# Patient Record
Sex: MALE | Race: BLACK OR AFRICAN AMERICAN | NOT HISPANIC OR LATINO | Employment: OTHER | URBAN - METROPOLITAN AREA
[De-identification: names, ages, dates, MRNs, and addresses within clinical notes are randomized per-mention and may not be internally consistent; named-entity substitution may affect disease eponyms.]

---

## 2017-06-14 LAB — HCV AB SER-ACNC: NEGATIVE

## 2019-01-29 ENCOUNTER — LAB VISIT (OUTPATIENT)
Dept: LAB | Facility: HOSPITAL | Age: 54
End: 2019-01-29
Attending: FAMILY MEDICINE
Payer: MEDICARE

## 2019-01-29 ENCOUNTER — OFFICE VISIT (OUTPATIENT)
Dept: FAMILY MEDICINE | Facility: CLINIC | Age: 54
End: 2019-01-29
Payer: MEDICARE

## 2019-01-29 VITALS
HEIGHT: 69 IN | OXYGEN SATURATION: 99 % | TEMPERATURE: 98 F | HEART RATE: 75 BPM | SYSTOLIC BLOOD PRESSURE: 122 MMHG | WEIGHT: 168.88 LBS | BODY MASS INDEX: 25.01 KG/M2 | DIASTOLIC BLOOD PRESSURE: 80 MMHG

## 2019-01-29 DIAGNOSIS — E78.00 PURE HYPERCHOLESTEROLEMIA: ICD-10-CM

## 2019-01-29 DIAGNOSIS — N18.5 BENIGN HYPERTENSION WITH CHRONIC KIDNEY DISEASE, STAGE V: ICD-10-CM

## 2019-01-29 DIAGNOSIS — I10 ESSENTIAL (PRIMARY) HYPERTENSION: ICD-10-CM

## 2019-01-29 DIAGNOSIS — N18.6 ESRD ON HEMODIALYSIS: ICD-10-CM

## 2019-01-29 DIAGNOSIS — I12.0 BENIGN HYPERTENSION WITH CHRONIC KIDNEY DISEASE, STAGE V: ICD-10-CM

## 2019-01-29 DIAGNOSIS — E11.9 CONTROLLED TYPE 2 DIABETES MELLITUS WITHOUT COMPLICATION, WITHOUT LONG-TERM CURRENT USE OF INSULIN: ICD-10-CM

## 2019-01-29 DIAGNOSIS — Z99.2 ESRD ON HEMODIALYSIS: ICD-10-CM

## 2019-01-29 DIAGNOSIS — E11.9 CONTROLLED TYPE 2 DIABETES MELLITUS WITHOUT COMPLICATION, WITHOUT LONG-TERM CURRENT USE OF INSULIN: Primary | ICD-10-CM

## 2019-01-29 LAB
ALBUMIN SERPL BCP-MCNC: 3.4 G/DL
ALP SERPL-CCNC: 78 U/L
ALT SERPL W/O P-5'-P-CCNC: 7 U/L
ANION GAP SERPL CALC-SCNC: 16 MMOL/L
AST SERPL-CCNC: 11 U/L
BILIRUB SERPL-MCNC: 0.3 MG/DL
BUN SERPL-MCNC: 32 MG/DL
CALCIUM SERPL-MCNC: 8.8 MG/DL
CHLORIDE SERPL-SCNC: 98 MMOL/L
CHOLEST SERPL-MCNC: 241 MG/DL
CHOLEST/HDLC SERPL: 3.8 {RATIO}
CO2 SERPL-SCNC: 26 MMOL/L
CREAT SERPL-MCNC: 9.7 MG/DL
ERYTHROCYTE [DISTWIDTH] IN BLOOD BY AUTOMATED COUNT: 15.9 %
EST. GFR  (AFRICAN AMERICAN): 6.3 ML/MIN/1.73 M^2
EST. GFR  (NON AFRICAN AMERICAN): 5.4 ML/MIN/1.73 M^2
ESTIMATED AVG GLUCOSE: 128 MG/DL
GLUCOSE SERPL-MCNC: 214 MG/DL
HBA1C MFR BLD HPLC: 6.1 %
HCT VFR BLD AUTO: 41 %
HDLC SERPL-MCNC: 63 MG/DL
HDLC SERPL: 26.1 %
HGB BLD-MCNC: 12.2 G/DL
LDLC SERPL CALC-MCNC: 148.6 MG/DL
MCH RBC QN AUTO: 29.4 PG
MCHC RBC AUTO-ENTMCNC: 29.8 G/DL
MCV RBC AUTO: 99 FL
NONHDLC SERPL-MCNC: 178 MG/DL
PLATELET # BLD AUTO: 275 K/UL
PMV BLD AUTO: 10.3 FL
POTASSIUM SERPL-SCNC: 4.2 MMOL/L
PROT SERPL-MCNC: 8.5 G/DL
RBC # BLD AUTO: 4.15 M/UL
SODIUM SERPL-SCNC: 140 MMOL/L
TRIGL SERPL-MCNC: 147 MG/DL
WBC # BLD AUTO: 6.92 K/UL

## 2019-01-29 PROCEDURE — 99999 PR PBB SHADOW E&M-NEW PATIENT-LVL III: ICD-10-PCS | Mod: PBBFAC,,, | Performed by: FAMILY MEDICINE

## 2019-01-29 PROCEDURE — 99203 OFFICE O/P NEW LOW 30 MIN: CPT | Mod: PBBFAC,PO | Performed by: FAMILY MEDICINE

## 2019-01-29 PROCEDURE — 80053 COMPREHEN METABOLIC PANEL: CPT

## 2019-01-29 PROCEDURE — 83036 HEMOGLOBIN GLYCOSYLATED A1C: CPT

## 2019-01-29 PROCEDURE — 99204 PR OFFICE/OUTPT VISIT, NEW, LEVL IV, 45-59 MIN: ICD-10-PCS | Mod: S$PBB,,, | Performed by: FAMILY MEDICINE

## 2019-01-29 PROCEDURE — 99999 PR PBB SHADOW E&M-NEW PATIENT-LVL III: CPT | Mod: PBBFAC,,, | Performed by: FAMILY MEDICINE

## 2019-01-29 PROCEDURE — 36415 COLL VENOUS BLD VENIPUNCTURE: CPT | Mod: PO

## 2019-01-29 PROCEDURE — 85027 COMPLETE CBC AUTOMATED: CPT

## 2019-01-29 PROCEDURE — 80061 LIPID PANEL: CPT

## 2019-01-29 PROCEDURE — 99204 OFFICE O/P NEW MOD 45 MIN: CPT | Mod: S$PBB,,, | Performed by: FAMILY MEDICINE

## 2019-01-29 RX ORDER — HYDRALAZINE HYDROCHLORIDE 50 MG/1
50 TABLET, FILM COATED ORAL 3 TIMES DAILY
COMMUNITY
End: 2019-01-29

## 2019-01-29 RX ORDER — INSULIN GLARGINE 100 [IU]/ML
INJECTION, SOLUTION SUBCUTANEOUS
COMMUNITY
End: 2019-01-29 | Stop reason: SDUPTHER

## 2019-01-29 NOTE — LETTER
January 31, 2019      Lily May NP  [address] [place] [address]  [address]  [place] [address]           [facility]  [address] [facility] [place] [address]  Phone: [phone]  Fax: [phone]          Patient: Adryan Neff   MR Number: [id number]   YOB: 1965   Date of Visit: 1/29/2019       Dear Lily May:    Thank you for referring Adryan Neff to me for evaluation. Attached you will find relevant portions of my assessment and plan of care.    If you have questions, please do not hesitate to call me. I look forward to following Adryan Neff along with you.    Sincerely,    Soren Rice MD    Enclosure  CC:  No Recipients    If you would like to receive this communication electronically, please contact [email or web address] or [phone] to request more information on [employer] Link access.    For providers and/or their staff who would like to refer a patient to [facility], please contact us through our one-stop-shop provider referral line, [facility] , at [phone].    If you feel you have received this communication in error or would no longer like to receive these types of communications, please e-mail [email or web address]

## 2019-01-29 NOTE — PROGRESS NOTES
Office Visit    Patient Name: Adryan Neff    : 1965  MRN: [id number]      Assessment/Plan:  Adryan eNff is a 53 y.o. male who presents today for :    Controlled type 2 diabetes mellitus without complication, without long-term current use of insulin  -     Hemoglobin A1c; Future; Expected date: 2019  -     CBC Without Differential; Future; Expected date: 2019  -     Comprehensive metabolic panel; Future; Expected date: 2019  -     Lipid panel; Future; Expected date: 2019  -     Microalbumin/creatinine urine ratio; Future; Expected date: 2019  -     Urinalysis Microscopic; Future; Expected date: 2019  -     insulin (LANTUS SOLOSTAR U-100 INSULIN) glargine 100 units/mL (3mL) SubQ pen; Inject 15 Units into the skin every evening.  Dispense: 3 mL; Refill: 4  -     blood sugar diagnostic Strp; 1 each by Misc.(Non-Drug; Combo Route) route 2 hours after meals and at bedtime.  Dispense: 200 each; Refill: 10  -     Ambulatory referral to Optometry  -previous A1c reviewed:   Lab Results   Component Value Date    HGBA1C 6.1 (H) 2019     -controlled, continue current medication regimen  -d/w patient about the need for regular eye care, skin care, daily foot exam, proper nutrition, daily BG checks at home (fasting and 2 hrs pp) and medication compliance in a diabetic.  Target morning BS  and after meal -180  -discussed weight loss and regular physical excercise  -caution on hypoglycemia and eating small cracker or small cup of juice if pt feels faint/dizzy/weak, and call doctor or go to ER.  -f/u in 3 months.    Essential (primary) hypertension  -     lisinopril (PRINIVIL,ZESTRIL) 2.5 MG tablet; Take 1 tablet (2.5 mg total) by mouth once daily.  Dispense: 90 tablet; Refill: 3  -     hydrALAZINE (APRESOLINE) 50 MG tablet; Take 1 tablet (50 mg total) by mouth 3 (three) times daily.  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  Dispense: 90 tablet; Refill: 3        Follow-up in about 3 months (around 4/29/2019).     This note was created by combination of typed  and Dragon dictation.  Transcription errors may be present.  If there are any questions, please contact me.    At least 45 minutes were spent today with the patient in the office, which more than half the time was spent in counseling regarding management as above, as well as answering questions and addressing patient's concerns. Pt voices understanding of what was discussed and has no other questions at this time.      ----------------------------------------------------------------------------------------------------------------------      HPI:  Patient Care Team:  Soren Rice MD as PCP - General (Family Medicine)    Adryan is a 53 y.o. male with      Patient Active Problem List   Diagnosis    Controlled type 2 diabetes mellitus without complication, without long-term current use of insulin    Essential (primary) hypertension    ESRD on hemodialysis    Pure hypercholesterolemia     This patient is new to me     Patient presents today for f/u :  Establish Care and Diabetes      DM - Dx'd over 18 years ago, last saw prior PCP was over 6 months ago. His current regimen is only Lantus 15 unit nightly, patient is compliant, no side effects   He has not checked FBG in quite some time as he no longer has a glucometer - requests for new testing supplies today. He denies any polyuria/polydipsia. No foot numbness/tingling/vision changes/hypoglycemia. Labs obtained today shows good control of DM. He does shave ESRD on HD M,W,F - still makes some urine.      Hemoglobin A1C   Date Value Ref Range Status   01/29/2019 6.1 (H) 4.0 -  5.6 % Final     Comment:     ADA Screening Guidelines:  5.7-6.4%  Consistent with prediabetes  >or=6.5%  Consistent with diabetes  High levels of fetal hemoglobin interfere with the HbA1C  assay. Heterozygous hemoglobin variants (HbS, HgC, etc)do  not significantly interfere with this assay.   However, presence of multiple variants may affect accuracy.         HTN - compliant with meds as prescribed, denies any side effects.  No BP logs at home. No vision changes/urinary changes/leg swelling.         Additional ROS    No F/C/wt changes/fatigue  No dysphagia/sore throat  No CP/WALLIS/palpitations/swelling  No cough/wheezing/SOB  No nausea/vomiting/abd pain/no diarrhea, no constipation, no blood in stool  No muscle aches/joint pain  No rashes  No weakness/HA/tingling/numbness  No anxiety/depression  No dysuria/hematuria      Patient Active Problem List   Diagnosis    Controlled type 2 diabetes mellitus without complication, without long-term current use of insulin    Essential (primary) hypertension    ESRD on hemodialysis    Pure hypercholesterolemia       Current Medications  Medications reviewed/updated.     No current outpatient medications on file prior to visit.     No current facility-administered medications on file prior to visit.            History reviewed. No pertinent surgical history.    History reviewed. No pertinent family history.    Social History     Socioeconomic History    Marital status: Single     Spouse name: Not on file    Number of children: Not on file    Years of education: Not on file    Highest education level: Not on file   Social Needs    Financial resource strain: Not on file    Food insecurity - worry: Not on file    Food insecurity - inability: Not on file    Transportation needs - medical: Not on file    Transportation needs - non-medical: Not on file   Occupational History    Not on file   Tobacco Use    Smoking status: Never Smoker    Smokeless tobacco: Never Used  "  Substance and Sexual Activity    Alcohol use: No     Frequency: Never    Drug use: No    Sexual activity: Not on file   Other Topics Concern    Not on file   Social History Narrative    Not on file             Allergies   Review of patient's allergies indicates:  No Known Allergies          Review of Systems  See HPI      Physical Exam  /80   Pulse 75   Temp 98 °F (36.7 °C) (Oral)   Ht 5' 9" (1.753 m)   Wt 76.6 kg (168 lb 14 oz)   SpO2 99%   BMI 24.94 kg/m²     GEN: NAD, well developed, pleasant, well nourished  HEENT: NCAT, PERRLA, EOMI, sclera clear, anicteric, O/P clear, MMM with no lesions  NECK: normal, supple with midline trachea, no LAD, no thyromegaly  LUNGS: CTAB, no w/r/r, no increased work of breathing   HEART: RRR, normal S1 and S2, no m/r/g, no edema  ABD: s/nt/nd, NABS  SKIN: normal turgor, no rashes  PSYCH: AOx3, appropriate mood and affect  MSK: warm/well perfused, normal ROM in all extremities, no c/c/e.  FOOT:  Protective Sensation (w/ 10 gram monofilament):  Right: Intact  Left: Intact    Visual Inspection:  Normal -  Bilateral    Pedal Pulses:   Right: Present  Left: Present    Posterior tibialis:   Right:Present  Left: Present          Labs  Lab Results   Component Value Date    HGBA1C 6.1 (H) 01/29/2019     Lab Results   Component Value Date     01/29/2019    K 4.2 01/29/2019    CL 98 01/29/2019    CO2 26 01/29/2019    BUN 32 (H) 01/29/2019    CREATININE 9.7 (H) 01/29/2019    CALCIUM 8.8 01/29/2019    ANIONGAP 16 01/29/2019    ESTGFRAFRICA 6.3 (A) 01/29/2019    EGFRNONAA 5.4 (A) 01/29/2019     Lab Results   Component Value Date    CHOL 241 (H) 01/29/2019     Lab Results   Component Value Date    HDL 63 01/29/2019     Lab Results   Component Value Date    LDLCALC 148.6 01/29/2019     Lab Results   Component Value Date    TRIG 147 01/29/2019     Lab Results   Component Value Date    CHOLHDL 26.1 01/29/2019     Last set of blood work has been reviewed as noted " above.      Health Maintenance  Health Maintenance    Patient has no pending health maintenance at this time

## 2019-01-31 PROBLEM — E78.00 PURE HYPERCHOLESTEROLEMIA: Status: ACTIVE | Noted: 2019-01-31

## 2019-01-31 PROBLEM — I12.0 BENIGN HYPERTENSION WITH CHRONIC KIDNEY DISEASE, STAGE V: Status: ACTIVE | Noted: 2019-01-31

## 2019-01-31 PROBLEM — N18.5 BENIGN HYPERTENSION WITH CHRONIC KIDNEY DISEASE, STAGE V: Status: ACTIVE | Noted: 2019-01-31

## 2019-01-31 RX ORDER — HYDRALAZINE HYDROCHLORIDE 50 MG/1
50 TABLET, FILM COATED ORAL 3 TIMES DAILY
Qty: 90 TABLET | Refills: 11 | Status: SHIPPED | OUTPATIENT
Start: 2019-01-31 | End: 2020-10-26 | Stop reason: ALTCHOICE

## 2019-01-31 RX ORDER — ATORVASTATIN CALCIUM 20 MG/1
20 TABLET, FILM COATED ORAL DAILY
Qty: 90 TABLET | Refills: 3 | Status: SHIPPED | OUTPATIENT
Start: 2019-01-31 | End: 2021-08-09

## 2019-01-31 RX ORDER — INSULIN GLARGINE 100 [IU]/ML
15 INJECTION, SOLUTION SUBCUTANEOUS NIGHTLY
Qty: 3 ML | Refills: 4 | Status: SHIPPED | OUTPATIENT
Start: 2019-01-31 | End: 2021-01-07 | Stop reason: ALTCHOICE

## 2019-01-31 RX ORDER — LISINOPRIL 2.5 MG/1
2.5 TABLET ORAL DAILY
Qty: 90 TABLET | Refills: 3 | OUTPATIENT
Start: 2019-01-31 | End: 2021-01-07

## 2019-02-04 ENCOUNTER — TELEPHONE (OUTPATIENT)
Dept: FAMILY MEDICINE | Facility: CLINIC | Age: 54
End: 2019-02-04

## 2019-02-04 NOTE — TELEPHONE ENCOUNTER
----- Message from Suzi Cervantes sent at 2/4/2019  9:20 AM CST -----  Contact: [facility] [phone]  [facility] called on behalf of the patient. The patient has been without insulin for 3 weeks. His insurance will not cover the medication prescribed. The medication is:insulin (LANTUS SOLOSTAR U-100 INSULIN) glargine 100 units/mL (3mL) SubQ pen. Instead they would like something else called in to the pharmacy. The pharmacy has faxed over a medication request as well. Please call at your earliest convenience.

## 2019-02-04 NOTE — TELEPHONE ENCOUNTER
Called patient an informed him that he will need to contact his insurance to find out what insulin is covered under his insurance. Patient voiced understanding.

## 2019-02-04 NOTE — TELEPHONE ENCOUNTER
Patient has been out of insulin for 3 weeks and his insurance will not cover his lantus and need and alternative sent to his pharmacy. Please advise.

## 2019-02-05 ENCOUNTER — TELEPHONE (OUTPATIENT)
Dept: FAMILY MEDICINE | Facility: CLINIC | Age: 54
End: 2019-02-05

## 2019-02-05 NOTE — TELEPHONE ENCOUNTER
----- Message from Laurie Paz sent at 2/5/2019 10:12 AM CST -----  Contact: self  Pt is calling to speak with staff. His insulin (LANTUS SOLOSTAR U-100 INSULIN) glargine 100 units/mL (3mL) SubQ pen is not covered by insurance. Please call pt at [phone].

## 2019-02-05 NOTE — TELEPHONE ENCOUNTER
Informed patient again that Lantus is not covered under his u=insurance and that he has to call his insurance company to find out which medication is covered. I gave patient the number to his insurance company to call.

## 2019-02-07 DIAGNOSIS — E11.9 CONTROLLED TYPE 2 DIABETES MELLITUS WITHOUT COMPLICATION, WITHOUT LONG-TERM CURRENT USE OF INSULIN: Primary | ICD-10-CM

## 2019-02-07 RX ORDER — INSULIN GLARGINE 100 [IU]/ML
INJECTION, SOLUTION SUBCUTANEOUS
COMMUNITY
End: 2019-02-07 | Stop reason: SDUPTHER

## 2019-02-07 RX ORDER — INSULIN GLARGINE 100 [IU]/ML
15 INJECTION, SOLUTION SUBCUTANEOUS NIGHTLY
Qty: 15 ML | Refills: 12 | Status: SHIPPED | OUTPATIENT
Start: 2019-02-07 | End: 2022-05-17 | Stop reason: DRUGHIGH

## 2019-02-07 NOTE — TELEPHONE ENCOUNTER
Dialysis center calling stating patient is still waiting on insulin. I have advised patient multiple times to call insurance company to find out which insulin is covered but had no luck. CoverMyMeds suggested Georgina Triana might possibly be covered under insurance. Please advise.

## 2019-02-07 NOTE — TELEPHONE ENCOUNTER
"----- Message from Kandy Balderas sent at 2/6/2019 12:20 PM CST -----  Contact: Jovana "[facility]" [phone]  Pt is still waiting to get a  PA on insulin (LANTUS SOLOSTAR U-100 INSULIN) glargine 100 units/mL (3mL) SubQ pen. If PA is denied please change Rx to something that covered by insurance company.  "

## 2019-02-07 NOTE — TELEPHONE ENCOUNTER
Controlled type 2 diabetes mellitus without complication, without long-term current use of insulin  -     insulin (BASAGLAR KWIKPEN U-100 INSULIN) glargine 100 units/mL (3mL) SubQ pen; Inject 15 Units into the skin every evening.  Dispense: 15 mL; Refill: 12

## 2019-05-14 ENCOUNTER — OFFICE VISIT (OUTPATIENT)
Dept: FAMILY MEDICINE | Facility: CLINIC | Age: 54
End: 2019-05-14
Payer: MEDICARE

## 2019-05-14 VITALS
HEART RATE: 80 BPM | HEIGHT: 69 IN | DIASTOLIC BLOOD PRESSURE: 70 MMHG | SYSTOLIC BLOOD PRESSURE: 124 MMHG | WEIGHT: 171.75 LBS | BODY MASS INDEX: 25.44 KG/M2 | OXYGEN SATURATION: 98 % | TEMPERATURE: 99 F

## 2019-05-14 DIAGNOSIS — K21.9 GASTROESOPHAGEAL REFLUX DISEASE, ESOPHAGITIS PRESENCE NOT SPECIFIED: Primary | ICD-10-CM

## 2019-05-14 DIAGNOSIS — E78.00 PURE HYPERCHOLESTEROLEMIA: ICD-10-CM

## 2019-05-14 DIAGNOSIS — N18.5 BENIGN HYPERTENSION WITH CHRONIC KIDNEY DISEASE, STAGE V: ICD-10-CM

## 2019-05-14 DIAGNOSIS — Z99.2 ESRD ON HEMODIALYSIS: ICD-10-CM

## 2019-05-14 DIAGNOSIS — Z12.11 COLON CANCER SCREENING: ICD-10-CM

## 2019-05-14 DIAGNOSIS — I12.0 BENIGN HYPERTENSION WITH CHRONIC KIDNEY DISEASE, STAGE V: ICD-10-CM

## 2019-05-14 DIAGNOSIS — N18.6 ESRD ON HEMODIALYSIS: ICD-10-CM

## 2019-05-14 DIAGNOSIS — E11.9 CONTROLLED TYPE 2 DIABETES MELLITUS WITHOUT COMPLICATION, WITHOUT LONG-TERM CURRENT USE OF INSULIN: ICD-10-CM

## 2019-05-14 PROCEDURE — 99213 OFFICE O/P EST LOW 20 MIN: CPT | Mod: PBBFAC,PO | Performed by: FAMILY MEDICINE

## 2019-05-14 PROCEDURE — 99214 PR OFFICE/OUTPT VISIT, EST, LEVL IV, 30-39 MIN: ICD-10-PCS | Mod: S$PBB,,, | Performed by: FAMILY MEDICINE

## 2019-05-14 PROCEDURE — 99999 PR PBB SHADOW E&M-EST. PATIENT-LVL III: CPT | Mod: PBBFAC,,, | Performed by: FAMILY MEDICINE

## 2019-05-14 PROCEDURE — 99214 OFFICE O/P EST MOD 30 MIN: CPT | Mod: S$PBB,,, | Performed by: FAMILY MEDICINE

## 2019-05-14 PROCEDURE — 99999 PR PBB SHADOW E&M-EST. PATIENT-LVL III: ICD-10-PCS | Mod: PBBFAC,,, | Performed by: FAMILY MEDICINE

## 2019-05-14 RX ORDER — OMEPRAZOLE 20 MG/1
20 CAPSULE, DELAYED RELEASE ORAL DAILY
Qty: 90 CAPSULE | Refills: 3 | Status: SHIPPED | OUTPATIENT
Start: 2019-05-14 | End: 2020-05-13

## 2019-05-14 NOTE — PROGRESS NOTES
Office Visit    Patient Name: Adryan Neff    : 1965  MRN: [id number]      Assessment/Plan:  Adryan Neff is a 53 y.o. male who presents today for :    Gastroesophageal reflux disease, esophagitis presence not specified  -     omeprazole (PRILOSEC) 20 MG capsule; Take 1 capsule (20 mg total) by mouth once daily.  Dispense: 90 capsule; Refill: 3  -start antacid and avoid triggers  d/w pt about the benefits and side effects of chronic PPI use, may use Zantac and TUMs as alternative in addition to dietary modification to improve symptoms  - handout given  -avoid spicy/greasy/sour/acidic foods, as well as tea/coffee/chocolate if possible  -Tylenol as needed for pain, avoid NSAIDs    -Keep food diary      Controlled type 2 diabetes mellitus without complication, without long-term current use of insulin  Benign hypertension with chronic kidney disease, stage V  ESRD on hemodialysis  Pure hypercholesterolemia  -continue current medications   - ?advised DASH diet, portion control, regular cardiovascular exercises  - ?counseled on weight loss    Colon cancer screening  -     Fecal Immunochemical Test (iFOBT); Future; Expected date: 2019  -     Case request GI: COLONOSCOPY            Follow up for any evaluation as needed.     This note was created by combination of typed  and Dragon dictation.  Transcription errors may be present.  If there are any questions, please contact me.        ----------------------------------------------------------------------------------------------------------------------      HPI:  Patient Care Team:  Soren Rice MD as PCP - General (Family Medicine)    Adryan is a 53 y.o. male with      Patient Active Problem List   Diagnosis    Controlled type 2 diabetes mellitus without complication, without long-term current use of insulin    Essential (primary) hypertension    ESRD on hemodialysis    Pure hypercholesterolemia    Benign hypertension with chronic kidney disease,  stage V       Patient presents today for :  Abdominal Pain  in epigastric region for the past week, feels worse right after meals - had 1 episode of non-bilious/blood vomiting yesterday - no recurrence, sometimes with heart burn sensation going up to the right chest. Feels better after meals. No WALLIS/SOB/palpitations.  He denies junk food/tea/coffee/special fad diet. He has normal soft BM daily.   MEDs tried at home:  none  Denies wt loss/N/V/appetite changes/heartburn/flatulence/changes in BMs/melena/hematochezia  Denies  Diarrhea/constipation  Pt is compliant with daily BP/insulin medication at home - no side effects, BP/FBG controlled at home.      No F/C/wt changes/fatigue  No dysphagia/sore throat/rhinorrhea  No CP/SOB/palpitations/swelling  No cough/wheezing/SOB  No muscle aches/joint pain  No rashes  No weakness/HA/tingling/numbness  No anxiety/depression  No dysuria/hematuria  No polyuria/polydipsia/fatigue/wt changes/cold or hot intolerance  No bleeding/bruising          Patient Active Problem List   Diagnosis    Controlled type 2 diabetes mellitus without complication, without long-term current use of insulin    Essential (primary) hypertension    ESRD on hemodialysis    Pure hypercholesterolemia    Benign hypertension with chronic kidney disease, stage V       Current Medications  Medications reviewed and updated.       Current Outpatient Medications:     atorvastatin (LIPITOR) 20 MG tablet, Take 1 tablet (20 mg total) by mouth once daily., Disp: 90 tablet, Rfl: 3    blood sugar diagnostic Strp, 1 each by Misc.(Non-Drug; Combo Route) route 2 hours after meals and at bedtime., Disp: 200 each, Rfl: 10    hydrALAZINE (APRESOLINE) 50 MG tablet, Take 1 tablet (50 mg total) by mouth 3 (three) times daily., Disp: 90 tablet, Rfl: 11    insulin (BASAGLAR KWIKPEN U-100 INSULIN) glargine 100 units/mL (3mL) SubQ pen, Inject 15 Units into the skin every evening., Disp: 15 mL, Rfl: 12    insulin (LANTUS  "SOLOSTAR U-100 INSULIN) glargine 100 units/mL (3mL) SubQ pen, Inject 15 Units into the skin every evening., Disp: 3 mL, Rfl: 4    lisinopril (PRINIVIL,ZESTRIL) 2.5 MG tablet, Take 1 tablet (2.5 mg total) by mouth once daily., Disp: 90 tablet, Rfl: 3    omeprazole (PRILOSEC) 20 MG capsule, Take 1 capsule (20 mg total) by mouth once daily., Disp: 90 capsule, Rfl: 3    History reviewed. No pertinent surgical history.    History reviewed. No pertinent family history.    Social History     Socioeconomic History    Marital status: Single     Spouse name: Not on file    Number of children: Not on file    Years of education: Not on file    Highest education level: Not on file   Occupational History    Not on file   Social Needs    Financial resource strain: Not on file    Food insecurity:     Worry: Not on file     Inability: Not on file    Transportation needs:     Medical: Not on file     Non-medical: Not on file   Tobacco Use    Smoking status: Never Smoker    Smokeless tobacco: Never Used   Substance and Sexual Activity    Alcohol use: No     Frequency: Never    Drug use: No    Sexual activity: Not on file   Lifestyle    Physical activity:     Days per week: Not on file     Minutes per session: Not on file    Stress: Not on file   Relationships    Social connections:     Talks on phone: Not on file     Gets together: Not on file     Attends Sabianist service: Not on file     Active member of club or organization: Not on file     Attends meetings of clubs or organizations: Not on file     Relationship status: Not on file   Other Topics Concern    Not on file   Social History Narrative    Not on file           Allergies   Review of patient's allergies indicates:  No Known Allergies          Review of Systems  See HPI      Physical Exam  /70   Pulse 80   Temp 98.7 °F (37.1 °C) (Oral)   Ht 5' 9" (1.753 m)   Wt 77.9 kg (171 lb 11.8 oz)   SpO2 98%   BMI 25.36 kg/m²     GEN: NAD, well developed, " pleasant, well nourished  HEENT: NCAT, PERRLA, EOMI, sclera clear, anicteric, O/P clear, MMM with no lesions  NECK: normal, supple with midline trachea, no LAD, no thyromegaly  LUNGS: CTAB, no w/r/r, no increased work of breathing   HEART: RRR, normal S1 and S2, no m/r/g, no edema  ABD: s/nd, +mild epigastric TTP, NABS, no organomegaly, no masses, no hernias, no rebound, no guarding. No RLQ/LLQ TTP. Suprapubic tenderness absent. No CVA tenderness.  SKIN: normal turgor, no rashes  PSYCH: AOx3, appropriate mood and affect  MSK: warm/well perfused, normal ROM in all extremities, no c/c/e.      Labs  Lab Results   Component Value Date    HGBA1C 6.1 (H) 01/29/2019     Lab Results   Component Value Date     01/29/2019    K 4.2 01/29/2019    CL 98 01/29/2019    CO2 26 01/29/2019    BUN 32 (H) 01/29/2019    CREATININE 9.7 (H) 01/29/2019    CALCIUM 8.8 01/29/2019    ANIONGAP 16 01/29/2019    ESTGFRAFRICA 6.3 (A) 01/29/2019    EGFRNONAA 5.4 (A) 01/29/2019     Lab Results   Component Value Date    CHOL 241 (H) 01/29/2019     Lab Results   Component Value Date    HDL 63 01/29/2019     Lab Results   Component Value Date    LDLCALC 148.6 01/29/2019     Lab Results   Component Value Date    TRIG 147 01/29/2019     Lab Results   Component Value Date    CHOLHDL 26.1 01/29/2019     Last set of blood work has been reviewed as noted above.

## 2019-06-04 ENCOUNTER — OFFICE VISIT (OUTPATIENT)
Dept: FAMILY MEDICINE | Facility: CLINIC | Age: 54
End: 2019-06-04
Payer: MEDICARE

## 2019-06-04 VITALS
TEMPERATURE: 99 F | HEIGHT: 69 IN | HEART RATE: 84 BPM | OXYGEN SATURATION: 99 % | BODY MASS INDEX: 25.12 KG/M2 | DIASTOLIC BLOOD PRESSURE: 70 MMHG | SYSTOLIC BLOOD PRESSURE: 138 MMHG | WEIGHT: 169.63 LBS

## 2019-06-04 DIAGNOSIS — E11.9 CONTROLLED TYPE 2 DIABETES MELLITUS WITHOUT COMPLICATION, WITHOUT LONG-TERM CURRENT USE OF INSULIN: Primary | ICD-10-CM

## 2019-06-04 DIAGNOSIS — G47.09 OTHER INSOMNIA: ICD-10-CM

## 2019-06-04 DIAGNOSIS — J30.89 SEASONAL ALLERGIC RHINITIS DUE TO OTHER ALLERGIC TRIGGER: ICD-10-CM

## 2019-06-04 DIAGNOSIS — N18.5 BENIGN HYPERTENSION WITH CHRONIC KIDNEY DISEASE, STAGE V: ICD-10-CM

## 2019-06-04 DIAGNOSIS — N18.6 ESRD ON HEMODIALYSIS: ICD-10-CM

## 2019-06-04 DIAGNOSIS — K21.9 GASTROESOPHAGEAL REFLUX DISEASE, ESOPHAGITIS PRESENCE NOT SPECIFIED: ICD-10-CM

## 2019-06-04 DIAGNOSIS — E78.00 PURE HYPERCHOLESTEROLEMIA: ICD-10-CM

## 2019-06-04 DIAGNOSIS — I12.0 BENIGN HYPERTENSION WITH CHRONIC KIDNEY DISEASE, STAGE V: ICD-10-CM

## 2019-06-04 DIAGNOSIS — Z99.2 ESRD ON HEMODIALYSIS: ICD-10-CM

## 2019-06-04 PROCEDURE — 99213 OFFICE O/P EST LOW 20 MIN: CPT | Mod: PBBFAC,PO | Performed by: FAMILY MEDICINE

## 2019-06-04 PROCEDURE — 99214 OFFICE O/P EST MOD 30 MIN: CPT | Mod: S$PBB,,, | Performed by: FAMILY MEDICINE

## 2019-06-04 PROCEDURE — 99999 PR PBB SHADOW E&M-EST. PATIENT-LVL III: ICD-10-PCS | Mod: PBBFAC,,, | Performed by: FAMILY MEDICINE

## 2019-06-04 PROCEDURE — 99999 PR PBB SHADOW E&M-EST. PATIENT-LVL III: CPT | Mod: PBBFAC,,, | Performed by: FAMILY MEDICINE

## 2019-06-04 PROCEDURE — 99214 PR OFFICE/OUTPT VISIT, EST, LEVL IV, 30-39 MIN: ICD-10-PCS | Mod: S$PBB,,, | Performed by: FAMILY MEDICINE

## 2019-06-04 RX ORDER — TRAZODONE HYDROCHLORIDE 50 MG/1
50 TABLET ORAL NIGHTLY
Qty: 90 TABLET | Refills: 1 | Status: SHIPPED | OUTPATIENT
Start: 2019-06-04 | End: 2021-01-26

## 2019-06-04 NOTE — PROGRESS NOTES
Office Visit    Patient Name: Adryan Neff    : 1965  MRN: [id number]      Assessment/Plan:  Adryan Neff is a 53 y.o. male who presents today for :    Controlled type 2 diabetes mellitus without complication, without long-term current use of insulin  Benign hypertension with chronic kidney disease, stage V  ESRD on hemodialysis  Pure hypercholesterolemia  -BP in appropriate range  -continue current medications   - ?advised DASH diet, portion control, regular cardiovascular exercises    Gastroesophageal reflux disease, esophagitis presence not specified  -stable, continue current medication regimen  Prn    Seasonal allergic rhinitis due to other allergic trigger  -start Claritin daily  -use Flonase daily in the morning and oral anti-histamine at night, as well as nasal saline rinses twice daily as needed. Pt prefers to get meds OTC.  -consider wearing a nose mask at work to avoid inhaling fine dust and/or pollen particles  -discussed use of air humidifier/filter at home, changing AC filters regularly, avoid second-hand smoking.   -advised cont supportive therapy and symptomatic management.   -f/u as needed     Other insomnia  -     traZODone (DESYREL) 50 MG tablet; Take 1 tablet (50 mg total) by mouth every evening.  Dispense: 90 tablet; Refill: 1  -I had a detailed discussion with patient regarding the risks and benefits of sleeping medications, and the importance of treating the underlying cause of insomnia instead of just treating insomnia with sleep medications  -advised patient to modify pre-bedtime routine:   -avoid watching TV at least 3-4 hours before bedtime, as well a no cell phone use in bed. If patient wakes up in the middle of the night, to get up and do tasks that do not involve mentally intense activities until patient gets sleepy again. Avoid smoking/alcohol abuse/substance abuse - especially tea. Advised eating healthy diet and pursuing a regular physical exercise for physical and mental  well-being to help with better, quality sleep.  Patient voices understanding  -f/u prn      Follow up in about 6 months (around 12/4/2019).     This note was created by combination of typed  and Dragon dictation.  Transcription errors may be present.  If there are any questions, please contact me.      ----------------------------------------------------------------------------------------------------------------------      HPI:  Patient Care Team:  Soren Rice MD as PCP - General (Family Medicine)    Adryan is a 53 y.o. male with      Patient Active Problem List   Diagnosis    Controlled type 2 diabetes mellitus without complication, without long-term current use of insulin    Essential (primary) hypertension    ESRD on hemodialysis    Pure hypercholesterolemia    Benign hypertension with chronic kidney disease, stage V    Other insomnia    Gastroesophageal reflux disease         Patient presents today for f/u   Insomnia      HTN - compliant with meds as prescribed, denies any side effects.  120s/80s per BP log at home.  No vision changes/urinary changes/leg swelling. Patient is cutting down on salt-intake. He has ESRD on HD MWF, going well without any issues. HLD - tolerating statin well without any issues.   DM - Pt is compliant with daily Basaglar/Lantus at home - no side effects, FBGcontrolled at home.    Sinus allergies - Pt endorses getting nasal and throat drainage on/off throughout the year but wo0rse in the past 2 weeks with increased pollen near his house. Associated with watery eyes and eyelid swelling. Has not tried any OTC meds.  coughing+sinus congestion, and occasional coughing due to postnasal drip. Also has issues with sleep the past 2 weeks due to coughing and drainage at night time. He does take tea 1-2 cups daily, no other sources of caffeine. No new meds nor changes in work/family life. No new stressors.      Additional ROS    No F/C/wt changes/fatigue  No dysphagia/sore  throat  No CP/WALLIS/palpitations/swelling  No cough/wheezing/SOB  No nausea/vomiting/abd pain/no diarrhea  No MSK weakness/HA/tingling/numbness  No rashes  No anxiety/depression  No dysuria/hematuria              Patient Active Problem List   Diagnosis    Controlled type 2 diabetes mellitus without complication, without long-term current use of insulin    Essential (primary) hypertension    ESRD on hemodialysis    Pure hypercholesterolemia    Benign hypertension with chronic kidney disease, stage V    Other insomnia    Gastroesophageal reflux disease       Current Medications  Medications reviewed/updated.     Current Outpatient Medications on File Prior to Visit   Medication Sig Dispense Refill    atorvastatin (LIPITOR) 20 MG tablet Take 1 tablet (20 mg total) by mouth once daily. 90 tablet 3    blood sugar diagnostic Strp 1 each by Misc.(Non-Drug; Combo Route) route 2 hours after meals and at bedtime. 200 each 10    hydrALAZINE (APRESOLINE) 50 MG tablet Take 1 tablet (50 mg total) by mouth 3 (three) times daily. 90 tablet 11    insulin (BASAGLAR KWIKPEN U-100 INSULIN) glargine 100 units/mL (3mL) SubQ pen Inject 15 Units into the skin every evening. 15 mL 12    insulin (LANTUS SOLOSTAR U-100 INSULIN) glargine 100 units/mL (3mL) SubQ pen Inject 15 Units into the skin every evening. 3 mL 4    lisinopril (PRINIVIL,ZESTRIL) 2.5 MG tablet Take 1 tablet (2.5 mg total) by mouth once daily. 90 tablet 3    omeprazole (PRILOSEC) 20 MG capsule Take 1 capsule (20 mg total) by mouth once daily. 90 capsule 3     No current facility-administered medications on file prior to visit.            History reviewed. No pertinent surgical history.    History reviewed. No pertinent family history.    Social History     Socioeconomic History    Marital status: Single     Spouse name: Not on file    Number of children: Not on file    Years of education: Not on file    Highest education level: Not on file   Occupational  "History    Not on file   Social Needs    Financial resource strain: Not on file    Food insecurity:     Worry: Not on file     Inability: Not on file    Transportation needs:     Medical: Not on file     Non-medical: Not on file   Tobacco Use    Smoking status: Never Smoker    Smokeless tobacco: Never Used   Substance and Sexual Activity    Alcohol use: No     Frequency: Never    Drug use: No    Sexual activity: Not on file   Lifestyle    Physical activity:     Days per week: Not on file     Minutes per session: Not on file    Stress: Not on file   Relationships    Social connections:     Talks on phone: Not on file     Gets together: Not on file     Attends Latter day service: Not on file     Active member of club or organization: Not on file     Attends meetings of clubs or organizations: Not on file     Relationship status: Not on file   Other Topics Concern    Not on file   Social History Narrative    Not on file             Allergies   Review of patient's allergies indicates:  No Known Allergies          Review of Systems  See HPI      Physical Exam  /70   Pulse 84   Temp 98.7 °F (37.1 °C) (Oral)   Ht 5' 9" (1.753 m)   Wt 76.9 kg (169 lb 10.3 oz)   SpO2 99%   BMI 25.05 kg/m²     GEN: NAD, well developed, pleasant, well nourished  HEENT: NCAT, PERRLA, EOMI, sclera clear, anicteric, TM clear bilaterally, O/P with copious PND but otherwise normal, MMM with +mild cobblestoning, +boggy nasal mucosa with clear nasal discharge, no maxillary/frontal TTP   NECK: normal, supple with midline trachea, no LAD, no thyromegaly  LUNGS: CTAB, no w/r/r, no respiratory distress, no increased work of breathing  HEART: RRR, normal S1 and S2, no m/r/g, no palpitations    ABD: s/nt/nd, NABS, no organomegaly  SKIN: normal turgor, no rashes, no other lesions.               "

## 2019-07-05 ENCOUNTER — TELEPHONE (OUTPATIENT)
Dept: FAMILY MEDICINE | Facility: CLINIC | Age: 54
End: 2019-07-05

## 2019-07-05 NOTE — TELEPHONE ENCOUNTER
----- Message from Malka Benítez sent at 7/5/2019  9:51 AM CDT -----  Contact: Ciara  Type: Patient Call Back    Who called: Mick Sutton     What is the request in detail: need a copy of patient's progress note, they referred patient to office     Can the clinic reply by [facility]? No     Would the patient rather a call back or a response via [facility]? Call     Best call back number:phone: [phone] Fax: [phone]

## 2019-09-18 ENCOUNTER — PATIENT OUTREACH (OUTPATIENT)
Dept: ADMINISTRATIVE | Facility: HOSPITAL | Age: 54
End: 2019-09-18

## 2020-03-09 ENCOUNTER — TELEPHONE (OUTPATIENT)
Dept: FAMILY MEDICINE | Facility: CLINIC | Age: 55
End: 2020-03-09

## 2020-03-09 DIAGNOSIS — E11.9 CONTROLLED TYPE 2 DIABETES MELLITUS WITHOUT COMPLICATION, WITHOUT LONG-TERM CURRENT USE OF INSULIN: Primary | ICD-10-CM

## 2020-03-09 NOTE — TELEPHONE ENCOUNTER
Order entered. Thanks!    Controlled type 2 diabetes mellitus without complication, without long-term current use of insulin  -     Diabetic Eye Screening Photo; Future

## 2020-07-11 ENCOUNTER — LAB VISIT (OUTPATIENT)
Dept: LAB | Facility: HOSPITAL | Age: 55
End: 2020-07-11
Attending: NURSE PRACTITIONER
Payer: MEDICARE

## 2020-07-11 DIAGNOSIS — Z99.2 ANEMIA IN CHRONIC KIDNEY DISEASE, ON CHRONIC DIALYSIS: ICD-10-CM

## 2020-07-11 DIAGNOSIS — Z99.2 ANEMIA IN CHRONIC KIDNEY DISEASE, ON CHRONIC DIALYSIS: Primary | ICD-10-CM

## 2020-07-11 DIAGNOSIS — N18.6 ANEMIA IN CHRONIC KIDNEY DISEASE, ON CHRONIC DIALYSIS: Primary | ICD-10-CM

## 2020-07-11 DIAGNOSIS — D63.1 ANEMIA IN CHRONIC KIDNEY DISEASE, ON CHRONIC DIALYSIS: Primary | ICD-10-CM

## 2020-07-11 DIAGNOSIS — D63.1 ANEMIA IN CHRONIC KIDNEY DISEASE, ON CHRONIC DIALYSIS: ICD-10-CM

## 2020-07-11 DIAGNOSIS — N18.6 ANEMIA IN CHRONIC KIDNEY DISEASE, ON CHRONIC DIALYSIS: ICD-10-CM

## 2020-07-11 LAB
BASOPHILS # BLD AUTO: 0.04 K/UL (ref 0–0.2)
BASOPHILS NFR BLD: 0.6 % (ref 0–1.9)
DIFFERENTIAL METHOD: ABNORMAL
EOSINOPHIL # BLD AUTO: 0.3 K/UL (ref 0–0.5)
EOSINOPHIL NFR BLD: 4 % (ref 0–8)
ERYTHROCYTE [DISTWIDTH] IN BLOOD BY AUTOMATED COUNT: 16.2 % (ref 11.5–14.5)
HCT VFR BLD AUTO: 27 % (ref 40–54)
HGB BLD-MCNC: 7.9 G/DL (ref 14–18)
IMM GRANULOCYTES # BLD AUTO: 0.03 K/UL (ref 0–0.04)
IMM GRANULOCYTES NFR BLD AUTO: 0.4 % (ref 0–0.5)
LYMPHOCYTES # BLD AUTO: 1.1 K/UL (ref 1–4.8)
LYMPHOCYTES NFR BLD: 16.5 % (ref 18–48)
MCH RBC QN AUTO: 28.5 PG (ref 27–31)
MCHC RBC AUTO-ENTMCNC: 29.3 G/DL (ref 32–36)
MCV RBC AUTO: 98 FL (ref 82–98)
MONOCYTES # BLD AUTO: 0.5 K/UL (ref 0.3–1)
MONOCYTES NFR BLD: 7 % (ref 4–15)
NEUTROPHILS # BLD AUTO: 4.8 K/UL (ref 1.8–7.7)
NEUTROPHILS NFR BLD: 71.5 % (ref 38–73)
NRBC BLD-RTO: 1 /100 WBC
PLATELET # BLD AUTO: 188 K/UL (ref 150–350)
PMV BLD AUTO: 10 FL (ref 9.2–12.9)
RBC # BLD AUTO: 2.77 M/UL (ref 4.6–6.2)
WBC # BLD AUTO: 6.67 K/UL (ref 3.9–12.7)

## 2020-07-11 PROCEDURE — 36415 COLL VENOUS BLD VENIPUNCTURE: CPT

## 2020-07-11 PROCEDURE — 85025 COMPLETE CBC W/AUTO DIFF WBC: CPT

## 2020-07-22 ENCOUNTER — OFFICE VISIT (OUTPATIENT)
Dept: FAMILY MEDICINE | Facility: CLINIC | Age: 55
End: 2020-07-22
Payer: MEDICARE

## 2020-07-22 VITALS
WEIGHT: 163.69 LBS | TEMPERATURE: 97 F | HEIGHT: 69 IN | BODY MASS INDEX: 24.24 KG/M2 | HEART RATE: 82 BPM | DIASTOLIC BLOOD PRESSURE: 62 MMHG | OXYGEN SATURATION: 96 % | SYSTOLIC BLOOD PRESSURE: 164 MMHG

## 2020-07-22 DIAGNOSIS — R10.13 EPIGASTRIC PAIN: Primary | ICD-10-CM

## 2020-07-22 DIAGNOSIS — R11.2 NAUSEA AND VOMITING, INTRACTABILITY OF VOMITING NOT SPECIFIED, UNSPECIFIED VOMITING TYPE: ICD-10-CM

## 2020-07-22 PROCEDURE — 99214 OFFICE O/P EST MOD 30 MIN: CPT | Mod: S$PBB,,, | Performed by: NURSE PRACTITIONER

## 2020-07-22 PROCEDURE — 99215 OFFICE O/P EST HI 40 MIN: CPT | Mod: PBBFAC,PO | Performed by: NURSE PRACTITIONER

## 2020-07-22 PROCEDURE — 99214 PR OFFICE/OUTPT VISIT, EST, LEVL IV, 30-39 MIN: ICD-10-PCS | Mod: S$PBB,,, | Performed by: NURSE PRACTITIONER

## 2020-07-22 PROCEDURE — 99999 PR PBB SHADOW E&M-EST. PATIENT-LVL V: ICD-10-PCS | Mod: PBBFAC,,, | Performed by: NURSE PRACTITIONER

## 2020-07-22 PROCEDURE — 99999 PR PBB SHADOW E&M-EST. PATIENT-LVL V: CPT | Mod: PBBFAC,,, | Performed by: NURSE PRACTITIONER

## 2020-07-22 RX ORDER — IBUPROFEN 800 MG/1
TABLET ORAL
COMMUNITY
Start: 2020-07-15 | End: 2021-01-07

## 2020-07-22 RX ORDER — ONDANSETRON 8 MG/1
8 TABLET, ORALLY DISINTEGRATING ORAL EVERY 8 HOURS PRN
Qty: 30 TABLET | Refills: 0 | Status: SHIPPED | OUTPATIENT
Start: 2020-07-22 | End: 2020-08-01

## 2020-07-22 RX ORDER — PENICILLIN V POTASSIUM 500 MG/1
TABLET, FILM COATED ORAL
COMMUNITY
Start: 2020-07-15 | End: 2021-01-07

## 2020-07-22 RX ORDER — OMEPRAZOLE 20 MG/1
40 CAPSULE, DELAYED RELEASE ORAL DAILY
Qty: 180 CAPSULE | Refills: 0 | Status: SHIPPED | OUTPATIENT
Start: 2020-07-22 | End: 2020-10-19

## 2020-07-22 NOTE — PATIENT INSTRUCTIONS
Omeprazole 40 mg in the am on a empty stomach  Zofran one every 8 hours as needed for nausea or vomiting  Gi referral   Clear liquids advance to bland diet as tolerated  Avoid greasy and spicy foods   Clear Liquid Diet    Clear liquids are any liquid that you can see through. They are also very easy to digest. You may be put on a clear liquid diet if you are recovering from irritation or infection of the stomach or intestinal tract. This diet may also be used before surgery or special procedures such as a colonoscopy. You should not be on this diet for more than 3 days. Below are some clear liquids you can have on this diet.  Adults and children over 2 years old  Adults should drink a total of 2 to 3 quarts of liquid per day. It may be easier to drink small frequent servings rather than a few large ones. Liquids can include:  · Fruit juices. Strained orange juice or lemonade (no pulp); apple, grape, and cranberry juice; clear fruit drinks  · Beverages. Sport drinks, sodas, mineral water (plain or flavored), tea, black coffee, liquid gelatin (add twice the recommended amount of water)  · Soups. Clear broth  · Desserts. Plain gelatin, popsicles, fruit juice bars  Children under 2 years old  Oral rehydration fluids are available at drug stores and most grocery stores. You dont need a prescription.  Date Last Reviewed: 8/1/2016 © 2000-2017 [employer]. [address] [place] [address]. All rights reserved. This information is not intended as a substitute for professional medical care. Always follow your healthcare professional's instructions.        [place] Diet  Your healthcare provider may recommend a bland diet if you have an upset stomach. It consists of foods that are mild and easy to digest. It is better to eat small frequent meals rather than 3 large meals a day.    Beverages  OK: Fruit juices, non-caffeinated teas and coffee, non-carbonated jones  Avoid: Carbonated beverage, caffeinated  "tea and coffee, all alcoholic beverages  Bread  OK: Refined white, wheat or rye bread, kyara or soda crackers, Davin toast, plain rolls, bagels  Avoid: Whole-grain bread  Cereal  OK: Refined cereals: cooked or ready to eat  Avoid: Whole-grain cereals and granola, or those containing bran, seeds or nuts  Desserts  OK: Peanut butter and all others except those to "avoid"  Avoid: Chocolate, cocoa, coconut, popcorn, nuts, seeds, jam, marmalade  Fruits  OK: Canned, cooked, frozen or fresh fruits without seeds or tough skin  Avoid: Olives, skin and seeds of fruit  Meats  OK: All fresh or preserved meat, fish and fowl  Avoid: Any that are prepared with those spices to "avoid"  Cheese and eggs  OK: Eggs, cottage cheese, cream cheese, other cheeses  Avoid: All cheeses made with those spices to "avoid"  Potatoes and pasta  OK: Potato, rice, macaroni, noodles, spaghetti  Avoid: None  Soups  OK: All soups without heavy seasoning  Avoid: Soups made with those spices to "avoid"  Vegetables  OK: Canned, cooked, fresh or frozen mildly flavored vegetables without seeds, skins or coarse fiber  Avoid: Vegetables prepared with those spices to "avoid"; skin and seeds of vegetables and those with coarse fiber  Spices  OK: Salt, lemon and lime juice, vinegar, all extracts, jean, cinnamon, thyme, mace, allspice, paprika  Avoid: Chili powder, cloves, pepper, seed spices, garlic, gravy pickles, highly seasoned salad dressings  Date Last Reviewed: 11/20/2015 © 2000-2017 [employer]. [address] [place] [address], [place] [address]. All rights reserved. This information is not intended as a substitute for professional medical care. Always follow your healthcare professional's instructions.        "

## 2020-07-22 NOTE — PROGRESS NOTES
Subjective:       Patient ID: Adryan Neff is a 54 y.o. male.    Chief Complaint: Abdominal Pain (5 days), Emesis (5 days), and Nausea (5 days)    54-year-old male presents to the clinic today with complaint of epigastric pain, nausea, and vomiting x5 days.  He states he vomited only once today. He has been drinking clear liquids and ate only a banana today.  He denies any diarrhea or constipation.  He denies any fever or chills.  He denies any dysuria, difficulty urination, or hematuria.  He denies any cardiac chest pain, heart palpitations, shortness breath, or swelling to lower extremities. He denies any headaches, dizziness, or blurred vision.     Past Medical History:   Diagnosis Date    Diabetes mellitus type I     Hypertension     Kidney failure      No past surgical history on file.   reports that he has never smoked. He has never used smokeless tobacco. He reports that he does not drink alcohol or use drugs.  Review of Systems   Constitutional: Negative for chills and fever.   Respiratory: Negative for cough, shortness of breath and wheezing.    Cardiovascular: Negative for chest pain, palpitations and leg swelling.   Gastrointestinal: Positive for abdominal pain, nausea and vomiting. Negative for blood in stool, constipation and diarrhea.   Neurological: Negative for dizziness, light-headedness and headaches.       Objective:      Physical Exam  Constitutional:       General: He is not in acute distress.     Appearance: Normal appearance. He is normal weight. He is not ill-appearing, toxic-appearing or diaphoretic.   Cardiovascular:      Rate and Rhythm: Normal rate and regular rhythm.      Heart sounds: Normal heart sounds. No murmur.   Pulmonary:      Effort: Pulmonary effort is normal.      Breath sounds: Normal breath sounds. No wheezing or rhonchi.   Abdominal:      Palpations: Abdomen is soft.      Tenderness: There is abdominal tenderness. There is no guarding or rebound.      Comments: Mild  epigastric tenderness no rebound or guarding noted    Musculoskeletal: Normal range of motion.      Right lower leg: No edema.      Left lower leg: Edema present.   Neurological:      Mental Status: He is alert and oriented to person, place, and time.         Assessment:       1. Epigastric pain    2. Nausea and vomiting, intractability of vomiting not specified, unspecified vomiting type        Plan:         Epigastric pain  -     omeprazole (PRILOSEC) 20 MG capsule; Take 2 capsules (40 mg total) by mouth once daily.  Dispense: 180 capsule; Refill: 0  -     Ambulatory referral/consult to Gastroenterology; Future; Expected date: 07/29/2020    Nausea and vomiting, intractability of vomiting not specified, unspecified vomiting type  -     ondansetron (ZOFRAN-ODT) 8 MG TbDL; Take 1 tablet (8 mg total) by mouth every 8 (eight) hours as needed.  Dispense: 30 tablet; Refill: 0    - clear liquids advance to a bland diet as tolerated

## 2020-10-14 DIAGNOSIS — Z76.82 ORGAN TRANSPLANT CANDIDATE: Primary | ICD-10-CM

## 2020-10-15 DIAGNOSIS — Z76.82 ORGAN TRANSPLANT CANDIDATE: Primary | ICD-10-CM

## 2020-10-18 DIAGNOSIS — R10.13 EPIGASTRIC PAIN: ICD-10-CM

## 2020-10-19 RX ORDER — OMEPRAZOLE 20 MG/1
CAPSULE, DELAYED RELEASE ORAL
Qty: 180 CAPSULE | Refills: 0 | Status: ON HOLD | OUTPATIENT
Start: 2020-10-19 | End: 2022-11-10 | Stop reason: HOSPADM

## 2020-10-21 ENCOUNTER — TELEPHONE (OUTPATIENT)
Dept: TRANSPLANT | Facility: CLINIC | Age: 55
End: 2020-10-21

## 2020-10-25 ENCOUNTER — HOSPITAL ENCOUNTER (INPATIENT)
Facility: HOSPITAL | Age: 55
LOS: 1 days | Discharge: LEFT BEFORE DISCHARGE | DRG: 640 | End: 2020-10-27
Attending: EMERGENCY MEDICINE | Admitting: EMERGENCY MEDICINE
Payer: MEDICARE

## 2020-10-25 DIAGNOSIS — R06.02 SOB (SHORTNESS OF BREATH): ICD-10-CM

## 2020-10-25 DIAGNOSIS — I10 HYPERTENSION, UNSPECIFIED TYPE: ICD-10-CM

## 2020-10-25 DIAGNOSIS — R11.2 INTRACTABLE VOMITING WITH NAUSEA, UNSPECIFIED VOMITING TYPE: Primary | ICD-10-CM

## 2020-10-25 DIAGNOSIS — R79.89 ELEVATED TROPONIN: ICD-10-CM

## 2020-10-25 DIAGNOSIS — R10.32 LEFT LOWER QUADRANT ABDOMINAL PAIN: ICD-10-CM

## 2020-10-25 LAB
ALBUMIN SERPL BCP-MCNC: 4 G/DL (ref 3.5–5.2)
ALP SERPL-CCNC: 86 U/L (ref 55–135)
ALT SERPL W/O P-5'-P-CCNC: 12 U/L (ref 10–44)
ANION GAP SERPL CALC-SCNC: 17 MMOL/L (ref 8–16)
AST SERPL-CCNC: 13 U/L (ref 10–40)
BASOPHILS # BLD AUTO: 0.05 K/UL (ref 0–0.2)
BASOPHILS NFR BLD: 0.8 % (ref 0–1.9)
BILIRUB SERPL-MCNC: 0.2 MG/DL (ref 0.1–1)
BNP SERPL-MCNC: 1359 PG/ML (ref 0–99)
BUN SERPL-MCNC: 33 MG/DL (ref 6–20)
CALCIUM SERPL-MCNC: 9.6 MG/DL (ref 8.7–10.5)
CHLORIDE SERPL-SCNC: 101 MMOL/L (ref 95–110)
CO2 SERPL-SCNC: 22 MMOL/L (ref 23–29)
CREAT SERPL-MCNC: 11.2 MG/DL (ref 0.5–1.4)
DIFFERENTIAL METHOD: ABNORMAL
EOSINOPHIL # BLD AUTO: 0.1 K/UL (ref 0–0.5)
EOSINOPHIL NFR BLD: 2 % (ref 0–8)
ERYTHROCYTE [DISTWIDTH] IN BLOOD BY AUTOMATED COUNT: 16.7 % (ref 11.5–14.5)
EST. GFR  (AFRICAN AMERICAN): 5 ML/MIN/1.73 M^2
EST. GFR  (NON AFRICAN AMERICAN): 5 ML/MIN/1.73 M^2
GLUCOSE SERPL-MCNC: 117 MG/DL (ref 70–110)
HCT VFR BLD AUTO: 38.7 % (ref 40–54)
HGB BLD-MCNC: 11.7 G/DL (ref 14–18)
IMM GRANULOCYTES # BLD AUTO: 0.01 K/UL (ref 0–0.04)
IMM GRANULOCYTES NFR BLD AUTO: 0.2 % (ref 0–0.5)
LYMPHOCYTES # BLD AUTO: 1.1 K/UL (ref 1–4.8)
LYMPHOCYTES NFR BLD: 16.1 % (ref 18–48)
MCH RBC QN AUTO: 27.2 PG (ref 27–31)
MCHC RBC AUTO-ENTMCNC: 30.2 G/DL (ref 32–36)
MCV RBC AUTO: 90 FL (ref 82–98)
MONOCYTES # BLD AUTO: 0.4 K/UL (ref 0.3–1)
MONOCYTES NFR BLD: 6 % (ref 4–15)
NEUTROPHILS # BLD AUTO: 5 K/UL (ref 1.8–7.7)
NEUTROPHILS NFR BLD: 74.9 % (ref 38–73)
NRBC BLD-RTO: 0 /100 WBC
PLATELET # BLD AUTO: 151 K/UL (ref 150–350)
PMV BLD AUTO: 11.1 FL (ref 9.2–12.9)
POTASSIUM SERPL-SCNC: 5.9 MMOL/L (ref 3.5–5.1)
PROT SERPL-MCNC: 8.4 G/DL (ref 6–8.4)
RBC # BLD AUTO: 4.3 M/UL (ref 4.6–6.2)
SODIUM SERPL-SCNC: 140 MMOL/L (ref 136–145)
TROPONIN I SERPL DL<=0.01 NG/ML-MCNC: 0.08 NG/ML (ref 0–0.03)
TROPONIN I SERPL DL<=0.01 NG/ML-MCNC: 0.09 NG/ML (ref 0–0.03)
WBC # BLD AUTO: 6.66 K/UL (ref 3.9–12.7)

## 2020-10-25 PROCEDURE — 96375 TX/PRO/DX INJ NEW DRUG ADDON: CPT | Mod: NTX

## 2020-10-25 PROCEDURE — 84484 ASSAY OF TROPONIN QUANT: CPT | Mod: NTX

## 2020-10-25 PROCEDURE — 80053 COMPREHEN METABOLIC PANEL: CPT | Mod: NTX

## 2020-10-25 PROCEDURE — 63600175 PHARM REV CODE 636 W HCPCS: Mod: NTX | Performed by: EMERGENCY MEDICINE

## 2020-10-25 PROCEDURE — 25000003 PHARM REV CODE 250: Mod: NTX | Performed by: EMERGENCY MEDICINE

## 2020-10-25 PROCEDURE — 96374 THER/PROPH/DIAG INJ IV PUSH: CPT | Mod: NTX

## 2020-10-25 PROCEDURE — 93010 EKG 12-LEAD: ICD-10-PCS | Mod: NTX,,, | Performed by: INTERNAL MEDICINE

## 2020-10-25 PROCEDURE — 83880 ASSAY OF NATRIURETIC PEPTIDE: CPT | Mod: NTX

## 2020-10-25 PROCEDURE — 93010 ELECTROCARDIOGRAM REPORT: CPT | Mod: NTX,,, | Performed by: INTERNAL MEDICINE

## 2020-10-25 PROCEDURE — 85025 COMPLETE CBC W/AUTO DIFF WBC: CPT | Mod: NTX

## 2020-10-25 PROCEDURE — 99285 EMERGENCY DEPT VISIT HI MDM: CPT | Mod: 25,NTX

## 2020-10-25 PROCEDURE — 93005 ELECTROCARDIOGRAM TRACING: CPT | Mod: NTX

## 2020-10-25 RX ORDER — HYDRALAZINE HYDROCHLORIDE 20 MG/ML
20 INJECTION INTRAMUSCULAR; INTRAVENOUS
Status: COMPLETED | OUTPATIENT
Start: 2020-10-25 | End: 2020-10-25

## 2020-10-25 RX ORDER — ASPIRIN 325 MG
325 TABLET ORAL
Status: COMPLETED | OUTPATIENT
Start: 2020-10-25 | End: 2020-10-25

## 2020-10-25 RX ORDER — ONDANSETRON 2 MG/ML
4 INJECTION INTRAMUSCULAR; INTRAVENOUS
Status: COMPLETED | OUTPATIENT
Start: 2020-10-25 | End: 2020-10-25

## 2020-10-25 RX ORDER — HYDROMORPHONE HYDROCHLORIDE 2 MG/ML
1 INJECTION, SOLUTION INTRAMUSCULAR; INTRAVENOUS; SUBCUTANEOUS
Status: COMPLETED | OUTPATIENT
Start: 2020-10-25 | End: 2020-10-25

## 2020-10-25 RX ADMIN — ONDANSETRON 4 MG: 2 INJECTION INTRAMUSCULAR; INTRAVENOUS at 09:10

## 2020-10-25 RX ADMIN — ASPIRIN 325 MG ORAL TABLET 325 MG: 325 PILL ORAL at 08:10

## 2020-10-25 RX ADMIN — HYDROMORPHONE HYDROCHLORIDE 1 MG: 2 INJECTION INTRAMUSCULAR; INTRAVENOUS; SUBCUTANEOUS at 09:10

## 2020-10-25 RX ADMIN — HYDRALAZINE HYDROCHLORIDE 20 MG: 20 INJECTION INTRAMUSCULAR; INTRAVENOUS at 08:10

## 2020-10-26 PROBLEM — I50.32 CHRONIC DIASTOLIC HEART FAILURE: Status: ACTIVE | Noted: 2020-10-26

## 2020-10-26 PROBLEM — R06.02 SHORTNESS OF BREATH: Status: ACTIVE | Noted: 2020-10-26

## 2020-10-26 PROBLEM — I25.10 CAD (CORONARY ARTERY DISEASE): Status: ACTIVE | Noted: 2020-10-26

## 2020-10-26 PROBLEM — R11.10 INTRACTABLE VOMITING: Status: ACTIVE | Noted: 2020-10-26

## 2020-10-26 PROBLEM — R79.89 ELEVATED TROPONIN: Status: ACTIVE | Noted: 2020-10-26

## 2020-10-26 PROBLEM — E87.5 HYPERKALEMIA: Status: ACTIVE | Noted: 2020-10-26

## 2020-10-26 LAB
ANION GAP SERPL CALC-SCNC: 13 MMOL/L (ref 8–16)
AORTIC ROOT ANNULUS: 2.85 CM
AORTIC VALVE CUSP SEPERATION: 2.02 CM
ASCENDING AORTA: 2.65 CM
AV INDEX (PROSTH): 0.73
AV MEAN GRADIENT: 4 MMHG
AV PEAK GRADIENT: 8 MMHG
AV VALVE AREA: 2.37 CM2
AV VELOCITY RATIO: 0.69
BASOPHILS # BLD AUTO: 0.05 K/UL (ref 0–0.2)
BASOPHILS NFR BLD: 0.5 % (ref 0–1.9)
BSA FOR ECHO PROCEDURE: 1.82 M2
BUN SERPL-MCNC: 38 MG/DL (ref 6–20)
CALCIUM SERPL-MCNC: 8.9 MG/DL (ref 8.7–10.5)
CHLORIDE SERPL-SCNC: 101 MMOL/L (ref 95–110)
CO2 SERPL-SCNC: 25 MMOL/L (ref 23–29)
CREAT SERPL-MCNC: 11.9 MG/DL (ref 0.5–1.4)
CTP QC/QA: YES
CV ECHO LV RWT: 0.51 CM
DIFFERENTIAL METHOD: ABNORMAL
DOP CALC AO PEAK VEL: 1.37 M/S
DOP CALC AO VTI: 31.52 CM
DOP CALC LVOT AREA: 3.2 CM2
DOP CALC LVOT DIAMETER: 2.03 CM
DOP CALC LVOT PEAK VEL: 0.95 M/S
DOP CALC LVOT STROKE VOLUME: 74.82 CM3
DOP CALCLVOT PEAK VEL VTI: 23.13 CM
E WAVE DECELERATION TIME: 194.11 MSEC
E/A RATIO: 3.64
ECHO LV POSTERIOR WALL: 1.31 CM (ref 0.6–1.1)
EOSINOPHIL # BLD AUTO: 0 K/UL (ref 0–0.5)
EOSINOPHIL NFR BLD: 0.3 % (ref 0–8)
ERYTHROCYTE [DISTWIDTH] IN BLOOD BY AUTOMATED COUNT: 16.9 % (ref 11.5–14.5)
EST. GFR  (AFRICAN AMERICAN): 5 ML/MIN/1.73 M^2
EST. GFR  (NON AFRICAN AMERICAN): 4 ML/MIN/1.73 M^2
ESTIMATED AVG GLUCOSE: 114 MG/DL (ref 68–131)
FRACTIONAL SHORTENING: 24 % (ref 28–44)
GLUCOSE SERPL-MCNC: 151 MG/DL (ref 70–110)
HBA1C MFR BLD HPLC: 5.6 % (ref 4–5.6)
HCT VFR BLD AUTO: 36.3 % (ref 40–54)
HGB BLD-MCNC: 11.3 G/DL (ref 14–18)
IMM GRANULOCYTES # BLD AUTO: 0.02 K/UL (ref 0–0.04)
IMM GRANULOCYTES NFR BLD AUTO: 0.2 % (ref 0–0.5)
INTERVENTRICULAR SEPTUM: 1.19 CM (ref 0.6–1.1)
IVRT: 139.87 MSEC
LA MAJOR: 7.85 CM
LA MINOR: 6.47 CM
LA WIDTH: 3.96 CM
LEFT ATRIUM SIZE: 4.55 CM
LEFT ATRIUM VOLUME INDEX: 59.3 ML/M2
LEFT ATRIUM VOLUME: 108.64 CM3
LEFT INTERNAL DIMENSION IN SYSTOLE: 3.85 CM (ref 2.1–4)
LEFT VENTRICLE DIASTOLIC VOLUME INDEX: 67.4 ML/M2
LEFT VENTRICLE DIASTOLIC VOLUME: 123.48 ML
LEFT VENTRICLE MASS INDEX: 139 G/M2
LEFT VENTRICLE SYSTOLIC VOLUME INDEX: 35 ML/M2
LEFT VENTRICLE SYSTOLIC VOLUME: 64.03 ML
LEFT VENTRICULAR INTERNAL DIMENSION IN DIASTOLE: 5.09 CM (ref 3.5–6)
LEFT VENTRICULAR MASS: 254.67 G
LYMPHOCYTES # BLD AUTO: 1.2 K/UL (ref 1–4.8)
LYMPHOCYTES NFR BLD: 12 % (ref 18–48)
MCH RBC QN AUTO: 27.4 PG (ref 27–31)
MCHC RBC AUTO-ENTMCNC: 31.1 G/DL (ref 32–36)
MCV RBC AUTO: 88 FL (ref 82–98)
MONOCYTES # BLD AUTO: 0.7 K/UL (ref 0.3–1)
MONOCYTES NFR BLD: 7.3 % (ref 4–15)
MV PEAK A VEL: 0.36 M/S
MV PEAK E VEL: 1.31 M/S
MV STENOSIS PRESSURE HALF TIME: 56.29 MS
MV VALVE AREA P 1/2 METHOD: 3.91 CM2
NEUTROPHILS # BLD AUTO: 7.7 K/UL (ref 1.8–7.7)
NEUTROPHILS NFR BLD: 79.7 % (ref 38–73)
NRBC BLD-RTO: 0 /100 WBC
PHOSPHATE SERPL-MCNC: 5.8 MG/DL (ref 2.7–4.5)
PISA TR MAX VEL: 4.13 M/S
PLATELET # BLD AUTO: 190 K/UL (ref 150–350)
PMV BLD AUTO: 10.8 FL (ref 9.2–12.9)
POCT GLUCOSE: 150 MG/DL (ref 70–110)
POCT GLUCOSE: 155 MG/DL (ref 70–110)
POCT GLUCOSE: 159 MG/DL (ref 70–110)
POCT GLUCOSE: 186 MG/DL (ref 70–110)
POCT GLUCOSE: 50 MG/DL (ref 70–110)
POTASSIUM SERPL-SCNC: 5.8 MMOL/L (ref 3.5–5.1)
POTASSIUM SERPL-SCNC: 7 MMOL/L (ref 3.5–5.1)
PULM VEIN S/D RATIO: 0.68
PV PEAK D VEL: 0.4 M/S
PV PEAK S VEL: 0.27 M/S
PV PEAK VELOCITY: 0.81 CM/S
RA MAJOR: 6.15 CM
RA PRESSURE: 3 MMHG
RA WIDTH: 3.38 CM
RBC # BLD AUTO: 4.13 M/UL (ref 4.6–6.2)
RIGHT VENTRICULAR END-DIASTOLIC DIMENSION: 4.01 CM
SARS-COV-2 RDRP RESP QL NAA+PROBE: NEGATIVE
SODIUM SERPL-SCNC: 139 MMOL/L (ref 136–145)
STJ: 1.88 CM
TR MAX PG: 68 MMHG
TRICUSPID ANNULAR PLANE SYSTOLIC EXCURSION: 2.13 CM
TROPONIN I SERPL DL<=0.01 NG/ML-MCNC: 0.14 NG/ML (ref 0–0.03)
TV REST PULMONARY ARTERY PRESSURE: 71 MMHG
WBC # BLD AUTO: 9.63 K/UL (ref 3.9–12.7)

## 2020-10-26 PROCEDURE — 96375 TX/PRO/DX INJ NEW DRUG ADDON: CPT | Mod: NTX

## 2020-10-26 PROCEDURE — 84100 ASSAY OF PHOSPHORUS: CPT | Mod: NTX

## 2020-10-26 PROCEDURE — U0002 COVID-19 LAB TEST NON-CDC: HCPCS | Mod: NTX | Performed by: EMERGENCY MEDICINE

## 2020-10-26 PROCEDURE — 96372 THER/PROPH/DIAG INJ SC/IM: CPT

## 2020-10-26 PROCEDURE — 25000003 PHARM REV CODE 250: Mod: NTX | Performed by: HOSPITALIST

## 2020-10-26 PROCEDURE — 80100016 HC MAINTENANCE HEMODIALYSIS

## 2020-10-26 PROCEDURE — 84484 ASSAY OF TROPONIN QUANT: CPT | Mod: NTX

## 2020-10-26 PROCEDURE — 25000003 PHARM REV CODE 250: Mod: NTX | Performed by: INTERNAL MEDICINE

## 2020-10-26 PROCEDURE — 83036 HEMOGLOBIN GLYCOSYLATED A1C: CPT | Mod: NTX

## 2020-10-26 PROCEDURE — 25000003 PHARM REV CODE 250: Mod: NTX | Performed by: EMERGENCY MEDICINE

## 2020-10-26 PROCEDURE — 80048 BASIC METABOLIC PNL TOTAL CA: CPT | Mod: NTX

## 2020-10-26 PROCEDURE — 94640 AIRWAY INHALATION TREATMENT: CPT | Mod: NTX

## 2020-10-26 PROCEDURE — 27000221 HC OXYGEN, UP TO 24 HOURS: Mod: NTX

## 2020-10-26 PROCEDURE — C9399 UNCLASSIFIED DRUGS OR BIOLOG: HCPCS | Mod: NTX | Performed by: HOSPITALIST

## 2020-10-26 PROCEDURE — 63600175 PHARM REV CODE 636 W HCPCS: Mod: NTX | Performed by: EMERGENCY MEDICINE

## 2020-10-26 PROCEDURE — 96375 TX/PRO/DX INJ NEW DRUG ADDON: CPT

## 2020-10-26 PROCEDURE — G0257 UNSCHED DIALYSIS ESRD PT HOS: HCPCS | Mod: NTX

## 2020-10-26 PROCEDURE — 11000001 HC ACUTE MED/SURG PRIVATE ROOM: Mod: NTX

## 2020-10-26 PROCEDURE — 84132 ASSAY OF SERUM POTASSIUM: CPT | Mod: NTX

## 2020-10-26 PROCEDURE — 85025 COMPLETE CBC W/AUTO DIFF WBC: CPT | Mod: NTX

## 2020-10-26 PROCEDURE — 94761 N-INVAS EAR/PLS OXIMETRY MLT: CPT | Mod: NTX

## 2020-10-26 PROCEDURE — 63600175 PHARM REV CODE 636 W HCPCS: Mod: NTX | Performed by: INTERNAL MEDICINE

## 2020-10-26 PROCEDURE — 25000242 PHARM REV CODE 250 ALT 637 W/ HCPCS: Mod: NTX | Performed by: HOSPITALIST

## 2020-10-26 PROCEDURE — 63600175 PHARM REV CODE 636 W HCPCS: Mod: NTX | Performed by: HOSPITALIST

## 2020-10-26 PROCEDURE — 36415 COLL VENOUS BLD VENIPUNCTURE: CPT | Mod: NTX

## 2020-10-26 RX ORDER — ACETAMINOPHEN 325 MG/1
650 TABLET ORAL EVERY 8 HOURS PRN
Status: DISCONTINUED | OUTPATIENT
Start: 2020-10-26 | End: 2020-10-27 | Stop reason: HOSPADM

## 2020-10-26 RX ORDER — LABETALOL HYDROCHLORIDE 5 MG/ML
20 INJECTION, SOLUTION INTRAVENOUS
Status: COMPLETED | OUTPATIENT
Start: 2020-10-26 | End: 2020-10-26

## 2020-10-26 RX ORDER — SODIUM CHLORIDE 0.9 % (FLUSH) 0.9 %
10 SYRINGE (ML) INJECTION
Status: DISCONTINUED | OUTPATIENT
Start: 2020-10-26 | End: 2020-10-27 | Stop reason: HOSPADM

## 2020-10-26 RX ORDER — MAG HYDROX/ALUMINUM HYD/SIMETH 200-200-20
30 SUSPENSION, ORAL (FINAL DOSE FORM) ORAL
Status: DISCONTINUED | OUTPATIENT
Start: 2020-10-26 | End: 2020-10-26

## 2020-10-26 RX ORDER — AMLODIPINE BESYLATE 10 MG/1
10 TABLET ORAL DAILY
Qty: 30 TABLET | Refills: 11 | Status: SHIPPED | OUTPATIENT
Start: 2020-10-26 | End: 2021-01-07

## 2020-10-26 RX ORDER — HYDRALAZINE HYDROCHLORIDE 25 MG/1
100 TABLET, FILM COATED ORAL
Status: DISCONTINUED | OUTPATIENT
Start: 2020-10-26 | End: 2020-10-26

## 2020-10-26 RX ORDER — METOCLOPRAMIDE HYDROCHLORIDE 5 MG/ML
10 INJECTION INTRAMUSCULAR; INTRAVENOUS
Status: COMPLETED | OUTPATIENT
Start: 2020-10-26 | End: 2020-10-26

## 2020-10-26 RX ORDER — CARVEDILOL 12.5 MG/1
25 TABLET ORAL ONCE
Status: DISCONTINUED | OUTPATIENT
Start: 2020-10-26 | End: 2020-10-26

## 2020-10-26 RX ORDER — IBUPROFEN 200 MG
24 TABLET ORAL
Status: DISCONTINUED | OUTPATIENT
Start: 2020-10-26 | End: 2020-10-27 | Stop reason: HOSPADM

## 2020-10-26 RX ORDER — CARVEDILOL 25 MG/1
25 TABLET ORAL 2 TIMES DAILY WITH MEALS
Qty: 60 TABLET | Refills: 11 | Status: SHIPPED | OUTPATIENT
Start: 2020-10-26 | End: 2020-10-26 | Stop reason: SDUPTHER

## 2020-10-26 RX ORDER — AMLODIPINE BESYLATE 5 MG/1
10 TABLET ORAL DAILY
Status: DISCONTINUED | OUTPATIENT
Start: 2020-10-27 | End: 2020-10-27 | Stop reason: HOSPADM

## 2020-10-26 RX ORDER — CLONIDINE HYDROCHLORIDE 0.1 MG/1
0.1 TABLET ORAL EVERY 12 HOURS PRN
Status: DISCONTINUED | OUTPATIENT
Start: 2020-10-26 | End: 2020-10-27 | Stop reason: HOSPADM

## 2020-10-26 RX ORDER — GUAIFENESIN/DEXTROMETHORPHAN 100-10MG/5
10 SYRUP ORAL EVERY 4 HOURS PRN
Status: DISCONTINUED | OUTPATIENT
Start: 2020-10-26 | End: 2020-10-27 | Stop reason: HOSPADM

## 2020-10-26 RX ORDER — IBUPROFEN 200 MG
16 TABLET ORAL
Status: DISCONTINUED | OUTPATIENT
Start: 2020-10-26 | End: 2020-10-27 | Stop reason: HOSPADM

## 2020-10-26 RX ORDER — HYDRALAZINE HYDROCHLORIDE 100 MG/1
100 TABLET, FILM COATED ORAL EVERY 8 HOURS
Qty: 90 TABLET | Refills: 11 | Status: ON HOLD | OUTPATIENT
Start: 2020-10-26 | End: 2022-01-26 | Stop reason: HOSPADM

## 2020-10-26 RX ORDER — HEPARIN SODIUM 5000 [USP'U]/ML
5000 INJECTION, SOLUTION INTRAVENOUS; SUBCUTANEOUS EVERY 8 HOURS
Status: DISCONTINUED | OUTPATIENT
Start: 2020-10-26 | End: 2020-10-27 | Stop reason: HOSPADM

## 2020-10-26 RX ORDER — AMLODIPINE BESYLATE 10 MG/1
10 TABLET ORAL DAILY
Qty: 30 TABLET | Refills: 11 | Status: SHIPPED | OUTPATIENT
Start: 2020-10-26 | End: 2020-10-26 | Stop reason: SDUPTHER

## 2020-10-26 RX ORDER — IPRATROPIUM BROMIDE AND ALBUTEROL SULFATE 2.5; .5 MG/3ML; MG/3ML
3 SOLUTION RESPIRATORY (INHALATION) EVERY 4 HOURS
Status: DISCONTINUED | OUTPATIENT
Start: 2020-10-26 | End: 2020-10-27

## 2020-10-26 RX ORDER — HYDRALAZINE HYDROCHLORIDE 100 MG/1
100 TABLET, FILM COATED ORAL EVERY 8 HOURS
Qty: 90 TABLET | Refills: 11 | Status: SHIPPED | OUTPATIENT
Start: 2020-10-26 | End: 2020-10-26 | Stop reason: SDUPTHER

## 2020-10-26 RX ORDER — AMLODIPINE BESYLATE 5 MG/1
10 TABLET ORAL
Status: DISCONTINUED | OUTPATIENT
Start: 2020-10-26 | End: 2020-10-26

## 2020-10-26 RX ORDER — GLUCAGON 1 MG
1 KIT INJECTION
Status: DISCONTINUED | OUTPATIENT
Start: 2020-10-26 | End: 2020-10-27 | Stop reason: HOSPADM

## 2020-10-26 RX ORDER — ATORVASTATIN CALCIUM 10 MG/1
20 TABLET, FILM COATED ORAL DAILY
Status: DISCONTINUED | OUTPATIENT
Start: 2020-10-26 | End: 2020-10-27 | Stop reason: HOSPADM

## 2020-10-26 RX ORDER — AMOXICILLIN 250 MG
1 CAPSULE ORAL DAILY PRN
Status: DISCONTINUED | OUTPATIENT
Start: 2020-10-26 | End: 2020-10-27 | Stop reason: HOSPADM

## 2020-10-26 RX ORDER — CARVEDILOL 25 MG/1
25 TABLET ORAL 2 TIMES DAILY WITH MEALS
Qty: 60 TABLET | Refills: 11 | Status: SHIPPED | OUTPATIENT
Start: 2020-10-26 | End: 2021-01-07

## 2020-10-26 RX ORDER — ONDANSETRON 2 MG/ML
4 INJECTION INTRAMUSCULAR; INTRAVENOUS EVERY 8 HOURS PRN
Status: DISCONTINUED | OUTPATIENT
Start: 2020-10-26 | End: 2020-10-27 | Stop reason: HOSPADM

## 2020-10-26 RX ORDER — SUCRALFATE 1 G/10ML
1 SUSPENSION ORAL EVERY 6 HOURS
Status: DISCONTINUED | OUTPATIENT
Start: 2020-10-26 | End: 2020-10-26

## 2020-10-26 RX ORDER — INSULIN ASPART 100 [IU]/ML
0-5 INJECTION, SOLUTION INTRAVENOUS; SUBCUTANEOUS
Status: DISCONTINUED | OUTPATIENT
Start: 2020-10-26 | End: 2020-10-27 | Stop reason: HOSPADM

## 2020-10-26 RX ORDER — CARVEDILOL 12.5 MG/1
25 TABLET ORAL 2 TIMES DAILY
Status: DISCONTINUED | OUTPATIENT
Start: 2020-10-26 | End: 2020-10-27 | Stop reason: HOSPADM

## 2020-10-26 RX ORDER — INSULIN ASPART 100 [IU]/ML
10 INJECTION, SOLUTION INTRAVENOUS; SUBCUTANEOUS ONCE
Status: COMPLETED | OUTPATIENT
Start: 2020-10-26 | End: 2020-10-26

## 2020-10-26 RX ORDER — AMLODIPINE BESYLATE 5 MG/1
10 TABLET ORAL DAILY
Status: DISCONTINUED | OUTPATIENT
Start: 2020-10-26 | End: 2020-10-26

## 2020-10-26 RX ADMIN — ACETAMINOPHEN 650 MG: 325 TABLET ORAL at 06:10

## 2020-10-26 RX ADMIN — HEPARIN SODIUM 5000 UNITS: 5000 INJECTION INTRAVENOUS; SUBCUTANEOUS at 06:10

## 2020-10-26 RX ADMIN — METOCLOPRAMIDE 10 MG: 5 INJECTION, SOLUTION INTRAMUSCULAR; INTRAVENOUS at 12:10

## 2020-10-26 RX ADMIN — LABETALOL HYDROCHLORIDE 20 MG: 5 INJECTION INTRAVENOUS at 12:10

## 2020-10-26 RX ADMIN — DEXTROSE MONOHYDRATE 25 G: 25 INJECTION, SOLUTION INTRAVENOUS at 06:10

## 2020-10-26 RX ADMIN — AMLODIPINE BESYLATE 10 MG: 5 TABLET ORAL at 01:10

## 2020-10-26 RX ADMIN — INSULIN ASPART 10 UNITS: 100 INJECTION, SOLUTION INTRAVENOUS; SUBCUTANEOUS at 06:10

## 2020-10-26 RX ADMIN — CALCIUM GLUCONATE 1000 MG: 98 INJECTION, SOLUTION INTRAVENOUS at 06:10

## 2020-10-26 RX ADMIN — IPRATROPIUM BROMIDE AND ALBUTEROL SULFATE 3 ML: .5; 3 SOLUTION RESPIRATORY (INHALATION) at 08:10

## 2020-10-26 RX ADMIN — ATORVASTATIN CALCIUM 20 MG: 10 TABLET, FILM COATED ORAL at 08:10

## 2020-10-26 RX ADMIN — IPRATROPIUM BROMIDE AND ALBUTEROL SULFATE 3 ML: .5; 3 SOLUTION RESPIRATORY (INHALATION) at 03:10

## 2020-10-26 RX ADMIN — CLONIDINE HYDROCHLORIDE 0.1 MG: 0.1 TABLET ORAL at 09:10

## 2020-10-26 RX ADMIN — INSULIN DETEMIR 7.5 UNITS: 100 INJECTION, SOLUTION SUBCUTANEOUS at 04:10

## 2020-10-26 RX ADMIN — CARVEDILOL 25 MG: 12.5 TABLET, FILM COATED ORAL at 01:10

## 2020-10-26 RX ADMIN — HEPARIN SODIUM 5000 UNITS: 5000 INJECTION INTRAVENOUS; SUBCUTANEOUS at 09:10

## 2020-10-26 RX ADMIN — IPRATROPIUM BROMIDE AND ALBUTEROL SULFATE 3 ML: .5; 3 SOLUTION RESPIRATORY (INHALATION) at 07:10

## 2020-10-26 RX ADMIN — HEPARIN SODIUM 5000 UNITS: 5000 INJECTION INTRAVENOUS; SUBCUTANEOUS at 02:10

## 2020-10-26 RX ADMIN — INSULIN DETEMIR 7.5 UNITS: 100 INJECTION, SOLUTION SUBCUTANEOUS at 09:10

## 2020-10-26 RX ADMIN — CARVEDILOL 25 MG: 12.5 TABLET, FILM COATED ORAL at 08:10

## 2020-10-26 RX ADMIN — IPRATROPIUM BROMIDE AND ALBUTEROL SULFATE 3 ML: .5; 3 SOLUTION RESPIRATORY (INHALATION) at 04:10

## 2020-10-26 NOTE — ASSESSMENT & PLAN NOTE
Random glucose=117.Most recent hemoglobin A1c=  -P0CT glucose monitoring  -Hold home anti-hyperglycemics, basal and correctional scale insulin; adjust as needed for tighter control.  -Hemoglobin A1c pending

## 2020-10-26 NOTE — ASSESSMENT & PLAN NOTE
TTE:LVEF 55% with grade 2 diastolic dysfunction moderate pulmonary hypertension on 03/27/2017  Further treatment as above.

## 2020-10-26 NOTE — ASSESSMENT & PLAN NOTE
K=5.9 on admit.BUN /CR 33/11.2 bumped in comparison to previous on BUN/CR=32./9.7 on 01/29/19. BNP=1.359  -Nephrology consult for Hemodialysis

## 2020-10-26 NOTE — [facility] SECONDARY REVIEW
Physician Advisor External    Level of Care Issue     Sent to EHR again for potassium 7.0    Approved Inpatient   EHR IP determination by Low Yang

## 2020-10-26 NOTE — PLAN OF CARE
10/26/20 1304   Discharge Assessment   Assessment Type Discharge Planning Assessment   Confirmed/corrected address and phone number on facesheet? Yes   Assessment information obtained from? Patient   Communicated expected length of stay with patient/caregiver yes   Prior to hospitilization cognitive status: Alert/Oriented   Prior to hospitalization functional status: Independent   Current cognitive status: Alert/Oriented   Current Functional Status: Independent   Facility Arrived From: Home   Lives With child(ramesh), adult   Able to Return to Prior Arrangements yes   Is patient able to care for self after discharge? Yes   Who are your caregiver(s) and their phone number(s)? Mariel, pt's daughter,   Patient's perception of discharge disposition home or selfcare   Readmission Within the Last 30 Days no previous admission in last 30 days   Patient currently being followed by outpatient case management? No   Patient currently receives any other outside agency services? No   Equipment Currently Used at Home none   Do you have any problems affording any of your prescribed medications? No   Is the patient taking medications as prescribed? yes   Does the patient have transportation home? Yes   Transportation Anticipated family or friend will provide   Dialysis Name and Scheduled days [facility]   Does the patient receive services at the [facility]? No   Discharge Plan A Home with family   DME Needed Upon Discharge  none   Patient/Family in Agreement with Plan yes     SW to patient's room to discuss Helping the patient manage care at home.   TN/SW role explained to pt.  Patient identified using two identifiers:  Name and date of birth.    SW's name and contact info placed on white board.     Person who will help at home if needed:  Mariel pt's daughter.     Preferred pharmacy:   [id number] - [place], [place] - [address]  [facility]  "[address] [place] [id number]  Phone: [phone] Fax: [phone]     "Help at home Questions" discussed and placed in "My Health Packet" and placed at bedside.     Preferred Appointment time: Morning appointments       "

## 2020-10-26 NOTE — ED PROVIDER NOTES
Encounter Date: 10/25/2020    SCRIBE #1 NOTE: I, Alfonso Allen, am scribing for, and in the presence of,  Fan Kaye MD. I have scribed the following portions of the note - Other sections scribed: HPI, ROS, PE.       History     Chief Complaint   Patient presents with    Abdominal Pain     pt daughter states that he has been having SOB and abdominal pain since 6:50pm.     Shortness of Breath     This is a 54-year-old male dialysis patient with a PMHx of Hypertension, Diabetes Mellitus, and Kidney Failure who presents to the ED complaining of left lower quadrant abdominal pain that onset earlier this evening. Associated symptoms include nausea and vomiting. Patient denies any fever or urinary symptoms. No alleviating or worsening factors. No prior treatment. No known drug allergies.    The history is provided by the patient and a relative. No  was used.     Review of patient's allergies indicates:  No Known Allergies  Past Medical History:   Diagnosis Date    Diabetes mellitus type I     Dialysis patient     Hypertension     Kidney failure      No past surgical history on file.  No family history on file.  Social History     Tobacco Use    Smoking status: Never Smoker    Smokeless tobacco: Never Used   Substance Use Topics    Alcohol use: No     Frequency: Never    Drug use: No     Review of Systems   Constitutional: Negative for fever.   Respiratory: Positive for shortness of breath.    Gastrointestinal: Positive for abdominal pain (LLQ), nausea and vomiting.   Genitourinary: Negative for difficulty urinating, dysuria and frequency.   All other systems reviewed and are negative.      Physical Exam     Initial Vitals   BP Pulse Resp Temp SpO2   10/25/20 1933 10/25/20 1954 10/25/20 2135 10/25/20 2000 10/25/20 1954   (!) 228/102 83 18 98.5 °F (36.9 °C) 97 %      MAP       --                Physical Exam    Nursing note and vitals reviewed.  Constitutional: He appears well-developed  and well-nourished. He is not diaphoretic. No distress.   HENT:   Head: Normocephalic and atraumatic.   Mouth/Throat: Oropharynx is clear and moist.   Eyes: Conjunctivae are normal. Right eye exhibits no discharge. Left eye exhibits no discharge. No scleral icterus.   Neck: No tracheal deviation present. No JVD present.   Cardiovascular: Normal rate, regular rhythm, normal heart sounds and intact distal pulses. Exam reveals no gallop and no friction rub.    No murmur heard.  Pulmonary/Chest: Breath sounds normal. No stridor. No respiratory distress. He has no wheezes. He has no rhonchi. He has no rales. He exhibits no tenderness.   Abdominal: Soft. He exhibits no distension and no mass. There is abdominal tenderness (TTP to the LLQ). There is guarding. There is no rebound.   Musculoskeletal: Normal range of motion. No tenderness or edema.   Neurological: He is alert and oriented to person, place, and time. He has normal strength. GCS score is 15. GCS eye subscore is 4. GCS verbal subscore is 5. GCS motor subscore is 6.   TERRENCE with NGND's   Skin: Skin is warm and dry. Capillary refill takes less than 2 seconds. No rash and no abscess noted. No erythema. No pallor.   AV fistula to the LUE   Psychiatric: He has a normal mood and affect. His behavior is normal. Judgment and thought content normal.         ED Course   Procedures  Labs Reviewed   CBC W/ AUTO DIFFERENTIAL - Abnormal; Notable for the following components:       Result Value    RBC 4.30 (*)     Hemoglobin 11.7 (*)     Hematocrit 38.7 (*)     Mean Corpuscular Hemoglobin Conc 30.2 (*)     RDW 16.7 (*)     Gran% 74.9 (*)     Lymph% 16.1 (*)     All other components within normal limits   COMPREHENSIVE METABOLIC PANEL - Abnormal; Notable for the following components:    Potassium 5.9 (*)     CO2 22 (*)     Glucose 117 (*)     BUN, Bld 33 (*)     Creatinine 11.2 (*)     Anion Gap 17 (*)     eGFR if  5 (*)     eGFR if non  5 (*)      All other components within normal limits   TROPONIN I - Abnormal; Notable for the following components:    Troponin I 0.088 (*)     All other components within normal limits   B-TYPE NATRIURETIC PEPTIDE - Abnormal; Notable for the following components:    BNP 1,359 (*)     All other components within normal limits   TROPONIN I - Abnormal; Notable for the following components:    Troponin I 0.079 (*)     All other components within normal limits   SARS-COV-2 RDRP GENE     EKG Readings: (Independently Interpreted)   Initial Reading: No STEMI. Rhythm: Normal Sinus Rhythm. Heart Rate: 83. Ectopy: No Ectopy. ST Segments: Normal ST Segments. Axis: Normal. Clinical Impression: Normal Sinus Rhythm       Imaging Results          CT Abdomen Pelvis  Without Contrast (Final result)  Result time 10/25/20 22:41:36    Final result by Gisselle Montiel MD (10/25/20 22:41:36)                 Impression:      1. Significant bibasilar ground-glass and superimposed consolidative opacities.  Findings could relate to underlying infectious or non infectious inflammatory process or possibly pulmonary edema.  Clinical correlation is advised.  2. Circumferential bladder wall thickening which can be seen with nondistention, cystitis or chronic outlet obstruction.  Correlation with urinalysis advised.  Mild prostatomegaly.  3. Minimal asymmetric prominence of the left renal collecting system without definite evidence of left ureteral stone.  Findings could relate to possible recently passed stone or infectious process.  Correlation with urinalysis advised.  4. Moderate volume of fecal material in the colon which can be seen with constipation.  5. Additional findings as discussed above.      Electronically signed by: Gisselle Montiel MD  Date:    10/25/2020  Time:    22:41             Narrative:    EXAMINATION:  CT ABDOMEN PELVIS WITHOUT CONTRAST    CLINICAL HISTORY:  LLQ abdominal pain, diverticulitis suspected;    TECHNIQUE:  Low dose axial  images, sagittal and coronal reformations were obtained from the lung bases to the pubic symphysis.  Oral contrast was not administered.    COMPARISON:  None    FINDINGS:  The visualized lung bases demonstrate significant bilateral ground-glass and superimposed consolidative opacities.  The visualized portions of the heart and pericardium demonstrate calcific atherosclerosis of the coronary vessels.    Please note evaluation of solid organ parenchyma is limited due to lack of IV contrast as well as patient motion artifact.  The liver, spleen, pancreas and adrenal glands demonstrate a grossly unremarkable noncontrast CT appearance.  The gallbladder demonstrates no evidence of radiopaque stone within its lumen.    The kidneys demonstrate bilateral cortical thinning.  There is minimal slight asymmetric prominence of the left renal collecting system.  No definite calcific density identified along the anticipated course of the left ureter.  There are bilateral renal vascular calcifications versus small nephroliths.  There is circumferential bladder wall thickening.  The prostate is mildly prominent in size.    The abdominal aorta is nonaneurysmal with atherosclerosis.  There is significant obstruct plaquing of visceral abdominal vasculature.  No bulky lymphadenopathy.    Allowing for motion artifact, the visualized loops of large and small bowel demonstrate no evidence of obstruction or inflammatory change.  Moderate volume of fecal material throughout colon which can be seen with constipation.  There is no ascites, free intraperitoneal air or portal venous gas.    The visualized osseous structures demonstrate degenerative change without acute abnormality.                                X-Ray Chest AP Portable (Final result)  Result time 10/25/20 20:39:49    Final result by Domenico Hall MD (10/25/20 20:39:49)                 Impression:      Findings suggesting sequela of pulmonary edema/CHF pattern, with interstitial  pneumonia not excluded.    Right basilar opacity may represent superimposed atelectasis versus aspiration or pneumonia.  Short-term follow-up chest radiography after therapy is recommended to ensure resolution.    Asymmetric prominent left hilum with differential considerations above.  Further evaluation with elective/nonemergent cross-sectional imaging can be obtained after treatment of acute illness as warranted.    This report was flagged in Epic as abnormal.      Electronically signed by: Domenico Hall MD  Date:    10/25/2020  Time:    20:39             Narrative:    EXAMINATION:  XR CHEST AP PORTABLE    CLINICAL HISTORY:  Chest Pain;    TECHNIQUE:  Single frontal view of the chest was performed.    COMPARISON:  None    FINDINGS:  Monitoring leads overlie the chest.  Patient is slightly rotated.    Cardiac silhouette is mild-to-moderately enlarged with prominence of the central pulmonary vasculature and bilateral diffuse nonspecific interstitial coarsening suggesting pulmonary edema/CHF pattern.  Left subclavian vascular stent in place.  Calcific atherosclerosis of the aortic arch.  Remaining mediastinal contours are grossly within limits.    Trachea is midline.    Opacity at the right lung base.  The lungs are otherwise symmetrically well expanded without large consolidation on the left, pleural effusion or pneumothorax either side.  Left hilum is slightly prominent than right which may be related to AP portable technique, patient rotation and chest wall; however, pulmonary arterial enlargement and/or underlying lymphadenopathy or mass not excluded.  No acute osseous process seen.  PA and lateral views can be obtained.                              Pt arrived alert, afebrile, non-toxic in appearance, in no acute respiratory distress with poorly controlled HTN and remaining VSS.  Hydralazine IVP given to initially address BP.  EKG revealed no acute findings concerning for ischemia, arrhythmia, heart block or any  pathology to warrant cardiology consultation.  Labs returned with flat trop and elevated BNP which correlates with moderate volume overload.   CT returned with no findings to warrant surgical consultation and patient's abdominal pain resolved with meds.      However, he was unable to tolerate PO intake due to N/v and was unable to tolerate his PO BP meds  Pt thus discussed with hospital medicine and placed in observation for further evaluation and management.    Fan Kaye MD      .                    Scribe Attestation:   Scribe #1: I performed the above scribed service and the documentation accurately describes the services I performed. I attest to the accuracy of the note.                      Clinical Impression:       ICD-10-CM ICD-9-CM   1. Left lower quadrant abdominal pain  R10.32 789.04   2. SOB (shortness of breath)  R06.02 786.05   3. Hypertension, unspecified type  I10 401.9   4. Intractable vomiting with nausea, unspecified vomiting type  R11.2 536.2   5. Intractable vomiting  R11.10 536.2                          ED Disposition Condition    Observation                     I, Fan Kaye, personally performed the services described in this documentation. All medical record entries made by the scribe were at my direction and in my presence.  I have reviewed the chart and agree that the record reflects my personal performance and is accurate and complete.    Fan Kaye MD  10/26/20 2804

## 2020-10-26 NOTE — PROGRESS NOTES
WRITTEN HEALTHCARE DISCHARGE INFORMATION     Things that YOU are RESPONSIBLE for to Manage Your Care At Home:    1. Getting your prescriptions filled.  2. Taking you medications as directed. DO NOT MISS ANY DOSES!  3. Going to your follow-up doctor appointments. This is important because it allows the doctor to monitor your progress and to determine if any changes need to be made to your treatment plan.    If you are unable to make your follow up appointments, please call the number listed and reschedule this appointment.     ____________HELP AT HOME____________________    Experiencing any SIGNS or SYMPTOMS: YOU CAN    Schedule a same day appopintment with your Primary Care Doctor or  you can call [facility] On Call Nurse Care Line for 24/7 assistance at [phone]    If you are experience any signs or symptoms that have become severe, Call 911 and come to your nearest Emergency Room.    Thank you for choosing [facility] and allowing us to care for you.   From your care management team: Yael HERNANDEZ [place] [phone]    You should receive a call from [facility] Discharge Department within 48-72 hours to help manage your care after discharge. Please try to make sure that you answer your phone for this important phone call.  Follow-up Information     Soren Rice MD On 11/3/2020.    Specialty: Family Medicine  Why: Outpatient Services, PCP, follow-up appointment at 8:00am.   Contact information:  [id number]  [place] [id number]  [phone]

## 2020-10-26 NOTE — SUBJECTIVE & OBJECTIVE
Past Medical History:   Diagnosis Date    Diabetes mellitus type I     Dialysis patient     Hypertension     Kidney failure        No past surgical history on file.    Review of patient's allergies indicates:  No Known Allergies    No current facility-administered medications on file prior to encounter.      Current Outpatient Medications on File Prior to Encounter   Medication Sig    atorvastatin (LIPITOR) 20 MG tablet Take 1 tablet (20 mg total) by mouth once daily.    blood sugar diagnostic Strp 1 each by Misc.(Non-Drug; Combo Route) route 2 hours after meals and at bedtime.    ibuprofen (ADVIL,MOTRIN) 800 MG tablet     insulin (BASAGLAR KWIKPEN U-100 INSULIN) glargine 100 units/mL (3mL) SubQ pen Inject 15 Units into the skin every evening.    insulin (LANTUS SOLOSTAR U-100 INSULIN) glargine 100 units/mL (3mL) SubQ pen Inject 15 Units into the skin every evening. (Patient not taking: Reported on 7/22/2020)    omeprazole (PRILOSEC) 20 MG capsule TAKE 2 CAPSULES(40 MG) BY MOUTH EVERY DAY    penicillin v potassium (VEETID) 500 MG tablet     traZODone (DESYREL) 50 MG tablet Take 1 tablet (50 mg total) by mouth every evening.    [DISCONTINUED] hydrALAZINE (APRESOLINE) 50 MG tablet Take 1 tablet (50 mg total) by mouth 3 (three) times daily.    [DISCONTINUED] lisinopril (PRINIVIL,ZESTRIL) 2.5 MG tablet Take 1 tablet (2.5 mg total) by mouth once daily.     Family History     None        Tobacco Use    Smoking status: Never Smoker    Smokeless tobacco: Never Used   Substance and Sexual Activity    Alcohol use: No     Frequency: Never    Drug use: No    Sexual activity: Not on file     Review of Systems   Constitutional: Negative for chills and fever.   HENT: Negative for congestion, rhinorrhea and sore throat.    Respiratory: Positive for cough (productive x1 day) and shortness of breath.         Endorses chest congestion.   Cardiovascular: Negative for chest pain.   Gastrointestinal: Positive for  abdominal pain and vomiting. Negative for blood in stool, constipation, diarrhea and nausea.   Genitourinary: Negative for dysuria, flank pain and hematuria.   Musculoskeletal: Negative for arthralgias, back pain, myalgias and neck pain.   Skin: Negative for rash.   Neurological: Negative for dizziness, syncope, weakness, numbness and headaches.   Hematological: Does not bruise/bleed easily.   Psychiatric/Behavioral: Negative for confusion.     Objective:     Vital Signs (Most Recent):  Temp: 97.8 °F (36.6 °C) (10/26/20 0242)  Pulse: 81 (10/26/20 0242)  Resp: (!) 0 (10/26/20 0003)  BP: (!) 181/82 (10/26/20 0242)  SpO2: 100 % (10/26/20 0242) Vital Signs (24h Range):  Temp:  [97.8 °F (36.6 °C)-98.5 °F (36.9 °C)] 97.8 °F (36.6 °C)  Pulse:  [] 81  Resp:  [0-18] 0  SpO2:  [95 %-100 %] 100 %  BP: (151-230)/() 181/82     Weight: 68.4 kg (150 lb 12.7 oz)  Body mass index is 22.27 kg/m².    Physical Exam  Vitals signs and nursing note reviewed.   Constitutional:       Appearance: Normal appearance.   HENT:      Head: Normocephalic and atraumatic.      Nose: Nose normal. No congestion.      Mouth/Throat:      Mouth: Mucous membranes are moist.   Eyes:      Conjunctiva/sclera: Conjunctivae normal.   Neck:      Musculoskeletal: Normal range of motion and neck supple. No neck rigidity or muscular tenderness.   Cardiovascular:      Rate and Rhythm: Normal rate and regular rhythm.      Pulses: Normal pulses.      Heart sounds: Normal heart sounds. No gallop.    Pulmonary:      Effort: Pulmonary effort is normal.      Breath sounds: No wheezing.      Comments: Coarse throughout lung fields.  Chest:      Chest wall: No tenderness.   Abdominal:      General: There is no distension.      Palpations: Abdomen is soft.      Tenderness: There is abdominal tenderness (LUQ/LLQ). There is no guarding or rebound.   Musculoskeletal:         General: No tenderness.      Right lower leg: No edema.      Left lower leg: No edema.    Skin:     General: Skin is warm and dry.      Capillary Refill: Capillary refill takes less than 2 seconds.      Findings: No rash.   Neurological:      Mental Status: He is alert and oriented to person, place, and time.   Psychiatric:         Mood and Affect: Mood normal.         Thought Content: Thought content normal.         Judgment: Judgment normal.             Significant Labs:   CBC:   Recent Labs   Lab 10/25/20  1951 10/26/20  0414   WBC 6.66 9.63   HGB 11.7* 11.3*   HCT 38.7* 36.3*    190     CMP:   Recent Labs   Lab 10/25/20  1951 10/26/20  0413    139   K 5.9* 7.0*    101   CO2 22* 25   * 151*   BUN 33* 38*   CREATININE 11.2* 11.9*   CALCIUM 9.6 8.9   PROT 8.4  --    ALBUMIN 4.0  --    BILITOT 0.2  --    ALKPHOS 86  --    AST 13  --    ALT 12  --    ANIONGAP 17* 13   EGFRNONAA 5* 4*     Cardiac Markers:   Recent Labs   Lab 10/25/20  1951   BNP 1,359*     All pertinent labs within the past 24 hours have been reviewed.    Significant Imaging: I have reviewed all pertinent imaging results/findings within the past 24 hours.

## 2020-10-26 NOTE — H&P
"[facility] Medicine  History & Physical    Patient Name: Adryan Neff  MRN: [id number]  Admission Date: 10/25/2020  Attending Physician: Homer Melendez MD   Primary Care Provider: Soren Rice MD         Patient information was obtained from patient, past medical records and ER records.     Subjective:     Principal Problem:Intractable vomiting    Chief Complaint:   Chief Complaint   Patient presents with    Abdominal Pain     pt daughter states that he has been having SOB and abdominal pain since 6:50pm.     Shortness of Breath        HPI: Adryan Neff 54 y.o. male PMHX: Hypertension ESRD on hemodialysis presents complaining of abdominal pain and shortness of breath.He reports acute onset of constant stabbing LLQ abdominal pain(10/10) associated with shortness of breath around 1700 on10/25/20.He endorses x3 episodes of vomiting, last bowel movement on morning of 10/25/20 ,chest congestion,chronic cough;productive cough x1 day and compliance with M/W/F dialysis at [facility] last on 10/23/20.Additionally endorses compliance with home medication;however doesn't know the names.He denies fever/chills,diarrhea/constipation,CP/palpitations,dysuria or any other associated symptoms.    "As clarification,on 10/26/2020 patient should be admitted for hospital observation services under my care in collaboration with Nikolay Sawyer MD   Signed by Mary Lou Jordan,MSN,APRN,FNP-C".    Left Heart Cath on 03/30/2016:  CAD-Non-obstructive,medically managed.  TTE on 03/27/2017:  Constrictive physiology  Normal left ventricular systolic function(LVEF55%).  Grade II left ventricular diastolic dysfunction  Moderate Pulmonary Hypertension  Elevated left atrial and central venous pressures.        Past Medical History:   Diagnosis Date    Diabetes mellitus type I     Dialysis patient     Hypertension     Kidney failure        No past surgical history on file.    Review of patient's " allergies indicates:  No Known Allergies    No current facility-administered medications on file prior to encounter.      Current Outpatient Medications on File Prior to Encounter   Medication Sig    atorvastatin (LIPITOR) 20 MG tablet Take 1 tablet (20 mg total) by mouth once daily.    blood sugar diagnostic Strp 1 each by Misc.(Non-Drug; Combo Route) route 2 hours after meals and at bedtime.    ibuprofen (ADVIL,MOTRIN) 800 MG tablet     insulin (BASAGLAR KWIKPEN U-100 INSULIN) glargine 100 units/mL (3mL) SubQ pen Inject 15 Units into the skin every evening.    insulin (LANTUS SOLOSTAR U-100 INSULIN) glargine 100 units/mL (3mL) SubQ pen Inject 15 Units into the skin every evening. (Patient not taking: Reported on 7/22/2020)    omeprazole (PRILOSEC) 20 MG capsule TAKE 2 CAPSULES(40 MG) BY MOUTH EVERY DAY    penicillin v potassium (VEETID) 500 MG tablet     traZODone (DESYREL) 50 MG tablet Take 1 tablet (50 mg total) by mouth every evening.    [DISCONTINUED] hydrALAZINE (APRESOLINE) 50 MG tablet Take 1 tablet (50 mg total) by mouth 3 (three) times daily.    [DISCONTINUED] lisinopril (PRINIVIL,ZESTRIL) 2.5 MG tablet Take 1 tablet (2.5 mg total) by mouth once daily.     Family History     None        Tobacco Use    Smoking status: Never Smoker    Smokeless tobacco: Never Used   Substance and Sexual Activity    Alcohol use: No     Frequency: Never    Drug use: No    Sexual activity: Not on file     Review of Systems   Constitutional: Negative for chills and fever.   HENT: Negative for congestion, rhinorrhea and sore throat.    Respiratory: Positive for cough (productive x1 day) and shortness of breath.         Endorses chest congestion.   Cardiovascular: Negative for chest pain.   Gastrointestinal: Positive for abdominal pain and vomiting. Negative for blood in stool, constipation, diarrhea and nausea.   Genitourinary: Negative for dysuria, flank pain and hematuria.   Musculoskeletal: Negative for  arthralgias, back pain, myalgias and neck pain.   Skin: Negative for rash.   Neurological: Negative for dizziness, syncope, weakness, numbness and headaches.   Hematological: Does not bruise/bleed easily.   Psychiatric/Behavioral: Negative for confusion.     Objective:     Vital Signs (Most Recent):  Temp: 97.8 °F (36.6 °C) (10/26/20 0242)  Pulse: 81 (10/26/20 0242)  Resp: (!) 0 (10/26/20 0003)  BP: (!) 181/82 (10/26/20 0242)  SpO2: 100 % (10/26/20 0242) Vital Signs (24h Range):  Temp:  [97.8 °F (36.6 °C)-98.5 °F (36.9 °C)] 97.8 °F (36.6 °C)  Pulse:  [] 81  Resp:  [0-18] 0  SpO2:  [95 %-100 %] 100 %  BP: (151-230)/() 181/82     Weight: 68.4 kg (150 lb 12.7 oz)  Body mass index is 22.27 kg/m².    Physical Exam  Vitals signs and nursing note reviewed.   Constitutional:       Appearance: Normal appearance.   HENT:      Head: Normocephalic and atraumatic.      Nose: Nose normal. No congestion.      Mouth/Throat:      Mouth: Mucous membranes are moist.   Eyes:      Conjunctiva/sclera: Conjunctivae normal.   Neck:      Musculoskeletal: Normal range of motion and neck supple. No neck rigidity or muscular tenderness.   Cardiovascular:      Rate and Rhythm: Normal rate and regular rhythm.      Pulses: Normal pulses.      Heart sounds: Normal heart sounds. No gallop.    Pulmonary:      Effort: Pulmonary effort is normal.      Breath sounds: No wheezing.      Comments: Coarse throughout lung fields.  Chest:      Chest wall: No tenderness.   Abdominal:      General: There is no distension.      Palpations: Abdomen is soft.      Tenderness: There is abdominal tenderness (LUQ/LLQ). There is no guarding or rebound.   Musculoskeletal:         General: No tenderness.      Right lower leg: No edema.      Left lower leg: No edema.   Skin:     General: Skin is warm and dry.      Capillary Refill: Capillary refill takes less than 2 seconds.      Findings: No rash.   Neurological:      Mental Status: He is alert and oriented  to person, place, and time.   Psychiatric:         Mood and Affect: Mood normal.         Thought Content: Thought content normal.         Judgment: Judgment normal.             Significant Labs:   CBC:   Recent Labs   Lab 10/25/20  1952 10/26/20  0414   WBC 6.66 9.63   HGB 11.7* 11.3*   HCT 38.7* 36.3*    190     CMP:   Recent Labs   Lab 10/25/20  1952 10/26/20  0413    139   K 5.9* 7.0*    101   CO2 22* 25   * 151*   BUN 33* 38*   CREATININE 11.2* 11.9*   CALCIUM 9.6 8.9   PROT 8.4  --    ALBUMIN 4.0  --    BILITOT 0.2  --    ALKPHOS 86  --    AST 13  --    ALT 12  --    ANIONGAP 17* 13   EGFRNONAA 5* 4*     Cardiac Markers:   Recent Labs   Lab 10/25/20  1952   BNP 1,359*     All pertinent labs within the past 24 hours have been reviewed.    Significant Imaging: I have reviewed all pertinent imaging results/findings within the past 24 hours.    Assessment/Plan:     * Elevated troponin  JUO=2532 bumped in comparison to OST=709 on 03/27/2017. Initial trop=0.008 in comparison totrop 0.02 on 06/14/2017.EKG exhibits no findings of acute ischemia;prolonged QT and pulmonary disease pattern.CXR exhibits findings of suggestive of pulmonary edema/CHF pattern right basilar opacity could represent superimposed atelectasis versus aspiration or pneumonia. CT abdomen/pelvis exhibit findings significant bibasilar ground-glass and superimposed consolidative opacities; could relate to underlying infectious or noninfectious inflammatory process a possible pulmonary edema.  -Telemetry  -Serial troponins  -ASA   -TSH and lipid panel pending  -2D Echo        ESRD on hemodialysis  K=5.9 on admit.BUN /CR 33/11.2 bumped in comparison to previous on BUN/CR=32./9.7 on 01/29/19. BNP=1.359  -Nephrology consult for Hemodialysis     Chronic diastolic heart failure  TTE:LVEF 55% with grade 2 diastolic dysfunction moderate pulmonary hypertension on 03/27/2017  Further treatment as above.      Shortness of breath  See as  above.      Hyperkalemia  See ESRD above        CAD (coronary artery disease)  Nonobstructive,medically managed.  See as above      Intractable vomiting  CT abd/pelvis exhibit findings of moderate stool burden in the colon.  -Pericolace  -Anti-emetics  -Advance diet as tolerated.          Gastroesophageal reflux disease  Continue home medication treatment regimen.      Pure hypercholesterolemia  Continue home statin.      Essential (primary) hypertension  Systolic BP's 151-230. Received IV hydralazine,IV labetalol,oral the amlodipine and carvedilol in ED with improvement.Likely 2/2 pulmonary edema.Continue home medication treatment regimen;Clonidine p.r.n. for tighter control. Nephrology consulted for volume overload          Controlled type 2 diabetes mellitus without complication, without long-term current use of insulin  Random glucose=117.Most recent hemoglobin A1c=  -P0CT glucose monitoring  -Hold home anti-hyperglycemics, basal and correctional scale insulin; adjust as needed for tighter control.  -Hemoglobin A1c pending      VTE Risk Mitigation (From admission, onward)         Ordered     heparin (porcine) injection 5,000 Units  Every 8 hours      10/26/20 0308     IP VTE LOW RISK PATIENT  Once      10/26/20 0303     Place sequential compression device  Until discontinued      10/26/20 0304                   KIKI Newman, FNP-C  Hospitalist - Department of Hospital Medicine  [address], [place] [address]  Office [phone]; Pager [phone]

## 2020-10-26 NOTE — HPI
"Adryan Neff 54 y.o. male PMHX: Hypertension ESRD on hemodialysis presents complaining of abdominal pain and shortness of breath.He reports acute onset of constant stabbing LLQ abdominal pain(10/10) associated with shortness of breath around 1700 on10/25/20.He endorses x3 episodes of vomiting, last bowel movement on morning of 10/25/20 ,chest congestion,chronic cough;productive cough x1 day and compliance with M/W/F dialysis at [facility] last on 10/23/20.Additionally endorses compliance with home medication;however doesn't know the names.He denies fever/chills,diarrhea/constipation,CP/palpitations,dysuria or any other associated symptoms.    "As clarification,on 10/26/2020 patient should be admitted for hospital observation services under my care in collaboration with Nikolay Sawyer MD   Signed by Mary Lou Jordan,MSN,APRN,FNP-C".    Left Heart Cath on 03/30/2016:  CAD-Non-obstructive,medically managed.  TTE on 03/27/2017:  Constrictive physiology  Normal left ventricular systolic function(LVEF55%).  Grade II left ventricular diastolic dysfunction  Moderate Pulmonary Hypertension  Elevated left atrial and central venous pressures.      "

## 2020-10-26 NOTE — ED NOTES
In POV. A/o X4. Ambulatory to room. VSS. With HTN noted.     C/o CP and epigastric pain for 3 hrs. Pt notes the epigastric pain is better when pressing on stomach. Admits to nausea but denies vomiting. Admits to constipation but denies diarrhea. Denies Sob at present. Pt notes he's on dialysis M/W/F with last dialysis F and was normal. Pt is anuric from dialysis.    Hooked to monitor. 18G INT started to R FA with blood drawn and ran/sent to lab as ordered.

## 2020-10-26 NOTE — ASSESSMENT & PLAN NOTE
Systolic BP's 151-230. Received IV hydralazine,IV labetalol,oral the amlodipine and carvedilol in ED with improvement.Likely 2/2 pulmonary edema.Continue home medication treatment regimen;Clonidine p.r.n. for tighter control. Nephrology consulted for volume overload

## 2020-10-26 NOTE — ASSESSMENT & PLAN NOTE
LVC=6162 bumped in comparison to UGZ=502 on 03/27/2017. Initial trop=0.008 in comparison totrop 0.02 on 06/14/2017.EKG exhibits no findings of acute ischemia;prolonged QT and pulmonary disease pattern.CXR exhibits findings of suggestive of pulmonary edema/CHF pattern right basilar opacity could represent superimposed atelectasis versus aspiration or pneumonia. CT abdomen/pelvis exhibit findings significant bibasilar ground-glass and superimposed consolidative opacities; could relate to underlying infectious or noninfectious inflammatory process a possible pulmonary edema.  -Telemetry  -Serial troponins  -ASA   -TSH and lipid panel pending  -2D Echo

## 2020-10-26 NOTE — NURSING
Transferred to  Cardiology via wheelchair with Oxygen infusing at 2/ LPM via nasal cannula. Daughter at side. He prefers to use daughter for

## 2020-10-26 NOTE — PLAN OF CARE
Patient arrived on unit with recent episode of emesis and nausea and had a lot of guarding and complaints of pain left side of abdomen. His nausea decreased while time passed but pain persisted. His daughter is present to interpret because he is Yoruba speaking.       Problem: Fall Injury Risk  Goal: Absence of Fall and Fall-Related Injury  Outcome: Ongoing, Progressing     Problem: Adult Inpatient Plan of Care  Goal: Patient-Specific Goal (Individualization)  Outcome: Ongoing, Progressing

## 2020-10-26 NOTE — ASSESSMENT & PLAN NOTE
CT abd/pelvis exhibit findings of moderate stool burden in the colon.  -Pericolace  -Anti-emetics  -Advance diet as tolerated.

## 2020-10-26 NOTE — CONSULTS
Awake alert oriented NAD admitted with LLQ abd pain n/v of a few hrs duration prior to admit.    Denies fever diarrhea dysuria or hematuria    Renal consult requested to help with care    Usual hd at [facility]O admitted with    Denies CNS ENT CP GI  RHEUM OR DERM SX  Past Medical History:   Diagnosis Date    Diabetes mellitus type I     Dialysis patient     Hypertension     Kidney failure      No past surgical history on file.  Review of patient's allergies indicates:  No Known Allergies    Current Facility-Administered Medications   Medication    acetaminophen tablet 650 mg    albuterol-ipratropium 2.5 mg-0.5 mg/3 mL nebulizer solution 3 mL    [START ON 10/27/2020] amLODIPine tablet 10 mg    atorvastatin tablet 20 mg    carvediloL tablet 25 mg    dextromethorphan-guaifenesin  mg/5 ml liquid 10 mL    dextrose 50% injection 12.5 g    dextrose 50% injection 25 g    glucagon (human recombinant) injection 1 mg    glucose chewable tablet 16 g    glucose chewable tablet 24 g    heparin (porcine) injection 5,000 Units    influenza (QUADRIVALENT PF) vaccine 0.5 mL    insulin aspart U-100 pen 0-5 Units    insulin detemir U-100 pen 7.5 Units    ondansetron injection 4 mg    pneumoc 13-jo conj-dip cr(PF) (PREVNAR 13 (PF)) 0.5 mL    senna-docusate 8.6-50 mg per tablet 1 tablet    sodium chloride 0.9% flush 10 mL     Social History     Socioeconomic History    Marital status: Single     Spouse name: Not on file    Number of children: Not on file    Years of education: Not on file    Highest education level: Not on file   Occupational History    Not on file   Social Needs    Financial resource strain: Not on file    Food insecurity     Worry: Not on file     Inability: Not on file    Transportation needs     Medical: Not on file     Non-medical: Not on file   Tobacco Use    Smoking status: Never Smoker    Smokeless tobacco: Never Used   Substance and Sexual Activity    Alcohol use:  No     Frequency: Never    Drug use: No    Sexual activity: Not on file   Lifestyle    Physical activity     Days per week: Not on file     Minutes per session: Not on file    Stress: Not on file   Relationships    Social connections     Talks on phone: Not on file     Gets together: Not on file     Attends Evangelical service: Not on file     Active member of club or organization: Not on file     Attends meetings of clubs or organizations: Not on file     Relationship status: Not on file   Other Topics Concern    Not on file   Social History Narrative    Not on file     No family history on file.    LABS    Recent Results (from the past 24 hour(s))   CBC auto differential    Collection Time: 10/25/20  7:51 PM   Result Value Ref Range    WBC 6.66 3.90 - 12.70 K/uL    RBC 4.30 (L) 4.60 - 6.20 M/uL    Hemoglobin 11.7 (L) 14.0 - 18.0 g/dL    Hematocrit 38.7 (L) 40.0 - 54.0 %    Mean Corpuscular Volume 90 82 - 98 fL    Mean Corpuscular Hemoglobin 27.2 27.0 - 31.0 pg    Mean Corpuscular Hemoglobin Conc 30.2 (L) 32.0 - 36.0 g/dL    RDW 16.7 (H) 11.5 - 14.5 %    Platelets 151 150 - 350 K/uL    MPV 11.1 9.2 - 12.9 fL    Immature Granulocytes 0.2 0.0 - 0.5 %    Gran # (ANC) 5.0 1.8 - 7.7 K/uL    Immature Grans (Abs) 0.01 0.00 - 0.04 K/uL    Lymph # 1.1 1.0 - 4.8 K/uL    Mono # 0.4 0.3 - 1.0 K/uL    Eos # 0.1 0.0 - 0.5 K/uL    Baso # 0.05 0.00 - 0.20 K/uL    nRBC 0 0 /100 WBC    Gran% 74.9 (H) 38.0 - 73.0 %    Lymph% 16.1 (L) 18.0 - 48.0 %    Mono% 6.0 4.0 - 15.0 %    Eosinophil% 2.0 0.0 - 8.0 %    Basophil% 0.8 0.0 - 1.9 %    Differential Method Automated    Comprehensive metabolic panel    Collection Time: 10/25/20  7:51 PM   Result Value Ref Range    Sodium 140 136 - 145 mmol/L    Potassium 5.9 (H) 3.5 - 5.1 mmol/L    Chloride 101 95 - 110 mmol/L    CO2 22 (L) 23 - 29 mmol/L    Glucose 117 (H) 70 - 110 mg/dL    BUN, Bld 33 (H) 6 - 20 mg/dL    Creatinine 11.2 (H) 0.5 - 1.4 mg/dL    Calcium 9.6 8.7 - 10.5 mg/dL    Total  Protein 8.4 6.0 - 8.4 g/dL    Albumin 4.0 3.5 - 5.2 g/dL    Total Bilirubin 0.2 0.1 - 1.0 mg/dL    Alkaline Phosphatase 86 55 - 135 U/L    AST 13 10 - 40 U/L    ALT 12 10 - 44 U/L    Anion Gap 17 (H) 8 - 16 mmol/L    eGFR if African American 5 (A) >60 mL/min/1.73 m^2    eGFR if non African American 5 (A) >60 mL/min/1.73 m^2   Troponin I #1    Collection Time: 10/25/20  7:51 PM   Result Value Ref Range    Troponin I 0.088 (H) 0.000 - 0.026 ng/mL   B-Type natriuretic peptide (BNP)    Collection Time: 10/25/20  7:51 PM   Result Value Ref Range    BNP 1,359 (H) 0 - 99 pg/mL   Troponin I #2    Collection Time: 10/25/20 11:23 PM   Result Value Ref Range    Troponin I 0.079 (H) 0.000 - 0.026 ng/mL   POCT COVID-19 Rapid Screening    Collection Time: 10/26/20 12:43 AM   Result Value Ref Range    POC Rapid COVID Negative Negative     Acceptable Yes    POCT glucose    Collection Time: 10/26/20  4:10 AM   Result Value Ref Range    POCT Glucose 155 (H) 70 - 110 mg/dL   Basic Metabolic Panel    Collection Time: 10/26/20  4:13 AM   Result Value Ref Range    Sodium 139 136 - 145 mmol/L    Potassium 7.0 (HH) 3.5 - 5.1 mmol/L    Chloride 101 95 - 110 mmol/L    CO2 25 23 - 29 mmol/L    Glucose 151 (H) 70 - 110 mg/dL    BUN, Bld 38 (H) 6 - 20 mg/dL    Creatinine 11.9 (H) 0.5 - 1.4 mg/dL    Calcium 8.9 8.7 - 10.5 mg/dL    Anion Gap 13 8 - 16 mmol/L    eGFR if African American 5 (A) >60 mL/min/1.73 m^2    eGFR if non African American 4 (A) >60 mL/min/1.73 m^2   Troponin I    Collection Time: 10/26/20  4:13 AM   Result Value Ref Range    Troponin I 0.141 (H) 0.000 - 0.026 ng/mL   Hemoglobin A1c if not done in past 3 months    Collection Time: 10/26/20  4:14 AM   Result Value Ref Range    Hemoglobin A1C 5.6 4.0 - 5.6 %    Estimated Avg Glucose 114 68 - 131 mg/dL   CBC auto differential    Collection Time: 10/26/20  4:14 AM   Result Value Ref Range    WBC 9.63 3.90 - 12.70 K/uL    RBC 4.13 (L) 4.60 - 6.20 M/uL    Hemoglobin  11.3 (L) 14.0 - 18.0 g/dL    Hematocrit 36.3 (L) 40.0 - 54.0 %    Mean Corpuscular Volume 88 82 - 98 fL    Mean Corpuscular Hemoglobin 27.4 27.0 - 31.0 pg    Mean Corpuscular Hemoglobin Conc 31.1 (L) 32.0 - 36.0 g/dL    RDW 16.9 (H) 11.5 - 14.5 %    Platelets 190 150 - 350 K/uL    MPV 10.8 9.2 - 12.9 fL    Immature Granulocytes 0.2 0.0 - 0.5 %    Gran # (ANC) 7.7 1.8 - 7.7 K/uL    Immature Grans (Abs) 0.02 0.00 - 0.04 K/uL    Lymph # 1.2 1.0 - 4.8 K/uL    Mono # 0.7 0.3 - 1.0 K/uL    Eos # 0.0 0.0 - 0.5 K/uL    Baso # 0.05 0.00 - 0.20 K/uL    nRBC 0 0 /100 WBC    Gran% 79.7 (H) 38.0 - 73.0 %    Lymph% 12.0 (L) 18.0 - 48.0 %    Mono% 7.3 4.0 - 15.0 %    Eosinophil% 0.3 0.0 - 8.0 %    Basophil% 0.5 0.0 - 1.9 %    Differential Method Automated    POCT glucose    Collection Time: 10/26/20  8:05 AM   Result Value Ref Range    POCT Glucose 159 (H) 70 - 110 mg/dL   Echo Color Flow Doppler? Yes    Collection Time: 10/26/20 11:37 AM   Result Value Ref Range    BSA 1.82 m2    LA WIDTH 3.96 cm    AORTIC VALVE CUSP SEPERATION 2.02 cm    PV PEAK VELOCITY 0.81 cm/s    LVIDd 5.09 3.5 - 6.0 cm    IVS 1.19 (A) 0.6 - 1.1 cm    Posterior Wall 1.31 (A) 0.6 - 1.1 cm    Ao root annulus 2.85 cm    LVIDs 3.85 2.1 - 4.0 cm    FS 24 28 - 44 %    LA volume 108.64 cm3    STJ 1.88 cm    Ascending aorta 2.65 cm    LV mass 254.67 g    LA size 4.55 cm    RVDD 4.01 cm    TAPSE 2.13 cm    Left Ventricle Relative Wall Thickness 0.51 cm    AV mean gradient 4 mmHg    AV valve area 2.37 cm2    AV Velocity Ratio 0.69     AV index (prosthetic) 0.73     MV valve area p 1/2 method 3.91 cm2    E/A ratio 3.64     E wave decelartion time 194.11 msec    IVRT 139.87 msec    Pulm vein S/D ratio 0.68     LVOT diameter 2.03 cm    LVOT area 3.2 cm2    LVOT peak mac 0.95 m/s    LVOT peak VTI 23.13 cm    Ao peak mac 1.37 m/s    Ao VTI 31.52 cm    LVOT stroke volume 74.82 cm3    AV peak gradient 8 mmHg    MV Peak E Mac 1.31 m/s    TR Max Mac 4.13 m/s    MV stenosis  pressure 1/2 time 56.29 ms    MV Peak A Mac 0.36 m/s    PV Peak S Mac 0.27 m/s    PV Peak D Mac 0.40 m/s    LV Systolic Volume 64.03 mL    LV Systolic Volume Index 35.0 mL/m2    LV Diastolic Volume 123.48 mL    LV Diastolic Volume Index 67.40 mL/m2    LA Volume Index 59.3 mL/m2    LV Mass Index 139 g/m2    RA Major Axis 6.15 cm    Left Atrium Minor Axis 6.47 cm    Left Atrium Major Axis 7.85 cm    Triscuspid Valve Regurgitation Peak Gradient 68 mmHg    RA Width 3.38 cm    Right Atrial Pressure (from IVC) 3 mmHg    TV rest pulmonary artery pressure 71 mmHg   Potassium    Collection Time: 10/26/20 12:10 PM   Result Value Ref Range    Potassium 5.8 (H) 3.5 - 5.1 mmol/L   POCT glucose    Collection Time: 10/26/20 12:20 PM   Result Value Ref Range    POCT Glucose 50 (LL) 70 - 110 mg/dL   ]    No intake/output data recorded.    Vitals:    10/26/20 0438 10/26/20 0741 10/26/20 0801 10/26/20 1220   BP: (!) 175/78  (!) 151/69 (!) 145/66   Pulse: 67 66 62 60   Resp: 17 18 19 20   Temp:   98.2 °F (36.8 °C) 96.4 °F (35.8 °C)   TempSrc:   Oral Oral   SpO2: 98% 100% 96% 96%   Weight:       Height:           No Jvd, Thyromegaly or Lymphadenopathy  Lungs: Fairly clear anteriorly and laterally  Cor: RRR no G or rubs  Abd: Soft benign good bowel sounds non tender  Ext: No E C C    A)    ESRD  Abd pain with nausea and vomiting  HTN  Anemia of ckd  Chronic lung changes in cxr  Dm  2nd hyperpth    P)  Renal Diet  Home meds  Protect access  HD MWF  EPO  prn  Binders prn  Adjust all meds to the degree of renal fx  Close follow up I/O and weights  Maintain Hydration   Check po4

## 2020-10-27 VITALS
BODY MASS INDEX: 22.34 KG/M2 | HEART RATE: 63 BPM | OXYGEN SATURATION: 99 % | TEMPERATURE: 98 F | DIASTOLIC BLOOD PRESSURE: 62 MMHG | SYSTOLIC BLOOD PRESSURE: 136 MMHG | RESPIRATION RATE: 18 BRPM | WEIGHT: 150.81 LBS | HEIGHT: 69 IN

## 2020-10-27 LAB
POCT GLUCOSE: 137 MG/DL (ref 70–110)
POCT GLUCOSE: 93 MG/DL (ref 70–110)

## 2020-10-27 PROCEDURE — 25000003 PHARM REV CODE 250: Mod: NTX | Performed by: HOSPITALIST

## 2020-10-27 PROCEDURE — 25000242 PHARM REV CODE 250 ALT 637 W/ HCPCS: Mod: NTX | Performed by: HOSPITALIST

## 2020-10-27 PROCEDURE — 99900035 HC TECH TIME PER 15 MIN (STAT): Mod: NTX

## 2020-10-27 PROCEDURE — 25000003 PHARM REV CODE 250: Mod: NTX | Performed by: EMERGENCY MEDICINE

## 2020-10-27 PROCEDURE — 94640 AIRWAY INHALATION TREATMENT: CPT | Mod: NTX

## 2020-10-27 PROCEDURE — 63600175 PHARM REV CODE 636 W HCPCS: Mod: NTX | Performed by: HOSPITALIST

## 2020-10-27 PROCEDURE — 94761 N-INVAS EAR/PLS OXIMETRY MLT: CPT | Mod: NTX

## 2020-10-27 RX ORDER — IPRATROPIUM BROMIDE AND ALBUTEROL SULFATE 2.5; .5 MG/3ML; MG/3ML
3 SOLUTION RESPIRATORY (INHALATION) EVERY 6 HOURS PRN
Status: DISCONTINUED | OUTPATIENT
Start: 2020-10-27 | End: 2020-10-27 | Stop reason: HOSPADM

## 2020-10-27 RX ADMIN — IPRATROPIUM BROMIDE AND ALBUTEROL SULFATE 3 ML: .5; 3 SOLUTION RESPIRATORY (INHALATION) at 12:10

## 2020-10-27 RX ADMIN — HEPARIN SODIUM 5000 UNITS: 5000 INJECTION INTRAVENOUS; SUBCUTANEOUS at 05:10

## 2020-10-27 RX ADMIN — ATORVASTATIN CALCIUM 20 MG: 10 TABLET, FILM COATED ORAL at 08:10

## 2020-10-27 RX ADMIN — CARVEDILOL 25 MG: 12.5 TABLET, FILM COATED ORAL at 08:10

## 2020-10-27 RX ADMIN — IPRATROPIUM BROMIDE AND ALBUTEROL SULFATE 3 ML: .5; 3 SOLUTION RESPIRATORY (INHALATION) at 04:10

## 2020-10-27 RX ADMIN — AMLODIPINE BESYLATE 10 MG: 5 TABLET ORAL at 08:10

## 2020-10-27 RX ADMIN — IPRATROPIUM BROMIDE AND ALBUTEROL SULFATE 3 ML: .5; 3 SOLUTION RESPIRATORY (INHALATION) at 08:10

## 2020-10-27 NOTE — NURSING
Returned from Dialysis. No acute distress noted.  Patient on room air at this time. No complains of pain or discomfort

## 2020-10-27 NOTE — NURSING
Pt request to leave AMA, and stated he is not waiting for Dialysis schedule for 3pm today, language line  Dexter # [id number] explained AMA form in language pt requested, creole, pt stated he verbalize understanding of form, of K level 5.8, possible risk of heart attack or death.   notified of pt AMA and message left for Dr. Rodriguez at messaging service of pt leaving AMA and refusal for dialysis.   R.arm IV line removed, pt left ambulated/left in no s/s of distress. Wife and daughter present during interpretation and verbalize understanding, pt and family members had no questions or concerns.   AMA form signed by pt and placed in pt's chart.

## 2020-10-27 NOTE — NURSING
On several occasions patient refusing lab to be obtained. He states that he has no more blood. Informed  Him that this is an informed step in his care. Discussed potassium level from yesterday

## 2020-10-27 NOTE — PLAN OF CARE
Problem: Fall Injury Risk  Goal: Absence of Fall and Fall-Related Injury  Intervention: Identify and Manage Contributors to Fall Injury Risk  Flowsheets (Taken 10/26/2020 1929)  Self-Care Promotion:   independence encouraged   BADL personal objects within reach   BADL personal routines maintained   meal setup provided  Medication Review/Management:   medications reviewed   high risk medications identified     Problem: Diabetes Comorbidity  Goal: Blood Glucose Level Within Desired Range  Intervention: Maintain Glycemic Control  Flowsheets (Taken 10/26/2020 1929)  Glycemic Management: blood glucose monitoring     Problem: Electrolyte Imbalance (Chronic Kidney Disease)  Goal: Electrolyte Balance  Outcome: Ongoing, Progressing   Dialysis tolerated well. Calcium carbonate given prior for elevated potassium

## 2020-10-27 NOTE — PROGRESS NOTES
Renal Progress Note    Date of Admission:  10/25/2020  7:28 PM    Length of Stay: 1  Days    Subjective: n/a    Objective:    Current Facility-Administered Medications   Medication    acetaminophen tablet 650 mg    albuterol-ipratropium 2.5 mg-0.5 mg/3 mL nebulizer solution 3 mL    amLODIPine tablet 10 mg    atorvastatin tablet 20 mg    carvediloL tablet 25 mg    cloNIDine tablet 0.1 mg    dextromethorphan-guaifenesin  mg/5 ml liquid 10 mL    dextrose 50% injection 12.5 g    dextrose 50% injection 25 g    glucagon (human recombinant) injection 1 mg    glucose chewable tablet 16 g    glucose chewable tablet 24 g    heparin (porcine) injection 5,000 Units    influenza (QUADRIVALENT PF) vaccine 0.5 mL    insulin aspart U-100 pen 0-5 Units    insulin detemir U-100 pen 7.5 Units    ondansetron injection 4 mg    pneumoc 13-jo conj-dip cr(PF) (PREVNAR 13 (PF)) 0.5 mL    senna-docusate 8.6-50 mg per tablet 1 tablet    sodium chloride 0.9% flush 10 mL       Vitals:    10/27/20 0412 10/27/20 0427 10/27/20 0745 10/27/20 0801   BP:  (!) 148/67 127/60    BP Location:  Right arm Right arm    Patient Position:  Lying Lying    Pulse: 79 67 72 67   Resp: 18 16 17 16   Temp:  98.5 °F (36.9 °C) 98.4 °F (36.9 °C)    TempSrc:  Oral Oral    SpO2: 95% 96% 97% 99%   Weight:       Height:           I/O last 3 completed shifts:  In: 860 [P.O.:360; Other:500]  Out: 2000 [Other:2000]  I/O this shift:  In: 240 [P.O.:240]  Out: -       Physical Exam: n/a      Laboratories:    No results for input(s): WBC, RBC, HGB, HCT, PLT, MCV, MCH, MCHC in the last 24 hours.    Recent Labs   Lab 10/26/20  1210   K 5.8*       No results for input(s): COLORU, CLARITYU, SPECGRAV, PHUR, PROTEINUA, GLUCOSEU, BLOODU, WBCU, RBCU, BACTERIA, MUCUS in the last 24 hours.    Invalid input(s):  BILIRUBINCON    Microbiology Results (last 7 days)     ** No results found for the last 168 hours. **            Diagnostic  Tests:    X-ray Abdomen    FINDINGS:   Scattered air is seen throughout nondilated loops of large and small bowel.  There is a moderate to large volume of fecal material throughout the colon which can be seen with constipation.  Limited evaluation of free intraperitoneal air due to patient positioning.  Calcifications projecting over the pelvis likely relate to pelvic phleboliths.  Vascular calcifications are present.  Osseous structures are intact.    Impression:       Moderate to large volume of fecal material throughout the colon which can be seen with constipation.       Electronically signed by: Gisselle Montiel MD   Date: 10/26/2020   Time: 23:40       CXR:  Impression:       Findings suggesting sequela of pulmonary edema/CHF pattern, with interstitial pneumonia not excluded.     Right basilar opacity may represent superimposed atelectasis versus aspiration or pneumonia.  Short-term follow-up chest radiography after therapy is recommended to ensure resolution.     Asymmetric prominent left hilum with differential considerations above.  Further evaluation with elective/nonemergent cross-sectional imaging can be obtained after treatment of acute illness as warranted.     This report was flagged in Epic as abnormal.       Electronically signed by: Domenico Hall MD   Date: 10/25/2020              Assessment:    53 y/o male with ESRD on HD admitted with:    - Dyspnea with abnormal CXR suggestive of HF and fluid O.L.  - Hyperkalemia  - Emesis - abd. Pain r/o obstipation  - Chronic diastolic HF  - Stable anemia of CKD  - DM-2 with renal manifestations  - HTN  - CAD  - Hx. GERD        Plan:    - Pte. Told he needed additional Dialysis today   But he refused and left Hospital AMA

## 2020-10-27 NOTE — PLAN OF CARE
10/27/20 1042   Medicare Message   Important Message from Medicare regarding Discharge Appeal Rights Given to patient/caregiver;Explained to patient/caregiver;Signed/date by patient/caregiver   Date IMM was signed 10/27/20   Time IMM was signed 1042

## 2020-10-27 NOTE — NURSING
Language line/Jonus, used for interaction and medication administration with pt, pt verbalize understanding and pt's questions answered in preferred language/ creole.

## 2020-10-30 NOTE — HOSPITAL COURSE
Ms Neff presented with severe hyperkalemia secondary to non compliance with dialysis. Admitted and dialyzed. Potassium improved but not back to normal. Patient then refused repeat labs for re-evaluation and refused repeat hemodialysis. Patient requested leaving against medical advised prior to evaluation by myself and nephrologist. Patient signed AMA form and left room ambulating independently, per nurse.

## 2020-11-25 ENCOUNTER — TELEPHONE (OUTPATIENT)
Dept: TRANSPLANT | Facility: CLINIC | Age: 55
End: 2020-11-25

## 2020-12-11 DIAGNOSIS — Z76.82 ORGAN TRANSPLANT CANDIDATE: Primary | ICD-10-CM

## 2020-12-21 ENCOUNTER — DOCUMENTATION ONLY (OUTPATIENT)
Dept: TRANSPLANT | Facility: CLINIC | Age: 55
End: 2020-12-21

## 2020-12-21 ENCOUNTER — TELEPHONE (OUTPATIENT)
Dept: TRANSPLANT | Facility: CLINIC | Age: 55
End: 2020-12-21

## 2020-12-21 NOTE — TELEPHONE ENCOUNTER
12/21/20  I called [facility] to check on compliance.  I spoke to KIAH Bettencourt.  She stated the pt has no issues with compliance.  I informed her of the D/C summary in Epic concerning his hospital stay and issues of noncompliance.  Leaving AMA and refused treatment.  Rut confirmed he is not like that with his HD treatments or meds.

## 2020-12-21 NOTE — TELEPHONE ENCOUNTER
----- Message from Soraida Villagomez sent at 12/21/2020 12:56 PM CST -----  Regarding: RE: Add pancreas for FC  Patient can be cleared for pancreas transplant do you need a clearance sheet for K/P  ----- Message -----  From: Scott Carlos RN  Sent: 12/19/2020  12:44 PM CST  To: Soraida Villagomez  Subject: Add pancreas for FC                              Please see if this pt can be financially cleared for pancreas transplant.    Thanks  Nikolay

## 2020-12-28 ENCOUNTER — TELEPHONE (OUTPATIENT)
Dept: TRANSPLANT | Facility: CLINIC | Age: 55
End: 2020-12-28

## 2020-12-28 NOTE — TELEPHONE ENCOUNTER
"Compliance check completed.    ----- Message from Brett Ron sent at 12/28/2020 11:16 AM CST -----  Regarding: RE: compliance check  No additional follow up needed from SW perspective.  Thanks!  Brett  ----- Message -----  From: Scott Carlos RN  Sent: 12/28/2020  11:04 AM CST  To: Brett Ron  Subject: compliance check                                 I need to confirm a compliance check for this pt.  I called his dialysis unit This is me note. " 12/21/20  I called [facility] to check on compliance.  I spoke to KIAH Bettencourt.  She stated the pt has no issues with compliance.  I informed her of the D/C summary in Epic concerning his hospital stay and issues of noncompliance.  Leaving AMA and refused treatment.  Rut confirmed he is not like that with his HD treatments or meds"".  Do you need to do any additional follow up?    Thanks   Nikolay        "

## 2021-01-07 ENCOUNTER — OFFICE VISIT (OUTPATIENT)
Dept: TRANSPLANT | Facility: CLINIC | Age: 56
End: 2021-01-07
Payer: MEDICARE

## 2021-01-07 ENCOUNTER — HOSPITAL ENCOUNTER (OUTPATIENT)
Dept: RADIOLOGY | Facility: HOSPITAL | Age: 56
Discharge: HOME | End: 2021-01-07
Attending: NURSE PRACTITIONER
Payer: MEDICARE

## 2021-01-07 ENCOUNTER — TELEPHONE (OUTPATIENT)
Dept: TRANSPLANT | Facility: CLINIC | Age: 56
End: 2021-01-07

## 2021-01-07 VITALS
TEMPERATURE: 99 F | HEART RATE: 114 BPM | SYSTOLIC BLOOD PRESSURE: 221 MMHG | DIASTOLIC BLOOD PRESSURE: 92 MMHG | BODY MASS INDEX: 23.02 KG/M2 | OXYGEN SATURATION: 100 % | HEIGHT: 69 IN | WEIGHT: 155.44 LBS | RESPIRATION RATE: 16 BRPM

## 2021-01-07 DIAGNOSIS — Z76.82 ORGAN TRANSPLANT CANDIDATE: ICD-10-CM

## 2021-01-07 DIAGNOSIS — I10 ESSENTIAL (PRIMARY) HYPERTENSION: ICD-10-CM

## 2021-01-07 DIAGNOSIS — E11.9 CONTROLLED TYPE 2 DIABETES MELLITUS WITHOUT COMPLICATION, WITHOUT LONG-TERM CURRENT USE OF INSULIN: ICD-10-CM

## 2021-01-07 DIAGNOSIS — Z01.818 PRE-TRANSPLANT EVALUATION FOR KIDNEY TRANSPLANT: ICD-10-CM

## 2021-01-07 DIAGNOSIS — N18.6 ESRD ON HEMODIALYSIS: Primary | ICD-10-CM

## 2021-01-07 DIAGNOSIS — Z99.2 ESRD ON HEMODIALYSIS: Primary | ICD-10-CM

## 2021-01-07 DIAGNOSIS — E78.00 PURE HYPERCHOLESTEROLEMIA: ICD-10-CM

## 2021-01-07 PROCEDURE — 76770 US EXAM ABDO BACK WALL COMP: CPT | Mod: 26,TXP,, | Performed by: RADIOLOGY

## 2021-01-07 PROCEDURE — 71046 X-RAY EXAM CHEST 2 VIEWS: CPT | Mod: 26,TXP,, | Performed by: RADIOLOGY

## 2021-01-07 PROCEDURE — 93978 VASCULAR STUDY: CPT | Mod: TC,TXP

## 2021-01-07 PROCEDURE — 99204 PR OFFICE/OUTPT VISIT, NEW, LEVL IV, 45-59 MIN: ICD-10-PCS | Mod: S$PBB,TXP,, | Performed by: TRANSPLANT SURGERY

## 2021-01-07 PROCEDURE — 76700 US EXAM ABDOM COMPLETE: CPT | Mod: TC,TXP

## 2021-01-07 PROCEDURE — 93978 US DOPP ILIACS BILATERAL: ICD-10-PCS | Mod: 26,TXP,, | Performed by: RADIOLOGY

## 2021-01-07 PROCEDURE — 93978 VASCULAR STUDY: CPT | Mod: 26,TXP,, | Performed by: RADIOLOGY

## 2021-01-07 PROCEDURE — 76700 US ABDOMEN COMPLETE: ICD-10-PCS | Mod: 26,TXP,, | Performed by: RADIOLOGY

## 2021-01-07 PROCEDURE — 76770 US RETROPERITONEAL COMPLETE: ICD-10-PCS | Mod: 26,TXP,, | Performed by: RADIOLOGY

## 2021-01-07 PROCEDURE — 71046 XR CHEST PA AND LATERAL: ICD-10-PCS | Mod: 26,TXP,, | Performed by: RADIOLOGY

## 2021-01-07 PROCEDURE — 99204 OFFICE O/P NEW MOD 45 MIN: CPT | Mod: S$PBB,TXP,, | Performed by: TRANSPLANT SURGERY

## 2021-01-07 PROCEDURE — 99205 OFFICE O/P NEW HI 60 MIN: CPT | Mod: S$PBB,TXP,, | Performed by: NURSE PRACTITIONER

## 2021-01-07 PROCEDURE — 71046 X-RAY EXAM CHEST 2 VIEWS: CPT | Mod: TC,TXP

## 2021-01-07 PROCEDURE — 99999 PR PBB SHADOW E&M-EST. PATIENT-LVL IV: ICD-10-PCS | Mod: PBBFAC,TXP,, | Performed by: NURSE PRACTITIONER

## 2021-01-07 PROCEDURE — 99214 OFFICE O/P EST MOD 30 MIN: CPT | Mod: PBBFAC,25,TXP | Performed by: NURSE PRACTITIONER

## 2021-01-07 PROCEDURE — 76700 US EXAM ABDOM COMPLETE: CPT | Mod: 26,TXP,, | Performed by: RADIOLOGY

## 2021-01-07 PROCEDURE — 99999 PR PBB SHADOW E&M-EST. PATIENT-LVL IV: CPT | Mod: PBBFAC,TXP,, | Performed by: NURSE PRACTITIONER

## 2021-01-07 PROCEDURE — 99205 PR OFFICE/OUTPT VISIT, NEW, LEVL V, 60-74 MIN: ICD-10-PCS | Mod: S$PBB,TXP,, | Performed by: NURSE PRACTITIONER

## 2021-01-07 PROCEDURE — 76770 US EXAM ABDO BACK WALL COMP: CPT | Mod: TC,TXP

## 2021-01-07 RX ORDER — SEVELAMER CARBONATE 800 MG/1
800 TABLET, FILM COATED ORAL
Status: ON HOLD | COMMUNITY
Start: 2020-11-21 | End: 2022-12-02 | Stop reason: HOSPADM

## 2021-01-07 RX ORDER — LISINOPRIL 2.5 MG/1
2.5 TABLET ORAL DAILY
COMMUNITY
End: 2021-04-20 | Stop reason: SDUPTHER

## 2021-01-14 ENCOUNTER — SOCIAL WORK (OUTPATIENT)
Dept: TRANSPLANT | Facility: CLINIC | Age: 56
End: 2021-01-14

## 2021-01-20 ENCOUNTER — PATIENT OUTREACH (OUTPATIENT)
Dept: ADMINISTRATIVE | Facility: HOSPITAL | Age: 56
End: 2021-01-20

## 2021-01-22 ENCOUNTER — TELEPHONE (OUTPATIENT)
Dept: CARDIOLOGY | Facility: CLINIC | Age: 56
End: 2021-01-22

## 2021-01-24 ENCOUNTER — PATIENT OUTREACH (OUTPATIENT)
Dept: ADMINISTRATIVE | Facility: OTHER | Age: 56
End: 2021-01-24

## 2021-01-26 ENCOUNTER — EPISODE CHANGES (OUTPATIENT)
Dept: TRANSPLANT | Facility: CLINIC | Age: 56
End: 2021-01-26

## 2021-01-26 ENCOUNTER — TELEPHONE (OUTPATIENT)
Dept: TRANSPLANT | Facility: CLINIC | Age: 56
End: 2021-01-26

## 2021-01-26 ENCOUNTER — OFFICE VISIT (OUTPATIENT)
Dept: CARDIOLOGY | Facility: CLINIC | Age: 56
End: 2021-01-26
Payer: MEDICARE

## 2021-01-26 ENCOUNTER — HOSPITAL ENCOUNTER (OUTPATIENT)
Dept: CARDIOLOGY | Facility: HOSPITAL | Age: 56
Discharge: HOME | End: 2021-01-26
Attending: NURSE PRACTITIONER
Payer: MEDICARE

## 2021-01-26 VITALS
SYSTOLIC BLOOD PRESSURE: 148 MMHG | DIASTOLIC BLOOD PRESSURE: 65 MMHG | HEIGHT: 70 IN | HEART RATE: 80 BPM | WEIGHT: 159.38 LBS | BODY MASS INDEX: 22.82 KG/M2

## 2021-01-26 VITALS
DIASTOLIC BLOOD PRESSURE: 65 MMHG | HEIGHT: 70 IN | HEART RATE: 73 BPM | BODY MASS INDEX: 22.76 KG/M2 | SYSTOLIC BLOOD PRESSURE: 148 MMHG | WEIGHT: 159 LBS

## 2021-01-26 DIAGNOSIS — Z99.2 ESRD ON HEMODIALYSIS: ICD-10-CM

## 2021-01-26 DIAGNOSIS — Z01.818 PRE-TRANSPLANT EVALUATION FOR KIDNEY TRANSPLANT: ICD-10-CM

## 2021-01-26 DIAGNOSIS — N18.6 ESRD ON HEMODIALYSIS: ICD-10-CM

## 2021-01-26 DIAGNOSIS — E11.9 CONTROLLED TYPE 2 DIABETES MELLITUS WITHOUT COMPLICATION, WITHOUT LONG-TERM CURRENT USE OF INSULIN: ICD-10-CM

## 2021-01-26 DIAGNOSIS — I10 ESSENTIAL (PRIMARY) HYPERTENSION: ICD-10-CM

## 2021-01-26 DIAGNOSIS — E78.00 PURE HYPERCHOLESTEROLEMIA: ICD-10-CM

## 2021-01-26 DIAGNOSIS — I50.32 CHRONIC DIASTOLIC HEART FAILURE: Primary | ICD-10-CM

## 2021-01-26 DIAGNOSIS — I25.10 CORONARY ARTERY DISEASE INVOLVING NATIVE HEART WITHOUT ANGINA PECTORIS, UNSPECIFIED VESSEL OR LESION TYPE: ICD-10-CM

## 2021-01-26 DIAGNOSIS — Z76.82 ORGAN TRANSPLANT CANDIDATE: ICD-10-CM

## 2021-01-26 DIAGNOSIS — I27.20 PULMONARY HTN: ICD-10-CM

## 2021-01-26 LAB
ASCENDING AORTA: 2.63 CM
AV INDEX (PROSTH): 0.71
AV MEAN GRADIENT: 3 MMHG
AV PEAK GRADIENT: 6 MMHG
AV VALVE AREA: 2.15 CM2
AV VELOCITY RATIO: 0.74
BSA FOR ECHO PROCEDURE: 1.89 M2
CV ECHO LV RWT: 0.36 CM
DOP CALC AO PEAK VEL: 1.19 M/S
DOP CALC AO VTI: 25.4 CM
DOP CALC LVOT AREA: 3 CM2
DOP CALC LVOT DIAMETER: 1.96 CM
DOP CALC LVOT PEAK VEL: 0.88 M/S
DOP CALC LVOT STROKE VOLUME: 54.52 CM3
DOP CALCLVOT PEAK VEL VTI: 18.08 CM
E WAVE DECELERATION TIME: 174.13 MSEC
E/A RATIO: 2.26
E/E' RATIO: 17.6 M/S
ECHO LV POSTERIOR WALL: 1.04 CM (ref 0.6–1.1)
FRACTIONAL SHORTENING: 15 % (ref 28–44)
INTERVENTRICULAR SEPTUM: 0.83 CM (ref 0.6–1.1)
IVRT: 107.04 MSEC
LA MAJOR: 4.88 CM
LA MINOR: 5.11 CM
LA WIDTH: 4.62 CM
LEFT ATRIUM SIZE: 4.55 CM
LEFT ATRIUM VOLUME INDEX MOD: 37 ML/M2
LEFT ATRIUM VOLUME INDEX: 47.2 ML/M2
LEFT ATRIUM VOLUME MOD: 70.01 CM3
LEFT ATRIUM VOLUME: 89.2 CM3
LEFT INTERNAL DIMENSION IN SYSTOLE: 5 CM (ref 2.1–4)
LEFT VENTRICLE DIASTOLIC VOLUME INDEX: 90.01 ML/M2
LEFT VENTRICLE DIASTOLIC VOLUME: 170.11 ML
LEFT VENTRICLE MASS INDEX: 115 G/M2
LEFT VENTRICLE SYSTOLIC VOLUME INDEX: 62.5 ML/M2
LEFT VENTRICLE SYSTOLIC VOLUME: 118.07 ML
LEFT VENTRICULAR INTERNAL DIMENSION IN DIASTOLE: 5.85 CM (ref 3.5–6)
LEFT VENTRICULAR MASS: 216.84 G
LV LATERAL E/E' RATIO: 17.6 M/S
LV SEPTAL E/E' RATIO: 17.6 M/S
MV A" WAVE DURATION": 10.85 MSEC
MV PEAK A VEL: 0.39 M/S
MV PEAK E VEL: 0.88 M/S
PISA MRMAX VEL: 0.07 M/S
PISA RADIUS: 0.6 CM
PISA TR MAX VEL: 4.07 M/S
PISA TR VN NYQUIST: 0.01 M/S
PULM VEIN S/D RATIO: 0.52
PV PEAK D VEL: 0.52 M/S
PV PEAK S VEL: 0.27 M/S
RA MAJOR: 4.08 CM
RA PRESSURE: 3 MMHG
RA WIDTH: 3.35 CM
RIGHT VENTRICULAR END-DIASTOLIC DIMENSION: 3.2 CM
RV TISSUE DOPPLER FREE WALL SYSTOLIC VELOCITY 1 (APICAL 4 CHAMBER VIEW): 10.01 CM/S
SINUS: 2.56 CM
STJ: 2.26 CM
TDI LATERAL: 0.05 M/S
TDI SEPTAL: 0.05 M/S
TDI: 0.05 M/S
TR MAX PG: 66 MMHG
TRICUSPID ANNULAR PLANE SYSTOLIC EXCURSION: 1.93 CM
TV REST PULMONARY ARTERY PRESSURE: 69 MMHG

## 2021-01-26 PROCEDURE — 99999 PR PBB SHADOW E&M-EST. PATIENT-LVL IV: CPT | Mod: PBBFAC,TXP,, | Performed by: INTERNAL MEDICINE

## 2021-01-26 PROCEDURE — 99204 PR OFFICE/OUTPT VISIT, NEW, LEVL IV, 45-59 MIN: ICD-10-PCS | Mod: S$PBB,TXP,, | Performed by: INTERNAL MEDICINE

## 2021-01-26 PROCEDURE — 99999 PR PBB SHADOW E&M-EST. PATIENT-LVL IV: ICD-10-PCS | Mod: PBBFAC,TXP,, | Performed by: INTERNAL MEDICINE

## 2021-01-26 PROCEDURE — 93306 TTE W/DOPPLER COMPLETE: CPT | Mod: TXP

## 2021-01-26 PROCEDURE — 93306 ECHO (CUPID ONLY): ICD-10-PCS | Mod: 26,TXP,, | Performed by: INTERNAL MEDICINE

## 2021-01-26 PROCEDURE — 99204 OFFICE O/P NEW MOD 45 MIN: CPT | Mod: S$PBB,TXP,, | Performed by: INTERNAL MEDICINE

## 2021-01-26 PROCEDURE — 93306 TTE W/DOPPLER COMPLETE: CPT | Mod: 26,TXP,, | Performed by: INTERNAL MEDICINE

## 2021-01-26 PROCEDURE — 99214 OFFICE O/P EST MOD 30 MIN: CPT | Mod: PBBFAC,TXP | Performed by: INTERNAL MEDICINE

## 2021-01-29 ENCOUNTER — TELEPHONE (OUTPATIENT)
Dept: TRANSPLANT | Facility: CLINIC | Age: 56
End: 2021-01-29

## 2021-01-29 DIAGNOSIS — Z79.899 POLYPHARMACY: Primary | ICD-10-CM

## 2021-01-29 DIAGNOSIS — R06.82 TACHYPNEA: ICD-10-CM

## 2021-01-29 DIAGNOSIS — I27.9 CHRONIC PULMONARY HEART DISEASE: ICD-10-CM

## 2021-02-02 ENCOUNTER — TELEPHONE (OUTPATIENT)
Dept: CARDIOLOGY | Facility: CLINIC | Age: 56
End: 2021-02-02

## 2021-02-03 DIAGNOSIS — Z76.82 ORGAN TRANSPLANT CANDIDATE: Primary | ICD-10-CM

## 2021-02-04 ENCOUNTER — HOSPITAL ENCOUNTER (OUTPATIENT)
Dept: CARDIOLOGY | Facility: HOSPITAL | Age: 56
Discharge: HOME | End: 2021-02-04
Attending: NURSE PRACTITIONER
Payer: MEDICARE

## 2021-02-04 VITALS — WEIGHT: 159 LBS | BODY MASS INDEX: 22.76 KG/M2 | HEIGHT: 70 IN

## 2021-02-04 LAB
CV PHARM DOSE: 0.4 MG
CV STRESS BASE HR: 68 BPM
END DIASTOLIC INDEX-HIGH: 170 ML/M2
END SYSTOLIC INDEX-HIGH: 70 ML/M2
NUC REST DIASTOLIC VOLUME INDEX: 189
NUC REST EJECTION FRACTION: 41
NUC REST SYSTOLIC VOLUME INDEX: 111
NUC STRESS DIASTOLIC VOLUME INDEX: 189
NUC STRESS EJECTION FRACTION: 38 %
NUC STRESS SYSTOLIC VOLUME INDEX: 118
OHS CV CPX 85 PERCENT MAX PREDICTED HEART RATE MALE: 139
OHS CV CPX MAX PREDICTED HEART RATE: 164
OHS CV CPX PATIENT IS FEMALE: 0
OHS CV CPX PATIENT IS MALE: 1
OHS CV CPX PEAK HEAR RATE: 82 BPM
OHS CV CPX PERCENT MAX PREDICTED HEART RATE ACHIEVED: 50
RETIRED EF AND QEF - SEE NOTES: 51 %

## 2021-02-04 PROCEDURE — 93016 STRESS TEST WITH MYOCARDIAL PERFUSION (CUPID ONLY): ICD-10-PCS | Mod: TXP,,, | Performed by: INTERNAL MEDICINE

## 2021-02-04 PROCEDURE — 93018 STRESS TEST WITH MYOCARDIAL PERFUSION (CUPID ONLY): ICD-10-PCS | Mod: TXP,,, | Performed by: INTERNAL MEDICINE

## 2021-02-04 PROCEDURE — 78452 HT MUSCLE IMAGE SPECT MULT: CPT | Mod: TXP

## 2021-02-04 PROCEDURE — A9502 TC99M TETROFOSMIN: HCPCS | Mod: TXP

## 2021-02-04 PROCEDURE — 78452 STRESS TEST WITH MYOCARDIAL PERFUSION (CUPID ONLY): ICD-10-PCS | Mod: 26,TXP,, | Performed by: INTERNAL MEDICINE

## 2021-02-04 PROCEDURE — 93018 CV STRESS TEST I&R ONLY: CPT | Mod: TXP,,, | Performed by: INTERNAL MEDICINE

## 2021-02-04 PROCEDURE — 78452 HT MUSCLE IMAGE SPECT MULT: CPT | Mod: 26,TXP,, | Performed by: INTERNAL MEDICINE

## 2021-02-04 PROCEDURE — 93016 CV STRESS TEST SUPVJ ONLY: CPT | Mod: TXP,,, | Performed by: INTERNAL MEDICINE

## 2021-02-04 PROCEDURE — 63600175 PHARM REV CODE 636 W HCPCS: Mod: TXP | Performed by: NURSE PRACTITIONER

## 2021-02-04 RX ORDER — AMINOPHYLLINE 25 MG/ML
75 INJECTION, SOLUTION INTRAVENOUS ONCE
Status: COMPLETED | OUTPATIENT
Start: 2021-02-04 | End: 2021-02-04

## 2021-02-04 RX ORDER — REGADENOSON 0.08 MG/ML
0.4 INJECTION, SOLUTION INTRAVENOUS
Status: COMPLETED | OUTPATIENT
Start: 2021-02-04 | End: 2021-02-04

## 2021-02-04 RX ADMIN — REGADENOSON 0.4 MG: 0.08 INJECTION, SOLUTION INTRAVENOUS at 08:02

## 2021-02-04 RX ADMIN — AMINOPHYLLINE 75 MG: 25 INJECTION, SOLUTION INTRAVENOUS at 09:02

## 2021-02-08 ENCOUNTER — TELEPHONE (OUTPATIENT)
Dept: TRANSPLANT | Facility: CLINIC | Age: 56
End: 2021-02-08

## 2021-02-15 DIAGNOSIS — Z01.818 PRE-TRANSPLANT EVALUATION FOR KIDNEY TRANSPLANT: Primary | ICD-10-CM

## 2021-02-24 ENCOUNTER — PATIENT OUTREACH (OUTPATIENT)
Dept: ADMINISTRATIVE | Facility: OTHER | Age: 56
End: 2021-02-24

## 2021-03-16 ENCOUNTER — TELEPHONE (OUTPATIENT)
Dept: TRANSPLANT | Facility: CLINIC | Age: 56
End: 2021-03-16

## 2021-03-24 DIAGNOSIS — Z99.2 DIALYSIS PATIENT: Primary | ICD-10-CM

## 2021-03-24 DIAGNOSIS — Z01.818 PRE-OP TESTING: ICD-10-CM

## 2021-03-24 DIAGNOSIS — Z12.11 COLON CANCER SCREENING: Primary | ICD-10-CM

## 2021-03-24 RX ORDER — POLYETHYLENE GLYCOL 3350, SODIUM SULFATE ANHYDROUS, SODIUM BICARBONATE, SODIUM CHLORIDE, POTASSIUM CHLORIDE 236; 22.74; 6.74; 5.86; 2.97 G/4L; G/4L; G/4L; G/4L; G/4L
4 POWDER, FOR SOLUTION ORAL ONCE
Qty: 4000 ML | Refills: 0 | Status: SHIPPED | OUTPATIENT
Start: 2021-03-24 | End: 2021-03-24

## 2021-04-19 ENCOUNTER — TELEPHONE (OUTPATIENT)
Dept: VASCULAR SURGERY | Facility: CLINIC | Age: 56
End: 2021-04-19

## 2021-04-19 ENCOUNTER — PATIENT OUTREACH (OUTPATIENT)
Dept: ADMINISTRATIVE | Facility: OTHER | Age: 56
End: 2021-04-19

## 2021-04-19 DIAGNOSIS — E11.9 TYPE 2 DIABETES MELLITUS WITHOUT COMPLICATION, WITH LONG-TERM CURRENT USE OF INSULIN: ICD-10-CM

## 2021-04-19 DIAGNOSIS — Z79.4 TYPE 2 DIABETES MELLITUS WITHOUT COMPLICATION, WITH LONG-TERM CURRENT USE OF INSULIN: ICD-10-CM

## 2021-04-19 DIAGNOSIS — E11.9 CONTROLLED TYPE 2 DIABETES MELLITUS WITHOUT COMPLICATION, WITHOUT LONG-TERM CURRENT USE OF INSULIN: Primary | ICD-10-CM

## 2021-04-20 ENCOUNTER — OFFICE VISIT (OUTPATIENT)
Dept: INFECTIOUS DISEASES | Facility: CLINIC | Age: 56
End: 2021-04-20
Attending: SURGERY
Payer: MEDICARE

## 2021-04-20 ENCOUNTER — INITIAL CONSULT (OUTPATIENT)
Dept: VASCULAR SURGERY | Facility: CLINIC | Age: 56
End: 2021-04-20
Attending: SURGERY
Payer: MEDICARE

## 2021-04-20 ENCOUNTER — HOSPITAL ENCOUNTER (OUTPATIENT)
Dept: VASCULAR SURGERY | Facility: CLINIC | Age: 56
Discharge: HOME | End: 2021-04-20
Attending: SURGERY
Payer: MEDICARE

## 2021-04-20 ENCOUNTER — HOSPITAL ENCOUNTER (OUTPATIENT)
Dept: RADIOLOGY | Facility: HOSPITAL | Age: 56
Discharge: HOME | End: 2021-04-20
Attending: NURSE PRACTITIONER
Payer: MEDICARE

## 2021-04-20 ENCOUNTER — OFFICE VISIT (OUTPATIENT)
Dept: TRANSPLANT | Facility: CLINIC | Age: 56
End: 2021-04-20
Payer: MEDICARE

## 2021-04-20 ENCOUNTER — HOSPITAL ENCOUNTER (OUTPATIENT)
Dept: PULMONOLOGY | Facility: CLINIC | Age: 56
Discharge: HOME | End: 2021-04-20
Payer: MEDICARE

## 2021-04-20 VITALS
WEIGHT: 163.13 LBS | BODY MASS INDEX: 23.6 KG/M2 | HEIGHT: 70 IN | SYSTOLIC BLOOD PRESSURE: 170 MMHG | HEART RATE: 76 BPM | TEMPERATURE: 99 F | WEIGHT: 164.88 LBS | SYSTOLIC BLOOD PRESSURE: 186 MMHG | BODY MASS INDEX: 23.35 KG/M2 | HEIGHT: 70 IN | DIASTOLIC BLOOD PRESSURE: 75 MMHG | DIASTOLIC BLOOD PRESSURE: 91 MMHG | HEART RATE: 77 BPM

## 2021-04-20 VITALS — WEIGHT: 180 LBS | BODY MASS INDEX: 25.77 KG/M2 | HEIGHT: 70 IN

## 2021-04-20 VITALS — HEIGHT: 70 IN | BODY MASS INDEX: 23.48 KG/M2 | WEIGHT: 164 LBS | TEMPERATURE: 99 F

## 2021-04-20 DIAGNOSIS — I27.20 PULMONARY HTN: Primary | ICD-10-CM

## 2021-04-20 DIAGNOSIS — I10 ESSENTIAL (PRIMARY) HYPERTENSION: ICD-10-CM

## 2021-04-20 DIAGNOSIS — N18.6 ESRD (END STAGE RENAL DISEASE): ICD-10-CM

## 2021-04-20 DIAGNOSIS — I27.9 CHRONIC PULMONARY HEART DISEASE: ICD-10-CM

## 2021-04-20 DIAGNOSIS — Z76.82 ORGAN TRANSPLANT CANDIDATE: ICD-10-CM

## 2021-04-20 DIAGNOSIS — Z01.818 PRE-TRANSPLANT EVALUATION FOR KIDNEY TRANSPLANT: ICD-10-CM

## 2021-04-20 DIAGNOSIS — I50.32 CHRONIC DIASTOLIC HEART FAILURE: ICD-10-CM

## 2021-04-20 DIAGNOSIS — N18.6 ESRD (END STAGE RENAL DISEASE): Primary | ICD-10-CM

## 2021-04-20 DIAGNOSIS — Z76.82 ORGAN TRANSPLANT CANDIDATE: Primary | ICD-10-CM

## 2021-04-20 PROCEDURE — 99999 PR PBB SHADOW E&M-EST. PATIENT-LVL IV: CPT | Mod: PBBFAC,TXP,, | Performed by: SURGERY

## 2021-04-20 PROCEDURE — 93975 VASCULAR STUDY: CPT | Mod: 26,S$PBB,TXP, | Performed by: SURGERY

## 2021-04-20 PROCEDURE — 94618 PULMONARY STRESS TESTING: CPT | Mod: 26,S$PBB,TXP, | Performed by: INTERNAL MEDICINE

## 2021-04-20 PROCEDURE — 99204 OFFICE O/P NEW MOD 45 MIN: CPT | Mod: S$PBB,TXP,, | Performed by: SURGERY

## 2021-04-20 PROCEDURE — 99213 PR OFFICE/OUTPT VISIT, EST, LEVL III, 20-29 MIN: ICD-10-PCS | Mod: S$PBB,GC,TXP, | Performed by: STUDENT IN AN ORGANIZED HEALTH CARE EDUCATION/TRAINING PROGRAM

## 2021-04-20 PROCEDURE — 71250 CT THORAX DX C-: CPT | Mod: 26,TXP,, | Performed by: RADIOLOGY

## 2021-04-20 PROCEDURE — 99999 PR PBB SHADOW E&M-EST. PATIENT-LVL IV: CPT | Mod: PBBFAC,TXP,, | Performed by: INTERNAL MEDICINE

## 2021-04-20 PROCEDURE — 71250 CT THORAX DX C-: CPT | Mod: TC,TXP

## 2021-04-20 PROCEDURE — 99999 PR PBB SHADOW E&M-EST. PATIENT-LVL IV: ICD-10-PCS | Mod: PBBFAC,TXP,, | Performed by: SURGERY

## 2021-04-20 PROCEDURE — 99214 OFFICE O/P EST MOD 30 MIN: CPT | Mod: PBBFAC,25,27,TXP | Performed by: INTERNAL MEDICINE

## 2021-04-20 PROCEDURE — 99204 PR OFFICE/OUTPT VISIT, NEW, LEVL IV, 45-59 MIN: ICD-10-PCS | Mod: S$PBB,TXP,, | Performed by: SURGERY

## 2021-04-20 PROCEDURE — 93975 PR DUPLEX ABD/PEL VASC STUDY,COMPLETE: ICD-10-PCS | Mod: 26,S$PBB,TXP, | Performed by: SURGERY

## 2021-04-20 PROCEDURE — 71250 CT CHEST WITHOUT CONTRAST: ICD-10-PCS | Mod: 26,TXP,, | Performed by: RADIOLOGY

## 2021-04-20 PROCEDURE — 99214 OFFICE O/P EST MOD 30 MIN: CPT | Mod: PBBFAC,25,27,TXP | Performed by: SURGERY

## 2021-04-20 PROCEDURE — 99214 PR OFFICE/OUTPT VISIT, EST, LEVL IV, 30-39 MIN: ICD-10-PCS | Mod: S$PBB,TXP,, | Performed by: INTERNAL MEDICINE

## 2021-04-20 PROCEDURE — 94618 PULMONARY STRESS TESTING: CPT | Mod: PBBFAC,TXP | Performed by: INTERNAL MEDICINE

## 2021-04-20 PROCEDURE — 99213 OFFICE O/P EST LOW 20 MIN: CPT | Mod: PBBFAC,25,TXP | Performed by: STUDENT IN AN ORGANIZED HEALTH CARE EDUCATION/TRAINING PROGRAM

## 2021-04-20 PROCEDURE — 99213 OFFICE O/P EST LOW 20 MIN: CPT | Mod: S$PBB,GC,TXP, | Performed by: STUDENT IN AN ORGANIZED HEALTH CARE EDUCATION/TRAINING PROGRAM

## 2021-04-20 PROCEDURE — 99999 PR PBB SHADOW E&M-EST. PATIENT-LVL IV: ICD-10-PCS | Mod: PBBFAC,TXP,, | Performed by: INTERNAL MEDICINE

## 2021-04-20 PROCEDURE — 99999 PR PBB SHADOW E&M-EST. PATIENT-LVL III: ICD-10-PCS | Mod: PBBFAC,GC,TXP, | Performed by: STUDENT IN AN ORGANIZED HEALTH CARE EDUCATION/TRAINING PROGRAM

## 2021-04-20 PROCEDURE — 99999 PR PBB SHADOW E&M-EST. PATIENT-LVL III: CPT | Mod: PBBFAC,GC,TXP, | Performed by: STUDENT IN AN ORGANIZED HEALTH CARE EDUCATION/TRAINING PROGRAM

## 2021-04-20 PROCEDURE — 93975 VASCULAR STUDY: CPT | Mod: PBBFAC,TXP | Performed by: SURGERY

## 2021-04-20 PROCEDURE — 94618 PULMONARY STRESS TESTING: ICD-10-PCS | Mod: 26,S$PBB,TXP, | Performed by: INTERNAL MEDICINE

## 2021-04-20 PROCEDURE — 99214 OFFICE O/P EST MOD 30 MIN: CPT | Mod: S$PBB,TXP,, | Performed by: INTERNAL MEDICINE

## 2021-04-20 RX ORDER — TRAZODONE HYDROCHLORIDE 50 MG/1
50 TABLET ORAL NIGHTLY
Status: ON HOLD | COMMUNITY
End: 2022-01-26 | Stop reason: HOSPADM

## 2021-04-20 RX ORDER — LANOLIN ALCOHOL/MO/W.PET/CERES
50 CREAM (GRAM) TOPICAL DAILY
Qty: 90 TABLET | Refills: 2 | Status: SHIPPED | OUTPATIENT
Start: 2021-04-20 | End: 2021-07-19

## 2021-04-20 RX ORDER — METOPROLOL SUCCINATE 25 MG/1
25 TABLET, EXTENDED RELEASE ORAL DAILY
Qty: 30 TABLET | Refills: 11 | Status: SHIPPED | OUTPATIENT
Start: 2021-04-20 | End: 2022-04-20

## 2021-04-20 RX ORDER — LISINOPRIL 5 MG/1
5 TABLET ORAL DAILY
Qty: 90 TABLET | Refills: 3 | Status: SHIPPED | OUTPATIENT
Start: 2021-04-20 | End: 2021-08-20 | Stop reason: SDUPTHER

## 2021-04-20 RX ORDER — ISONIAZID 300 MG/1
300 TABLET ORAL DAILY
Qty: 90 TABLET | Refills: 2 | Status: ON HOLD | OUTPATIENT
Start: 2021-04-20 | End: 2022-01-26 | Stop reason: HOSPADM

## 2021-05-10 DIAGNOSIS — Z76.82 ORGAN TRANSPLANT CANDIDATE: Primary | ICD-10-CM

## 2021-05-18 ENCOUNTER — TELEPHONE (OUTPATIENT)
Dept: TRANSPLANT | Facility: CLINIC | Age: 56
End: 2021-05-18

## 2021-05-19 ENCOUNTER — LAB VISIT (OUTPATIENT)
Dept: LAB | Facility: HOSPITAL | Age: 56
End: 2021-05-19
Attending: INTERNAL MEDICINE
Payer: MEDICARE

## 2021-05-19 ENCOUNTER — OFFICE VISIT (OUTPATIENT)
Dept: TRANSPLANT | Facility: CLINIC | Age: 56
End: 2021-05-19
Payer: MEDICARE

## 2021-05-19 ENCOUNTER — TELEPHONE (OUTPATIENT)
Dept: INFECTIOUS DISEASES | Facility: CLINIC | Age: 56
End: 2021-05-19

## 2021-05-19 VITALS
HEIGHT: 72 IN | HEART RATE: 86 BPM | WEIGHT: 167.31 LBS | SYSTOLIC BLOOD PRESSURE: 177 MMHG | DIASTOLIC BLOOD PRESSURE: 75 MMHG | BODY MASS INDEX: 22.66 KG/M2

## 2021-05-19 DIAGNOSIS — I50.32 CHRONIC DIASTOLIC HEART FAILURE: ICD-10-CM

## 2021-05-19 DIAGNOSIS — I27.20 PULMONARY HTN: Primary | ICD-10-CM

## 2021-05-19 DIAGNOSIS — I10 ESSENTIAL (PRIMARY) HYPERTENSION: ICD-10-CM

## 2021-05-19 DIAGNOSIS — N18.6 ESRD (END STAGE RENAL DISEASE): ICD-10-CM

## 2021-05-19 LAB
ALBUMIN SERPL BCP-MCNC: 2.9 G/DL (ref 3.5–5.2)
ALP SERPL-CCNC: 63 U/L (ref 55–135)
ALT SERPL W/O P-5'-P-CCNC: 5 U/L (ref 10–44)
ANION GAP SERPL CALC-SCNC: 11 MMOL/L (ref 8–16)
ANION GAP SERPL CALC-SCNC: 11 MMOL/L (ref 8–16)
AST SERPL-CCNC: 11 U/L (ref 10–40)
BILIRUB SERPL-MCNC: 0.4 MG/DL (ref 0.1–1)
BUN SERPL-MCNC: 32 MG/DL (ref 6–20)
BUN SERPL-MCNC: 32 MG/DL (ref 6–20)
CALCIUM SERPL-MCNC: 8.6 MG/DL (ref 8.7–10.5)
CALCIUM SERPL-MCNC: 8.6 MG/DL (ref 8.7–10.5)
CHLORIDE SERPL-SCNC: 108 MMOL/L (ref 95–110)
CHLORIDE SERPL-SCNC: 108 MMOL/L (ref 95–110)
CO2 SERPL-SCNC: 22 MMOL/L (ref 23–29)
CO2 SERPL-SCNC: 22 MMOL/L (ref 23–29)
CREAT SERPL-MCNC: 9.7 MG/DL (ref 0.5–1.4)
CREAT SERPL-MCNC: 9.7 MG/DL (ref 0.5–1.4)
EST. GFR  (AFRICAN AMERICAN): 6.2 ML/MIN/1.73 M^2
EST. GFR  (AFRICAN AMERICAN): 6.2 ML/MIN/1.73 M^2
EST. GFR  (NON AFRICAN AMERICAN): 5.4 ML/MIN/1.73 M^2
EST. GFR  (NON AFRICAN AMERICAN): 5.4 ML/MIN/1.73 M^2
GLUCOSE SERPL-MCNC: 192 MG/DL (ref 70–110)
GLUCOSE SERPL-MCNC: 192 MG/DL (ref 70–110)
POTASSIUM SERPL-SCNC: 4.9 MMOL/L (ref 3.5–5.1)
POTASSIUM SERPL-SCNC: 4.9 MMOL/L (ref 3.5–5.1)
PROT SERPL-MCNC: 7.5 G/DL (ref 6–8.4)
SODIUM SERPL-SCNC: 141 MMOL/L (ref 136–145)
SODIUM SERPL-SCNC: 141 MMOL/L (ref 136–145)

## 2021-05-19 PROCEDURE — 99214 OFFICE O/P EST MOD 30 MIN: CPT | Mod: PBBFAC,TXP | Performed by: INTERNAL MEDICINE

## 2021-05-19 PROCEDURE — 36415 COLL VENOUS BLD VENIPUNCTURE: CPT | Mod: TXP | Performed by: INTERNAL MEDICINE

## 2021-05-19 PROCEDURE — 99214 PR OFFICE/OUTPT VISIT, EST, LEVL IV, 30-39 MIN: ICD-10-PCS | Mod: S$PBB,TXP,, | Performed by: INTERNAL MEDICINE

## 2021-05-19 PROCEDURE — 99999 PR PBB SHADOW E&M-EST. PATIENT-LVL IV: ICD-10-PCS | Mod: PBBFAC,TXP,, | Performed by: INTERNAL MEDICINE

## 2021-05-19 PROCEDURE — 80053 COMPREHEN METABOLIC PANEL: CPT | Mod: TXP | Performed by: INTERNAL MEDICINE

## 2021-05-19 PROCEDURE — 99214 OFFICE O/P EST MOD 30 MIN: CPT | Mod: S$PBB,TXP,, | Performed by: INTERNAL MEDICINE

## 2021-05-19 PROCEDURE — 99999 PR PBB SHADOW E&M-EST. PATIENT-LVL IV: CPT | Mod: PBBFAC,TXP,, | Performed by: INTERNAL MEDICINE

## 2021-05-24 ENCOUNTER — LAB VISIT (OUTPATIENT)
Dept: LAB | Facility: HOSPITAL | Age: 56
End: 2021-05-24
Attending: STUDENT IN AN ORGANIZED HEALTH CARE EDUCATION/TRAINING PROGRAM
Payer: MEDICARE

## 2021-05-24 DIAGNOSIS — Z76.82 ORGAN TRANSPLANT CANDIDATE: ICD-10-CM

## 2021-05-24 PROCEDURE — 36415 COLL VENOUS BLD VENIPUNCTURE: CPT | Mod: TXP | Performed by: STUDENT IN AN ORGANIZED HEALTH CARE EDUCATION/TRAINING PROGRAM

## 2021-05-24 PROCEDURE — 86790 VIRUS ANTIBODY NOS: CPT | Mod: TXP | Performed by: STUDENT IN AN ORGANIZED HEALTH CARE EDUCATION/TRAINING PROGRAM

## 2021-05-25 LAB — HEPATITIS A ANTIBODY, IGG: POSITIVE

## 2021-05-27 LAB — SCHISTOSOMA IGG SER QL: NEGATIVE

## 2021-06-02 ENCOUNTER — TELEPHONE (OUTPATIENT)
Dept: ENDOSCOPY | Facility: HOSPITAL | Age: 56
End: 2021-06-02

## 2021-06-02 DIAGNOSIS — N18.6 ESRD (END STAGE RENAL DISEASE): Primary | ICD-10-CM

## 2021-06-02 DIAGNOSIS — Z12.11 SPECIAL SCREENING FOR MALIGNANT NEOPLASMS, COLON: Primary | ICD-10-CM

## 2021-06-02 RX ORDER — POLYETHYLENE GLYCOL 3350, SODIUM SULFATE ANHYDROUS, SODIUM BICARBONATE, SODIUM CHLORIDE, POTASSIUM CHLORIDE 236; 22.74; 6.74; 5.86; 2.97 G/4L; G/4L; G/4L; G/4L; G/4L
4 POWDER, FOR SOLUTION ORAL ONCE
Qty: 4000 ML | Refills: 0 | Status: SHIPPED | OUTPATIENT
Start: 2021-06-02 | End: 2021-06-02

## 2021-06-11 ENCOUNTER — OFFICE VISIT (OUTPATIENT)
Dept: HEMATOLOGY/ONCOLOGY | Facility: CLINIC | Age: 56
End: 2021-06-11
Payer: MEDICARE

## 2021-06-11 VITALS
TEMPERATURE: 98 F | SYSTOLIC BLOOD PRESSURE: 188 MMHG | WEIGHT: 163.38 LBS | HEART RATE: 91 BPM | BODY MASS INDEX: 22.13 KG/M2 | OXYGEN SATURATION: 99 % | HEIGHT: 72 IN | RESPIRATION RATE: 18 BRPM | DIASTOLIC BLOOD PRESSURE: 78 MMHG

## 2021-06-11 DIAGNOSIS — Z76.82 ORGAN TRANSPLANT CANDIDATE: ICD-10-CM

## 2021-06-11 DIAGNOSIS — D47.2 MONOCLONAL PARAPROTEINEMIA: Primary | ICD-10-CM

## 2021-06-11 PROBLEM — E78.5 HLD (HYPERLIPIDEMIA): Status: ACTIVE | Noted: 2017-01-24

## 2021-06-11 PROBLEM — E11.3599 PDR (PROLIFERATIVE DIABETIC RETINOPATHY): Status: ACTIVE | Noted: 2017-09-21

## 2021-06-11 PROBLEM — N25.81 SECONDARY HYPERPARATHYROIDISM OF RENAL ORIGIN: Status: ACTIVE | Noted: 2017-10-02

## 2021-06-11 PROCEDURE — 99214 OFFICE O/P EST MOD 30 MIN: CPT | Mod: PBBFAC,TXP | Performed by: INTERNAL MEDICINE

## 2021-06-11 PROCEDURE — 99204 OFFICE O/P NEW MOD 45 MIN: CPT | Mod: S$PBB,TXP,, | Performed by: INTERNAL MEDICINE

## 2021-06-11 PROCEDURE — 99204 PR OFFICE/OUTPT VISIT, NEW, LEVL IV, 45-59 MIN: ICD-10-PCS | Mod: S$PBB,TXP,, | Performed by: INTERNAL MEDICINE

## 2021-06-11 PROCEDURE — 99999 PR PBB SHADOW E&M-EST. PATIENT-LVL IV: CPT | Mod: PBBFAC,TXP,, | Performed by: INTERNAL MEDICINE

## 2021-06-11 PROCEDURE — 99999 PR PBB SHADOW E&M-EST. PATIENT-LVL IV: ICD-10-PCS | Mod: PBBFAC,TXP,, | Performed by: INTERNAL MEDICINE

## 2021-06-11 RX ORDER — LISINOPRIL 2.5 MG/1
2.5 TABLET ORAL DAILY
Status: ON HOLD | COMMUNITY
End: 2022-01-26 | Stop reason: HOSPADM

## 2021-06-23 ENCOUNTER — TELEPHONE (OUTPATIENT)
Dept: HEMATOLOGY/ONCOLOGY | Facility: CLINIC | Age: 56
End: 2021-06-23

## 2021-06-25 DIAGNOSIS — I27.9 CHRONIC PULMONARY HEART DISEASE: Primary | ICD-10-CM

## 2021-08-02 ENCOUNTER — PROCEDURE VISIT (OUTPATIENT)
Dept: HEMATOLOGY/ONCOLOGY | Facility: CLINIC | Age: 56
End: 2021-08-02
Payer: MEDICARE

## 2021-08-02 VITALS
TEMPERATURE: 98 F | RESPIRATION RATE: 16 BRPM | DIASTOLIC BLOOD PRESSURE: 61 MMHG | SYSTOLIC BLOOD PRESSURE: 145 MMHG | OXYGEN SATURATION: 100 % | HEART RATE: 72 BPM

## 2021-08-02 DIAGNOSIS — D47.2 MONOCLONAL PARAPROTEINEMIA: Primary | ICD-10-CM

## 2021-08-02 PROCEDURE — 88184 FLOWCYTOMETRY/ TC 1 MARKER: CPT | Performed by: PATHOLOGY

## 2021-08-02 PROCEDURE — 88313 SPECIAL STAINS GROUP 2: CPT | Mod: 26,,, | Performed by: PATHOLOGY

## 2021-08-02 PROCEDURE — 88365 INSITU HYBRIDIZATION (FISH): CPT | Mod: 26,,, | Performed by: PATHOLOGY

## 2021-08-02 PROCEDURE — 88189 PR  FLOWCYTOMETRY/READ, 16 & > MARKERS: ICD-10-PCS | Mod: ,,, | Performed by: PATHOLOGY

## 2021-08-02 PROCEDURE — 85097 BONE MARROW INTERPRETATION: CPT | Mod: ,,, | Performed by: PATHOLOGY

## 2021-08-02 PROCEDURE — 88305 TISSUE EXAM BY PATHOLOGIST: CPT | Mod: 26,,, | Performed by: PATHOLOGY

## 2021-08-02 PROCEDURE — 88313 PR  SPECIAL STAINS,GROUP II: ICD-10-PCS | Mod: 26,,, | Performed by: PATHOLOGY

## 2021-08-02 PROCEDURE — 88311 DECALCIFY TISSUE: CPT | Mod: 26,,, | Performed by: PATHOLOGY

## 2021-08-02 PROCEDURE — 38222 PR BONE MARROW BIOPSY(IES) W/ASPIRATION(S); DIAGNOSTIC: ICD-10-PCS | Mod: S$PBB,LT,, | Performed by: NURSE PRACTITIONER

## 2021-08-02 PROCEDURE — 88189 FLOWCYTOMETRY/READ 16 & >: CPT | Mod: ,,, | Performed by: PATHOLOGY

## 2021-08-02 PROCEDURE — 88342 IMHCHEM/IMCYTCHM 1ST ANTB: CPT | Performed by: PATHOLOGY

## 2021-08-02 PROCEDURE — 88364 INSITU HYBRIDIZATION (FISH): CPT | Performed by: PATHOLOGY

## 2021-08-02 PROCEDURE — 88342 IMHCHEM/IMCYTCHM 1ST ANTB: CPT | Mod: 26,XU,, | Performed by: PATHOLOGY

## 2021-08-02 PROCEDURE — 88313 SPECIAL STAINS GROUP 2: CPT | Performed by: PATHOLOGY

## 2021-08-02 PROCEDURE — 38222 DX BONE MARROW BX & ASPIR: CPT | Mod: S$PBB,LT,, | Performed by: NURSE PRACTITIONER

## 2021-08-02 PROCEDURE — 88341 IMHCHEM/IMCYTCHM EA ADD ANTB: CPT | Mod: 59 | Performed by: PATHOLOGY

## 2021-08-02 PROCEDURE — 88311 PR  DECALCIFY TISSUE: ICD-10-PCS | Mod: 26,,, | Performed by: PATHOLOGY

## 2021-08-02 PROCEDURE — 88365 INSITU HYBRIDIZATION (FISH): CPT | Performed by: PATHOLOGY

## 2021-08-02 PROCEDURE — 88364 CHG INSITU HYBRIDIZATION (FISH: ICD-10-PCS | Mod: 26,,, | Performed by: PATHOLOGY

## 2021-08-02 PROCEDURE — 88185 FLOWCYTOMETRY/TC ADD-ON: CPT | Performed by: PATHOLOGY

## 2021-08-02 PROCEDURE — 88364 INSITU HYBRIDIZATION (FISH): CPT | Mod: 26,,, | Performed by: PATHOLOGY

## 2021-08-02 PROCEDURE — 88305 TISSUE EXAM BY PATHOLOGIST: CPT | Mod: 59 | Performed by: PATHOLOGY

## 2021-08-02 PROCEDURE — 88341 PR IHC OR ICC EACH ADD'L SINGLE ANTIBODY  STAINPR: ICD-10-PCS | Mod: 26,XU,, | Performed by: PATHOLOGY

## 2021-08-02 PROCEDURE — 88311 DECALCIFY TISSUE: CPT | Performed by: PATHOLOGY

## 2021-08-02 PROCEDURE — 88342 CHG IMMUNOCYTOCHEMISTRY: ICD-10-PCS | Mod: 26,XU,, | Performed by: PATHOLOGY

## 2021-08-02 PROCEDURE — 88341 IMHCHEM/IMCYTCHM EA ADD ANTB: CPT | Mod: 26,XU,, | Performed by: PATHOLOGY

## 2021-08-02 PROCEDURE — 88305 TISSUE EXAM BY PATHOLOGIST: ICD-10-PCS | Mod: 26,,, | Performed by: PATHOLOGY

## 2021-08-02 PROCEDURE — 88365 PR  TISSUE HYBRIDIZATION: ICD-10-PCS | Mod: 26,,, | Performed by: PATHOLOGY

## 2021-08-02 PROCEDURE — 85097 PR  BONE MARROW,SMEAR INTERPRETATION: ICD-10-PCS | Mod: ,,, | Performed by: PATHOLOGY

## 2021-08-02 RX ORDER — LIDOCAINE HYDROCHLORIDE 20 MG/ML
10 INJECTION, SOLUTION EPIDURAL; INFILTRATION; INTRACAUDAL; PERINEURAL ONCE
Status: COMPLETED | OUTPATIENT
Start: 2021-08-02 | End: 2021-08-02

## 2021-08-02 RX ADMIN — LIDOCAINE HYDROCHLORIDE 7 ML: 20 INJECTION, SOLUTION EPIDURAL; INFILTRATION; INTRACAUDAL; PERINEURAL at 02:08

## 2021-08-03 DIAGNOSIS — Z01.812 PRE-PROCEDURE LAB EXAM: Primary | ICD-10-CM

## 2021-08-03 LAB
BODY SITE - BONE MARROW: NORMAL
CHROM BANDING METHOD: NORMAL
CHROMOSOME ANALYSIS BM ADDITIONAL INFORMATION: NORMAL
CHROMOSOME ANALYSIS BM RELEASED BY: NORMAL
CHROMOSOME ANALYSIS BM RESULT SUMMARY: NORMAL
CLINICAL CYTOGENETICIST REVIEW: NORMAL
CLINICAL DIAGNOSIS - BONE MARROW: NORMAL
FLOW CYTOMETRY ANTIBODIES ANALYZED - BONE MARROW: NORMAL
FLOW CYTOMETRY COMMENT - BONE MARROW: NORMAL
FLOW CYTOMETRY INTERPRETATION - BONE MARROW: NORMAL
KARYOTYP MAR: NORMAL
REASON FOR REFERRAL (NARRATIVE): NORMAL
REF LAB TEST METHOD: NORMAL
SPECIMEN SOURCE: NORMAL
SPECIMEN: NORMAL

## 2021-08-09 ENCOUNTER — OFFICE VISIT (OUTPATIENT)
Dept: HEMATOLOGY/ONCOLOGY | Facility: CLINIC | Age: 56
End: 2021-08-09
Payer: MEDICARE

## 2021-08-09 ENCOUNTER — LAB VISIT (OUTPATIENT)
Dept: LAB | Facility: HOSPITAL | Age: 56
End: 2021-08-09
Attending: INTERNAL MEDICINE
Payer: MEDICARE

## 2021-08-09 VITALS
HEIGHT: 72 IN | RESPIRATION RATE: 16 BRPM | WEIGHT: 160.38 LBS | SYSTOLIC BLOOD PRESSURE: 158 MMHG | DIASTOLIC BLOOD PRESSURE: 88 MMHG | BODY MASS INDEX: 21.72 KG/M2 | HEART RATE: 74 BPM | TEMPERATURE: 98 F | OXYGEN SATURATION: 100 %

## 2021-08-09 DIAGNOSIS — N18.6 ESRD (END STAGE RENAL DISEASE): ICD-10-CM

## 2021-08-09 DIAGNOSIS — D47.2 MGUS (MONOCLONAL GAMMOPATHY OF UNKNOWN SIGNIFICANCE): Primary | ICD-10-CM

## 2021-08-09 LAB — POTASSIUM SERPL-SCNC: 4.3 MMOL/L (ref 3.5–5.1)

## 2021-08-09 PROCEDURE — 99999 PR PBB SHADOW E&M-EST. PATIENT-LVL IV: ICD-10-PCS | Mod: PBBFAC,,, | Performed by: INTERNAL MEDICINE

## 2021-08-09 PROCEDURE — 99214 OFFICE O/P EST MOD 30 MIN: CPT | Mod: PBBFAC,TXP | Performed by: INTERNAL MEDICINE

## 2021-08-09 PROCEDURE — 99214 OFFICE O/P EST MOD 30 MIN: CPT | Mod: S$PBB,,, | Performed by: INTERNAL MEDICINE

## 2021-08-09 PROCEDURE — 84132 ASSAY OF SERUM POTASSIUM: CPT | Mod: TXP | Performed by: INTERNAL MEDICINE

## 2021-08-09 PROCEDURE — 99999 PR PBB SHADOW E&M-EST. PATIENT-LVL IV: CPT | Mod: PBBFAC,,, | Performed by: INTERNAL MEDICINE

## 2021-08-09 PROCEDURE — 36415 COLL VENOUS BLD VENIPUNCTURE: CPT | Mod: TXP | Performed by: INTERNAL MEDICINE

## 2021-08-09 PROCEDURE — 99214 PR OFFICE/OUTPT VISIT, EST, LEVL IV, 30-39 MIN: ICD-10-PCS | Mod: S$PBB,,, | Performed by: INTERNAL MEDICINE

## 2021-08-10 LAB
COMMENT: NORMAL
FINAL PATHOLOGIC DIAGNOSIS: NORMAL
GENETICIST REVIEW: NORMAL
GROSS: NORMAL
Lab: NORMAL
MICROSCOPIC EXAM: NORMAL
PLASMA CELL PROLIF RELEASED BY: NORMAL
PLASMA CELL PROLIF RESULT SUMMARY: NORMAL
PLASMA CELL PROLIF RESULT TABLE: NORMAL
REASON FOR REFERRAL, PLASMA CELL PROLIF (PCPD), FISH: NORMAL
REF LAB TEST METHOD: NORMAL
RESULTS, PLASMA CELL PROLIF (PCPD), FISH: NORMAL
SERVICE CMNT-IMP: NORMAL
SERVICE CMNT-IMP: NORMAL
SPECIMEN SOURCE: NORMAL
SPECIMEN, PLASMA CELL PROLIF (PCPD), FISH: NORMAL
SUPPLEMENTAL DIAGNOSIS: NORMAL

## 2021-08-17 DIAGNOSIS — N18.6 ESRD (END STAGE RENAL DISEASE): Primary | ICD-10-CM

## 2021-08-17 DIAGNOSIS — R10.31 RIGHT LOWER QUADRANT ABDOMINAL PAIN: ICD-10-CM

## 2021-08-20 ENCOUNTER — HOSPITAL ENCOUNTER (OUTPATIENT)
Dept: RADIOLOGY | Facility: HOSPITAL | Age: 56
Discharge: HOME | End: 2021-08-20
Attending: INTERNAL MEDICINE
Payer: MEDICARE

## 2021-08-20 ENCOUNTER — OFFICE VISIT (OUTPATIENT)
Dept: TRANSPLANT | Facility: CLINIC | Age: 56
End: 2021-08-20
Payer: MEDICARE

## 2021-08-20 VITALS
HEART RATE: 72 BPM | WEIGHT: 156.94 LBS | SYSTOLIC BLOOD PRESSURE: 132 MMHG | HEIGHT: 71 IN | BODY MASS INDEX: 21.97 KG/M2 | DIASTOLIC BLOOD PRESSURE: 62 MMHG | OXYGEN SATURATION: 99 %

## 2021-08-20 DIAGNOSIS — I50.32 CHRONIC DIASTOLIC HEART FAILURE: ICD-10-CM

## 2021-08-20 DIAGNOSIS — R10.31 RIGHT LOWER QUADRANT ABDOMINAL PAIN: ICD-10-CM

## 2021-08-20 DIAGNOSIS — I27.20 PULMONARY HTN: Primary | ICD-10-CM

## 2021-08-20 DIAGNOSIS — N18.6 ESRD (END STAGE RENAL DISEASE): ICD-10-CM

## 2021-08-20 PROCEDURE — 99213 OFFICE O/P EST LOW 20 MIN: CPT | Mod: PBBFAC,NTX,25 | Performed by: INTERNAL MEDICINE

## 2021-08-20 PROCEDURE — 99999 PR PBB SHADOW E&M-EST. PATIENT-LVL III: ICD-10-PCS | Mod: PBBFAC,TXP,, | Performed by: INTERNAL MEDICINE

## 2021-08-20 PROCEDURE — 99214 PR OFFICE/OUTPT VISIT, EST, LEVL IV, 30-39 MIN: ICD-10-PCS | Mod: S$PBB,TXP,, | Performed by: INTERNAL MEDICINE

## 2021-08-20 PROCEDURE — 99999 PR PBB SHADOW E&M-EST. PATIENT-LVL III: CPT | Mod: PBBFAC,TXP,, | Performed by: INTERNAL MEDICINE

## 2021-08-20 PROCEDURE — 76700 US EXAM ABDOM COMPLETE: CPT | Mod: TC,NTX

## 2021-08-20 PROCEDURE — 76700 US EXAM ABDOM COMPLETE: CPT | Mod: 26,NTX,, | Performed by: INTERNAL MEDICINE

## 2021-08-20 PROCEDURE — 99214 OFFICE O/P EST MOD 30 MIN: CPT | Mod: S$PBB,TXP,, | Performed by: INTERNAL MEDICINE

## 2021-08-20 PROCEDURE — 76700 US ABDOMEN COMPLETE: ICD-10-PCS | Mod: 26,NTX,, | Performed by: INTERNAL MEDICINE

## 2021-08-20 RX ORDER — DOXERCALCIFEROL 0.5 UG/1
2 CAPSULE ORAL
Status: ON HOLD | COMMUNITY
Start: 2021-07-24 | End: 2022-01-26 | Stop reason: HOSPADM

## 2021-08-26 ENCOUNTER — TELEPHONE (OUTPATIENT)
Dept: TRANSPLANT | Facility: CLINIC | Age: 56
End: 2021-08-26

## 2021-08-27 ENCOUNTER — COMMITTEE REVIEW (OUTPATIENT)
Dept: TRANSPLANT | Facility: CLINIC | Age: 56
End: 2021-08-27

## 2021-09-17 ENCOUNTER — CLINICAL SUPPORT (OUTPATIENT)
Dept: URGENT CARE | Facility: CLINIC | Age: 56
End: 2021-09-17
Payer: MEDICARE

## 2021-09-17 DIAGNOSIS — Z20.822 ENCOUNTER FOR LABORATORY TESTING FOR COVID-19 VIRUS: Primary | ICD-10-CM

## 2021-09-17 LAB
CTP QC/QA: YES
SARS-COV-2 RDRP RESP QL NAA+PROBE: NEGATIVE

## 2021-09-17 PROCEDURE — U0002: ICD-10-PCS | Mod: QW,CR,S$GLB, | Performed by: NURSE PRACTITIONER

## 2021-09-17 PROCEDURE — U0002 COVID-19 LAB TEST NON-CDC: HCPCS | Mod: QW,CR,S$GLB, | Performed by: NURSE PRACTITIONER

## 2021-09-30 DIAGNOSIS — N18.6 ESRD (END STAGE RENAL DISEASE): ICD-10-CM

## 2021-09-30 DIAGNOSIS — Z00.5 TRANSPLANT DONOR EVALUATION: Primary | ICD-10-CM

## 2021-10-07 DIAGNOSIS — I73.9 PAD (PERIPHERAL ARTERY DISEASE): Primary | ICD-10-CM

## 2021-10-07 DIAGNOSIS — N18.6 ESRD (END STAGE RENAL DISEASE): ICD-10-CM

## 2021-10-12 DIAGNOSIS — N18.6 ESRD (END STAGE RENAL DISEASE): ICD-10-CM

## 2021-10-12 DIAGNOSIS — D47.2 MGUS (MONOCLONAL GAMMOPATHY OF UNKNOWN SIGNIFICANCE): ICD-10-CM

## 2021-10-12 DIAGNOSIS — R10.11 RUQ PAIN: Primary | ICD-10-CM

## 2021-10-14 ENCOUNTER — TELEPHONE (OUTPATIENT)
Dept: ENDOSCOPY | Facility: HOSPITAL | Age: 56
End: 2021-10-14

## 2021-11-11 ENCOUNTER — HOSPITAL ENCOUNTER (INPATIENT)
Facility: HOSPITAL | Age: 56
LOS: 1 days | Discharge: HOME | DRG: 602 | End: 2021-11-11
Attending: EMERGENCY MEDICINE | Admitting: HOSPITALIST
Payer: MEDICARE

## 2021-11-11 VITALS
OXYGEN SATURATION: 100 % | RESPIRATION RATE: 16 BRPM | SYSTOLIC BLOOD PRESSURE: 185 MMHG | DIASTOLIC BLOOD PRESSURE: 89 MMHG | TEMPERATURE: 99 F | HEART RATE: 65 BPM

## 2021-11-11 DIAGNOSIS — I96 TOE NECROSIS: ICD-10-CM

## 2021-11-11 DIAGNOSIS — I96 GANGRENE OF TOE OF LEFT FOOT: ICD-10-CM

## 2021-11-11 DIAGNOSIS — M79.672 LEFT FOOT PAIN: ICD-10-CM

## 2021-11-11 DIAGNOSIS — S91.109A OPEN TOE WOUND: Primary | ICD-10-CM

## 2021-11-11 DIAGNOSIS — L08.9 DIABETIC FOOT INFECTION: ICD-10-CM

## 2021-11-11 DIAGNOSIS — L03.032 PARONYCHIA, TOE, LEFT: ICD-10-CM

## 2021-11-11 DIAGNOSIS — E11.628 DIABETIC FOOT INFECTION: ICD-10-CM

## 2021-11-11 PROBLEM — Z22.7 TB LUNG, LATENT: Status: ACTIVE | Noted: 2021-04-01

## 2021-11-11 PROBLEM — Z99.2 CONTROLLED TYPE 2 DIABETES MELLITUS WITH CHRONIC KIDNEY DISEASE ON CHRONIC DIALYSIS, WITH LONG-TERM CURRENT USE OF INSULIN: Status: ACTIVE | Noted: 2019-01-29

## 2021-11-11 PROBLEM — E11.22 CONTROLLED TYPE 2 DIABETES MELLITUS WITH CHRONIC KIDNEY DISEASE ON CHRONIC DIALYSIS, WITH LONG-TERM CURRENT USE OF INSULIN: Status: ACTIVE | Noted: 2019-01-29

## 2021-11-11 PROBLEM — Z79.4 CONTROLLED TYPE 2 DIABETES MELLITUS WITH CHRONIC KIDNEY DISEASE ON CHRONIC DIALYSIS, WITH LONG-TERM CURRENT USE OF INSULIN: Status: ACTIVE | Noted: 2019-01-29

## 2021-11-11 PROBLEM — N18.6 CONTROLLED TYPE 2 DIABETES MELLITUS WITH CHRONIC KIDNEY DISEASE ON CHRONIC DIALYSIS, WITH LONG-TERM CURRENT USE OF INSULIN: Status: ACTIVE | Noted: 2019-01-29

## 2021-11-11 LAB
ALBUMIN SERPL BCP-MCNC: 3.5 G/DL (ref 3.5–5.2)
ALP SERPL-CCNC: 106 U/L (ref 55–135)
ALT SERPL W/O P-5'-P-CCNC: 7 U/L (ref 10–44)
ANION GAP SERPL CALC-SCNC: 12 MMOL/L (ref 8–16)
AST SERPL-CCNC: 11 U/L (ref 10–40)
BASOPHILS # BLD AUTO: 0.07 K/UL (ref 0–0.2)
BASOPHILS NFR BLD: 1 % (ref 0–1.9)
BILIRUB SERPL-MCNC: 0.6 MG/DL (ref 0.1–1)
BUN SERPL-MCNC: 14 MG/DL (ref 6–20)
CALCIUM SERPL-MCNC: 9.4 MG/DL (ref 8.7–10.5)
CHLORIDE SERPL-SCNC: 98 MMOL/L (ref 95–110)
CO2 SERPL-SCNC: 26 MMOL/L (ref 23–29)
CREAT SERPL-MCNC: 6.2 MG/DL (ref 0.5–1.4)
CRP SERPL-MCNC: 18.3 MG/L (ref 0–8.2)
CTP QC/QA: YES
DIFFERENTIAL METHOD: ABNORMAL
EOSINOPHIL # BLD AUTO: 0.5 K/UL (ref 0–0.5)
EOSINOPHIL NFR BLD: 7.2 % (ref 0–8)
ERYTHROCYTE [DISTWIDTH] IN BLOOD BY AUTOMATED COUNT: 16.3 % (ref 11.5–14.5)
ERYTHROCYTE [SEDIMENTATION RATE] IN BLOOD BY WESTERGREN METHOD: >120 MM/HR (ref 0–23)
EST. GFR  (AFRICAN AMERICAN): 10.7 ML/MIN/1.73 M^2
EST. GFR  (NON AFRICAN AMERICAN): 9.2 ML/MIN/1.73 M^2
GLUCOSE SERPL-MCNC: 84 MG/DL (ref 70–110)
HCT VFR BLD AUTO: 39.4 % (ref 40–54)
HCV AB SERPL QL IA: NEGATIVE
HGB BLD-MCNC: 12.2 G/DL (ref 14–18)
HIV 1+2 AB+HIV1 P24 AG SERPL QL IA: NEGATIVE
IMM GRANULOCYTES # BLD AUTO: 0.02 K/UL (ref 0–0.04)
IMM GRANULOCYTES NFR BLD AUTO: 0.3 % (ref 0–0.5)
LACTATE SERPL-SCNC: 1.1 MMOL/L (ref 0.5–2.2)
LYMPHOCYTES # BLD AUTO: 1.1 K/UL (ref 1–4.8)
LYMPHOCYTES NFR BLD: 15.7 % (ref 18–48)
MCH RBC QN AUTO: 28.5 PG (ref 27–31)
MCHC RBC AUTO-ENTMCNC: 31 G/DL (ref 32–36)
MCV RBC AUTO: 92 FL (ref 82–98)
MONOCYTES # BLD AUTO: 0.7 K/UL (ref 0.3–1)
MONOCYTES NFR BLD: 9.1 % (ref 4–15)
NEUTROPHILS # BLD AUTO: 4.8 K/UL (ref 1.8–7.7)
NEUTROPHILS NFR BLD: 66.7 % (ref 38–73)
NRBC BLD-RTO: 0 /100 WBC
PLATELET # BLD AUTO: 145 K/UL (ref 150–450)
PMV BLD AUTO: 9.9 FL (ref 9.2–12.9)
POTASSIUM SERPL-SCNC: 4.4 MMOL/L (ref 3.5–5.1)
PROT SERPL-MCNC: 9.6 G/DL (ref 6–8.4)
RBC # BLD AUTO: 4.28 M/UL (ref 4.6–6.2)
SARS-COV-2 RDRP RESP QL NAA+PROBE: NEGATIVE
SODIUM SERPL-SCNC: 136 MMOL/L (ref 136–145)
WBC # BLD AUTO: 7.25 K/UL (ref 3.9–12.7)

## 2021-11-11 PROCEDURE — 87040 BLOOD CULTURE FOR BACTERIA: CPT | Performed by: PHYSICIAN ASSISTANT

## 2021-11-11 PROCEDURE — 12000002 HC ACUTE/MED SURGE SEMI-PRIVATE ROOM

## 2021-11-11 PROCEDURE — 83605 ASSAY OF LACTIC ACID: CPT | Performed by: PHYSICIAN ASSISTANT

## 2021-11-11 PROCEDURE — 96366 THER/PROPH/DIAG IV INF ADDON: CPT

## 2021-11-11 PROCEDURE — 80053 COMPREHEN METABOLIC PANEL: CPT | Mod: AY | Performed by: PHYSICIAN ASSISTANT

## 2021-11-11 PROCEDURE — 25000003 PHARM REV CODE 250: Performed by: PHYSICIAN ASSISTANT

## 2021-11-11 PROCEDURE — 63600175 PHARM REV CODE 636 W HCPCS: Performed by: PHYSICIAN ASSISTANT

## 2021-11-11 PROCEDURE — 86140 C-REACTIVE PROTEIN: CPT | Performed by: PHYSICIAN ASSISTANT

## 2021-11-11 PROCEDURE — 86803 HEPATITIS C AB TEST: CPT | Performed by: EMERGENCY MEDICINE

## 2021-11-11 PROCEDURE — U0002 COVID-19 LAB TEST NON-CDC: HCPCS | Performed by: PHYSICIAN ASSISTANT

## 2021-11-11 PROCEDURE — 96365 THER/PROPH/DIAG IV INF INIT: CPT

## 2021-11-11 PROCEDURE — 87389 HIV-1 AG W/HIV-1&-2 AB AG IA: CPT | Performed by: EMERGENCY MEDICINE

## 2021-11-11 PROCEDURE — 85652 RBC SED RATE AUTOMATED: CPT | Performed by: PHYSICIAN ASSISTANT

## 2021-11-11 PROCEDURE — 99285 PR EMERGENCY DEPT VISIT,LEVEL V: ICD-10-PCS | Mod: CS,,, | Performed by: PHYSICIAN ASSISTANT

## 2021-11-11 PROCEDURE — 99223 1ST HOSP IP/OBS HIGH 75: CPT | Mod: ,,, | Performed by: PODIATRIST

## 2021-11-11 PROCEDURE — 99223 PR INITIAL HOSPITAL CARE,LEVL III: ICD-10-PCS | Mod: ,,, | Performed by: PODIATRIST

## 2021-11-11 PROCEDURE — 96367 TX/PROPH/DG ADDL SEQ IV INF: CPT

## 2021-11-11 PROCEDURE — 99284 EMERGENCY DEPT VISIT MOD MDM: CPT | Mod: 25

## 2021-11-11 PROCEDURE — 99223 1ST HOSP IP/OBS HIGH 75: CPT | Mod: AI,GC,, | Performed by: HOSPITALIST

## 2021-11-11 PROCEDURE — 99223 PR INITIAL HOSPITAL CARE,LEVL III: ICD-10-PCS | Mod: AI,GC,, | Performed by: HOSPITALIST

## 2021-11-11 PROCEDURE — 85025 COMPLETE CBC W/AUTO DIFF WBC: CPT | Performed by: PHYSICIAN ASSISTANT

## 2021-11-11 PROCEDURE — 99285 EMERGENCY DEPT VISIT HI MDM: CPT | Mod: CS,,, | Performed by: PHYSICIAN ASSISTANT

## 2021-11-11 RX ORDER — IBUPROFEN 200 MG
24 TABLET ORAL
Status: DISCONTINUED | OUTPATIENT
Start: 2021-11-11 | End: 2021-11-11 | Stop reason: HOSPADM

## 2021-11-11 RX ORDER — AMOXICILLIN AND CLAVULANATE POTASSIUM 500; 125 MG/1; MG/1
1 TABLET, FILM COATED ORAL DAILY
Qty: 7 TABLET | Refills: 0 | Status: ON HOLD | OUTPATIENT
Start: 2021-11-11 | End: 2022-01-26 | Stop reason: HOSPADM

## 2021-11-11 RX ORDER — AMOXICILLIN AND CLAVULANATE POTASSIUM 500; 125 MG/1; MG/1
1 TABLET, FILM COATED ORAL DAILY
Qty: 7 TABLET | Refills: 0 | Status: SHIPPED | OUTPATIENT
Start: 2021-11-11 | End: 2021-11-11 | Stop reason: SDUPTHER

## 2021-11-11 RX ORDER — IBUPROFEN 200 MG
16 TABLET ORAL
Status: DISCONTINUED | OUTPATIENT
Start: 2021-11-11 | End: 2021-11-11 | Stop reason: HOSPADM

## 2021-11-11 RX ORDER — NALOXONE HCL 0.4 MG/ML
0.02 VIAL (ML) INJECTION
Status: DISCONTINUED | OUTPATIENT
Start: 2021-11-11 | End: 2021-11-11 | Stop reason: HOSPADM

## 2021-11-11 RX ORDER — SODIUM CHLORIDE 0.9 % (FLUSH) 0.9 %
10 SYRINGE (ML) INJECTION EVERY 12 HOURS PRN
Status: DISCONTINUED | OUTPATIENT
Start: 2021-11-11 | End: 2021-11-11 | Stop reason: HOSPADM

## 2021-11-11 RX ORDER — GLUCAGON 1 MG
1 KIT INJECTION
Status: DISCONTINUED | OUTPATIENT
Start: 2021-11-11 | End: 2021-11-11 | Stop reason: HOSPADM

## 2021-11-11 RX ADMIN — PIPERACILLIN AND TAZOBACTAM 4.5 G: 4; .5 INJECTION, POWDER, LYOPHILIZED, FOR SOLUTION INTRAVENOUS; PARENTERAL at 01:11

## 2021-11-11 RX ADMIN — VANCOMYCIN HYDROCHLORIDE 1500 MG: 1.5 INJECTION, POWDER, LYOPHILIZED, FOR SOLUTION INTRAVENOUS at 02:11

## 2021-11-15 ENCOUNTER — TELEPHONE (OUTPATIENT)
Dept: PODIATRY | Facility: CLINIC | Age: 56
End: 2021-11-15
Payer: MEDICARE

## 2021-11-16 LAB
BACTERIA BLD CULT: NORMAL
BACTERIA BLD CULT: NORMAL

## 2021-11-19 ENCOUNTER — PES CALL (OUTPATIENT)
Dept: ADMINISTRATIVE | Facility: CLINIC | Age: 56
End: 2021-11-19
Payer: MEDICARE

## 2021-11-23 ENCOUNTER — OFFICE VISIT (OUTPATIENT)
Dept: PODIATRY | Facility: CLINIC | Age: 56
End: 2021-11-23
Payer: MEDICARE

## 2021-11-23 VITALS
HEIGHT: 71 IN | BODY MASS INDEX: 22.47 KG/M2 | SYSTOLIC BLOOD PRESSURE: 166 MMHG | DIASTOLIC BLOOD PRESSURE: 74 MMHG | HEART RATE: 60 BPM | WEIGHT: 160.5 LBS | RESPIRATION RATE: 18 BRPM

## 2021-11-23 DIAGNOSIS — Z79.4 CONTROLLED TYPE 2 DIABETES MELLITUS WITH CHRONIC KIDNEY DISEASE ON CHRONIC DIALYSIS, WITH LONG-TERM CURRENT USE OF INSULIN: ICD-10-CM

## 2021-11-23 DIAGNOSIS — N18.6 ESRD ON HEMODIALYSIS: ICD-10-CM

## 2021-11-23 DIAGNOSIS — I96 GANGRENE OF TOE OF LEFT FOOT: Primary | ICD-10-CM

## 2021-11-23 DIAGNOSIS — Z99.2 ESRD ON HEMODIALYSIS: ICD-10-CM

## 2021-11-23 DIAGNOSIS — N18.6 CONTROLLED TYPE 2 DIABETES MELLITUS WITH CHRONIC KIDNEY DISEASE ON CHRONIC DIALYSIS, WITH LONG-TERM CURRENT USE OF INSULIN: ICD-10-CM

## 2021-11-23 DIAGNOSIS — Z99.2 CONTROLLED TYPE 2 DIABETES MELLITUS WITH CHRONIC KIDNEY DISEASE ON CHRONIC DIALYSIS, WITH LONG-TERM CURRENT USE OF INSULIN: ICD-10-CM

## 2021-11-23 DIAGNOSIS — E11.22 CONTROLLED TYPE 2 DIABETES MELLITUS WITH CHRONIC KIDNEY DISEASE ON CHRONIC DIALYSIS, WITH LONG-TERM CURRENT USE OF INSULIN: ICD-10-CM

## 2021-11-23 PROCEDURE — 99999 PR PBB SHADOW E&M-EST. PATIENT-LVL IV: ICD-10-PCS | Mod: PBBFAC,,, | Performed by: PODIATRIST

## 2021-11-23 PROCEDURE — 99213 PR OFFICE/OUTPT VISIT, EST, LEVL III, 20-29 MIN: ICD-10-PCS | Mod: S$PBB,,, | Performed by: PODIATRIST

## 2021-11-23 PROCEDURE — 99213 OFFICE O/P EST LOW 20 MIN: CPT | Mod: S$PBB,,, | Performed by: PODIATRIST

## 2021-11-23 PROCEDURE — 99214 OFFICE O/P EST MOD 30 MIN: CPT | Mod: PBBFAC | Performed by: PODIATRIST

## 2021-11-23 PROCEDURE — 99999 PR PBB SHADOW E&M-EST. PATIENT-LVL IV: CPT | Mod: PBBFAC,,, | Performed by: PODIATRIST

## 2021-11-23 RX ORDER — PENICILLIN V POTASSIUM 500 MG/1
500 TABLET, FILM COATED ORAL 4 TIMES DAILY
Status: ON HOLD | COMMUNITY
Start: 2021-09-15 | End: 2022-01-26 | Stop reason: HOSPADM

## 2021-11-23 RX ORDER — LISINOPRIL 5 MG/1
5 TABLET ORAL DAILY
Status: ON HOLD | COMMUNITY
Start: 2021-11-18 | End: 2022-01-26 | Stop reason: HOSPADM

## 2021-11-24 ENCOUNTER — TELEPHONE (OUTPATIENT)
Dept: CARDIOLOGY | Facility: CLINIC | Age: 56
End: 2021-11-24
Payer: MEDICARE

## 2021-11-24 ENCOUNTER — OFFICE VISIT (OUTPATIENT)
Dept: OPTOMETRY | Facility: CLINIC | Age: 56
End: 2021-11-24
Payer: MEDICARE

## 2021-11-24 DIAGNOSIS — I73.9 PAD (PERIPHERAL ARTERY DISEASE): Primary | ICD-10-CM

## 2021-11-24 DIAGNOSIS — Z96.1 PSEUDOPHAKIA: ICD-10-CM

## 2021-11-24 DIAGNOSIS — H25.11 NUCLEAR SCLEROSIS OF RIGHT EYE: ICD-10-CM

## 2021-11-24 DIAGNOSIS — I10 PRIMARY HYPERTENSION: ICD-10-CM

## 2021-11-24 DIAGNOSIS — H25.041 POSTERIOR SUBCAPSULAR AGE-RELATED CATARACT OF RIGHT EYE: ICD-10-CM

## 2021-11-24 DIAGNOSIS — E78.2 MIXED HYPERLIPIDEMIA: ICD-10-CM

## 2021-11-24 DIAGNOSIS — E11.3593 PROLIFERATIVE DIABETIC RETINOPATHY OF BOTH EYES ASSOCIATED WITH TYPE 2 DIABETES MELLITUS, MACULAR EDEMA PRESENCE UNSPECIFIED: Primary | ICD-10-CM

## 2021-11-24 PROCEDURE — 92134 POSTERIOR SEGMENT OCT RETINA (OCULAR COHERENCE TOMOGRAPHY)-BOTH EYES: ICD-10-PCS | Mod: 26,S$PBB,, | Performed by: OPTOMETRIST

## 2021-11-24 PROCEDURE — 92134 CPTRZ OPH DX IMG PST SGM RTA: CPT | Mod: PBBFAC,PO | Performed by: OPTOMETRIST

## 2021-11-24 PROCEDURE — 92004 COMPRE OPH EXAM NEW PT 1/>: CPT | Mod: S$PBB,,, | Performed by: OPTOMETRIST

## 2021-11-24 PROCEDURE — 99999 PR PBB SHADOW E&M-EST. PATIENT-LVL III: CPT | Mod: PBBFAC,,, | Performed by: OPTOMETRIST

## 2021-11-24 PROCEDURE — 92004 PR EYE EXAM, NEW PATIENT,COMPREHESV: ICD-10-PCS | Mod: S$PBB,,, | Performed by: OPTOMETRIST

## 2021-11-24 PROCEDURE — 99999 PR PBB SHADOW E&M-EST. PATIENT-LVL III: ICD-10-PCS | Mod: PBBFAC,,, | Performed by: OPTOMETRIST

## 2021-11-24 PROCEDURE — 99213 OFFICE O/P EST LOW 20 MIN: CPT | Mod: PBBFAC,PO | Performed by: OPTOMETRIST

## 2021-12-08 ENCOUNTER — TELEPHONE (OUTPATIENT)
Dept: CARDIOLOGY | Facility: CLINIC | Age: 56
End: 2021-12-08
Payer: MEDICARE

## 2021-12-09 ENCOUNTER — TELEPHONE (OUTPATIENT)
Dept: CARDIOLOGY | Facility: CLINIC | Age: 56
End: 2021-12-09
Payer: MEDICARE

## 2021-12-13 ENCOUNTER — OFFICE VISIT (OUTPATIENT)
Dept: PODIATRY | Facility: CLINIC | Age: 56
End: 2021-12-13
Payer: MEDICARE

## 2021-12-13 VITALS
WEIGHT: 160.5 LBS | HEART RATE: 99 BPM | BODY MASS INDEX: 22.38 KG/M2 | DIASTOLIC BLOOD PRESSURE: 82 MMHG | SYSTOLIC BLOOD PRESSURE: 182 MMHG

## 2021-12-13 DIAGNOSIS — M79.672 ISCHEMIC PAIN OF FOOT, LEFT: ICD-10-CM

## 2021-12-13 DIAGNOSIS — Z79.4 CONTROLLED TYPE 2 DIABETES MELLITUS WITH CHRONIC KIDNEY DISEASE ON CHRONIC DIALYSIS, WITH LONG-TERM CURRENT USE OF INSULIN: Primary | ICD-10-CM

## 2021-12-13 DIAGNOSIS — E11.22 CONTROLLED TYPE 2 DIABETES MELLITUS WITH CHRONIC KIDNEY DISEASE ON CHRONIC DIALYSIS, WITH LONG-TERM CURRENT USE OF INSULIN: Primary | ICD-10-CM

## 2021-12-13 DIAGNOSIS — I99.8 ISCHEMIC PAIN OF FOOT, LEFT: ICD-10-CM

## 2021-12-13 DIAGNOSIS — N18.6 CONTROLLED TYPE 2 DIABETES MELLITUS WITH CHRONIC KIDNEY DISEASE ON CHRONIC DIALYSIS, WITH LONG-TERM CURRENT USE OF INSULIN: Primary | ICD-10-CM

## 2021-12-13 DIAGNOSIS — I96 GANGRENE OF TOE OF LEFT FOOT: ICD-10-CM

## 2021-12-13 DIAGNOSIS — Z99.2 CONTROLLED TYPE 2 DIABETES MELLITUS WITH CHRONIC KIDNEY DISEASE ON CHRONIC DIALYSIS, WITH LONG-TERM CURRENT USE OF INSULIN: Primary | ICD-10-CM

## 2021-12-13 DIAGNOSIS — I73.9 PAD (PERIPHERAL ARTERY DISEASE): ICD-10-CM

## 2021-12-13 PROCEDURE — 99999 PR PBB SHADOW E&M-EST. PATIENT-LVL III: CPT | Mod: PBBFAC,,, | Performed by: PODIATRIST

## 2021-12-13 PROCEDURE — 99213 OFFICE O/P EST LOW 20 MIN: CPT | Mod: PBBFAC | Performed by: PODIATRIST

## 2021-12-13 PROCEDURE — 99213 PR OFFICE/OUTPT VISIT, EST, LEVL III, 20-29 MIN: ICD-10-PCS | Mod: S$PBB,,, | Performed by: PODIATRIST

## 2021-12-13 PROCEDURE — 99999 PR PBB SHADOW E&M-EST. PATIENT-LVL III: ICD-10-PCS | Mod: PBBFAC,,, | Performed by: PODIATRIST

## 2021-12-13 PROCEDURE — 99213 OFFICE O/P EST LOW 20 MIN: CPT | Mod: S$PBB,,, | Performed by: PODIATRIST

## 2021-12-13 RX ORDER — TRAMADOL HYDROCHLORIDE 50 MG/1
50 TABLET ORAL EVERY 8 HOURS PRN
Qty: 30 TABLET | Refills: 0 | Status: ON HOLD | OUTPATIENT
Start: 2021-12-13 | End: 2022-01-26 | Stop reason: SDUPTHER

## 2021-12-15 ENCOUNTER — HOSPITAL ENCOUNTER (OUTPATIENT)
Dept: RADIOLOGY | Facility: HOSPITAL | Age: 56
Discharge: HOME | End: 2021-12-15
Attending: INTERNAL MEDICINE
Payer: MEDICARE

## 2021-12-15 ENCOUNTER — HOSPITAL ENCOUNTER (OUTPATIENT)
Dept: CARDIOLOGY | Facility: HOSPITAL | Age: 56
Discharge: HOME | End: 2021-12-15
Attending: INTERNAL MEDICINE
Payer: MEDICARE

## 2021-12-15 DIAGNOSIS — I10 PRIMARY HYPERTENSION: ICD-10-CM

## 2021-12-15 DIAGNOSIS — I73.9 PAD (PERIPHERAL ARTERY DISEASE): ICD-10-CM

## 2021-12-15 LAB
EJECTION FRACTION: 55 %
RA PRESSURE: 3 MMHG
SINUS: 2.8 CM

## 2021-12-15 PROCEDURE — 93925 LOWER EXTREMITY STUDY: CPT | Mod: TC

## 2021-12-15 PROCEDURE — 93922 ANKLE BRACHIAL INDICES (ABI): ICD-10-PCS | Mod: 26,,, | Performed by: SURGERY

## 2021-12-15 PROCEDURE — 93922 UPR/L XTREMITY ART 2 LEVELS: CPT | Mod: 26,,, | Performed by: SURGERY

## 2021-12-15 PROCEDURE — 93925 LOWER EXTREMITY STUDY: CPT | Mod: 26,,, | Performed by: RADIOLOGY

## 2021-12-15 PROCEDURE — 93922 UPR/L XTREMITY ART 2 LEVELS: CPT

## 2021-12-15 PROCEDURE — 93306 TTE W/DOPPLER COMPLETE: CPT | Mod: 26,,, | Performed by: INTERNAL MEDICINE

## 2021-12-15 PROCEDURE — 93306 ECHO (CUPID ONLY): ICD-10-PCS | Mod: 26,,, | Performed by: INTERNAL MEDICINE

## 2021-12-15 PROCEDURE — 93306 TTE W/DOPPLER COMPLETE: CPT

## 2021-12-15 PROCEDURE — 93925 US LOWER EXTREMITY ARTERIES BILATERAL: ICD-10-PCS | Mod: 26,,, | Performed by: RADIOLOGY

## 2021-12-21 LAB
LEFT ABI: 1.34
LEFT DORSALIS PEDIS: 255 MMHG
LEFT POSTERIOR TIBIAL: 255 MMHG
LEFT TBI: 0
LEFT TOE PRESSURE: 0 MMHG
RIGHT ABI: 1.34
RIGHT ARM BP: 190 MMHG
RIGHT DORSALIS PEDIS: 255 MMHG
RIGHT POSTERIOR TIBIAL: 255 MMHG
RIGHT TBI: 0
RIGHT TOE PRESSURE: 0 MMHG

## 2022-01-04 ENCOUNTER — TELEPHONE (OUTPATIENT)
Dept: CARDIOLOGY | Facility: CLINIC | Age: 57
End: 2022-01-04
Payer: MEDICARE

## 2022-01-04 NOTE — TELEPHONE ENCOUNTER
----- Message from Thierry Bertrand MD sent at 11/24/2021  6:21 AM CST -----  Thanks      We will help       I will keep you posted      Ms Epps      Can we get him asap please       ZN  ----- Message -----  From: Judie Calles DPM  Sent: 11/23/2021  10:05 AM CST  To: MD Tomas Sandoval,     I have a very pleasant new patient w/ dry gangrene to the L  2nd toe. Can your dept assit w/ scheduling vascular testing and clinical f/u w/ you?     Many thanks and happy holidays !     DB

## 2022-01-05 ENCOUNTER — HOSPITAL ENCOUNTER (INPATIENT)
Facility: HOSPITAL | Age: 57
LOS: 21 days | Discharge: NURSING, REHAB OR LONG-TERM CARE | DRG: 239 | End: 2022-01-26
Attending: EMERGENCY MEDICINE | Admitting: SURGERY
Payer: MEDICARE

## 2022-01-05 ENCOUNTER — ANESTHESIA EVENT (OUTPATIENT)
Dept: SURGERY | Facility: HOSPITAL | Age: 57
DRG: 239 | End: 2022-01-05
Payer: MEDICARE

## 2022-01-05 DIAGNOSIS — L03.032 PARONYCHIA, TOE, LEFT: ICD-10-CM

## 2022-01-05 DIAGNOSIS — U07.1 COVID-19 VIRUS INFECTION: ICD-10-CM

## 2022-01-05 DIAGNOSIS — Z99.2 END-STAGE RENAL DISEASE ON HEMODIALYSIS: ICD-10-CM

## 2022-01-05 DIAGNOSIS — I96 GANGRENE OF LEFT FOOT: Primary | ICD-10-CM

## 2022-01-05 DIAGNOSIS — G93.41 ENCEPHALOPATHY, METABOLIC: ICD-10-CM

## 2022-01-05 DIAGNOSIS — N18.6 END-STAGE RENAL DISEASE ON HEMODIALYSIS: ICD-10-CM

## 2022-01-05 DIAGNOSIS — R07.9 CHEST PAIN: ICD-10-CM

## 2022-01-05 DIAGNOSIS — Z01.818 PREOPERATIVE CLEARANCE: ICD-10-CM

## 2022-01-05 DIAGNOSIS — I10 UNCONTROLLED HYPERTENSION: ICD-10-CM

## 2022-01-05 PROBLEM — D84.9 COVID-19 IN IMMUNOCOMPROMISED PATIENT: Status: ACTIVE | Noted: 2022-01-05

## 2022-01-05 LAB
ALBUMIN SERPL BCP-MCNC: 2.4 G/DL (ref 3.5–5.2)
ALP SERPL-CCNC: 94 U/L (ref 55–135)
ALT SERPL W/O P-5'-P-CCNC: 7 U/L (ref 10–44)
ANION GAP SERPL CALC-SCNC: 14 MMOL/L (ref 8–16)
AST SERPL-CCNC: 13 U/L (ref 10–40)
BASOPHILS # BLD AUTO: 0.04 K/UL (ref 0–0.2)
BASOPHILS NFR BLD: 0.3 % (ref 0–1.9)
BILIRUB SERPL-MCNC: 0.4 MG/DL (ref 0.1–1)
BUN SERPL-MCNC: 38 MG/DL (ref 6–20)
CALCIUM SERPL-MCNC: 8.6 MG/DL (ref 8.7–10.5)
CHLORIDE SERPL-SCNC: 98 MMOL/L (ref 95–110)
CO2 SERPL-SCNC: 26 MMOL/L (ref 23–29)
CREAT SERPL-MCNC: 9.3 MG/DL (ref 0.5–1.4)
CTP QC/QA: YES
DIFFERENTIAL METHOD: ABNORMAL
EOSINOPHIL # BLD AUTO: 0.2 K/UL (ref 0–0.5)
EOSINOPHIL NFR BLD: 1.5 % (ref 0–8)
ERYTHROCYTE [DISTWIDTH] IN BLOOD BY AUTOMATED COUNT: 15.2 % (ref 11.5–14.5)
EST. GFR  (AFRICAN AMERICAN): 6 ML/MIN/1.73 M^2
EST. GFR  (NON AFRICAN AMERICAN): 6 ML/MIN/1.73 M^2
GLUCOSE SERPL-MCNC: 212 MG/DL (ref 70–110)
HCT VFR BLD AUTO: 37.8 % (ref 40–54)
HGB BLD-MCNC: 10.8 G/DL (ref 14–18)
IMM GRANULOCYTES # BLD AUTO: 0.1 K/UL (ref 0–0.04)
IMM GRANULOCYTES NFR BLD AUTO: 0.7 % (ref 0–0.5)
INR PPP: 1 (ref 0.8–1.2)
LACTATE SERPL-SCNC: 1.2 MMOL/L (ref 0.5–2.2)
LYMPHOCYTES # BLD AUTO: 1.1 K/UL (ref 1–4.8)
LYMPHOCYTES NFR BLD: 7 % (ref 18–48)
MCH RBC QN AUTO: 27.5 PG (ref 27–31)
MCHC RBC AUTO-ENTMCNC: 28.6 G/DL (ref 32–36)
MCV RBC AUTO: 96 FL (ref 82–98)
MONOCYTES # BLD AUTO: 1.2 K/UL (ref 0.3–1)
MONOCYTES NFR BLD: 7.7 % (ref 4–15)
NEUTROPHILS # BLD AUTO: 12.6 K/UL (ref 1.8–7.7)
NEUTROPHILS NFR BLD: 82.8 % (ref 38–73)
NRBC BLD-RTO: 0 /100 WBC
PLATELET # BLD AUTO: 425 K/UL (ref 150–450)
PMV BLD AUTO: 9.9 FL (ref 9.2–12.9)
POCT GLUCOSE: 224 MG/DL (ref 70–110)
POCT GLUCOSE: 226 MG/DL (ref 70–110)
POCT GLUCOSE: 233 MG/DL (ref 70–110)
POTASSIUM SERPL-SCNC: 4.7 MMOL/L (ref 3.5–5.1)
PROT SERPL-MCNC: 9 G/DL (ref 6–8.4)
PROTHROMBIN TIME: 11.2 SEC (ref 9–12.5)
RBC # BLD AUTO: 3.93 M/UL (ref 4.6–6.2)
SARS-COV-2 RDRP RESP QL NAA+PROBE: POSITIVE
SODIUM SERPL-SCNC: 138 MMOL/L (ref 136–145)
WBC # BLD AUTO: 15.22 K/UL (ref 3.9–12.7)

## 2022-01-05 PROCEDURE — 93010 EKG 12-LEAD: ICD-10-PCS | Mod: ,,, | Performed by: INTERNAL MEDICINE

## 2022-01-05 PROCEDURE — C9399 UNCLASSIFIED DRUGS OR BIOLOG: HCPCS | Performed by: FAMILY MEDICINE

## 2022-01-05 PROCEDURE — 93010 ELECTROCARDIOGRAM REPORT: CPT | Mod: ,,, | Performed by: INTERNAL MEDICINE

## 2022-01-05 PROCEDURE — 82962 GLUCOSE BLOOD TEST: CPT

## 2022-01-05 PROCEDURE — 80053 COMPREHEN METABOLIC PANEL: CPT | Performed by: EMERGENCY MEDICINE

## 2022-01-05 PROCEDURE — 87070 CULTURE OTHR SPECIMN AEROBIC: CPT | Performed by: EMERGENCY MEDICINE

## 2022-01-05 PROCEDURE — 85025 COMPLETE CBC W/AUTO DIFF WBC: CPT | Performed by: EMERGENCY MEDICINE

## 2022-01-05 PROCEDURE — 96365 THER/PROPH/DIAG IV INF INIT: CPT

## 2022-01-05 PROCEDURE — 25000003 PHARM REV CODE 250: Performed by: FAMILY MEDICINE

## 2022-01-05 PROCEDURE — 27000207 HC ISOLATION

## 2022-01-05 PROCEDURE — 83605 ASSAY OF LACTIC ACID: CPT | Performed by: EMERGENCY MEDICINE

## 2022-01-05 PROCEDURE — 25000003 PHARM REV CODE 250: Performed by: EMERGENCY MEDICINE

## 2022-01-05 PROCEDURE — 21400001 HC TELEMETRY ROOM

## 2022-01-05 PROCEDURE — 99223 PR INITIAL HOSPITAL CARE,LEVL III: ICD-10-PCS | Mod: 57,ICN,, | Performed by: SURGERY

## 2022-01-05 PROCEDURE — 85610 PROTHROMBIN TIME: CPT | Performed by: EMERGENCY MEDICINE

## 2022-01-05 PROCEDURE — 63600175 PHARM REV CODE 636 W HCPCS: Performed by: FAMILY MEDICINE

## 2022-01-05 PROCEDURE — 63600175 PHARM REV CODE 636 W HCPCS: Performed by: EMERGENCY MEDICINE

## 2022-01-05 PROCEDURE — 87077 CULTURE AEROBIC IDENTIFY: CPT | Mod: 59 | Performed by: EMERGENCY MEDICINE

## 2022-01-05 PROCEDURE — U0002 COVID-19 LAB TEST NON-CDC: HCPCS | Performed by: EMERGENCY MEDICINE

## 2022-01-05 PROCEDURE — 87040 BLOOD CULTURE FOR BACTERIA: CPT | Mod: 59 | Performed by: EMERGENCY MEDICINE

## 2022-01-05 PROCEDURE — 87186 SC STD MICRODIL/AGAR DIL: CPT | Mod: 59 | Performed by: EMERGENCY MEDICINE

## 2022-01-05 PROCEDURE — 99223 1ST HOSP IP/OBS HIGH 75: CPT | Mod: 57,ICN,, | Performed by: SURGERY

## 2022-01-05 PROCEDURE — 93005 ELECTROCARDIOGRAM TRACING: CPT

## 2022-01-05 PROCEDURE — 25500020 PHARM REV CODE 255: Performed by: EMERGENCY MEDICINE

## 2022-01-05 PROCEDURE — 99285 EMERGENCY DEPT VISIT HI MDM: CPT | Mod: 25

## 2022-01-05 RX ORDER — LISINOPRIL 5 MG/1
5 TABLET ORAL DAILY
Status: DISCONTINUED | OUTPATIENT
Start: 2022-01-05 | End: 2022-01-22

## 2022-01-05 RX ORDER — METOPROLOL SUCCINATE 25 MG/1
25 TABLET, EXTENDED RELEASE ORAL DAILY
Status: DISCONTINUED | OUTPATIENT
Start: 2022-01-05 | End: 2022-01-09

## 2022-01-05 RX ORDER — HYDROCODONE BITARTRATE AND ACETAMINOPHEN 10; 325 MG/1; MG/1
1 TABLET ORAL EVERY 6 HOURS PRN
Status: DISCONTINUED | OUTPATIENT
Start: 2022-01-05 | End: 2022-01-09

## 2022-01-05 RX ORDER — HYDROCODONE BITARTRATE AND ACETAMINOPHEN 5; 325 MG/1; MG/1
1 TABLET ORAL EVERY 6 HOURS PRN
Status: DISCONTINUED | OUTPATIENT
Start: 2022-01-05 | End: 2022-01-09

## 2022-01-05 RX ORDER — TRAZODONE HYDROCHLORIDE 50 MG/1
50 TABLET ORAL NIGHTLY
Status: DISCONTINUED | OUTPATIENT
Start: 2022-01-05 | End: 2022-01-13

## 2022-01-05 RX ORDER — INSULIN ASPART 100 [IU]/ML
1-10 INJECTION, SOLUTION INTRAVENOUS; SUBCUTANEOUS
Status: DISCONTINUED | OUTPATIENT
Start: 2022-01-05 | End: 2022-01-27 | Stop reason: HOSPADM

## 2022-01-05 RX ORDER — ACETAMINOPHEN 325 MG/1
650 TABLET ORAL EVERY 4 HOURS PRN
Status: DISCONTINUED | OUTPATIENT
Start: 2022-01-05 | End: 2022-01-24

## 2022-01-05 RX ORDER — IBUPROFEN 200 MG
24 TABLET ORAL
Status: DISCONTINUED | OUTPATIENT
Start: 2022-01-05 | End: 2022-01-27 | Stop reason: HOSPADM

## 2022-01-05 RX ORDER — HYDRALAZINE HYDROCHLORIDE 25 MG/1
100 TABLET, FILM COATED ORAL EVERY 8 HOURS
Status: DISCONTINUED | OUTPATIENT
Start: 2022-01-05 | End: 2022-01-22

## 2022-01-05 RX ORDER — PANTOPRAZOLE SODIUM 40 MG/1
40 TABLET, DELAYED RELEASE ORAL DAILY
Refills: 0 | Status: DISCONTINUED | OUTPATIENT
Start: 2022-01-05 | End: 2022-01-27 | Stop reason: HOSPADM

## 2022-01-05 RX ORDER — ACETAMINOPHEN 325 MG/1
650 TABLET ORAL EVERY 8 HOURS PRN
Status: DISCONTINUED | OUTPATIENT
Start: 2022-01-05 | End: 2022-01-27 | Stop reason: HOSPADM

## 2022-01-05 RX ORDER — SEVELAMER CARBONATE 800 MG/1
800 TABLET, FILM COATED ORAL
Status: DISCONTINUED | OUTPATIENT
Start: 2022-01-05 | End: 2022-01-11

## 2022-01-05 RX ORDER — NALOXONE HCL 0.4 MG/ML
0.02 VIAL (ML) INJECTION
Status: DISCONTINUED | OUTPATIENT
Start: 2022-01-05 | End: 2022-01-27 | Stop reason: HOSPADM

## 2022-01-05 RX ORDER — ONDANSETRON 2 MG/ML
8 INJECTION INTRAMUSCULAR; INTRAVENOUS EVERY 8 HOURS PRN
Status: DISCONTINUED | OUTPATIENT
Start: 2022-01-05 | End: 2022-01-27 | Stop reason: HOSPADM

## 2022-01-05 RX ORDER — LANOLIN ALCOHOL/MO/W.PET/CERES
800 CREAM (GRAM) TOPICAL
Status: DISCONTINUED | OUTPATIENT
Start: 2022-01-05 | End: 2022-01-27 | Stop reason: HOSPADM

## 2022-01-05 RX ORDER — ATORVASTATIN CALCIUM 10 MG/1
20 TABLET, FILM COATED ORAL DAILY
Status: DISCONTINUED | OUTPATIENT
Start: 2022-01-05 | End: 2022-01-27 | Stop reason: HOSPADM

## 2022-01-05 RX ORDER — IBUPROFEN 200 MG
16 TABLET ORAL
Status: DISCONTINUED | OUTPATIENT
Start: 2022-01-05 | End: 2022-01-27 | Stop reason: HOSPADM

## 2022-01-05 RX ORDER — SODIUM CHLORIDE 0.9 % (FLUSH) 0.9 %
10 SYRINGE (ML) INJECTION EVERY 12 HOURS PRN
Status: DISCONTINUED | OUTPATIENT
Start: 2022-01-05 | End: 2022-01-27 | Stop reason: HOSPADM

## 2022-01-05 RX ORDER — HEPARIN SODIUM 5000 [USP'U]/ML
5000 INJECTION, SOLUTION INTRAVENOUS; SUBCUTANEOUS EVERY 8 HOURS
Status: DISCONTINUED | OUTPATIENT
Start: 2022-01-05 | End: 2022-01-18

## 2022-01-05 RX ORDER — GLUCAGON 1 MG
1 KIT INJECTION
Status: DISCONTINUED | OUTPATIENT
Start: 2022-01-05 | End: 2022-01-27 | Stop reason: HOSPADM

## 2022-01-05 RX ADMIN — HEPARIN SODIUM 5000 UNITS: 5000 INJECTION INTRAVENOUS; SUBCUTANEOUS at 09:01

## 2022-01-05 RX ADMIN — INSULIN ASPART 4 UNITS: 100 INJECTION, SOLUTION INTRAVENOUS; SUBCUTANEOUS at 05:01

## 2022-01-05 RX ADMIN — HYDRALAZINE HYDROCHLORIDE 100 MG: 25 TABLET, FILM COATED ORAL at 09:01

## 2022-01-05 RX ADMIN — METOPROLOL SUCCINATE 25 MG: 25 TABLET, EXTENDED RELEASE ORAL at 05:01

## 2022-01-05 RX ADMIN — INSULIN DETEMIR 15 UNITS: 100 INJECTION, SOLUTION SUBCUTANEOUS at 09:01

## 2022-01-05 RX ADMIN — IOHEXOL 125 ML: 350 INJECTION, SOLUTION INTRAVENOUS at 02:01

## 2022-01-05 RX ADMIN — PIPERACILLIN AND TAZOBACTAM 4.5 G: 4; .5 INJECTION, POWDER, LYOPHILIZED, FOR SOLUTION INTRAVENOUS; PARENTERAL at 01:01

## 2022-01-05 RX ADMIN — HYDROCODONE BITARTRATE AND ACETAMINOPHEN 1 TABLET: 10; 325 TABLET ORAL at 11:01

## 2022-01-05 RX ADMIN — PANTOPRAZOLE SODIUM 40 MG: 40 TABLET, DELAYED RELEASE ORAL at 05:01

## 2022-01-05 RX ADMIN — VANCOMYCIN HYDROCHLORIDE 1500 MG: 1.5 INJECTION, POWDER, LYOPHILIZED, FOR SOLUTION INTRAVENOUS at 01:01

## 2022-01-05 RX ADMIN — ATORVASTATIN CALCIUM 20 MG: 10 TABLET, FILM COATED ORAL at 05:01

## 2022-01-05 RX ADMIN — LISINOPRIL 5 MG: 5 TABLET ORAL at 05:01

## 2022-01-05 RX ADMIN — TRAZODONE HYDROCHLORIDE 50 MG: 50 TABLET ORAL at 09:01

## 2022-01-05 RX ADMIN — INSULIN ASPART 2 UNITS: 100 INJECTION, SOLUTION INTRAVENOUS; SUBCUTANEOUS at 09:01

## 2022-01-05 NOTE — ED PROVIDER NOTES
Encounter Date: 1/5/2022    SCRIBE #1 NOTE: I, Carla Dodd, am scribing for, and in the presence of,  Adan Judge MD. I have scribed the following portions of the note - Other sections scribed: HPI,ROS.       History     Chief Complaint   Patient presents with    Foot Injury     EMS called to 55yo male with left foot pain x1 month. EMS reports that left foot is black to above ankle, cold to touch, and they were unable to find a pulse. Some peeling skin noted to top of foot.       CC: Foot pain    HPI: This is a 56 y.o.male patient, with a PMHx of ESRD on hemodialysis, CAD, HTN, and DM, presenting to the ED for further evaluation of left foot pain and black discoloration that began 3 days ago. Patient states these symptoms have been ongoing for the last month. He reports being referred to a specialist but denies ever following up. No trauma or injury. Patient reports associated chills. Patient states applying a creme to the foot and noticed his discoloration worsened. Patient reports he was last dialyzed yesterday. Patient denies any fever, shortness of breath, chest pain, neck pain, back pain, abdominal pain, rash, headaches, congestion, rhinorrhea, cough, sore throat, ear pain, eye pain, blurred vision, nausea, vomiting, diarrhea, dysuria, or any other associated symptoms. No known drug allergies.    The history is provided by the patient. A  was used.     Review of patient's allergies indicates:  No Known Allergies  Past Medical History:   Diagnosis Date    CAD (coronary artery disease) 2016    CAD (non obstructive) 2016 [facility]    Diabetes mellitus type I     Diabetic retinopathy     ESRD on hemodialysis     on HD TThSat    Hypertension     Nuclear sclerosis of right eye 11/24/2021    Pulmonary HTN     TB lung, latent 04/2021     Past Surgical History:   Procedure Laterality Date    AV FISTULA PLACEMENT      CATARACT EXTRACTION Left     EYE SURGERY       Family History   Problem  Relation Age of Onset    No Known Problems Mother     No Known Problems Father     No Known Problems Sister     No Known Problems Brother     No Known Problems Daughter     No Known Problems Daughter     No Known Problems Daughter     No Known Problems Brother     No Known Problems Maternal Aunt     No Known Problems Maternal Uncle     No Known Problems Paternal Aunt     No Known Problems Paternal Uncle     No Known Problems Maternal Grandmother     No Known Problems Maternal Grandfather     No Known Problems Paternal Grandmother     No Known Problems Paternal Grandfather      Social History     Tobacco Use    Smoking status: Never Smoker    Smokeless tobacco: Never Used   Substance Use Topics    Alcohol use: No    Drug use: No     Review of Systems   Constitutional: Positive for chills. Negative for fever.   HENT: Negative for congestion, ear pain, rhinorrhea and sore throat.    Eyes: Negative for pain and visual disturbance.   Respiratory: Negative for cough and shortness of breath.    Cardiovascular: Negative for chest pain.   Gastrointestinal: Negative for abdominal pain, diarrhea, nausea and vomiting.   Genitourinary: Negative for dysuria.   Musculoskeletal: Positive for arthralgias. Negative for back pain and neck pain.   Skin: Positive for color change. Negative for rash.   Neurological: Negative for headaches.       Physical Exam     Initial Vitals [01/05/22 1135]   BP Pulse Resp Temp SpO2   (!) 191/82 92 16 98.3 °F (36.8 °C) 100 %      MAP       --         Physical Exam  The patient was examined specifically for the following:   General:No significant distress, Good color, Warm and dry. Head and neck:Scalp atraumatic, Neck supple. Neurological:Appropriate conversation, Gross motor deficits. Eyes:Conjugate gaze, Clear corneas. ENT: No epistaxis. Cardiac: Regular rate and rhythm, Grossly normal heart tones. Pulmonary: Wheezing, Rales. Gastrointestinal: Abdominal tenderness, Abdominal  distention. Musculoskeletal: Extremity deformity, Apparent pain with range of motion of the joints. Skin: Rash.   The findings on examination were normal except for the following:  The patient has black cold 2nd 3rd and 4th toes of the left foot.  The great toe and little toe or also dusky with no capillary refill.  I cannot feel posterior tibial or dorsalis pedis pulses.  I can Doppler both posterior tibial and dorsalis pedis pulses.           ED Course   Procedures  Labs Reviewed   POCT GLUCOSE - Abnormal; Notable for the following components:       Result Value    POCT Glucose 224 (*)     All other components within normal limits   COMPREHENSIVE METABOLIC PANEL   CBC W/ AUTO DIFFERENTIAL   URINALYSIS, REFLEX TO URINE CULTURE   PROTIME-INR   LACTIC ACID, PLASMA   SARS-COV-2 RDRP GENE     EKG Readings: (Independently Interpreted)   This patient is in a normal sinus rhythm with a heart rate of 90. There are nonspecific ST segment and T-wave changes.  There is no definite evidence of acute myocardial infarction or malignant arrhythmia.  There is poor R-wave progression across the precordium.         Imaging Results    None       Medical decision making:  Given the above this patient has gangrene of the left foot.  He will need an amputation.  Consultation was obtained with vascular surgery, Orthopedics, hospital medicine.  The patient will be admitted and a plan her amputation will be made.  Preoperative clearance was launch in the emergency room.  The patient was treated with IV antibiotics.       Medications - No data to display           Scribe Attestation:   Scribe #1: I performed the above scribed service and the documentation accurately describes the services I performed. I attest to the accuracy of the note.                 Clinical Impression:   Final diagnoses:  [I96] Gangrene of left foot  [Z01.818] Preoperative clearance          I personally performed the services described in this documentation.  All  medical record  entries made by the scribe are at my direction and in my presence.  Signed, Dr. Alfie Judge MD  01/06/22 5222

## 2022-01-05 NOTE — ASSESSMENT & PLAN NOTE
-Ortho consulted for L foot wound - rec open 2-4 toe amputations  -ID consult  -Wound care  -Will obtain arterial US and CTA runoff

## 2022-01-05 NOTE — ASSESSMENT & PLAN NOTE
Chronic, uncontrolled.  Latest blood pressure and vitals reviewed-   Temp:  [98.3 °F (36.8 °C)]   Pulse:  []   Resp:  [16-18]   BP: (173-215)/(76-99)   SpO2:  [99 %-100 %] .   Home meds for hypertension were reviewed and noted below.   Hypertension Medications             hydrALAZINE (APRESOLINE) 100 MG tablet Take 1 tablet (100 mg total) by mouth every 8 (eight) hours.    lisinopriL (PRINIVIL,ZESTRIL) 2.5 MG tablet Take 2.5 mg by mouth once daily.    lisinopriL (PRINIVIL,ZESTRIL) 5 MG tablet Take 5 mg by mouth once daily.    metoprolol succinate (TOPROL-XL) 25 MG 24 hr tablet Take 1 tablet (25 mg total) by mouth once daily.          While in the hospital, will manage blood pressure as follows; Continue home antihypertensive regimen (will use higher dose of lisinopril). Adjust as needed.     Will utilize p.r.n. blood pressure medication only if patient's blood pressure greater than  180/110 and he develops symptoms such as worsening chest pain or shortness of breath.

## 2022-01-05 NOTE — ED TRIAGE NOTES
56 y.o male presents to the ED with chief complaint of foot injury. Pt reports left foot pain and black discoloration x 1 month with worsening symptoms for the past 3 days. Reports he was suppose to get blood work done today but the pain was too bad for him to drive. Pt reports associated chils. Reports he went to dialysis yesterday and got the full tx. Pt denies fever, SOB, CP, abdominal pain, N/V/D. States he does not make urine. AAOx4, NAD. MD able to doppler posterior tibial and dorsalis pedis pulses on left foot.  #[id number] used for hx.

## 2022-01-05 NOTE — SUBJECTIVE & OBJECTIVE
Past Medical History:   Diagnosis Date    CAD (coronary artery disease) 2016    CAD (non obstructive) 2016 [facility]    Diabetes mellitus type I     Diabetic retinopathy     ESRD on hemodialysis     on HD TThSat    Hypertension     Nuclear sclerosis of right eye 11/24/2021    Pulmonary HTN     TB lung, latent 04/2021       Past Surgical History:   Procedure Laterality Date    AV FISTULA PLACEMENT      CATARACT EXTRACTION Left     EYE SURGERY         Review of patient's allergies indicates:  No Known Allergies    No current facility-administered medications on file prior to encounter.     Current Outpatient Medications on File Prior to Encounter   Medication Sig    amoxicillin-clavulanate 500-125mg (AUGMENTIN) 500-125 mg Tab Take 1 tablet (500 mg total) by mouth once daily.    atorvastatin (LIPITOR) 20 MG tablet Take 1 tablet (20 mg total) by mouth once daily. (Patient taking differently: Take 20 mg by mouth once daily.)    blood sugar diagnostic Strp 1 each by Misc.(Non-Drug; Combo Route) route 2 hours after meals and at bedtime.    doxercalciferoL (HECTOROL) 0.5 MCG capsule 2 mcg.    heparin sodium,porcine (HEPARIN, PORCINE,) 1,000 unit/mL Soln injection Heparin Sodium (Porcine) 1,000 Units/mL Systemic    hydrALAZINE (APRESOLINE) 100 MG tablet Take 1 tablet (100 mg total) by mouth every 8 (eight) hours. (Patient taking differently: Take 50 mg by mouth every 8 (eight) hours. )    insulin (BASAGLAR KWIKPEN U-100 INSULIN) glargine 100 units/mL (3mL) SubQ pen Inject 15 Units into the skin every evening.    isoniazid (NYDRAZID) 300 MG Tab Take 1 tablet (300 mg total) by mouth once daily.    lisinopriL (PRINIVIL,ZESTRIL) 2.5 MG tablet Take 2.5 mg by mouth once daily.    lisinopriL (PRINIVIL,ZESTRIL) 5 MG tablet Take 5 mg by mouth once daily.    methoxy peg-epoetin beta (MIRCERA INJ) 75 mcg.    metoprolol succinate (TOPROL-XL) 25 MG 24 hr tablet Take 1 tablet (25 mg total) by mouth once daily.     omeprazole (PRILOSEC) 20 MG capsule TAKE 2 CAPSULES(40 MG) BY MOUTH EVERY DAY (Patient taking differently: Take 20 mg by mouth once daily.)    penicillin v potassium (VEETID) 500 MG tablet Take 500 mg by mouth 4 (four) times daily.    RENVELA 800 mg Tab Take 800 mg by mouth 3 (three) times daily with meals.    traMADoL (ULTRAM) 50 mg tablet Take 1 tablet (50 mg total) by mouth every 8 (eight) hours as needed for Pain.    traZODone (DESYREL) 50 MG tablet Take 50 mg by mouth every evening.     Family History     Problem Relation (Age of Onset)    No Known Problems Mother, Father, Sister, Brother, Daughter, Daughter, Daughter, Brother, Maternal Aunt, Maternal Uncle, Paternal Aunt, Paternal Uncle, Maternal Grandmother, Maternal Grandfather, Paternal Grandmother, Paternal Grandfather        Tobacco Use    Smoking status: Never Smoker    Smokeless tobacco: Never Used   Substance and Sexual Activity    Alcohol use: No    Drug use: No    Sexual activity: Yes     Partners: Female     Review of Systems   Constitutional: Negative for activity change, appetite change, chills, diaphoresis, fatigue and fever.   HENT: Negative for congestion, sinus pressure, sore throat and tinnitus.    Eyes: Negative for visual disturbance.   Respiratory: Negative for cough, chest tightness, shortness of breath and wheezing.    Cardiovascular: Negative for chest pain, palpitations and leg swelling.   Gastrointestinal: Negative for abdominal distention, abdominal pain, nausea and vomiting.   Endocrine: Negative for polydipsia, polyphagia and polyuria.   Genitourinary: Negative for dysuria.   Musculoskeletal: Positive for arthralgias. Negative for back pain.   Skin: Negative for rash and wound.   Neurological: Negative for dizziness, syncope, light-headedness and headaches.   Psychiatric/Behavioral: Negative for confusion.     Objective:     Vital Signs (Most Recent):  Temp: 98.3 °F (36.8 °C) (01/05/22 1135)  Pulse: 77 (01/05/22  1332)  Resp: 17 (01/05/22 1332)  BP: (!) 187/76 (01/05/22 1332)  SpO2: 100 % (01/05/22 1332) Vital Signs (24h Range):  Temp:  [98.3 °F (36.8 °C)] 98.3 °F (36.8 °C)  Pulse:  [] 77  Resp:  [16-18] 17  SpO2:  [99 %-100 %] 100 %  BP: (173-215)/(76-99) 187/76     Weight: 69 kg (152 lb 1.9 oz)  Body mass index is 21.83 kg/m².    Physical Exam  Vitals and nursing note reviewed.   Constitutional:       General: He is not in acute distress.     Appearance: Normal appearance. He is well-developed and well-nourished. He is not ill-appearing, toxic-appearing or sickly-appearing.   HENT:      Head: Normocephalic and atraumatic.      Right Ear: External ear normal.      Left Ear: External ear normal.      Nose: Nose normal.      Mouth/Throat:      Mouth: Oropharynx is clear and moist and mucous membranes are normal.      Pharynx: Uvula midline.   Eyes:      General: Lids are normal.      Extraocular Movements: EOM normal.      Conjunctiva/sclera: Conjunctivae normal.      Pupils: Pupils are equal, round, and reactive to light.   Neck:      Thyroid: No thyroid mass.      Vascular: No JVD.      Trachea: Trachea normal.   Cardiovascular:      Rate and Rhythm: Normal rate and regular rhythm.      Heart sounds: Normal heart sounds, S1 normal and S2 normal.   Pulmonary:      Effort: Pulmonary effort is normal.      Breath sounds: Normal breath sounds.   Abdominal:      General: Bowel sounds are normal. There is no distension.      Palpations: Abdomen is soft.      Tenderness: There is no abdominal tenderness.   Musculoskeletal:      Cervical back: Full passive range of motion without pain, normal range of motion and neck supple.      Right lower leg: No edema.      Left lower leg: No edema.      Comments: L foot with gangrene to all metatarsals. Also ulceration/ blister on dorsal surface.   Skin:     General: Skin is intact.      Nails: There is no cyanosis.   Neurological:      Mental Status: He is alert.      Cranial Nerves: No  cranial nerve deficit.      Sensory: No sensory deficit.      Coordination: He displays a negative Romberg sign.      Deep Tendon Reflexes: Strength normal.   Psychiatric:         Mood and Affect: Mood and affect normal.         Speech: Speech normal.         Behavior: Behavior normal. Behavior is cooperative.         Thought Content: Thought content normal.         Cognition and Memory: Cognition and memory normal.           CRANIAL NERVES     CN III, IV, VI   Pupils are equal, round, and reactive to light.  Extraocular motions are normal.        Significant Labs:   All pertinent labs within the past 24 hours have been reviewed.  Recent Lab Results       01/05/22  1238   01/05/22  1232   01/05/22  1153        Albumin   2.4         Alkaline Phosphatase   94         ALT   7         Anion Gap   14         AST   13         Baso #   0.04         Basophil %   0.3         BILIRUBIN TOTAL   0.4  Comment: For infants and newborns, interpretation of results should be based  on gestational age, weight and in agreement with clinical  observations.    Premature Infant recommended reference ranges:  Up to 24 hours.............<8.0 mg/dL  Up to 48 hours............<12.0 mg/dL  3-5 days..................<15.0 mg/dL  6-29 days.................<15.0 mg/dL           BUN   38         Calcium   8.6         Chloride   98         CO2   26         Creatinine   9.3         Differential Method   Automated         eGFR if    6         eGFR if non    6  Comment: Calculation used to obtain the estimated glomerular filtration  rate (eGFR) is the CKD-EPI equation.            Eos #   0.2         Eosinophil %   1.5         Glucose   212         Gran # (ANC)   12.6         Gran %   82.8         Hematocrit   37.8         Hemoglobin   10.8         Immature Grans (Abs)   0.10  Comment: Mild elevation in immature granulocytes is non specific and   can be seen in a variety of conditions including stress response,   acute  inflammation, trauma and pregnancy. Correlation with other   laboratory and clinical findings is essential.           Immature Granulocytes   0.7         INR   1.0  Comment: Coumadin Therapy:  2.0 - 3.0 for INR for all indicators except mechanical heart valves  and antiphospholipid syndromes which should use 2.5 - 3.5.           Lactate, Gm   1.2  Comment: Falsely low lactic acid results can be found in samples   containing >=13.0 mg/dL total bilirubin and/or >=3.5 mg/dL   direct bilirubin.           Lymph #   1.1         Lymph %   7.0         MCH   27.5         MCHC   28.6         MCV   96         Mono #   1.2         Mono %   7.7         MPV   9.9         nRBC   0         Platelets   425         POCT Glucose     224       Potassium   4.7         PROTEIN TOTAL   9.0         Protime   11.2          Acceptable Yes           RBC   3.93         RDW   15.2         SARS-CoV-2 RNA, Amplification, Qual Positive           Sodium   138         WBC   15.22               Significant Imaging: I have reviewed all pertinent imaging results/findings within the past 24 hours.

## 2022-01-05 NOTE — CONSULTS
[facility] - Emergency Dept  Vascular Surgery  Consult Note    Inpatient consult to Vascular Surgery  Consult performed by: Masoud Soni MD  Consult ordered by: Jessenia Wharton MD        Subjective:     Chief Complaint/Reason for Admission: L foot wound    History of Present Illness:             Masoud Soni MD [facility]                         01/05/2022    HPI:  Adryan Neff is a 56 y.o. male with   Patient Active Problem List   Diagnosis    Controlled type 2 diabetes mellitus with chronic kidney disease on chronic dialysis, with long-term current use of insulin    Essential (primary) hypertension    Pure hypercholesterolemia    Benign hypertension with chronic kidney disease, stage V    Other insomnia    Gastroesophageal reflux disease    Intractable vomiting    Chronic diastolic heart failure    CAD (coronary artery disease) - [facility]obstructive from [facility] in 2016    Hyperkalemia    Pre-transplant evaluation for kidney transplant    Pulmonary HTN    Chronic pulmonary heart disease    HLD (hyperlipidemia)    NICM (nonischemic cardiomyopathy)    PDR (proliferative diabetic retinopathy)    Secondary hyperparathyroidism of renal origin    ESRD on hemodialysis    TB lung, latent    Paronychia, toe, left    Nuclear sclerosis of right eye    Pseudophakia    Gangrene of left foot    COVID-19 in immunocompromised patient    being managed by PCP and specialists who is here today for evaluation of L foot wound.  Patient states location is L foot occurring for 1 month.  Associated signs and symptoms include discoloration and pain.  Quality is aching and severity is 5/10.  Symptoms began 1 mo ago.  Alleviating factors include rest.  Worsening factors include pressure.    no MI  no Stroke  Tobacco use: denies    Past Medical History:   Diagnosis Date    CAD (coronary artery disease) 2016    CAD (non obstructive) 2016 [facility]    Diabetes mellitus type I      Diabetic retinopathy     ESRD on hemodialysis     on HD TThSat    Hypertension     Nuclear sclerosis of right eye 11/24/2021    Pulmonary HTN     TB lung, latent 04/2021     Past Surgical History:   Procedure Laterality Date    AV FISTULA PLACEMENT      CATARACT EXTRACTION Left     EYE SURGERY       Family History   Problem Relation Age of Onset    No Known Problems Mother     No Known Problems Father     No Known Problems Sister     No Known Problems Brother     No Known Problems Daughter     No Known Problems Daughter     No Known Problems Daughter     No Known Problems Brother     No Known Problems Maternal Aunt     No Known Problems Maternal Uncle     No Known Problems Paternal Aunt     No Known Problems Paternal Uncle     No Known Problems Maternal Grandmother     No Known Problems Maternal Grandfather     No Known Problems Paternal Grandmother     No Known Problems Paternal Grandfather      Social History     Socioeconomic History    Marital status: Single    Number of children: 5   Tobacco Use    Smoking status: Never Smoker    Smokeless tobacco: Never Used   Substance and Sexual Activity    Alcohol use: No    Drug use: No    Sexual activity: Yes     Partners: Female   Social History Narrative    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       Current Facility-Administered Medications:     acetaminophen tablet 650 mg, 650 mg, Oral, Q8H PRN, Jessenia Wharton MD    acetaminophen tablet 650 mg, 650 mg, Oral, Q4H PRN, Jessenia Wharton MD    atorvastatin tablet 20 mg, 20 mg, Oral, Daily, Jessenia Wharton MD    dextrose 50% injection 12.5 g, 12.5 g, Intravenous, PRN, Jessenia Wharton MD    dextrose 50% injection 25 g, 25 g, Intravenous, PRN, Jessenia Wharton MD    glucagon (human recombinant) injection 1 mg, 1 mg, Intramuscular, PRN, Jessenia Wharton MD    glucose chewable tablet 16 g, 16 g, Oral, PRN, Jessenia Wharton MD    glucose chewable tablet 24 g, 24 g, Oral, PRN, Jessenia  MD Dio    heparin (porcine) injection 5,000 Units, 5,000 Units, Subcutaneous, Q8H, Jessenia Wharton MD    hydrALAZINE tablet 100 mg, 100 mg, Oral, Q8H, Jessenia Wharton MD    HYDROcodone-acetaminophen  mg per tablet 1 tablet, 1 tablet, Oral, Q6H PRN, Jessenia Wharton MD    HYDROcodone-acetaminophen 5-325 mg per tablet 1 tablet, 1 tablet, Oral, Q6H PRN, Jessenia Wharton MD    insulin aspart U-100 pen 1-10 Units, 1-10 Units, Subcutaneous, QID (AC + HS) PRN, Jessenia Wharton MD    insulin detemir U-100 pen 15 Units, 15 Units, Subcutaneous, QHS, Jessenia Wharton MD    lisinopriL tablet 5 mg, 5 mg, Oral, Daily, Jessenia Wharton MD    magnesium oxide tablet 800 mg, 800 mg, Oral, PRN, Jessenia Wharton MD    magnesium oxide tablet 800 mg, 800 mg, Oral, PRN, Jessenia Wharton MD    metoprolol succinate (TOPROL-XL) 24 hr tablet 25 mg, 25 mg, Oral, Daily, Jessenia Wharton MD    naloxone 0.4 mg/mL injection 0.02 mg, 0.02 mg, Intravenous, PRN, Jessenia Wharton MD    ondansetron injection 8 mg, 8 mg, Intravenous, Q8H PRN, Jessenia Wharton MD    pantoprazole EC tablet 40 mg, 40 mg, Oral, Daily, Jessenia Wharton MD    sevelamer carbonate tablet 800 mg, 800 mg, Oral, TID WM, Jessenia Wharton MD    sodium chloride 0.9% flush 10 mL, 10 mL, Intravenous, Q12H PRN, Jessenia Wharton MD    traZODone tablet 50 mg, 50 mg, Oral, QHS, Jessenia Wharton MD    vancomycin 1.5 g in dextrose 5 % 250 mL IVPB (ready to mix), 20 mg/kg, Intravenous, ED 1 Time, Adan Judge MD, Last Rate: 166.7 mL/hr at 01/05/22 1351, 1,500 mg at 01/05/22 1351    Current Outpatient Medications:     amoxicillin-clavulanate 500-125mg (AUGMENTIN) 500-125 mg Tab, Take 1 tablet (500 mg total) by mouth once daily., Disp: 7 tablet, Rfl: 0    atorvastatin (LIPITOR) 20 MG tablet, Take 1 tablet (20 mg total) by mouth once daily. (Patient taking differently: Take 20 mg by mouth once daily.), Disp: 90 tablet, Rfl:  3    blood sugar diagnostic Strp, 1 each by Misc.(Non-Drug; Combo Route) route 2 hours after meals and at bedtime., Disp: 200 each, Rfl: 10    doxercalciferoL (HECTOROL) 0.5 MCG capsule, 2 mcg., Disp: , Rfl:     heparin sodium,porcine (HEPARIN, PORCINE,) 1,000 unit/mL Soln injection, Heparin Sodium (Porcine) 1,000 Units/mL Systemic, Disp: , Rfl:     hydrALAZINE (APRESOLINE) 100 MG tablet, Take 1 tablet (100 mg total) by mouth every 8 (eight) hours. (Patient taking differently: Take 50 mg by mouth every 8 (eight) hours. ), Disp: 90 tablet, Rfl: 11    insulin (BASAGLAR KWIKPEN U-100 INSULIN) glargine 100 units/mL (3mL) SubQ pen, Inject 15 Units into the skin every evening., Disp: 15 mL, Rfl: 12    isoniazid (NYDRAZID) 300 MG Tab, Take 1 tablet (300 mg total) by mouth once daily., Disp: 90 tablet, Rfl: 2    lisinopriL (PRINIVIL,ZESTRIL) 2.5 MG tablet, Take 2.5 mg by mouth once daily., Disp: , Rfl:     lisinopriL (PRINIVIL,ZESTRIL) 5 MG tablet, Take 5 mg by mouth once daily., Disp: , Rfl:     methoxy peg-epoetin beta (MIRCERA INJ), 75 mcg., Disp: , Rfl:     metoprolol succinate (TOPROL-XL) 25 MG 24 hr tablet, Take 1 tablet (25 mg total) by mouth once daily., Disp: 30 tablet, Rfl: 11    omeprazole (PRILOSEC) 20 MG capsule, TAKE 2 CAPSULES(40 MG) BY MOUTH EVERY DAY (Patient taking differently: Take 20 mg by mouth once daily.), Disp: 180 capsule, Rfl: 0    penicillin v potassium (VEETID) 500 MG tablet, Take 500 mg by mouth 4 (four) times daily., Disp: , Rfl:     RENVELA 800 mg Tab, Take 800 mg by mouth 3 (three) times daily with meals., Disp: , Rfl:     traMADoL (ULTRAM) 50 mg tablet, Take 1 tablet (50 mg total) by mouth every 8 (eight) hours as needed for Pain., Disp: 30 tablet, Rfl: 0    traZODone (DESYREL) 50 MG tablet, Take 50 mg by mouth every evening., Disp: , Rfl:         (Not in a hospital admission)      Review of patient's allergies indicates:  No Known Allergies    Past Medical History:    Diagnosis Date    CAD (coronary artery disease) 2016    [facility] obstructive[facility]    Diabetes mellitus type I     Diabetic retinopathy     ESRD on hemodialysis     on HD TThSat    Hypertension     Nuclear sclerosis of right eye 11/24/2021    Pulmonary HTN     TB lung, latent 04/2021     Past Surgical History:   Procedure Laterality Date    AV FISTULA PLACEMENT      CATARACT EXTRACTION Left     EYE SURGERY       Family History     Problem Relation (Age of Onset)    No Known Problems Mother, Father, Sister, Brother, Daughter, Daughter, Daughter, Brother, Maternal Aunt, Maternal Uncle, Paternal Aunt, Paternal Uncle, Maternal Grandmother, Maternal Grandfather, Paternal Grandmother, Paternal Grandfather        Tobacco Use    Smoking status: Never Smoker    Smokeless tobacco: Never Used   Substance and Sexual Activity    Alcohol use: No    Drug use: No    Sexual activity: Yes     Partners: Female     Review of Systems   Constitutional: Negative for chills and fever.   HENT: Negative for congestion.    Eyes: Negative for visual disturbance.   Respiratory: Negative for shortness of breath.    Cardiovascular: Negative for chest pain.   Gastrointestinal: Negative for abdominal distention.   Endocrine: Negative for cold intolerance.   Genitourinary: Negative for flank pain.   Musculoskeletal: Negative for back pain.   Skin: Positive for color change and wound. Negative for pallor and rash.   Allergic/Immunologic: Negative for immunocompromised state.   Neurological: Negative for dizziness.   Hematological: Does not bruise/bleed easily.   Psychiatric/Behavioral: Negative for agitation.     Objective:     Vital Signs (Most Recent):  Temp: 98.3 °F (36.8 °C) (01/05/22 1135)  Pulse: 77 (01/05/22 1332)  Resp: 17 (01/05/22 1332)  BP: (!) 187/76 (01/05/22 1332)  SpO2: 100 % (01/05/22 1332) Vital Signs (24h Range):  Temp:  [98.3 °F (36.8 °C)] 98.3 °F (36.8 °C)  Pulse:  [] 77  Resp:  [16-18] 17  SpO2:  [99  %-100 %] 100 %  BP: (173-215)/(76-99) 187/76     Weight: 69 kg (152 lb 1.9 oz)  Body mass index is 21.83 kg/m².    Physical Exam  Vitals reviewed.   Constitutional:       General: He is not in acute distress.     Appearance: He is well-developed and well-nourished. He is not diaphoretic.   HENT:      Head: Normocephalic and atraumatic.   Eyes:      Conjunctiva/sclera: Conjunctivae normal.   Cardiovascular:      Rate and Rhythm: Normal rate.      Pulses:           Femoral pulses are 2+ on the right side and 2+ on the left side.       Popliteal pulses are 2+ on the left side.   Pulmonary:      Effort: Pulmonary effort is normal.   Abdominal:      General: There is no distension.      Palpations: Abdomen is soft. There is no mass.      Tenderness: There is no abdominal tenderness. There is no guarding or rebound.      Hernia: No hernia is present.   Musculoskeletal:         General: Tenderness present. No deformity. Normal range of motion.      Cervical back: Neck supple.      Right lower leg: No edema.      Left lower leg: No edema.   Feet:      Left foot:      Skin integrity: Ulcer, blister, skin breakdown and dry skin present. No erythema.   Skin:     General: Skin is warm and dry.      Capillary Refill: Capillary refill takes more than 3 seconds.      Findings: No erythema or rash.   Neurological:      General: No focal deficit present.      Mental Status: He is alert and oriented to person, place, and time.   Psychiatric:         Mood and Affect: Mood and affect and mood normal.         Significant Labs:  All pertinent labs from the last 24 hours have been reviewed.    Significant Diagnostics:  I have reviewed all pertinent imaging results/findings within the past 24 hours.    Assessment/Plan:     * Gangrene of left foot  -Ortho consulted for L foot wound - rec open 2-4 toe amputations  -ID consult  -Wound care  -Will obtain arterial US and CTA runoff    ESRD on hemodialysis  -will evaluate HD access once acute  clinic issues are resolved        Thank you for your consult. I will follow-up with patient. Please contact us if you have any additional questions.    Masoud oSni MD  Vascular Surgery  [facility] [place] - Emergency Dept

## 2022-01-05 NOTE — H&P
[facility] Medicine  History & Physical    Patient Name: Adryan Neff  MRN: [id number]  Patient Class: IP- Inpatient  Admission Date: 1/5/2022  Attending Physician: Jessenia Wharton MD  Primary Care Provider: Soren Rice MD      Patient information was obtained from patient, past medical records and ER records.     Subjective:     Principal Problem:Gangrene of left foot    Chief Complaint:   Chief Complaint   Patient presents with    Foot Injury     EMS called to 55yo male with left foot pain x1 month. EMS reports that left foot is black to above ankle, cold to touch, and they were unable to find a pulse. Some peeling skin noted to top of foot.          HPI: This is a 56 y.o.male patient, with a PMHx of ESRD on hemodialysis, CAD, HTN, and DM, presenting to the ED for further evaluation of left foot pain and black discoloration that began 3 days ago. Patient states these symptoms have been ongoing for the last month. He reports being referred to a specialist but denies ever following up. He has been putting some otc medication on the foot that he believes has turned his foot more black. No trauma or injury. Patient reports associated chills. Patient states applying a creme to the foot and noticed his discoloration worsened. Patient reports he was last dialyzed yesterday. Patient denies any fever, shortness of breath, chest pain, neck pain, back pain, abdominal pain, rash, headaches, congestion, rhinorrhea, cough, sore throat, ear pain, eye pain, blurred vision, nausea, vomiting, diarrhea, dysuria, or any other associated symptoms.    Per chart review, he had an arterial u/s 12/15.   Impression:   1. High-grade severe stenosis right popliteal artery with occlusion anterior tibial artery which is reconstituted at level of DPA via collaterals.  2. 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    Cardiology has been planning to see him for revascularization options. Last podiatry  visit 12/13 with Stable dry gangrene L 2nd toe and medial L 5th toe. Dorsalis pedis and posterior tibial pulses are diminished Bilaterally. Toes are cool to touch. Feet are warm proximally.There is decreased digital hair. Skin is atrophic, slightly hyperpigmented, and mildly edematous     In the ED, he was found to have worsened gangrene and a cool extremity. Vascular surgery was consulted. He was also COVID +. Says he got a booster shot in November sometime.      Past Medical History:   Diagnosis Date    CAD (coronary artery disease) 2016    CAD (non obstructive) 2016 [facility]    Diabetes mellitus type I     Diabetic retinopathy     ESRD on hemodialysis     on HD TThSat    Hypertension     Nuclear sclerosis of right eye 11/24/2021    Pulmonary HTN     TB lung, latent 04/2021       Past Surgical History:   Procedure Laterality Date    AV FISTULA PLACEMENT      CATARACT EXTRACTION Left     EYE SURGERY         Review of patient's allergies indicates:  No Known Allergies    No current facility-administered medications on file prior to encounter.     Current Outpatient Medications on File Prior to Encounter   Medication Sig    amoxicillin-clavulanate 500-125mg (AUGMENTIN) 500-125 mg Tab Take 1 tablet (500 mg total) by mouth once daily.    atorvastatin (LIPITOR) 20 MG tablet Take 1 tablet (20 mg total) by mouth once daily. (Patient taking differently: Take 20 mg by mouth once daily.)    blood sugar diagnostic Strp 1 each by Misc.(Non-Drug; Combo Route) route 2 hours after meals and at bedtime.    doxercalciferoL (HECTOROL) 0.5 MCG capsule 2 mcg.    heparin sodium,porcine (HEPARIN, PORCINE,) 1,000 unit/mL Soln injection Heparin Sodium (Porcine) 1,000 Units/mL Systemic    hydrALAZINE (APRESOLINE) 100 MG tablet Take 1 tablet (100 mg total) by mouth every 8 (eight) hours. (Patient taking differently: Take 50 mg by mouth every 8 (eight) hours. )    insulin (BASAGLAR KWIKPEN U-100 INSULIN) glargine 100 units/mL  (3mL) SubQ pen Inject 15 Units into the skin every evening.    isoniazid (NYDRAZID) 300 MG Tab Take 1 tablet (300 mg total) by mouth once daily.    lisinopriL (PRINIVIL,ZESTRIL) 2.5 MG tablet Take 2.5 mg by mouth once daily.    lisinopriL (PRINIVIL,ZESTRIL) 5 MG tablet Take 5 mg by mouth once daily.    methoxy peg-epoetin beta (MIRCERA INJ) 75 mcg.    metoprolol succinate (TOPROL-XL) 25 MG 24 hr tablet Take 1 tablet (25 mg total) by mouth once daily.    omeprazole (PRILOSEC) 20 MG capsule TAKE 2 CAPSULES(40 MG) BY MOUTH EVERY DAY (Patient taking differently: Take 20 mg by mouth once daily.)    penicillin v potassium (VEETID) 500 MG tablet Take 500 mg by mouth 4 (four) times daily.    RENVELA 800 mg Tab Take 800 mg by mouth 3 (three) times daily with meals.    traMADoL (ULTRAM) 50 mg tablet Take 1 tablet (50 mg total) by mouth every 8 (eight) hours as needed for Pain.    traZODone (DESYREL) 50 MG tablet Take 50 mg by mouth every evening.     Family History     Problem Relation (Age of Onset)    No Known Problems Mother, Father, Sister, Brother, Daughter, Daughter, Daughter, Brother, Maternal Aunt, Maternal Uncle, Paternal Aunt, Paternal Uncle, Maternal Grandmother, Maternal Grandfather, Paternal Grandmother, Paternal Grandfather        Tobacco Use    Smoking status: Never Smoker    Smokeless tobacco: Never Used   Substance and Sexual Activity    Alcohol use: No    Drug use: No    Sexual activity: Yes     Partners: Female     Review of Systems   Constitutional: Negative for activity change, appetite change, chills, diaphoresis, fatigue and fever.   HENT: Negative for congestion, sinus pressure, sore throat and tinnitus.    Eyes: Negative for visual disturbance.   Respiratory: Negative for cough, chest tightness, shortness of breath and wheezing.    Cardiovascular: Negative for chest pain, palpitations and leg swelling.   Gastrointestinal: Negative for abdominal distention, abdominal pain, nausea and  vomiting.   Endocrine: Negative for polydipsia, polyphagia and polyuria.   Genitourinary: Negative for dysuria.   Musculoskeletal: Positive for arthralgias. Negative for back pain.   Skin: Negative for rash and wound.   Neurological: Negative for dizziness, syncope, light-headedness and headaches.   Psychiatric/Behavioral: Negative for confusion.     Objective:     Vital Signs (Most Recent):  Temp: 98.3 °F (36.8 °C) (01/05/22 1135)  Pulse: 77 (01/05/22 1332)  Resp: 17 (01/05/22 1332)  BP: (!) 187/76 (01/05/22 1332)  SpO2: 100 % (01/05/22 1332) Vital Signs (24h Range):  Temp:  [98.3 °F (36.8 °C)] 98.3 °F (36.8 °C)  Pulse:  [] 77  Resp:  [16-18] 17  SpO2:  [99 %-100 %] 100 %  BP: (173-215)/(76-99) 187/76     Weight: 69 kg (152 lb 1.9 oz)  Body mass index is 21.83 kg/m².    Physical Exam  Vitals and nursing note reviewed.   Constitutional:       General: He is not in acute distress.     Appearance: Normal appearance. He is well-developed and well-nourished. He is not ill-appearing, toxic-appearing or sickly-appearing.   HENT:      Head: Normocephalic and atraumatic.      Right Ear: External ear normal.      Left Ear: External ear normal.      Nose: Nose normal.      Mouth/Throat:      Mouth: Oropharynx is clear and moist and mucous membranes are normal.      Pharynx: Uvula midline.   Eyes:      General: Lids are normal.      Extraocular Movements: EOM normal.      Conjunctiva/sclera: Conjunctivae normal.      Pupils: Pupils are equal, round, and reactive to light.   Neck:      Thyroid: No thyroid mass.      Vascular: No JVD.      Trachea: Trachea normal.   Cardiovascular:      Rate and Rhythm: Normal rate and regular rhythm.      Heart sounds: Normal heart sounds, S1 normal and S2 normal.   Pulmonary:      Effort: Pulmonary effort is normal.      Breath sounds: Normal breath sounds.   Abdominal:      General: Bowel sounds are normal. There is no distension.      Palpations: Abdomen is soft.      Tenderness: There  is no abdominal tenderness.   Musculoskeletal:      Cervical back: Full passive range of motion without pain, normal range of motion and neck supple.      Right lower leg: No edema.      Left lower leg: No edema.      Comments: L foot with gangrene to all metatarsals. Also ulceration/ blister on dorsal surface.   Skin:     General: Skin is intact.      Nails: There is no cyanosis.   Neurological:      Mental Status: He is alert.      Cranial Nerves: No cranial nerve deficit.      Sensory: No sensory deficit.      Coordination: He displays a negative Romberg sign.      Deep Tendon Reflexes: Strength normal.   Psychiatric:         Mood and Affect: Mood and affect normal.         Speech: Speech normal.         Behavior: Behavior normal. Behavior is cooperative.         Thought Content: Thought content normal.         Cognition and Memory: Cognition and memory normal.           CRANIAL NERVES     CN III, IV, VI   Pupils are equal, round, and reactive to light.  Extraocular motions are normal.        Significant Labs:   All pertinent labs within the past 24 hours have been reviewed.  Recent Lab Results       01/05/22  1238   01/05/22  1232   01/05/22  1153        Albumin   2.4         Alkaline Phosphatase   94         ALT   7         Anion Gap   14         AST   13         Baso #   0.04         Basophil %   0.3         BILIRUBIN TOTAL   0.4  Comment: For infants and newborns, interpretation of results should be based  on gestational age, weight and in agreement with clinical  observations.    Premature Infant recommended reference ranges:  Up to 24 hours.............<8.0 mg/dL  Up to 48 hours............<12.0 mg/dL  3-5 days..................<15.0 mg/dL  6-29 days.................<15.0 mg/dL           BUN   38         Calcium   8.6         Chloride   98         CO2   26         Creatinine   9.3         Differential Method   Automated         eGFR if    6         eGFR if non    6  Comment:  Calculation used to obtain the estimated glomerular filtration  rate (eGFR) is the CKD-EPI equation.            Eos #   0.2         Eosinophil %   1.5         Glucose   212         Gran # (ANC)   12.6         Gran %   82.8         Hematocrit   37.8         Hemoglobin   10.8         Immature Grans (Abs)   0.10  Comment: Mild elevation in immature granulocytes is non specific and   can be seen in a variety of conditions including stress response,   acute inflammation, trauma and pregnancy. Correlation with other   laboratory and clinical findings is essential.           Immature Granulocytes   0.7         INR   1.0  Comment: Coumadin Therapy:  2.0 - 3.0 for INR for all indicators except mechanical heart valves  and antiphospholipid syndromes which should use 2.5 - 3.5.           Lactate, Gm   1.2  Comment: Falsely low lactic acid results can be found in samples   containing >=13.0 mg/dL total bilirubin and/or >=3.5 mg/dL   direct bilirubin.           Lymph #   1.1         Lymph %   7.0         MCH   27.5         MCHC   28.6         MCV   96         Mono #   1.2         Mono %   7.7         MPV   9.9         nRBC   0         Platelets   425         POCT Glucose     224       Potassium   4.7         PROTEIN TOTAL   9.0         Protime   11.2          Acceptable Yes           RBC   3.93         RDW   15.2         SARS-CoV-2 RNA, Amplification, Qual Positive           Sodium   138         WBC   15.22               Significant Imaging: I have reviewed all pertinent imaging results/findings within the past 24 hours.     November 2021 January 2022      Assessment/Plan:     * Gangrene of left foot  Vascular and ortho consulted. Likely needs amputation. In comparison of recent pictures by podiatry, his gangrene has worsened and now involves all toes. See pics below.   Continue IV abx. Pain control.     COVID-19 in immunocompromised patient  HD patient + for COVID 19. Not hypoxic or symptomatic. Supportive  care prn.       ESRD on hemodialysis  Nephrology consulted to resume routine HD.     Chronic diastolic heart failure  December 2021 echo reviewed. Stable.       Essential (primary) hypertension  Chronic, uncontrolled.  Latest blood pressure and vitals reviewed-   Temp:  [98.3 °F (36.8 °C)]   Pulse:  []   Resp:  [16-18]   BP: (173-215)/(76-99)   SpO2:  [99 %-100 %] .   Home meds for hypertension were reviewed and noted below.   Hypertension Medications             hydrALAZINE (APRESOLINE) 100 MG tablet Take 1 tablet (100 mg total) by mouth every 8 (eight) hours.    lisinopriL (PRINIVIL,ZESTRIL) 2.5 MG tablet Take 2.5 mg by mouth once daily.    lisinopriL (PRINIVIL,ZESTRIL) 5 MG tablet Take 5 mg by mouth once daily.    metoprolol succinate (TOPROL-XL) 25 MG 24 hr tablet Take 1 tablet (25 mg total) by mouth once daily.          While in the hospital, will manage blood pressure as follows; Continue home antihypertensive regimen (will use higher dose of lisinopril). Adjust as needed.     Will utilize p.r.n. blood pressure medication only if patient's blood pressure greater than  180/110 and he develops symptoms such as worsening chest pain or shortness of breath.      Controlled type 2 diabetes mellitus with chronic kidney disease on chronic dialysis, with long-term current use of insulin  Patient's FSGs are controlled on current medication regimen.  Last A1c reviewed-   Lab Results   Component Value Date    HGBA1C 6.1 (H) 01/07/2021     Most recent fingerstick glucose reviewed-   Recent Labs   Lab 01/05/22  1153   POCTGLUCOSE 224*     Current correctional scale  Medium  Maintain anti-hyperglycemic dose as follows-   Antihyperglycemics (From admission, onward)            Start     Stop Route Frequency Ordered    01/05/22 2100  insulin detemir U-100 pen 15 Units         -- SubQ Nightly 01/05/22 1404    01/05/22 1503  insulin aspart U-100 pen 1-10 Units         -- SubQ Before meals & nightly PRN 01/05/22 1404         Hold Oral hypoglycemics while patient is in the hospital.          VTE Risk Mitigation (From admission, onward)         Ordered     heparin (porcine) injection 5,000 Units  Every 8 hours         01/05/22 1404     IP VTE HIGH RISK PATIENT  Once         01/05/22 1404     Place sequential compression device  Until discontinued         01/05/22 1404                Jessenia Wharton MD  Department of Hospital Medicine   [facility] - [place] [facility] [place] Dept

## 2022-01-05 NOTE — ASSESSMENT & PLAN NOTE
Vascular and ortho consulted. Likely needs amputation. Discussed with Dr. Soni. In comparison of recent pictures by podiatry, his gangrene has worsened and now involves all toes. See pics below.   Continue IV abx. Pain control.

## 2022-01-05 NOTE — HPI
Masoud Soni MD [facility]                         01/05/2022    HPI:  Adryan Neff is a 56 y.o. male with   Patient Active Problem List   Diagnosis    Controlled type 2 diabetes mellitus with chronic kidney disease on chronic dialysis, with long-term current use of insulin    Essential (primary) hypertension    Pure hypercholesterolemia    Benign hypertension with chronic kidney disease, stage V    Other insomnia    Gastroesophageal reflux disease    Intractable vomiting    Chronic diastolic heart failure    CAD (coronary artery disease) - non-obstructive from [facility] in 2016    Hyperkalemia    Pre-transplant evaluation for kidney transplant    Pulmonary HTN    Chronic pulmonary heart disease    HLD (hyperlipidemia)    NICM (nonischemic cardiomyopathy)    PDR (proliferative diabetic retinopathy)    Secondary hyperparathyroidism of renal origin    ESRD on hemodialysis    TB lung, latent    Paronychia, toe, left    Nuclear sclerosis of right eye    Pseudophakia    Gangrene of left foot    COVID-19 in immunocompromised patient    being managed by PCP and specialists who is here today for evaluation of L foot wound.  Patient states location is L foot occurring for 1 month.  Associated signs and symptoms include discoloration and pain.  Quality is aching and severity is 5/10.  Symptoms began 1 mo ago.  Alleviating factors include rest.  Worsening factors include pressure.    no MI  no Stroke  Tobacco use: denies    Past Medical History:   Diagnosis Date    CAD (coronary artery disease) 2016    CAD (non obstructive) 2016 [facility]    Diabetes mellitus type I     Diabetic retinopathy     ESRD on hemodialysis     on HD TThSat    Hypertension     Nuclear sclerosis of right eye 11/24/2021    Pulmonary HTN     TB lung, latent 04/2021     Past Surgical History:   Procedure Laterality Date    AV FISTULA PLACEMENT      CATARACT EXTRACTION Left     EYE SURGERY       Family History    Problem Relation Age of Onset    No Known Problems Mother     No Known Problems Father     No Known Problems Sister     No Known Problems Brother     No Known Problems Daughter     No Known Problems Daughter     No Known Problems Daughter     No Known Problems Brother     No Known Problems Maternal Aunt     No Known Problems Maternal Uncle     No Known Problems Paternal Aunt     No Known Problems Paternal Uncle     No Known Problems Maternal Grandmother     No Known Problems Maternal Grandfather     No Known Problems Paternal Grandmother     No Known Problems Paternal Grandfather      Social History     Socioeconomic History    Marital status: Single    Number of children: 5   Tobacco Use    Smoking status: Never Smoker    Smokeless tobacco: Never Used   Substance and Sexual Activity    Alcohol use: No    Drug use: No    Sexual activity: Yes     Partners: Female   Social History Narrative    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       Current Facility-Administered Medications:     acetaminophen tablet 650 mg, 650 mg, Oral, Q8H PRN, Jessenia Wharton MD    acetaminophen tablet 650 mg, 650 mg, Oral, Q4H PRN, Jessenia Wharton MD    atorvastatin tablet 20 mg, 20 mg, Oral, Daily, Jessenia Wharton MD    dextrose 50% injection 12.5 g, 12.5 g, Intravenous, PRN, Jessenia Wharton MD    dextrose 50% injection 25 g, 25 g, Intravenous, PRN, Jessenia Wharton MD    glucagon (human recombinant) injection 1 mg, 1 mg, Intramuscular, PRN, Jessenia Wharton MD    glucose chewable tablet 16 g, 16 g, Oral, PRN, Jessenia Wharton MD    glucose chewable tablet 24 g, 24 g, Oral, PRN, Jessenia Wharton MD    heparin (porcine) injection 5,000 Units, 5,000 Units, Subcutaneous, Q8H, Jessenia Wharton MD    hydrALAZINE tablet 100 mg, 100 mg, Oral, Q8H, Jessenia Wharton MD    HYDROcodone-acetaminophen  mg per tablet 1 tablet, 1 tablet, Oral, Q6H PRN, Jessenia Wharton MD    HYDROcodone-acetaminophen 5-325 mg per tablet 1 tablet, 1 tablet,  Oral, Q6H PRN, Jessenia Wharton MD    insulin aspart U-100 pen 1-10 Units, 1-10 Units, Subcutaneous, QID (AC + HS) PRN, Jessenia Wharton MD    insulin detemir U-100 pen 15 Units, 15 Units, Subcutaneous, QHS, Jessenia Wharton MD    lisinopriL tablet 5 mg, 5 mg, Oral, Daily, Jessenia Wharton MD    magnesium oxide tablet 800 mg, 800 mg, Oral, PRN, Jessenia Wharton MD    magnesium oxide tablet 800 mg, 800 mg, Oral, PRN, Jessenia Wharton MD    metoprolol succinate (TOPROL-XL) 24 hr tablet 25 mg, 25 mg, Oral, Daily, Jessenia Wharton MD    naloxone 0.4 mg/mL injection 0.02 mg, 0.02 mg, Intravenous, PRN, Jessenia Wharton MD    ondansetron injection 8 mg, 8 mg, Intravenous, Q8H PRN, Jessenia Wharton MD    pantoprazole EC tablet 40 mg, 40 mg, Oral, Daily, Jessenia Wharton MD    sevelamer carbonate tablet 800 mg, 800 mg, Oral, TID WM, Jessenia Wharton MD    sodium chloride 0.9% flush 10 mL, 10 mL, Intravenous, Q12H PRN, Jessenia Wharton MD    traZODone tablet 50 mg, 50 mg, Oral, QHS, Jessenia Wharton MD    vancomycin 1.5 g in dextrose 5 % 250 mL IVPB (ready to mix), 20 mg/kg, Intravenous, ED 1 Time, Adan Judge MD, Last Rate: 166.7 mL/hr at 01/05/22 1351, 1,500 mg at 01/05/22 1351    Current Outpatient Medications:     amoxicillin-clavulanate 500-125mg (AUGMENTIN) 500-125 mg Tab, Take 1 tablet (500 mg total) by mouth once daily., Disp: 7 tablet, Rfl: 0    atorvastatin (LIPITOR) 20 MG tablet, Take 1 tablet (20 mg total) by mouth once daily. (Patient taking differently: Take 20 mg by mouth once daily.), Disp: 90 tablet, Rfl: 3    blood sugar diagnostic Strp, 1 each by Misc.(Non-Drug; Combo Route) route 2 hours after meals and at bedtime., Disp: 200 each, Rfl: 10    doxercalciferoL (HECTOROL) 0.5 MCG capsule, 2 mcg., Disp: , Rfl:     heparin sodium,porcine (HEPARIN, PORCINE,) 1,000 unit/mL Soln injection, Heparin Sodium (Porcine) 1,000 Units/mL Systemic, Disp: , Rfl:     hydrALAZINE (APRESOLINE) 100  MG tablet, Take 1 tablet (100 mg total) by mouth every 8 (eight) hours. (Patient taking differently: Take 50 mg by mouth every 8 (eight) hours. ), Disp: 90 tablet, Rfl: 11    insulin (BASAGLAR KWIKPEN U-100 INSULIN) glargine 100 units/mL (3mL) SubQ pen, Inject 15 Units into the skin every evening., Disp: 15 mL, Rfl: 12    isoniazid (NYDRAZID) 300 MG Tab, Take 1 tablet (300 mg total) by mouth once daily., Disp: 90 tablet, Rfl: 2    lisinopriL (PRINIVIL,ZESTRIL) 2.5 MG tablet, Take 2.5 mg by mouth once daily., Disp: , Rfl:     lisinopriL (PRINIVIL,ZESTRIL) 5 MG tablet, Take 5 mg by mouth once daily., Disp: , Rfl:     methoxy peg-epoetin beta (MIRCERA INJ), 75 mcg., Disp: , Rfl:     metoprolol succinate (TOPROL-XL) 25 MG 24 hr tablet, Take 1 tablet (25 mg total) by mouth once daily., Disp: 30 tablet, Rfl: 11    omeprazole (PRILOSEC) 20 MG capsule, TAKE 2 CAPSULES(40 MG) BY MOUTH EVERY DAY (Patient taking differently: Take 20 mg by mouth once daily.), Disp: 180 capsule, Rfl: 0    penicillin v potassium (VEETID) 500 MG tablet, Take 500 mg by mouth 4 (four) times daily., Disp: , Rfl:     RENVELA 800 mg Tab, Take 800 mg by mouth 3 (three) times daily with meals., Disp: , Rfl:     traMADoL (ULTRAM) 50 mg tablet, Take 1 tablet (50 mg total) by mouth every 8 (eight) hours as needed for Pain., Disp: 30 tablet, Rfl: 0    traZODone (DESYREL) 50 MG tablet, Take 50 mg by mouth every evening., Disp: , Rfl:

## 2022-01-05 NOTE — HPI
This is a 56 y.o.male patient, with a PMHx of ESRD on hemodialysis, CAD, HTN, and DM, presenting to the ED for further evaluation of left foot pain and black discoloration that began 3 days ago. Patient states these symptoms have been ongoing for the last month. He reports being referred to a specialist but denies ever following up. He has been putting some otc medication on the foot that he believes has turned his foot more black. No trauma or injury. Patient reports associated chills. Patient states applying a creme to the foot and noticed his discoloration worsened. Patient reports he was last dialyzed yesterday. Patient denies any fever, shortness of breath, chest pain, neck pain, back pain, abdominal pain, rash, headaches, congestion, rhinorrhea, cough, sore throat, ear pain, eye pain, blurred vision, nausea, vomiting, diarrhea, dysuria, or any other associated symptoms.    Per chart review, he had an arterial u/s 12/15.   Impression:   1. High-grade severe stenosis right popliteal artery with occlusion anterior tibial artery which is reconstituted at level of DPA via collaterals.  2. 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    Cardiology has been planning to see him for revascularization options. Last podiatry visit 12/13 with Stable dry gangrene L 2nd toe and medial L 5th toe. Dorsalis pedis and posterior tibial pulses are diminished Bilaterally. Toes are cool to touch. Feet are warm proximally.There is decreased digital hair. Skin is atrophic, slightly hyperpigmented, and mildly edematous     In the ED, he was found to have worsened gangrene and a cool extremity. Vascular surgery was consulted. He was also COVID +. Says he got a booster shot in November sometime.

## 2022-01-05 NOTE — SUBJECTIVE & OBJECTIVE
(Not in a hospital admission)      Review of patient's allergies indicates:  No Known Allergies    Past Medical History:   Diagnosis Date    CAD (coronary artery disease) 2016    CAD (non obstructive) 2016 [facility]    Diabetes mellitus type I     Diabetic retinopathy     ESRD on hemodialysis     on HD TThSat    Hypertension     Nuclear sclerosis of right eye 11/24/2021    Pulmonary HTN     TB lung, latent 04/2021     Past Surgical History:   Procedure Laterality Date    AV FISTULA PLACEMENT      CATARACT EXTRACTION Left     EYE SURGERY       Family History     Problem Relation (Age of Onset)    No Known Problems Mother, Father, Sister, Brother, Daughter, Daughter, Daughter, Brother, Maternal Aunt, Maternal Uncle, Paternal Aunt, Paternal Uncle, Maternal Grandmother, Maternal Grandfather, Paternal Grandmother, Paternal Grandfather        Tobacco Use    Smoking status: Never Smoker    Smokeless tobacco: Never Used   Substance and Sexual Activity    Alcohol use: No    Drug use: No    Sexual activity: Yes     Partners: Female     Review of Systems   Constitutional: Negative for chills and fever.   HENT: Negative for congestion.    Eyes: Negative for visual disturbance.   Respiratory: Negative for shortness of breath.    Cardiovascular: Negative for chest pain.   Gastrointestinal: Negative for abdominal distention.   Endocrine: Negative for cold intolerance.   Genitourinary: Negative for flank pain.   Musculoskeletal: Negative for back pain.   Skin: Positive for color change and wound. Negative for pallor and rash.   Allergic/Immunologic: Negative for immunocompromised state.   Neurological: Negative for dizziness.   Hematological: Does not bruise/bleed easily.   Psychiatric/Behavioral: Negative for agitation.     Objective:     Vital Signs (Most Recent):  Temp: 98.3 °F (36.8 °C) (01/05/22 1135)  Pulse: 77 (01/05/22 1332)  Resp: 17 (01/05/22 1332)  BP: (!) 187/76 (01/05/22 1332)  SpO2: 100 % (01/05/22 1332)  Vital Signs (24h Range):  Temp:  [98.3 °F (36.8 °C)] 98.3 °F (36.8 °C)  Pulse:  [] 77  Resp:  [16-18] 17  SpO2:  [99 %-100 %] 100 %  BP: (173-215)/(76-99) 187/76     Weight: 69 kg (152 lb 1.9 oz)  Body mass index is 21.83 kg/m².    Physical Exam  Vitals reviewed.   Constitutional:       General: He is not in acute distress.     Appearance: He is well-developed and well-nourished. He is not diaphoretic.   HENT:      Head: Normocephalic and atraumatic.   Eyes:      Conjunctiva/sclera: Conjunctivae normal.   Cardiovascular:      Rate and Rhythm: Normal rate.      Pulses:           Femoral pulses are 2+ on the right side and 2+ on the left side.       Popliteal pulses are 2+ on the left side.   Pulmonary:      Effort: Pulmonary effort is normal.   Abdominal:      General: There is no distension.      Palpations: Abdomen is soft. There is no mass.      Tenderness: There is no abdominal tenderness. There is no guarding or rebound.      Hernia: No hernia is present.   Musculoskeletal:         General: Tenderness present. No deformity. Normal range of motion.      Cervical back: Neck supple.      Right lower leg: No edema.      Left lower leg: No edema.   Feet:      Left foot:      Skin integrity: Ulcer, blister, skin breakdown and dry skin present. No erythema.   Skin:     General: Skin is warm and dry.      Capillary Refill: Capillary refill takes more than 3 seconds.      Findings: No erythema or rash.   Neurological:      General: No focal deficit present.      Mental Status: He is alert and oriented to person, place, and time.   Psychiatric:         Mood and Affect: Mood and affect and mood normal.         Significant Labs:  All pertinent labs from the last 24 hours have been reviewed.    Significant Diagnostics:  I have reviewed all pertinent imaging results/findings within the past 24 hours.

## 2022-01-05 NOTE — H&P (VIEW-ONLY)
[facility] - Emergency Dept  Vascular Surgery  Consult Note    Inpatient consult to Vascular Surgery  Consult performed by: Masoud Soni MD  Consult ordered by: Jessenia Wharton MD        Subjective:     Chief Complaint/Reason for Admission: L foot wound    History of Present Illness:             Masoud Soni MD [facility]                         01/05/2022    HPI:  Adryan Neff is a 56 y.o. male with   Patient Active Problem List   Diagnosis    Controlled type 2 diabetes mellitus with chronic kidney disease on chronic dialysis, with long-term current use of insulin    Essential (primary) hypertension    Pure hypercholesterolemia    Benign hypertension with chronic kidney disease, stage V    Other insomnia    Gastroesophageal reflux disease    Intractable vomiting    Chronic diastolic heart failure    CAD (coronary artery disease) - non-obstructive from [facility] in 2016    Hyperkalemia    Pre-transplant evaluation for kidney transplant    Pulmonary HTN    Chronic pulmonary heart disease    HLD (hyperlipidemia)    NICM (nonischemic cardiomyopathy)    PDR (proliferative diabetic retinopathy)    Secondary hyperparathyroidism of renal origin    ESRD on hemodialysis    TB lung, latent    Paronychia, toe, left    Nuclear sclerosis of right eye    Pseudophakia    Gangrene of left foot    COVID-19 in immunocompromised patient    being managed by PCP and specialists who is here today for evaluation of L foot wound.  Patient states location is L foot occurring for 1 month.  Associated signs and symptoms include discoloration and pain.  Quality is aching and severity is 5/10.  Symptoms began 1 mo ago.  Alleviating factors include rest.  Worsening factors include pressure.    no MI  no Stroke  Tobacco use: denies    Past Medical History:   Diagnosis Date    CAD (coronary artery disease) 2016    CAD (non obstructive) 2016 [facility]    Diabetes mellitus type I      Diabetic retinopathy     ESRD on hemodialysis     on HD TThSat    Hypertension     Nuclear sclerosis of right eye 11/24/2021    Pulmonary HTN     TB lung, latent 04/2021     Past Surgical History:   Procedure Laterality Date    AV FISTULA PLACEMENT      CATARACT EXTRACTION Left     EYE SURGERY       Family History   Problem Relation Age of Onset    No Known Problems Mother     No Known Problems Father     No Known Problems Sister     No Known Problems Brother     No Known Problems Daughter     No Known Problems Daughter     No Known Problems Daughter     No Known Problems Brother     No Known Problems Maternal Aunt     No Known Problems Maternal Uncle     No Known Problems Paternal Aunt     No Known Problems Paternal Uncle     No Known Problems Maternal Grandmother     No Known Problems Maternal Grandfather     No Known Problems Paternal Grandmother     No Known Problems Paternal Grandfather      Social History     Socioeconomic History    Marital status: Single    Number of children: 5   Tobacco Use    Smoking status: Never Smoker    Smokeless tobacco: Never Used   Substance and Sexual Activity    Alcohol use: No    Drug use: No    Sexual activity: Yes     Partners: Female   Social History Narrative    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       Current Facility-Administered Medications:     acetaminophen tablet 650 mg, 650 mg, Oral, Q8H PRN, Jessenia Wharton MD    acetaminophen tablet 650 mg, 650 mg, Oral, Q4H PRN, Jessenia Wharton MD    atorvastatin tablet 20 mg, 20 mg, Oral, Daily, Jessenia Wharton MD    dextrose 50% injection 12.5 g, 12.5 g, Intravenous, PRN, Jessenia Wharton MD    dextrose 50% injection 25 g, 25 g, Intravenous, PRN, Jessenia Wharton MD    glucagon (human recombinant) injection 1 mg, 1 mg, Intramuscular, PRN, Jessenia Wharton MD    glucose chewable tablet 16 g, 16 g, Oral, PRN, Jessenia Wharton MD    glucose chewable tablet 24 g, 24 g, Oral, PRN, Jessenia  MD Dio    heparin (porcine) injection 5,000 Units, 5,000 Units, Subcutaneous, Q8H, Jessenia Wharton MD    hydrALAZINE tablet 100 mg, 100 mg, Oral, Q8H, Jessenia Wharton MD    HYDROcodone-acetaminophen  mg per tablet 1 tablet, 1 tablet, Oral, Q6H PRN, Jessenia Wharton MD    HYDROcodone-acetaminophen 5-325 mg per tablet 1 tablet, 1 tablet, Oral, Q6H PRN, Jessenia Wharton MD    insulin aspart U-100 pen 1-10 Units, 1-10 Units, Subcutaneous, QID (AC + HS) PRN, Jessenia Wharton MD    insulin detemir U-100 pen 15 Units, 15 Units, Subcutaneous, QHS, Jessenia Wharton MD    lisinopriL tablet 5 mg, 5 mg, Oral, Daily, Jessenia Wharton MD    magnesium oxide tablet 800 mg, 800 mg, Oral, PRN, Jessenia Wharton MD    magnesium oxide tablet 800 mg, 800 mg, Oral, PRN, Jessenia Wharton MD    metoprolol succinate (TOPROL-XL) 24 hr tablet 25 mg, 25 mg, Oral, Daily, Jessenia Wharton MD    naloxone 0.4 mg/mL injection 0.02 mg, 0.02 mg, Intravenous, PRN, Jessenia Wharton MD    ondansetron injection 8 mg, 8 mg, Intravenous, Q8H PRN, Jessenia Wharton MD    pantoprazole EC tablet 40 mg, 40 mg, Oral, Daily, Jessenia Wharton MD    sevelamer carbonate tablet 800 mg, 800 mg, Oral, TID WM, Jessenia Wharton MD    sodium chloride 0.9% flush 10 mL, 10 mL, Intravenous, Q12H PRN, Jessenia Wharton MD    traZODone tablet 50 mg, 50 mg, Oral, QHS, Jessenia Wharton MD    vancomycin 1.5 g in dextrose 5 % 250 mL IVPB (ready to mix), 20 mg/kg, Intravenous, ED 1 Time, Adan Judge MD, Last Rate: 166.7 mL/hr at 01/05/22 1351, 1,500 mg at 01/05/22 1351    Current Outpatient Medications:     amoxicillin-clavulanate 500-125mg (AUGMENTIN) 500-125 mg Tab, Take 1 tablet (500 mg total) by mouth once daily., Disp: 7 tablet, Rfl: 0    atorvastatin (LIPITOR) 20 MG tablet, Take 1 tablet (20 mg total) by mouth once daily. (Patient taking differently: Take 20 mg by mouth once daily.), Disp: 90 tablet, Rfl:  3    blood sugar diagnostic Strp, 1 each by Misc.(Non-Drug; Combo Route) route 2 hours after meals and at bedtime., Disp: 200 each, Rfl: 10    doxercalciferoL (HECTOROL) 0.5 MCG capsule, 2 mcg., Disp: , Rfl:     heparin sodium,porcine (HEPARIN, PORCINE,) 1,000 unit/mL Soln injection, Heparin Sodium (Porcine) 1,000 Units/mL Systemic, Disp: , Rfl:     hydrALAZINE (APRESOLINE) 100 MG tablet, Take 1 tablet (100 mg total) by mouth every 8 (eight) hours. (Patient taking differently: Take 50 mg by mouth every 8 (eight) hours. ), Disp: 90 tablet, Rfl: 11    insulin (BASAGLAR KWIKPEN U-100 INSULIN) glargine 100 units/mL (3mL) SubQ pen, Inject 15 Units into the skin every evening., Disp: 15 mL, Rfl: 12    isoniazid (NYDRAZID) 300 MG Tab, Take 1 tablet (300 mg total) by mouth once daily., Disp: 90 tablet, Rfl: 2    lisinopriL (PRINIVIL,ZESTRIL) 2.5 MG tablet, Take 2.5 mg by mouth once daily., Disp: , Rfl:     lisinopriL (PRINIVIL,ZESTRIL) 5 MG tablet, Take 5 mg by mouth once daily., Disp: , Rfl:     methoxy peg-epoetin beta (MIRCERA INJ), 75 mcg., Disp: , Rfl:     metoprolol succinate (TOPROL-XL) 25 MG 24 hr tablet, Take 1 tablet (25 mg total) by mouth once daily., Disp: 30 tablet, Rfl: 11    omeprazole (PRILOSEC) 20 MG capsule, TAKE 2 CAPSULES(40 MG) BY MOUTH EVERY DAY (Patient taking differently: Take 20 mg by mouth once daily.), Disp: 180 capsule, Rfl: 0    penicillin v potassium (VEETID) 500 MG tablet, Take 500 mg by mouth 4 (four) times daily., Disp: , Rfl:     RENVELA 800 mg Tab, Take 800 mg by mouth 3 (three) times daily with meals., Disp: , Rfl:     traMADoL (ULTRAM) 50 mg tablet, Take 1 tablet (50 mg total) by mouth every 8 (eight) hours as needed for Pain., Disp: 30 tablet, Rfl: 0    traZODone (DESYREL) 50 MG tablet, Take 50 mg by mouth every evening., Disp: , Rfl:         (Not in a hospital admission)      Review of patient's allergies indicates:  No Known Allergies    Past Medical History:    Diagnosis Date    CAD (coronary artery disease) 2016    CAD (non obstructive[facility] 2016 [facility]    Diabetes mellitus type I     Diabetic retinopathy     ESRD on hemodialysis     on HD TThSat    Hypertension     Nuclear sclerosis of right eye 11/24/2021    Pulmonary HTN     TB lung, latent 04/2021     Past Surgical History:   Procedure Laterality Date    AV FISTULA PLACEMENT      CATARACT EXTRACTION Left     EYE SURGERY       Family History     Problem Relation (Age of Onset)    No Known Problems Mother, Father, Sister, Brother, Daughter, Daughter, Daughter, Brother, Maternal Aunt, Maternal Uncle, Paternal Aunt, Paternal Uncle, Maternal Grandmother, Maternal Grandfather, Paternal Grandmother, Paternal Grandfather        Tobacco Use    Smoking status: Never Smoker    Smokeless tobacco: Never Used   Substance and Sexual Activity    Alcohol use: No    Drug use: No    Sexual activity: Yes     Partners: Female     Review of Systems   Constitutional: Negative for chills and fever.   HENT: Negative for congestion.    Eyes: Negative for visual disturbance.   Respiratory: Negative for shortness of breath.    Cardiovascular: Negative for chest pain.   Gastrointestinal: Negative for abdominal distention.   Endocrine: Negative for cold intolerance.   Genitourinary: Negative for flank pain.   Musculoskeletal: Negative for back pain.   Skin: Positive for color change and wound. Negative for pallor and rash.   Allergic/Immunologic: Negative for immunocompromised state.   Neurological: Negative for dizziness.   Hematological: Does not bruise/bleed easily.   Psychiatric/Behavioral: Negative for agitation.     Objective:     Vital Signs (Most Recent):  Temp: 98.3 °F (36.8 °C) (01/05/22 1135)  Pulse: 77 (01/05/22 1332)  Resp: 17 (01/05/22 1332)  BP: (!) 187/76 (01/05/22 1332)  SpO2: 100 % (01/05/22 1332) Vital Signs (24h Range):  Temp:  [98.3 °F (36.8 °C)] 98.3 °F (36.8 °C)  Pulse:  [] 77  Resp:  [16-18] 17  SpO2:  [99  %-100 %] 100 %  BP: (173-215)/(76-99) 187/76     Weight: 69 kg (152 lb 1.9 oz)  Body mass index is 21.83 kg/m².    Physical Exam  Vitals reviewed.   Constitutional:       General: He is not in acute distress.     Appearance: He is well-developed and well-nourished. He is not diaphoretic.   HENT:      Head: Normocephalic and atraumatic.   Eyes:      Conjunctiva/sclera: Conjunctivae normal.   Cardiovascular:      Rate and Rhythm: Normal rate.      Pulses:           Femoral pulses are 2+ on the right side and 2+ on the left side.       Popliteal pulses are 2+ on the left side.   Pulmonary:      Effort: Pulmonary effort is normal.   Abdominal:      General: There is no distension.      Palpations: Abdomen is soft. There is no mass.      Tenderness: There is no abdominal tenderness. There is no guarding or rebound.      Hernia: No hernia is present.   Musculoskeletal:         General: Tenderness present. No deformity. Normal range of motion.      Cervical back: Neck supple.      Right lower leg: No edema.      Left lower leg: No edema.   Feet:      Left foot:      Skin integrity: Ulcer, blister, skin breakdown and dry skin present. No erythema.   Skin:     General: Skin is warm and dry.      Capillary Refill: Capillary refill takes more than 3 seconds.      Findings: No erythema or rash.   Neurological:      General: No focal deficit present.      Mental Status: He is alert and oriented to person, place, and time.   Psychiatric:         Mood and Affect: Mood and affect and mood normal.         Significant Labs:  All pertinent labs from the last 24 hours have been reviewed.    Significant Diagnostics:  I have reviewed all pertinent imaging results/findings within the past 24 hours.    Assessment/Plan:     * Gangrene of left foot  -Ortho consulted for L foot wound - rec open 2-4 toe amputations  -ID consult  -Wound care  -Will obtain arterial US and CTA runoff    ESRD on hemodialysis  -will evaluate HD access once acute  clinic issues are resolved        Thank you for your consult. I will follow-up with patient. Please contact us if you have any additional questions.    Masoud Soni MD  Vascular Surgery  [facility] - Emergency Dept

## 2022-01-05 NOTE — ASSESSMENT & PLAN NOTE
Patient's FSGs are controlled on current medication regimen.  Last A1c reviewed-   Lab Results   Component Value Date    HGBA1C 6.1 (H) 01/07/2021     Most recent fingerstick glucose reviewed-   Recent Labs   Lab 01/05/22  1153   POCTGLUCOSE 224*     Current correctional scale  Medium  Maintain anti-hyperglycemic dose as follows-   Antihyperglycemics (From admission, onward)            Start     Stop Route Frequency Ordered    01/05/22 2100  insulin detemir U-100 pen 15 Units         -- SubQ Nightly 01/05/22 1404    01/05/22 1503  insulin aspart U-100 pen 1-10 Units         -- SubQ Before meals & nightly PRN 01/05/22 1404        Hold Oral hypoglycemics while patient is in the hospital.

## 2022-01-06 ENCOUNTER — ANESTHESIA (OUTPATIENT)
Dept: SURGERY | Facility: HOSPITAL | Age: 57
DRG: 239 | End: 2022-01-06
Payer: MEDICARE

## 2022-01-06 LAB
ANION GAP SERPL CALC-SCNC: 15 MMOL/L (ref 8–16)
BASOPHILS # BLD AUTO: 0.05 K/UL (ref 0–0.2)
BASOPHILS NFR BLD: 0.3 % (ref 0–1.9)
BUN SERPL-MCNC: 45 MG/DL (ref 6–20)
CALCIUM SERPL-MCNC: 9.3 MG/DL (ref 8.7–10.5)
CHLORIDE SERPL-SCNC: 97 MMOL/L (ref 95–110)
CO2 SERPL-SCNC: 24 MMOL/L (ref 23–29)
CREAT SERPL-MCNC: 11.4 MG/DL (ref 0.5–1.4)
DIFFERENTIAL METHOD: ABNORMAL
EOSINOPHIL # BLD AUTO: 0.3 K/UL (ref 0–0.5)
EOSINOPHIL NFR BLD: 2.2 % (ref 0–8)
ERYTHROCYTE [DISTWIDTH] IN BLOOD BY AUTOMATED COUNT: 15.2 % (ref 11.5–14.5)
EST. GFR  (AFRICAN AMERICAN): 5 ML/MIN/1.73 M^2
EST. GFR  (NON AFRICAN AMERICAN): 4 ML/MIN/1.73 M^2
GLUCOSE SERPL-MCNC: 120 MG/DL (ref 70–110)
GRAM STN SPEC: NORMAL
HCT VFR BLD AUTO: 32.6 % (ref 40–54)
HGB BLD-MCNC: 9.7 G/DL (ref 14–18)
IMM GRANULOCYTES # BLD AUTO: 0.1 K/UL (ref 0–0.04)
IMM GRANULOCYTES NFR BLD AUTO: 0.7 % (ref 0–0.5)
LYMPHOCYTES # BLD AUTO: 1.2 K/UL (ref 1–4.8)
LYMPHOCYTES NFR BLD: 8.5 % (ref 18–48)
MCH RBC QN AUTO: 27.8 PG (ref 27–31)
MCHC RBC AUTO-ENTMCNC: 29.8 G/DL (ref 32–36)
MCV RBC AUTO: 93 FL (ref 82–98)
MONOCYTES # BLD AUTO: 1 K/UL (ref 0.3–1)
MONOCYTES NFR BLD: 7.3 % (ref 4–15)
NEUTROPHILS # BLD AUTO: 11.6 K/UL (ref 1.8–7.7)
NEUTROPHILS NFR BLD: 81 % (ref 38–73)
NRBC BLD-RTO: 0 /100 WBC
PLATELET # BLD AUTO: 450 K/UL (ref 150–450)
PMV BLD AUTO: 9.7 FL (ref 9.2–12.9)
POCT GLUCOSE: 135 MG/DL (ref 70–110)
POCT GLUCOSE: 145 MG/DL (ref 70–110)
POCT GLUCOSE: 147 MG/DL (ref 70–110)
POCT GLUCOSE: 161 MG/DL (ref 70–110)
POTASSIUM SERPL-SCNC: 4.7 MMOL/L (ref 3.5–5.1)
RBC # BLD AUTO: 3.49 M/UL (ref 4.6–6.2)
SODIUM SERPL-SCNC: 136 MMOL/L (ref 136–145)
VANCOMYCIN SERPL-MCNC: 29 UG/ML
WBC # BLD AUTO: 14.3 K/UL (ref 3.9–12.7)

## 2022-01-06 PROCEDURE — 28800 PR AMPUTATION FOOT,MIDTARSAL-CHOPART: ICD-10-PCS | Mod: LT,,, | Performed by: SURGERY

## 2022-01-06 PROCEDURE — D9220A PRA ANESTHESIA: ICD-10-PCS | Mod: CRNA,,, | Performed by: NURSE ANESTHETIST, CERTIFIED REGISTERED

## 2022-01-06 PROCEDURE — 25000003 PHARM REV CODE 250: Performed by: INTERNAL MEDICINE

## 2022-01-06 PROCEDURE — D9220A PRA ANESTHESIA: Mod: CRNA,,, | Performed by: NURSE ANESTHETIST, CERTIFIED REGISTERED

## 2022-01-06 PROCEDURE — 87186 SC STD MICRODIL/AGAR DIL: CPT | Mod: 59 | Performed by: SURGERY

## 2022-01-06 PROCEDURE — 99499 UNLISTED E&M SERVICE: CPT | Mod: ,,, | Performed by: SURGERY

## 2022-01-06 PROCEDURE — 63600175 PHARM REV CODE 636 W HCPCS: Performed by: NURSE ANESTHETIST, CERTIFIED REGISTERED

## 2022-01-06 PROCEDURE — 80048 BASIC METABOLIC PNL TOTAL CA: CPT | Performed by: INTERNAL MEDICINE

## 2022-01-06 PROCEDURE — 21400001 HC TELEMETRY ROOM

## 2022-01-06 PROCEDURE — 36000707: Performed by: SURGERY

## 2022-01-06 PROCEDURE — 25000003 PHARM REV CODE 250: Performed by: FAMILY MEDICINE

## 2022-01-06 PROCEDURE — 87205 SMEAR GRAM STAIN: CPT | Performed by: SURGERY

## 2022-01-06 PROCEDURE — D9220A PRA ANESTHESIA: ICD-10-PCS | Mod: ANES,,, | Performed by: ANESTHESIOLOGY

## 2022-01-06 PROCEDURE — 87102 FUNGUS ISOLATION CULTURE: CPT | Mod: 59 | Performed by: SURGERY

## 2022-01-06 PROCEDURE — 87075 CULTR BACTERIA EXCEPT BLOOD: CPT | Mod: 59 | Performed by: SURGERY

## 2022-01-06 PROCEDURE — 63600175 PHARM REV CODE 636 W HCPCS: Performed by: INTERNAL MEDICINE

## 2022-01-06 PROCEDURE — 80202 ASSAY OF VANCOMYCIN: CPT | Performed by: INTERNAL MEDICINE

## 2022-01-06 PROCEDURE — 63600175 PHARM REV CODE 636 W HCPCS: Performed by: SURGERY

## 2022-01-06 PROCEDURE — 87206 SMEAR FLUORESCENT/ACID STAI: CPT | Mod: 91 | Performed by: SURGERY

## 2022-01-06 PROCEDURE — 25000003 PHARM REV CODE 250: Performed by: NURSE ANESTHETIST, CERTIFIED REGISTERED

## 2022-01-06 PROCEDURE — 80100016 HC MAINTENANCE HEMODIALYSIS

## 2022-01-06 PROCEDURE — 71000033 HC RECOVERY, INTIAL HOUR: Performed by: SURGERY

## 2022-01-06 PROCEDURE — 87340 HEPATITIS B SURFACE AG IA: CPT | Performed by: INTERNAL MEDICINE

## 2022-01-06 PROCEDURE — 37000008 HC ANESTHESIA 1ST 15 MINUTES: Performed by: SURGERY

## 2022-01-06 PROCEDURE — 37000009 HC ANESTHESIA EA ADD 15 MINS: Performed by: SURGERY

## 2022-01-06 PROCEDURE — 99499 NO LOS: ICD-10-PCS | Mod: ,,, | Performed by: SURGERY

## 2022-01-06 PROCEDURE — 88311 PR  DECALCIFY TISSUE: ICD-10-PCS | Mod: 26,,, | Performed by: PATHOLOGY

## 2022-01-06 PROCEDURE — C9399 UNCLASSIFIED DRUGS OR BIOLOG: HCPCS | Performed by: FAMILY MEDICINE

## 2022-01-06 PROCEDURE — 88311 DECALCIFY TISSUE: CPT | Mod: 26,,, | Performed by: PATHOLOGY

## 2022-01-06 PROCEDURE — 63600175 PHARM REV CODE 636 W HCPCS: Performed by: FAMILY MEDICINE

## 2022-01-06 PROCEDURE — 87116 MYCOBACTERIA CULTURE: CPT | Performed by: SURGERY

## 2022-01-06 PROCEDURE — 85025 COMPLETE CBC W/AUTO DIFF WBC: CPT | Performed by: INTERNAL MEDICINE

## 2022-01-06 PROCEDURE — 87070 CULTURE OTHR SPECIMN AEROBIC: CPT | Mod: 59 | Performed by: SURGERY

## 2022-01-06 PROCEDURE — 36415 COLL VENOUS BLD VENIPUNCTURE: CPT | Performed by: INTERNAL MEDICINE

## 2022-01-06 PROCEDURE — 28800 AMPUTATION OF MIDFOOT: CPT | Mod: LT,,, | Performed by: SURGERY

## 2022-01-06 PROCEDURE — 88305 TISSUE EXAM BY PATHOLOGIST: CPT | Performed by: PATHOLOGY

## 2022-01-06 PROCEDURE — 27000207 HC ISOLATION

## 2022-01-06 PROCEDURE — 88305 TISSUE EXAM BY PATHOLOGIST: ICD-10-PCS | Mod: 26,,, | Performed by: PATHOLOGY

## 2022-01-06 PROCEDURE — 36000706: Performed by: SURGERY

## 2022-01-06 PROCEDURE — D9220A PRA ANESTHESIA: Mod: ANES,,, | Performed by: ANESTHESIOLOGY

## 2022-01-06 PROCEDURE — 86706 HEP B SURFACE ANTIBODY: CPT | Performed by: INTERNAL MEDICINE

## 2022-01-06 PROCEDURE — 87077 CULTURE AEROBIC IDENTIFY: CPT | Performed by: SURGERY

## 2022-01-06 PROCEDURE — 88305 TISSUE EXAM BY PATHOLOGIST: CPT | Mod: 26,,, | Performed by: PATHOLOGY

## 2022-01-06 RX ORDER — GENTAMICIN SULFATE 40 MG/ML
INJECTION, SOLUTION INTRAMUSCULAR; INTRAVENOUS
Status: DISCONTINUED | OUTPATIENT
Start: 2022-01-06 | End: 2022-01-06 | Stop reason: HOSPADM

## 2022-01-06 RX ORDER — SODIUM CHLORIDE 9 MG/ML
INJECTION, SOLUTION INTRAVENOUS ONCE
Status: DISCONTINUED | OUTPATIENT
Start: 2022-01-06 | End: 2022-01-09

## 2022-01-06 RX ORDER — SODIUM CHLORIDE 9 MG/ML
INJECTION, SOLUTION INTRAVENOUS
Status: DISCONTINUED | OUTPATIENT
Start: 2022-01-06 | End: 2022-01-27 | Stop reason: HOSPADM

## 2022-01-06 RX ORDER — MUPIROCIN 20 MG/G
OINTMENT TOPICAL 2 TIMES DAILY
Status: DISPENSED | OUTPATIENT
Start: 2022-01-06 | End: 2022-01-11

## 2022-01-06 RX ORDER — PROPOFOL 10 MG/ML
VIAL (ML) INTRAVENOUS
Status: DISCONTINUED | OUTPATIENT
Start: 2022-01-06 | End: 2022-01-06

## 2022-01-06 RX ORDER — PROPOFOL 10 MG/ML
VIAL (ML) INTRAVENOUS CONTINUOUS PRN
Status: DISCONTINUED | OUTPATIENT
Start: 2022-01-06 | End: 2022-01-06

## 2022-01-06 RX ADMIN — ACETAMINOPHEN 650 MG: 325 TABLET ORAL at 06:01

## 2022-01-06 RX ADMIN — SEVELAMER CARBONATE 800 MG: 800 TABLET, FILM COATED ORAL at 06:01

## 2022-01-06 RX ADMIN — PROPOFOL 20 MG: 10 INJECTION, EMULSION INTRAVENOUS at 09:01

## 2022-01-06 RX ADMIN — HEPARIN SODIUM 5000 UNITS: 5000 INJECTION INTRAVENOUS; SUBCUTANEOUS at 09:01

## 2022-01-06 RX ADMIN — MUPIROCIN: 20 OINTMENT TOPICAL at 09:01

## 2022-01-06 RX ADMIN — SODIUM CHLORIDE: 0.9 INJECTION, SOLUTION INTRAVENOUS at 09:01

## 2022-01-06 RX ADMIN — PROPOFOL 10 MG: 10 INJECTION, EMULSION INTRAVENOUS at 09:01

## 2022-01-06 RX ADMIN — PIPERACILLIN AND TAZOBACTAM 4.5 G: 4; .5 INJECTION, POWDER, LYOPHILIZED, FOR SOLUTION INTRAVENOUS; PARENTERAL at 08:01

## 2022-01-06 RX ADMIN — HYDROCODONE BITARTRATE AND ACETAMINOPHEN 1 TABLET: 5; 325 TABLET ORAL at 01:01

## 2022-01-06 RX ADMIN — HYDRALAZINE HYDROCHLORIDE 100 MG: 25 TABLET, FILM COATED ORAL at 09:01

## 2022-01-06 RX ADMIN — INSULIN DETEMIR 15 UNITS: 100 INJECTION, SOLUTION SUBCUTANEOUS at 09:01

## 2022-01-06 RX ADMIN — PIPERACILLIN AND TAZOBACTAM 4.5 G: 4; .5 INJECTION, POWDER, LYOPHILIZED, FOR SOLUTION INTRAVENOUS; PARENTERAL at 09:01

## 2022-01-06 RX ADMIN — TRAZODONE HYDROCHLORIDE 50 MG: 50 TABLET ORAL at 09:01

## 2022-01-06 RX ADMIN — SEVELAMER CARBONATE 800 MG: 800 TABLET, FILM COATED ORAL at 01:01

## 2022-01-06 RX ADMIN — HYDROCODONE BITARTRATE AND ACETAMINOPHEN 1 TABLET: 5; 325 TABLET ORAL at 06:01

## 2022-01-06 RX ADMIN — HYDRALAZINE HYDROCHLORIDE 100 MG: 25 TABLET, FILM COATED ORAL at 05:01

## 2022-01-06 RX ADMIN — PROPOFOL 25 MCG/KG/MIN: 10 INJECTION, EMULSION INTRAVENOUS at 09:01

## 2022-01-06 RX ADMIN — PROPOFOL 30 MG: 10 INJECTION, EMULSION INTRAVENOUS at 09:01

## 2022-01-06 RX ADMIN — HEPARIN SODIUM 5000 UNITS: 5000 INJECTION INTRAVENOUS; SUBCUTANEOUS at 05:01

## 2022-01-06 NOTE — CONSULTS
Consult on 56-year-old male with diabetes and progressive gangrene to the left foot.  Patient was seen in podiatry clinic in vascular clinic last month and studies ordered.     Afebrile vital signs stable  Leukocytosis    Patient is alert and oriented.  Speaks some English but does not seem to comprehend full language.  Daughter has left hospital and unable to contact her currently.  Patient has obvious ischemic left hindfoot and forefoot with necrotic wound to the dorsal aspect of the 2nd 3rd and 4th toes.  He has no sensation to the forefoot.  The skin is dark, ischemic appearing and the foot is painful.    I have discussed the case with Dr. Masoud Soni , vascular surgery who plans to attempt revascularization of the left lower extremity and amputate the toes.  In my opinion, he will probably require left below-knee amputation at some point unless significant improvement in circulation can be obtained.  Here he has necrotic findings in the foot and does have ischemic pain.  Apparently the patient and daughter want to try everything to save the limb.  Re-consult Orthopedics if below-knee amputation is desired by patient and family.  Otherwise podiatry can continue there treatment as they have been already caring for this patient is left foot.

## 2022-01-06 NOTE — PLAN OF CARE
01/06/22 1754   Discharge Assessment   Assessment Type Discharge Planning Assessment   TN attempted video chat to complete DC needs assessment.  Patient out of room.  TN to follow up at a  later time.

## 2022-01-06 NOTE — SUBJECTIVE & OBJECTIVE
Interval History: No new issues     Review of Systems   Constitutional: Negative for activity change, appetite change, chills, diaphoresis, fatigue and fever.   HENT: Negative for congestion, sinus pressure, sore throat and tinnitus.    Eyes: Negative for visual disturbance.   Respiratory: Negative for cough, chest tightness, shortness of breath and wheezing.    Cardiovascular: Negative for chest pain, palpitations and leg swelling.   Gastrointestinal: Negative for abdominal distention, abdominal pain, nausea and vomiting.   Endocrine: Negative for polydipsia, polyphagia and polyuria.   Genitourinary: Negative for dysuria.   Musculoskeletal: Positive for arthralgias. Negative for back pain.   Skin: Negative for rash and wound.   Neurological: Negative for dizziness, syncope, light-headedness and headaches.   Psychiatric/Behavioral: Negative for confusion.     Objective:     Vital Signs (Most Recent):  Temp: 97.7 °F (36.5 °C) (01/06/22 0453)  Pulse: 82 (01/06/22 0541)  Resp: 18 (01/06/22 0453)  BP: (!) 160/71 (01/06/22 0541)  SpO2: 99 % (01/06/22 0453) Vital Signs (24h Range):  Temp:  [97.7 °F (36.5 °C)-99.5 °F (37.5 °C)] 97.7 °F (36.5 °C)  Pulse:  [] 82  Resp:  [15-20] 18  SpO2:  [96 %-100 %] 99 %  BP: (100-215)/(45-99) 160/71     Weight: 64.3 kg (141 lb 12.1 oz)  Body mass index is 20.34 kg/m².    Intake/Output Summary (Last 24 hours) at 1/6/2022 0708  Last data filed at 1/6/2022 0300  Gross per 24 hour   Intake 614.83 ml   Output 350 ml   Net 264.83 ml      Physical Exam  Vitals and nursing note reviewed.   Constitutional:       General: He is not in acute distress.     Appearance: Normal appearance. He is well-developed. He is not ill-appearing or toxic-appearing.   HENT:      Head: Normocephalic and atraumatic.      Right Ear: External ear normal.      Left Ear: External ear normal.      Nose: Nose normal.      Mouth/Throat:      Pharynx: Uvula midline.   Eyes:      General: Lids are normal.       Conjunctiva/sclera: Conjunctivae normal.      Pupils: Pupils are equal, round, and reactive to light.   Neck:      Thyroid: No thyroid mass.      Vascular: No JVD.      Trachea: Trachea normal.   Cardiovascular:      Rate and Rhythm: Normal rate and regular rhythm.      Heart sounds: Normal heart sounds, S1 normal and S2 normal.   Pulmonary:      Effort: Pulmonary effort is normal.      Breath sounds: Normal breath sounds.   Musculoskeletal:      Cervical back: Full passive range of motion without pain, normal range of motion and neck supple.      Right lower leg: No edema.      Left lower leg: No edema.      Comments: L foot with gangrene to all metatarsals. Also ulceration/ blister on dorsal surface.   Neurological:      Mental Status: He is alert and oriented to person, place, and time.      Cranial Nerves: No cranial nerve deficit.      Sensory: No sensory deficit.   Psychiatric:         Speech: Speech normal.         Behavior: Behavior normal. Behavior is cooperative.         Thought Content: Thought content normal.         Significant Labs:   All pertinent labs within the past 24 hours have been reviewed.  BMP:   Recent Labs   Lab 01/05/22  1232   *      K 4.7   CL 98   CO2 26   BUN 38*   CREATININE 9.3*   CALCIUM 8.6*     CBC:   Recent Labs   Lab 01/05/22  1232   WBC 15.22*   HGB 10.8*   HCT 37.8*

## 2022-01-06 NOTE — OP NOTE
[facility]  Operative Note     SUMMARY      Surgery Date: 1/6/2022      Surgeon(s) and Role:     * Masoud Soni MD - Primary     Assisting Surgeon: None     Pre-op Diagnosis:  Gangrene of left foot [I96]     Post-op Diagnosis:  Post-Op Diagnosis Codes:     * Gangrene of left foot [I96]     Procedure:  1. Left 2-5 metatarsal amputations   2. Left foot washout  3. Left foot debridement     Anesthesia: Regional     Operative Findings: soft 2-5 metatarsals, seropurulent drainage, no mid foot purulence, no infection involving 1st digit.    Procedure in detail:  Patient seen preoperative anesthesia was deep to for 30 previous but to the operating placed spinal operative table.  COVID protocols were followed.  Regional block was performed by anesthesia.  Please see procedure note for the details of procedure.  Patient was preventive sterile fashion.  Some was performed.  Scalpel used to remove the infected portion of the left foot involving the 2nd through 5th digits.  Bone she was reused to remove the metatarsals and these were resected proximally instead office margins.  Proximally the metatarsals were noted to be hard where as distally they are very soft.  There was severe purulent drainage in the foot although no purulence in the midfoot.  The 1st toe was uninvolved.  Specimen was passed off.  Cultures were obtained.  The wound was irrigated washed out and hemostasis was achieved.  There is poor bleeding.  With a dry dressing was placed.  The patient was transferred a stretcher assess would back to his room without any complications noted in stable condition.     Estimated Blood Loss: <5 cc     Estimated Blood Loss has been documented.         Specimens:       Specimen (24h ago, onward)    Left 2-4 digits, proximal margin, left distal foot

## 2022-01-06 NOTE — PROGRESS NOTES
[facility]  Progress Note    Patient Name: Adryan Neff  MRN: [id number]  Patient Class: IP- Inpatient   Admission Date: 1/5/2022  Length of Stay: 1 days  Attending Physician: Nikolay Sawyer MD  Primary Care Provider: Soren Rice MD        Subjective:     Principal Problem:Gangrene of left foot        HPI:  This is a 56 y.o.male patient, with a PMHx of ESRD on hemodialysis, CAD, HTN, and DM, presenting to the ED for further evaluation of left foot pain and black discoloration that began 3 days ago. Patient states these symptoms have been ongoing for the last month. He reports being referred to a specialist but denies ever following up. He has been putting some otc medication on the foot that he believes has turned his foot more black. No trauma or injury. Patient reports associated chills. Patient states applying a creme to the foot and noticed his discoloration worsened. Patient reports he was last dialyzed yesterday. Patient denies any fever, shortness of breath, chest pain, neck pain, back pain, abdominal pain, rash, headaches, congestion, rhinorrhea, cough, sore throat, ear pain, eye pain, blurred vision, nausea, vomiting, diarrhea, dysuria, or any other associated symptoms.    Per chart review, he had an arterial u/s 12/15.   Impression:   1. High-grade severe stenosis right popliteal artery with occlusion anterior tibial artery which is reconstituted at level of DPA via collaterals.  2. 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    Cardiology has been planning to see him for revascularization options. Last podiatry visit 12/13 with Stable dry gangrene L 2nd toe and medial L 5th toe. Dorsalis pedis and posterior tibial pulses are diminished Bilaterally. Toes are cool to touch. Feet are warm proximally.There is decreased digital hair. Skin is atrophic, slightly hyperpigmented, and mildly edematous     In the ED, he was found to have worsened gangrene and a  cool extremity. Vascular surgery was consulted. He was also COVID +. Says he got a booster shot in November sometime.      Overview/Hospital Course:  Patient admitted to the hospital for eval and treatment of L foot gangrene.  Ortho and Vasc consulted. Nephrology was also consulted for chronic dialysis       Interval History: No new issues     Review of Systems   Constitutional: Negative for activity change, appetite change, chills, diaphoresis, fatigue and fever.   HENT: Negative for congestion, sinus pressure, sore throat and tinnitus.    Eyes: Negative for visual disturbance.   Respiratory: Negative for cough, chest tightness, shortness of breath and wheezing.    Cardiovascular: Negative for chest pain, palpitations and leg swelling.   Gastrointestinal: Negative for abdominal distention, abdominal pain, nausea and vomiting.   Endocrine: Negative for polydipsia, polyphagia and polyuria.   Genitourinary: Negative for dysuria.   Musculoskeletal: Positive for arthralgias. Negative for back pain.   Skin: Negative for rash and wound.   Neurological: Negative for dizziness, syncope, light-headedness and headaches.   Psychiatric/Behavioral: Negative for confusion.     Objective:     Vital Signs (Most Recent):  Temp: 97.7 °F (36.5 °C) (01/06/22 0453)  Pulse: 82 (01/06/22 0541)  Resp: 18 (01/06/22 0453)  BP: (!) 160/71 (01/06/22 0541)  SpO2: 99 % (01/06/22 0453) Vital Signs (24h Range):  Temp:  [97.7 °F (36.5 °C)-99.5 °F (37.5 °C)] 97.7 °F (36.5 °C)  Pulse:  [] 82  Resp:  [15-20] 18  SpO2:  [96 %-100 %] 99 %  BP: (100-215)/(45-99) 160/71     Weight: 64.3 kg (141 lb 12.1 oz)  Body mass index is 20.34 kg/m².    Intake/Output Summary (Last 24 hours) at 1/6/2022 0708  Last data filed at 1/6/2022 0300  Gross per 24 hour   Intake 614.83 ml   Output 350 ml   Net 264.83 ml      Physical Exam  Vitals and nursing note reviewed.   Constitutional:       General: He is not in acute distress.     Appearance: Normal appearance. He  is well-developed. He is not ill-appearing or toxic-appearing.   HENT:      Head: Normocephalic and atraumatic.      Right Ear: External ear normal.      Left Ear: External ear normal.      Nose: Nose normal.      Mouth/Throat:      Pharynx: Uvula midline.   Eyes:      General: Lids are normal.      Conjunctiva/sclera: Conjunctivae normal.      Pupils: Pupils are equal, round, and reactive to light.   Neck:      Thyroid: No thyroid mass.      Vascular: No JVD.      Trachea: Trachea normal.   Cardiovascular:      Rate and Rhythm: Normal rate and regular rhythm.      Heart sounds: Normal heart sounds, S1 normal and S2 normal.   Pulmonary:      Effort: Pulmonary effort is normal.      Breath sounds: Normal breath sounds.   Musculoskeletal:      Cervical back: Full passive range of motion without pain, normal range of motion and neck supple.      Right lower leg: No edema.      Left lower leg: No edema.      Comments: L foot with gangrene to all metatarsals. Also ulceration/ blister on dorsal surface.   Neurological:      Mental Status: He is alert and oriented to person, place, and time.      Cranial Nerves: No cranial nerve deficit.      Sensory: No sensory deficit.   Psychiatric:         Speech: Speech normal.         Behavior: Behavior normal. Behavior is cooperative.         Thought Content: Thought content normal.         Significant Labs:   All pertinent labs within the past 24 hours have been reviewed.  BMP:   Recent Labs   Lab 01/05/22  1232   *      K 4.7   CL 98   CO2 26   BUN 38*   CREATININE 9.3*   CALCIUM 8.6*     CBC:   Recent Labs   Lab 01/05/22  1232   WBC 15.22*   HGB 10.8*   HCT 37.8*          Assessment/Plan:      * Gangrene of left foot  Vascular and ortho consulted. Likely needs amputation. Discussed with Dr. Soni. In comparison of recent pictures by podiatry, his gangrene has worsened and now involves all toes. See pics below.   Continue IV abx. Pain control.     Follow  vasc. Ortho recs     COVID-19 in immunocompromised patient  HD patient + for COVID 19. Not hypoxic or symptomatic. Supportive care prn.       ESRD on hemodialysis  Nephrology consulted to resume routine HD.     Chronic diastolic heart failure  December 2021 echo reviewed. Stable.       Essential (primary) hypertension  Chronic, uncontrolled.  Latest blood pressure and vitals reviewed-   Temp:  [98.3 °F (36.8 °C)]   Pulse:  []   Resp:  [16-18]   BP: (173-215)/(76-99)   SpO2:  [99 %-100 %] .   Home meds for hypertension were reviewed and noted below.   Hypertension Medications             hydrALAZINE (APRESOLINE) 100 MG tablet Take 1 tablet (100 mg total) by mouth every 8 (eight) hours.    lisinopriL (PRINIVIL,ZESTRIL) 2.5 MG tablet Take 2.5 mg by mouth once daily.    lisinopriL (PRINIVIL,ZESTRIL) 5 MG tablet Take 5 mg by mouth once daily.    metoprolol succinate (TOPROL-XL) 25 MG 24 hr tablet Take 1 tablet (25 mg total) by mouth once daily.          While in the hospital, will manage blood pressure as follows; Continue home antihypertensive regimen (will use higher dose of lisinopril). Adjust as needed.     Will utilize p.r.n. blood pressure medication only if patient's blood pressure greater than  180/110 and he develops symptoms such as worsening chest pain or shortness of breath.      Controlled type 2 diabetes mellitus with chronic kidney disease on chronic dialysis, with long-term current use of insulin  Patient's FSGs are controlled on current medication regimen.  Last A1c reviewed-   Lab Results   Component Value Date    HGBA1C 6.1 (H) 01/07/2021     Most recent fingerstick glucose reviewed-   Recent Labs   Lab 01/05/22  1153   POCTGLUCOSE 224*     Current correctional scale  Medium  Maintain anti-hyperglycemic dose as follows-   Antihyperglycemics (From admission, onward)            Start     Stop Route Frequency Ordered    01/05/22 2100  insulin detemir U-100 pen 15 Units         -- SubQ Nightly  01/05/22 1404    01/05/22 1503  insulin aspart U-100 pen 1-10 Units         -- SubQ Before meals & nightly PRN 01/05/22 1404        Hold Oral hypoglycemics while patient is in the hospital.          VTE Risk Mitigation (From admission, onward)         Ordered     heparin (porcine) injection 5,000 Units  Every 8 hours         01/05/22 1404     IP VTE HIGH RISK PATIENT  Once         01/05/22 1404     Place sequential compression device  Until discontinued         01/05/22 1404                Discharge Planning   ENIO:      Code Status: Full Code   Is the patient medically ready for discharge?:     Reason for patient still in hospital (select all that apply): Patient unstable                     Nikolay Burch MD  Department of Hospital Medicine   [facility], [place]

## 2022-01-06 NOTE — PROGRESS NOTES
Pharmacokinetic Assessment Follow Up: IV Vancomycin    Vancomycin serum concentration assessment(s):    The random level was drawn correctly and can be used to guide therapy at this time. The measurement is above the desired definitive target range of 10 to 20 mcg/mL.    Vancomycin Regimen Plan:    Re-dose when the random level is less than 20 mcg/mL, next level to be drawn at 0400 on 01/07/2022    Drug levels (last 3 results):  Recent Labs   Lab Result Units 01/06/22  0812   Vancomycin, Random ug/mL 29.0       Pharmacy will continue to follow and monitor vancomycin.    Please contact pharmacy at extension [phone] for questions regarding this assessment.    Thank you for the consult,   Reymundo Montoya       Patient brief summary:  Adryan Neff is a 56 y.o. male initiated on antimicrobial therapy with IV Vancomycin for treatment of bone/joint infection      Drug Allergies:   Review of patient's allergies indicates:  No Known Allergies    Actual Body Weight:   64.3 kg    Renal Function:   Estimated Creatinine Clearance: 8 mL/min (A) (based on SCr of 9.3 mg/dL (H)).,     Dialysis Method (if applicable):  intermittent HD    CBC (last 72 hours):  Recent Labs   Lab Result Units 01/05/22  1232 01/06/22  0812   WBC K/uL 15.22* 14.30*   Hemoglobin g/dL 10.8* 9.7*   Hematocrit % 37.8* 32.6*   Platelets K/uL 425 450   Gran % % 82.8* 81.0*   Lymph % % 7.0* 8.5*   Mono % % 7.7 7.3   Eosinophil % % 1.5 2.2   Basophil % % 0.3 0.3   Differential Method  Automated Automated       Metabolic Panel (last 72 hours):  Recent Labs   Lab Result Units 01/05/22  1232   Sodium mmol/L 138   Potassium mmol/L 4.7   Chloride mmol/L 98   CO2 mmol/L 26   Glucose mg/dL 212*   BUN mg/dL 38*   Creatinine mg/dL 9.3*   Albumin g/dL 2.4*   Total Bilirubin mg/dL 0.4   Alkaline Phosphatase U/L 94   AST U/L 13   ALT U/L 7*       Vancomycin Administrations:  vancomycin given in the last 96 hours                     vancomycin 1.5 g in dextrose 5 % 250 mL  IVPB (ready to mix) (mg) 1,500 mg New Bag 01/05/22 1351                    Microbiologic Results:  Microbiology Results (last 7 days)       Procedure Component Value Units Date/Time    Blood culture [[id number]] Collected: 01/05/22 1309    Order Status: Completed Specimen: Blood from Peripheral, Wrist, Right Updated: 01/05/22 2112     Blood Culture, Routine No Growth to date    Blood culture [[id number]] Collected: 01/05/22 1308    Order Status: Completed Specimen: Blood from Peripheral, Hand, Right Updated: 01/05/22 2112     Blood Culture, Routine No Growth to date    Aerobic culture (Specify Source) **CANNOT BE ORDERED AS STAT** [[id number]] Collected: 01/05/22 1315    Order Status: Sent Specimen: Wound from Foot, Left Updated: 01/05/22 1324

## 2022-01-06 NOTE — CONSULTS
Date of Admit: 1/5/2022  Length of Stay: 1   Days  Consulting Staff: MD Alfie    Date of Consult: 1/6/2022    Reason for Consultation     dialysis    Subjective:      History of Present Illness:  Adryan Neff is a 56 y.o. year old male with a past medical history significant for esrd, hnt ,and dm. Pt hd at [facility] under Dr. Mckeon. Pt came with foot pain ,found with gangrene, history of PAD.  Pt + covid 19.    Past Medical History:  Past Medical History:   Diagnosis Date    CAD (coronary artery disease) 2016    CAD (non obstructive) 2016 [facility]    Diabetes mellitus type I     Diabetic retinopathy     ESRD on hemodialysis     on HD TThSat    Gangrene of left foot     Hypertension     Nuclear sclerosis of right eye 11/24/2021    PAD (peripheral artery disease)     Pulmonary HTN     TB lung, latent 04/2021       Past Surgical History:  Past Surgical History:   Procedure Laterality Date    AV FISTULA PLACEMENT      CATARACT EXTRACTION Left     EYE SURGERY         Allergies:  Review of patient's allergies indicates:  No Known Allergies    Home Medications:    No current facility-administered medications on file prior to encounter.     Current Outpatient Medications on File Prior to Encounter   Medication Sig Dispense Refill    amoxicillin-clavulanate 500-125mg (AUGMENTIN) 500-125 mg Tab Take 1 tablet (500 mg total) by mouth once daily. 7 tablet 0    atorvastatin (LIPITOR) 20 MG tablet Take 1 tablet (20 mg total) by mouth once daily. (Patient taking differently: Take 20 mg by mouth once daily.) 90 tablet 3    blood sugar diagnostic Strp 1 each by Misc.(Non-Drug; Combo Route) route 2 hours after meals and at bedtime. 200 each 10    doxercalciferoL (HECTOROL) 0.5 MCG capsule 2 mcg.      heparin sodium,porcine (HEPARIN, PORCINE,) 1,000 unit/mL Soln injection Heparin Sodium (Porcine) 1,000 Units/mL Systemic      hydrALAZINE (APRESOLINE) 100 MG tablet Take 1 tablet (100 mg total) by mouth every 8  (eight) hours. (Patient taking differently: Take 50 mg by mouth every 8 (eight) hours. ) 90 tablet 11    insulin (BASAGLAR KWIKPEN U-100 INSULIN) glargine 100 units/mL (3mL) SubQ pen Inject 15 Units into the skin every evening. 15 mL 12    isoniazid (NYDRAZID) 300 MG Tab Take 1 tablet (300 mg total) by mouth once daily. 90 tablet 2    lisinopriL (PRINIVIL,ZESTRIL) 2.5 MG tablet Take 2.5 mg by mouth once daily.      lisinopriL (PRINIVIL,ZESTRIL) 5 MG tablet Take 5 mg by mouth once daily.      methoxy peg-epoetin beta (MIRCERA INJ) 75 mcg.      metoprolol succinate (TOPROL-XL) 25 MG 24 hr tablet Take 1 tablet (25 mg total) by mouth once daily. 30 tablet 11    omeprazole (PRILOSEC) 20 MG capsule TAKE 2 CAPSULES(40 MG) BY MOUTH EVERY DAY (Patient taking differently: Take 20 mg by mouth once daily.) 180 capsule 0    penicillin v potassium (VEETID) 500 MG tablet Take 500 mg by mouth 4 (four) times daily.      RENVELA 800 mg Tab Take 800 mg by mouth 3 (three) times daily with meals.      traMADoL (ULTRAM) 50 mg tablet Take 1 tablet (50 mg total) by mouth every 8 (eight) hours as needed for Pain. 30 tablet 0    traZODone (DESYREL) 50 MG tablet Take 50 mg by mouth every evening.         Family History:  Family History   Problem Relation Age of Onset    No Known Problems Mother     No Known Problems Father     No Known Problems Sister     No Known Problems Brother     No Known Problems Daughter     No Known Problems Daughter     No Known Problems Daughter     No Known Problems Brother     No Known Problems Maternal Aunt     No Known Problems Maternal Uncle     No Known Problems Paternal Aunt     No Known Problems Paternal Uncle     No Known Problems Maternal Grandmother     No Known Problems Maternal Grandfather     No Known Problems Paternal Grandmother     No Known Problems Paternal Grandfather        Social History:  Social History     Tobacco Use    Smoking status: Never Smoker    Smokeless  "tobacco: Never Used   Substance Use Topics    Alcohol use: No    Drug use: No       Review of Systems:10pt ros: +food wound, foot pain, black toes       Objective:     Scheduled Meds:   sodium chloride 0.9%   Intravenous Once    atorvastatin  20 mg Oral Daily    heparin (porcine)  5,000 Units Subcutaneous Q8H    hydrALAZINE  100 mg Oral Q8H    insulin detemir U-100  15 Units Subcutaneous QHS    lisinopriL  5 mg Oral Daily    metoprolol succinate  25 mg Oral Daily    mupirocin   Nasal BID    pantoprazole  40 mg Oral Daily    piperacillin-tazobactam (ZOSYN) IVPB  4.5 g Intravenous Q12H    sevelamer carbonate  800 mg Oral TID WM    traZODone  50 mg Oral QHS     Continuous Infusions:  PRN Meds:.sodium chloride 0.9%, acetaminophen, acetaminophen, dextrose 50%, dextrose 50%, glucagon (human recombinant), glucose, glucose, HYDROcodone-acetaminophen, HYDROcodone-acetaminophen, insulin aspart U-100, magnesium oxide, magnesium oxide, naloxone, ondansetron, sodium chloride 0.9%, sodium chloride 0.9%, Pharmacy to dose Vancomycin consult **AND** vancomycin - pharmacy to dose      Physical Examination:    Vitals: /72   Pulse 94   Temp (!) 100.4 °F (38 °C) (Oral)   Resp 18   Ht 5' 10" (1.778 m)   Wt 64.3 kg (141 lb 12.1 oz)   SpO2 98%   BMI 20.34 kg/m²    I/O last 3 completed shifts:  In: 614.8 [P.O.:260; IV Piggyback:354.8]  Out: 350 [Urine:350]  No intake/output data recorded.      General: wd male in nad  HEENT:ncat,eomi,mask  CVS:s1s2 regular  PULM:ctab  ABD:+bs,soft  EXT:no leg edema , avf  NEURO:awake  Laboratory:  Recent Results (from the past 24 hour(s))   POCT glucose    Collection Time: 01/05/22  5:10 PM   Result Value Ref Range    POCT Glucose 226 (H) 70 - 110 mg/dL   POCT glucose    Collection Time: 01/05/22  8:54 PM   Result Value Ref Range    POCT Glucose 233 (H) 70 - 110 mg/dL   CBC Auto Differential    Collection Time: 01/06/22  8:12 AM   Result Value Ref Range    WBC 14.30 (H) 3.90 - " 12.70 K/uL    RBC 3.49 (L) 4.60 - 6.20 M/uL    Hemoglobin 9.7 (L) 14.0 - 18.0 g/dL    Hematocrit 32.6 (L) 40.0 - 54.0 %    MCV 93 82 - 98 fL    MCH 27.8 27.0 - 31.0 pg    MCHC 29.8 (L) 32.0 - 36.0 g/dL    RDW 15.2 (H) 11.5 - 14.5 %    Platelets 450 150 - 450 K/uL    MPV 9.7 9.2 - 12.9 fL    Immature Granulocytes 0.7 (H) 0.0 - 0.5 %    Gran # (ANC) 11.6 (H) 1.8 - 7.7 K/uL    Immature Grans (Abs) 0.10 (H) 0.00 - 0.04 K/uL    Lymph # 1.2 1.0 - 4.8 K/uL    Mono # 1.0 0.3 - 1.0 K/uL    Eos # 0.3 0.0 - 0.5 K/uL    Baso # 0.05 0.00 - 0.20 K/uL    nRBC 0 0 /100 WBC    Gran % 81.0 (H) 38.0 - 73.0 %    Lymph % 8.5 (L) 18.0 - 48.0 %    Mono % 7.3 4.0 - 15.0 %    Eosinophil % 2.2 0.0 - 8.0 %    Basophil % 0.3 0.0 - 1.9 %    Differential Method Automated    Basic Metabolic Panel    Collection Time: 01/06/22  8:12 AM   Result Value Ref Range    Sodium 136 136 - 145 mmol/L    Potassium 4.7 3.5 - 5.1 mmol/L    Chloride 97 95 - 110 mmol/L    CO2 24 23 - 29 mmol/L    Glucose 120 (H) 70 - 110 mg/dL    BUN 45 (H) 6 - 20 mg/dL    Creatinine 11.4 (H) 0.5 - 1.4 mg/dL    Calcium 9.3 8.7 - 10.5 mg/dL    Anion Gap 15 8 - 16 mmol/L    eGFR if African American 5 (A) >60 mL/min/1.73 m^2    eGFR if non African American 4 (A) >60 mL/min/1.73 m^2   Vancomycin, random    Collection Time: 01/06/22  8:12 AM   Result Value Ref Range    Vancomycin, Random 29.0 Not established ug/mL   POCT glucose    Collection Time: 01/06/22  8:39 AM   Result Value Ref Range    POCT Glucose 135 (H) 70 - 110 mg/dL   POCT glucose    Collection Time: 01/06/22 12:17 PM   Result Value Ref Range    POCT Glucose 161 (H) 70 - 110 mg/dL               Diagnostic Tests:     Narrative & Impression  EXAMINATION:  XR CHEST AP PORTABLE     CLINICAL HISTORY:  chest pain;     TECHNIQUE:  Single frontal view of the chest was performed.     COMPARISON:  January 7, 2021     FINDINGS:  There are vascular stents at the left upper chest and within the soft tissues of the left axilla/proximal  arm.  Cardiac size is upper normal.  There is no pleural effusion.  The osseous structures demonstrate degenerative change.  The lungs are clear.  There is vascular calcification.     Impression:     No detrimental change        Electronically signed by: Sandra Wright MD  Date:                                            01/05/2022  Time:                                           13:03             Exam Ended: 01/05/22 12:52                Assessment/Plan:     Adryan Neff is a 56 y.o. male admitted with:     1.esrd. cont hd TTS for now. Seen on hd.  2.htn. uf with hd.  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 Add epo.  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 Cont binders.  5.dm2. following sugars.  6 gas .gangrene. cont tx. Surgery today by vascular.  7.pad.seen by vascular. ? longterm may need bka.    Reshma Gee

## 2022-01-06 NOTE — ANESTHESIA PREPROCEDURE EVALUATION
01/05/2022    Pre-operative evaluation for Procedure(s) (LRB):  AMPUTATION, TOE (Left)    Adryan Neff is a 56 y.o. male     Patient Active Problem List   Diagnosis    Controlled type 2 diabetes mellitus with chronic kidney disease on chronic dialysis, with long-term current use of insulin    Essential (primary) hypertension    Pure hypercholesterolemia    Benign hypertension with chronic kidney disease, stage V    Other insomnia    Gastroesophageal reflux disease    Intractable vomiting    Chronic diastolic heart failure    CAD (coronary artery disease) - non-obstructive from [facility] in 2016    Hyperkalemia    Pre-transplant evaluation for kidney transplant    Pulmonary HTN    Chronic pulmonary heart disease    HLD (hyperlipidemia)    NICM (nonischemic cardiomyopathy)    PDR (proliferative diabetic retinopathy)    Secondary hyperparathyroidism of renal origin    ESRD on hemodialysis    TB lung, latent    Paronychia, toe, left    Nuclear sclerosis of right eye    Pseudophakia    Gangrene of left foot    COVID-19 in immunocompromised patient       Review of patient's allergies indicates:  No Known Allergies    No current facility-administered medications on file prior to encounter.     Current Outpatient Medications on File Prior to Encounter   Medication Sig Dispense Refill    amoxicillin-clavulanate 500-125mg (AUGMENTIN) 500-125 mg Tab Take 1 tablet (500 mg total) by mouth once daily. 7 tablet 0    atorvastatin (LIPITOR) 20 MG tablet Take 1 tablet (20 mg total) by mouth once daily. (Patient taking differently: Take 20 mg by mouth once daily.) 90 tablet 3    blood sugar diagnostic Strp 1 each by Misc.(Non-Drug; Combo Route) route 2 hours after meals and at bedtime. 200 each 10    doxercalciferoL (HECTOROL) 0.5 MCG capsule 2 mcg.      heparin sodium,porcine (HEPARIN, PORCINE,) 1,000 unit/mL Soln injection Heparin Sodium (Porcine) 1,000 Units/mL Systemic      hydrALAZINE (APRESOLINE) 100 MG  tablet Take 1 tablet (100 mg total) by mouth every 8 (eight) hours. (Patient taking differently: Take 50 mg by mouth every 8 (eight) hours. ) 90 tablet 11    insulin (BASAGLAR KWIKPEN U-100 INSULIN) glargine 100 units/mL (3mL) SubQ pen Inject 15 Units into the skin every evening. 15 mL 12    isoniazid (NYDRAZID) 300 MG Tab Take 1 tablet (300 mg total) by mouth once daily. 90 tablet 2    lisinopriL (PRINIVIL,ZESTRIL) 2.5 MG tablet Take 2.5 mg by mouth once daily.      lisinopriL (PRINIVIL,ZESTRIL) 5 MG tablet Take 5 mg by mouth once daily.      methoxy peg-epoetin beta (MIRCERA INJ) 75 mcg.      metoprolol succinate (TOPROL-XL) 25 MG 24 hr tablet Take 1 tablet (25 mg total) by mouth once daily. 30 tablet 11    omeprazole (PRILOSEC) 20 MG capsule TAKE 2 CAPSULES(40 MG) BY MOUTH EVERY DAY (Patient taking differently: Take 20 mg by mouth once daily.) 180 capsule 0    penicillin v potassium (VEETID) 500 MG tablet Take 500 mg by mouth 4 (four) times daily.      RENVELA 800 mg Tab Take 800 mg by mouth 3 (three) times daily with meals.      traMADoL (ULTRAM) 50 mg tablet Take 1 tablet (50 mg total) by mouth every 8 (eight) hours as needed for Pain. 30 tablet 0    traZODone (DESYREL) 50 MG tablet Take 50 mg by mouth every evening.         Past Surgical History:   Procedure Laterality Date    AV FISTULA PLACEMENT      CATARACT EXTRACTION Left     EYE SURGERY       LABS  CBC:   Recent Labs     01/05/22  1232   WBC 15.22*   RBC 3.93*   HGB 10.8*   HCT 37.8*      MCV 96   MCH 27.5   MCHC 28.6*       CMP:   Recent Labs     01/05/22  1232      K 4.7   CL 98   CO2 26   BUN 38*   CREATININE 9.3*   *   CALCIUM 8.6*   ALBUMIN 2.4*   PROT 9.0*   ALKPHOS 94   ALT 7*   AST 13   BILITOT 0.4       INR  Recent Labs     01/05/22  1232   INR 1.0       Anesthesia Evaluation     I have reviewed the Nursing Notes.       Review of Systems  Social:  Non-Smoker   Cardiovascular:   Hypertension CAD   PVD  hyperlipidemia Echo 12/15/21  · The estimated ejection fraction is 55%.  · Normal systolic function.  · Grade I left ventricular diastolic dysfunction.  · Moderate left atrial enlargement.  · Mild right atrial enlargement.  · Mild tricuspid regurgitation.  · Normal right ventricular size with normal right ventricular systolic function.  · Moderate-to-severe mitral regurgitation.  · Normal central venous pressure (3 mmHg).   Cardiomyopathy, Non-Ischemic Cardiomyopathy    Pulmonary:   Denies COPD.  Denies Asthma.  Denies Shortness of breath.  Denies Sleep Apnea. COVID  Pulmonary Hypertension    Renal/:   Chronic Renal Disease, ESRD, Dialysis    Hepatic/GI:   No Bowel Prep. Denies PUD. GERD Denies Liver Disease. Denies Hepatitis.    Musculoskeletal:   Gangrene L foot   Neurological:   Denies CVA. Denies Seizures.   Peripheral Neuropathy    Endocrine:   Diabetes, type 2 Denies Hypothyroidism. Denies Hyperthyroidism.        Physical Exam  General:  Well nourished    Airway/Jaw/Neck:  AIRWAY FINDINGS: Normal      Chest/Lungs:  Chest/Lungs Clear    Heart/Vascular:  Heart Findings: Normal       Mental Status:  Pt responds to simple questions but not clearly able to consent       Anesthesia Plan  Type of Anesthesia, risks & benefits discussed:  Anesthesia Type:  regional    Patient's Preference:   Plan Factors:          Intra-op Monitoring Plan: standard ASA monitors  Intra-op Monitoring Plan Comments:   Post Op Pain Control Plan:   Post Op Pain Control Plan Comments:     Induction:   IV  Beta Blocker:         Informed Consent: Patient representative understands risks and agrees with Anesthesia plan.  Questions answered. Anesthesia consent signed with patient representative.  ASA Score: 3     Day of Surgery Review of History & Physical:  There are no significant changes.  H&P update referred to the provider.     Anesthesia Plan Notes: 57 yo M PMHx of COVID-19 (positive on 1/5/220, ESRD (on HD), CAD, HTN, and DM scheduled  for amputation possible TMA due to gangrene.  Telephone consent obtained from daughter.        Ready For Surgery From Anesthesia Perspective.

## 2022-01-06 NOTE — OR NURSING
Patient awake, alert, VSS. Dressing to LLE dry and intact, denies sensation secondary to block. Denies pain at present.

## 2022-01-06 NOTE — INTERVAL H&P NOTE
The patient has been examined and the H&P has been reviewed:    I concur with the findings and no changes have occurred since H&P was written.    Surgery risks, benefits and alternative options discussed and understood by patient/family.          Active Hospital Problems    Diagnosis  POA    *Gangrene of left foot [I96]  Yes    COVID-19 in immunocompromised patient [U07.1, D84.9]  Yes    ESRD on hemodialysis [N18.6, Z99.2]  Not Applicable     on HD TThSat        Chronic diastolic heart failure [I50.32]  Yes    Controlled type 2 diabetes mellitus with chronic kidney disease on chronic dialysis, with long-term current use of insulin [E11.22, N18.6, Z79.4, Z99.2]  Not Applicable    Essential (primary) hypertension [I10]  Yes      Resolved Hospital Problems   No resolved problems to display.

## 2022-01-06 NOTE — PROGRESS NOTES
Returned from surgery without signs of distress. Dressing to left foot dry and intact. Patient able to hold simple conversation at this time. Call bell in reach, bed alarm set, SR up x3, bed in low position.

## 2022-01-06 NOTE — TRANSFER OF CARE
"Anesthesia Transfer of Care Note    Patient: Adryan Neff    Procedure(s) Performed: Procedure(s) (LRB):  AMPUTATION, TOE (Left)    Patient location: Other: OR 4    Anesthesia Type: MAC and regional    Transport from OR: Transported from OR on room air with adequate spontaneous ventilation    Post pain: adequate analgesia    Post assessment: no apparent anesthetic complications and tolerated procedure well    Post vital signs: stable    Level of consciousness: awake, alert and oriented    Nausea/Vomiting: no nausea/vomiting    Complications: none    Transfer of care protocol was followed      Last vitals:   Visit Vitals  BP (!) 149/68   Pulse 90   Temp 37 °C (98.6 °F)   Resp 20   Ht 5' 10" (1.778 m)   Wt 64.3 kg (141 lb 12.1 oz)   SpO2 100%   BMI 20.34 kg/m²     "

## 2022-01-06 NOTE — ASSESSMENT & PLAN NOTE
Vascular and ortho consulted. Likely needs amputation. Discussed with Dr. Soni. In comparison of recent pictures by podiatry, his gangrene has worsened and now involves all toes. See pics below.   Continue IV abx. Pain control.     Follow vasc. Ortho recs

## 2022-01-06 NOTE — HOSPITAL COURSE
Patient admitted to the hospital for eval and treatment of L foot gangrene.  ID, Ortho and Vasc consulted. Nephrology was also consulted for chronic dialysis. Patient started on broad spectrum antibiotics, and had several toes on L foot amputated by Vascular surgery.     The patient's AVF had recurrent malfunction. IR attempts at intervention without success. THDC placed for dialysis.    Pt became increasingly delirious and combative. He refused angiogram with Vascular. Psychiatry evaluated, felt that he was not able to make his own medical decisions. At this point Dr. Soni felt the patient would be a poor candidate for vascular intervention, recommended AKA. ID in agreement. Ortho consulted for AKA, daughter/POA in agreement,   S/P left AKA on 1.21.22,plan is SNF placement,SW was on case.afebrile.  Had some EKG changes with no chest pain,cardiology cleared patient for procedure.  Overall his mental status is improved,his pain was controlled,patient was discharged to SNF with PCP and Ortho. Follow up as out patient.

## 2022-01-07 LAB
ANION GAP SERPL CALC-SCNC: 12 MMOL/L (ref 8–16)
BASOPHILS # BLD AUTO: 0.07 K/UL (ref 0–0.2)
BASOPHILS NFR BLD: 0.5 % (ref 0–1.9)
BUN SERPL-MCNC: 28 MG/DL (ref 6–20)
CALCIUM SERPL-MCNC: 8.4 MG/DL (ref 8.7–10.5)
CHLORIDE SERPL-SCNC: 97 MMOL/L (ref 95–110)
CO2 SERPL-SCNC: 25 MMOL/L (ref 23–29)
CREAT SERPL-MCNC: 8.8 MG/DL (ref 0.5–1.4)
DIFFERENTIAL METHOD: ABNORMAL
EOSINOPHIL # BLD AUTO: 0.2 K/UL (ref 0–0.5)
EOSINOPHIL NFR BLD: 1.3 % (ref 0–8)
ERYTHROCYTE [DISTWIDTH] IN BLOOD BY AUTOMATED COUNT: 15.4 % (ref 11.5–14.5)
EST. GFR  (AFRICAN AMERICAN): 7 ML/MIN/1.73 M^2
EST. GFR  (NON AFRICAN AMERICAN): 6 ML/MIN/1.73 M^2
GLUCOSE SERPL-MCNC: 180 MG/DL (ref 70–110)
HBV SURFACE AB SER-ACNC: POSITIVE M[IU]/ML
HBV SURFACE AG SERPL QL IA: NEGATIVE
HCT VFR BLD AUTO: 33.4 % (ref 40–54)
HGB BLD-MCNC: 9.9 G/DL (ref 14–18)
IMM GRANULOCYTES # BLD AUTO: 0.07 K/UL (ref 0–0.04)
IMM GRANULOCYTES NFR BLD AUTO: 0.5 % (ref 0–0.5)
LYMPHOCYTES # BLD AUTO: 1.1 K/UL (ref 1–4.8)
LYMPHOCYTES NFR BLD: 7.8 % (ref 18–48)
MCH RBC QN AUTO: 27.2 PG (ref 27–31)
MCHC RBC AUTO-ENTMCNC: 29.6 G/DL (ref 32–36)
MCV RBC AUTO: 92 FL (ref 82–98)
MONOCYTES # BLD AUTO: 1.1 K/UL (ref 0.3–1)
MONOCYTES NFR BLD: 7.5 % (ref 4–15)
NEUTROPHILS # BLD AUTO: 11.6 K/UL (ref 1.8–7.7)
NEUTROPHILS NFR BLD: 82.4 % (ref 38–73)
NRBC BLD-RTO: 0 /100 WBC
PLATELET # BLD AUTO: 437 K/UL (ref 150–450)
PMV BLD AUTO: 9.9 FL (ref 9.2–12.9)
POCT GLUCOSE: 176 MG/DL (ref 70–110)
POCT GLUCOSE: 218 MG/DL (ref 70–110)
POCT GLUCOSE: 95 MG/DL (ref 70–110)
POTASSIUM SERPL-SCNC: 4.8 MMOL/L (ref 3.5–5.1)
RBC # BLD AUTO: 3.64 M/UL (ref 4.6–6.2)
SODIUM SERPL-SCNC: 134 MMOL/L (ref 136–145)
VANCOMYCIN SERPL-MCNC: 21.5 UG/ML
WBC # BLD AUTO: 14.08 K/UL (ref 3.9–12.7)

## 2022-01-07 PROCEDURE — 25000003 PHARM REV CODE 250: Performed by: INTERNAL MEDICINE

## 2022-01-07 PROCEDURE — 27000207 HC ISOLATION

## 2022-01-07 PROCEDURE — 36415 COLL VENOUS BLD VENIPUNCTURE: CPT | Performed by: INTERNAL MEDICINE

## 2022-01-07 PROCEDURE — 80048 BASIC METABOLIC PNL TOTAL CA: CPT | Performed by: INTERNAL MEDICINE

## 2022-01-07 PROCEDURE — 80202 ASSAY OF VANCOMYCIN: CPT | Performed by: INTERNAL MEDICINE

## 2022-01-07 PROCEDURE — 25000003 PHARM REV CODE 250: Performed by: FAMILY MEDICINE

## 2022-01-07 PROCEDURE — 99024 PR POST-OP FOLLOW-UP VISIT: ICD-10-PCS | Mod: ,,, | Performed by: SURGERY

## 2022-01-07 PROCEDURE — 99024 POSTOP FOLLOW-UP VISIT: CPT | Mod: ,,, | Performed by: SURGERY

## 2022-01-07 PROCEDURE — 63600175 PHARM REV CODE 636 W HCPCS: Performed by: FAMILY MEDICINE

## 2022-01-07 PROCEDURE — 21400001 HC TELEMETRY ROOM

## 2022-01-07 PROCEDURE — 36415 COLL VENOUS BLD VENIPUNCTURE: CPT | Performed by: SURGERY

## 2022-01-07 PROCEDURE — 63600175 PHARM REV CODE 636 W HCPCS: Performed by: INTERNAL MEDICINE

## 2022-01-07 PROCEDURE — 85025 COMPLETE CBC W/AUTO DIFF WBC: CPT | Performed by: SURGERY

## 2022-01-07 RX ADMIN — SEVELAMER CARBONATE 800 MG: 800 TABLET, FILM COATED ORAL at 01:01

## 2022-01-07 RX ADMIN — HYDRALAZINE HYDROCHLORIDE 100 MG: 25 TABLET, FILM COATED ORAL at 09:01

## 2022-01-07 RX ADMIN — METOPROLOL SUCCINATE 25 MG: 25 TABLET, EXTENDED RELEASE ORAL at 09:01

## 2022-01-07 RX ADMIN — ATORVASTATIN CALCIUM 20 MG: 10 TABLET, FILM COATED ORAL at 09:01

## 2022-01-07 RX ADMIN — HYDRALAZINE HYDROCHLORIDE 100 MG: 25 TABLET, FILM COATED ORAL at 01:01

## 2022-01-07 RX ADMIN — HYDROCODONE BITARTRATE AND ACETAMINOPHEN 1 TABLET: 10; 325 TABLET ORAL at 07:01

## 2022-01-07 RX ADMIN — LISINOPRIL 5 MG: 5 TABLET ORAL at 09:01

## 2022-01-07 RX ADMIN — MUPIROCIN: 20 OINTMENT TOPICAL at 09:01

## 2022-01-07 RX ADMIN — PANTOPRAZOLE SODIUM 40 MG: 40 TABLET, DELAYED RELEASE ORAL at 09:01

## 2022-01-07 RX ADMIN — SEVELAMER CARBONATE 800 MG: 800 TABLET, FILM COATED ORAL at 06:01

## 2022-01-07 RX ADMIN — HEPARIN SODIUM 5000 UNITS: 5000 INJECTION INTRAVENOUS; SUBCUTANEOUS at 01:01

## 2022-01-07 RX ADMIN — HEPARIN SODIUM 5000 UNITS: 5000 INJECTION INTRAVENOUS; SUBCUTANEOUS at 05:01

## 2022-01-07 RX ADMIN — TRAZODONE HYDROCHLORIDE 50 MG: 50 TABLET ORAL at 09:01

## 2022-01-07 RX ADMIN — ACETAMINOPHEN 650 MG: 325 TABLET ORAL at 01:01

## 2022-01-07 RX ADMIN — PIPERACILLIN AND TAZOBACTAM 4.5 G: 4; .5 INJECTION, POWDER, LYOPHILIZED, FOR SOLUTION INTRAVENOUS; PARENTERAL at 09:01

## 2022-01-07 RX ADMIN — SEVELAMER CARBONATE 800 MG: 800 TABLET, FILM COATED ORAL at 09:01

## 2022-01-07 RX ADMIN — HYDROCODONE BITARTRATE AND ACETAMINOPHEN 1 TABLET: 10; 325 TABLET ORAL at 01:01

## 2022-01-07 RX ADMIN — HEPARIN SODIUM 5000 UNITS: 5000 INJECTION INTRAVENOUS; SUBCUTANEOUS at 09:01

## 2022-01-07 NOTE — PHYSICIAN QUERY
PT Name: Adryan Neff  MR #: [id number]     Documentation Clarification      CDS/: Olivia Gonzalez RN CDIS               Contact information: [email or web address]      This form is a permanent document in the medical record.     Query Date: January 7, 2022    By submitting this query, we are merely seeking further clarification of documentation. Please utilize your independent clinical judgment when addressing the question(s) below.    The Medical Record reflects the following:    Supporting Clinical Findings Location in Medical Record   Controlled type 2 diabetes mellitus with chronic kidney disease on chronic dialysis, with long-term current use of insulin  Patient's FSGs are controlled on current medication regimen.  Last A1c reviewed-   Lab Results  Component Value Date    HGBA1C 6.1 (H) 01/07/2021     Most recent fingerstick glucose reviewed-   Recent Labs  Lab 01/05/22  1153  POCTGLUCOSE 224*     Current correctional scale  Medium  Maintain anti-hyperglycemic dose as follows-   Antihyperglycemics (From admission, onward)         Start     Stop Route Frequency Ordered    01/05/22 2100  insulin detemir U-100 pen 15 Units        -- SubQ Nightly 01/05/22 1404    01/05/22 1503  insulin aspart U-100 pen 1-10 Units        -- SubQ Before meals & nightly PRN 01/05/22 1404      Hold Oral hypoglycemics while patient is in the hospital.      note 1/7   Diabetes mellitus type I H&P                                                                             Provider, please provide diagnosis or diagnoses associated with above clinical findings.    [   ] Diabetes mellitus type I   [   x] Diabetes mellitus type II   [   ] Other (please specify): ____________   [  ] Clinically undetermined

## 2022-01-07 NOTE — PROGRESS NOTES
Patient seen and examined. Infection has improved in foot with bleeding noted. Recommend continued wound care twice daily and antibiotics. Once infection Improves will perform in angiography and possible revascularization

## 2022-01-07 NOTE — PT/OT/SLP PROGRESS
Occupational Therapy      Patient Name:  Adryan Neff   MRN:  [id number]    Patient not seen today secondary to pending WB status from MD. Will follow-up 1/7/22.    1/7/2022

## 2022-01-07 NOTE — PROGRESS NOTES
[facility] Medicine  Progress Note    Patient Name: Adryan Neff  MRN: [id number]  Patient Class: IP- Inpatient   Admission Date: 1/5/2022  Length of Stay: 2 days  Attending Physician: Nikolay Sawyer MD  Primary Care Provider: Soren Rice MD        Subjective:     Principal Problem:Gangrene of left foot        HPI:  This is a 56 y.o.male patient, with a PMHx of ESRD on hemodialysis, CAD, HTN, and DM, presenting to the ED for further evaluation of left foot pain and black discoloration that began 3 days ago. Patient states these symptoms have been ongoing for the last month. He reports being referred to a specialist but denies ever following up. He has been putting some otc medication on the foot that he believes has turned his foot more black. No trauma or injury. Patient reports associated chills. Patient states applying a creme to the foot and noticed his discoloration worsened. Patient reports he was last dialyzed yesterday. Patient denies any fever, shortness of breath, chest pain, neck pain, back pain, abdominal pain, rash, headaches, congestion, rhinorrhea, cough, sore throat, ear pain, eye pain, blurred vision, nausea, vomiting, diarrhea, dysuria, or any other associated symptoms.    Per chart review, he had an arterial u/s 12/15.   Impression:   1. High-grade severe stenosis right popliteal artery with occlusion anterior tibial artery which is reconstituted at level of DPA via collaterals.  2. 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    Cardiology has been planning to see him for revascularization options. Last podiatry visit 12/13 with Stable dry gangrene L 2nd toe and medial L 5th toe. Dorsalis pedis and posterior tibial pulses are diminished Bilaterally. Toes are cool to touch. Feet are warm proximally.There is decreased digital hair. Skin is atrophic, slightly hyperpigmented, and mildly edematous     In the ED, he was found to have worsened gangrene and a  cool extremity. Vascular surgery was consulted. He was also COVID +. Says he got a booster shot in November sometime.      Overview/Hospital Course:  Patient admitted to the hospital for eval and treatment of L foot gangrene.  Ortho and Vasc consulted. Nephrology was also consulted for chronic dialysis. Patient had several toes on L foot amputated.  Continued with dialysis. PT/OT consulted.       Interval History: No new issues  Pain controlled.       Review of Systems   Constitutional: Negative for activity change, appetite change, chills, diaphoresis, fatigue and fever.   HENT: Negative for congestion, sinus pressure, sore throat and tinnitus.    Eyes: Negative for visual disturbance.   Respiratory: Negative for cough, chest tightness, shortness of breath and wheezing.    Cardiovascular: Negative for chest pain, palpitations and leg swelling.   Gastrointestinal: Negative for abdominal distention, abdominal pain, nausea and vomiting.   Endocrine: Negative for polydipsia, polyphagia and polyuria.   Genitourinary: Negative for dysuria.   Musculoskeletal: Positive for arthralgias. Negative for back pain.   Skin: Negative for rash and wound.   Neurological: Negative for dizziness, syncope, light-headedness and headaches.   Psychiatric/Behavioral: Negative for confusion.     Objective:     Vital Signs (Most Recent):  Temp: 98.7 °F (37.1 °C) (01/07/22 0806)  Pulse: 92 (01/07/22 0806)  Resp: 18 (01/07/22 0806)  BP: (!) 113/53 (01/07/22 0806)  SpO2: 97 % (01/07/22 0806) Vital Signs (24h Range):  Temp:  [98.5 °F (36.9 °C)-101.6 °F (38.7 °C)] 98.7 °F (37.1 °C)  Pulse:  [] 92  Resp:  [18-20] 18  SpO2:  [97 %-100 %] 97 %  BP: (101-165)/(48-83) 113/53     Weight: 64.3 kg (141 lb 12.1 oz)  Body mass index is 20.34 kg/m².    Intake/Output Summary (Last 24 hours) at 1/7/2022 1059  Last data filed at 1/6/2022 1725  Gross per 24 hour   Intake 750 ml   Output 2750 ml   Net -2000 ml      Physical Exam  Vitals and nursing note  reviewed.   Constitutional:       General: He is not in acute distress.     Appearance: Normal appearance. He is well-developed. He is not ill-appearing or toxic-appearing.   HENT:      Head: Normocephalic and atraumatic.      Right Ear: External ear normal.      Left Ear: External ear normal.      Nose: Nose normal.      Mouth/Throat:      Pharynx: Uvula midline.   Eyes:      General: Lids are normal.      Conjunctiva/sclera: Conjunctivae normal.      Pupils: Pupils are equal, round, and reactive to light.   Neck:      Thyroid: No thyroid mass.      Vascular: No JVD.      Trachea: Trachea normal.   Cardiovascular:      Rate and Rhythm: Normal rate and regular rhythm.      Heart sounds: Normal heart sounds, S1 normal and S2 normal.   Pulmonary:      Effort: Pulmonary effort is normal.      Breath sounds: Normal breath sounds.   Musculoskeletal:      Cervical back: Full passive range of motion without pain, normal range of motion and neck supple.      Right lower leg: No edema.      Left lower leg: No edema.      Comments: L foot with gangrene to all metatarsals. Also ulceration/ blister on dorsal surface.   Neurological:      Mental Status: He is alert and oriented to person, place, and time.      Cranial Nerves: No cranial nerve deficit.      Sensory: No sensory deficit.   Psychiatric:         Speech: Speech normal.         Behavior: Behavior normal. Behavior is cooperative.         Thought Content: Thought content normal.         Significant Labs:   All pertinent labs within the past 24 hours have been reviewed.  BMP:   Recent Labs   Lab 01/06/22  0812   *      K 4.7   CL 97   CO2 24   BUN 45*   CREATININE 11.4*   CALCIUM 9.3     CBC:   Recent Labs   Lab 01/05/22  1232 01/06/22  0812 01/07/22  0714   WBC 15.22* 14.30* 14.08*   HGB 10.8* 9.7* 9.9*   HCT 37.8* 32.6* 33.4*    450 437            Assessment/Plan:      * Gangrene of left foot  Vascular and ortho consulted. Likely needs amputation.  Discussed with Dr. Soni. In comparison of recent pictures by podiatry, his gangrene has worsened and now involves all toes. See pics below.   Continue IV abx. Pain control.     Follow vasc. Ortho recs     S/p amputation of toes.  Patient would like to try to salvage limb.  Awaiting next vascular recs.   PT/OT consulted. Continue Abx.     COVID-19 in immunocompromised patient  HD patient + for COVID 19. Not hypoxic or symptomatic. Supportive care prn.       ESRD on hemodialysis  Nephrology consulted to resume routine HD.     Chronic diastolic heart failure  December 2021 echo reviewed. Stable.       Essential (primary) hypertension  Chronic, uncontrolled.  Latest blood pressure and vitals reviewed-   Temp:  [98.3 °F (36.8 °C)]   Pulse:  []   Resp:  [16-18]   BP: (173-215)/(76-99)   SpO2:  [99 %-100 %] .   Home meds for hypertension were reviewed and noted below.   Hypertension Medications             hydrALAZINE (APRESOLINE) 100 MG tablet Take 1 tablet (100 mg total) by mouth every 8 (eight) hours.    lisinopriL (PRINIVIL,ZESTRIL) 2.5 MG tablet Take 2.5 mg by mouth once daily.    lisinopriL (PRINIVIL,ZESTRIL) 5 MG tablet Take 5 mg by mouth once daily.    metoprolol succinate (TOPROL-XL) 25 MG 24 hr tablet Take 1 tablet (25 mg total) by mouth once daily.          While in the hospital, will manage blood pressure as follows; Continue home antihypertensive regimen (will use higher dose of lisinopril). Adjust as needed.     Will utilize p.r.n. blood pressure medication only if patient's blood pressure greater than  180/110 and he develops symptoms such as worsening chest pain or shortness of breath.      Controlled type 2 diabetes mellitus with chronic kidney disease on chronic dialysis, with long-term current use of insulin  Patient's FSGs are controlled on current medication regimen.  Last A1c reviewed-   Lab Results   Component Value Date    HGBA1C 6.1 (H) 01/07/2021     Most recent fingerstick glucose  reviewed-   Recent Labs   Lab 01/05/22  1153   POCTGLUCOSE 224*     Current correctional scale  Medium  Maintain anti-hyperglycemic dose as follows-   Antihyperglycemics (From admission, onward)            Start     Stop Route Frequency Ordered    01/05/22 2100  insulin detemir U-100 pen 15 Units         -- SubQ Nightly 01/05/22 1404    01/05/22 1503  insulin aspart U-100 pen 1-10 Units         -- SubQ Before meals & nightly PRN 01/05/22 1404        Hold Oral hypoglycemics while patient is in the hospital.          VTE Risk Mitigation (From admission, onward)         Ordered     heparin (porcine) injection 5,000 Units  Every 8 hours         01/05/22 1404     IP VTE HIGH RISK PATIENT  Once         01/05/22 1404     Place sequential compression device  Until discontinued         01/05/22 1404                Discharge Planning   ENIO:      Code Status: Full Code   Is the patient medically ready for discharge?:     Reason for patient still in hospital (select all that apply): Patient unstable           Awaiting plan from vascular             Nikolay Burch MD  Department of Hospital Medicine   [facility]

## 2022-01-07 NOTE — SUBJECTIVE & OBJECTIVE
Interval History: No new issues  Pain controlled.       Review of Systems   Constitutional: Negative for activity change, appetite change, chills, diaphoresis, fatigue and fever.   HENT: Negative for congestion, sinus pressure, sore throat and tinnitus.    Eyes: Negative for visual disturbance.   Respiratory: Negative for cough, chest tightness, shortness of breath and wheezing.    Cardiovascular: Negative for chest pain, palpitations and leg swelling.   Gastrointestinal: Negative for abdominal distention, abdominal pain, nausea and vomiting.   Endocrine: Negative for polydipsia, polyphagia and polyuria.   Genitourinary: Negative for dysuria.   Musculoskeletal: Positive for arthralgias. Negative for back pain.   Skin: Negative for rash and wound.   Neurological: Negative for dizziness, syncope, light-headedness and headaches.   Psychiatric/Behavioral: Negative for confusion.     Objective:     Vital Signs (Most Recent):  Temp: 98.7 °F (37.1 °C) (01/07/22 0806)  Pulse: 92 (01/07/22 0806)  Resp: 18 (01/07/22 0806)  BP: (!) 113/53 (01/07/22 0806)  SpO2: 97 % (01/07/22 0806) Vital Signs (24h Range):  Temp:  [98.5 °F (36.9 °C)-101.6 °F (38.7 °C)] 98.7 °F (37.1 °C)  Pulse:  [] 92  Resp:  [18-20] 18  SpO2:  [97 %-100 %] 97 %  BP: (101-165)/(48-83) 113/53     Weight: 64.3 kg (141 lb 12.1 oz)  Body mass index is 20.34 kg/m².    Intake/Output Summary (Last 24 hours) at 1/7/2022 1059  Last data filed at 1/6/2022 1725  Gross per 24 hour   Intake 750 ml   Output 2750 ml   Net -2000 ml      Physical Exam  Vitals and nursing note reviewed.   Constitutional:       General: He is not in acute distress.     Appearance: Normal appearance. He is well-developed. He is not ill-appearing or toxic-appearing.   HENT:      Head: Normocephalic and atraumatic.      Right Ear: External ear normal.      Left Ear: External ear normal.      Nose: Nose normal.      Mouth/Throat:      Pharynx: Uvula midline.   Eyes:      General: Lids are  normal.      Conjunctiva/sclera: Conjunctivae normal.      Pupils: Pupils are equal, round, and reactive to light.   Neck:      Thyroid: No thyroid mass.      Vascular: No JVD.      Trachea: Trachea normal.   Cardiovascular:      Rate and Rhythm: Normal rate and regular rhythm.      Heart sounds: Normal heart sounds, S1 normal and S2 normal.   Pulmonary:      Effort: Pulmonary effort is normal.      Breath sounds: Normal breath sounds.   Musculoskeletal:      Cervical back: Full passive range of motion without pain, normal range of motion and neck supple.      Right lower leg: No edema.      Left lower leg: No edema.      Comments: L foot with gangrene to all metatarsals. Also ulceration/ blister on dorsal surface.   Neurological:      Mental Status: He is alert and oriented to person, place, and time.      Cranial Nerves: No cranial nerve deficit.      Sensory: No sensory deficit.   Psychiatric:         Speech: Speech normal.         Behavior: Behavior normal. Behavior is cooperative.         Thought Content: Thought content normal.         Significant Labs:   All pertinent labs within the past 24 hours have been reviewed.  BMP:   Recent Labs   Lab 01/06/22  0812   *      K 4.7   CL 97   CO2 24   BUN 45*   CREATININE 11.4*   CALCIUM 9.3     CBC:   Recent Labs   Lab 01/05/22  1232 01/06/22  0812 01/07/22  0714   WBC 15.22* 14.30* 14.08*   HGB 10.8* 9.7* 9.9*   HCT 37.8* 32.6* 33.4*    450 437

## 2022-01-07 NOTE — ASSESSMENT & PLAN NOTE
Vascular and ortho consulted. Likely needs amputation. Discussed with Dr. Soni. In comparison of recent pictures by podiatry, his gangrene has worsened and now involves all toes. See pics below.   Continue IV abx. Pain control.     Follow vasc. Ortho recs     S/p amputation of toes.  Patient would like to try to salvage limb.  Awaiting next vascular recs.   PT/OT consulted. Continue Abx.

## 2022-01-07 NOTE — CONSULTS
[facility]    Patient Name:  Adryan Neff   MRN:  [id number]  Date: 1/7/2022  Diagnosis: Gangrene of left foot    History:     Past Medical History:   Diagnosis Date    CAD (coronary artery disease) 2016    CAD (non obstructive) 2016 [facility]    Diabetes mellitus type I     Diabetic retinopathy     ESRD on hemodialysis     on HD TThSat    Gangrene of left foot     Hypertension     Nuclear sclerosis of right eye 11/24/2021    PAD (peripheral artery disease)     Pulmonary HTN     TB lung, latent 04/2021       Social History     Socioeconomic History    Marital status: Single    Number of children: 5   Tobacco Use    Smoking status: Never Smoker    Smokeless tobacco: Never Used   Substance and Sexual Activity    Alcohol use: No    Drug use: No    Sexual activity: Yes     Partners: Female   Social History Narrative    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 Assessment Details/Treatment   Consulted for wounds post surgery left foot 1/6/22  A 56 year old male admitted 1/5/22 from home with gangrene left foot; COVID 19 in immunocompromised patient; ESRD on HD; chronic diastolic heart failure; essential hypertension; controlled DM II with CKD on chronic dialysis  1/7 WBC 14.08 Hgb  9.9 Hct 33.4   1/5 Alb 2.4   1/6 S/P Left 2-5 metatarsal amputations; left foot washout; left foot debridement per Dr. Soni for gangrene left foot  On Isoflex mattress; Harpal score 19  Assessment:  Photodocumentation    Left 2-5 metatarsal amputation- Open wound with small amount bloody drainage. Base of wound pink in color with clots. Painful to touch.  Treatment:  Cleansed wound with Vashe. Filled with Aquacel Ag hydrofiber dressing. Covered with dry gauze then wrapped with Kerlix roll. Nursing to change dressing daily.   Orders placed.     01/07/2022

## 2022-01-07 NOTE — PT/OT/SLP PROGRESS
"Physical Therapy      Patient Name:  Adryan Neff   MRN:  [id number]    Patient not seen today secondary to pending weight-bearing status of left LE from Dr. Santo.     Addendum: per Secure Chat patient is "touch down" weightbearing to LLE per Dr. Santo. Dept to follow up tomorrow.         "

## 2022-01-08 LAB
BACTERIA SPEC AEROBE CULT: ABNORMAL
BACTERIA SPEC AEROBE CULT: ABNORMAL
BASOPHILS # BLD AUTO: 0.09 K/UL (ref 0–0.2)
BASOPHILS NFR BLD: 0.5 % (ref 0–1.9)
DIFFERENTIAL METHOD: ABNORMAL
EOSINOPHIL # BLD AUTO: 0.3 K/UL (ref 0–0.5)
EOSINOPHIL NFR BLD: 1.5 % (ref 0–8)
ERYTHROCYTE [DISTWIDTH] IN BLOOD BY AUTOMATED COUNT: 15.5 % (ref 11.5–14.5)
HCT VFR BLD AUTO: 31.1 % (ref 40–54)
HGB BLD-MCNC: 9.1 G/DL (ref 14–18)
IMM GRANULOCYTES # BLD AUTO: 0.12 K/UL (ref 0–0.04)
IMM GRANULOCYTES NFR BLD AUTO: 0.7 % (ref 0–0.5)
LYMPHOCYTES # BLD AUTO: 1.2 K/UL (ref 1–4.8)
LYMPHOCYTES NFR BLD: 7.4 % (ref 18–48)
MCH RBC QN AUTO: 27.3 PG (ref 27–31)
MCHC RBC AUTO-ENTMCNC: 29.3 G/DL (ref 32–36)
MCV RBC AUTO: 93 FL (ref 82–98)
MONOCYTES # BLD AUTO: 1.5 K/UL (ref 0.3–1)
MONOCYTES NFR BLD: 8.8 % (ref 4–15)
NEUTROPHILS # BLD AUTO: 13.4 K/UL (ref 1.8–7.7)
NEUTROPHILS NFR BLD: 81.1 % (ref 38–73)
NRBC BLD-RTO: 0 /100 WBC
PLATELET # BLD AUTO: 434 K/UL (ref 150–450)
PMV BLD AUTO: 9.5 FL (ref 9.2–12.9)
POCT GLUCOSE: 148 MG/DL (ref 70–110)
POCT GLUCOSE: 174 MG/DL (ref 70–110)
POCT GLUCOSE: 215 MG/DL (ref 70–110)
POCT GLUCOSE: 289 MG/DL (ref 70–110)
RBC # BLD AUTO: 3.33 M/UL (ref 4.6–6.2)
VANCOMYCIN SERPL-MCNC: 18.9 UG/ML
WBC # BLD AUTO: 16.52 K/UL (ref 3.9–12.7)

## 2022-01-08 PROCEDURE — 36415 COLL VENOUS BLD VENIPUNCTURE: CPT | Performed by: INTERNAL MEDICINE

## 2022-01-08 PROCEDURE — C9399 UNCLASSIFIED DRUGS OR BIOLOG: HCPCS | Performed by: FAMILY MEDICINE

## 2022-01-08 PROCEDURE — 97166 OT EVAL MOD COMPLEX 45 MIN: CPT

## 2022-01-08 PROCEDURE — 97161 PT EVAL LOW COMPLEX 20 MIN: CPT

## 2022-01-08 PROCEDURE — 25000003 PHARM REV CODE 250: Performed by: INTERNAL MEDICINE

## 2022-01-08 PROCEDURE — 63600175 PHARM REV CODE 636 W HCPCS: Performed by: FAMILY MEDICINE

## 2022-01-08 PROCEDURE — 63600175 PHARM REV CODE 636 W HCPCS: Performed by: INTERNAL MEDICINE

## 2022-01-08 PROCEDURE — 27000207 HC ISOLATION

## 2022-01-08 PROCEDURE — 25000003 PHARM REV CODE 250: Performed by: FAMILY MEDICINE

## 2022-01-08 PROCEDURE — 21400001 HC TELEMETRY ROOM

## 2022-01-08 PROCEDURE — 80202 ASSAY OF VANCOMYCIN: CPT | Performed by: INTERNAL MEDICINE

## 2022-01-08 PROCEDURE — 85025 COMPLETE CBC W/AUTO DIFF WBC: CPT | Performed by: INTERNAL MEDICINE

## 2022-01-08 RX ADMIN — HYDROCODONE BITARTRATE AND ACETAMINOPHEN 1 TABLET: 10; 325 TABLET ORAL at 06:01

## 2022-01-08 RX ADMIN — HEPARIN SODIUM 5000 UNITS: 5000 INJECTION INTRAVENOUS; SUBCUTANEOUS at 09:01

## 2022-01-08 RX ADMIN — HYDROCODONE BITARTRATE AND ACETAMINOPHEN 1 TABLET: 5; 325 TABLET ORAL at 08:01

## 2022-01-08 RX ADMIN — SEVELAMER CARBONATE 800 MG: 800 TABLET, FILM COATED ORAL at 05:01

## 2022-01-08 RX ADMIN — HYDRALAZINE HYDROCHLORIDE 100 MG: 25 TABLET, FILM COATED ORAL at 09:01

## 2022-01-08 RX ADMIN — METOPROLOL SUCCINATE 25 MG: 25 TABLET, EXTENDED RELEASE ORAL at 08:01

## 2022-01-08 RX ADMIN — INSULIN ASPART 2 UNITS: 100 INJECTION, SOLUTION INTRAVENOUS; SUBCUTANEOUS at 05:01

## 2022-01-08 RX ADMIN — INSULIN DETEMIR 15 UNITS: 100 INJECTION, SOLUTION SUBCUTANEOUS at 09:01

## 2022-01-08 RX ADMIN — MUPIROCIN: 20 OINTMENT TOPICAL at 08:01

## 2022-01-08 RX ADMIN — ATORVASTATIN CALCIUM 20 MG: 10 TABLET, FILM COATED ORAL at 08:01

## 2022-01-08 RX ADMIN — SEVELAMER CARBONATE 800 MG: 800 TABLET, FILM COATED ORAL at 08:01

## 2022-01-08 RX ADMIN — VANCOMYCIN HYDROCHLORIDE 500 MG: 500 INJECTION, POWDER, LYOPHILIZED, FOR SOLUTION INTRAVENOUS at 05:01

## 2022-01-08 RX ADMIN — PIPERACILLIN AND TAZOBACTAM 4.5 G: 4; .5 INJECTION, POWDER, LYOPHILIZED, FOR SOLUTION INTRAVENOUS; PARENTERAL at 09:01

## 2022-01-08 RX ADMIN — HYDRALAZINE HYDROCHLORIDE 100 MG: 25 TABLET, FILM COATED ORAL at 06:01

## 2022-01-08 RX ADMIN — HEPARIN SODIUM 5000 UNITS: 5000 INJECTION INTRAVENOUS; SUBCUTANEOUS at 06:01

## 2022-01-08 RX ADMIN — PIPERACILLIN AND TAZOBACTAM 4.5 G: 4; .5 INJECTION, POWDER, LYOPHILIZED, FOR SOLUTION INTRAVENOUS; PARENTERAL at 08:01

## 2022-01-08 RX ADMIN — INSULIN ASPART 2 UNITS: 100 INJECTION, SOLUTION INTRAVENOUS; SUBCUTANEOUS at 09:01

## 2022-01-08 RX ADMIN — TRAZODONE HYDROCHLORIDE 50 MG: 50 TABLET ORAL at 09:01

## 2022-01-08 RX ADMIN — MUPIROCIN: 20 OINTMENT TOPICAL at 09:01

## 2022-01-08 RX ADMIN — PANTOPRAZOLE SODIUM 40 MG: 40 TABLET, DELAYED RELEASE ORAL at 08:01

## 2022-01-08 NOTE — PROGRESS NOTES
Adryan Neff is a 56 y.o. male patient.    Follow for ESRD, dialysis    Patient was brought to dialysis this morning, after several attempts per 2 different nurses, unable to cannulating AVF.  Patient return to room  No c/o, feeling frustrated    Scheduled Meds:   sodium chloride 0.9%   Intravenous Once    atorvastatin  20 mg Oral Daily    heparin (porcine)  5,000 Units Subcutaneous Q8H    hydrALAZINE  100 mg Oral Q8H    insulin detemir U-100  15 Units Subcutaneous QHS    lisinopriL  5 mg Oral Daily    metoprolol succinate  25 mg Oral Daily    mupirocin   Nasal BID    pantoprazole  40 mg Oral Daily    piperacillin-tazobactam (ZOSYN) IVPB  4.5 g Intravenous Q12H    sevelamer carbonate  800 mg Oral TID WM    traZODone  50 mg Oral QHS    vancomycin (VANCOCIN) IVPB  500 mg Intravenous Once       Review of patient's allergies indicates:  No Known Allergies      Vital Signs Range (Last 24H):  Temp:  [97.5 °F (36.4 °C)-98.7 °F (37.1 °C)]   Pulse:  [82-94]   Resp:  [17-20]   BP: (120-159)/(53-90)   SpO2:  [96 %-98 %]     I & O (Last 24H):    Intake/Output Summary (Last 24 hours) at 1/8/2022 1604  Last data filed at 1/8/2022 1250  Gross per 24 hour   Intake 600 ml   Output --   Net 600 ml           Physical Exam:  General appearance: well developed, well nourished, no distress  Lungs:  clear to auscultation bilaterally and normal respiratory effort  Heart: regular rate and rhythm  Abdomen: soft, non-tender non-distented; bowel sounds normal; no masses,  no organomegaly  Extremities: no cyanosis or edema, or clubbing    Laboratory:  I have reviewed all pertinent lab results within the past 24 hours.  CBC:   Recent Labs   Lab 01/08/22  0614   WBC 16.52*   RBC 3.33*   HGB 9.1*   HCT 31.1*      MCV 93   MCH 27.3   MCHC 29.3*     CMP:   Recent Labs   Lab 01/05/22  1232 01/06/22  0812 01/07/22  1120   *   < > 180*   CALCIUM 8.6*   < > 8.4*   ALBUMIN 2.4*  --   --    PROT 9.0*  --   --       < >  134*   K 4.7   < > 4.8   CO2 26   < > 25   CL 98   < > 97   BUN 38*   < > 28*   CREATININE 9.3*   < > 8.8*   ALKPHOS 94  --   --    ALT 7*  --   --    AST 13  --   --    BILITOT 0.4  --   --     < > = values in this interval not displayed.       Imp/pan    ESRD  COVID-19 infection  (L) foot gangrene  HTN  DM type 2  Anemia of CKD    Patient is asymptomatic  Check BMP in am  Unless problem with elevated K or fluid overload, will see if IR can do fistulogram on Monday, and HD afterward        Wil Fine  1/8/2022

## 2022-01-08 NOTE — PLAN OF CARE
OT evaluation completed and goals set.     Problem: Occupational Therapy Goal  Goal: Occupational Therapy Goal  Description: Goals to be met by: 2/8/2022    Patient will increase functional independence with ADLs by performing:    LE Dressing with Minimal Assistance.  Grooming while standing with Stand-by Assistance  Toileting from bedside commode with Stand-by Assistance for hygiene and clothing management  Supine to sit with Stand-by Assistance.  Toilet transfer to bedside commode with Stand-by Assistance    Outcome: Ongoing, Progressing

## 2022-01-08 NOTE — ASSESSMENT & PLAN NOTE
Vascular and ortho consulted. Likely needs amputation. Discussed with Dr. Soni. In comparison of recent pictures by podiatry, his gangrene has worsened and now involves all toes. See pics below.   Continue IV abx. Pain control.     Follow vasc. Ortho recs     S/p amputation of toes.  Patient would like to try to salvage limb.  Awaiting next vascular recs.   PT/OT consulted. Continue Abx.     Angiogram on Mon or Tues per Vascular. Continue wound care/Abx

## 2022-01-08 NOTE — PT/OT/SLP EVAL
Physical Therapy Evaluation    Patient Name:  Adryan Neff   MRN:  [id number]    Recommendations:     Discharge Recommendations:  nursing facility, skilled,home with home health (To be determined)   Discharge Equipment Recommendations: walker, rolling   Barriers to discharge: None    Assessment:     Adryan Neff is a 56 y.o. male admitted with a medical diagnosis of Gangrene of left foot.  He presents with the following impairments/functional limitations:  weakness,impaired endurance,impaired functional mobilty,gait instability,decreased lower extremity function,pain .    Rehab Prognosis: Good; patient would benefit from acute skilled PT services to address these deficits and reach maximum level of function.    Recent Surgery: Procedure(s) (LRB):  AMPUTATION, TOE (Left) 2 Days Post-Op    Plan:     During this hospitalization, patient to be seen daily to address the identified rehab impairments via gait training,therapeutic activities,therapeutic exercises and progress toward the following goals:    · Plan of Care Expires:  01/14/22    Subjective     Chief Complaint: Pain (L) foot  Patient/Family Comments/goals: unstated; language barrier  Pain/Comfort:  · Pain Rating 1: 7/10  · Pain Addressed 1: Reposition    Patients cultural, spiritual, Yazidi conflicts given the current situation: no    Living Environment:  PTA pt reports living with his son and daughter in an apartment.  Prior to admission, patients level of function was independent.  Equipment used at home: walker, rolling.  DME owned (not currently used): none.  Upon discharge, patient will have assistance from family.    Objective:     Communicated with nurseRoberta prior to session.  Patient found supine with peripheral IV  upon PT entry to room.    General Precautions: Standard, droplet   Orthopedic Precautions:LLE toe touch weight bearing   Braces: N/A  Respiratory Status: Room air    Exams:  · RLE ROM: WFL  · RLE Strength: unable to adequately test  · LLE  ROM: WFL  · LLE Strength: unable to adequately test    Functional Mobility:  · Bed Mobility:     · Supine to Sit: stand by assistance  · Sit to Supine: stand by assistance    Therapeutic Activities and Exercises:   Pt sat EOB, however reluctant.    AM-PAC 6 CLICK MOBILITY  Total Score:17     Patient left supine with all lines intact and call button in reach.    GOALS:   Multidisciplinary Problems     Physical Therapy Goals        Problem: Physical Therapy Goal    Goal Priority Disciplines Outcome Goal Variances Interventions   Physical Therapy Goal     PT, PT/OT      Description: 1. Pt to be mod I with bed mobility.  2. Pt to transfer with SBA.  3. Pt to ambulate 15' w/RW TDWB LLE.                   History:     Past Medical History:   Diagnosis Date    CAD (coronary artery disease) 2016    CAD (non obstructive) 2016 [facility]    Diabetes mellitus type I     Diabetic retinopathy     ESRD on hemodialysis     on HD TThSat    Gangrene of left foot     Hypertension     Nuclear sclerosis of right eye 11/24/2021    PAD (peripheral artery disease)     Pulmonary HTN     TB lung, latent 04/2021       Past Surgical History:   Procedure Laterality Date    AV FISTULA PLACEMENT      CATARACT EXTRACTION Left     EYE SURGERY      TOE AMPUTATION Left 1/6/2022    Procedure: AMPUTATION, TOE;  Surgeon: Masoud Soni MD;  Location: [facility] OR;  Service: Vascular;  Laterality: Left;  Left first through fifth toes, possible open transmetatarsal amputation and all other indicated procedures       Time Tracking:     PT Received On: 01/08/22  PT Start Time: 0950     PT Stop Time: 1004  PT Total Time (min): 14 min     Billable Minutes: Evaluation 14      01/08/2022

## 2022-01-08 NOTE — PLAN OF CARE
01/08/22 1621   Discharge Assessment   Assessment Type Discharge Planning Assessment   TN attempted without success to complete assessment with patient via video chat.  TN called patient's daughter @ [phone], no answer, detailed message left requesting call back.

## 2022-01-08 NOTE — PT/OT/SLP EVAL
Occupational Therapy   Evaluation    Name: Adryan Neff  MRN: [id number]  Admitting Diagnosis:  Gangrene of left foot  Recent Surgery: Procedure(s) (LRB):  AMPUTATION, TOE (Left) 2 Days Post-Op    Recommendations:     Discharge Recommendations: nursing facility, skilled  Discharge Equipment Recommendations:  walker, rolling,bedside commode  Barriers to discharge:  Other (Comment) (required increased assistance for ADL's and functional mobility)    Assessment:     Adryan Neff is a 56 y.o. male with a medical diagnosis of Gangrene of left foot.  He presents with increased pain to left foot which is limiting his ability to perform ADL's in standing as well as transfers to bedside commode. Performance deficits affecting function: weakness,impaired endurance,impaired functional mobilty,gait instability,impaired balance,impaired self care skills,impaired sensation,pain,decreased lower extremity function,impaired skin,orthopedic precautions.      Rehab Prognosis: Good; patient would benefit from acute skilled OT services to address these deficits and reach maximum level of function.       Plan:     Patient to be seen 5 x/week to address the above listed problems via self-care/home management,therapeutic activities,therapeutic exercises,neuromuscular re-education  · Plan of Care Expires: 02/08/22  · Plan of Care Reviewed with: patient    Subjective     Chief Complaint: Pain in left foot.   Patient/Family Comments/goals: To get back to PLOF.     Occupational Profile: Language barrier made it difficult for patient to answer all PLOF questions.   Living Environment: Patient lives with his daughter in an apartment.   Previous level of function: He was independent with ADL's and functional mobility. He was driving.  Roles and Routines: Father  Equipment Used at Home:  none  Assistance upon Discharge: Daughter     Pain/Comfort:  · Pain Rating 1:  (yes but did not rate with number)  · Location - Side 1: Left  · Location 1: foot  · Pain  "Addressed 1: Reposition,Distraction,Cessation of Activity    Patients cultural, spiritual, Denominational conflicts given the current situation:  n/a    Objective:     Communicated with: RN prior to session.  Patient found right sidelying with peripheral IV upon OT entry to room.    General Precautions: Standard, airborne,contact,droplet,fall (covid +)   Orthopedic Precautions: ("touch down weight bearing" to LLE)   Braces: N/A    Occupational Performance:    Bed Mobility:    · Patient completed Scooting/Bridging with stand by assistance  · Patient completed Supine to Sit with stand by assistance  · Patient completed Sit to Supine with minimum assistance    Functional Mobility/Transfers:  · Patient declined to perform due to pain in left foot.     Activities of Daily Living:  · Feeding:  independence to drink from cup   · Grooming: supervision to brush teeth including opening tooth paste tube and mouth wash bottle   · Upper Body Dressing: minimal assistance to don back gown  · Lower Body Dressing: maximal assistance to don right sock     Cognitive/Visual Perceptual:  Cognitive/Psychosocial Skills:     -       Oriented to: Person, Place, Time and Situation   -       Follows Commands/attention:Follows one-step commands  -       Communication: clear/fluent  -       Memory: No Deficits noted  -       Safety awareness/insight to disability: intact   -       Mood/Affect/Coping skills/emotional control: Appropriate to situation    Physical Exam:  Dominant hand:    -       right  Upper Extremity Range of Motion:     -       Right Upper Extremity: WFL except limited to 90 degrees at shoulder   -       Left Upper Extremity: WFL except limited to 90 degrees at shoulder   Upper Extremity Strength:    -       Right Upper Extremity: WFL  -       Left Upper Extremity: WFL   Strength:    -       Right Upper Extremity: WFL  -       Left Upper Extremity: WFL  Gross motor coordination:   WFL    AMPAC 6 Click ADL:  AMPAC Total Score: " 17    Treatment & Education:  Patient sat edge of bed ~15 minutes with supervision to perform ADL's. Patient educated in OT role and plan of care.   Education:    Patient left right sidelying with all lines intact and call button in reach.    GOALS:   Multidisciplinary Problems     Occupational Therapy Goals        Problem: Occupational Therapy Goal    Goal Priority Disciplines Outcome Interventions   Occupational Therapy Goal     OT, PT/OT Ongoing, Progressing    Description: Goals to be met by: 2/8/2022    Patient will increase functional independence with ADLs by performing:    LE Dressing with Minimal Assistance.  Grooming while standing with Stand-by Assistance  Toileting from bedside commode with Stand-by Assistance for hygiene and clothing management  Supine to sit with Stand-by Assistance.  Toilet transfer to bedside commode with Stand-by Assistance                     History:     Past Medical History:   Diagnosis Date    CAD (coronary artery disease) 2016    CAD (non obstructive) 2016 [facility]    Diabetes mellitus type I     Diabetic retinopathy     ESRD on hemodialysis     on HD TThSat    Gangrene of left foot     Hypertension     Nuclear sclerosis of right eye 11/24/2021    PAD (peripheral artery disease)     Pulmonary HTN     TB lung, latent 04/2021       Past Surgical History:   Procedure Laterality Date    AV FISTULA PLACEMENT      CATARACT EXTRACTION Left     EYE SURGERY      TOE AMPUTATION Left 1/6/2022    Procedure: AMPUTATION, TOE;  Surgeon: Masoud Soni MD;  Location: [facility] OR;  Service: Vascular;  Laterality: Left;  Left first through fifth toes, possible open transmetatarsal amputation and all other indicated procedures       Time Tracking:     OT Date of Treatment: 01/08/22  OT Start Time: 1026  OT Stop Time: 1047  OT Total Time (min): 21 min    Billable Minutes:Evaluation 21 1/8/2022

## 2022-01-08 NOTE — PROGRESS NOTES
Unable to start dialysis tx today d/t access complications. Unsuccessfully cannulations by myself and additional dialysis RN, Alvarez to venous end greater than 3 times. Notified Dr. Fine of access issues. NON for CMP in am. Informed that he would have vascular look at it possibly on Monday. Report given to TAMIKA Escobar LPN via secure chat/pt returned to room.

## 2022-01-08 NOTE — SUBJECTIVE & OBJECTIVE
Interval History: No new issues     Review of Systems   Constitutional: Negative for activity change, appetite change, chills, diaphoresis, fatigue and fever.   HENT: Negative for congestion, sinus pressure, sore throat and tinnitus.    Eyes: Negative for visual disturbance.   Respiratory: Negative for cough, chest tightness, shortness of breath and wheezing.    Cardiovascular: Negative for chest pain, palpitations and leg swelling.   Gastrointestinal: Negative for abdominal distention, abdominal pain, nausea and vomiting.   Endocrine: Negative for polydipsia, polyphagia and polyuria.   Genitourinary: Negative for dysuria.   Musculoskeletal: Positive for arthralgias. Negative for back pain.   Skin: Negative for rash and wound.   Neurological: Negative for dizziness, syncope, light-headedness and headaches.   Psychiatric/Behavioral: Negative for confusion.     Objective:     Vital Signs (Most Recent):  Temp: 97.8 °F (36.6 °C) (01/08/22 0809)  Pulse: 87 (01/08/22 0809)  Resp: 18 (01/08/22 0847)  BP: (!) 120/57 (01/08/22 0809)  SpO2: 98 % (01/08/22 0809) Vital Signs (24h Range):  Temp:  [97.5 °F (36.4 °C)-99.7 °F (37.6 °C)] 97.8 °F (36.6 °C)  Pulse:  [82-95] 87  Resp:  [17-20] 18  SpO2:  [96 %-98 %] 98 %  BP: (120-159)/(57-90) 120/57     Weight: 64.3 kg (141 lb 12.1 oz)  Body mass index is 20.34 kg/m².    Intake/Output Summary (Last 24 hours) at 1/8/2022 1010  Last data filed at 1/7/2022 1800  Gross per 24 hour   Intake 360 ml   Output --   Net 360 ml      Physical Exam  Vitals and nursing note reviewed.   Constitutional:       General: He is not in acute distress.     Appearance: Normal appearance. He is well-developed. He is not ill-appearing or toxic-appearing.   HENT:      Head: Normocephalic and atraumatic.      Right Ear: External ear normal.      Left Ear: External ear normal.      Nose: Nose normal.      Mouth/Throat:      Pharynx: Uvula midline.   Eyes:      General: Lids are normal.      Conjunctiva/sclera:  Conjunctivae normal.      Pupils: Pupils are equal, round, and reactive to light.   Neck:      Thyroid: No thyroid mass.      Vascular: No JVD.      Trachea: Trachea normal.   Cardiovascular:      Rate and Rhythm: Normal rate and regular rhythm.      Heart sounds: Normal heart sounds, S1 normal and S2 normal.   Pulmonary:      Effort: Pulmonary effort is normal.      Breath sounds: Normal breath sounds.   Musculoskeletal:      Cervical back: Full passive range of motion without pain, normal range of motion and neck supple.      Right lower leg: No edema.      Left lower leg: No edema.      Comments: L foot with gangrene to all metatarsals. Also ulceration/ blister on dorsal surface.   Neurological:      Mental Status: He is alert and oriented to person, place, and time.      Cranial Nerves: No cranial nerve deficit.      Sensory: No sensory deficit.   Psychiatric:         Speech: Speech normal.         Behavior: Behavior normal. Behavior is cooperative.         Thought Content: Thought content normal.         Significant Labs:   All pertinent labs within the past 24 hours have been reviewed.  BMP:   Recent Labs   Lab 01/07/22  1120   *   *   K 4.8   CL 97   CO2 25   BUN 28*   CREATININE 8.8*   CALCIUM 8.4*     CBC:   Recent Labs   Lab 01/07/22  0714 01/08/22  0614   WBC 14.08* 16.52*   HGB 9.9* 9.1*   HCT 33.4* 31.1*    434

## 2022-01-08 NOTE — PROGRESS NOTES
[facility] Medicine  Progress Note    Patient Name: Adryan Neff  MRN: [id number]  Patient Class: IP- Inpatient   Admission Date: 1/5/2022  Length of Stay: 3 days  Attending Physician: Nikolay Sawyer MD  Primary Care Provider: Soren Rice MD        Subjective:     Principal Problem:Gangrene of left foot        HPI:  This is a 56 y.o.male patient, with a PMHx of ESRD on hemodialysis, CAD, HTN, and DM, presenting to the ED for further evaluation of left foot pain and black discoloration that began 3 days ago. Patient states these symptoms have been ongoing for the last month. He reports being referred to a specialist but denies ever following up. He has been putting some otc medication on the foot that he believes has turned his foot more black. No trauma or injury. Patient reports associated chills. Patient states applying a creme to the foot and noticed his discoloration worsened. Patient reports he was last dialyzed yesterday. Patient denies any fever, shortness of breath, chest pain, neck pain, back pain, abdominal pain, rash, headaches, congestion, rhinorrhea, cough, sore throat, ear pain, eye pain, blurred vision, nausea, vomiting, diarrhea, dysuria, or any other associated symptoms.    Per chart review, he had an arterial u/s 12/15.   Impression:   1. High-grade severe stenosis right popliteal artery with occlusion anterior tibial artery which is reconstituted at level of DPA via collaterals.  2. 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    Cardiology has been planning to see him for revascularization options. Last podiatry visit 12/13 with Stable dry gangrene L 2nd toe and medial L 5th toe. Dorsalis pedis and posterior tibial pulses are diminished Bilaterally. Toes are cool to touch. Feet are warm proximally.There is decreased digital hair. Skin is atrophic, slightly hyperpigmented, and mildly edematous     In the ED, he was found to have worsened gangrene and a  cool extremity. Vascular surgery was consulted. He was also COVID +. Says he got a booster shot in November sometime.      Overview/Hospital Course:  Patient admitted to the hospital for eval and treatment of L foot gangrene.  Ortho and Vasc consulted. Nephrology was also consulted for chronic dialysis. Patient had several toes on L foot amputated.  Continued with dialysis. PT/OT consulted.       Interval History: No new issues     Review of Systems   Constitutional: Negative for activity change, appetite change, chills, diaphoresis, fatigue and fever.   HENT: Negative for congestion, sinus pressure, sore throat and tinnitus.    Eyes: Negative for visual disturbance.   Respiratory: Negative for cough, chest tightness, shortness of breath and wheezing.    Cardiovascular: Negative for chest pain, palpitations and leg swelling.   Gastrointestinal: Negative for abdominal distention, abdominal pain, nausea and vomiting.   Endocrine: Negative for polydipsia, polyphagia and polyuria.   Genitourinary: Negative for dysuria.   Musculoskeletal: Positive for arthralgias. Negative for back pain.   Skin: Negative for rash and wound.   Neurological: Negative for dizziness, syncope, light-headedness and headaches.   Psychiatric/Behavioral: Negative for confusion.     Objective:     Vital Signs (Most Recent):  Temp: 97.8 °F (36.6 °C) (01/08/22 0809)  Pulse: 87 (01/08/22 0809)  Resp: 18 (01/08/22 0847)  BP: (!) 120/57 (01/08/22 0809)  SpO2: 98 % (01/08/22 0809) Vital Signs (24h Range):  Temp:  [97.5 °F (36.4 °C)-99.7 °F (37.6 °C)] 97.8 °F (36.6 °C)  Pulse:  [82-95] 87  Resp:  [17-20] 18  SpO2:  [96 %-98 %] 98 %  BP: (120-159)/(57-90) 120/57     Weight: 64.3 kg (141 lb 12.1 oz)  Body mass index is 20.34 kg/m².    Intake/Output Summary (Last 24 hours) at 1/8/2022 1010  Last data filed at 1/7/2022 1800  Gross per 24 hour   Intake 360 ml   Output --   Net 360 ml      Physical Exam  Vitals and nursing note reviewed.   Constitutional:        General: He is not in acute distress.     Appearance: Normal appearance. He is well-developed. He is not ill-appearing or toxic-appearing.   HENT:      Head: Normocephalic and atraumatic.      Right Ear: External ear normal.      Left Ear: External ear normal.      Nose: Nose normal.      Mouth/Throat:      Pharynx: Uvula midline.   Eyes:      General: Lids are normal.      Conjunctiva/sclera: Conjunctivae normal.      Pupils: Pupils are equal, round, and reactive to light.   Neck:      Thyroid: No thyroid mass.      Vascular: No JVD.      Trachea: Trachea normal.   Cardiovascular:      Rate and Rhythm: Normal rate and regular rhythm.      Heart sounds: Normal heart sounds, S1 normal and S2 normal.   Pulmonary:      Effort: Pulmonary effort is normal.      Breath sounds: Normal breath sounds.   Musculoskeletal:      Cervical back: Full passive range of motion without pain, normal range of motion and neck supple.      Right lower leg: No edema.      Left lower leg: No edema.      Comments: L foot with gangrene to all metatarsals. Also ulceration/ blister on dorsal surface.   Neurological:      Mental Status: He is alert and oriented to person, place, and time.      Cranial Nerves: No cranial nerve deficit.      Sensory: No sensory deficit.   Psychiatric:         Speech: Speech normal.         Behavior: Behavior normal. Behavior is cooperative.         Thought Content: Thought content normal.         Significant Labs:   All pertinent labs within the past 24 hours have been reviewed.  BMP:   Recent Labs   Lab 01/07/22  1120   *   *   K 4.8   CL 97   CO2 25   BUN 28*   CREATININE 8.8*   CALCIUM 8.4*     CBC:   Recent Labs   Lab 01/07/22  0714 01/08/22  0614   WBC 14.08* 16.52*   HGB 9.9* 9.1*   HCT 33.4* 31.1*    434           Assessment/Plan:      * Gangrene of left foot  Vascular and ortho consulted. Likely needs amputation. Discussed with Dr. Soni. In comparison of recent pictures by  podiatry, his gangrene has worsened and now involves all toes. See pics below.   Continue IV abx. Pain control.     Follow vasc. Ortho recs     S/p amputation of toes.  Patient would like to try to salvage limb.  Awaiting next vascular recs.   PT/OT consulted. Continue Abx.     Angiogram on Mon or Tues per Vascular. Continue wound care/Abx     COVID-19 in immunocompromised patient  HD patient + for COVID 19. Not hypoxic or symptomatic. Supportive care prn.       ESRD on hemodialysis  Nephrology consulted to resume routine HD.     Chronic diastolic heart failure  December 2021 echo reviewed. Stable.       Essential (primary) hypertension  Chronic, uncontrolled.  Latest blood pressure and vitals reviewed-   Temp:  [98.3 °F (36.8 °C)]   Pulse:  []   Resp:  [16-18]   BP: (173-215)/(76-99)   SpO2:  [99 %-100 %] .   Home meds for hypertension were reviewed and noted below.   Hypertension Medications             hydrALAZINE (APRESOLINE) 100 MG tablet Take 1 tablet (100 mg total) by mouth every 8 (eight) hours.    lisinopriL (PRINIVIL,ZESTRIL) 2.5 MG tablet Take 2.5 mg by mouth once daily.    lisinopriL (PRINIVIL,ZESTRIL) 5 MG tablet Take 5 mg by mouth once daily.    metoprolol succinate (TOPROL-XL) 25 MG 24 hr tablet Take 1 tablet (25 mg total) by mouth once daily.          While in the hospital, will manage blood pressure as follows; Continue home antihypertensive regimen (will use higher dose of lisinopril). Adjust as needed.     Will utilize p.r.n. blood pressure medication only if patient's blood pressure greater than  180/110 and he develops symptoms such as worsening chest pain or shortness of breath.      Controlled type 2 diabetes mellitus with chronic kidney disease on chronic dialysis, with long-term current use of insulin  Patient's FSGs are controlled on current medication regimen.  Last A1c reviewed-   Lab Results   Component Value Date    HGBA1C 6.1 (H) 01/07/2021     Most recent fingerstick glucose  reviewed-   Recent Labs   Lab 01/05/22  1153   POCTGLUCOSE 224*     Current correctional scale  Medium  Maintain anti-hyperglycemic dose as follows-   Antihyperglycemics (From admission, onward)            Start     Stop Route Frequency Ordered    01/05/22 2100  insulin detemir U-100 pen 15 Units         -- SubQ Nightly 01/05/22 1404    01/05/22 1503  insulin aspart U-100 pen 1-10 Units         -- SubQ Before meals & nightly PRN 01/05/22 1404        Hold Oral hypoglycemics while patient is in the hospital.          VTE Risk Mitigation (From admission, onward)         Ordered     heparin (porcine) injection 5,000 Units  Every 8 hours         01/05/22 1404     IP VTE HIGH RISK PATIENT  Once         01/05/22 1404     Place sequential compression device  Until discontinued         01/05/22 1404                Discharge Planning   ENIO:      Code Status: Full Code   Is the patient medically ready for discharge?:     Reason for patient still in hospital (select all that apply): Patient unstable                     Nikolay Burch MD  Department of Hospital Medicine   [facility]

## 2022-01-08 NOTE — PROGRESS NOTES
Pharmacokinetic Assessment Follow Up: IV Vancomycin    Vancomycin serum concentration assessment(s):    The random level was drawn correctly and can be used to guide therapy at this time. The measurement is within the desired definitive target range of 10 to 20 mcg/mL.    Vancomycin Regimen Plan:    Vancomycin 500mg to be given post HD.    Re-dose when the random level is less than 20 mcg/mL, next level to be drawn at 0400 on 1/9/22    Drug levels (last 3 results):  Recent Labs   Lab Result Units 01/06/22  0812 01/07/22  0458 01/08/22  0614   Vancomycin, Random ug/mL 29.0 21.5 18.9       Pharmacy will continue to follow and monitor vancomycin.    Please contact pharmacy at extension [phone] for questions regarding this assessment.    Thank you for the consult,   Douglas Falcon       Patient brief summary:  Adryan Neff is a 56 y.o. male initiated on antimicrobial therapy with IV Vancomycin for treatment of bone/joint infection    The patient's current regimen is random pulse dosing    Drug Allergies:   Review of patient's allergies indicates:  No Known Allergies    Actual Body Weight:   64.3 kg    Renal Function:   Estimated Creatinine Clearance: 8.4 mL/min (A) (based on SCr of 8.8 mg/dL (H)).,     Dialysis Method (if applicable):  intermittent HD    CBC (last 72 hours):  Recent Labs   Lab Result Units 01/05/22  1232 01/06/22  0812 01/07/22  0714 01/08/22  0614   WBC K/uL 15.22* 14.30* 14.08* 16.52*   Hemoglobin g/dL 10.8* 9.7* 9.9* 9.1*   Hematocrit % 37.8* 32.6* 33.4* 31.1*   Platelets K/uL 425 450 437 434   Gran % % 82.8* 81.0* 82.4* 81.1*   Lymph % % 7.0* 8.5* 7.8* 7.4*   Mono % % 7.7 7.3 7.5 8.8   Eosinophil % % 1.5 2.2 1.3 1.5   Basophil % % 0.3 0.3 0.5 0.5   Differential Method  Automated Automated Automated Automated       Metabolic Panel (last 72 hours):  Recent Labs   Lab Result Units 01/05/22  1232 01/06/22  0812 01/07/22  1120   Sodium mmol/L 138 136 134*   Potassium mmol/L 4.7 4.7 4.8   Chloride  mmol/L 98 97 97   CO2 mmol/L 26 24 25   Glucose mg/dL 212* 120* 180*   BUN mg/dL 38* 45* 28*   Creatinine mg/dL 9.3* 11.4* 8.8*   Albumin g/dL 2.4*  --   --    Total Bilirubin mg/dL 0.4  --   --    Alkaline Phosphatase U/L 94  --   --    AST U/L 13  --   --    ALT U/L 7*  --   --        Vancomycin Administrations:  vancomycin given in the last 96 hours                     vancomycin 1.5 g in dextrose 5 % 250 mL IVPB (ready to mix) (mg) 1,500 mg New Bag 01/05/22 1351                    Microbiologic Results:  Microbiology Results (last 7 days)       Procedure Component Value Units Date/Time    Blood culture [[id number]] Collected: 01/05/22 1308    Order Status: Completed Specimen: Blood from Peripheral, Hand, Right Updated: 01/07/22 1503     Blood Culture, Routine No Growth to date      No Growth to date      No Growth to date    Blood culture [[id number]] Collected: 01/05/22 1309    Order Status: Completed Specimen: Blood from Peripheral, Wrist, Right Updated: 01/07/22 1503     Blood Culture, Routine No Growth to date      No Growth to date      No Growth to date    Aerobic culture [[id number]] Collected: 01/06/22 1100    Order Status: Completed Specimen: Wound from Foot, Left Updated: 01/07/22 1037     Aerobic Bacterial Culture Further report to follow    Narrative:      Left foot metatarsal  Proximal margin - left foot    Aerobic culture [[id number]] Collected: 01/06/22 1100    Order Status: Completed Specimen: Wound from Foot, Left Updated: 01/07/22 1034    AFB Culture & Smear [[id number]] Collected: 01/06/22 1100    Order Status: Sent Specimen: Wound from Foot, Left Updated: 01/07/22 1005    AFB Culture & Smear [[id number]] Collected: 01/06/22 1100    Order Status: Sent Specimen: Wound from Foot, Left Updated: 01/07/22 1005    Aerobic culture (Specify Source) **CANNOT BE ORDERED AS STAT** [[id number]]  (Abnormal)  (Susceptibility) Collected: 01/05/22 1315    Order Status: Completed Specimen: Wound from Foot, Left  Updated: 01/07/22 0849     Aerobic Bacterial Culture CITROBACTER FREUNDII  Many        GRAM NEGATIVE JOHN, NON-LACTOSE   Many  Identification and susceptibility pending        PSEUDOMONAS AERUGINOSA  Moderate      Gram stain [[id number]] Collected: 01/06/22 1100    Order Status: Completed Specimen: Wound from Foot, Left Updated: 01/06/22 1451     Gram Stain Result Few WBC's      Moderate Gram negative rods      Few Gram positive cocci    Narrative:      Left foot metatarsal  Proximal margin - left foot    Gram stain [[id number]] Collected: 01/06/22 1100    Order Status: Completed Specimen: Wound from Foot, Left Updated: 01/06/22 1450     Gram Stain Result Few WBC's      Few Gram negative rods      Few Gram positive cocci    Fungus culture [[id number]] Collected: 01/06/22 1100    Order Status: Sent Specimen: Wound from Foot, Left Updated: 01/06/22 1350    Fungus culture [[id number]] Collected: 01/06/22 1100    Order Status: Sent Specimen: Wound from Foot, Left Updated: 01/06/22 1346    Culture, Anaerobe [[id number]] Collected: 01/06/22 1100    Order Status: Sent Specimen: Wound from Foot, Left Updated: 01/06/22 1346    Culture, Anaerobe [[id number]] Collected: 01/06/22 1100    Order Status: Sent Specimen: Wound from Foot, Left Updated: 01/06/22 1345

## 2022-01-09 LAB
ALBUMIN SERPL BCP-MCNC: 1.9 G/DL (ref 3.5–5.2)
ALP SERPL-CCNC: 70 U/L (ref 55–135)
ALT SERPL W/O P-5'-P-CCNC: 5 U/L (ref 10–44)
ANION GAP SERPL CALC-SCNC: 17 MMOL/L (ref 8–16)
AST SERPL-CCNC: 12 U/L (ref 10–40)
BACTERIA BLD CULT: NORMAL
BACTERIA BLD CULT: NORMAL
BACTERIA SPEC AEROBE CULT: ABNORMAL
BACTERIA SPEC AEROBE CULT: ABNORMAL
BASOPHILS # BLD AUTO: 0.09 K/UL (ref 0–0.2)
BASOPHILS NFR BLD: 0.5 % (ref 0–1.9)
BILIRUB SERPL-MCNC: 0.3 MG/DL (ref 0.1–1)
BUN SERPL-MCNC: 55 MG/DL (ref 6–20)
CALCIUM SERPL-MCNC: 8.7 MG/DL (ref 8.7–10.5)
CHLORIDE SERPL-SCNC: 97 MMOL/L (ref 95–110)
CO2 SERPL-SCNC: 20 MMOL/L (ref 23–29)
CREAT SERPL-MCNC: 13.5 MG/DL (ref 0.5–1.4)
DIFFERENTIAL METHOD: ABNORMAL
EOSINOPHIL # BLD AUTO: 0.4 K/UL (ref 0–0.5)
EOSINOPHIL NFR BLD: 2.2 % (ref 0–8)
ERYTHROCYTE [DISTWIDTH] IN BLOOD BY AUTOMATED COUNT: 15.6 % (ref 11.5–14.5)
EST. GFR  (AFRICAN AMERICAN): 4 ML/MIN/1.73 M^2
EST. GFR  (NON AFRICAN AMERICAN): 4 ML/MIN/1.73 M^2
GLUCOSE SERPL-MCNC: 103 MG/DL (ref 70–110)
HCT VFR BLD AUTO: 31.9 % (ref 40–54)
HGB BLD-MCNC: 9.2 G/DL (ref 14–18)
IMM GRANULOCYTES # BLD AUTO: 0.11 K/UL (ref 0–0.04)
IMM GRANULOCYTES NFR BLD AUTO: 0.6 % (ref 0–0.5)
LACTATE SERPL-SCNC: 0.6 MMOL/L (ref 0.5–2.2)
LYMPHOCYTES # BLD AUTO: 1.4 K/UL (ref 1–4.8)
LYMPHOCYTES NFR BLD: 8.1 % (ref 18–48)
MCH RBC QN AUTO: 27.5 PG (ref 27–31)
MCHC RBC AUTO-ENTMCNC: 28.8 G/DL (ref 32–36)
MCV RBC AUTO: 95 FL (ref 82–98)
MONOCYTES # BLD AUTO: 1.2 K/UL (ref 0.3–1)
MONOCYTES NFR BLD: 6.7 % (ref 4–15)
NEUTROPHILS # BLD AUTO: 14 K/UL (ref 1.8–7.7)
NEUTROPHILS NFR BLD: 81.9 % (ref 38–73)
NRBC BLD-RTO: 0 /100 WBC
PLATELET # BLD AUTO: 468 K/UL (ref 150–450)
PMV BLD AUTO: 9.7 FL (ref 9.2–12.9)
POCT GLUCOSE: 164 MG/DL (ref 70–110)
POCT GLUCOSE: 195 MG/DL (ref 70–110)
POCT GLUCOSE: 90 MG/DL (ref 70–110)
POTASSIUM SERPL-SCNC: 4.8 MMOL/L (ref 3.5–5.1)
PROT SERPL-MCNC: 8.5 G/DL (ref 6–8.4)
RBC # BLD AUTO: 3.35 M/UL (ref 4.6–6.2)
SODIUM SERPL-SCNC: 134 MMOL/L (ref 136–145)
VANCOMYCIN SERPL-MCNC: 25.5 UG/ML
WBC # BLD AUTO: 17.1 K/UL (ref 3.9–12.7)

## 2022-01-09 PROCEDURE — 27000207 HC ISOLATION

## 2022-01-09 PROCEDURE — 21400001 HC TELEMETRY ROOM

## 2022-01-09 PROCEDURE — 36415 COLL VENOUS BLD VENIPUNCTURE: CPT | Performed by: INTERNAL MEDICINE

## 2022-01-09 PROCEDURE — 25000003 PHARM REV CODE 250: Performed by: FAMILY MEDICINE

## 2022-01-09 PROCEDURE — 97110 THERAPEUTIC EXERCISES: CPT | Mod: CQ

## 2022-01-09 PROCEDURE — 83605 ASSAY OF LACTIC ACID: CPT | Performed by: HOSPITALIST

## 2022-01-09 PROCEDURE — 80053 COMPREHEN METABOLIC PANEL: CPT | Performed by: INTERNAL MEDICINE

## 2022-01-09 PROCEDURE — 63600175 PHARM REV CODE 636 W HCPCS: Performed by: INTERNAL MEDICINE

## 2022-01-09 PROCEDURE — 63600175 PHARM REV CODE 636 W HCPCS: Performed by: FAMILY MEDICINE

## 2022-01-09 PROCEDURE — 94760 N-INVAS EAR/PLS OXIMETRY 1: CPT

## 2022-01-09 PROCEDURE — 25000003 PHARM REV CODE 250: Performed by: HOSPITALIST

## 2022-01-09 PROCEDURE — 63600175 PHARM REV CODE 636 W HCPCS: Performed by: HOSPITALIST

## 2022-01-09 PROCEDURE — 87040 BLOOD CULTURE FOR BACTERIA: CPT | Mod: 59 | Performed by: HOSPITALIST

## 2022-01-09 PROCEDURE — 85025 COMPLETE CBC W/AUTO DIFF WBC: CPT | Performed by: HOSPITALIST

## 2022-01-09 PROCEDURE — 80202 ASSAY OF VANCOMYCIN: CPT | Performed by: INTERNAL MEDICINE

## 2022-01-09 PROCEDURE — 25000003 PHARM REV CODE 250: Performed by: INTERNAL MEDICINE

## 2022-01-09 RX ORDER — HYDROMORPHONE HYDROCHLORIDE 2 MG/ML
1 INJECTION, SOLUTION INTRAMUSCULAR; INTRAVENOUS; SUBCUTANEOUS
Status: DISCONTINUED | OUTPATIENT
Start: 2022-01-09 | End: 2022-01-12

## 2022-01-09 RX ORDER — HYDROMORPHONE HYDROCHLORIDE 2 MG/ML
1 INJECTION, SOLUTION INTRAMUSCULAR; INTRAVENOUS; SUBCUTANEOUS ONCE
Status: COMPLETED | OUTPATIENT
Start: 2022-01-09 | End: 2022-01-09

## 2022-01-09 RX ORDER — CARVEDILOL 6.25 MG/1
6.25 TABLET ORAL 2 TIMES DAILY
Status: DISCONTINUED | OUTPATIENT
Start: 2022-01-09 | End: 2022-01-27 | Stop reason: HOSPADM

## 2022-01-09 RX ORDER — OXYCODONE HYDROCHLORIDE 5 MG/1
10 TABLET ORAL EVERY 4 HOURS PRN
Status: DISCONTINUED | OUTPATIENT
Start: 2022-01-09 | End: 2022-01-12

## 2022-01-09 RX ORDER — SODIUM CHLORIDE 9 MG/ML
INJECTION, SOLUTION INTRAVENOUS ONCE
Status: DISCONTINUED | OUTPATIENT
Start: 2022-01-09 | End: 2022-01-24

## 2022-01-09 RX ADMIN — HYDRALAZINE HYDROCHLORIDE 100 MG: 25 TABLET, FILM COATED ORAL at 01:01

## 2022-01-09 RX ADMIN — MUPIROCIN: 20 OINTMENT TOPICAL at 08:01

## 2022-01-09 RX ADMIN — HYDROMORPHONE HYDROCHLORIDE 1 MG: 2 INJECTION INTRAMUSCULAR; INTRAVENOUS; SUBCUTANEOUS at 10:01

## 2022-01-09 RX ADMIN — ATORVASTATIN CALCIUM 20 MG: 10 TABLET, FILM COATED ORAL at 08:01

## 2022-01-09 RX ADMIN — INSULIN ASPART 2 UNITS: 100 INJECTION, SOLUTION INTRAVENOUS; SUBCUTANEOUS at 03:01

## 2022-01-09 RX ADMIN — OXYCODONE 10 MG: 5 TABLET ORAL at 08:01

## 2022-01-09 RX ADMIN — PIPERACILLIN AND TAZOBACTAM 4.5 G: 4; .5 INJECTION, POWDER, LYOPHILIZED, FOR SOLUTION INTRAVENOUS; PARENTERAL at 10:01

## 2022-01-09 RX ADMIN — TRAZODONE HYDROCHLORIDE 50 MG: 50 TABLET ORAL at 08:01

## 2022-01-09 RX ADMIN — PANTOPRAZOLE SODIUM 40 MG: 40 TABLET, DELAYED RELEASE ORAL at 08:01

## 2022-01-09 RX ADMIN — LISINOPRIL 5 MG: 5 TABLET ORAL at 08:01

## 2022-01-09 RX ADMIN — HEPARIN SODIUM 5000 UNITS: 5000 INJECTION INTRAVENOUS; SUBCUTANEOUS at 01:01

## 2022-01-09 RX ADMIN — HYDRALAZINE HYDROCHLORIDE 100 MG: 25 TABLET, FILM COATED ORAL at 08:01

## 2022-01-09 RX ADMIN — METOPROLOL SUCCINATE 25 MG: 25 TABLET, EXTENDED RELEASE ORAL at 08:01

## 2022-01-09 RX ADMIN — SEVELAMER CARBONATE 800 MG: 800 TABLET, FILM COATED ORAL at 07:01

## 2022-01-09 RX ADMIN — HYDRALAZINE HYDROCHLORIDE 100 MG: 25 TABLET, FILM COATED ORAL at 05:01

## 2022-01-09 RX ADMIN — OXYCODONE 10 MG: 5 TABLET ORAL at 04:01

## 2022-01-09 RX ADMIN — SEVELAMER CARBONATE 800 MG: 800 TABLET, FILM COATED ORAL at 12:01

## 2022-01-09 RX ADMIN — HEPARIN SODIUM 5000 UNITS: 5000 INJECTION INTRAVENOUS; SUBCUTANEOUS at 05:01

## 2022-01-09 RX ADMIN — HYDROCODONE BITARTRATE AND ACETAMINOPHEN 1 TABLET: 10; 325 TABLET ORAL at 07:01

## 2022-01-09 RX ADMIN — HYDROCODONE BITARTRATE AND ACETAMINOPHEN 1 TABLET: 10; 325 TABLET ORAL at 12:01

## 2022-01-09 RX ADMIN — SEVELAMER CARBONATE 800 MG: 800 TABLET, FILM COATED ORAL at 04:01

## 2022-01-09 RX ADMIN — HEPARIN SODIUM 5000 UNITS: 5000 INJECTION INTRAVENOUS; SUBCUTANEOUS at 08:01

## 2022-01-09 NOTE — PROGRESS NOTES
Adryan Neff is a 56 y.o. male patient.    Follow for ESRD, dialysis    Having problem with dialysis yesterday  AVF infiltrated  No c/o this am  Comfortable    Scheduled Meds:   sodium chloride 0.9%   Intravenous Once    atorvastatin  20 mg Oral Daily    heparin (porcine)  5,000 Units Subcutaneous Q8H    hydrALAZINE  100 mg Oral Q8H    HYDROmorphone  1 mg Intravenous Once    insulin detemir U-100  15 Units Subcutaneous QHS    lisinopriL  5 mg Oral Daily    metoprolol succinate  25 mg Oral Daily    mupirocin   Nasal BID    pantoprazole  40 mg Oral Daily    piperacillin-tazobactam (ZOSYN) IVPB  4.5 g Intravenous Q12H    sevelamer carbonate  800 mg Oral TID WM    traZODone  50 mg Oral QHS       Review of patient's allergies indicates:  No Known Allergies      Vital Signs Range (Last 24H):  Temp:  [97.8 °F (36.6 °C)-98.6 °F (37 °C)]   Pulse:  [69-91]   Resp:  [16-20]   BP: (120-175)/(53-79)   SpO2:  [97 %-98 %]     I & O (Last 24H):    Intake/Output Summary (Last 24 hours) at 1/9/2022 0926  Last data filed at 1/8/2022 1250  Gross per 24 hour   Intake 240 ml   Output --   Net 240 ml           Physical Exam:  General appearance: well developed, well nourished, no distress  Lungs:  clear to auscultation bilaterally and normal respiratory effort  Heart: regular rate and rhythm  Abdomen: soft, non-tender non-distented; bowel sounds normal; no masses,  no organomegaly  Extremities: no cyanosis or edema, or clubbing    Laboratory:  I have reviewed all pertinent lab results within the past 24 hours.  CBC:   Recent Labs   Lab 01/08/22  0614   WBC 16.52*   RBC 3.33*   HGB 9.1*   HCT 31.1*      MCV 93   MCH 27.3   MCHC 29.3*     CMP:   Recent Labs   Lab 01/09/22  0542      CALCIUM 8.7   ALBUMIN 1.9*   PROT 8.5*   *   K 4.8   CO2 20*   CL 97   BUN 55*   CREATININE 13.5*   ALKPHOS 70   ALT 5*   AST 12   BILITOT 0.3       Imp/Plan    ESRD  COVID-19 infection  Malfunction AVF  (L) foot  gangrene  HTN  DM type 2  Anemia of CKD    Consult IR in am for fistulogram  HD after fistulogram  We'll follow for dialysis          Wil Fine  1/9/2022

## 2022-01-09 NOTE — CONSULTS
[facility]  Telemedicine Consult Note      Thank you for your consult to [facility]. We have reviewed the patient chart. This patient does meet criteria for [facility] service at this time. Will assume care on 01/09/22 at 7AM.      Edi Moreno MD  Department of Hospital Medicine   [facility]

## 2022-01-09 NOTE — PT/OT/SLP PROGRESS
"Physical Therapy Treatment    Patient Name:  Adryan Neff   MRN:  [id number]    Recommendations:     Discharge Recommendations:  nursing facility, skilled,home with home health (To be determined)   Discharge Equipment Recommendations: walker, rolling   Barriers to discharge: pain and decreased mobility     Assessment:     Adryan Neff is a 56 y.o. male admitted with a medical diagnosis of Gangrene of left foot.  He presents with the following impairments/functional limitations:  weakness,impaired endurance,impaired self care skills,gait instability,impaired functional mobilty,impaired balance,decreased upper extremity function,decreased lower extremity function,decreased ROM,decreased safety awareness,pain,edema,orthopedic precautions,impaired skin,decreased coordination .    Rehab Prognosis: Good; patient would benefit from acute skilled PT services to address these deficits and reach maximum level of function.    Recent Surgery: Procedure(s) (LRB):  AMPUTATION, TOE (Left) 3 Days Post-Op    Plan:     During this hospitalization, patient to be seen daily to address the identified rehab impairments via gait training,therapeutic activities,therapeutic exercises and progress toward the following goals:    · Plan of Care Expires:  01/14/22    Subjective     Chief Complaint: pain   Patient/Family Comments/goals: pt agreeable to bed level ex's only stated he is in pain from wound care.   Pain/Comfort:  · Pain Rating 1: 8/10  · Location - Side 1: Left  · Location 1: foot  · Pain Addressed 1: Pre-medicate for activity,Reposition,Nurse notified  · Pain Rating Post-Intervention 1: 8/10      Objective:     Communicated with nurse  prior to session.  Patient found HOB elevated with bed alarm,peripheral IV upon PT entry to room.     General Precautions: Standard, fall,airborne,contact,droplet   Orthopedic Precautions: ("touch down weight bearing" to LLE)   Braces: N/A    AM-PAC 6 CLICK MOBILITY  Turning over in bed (including " adjusting bedclothes, sheets and blankets)?: 4  Sitting down on and standing up from a chair with arms (e.g., wheelchair, bedside commode, etc.): 2  Moving from lying on back to sitting on the side of the bed?: 3  Moving to and from a bed to a chair (including a wheelchair)?: 2  Need to walk in hospital room?: 2  Climbing 3-5 steps with a railing?: 1  Basic Mobility Total Score: 14       Therapeutic Activities and Exercises:   Lower Extremity Exercises.   Patient educated on the purpose of therapeutic exercise.     Patient verbalized acceptance/understanding of instructions, expectations, and limitations(for safety).   Patient performed:  10 reps (each) of B LE There Ex (AAROM BLE) : AP, QS, HS, Hip abd/add while in bed(bed in chair position ).          Patient required max encouragements and education to participate in ex's, verbal cues/tactile cues to ensure correct sequence, to maintain proper form, and to allow for self-correction.      Patient left with bed in chair position with all lines intact, call button in reach, bed alarm on and nurse notified..    GOALS:   Multidisciplinary Problems     Physical Therapy Goals        Problem: Physical Therapy Goal    Goal Priority Disciplines Outcome Goal Variances Interventions   Physical Therapy Goal     PT, PT/OT Ongoing, Progressing     Description: 1. Pt to be mod I with bed mobility.  2. Pt to transfer with SBA.  3. Pt to ambulate 15' w/RW TDWB LLE.                   Time Tracking:     PT Received On: 01/09/22  PT Start Time: 1630     PT Stop Time: 1642  PT Total Time (min): 12 min     Billable Minutes: Therapeutic Exercise 12    Treatment Type: Treatment  PT/PTA: PTA     PTA Visit Number: 1     01/09/2022

## 2022-01-09 NOTE — NURSING
Wound Care Completed  Report given to Dennise DUPONT, patient is resting comfortably on room air, no distress, pain medicine given

## 2022-01-09 NOTE — PLAN OF CARE
[facility]  Initial Discharge Assessment       Primary Care Provider: Soren Rice MD    Admission Diagnosis: Preoperative clearance [Z01.818]  End-stage renal disease on hemodialysis [N18.6, Z99.2]  Uncontrolled hypertension [I10]  Chest pain [R07.9]  Gangrene of left foot [I96]  COVID-19 virus infection [U07.1]    Admission Date: 1/5/2022  Expected Discharge Date:          Payor: MEDICARE / Plan: MEDICARE PART A & B / Product Type: Government /     Extended Emergency Contact Information  Primary Emergency Contact: Jonn Neff  Mobile Phone: [phone]  Relation: Daughter  Preferred language: English   needed? No    Discharge Plan A: Skilled Nursing Facility  Discharge Plan B:  (tbd)      [id number] - [place], [place] - [address]  [address] [place] [address]  Phone: [phone] Fax: [phone]      Initial Assessment (most recent)     Adult Discharge Assessment - 01/09/22 1543        Discharge Assessment    Assessment Type Discharge Planning Assessment     Confirmed/corrected address, phone number and insurance Yes     Confirmed Demographics Correct on Facesheet     Source of Information patient     If unable to respond/provide information was family/caregiver contacted? --   called Jonn sanchez at:  [phone], no answer, detailed message left requesting call back    When was your last doctors appointment? --   does not recall    Communicated ENIO with patient/caregiver Date not available/Unable to determine     Lives With friend(s);child(ramesh), adult     Do you expect to return to your current living situation? Yes     Do you have help at home or someone to help you manage your care at home? Yes     Who are your caregiver(s) and their phone number(s)? JonyJonn alvarez [phone]     Prior to hospitilization cognitive status: Unable to Assess     Current cognitive status: Alert/Oriented      Walking or Climbing Stairs Difficulty ambulation difficulty, assistance 1 person     Dressing/Bathing Difficulty bathing difficulty, assistance 1 person     Equipment Currently Used at Home none     Readmission within 30 days? No     Patient currently being followed by outpatient case management? No     Do you currently have service(s) that help you manage your care at home? No     Do you take prescription medications? Yes     Do you have prescription coverage? Yes     Coverage Medicare     Do you have any problems affording any of your prescribed medications? TBD     Is the patient taking medications as prescribed? --   unknown    Who is going to help you get home at discharge? Jonn Espinal [phone]     How do you get to doctors appointments? family or friend will provide     Are you on dialysis? Yes     Dialysis Name and Scheduled days [facility] MWF     Do you take coumadin? No     Discharge Plan A Skilled Nursing Facility     Discharge Plan B --   tbd    DME Needed Upon Discharge  --   tbd    Discharge Plan discussed with: Patient        Relationship/Environment    Name(s) of Who Lives With Patient Jonn Espinal [phone]

## 2022-01-09 NOTE — PROGRESS NOTES
Pharmacokinetic Assessment Follow Up: IV Vancomycin    Vancomycin serum concentration assessment(s):    The random level was drawn correctly and can be used to guide therapy at this time. The measurement is above the desired definitive target range of 10 to 20 mcg/mL.    Vancomycin Regimen Plan:    Re-dose when the random level is less than 20 mcg/mL, next level to be drawn at 0400 on 1/10/22    Drug levels (last 3 results):  Recent Labs   Lab Result Units 01/07/22  0458 01/08/22  0614 01/09/22  0542   Vancomycin, Random ug/mL 21.5 18.9 25.5       Pharmacy will continue to follow and monitor vancomycin.    Please contact pharmacy at extension [phone] for questions regarding this assessment.    Thank you for the consult,   Douglas Falcon       Patient brief summary:  Adryan Neff is a 56 y.o. male initiated on antimicrobial therapy with IV Vancomycin for treatment of bone/joint infection    The patient's current regimen is random pulse dosing    Drug Allergies:   Review of patient's allergies indicates:  No Known Allergies    Actual Body Weight:   64.3 kg      Renal Function:   Estimated Creatinine Clearance: 8.4 mL/min (A) (based on SCr of 8.8 mg/dL (H)).,     Dialysis Method (if applicable):  intermittent HD    CBC (last 72 hours):  Recent Labs   Lab Result Units 01/06/22  0812 01/07/22  0714 01/08/22  0614   WBC K/uL 14.30* 14.08* 16.52*   Hemoglobin g/dL 9.7* 9.9* 9.1*   Hematocrit % 32.6* 33.4* 31.1*   Platelets K/uL 450 437 434   Gran % % 81.0* 82.4* 81.1*   Lymph % % 8.5* 7.8* 7.4*   Mono % % 7.3 7.5 8.8   Eosinophil % % 2.2 1.3 1.5   Basophil % % 0.3 0.5 0.5   Differential Method  Automated Automated Automated       Metabolic Panel (last 72 hours):  Recent Labs   Lab Result Units 01/06/22  0812 01/07/22  1120   Sodium mmol/L 136 134*   Potassium mmol/L 4.7 4.8   Chloride mmol/L 97 97   CO2 mmol/L 24 25   Glucose mg/dL 120* 180*   BUN mg/dL 45* 28*   Creatinine mg/dL 11.4* 8.8*       Vancomycin  Administrations:  vancomycin given in the last 96 hours                     vancomycin 500 mg in dextrose 5 % 100 mL IVPB (ready to mix system) (mg) 500 mg New Bag 01/08/22 1721    vancomycin 1.5 g in dextrose 5 % 250 mL IVPB (ready to mix) (mg) 1,500 mg New Bag 01/05/22 1351                    Microbiologic Results:  Microbiology Results (last 7 days)       Procedure Component Value Units Date/Time    AFB Culture & Smear [[id number]] Collected: 01/06/22 1100    Order Status: Completed Specimen: Wound from Foot, Left Updated: 01/08/22 2127     AFB Culture & Smear Culture in progress    Narrative:      Left foot metatarsal  Proximal margin - left foot    AFB Culture & Smear [[id number]] Collected: 01/06/22 1100    Order Status: Completed Specimen: Wound from Foot, Left Updated: 01/08/22 2127     AFB Culture & Smear Culture in progress    Blood culture [[id number]] Collected: 01/05/22 1309    Order Status: Completed Specimen: Blood from Peripheral, Wrist, Right Updated: 01/08/22 1503     Blood Culture, Routine No Growth to date      No Growth to date      No Growth to date      No Growth to date    Blood culture [[id number]] Collected: 01/05/22 1308    Order Status: Completed Specimen: Blood from Peripheral, Hand, Right Updated: 01/08/22 1503     Blood Culture, Routine No Growth to date      No Growth to date      No Growth to date      No Growth to date    Culture, Anaerobe [[id number]] Collected: 01/06/22 1100    Order Status: Completed Specimen: Wound from Foot, Left Updated: 01/08/22 1357     Anaerobic Culture Culture in progress    Narrative:      Left foot metatarsal  Proximal margin - left foot    Culture, Anaerobe [[id number]] Collected: 01/06/22 1100    Order Status: Completed Specimen: Wound from Foot, Left Updated: 01/08/22 1356     Anaerobic Culture Culture in progress    Aerobic culture [[id number]]  (Abnormal) Collected: 01/06/22 1100    Order Status: Completed Specimen: Wound from Foot, Left Updated: 01/08/22  0919     Aerobic Bacterial Culture CITROBACTER KOSERI  Many  For susceptibility see order # [id number]        GRAM NEGATIVE JOHN, NON-LACTOSE   Identification and susceptibility pending      Narrative:      Left foot metatarsal  Proximal margin - left foot    Aerobic culture [[id number]]  (Abnormal)  (Susceptibility) Collected: 01/06/22 1100    Order Status: Completed Specimen: Wound from Foot, Left Updated: 01/08/22 0916     Aerobic Bacterial Culture CITROBACTER KOSERI  Many        GRAM NEGATIVE JOHN  Few  Identification and susceptibility pending      Aerobic culture (Specify Source) **CANNOT BE ORDERED AS STAT** [[id number]]  (Abnormal)  (Susceptibility) Collected: 01/05/22 1315    Order Status: Completed Specimen: Wound from Foot, Left Updated: 01/08/22 0850     Aerobic Bacterial Culture CITROBACTER FREUNDII  Many        PSEUDOMONAS AERUGINOSA  Moderate      Gram stain [[id number]] Collected: 01/06/22 1100    Order Status: Completed Specimen: Wound from Foot, Left Updated: 01/06/22 1451     Gram Stain Result Few WBC's      Moderate Gram negative rods      Few Gram positive cocci    Narrative:      Left foot metatarsal  Proximal margin - left foot    Gram stain [[id number]] Collected: 01/06/22 1100    Order Status: Completed Specimen: Wound from Foot, Left Updated: 01/06/22 1450     Gram Stain Result Few WBC's      Few Gram negative rods      Few Gram positive cocci    Fungus culture [[id number]] Collected: 01/06/22 1100    Order Status: Sent Specimen: Wound from Foot, Left Updated: 01/06/22 1350    Fungus culture [[id number]] Collected: 01/06/22 1100    Order Status: Sent Specimen: Wound from Foot, Left Updated: 01/06/22 1346

## 2022-01-09 NOTE — PLAN OF CARE
Problem: Physical Therapy Goal  Goal: Physical Therapy Goal  Description: 1. Pt to be mod I with bed mobility.  2. Pt to transfer with SBA.  3. Pt to ambulate 15' w/RW TDWB LLE.  Outcome: Ongoing, Progressing

## 2022-01-10 DIAGNOSIS — I70.229 CRITICAL LOWER LIMB ISCHEMIA: Primary | ICD-10-CM

## 2022-01-10 LAB
BACTERIA SPEC AEROBE CULT: ABNORMAL
BACTERIA SPEC ANAEROBE CULT: NORMAL
BACTERIA SPEC ANAEROBE CULT: NORMAL
BASOPHILS # BLD AUTO: 0.1 K/UL (ref 0–0.2)
BASOPHILS NFR BLD: 0.6 % (ref 0–1.9)
DIFFERENTIAL METHOD: ABNORMAL
EOSINOPHIL # BLD AUTO: 0.4 K/UL (ref 0–0.5)
EOSINOPHIL NFR BLD: 2.5 % (ref 0–8)
ERYTHROCYTE [DISTWIDTH] IN BLOOD BY AUTOMATED COUNT: 15.6 % (ref 11.5–14.5)
FINAL PATHOLOGIC DIAGNOSIS: NORMAL
GROSS: NORMAL
HCT VFR BLD AUTO: 32 % (ref 40–54)
HGB BLD-MCNC: 9.4 G/DL (ref 14–18)
IMM GRANULOCYTES # BLD AUTO: 0.1 K/UL (ref 0–0.04)
IMM GRANULOCYTES NFR BLD AUTO: 0.6 % (ref 0–0.5)
LYMPHOCYTES # BLD AUTO: 1.2 K/UL (ref 1–4.8)
LYMPHOCYTES NFR BLD: 7 % (ref 18–48)
Lab: NORMAL
MCH RBC QN AUTO: 27.5 PG (ref 27–31)
MCHC RBC AUTO-ENTMCNC: 29.4 G/DL (ref 32–36)
MCV RBC AUTO: 94 FL (ref 82–98)
MONOCYTES # BLD AUTO: 1.1 K/UL (ref 0.3–1)
MONOCYTES NFR BLD: 6.8 % (ref 4–15)
NEUTROPHILS # BLD AUTO: 13.7 K/UL (ref 1.8–7.7)
NEUTROPHILS NFR BLD: 82.5 % (ref 38–73)
NRBC BLD-RTO: 0 /100 WBC
PLATELET # BLD AUTO: 462 K/UL (ref 150–450)
PMV BLD AUTO: 9.1 FL (ref 9.2–12.9)
POCT GLUCOSE: 112 MG/DL (ref 70–110)
POCT GLUCOSE: 272 MG/DL (ref 70–110)
POCT GLUCOSE: 278 MG/DL (ref 70–110)
RBC # BLD AUTO: 3.42 M/UL (ref 4.6–6.2)
VANCOMYCIN SERPL-MCNC: 22.2 UG/ML
WBC # BLD AUTO: 16.66 K/UL (ref 3.9–12.7)

## 2022-01-10 PROCEDURE — 25000003 PHARM REV CODE 250: Performed by: FAMILY MEDICINE

## 2022-01-10 PROCEDURE — 80202 ASSAY OF VANCOMYCIN: CPT | Performed by: HOSPITALIST

## 2022-01-10 PROCEDURE — 25000003 PHARM REV CODE 250: Performed by: HOSPITALIST

## 2022-01-10 PROCEDURE — 27201247 HC HEMODIALYSIS, SET-UP & CANCEL

## 2022-01-10 PROCEDURE — 85025 COMPLETE CBC W/AUTO DIFF WBC: CPT | Performed by: HOSPITALIST

## 2022-01-10 PROCEDURE — 63600175 PHARM REV CODE 636 W HCPCS: Performed by: HOSPITALIST

## 2022-01-10 PROCEDURE — 21400001 HC TELEMETRY ROOM

## 2022-01-10 PROCEDURE — 63600175 PHARM REV CODE 636 W HCPCS: Performed by: INTERNAL MEDICINE

## 2022-01-10 PROCEDURE — 63600175 PHARM REV CODE 636 W HCPCS: Performed by: FAMILY MEDICINE

## 2022-01-10 PROCEDURE — 99223 1ST HOSP IP/OBS HIGH 75: CPT | Mod: ,,, | Performed by: INTERNAL MEDICINE

## 2022-01-10 PROCEDURE — 99223 PR INITIAL HOSPITAL CARE,LEVL III: ICD-10-PCS | Mod: ,,, | Performed by: INTERNAL MEDICINE

## 2022-01-10 PROCEDURE — 99024 PR POST-OP FOLLOW-UP VISIT: ICD-10-PCS | Mod: ,,, | Performed by: SURGERY

## 2022-01-10 PROCEDURE — 27000207 HC ISOLATION

## 2022-01-10 PROCEDURE — 99024 POSTOP FOLLOW-UP VISIT: CPT | Mod: ,,, | Performed by: SURGERY

## 2022-01-10 PROCEDURE — 25000003 PHARM REV CODE 250: Performed by: INTERNAL MEDICINE

## 2022-01-10 RX ORDER — CEFEPIME HYDROCHLORIDE 1 G/50ML
1 INJECTION, SOLUTION INTRAVENOUS
Status: DISCONTINUED | OUTPATIENT
Start: 2022-01-10 | End: 2022-01-24

## 2022-01-10 RX ADMIN — ACETAMINOPHEN 650 MG: 325 TABLET ORAL at 09:01

## 2022-01-10 RX ADMIN — CARVEDILOL 6.25 MG: 6.25 TABLET, FILM COATED ORAL at 10:01

## 2022-01-10 RX ADMIN — PIPERACILLIN AND TAZOBACTAM 4.5 G: 4; .5 INJECTION, POWDER, LYOPHILIZED, FOR SOLUTION INTRAVENOUS; PARENTERAL at 09:01

## 2022-01-10 RX ADMIN — CEFEPIME HYDROCHLORIDE 1 G: 1 INJECTION, SOLUTION INTRAVENOUS at 04:01

## 2022-01-10 RX ADMIN — HEPARIN SODIUM 5000 UNITS: 5000 INJECTION INTRAVENOUS; SUBCUTANEOUS at 02:01

## 2022-01-10 RX ADMIN — SEVELAMER CARBONATE 800 MG: 800 TABLET, FILM COATED ORAL at 04:01

## 2022-01-10 RX ADMIN — OXYCODONE 10 MG: 5 TABLET ORAL at 10:01

## 2022-01-10 RX ADMIN — PANTOPRAZOLE SODIUM 40 MG: 40 TABLET, DELAYED RELEASE ORAL at 09:01

## 2022-01-10 RX ADMIN — OXYCODONE 10 MG: 5 TABLET ORAL at 04:01

## 2022-01-10 RX ADMIN — MUPIROCIN: 20 OINTMENT TOPICAL at 09:01

## 2022-01-10 RX ADMIN — HYDROMORPHONE HYDROCHLORIDE 1 MG: 2 INJECTION INTRAMUSCULAR; INTRAVENOUS; SUBCUTANEOUS at 08:01

## 2022-01-10 RX ADMIN — HYDRALAZINE HYDROCHLORIDE 100 MG: 25 TABLET, FILM COATED ORAL at 10:01

## 2022-01-10 RX ADMIN — INSULIN ASPART 6 UNITS: 100 INJECTION, SOLUTION INTRAVENOUS; SUBCUTANEOUS at 05:01

## 2022-01-10 RX ADMIN — TRAZODONE HYDROCHLORIDE 50 MG: 50 TABLET ORAL at 10:01

## 2022-01-10 RX ADMIN — HYDRALAZINE HYDROCHLORIDE 100 MG: 25 TABLET, FILM COATED ORAL at 02:01

## 2022-01-10 RX ADMIN — MUPIROCIN: 20 OINTMENT TOPICAL at 10:01

## 2022-01-10 NOTE — PT/OT/SLP PROGRESS
Occupational Therapy      Patient Name:  Adryan Neff   MRN:  [id number]    Patient not seen today secondary to in IR, followed immediately by Dialysis. KIAH Tristan stated Patient also currently NPO 2* likely Sx 01/11/22.   OT will follow up as indicated.     1/10/2022

## 2022-01-10 NOTE — PROGRESS NOTES
[facility]  Vascular Surgery  Progress Note    Patient Name: Adryan Neff  MRN: [id number]  Admission Date: 1/5/2022  Primary Care Provider: Soren Rice MD    Subjective:     Interval History: No new issues    Post-Op Info:  Procedure(s) (LRB):  AMPUTATION, TOE (Left)   4 Days Post-Op       Medications:  Continuous Infusions:  Scheduled Meds:   sodium chloride 0.9%   Intravenous Once    atorvastatin  20 mg Oral Daily    carvediloL  6.25 mg Oral BID    ceFEPime (MAXIPIME) IVPB  1 g Intravenous Q24H    heparin (porcine)  5,000 Units Subcutaneous Q8H    hydrALAZINE  100 mg Oral Q8H    insulin detemir U-100  15 Units Subcutaneous QHS    lisinopriL  5 mg Oral Daily    mupirocin   Nasal BID    pantoprazole  40 mg Oral Daily    sevelamer carbonate  800 mg Oral TID WM    traZODone  50 mg Oral QHS     PRN Meds:sodium chloride 0.9%, acetaminophen, acetaminophen, dextrose 50%, dextrose 50%, glucagon (human recombinant), glucose, glucose, HYDROmorphone, influenza, insulin aspart U-100, magnesium oxide, magnesium oxide, naloxone, ondansetron, oxyCODONE, sodium chloride 0.9%, sodium chloride 0.9%     Objective:     Vital Signs (Most Recent):  Temp: 97.7 °F (36.5 °C) (01/10/22 1415)  Pulse: 75 (01/10/22 1415)  Resp: 19 (01/10/22 1616)  BP: (!) 167/67 (med given, see mar) (01/10/22 1415)  SpO2: 100 % (01/10/22 1415) Vital Signs (24h Range):  Temp:  [97.7 °F (36.5 °C)-98.7 °F (37.1 °C)] 97.7 °F (36.5 °C)  Pulse:  [71-83] 75  Resp:  [11-22] 19  SpO2:  [96 %-100 %] 100 %  BP: (139-173)/() 167/67         Physical Exam  Vitals reviewed.   Constitutional:       General: He is not in acute distress.     Appearance: He is well-developed. He is not diaphoretic.   HENT:      Head: Normocephalic and atraumatic.   Eyes:      Conjunctiva/sclera: Conjunctivae normal.   Cardiovascular:      Rate and Rhythm: Normal rate.      Pulses:           Femoral pulses are 2+ on the right side and 2+ on the left side.        Popliteal pulses are 2+ on the left side.   Pulmonary:      Effort: Pulmonary effort is normal.   Abdominal:      General: There is no distension.      Palpations: Abdomen is soft. There is no mass.      Tenderness: There is no abdominal tenderness. There is no guarding or rebound.      Hernia: No hernia is present.   Musculoskeletal:         General: Tenderness present. No deformity. Normal range of motion.      Cervical back: Neck supple.      Right lower leg: No edema.      Left lower leg: No edema.   Feet:      Left foot:      Skin integrity: Ulcer, blister, skin breakdown and dry skin present. No erythema.   Skin:     General: Skin is warm and dry.      Capillary Refill: Capillary refill takes more than 3 seconds.      Findings: No erythema or rash.   Neurological:      General: No focal deficit present.      Mental Status: He is alert and oriented to person, place, and time.   Psychiatric:         Mood and Affect: Mood normal.         Significant Labs:  All pertinent labs from the last 24 hours have been reviewed.    Significant Diagnostics:  I have reviewed all pertinent imaging results/findings within the past 24 hours.    Assessment/Plan:     * Gangrene of left foot  -Ortho deferred mgmt of L foot wound to vascular surgery- rec open 2-5 toe amputations  -ID consult  -Wound care    ESRD on hemodialysis  -will evaluate HD access once acute clinic issues are resolved        Masoud Soni MD  Vascular Surgery  [facility] - Telemetry [place]

## 2022-01-10 NOTE — ASSESSMENT & PLAN NOTE
- s/p amputations, debridement, and washout  - remaining bone seems likely infected  - culture grew Citrobacter x 2 and Pseudomonas  - QTc 499 ms, so I do not feel comfortable treating with a quinolone which potentiates QT prolongation  - stop vancomycin- OK to change to cefepime/Fortaz x 6 weeks

## 2022-01-10 NOTE — HPI
"This is a 56 y.o.male patient, with a PMHx of ESRD on hemodialysis, CAD, HTN, and DM, presenting to the ED for further evaluation of left foot pain. He was diagnosed with gangrene of the foot due to PAD. Coincidentally, he tested positive for COVID-19. The patient was taken to the OR and underwent: left 2-5 metatarsal amputations and left foot debridement and washout. OR cultures have grown Citrobacter and Pseudomonas. The patient has been receiving Zosyn and Vancocin since admission. ID is consulted for: "left foot gangrene, patient and family wanting to preserve limb; growing citrobacter and pseudomonas." The patient complains of pain but is otherwise, a difficult historian. No family is present.  "

## 2022-01-10 NOTE — ANESTHESIA PROCEDURE NOTES
Peripheral Block    Patient location during procedure: OR    Reason for block: primary anesthetic   Diagnosis: left foot gangrene   Start time: 1/6/2022 9:20 AM  Timeout: 1/6/2022 9:20 AM   End time: 1/6/2022 9:30 AM    Staffing  Authorizing Provider: Emmett Morales MD  Performing Provider: Emmett Morales MD    Preanesthetic Checklist  Completed: patient identified, IV checked, site marked, risks and benefits discussed, surgical consent, monitors and equipment checked, pre-op evaluation and timeout performed  Peripheral Block  Patient position: supine  Prep: ChloraPrep  Patient monitoring: heart rate, cardiac monitor, continuous pulse ox and frequent blood pressure checks  Block type: ankle - saphenous, ankle - superficial peroneal, ankle - sural and ankle - tibial  Laterality: left  Injection technique: single shot  Needle  Needle gauge: 25 G  Needle length: 2 in  Needle localization: anatomical landmarks     Assessment  Injection assessment: negative aspiration and negative parasthesia  Paresthesia pain: none  Heart rate change: no  Slow fractionated injection: yes  Pain Tolerance: comfortable throughout block and no complaints

## 2022-01-10 NOTE — PROGRESS NOTES
[facility]  Telemedicine Progress Note    Patient Name: Adryan Neff  MRN: [id number]  Patient Class: IP- Inpatient   Admission Date: 1/5/2022  Length of Stay: 4 days  Attending Physician: Edi Moreno MD  Primary Care Provider: Soren Rice MD          Subjective:     Principal Problem:Gangrene of left foot        HPI:  This is a 56 y.o.male patient, with a PMHx of ESRD on hemodialysis, CAD, HTN, and DM, presenting to the ED for further evaluation of left foot pain and black discoloration that began 3 days ago. Patient states these symptoms have been ongoing for the last month. He reports being referred to a specialist but denies ever following up. He has been putting some otc medication on the foot that he believes has turned his foot more black. No trauma or injury. Patient reports associated chills. Patient states applying a creme to the foot and noticed his discoloration worsened. Patient reports he was last dialyzed yesterday. Patient denies any fever, shortness of breath, chest pain, neck pain, back pain, abdominal pain, rash, headaches, congestion, rhinorrhea, cough, sore throat, ear pain, eye pain, blurred vision, nausea, vomiting, diarrhea, dysuria, or any other associated symptoms.    Per chart review, he had an arterial u/s 12/15.   Impression:   1. High-grade severe stenosis right popliteal artery with occlusion anterior tibial artery which is reconstituted at level of DPA via collaterals.  2. 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    Cardiology has been planning to see him for revascularization options. Last podiatry visit 12/13 with Stable dry gangrene L 2nd toe and medial L 5th toe. Dorsalis pedis and posterior tibial pulses are diminished Bilaterally. Toes are cool to touch. Feet are warm proximally.There is decreased digital hair. Skin is atrophic, slightly hyperpigmented, and mildly edematous     In the ED, he was found to have worsened  gangrene and a cool extremity. Vascular surgery was consulted. He was also COVID +. Says he got a booster shot in November sometime.      Overview/Hospital Course:  Patient admitted to the hospital for eval and treatment of L foot gangrene.  Ortho and Vasc consulted. Nephrology was also consulted for chronic dialysis. Patient had several toes on L foot amputated.  Continued with dialysis. PT/OT consulted.       Interval History: patient's leukocytosis is worsening; on Vanc and Zosyn; getting blood cultures today; denies any pain; on RA; patient unable to get HD properly yesterday due to infiltration of fistula, nephrology recs IR fistulagram for the AM, NPO after midnight     - patient may need amputation of foot, however needs an angiogram prior by vascular surgery who is waiting on infection to improve; will re-discuss with them in the AM     Review of Systems   Constitutional: Negative for activity change, fatigue and fever.   Respiratory: Negative for cough and shortness of breath.    Gastrointestinal: Negative for abdominal pain and nausea.     Objective:     Vital Signs (Most Recent):  Temp: 98.7 °F (37.1 °C) (01/09/22 2056)  Pulse: 83 (01/09/22 2056)  Resp: (!) 22 (01/09/22 2058)  BP: (!) 151/106 (01/09/22 2056)  SpO2: 96 % (01/09/22 2056) Vital Signs (24h Range):  Temp:  [97.8 °F (36.6 °C)-98.7 °F (37.1 °C)] 98.7 °F (37.1 °C)  Pulse:  [68-83] 83  Resp:  [16-22] 22  SpO2:  [96 %-100 %] 96 %  BP: (123-172)/() 151/106     Weight: 64.3 kg (141 lb 12.1 oz)  Body mass index is 20.34 kg/m².    Intake/Output Summary (Last 24 hours) at 1/9/2022 2231  Last data filed at 1/9/2022 1603  Gross per 24 hour   Intake 320 ml   Output --   Net 320 ml      Physical Exam  Constitutional:       Appearance: Normal appearance.   Pulmonary:      Effort: Pulmonary effort is normal.      Breath sounds: No wheezing.   Abdominal:      General: Abdomen is flat. There is no distension.   Musculoskeletal:         General: Swelling  and tenderness present.   Skin:     Coloration: Skin is not jaundiced or pale.   Neurological:      General: No focal deficit present.      Mental Status: He is alert and oriented to person, place, and time.   Psychiatric:         Mood and Affect: Mood normal.         Behavior: Behavior normal.         Significant Labs: All pertinent labs within the past 24 hours have been reviewed.    Significant Imaging: I have reviewed all pertinent imaging results/findings within the past 24 hours.      Assessment/Plan:      * Gangrene of left foot  Vascular and ortho consulted. Likely needs amputation. Discussed with Dr. Soni. In comparison of recent pictures by podiatry, his gangrene has worsened and now involves all toes. See pics below.   Continue IV abx. Pain control.     Follow vasc. Ortho recs     S/p amputation of toes.  Patient would like to try to salvage limb.  Awaiting next vascular recs.   PT/OT consulted. Continue Abx.     Angiogram on Mon or Tues per Vascular. Continue wound care/Abx     1/9- leukocytosis rising, on Vanc and Zosyn; getting blood cultures today; will discuss with vascular surgery tomorrow about when angiogram will occur as wbc is rising; may need amputation     ESRD on hemodialysis  Nephrology consulted to resume routine HD.     1/9 - fistula infiltrated, planning for IR fistulagram in the AM    Essential (primary) hypertension  Chronic, uncontrolled.  Latest blood pressure and vitals reviewed-   Temp:  [97.8 °F (36.6 °C)-98.7 °F (37.1 °C)]   Pulse:  [68-83]   Resp:  [16-22]   BP: (123-172)/()   SpO2:  [96 %-100 %] .   Home meds for hypertension were reviewed and noted below.   Hypertension Medications             hydrALAZINE (APRESOLINE) 100 MG tablet Take 1 tablet (100 mg total) by mouth every 8 (eight) hours.    lisinopriL (PRINIVIL,ZESTRIL) 2.5 MG tablet Take 2.5 mg by mouth once daily.    lisinopriL (PRINIVIL,ZESTRIL) 5 MG tablet Take 5 mg by mouth once daily.    metoprolol succinate  (TOPROL-XL) 25 MG 24 hr tablet Take 1 tablet (25 mg total) by mouth once daily.          While in the hospital, will manage blood pressure as follows; Continue home antihypertensive regimen (will use higher dose of lisinopril). Adjust as needed.     Will utilize p.r.n. blood pressure medication only if patient's blood pressure greater than  180/110 and he develops symptoms such as worsening chest pain or shortness of breath.    1/9- uncontrolled, adding coreg today       COVID-19 in immunocompromised patient  HD patient + for COVID 19. Not hypoxic or symptomatic. Supportive care prn.       Chronic diastolic heart failure  December 2021 echo reviewed. Stable.       Controlled type 2 diabetes mellitus with chronic kidney disease on chronic dialysis, with long-term current use of insulin  Patient's FSGs are controlled on current medication regimen.  Last A1c reviewed-   Lab Results   Component Value Date    HGBA1C 6.1 (H) 01/07/2021     Most recent fingerstick glucose reviewed-   Recent Labs   Lab 01/05/22  1153   POCTGLUCOSE 224*     Current correctional scale  Medium  Maintain anti-hyperglycemic dose as follows-   Antihyperglycemics (From admission, onward)            Start     Stop Route Frequency Ordered    01/05/22 2100  insulin detemir U-100 pen 15 Units         -- SubQ Nightly 01/05/22 1404    01/05/22 1503  insulin aspart U-100 pen 1-10 Units         -- SubQ Before meals & nightly PRN 01/05/22 1404        Hold Oral hypoglycemics while patient is in the hospital.          VTE Risk Mitigation (From admission, onward)         Ordered     heparin (porcine) injection 5,000 Units  Every 8 hours         01/05/22 1404     IP VTE HIGH RISK PATIENT  Once         01/05/22 1404     Place sequential compression device  Until discontinued         01/05/22 1404                      I have assessed these finding virtually using telemed platform and with assistance of bedside nurse                 The attending portion of this  evaluation, treatment, and documentation was performed per Edi Moreno MD via Telemedicine AudioVisual using the secure Scan software platform with 2 way audio/video. The provider was located off-site and the patient is located in the hospital. The aforementioned video software was utilized to document the relevant history and physical exam    Edi Moreno MD  Department of [facility]   [facility]

## 2022-01-10 NOTE — ASSESSMENT & PLAN NOTE
-Ortho deferred mgmt of L foot wound to vascular surgery s/p open 2-5 toe amputations  -ID consulted - f/u recs  -Cont wound care and monitor WBC closely - plan for angiogram/revascularization if safely able 1/11/22

## 2022-01-10 NOTE — BRIEF OP NOTE
Radiology Post-Procedure Note    Pre Op Diagnosis: GODWINE AVF/AVG malfunction  Post Op Diagnosis: Same    Procedure: US and fluoroscopic-guided 1. LUE brachio-cephalic AVG fistulagram 2. Angioplasty of severe distal AVG stenosis with 8-mm balloon 3. Angioplasty of severe edge-stent cephalic vein stenosis with 8-mm balloon 4. Angioplasty of in-stent total occlusion at axillary/subclavian vein border with 10-mm ballon 5. Stent placement (7-mm x 6-cm BD COVERA vascular covered stent) excluding large mid AVG pseudoaneurysm    Procedure performed by: Gt Mcghee MD    Written Informed Consent Obtained: Yes  Specimen Removed: NO  Estimated Blood Loss: Minimal    Findings:   Fistulagram of GODWINE brachio-cephalic AVG reveals mid-AVG large PSA disrupting inline flow through the dialysis circuit, severe stenosis of the distal AVG, severe edge-stent stenosis of the cephalic vein outflow and complete in-stent occlusion at the border of the axillary and subclavian veins. These findings significantly alter/impede optimal flow through the AVG, disrupt inline flow through the AVG outflow veins to the central veins, contributing to difficulty accessing the venous segement of the AVG and contribute to prolonged bleeding post HD.    Now s/p successful angioplasty of severe distal AVG stenosis and severe edge-stent cephalic vein stenosis with 8-mm balloon, angioplasty of in-stent total occlusion at axillary/subclavian vein border with 10-mm balloon and exclusion of large mid-AVG PSA with 7-mm x 6-cm BD COVERA vascular covered stent under moderate conscious sedation.     Post-interventional fistulagram reveals no further evidence of hemodynamically significant stenoses in distal AVG, cephalic vein or axillary/subclavian vein junction and successful exclusion of the mid-AVG large PSA, now with markedly improved rapid antegrade flow of contrast from the arterial anastomosis, through the AVG and central veins. There is now optimal palpable  thrill and audible bruit and, LUE AVG is ready for use immediately.    Suture-lock device placed over AVG access site for hemostasis post which will be removed tomorrow 1/11/22 by IR representative.    Thank you for considering IR for the care of your patient.     Gt Mcghee MD  Interventional Radiology

## 2022-01-10 NOTE — ASSESSMENT & PLAN NOTE
Chronic, uncontrolled.  Latest blood pressure and vitals reviewed-   Temp:  [97.8 °F (36.6 °C)-98.7 °F (37.1 °C)]   Pulse:  [68-83]   Resp:  [16-22]   BP: (123-172)/()   SpO2:  [96 %-100 %] .   Home meds for hypertension were reviewed and noted below.   Hypertension Medications             hydrALAZINE (APRESOLINE) 100 MG tablet Take 1 tablet (100 mg total) by mouth every 8 (eight) hours.    lisinopriL (PRINIVIL,ZESTRIL) 2.5 MG tablet Take 2.5 mg by mouth once daily.    lisinopriL (PRINIVIL,ZESTRIL) 5 MG tablet Take 5 mg by mouth once daily.    metoprolol succinate (TOPROL-XL) 25 MG 24 hr tablet Take 1 tablet (25 mg total) by mouth once daily.          While in the hospital, will manage blood pressure as follows; Continue home antihypertensive regimen (will use higher dose of lisinopril). Adjust as needed.     Will utilize p.r.n. blood pressure medication only if patient's blood pressure greater than  180/110 and he develops symptoms such as worsening chest pain or shortness of breath.    1/9- uncontrolled, adding coreg today

## 2022-01-10 NOTE — NURSING
Dr. Rodriguez has been notified of pt's refusal. Despite multiple efforts to explain risks of not having dialysis to pt- he still refuses. Pt has now agreed to sign AMA form.

## 2022-01-10 NOTE — NURSING
Report given to Dennise DUPONT, patient resting comfortably on room air, no distress noted, safety measures in place

## 2022-01-10 NOTE — SUBJECTIVE & OBJECTIVE
Interval History: patient's leukocytosis is worsening; on Vanc and Zosyn; getting blood cultures today; denies any pain; on RA; patient unable to get HD properly yesterday due to infiltration of fistula, nephrology recs IR fistulagram for the AM, NPO after midnight     - patient may need amputation of foot, however needs an angiogram prior by vascular surgery who is waiting on infection to improve; will re-discuss with them in the AM     Review of Systems   Constitutional: Negative for activity change, fatigue and fever.   Respiratory: Negative for cough and shortness of breath.    Gastrointestinal: Negative for abdominal pain and nausea.     Objective:     Vital Signs (Most Recent):  Temp: 98.7 °F (37.1 °C) (01/09/22 2056)  Pulse: 83 (01/09/22 2056)  Resp: (!) 22 (01/09/22 2058)  BP: (!) 151/106 (01/09/22 2056)  SpO2: 96 % (01/09/22 2056) Vital Signs (24h Range):  Temp:  [97.8 °F (36.6 °C)-98.7 °F (37.1 °C)] 98.7 °F (37.1 °C)  Pulse:  [68-83] 83  Resp:  [16-22] 22  SpO2:  [96 %-100 %] 96 %  BP: (123-172)/() 151/106     Weight: 64.3 kg (141 lb 12.1 oz)  Body mass index is 20.34 kg/m².    Intake/Output Summary (Last 24 hours) at 1/9/2022 2231  Last data filed at 1/9/2022 1603  Gross per 24 hour   Intake 320 ml   Output --   Net 320 ml      Physical Exam  Constitutional:       Appearance: Normal appearance.   Pulmonary:      Effort: Pulmonary effort is normal.      Breath sounds: No wheezing.   Abdominal:      General: Abdomen is flat. There is no distension.   Musculoskeletal:         General: Swelling and tenderness present.   Skin:     Coloration: Skin is not jaundiced or pale.   Neurological:      General: No focal deficit present.      Mental Status: He is alert and oriented to person, place, and time.   Psychiatric:         Mood and Affect: Mood normal.         Behavior: Behavior normal.         Significant Labs: All pertinent labs within the past 24 hours have been reviewed.    Significant Imaging: I have  reviewed all pertinent imaging results/findings within the past 24 hours.

## 2022-01-10 NOTE — ASSESSMENT & PLAN NOTE
Vascular and ortho consulted. Likely needs amputation. Discussed with Dr. Soni. In comparison of recent pictures by podiatry, his gangrene has worsened and now involves all toes. See pics below.   Continue IV abx. Pain control.     Follow vasc. Ortho recs     S/p amputation of toes.  Patient would like to try to salvage limb.  Awaiting next vascular recs.   PT/OT consulted. Continue Abx.     Angiogram on Mon or Tues per Vascular. Continue wound care/Abx     1/9- leukocytosis rising, on Vanc and Zosyn; getting blood cultures today; will discuss with vascular surgery tomorrow about when angiogram will occur as wbc is rising; may need amputation

## 2022-01-10 NOTE — SUBJECTIVE & OBJECTIVE
Medications:  Continuous Infusions:  Scheduled Meds:   sodium chloride 0.9%   Intravenous Once    atorvastatin  20 mg Oral Daily    carvediloL  6.25 mg Oral BID    ceFEPime (MAXIPIME) IVPB  1 g Intravenous Q24H    heparin (porcine)  5,000 Units Subcutaneous Q8H    hydrALAZINE  100 mg Oral Q8H    insulin detemir U-100  15 Units Subcutaneous QHS    lisinopriL  5 mg Oral Daily    mupirocin   Nasal BID    pantoprazole  40 mg Oral Daily    sevelamer carbonate  800 mg Oral TID WM    traZODone  50 mg Oral QHS     PRN Meds:sodium chloride 0.9%, acetaminophen, acetaminophen, dextrose 50%, dextrose 50%, glucagon (human recombinant), glucose, glucose, HYDROmorphone, influenza, insulin aspart U-100, magnesium oxide, magnesium oxide, naloxone, ondansetron, oxyCODONE, sodium chloride 0.9%, sodium chloride 0.9%     Objective:     Vital Signs (Most Recent):  Temp: 97.7 °F (36.5 °C) (01/10/22 1415)  Pulse: 75 (01/10/22 1415)  Resp: 19 (01/10/22 1616)  BP: (!) 167/67 (med given, see mar) (01/10/22 1415)  SpO2: 100 % (01/10/22 1415) Vital Signs (24h Range):  Temp:  [97.7 °F (36.5 °C)-98.7 °F (37.1 °C)] 97.7 °F (36.5 °C)  Pulse:  [71-83] 75  Resp:  [11-22] 19  SpO2:  [96 %-100 %] 100 %  BP: (139-173)/() 167/67         Physical Exam  Vitals reviewed.   Constitutional:       General: He is not in acute distress.     Appearance: He is well-developed. He is not diaphoretic.   HENT:      Head: Normocephalic and atraumatic.   Eyes:      Conjunctiva/sclera: Conjunctivae normal.   Cardiovascular:      Rate and Rhythm: Normal rate.      Pulses:           Femoral pulses are 2+ on the right side and 2+ on the left side.       Popliteal pulses are 2+ on the left side.   Pulmonary:      Effort: Pulmonary effort is normal.   Abdominal:      General: There is no distension.      Palpations: Abdomen is soft. There is no mass.      Tenderness: There is no abdominal tenderness. There is no guarding or rebound.      Hernia: No hernia  is present.   Musculoskeletal:         General: Tenderness present. No deformity. Normal range of motion.      Cervical back: Neck supple.      Right lower leg: No edema.      Left lower leg: No edema.   Feet:      Left foot:      Skin integrity: Ulcer, blister, skin breakdown and dry skin present. No erythema.   Skin:     General: Skin is warm and dry.      Capillary Refill: Capillary refill takes more than 3 seconds.      Findings: No erythema or rash.   Neurological:      General: No focal deficit present.      Mental Status: He is alert and oriented to person, place, and time.   Psychiatric:         Mood and Affect: Mood normal.         Significant Labs:  All pertinent labs from the last 24 hours have been reviewed.    Significant Diagnostics:  I have reviewed all pertinent imaging results/findings within the past 24 hours.

## 2022-01-10 NOTE — PROGRESS NOTES
Renal Progress Note    Date of Admission:  1/5/2022 11:39 AM    Length of Stay: 5  Days    Subjective: n/a    Objective:    Current Facility-Administered Medications   Medication    0.9%  NaCl infusion    0.9%  NaCl infusion    acetaminophen tablet 650 mg    acetaminophen tablet 650 mg    atorvastatin tablet 20 mg    carvediloL tablet 6.25 mg    cefepime in dextrose 5 % 1 gram/50 mL IVPB 1 g    dextrose 50% injection 12.5 g    dextrose 50% injection 25 g    glucagon (human recombinant) injection 1 mg    glucose chewable tablet 16 g    glucose chewable tablet 24 g    heparin (porcine) injection 5,000 Units    hydrALAZINE tablet 100 mg    HYDROmorphone (PF) injection 1 mg    influenza (QUADRIVALENT PF) vaccine 0.5 mL    insulin aspart U-100 pen 1-10 Units    insulin detemir U-100 pen 15 Units    lisinopriL tablet 5 mg    magnesium oxide tablet 800 mg    magnesium oxide tablet 800 mg    mupirocin 2 % ointment    naloxone 0.4 mg/mL injection 0.02 mg    ondansetron injection 8 mg    oxyCODONE immediate release tablet 10 mg    pantoprazole EC tablet 40 mg    sevelamer carbonate tablet 800 mg    sodium chloride 0.9% bolus 250 mL    sodium chloride 0.9% flush 10 mL    traZODone tablet 50 mg       Vitals:    01/09/22 2058 01/09/22 2300 01/10/22 0847 01/10/22 0935   BP:    139/63   BP Location:       Patient Position:       Pulse:    71   Resp: (!) 22  20 20   Temp:  Comment: refused vitals     TempSrc:       SpO2:    97%   Weight:       Height:           I/O last 3 completed shifts:  In: 320 [P.O.:320]  Out: -   No intake/output data recorded.      Physical Exam: deferred due to Covid-19    Laboratories:    Recent Labs   Lab 01/10/22  1051   WBC 16.66*   RBC 3.42*   HGB 9.4*   HCT 32.0*   *   MCV 94   MCH 27.5   MCHC 29.4*       No results for input(s): GLUCOSE, CALCIUM, PROT, NA, K, CO2, CL, BUN, CREATININE, ALKPHOS, ALT, AST, BILITOT in the last 24  hours.    Invalid input(s):  ALBUMIN    No results for input(s): COLORU, CLARITYU, SPECGRAV, PHUR, PROTEINUA, GLUCOSEU, BLOODU, WBCU, RBCU, BACTERIA, MUCUS in the last 24 hours.    Invalid input(s):  BILIRUBINCON    Microbiology Results (last 7 days)     Procedure Component Value Units Date/Time    Aerobic culture [[id number]]  (Abnormal)  (Susceptibility) Collected: 01/06/22 1100    Order Status: Completed Specimen: Wound from Foot, Left Updated: 01/10/22 0838     Aerobic Bacterial Culture CITROBACTER KOSERI  Many  For susceptibility see order # [id number]        CITROBACTER FREUNDII  Many  For susceptibility see order # [id number]        PSEUDOMONAS AERUGINOSA    Narrative:      Left foot metatarsal  Proximal margin - left foot    Blood culture [[id number]] Collected: 01/09/22 1217    Order Status: Completed Specimen: Blood Updated: 01/09/22 1912     Blood Culture, Routine No Growth to date    Blood culture [[id number]] Collected: 01/09/22 1216    Order Status: Completed Specimen: Blood Updated: 01/09/22 1912     Blood Culture, Routine No Growth to date    Blood culture [[id number]] Collected: 01/05/22 1309    Order Status: Completed Specimen: Blood from Peripheral, Wrist, Right Updated: 01/09/22 1503     Blood Culture, Routine No Growth after 4 days.     Blood culture [[id number]] Collected: 01/05/22 1308    Order Status: Completed Specimen: Blood from Peripheral, Hand, Right Updated: 01/09/22 1503     Blood Culture, Routine No Growth after 4 days.     Aerobic culture [[id number]]  (Abnormal)  (Susceptibility) Collected: 01/06/22 1100    Order Status: Completed Specimen: Wound from Foot, Left Updated: 01/09/22 0857     Aerobic Bacterial Culture CITROBACTER KOSERI  Many        CITROBACTER FREUNDII  Few      AFB Culture & Smear [[id number]] Collected: 01/06/22 1100    Order Status: Completed Specimen: Wound from Foot, Left Updated: 01/08/22 2127     AFB Culture & Smear Culture in progress    Narrative:      Left foot  metatarsal  Proximal margin - left foot    AFB Culture & Smear [[id number]] Collected: 01/06/22 1100    Order Status: Completed Specimen: Wound from Foot, Left Updated: 01/08/22 2127     AFB Culture & Smear Culture in progress    Culture, Anaerobe [[id number]] Collected: 01/06/22 1100    Order Status: Completed Specimen: Wound from Foot, Left Updated: 01/08/22 1357     Anaerobic Culture Culture in progress    Narrative:      Left foot metatarsal  Proximal margin - left foot    Culture, Anaerobe [[id number]] Collected: 01/06/22 1100    Order Status: Completed Specimen: Wound from Foot, Left Updated: 01/08/22 1356     Anaerobic Culture Culture in progress    Aerobic culture (Specify Source) **CANNOT BE ORDERED AS STAT** [[id number]]  (Abnormal)  (Susceptibility) Collected: 01/05/22 1315    Order Status: Completed Specimen: Wound from Foot, Left Updated: 01/08/22 0850     Aerobic Bacterial Culture CITROBACTER FREUNDII  Many        PSEUDOMONAS AERUGINOSA  Moderate      Gram stain [[id number]] Collected: 01/06/22 1100    Order Status: Completed Specimen: Wound from Foot, Left Updated: 01/06/22 1451     Gram Stain Result Few WBC's      Moderate Gram negative rods      Few Gram positive cocci    Narrative:      Left foot metatarsal  Proximal margin - left foot    Gram stain [[id number]] Collected: 01/06/22 1100    Order Status: Completed Specimen: Wound from Foot, Left Updated: 01/06/22 1450     Gram Stain Result Few WBC's      Few Gram negative rods      Few Gram positive cocci    Fungus culture [[id number]] Collected: 01/06/22 1100    Order Status: Sent Specimen: Wound from Foot, Left Updated: 01/06/22 1350    Fungus culture [[id number]] Collected: 01/06/22 1100    Order Status: Sent Specimen: Wound from Foot, Left Updated: 01/06/22 1346            Diagnostic Tests: n/a        Assessment:    57 y/o male with ESRD on HD admitted with:    - L-foot gangrene s/p amputation, debridement and washout  - Citrobacter + Pseudomonas wound  infection  - Osteo  - LE PAD  - + Covid test  - AVF malfunction s/p Fistulogram  - Uncontrolled HTN  - IDDM2  - Chronic diastolic HF  - Hypoalbuminemia          Plan:    - Pte. Refused Dialysis today and wants to go back to TTS schedule in a.m.  - Epogen 3 x week  - Antibiotics per ID  - Renal ADA diet  - Vascular following      Will f/u on HD days.

## 2022-01-10 NOTE — PLAN OF CARE
Problem: Adult Inpatient Plan of Care  Goal: Optimal Comfort and Wellbeing  Outcome: Ongoing, Progressing  Intervention: Provide Person-Centered Care  Flowsheets (Taken 1/10/2022 1708)  Trust Relationship/Rapport:   care explained   questions answered   questions encouraged   choices provided   emotional support provided   reassurance provided     Problem: Fall Injury Risk  Goal: Absence of Fall and Fall-Related Injury  Outcome: Ongoing, Progressing  Intervention: Promote Injury-Free Environment  Flowsheets (Taken 1/10/2022 1708)  Safety Promotion/Fall Prevention:   bed alarm set   assistive device/personal item within reach   side rails raised x 2

## 2022-01-10 NOTE — NURSING
Pt refused midnight vital signs.   0400: pt refused 0400 vitals, meds and lab draw. Charge nurse notified.

## 2022-01-10 NOTE — ASSESSMENT & PLAN NOTE
-Ortho deferred mgmt of L foot wound to vascular surgery- rec open 2-5 toe amputations  -ID consult  -Wound care

## 2022-01-10 NOTE — NURSING
"Upon successful cannulation of arterial site, trouble cannulating venous site. CHELSEY ARCHER came to BS to show where stents were placed. At time of 2nd attempt to cannulate venous site, patient is now refusing tx. CHELSEY ARCHER at BS at time of refusal. Pt also refuses to sign AMA form. Pt states "I will have a tx tomorrow." Explained to pt that he needs to have dialysis today 2/2 not having a tx done on Saturday. Pt remains persistent that he will not allow me to perform dialysis on him today and he will resume tomorrow. Will notify nephrologist. Report given to primary RN.  "

## 2022-01-10 NOTE — SUBJECTIVE & OBJECTIVE
Medications:  Continuous Infusions:  Scheduled Meds:   sodium chloride 0.9%   Intravenous Once    atorvastatin  20 mg Oral Daily    carvediloL  6.25 mg Oral BID    ceFEPime (MAXIPIME) IVPB  1 g Intravenous Q24H    heparin (porcine)  5,000 Units Subcutaneous Q8H    hydrALAZINE  100 mg Oral Q8H    insulin detemir U-100  15 Units Subcutaneous QHS    lisinopriL  5 mg Oral Daily    mupirocin   Nasal BID    pantoprazole  40 mg Oral Daily    sevelamer carbonate  800 mg Oral TID WM    traZODone  50 mg Oral QHS     PRN Meds:sodium chloride 0.9%, acetaminophen, acetaminophen, dextrose 50%, dextrose 50%, glucagon (human recombinant), glucose, glucose, HYDROmorphone, influenza, insulin aspart U-100, magnesium oxide, magnesium oxide, naloxone, ondansetron, oxyCODONE, sodium chloride 0.9%, sodium chloride 0.9%     Objective:     Vital Signs (Most Recent):  Temp: 97.7 °F (36.5 °C) (01/10/22 1415)  Pulse: 75 (01/10/22 1415)  Resp: 19 (01/10/22 1616)  BP: (!) 167/67 (med given, see mar) (01/10/22 1415)  SpO2: 100 % (01/10/22 1415) Vital Signs (24h Range):  Temp:  [97.7 °F (36.5 °C)-98.7 °F (37.1 °C)] 97.7 °F (36.5 °C)  Pulse:  [71-83] 75  Resp:  [11-22] 19  SpO2:  [96 %-100 %] 100 %  BP: (139-173)/() 167/67         Physical Exam  Vitals reviewed.   Constitutional:       General: He is not in acute distress.     Appearance: He is well-developed. He is not diaphoretic.   HENT:      Head: Normocephalic and atraumatic.   Eyes:      Conjunctiva/sclera: Conjunctivae normal.   Cardiovascular:      Rate and Rhythm: Normal rate.      Pulses:           Femoral pulses are 2+ on the right side and 2+ on the left side.       Popliteal pulses are 2+ on the left side.   Pulmonary:      Effort: Pulmonary effort is normal.   Abdominal:      General: There is no distension.      Palpations: Abdomen is soft. There is no mass.      Tenderness: There is no abdominal tenderness. There is no guarding or rebound.      Hernia: No hernia  is present.   Musculoskeletal:         General: Tenderness present. No deformity. Normal range of motion.      Cervical back: Neck supple.      Right lower leg: No edema.      Left lower leg: No edema.   Feet:      Left foot:      Skin integrity: No ulcer, blister, skin breakdown, erythema or dry skin.   Skin:     General: Skin is warm and dry.      Capillary Refill: Capillary refill takes more than 3 seconds.      Findings: No erythema or rash.      Comments: L foot wound without purulence, bleeding noted   Neurological:      General: No focal deficit present.      Mental Status: He is alert and oriented to person, place, and time.   Psychiatric:         Mood and Affect: Mood normal.         Significant Labs:  All pertinent labs from the last 24 hours have been reviewed.    Significant Diagnostics:  I have reviewed all pertinent imaging results/findings within the past 24 hours.

## 2022-01-10 NOTE — CONSULTS
"[facility]  Infectious Disease  Consult Note    Patient Name: Adryan Neff  MRN: [id number]  Admission Date: 1/5/2022  Hospital Length of Stay: 5 days  Attending Physician: Edi Moreno MD  Primary Care Provider: Soren Rice MD     Isolation Status: Airborne and Contact and Droplet    Patient information was obtained from patient, past medical records and ER records.      Inpatient consult to Infectious Diseases  Consult performed by: Fausto Archer MD  Consult ordered by: Edi Moreno MD        Assessment/Plan:     * Gangrene of left foot  - s/p amputations, debridement, and washout  - remaining bone seems likely infected  - culture grew Citrobacter x 2 and Pseudomonas  - QTc 499 ms, so I do not feel comfortable treating with a quinolone which potentiates QT prolongation  - stop vancomycin- OK to change to cefepime/Fortaz x 6 weeks      COVID - asymptomatic.    Thank you for your consult. I will follow-up with patient. Please contact us if you have any additional questions.    Fausto Archer MD  Infectious Disease  [facility]    Subjective:     Principal Problem: Gangrene of left foot    HPI: This is a 56 y.o.male patient, with a PMHx of ESRD on hemodialysis, CAD, HTN, and DM, presenting to the ED for further evaluation of left foot pain. He was diagnosed with gangrene of the foot due to PAD. Coincidentally, he tested positive for COVID-19. The patient was taken to the OR and underwent: left 2-5 metatarsal amputations and left foot debridement and washout. OR cultures have grown Citrobacter and Pseudomonas. The patient has been receiving Zosyn and Vancocin since admission. ID is consulted for: "left foot gangrene, patient and family wanting to preserve limb; growing citrobacter and pseudomonas." The patient complains of pain but is otherwise, a difficult historian. No family is present.      Past Medical History:   Diagnosis Date    CAD (coronary artery disease) 2016 " ----- Message from Dalila Jerry sent at 11/14/2017 12:15 PM CST -----  Contact: [phone]/self  Patient called in returning your call. Please advise.        CAD (non obstructive) 2016 [facility]    Diabetes mellitus type I     Diabetic retinopathy     ESRD on hemodialysis     on HD TThSat    Gangrene of left foot     Hypertension     Nuclear sclerosis of right eye 11/24/2021    PAD (peripheral artery disease)     Pulmonary HTN     TB lung, latent 04/2021       Past Surgical History:   Procedure Laterality Date    AV FISTULA PLACEMENT      CATARACT EXTRACTION Left     EYE SURGERY      TOE AMPUTATION Left 1/6/2022    Procedure: AMPUTATION, TOE;  Surgeon: Masoud Soni MD;  Location: [facility] OR;  Service: Vascular;  Laterality: Left;  Left first through fifth toes, possible open transmetatarsal amputation and all other indicated procedures       Review of patient's allergies indicates:  No Known Allergies    Medications:  Medications Prior to Admission   Medication Sig    amoxicillin-clavulanate 500-125mg (AUGMENTIN) 500-125 mg Tab Take 1 tablet (500 mg total) by mouth once daily.    atorvastatin (LIPITOR) 20 MG tablet Take 1 tablet (20 mg total) by mouth once daily. (Patient taking differently: Take 20 mg by mouth once daily.)    blood sugar diagnostic Strp 1 each by Misc.(Non-Drug; Combo Route) route 2 hours after meals and at bedtime.    doxercalciferoL (HECTOROL) 0.5 MCG capsule 2 mcg.    heparin sodium,porcine (HEPARIN, PORCINE,) 1,000 unit/mL Soln injection Heparin Sodium (Porcine) 1,000 Units/mL Systemic    hydrALAZINE (APRESOLINE) 100 MG tablet Take 1 tablet (100 mg total) by mouth every 8 (eight) hours. (Patient taking differently: Take 50 mg by mouth every 8 (eight) hours. )    insulin (BASAGLAR KWIKPEN U-100 INSULIN) glargine 100 units/mL (3mL) SubQ pen Inject 15 Units into the skin every evening.    isoniazid (NYDRAZID) 300 MG Tab Take 1 tablet (300 mg total) by mouth once daily.    lisinopriL (PRINIVIL,ZESTRIL) 2.5 MG tablet Take 2.5 mg by mouth once daily.    lisinopriL (PRINIVIL,ZESTRIL) 5 MG tablet Take 5 mg by mouth once daily.     "methoxy peg-epoetin beta (MIRCERA INJ) 75 mcg.    metoprolol succinate (TOPROL-XL) 25 MG 24 hr tablet Take 1 tablet (25 mg total) by mouth once daily.    omeprazole (PRILOSEC) 20 MG capsule TAKE 2 CAPSULES(40 MG) BY MOUTH EVERY DAY (Patient taking differently: Take 20 mg by mouth once daily.)    penicillin v potassium (VEETID) 500 MG tablet Take 500 mg by mouth 4 (four) times daily.    RENVELA 800 mg Tab Take 800 mg by mouth 3 (three) times daily with meals.    traMADoL (ULTRAM) 50 mg tablet Take 1 tablet (50 mg total) by mouth every 8 (eight) hours as needed for Pain.    traZODone (DESYREL) 50 MG tablet Take 50 mg by mouth every evening.     Antibiotics (From admission, onward)            Start     Stop Route Frequency Ordered    01/06/22 0945  mupirocin 2 % ointment         01/11 0859 Nasl 2 times daily 01/06/22 0836    01/06/22 0815  piperacillin-tazobactam 4.5 g in dextrose 5 % 100 mL IVPB (ready to mix system)         -- IV Every 12 hours (non-standard times) 01/06/22 0711    01/06/22 0811  vancomycin - pharmacy to dose  (vancomycin IVPB)        "And" Linked Group Details    -- IV pharmacy to manage frequency 01/06/22 0711        Antifungals (From admission, onward)            None        Antivirals (From admission, onward)    None           Immunization History   Administered Date(s) Administered    COVID-19 Vaccine 01/15/2021    COVID-19, MRNA, LN-S, PF (MODERNA FULL 0.5 ML DOSE) 01/15/2021, 02/11/2021    Hepatitis B, Adult 07/10/2017, 08/14/2017, 09/11/2017, 03/30/2021, 04/22/2021    Influenza 09/29/2020    Influenza - Quadrivalent - PF *Preferred* (6 months and older) 01/16/2008, 03/30/2016, 12/07/2016    PPD Test 04/04/2017    Pneumococcal Conjugate - 13 Valent 11/27/2017    Pneumococcal Polysaccharide - 23 Valent 03/30/2016, 10/24/2018    Tdap 03/30/2016       Family History     Problem Relation (Age of Onset)    No Known Problems Mother, Father, Sister, Brother, Daughter, Daughter, " Daughter, Brother, Maternal Aunt, Maternal Uncle, Paternal Aunt, Paternal Uncle, Maternal Grandmother, Maternal Grandfather, Paternal Grandmother, Paternal Grandfather        Social History     Socioeconomic History    Marital status: Single    Number of children: 5   Tobacco Use    Smoking status: Never Smoker    Smokeless tobacco: Never Used   Substance and Sexual Activity    Alcohol use: No    Drug use: No    Sexual activity: Yes     Partners: Female   Social History Narrative    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     Review of Systems   Unable to perform ROS: Dementia     Objective:     Vital Signs (Most Recent):  Temp:  (refused vitals) (01/09/22 2300)  Pulse: 83 (01/09/22 2056)  Resp: 20 (01/10/22 0847)  BP: (!) 151/106 (01/09/22 2056)  SpO2: 96 % (01/09/22 2056) Vital Signs (24h Range):  Temp:  [97.8 °F (36.6 °C)-98.7 °F (37.1 °C)] 98.7 °F (37.1 °C)  Pulse:  [68-83] 83  Resp:  [16-22] 20  SpO2:  [96 %-100 %] 96 %  BP: (127-172)/() 151/106     Weight: 64.3 kg (141 lb 12.1 oz)  Body mass index is 20.34 kg/m².    Estimated Creatinine Clearance: 5.5 mL/min (A) (based on SCr of 13.5 mg/dL (H)).    Physical Exam  Vitals and nursing note reviewed.   Constitutional:       General: He is not in acute distress.     Appearance: Normal appearance. He is not toxic-appearing.   HENT:      Head: Normocephalic and atraumatic.      Mouth/Throat:      Comments: Poor dentition  Eyes:      Extraocular Movements: Extraocular movements intact.      Pupils: Pupils are equal, round, and reactive to light.   Cardiovascular:      Rate and Rhythm: Normal rate.   Pulmonary:      Effort: Pulmonary effort is normal.      Breath sounds: Normal breath sounds.   Abdominal:      General: Abdomen is flat.      Tenderness: There is no abdominal tenderness.   Musculoskeletal:         General: Tenderness and deformity present.   Neurological:      General: No focal deficit present.      Mental Status: He is alert.   Psychiatric:         Behavior:  Behavior normal.           From 1/7:        Significant Labs:   Blood Culture:   Recent Labs   Lab 11/11/21  1155 01/05/22  1308 01/05/22  1309 01/09/22  1216 01/09/22  1217   LABBLOO No growth after 5 days. No Growth after 4 days.  No Growth after 4 days.  No Growth to date No Growth to date     CBC:   Recent Labs   Lab 01/09/22  1217   WBC 17.10*   HGB 9.2*   HCT 31.9*   *     CMP:   Recent Labs   Lab 01/09/22  0542   *   K 4.8   CL 97   CO2 20*      BUN 55*   CREATININE 13.5*   CALCIUM 8.7   PROT 8.5*   ALBUMIN 1.9*   BILITOT 0.3   ALKPHOS 70   AST 12   ALT 5*   ANIONGAP 17*   EGFRNONAA 4*     Wound Culture:   Recent Labs   Lab 01/05/22  1315 01/06/22  1100   LABAERO CITROBACTER FREUNDII  Many  *  PSEUDOMONAS AERUGINOSA  Moderate  * CITROBACTER KOSERI  Many  For susceptibility see order # [id number]  *  CITROBACTER FREUNDII  Many  For susceptibility see order # [id number]  *  PSEUDOMONAS AERUGINOSA*  CITROBACTER KOSERI  Many  *  CITROBACTER FREUNDII  Few  *       Significant Imaging: None

## 2022-01-10 NOTE — NURSING
Attempted to take vials and accucheck. Pt screaming. Pain assessed. PRN pain med and reassurance given now. Will continue to monitor.

## 2022-01-10 NOTE — PT/OT/SLP PROGRESS
Physical Therapy      Patient Name:  Adryan Neff   MRN:  [id number]    Patient not seen today secondary to in IR for procedure then to HD. Will f/u tomorrow.

## 2022-01-10 NOTE — PROGRESS NOTES
[facility]  Vascular Surgery  Progress Note    Patient Name: Adryan Neff  MRN: [id number]  Admission Date: 1/5/2022  Primary Care Provider: Soren Rice MD    Subjective:     Interval History: s/p toe amputations, on Abx    Post-Op Info:  Procedure(s) (LRB):  AMPUTATION, TOE (Left)   4 Days Post-Op       Medications:  Continuous Infusions:  Scheduled Meds:   sodium chloride 0.9%   Intravenous Once    atorvastatin  20 mg Oral Daily    carvediloL  6.25 mg Oral BID    ceFEPime (MAXIPIME) IVPB  1 g Intravenous Q24H    heparin (porcine)  5,000 Units Subcutaneous Q8H    hydrALAZINE  100 mg Oral Q8H    insulin detemir U-100  15 Units Subcutaneous QHS    lisinopriL  5 mg Oral Daily    mupirocin   Nasal BID    pantoprazole  40 mg Oral Daily    sevelamer carbonate  800 mg Oral TID WM    traZODone  50 mg Oral QHS     PRN Meds:sodium chloride 0.9%, acetaminophen, acetaminophen, dextrose 50%, dextrose 50%, glucagon (human recombinant), glucose, glucose, HYDROmorphone, influenza, insulin aspart U-100, magnesium oxide, magnesium oxide, naloxone, ondansetron, oxyCODONE, sodium chloride 0.9%, sodium chloride 0.9%     Objective:     Vital Signs (Most Recent):  Temp: 97.7 °F (36.5 °C) (01/10/22 1415)  Pulse: 75 (01/10/22 1415)  Resp: 19 (01/10/22 1616)  BP: (!) 167/67 (med given, see mar) (01/10/22 1415)  SpO2: 100 % (01/10/22 1415) Vital Signs (24h Range):  Temp:  [97.7 °F (36.5 °C)-98.7 °F (37.1 °C)] 97.7 °F (36.5 °C)  Pulse:  [71-83] 75  Resp:  [11-22] 19  SpO2:  [96 %-100 %] 100 %  BP: (139-173)/() 167/67         Physical Exam  Vitals reviewed.   Constitutional:       General: He is not in acute distress.     Appearance: He is well-developed. He is not diaphoretic.   HENT:      Head: Normocephalic and atraumatic.   Eyes:      Conjunctiva/sclera: Conjunctivae normal.   Cardiovascular:      Rate and Rhythm: Normal rate.      Pulses:           Femoral pulses are 2+ on the right side and 2+ on the left  side.       Popliteal pulses are 2+ on the left side.   Pulmonary:      Effort: Pulmonary effort is normal.   Abdominal:      General: There is no distension.      Palpations: Abdomen is soft. There is no mass.      Tenderness: There is no abdominal tenderness. There is no guarding or rebound.      Hernia: No hernia is present.   Musculoskeletal:         General: Tenderness present. No deformity. Normal range of motion.      Cervical back: Neck supple.      Right lower leg: No edema.      Left lower leg: No edema.   Feet:      Left foot:      Skin integrity: No ulcer, blister, skin breakdown, erythema or dry skin.   Skin:     General: Skin is warm and dry.      Capillary Refill: Capillary refill takes more than 3 seconds.      Findings: No erythema or rash.      Comments: L foot wound without purulence, bleeding noted   Neurological:      General: No focal deficit present.      Mental Status: He is alert and oriented to person, place, and time.   Psychiatric:         Mood and Affect: Mood normal.         Significant Labs:  All pertinent labs from the last 24 hours have been reviewed.    Significant Diagnostics:  I have reviewed all pertinent imaging results/findings within the past 24 hours.    Assessment/Plan:     * Gangrene of left foot  -Ortho deferred mgmt of L foot wound to vascular surgery s/p open 2-5 toe amputations  -ID consulted - f/u recs  -Cont wound care and monitor WBC closely - plan for angiogram/revascularization if safely able 1/11/22    ESRD on hemodialysis  -will evaluate HD access once acute clinic issues are resolved        Masoud Soni MD  Vascular Surgery  [facility] [place] [facility] [place]

## 2022-01-10 NOTE — NURSING
Attempted to check pt's blood sugar on 3 separate occasions. Pt is refusing to have accuchek and refusing IV antibiotics. Pt states that the medication is no good. Asked CNA to go in and attempt to check blood sugar and he told her also that he did not want to have it done. Educated pt of the importance of having blood sugars checked so that he could get his sliding scale insulin and levemir insulin. Pt again verbalized that he did not want to get either the accuchek done, get the insulins and did not want the antibiotic. Further explained the importance of receiving these medications  because of his diagnosis and he continued to refuse treatment. Notified charge nurse of patient refusing medication. Will continue to educate patient and will continue to monitor.

## 2022-01-10 NOTE — PROGRESS NOTES
[facility]  Vascular Surgery  Progress Note    Patient Name: Adryan Neff  MRN: [id number]  Admission Date: 1/5/2022  Primary Care Provider: Soren Rice MD    Subjective:     Interval History: resting comfortably today    Post-Op Info:  Procedure(s) (LRB):  AMPUTATION, TOE (Left)   4 Days Post-Op       Medications:  Continuous Infusions:  Scheduled Meds:   sodium chloride 0.9%   Intravenous Once    atorvastatin  20 mg Oral Daily    carvediloL  6.25 mg Oral BID    ceFEPime (MAXIPIME) IVPB  1 g Intravenous Q24H    heparin (porcine)  5,000 Units Subcutaneous Q8H    hydrALAZINE  100 mg Oral Q8H    insulin detemir U-100  15 Units Subcutaneous QHS    lisinopriL  5 mg Oral Daily    mupirocin   Nasal BID    pantoprazole  40 mg Oral Daily    sevelamer carbonate  800 mg Oral TID WM    traZODone  50 mg Oral QHS     PRN Meds:sodium chloride 0.9%, acetaminophen, acetaminophen, dextrose 50%, dextrose 50%, glucagon (human recombinant), glucose, glucose, HYDROmorphone, influenza, insulin aspart U-100, magnesium oxide, magnesium oxide, naloxone, ondansetron, oxyCODONE, sodium chloride 0.9%, sodium chloride 0.9%     Objective:     Vital Signs (Most Recent):  Temp: 97.7 °F (36.5 °C) (01/10/22 1415)  Pulse: 75 (01/10/22 1415)  Resp: 19 (01/10/22 1616)  BP: (!) 167/67 (med given, see mar) (01/10/22 1415)  SpO2: 100 % (01/10/22 1415) Vital Signs (24h Range):  Temp:  [97.7 °F (36.5 °C)-98.7 °F (37.1 °C)] 97.7 °F (36.5 °C)  Pulse:  [71-83] 75  Resp:  [11-22] 19  SpO2:  [96 %-100 %] 100 %  BP: (139-173)/() 167/67         Physical Exam  Vitals reviewed.   Constitutional:       General: He is not in acute distress.     Appearance: He is well-developed. He is not diaphoretic.   HENT:      Head: Normocephalic and atraumatic.   Eyes:      Conjunctiva/sclera: Conjunctivae normal.   Cardiovascular:      Rate and Rhythm: Normal rate.      Pulses:           Femoral pulses are 2+ on the right side and 2+ on the left  side.       Popliteal pulses are 2+ on the left side.   Pulmonary:      Effort: Pulmonary effort is normal.   Abdominal:      General: There is no distension.      Palpations: Abdomen is soft. There is no mass.      Tenderness: There is no abdominal tenderness. There is no guarding or rebound.      Hernia: No hernia is present.   Musculoskeletal:         General: Tenderness present. No deformity. Normal range of motion.      Cervical back: Neck supple.      Right lower leg: No edema.      Left lower leg: No edema.   Feet:      Left foot:      Skin integrity: No ulcer, blister, skin breakdown, erythema or dry skin.   Skin:     General: Skin is warm and dry.      Capillary Refill: Capillary refill takes more than 3 seconds.      Findings: No erythema or rash.      Comments: L foot wound without purulence, bleeding noted   Neurological:      General: No focal deficit present.      Mental Status: He is alert and oriented to person, place, and time.   Psychiatric:         Mood and Affect: Mood normal.         Significant Labs:  All pertinent labs from the last 24 hours have been reviewed.    Significant Diagnostics:  I have reviewed all pertinent imaging results/findings within the past 24 hours.    Assessment/Plan:     * Gangrene of left foot  -Ortho deferred mgmt of L foot wound to vascular surgery s/p open 2-5 toe amputations  -ID consulted - f/u recs  -Cont wound care and monitor WBC closely - plan for angiogram/revascularization if safely able 1/11/22    ESRD on hemodialysis  -will evaluate HD access once acute clinic issues are resolved        Masoud Soni MD  Vascular Surgery  [facility] [place] [facility] [place]

## 2022-01-10 NOTE — SUBJECTIVE & OBJECTIVE
Past Medical History:   Diagnosis Date    CAD (coronary artery disease) 2016    CAD (non obstructive) 2016 [facility]    Diabetes mellitus type I     Diabetic retinopathy     ESRD on hemodialysis     on HD TThSat    Gangrene of left foot     Hypertension     Nuclear sclerosis of right eye 11/24/2021    PAD (peripheral artery disease)     Pulmonary HTN     TB lung, latent 04/2021       Past Surgical History:   Procedure Laterality Date    AV FISTULA PLACEMENT      CATARACT EXTRACTION Left     EYE SURGERY      TOE AMPUTATION Left 1/6/2022    Procedure: AMPUTATION, TOE;  Surgeon: Masoud Soni MD;  Location: [facility] OR;  Service: Vascular;  Laterality: Left;  Left first through fifth toes, possible open transmetatarsal amputation and all other indicated procedures       Review of patient's allergies indicates:  No Known Allergies    Medications:  Medications Prior to Admission   Medication Sig    amoxicillin-clavulanate 500-125mg (AUGMENTIN) 500-125 mg Tab Take 1 tablet (500 mg total) by mouth once daily.    atorvastatin (LIPITOR) 20 MG tablet Take 1 tablet (20 mg total) by mouth once daily. (Patient taking differently: Take 20 mg by mouth once daily.)    blood sugar diagnostic Strp 1 each by Misc.(Non-Drug; Combo Route) route 2 hours after meals and at bedtime.    doxercalciferoL (HECTOROL) 0.5 MCG capsule 2 mcg.    heparin sodium,porcine (HEPARIN, PORCINE,) 1,000 unit/mL Soln injection Heparin Sodium (Porcine) 1,000 Units/mL Systemic    hydrALAZINE (APRESOLINE) 100 MG tablet Take 1 tablet (100 mg total) by mouth every 8 (eight) hours. (Patient taking differently: Take 50 mg by mouth every 8 (eight) hours. )    insulin (BASAGLAR KWIKPEN U-100 INSULIN) glargine 100 units/mL (3mL) SubQ pen Inject 15 Units into the skin every evening.    isoniazid (NYDRAZID) 300 MG Tab Take 1 tablet (300 mg total) by mouth once daily.    lisinopriL (PRINIVIL,ZESTRIL) 2.5 MG tablet Take 2.5 mg by mouth once daily.     "lisinopriL (PRINIVIL,ZESTRIL) 5 MG tablet Take 5 mg by mouth once daily.    methoxy peg-epoetin beta (MIRCERA INJ) 75 mcg.    metoprolol succinate (TOPROL-XL) 25 MG 24 hr tablet Take 1 tablet (25 mg total) by mouth once daily.    omeprazole (PRILOSEC) 20 MG capsule TAKE 2 CAPSULES(40 MG) BY MOUTH EVERY DAY (Patient taking differently: Take 20 mg by mouth once daily.)    penicillin v potassium (VEETID) 500 MG tablet Take 500 mg by mouth 4 (four) times daily.    RENVELA 800 mg Tab Take 800 mg by mouth 3 (three) times daily with meals.    traMADoL (ULTRAM) 50 mg tablet Take 1 tablet (50 mg total) by mouth every 8 (eight) hours as needed for Pain.    traZODone (DESYREL) 50 MG tablet Take 50 mg by mouth every evening.     Antibiotics (From admission, onward)            Start     Stop Route Frequency Ordered    01/06/22 0945  mupirocin 2 % ointment         01/11 0859 Nasl 2 times daily 01/06/22 0836    01/06/22 0815  piperacillin-tazobactam 4.5 g in dextrose 5 % 100 mL IVPB (ready to mix system)         -- IV Every 12 hours (non-standard times) 01/06/22 0711    01/06/22 0811  vancomycin - pharmacy to dose  (vancomycin IVPB)        "And" Linked Group Details    -- IV pharmacy to manage frequency 01/06/22 0711        Antifungals (From admission, onward)            None        Antivirals (From admission, onward)    None           Immunization History   Administered Date(s) Administered    COVID-19 Vaccine 01/15/2021    COVID-19, MRNA, LN-S, PF (MODERNA FULL 0.5 ML DOSE) 01/15/2021, 02/11/2021    Hepatitis B, Adult 07/10/2017, 08/14/2017, 09/11/2017, 03/30/2021, 04/22/2021    Influenza 09/29/2020    Influenza - Quadrivalent - PF *Preferred* (6 months and older) 01/16/2008, 03/30/2016, 12/07/2016    PPD Test 04/04/2017    Pneumococcal Conjugate - 13 Valent 11/27/2017    Pneumococcal Polysaccharide - 23 Valent 03/30/2016, 10/24/2018    Tdap 03/30/2016       Family History     Problem Relation (Age of Onset)    " No Known Problems Mother, Father, Sister, Brother, Daughter, Daughter, Daughter, Brother, Maternal Aunt, Maternal Uncle, Paternal Aunt, Paternal Uncle, Maternal Grandmother, Maternal Grandfather, Paternal Grandmother, Paternal Grandfather        Social History     Socioeconomic History    Marital status: Single    Number of children: 5   Tobacco Use    Smoking status: Never Smoker    Smokeless tobacco: Never Used   Substance and Sexual Activity    Alcohol use: No    Drug use: No    Sexual activity: Yes     Partners: Female   Social History Narrative    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     Review of Systems   Unable to perform ROS: Dementia     Objective:     Vital Signs (Most Recent):  Temp:  (refused vitals) (01/09/22 2300)  Pulse: 83 (01/09/22 2056)  Resp: 20 (01/10/22 0847)  BP: (!) 151/106 (01/09/22 2056)  SpO2: 96 % (01/09/22 2056) Vital Signs (24h Range):  Temp:  [97.8 °F (36.6 °C)-98.7 °F (37.1 °C)] 98.7 °F (37.1 °C)  Pulse:  [68-83] 83  Resp:  [16-22] 20  SpO2:  [96 %-100 %] 96 %  BP: (127-172)/() 151/106     Weight: 64.3 kg (141 lb 12.1 oz)  Body mass index is 20.34 kg/m².    Estimated Creatinine Clearance: 5.5 mL/min (A) (based on SCr of 13.5 mg/dL (H)).    Physical Exam  Vitals and nursing note reviewed.   Constitutional:       General: He is not in acute distress.     Appearance: Normal appearance. He is not toxic-appearing.   HENT:      Head: Normocephalic and atraumatic.      Mouth/Throat:      Comments: Poor dentition  Eyes:      Extraocular Movements: Extraocular movements intact.      Pupils: Pupils are equal, round, and reactive to light.   Cardiovascular:      Rate and Rhythm: Normal rate.   Pulmonary:      Effort: Pulmonary effort is normal.      Breath sounds: Normal breath sounds.   Abdominal:      General: Abdomen is flat.      Tenderness: There is no abdominal tenderness.   Musculoskeletal:         General: Tenderness and deformity present.   Neurological:      General: No focal deficit  present.      Mental Status: He is alert.   Psychiatric:         Behavior: Behavior normal.           From 1/7:        Significant Labs:   Blood Culture:   Recent Labs   Lab 11/11/21  1155 01/05/22  1308 01/05/22  1309 01/09/22  1216 01/09/22  1217   LABBLOO No growth after 5 days. No Growth after 4 days.  No Growth after 4 days.  No Growth to date No Growth to date     CBC:   Recent Labs   Lab 01/09/22  1217   WBC 17.10*   HGB 9.2*   HCT 31.9*   *     CMP:   Recent Labs   Lab 01/09/22  0542   *   K 4.8   CL 97   CO2 20*      BUN 55*   CREATININE 13.5*   CALCIUM 8.7   PROT 8.5*   ALBUMIN 1.9*   BILITOT 0.3   ALKPHOS 70   AST 12   ALT 5*   ANIONGAP 17*   EGFRNONAA 4*     Wound Culture:   Recent Labs   Lab 01/05/22  1315 01/06/22  1100   LABAERO CITROBACTER FREUNDII  Many  *  PSEUDOMONAS AERUGINOSA  Moderate  * CITROBACTER KOSERI  Many  For susceptibility see order # [id number]  *  CITROBACTER FREUNDII  Many  For susceptibility see order # [id number]  *  PSEUDOMONAS AERUGINOSA*  CITROBACTER KOSERI  Many  *  CITROBACTER FREUNDII  Few  *       Significant Imaging: None

## 2022-01-10 NOTE — PLAN OF CARE
01/10/22 1633   Discharge Reassessment   Assessment Type Discharge Planning Reassessment   SNF recommended for DC.  TN attempted to speak with patient about DC plan without success.  TN called patient's family, no answer, detailed message left requesting call back.  TN sent reeferral via [employer] to:  [facility], [facility], [facility], [facility] and [facility].  TN will continue to reach out to family for preferences.

## 2022-01-10 NOTE — NURSING
Pt explained care of plan and reeducated regarding NPO. Pt reassessed pain. Pt changed new gown and partial sheet/blanket. Wound dressing GLENDA. Dressing completed per order. Pt cooperative. Will continue to monitor.

## 2022-01-10 NOTE — ASSESSMENT & PLAN NOTE
Nephrology consulted to resume routine HD.     1/9 - fistula infiltrated, planning for IR fistulagram in the AM

## 2022-01-10 NOTE — CONSULTS
Inpatient Radiology Pre-procedure Note    History of Present Illness:  Adryan Neff is a 56 y.o. male with pertinent PMHx of ESRD on HD TTS via LUE AVF/AVG with reports of pt undergoing full HD session as inpatient this past Thursday however, with unsuccessful attempts at HD on this past Saturday 2/2 to reported difficulties cannulating the venous segment despite multiple repeated attempts by varying RN's suggesting malfunction of the LUE AVF/AVG requiring evaluation.    A new inpatient IR consult received for US and fluoroscopic-guided LUE AVF/AVG fistulagram with potential intervention.    Admission H&P reviewed.  Past Medical History:   Diagnosis Date    CAD (coronary artery disease) 2016    CAD (non obstructive) 2016 [facility]    Diabetes mellitus type I     Diabetic retinopathy     ESRD on hemodialysis     on HD TThSat    Gangrene of left foot     Hypertension     Nuclear sclerosis of right eye 11/24/2021    PAD (peripheral artery disease)     Pulmonary HTN     TB lung, latent 04/2021     Past Surgical History:   Procedure Laterality Date    AV FISTULA PLACEMENT      CATARACT EXTRACTION Left     EYE SURGERY      TOE AMPUTATION Left 1/6/2022    Procedure: AMPUTATION, TOE;  Surgeon: Masoud Soni MD;  Location: [facility];  Service: Vascular;  Laterality: Left;  Left first through fifth toes, possible open transmetatarsal amputation and all other indicated procedures     Review of Systems:   As documented in primary team H&P    Home Meds:   Prior to Admission medications    Medication Sig Start Date End Date Taking? Authorizing Provider   amoxicillin-clavulanate 500-125mg (AUGMENTIN) 500-125 mg Tab Take 1 tablet (500 mg total) by mouth once daily. 11/11/21   Natali Mayers MD   atorvastatin (LIPITOR) 20 MG tablet Take 1 tablet (20 mg total) by mouth once daily.  Patient taking differently: Take 20 mg by mouth once daily. 1/31/19 8/9/21  Soren Rice MD   blood sugar diagnostic Strp 1 each by  Misc.(Non-Drug; Combo Route) route 2 hours after meals and at bedtime. 1/31/19   Soren Rice MD   doxercalciferoL (HECTOROL) 0.5 MCG capsule 2 mcg. 7/24/21 7/23/22  Historical Provider   heparin sodium,porcine (HEPARIN, PORCINE,) 1,000 unit/mL Soln injection Heparin Sodium (Porcine) 1,000 Units/mL Systemic 2/9/21 2/8/22  Historical Provider   hydrALAZINE (APRESOLINE) 100 MG tablet Take 1 tablet (100 mg total) by mouth every 8 (eight) hours.  Patient taking differently: Take 50 mg by mouth every 8 (eight) hours.  10/26/20 10/26/21  Fan Kaye MD   insulin (BASAGLAR KWIKPEN U-100 INSULIN) glargine 100 units/mL (3mL) SubQ pen Inject 15 Units into the skin every evening. 2/7/19   Soren Rice MD   isoniazid (NYDRAZID) 300 MG Tab Take 1 tablet (300 mg total) by mouth once daily. 4/20/21 8/9/21  Anastacio Hoyos MD   lisinopriL (PRINIVIL,ZESTRIL) 2.5 MG tablet Take 2.5 mg by mouth once daily.    Historical Provider   lisinopriL (PRINIVIL,ZESTRIL) 5 MG tablet Take 5 mg by mouth once daily. 11/18/21   Historical Provider   methoxy peg-epoetin beta (MIRCERA INJ) 75 mcg. 7/10/21 7/9/22  Historical Provider   metoprolol succinate (TOPROL-XL) 25 MG 24 hr tablet Take 1 tablet (25 mg total) by mouth once daily. 4/20/21 4/20/22  Mor Avina MD   omeprazole (PRILOSEC) 20 MG capsule TAKE 2 CAPSULES(40 MG) BY MOUTH EVERY DAY  Patient taking differently: Take 20 mg by mouth once daily. 10/19/20   Soren Rice MD   penicillin v potassium (VEETID) 500 MG tablet Take 500 mg by mouth 4 (four) times daily. 9/15/21   Historical Provider   RENVELA 800 mg Tab Take 800 mg by mouth 3 (three) times daily with meals. 11/21/20   Historical Provider   traMADoL (ULTRAM) 50 mg tablet Take 1 tablet (50 mg total) by mouth every 8 (eight) hours as needed for Pain. 12/13/21   Tomas Barron DPM   traZODone (DESYREL) 50 MG tablet Take 50 mg by mouth every evening.    Historical Provider     Scheduled Meds:    sodium chloride 0.9%    Intravenous Once    atorvastatin  20 mg Oral Daily    carvediloL  6.25 mg Oral BID    heparin (porcine)  5,000 Units Subcutaneous Q8H    hydrALAZINE  100 mg Oral Q8H    insulin detemir U-100  15 Units Subcutaneous QHS    lisinopriL  5 mg Oral Daily    mupirocin   Nasal BID    pantoprazole  40 mg Oral Daily    piperacillin-tazobactam (ZOSYN) IVPB  4.5 g Intravenous Q12H    sevelamer carbonate  800 mg Oral TID WM    traZODone  50 mg Oral QHS     Continuous Infusions:   PRN Meds:sodium chloride 0.9%, acetaminophen, acetaminophen, dextrose 50%, dextrose 50%, glucagon (human recombinant), glucose, glucose, HYDROmorphone, influenza, insulin aspart U-100, magnesium oxide, magnesium oxide, naloxone, ondansetron, oxyCODONE, sodium chloride 0.9%, sodium chloride 0.9%, Pharmacy to dose Vancomycin consult **AND** vancomycin - pharmacy to dose     Anticoagulants/Antiplatelets: Heparin    Allergies: Review of patient's allergies indicates:  No Known Allergies   \  Sedation Hx: have not been any systemic reactions    Labs:  Recent Labs   Lab 01/05/22  1232   INR 1.0       Recent Labs   Lab 01/09/22  1217   WBC 17.10*   HGB 9.2*   HCT 31.9*   MCV 95   *      Recent Labs   Lab 01/09/22  0542      *   K 4.8   CL 97   CO2 20*   BUN 55*   CREATININE 13.5*   CALCIUM 8.7   ALT 5*   AST 12   ALBUMIN 1.9*   BILITOT 0.3     Vitals:  Temp:  (refused vitals) (01/09/22 2300)  Pulse: 71 (01/10/22 0935)  Resp: 20 (01/10/22 0935)  BP: 139/63 (01/10/22 0935)  SpO2: 97 % (01/10/22 0935)     Physical Exam:  ASA: III  Mallampati: II    General: no acute distress  Mental Status: alert and oriented to person, place and time  HEENT: normocephalic, atraumatic  Chest: unlabored breathing  Heart: regular heart rate  Abdomen: nondistended  Extremity: moves all extremities    A/P:  56 y.o. male with pertinent PMHx of ESRD on HD TTS via LUE AVF/AVG with reports of pt undergoing full HD session as inpatient this past Thursday  however, with unsuccessful attempts at HD on this past Saturday 2/2 to reported difficulties cannulating the venous segment despite multiple repeated attempts by varying RN's suggesting malfunction of the LUE AVF/AVG requiring evaluation.    1. LUE AVF/AVG malfunction - Will attempt US and fluoroscopic-guided LUE AVF/AVG fistulagram with potential intervention with local anesthetic and up to moderate conscious sedation.    Risks (including, but not limited to, pain, bleeding, infection, damage to nearby structures, failure to obtain sufficient material for a diagnosis, the need for additional procedures, and death), benefits, and alternatives were discussed with the patient. All questions were answered to the best of my abilities. The patient wishes to proceed with the procedure. Written informed consent was obtained.    Thank you for considering IR for the care of your patient.     Gt Mcghee MD  Interventional Radiology

## 2022-01-10 NOTE — NURSING
Pt back to unit now. No acute distress noted. HD access dressing DIC. VS and BG obtained. BP meds given per order. Pt cooperative Lunch tray given. Dr. Moreno notified regarding dialysis refusal.

## 2022-01-10 NOTE — ANESTHESIA POSTPROCEDURE EVALUATION
Anesthesia Post Evaluation    Patient: Adryan Neff    Procedure(s) Performed: Procedure(s) (LRB):  AMPUTATION, TOE (Left)    Final Anesthesia Type: regional      Patient location during evaluation: PACU  Patient participation: Yes- Able to Participate  Level of consciousness: awake and alert  Post-procedure vital signs: reviewed and stable  Pain management: adequate  Airway patency: patent    PONV status at discharge: No PONV  Anesthetic complications: no      Cardiovascular status: blood pressure returned to baseline and hemodynamically stable  Respiratory status: unassisted and spontaneous ventilation  Hydration status: euvolemic  Follow-up not needed.          Vitals Value Taken Time   /106 01/09/22 2056   Temp 37.1 °C (98.7 °F) 01/09/22 2056   Pulse 83 01/09/22 2056   Resp 22 01/09/22 2058   SpO2 96 % 01/09/22 2056         Event Time   Out of Recovery 01/06/2022 11:29:26         Pain/Azul Score: Pain Rating Prior to Med Admin: 10 (1/9/2022  8:58 PM)

## 2022-01-11 LAB
ALBUMIN SERPL BCP-MCNC: 1.8 G/DL (ref 3.5–5.2)
ANION GAP SERPL CALC-SCNC: 18 MMOL/L (ref 8–16)
BASOPHILS # BLD AUTO: 0.09 K/UL (ref 0–0.2)
BASOPHILS NFR BLD: 0.5 % (ref 0–1.9)
BUN SERPL-MCNC: 78 MG/DL (ref 6–20)
CALCIUM SERPL-MCNC: 8.3 MG/DL (ref 8.7–10.5)
CHLORIDE SERPL-SCNC: 93 MMOL/L (ref 95–110)
CO2 SERPL-SCNC: 20 MMOL/L (ref 23–29)
CREAT SERPL-MCNC: 17.1 MG/DL (ref 0.5–1.4)
DIFFERENTIAL METHOD: ABNORMAL
EOSINOPHIL # BLD AUTO: 0.6 K/UL (ref 0–0.5)
EOSINOPHIL NFR BLD: 3.2 % (ref 0–8)
ERYTHROCYTE [DISTWIDTH] IN BLOOD BY AUTOMATED COUNT: 15.8 % (ref 11.5–14.5)
EST. GFR  (AFRICAN AMERICAN): 3 ML/MIN/1.73 M^2
EST. GFR  (NON AFRICAN AMERICAN): 3 ML/MIN/1.73 M^2
GLUCOSE SERPL-MCNC: 174 MG/DL (ref 70–110)
HCT VFR BLD AUTO: 28.5 % (ref 40–54)
HGB BLD-MCNC: 8.5 G/DL (ref 14–18)
IMM GRANULOCYTES # BLD AUTO: 0.12 K/UL (ref 0–0.04)
IMM GRANULOCYTES NFR BLD AUTO: 0.7 % (ref 0–0.5)
LYMPHOCYTES # BLD AUTO: 1 K/UL (ref 1–4.8)
LYMPHOCYTES NFR BLD: 5.7 % (ref 18–48)
MCH RBC QN AUTO: 27.7 PG (ref 27–31)
MCHC RBC AUTO-ENTMCNC: 29.8 G/DL (ref 32–36)
MCV RBC AUTO: 93 FL (ref 82–98)
MONOCYTES # BLD AUTO: 1.2 K/UL (ref 0.3–1)
MONOCYTES NFR BLD: 7 % (ref 4–15)
NEUTROPHILS # BLD AUTO: 14.2 K/UL (ref 1.8–7.7)
NEUTROPHILS NFR BLD: 82.9 % (ref 38–73)
NRBC BLD-RTO: 0 /100 WBC
PHOSPHATE SERPL-MCNC: 8.8 MG/DL (ref 2.7–4.5)
PLATELET # BLD AUTO: 489 K/UL (ref 150–450)
PMV BLD AUTO: 9.4 FL (ref 9.2–12.9)
POCT GLUCOSE: 118 MG/DL (ref 70–110)
POCT GLUCOSE: 124 MG/DL (ref 70–110)
POCT GLUCOSE: 137 MG/DL (ref 70–110)
POCT GLUCOSE: 201 MG/DL (ref 70–110)
POTASSIUM SERPL-SCNC: 5.3 MMOL/L (ref 3.5–5.1)
RBC # BLD AUTO: 3.07 M/UL (ref 4.6–6.2)
SODIUM SERPL-SCNC: 131 MMOL/L (ref 136–145)
WBC # BLD AUTO: 17.12 K/UL (ref 3.9–12.7)

## 2022-01-11 PROCEDURE — 86580 TB INTRADERMAL TEST: CPT | Performed by: HOSPITALIST

## 2022-01-11 PROCEDURE — C9399 UNCLASSIFIED DRUGS OR BIOLOG: HCPCS | Performed by: FAMILY MEDICINE

## 2022-01-11 PROCEDURE — 99024 POSTOP FOLLOW-UP VISIT: CPT | Mod: ,,, | Performed by: SURGERY

## 2022-01-11 PROCEDURE — 63600175 PHARM REV CODE 636 W HCPCS: Performed by: INTERNAL MEDICINE

## 2022-01-11 PROCEDURE — 25000003 PHARM REV CODE 250: Performed by: INTERNAL MEDICINE

## 2022-01-11 PROCEDURE — 63600175 PHARM REV CODE 636 W HCPCS: Performed by: FAMILY MEDICINE

## 2022-01-11 PROCEDURE — 99024 PR POST-OP FOLLOW-UP VISIT: ICD-10-PCS | Mod: ,,, | Performed by: SURGERY

## 2022-01-11 PROCEDURE — 21400001 HC TELEMETRY ROOM

## 2022-01-11 PROCEDURE — 25000003 PHARM REV CODE 250: Performed by: FAMILY MEDICINE

## 2022-01-11 PROCEDURE — 85025 COMPLETE CBC W/AUTO DIFF WBC: CPT | Performed by: HOSPITALIST

## 2022-01-11 PROCEDURE — 36415 COLL VENOUS BLD VENIPUNCTURE: CPT | Performed by: HOSPITALIST

## 2022-01-11 PROCEDURE — 30200315 PPD INTRADERMAL TEST REV CODE 302: Performed by: HOSPITALIST

## 2022-01-11 PROCEDURE — 27000207 HC ISOLATION

## 2022-01-11 PROCEDURE — 25000003 PHARM REV CODE 250: Performed by: HOSPITALIST

## 2022-01-11 PROCEDURE — 80069 RENAL FUNCTION PANEL: CPT | Performed by: HOSPITALIST

## 2022-01-11 RX ORDER — SEVELAMER CARBONATE 800 MG/1
2400 TABLET, FILM COATED ORAL
Status: DISCONTINUED | OUTPATIENT
Start: 2022-01-11 | End: 2022-01-27 | Stop reason: HOSPADM

## 2022-01-11 RX ADMIN — HYDRALAZINE HYDROCHLORIDE 100 MG: 25 TABLET, FILM COATED ORAL at 09:01

## 2022-01-11 RX ADMIN — OXYCODONE 10 MG: 5 TABLET ORAL at 02:01

## 2022-01-11 RX ADMIN — CEFEPIME HYDROCHLORIDE 1 G: 1 INJECTION, SOLUTION INTRAVENOUS at 05:01

## 2022-01-11 RX ADMIN — TUBERCULIN PURIFIED PROTEIN DERIVATIVE 5 UNITS: 5 INJECTION, SOLUTION INTRADERMAL at 09:01

## 2022-01-11 RX ADMIN — NEPHROCAP 1 CAPSULE: 1 CAP ORAL at 02:01

## 2022-01-11 RX ADMIN — SODIUM ZIRCONIUM CYCLOSILICATE 10 G: 10 POWDER, FOR SUSPENSION ORAL at 02:01

## 2022-01-11 RX ADMIN — HEPARIN SODIUM 5000 UNITS: 5000 INJECTION INTRAVENOUS; SUBCUTANEOUS at 02:01

## 2022-01-11 RX ADMIN — HYDRALAZINE HYDROCHLORIDE 100 MG: 25 TABLET, FILM COATED ORAL at 02:01

## 2022-01-11 RX ADMIN — OXYCODONE 10 MG: 5 TABLET ORAL at 07:01

## 2022-01-11 RX ADMIN — CARVEDILOL 6.25 MG: 6.25 TABLET, FILM COATED ORAL at 09:01

## 2022-01-11 RX ADMIN — SEVELAMER CARBONATE 2400 MG: 800 TABLET, FILM COATED ORAL at 05:01

## 2022-01-11 RX ADMIN — PANTOPRAZOLE SODIUM 40 MG: 40 TABLET, DELAYED RELEASE ORAL at 08:01

## 2022-01-11 RX ADMIN — TRAZODONE HYDROCHLORIDE 50 MG: 50 TABLET ORAL at 09:01

## 2022-01-11 NOTE — PROGRESS NOTES
[facility]  Telemedicine Progress Note    Patient Name: Adryan Neff  MRN: [id number]  Patient Class: IP- Inpatient   Admission Date: 1/5/2022  Length of Stay: 5 days  Attending Physician: Edi Moreno MD  Primary Care Provider: Soren Rice MD          Subjective:     Principal Problem:Gangrene of left foot        HPI:  This is a 56 y.o.male patient, with a PMHx of ESRD on hemodialysis, CAD, HTN, and DM, presenting to the ED for further evaluation of left foot pain and black discoloration that began 3 days ago. Patient states these symptoms have been ongoing for the last month. He reports being referred to a specialist but denies ever following up. He has been putting some otc medication on the foot that he believes has turned his foot more black. No trauma or injury. Patient reports associated chills. Patient states applying a creme to the foot and noticed his discoloration worsened. Patient reports he was last dialyzed yesterday. Patient denies any fever, shortness of breath, chest pain, neck pain, back pain, abdominal pain, rash, headaches, congestion, rhinorrhea, cough, sore throat, ear pain, eye pain, blurred vision, nausea, vomiting, diarrhea, dysuria, or any other associated symptoms.    Per chart review, he had an arterial u/s 12/15.   Impression:   1. High-grade severe stenosis right popliteal artery with occlusion anterior tibial artery which is reconstituted at level of DPA via collaterals.  2. 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    Cardiology has been planning to see him for revascularization options. Last podiatry visit 12/13 with Stable dry gangrene L 2nd toe and medial L 5th toe. Dorsalis pedis and posterior tibial pulses are diminished Bilaterally. Toes are cool to touch. Feet are warm proximally.There is decreased digital hair. Skin is atrophic, slightly hyperpigmented, and mildly edematous     In the ED, he was found to have worsened  gangrene and a cool extremity. Vascular surgery was consulted. He was also COVID +. Says he got a booster shot in November sometime.      Overview/Hospital Course:  Patient admitted to the hospital for eval and treatment of L foot gangrene.  Ortho and Vasc consulted. Nephrology was also consulted for chronic dialysis. Patient had several toes on L foot amputated.  Continued with dialysis. PT/OT consulted.       Interval History: patient went for fistulagram today to repair AV fistula infiltration, however patient refused HD after and states his normal schedule is TThSat and will get it tomorrow    - spoke with vascular surgery who is planning on doing an angiogram tomorrow plus or minus intervention; patient likely needing an amputation    - NPO after midnight     Review of Systems   Constitutional: Negative for activity change, fatigue and fever.   Respiratory: Negative for cough and shortness of breath.    Gastrointestinal: Negative for abdominal pain and nausea.     Objective:     Vital Signs (Most Recent):  Temp: 97.7 °F (36.5 °C) (01/10/22 1722)  Pulse: 84 (01/10/22 1722)  Resp: 19 (01/10/22 1722)  BP: (!) 156/67 (01/10/22 1722)  SpO2: 100 % (01/10/22 1722) Vital Signs (24h Range):  Temp:  [97.7 °F (36.5 °C)-98.7 °F (37.1 °C)] 97.7 °F (36.5 °C)  Pulse:  [71-84] 84  Resp:  [11-22] 19  SpO2:  [96 %-100 %] 100 %  BP: (139-173)/() 156/67     Weight: 64.3 kg (141 lb 12.1 oz)  Body mass index is 20.34 kg/m².  No intake or output data in the 24 hours ending 01/10/22 1901   Physical Exam  Constitutional:       Appearance: Normal appearance.   Pulmonary:      Effort: Pulmonary effort is normal.      Breath sounds: No wheezing.   Abdominal:      General: Abdomen is flat. There is no distension.   Musculoskeletal:         General: Swelling and tenderness present.   Skin:     Coloration: Skin is not jaundiced or pale.   Neurological:      General: No focal deficit present.      Mental Status: He is alert and oriented  to person, place, and time.   Psychiatric:         Mood and Affect: Mood normal.         Behavior: Behavior normal.         Significant Labs: All pertinent labs within the past 24 hours have been reviewed.    Significant Imaging: I have reviewed all pertinent imaging results/findings within the past 24 hours.      Assessment/Plan:      * Gangrene of left foot  Vascular and ortho consulted. Likely needs amputation. Discussed with Dr. Soni. In comparison of recent pictures by podiatry, his gangrene has worsened and now involves all toes. See pics below.   Continue IV abx. Pain control.     Follow vasc. Ortho recs     S/p amputation of toes.  Patient would like to try to salvage limb.  Awaiting next vascular recs.   PT/OT consulted. Continue Abx.     Angiogram on Mon or Tues per Vascular. Continue wound care/Abx     1/9- leukocytosis rising, on Vanc and Zosyn; getting blood cultures today; will discuss with vascular surgery tomorrow about when angiogram will occur as wbc is rising; may need amputation     1/10- vascular surgery planning on doing angiogram in the AM; NPO aftermidnight    ESRD on hemodialysis  Nephrology consulted to resume routine HD.     1/9 - fistula infiltrated, planning for IR fistulagram in the AM    1/10- AV fistula repaired today in IR; HD tomorrow as patient refused today    Essential (primary) hypertension  Chronic, uncontrolled.  Latest blood pressure and vitals reviewed-   Temp:  [97.8 °F (36.6 °C)-98.7 °F (37.1 °C)]   Pulse:  [68-83]   Resp:  [16-22]   BP: (123-172)/()   SpO2:  [96 %-100 %] .   Home meds for hypertension were reviewed and noted below.   Hypertension Medications             hydrALAZINE (APRESOLINE) 100 MG tablet Take 1 tablet (100 mg total) by mouth every 8 (eight) hours.    lisinopriL (PRINIVIL,ZESTRIL) 2.5 MG tablet Take 2.5 mg by mouth once daily.    lisinopriL (PRINIVIL,ZESTRIL) 5 MG tablet Take 5 mg by mouth once daily.    metoprolol succinate (TOPROL-XL) 25 MG  24 hr tablet Take 1 tablet (25 mg total) by mouth once daily.          While in the hospital, will manage blood pressure as follows; Continue home antihypertensive regimen (will use higher dose of lisinopril). Adjust as needed.     Will utilize p.r.n. blood pressure medication only if patient's blood pressure greater than  180/110 and he develops symptoms such as worsening chest pain or shortness of breath.    1/9- uncontrolled, adding coreg today       COVID-19 in immunocompromised patient  HD patient + for COVID 19. Not hypoxic or symptomatic. Supportive care prn.       Chronic diastolic heart failure  December 2021 echo reviewed. Stable.       Controlled type 2 diabetes mellitus with chronic kidney disease on chronic dialysis, with long-term current use of insulin  Patient's FSGs are controlled on current medication regimen.  Last A1c reviewed-   Lab Results   Component Value Date    HGBA1C 6.1 (H) 01/07/2021     Most recent fingerstick glucose reviewed-   Recent Labs   Lab 01/05/22  1153   POCTGLUCOSE 224*     Current correctional scale  Medium  Maintain anti-hyperglycemic dose as follows-   Antihyperglycemics (From admission, onward)            Start     Stop Route Frequency Ordered    01/05/22 2100  insulin detemir U-100 pen 15 Units         -- SubQ Nightly 01/05/22 1404    01/05/22 1503  insulin aspart U-100 pen 1-10 Units         -- SubQ Before meals & nightly PRN 01/05/22 1404        Hold Oral hypoglycemics while patient is in the hospital.          VTE Risk Mitigation (From admission, onward)         Ordered     heparin (porcine) injection 5,000 Units  Every 8 hours         01/05/22 1404     IP VTE HIGH RISK PATIENT  Once         01/05/22 1404     Place sequential compression device  Until discontinued         01/05/22 1404                      I have assessed these finding virtually using telemed platform and with assistance of bedside nurse                 The attending portion of this evaluation,  treatment, and documentation was performed per Edi Moreno MD via Telemedicine AudioVisual using the secure [employer] software platform with 2 way audio/video. The provider was located off-site and the patient is located in the hospital. The aforementioned video software was utilized to document the relevant history and physical exam    Edi Moreno MD  Department of [facility]   [facility]

## 2022-01-11 NOTE — PROGRESS NOTES
Renal Progress Note    Date of Admission:  1/5/2022 11:39 AM    Length of Stay: 6  Days    Subjective: n/a    Objective:    Current Facility-Administered Medications   Medication    0.9%  NaCl infusion    0.9%  NaCl infusion    acetaminophen tablet 650 mg    acetaminophen tablet 650 mg    atorvastatin tablet 20 mg    carvediloL tablet 6.25 mg    cefepime in dextrose 5 % 1 gram/50 mL IVPB 1 g    dextrose 50% injection 12.5 g    dextrose 50% injection 25 g    glucagon (human recombinant) injection 1 mg    glucose chewable tablet 16 g    glucose chewable tablet 24 g    heparin (porcine) injection 5,000 Units    hydrALAZINE tablet 100 mg    HYDROmorphone (PF) injection 1 mg    influenza (QUADRIVALENT PF) vaccine 0.5 mL    insulin aspart U-100 pen 1-10 Units    insulin detemir U-100 pen 15 Units    lisinopriL tablet 5 mg    magnesium oxide tablet 800 mg    magnesium oxide tablet 800 mg    naloxone 0.4 mg/mL injection 0.02 mg    ondansetron injection 8 mg    oxyCODONE immediate release tablet 10 mg    pantoprazole EC tablet 40 mg    sevelamer carbonate tablet 800 mg    sodium chloride 0.9% bolus 250 mL    sodium chloride 0.9% flush 10 mL    traZODone tablet 50 mg       Vitals:    01/10/22 2214 01/11/22 0000 01/11/22 0722 01/11/22 0750   BP:   (!) 152/64    BP Location:   Right arm    Patient Position:   Lying    Pulse:   86    Resp: 18  18 19   Temp:  Comment: patient refused vitals and nurse was Radha was notified both times 98.7 °F (37.1 °C)    TempSrc:       SpO2:   98%    Weight:       Height:           No intake/output data recorded.  No intake/output data recorded.      Physical Exam: deferred due to Covid-19    Laboratories:    Recent Labs   Lab 01/11/22  0329   WBC 17.12*   RBC 3.07*   HGB 8.5*   HCT 28.5*   *   MCV 93   MCH 27.7   MCHC 29.8*       Recent Labs   Lab 01/11/22  0329   CALCIUM 8.3*   *   K 5.3*   CO2 20*   CL 93*   BUN 78*    CREATININE 17.1*       No results for input(s): COLORU, CLARITYU, SPECGRAV, PHUR, PROTEINUA, GLUCOSEU, BLOODU, WBCU, RBCU, BACTERIA, MUCUS in the last 24 hours.    Invalid input(s):  BILIRUBINCON    Microbiology Results (last 7 days)     Procedure Component Value Units Date/Time    AFB Culture & Smear [[id number]] Collected: 01/06/22 1100    Order Status: Completed Specimen: Wound from Foot, Left Updated: 01/10/22 1600     AFB Culture & Smear Culture in progress     AFB CULTURE STAIN No acid fast bacilli seen.    Narrative:      Left foot metatarsal  Proximal margin - left foot    AFB Culture & Smear [[id number]] Collected: 01/06/22 1100    Order Status: Completed Specimen: Wound from Foot, Left Updated: 01/10/22 1600     AFB Culture & Smear Culture in progress     AFB CULTURE STAIN No acid fast bacilli seen.    Culture, Anaerobe [[id number]] Collected: 01/06/22 1100    Order Status: Completed Specimen: Wound from Foot, Left Updated: 01/10/22 1357     Anaerobic Culture No anaerobes isolated    Narrative:      Left foot metatarsal  Proximal margin - left foot    Culture, Anaerobe [[id number]] Collected: 01/06/22 1100    Order Status: Completed Specimen: Wound from Foot, Left Updated: 01/10/22 1357     Anaerobic Culture No anaerobes isolated    Blood culture [[id number]] Collected: 01/09/22 1217    Order Status: Completed Specimen: Blood Updated: 01/10/22 1303     Blood Culture, Routine No Growth to date      No Growth to date    Blood culture [[id number]] Collected: 01/09/22 1216    Order Status: Completed Specimen: Blood Updated: 01/10/22 1303     Blood Culture, Routine No Growth to date      No Growth to date    Aerobic culture [[id number]]  (Abnormal)  (Susceptibility) Collected: 01/06/22 1100    Order Status: Completed Specimen: Wound from Foot, Left Updated: 01/10/22 0838     Aerobic Bacterial Culture CITROBACTER KOSERI  Many  For susceptibility see order # [id number]        CITROBACTER FREUNDII  Many  For  susceptibility see order # [id number]        PSEUDOMONAS AERUGINOSA    Narrative:      Left foot metatarsal  Proximal margin - left foot    Blood culture [[id number]] Collected: 01/05/22 1309    Order Status: Completed Specimen: Blood from Peripheral, Wrist, Right Updated: 01/09/22 1503     Blood Culture, Routine No Growth after 4 days.     Blood culture [[id number]] Collected: 01/05/22 1308    Order Status: Completed Specimen: Blood from Peripheral, Hand, Right Updated: 01/09/22 1503     Blood Culture, Routine No Growth after 4 days.     Aerobic culture [[id number]]  (Abnormal)  (Susceptibility) Collected: 01/06/22 1100    Order Status: Completed Specimen: Wound from Foot, Left Updated: 01/09/22 0857     Aerobic Bacterial Culture CITROBACTER KOSERI  Many        CITROBACTER FREUNDII  Few      Aerobic culture (Specify Source) **CANNOT BE ORDERED AS STAT** [[id number]]  (Abnormal)  (Susceptibility) Collected: 01/05/22 1315    Order Status: Completed Specimen: Wound from Foot, Left Updated: 01/08/22 0850     Aerobic Bacterial Culture CITROBACTER FREUNDII  Many        PSEUDOMONAS AERUGINOSA  Moderate      Gram stain [[id number]] Collected: 01/06/22 1100    Order Status: Completed Specimen: Wound from Foot, Left Updated: 01/06/22 1451     Gram Stain Result Few WBC's      Moderate Gram negative rods      Few Gram positive cocci    Narrative:      Left foot metatarsal  Proximal margin - left foot    Gram stain [[id number]] Collected: 01/06/22 1100    Order Status: Completed Specimen: Wound from Foot, Left Updated: 01/06/22 1450     Gram Stain Result Few WBC's      Few Gram negative rods      Few Gram positive cocci    Fungus culture [[id number]] Collected: 01/06/22 1100    Order Status: Sent Specimen: Wound from Foot, Left Updated: 01/06/22 1350    Fungus culture [[id number]] Collected: 01/06/22 1100    Order Status: Sent Specimen: Wound from Foot, Left Updated: 01/06/22 1346            Diagnostic Tests:  n/a        Assessment:    57 y/o male with ESRD on HD (outside Nephrologist) admitted with:    - L-foot gangrene s/p amputation, debridement and washout  - Citrobacter + Pseudomonas wound infection  - Osteo  - LE PAD  - + Covid test  - AVF malfunction s/p Fistulogram 1/10/22 (HD nurse unable to needle access due to swelling-pain)  - Uncontrolled HTN  - Hyperphosphatemia  - IDDM2  - Chronic diastolic HF  - Hypoalbuminemia  - Non-compliant behavior          Plan:    - Consult IR for re-do fistulogram vs THDC placement  - Vascular (Dr. Soni) notified of problem  - Dialysis once AVF issues resolved   - Na+zirconium for K+ control until able to be dialyzed  - Epogen 3 x week  - Antibiotics per ID  - Renal ADA diet low PO4-  - Adjust oral PO4- binders  - Podiatry and Vascular following  - Other problems per admitting

## 2022-01-11 NOTE — PT/OT/SLP PROGRESS
Physical Therapy      Patient Name:  Adryan Neff   MRN:  [id number]    Hold PT today. Patient in need of HD (Cr = 17.1, BUN = 78) after refusing HD yesterday. Pt also refused heparin last night. Awaiting further intervention to leg and appropriate anticoagulation as per ordered by MD. Will f/u.

## 2022-01-11 NOTE — NURSING
Patient refused all insulin and heparin. In pain 10/10 to L foot, gave oxycodone prn. BP also elevated most likely d/t pain also, did take BP meds. Reminded of NPO status for L foot surgery in AM, refused a snack at this time.

## 2022-01-11 NOTE — ASSESSMENT & PLAN NOTE
-Ortho deferred mgmt of L foot wound to vascular surgery s/p open 2-5 toe amputations  -ID consulted - f/u recs  -Cont wound care and monitor WBC closely - plan for angiogram/revascularization if safely able 1/11/22.  Discussed risks and benefits with patient and ; pt refusing procedure.  Will consult psych to assist with capacity.  Explained he is at high risk of amputation of left leg without angiogram/revascularization.

## 2022-01-11 NOTE — ASSESSMENT & PLAN NOTE
Nephrology consulted to resume routine HD.     1/9 - fistula infiltrated, planning for IR fistulagram in the AM    1/10- AV fistula repaired today in IR; HD tomorrow as patient refused today

## 2022-01-11 NOTE — PROGRESS NOTES
[facility]  Vascular Surgery  Progress Note    Patient Name: Adryan Neff  MRN: [id number]  Admission Date: 1/5/2022  Primary Care Provider: Soren Rice MD    Subjective:     Interval History: No complaints today    Post-Op Info:  Procedure(s) (LRB):  AMPUTATION, TOE (Left)   5 Days Post-Op       Medications:  Continuous Infusions:  Scheduled Meds:   sodium chloride 0.9%   Intravenous Once    atorvastatin  20 mg Oral Daily    carvediloL  6.25 mg Oral BID    ceFEPime (MAXIPIME) IVPB  1 g Intravenous Q24H    heparin (porcine)  5,000 Units Subcutaneous Q8H    hydrALAZINE  100 mg Oral Q8H    insulin detemir U-100  15 Units Subcutaneous QHS    lisinopriL  5 mg Oral Daily    pantoprazole  40 mg Oral Daily    sevelamer carbonate  2,400 mg Oral TID WM    traZODone  50 mg Oral QHS    vitamin renal formula (B-complex-vitamin c-folic acid)  1 capsule Oral Daily     PRN Meds:sodium chloride 0.9%, acetaminophen, acetaminophen, dextrose 50%, dextrose 50%, glucagon (human recombinant), glucose, glucose, HYDROmorphone, influenza, insulin aspart U-100, magnesium oxide, magnesium oxide, naloxone, ondansetron, oxyCODONE, sodium chloride 0.9%, sodium chloride 0.9%     Objective:     Vital Signs (Most Recent):  Temp: 98.6 °F (37 °C) (01/11/22 1123)  Pulse: 75 (01/11/22 1123)  Resp: 19 (01/11/22 1123)  BP: (!) 156/66 (01/11/22 1123)  SpO2: 97 % (01/11/22 1123) Vital Signs (24h Range):  Temp:  [97.7 °F (36.5 °C)-98.7 °F (37.1 °C)] 98.6 °F (37 °C)  Pulse:  [75-92] 75  Resp:  [18-19] 19  SpO2:  [97 %-100 %] 97 %  BP: (152-192)/(64-76) 156/66         Physical Exam  Vitals reviewed.   Constitutional:       General: He is not in acute distress.     Appearance: He is well-developed. He is not diaphoretic.   HENT:      Head: Normocephalic and atraumatic.   Eyes:      Conjunctiva/sclera: Conjunctivae normal.   Cardiovascular:      Rate and Rhythm: Normal rate.      Pulses:           Femoral pulses are 2+ on the right  side and 2+ on the left side.       Popliteal pulses are 2+ on the left side.   Pulmonary:      Effort: Pulmonary effort is normal.   Abdominal:      General: There is no distension.      Palpations: Abdomen is soft. There is no mass.      Tenderness: There is no abdominal tenderness. There is no guarding or rebound.      Hernia: No hernia is present.   Musculoskeletal:         General: Tenderness present. No deformity. Normal range of motion.      Cervical back: Neck supple.      Right lower leg: No edema.      Left lower leg: No edema.   Feet:      Left foot:      Skin integrity: No ulcer, blister, skin breakdown, erythema or dry skin.   Skin:     General: Skin is warm and dry.      Capillary Refill: Capillary refill takes more than 3 seconds.      Findings: No erythema or rash.      Comments: L foot wound without purulence, bleeding noted   Neurological:      General: No focal deficit present.      Mental Status: He is alert and oriented to person, place, and time.   Psychiatric:         Mood and Affect: Mood normal.         Significant Labs:  All pertinent labs from the last 24 hours have been reviewed.    Significant Diagnostics:  I have reviewed and interpreted all pertinent imaging results/findings within the past 24 hours.    Assessment/Plan:     * Gangrene of left foot  -Ortho deferred mgmt of L foot wound to vascular surgery s/p open 2-5 toe amputations  -ID consulted - f/u recs  -Cont wound care and monitor WBC closely - plan for angiogram/revascularization if safely able 1/11/22.  Discussed risks and benefits with patient and ; pt refusing procedure.  Will consult psych to assist with capacity.  Explained he is at high risk of amputation of left leg without angiogram/revascularization.    ESRD on hemodialysis  -will evaluate HD access once acute clinic issues are resolved        Masoud Soni MD  Vascular Surgery  [facility] - Telemetry [place]

## 2022-01-11 NOTE — ASSESSMENT & PLAN NOTE
Vascular and ortho consulted. Likely needs amputation. Discussed with Dr. Soni. In comparison of recent pictures by podiatry, his gangrene has worsened and now involves all toes. See pics below.   Continue IV abx. Pain control.     Follow vasc. Ortho recs     S/p amputation of toes.  Patient would like to try to salvage limb.  Awaiting next vascular recs.   PT/OT consulted. Continue Abx.     Angiogram on Mon or Tues per Vascular. Continue wound care/Abx     1/9- leukocytosis rising, on Vanc and Zosyn; getting blood cultures today; will discuss with vascular surgery tomorrow about when angiogram will occur as wbc is rising; may need amputation     1/10- vascular surgery planning on doing angiogram in the AM; NPO aftermidnight

## 2022-01-11 NOTE — SUBJECTIVE & OBJECTIVE
Interval History: patient went for fistulagram today to repair AV fistula infiltration, however patient refused HD after and states his normal schedule is TThSat and will get it tomorrow    - spoke with vascular surgery who is planning on doing an angiogram tomorrow plus or minus intervention; patient likely needing an amputation    - NPO after midnight     Review of Systems   Constitutional: Negative for activity change, fatigue and fever.   Respiratory: Negative for cough and shortness of breath.    Gastrointestinal: Negative for abdominal pain and nausea.     Objective:     Vital Signs (Most Recent):  Temp: 97.7 °F (36.5 °C) (01/10/22 1722)  Pulse: 84 (01/10/22 1722)  Resp: 19 (01/10/22 1722)  BP: (!) 156/67 (01/10/22 1722)  SpO2: 100 % (01/10/22 1722) Vital Signs (24h Range):  Temp:  [97.7 °F (36.5 °C)-98.7 °F (37.1 °C)] 97.7 °F (36.5 °C)  Pulse:  [71-84] 84  Resp:  [11-22] 19  SpO2:  [96 %-100 %] 100 %  BP: (139-173)/() 156/67     Weight: 64.3 kg (141 lb 12.1 oz)  Body mass index is 20.34 kg/m².  No intake or output data in the 24 hours ending 01/10/22 1901   Physical Exam  Constitutional:       Appearance: Normal appearance.   Pulmonary:      Effort: Pulmonary effort is normal.      Breath sounds: No wheezing.   Abdominal:      General: Abdomen is flat. There is no distension.   Musculoskeletal:         General: Swelling and tenderness present.   Skin:     Coloration: Skin is not jaundiced or pale.   Neurological:      General: No focal deficit present.      Mental Status: He is alert and oriented to person, place, and time.   Psychiatric:         Mood and Affect: Mood normal.         Behavior: Behavior normal.         Significant Labs: All pertinent labs within the past 24 hours have been reviewed.    Significant Imaging: I have reviewed all pertinent imaging results/findings within the past 24 hours.

## 2022-01-11 NOTE — PROGRESS NOTES
Marco completed.  Locet called in to [facility] @ [phone].  PASSR faxed to [facility] at:  [phone].   Awaiting 142.

## 2022-01-11 NOTE — SUBJECTIVE & OBJECTIVE
Medications:  Continuous Infusions:  Scheduled Meds:   sodium chloride 0.9%   Intravenous Once    atorvastatin  20 mg Oral Daily    carvediloL  6.25 mg Oral BID    ceFEPime (MAXIPIME) IVPB  1 g Intravenous Q24H    heparin (porcine)  5,000 Units Subcutaneous Q8H    hydrALAZINE  100 mg Oral Q8H    insulin detemir U-100  15 Units Subcutaneous QHS    lisinopriL  5 mg Oral Daily    pantoprazole  40 mg Oral Daily    sevelamer carbonate  2,400 mg Oral TID WM    traZODone  50 mg Oral QHS    vitamin renal formula (B-complex-vitamin c-folic acid)  1 capsule Oral Daily     PRN Meds:sodium chloride 0.9%, acetaminophen, acetaminophen, dextrose 50%, dextrose 50%, glucagon (human recombinant), glucose, glucose, HYDROmorphone, influenza, insulin aspart U-100, magnesium oxide, magnesium oxide, naloxone, ondansetron, oxyCODONE, sodium chloride 0.9%, sodium chloride 0.9%     Objective:     Vital Signs (Most Recent):  Temp: 98.6 °F (37 °C) (01/11/22 1123)  Pulse: 75 (01/11/22 1123)  Resp: 19 (01/11/22 1123)  BP: (!) 156/66 (01/11/22 1123)  SpO2: 97 % (01/11/22 1123) Vital Signs (24h Range):  Temp:  [97.7 °F (36.5 °C)-98.7 °F (37.1 °C)] 98.6 °F (37 °C)  Pulse:  [75-92] 75  Resp:  [18-19] 19  SpO2:  [97 %-100 %] 97 %  BP: (152-192)/(64-76) 156/66         Physical Exam  Vitals reviewed.   Constitutional:       General: He is not in acute distress.     Appearance: He is well-developed. He is not diaphoretic.   HENT:      Head: Normocephalic and atraumatic.   Eyes:      Conjunctiva/sclera: Conjunctivae normal.   Cardiovascular:      Rate and Rhythm: Normal rate.      Pulses:           Femoral pulses are 2+ on the right side and 2+ on the left side.       Popliteal pulses are 2+ on the left side.   Pulmonary:      Effort: Pulmonary effort is normal.   Abdominal:      General: There is no distension.      Palpations: Abdomen is soft. There is no mass.      Tenderness: There is no abdominal tenderness. There is no guarding or  rebound.      Hernia: No hernia is present.   Musculoskeletal:         General: Tenderness present. No deformity. Normal range of motion.      Cervical back: Neck supple.      Right lower leg: No edema.      Left lower leg: No edema.   Feet:      Left foot:      Skin integrity: No ulcer, blister, skin breakdown, erythema or dry skin.   Skin:     General: Skin is warm and dry.      Capillary Refill: Capillary refill takes more than 3 seconds.      Findings: No erythema or rash.      Comments: L foot wound without purulence, bleeding noted   Neurological:      General: No focal deficit present.      Mental Status: He is alert and oriented to person, place, and time.   Psychiatric:         Mood and Affect: Mood normal.         Significant Labs:  All pertinent labs from the last 24 hours have been reviewed.    Significant Diagnostics:  I have reviewed and interpreted all pertinent imaging results/findings within the past 24 hours.

## 2022-01-11 NOTE — PROGRESS NOTES
Unable to access the left AVF due swelling and pain, thrill is difficult to palpate and patient get's very upset when palpating access due to pain. Dr Rodriguez made aware and asked that I let Dr Soni be aware of problem, which I have, no new orders at this time.

## 2022-01-11 NOTE — PT/OT/SLP PROGRESS
Occupational Therapy      Patient Name:  Adryan Neff   MRN:  [id number]    Patient not seen today secondary to Dialysis (then off the unit for HD access.). Will follow-up 1/12/2021.    1/11/2022

## 2022-01-11 NOTE — PLAN OF CARE
Problem: Adult Inpatient Plan of Care  Goal: Plan of Care Review  Outcome: Ongoing, Progressing     Problem: Fall Injury Risk  Goal: Absence of Fall and Fall-Related Injury  Outcome: Ongoing, Progressing     Problem: Diabetes Comorbidity  Goal: Blood Glucose Level Within Targeted Range  Outcome: Ongoing, Progressing     Problem: Impaired Wound Healing  Goal: Optimal Wound Healing  Outcome: Ongoing, Progressing     Problem: Device-Related Complication Risk (Hemodialysis)  Goal: Safe, Effective Therapy Delivery  Outcome: Ongoing, Progressing     Problem: Hemodynamic Instability (Hemodialysis)  Goal: Effective Tissue Perfusion  Outcome: Ongoing, Progressing     Problem: Infection (Hemodialysis)  Goal: Absence of Infection Signs and Symptoms  Outcome: Ongoing, Progressing     Problem: Skin Injury Risk Increased  Goal: Skin Health and Integrity  Outcome: Ongoing, Progressing

## 2022-01-11 NOTE — NURSING
Pt back to unit now. Reported by dialysis nurse. Unable to access due to swollen and pain. HD not completed. Dr Moreno and Dr Soni notified. Will continue to monitor.

## 2022-01-11 NOTE — NURSING
Pt reeducated regarding NPO status/ angiogram/ hemo dialysis. Pt verbalized understanding of care plan. Pt cooperative, VS/BG obtained by primary nurse. Pain assessed. Pt stated pain scale 8 out of 10. 10 mg oxycodone given now. Will continue to monitor.

## 2022-01-11 NOTE — NURSING
IR department called regarding HD access. Tech stated HD access was not clotted and worked good yesterday. He recommended attending doctor contacts Dr. Mcghee. Made  Dr. Moreno aware of it.

## 2022-01-12 PROBLEM — G93.41 ENCEPHALOPATHY, METABOLIC: Status: ACTIVE | Noted: 2022-01-12

## 2022-01-12 LAB
ANION GAP SERPL CALC-SCNC: 20 MMOL/L (ref 8–16)
BASOPHILS # BLD AUTO: 0.1 K/UL (ref 0–0.2)
BASOPHILS NFR BLD: 0.7 % (ref 0–1.9)
BUN SERPL-MCNC: 86 MG/DL (ref 6–20)
CALCIUM SERPL-MCNC: 8.4 MG/DL (ref 8.7–10.5)
CHLORIDE SERPL-SCNC: 93 MMOL/L (ref 95–110)
CO2 SERPL-SCNC: 21 MMOL/L (ref 23–29)
CREAT SERPL-MCNC: 19.7 MG/DL (ref 0.5–1.4)
DIFFERENTIAL METHOD: ABNORMAL
EOSINOPHIL # BLD AUTO: 0.7 K/UL (ref 0–0.5)
EOSINOPHIL NFR BLD: 5.1 % (ref 0–8)
ERYTHROCYTE [DISTWIDTH] IN BLOOD BY AUTOMATED COUNT: 15.9 % (ref 11.5–14.5)
EST. GFR  (AFRICAN AMERICAN): 3 ML/MIN/1.73 M^2
EST. GFR  (NON AFRICAN AMERICAN): 2 ML/MIN/1.73 M^2
GLUCOSE SERPL-MCNC: 159 MG/DL (ref 70–110)
HCT VFR BLD AUTO: 27.4 % (ref 40–54)
HGB BLD-MCNC: 8.3 G/DL (ref 14–18)
IMM GRANULOCYTES # BLD AUTO: 0.15 K/UL (ref 0–0.04)
IMM GRANULOCYTES NFR BLD AUTO: 1.1 % (ref 0–0.5)
LYMPHOCYTES # BLD AUTO: 1 K/UL (ref 1–4.8)
LYMPHOCYTES NFR BLD: 6.9 % (ref 18–48)
MCH RBC QN AUTO: 27.7 PG (ref 27–31)
MCHC RBC AUTO-ENTMCNC: 30.3 G/DL (ref 32–36)
MCV RBC AUTO: 91 FL (ref 82–98)
MONOCYTES # BLD AUTO: 0.9 K/UL (ref 0.3–1)
MONOCYTES NFR BLD: 6.6 % (ref 4–15)
NEUTROPHILS # BLD AUTO: 11.2 K/UL (ref 1.8–7.7)
NEUTROPHILS NFR BLD: 79.6 % (ref 38–73)
NRBC BLD-RTO: 0 /100 WBC
PHOSPHATE SERPL-MCNC: 9.5 MG/DL (ref 2.7–4.5)
PLATELET # BLD AUTO: 479 K/UL (ref 150–450)
PMV BLD AUTO: 9.3 FL (ref 9.2–12.9)
POCT GLUCOSE: 127 MG/DL (ref 70–110)
POCT GLUCOSE: 136 MG/DL (ref 70–110)
POCT GLUCOSE: 144 MG/DL (ref 70–110)
POTASSIUM SERPL-SCNC: 5.4 MMOL/L (ref 3.5–5.1)
RBC # BLD AUTO: 3 M/UL (ref 4.6–6.2)
SODIUM SERPL-SCNC: 134 MMOL/L (ref 136–145)
WBC # BLD AUTO: 14.08 K/UL (ref 3.9–12.7)

## 2022-01-12 PROCEDURE — 90792 PSYCH DIAG EVAL W/MED SRVCS: CPT | Mod: ,,, | Performed by: PSYCHIATRY & NEUROLOGY

## 2022-01-12 PROCEDURE — 84100 ASSAY OF PHOSPHORUS: CPT | Performed by: NURSE PRACTITIONER

## 2022-01-12 PROCEDURE — 85025 COMPLETE CBC W/AUTO DIFF WBC: CPT | Performed by: NURSE PRACTITIONER

## 2022-01-12 PROCEDURE — 25000003 PHARM REV CODE 250: Performed by: HOSPITALIST

## 2022-01-12 PROCEDURE — 27000207 HC ISOLATION

## 2022-01-12 PROCEDURE — 21400001 HC TELEMETRY ROOM

## 2022-01-12 PROCEDURE — 80100016 HC MAINTENANCE HEMODIALYSIS

## 2022-01-12 PROCEDURE — 80048 BASIC METABOLIC PNL TOTAL CA: CPT | Performed by: NURSE PRACTITIONER

## 2022-01-12 PROCEDURE — 63600175 PHARM REV CODE 636 W HCPCS: Performed by: INTERNAL MEDICINE

## 2022-01-12 PROCEDURE — 90792 PR PSYCHIATRIC DIAGNOSTIC EVALUATION W/MEDICAL SERVICES: ICD-10-PCS | Mod: ,,, | Performed by: PSYCHIATRY & NEUROLOGY

## 2022-01-12 PROCEDURE — 25000003 PHARM REV CODE 250: Performed by: FAMILY MEDICINE

## 2022-01-12 PROCEDURE — 36415 COLL VENOUS BLD VENIPUNCTURE: CPT | Performed by: NURSE PRACTITIONER

## 2022-01-12 PROCEDURE — 25000003 PHARM REV CODE 250: Performed by: INTERNAL MEDICINE

## 2022-01-12 RX ORDER — OXYCODONE HYDROCHLORIDE 5 MG/1
5 TABLET ORAL EVERY 6 HOURS PRN
Status: DISCONTINUED | OUTPATIENT
Start: 2022-01-12 | End: 2022-01-27 | Stop reason: HOSPADM

## 2022-01-12 RX ORDER — HYDROMORPHONE HYDROCHLORIDE 2 MG/ML
0.5 INJECTION, SOLUTION INTRAMUSCULAR; INTRAVENOUS; SUBCUTANEOUS EVERY 6 HOURS PRN
Status: DISCONTINUED | OUTPATIENT
Start: 2022-01-12 | End: 2022-01-27 | Stop reason: HOSPADM

## 2022-01-12 RX ADMIN — CARVEDILOL 6.25 MG: 6.25 TABLET, FILM COATED ORAL at 10:01

## 2022-01-12 RX ADMIN — ATORVASTATIN CALCIUM 20 MG: 10 TABLET, FILM COATED ORAL at 10:01

## 2022-01-12 RX ADMIN — LISINOPRIL 5 MG: 5 TABLET ORAL at 10:01

## 2022-01-12 RX ADMIN — NEPHROCAP 1 CAPSULE: 1 CAP ORAL at 10:01

## 2022-01-12 RX ADMIN — PANTOPRAZOLE SODIUM 40 MG: 40 TABLET, DELAYED RELEASE ORAL at 10:01

## 2022-01-12 RX ADMIN — OXYCODONE 10 MG: 5 TABLET ORAL at 11:01

## 2022-01-12 RX ADMIN — CEFEPIME HYDROCHLORIDE 1 G: 1 INJECTION, SOLUTION INTRAVENOUS at 06:01

## 2022-01-12 RX ADMIN — SODIUM ZIRCONIUM CYCLOSILICATE 10 G: 10 POWDER, FOR SUSPENSION ORAL at 10:01

## 2022-01-12 NOTE — PROGRESS NOTES
[facility] Medicine  Progress Note    Patient Name: Adryan Neff  MRN: [id number]  Patient Class: IP- Inpatient   Admission Date: 1/5/2022  Length of Stay: 7 days  Attending Physician: Nghia Jordan MD  Primary Care Provider: Soren Rice MD        Subjective:     Principal Problem:Gangrene of left foot        HPI:  This is a 56 y.o.male patient, with a PMHx of ESRD on hemodialysis, CAD, HTN, and DM, presenting to the ED for further evaluation of left foot pain and black discoloration that began 3 days ago. Patient states these symptoms have been ongoing for the last month. He reports being referred to a specialist but denies ever following up. He has been putting some otc medication on the foot that he believes has turned his foot more black. No trauma or injury. Patient reports associated chills. Patient states applying a creme to the foot and noticed his discoloration worsened. Patient reports he was last dialyzed yesterday. Patient denies any fever, shortness of breath, chest pain, neck pain, back pain, abdominal pain, rash, headaches, congestion, rhinorrhea, cough, sore throat, ear pain, eye pain, blurred vision, nausea, vomiting, diarrhea, dysuria, or any other associated symptoms.    Per chart review, he had an arterial u/s 12/15.   Impression:   1. High-grade severe stenosis right popliteal artery with occlusion anterior tibial artery which is reconstituted at level of DPA via collaterals.  2. 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    Cardiology has been planning to see him for revascularization options. Last podiatry visit 12/13 with Stable dry gangrene L 2nd toe and medial L 5th toe. Dorsalis pedis and posterior tibial pulses are diminished Bilaterally. Toes are cool to touch. Feet are warm proximally.There is decreased digital hair. Skin is atrophic, slightly hyperpigmented, and mildly edematous     In the ED, he was found to have worsened gangrene and a cool  "extremity. Vascular surgery was consulted. He was also COVID +. Says he got a booster shot in November sometime.      Overview/Hospital Course:  Patient admitted to the hospital for eval and treatment of L foot gangrene.  Ortho and Vasc consulted. Nephrology was also consulted for chronic dialysis. Patient had several toes on L foot amputated.  Continued with dialysis. PT/OT consulted.       Interval History: Yesterday PM pt refused medications, reported confusion.     This AM pt reports feeling "sick", is able to reason that dialysis may help him, and that he needs IR prior to dialysis. Oriented x 3    Review of Systems   Constitutional: Negative for chills and fever.        Generalized malaise   Respiratory: Negative for cough and shortness of breath.    Cardiovascular: Negative for chest pain and leg swelling.   Gastrointestinal: Negative for abdominal distention and abdominal pain.     Objective:     Vital Signs (Most Recent):  Temp: 98.6 °F (37 °C) (01/12/22 0507)  Pulse: 91 (01/12/22 0507)  Resp: 19 (01/12/22 0507)  BP: (!) 145/64 (01/12/22 0507)  SpO2: 99 % (01/12/22 0507) Vital Signs (24h Range):  Temp:  [98 °F (36.7 °C)-98.6 °F (37 °C)] 98.6 °F (37 °C)  Pulse:  [75-94] 91  Resp:  [18-19] 19  SpO2:  [97 %-99 %] 99 %  BP: (125-165)/(60-69) 145/64     Weight: 64.3 kg (141 lb 12.1 oz)  Body mass index is 20.34 kg/m².  No intake or output data in the 24 hours ending 01/12/22 0905   Physical Exam  Constitutional:       General: He is not in acute distress.     Appearance: He is ill-appearing. He is not toxic-appearing or diaphoretic.   Cardiovascular:      Rate and Rhythm: Normal rate and regular rhythm.      Heart sounds: No murmur heard.  No gallop.    Pulmonary:      Effort: Pulmonary effort is normal. No respiratory distress.      Breath sounds: Normal breath sounds. No wheezing.   Abdominal:      General: Bowel sounds are normal. There is no distension.      Palpations: Abdomen is soft.      Tenderness: " There is no abdominal tenderness.         Significant Labs: All pertinent labs within the past 24 hours have been reviewed.    Significant Imaging: I have reviewed all pertinent imaging results/findings within the past 24 hours.      Assessment/Plan:      * Gangrene of left foot  Vascular and ortho consulted. Likely needs amputation. Discussed with Dr. Soni. In comparison of recent pictures by podiatry, his gangrene has worsened and now involves all toes. See pics below.   Continue IV abx. Pain control.     Follow vasc. Ortho recs     S/p amputation of toes.  Patient would like to try to salvage limb.  Awaiting next vascular recs.   PT/OT consulted. Continue Abx.     Angiogram on Mon or Tues per Vascular. Continue wound care/Abx     1/9- leukocytosis rising, on Vanc and Zosyn; getting blood cultures today; will discuss with vascular surgery tomorrow about when angiogram will occur as wbc is rising; may need amputation     1/10- vascular surgery planning on doing angiogram in the AM; NPO aftermidnight    1/11- patient refused angiogram today    1/12 psychiatry consult pending for capacity    Encephalopathy, metabolic  Waxing and waning confusion likely delirium secondary to ischemic foot w/ possible superimposed infection and uremia from missing dialysis  - psychiatry consulted, appreciate recs  - tx of underlying issues as noted elsewhere    COVID-19 in immunocompromised patient  HD patient + for COVID 19. Not hypoxic or symptomatic. Supportive care prn.       ESRD on hemodialysis  Nephrology consulted to resume routine HD. Pt w/ fistulogram 1/10 w/ IR, however after attempting HD on 1/11 the patient had pain and swelling at fistula site.   - IR reconsulted for redo declot  - nephrology following    Chronic diastolic heart failure  December 2021 echo reviewed. Stable.       Essential (primary) hypertension  Titrate to goal. Pt refusing po medications may require IV meds      Controlled type 2 diabetes mellitus  with chronic kidney disease on chronic dialysis, with long-term current use of insulin  Blood sugars mildly elevated as pt has been refusing insulin.        VTE Risk Mitigation (From admission, onward)         Ordered     heparin (porcine) injection 5,000 Units  Every 8 hours         01/05/22 1404     IP VTE HIGH RISK PATIENT  Once         01/05/22 1404     Place sequential compression device  Until discontinued         01/05/22 1404                Discharge Planning   ENIO:      Code Status: Full Code   Is the patient medically ready for discharge?:     Reason for patient still in hospital (select all that apply): Patient trending condition, Laboratory test, Treatment, Consult recommendations and Pending disposition  Discharge Plan A: Skilled Nursing Facility                  Nghia Jordan MD  Department of Hospital Medicine   [facility]

## 2022-01-12 NOTE — ASSESSMENT & PLAN NOTE
Patient is somewhat of a difficult conversation.  He does not have any primary psychiatric concerns at this time.  He does not seem to be functioning cognitively at his baseline as per records.      Capacity: the ability to accept or refuse treatment recommendations. Capacity is determined by a clinician upon specific elements of a mental status exam (does not have to be performed by a psychiatrist). To demonstrate capacity, a patient must be able to cognitively utilize information provided to them:   Communicate and Express a choice that is stable over time - he does not express a choice as much as that his foot is sick  Understand the relevant information - he does not express an understanding of what is happening to his foot  Appreciate the consequences of the decision (risks vs benefits) - he does not express good vs bad of having an amputation or not  Manipulate all of the data in a logical fashion (rationalize) - he is not rational or able to process information given to him about his need of care      -Based on the above assessment, pt Adryan Neff appears to lack capacity to make medical decisions about his care at this time.     Please reach out to nearest family and enact a surrogate decision in his best interest.  He is unable to fully understand or appreciate his situation and/or risks of surgery.

## 2022-01-12 NOTE — PLAN OF CARE
Contact by Dr. Rodriguez to comment on the patient's malfunctioning LUE AVF s/p fistulogram, angioplasty and stenting by IR 1/10/22.  Imaging reviewed.    Recommend: I think patient would benefit from fistulogram to evaluate poor flow in mid graft and central graft thrombus. Please let me know if I can assist in or with procedure.    This was sent to Dr. Rodriguez and Taz.

## 2022-01-12 NOTE — PLAN OF CARE
Problem: Adult Inpatient Plan of Care  Goal: Optimal Comfort and Wellbeing  Outcome: Ongoing, Progressing     Problem: Fall Injury Risk  Goal: Absence of Fall and Fall-Related Injury  Outcome: Ongoing, Progressing     Problem: Impaired Wound Healing  Goal: Optimal Wound Healing  Outcome: Ongoing, Progressing     Problem: Device-Related Complication Risk (Hemodialysis)  Goal: Safe, Effective Therapy Delivery  Outcome: Ongoing, Progressing     Problem: Hemodynamic Instability (Hemodialysis)  Goal: Effective Tissue Perfusion  Outcome: Ongoing, Progressing     Problem: Infection (Hemodialysis)  Goal: Absence of Infection Signs and Symptoms  Outcome: Ongoing, Progressing     Problem: Skin Injury Risk Increased  Goal: Skin Health and Integrity  Outcome: Ongoing, Progressing

## 2022-01-12 NOTE — SUBJECTIVE & OBJECTIVE
Interval History:   - patient is having swelling around AV fistula site and pain and unable to access the AV fistula due to that; U/S ordered to further evaluate, however unable to get HD today    - patient also refused his angiogram gram today and seems a little confused and not able to have capacity to make decisions  - psych eval ordered for capacity    - I tried calling daughter on the phone and left a VM to update her on patient's condition and to call back my cell phone    Review of Systems   Constitutional: Negative for activity change, fatigue and fever.   Respiratory: Negative for cough and shortness of breath.    Gastrointestinal: Negative for abdominal pain and nausea.     Objective:     Vital Signs (Most Recent):  Temp: 98 °F (36.7 °C) (01/11/22 2352)  Pulse: 88 (01/11/22 2352)  Resp: 19 (01/11/22 2352)  BP: 125/60 (01/11/22 2352)  SpO2: 98 % (01/11/22 2352) Vital Signs (24h Range):  Temp:  [98 °F (36.7 °C)-98.7 °F (37.1 °C)] 98 °F (36.7 °C)  Pulse:  [75-94] 88  Resp:  [18-19] 19  SpO2:  [97 %-98 %] 98 %  BP: (125-165)/(60-69) 125/60     Weight: 64.3 kg (141 lb 12.1 oz)  Body mass index is 20.34 kg/m².  No intake or output data in the 24 hours ending 01/12/22 0135   Physical Exam  Constitutional:       Appearance: Normal appearance.   Pulmonary:      Effort: Pulmonary effort is normal.      Breath sounds: No wheezing.   Abdominal:      General: Abdomen is flat. There is no distension.   Musculoskeletal:         General: Swelling and tenderness present.   Skin:     Coloration: Skin is not jaundiced or pale.   Neurological:      General: No focal deficit present.      Mental Status: He is alert.      Motor: Weakness present.      Coordination: Coordination abnormal.         Significant Labs: All pertinent labs within the past 24 hours have been reviewed.    Significant Imaging: I have reviewed all pertinent imaging results/findings within the past 24 hours.

## 2022-01-12 NOTE — ASSESSMENT & PLAN NOTE
Vascular and ortho consulted. Likely needs amputation. Discussed with Dr. Soni. In comparison of recent pictures by podiatry, his gangrene has worsened and now involves all toes. See pics below.   Continue IV abx. Pain control.     Follow vasc. Ortho recs     S/p amputation of toes.  Patient would like to try to salvage limb.  Awaiting next vascular recs.   PT/OT consulted. Continue Abx.     Angiogram on Mon or Tues per Vascular. Continue wound care/Abx     1/9- leukocytosis rising, on Vanc and Zosyn; getting blood cultures today; will discuss with vascular surgery tomorrow about when angiogram will occur as wbc is rising; may need amputation     1/10- vascular surgery planning on doing angiogram in the AM; NPO aftermidnight    1/11- patient refused angiogram today    1/12 psychiatry consult pending for capacity

## 2022-01-12 NOTE — PROGRESS NOTES
01/11/22 1877      Offer of free  was accepted or rejected? accepted   Interpreted items include Patient education activities;Nursing rounding;Other (See comment)  (PATIENT'S NIGHT MEDICATION, AND INSULIN)    service used Video Remote   Patient confused, AAOX1. Reoriented pt to time & place. Patient took all PO Medication. I was able to place a new IV. Pt refusing insulin and heparin SQ at this time. Educated pt blood sugar was 201 and he needing his insulin. Pt stating he is not the doctor and he does not know. Attempted to reorient patient and educate him about insulin administration. He kept stating I am not the doctor I do not know. Pt refused insulin administration at this time. Pt getting aggravated and angry toward staff. Before exiting room I asked pt if he needed anything patient said no. Will continue to round on him. No family at bedside.

## 2022-01-12 NOTE — PROGRESS NOTES
01/12/22 0542      Offer of free  was accepted or rejected? accepted   Interpreted items include Nursing rounding;Other (See comment)    service used Video Remote   's ID # morning medication blood pressure & blood thinner.   Patient AAOX1. Reoriented pt to place, situation & time. Patient stating he is in [place]. Reoriented pt he is in a hospital in [place]. Refusing his morning medication. Patient not speaking kreole to . He is attempting to speak english. Pt keeps stating I do not know why I am here. Attempted to reorient patient. No family at bedside.

## 2022-01-12 NOTE — PT/OT/SLP PROGRESS
Physical Therapy      Patient Name:  Adryan Neff   MRN:  [id number]    Please reconsult PT once stable after he receives adequate HD/further medical treatment. PT currently not indicated.

## 2022-01-12 NOTE — CONSULTS
[facility]  Psychiatry  Consult Note    Patient Name: Adryan Neff  MRN: [id number]   Code Status: Full Code  Admission Date: 1/5/2022  Hospital Length of Stay: 7 days  Attending Physician: Nghia Jordan MD  Primary Care Provider: Soren Rice MD    Current Legal Status: Uncontested    Patient information was obtained from patient, ER records and chart review, nursing, .   Inpatient consult to Psychiatry  Consult performed by: Mekhi Ndiaye MD  Consult ordered by: Masoud Soni MD        Subjective:     Principal Problem:Gangrene of left foot    Chief Complaint:  Foot pain     HPI: Patient Adryan Neff presents to the hospital with foot pain and found to have gangrenous digits on his feet.  There was some confusion as to if he had capacity for surgical intervention and psychiatric consult was placed.  Patient is easily approached in conversation and [place] Creole  was used.  Patient engages but is somewhat repetitive with answers.  He is oriented to name only.  When I ask if we are located in a bank, hospital, or school; he is unable to answer.  He is not oriented to the month or year.  He does tell me that he is here because his foot is sick.  I ask if he has any other medical problems and he says that he does not.  When I ask about dialysis, he says that he was on dialysis in [facility].  He is able to tell me that he lives with his son and that he was a  until 1996 and moved here in 1980.  He repeats a lot that his foot is sick and hurts.  When asked if he knows about gangrene, he simply says yes but not able to tell me any more about it.  When I ask about having surgery to have his toes and possibly foot removed, he says again that his foot is sick.  He eventually says that he is OK with having a surgery if he needs to have one but cannot tell me anything about needing an amputation.  He denies any depression, anxiety, jennifer, or psychosis.  Denies any psychiatric  history.  After the conversation, the  adds that the patient is confused and cannot hold a lucid conversation.      Hospital Course: No notes on file         Patient History           Medical as of 1/12/2022     Past Medical History     Diagnosis Date Comments Source    CAD (coronary artery disease) 2016 CAD (non obstructive) 2016 [facility] Provider    Diabetes mellitus type I -- -- Provider    Diabetic retinopathy -- -- Provider    ESRD on hemodialysis -- on HD TThSat Provider    Gangrene of left foot -- -- Provider    Hypertension -- -- Provider    Nuclear sclerosis of right eye 11/24/2021 -- Provider    PAD (peripheral artery disease) -- -- Provider    Pulmonary HTN -- -- Provider    TB lung, latent 04/2021 -- Provider          Pertinent Negatives     Diagnosis Date Noted Comments Source    Amblyopia 11/24/2021 -- Provider    Arthritis 11/24/2021 -- Provider    Glaucoma 11/24/2021 -- Provider    History of psychiatric hospitalization 01/12/2022 -- Provider    Hx of psychiatric care 01/12/2022 -- Provider    Macular degeneration 11/24/2021 -- Provider    Retinal detachment 11/24/2021 -- Provider    Sickle cell anemia 11/24/2021 -- Provider    Sickle cell trait 11/24/2021 -- Provider    Strabismus 11/24/2021 -- Provider    Suicide attempt 01/12/2022 -- Provider    Therapy 01/12/2022 -- Provider    Uveitis 11/24/2021 -- Provider                  Surgical as of 1/12/2022     Past Surgical History     Procedure Laterality Date Comments Source    EYE SURGERY -- -- -- Provider    AV FISTULA PLACEMENT -- -- -- Provider    CATARACT EXTRACTION Left -- -- Provider    TOE AMPUTATION Left 1/6/2022 Procedure: AMPUTATION, TOE;  Surgeon: Masoud Soni MD;  Location: [facility] OR;  Service: Vascular;  Laterality: Left;  Left first through fifth toes, possible open transmetatarsal amputation and all other indicated procedures Provider                  Family as of 1/12/2022     Problem Relation Name Age of Onset Comments  Source    No Known Problems Mother -- -- -- Provider    No Known Problems Father -- -- -- Provider    No Known Problems Sister -- -- -- Provider    No Known Problems Brother -- -- -- Provider    No Known Problems Daughter -- -- -- Provider    No Known Problems Daughter -- -- -- Provider    No Known Problems Daughter -- -- -- Provider    No Known Problems Brother -- -- -- Provider    No Known Problems Maternal Aunt -- -- -- Provider    No Known Problems Maternal Uncle -- -- -- Provider    No Known Problems Paternal Aunt -- -- -- Provider    No Known Problems Paternal Uncle -- -- -- Provider    No Known Problems Maternal Grandmother -- -- -- Provider    No Known Problems Maternal Grandfather -- -- -- Provider    No Known Problems Paternal Grandmother -- -- -- Provider    No Known Problems Paternal Grandfather -- -- -- Provider            Tobacco Use as of 1/12/2022     Smoking Status Smoking Start Date Smoking Quit Date Packs/Day Years Used    Never Smoker -- -- -- --    Types Comments Smokeless Tobacco Status Smokeless Tobacco Quit Date Source    -- -- Never Used -- Provider            Alcohol Use as of 1/12/2022     Alcohol Use Drinks/Week Alcohol/Week Comments Source    No   -- -- Provider            Drug Use as of 1/12/2022     Drug Use Types Frequency Comments Source    No -- -- -- Provider            Sexual Activity as of 1/12/2022     Sexually Active Birth Control Partners Comments Source    Yes -- Female -- Provider            Activities of Daily Living as of 1/12/2022     Activities of Daily Living Question Response Comments Source    Patient feels they ought to cut down on drinking/drug use Not Asked -- Provider    Patient annoyed by others criticizing their drinking/drug use Not Asked -- Provider    Patient has felt bad or guilty about drinking/drug use Not Asked -- Provider    Patient has had a drink/used drugs as an eye opener in the AM Not Asked -- Provider            Social Documentation as of 1/12/2022     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  Source: Provider           Occupational as of 1/12/2022    None           Socioeconomic as of 1/12/2022     Marital Status Spouse Name Number of Children Years Education Education Level Preferred Language Ethnicity Race Source    Single -- 5 -- -- English Not  or /a Black or  Provider            Pertinent History     Question Response Comments    Lives with family --    Place in Birth Order -- --    Lives in -- --    Number of Siblings -- --    Raised by -- --    Legal Involvement -- --    Childhood Trauma -- --    Criminal History of -- --    Financial Status -- --    Highest Level of Education -- --    Does patient have access to a firearm? -- --     Service -- --    Primary Leisure Activity -- --    Spirituality -- --        Past Medical History:   Diagnosis Date    CAD (coronary artery disease) 2016    CAD (non obstructive) 2016 [facility]    Diabetes mellitus type I     Diabetic retinopathy     ESRD on hemodialysis     on HD TThSat    Gangrene of left foot     Hypertension     Nuclear sclerosis of right eye 11/24/2021    PAD (peripheral artery disease)     Pulmonary HTN     TB lung, latent 04/2021     Past Surgical History:   Procedure Laterality Date    AV FISTULA PLACEMENT      CATARACT EXTRACTION Left     EYE SURGERY      TOE AMPUTATION Left 1/6/2022    Procedure: AMPUTATION, TOE;  Surgeon: Masoud Soni MD;  Location: [facility] OR;  Service: Vascular;  Laterality: Left;  Left first through fifth toes, possible open transmetatarsal amputation and all other indicated procedures     Family History     Problem Relation (Age of Onset)    No Known Problems Mother, Father, Sister, Brother, Daughter, Daughter, Daughter, Brother, Maternal Aunt, Maternal Uncle, Paternal Aunt, Paternal Uncle, Maternal Grandmother, Maternal Grandfather, Paternal Grandmother, Paternal Grandfather        Tobacco Use    Smoking status: Never Smoker    Smokeless  tobacco: Never Used   Substance and Sexual Activity    Alcohol use: No    Drug use: No    Sexual activity: Yes     Partners: Female     Review of patient's allergies indicates:  No Known Allergies    No current facility-administered medications on file prior to encounter.     Current Outpatient Medications on File Prior to Encounter   Medication Sig    amoxicillin-clavulanate 500-125mg (AUGMENTIN) 500-125 mg Tab Take 1 tablet (500 mg total) by mouth once daily.    atorvastatin (LIPITOR) 20 MG tablet Take 1 tablet (20 mg total) by mouth once daily. (Patient taking differently: Take 20 mg by mouth once daily.)    blood sugar diagnostic Strp 1 each by Misc.(Non-Drug; Combo Route) route 2 hours after meals and at bedtime.    doxercalciferoL (HECTOROL) 0.5 MCG capsule 2 mcg.    heparin sodium,porcine (HEPARIN, PORCINE,) 1,000 unit/mL Soln injection Heparin Sodium (Porcine) 1,000 Units/mL Systemic    hydrALAZINE (APRESOLINE) 100 MG tablet Take 1 tablet (100 mg total) by mouth every 8 (eight) hours. (Patient taking differently: Take 50 mg by mouth every 8 (eight) hours. )    insulin (BASAGLAR KWIKPEN U-100 INSULIN) glargine 100 units/mL (3mL) SubQ pen Inject 15 Units into the skin every evening.    isoniazid (NYDRAZID) 300 MG Tab Take 1 tablet (300 mg total) by mouth once daily.    lisinopriL (PRINIVIL,ZESTRIL) 2.5 MG tablet Take 2.5 mg by mouth once daily.    lisinopriL (PRINIVIL,ZESTRIL) 5 MG tablet Take 5 mg by mouth once daily.    methoxy peg-epoetin beta (MIRCERA INJ) 75 mcg.    metoprolol succinate (TOPROL-XL) 25 MG 24 hr tablet Take 1 tablet (25 mg total) by mouth once daily.    omeprazole (PRILOSEC) 20 MG capsule TAKE 2 CAPSULES(40 MG) BY MOUTH EVERY DAY (Patient taking differently: Take 20 mg by mouth once daily.)    penicillin v potassium (VEETID) 500 MG tablet Take 500 mg by mouth 4 (four) times daily.    RENVELA 800 mg Tab Take 800 mg by mouth 3 (three) times daily with meals.    traMADoL  "(ULTRAM) 50 mg tablet Take 1 tablet (50 mg total) by mouth every 8 (eight) hours as needed for Pain.    traZODone (DESYREL) 50 MG tablet Take 50 mg by mouth every evening.     Psychotherapeutics (From admission, onward)            Start     Stop Route Frequency Ordered    01/05/22 2100  traZODone tablet 50 mg         -- Oral Nightly 01/05/22 1404        Review of Systems   Constitutional: Positive for fatigue.   Respiratory: Negative for shortness of breath.    Cardiovascular: Negative for chest pain.   Musculoskeletal: Positive for myalgias.        Foot pain   Psychiatric/Behavioral: Negative for suicidal ideas.     Strengths and Liabilities: Liability: Patient has poor health., Liability: Patient has possible cognitive impairment., Liability: Patient lacks coping skills.    Objective:     Vital Signs (Most Recent):  Temp: 98.1 °F (36.7 °C) (01/12/22 1127)  Pulse: 95 (01/12/22 1127)  Resp: 20 (01/12/22 1150)  BP: (!) 155/71 (01/12/22 1127)  SpO2: 97 % (01/12/22 1127) Vital Signs (24h Range):  Temp:  [97.6 °F (36.4 °C)-98.6 °F (37 °C)] 98.1 °F (36.7 °C)  Pulse:  [88-99] 95  Resp:  [18-20] 20  SpO2:  [97 %-99 %] 97 %  BP: (125-155)/(60-82) 155/71     Height: 5' 10" (177.8 cm)  Weight: 64.3 kg (141 lb 12.1 oz)  Body mass index is 20.34 kg/m².    No intake or output data in the 24 hours ending 01/12/22 1600    Physical Exam  Psychiatric:      Comments: EXAMINATION    CONSTITUTIONAL  General Appearance: hospital attire    MUSCULOSKELETAL  Muscle Strength and Tone: weak  Abnormal Involuntary Movements: none noted  Gait and Station: not observed    PSYCHIATRIC MENTAL STATUS EXAM   Level of Consciousness: awake and alert  Orientation: name only  Grooming: limited  Psychomotor Behavior: slowed  Speech: some delay, very soft  Language: no abnormalities  Mood: "good"  Affect: blunted and confused  Thought Process: perseverative  Associations: not intact most of the time  Thought Content: denies " suicidal/homicidal/psychosis  Memory: better intact to remote, limited to recent  Attention: diminished  Fund of Knowledge: simple to conversation  Insight: poor into current state  Judgment: fair into cooperative with care            Significant Labs:   Last 24 Hours:   Recent Lab Results       01/12/22  1126   01/12/22  1003   01/12/22  0445   01/11/22 2027        Anion Gap     20         Baso #     0.10         Basophil %     0.7         BUN     86         Calcium     8.4         Chloride     93         CO2     21         Creatinine     19.7         Differential Method     Automated         eGFR if      3         eGFR if non      2  Comment: Calculation used to obtain the estimated glomerular filtration  rate (eGFR) is the CKD-EPI equation.            Eos #     0.7         Eosinophil %     5.1         Glucose     159         Gran # (ANC)     11.2         Gran %     79.6         Hematocrit     27.4         Hemoglobin     8.3         Immature Grans (Abs)     0.15  Comment: Mild elevation in immature granulocytes is non specific and   can be seen in a variety of conditions including stress response,   acute inflammation, trauma and pregnancy. Correlation with other   laboratory and clinical findings is essential.           Immature Granulocytes     1.1         Lymph #     1.0         Lymph %     6.9         MCH     27.7         MCHC     30.3         MCV     91         Mono #     0.9         Mono %     6.6         MPV     9.3         nRBC     0         Phosphorus     9.5  Comment: PHOS  critical result(s) called and verbal readback obtained from   NURSE XIMENA QUIÑONEZ ON 01/12/2022 @ 0622 AKS/MLT  by AKABI   01/12/2022 06:22           Platelets     479         POCT Glucose 144   136     201       Potassium     5.4         RBC     3.00         RDW     15.9         Sodium     134         WBC     14.08             All pertinent labs within the past 24 hours have been  reviewed.    Significant Imaging: I have reviewed all pertinent imaging results/findings within the past 24 hours.    Assessment/Plan:     Encephalopathy, metabolic  Patient is somewhat of a difficult conversation.  He does not have any primary psychiatric concerns at this time.  He does not seem to be functioning cognitively at his baseline as per records.      Capacity: the ability to accept or refuse treatment recommendations. Capacity is determined by a clinician upon specific elements of a mental status exam (does not have to be performed by a psychiatrist). To demonstrate capacity, a patient must be able to cognitively utilize information provided to them:   Communicate and Express a choice that is stable over time - he does not express a choice as much as that his foot is sick  Understand the relevant information - he does not express an understanding of what is happening to his foot  Appreciate the consequences of the decision (risks vs benefits) - he does not express good vs bad of having an amputation or not  Manipulate all of the data in a logical fashion (rationalize) - he is not rational or able to process information given to him about his need of care      -Based on the above assessment, pt Adryan Neff appears to lack capacity to make medical decisions about his care at this time.     Please reach out to nearest family and enact a surrogate decision in his best interest.  He is unable to fully understand or appreciate his situation and/or risks of surgery.                 Total Time:  60 minutes      Mekhi Ndiaye MD   Psychiatry  [facility]

## 2022-01-12 NOTE — SUBJECTIVE & OBJECTIVE
Patient History           Medical as of 1/12/2022     Past Medical History     Diagnosis Date Comments Source    CAD (coronary artery disease) 2016 CAD (non obstructive) 2016 [facility] Provider    Diabetes mellitus type I -- -- Provider    Diabetic retinopathy -- -- Provider    ESRD on hemodialysis -- on HD TThSat Provider    Gangrene of left foot -- -- Provider    Hypertension -- -- Provider    Nuclear sclerosis of right eye 11/24/2021 -- Provider    PAD (peripheral artery disease) -- -- Provider    Pulmonary HTN -- -- Provider    TB lung, latent 04/2021 -- Provider          Pertinent Negatives     Diagnosis Date Noted Comments Source    Amblyopia 11/24/2021 -- Provider    Arthritis 11/24/2021 -- Provider    Glaucoma 11/24/2021 -- Provider    History of psychiatric hospitalization 01/12/2022 -- Provider    Hx of psychiatric care 01/12/2022 -- Provider    Macular degeneration 11/24/2021 -- Provider    Retinal detachment 11/24/2021 -- Provider    Sickle cell anemia 11/24/2021 -- Provider    Sickle cell trait 11/24/2021 -- Provider    Strabismus 11/24/2021 -- Provider    Suicide attempt 01/12/2022 -- Provider    Therapy 01/12/2022 -- Provider    Uveitis 11/24/2021 -- Provider                  Surgical as of 1/12/2022     Past Surgical History     Procedure Laterality Date Comments Source    EYE SURGERY -- -- -- Provider    AV FISTULA PLACEMENT -- -- -- Provider    CATARACT EXTRACTION Left -- -- Provider    TOE AMPUTATION Left 1/6/2022 Procedure: AMPUTATION, TOE;  Surgeon: Masoud Soni MD;  Location: [facility];  Service: Vascular;  Laterality: Left;  Left first through fifth toes, possible open transmetatarsal amputation and all other indicated procedures Provider                  Family as of 1/12/2022     Problem Relation Name Age of Onset Comments Source    No Known Problems Mother -- -- -- Provider    No Known Problems Father -- -- -- Provider    No Known Problems Sister -- -- -- Provider    No Known Problems  Brother -- -- -- Provider    No Known Problems Daughter -- -- -- Provider    No Known Problems Daughter -- -- -- Provider    No Known Problems Daughter -- -- -- Provider    No Known Problems Brother -- -- -- Provider    No Known Problems Maternal Aunt -- -- -- Provider    No Known Problems Maternal Uncle -- -- -- Provider    No Known Problems Paternal Aunt -- -- -- Provider    No Known Problems Paternal Uncle -- -- -- Provider    No Known Problems Maternal Grandmother -- -- -- Provider    No Known Problems Maternal Grandfather -- -- -- Provider    No Known Problems Paternal Grandmother -- -- -- Provider    No Known Problems Paternal Grandfather -- -- -- Provider            Tobacco Use as of 1/12/2022     Smoking Status Smoking Start Date Smoking Quit Date Packs/Day Years Used    Never Smoker -- -- -- --    Types Comments Smokeless Tobacco Status Smokeless Tobacco Quit Date Source    -- -- Never Used -- Provider            Alcohol Use as of 1/12/2022     Alcohol Use Drinks/Week Alcohol/Week Comments Source    No   -- -- Provider            Drug Use as of 1/12/2022     Drug Use Types Frequency Comments Source    No -- -- -- Provider            Sexual Activity as of 1/12/2022     Sexually Active Birth Control Partners Comments Source    Yes -- Female -- Provider            Activities of Daily Living as of 1/12/2022     Activities of Daily Living Question Response Comments Source    Patient feels they ought to cut down on drinking/drug use Not Asked -- Provider    Patient annoyed by others criticizing their drinking/drug use Not Asked -- Provider    Patient has felt bad or guilty about drinking/drug use Not Asked -- Provider    Patient has had a drink/used drugs as an eye opener in the AM Not Asked -- Provider            Social Documentation as of 1/12/2022    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  Source: Provider           Occupational as of 1/12/2022    None           Socioeconomic as of 1/12/2022     Marital Status Spouse Name Number  of Children Years Education Education Level Preferred Language Ethnicity Race Source    Single -- 5 -- -- English Not  or /a Black or  Provider            Pertinent History     Question Response Comments    Lives with family --    Place in Birth Order -- --    Lives in -- --    Number of Siblings -- --    Raised by -- --    Legal Involvement -- --    Childhood Trauma -- --    Criminal History of -- --    Financial Status -- --    Highest Level of Education -- --    Does patient have access to a firearm? -- --     Service -- --    Primary Leisure Activity -- --    Spirituality -- --        Past Medical History:   Diagnosis Date    CAD (coronary artery disease) 2016    CAD (non obstructive) 2016 [facility]    Diabetes mellitus type I     Diabetic retinopathy     ESRD on hemodialysis     on HD TThSat    Gangrene of left foot     Hypertension     Nuclear sclerosis of right eye 11/24/2021    PAD (peripheral artery disease)     Pulmonary HTN     TB lung, latent 04/2021     Past Surgical History:   Procedure Laterality Date    AV FISTULA PLACEMENT      CATARACT EXTRACTION Left     EYE SURGERY      TOE AMPUTATION Left 1/6/2022    Procedure: AMPUTATION, TOE;  Surgeon: Masoud Soni MD;  Location: [facility];  Service: Vascular;  Laterality: Left;  Left first through fifth toes, possible open transmetatarsal amputation and all other indicated procedures     Family History     Problem Relation (Age of Onset)    No Known Problems Mother, Father, Sister, Brother, Daughter, Daughter, Daughter, Brother, Maternal Aunt, Maternal Uncle, Paternal Aunt, Paternal Uncle, Maternal Grandmother, Maternal Grandfather, Paternal Grandmother, Paternal Grandfather        Tobacco Use    Smoking status: Never Smoker    Smokeless tobacco: Never Used   Substance and Sexual Activity    Alcohol use: No    Drug use: No    Sexual activity: Yes     Partners: Female     Review of patient's allergies  indicates:  No Known Allergies    No current facility-administered medications on file prior to encounter.     Current Outpatient Medications on File Prior to Encounter   Medication Sig    amoxicillin-clavulanate 500-125mg (AUGMENTIN) 500-125 mg Tab Take 1 tablet (500 mg total) by mouth once daily.    atorvastatin (LIPITOR) 20 MG tablet Take 1 tablet (20 mg total) by mouth once daily. (Patient taking differently: Take 20 mg by mouth once daily.)    blood sugar diagnostic Strp 1 each by Misc.(Non-Drug; Combo Route) route 2 hours after meals and at bedtime.    doxercalciferoL (HECTOROL) 0.5 MCG capsule 2 mcg.    heparin sodium,porcine (HEPARIN, PORCINE,) 1,000 unit/mL Soln injection Heparin Sodium (Porcine) 1,000 Units/mL Systemic    hydrALAZINE (APRESOLINE) 100 MG tablet Take 1 tablet (100 mg total) by mouth every 8 (eight) hours. (Patient taking differently: Take 50 mg by mouth every 8 (eight) hours. )    insulin (BASAGLAR KWIKPEN U-100 INSULIN) glargine 100 units/mL (3mL) SubQ pen Inject 15 Units into the skin every evening.    isoniazid (NYDRAZID) 300 MG Tab Take 1 tablet (300 mg total) by mouth once daily.    lisinopriL (PRINIVIL,ZESTRIL) 2.5 MG tablet Take 2.5 mg by mouth once daily.    lisinopriL (PRINIVIL,ZESTRIL) 5 MG tablet Take 5 mg by mouth once daily.    methoxy peg-epoetin beta (MIRCERA INJ) 75 mcg.    metoprolol succinate (TOPROL-XL) 25 MG 24 hr tablet Take 1 tablet (25 mg total) by mouth once daily.    omeprazole (PRILOSEC) 20 MG capsule TAKE 2 CAPSULES(40 MG) BY MOUTH EVERY DAY (Patient taking differently: Take 20 mg by mouth once daily.)    penicillin v potassium (VEETID) 500 MG tablet Take 500 mg by mouth 4 (four) times daily.    RENVELA 800 mg Tab Take 800 mg by mouth 3 (three) times daily with meals.    traMADoL (ULTRAM) 50 mg tablet Take 1 tablet (50 mg total) by mouth every 8 (eight) hours as needed for Pain.    traZODone (DESYREL) 50 MG tablet Take 50 mg by mouth every evening.  "    Psychotherapeutics (From admission, onward)            Start     Stop Route Frequency Ordered    01/05/22 2100  traZODone tablet 50 mg         -- Oral Nightly 01/05/22 1404        Review of Systems   Constitutional: Positive for fatigue.   Respiratory: Negative for shortness of breath.    Cardiovascular: Negative for chest pain.   Musculoskeletal: Positive for myalgias.        Foot pain   Psychiatric/Behavioral: Negative for suicidal ideas.     Strengths and Liabilities: Liability: Patient has poor health., Liability: Patient has possible cognitive impairment., Liability: Patient lacks coping skills.    Objective:     Vital Signs (Most Recent):  Temp: 98.1 °F (36.7 °C) (01/12/22 1127)  Pulse: 95 (01/12/22 1127)  Resp: 20 (01/12/22 1150)  BP: (!) 155/71 (01/12/22 1127)  SpO2: 97 % (01/12/22 1127) Vital Signs (24h Range):  Temp:  [97.6 °F (36.4 °C)-98.6 °F (37 °C)] 98.1 °F (36.7 °C)  Pulse:  [88-99] 95  Resp:  [18-20] 20  SpO2:  [97 %-99 %] 97 %  BP: (125-155)/(60-82) 155/71     Height: 5' 10" (177.8 cm)  Weight: 64.3 kg (141 lb 12.1 oz)  Body mass index is 20.34 kg/m².    No intake or output data in the 24 hours ending 01/12/22 1600    Physical Exam  Psychiatric:      Comments: EXAMINATION    CONSTITUTIONAL  General Appearance: hospital attire    MUSCULOSKELETAL  Muscle Strength and Tone: weak  Abnormal Involuntary Movements: none noted  Gait and Station: not observed    PSYCHIATRIC MENTAL STATUS EXAM   Level of Consciousness: awake and alert  Orientation: name only  Grooming: limited  Psychomotor Behavior: slowed  Speech: some delay, very soft  Language: no abnormalities  Mood: "good"  Affect: blunted and confused  Thought Process: perseverative  Associations: not intact most of the time  Thought Content: denies suicidal/homicidal/psychosis  Memory: better intact to remote, limited to recent  Attention: diminished  Fund of Knowledge: simple to conversation  Insight: poor into current state  Judgment: fair into " cooperative with care            Significant Labs:   Last 24 Hours:   Recent Lab Results       01/12/22  1126   01/12/22  1003   01/12/22  0445   01/11/22 2027        Anion Gap     20         Baso #     0.10         Basophil %     0.7         BUN     86         Calcium     8.4         Chloride     93         CO2     21         Creatinine     19.7         Differential Method     Automated         eGFR if      3         eGFR if non      2  Comment: Calculation used to obtain the estimated glomerular filtration  rate (eGFR) is the CKD-EPI equation.            Eos #     0.7         Eosinophil %     5.1         Glucose     159         Gran # (ANC)     11.2         Gran %     79.6         Hematocrit     27.4         Hemoglobin     8.3         Immature Grans (Abs)     0.15  Comment: Mild elevation in immature granulocytes is non specific and   can be seen in a variety of conditions including stress response,   acute inflammation, trauma and pregnancy. Correlation with other   laboratory and clinical findings is essential.           Immature Granulocytes     1.1         Lymph #     1.0         Lymph %     6.9         MCH     27.7         MCHC     30.3         MCV     91         Mono #     0.9         Mono %     6.6         MPV     9.3         nRBC     0         Phosphorus     9.5  Comment: PHOS  critical result(s) called and verbal readback obtained from   NURSE XIMENA QUIÑONEZ ON 01/12/2022 @ 0622 AKS/MLT  by AK   01/12/2022 06:22           Platelets     479         POCT Glucose 144   136     201       Potassium     5.4         RBC     3.00         RDW     15.9         Sodium     134         WBC     14.08             All pertinent labs within the past 24 hours have been reviewed.    Significant Imaging: I have reviewed all pertinent imaging results/findings within the past 24 hours.

## 2022-01-12 NOTE — CONSULTS
Inpatient Radiology Pre-procedure Note    History of Present Illness:  Adryan Neff is a 56 y.o. male with pertinent PMHx of ESRD on HD TTS via LUE brachi-cephalic AVG s/p IR fistulagram and intervention on 1/10/22 for angioplasty of severe stenoses in mid-distal AVG and cephalic vein outflow as well as total occlusion of axillary/subclavian vein junction and, stent-graft exclusion of large mid AVG PSA. HD tech again reports unsuccessful attempts at HD 2/2 to reported difficulties cannulating the venous segment despite multiple repeated attempts with imaging revealing minimal, nonocclusive mural thrombus along the distal AVG which is otherwise now widely patent.    A new inpatient IR consult received for fluoroscopic-guided fistulagram with potential intervention.    Admission H&P reviewed.  Past Medical History:   Diagnosis Date    CAD (coronary artery disease) 2016    CAD (non obstructive) 2016 [facility]    Diabetes mellitus type I     Diabetic retinopathy     ESRD on hemodialysis     on HD TThSat    Gangrene of left foot     Hypertension     Nuclear sclerosis of right eye 11/24/2021    PAD (peripheral artery disease)     Pulmonary HTN     TB lung, latent 04/2021     Past Surgical History:   Procedure Laterality Date    AV FISTULA PLACEMENT      CATARACT EXTRACTION Left     EYE SURGERY      TOE AMPUTATION Left 1/6/2022    Procedure: AMPUTATION, TOE;  Surgeon: Masoud Soni MD;  Location: [facility] OR;  Service: Vascular;  Laterality: Left;  Left first through fifth toes, possible open transmetatarsal amputation and all other indicated procedures     Review of Systems:   As documented in primary team H&P    Home Meds:   Prior to Admission medications    Medication Sig Start Date End Date Taking? Authorizing Provider   amoxicillin-clavulanate 500-125mg (AUGMENTIN) 500-125 mg Tab Take 1 tablet (500 mg total) by mouth once daily. 11/11/21   Natali Mayers MD   atorvastatin (LIPITOR) 20 MG tablet Take 1 tablet  (20 mg total) by mouth once daily.  Patient taking differently: Take 20 mg by mouth once daily. 1/31/19 8/9/21  Soren Rice MD   blood sugar diagnostic Strp 1 each by Misc.(Non-Drug; Combo Route) route 2 hours after meals and at bedtime. 1/31/19   Soren Rice MD   doxercalciferoL (HECTOROL) 0.5 MCG capsule 2 mcg. 7/24/21 7/23/22  Historical Provider   heparin sodium,porcine (HEPARIN, PORCINE,) 1,000 unit/mL Soln injection Heparin Sodium (Porcine) 1,000 Units/mL Systemic 2/9/21 2/8/22  Historical Provider   hydrALAZINE (APRESOLINE) 100 MG tablet Take 1 tablet (100 mg total) by mouth every 8 (eight) hours.  Patient taking differently: Take 50 mg by mouth every 8 (eight) hours.  10/26/20 10/26/21  Fan Kaye MD   insulin (BASAGLAR KWIKPEN U-100 INSULIN) glargine 100 units/mL (3mL) SubQ pen Inject 15 Units into the skin every evening. 2/7/19   Soren Rice MD   isoniazid (NYDRAZID) 300 MG Tab Take 1 tablet (300 mg total) by mouth once daily. 4/20/21 8/9/21  Anastacio Hoyos MD   lisinopriL (PRINIVIL,ZESTRIL) 2.5 MG tablet Take 2.5 mg by mouth once daily.    Historical Provider   lisinopriL (PRINIVIL,ZESTRIL) 5 MG tablet Take 5 mg by mouth once daily. 11/18/21   Historical Provider   methoxy peg-epoetin beta (MIRCERA INJ) 75 mcg. 7/10/21 7/9/22  Historical Provider   metoprolol succinate (TOPROL-XL) 25 MG 24 hr tablet Take 1 tablet (25 mg total) by mouth once daily. 4/20/21 4/20/22  Mor Avina MD   omeprazole (PRILOSEC) 20 MG capsule TAKE 2 CAPSULES(40 MG) BY MOUTH EVERY DAY  Patient taking differently: Take 20 mg by mouth once daily. 10/19/20   Soren Rice MD   penicillin v potassium (VEETID) 500 MG tablet Take 500 mg by mouth 4 (four) times daily. 9/15/21   Historical Provider   RENVELA 800 mg Tab Take 800 mg by mouth 3 (three) times daily with meals. 11/21/20   Historical Provider   traMADoL (ULTRAM) 50 mg tablet Take 1 tablet (50 mg total) by mouth every 8 (eight) hours as needed for  Pain. 12/13/21   Tomas Barron DPM   traZODone (DESYREL) 50 MG tablet Take 50 mg by mouth every evening.    Historical Provider     Scheduled Meds:    sodium chloride 0.9%   Intravenous Once    atorvastatin  20 mg Oral Daily    carvediloL  6.25 mg Oral BID    ceFEPime (MAXIPIME) IVPB  1 g Intravenous Q24H    heparin (porcine)  5,000 Units Subcutaneous Q8H    hydrALAZINE  100 mg Oral Q8H    insulin detemir U-100  15 Units Subcutaneous QHS    lisinopriL  5 mg Oral Daily    pantoprazole  40 mg Oral Daily    sevelamer carbonate  2,400 mg Oral TID WM    traZODone  50 mg Oral QHS    vitamin renal formula (B-complex-vitamin c-folic acid)  1 capsule Oral Daily     Continuous Infusions:   PRN Meds:sodium chloride 0.9%, acetaminophen, acetaminophen, dextrose 50%, dextrose 50%, glucagon (human recombinant), glucose, glucose, HYDROmorphone, influenza, insulin aspart U-100, magnesium oxide, magnesium oxide, naloxone, ondansetron, oxyCODONE, sodium chloride 0.9%, sodium chloride 0.9%     Anticoagulants/Antiplatelets: no anticoagulation    Allergies: Review of patient's allergies indicates:  No Known Allergies     Sedation Hx: have not been any systemic reactions    Labs:  No results for input(s): INR in the last 168 hours.    Invalid input(s):  PT,  PTT    Recent Labs   Lab 01/12/22  0445   WBC 14.08*   HGB 8.3*   HCT 27.4*   MCV 91   *      Recent Labs   Lab 01/09/22  0542 01/09/22  0542 01/11/22  0329 01/11/22  0329 01/12/22  0445      < > 174*   < > 159*   *   < > 131*   < > 134*   K 4.8   < > 5.3*   < > 5.4*   CL 97   < > 93*   < > 93*   CO2 20*   < > 20*   < > 21*   BUN 55*   < > 78*   < > 86*   CREATININE 13.5*   < > 17.1*   < > 19.7*   CALCIUM 8.7   < > 8.3*   < > 8.4*   ALT 5*  --   --   --   --    AST 12  --   --   --   --    ALBUMIN 1.9*   < > 1.8*  --   --    BILITOT 0.3  --   --   --   --     < > = values in this interval not displayed.     Vitals:  Temp: 98.1 °F (36.7 °C) (01/12/22  1127)  Pulse: 95 (01/12/22 1127)  Resp: 20 (01/12/22 1150)  BP: (!) 155/71 (01/12/22 1127)  SpO2: 97 % (01/12/22 1127)     Physical Exam:  ASA: II  Mallampati: II    General: no acute distress  Mental Status: alert and oriented to person, place and time  HEENT: normocephalic, atraumatic  Chest: unlabored breathing  Heart: regular heart rate  Abdomen: nondistended  Extremity: moves all extremities    A/P:  56 y.o. male with pertinent PMHx of ESRD on HD TTS via LUE brachi-cephalic AVG s/p IR fistulagram and intervention on 1/10/22 for angioplasty of severe stenoses in mid-distal AVG and cephalic vein outflow as well as total occlusion of axillary/subclavian vein junction and, stent-graft exclusion of large mid AVG PSA. HD tech again reports unsuccessful attempts at HD 2/2 to reported difficulties cannulating the venous segment despite multiple repeated attempts with imaging revealing minimal, nonocclusive mural thrombus along the distal AVG which is otherwise now widely patent.     1. Persistent difficulty cannulating LUE brachio-cephalic AVG - Will attempt US and fluoroscopic-guided LUE AVG fistulagram with potential intervention with local anesthetic and moderate conscious sedation.     However, it is unclear to me that the minimal nonocclusive mural thrombus would significantly contribute to the persistent difficulties cannulating the venous segment despite recent intervention with otherwise widely patent AVG with persistence of optimal thrill on personal evaluation just a moment ago. Distal AVG feels easily palpable to me however, possibly the depth of AVG may be contributing to difficulties cannulating. Will re-attempt intervention however, pt may require at least 1 other HD tech attempting access and potential for HD catheter placement. Consideration for revision of AVG.     Risks (including, but not limited to, pain, bleeding, infection, damage to nearby structures, failure to obtain sufficient material for a  diagnosis, the need for additional procedures, and death), benefits, and alternatives were discussed with the patient. All questions were answered to the best of my abilities. The patient wishes to proceed with the procedure. Written informed consent was obtained.     Thank you for considering IR for the care of your patient.      Gt Mcghee MD  Interventional Radiology

## 2022-01-12 NOTE — SUBJECTIVE & OBJECTIVE
"Interval History: Yesterday PM pt refused medications, reported confusion.     This AM pt reports feeling "sick", is able to reason that dialysis may help him, and that he needs IR prior to dialysis. Oriented x 3    Review of Systems   Constitutional: Negative for chills and fever.        Generalized malaise   Respiratory: Negative for cough and shortness of breath.    Cardiovascular: Negative for chest pain and leg swelling.   Gastrointestinal: Negative for abdominal distention and abdominal pain.     Objective:     Vital Signs (Most Recent):  Temp: 98.6 °F (37 °C) (01/12/22 0507)  Pulse: 91 (01/12/22 0507)  Resp: 19 (01/12/22 0507)  BP: (!) 145/64 (01/12/22 0507)  SpO2: 99 % (01/12/22 0507) Vital Signs (24h Range):  Temp:  [98 °F (36.7 °C)-98.6 °F (37 °C)] 98.6 °F (37 °C)  Pulse:  [75-94] 91  Resp:  [18-19] 19  SpO2:  [97 %-99 %] 99 %  BP: (125-165)/(60-69) 145/64     Weight: 64.3 kg (141 lb 12.1 oz)  Body mass index is 20.34 kg/m².  No intake or output data in the 24 hours ending 01/12/22 0905   Physical Exam  Constitutional:       General: He is not in acute distress.     Appearance: He is ill-appearing. He is not toxic-appearing or diaphoretic.   Cardiovascular:      Rate and Rhythm: Normal rate and regular rhythm.      Heart sounds: No murmur heard.  No gallop.    Pulmonary:      Effort: Pulmonary effort is normal. No respiratory distress.      Breath sounds: Normal breath sounds. No wheezing.   Abdominal:      General: Bowel sounds are normal. There is no distension.      Palpations: Abdomen is soft.      Tenderness: There is no abdominal tenderness.         Significant Labs: All pertinent labs within the past 24 hours have been reviewed.    Significant Imaging: I have reviewed all pertinent imaging results/findings within the past 24 hours.  "

## 2022-01-12 NOTE — PLAN OF CARE
Problem: Adult Inpatient Plan of Care  Goal: Plan of Care Review  Outcome: Ongoing, Progressing  Flowsheets (Taken 1/12/2022 1603)  Plan of Care Reviewed With: patient  Goal: Patient-Specific Goal (Individualized)  Outcome: Ongoing, Progressing  Goal: Absence of Hospital-Acquired Illness or Injury  Outcome: Ongoing, Progressing  Intervention: Identify and Manage Fall Risk  Flowsheets (Taken 1/12/2022 1603)  Safety Promotion/Fall Prevention:   assistive device/personal item within reach   bed alarm set   side rails raised x 3   upper side rails raised x 2, lower siderails raised x 1 (Peds only)   toileting scheduled  Intervention: Prevent Skin Injury  Flowsheets (Taken 1/12/2022 1603)  Body Position:   left   lower extremity elevated  Skin Protection:   adhesive use limited   skin-to-device areas padded   skin-to-skin areas padded   incontinence pads utilized  Goal: Optimal Comfort and Wellbeing  Outcome: Ongoing, Progressing  Intervention: Provide Person-Centered Care  Flowsheets (Taken 1/12/2022 1603)  Trust Relationship/Rapport:   care explained   choices provided   emotional support provided   empathic listening provided   questions answered   questions encouraged   reassurance provided   thoughts/feelings acknowledged   other (see comments)  Goal: Readiness for Transition of Care  Outcome: Ongoing, Progressing     Problem: Hemodynamic Instability (Hemodialysis)  Goal: Effective Tissue Perfusion  Outcome: Ongoing, Progressing     Problem: Infection (Hemodialysis)  Goal: Absence of Infection Signs and Symptoms  Outcome: Ongoing, Progressing     Problem: Skin Injury Risk Increased  Goal: Skin Health and Integrity  Outcome: Ongoing, Progressing

## 2022-01-12 NOTE — PROGRESS NOTES
[facility]  Telemedicine Progress Note    Patient Name: Adryan Neff  MRN: [id number]  Patient Class: IP- Inpatient   Admission Date: 1/5/2022  Length of Stay: 7 days  Attending Physician: Edi Moreno MD  Primary Care Provider: Soren Rice MD          Subjective:     Principal Problem:Gangrene of left foot        HPI:  This is a 56 y.o.male patient, with a PMHx of ESRD on hemodialysis, CAD, HTN, and DM, presenting to the ED for further evaluation of left foot pain and black discoloration that began 3 days ago. Patient states these symptoms have been ongoing for the last month. He reports being referred to a specialist but denies ever following up. He has been putting some otc medication on the foot that he believes has turned his foot more black. No trauma or injury. Patient reports associated chills. Patient states applying a creme to the foot and noticed his discoloration worsened. Patient reports he was last dialyzed yesterday. Patient denies any fever, shortness of breath, chest pain, neck pain, back pain, abdominal pain, rash, headaches, congestion, rhinorrhea, cough, sore throat, ear pain, eye pain, blurred vision, nausea, vomiting, diarrhea, dysuria, or any other associated symptoms.    Per chart review, he had an arterial u/s 12/15.   Impression:   1. High-grade severe stenosis right popliteal artery with occlusion anterior tibial artery which is reconstituted at level of DPA via collaterals.  2. 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    Cardiology has been planning to see him for revascularization options. Last podiatry visit 12/13 with Stable dry gangrene L 2nd toe and medial L 5th toe. Dorsalis pedis and posterior tibial pulses are diminished Bilaterally. Toes are cool to touch. Feet are warm proximally.There is decreased digital hair. Skin is atrophic, slightly hyperpigmented, and mildly edematous     In the ED, he was found to have worsened  gangrene and a cool extremity. Vascular surgery was consulted. He was also COVID +. Says he got a booster shot in November sometime.      Overview/Hospital Course:  Patient admitted to the hospital for eval and treatment of L foot gangrene.  Ortho and Vasc consulted. Nephrology was also consulted for chronic dialysis. Patient had several toes on L foot amputated.  Continued with dialysis. PT/OT consulted.       Interval History:   - patient is having swelling around AV fistula site and pain and unable to access the AV fistula due to that; U/S ordered to further evaluate, however unable to get HD today    - patient also refused his angiogram gram today and seems a little confused and not able to have capacity to make decisions  - psych eval ordered for capacity    - I tried calling daughter on the phone and left a VM to update her on patient's condition and to call back my cell phone    Review of Systems   Constitutional: Negative for activity change, fatigue and fever.   Respiratory: Negative for cough and shortness of breath.    Gastrointestinal: Negative for abdominal pain and nausea.     Objective:     Vital Signs (Most Recent):  Temp: 98 °F (36.7 °C) (01/11/22 2352)  Pulse: 88 (01/11/22 2352)  Resp: 19 (01/11/22 2352)  BP: 125/60 (01/11/22 2352)  SpO2: 98 % (01/11/22 2352) Vital Signs (24h Range):  Temp:  [98 °F (36.7 °C)-98.7 °F (37.1 °C)] 98 °F (36.7 °C)  Pulse:  [75-94] 88  Resp:  [18-19] 19  SpO2:  [97 %-98 %] 98 %  BP: (125-165)/(60-69) 125/60     Weight: 64.3 kg (141 lb 12.1 oz)  Body mass index is 20.34 kg/m².  No intake or output data in the 24 hours ending 01/12/22 0135   Physical Exam  Constitutional:       Appearance: Normal appearance.   Pulmonary:      Effort: Pulmonary effort is normal.      Breath sounds: No wheezing.   Abdominal:      General: Abdomen is flat. There is no distension.   Musculoskeletal:         General: Swelling and tenderness present.   Skin:     Coloration: Skin is not jaundiced  or pale.   Neurological:      General: No focal deficit present.      Mental Status: He is alert.      Motor: Weakness present.      Coordination: Coordination abnormal.         Significant Labs: All pertinent labs within the past 24 hours have been reviewed.    Significant Imaging: I have reviewed all pertinent imaging results/findings within the past 24 hours.      Assessment/Plan:      * Gangrene of left foot  Vascular and ortho consulted. Likely needs amputation. Discussed with Dr. Soni. In comparison of recent pictures by podiatry, his gangrene has worsened and now involves all toes. See pics below.   Continue IV abx. Pain control.     Follow vasc. Ortho recs     S/p amputation of toes.  Patient would like to try to salvage limb.  Awaiting next vascular recs.   PT/OT consulted. Continue Abx.     Angiogram on Mon or Tues per Vascular. Continue wound care/Abx     1/9- leukocytosis rising, on Vanc and Zosyn; getting blood cultures today; will discuss with vascular surgery tomorrow about when angiogram will occur as wbc is rising; may need amputation     1/10- vascular surgery planning on doing angiogram in the AM; NPO aftermidnight    1/11- patient refused angiogram today    Encephalopathy, metabolic  - multifactorial  - doing infectious work up   - uremia  - psych consult for capacity     ESRD on hemodialysis  Nephrology consulted to resume routine HD.     1/9 - fistula infiltrated, planning for IR fistulagram in the AM    1/10- AV fistula repaired today in IR; HD tomorrow as patient refused today    Essential (primary) hypertension  Chronic, uncontrolled.  Latest blood pressure and vitals reviewed-   Temp:  [97.8 °F (36.6 °C)-98.7 °F (37.1 °C)]   Pulse:  [68-83]   Resp:  [16-22]   BP: (123-172)/()   SpO2:  [96 %-100 %] .   Home meds for hypertension were reviewed and noted below.   Hypertension Medications             hydrALAZINE (APRESOLINE) 100 MG tablet Take 1 tablet (100 mg total) by mouth every 8  (eight) hours.    lisinopriL (PRINIVIL,ZESTRIL) 2.5 MG tablet Take 2.5 mg by mouth once daily.    lisinopriL (PRINIVIL,ZESTRIL) 5 MG tablet Take 5 mg by mouth once daily.    metoprolol succinate (TOPROL-XL) 25 MG 24 hr tablet Take 1 tablet (25 mg total) by mouth once daily.          While in the hospital, will manage blood pressure as follows; Continue home antihypertensive regimen (will use higher dose of lisinopril). Adjust as needed.     Will utilize p.r.n. blood pressure medication only if patient's blood pressure greater than  180/110 and he develops symptoms such as worsening chest pain or shortness of breath.    1/9- uncontrolled, adding coreg today       COVID-19 in immunocompromised patient  HD patient + for COVID 19. Not hypoxic or symptomatic. Supportive care prn.       Chronic diastolic heart failure  December 2021 echo reviewed. Stable.       Controlled type 2 diabetes mellitus with chronic kidney disease on chronic dialysis, with long-term current use of insulin  Patient's FSGs are controlled on current medication regimen.  Last A1c reviewed-   Lab Results   Component Value Date    HGBA1C 6.1 (H) 01/07/2021     Most recent fingerstick glucose reviewed-   Recent Labs   Lab 01/05/22  1153   POCTGLUCOSE 224*     Current correctional scale  Medium  Maintain anti-hyperglycemic dose as follows-   Antihyperglycemics (From admission, onward)            Start     Stop Route Frequency Ordered    01/05/22 2100  insulin detemir U-100 pen 15 Units         -- SubQ Nightly 01/05/22 1404    01/05/22 1503  insulin aspart U-100 pen 1-10 Units         -- SubQ Before meals & nightly PRN 01/05/22 1404        Hold Oral hypoglycemics while patient is in the hospital.          VTE Risk Mitigation (From admission, onward)         Ordered     heparin (porcine) injection 5,000 Units  Every 8 hours         01/05/22 1404     IP VTE HIGH RISK PATIENT  Once         01/05/22 1404     Place sequential compression device  Until  discontinued         01/05/22 1404                      I have assessed these finding virtually using telemed platform and with assistance of bedside nurse                 The attending portion of this evaluation, treatment, and documentation was performed per Edi Moreno MD via Telemedicine AudioVisual using the secure [employer] software platform with 2 way audio/video. The provider was located off-site and the patient is located in the hospital. The aforementioned video software was utilized to document the relevant history and physical exam    Edi Moreno MD  Department of Hospital Medicine   [facility]

## 2022-01-12 NOTE — HPI
Patient Adryan Neff presents to the hospital with foot pain and found to have gangrenous digits on his feet.  There was some confusion as to if he had capacity for surgical intervention and psychiatric consult was placed.  Patient is easily approached in conversation and Tuval[place] Creole  was used.  Patient engages but is somewhat repetitive with answers.  He is oriented to name only.  When I ask if we are located in a bank, hospital, or school; he is unable to answer.  He is not oriented to the month or year.  He does tell me that he is here because his foot is sick.  I ask if he has any other medical problems and he says that he does not.  When I ask about dialysis, he says that he was on dialysis in [facility].  He is able to tell me that he lives with his son and that he was a  until 1996 and moved here in 1980.  He repeats a lot that his foot is sick and hurts.  When asked if he knows about gangrene, he simply says yes but not able to tell me any more about it.  When I ask about having surgery to have his toes and possibly foot removed, he says again that his foot is sick.  He eventually says that he is OK with having a surgery if he needs to have one but cannot tell me anything about needing an amputation.  He denies any depression, anxiety, jennifer, or psychosis.  Denies any psychiatric history.  After the conversation, the  adds that the patient is confused and cannot hold a lucid conversation.

## 2022-01-12 NOTE — ASSESSMENT & PLAN NOTE
Waxing and waning confusion likely delirium secondary to ischemic foot w/ possible superimposed infection and uremia from missing dialysis  - psychiatry consulted, appreciate recs  - tx of underlying issues as noted elsewhere

## 2022-01-12 NOTE — PROGRESS NOTES
Renal Progress Note    Date of Admission:  1/5/2022 11:39 AM    Length of Stay: 7  Days    Subjective: n/a    Objective:    Current Facility-Administered Medications   Medication    0.9%  NaCl infusion    0.9%  NaCl infusion    acetaminophen tablet 650 mg    acetaminophen tablet 650 mg    atorvastatin tablet 20 mg    carvediloL tablet 6.25 mg    cefepime in dextrose 5 % 1 gram/50 mL IVPB 1 g    dextrose 50% injection 12.5 g    dextrose 50% injection 25 g    glucagon (human recombinant) injection 1 mg    glucose chewable tablet 16 g    glucose chewable tablet 24 g    heparin (porcine) injection 5,000 Units    hydrALAZINE tablet 100 mg    HYDROmorphone (PF) injection 1 mg    influenza (QUADRIVALENT PF) vaccine 0.5 mL    insulin aspart U-100 pen 1-10 Units    insulin detemir U-100 pen 15 Units    lisinopriL tablet 5 mg    magnesium oxide tablet 800 mg    magnesium oxide tablet 800 mg    naloxone 0.4 mg/mL injection 0.02 mg    ondansetron injection 8 mg    oxyCODONE immediate release tablet 10 mg    pantoprazole EC tablet 40 mg    sevelamer carbonate tablet 2,400 mg    sodium chloride 0.9% bolus 250 mL    sodium chloride 0.9% flush 10 mL    sodium zirconium cyclosilicate packet 10 g    traZODone tablet 50 mg    vitamin renal formula (B-complex-vitamin c-folic acid) 1 mg per capsule 1 capsule       Vitals:    01/11/22 1457 01/11/22 2028 01/11/22 2352 01/12/22 0507   BP:  (!) 150/65 125/60 (!) 145/64   BP Location:  Right arm Right arm Left arm   Patient Position:  Lying Lying Lying   Pulse:  94 88 91   Resp: 18 18 19 19   Temp:  98.2 °F (36.8 °C) 98 °F (36.7 °C) 98.6 °F (37 °C)   TempSrc:  Oral Axillary Oral   SpO2:  98% 98% 99%   Weight:       Height:           No intake/output data recorded.  No intake/output data recorded.      Physical Exam: deferred due to Covid-19    Laboratories:    Recent Labs   Lab 01/12/22  6442   WBC 14.08*   RBC 3.00*   HGB 8.3*   HCT  27.4*   *   MCV 91   MCH 27.7   MCHC 30.3*       Recent Labs   Lab 01/12/22  0445   CALCIUM 8.4*   *   K 5.4*   CO2 21*   CL 93*   BUN 86*   CREATININE 19.7*       No results for input(s): COLORU, CLARITYU, SPECGRAV, PHUR, PROTEINUA, GLUCOSEU, BLOODU, WBCU, RBCU, BACTERIA, MUCUS in the last 24 hours.    Invalid input(s):  BILIRUBINCON    Microbiology Results (last 7 days)     Procedure Component Value Units Date/Time    Blood culture [[id number]] Collected: 01/09/22 1216    Order Status: Completed Specimen: Blood Updated: 01/11/22 1303     Blood Culture, Routine No Growth to date      No Growth to date      No Growth to date    Blood culture [[id number]] Collected: 01/09/22 1217    Order Status: Completed Specimen: Blood Updated: 01/11/22 1303     Blood Culture, Routine No Growth to date      No Growth to date      No Growth to date    AFB Culture & Smear [[id number]] Collected: 01/06/22 1100    Order Status: Completed Specimen: Wound from Foot, Left Updated: 01/10/22 1600     AFB Culture & Smear Culture in progress     AFB CULTURE STAIN No acid fast bacilli seen.    Narrative:      Left foot metatarsal  Proximal margin - left foot    AFB Culture & Smear [[id number]] Collected: 01/06/22 1100    Order Status: Completed Specimen: Wound from Foot, Left Updated: 01/10/22 1600     AFB Culture & Smear Culture in progress     AFB CULTURE STAIN No acid fast bacilli seen.    Culture, Anaerobe [[id number]] Collected: 01/06/22 1100    Order Status: Completed Specimen: Wound from Foot, Left Updated: 01/10/22 1357     Anaerobic Culture No anaerobes isolated    Narrative:      Left foot metatarsal  Proximal margin - left foot    Culture, Anaerobe [[id number]] Collected: 01/06/22 1100    Order Status: Completed Specimen: Wound from Foot, Left Updated: 01/10/22 1357     Anaerobic Culture No anaerobes isolated    Aerobic culture [[id number]]  (Abnormal)  (Susceptibility) Collected: 01/06/22 1100    Order Status: Completed  Specimen: Wound from Foot, Left Updated: 01/10/22 0838     Aerobic Bacterial Culture CITROBACTER KOSERI  Many  For susceptibility see order # [id number]        CITROBACTER FREUNDII  Many  For susceptibility see order # [id number]        PSEUDOMONAS AERUGINOSA    Narrative:      Left foot metatarsal  Proximal margin - left foot    Blood culture [[id number]] Collected: 01/05/22 1309    Order Status: Completed Specimen: Blood from Peripheral, Wrist, Right Updated: 01/09/22 1503     Blood Culture, Routine No Growth after 4 days.     Blood culture [[id number]] Collected: 01/05/22 1308    Order Status: Completed Specimen: Blood from Peripheral, Hand, Right Updated: 01/09/22 1503     Blood Culture, Routine No Growth after 4 days.     Aerobic culture [[id number]]  (Abnormal)  (Susceptibility) Collected: 01/06/22 1100    Order Status: Completed Specimen: Wound from Foot, Left Updated: 01/09/22 0857     Aerobic Bacterial Culture CITROBACTER KOSERI  Many        CITROBACTER FREUNDII  Few      Aerobic culture (Specify Source) **CANNOT BE ORDERED AS STAT** [[id number]]  (Abnormal)  (Susceptibility) Collected: 01/05/22 1315    Order Status: Completed Specimen: Wound from Foot, Left Updated: 01/08/22 0850     Aerobic Bacterial Culture CITROBACTER FREUNDII  Many        PSEUDOMONAS AERUGINOSA  Moderate      Gram stain [[id number]] Collected: 01/06/22 1100    Order Status: Completed Specimen: Wound from Foot, Left Updated: 01/06/22 1451     Gram Stain Result Few WBC's      Moderate Gram negative rods      Few Gram positive cocci    Narrative:      Left foot metatarsal  Proximal margin - left foot    Gram stain [[id number]] Collected: 01/06/22 1100    Order Status: Completed Specimen: Wound from Foot, Left Updated: 01/06/22 1450     Gram Stain Result Few WBC's      Few Gram negative rods      Few Gram positive cocci    Fungus culture [[id number]] Collected: 01/06/22 1100    Order Status: Sent Specimen: Wound from Foot, Left Updated: 01/06/22  1350    Fungus culture [[id number]] Collected: 01/06/22 1100    Order Status: Sent Specimen: Wound from Foot, Left Updated: 01/06/22 1346            Diagnostic Tests: n/a        Assessment:    55 y/o male with ESRD on HD (outside Nephrologist) admitted with:    - L-foot gangrene s/p amputation, debridement and washout  - Citrobacter + Pseudomonas wound infection  - Osteo  - LE PAD  - + Covid test  - Recurrent AVF malfunction s/p Fistulogram 1/10/22 (HD nurse unable to needle access 1/11/22 due to swelling-pain)  - Uncontrolled HTN  - Hyperphosphatemia  - IDDM2  - Chronic diastolic HF  - Hypoalbuminemia  - Non-compliant behavior          Plan:    - IR Consulted for re-do fistulogram vs THDC placement  - Plan discussed with Vascular (Dr. Soni) over the phone  - Dialysis once AVF issues resolved   - Na+zirconium for K+ control until able to be dialyzed  - Epogen 3 x week  - Antibiotics per ID  - Renal ADA diet low PO4-  - Adjust oral PO4- binders  - Adjust opiate dose for ESRD  - Podiatry and Vascular following  - Other problems per admitting

## 2022-01-12 NOTE — NURSING
Attempted utilization of [facility]  services, Madhuri [id number].  Patient was uncooperative and did not answer or communicate with the .   attempted to communicate in Liberian and Creole. Nurse able to converse in English for basic needs.  Safety maintained, will continue to monitor.

## 2022-01-12 NOTE — ASSESSMENT & PLAN NOTE
Nephrology consulted to resume routine HD. Pt w/ fistulogram 1/10 w/ IR, however after attempting HD on 1/11 the patient had pain and swelling at fistula site.   - IR reconsulted for redo declot  - nephrology following

## 2022-01-12 NOTE — BRIEF OP NOTE
Radiology Post-Procedure Note    Pre Op Diagnosis: Persistent difficulty cannulating LUE brachio-cephalic AVG   Post Op Diagnosis: Same    Procedure: Fluoroscopic-guided 1. LUE brachio-cephalic AVG fistulagram 2. Angioplasty of recurrent stenosis at costoclavicular junction with 10-mm balloon 3. Thrombectomy of small, partially occlusive thrombus from distal AVG segment    Procedure performed by: Gt Mcghee MD    Written Informed Consent Obtained: Yes  Specimen Removed: NO  Estimated Blood Loss: Minimal    Findings:   Arterial and venous segments of AVG accessed without imaging/US guidance with mild and no difficulty respectively. Unclear to me as to reason for persistent difficulty accessing AVG along the venous segment.    Fistulagram reveals nonflow-limiting trace, nonocclusive thrombus along distal segment of AVG s/p successful thrombectomy and, recurrent moderate but barely flow-limiting in-stent stenosis at the costoclavicular junction which is expected as a result of placing stent at this location with external compression by surrounding osseous structures s/p successful angioplasty with 10-mm balloon. No residual thrombus or stenosis noted on post-intervention fistulagram with rapid antegrade flow of contrast through the AVG to the central veins. In my opinion, there is no need for placement of HD catheter or VascSurg consult to consider AVG revision.     Continue subQ Heparin as scheduled.    AVG is ready for use immediately.     Thank you for considering IR for the care of your patient.     Gt Mcghee MD  Interventional Radiology

## 2022-01-12 NOTE — NURSING
Report received from KIAH Rosenberg in IR.  Patient arrived to the floor.  WENDY, ALEXG 127.  Dialysis nurse at bedside.  Will continue to monitor.

## 2022-01-12 NOTE — ASSESSMENT & PLAN NOTE
Vascular and ortho consulted. Likely needs amputation. Discussed with Dr. Soni. In comparison of recent pictures by podiatry, his gangrene has worsened and now involves all toes. See pics below.   Continue IV abx. Pain control.     Follow vasc. Ortho recs     S/p amputation of toes.  Patient would like to try to salvage limb.  Awaiting next vascular recs.   PT/OT consulted. Continue Abx.     Angiogram on Mon or Tues per Vascular. Continue wound care/Abx     1/9- leukocytosis rising, on Vanc and Zosyn; getting blood cultures today; will discuss with vascular surgery tomorrow about when angiogram will occur as wbc is rising; may need amputation     1/10- vascular surgery planning on doing angiogram in the AM; NPO aftermidnight    1/11- patient refused angiogram today

## 2022-01-13 LAB
ALBUMIN SERPL BCP-MCNC: 2.1 G/DL (ref 3.5–5.2)
ALP SERPL-CCNC: 79 U/L (ref 55–135)
ALT SERPL W/O P-5'-P-CCNC: 13 U/L (ref 10–44)
ANION GAP SERPL CALC-SCNC: 19 MMOL/L (ref 8–16)
AST SERPL-CCNC: 30 U/L (ref 10–40)
BACTERIA BLD CULT: NORMAL
BACTERIA BLD CULT: NORMAL
BILIRUB SERPL-MCNC: 0.3 MG/DL (ref 0.1–1)
BUN SERPL-MCNC: 49 MG/DL (ref 6–20)
CALCIUM SERPL-MCNC: 9.3 MG/DL (ref 8.7–10.5)
CHLORIDE SERPL-SCNC: 94 MMOL/L (ref 95–110)
CO2 SERPL-SCNC: 21 MMOL/L (ref 23–29)
CREAT SERPL-MCNC: 13.1 MG/DL (ref 0.5–1.4)
ERYTHROCYTE [DISTWIDTH] IN BLOOD BY AUTOMATED COUNT: 15.9 % (ref 11.5–14.5)
EST. GFR  (AFRICAN AMERICAN): 4 ML/MIN/1.73 M^2
EST. GFR  (NON AFRICAN AMERICAN): 4 ML/MIN/1.73 M^2
GLUCOSE SERPL-MCNC: 114 MG/DL (ref 70–110)
HCT VFR BLD AUTO: 31.1 % (ref 40–54)
HGB BLD-MCNC: 9.5 G/DL (ref 14–18)
MCH RBC QN AUTO: 27.6 PG (ref 27–31)
MCHC RBC AUTO-ENTMCNC: 30.5 G/DL (ref 32–36)
MCV RBC AUTO: 90 FL (ref 82–98)
PLATELET # BLD AUTO: 599 K/UL (ref 150–450)
PMV BLD AUTO: 9.1 FL (ref 9.2–12.9)
POCT GLUCOSE: 118 MG/DL (ref 70–110)
POCT GLUCOSE: 134 MG/DL (ref 70–110)
POCT GLUCOSE: 134 MG/DL (ref 70–110)
POCT GLUCOSE: 200 MG/DL (ref 70–110)
POTASSIUM SERPL-SCNC: 5 MMOL/L (ref 3.5–5.1)
PROT SERPL-MCNC: 9.5 G/DL (ref 6–8.4)
RBC # BLD AUTO: 3.44 M/UL (ref 4.6–6.2)
SODIUM SERPL-SCNC: 134 MMOL/L (ref 136–145)
WBC # BLD AUTO: 19.74 K/UL (ref 3.9–12.7)

## 2022-01-13 PROCEDURE — 80053 COMPREHEN METABOLIC PANEL: CPT | Performed by: STUDENT IN AN ORGANIZED HEALTH CARE EDUCATION/TRAINING PROGRAM

## 2022-01-13 PROCEDURE — 63600175 PHARM REV CODE 636 W HCPCS: Performed by: INTERNAL MEDICINE

## 2022-01-13 PROCEDURE — 25000003 PHARM REV CODE 250: Performed by: STUDENT IN AN ORGANIZED HEALTH CARE EDUCATION/TRAINING PROGRAM

## 2022-01-13 PROCEDURE — 25000003 PHARM REV CODE 250: Performed by: FAMILY MEDICINE

## 2022-01-13 PROCEDURE — 63600175 PHARM REV CODE 636 W HCPCS: Performed by: FAMILY MEDICINE

## 2022-01-13 PROCEDURE — 85027 COMPLETE CBC AUTOMATED: CPT | Performed by: SURGERY

## 2022-01-13 PROCEDURE — 25000003 PHARM REV CODE 250: Performed by: HOSPITALIST

## 2022-01-13 PROCEDURE — C9399 UNCLASSIFIED DRUGS OR BIOLOG: HCPCS | Performed by: FAMILY MEDICINE

## 2022-01-13 PROCEDURE — 99233 SBSQ HOSP IP/OBS HIGH 50: CPT | Mod: ,,, | Performed by: SURGERY

## 2022-01-13 PROCEDURE — 21400001 HC TELEMETRY ROOM

## 2022-01-13 PROCEDURE — 97530 THERAPEUTIC ACTIVITIES: CPT

## 2022-01-13 PROCEDURE — 99233 PR SUBSEQUENT HOSPITAL CARE,LEVL III: ICD-10-PCS | Mod: ,,, | Performed by: SURGERY

## 2022-01-13 PROCEDURE — 27000207 HC ISOLATION

## 2022-01-13 RX ORDER — QUETIAPINE FUMARATE 25 MG/1
25 TABLET, FILM COATED ORAL NIGHTLY
Status: DISCONTINUED | OUTPATIENT
Start: 2022-01-13 | End: 2022-01-13

## 2022-01-13 RX ORDER — OLANZAPINE 10 MG/2ML
5 INJECTION, POWDER, FOR SOLUTION INTRAMUSCULAR ONCE AS NEEDED
Status: DISCONTINUED | OUTPATIENT
Start: 2022-01-13 | End: 2022-01-27 | Stop reason: HOSPADM

## 2022-01-13 RX ORDER — QUETIAPINE FUMARATE 25 MG/1
50 TABLET, FILM COATED ORAL NIGHTLY
Status: DISCONTINUED | OUTPATIENT
Start: 2022-01-13 | End: 2022-01-14

## 2022-01-13 RX ORDER — OLANZAPINE 5 MG/1
5 TABLET, ORALLY DISINTEGRATING ORAL EVERY 6 HOURS PRN
Status: DISCONTINUED | OUTPATIENT
Start: 2022-01-13 | End: 2022-01-27 | Stop reason: HOSPADM

## 2022-01-13 RX ADMIN — CARVEDILOL 6.25 MG: 6.25 TABLET, FILM COATED ORAL at 01:01

## 2022-01-13 RX ADMIN — CEFEPIME HYDROCHLORIDE 1 G: 1 INJECTION, SOLUTION INTRAVENOUS at 06:01

## 2022-01-13 RX ADMIN — HYDRALAZINE HYDROCHLORIDE 100 MG: 25 TABLET, FILM COATED ORAL at 10:01

## 2022-01-13 RX ADMIN — ATORVASTATIN CALCIUM 20 MG: 10 TABLET, FILM COATED ORAL at 01:01

## 2022-01-13 RX ADMIN — TRAZODONE HYDROCHLORIDE 50 MG: 50 TABLET ORAL at 01:01

## 2022-01-13 RX ADMIN — HYDRALAZINE HYDROCHLORIDE 100 MG: 25 TABLET, FILM COATED ORAL at 05:01

## 2022-01-13 RX ADMIN — QUETIAPINE FUMARATE 50 MG: 25 TABLET ORAL at 10:01

## 2022-01-13 RX ADMIN — LISINOPRIL 5 MG: 5 TABLET ORAL at 01:01

## 2022-01-13 RX ADMIN — HEPARIN SODIUM 5000 UNITS: 5000 INJECTION INTRAVENOUS; SUBCUTANEOUS at 10:01

## 2022-01-13 RX ADMIN — CARVEDILOL 6.25 MG: 6.25 TABLET, FILM COATED ORAL at 10:01

## 2022-01-13 RX ADMIN — HEPARIN SODIUM 5000 UNITS: 5000 INJECTION INTRAVENOUS; SUBCUTANEOUS at 03:01

## 2022-01-13 RX ADMIN — HYDRALAZINE HYDROCHLORIDE 100 MG: 25 TABLET, FILM COATED ORAL at 01:01

## 2022-01-13 RX ADMIN — PANTOPRAZOLE SODIUM 40 MG: 40 TABLET, DELAYED RELEASE ORAL at 01:01

## 2022-01-13 RX ADMIN — DEXTROSE 250 MG: 50 INJECTION, SOLUTION INTRAVENOUS at 04:01

## 2022-01-13 NOTE — PLAN OF CARE
"  Problem: Occupational Therapy Goal  Goal: Occupational Therapy Goal  Description: Goals to be met by: 2/8/2022    Patient will increase functional independence with ADLs by performing:    LE Dressing with Minimal Assistance.  Grooming while standing with Stand-by Assistance  Sit to stand w/ min A and use of RW w/ LLE "touch down weigth bearing" compliance.   Toileting from bedside commode with Stand-by Assistance for hygiene and clothing management  Supine to sit with Stand-by Assistance.  Toilet transfer to bedside commode with Stand-by Assistance    Outcome: Ongoing, Progressing     Pt cooperative yet moderately confused, requiring clear, one-step commands and redirection for participation; pt somewhat withdrawn throughout session. Pt was able to perform supine>sit w/ min A to stand from EOB x 2 trials w/ mod A and use of RW; pt noted w/ bearing no weight through LLE, maintaining NWB w/ static standing. Pt was unable to pivot/hop to HOB despite cueing and education. Pt will continue to benefit from skilled acute OT services to maximize functional capacity for safe performance w/ ADLs and functional mobility.   "

## 2022-01-13 NOTE — PROGRESS NOTES
[facility]  Vascular Surgery  Progress Note    Patient Name: Adryan Neff  MRN: [id number]  Admission Date: 1/5/2022  Primary Care Provider: Soren Rice MD    Subjective:     Interval History: Delirious this AM. Refusing labs and wound care.    Post-Op Info:  Procedure(s) (LRB):  AMPUTATION, TOE (Left)   7 Days Post-Op       Medications:  Continuous Infusions:  Scheduled Meds:   sodium chloride 0.9%   Intravenous Once    atorvastatin  20 mg Oral Daily    carvediloL  6.25 mg Oral BID    ceFEPime (MAXIPIME) IVPB  1 g Intravenous Q24H    heparin (porcine)  5,000 Units Subcutaneous Q8H    hydrALAZINE  100 mg Oral Q8H    insulin detemir U-100  15 Units Subcutaneous QHS    lisinopriL  5 mg Oral Daily    pantoprazole  40 mg Oral Daily    sevelamer carbonate  2,400 mg Oral TID WM    traZODone  50 mg Oral QHS    vitamin renal formula (B-complex-vitamin c-folic acid)  1 capsule Oral Daily     PRN Meds:sodium chloride 0.9%, acetaminophen, acetaminophen, dextrose 50%, dextrose 50%, glucagon (human recombinant), glucose, glucose, HYDROmorphone, influenza, insulin aspart U-100, magnesium oxide, magnesium oxide, naloxone, ondansetron, oxyCODONE, sodium chloride 0.9%, sodium chloride 0.9%     Objective:     Vital Signs (Most Recent):  Temp: 98.4 °F (36.9 °C) (01/13/22 0739)  Pulse: 101 (01/13/22 0739)  Resp: 18 (01/13/22 0739)  BP: (!) 151/72 (01/13/22 0739)  SpO2: 99 % (01/13/22 0739) Vital Signs (24h Range):  Temp:  [97.5 °F (36.4 °C)-99.2 °F (37.3 °C)] 98.4 °F (36.9 °C)  Pulse:  [] 101  Resp:  [12-20] 18  SpO2:  [97 %-100 %] 99 %  BP: ()/(51-82) 151/72         Physical Exam  Vitals reviewed.   Constitutional:       General: He is not in acute distress.     Appearance: He is well-developed. He is not diaphoretic.   HENT:      Head: Normocephalic and atraumatic.   Eyes:      Conjunctiva/sclera: Conjunctivae normal.   Cardiovascular:      Rate and Rhythm: Normal rate.      Pulses:            Femoral pulses are 2+ on the right side and 2+ on the left side.       Popliteal pulses are 2+ on the left side.   Pulmonary:      Effort: Pulmonary effort is normal.   Abdominal:      General: There is no distension.      Palpations: Abdomen is soft. There is no mass.      Tenderness: There is no abdominal tenderness. There is no guarding or rebound.      Hernia: No hernia is present.   Musculoskeletal:         General: Tenderness present. No deformity. Normal range of motion.      Cervical back: Neck supple.      Right lower leg: No edema.      Left lower leg: No edema.   Feet:      Left foot:      Skin integrity: No ulcer, blister, skin breakdown, erythema or dry skin.   Skin:     General: Skin is warm and dry.      Capillary Refill: Capillary refill takes more than 3 seconds.      Findings: No erythema or rash.      Comments: L foot wound without purulence, bleeding noted   Neurological:      General: No focal deficit present.      Mental Status: He is alert and oriented to person, place, and time.   Psychiatric:         Mood and Affect: Mood normal.         Significant Labs:  All pertinent labs from the last 24 hours have been reviewed.    Significant Diagnostics:  I have reviewed all pertinent imaging results/findings within the past 24 hours.    Assessment/Plan:     * Gangrene of left foot  -Ortho deferred mgmt of L foot wound to vascular surgery s/p open 2-5 toe amputations  -ID consulted - f/u recs  -Cont wound care and monitor WBC closely - plan for angiogram/revascularization if safely able 1/11/22.  Discussed risks and benefits with patient and ; pt refusing procedure.  Psych deemed no capacity.  Discussed risks and benefits of treatment plan with daughter; rec L AKA due to patient's infection and current clinical condition as risks outweigh benefits for L fem-tib bypass with preop angiogram. Will coordinate L AKA as soon as possible with Ortho.    ESRD on hemodialysis  -will evaluate HD access  once acute clinic issues are resolved        Masoud Soni MD  Vascular Surgery  [facility] - Telemetry [place]

## 2022-01-13 NOTE — SUBJECTIVE & OBJECTIVE
Medications:  Continuous Infusions:  Scheduled Meds:   sodium chloride 0.9%   Intravenous Once    atorvastatin  20 mg Oral Daily    carvediloL  6.25 mg Oral BID    ceFEPime (MAXIPIME) IVPB  1 g Intravenous Q24H    heparin (porcine)  5,000 Units Subcutaneous Q8H    hydrALAZINE  100 mg Oral Q8H    insulin detemir U-100  15 Units Subcutaneous QHS    lisinopriL  5 mg Oral Daily    pantoprazole  40 mg Oral Daily    sevelamer carbonate  2,400 mg Oral TID WM    traZODone  50 mg Oral QHS    vitamin renal formula (B-complex-vitamin c-folic acid)  1 capsule Oral Daily     PRN Meds:sodium chloride 0.9%, acetaminophen, acetaminophen, dextrose 50%, dextrose 50%, glucagon (human recombinant), glucose, glucose, HYDROmorphone, influenza, insulin aspart U-100, magnesium oxide, magnesium oxide, naloxone, ondansetron, oxyCODONE, sodium chloride 0.9%, sodium chloride 0.9%     Objective:     Vital Signs (Most Recent):  Temp: 98.4 °F (36.9 °C) (01/13/22 0739)  Pulse: 101 (01/13/22 0739)  Resp: 18 (01/13/22 0739)  BP: (!) 151/72 (01/13/22 0739)  SpO2: 99 % (01/13/22 0739) Vital Signs (24h Range):  Temp:  [97.5 °F (36.4 °C)-99.2 °F (37.3 °C)] 98.4 °F (36.9 °C)  Pulse:  [] 101  Resp:  [12-20] 18  SpO2:  [97 %-100 %] 99 %  BP: ()/(51-82) 151/72         Physical Exam  Vitals reviewed.   Constitutional:       General: He is not in acute distress.     Appearance: He is well-developed. He is not diaphoretic.   HENT:      Head: Normocephalic and atraumatic.   Eyes:      Conjunctiva/sclera: Conjunctivae normal.   Cardiovascular:      Rate and Rhythm: Normal rate.      Pulses:           Femoral pulses are 2+ on the right side and 2+ on the left side.       Popliteal pulses are 2+ on the left side.   Pulmonary:      Effort: Pulmonary effort is normal.   Abdominal:      General: There is no distension.      Palpations: Abdomen is soft. There is no mass.      Tenderness: There is no abdominal tenderness. There is no guarding  or rebound.      Hernia: No hernia is present.   Musculoskeletal:         General: Tenderness present. No deformity. Normal range of motion.      Cervical back: Neck supple.      Right lower leg: No edema.      Left lower leg: No edema.   Feet:      Left foot:      Skin integrity: No ulcer, blister, skin breakdown, erythema or dry skin.   Skin:     General: Skin is warm and dry.      Capillary Refill: Capillary refill takes more than 3 seconds.      Findings: No erythema or rash.      Comments: L foot wound without purulence, bleeding noted   Neurological:      General: No focal deficit present.      Mental Status: He is alert and oriented to person, place, and time.   Psychiatric:         Mood and Affect: Mood normal.         Significant Labs:  All pertinent labs from the last 24 hours have been reviewed.    Significant Diagnostics:  I have reviewed all pertinent imaging results/findings within the past 24 hours.

## 2022-01-13 NOTE — ASSESSMENT & PLAN NOTE
Vascular and ortho consulted. Likely needs amputation. Discussed with Dr. Soni. In comparison of recent pictures by podiatry, his gangrene has worsened and now involves all toes. See pics below.   Continue IV abx. Pain control.     Follow vasc. Ortho recs     S/p amputation of toes.  Patient would like to try to salvage limb.  Awaiting next vascular recs.   PT/OT consulted. Continue Abx.     Angiogram on Mon or Tues per Vascular. Continue wound care/Abx     1/9- leukocytosis rising, on Vanc and Zosyn; getting blood cultures today; will discuss with vascular surgery tomorrow about when angiogram will occur as wbc is rising; may need amputation     1/10- vascular surgery planning on doing angiogram in the AM; NPO aftermidnight    1/11- patient refused angiogram today    1/12 psychiatry ruled patient without capacity    1/13 after discussing with patient's daughter, Dr. Soni planning on AKA

## 2022-01-13 NOTE — PROGRESS NOTES
[facility] Medicine  Progress Note    Patient Name: Adryan Neff  MRN: [id number]  Patient Class: IP- Inpatient   Admission Date: 1/5/2022  Length of Stay: 8 days  Attending Physician: Nghia Jordan MD  Primary Care Provider: Soren Rice MD        Subjective:     Principal Problem:Gangrene of left foot        HPI:  This is a 56 y.o.male patient, with a PMHx of ESRD on hemodialysis, CAD, HTN, and DM, presenting to the ED for further evaluation of left foot pain and black discoloration that began 3 days ago. Patient states these symptoms have been ongoing for the last month. He reports being referred to a specialist but denies ever following up. He has been putting some otc medication on the foot that he believes has turned his foot more black. No trauma or injury. Patient reports associated chills. Patient states applying a creme to the foot and noticed his discoloration worsened. Patient reports he was last dialyzed yesterday. Patient denies any fever, shortness of breath, chest pain, neck pain, back pain, abdominal pain, rash, headaches, congestion, rhinorrhea, cough, sore throat, ear pain, eye pain, blurred vision, nausea, vomiting, diarrhea, dysuria, or any other associated symptoms.    Per chart review, he had an arterial u/s 12/15.   Impression:   1. High-grade severe stenosis right popliteal artery with occlusion anterior tibial artery which is reconstituted at level of DPA via collaterals.  2. 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    Cardiology has been planning to see him for revascularization options. Last podiatry visit 12/13 with Stable dry gangrene L 2nd toe and medial L 5th toe. Dorsalis pedis and posterior tibial pulses are diminished Bilaterally. Toes are cool to touch. Feet are warm proximally.There is decreased digital hair. Skin is atrophic, slightly hyperpigmented, and mildly edematous     In the ED, he was found to have worsened gangrene and a cool  extremity. Vascular surgery was consulted. He was also COVID +. Says he got a booster shot in November sometime.      Overview/Hospital Course:  Patient admitted to the hospital for eval and treatment of L foot gangrene.  Ortho and Vasc consulted. Nephrology was also consulted for chronic dialysis. Patient had several toes on L foot amputated.  Continued with dialysis. PT/OT consulted.     Pt became increasingly delirious and combative. Psychiatry evaluated, felt that he was not able to make his own medical decisions. Vascular surgery discussed with daughter, plan for AKA.       Interval History: Disoriented overnight, refusing medications and labs. This AM he denies refusing anything.     Review of Systems   Constitutional: Negative.    Respiratory: Negative.    Cardiovascular: Negative.    Gastrointestinal: Negative.      Objective:     Vital Signs (Most Recent):  Temp: 97.9 °F (36.6 °C) (01/13/22 1141)  Pulse: 99 (01/13/22 1141)  Resp: 18 (01/13/22 1141)  BP: 127/66 (01/13/22 1141)  SpO2: 100 % (01/13/22 1141) Vital Signs (24h Range):  Temp:  [97.5 °F (36.4 °C)-99.2 °F (37.3 °C)] 97.9 °F (36.6 °C)  Pulse:  [] 99  Resp:  [12-20] 18  SpO2:  [97 %-100 %] 100 %  BP: ()/(51-77) 127/66     Weight: 64.3 kg (141 lb 12.1 oz)  Body mass index is 20.34 kg/m².    Intake/Output Summary (Last 24 hours) at 1/13/2022 1502  Last data filed at 1/13/2022 0045  Gross per 24 hour   Intake 500 ml   Output 3500 ml   Net -3000 ml      Physical Exam  Constitutional:       General: He is not in acute distress.     Appearance: He is ill-appearing. He is not toxic-appearing or diaphoretic.   Cardiovascular:      Rate and Rhythm: Normal rate and regular rhythm.      Heart sounds: No murmur heard.  No gallop.    Pulmonary:      Effort: Pulmonary effort is normal. No respiratory distress.      Breath sounds: Normal breath sounds. No wheezing.   Abdominal:      General: Bowel sounds are normal. There is no distension.       Palpations: Abdomen is soft.      Tenderness: There is no abdominal tenderness.         Significant Labs: All pertinent labs within the past 24 hours have been reviewed.    Significant Imaging: I have reviewed all pertinent imaging results/findings within the past 24 hours.      Assessment/Plan:      * Gangrene of left foot  Vascular and ortho consulted. Likely needs amputation. Discussed with Dr. Soni. In comparison of recent pictures by podiatry, his gangrene has worsened and now involves all toes. See pics below.   Continue IV abx. Pain control.     Follow vasc. Ortho recs     S/p amputation of toes.  Patient would like to try to salvage limb.  Awaiting next vascular recs.   PT/OT consulted. Continue Abx.     Angiogram on Mon or Tues per Vascular. Continue wound care/Abx     1/9- leukocytosis rising, on Vanc and Zosyn; getting blood cultures today; will discuss with vascular surgery tomorrow about when angiogram will occur as wbc is rising; may need amputation     1/10- vascular surgery planning on doing angiogram in the AM; NPO aftermidnight    1/11- patient refused angiogram today    1/12 psychiatry ruled patient without capacity    1/13 after discussing with patient's daughter, Dr. Soni planning on AKA    Encephalopathy, metabolic  Waxing and waning confusion likely delirium secondary to ischemic foot w/ possible superimposed infection and uremia from missing dialysis  - psychiatry consulted, appreciate recs  - tx of underlying issues as noted elsewhere  - started seroquel, valproate  - delirium precautions    COVID-19 in immunocompromised patient  HD patient + for COVID 19. Not hypoxic or symptomatic. Supportive care prn.       ESRD on hemodialysis  Nephrology consulted to resume routine HD. Pt w/ difficulty w/ fistula, plan for IR THDC pending improvement of vascular access  - IR consulted by nephrology for THDC  - nephrology following    Chronic diastolic heart failure  December 2021 echo reviewed.  Stable.       Essential (primary) hypertension  Titrate to goal. Pt refusing po medications may require IV meds      Controlled type 2 diabetes mellitus with chronic kidney disease on chronic dialysis, with long-term current use of insulin  Blood sugars mildly elevated as pt has been refusing insulin.        VTE Risk Mitigation (From admission, onward)         Ordered     heparin (porcine) injection 5,000 Units  Every 8 hours         01/05/22 1404     IP VTE HIGH RISK PATIENT  Once         01/05/22 1404     Place sequential compression device  Until discontinued         01/05/22 1404                Discharge Planning   ENIO:      Code Status: Full Code   Is the patient medically ready for discharge?:     Reason for patient still in hospital (select all that apply): Patient trending condition, Laboratory test, Treatment, Consult recommendations and Pending disposition  Discharge Plan A: Skilled Nursing Facility                  Nghia Jordan MD  Department of Hospital Medicine   [facility]

## 2022-01-13 NOTE — PLAN OF CARE
01/13/22 1541   Post-Acute Status   Post-Acute Authorization Placement   Post-Acute Placement Status Referrals Sent   TN spoke with patient's daughter, Jonn [phone] who agreed to SNF as DC plan.  She is aware due to covid this could be quite [place] few miles away.  Per rasheed no accepting as yet.  TN expanded search and added 11 more facilities.

## 2022-01-13 NOTE — ASSESSMENT & PLAN NOTE
Nephrology consulted to resume routine HD. Pt w/ difficulty w/ fistula, plan for IR THDC pending improvement of vascular access  - IR consulted by nephrology for THDC  - nephrology following

## 2022-01-13 NOTE — SUBJECTIVE & OBJECTIVE
Interval History: Disoriented overnight, refusing medications and labs. This AM he denies refusing anything.     Review of Systems   Constitutional: Negative.    Respiratory: Negative.    Cardiovascular: Negative.    Gastrointestinal: Negative.      Objective:     Vital Signs (Most Recent):  Temp: 97.9 °F (36.6 °C) (01/13/22 1141)  Pulse: 99 (01/13/22 1141)  Resp: 18 (01/13/22 1141)  BP: 127/66 (01/13/22 1141)  SpO2: 100 % (01/13/22 1141) Vital Signs (24h Range):  Temp:  [97.5 °F (36.4 °C)-99.2 °F (37.3 °C)] 97.9 °F (36.6 °C)  Pulse:  [] 99  Resp:  [12-20] 18  SpO2:  [97 %-100 %] 100 %  BP: ()/(51-77) 127/66     Weight: 64.3 kg (141 lb 12.1 oz)  Body mass index is 20.34 kg/m².    Intake/Output Summary (Last 24 hours) at 1/13/2022 1502  Last data filed at 1/13/2022 0045  Gross per 24 hour   Intake 500 ml   Output 3500 ml   Net -3000 ml      Physical Exam  Constitutional:       General: He is not in acute distress.     Appearance: He is ill-appearing. He is not toxic-appearing or diaphoretic.   Cardiovascular:      Rate and Rhythm: Normal rate and regular rhythm.      Heart sounds: No murmur heard.  No gallop.    Pulmonary:      Effort: Pulmonary effort is normal. No respiratory distress.      Breath sounds: Normal breath sounds. No wheezing.   Abdominal:      General: Bowel sounds are normal. There is no distension.      Palpations: Abdomen is soft.      Tenderness: There is no abdominal tenderness.         Significant Labs: All pertinent labs within the past 24 hours have been reviewed.    Significant Imaging: I have reviewed all pertinent imaging results/findings within the past 24 hours.

## 2022-01-13 NOTE — PROGRESS NOTES
"[facility] [place]  Adult Nutrition   Progress Note (Initial Assessment)     SUMMARY     Recommendations/Interventions:  1) ADA medically able and tolerated to renal diet, encourage PO intake  2) Commercial beverage (Suplena) to assist in meeting needs  3) Hyponatremia, hyperkalemia, hyperphosphatemia, hyperglycemia    Goals:   1) Pt to tolerate advancement in diet and consistently meet >75% of EEN/EPN by PO/ONS intake by RD f/u  2) Nutrition related labs to trend towards target ranges    Nutrition Goal Status: new  Communication of RD Recs:  (POC)    Assessment and Plan  Nutrition Problem  Inadequate oral intake    Related to (etiology):   Inconsistent diet order    Signs and Symptoms (as evidenced by):   NPO on/off adding up to 6 days    Interventions/Recommendations (treatment strategy):  Commercial beverage (suplena)  Mineral modified diet   Collaboration of care with other providers    Nutrition Diagnosis Status:   New      Malnutrition Assessment:  Malnutrition Type: acute illness or injury  Energy Intake: severe energy intake  Weight Loss (Malnutrition): greater than 5% in 1 month  Energy Intake (Malnutrition): less than or equal to 50% for greater than or equal to 5 days   Severe Weight Loss (Malnutrition): greater than 5% in 1 month   NFPE unable to be performed due to COVID19     Pt UBW per pt chart hx is 160lbs. 12% wt change in 1 month signifying severe malnutrition.    Reason for Assessment    Reason For Assessment: length of stay  Diagnosis:  (Gangrene of left foot)  Relevant Medical History: Covid19, T2DM, CKD on dialysis, HTN, chronic diastolic heart failure, GERD    Nutrition Risk Screen    Nutrition Risk Screen: no indicators present    Nutrition/Diet History    Spiritual, Cultural Beliefs, Jew Practices, Values that Affect Care: no  Food Allergies: NKFA  Factors Affecting Nutritional Intake: NPO    Anthropometrics    Temp: 97.9 °F (36.6 °C)  Height Method: Stated  Height: 5' 10" " (177.8 cm)  Height (inches): 70 in  Weight Method: Bed Scale  Weight: 64.3 kg (141 lb 12.1 oz)  Weight (lb): 141.76 lb  Ideal Body Weight (IBW), Male: 166 lb  % Ideal Body Weight, Male (lb): 85.4 %  BMI (Calculated): 20.3  BMI Grade: 18.5-24.9 - normal  Usual Body Weight (UBW), k.7 kg  % Usual Body Weight: 88.63  % Weight Change From Usual Weight: -11.56 %       Weight History:  Wt Readings from Last 10 Encounters:   22 64.3 kg (141 lb 12.1 oz)   21 72.8 kg (160 lb 7.9 oz)   21 72.8 kg (160 lb 7.9 oz)   21 71.2 kg (156 lb 15.5 oz)   21 72.7 kg (160 lb 6.2 oz)   21 72.8 kg (160 lb 7.9 oz)   21 74.1 kg (163 lb 5.8 oz)   21 75.9 kg (167 lb 5.3 oz)   21 81.6 kg (180 lb)   21 74 kg (163 lb 2.3 oz)       Lab/Procedures/Meds: Pertinent Labs Reviewed    Medications:Pertinent Medications Reviewed  Scheduled Meds:   sodium chloride 0.9%   Intravenous Once    atorvastatin  20 mg Oral Daily    carvediloL  6.25 mg Oral BID    ceFEPime (MAXIPIME) IVPB  1 g Intravenous Q24H    heparin (porcine)  5,000 Units Subcutaneous Q8H    hydrALAZINE  100 mg Oral Q8H    insulin detemir U-100  15 Units Subcutaneous QHS    lisinopriL  5 mg Oral Daily    pantoprazole  40 mg Oral Daily    sevelamer carbonate  2,400 mg Oral TID WM    traZODone  50 mg Oral QHS    vitamin renal formula (B-complex-vitamin c-folic acid)  1 capsule Oral Daily     Continuous Infusions:   PRN Meds:.sodium chloride 0.9%, acetaminophen, acetaminophen, dextrose 50%, dextrose 50%, glucagon (human recombinant), glucose, glucose, HYDROmorphone, influenza, insulin aspart U-100, magnesium oxide, magnesium oxide, naloxone, ondansetron, oxyCODONE, sodium chloride 0.9%, sodium chloride 0.9%    Estimated/Assessed Needs    Weight Used For Calorie Calculations: 64.3 kg (141 lb 12.1 oz)  Energy Calorie Requirements (kcal): 2517-1744  Energy Need Method: Kcal/kg (25-35 per CKD HD)  Protein Requirements: 64-77 g  (1.0-1.2 g/kg)  Weight Used For Protein Calculations: 64.3 kg (141 lb 12.1 oz)  Fluid Requirements (mL): 1500  Estimated Fluid Requirement Method: other (see comments) (per HD or MD orders)  CHO Requirement: 200 g    Nutrition Prescription Ordered    Current Diet Order: NPO    Evaluation of Received Nutrient/Fluid Intake    Energy Calories Required: not meeting needs  Protein Required: not meeting needs  Fluid Required: not meeting needs  % Intake of Estimated Energy Needs: 0 - 25 %  % Meal Intake: NPO    Intake/Output Summary (Last 24 hours) at 1/13/2022 1145  Last data filed at 1/13/2022 0045  Gross per 24 hour   Intake 500 ml   Output 3500 ml   Net -3000 ml      Nutrition Risk    Level of Risk/Frequency of Follow-up:  (1-2x/week)     Monitor and Evaluation    Food and Nutrient Intake: energy intake,food and beverage intake  Food and Nutrient Adminstration: diet order  Knowledge/Beliefs/Attitudes: food and nutrition knowledge/skill  Physical Activity and Function: nutrition-related ADLs and IADLs  Anthropometric Measurements: weight,weight change,body mass index  Biochemical Data, Medical Tests and Procedures: electrolyte and renal panel,gastrointestinal profile,glucose/endocrine profile,inflammatory profile,lipid profile  Nutrition-Focused Physical Findings: overall appearance,extremities, muscles and bones,head and eyes,skin     Nutrition Follow-Up    RD Follow-up?: Yes

## 2022-01-13 NOTE — PLAN OF CARE
Problem: Adult Inpatient Plan of Care  Goal: Plan of Care Review  Outcome: Ongoing, Progressing  Goal: Patient-Specific Goal (Individualized)  Outcome: Ongoing, Progressing  Goal: Absence of Hospital-Acquired Illness or Injury  Outcome: Ongoing, Progressing  Goal: Optimal Comfort and Wellbeing  Outcome: Ongoing, Progressing  Goal: Readiness for Transition of Care  Outcome: Ongoing, Progressing     Problem: Fall Injury Risk  Goal: Absence of Fall and Fall-Related Injury  Outcome: Ongoing, Progressing     Problem: Diabetes Comorbidity  Goal: Blood Glucose Level Within Targeted Range  Outcome: Ongoing, Progressing     Problem: Impaired Wound Healing  Goal: Optimal Wound Healing  Outcome: Ongoing, Progressing     Problem: Device-Related Complication Risk (Hemodialysis)  Goal: Safe, Effective Therapy Delivery  Outcome: Ongoing, Progressing     Problem: Hemodynamic Instability (Hemodialysis)  Goal: Effective Tissue Perfusion  Outcome: Ongoing, Progressing     Problem: Infection (Hemodialysis)  Goal: Absence of Infection Signs and Symptoms  Outcome: Ongoing, Progressing     Problem: Skin Injury Risk Increased  Goal: Skin Health and Integrity  Outcome: Ongoing, Progressing

## 2022-01-13 NOTE — PROGRESS NOTES
01/13/22 0100      Interpreted items include Nursing rounding;Patient education activities;Other (See comment)  (medication)    service used Video Remote

## 2022-01-13 NOTE — ASSESSMENT & PLAN NOTE
-Ortho deferred mgmt of L foot wound to vascular surgery s/p open 2-5 toe amputations  -ID consulted - f/u recs  -Cont wound care and monitor WBC closely - plan for angiogram/revascularization if safely able 1/11/22.  Discussed risks and benefits with patient and ; pt refusing procedure.  Psych deemed no capacity.  Discussed risks and benefits of treatment plan with daughter; rec L AKA due to patient's infection and current clinical condition as risks outweigh benefits for L fem-tib bypass with preop angiogram. Will coordinate L AKA as soon as possible with Ortho.

## 2022-01-13 NOTE — PLAN OF CARE
Recommendations:  1) ADA medically able and tolerated to renal diet, encourage PO intake  2) Commercial beverage (Suplena) to assist in meeting needs  3) Hyponatremia, hyperkalemia, hyperphosphatemia, hyperglycemia    Goals:   1) Pt to tolerate advancement in diet and consistently meet >75% of EEN/EPN by PO/ONS intake by RD f/u  2) Nutrition related labs to trend towards target ranges    Nutrition Goal Status: new  Communication of RD Recs:  (POC)

## 2022-01-13 NOTE — ASSESSMENT & PLAN NOTE
Waxing and waning confusion likely delirium secondary to ischemic foot w/ possible superimposed infection and uremia from missing dialysis  - psychiatry consulted, appreciate recs  - tx of underlying issues as noted elsewhere  - started seroquel, valproate  - delirium precautions

## 2022-01-13 NOTE — PT/OT/SLP PROGRESS
Occupational Therapy      Patient Name:  Adryan Neff   MRN:  [id number]    Patient not seen today secondary to Unavailable. 2 attempts made to see pt. On first attempt, pt. yaz 2/2 visiting with MD and second attempt, pt. TAVO to procedure in IR. Will follow-up 12/13/2022.    1/12/2022

## 2022-01-13 NOTE — PROGRESS NOTES
Renal Progress Note    Date of Admission:  1/5/2022 11:39 AM    Length of Stay: 8  Days    Subjective: n/a    Objective:    Current Facility-Administered Medications   Medication    0.9%  NaCl infusion    0.9%  NaCl infusion    acetaminophen tablet 650 mg    acetaminophen tablet 650 mg    atorvastatin tablet 20 mg    carvediloL tablet 6.25 mg    cefepime in dextrose 5 % 1 gram/50 mL IVPB 1 g    dextrose 50% injection 12.5 g    dextrose 50% injection 25 g    glucagon (human recombinant) injection 1 mg    glucose chewable tablet 16 g    glucose chewable tablet 24 g    heparin (porcine) injection 5,000 Units    hydrALAZINE tablet 100 mg    HYDROmorphone (PF) injection 0.5 mg    influenza (QUADRIVALENT PF) vaccine 0.5 mL    insulin aspart U-100 pen 1-10 Units    insulin detemir U-100 pen 15 Units    lisinopriL tablet 5 mg    magnesium oxide tablet 800 mg    magnesium oxide tablet 800 mg    naloxone 0.4 mg/mL injection 0.02 mg    ondansetron injection 8 mg    oxyCODONE immediate release tablet 5 mg    pantoprazole EC tablet 40 mg    sevelamer carbonate tablet 2,400 mg    sodium chloride 0.9% bolus 250 mL    sodium chloride 0.9% flush 10 mL    traZODone tablet 50 mg    vitamin renal formula (B-complex-vitamin c-folic acid) 1 mg per capsule 1 capsule       Vitals:    01/13/22 0128 01/13/22 0456 01/13/22 0509 01/13/22 0739   BP: (!) 175/77 (!) 93/51 138/64 (!) 151/72   BP Location:  Right arm Right arm Right arm   Patient Position:  Lying Sitting Lying   Pulse: 89 83 82 101   Resp:  19  18   Temp:  99.2 °F (37.3 °C)  98.4 °F (36.9 °C)   TempSrc:  Oral  Oral   SpO2:  97%  99%   Weight:       Height:           I/O last 3 completed shifts:  In: 500 [Other:500]  Out: 3500 [Other:3500]  No intake/output data recorded.      Physical Exam: deferred due to Covid-19    Laboratories:    No results for input(s): WBC, RBC, HGB, HCT, PLT, MCV, MCH, MCHC in the last 24 hours.    No  results for input(s): GLUCOSE, CALCIUM, PROT, NA, K, CO2, CL, BUN, CREATININE, ALKPHOS, ALT, AST, BILITOT in the last 24 hours.    Invalid input(s):  ALBUMIN    No results for input(s): COLORU, CLARITYU, SPECGRAV, PHUR, PROTEINUA, GLUCOSEU, BLOODU, WBCU, RBCU, BACTERIA, MUCUS in the last 24 hours.    Invalid input(s):  BILIRUBINCON    Microbiology Results (last 7 days)     Procedure Component Value Units Date/Time    Blood culture [[id number]] Collected: 01/09/22 1217    Order Status: Completed Specimen: Blood Updated: 01/12/22 1303     Blood Culture, Routine No Growth to date      No Growth to date      No Growth to date      No Growth to date    Blood culture [[id number]] Collected: 01/09/22 1216    Order Status: Completed Specimen: Blood Updated: 01/12/22 1303     Blood Culture, Routine No Growth to date      No Growth to date      No Growth to date      No Growth to date    AFB Culture & Smear [[id number]] Collected: 01/06/22 1100    Order Status: Completed Specimen: Wound from Foot, Left Updated: 01/10/22 1600     AFB Culture & Smear Culture in progress     AFB CULTURE STAIN No acid fast bacilli seen.    Narrative:      Left foot metatarsal  Proximal margin - left foot    AFB Culture & Smear [[id number]] Collected: 01/06/22 1100    Order Status: Completed Specimen: Wound from Foot, Left Updated: 01/10/22 1600     AFB Culture & Smear Culture in progress     AFB CULTURE STAIN No acid fast bacilli seen.    Culture, Anaerobe [[id number]] Collected: 01/06/22 1100    Order Status: Completed Specimen: Wound from Foot, Left Updated: 01/10/22 1357     Anaerobic Culture No anaerobes isolated    Narrative:      Left foot metatarsal  Proximal margin - left foot    Culture, Anaerobe [[id number]] Collected: 01/06/22 1100    Order Status: Completed Specimen: Wound from Foot, Left Updated: 01/10/22 1357     Anaerobic Culture No anaerobes isolated    Aerobic culture [[id number]]  (Abnormal)  (Susceptibility) Collected: 01/06/22 1100     Order Status: Completed Specimen: Wound from Foot, Left Updated: 01/10/22 0838     Aerobic Bacterial Culture CITROBACTER KOSERI  Many  For susceptibility see order # [id number]        CITROBACTER FREUNDII  Many  For susceptibility see order # [id number]        PSEUDOMONAS AERUGINOSA    Narrative:      Left foot metatarsal  Proximal margin - left foot    Blood culture [[id number]] Collected: 01/05/22 1309    Order Status: Completed Specimen: Blood from Peripheral, Wrist, Right Updated: 01/09/22 1503     Blood Culture, Routine No Growth after 4 days.     Blood culture [[id number]] Collected: 01/05/22 1308    Order Status: Completed Specimen: Blood from Peripheral, Hand, Right Updated: 01/09/22 1503     Blood Culture, Routine No Growth after 4 days.     Aerobic culture [[id number]]  (Abnormal)  (Susceptibility) Collected: 01/06/22 1100    Order Status: Completed Specimen: Wound from Foot, Left Updated: 01/09/22 0857     Aerobic Bacterial Culture CITROBACTER KOSERI  Many        CITROBACTER FREUNDII  Few      Aerobic culture (Specify Source) **CANNOT BE ORDERED AS STAT** [[id number]]  (Abnormal)  (Susceptibility) Collected: 01/05/22 1315    Order Status: Completed Specimen: Wound from Foot, Left Updated: 01/08/22 0850     Aerobic Bacterial Culture CITROBACTER FREUNDII  Many        PSEUDOMONAS AERUGINOSA  Moderate      Gram stain [[id number]] Collected: 01/06/22 1100    Order Status: Completed Specimen: Wound from Foot, Left Updated: 01/06/22 1451     Gram Stain Result Few WBC's      Moderate Gram negative rods      Few Gram positive cocci    Narrative:      Left foot metatarsal  Proximal margin - left foot    Gram stain [[id number]] Collected: 01/06/22 1100    Order Status: Completed Specimen: Wound from Foot, Left Updated: 01/06/22 1450     Gram Stain Result Few WBC's      Few Gram negative rods      Few Gram positive cocci    Fungus culture [[id number]] Collected: 01/06/22 1100    Order Status: Sent Specimen: Wound from  Foot, Left Updated: 01/06/22 1350    Fungus culture [[id number]] Collected: 01/06/22 1100    Order Status: Sent Specimen: Wound from Foot, Left Updated: 01/06/22 1346            Diagnostic Tests: n/a        Assessment:    55 y/o male with ESRD on HD (outside Nephrologist) admitted with:    - L-foot gangrene s/p amputation, debridement and washout  - Citrobacter + Pseudomonas wound infection  - Osteo  - LE PAD  - + Covid test  - Recurrent AVF malfunction s/p re-do Fistulogram 1/12/22 (HD nurse unable to needle access 1/11/22 due to swelling-pain and again poor blood flows on 1/12  - Uncontrolled HTN  - Hyperkalemia  - Severe Hyperphosphatemia  - IDDM2  - Chronic diastolic HF  - Hypoalbuminemia  - Non-compliant behavior          Plan:      - Consult IR for THDC  - Dialysis in a.m. after above  - Epogen 3 x week  - Antibiotics per ID  - Renal ADA diet low PO4-  - Adjust oral PO4- binders as needed  - Adjust opiate dose for ESRD  - Podiatry and Vascular following  - Other problems per admitting    Dr. Soni notified of access malfunction

## 2022-01-13 NOTE — NURSING
Received pt from dialysis. Removed 3 L LAVA positive bruit & thrill. No bleeding observed. Made patient comfortable. Will give 2100 medication at this time.

## 2022-01-13 NOTE — CONSULTS
Inpatient Radiology Pre-procedure Note    History of Present Illness:  Adryan Neff is a 56 y.o. male with pertinent PMHx of ESRD on HD TTS via LUE brachi-cephalic AVG s/p IR fistulagram and intervention on 1/10/22 with repeat on 1/12/22 2/2 difficulties cannulating the venous segment of the AVG.     Post repeat fistulagram on 1/12/22, HD tech now able to cannulate AVG however, with reports of suboptimal flow rates during HD requiring alternative route of access for continued HD.     A new inpatient IR consult received for US and fluoroscopic-guided placement of a RIJV-approach THDC.    Admission H&P reviewed.  Past Medical History:   Diagnosis Date    CAD (coronary artery disease) 2016    CAD (non obstructive) 2016 [facility]    Diabetes mellitus type I     Diabetic retinopathy     ESRD on hemodialysis     on HD TThSat    Gangrene of left foot     Hypertension     Nuclear sclerosis of right eye 11/24/2021    PAD (peripheral artery disease)     Pulmonary HTN     TB lung, latent 04/2021     Past Surgical History:   Procedure Laterality Date    AV FISTULA PLACEMENT      CATARACT EXTRACTION Left     EYE SURGERY      TOE AMPUTATION Left 1/6/2022    Procedure: AMPUTATION, TOE;  Surgeon: Masoud Soni MD;  Location: [facility] OR;  Service: Vascular;  Laterality: Left;  Left first through fifth toes, possible open transmetatarsal amputation and all other indicated procedures     Review of Systems:   As documented in primary team H&P    Home Meds:   Prior to Admission medications    Medication Sig Start Date End Date Taking? Authorizing Provider   amoxicillin-clavulanate 500-125mg (AUGMENTIN) 500-125 mg Tab Take 1 tablet (500 mg total) by mouth once daily. 11/11/21   Natali Mayers MD   atorvastatin (LIPITOR) 20 MG tablet Take 1 tablet (20 mg total) by mouth once daily.  Patient taking differently: Take 20 mg by mouth once daily. 1/31/19 8/9/21  Soren Rice MD   blood sugar diagnostic Strp 1 each by  Misc.(Non-Drug; Combo Route) route 2 hours after meals and at bedtime. 1/31/19   Soren Rice MD   doxercalciferoL (HECTOROL) 0.5 MCG capsule 2 mcg. 7/24/21 7/23/22  Historical Provider   heparin sodium,porcine (HEPARIN, PORCINE,) 1,000 unit/mL Soln injection Heparin Sodium (Porcine) 1,000 Units/mL Systemic 2/9/21 2/8/22  Historical Provider   hydrALAZINE (APRESOLINE) 100 MG tablet Take 1 tablet (100 mg total) by mouth every 8 (eight) hours.  Patient taking differently: Take 50 mg by mouth every 8 (eight) hours.  10/26/20 10/26/21  Fan Kaye MD   insulin (BASAGLAR KWIKPEN U-100 INSULIN) glargine 100 units/mL (3mL) SubQ pen Inject 15 Units into the skin every evening. 2/7/19   Soren Rice MD   isoniazid (NYDRAZID) 300 MG Tab Take 1 tablet (300 mg total) by mouth once daily. 4/20/21 8/9/21  Anastacio Hoyos MD   lisinopriL (PRINIVIL,ZESTRIL) 2.5 MG tablet Take 2.5 mg by mouth once daily.    Historical Provider   lisinopriL (PRINIVIL,ZESTRIL) 5 MG tablet Take 5 mg by mouth once daily. 11/18/21   Historical Provider   methoxy peg-epoetin beta (MIRCERA INJ) 75 mcg. 7/10/21 7/9/22  Historical Provider   metoprolol succinate (TOPROL-XL) 25 MG 24 hr tablet Take 1 tablet (25 mg total) by mouth once daily. 4/20/21 4/20/22  Mor Avina MD   omeprazole (PRILOSEC) 20 MG capsule TAKE 2 CAPSULES(40 MG) BY MOUTH EVERY DAY  Patient taking differently: Take 20 mg by mouth once daily. 10/19/20   Soren Rice MD   penicillin v potassium (VEETID) 500 MG tablet Take 500 mg by mouth 4 (four) times daily. 9/15/21   Historical Provider   RENVELA 800 mg Tab Take 800 mg by mouth 3 (three) times daily with meals. 11/21/20   Historical Provider   traMADoL (ULTRAM) 50 mg tablet Take 1 tablet (50 mg total) by mouth every 8 (eight) hours as needed for Pain. 12/13/21   Tomas Barron DPM   traZODone (DESYREL) 50 MG tablet Take 50 mg by mouth every evening.    Historical Provider     Scheduled Meds:    sodium chloride 0.9%    Intravenous Once    atorvastatin  20 mg Oral Daily    carvediloL  6.25 mg Oral BID    ceFEPime (MAXIPIME) IVPB  1 g Intravenous Q24H    heparin (porcine)  5,000 Units Subcutaneous Q8H    hydrALAZINE  100 mg Oral Q8H    insulin detemir U-100  15 Units Subcutaneous QHS    lisinopriL  5 mg Oral Daily    pantoprazole  40 mg Oral Daily    sevelamer carbonate  2,400 mg Oral TID WM    traZODone  50 mg Oral QHS    vitamin renal formula (B-complex-vitamin c-folic acid)  1 capsule Oral Daily     Continuous Infusions:   PRN Meds:sodium chloride 0.9%, acetaminophen, acetaminophen, dextrose 50%, dextrose 50%, glucagon (human recombinant), glucose, glucose, HYDROmorphone, influenza, insulin aspart U-100, magnesium oxide, magnesium oxide, naloxone, ondansetron, oxyCODONE, sodium chloride 0.9%, sodium chloride 0.9%     Anticoagulants/Antiplatelets: Heparin    Allergies: Review of patient's allergies indicates:  No Known Allergies     Sedation Hx: have not been any systemic reactions    Labs:  No results for input(s): INR in the last 168 hours.    Invalid input(s):  PT,  PTT    Recent Labs   Lab 01/12/22  0445   WBC 14.08*   HGB 8.3*   HCT 27.4*   MCV 91   *      Recent Labs   Lab 01/09/22  0542 01/09/22  0542 01/11/22  0329 01/11/22  0329 01/12/22  0445      < > 174*   < > 159*   *   < > 131*   < > 134*   K 4.8   < > 5.3*   < > 5.4*   CL 97   < > 93*   < > 93*   CO2 20*   < > 20*   < > 21*   BUN 55*   < > 78*   < > 86*   CREATININE 13.5*   < > 17.1*   < > 19.7*   CALCIUM 8.7   < > 8.3*   < > 8.4*   ALT 5*  --   --   --   --    AST 12  --   --   --   --    ALBUMIN 1.9*   < > 1.8*  --   --    BILITOT 0.3  --   --   --   --     < > = values in this interval not displayed.     Vitals:  Temp: 97.9 °F (36.6 °C) (01/13/22 1141)  Pulse: 99 (01/13/22 1141)  Resp: 18 (01/13/22 1141)  BP: 127/66 (01/13/22 1141)  SpO2: 100 % (01/13/22 1141)     A/P:  56 y.o. male with  suboptimal flow rates during HD requiring  alternative route of access for continued HD.     1. Suboptimal flow rates via LUE brachio-cephalic AVG during HD - Will attempt US and fluoroscopic-guided placement of a RIJV-approach THDC with local anesthetic and moderate conscious sedation on 1/14/22.    Of note, pts K+ was 5.4 on labs over 24 hours prior and reportedly suboptimal HD session last night. Will need a new BMP prior to procedure to assess for safety of administering moderate sedation. Hopefully pt will not continue to refuse serum lab test.     Please continue to hold all non-essential anti-platelets, anti-coagulants and keep pt NPO after midnight.    Thank you for considering IR for the care of your patient.     Gt Mcghee MD  Interventional Radiology

## 2022-01-13 NOTE — PT/OT/SLP PROGRESS
Occupational Therapy   Treatment    Name: Adryan Neff  MRN: [id number]  Admitting Diagnosis:  Gangrene of left foot  7 Days Post-Op    Recommendations:     Discharge Recommendations: nursing facility, skilled  Discharge Equipment Recommendations:  walker, rolling,bedside commode  Barriers to discharge: Pt is at risk for falls, readmission and morbidity if d/c home at this time.       Assessment:     Adryan Neff is a 56 y.o. male with a medical diagnosis of Gangrene of left foot.   Performance deficits affecting function are weakness,impaired endurance,impaired self care skills,impaired functional mobilty,impaired balance,decreased upper extremity function,decreased lower extremity function,impaired cognition,decreased coordination,decreased safety awareness,decreased ROM,pain,impaired skin.     Pt cooperative yet moderately confused, requiring clear, one-step commands and redirection for participation; pt somewhat withdrawn throughout session. Pt was able to perform supine>sit w/ min A to stand from EOB x 2 trials w/ mod A and use of RW; pt noted w/ bearing no weight through LLE, maintaining NWB w/ static standing. Pt was unable to pivot/hop to HOB despite cueing and education. Pt will continue to benefit from skilled acute OT services to maximize functional capacity for safe performance w/ ADLs and functional mobility.     Rehab Prognosis:  Good; patient would benefit from acute skilled OT services to address these deficits and reach maximum level of function.       Plan:     Patient to be seen 5 x/week to address the above listed problems via self-care/home management,therapeutic activities,therapeutic exercises  · Plan of Care Expires: 02/08/22  · Plan of Care Reviewed with: patient    Subjective     Pain/Comfort:  · Pain Rating 1: 0/10  · Pain Rating Post-Intervention 1: 0/10    Objective:     Communicated with: nurse prior to session.  Patient found HOB elevated with bed alarm,peripheral IV upon OT entry to  "room.    General Precautions: Standard, fall,airborne,contact,droplet   Orthopedic Precautions: (Per chart, pt "touch down weight bearing")   Braces: N/A  Respiratory Status: Room air     Occupational Performance:     Bed Mobility:    · Patient completed Scooting hips to EOB with minimum assistance  · Patient completed Supine to Sit with stand by assistance and HOB elevated   · Patient completed Sit to Supine with minimum assistance     Functional Mobility/Transfers:  · Patient completed Sit <> Stand Transfer x 2 trials with moderate assistance  with  rolling walker; pt required increased time for initiating trials  · Pt noted w/ maintaining NWB, possible due to pain when bearing weight through heel   · Increased time needed in between trial due to  entering room to draw labs   · Functional Mobility: Unable to pivot or hop to HOB despite cueing.     Activities of Daily Living:  · Upper Body Dressing: minimum assistance for donning gown over back     [facility] 6 Click ADL: 18    Treatment & Education:  -Pt performed functional mobility as noted above w/ demonstration and education.   -Pt educated on jean body mechanics for achieving upright posture while complying w/ LLE WB orders.   -Questions and concerns addressed within OT scope.     Patient left right sidelying with all lines intact, call button in reach, bed alarm on and nurse notifiedEducation:      GOALS:   Multidisciplinary Problems     Occupational Therapy Goals        Problem: Occupational Therapy Goal    Goal Priority Disciplines Outcome Interventions   Occupational Therapy Goal     OT, PT/OT Ongoing, Progressing    Description: Goals to be met by: 2/8/2022    Patient will increase functional independence with ADLs by performing:    LE Dressing with Minimal Assistance.  Grooming while standing with Stand-by Assistance  Sit to stand w/ min A and use of RW w/ LLE "touch down weigth bearing" compliance.   Toileting from bedside commode with Stand-by " Assistance for hygiene and clothing management  Supine to sit with Stand-by Assistance.  Toilet transfer to bedside commode with Stand-by Assistance                     Time Tracking:     OT Date of Treatment: 01/13/22  OT Start Time: 1442  OT Stop Time: 1505  OT Total Time (min): 23 min    Billable Minutes:Therapeutic Activity 23  Total Time 23    OT/GLENDA: OT     GLENDA Visit Number: 0    1/13/2022

## 2022-01-14 ENCOUNTER — DOCUMENTATION ONLY (OUTPATIENT)
Dept: VASCULAR SURGERY | Facility: CLINIC | Age: 57
End: 2022-01-14
Payer: MEDICARE

## 2022-01-14 DIAGNOSIS — I70.229 CRITICAL LOWER LIMB ISCHEMIA: Primary | ICD-10-CM

## 2022-01-14 LAB
ALBUMIN SERPL BCP-MCNC: 2 G/DL (ref 3.5–5.2)
ALP SERPL-CCNC: 78 U/L (ref 55–135)
ALT SERPL W/O P-5'-P-CCNC: 9 U/L (ref 10–44)
ANION GAP SERPL CALC-SCNC: 18 MMOL/L (ref 8–16)
AST SERPL-CCNC: 35 U/L (ref 10–40)
BASOPHILS # BLD AUTO: 0.09 K/UL (ref 0–0.2)
BASOPHILS NFR BLD: 0.6 % (ref 0–1.9)
BILIRUB SERPL-MCNC: 0.3 MG/DL (ref 0.1–1)
BUN SERPL-MCNC: 59 MG/DL (ref 6–20)
CALCIUM SERPL-MCNC: 8.7 MG/DL (ref 8.7–10.5)
CHLORIDE SERPL-SCNC: 95 MMOL/L (ref 95–110)
CO2 SERPL-SCNC: 22 MMOL/L (ref 23–29)
CREAT SERPL-MCNC: 14.9 MG/DL (ref 0.5–1.4)
DIFFERENTIAL METHOD: ABNORMAL
EOSINOPHIL # BLD AUTO: 0.6 K/UL (ref 0–0.5)
EOSINOPHIL NFR BLD: 3.8 % (ref 0–8)
ERYTHROCYTE [DISTWIDTH] IN BLOOD BY AUTOMATED COUNT: 15.9 % (ref 11.5–14.5)
EST. GFR  (AFRICAN AMERICAN): 4 ML/MIN/1.73 M^2
EST. GFR  (NON AFRICAN AMERICAN): 3 ML/MIN/1.73 M^2
GLUCOSE SERPL-MCNC: 126 MG/DL (ref 70–110)
HCT VFR BLD AUTO: 29 % (ref 40–54)
HGB BLD-MCNC: 8.9 G/DL (ref 14–18)
IMM GRANULOCYTES # BLD AUTO: 0.12 K/UL (ref 0–0.04)
IMM GRANULOCYTES NFR BLD AUTO: 0.8 % (ref 0–0.5)
LYMPHOCYTES # BLD AUTO: 0.7 K/UL (ref 1–4.8)
LYMPHOCYTES NFR BLD: 4.2 % (ref 18–48)
MCH RBC QN AUTO: 27.7 PG (ref 27–31)
MCHC RBC AUTO-ENTMCNC: 30.7 G/DL (ref 32–36)
MCV RBC AUTO: 90 FL (ref 82–98)
MONOCYTES # BLD AUTO: 1.1 K/UL (ref 0.3–1)
MONOCYTES NFR BLD: 7 % (ref 4–15)
NEUTROPHILS # BLD AUTO: 13.1 K/UL (ref 1.8–7.7)
NEUTROPHILS NFR BLD: 83.6 % (ref 38–73)
NRBC BLD-RTO: 0 /100 WBC
PLATELET # BLD AUTO: 505 K/UL (ref 150–450)
PMV BLD AUTO: 9.1 FL (ref 9.2–12.9)
POCT GLUCOSE: 106 MG/DL (ref 70–110)
POCT GLUCOSE: 121 MG/DL (ref 70–110)
POCT GLUCOSE: 123 MG/DL (ref 70–110)
POTASSIUM SERPL-SCNC: 4.5 MMOL/L (ref 3.5–5.1)
PROT SERPL-MCNC: 8.6 G/DL (ref 6–8.4)
RBC # BLD AUTO: 3.21 M/UL (ref 4.6–6.2)
SODIUM SERPL-SCNC: 135 MMOL/L (ref 136–145)
WBC # BLD AUTO: 15.59 K/UL (ref 3.9–12.7)

## 2022-01-14 PROCEDURE — 27000207 HC ISOLATION

## 2022-01-14 PROCEDURE — 63600175 PHARM REV CODE 636 W HCPCS: Performed by: FAMILY MEDICINE

## 2022-01-14 PROCEDURE — 25000003 PHARM REV CODE 250: Performed by: HOSPITALIST

## 2022-01-14 PROCEDURE — 36415 COLL VENOUS BLD VENIPUNCTURE: CPT | Performed by: STUDENT IN AN ORGANIZED HEALTH CARE EDUCATION/TRAINING PROGRAM

## 2022-01-14 PROCEDURE — 80053 COMPREHEN METABOLIC PANEL: CPT | Performed by: STUDENT IN AN ORGANIZED HEALTH CARE EDUCATION/TRAINING PROGRAM

## 2022-01-14 PROCEDURE — 25000003 PHARM REV CODE 250: Performed by: INTERNAL MEDICINE

## 2022-01-14 PROCEDURE — 25000003 PHARM REV CODE 250: Performed by: STUDENT IN AN ORGANIZED HEALTH CARE EDUCATION/TRAINING PROGRAM

## 2022-01-14 PROCEDURE — 90935 HEMODIALYSIS ONE EVALUATION: CPT

## 2022-01-14 PROCEDURE — 97530 THERAPEUTIC ACTIVITIES: CPT

## 2022-01-14 PROCEDURE — 21400001 HC TELEMETRY ROOM

## 2022-01-14 PROCEDURE — 25000003 PHARM REV CODE 250: Performed by: FAMILY MEDICINE

## 2022-01-14 PROCEDURE — 63600175 PHARM REV CODE 636 W HCPCS: Performed by: INTERNAL MEDICINE

## 2022-01-14 PROCEDURE — 99233 PR SUBSEQUENT HOSPITAL CARE,LEVL III: ICD-10-PCS | Mod: ,,, | Performed by: INTERNAL MEDICINE

## 2022-01-14 PROCEDURE — 85025 COMPLETE CBC W/AUTO DIFF WBC: CPT | Performed by: STUDENT IN AN ORGANIZED HEALTH CARE EDUCATION/TRAINING PROGRAM

## 2022-01-14 PROCEDURE — 97535 SELF CARE MNGMENT TRAINING: CPT

## 2022-01-14 PROCEDURE — C9399 UNCLASSIFIED DRUGS OR BIOLOG: HCPCS | Performed by: FAMILY MEDICINE

## 2022-01-14 PROCEDURE — 99233 SBSQ HOSP IP/OBS HIGH 50: CPT | Mod: ,,, | Performed by: INTERNAL MEDICINE

## 2022-01-14 PROCEDURE — 63600175 PHARM REV CODE 636 W HCPCS: Performed by: RADIOLOGY

## 2022-01-14 RX ORDER — FENTANYL CITRATE 50 UG/ML
INJECTION, SOLUTION INTRAMUSCULAR; INTRAVENOUS CODE/TRAUMA/SEDATION MEDICATION
Status: COMPLETED | OUTPATIENT
Start: 2022-01-14 | End: 2022-01-14

## 2022-01-14 RX ORDER — HEPARIN SODIUM 5000 [USP'U]/ML
5000 INJECTION, SOLUTION INTRAVENOUS; SUBCUTANEOUS
Status: DISCONTINUED | OUTPATIENT
Start: 2022-01-14 | End: 2022-01-27 | Stop reason: HOSPADM

## 2022-01-14 RX ORDER — QUETIAPINE FUMARATE 25 MG/1
25 TABLET, FILM COATED ORAL NIGHTLY
Status: DISCONTINUED | OUTPATIENT
Start: 2022-01-14 | End: 2022-01-27 | Stop reason: HOSPADM

## 2022-01-14 RX ORDER — HEPARIN 100 UNIT/ML
SYRINGE INTRAVENOUS CODE/TRAUMA/SEDATION MEDICATION
Status: COMPLETED | OUTPATIENT
Start: 2022-01-14 | End: 2022-01-14

## 2022-01-14 RX ORDER — MIDAZOLAM HYDROCHLORIDE 1 MG/ML
INJECTION INTRAMUSCULAR; INTRAVENOUS CODE/TRAUMA/SEDATION MEDICATION
Status: COMPLETED | OUTPATIENT
Start: 2022-01-14 | End: 2022-01-14

## 2022-01-14 RX ADMIN — ATORVASTATIN CALCIUM 20 MG: 10 TABLET, FILM COATED ORAL at 09:01

## 2022-01-14 RX ADMIN — MIDAZOLAM HYDROCHLORIDE 0.5 MG: 1 INJECTION, SOLUTION INTRAMUSCULAR; INTRAVENOUS at 02:01

## 2022-01-14 RX ADMIN — HYDRALAZINE HYDROCHLORIDE 100 MG: 25 TABLET, FILM COATED ORAL at 10:01

## 2022-01-14 RX ADMIN — HEPARIN SODIUM 5000 UNITS: 5000 INJECTION INTRAVENOUS; SUBCUTANEOUS at 07:01

## 2022-01-14 RX ADMIN — PANTOPRAZOLE SODIUM 40 MG: 40 TABLET, DELAYED RELEASE ORAL at 09:01

## 2022-01-14 RX ADMIN — SEVELAMER CARBONATE 2400 MG: 800 TABLET, FILM COATED ORAL at 09:01

## 2022-01-14 RX ADMIN — DEXTROSE 250 MG: 50 INJECTION, SOLUTION INTRAVENOUS at 12:01

## 2022-01-14 RX ADMIN — CARVEDILOL 6.25 MG: 6.25 TABLET, FILM COATED ORAL at 08:01

## 2022-01-14 RX ADMIN — LISINOPRIL 5 MG: 5 TABLET ORAL at 09:01

## 2022-01-14 RX ADMIN — HEPARIN 1.8 ML: 100 SYRINGE at 02:01

## 2022-01-14 RX ADMIN — HEPARIN SODIUM 5000 UNITS: 5000 INJECTION INTRAVENOUS; SUBCUTANEOUS at 10:01

## 2022-01-14 RX ADMIN — NEPHROCAP 1 CAPSULE: 1 CAP ORAL at 09:01

## 2022-01-14 RX ADMIN — QUETIAPINE FUMARATE 25 MG: 25 TABLET ORAL at 08:01

## 2022-01-14 RX ADMIN — INSULIN DETEMIR 15 UNITS: 100 INJECTION, SOLUTION SUBCUTANEOUS at 08:01

## 2022-01-14 RX ADMIN — FENTANYL CITRATE 25 MCG: 50 INJECTION INTRAMUSCULAR; INTRAVENOUS at 02:01

## 2022-01-14 RX ADMIN — CARVEDILOL 6.25 MG: 6.25 TABLET, FILM COATED ORAL at 09:01

## 2022-01-14 NOTE — NURSING
Patient refused Insulin SQ and dressing to his wound.     12:00 am  Patient refused Vital signs to be taken at 12 MN. Will try again later, patient is calm and comfortable in bed. No apparent distress noted.

## 2022-01-14 NOTE — PLAN OF CARE
Problem: Adult Inpatient Plan of Care  Goal: Plan of Care Review  Outcome: Ongoing, Progressing  Goal: Patient-Specific Goal (Individualized)  Outcome: Ongoing, Progressing  Goal: Absence of Hospital-Acquired Illness or Injury  Outcome: Ongoing, Progressing  Goal: Optimal Comfort and Wellbeing  Outcome: Ongoing, Progressing  Goal: Readiness for Transition of Care  Outcome: Ongoing, Progressing     Problem: Infection (Hemodialysis)  Goal: Absence of Infection Signs and Symptoms  Outcome: Ongoing, Progressing

## 2022-01-14 NOTE — PLAN OF CARE
Problem: Fall Injury Risk  Goal: Absence of Fall and Fall-Related Injury  Outcome: Ongoing, Progressing  Intervention: Identify and Manage Contributors  Flowsheets (Taken 1/13/2022 1951)  Self-Care Promotion:   BADL personal routines maintained   meal set-up provided   safe use of adaptive equipment encouraged   BADL personal objects within reach  Medication Review/Management: medications reviewed

## 2022-01-14 NOTE — CONSULTS
Chief Complaint: left foot gangrene    History of Present Illness:  Adryan Neff is a very pleasant 56 y.o. male with multiple co-morbidities ESRD, DM with left foot wound. Hx obtained partly by pt, chart and nurse and daughter over the telephone.  His WBC is elevated but he is afebrile.  Consulted for possible AKA. PT refusing AKA currently.    Review of Systems:    Noncontributory except for above      Past Medical History:   Diagnosis Date    CAD (coronary artery disease) 2016    CAD (non obstructive) 2016 [facility]    Diabetes mellitus type I     Diabetic retinopathy     ESRD on hemodialysis     on HD TThSat    Gangrene of left foot     Hypertension     Nuclear sclerosis of right eye 11/24/2021    PAD (peripheral artery disease)     Pulmonary HTN     TB lung, latent 04/2021       Past Surgical History:   Past Surgical History:   Procedure Laterality Date    AV FISTULA PLACEMENT      CATARACT EXTRACTION Left     EYE SURGERY      TOE AMPUTATION Left 1/6/2022    Procedure: AMPUTATION, TOE;  Surgeon: Masoud Soni MD;  Location: [facility] OR;  Service: Vascular;  Laterality: Left;  Left first through fifth toes, possible open transmetatarsal amputation and all other indicated procedures       Social History:  negative tobacco abuse    Allergies:  Review of patient's allergies indicates:  No Known Allergies    Medications:  Current Facility-Administered Medications   Medication Dose Route Frequency Provider Last Rate Last Admin    0.9%  NaCl infusion   Intravenous PRN Reshma Gee MD        0.9%  NaCl infusion   Intravenous Once Wil Fine MD        acetaminophen tablet 650 mg  650 mg Oral Q8H PRN Jessenia Wharton MD   650 mg at 01/07/22 1323    acetaminophen tablet 650 mg  650 mg Oral Q4H PRN Jessenia Wharton MD   650 mg at 01/10/22 0920    atorvastatin tablet 20 mg  20 mg Oral Daily Jessenia Wharton MD   20 mg at 01/13/22 1348    carvediloL tablet 6.25 mg  6.25 mg Oral BID Edi Moreno MD    6.25 mg at 01/13/22 1349    cefepime in dextrose 5 % 1 gram/50 mL IVPB 1 g  1 g Intravenous Q24H Fausto Archer MD   Stopped at 01/13/22 1830    dextrose 50% injection 12.5 g  12.5 g Intravenous PRN Jessenia Wharton MD        dextrose 50% injection 25 g  25 g Intravenous PRN Jessenia Wharton MD        glucagon (human recombinant) injection 1 mg  1 mg Intramuscular PRN Jessenia Wharton MD        glucose chewable tablet 16 g  16 g Oral PRN Jessenia Wharton MD        glucose chewable tablet 24 g  24 g Oral PRN Jessenia Wharton MD        heparin (porcine) injection 5,000 Units  5,000 Units Subcutaneous Q8H Jessenia Wharton MD   5,000 Units at 01/13/22 1540    hydrALAZINE tablet 100 mg  100 mg Oral Q8H Jessenia Wharton MD   100 mg at 01/13/22 0517    HYDROmorphone (PF) injection 0.5 mg  0.5 mg Intravenous Q6H PRN Juan Rodriguez MD        influenza (QUADRIVALENT PF) vaccine 0.5 mL  0.5 mL Intramuscular vaccine x 1 dose Edi Moreno MD        insulin aspart U-100 pen 1-10 Units  1-10 Units Subcutaneous QID (AC + HS) PRN Jessenia Wharton MD   6 Units at 01/10/22 1724    insulin detemir U-100 pen 15 Units  15 Units Subcutaneous QHS Jessenia Wharton MD   15 Units at 01/08/22 2135    lisinopriL tablet 5 mg  5 mg Oral Daily Jessenia Wharton MD   5 mg at 01/13/22 1349    magnesium oxide tablet 800 mg  800 mg Oral PRN Jessenia Wharton MD        magnesium oxide tablet 800 mg  800 mg Oral PRN Jessenia Wharton MD        naloxone 0.4 mg/mL injection 0.02 mg  0.02 mg Intravenous PRN Jessenia Wharton MD        OLANZapine injection 5 mg  5 mg Intramuscular Once PRN Nghia Jordan MD        olanzapine zydis disintegrating tablet 5 mg  5 mg Oral Q6H PRN Nghia Jordan MD        ondansetron injection 8 mg  8 mg Intravenous Q8H PRN Jessenia Wharton MD        oxyCODONE immediate release tablet 5 mg  5 mg Oral Q6H PRN Juan Rodriguez MD        pantoprazole EC  tablet 40 mg  40 mg Oral Daily Jessenia Wharton MD   40 mg at 01/13/22 1348    QUEtiapine tablet 50 mg  50 mg Oral QHS Nghia Jordan MD        sevelamer carbonate tablet 2,400 mg  2,400 mg Oral TID  Juan Rodriguez MD   2,400 mg at 01/11/22 1708    sodium chloride 0.9% bolus 250 mL  250 mL Intravenous PRN Reshma Gee MD        sodium chloride 0.9% flush 10 mL  10 mL Intravenous Q12H PRN Jessenia Wharton MD        valproate (DEPACON) 250 mg in dextrose 5 % 50 mL IVPB  250 mg Intravenous Q8H Nghia Jordan MD   Stopped at 01/13/22 1758    vitamin renal formula (B-complex-vitamin c-folic acid) 1 mg per capsule 1 capsule  1 capsule Oral Daily Juan Rodriguez MD   1 capsule at 01/12/22 1011       Physical Exam:   General:  Well developed and well nourished age appropriate male in no acute distress  Cardiovascular: regular rate and rhythm  Respiratory:  Non labored breathing, no wheezing  Abdomen: soft, non-tender, non-distended  Musculoskeletal:   Left lesser toe amputations with open non healing wound without any purulence    Neuro: unable to determine     Imaging:  Imaging revealed: pending    Diagnosis:  55 y/o man with ESRD and DM and PVD and left foot wound with elevated WBC count, afebrile    Plan:   1. We discussed both surgical and non surgical options with the patient who refuses and the daughter on the telephone who wishes to discuss further. She reports she is a nurse and the main decision maker for the family.    2. Will discuss further with patient and family tomorrow at 2:30 PM    3. Will f/u x-rays.    Rusty Light MD  [facility]

## 2022-01-14 NOTE — BRIEF OP NOTE
Radiology Post-Procedure Note    Pre Op Diagnosis: LUE AVG dysfunction  Post Op Diagnosis: Same    Procedure: 1. US and fluoroscopic-guided placement of a 23-cm RIJV-approach THDC    Procedure performed by: Gt Mcghee MD    Written Informed Consent Obtained: Yes  Specimen Removed: NO  Estimated Blood Loss: Minimal    Findings:   Successful US and fluoroscopic-guided placement of a 23-cm RIJV-approach THDC with local anesthetic and moderate conscious sedation. Patient tolerated the procedure well. No immediate post-procedural complications noted.     Patient transferred back to floor for 2 hours post-op monitoring and bed rest with HOB elevated at least 30 degrees during bed rest.    Thank you for considering IR for the care of your patient.     Gt Mcghee MD  Interventional Radiology

## 2022-01-14 NOTE — ASSESSMENT & PLAN NOTE
Vascular and ortho consulted. Likely needs amputation. Discussed with Dr. Soni. In comparison of recent pictures by podiatry, his gangrene has worsened and now involves all toes. See pics below.   Continue IV abx. Pain control.     Follow vasc. Ortho recs     S/p amputation of toes.  Patient would like to try to salvage limb.  Awaiting next vascular recs.   PT/OT consulted. Continue Abx.     Angiogram on Mon or Tues per Vascular. Continue wound care/Abx     1/9- leukocytosis rising, on Vanc and Zosyn; getting blood cultures today; will discuss with vascular surgery tomorrow about when angiogram will occur as wbc is rising; may need amputation     1/10- vascular surgery planning on doing angiogram in the AM; NPO aftermidnight    1/11- patient refused angiogram today    1/12 psychiatry ruled patient without capacity    1/13 after discussing with patient's daughter, Dr. Soni planning on AKA    1/14 will discuss AKA with daughter along with orthopedics

## 2022-01-14 NOTE — PROGRESS NOTES
[facility] Medicine  Progress Note    Patient Name: Adryan Neff  MRN: [id number]  Patient Class: IP- Inpatient   Admission Date: 1/5/2022  Length of Stay: 9 days  Attending Physician: Nghia Jordan MD  Primary Care Provider: Soren Rice MD        Subjective:     Principal Problem:Gangrene of left foot        HPI:  This is a 56 y.o.male patient, with a PMHx of ESRD on hemodialysis, CAD, HTN, and DM, presenting to the ED for further evaluation of left foot pain and black discoloration that began 3 days ago. Patient states these symptoms have been ongoing for the last month. He reports being referred to a specialist but denies ever following up. He has been putting some otc medication on the foot that he believes has turned his foot more black. No trauma or injury. Patient reports associated chills. Patient states applying a creme to the foot and noticed his discoloration worsened. Patient reports he was last dialyzed yesterday. Patient denies any fever, shortness of breath, chest pain, neck pain, back pain, abdominal pain, rash, headaches, congestion, rhinorrhea, cough, sore throat, ear pain, eye pain, blurred vision, nausea, vomiting, diarrhea, dysuria, or any other associated symptoms.    Per chart review, he had an arterial u/s 12/15.   Impression:   1. High-grade severe stenosis right popliteal artery with occlusion anterior tibial artery which is reconstituted at level of DPA via collaterals.  2. 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    Cardiology has been planning to see him for revascularization options. Last podiatry visit 12/13 with Stable dry gangrene L 2nd toe and medial L 5th toe. Dorsalis pedis and posterior tibial pulses are diminished Bilaterally. Toes are cool to touch. Feet are warm proximally.There is decreased digital hair. Skin is atrophic, slightly hyperpigmented, and mildly edematous     In the ED, he was found to have worsened gangrene and a cool  extremity. Vascular surgery was consulted. He was also COVID +. Says he got a booster shot in November sometime.      Overview/Hospital Course:  Patient admitted to the hospital for eval and treatment of L foot gangrene.  Ortho and Vasc consulted. Nephrology was also consulted for chronic dialysis. Patient had several toes on L foot amputated.  Continued with dialysis. PT/OT consulted.     Pt became increasingly delirious and combative. Psychiatry evaluated, felt that he was not able to make his own medical decisions. Vascular surgery discussed with daughter, initial plan for aKA.      Interval History: Resting comfortably this AM    Review of Systems   Constitutional: Negative for chills and fever.   Respiratory: Negative for cough and shortness of breath.    Cardiovascular: Negative for chest pain and leg swelling.   Gastrointestinal: Negative for abdominal distention and abdominal pain.     Objective:     Vital Signs (Most Recent):  Temp: 98.1 °F (36.7 °C) (01/14/22 1152)  Pulse: 85 (01/14/22 1152)  Resp: 19 (01/14/22 1152)  BP: (!) 159/70 (01/14/22 1152)  SpO2: 96 % (01/14/22 1152) Vital Signs (24h Range):  Temp:  [97.5 °F (36.4 °C)-99.4 °F (37.4 °C)] 98.1 °F (36.7 °C)  Pulse:  [85-98] 85  Resp:  [18-20] 19  SpO2:  [96 %-100 %] 96 %  BP: (122-159)/(63-80) 159/70     Weight: 64.3 kg (141 lb 12.1 oz)  Body mass index is 20.34 kg/m².    Intake/Output Summary (Last 24 hours) at 1/14/2022 1158  Last data filed at 1/13/2022 1800  Gross per 24 hour   Intake 100 ml   Output --   Net 100 ml      Physical Exam  Constitutional:       General: He is not in acute distress.     Appearance: He is ill-appearing. He is not toxic-appearing or diaphoretic.   Cardiovascular:      Rate and Rhythm: Normal rate and regular rhythm.      Heart sounds: No murmur heard.  No gallop.    Pulmonary:      Effort: Pulmonary effort is normal. No respiratory distress.      Breath sounds: Normal breath sounds. No wheezing.   Abdominal:       General: Bowel sounds are normal. There is no distension.      Palpations: Abdomen is soft.      Tenderness: There is no abdominal tenderness.         Significant Labs: All pertinent labs within the past 24 hours have been reviewed.    Significant Imaging: I have reviewed all pertinent imaging results/findings within the past 24 hours.      Assessment/Plan:      * Gangrene of left foot  Vascular and ortho consulted. Likely needs amputation. Discussed with Dr. Soni. In comparison of recent pictures by podiatry, his gangrene has worsened and now involves all toes. See pics below.   Continue IV abx. Pain control.     Follow vasc. Ortho recs     S/p amputation of toes.  Patient would like to try to salvage limb.  Awaiting next vascular recs.   PT/OT consulted. Continue Abx.     Angiogram on Mon or Tues per Vascular. Continue wound care/Abx     1/9- leukocytosis rising, on Vanc and Zosyn; getting blood cultures today; will discuss with vascular surgery tomorrow about when angiogram will occur as wbc is rising; may need amputation     1/10- vascular surgery planning on doing angiogram in the AM; NPO aftermidnight    1/11- patient refused angiogram today    1/12 psychiatry ruled patient without capacity    1/13 after discussing with patient's daughter, Dr. Soni planning on AKA    1/14 will discuss AKA with daughter along with orthopedics    Encephalopathy, metabolic  Waxing and waning confusion likely delirium secondary to ischemic foot w/ possible superimposed infection and uremia from missing dialysis  - psychiatry consulted, appreciate recs  - tx of underlying issues as noted elsewhere  - started seroquel, valproate  - delirium precautions    COVID-19 in immunocompromised patient  HD patient + for COVID 19. Not hypoxic or symptomatic. Supportive care prn.       ESRD on hemodialysis  Nephrology consulted to resume routine HD. Pt w/ difficulty w/ fistula, plan for IR THDC pending improvement of vascular access  -  IR consulted by nephrology for [facility]  - nephrology following    Chronic diastolic heart failure  December 2021 echo reviewed. Stable.       Essential (primary) hypertension  Titrate to goal. Pt refusing po medications may require IV meds      Controlled type 2 diabetes mellitus with chronic kidney disease on chronic dialysis, with long-term current use of insulin  Blood sugars mildly elevated as pt has been refusing insulin.        VTE Risk Mitigation (From admission, onward)         Ordered     heparin (porcine) injection 5,000 Units  Every 8 hours         01/05/22 1404     IP VTE HIGH RISK PATIENT  Once         01/05/22 1404     Place sequential compression device  Until discontinued         01/05/22 1404                Discharge Planning   ENIO:      Code Status: Full Code   Is the patient medically ready for discharge?:     Reason for patient still in hospital (select all that apply): Patient trending condition  Discharge Plan A: Skilled Nursing Facility                  Nghia Jordan MD  Department of Hospital Medicine   [facility]

## 2022-01-14 NOTE — PLAN OF CARE
"  Problem: Occupational Therapy Goal  Goal: Occupational Therapy Goal  Description: Goals to be met by: 2/8/2022    Patient will increase functional independence with ADLs by performing:    LE Dressing with Minimal Assistance.  Grooming while standing with Stand-by Assistance  Sit to stand w/ min A and use of RW w/ LLE "touch down weigth bearing" compliance.   Toileting from bedside commode with Stand-by Assistance for hygiene and clothing management  Supine to sit with Stand-by Assistance.  Toilet transfer to bedside commode with Stand-by Assistance    Outcome: Ongoing, Progressing     "

## 2022-01-14 NOTE — PLAN OF CARE
01/14/22 1243   Discharge Reassessment   Assessment Type Discharge Planning Reassessment   Per careport no accepting facility as yet.  Most facilities stating no beds available.  TN will attempt again once patient has had surgery.

## 2022-01-14 NOTE — PT/OT/SLP PROGRESS
"Occupational Therapy   Treatment    Name: Adryan Neff  MRN: [id number]  Admitting Diagnosis:  Gangrene of left foot  8 Days Post-Op    Recommendations:     Discharge Recommendations: nursing facility, skilled  Discharge Equipment Recommendations:  bedside commode,walker, rolling  Barriers to discharge:   Pt is at risk for falls, readmission and morbidity if d/c home at this time.       Assessment:     Adryan Neff is a 56 y.o. male with a medical diagnosis of Gangrene of left foot. Performance deficits affecting function are weakness,impaired endurance,impaired self care skills,impaired functional mobilty,gait instability,impaired balance,decreased upper extremity function,decreased lower extremity function,decreased coordination,decreased safety awareness,pain,decreased ROM,impaired skin,impaired cognition,orthopedic precautions.     Pt pleasant and moderately cooperative, but still confused, refusing to stand and attempt transfers despite encouragement and redirection. Pt was able to sit EOB for performing self-care skills/oral care. Pt noted w/ BUE weakness and dropping ADL items frequently, requiring hand-over-hand assist for stability.     Rehab Prognosis:  Fair; patient would benefit from acute skilled OT services to address these deficits and reach maximum level of function.       Plan:     Patient to be seen  (3-5x/wk) to address the above listed problems via self-care/home management,therapeutic exercises,therapeutic activities  · Plan of Care Expires: 02/08/22  · Plan of Care Reviewed with: patient,daughter    Subjective     Pain/Comfort:  · Pain Rating 1: 0/10  · Pain Rating Post-Intervention 1: 0/10    Objective:     Communicated with: nurse (Ellen) prior to session.  Patient found HOB elevated with bed alarm,peripheral IV upon OT entry to room.    General Precautions: Standard, fall,airborne,contact,droplet   Orthopedic Precautions: (Per chart, pt "touchdown weight bearing")   Braces: N/A  Respiratory " "Status: Room air     Occupational Performance:     Bed Mobility:    · Patient completed Scooting hips to EOB with minimum assistance  · Patient completed Supine to Sit with minimum assistance for initiation   · Patient completed Sit to Supine with minimum assistance for lifting BLEs onto bed     Functional Mobility/Transfers:  · Pt refused despite encouragement; pt required max A for laterally scooting to HOB for returning to supine.     Activities of Daily Living:  · Grooming: minimum assistance for preventing pt from dropping ADL items; pt able to brush teeth but due to weakness, pt was noted w/ dropping items and unable to keep toothbrush in mouth for extended period of time; pt required increased time   · Upper Body Dressing: min A for donning gown over back     AMPA 6 Click ADL: 15    Treatment & Education:  -Pt performed ADL and bed mobility as noted above.   -Pt declined standing and functional transfers this date despite encouragement.   -Questions and concerns addressed within OT scope.     Patient left HOB elevated with all lines intact, call button in reach, bed alarm on and [employer] present Education:      GOALS:   Multidisciplinary Problems     Occupational Therapy Goals        Problem: Occupational Therapy Goal    Goal Priority Disciplines Outcome Interventions   Occupational Therapy Goal     OT, PT/OT Ongoing, Progressing    Description: Goals to be met by: 2/8/2022    Patient will increase functional independence with ADLs by performing:    LE Dressing with Minimal Assistance.  Grooming while standing with Stand-by Assistance  Sit to stand w/ min A and use of RW w/ LLE "touch down weigth bearing" compliance.   Toileting from bedside commode with Stand-by Assistance for hygiene and clothing management  Supine to sit with Stand-by Assistance.  Toilet transfer to bedside commode with Stand-by Assistance                     Time Tracking:     OT Date of Treatment: 01/14/22  OT Start Time: 1025  OT Stop " Time: 1049  OT Total Time (min): 24 min    Billable Minutes:Self Care/Home Management 12  Therapeutic Activity 12  Total Time 24    OT/GLENDA: OT     GLENDA Visit Number: 0    1/14/2022

## 2022-01-14 NOTE — NURSING
Report received from KIAH Young. Patient resting comfortably in bed, confused. No acute distress noted. Instructed patient to call for assistance before ambulating, side rails up x2, bed alarm set, call light in reach, non skid socks in use. Peripheral IV site, no redness or swelling noted. Will continue to monitor.    NPO post midnight instructed.

## 2022-01-14 NOTE — PROGRESS NOTES
Renal Progress Note    Date of Admission:  1/5/2022 11:39 AM    Length of Stay: 9  Days    Subjective: n/a    Objective:    Current Facility-Administered Medications   Medication    0.9%  NaCl infusion    0.9%  NaCl infusion    acetaminophen tablet 650 mg    acetaminophen tablet 650 mg    atorvastatin tablet 20 mg    carvediloL tablet 6.25 mg    cefepime in dextrose 5 % 1 gram/50 mL IVPB 1 g    dextrose 50% injection 12.5 g    dextrose 50% injection 25 g    glucagon (human recombinant) injection 1 mg    glucose chewable tablet 16 g    glucose chewable tablet 24 g    heparin (porcine) injection 5,000 Units    hydrALAZINE tablet 100 mg    HYDROmorphone (PF) injection 0.5 mg    influenza (QUADRIVALENT PF) vaccine 0.5 mL    insulin aspart U-100 pen 1-10 Units    insulin detemir U-100 pen 15 Units    lisinopriL tablet 5 mg    magnesium oxide tablet 800 mg    magnesium oxide tablet 800 mg    naloxone 0.4 mg/mL injection 0.02 mg    OLANZapine injection 5 mg    olanzapine zydis disintegrating tablet 5 mg    ondansetron injection 8 mg    oxyCODONE immediate release tablet 5 mg    pantoprazole EC tablet 40 mg    QUEtiapine tablet 50 mg    sevelamer carbonate tablet 2,400 mg    sodium chloride 0.9% bolus 250 mL    sodium chloride 0.9% flush 10 mL    valproate (DEPACON) 250 mg in dextrose 5 % 50 mL IVPB    vitamin renal formula (B-complex-vitamin c-folic acid) 1 mg per capsule 1 capsule       Vitals:    01/13/22 1634 01/13/22 2028 01/14/22 0516 01/14/22 0803   BP: 122/63 136/80 (!) 143/65 (!) 143/65   BP Location: Right arm Left arm Right arm    Patient Position: Lying Lying Lying    Pulse: 94 94 98 90   Resp: 18 20 20 19   Temp: 99.4 °F (37.4 °C) 99.3 °F (37.4 °C) 97.5 °F (36.4 °C) 97.9 °F (36.6 °C)   TempSrc: Axillary Oral Axillary    SpO2: 97% 98% 99% 100%   Weight:       Height:           I/O last 3 completed shifts:  In: 600 [P.O.:100; Other:500]  Out: 3500  [Other:3500]  No intake/output data recorded.      Physical Exam: deferred due to Covid-19    Laboratories:    Recent Labs   Lab 01/14/22  0630   WBC 15.59*   RBC 3.21*   HGB 8.9*   HCT 29.0*   *   MCV 90   MCH 27.7   MCHC 30.7*       Recent Labs   Lab 01/14/22  0630   CALCIUM 8.7   PROT 8.6*   *   K 4.5   CO2 22*   CL 95   BUN 59*   CREATININE 14.9*   ALKPHOS 78   ALT 9*   AST 35   BILITOT 0.3       No results for input(s): COLORU, CLARITYU, SPECGRAV, PHUR, PROTEINUA, GLUCOSEU, BLOODU, WBCU, RBCU, BACTERIA, MUCUS in the last 24 hours.    Invalid input(s):  BILIRUBINCON    Microbiology Results (last 7 days)     Procedure Component Value Units Date/Time    Blood culture [[id number]] Collected: 01/09/22 1216    Order Status: Completed Specimen: Blood Updated: 01/13/22 1303     Blood Culture, Routine No Growth after 4 days.     Blood culture [[id number]] Collected: 01/09/22 1217    Order Status: Completed Specimen: Blood Updated: 01/13/22 1303     Blood Culture, Routine No Growth after 4 days.     AFB Culture & Smear [[id number]] Collected: 01/06/22 1100    Order Status: Completed Specimen: Wound from Foot, Left Updated: 01/10/22 1600     AFB Culture & Smear Culture in progress     AFB CULTURE STAIN No acid fast bacilli seen.    Narrative:      Left foot metatarsal  Proximal margin - left foot    AFB Culture & Smear [[id number]] Collected: 01/06/22 1100    Order Status: Completed Specimen: Wound from Foot, Left Updated: 01/10/22 1600     AFB Culture & Smear Culture in progress     AFB CULTURE STAIN No acid fast bacilli seen.    Culture, Anaerobe [[id number]] Collected: 01/06/22 1100    Order Status: Completed Specimen: Wound from Foot, Left Updated: 01/10/22 1357     Anaerobic Culture No anaerobes isolated    Narrative:      Left foot metatarsal  Proximal margin - left foot    Culture, Anaerobe [[id number]] Collected: 01/06/22 1100    Order Status: Completed Specimen: Wound from Foot, Left Updated: 01/10/22 1357      Anaerobic Culture No anaerobes isolated    Aerobic culture [[id number]]  (Abnormal)  (Susceptibility) Collected: 01/06/22 1100    Order Status: Completed Specimen: Wound from Foot, Left Updated: 01/10/22 0838     Aerobic Bacterial Culture CITROBACTER KOSERI  Many  For susceptibility see order # [id number]        CITROBACTER FREUNDII  Many  For susceptibility see order # [id number]        PSEUDOMONAS AERUGINOSA    Narrative:      Left foot metatarsal  Proximal margin - left foot    Blood culture [[id number]] Collected: 01/05/22 1309    Order Status: Completed Specimen: Blood from Peripheral, Wrist, Right Updated: 01/09/22 1503     Blood Culture, Routine No Growth after 4 days.     Blood culture [[id number]] Collected: 01/05/22 1308    Order Status: Completed Specimen: Blood from Peripheral, Hand, Right Updated: 01/09/22 1503     Blood Culture, Routine No Growth after 4 days.     Aerobic culture [[id number]]  (Abnormal)  (Susceptibility) Collected: 01/06/22 1100    Order Status: Completed Specimen: Wound from Foot, Left Updated: 01/09/22 0857     Aerobic Bacterial Culture CITROBACTER KOSERI  Many        CITROBACTER FREUNDII  Few      Aerobic culture (Specify Source) **CANNOT BE ORDERED AS STAT** [[id number]]  (Abnormal)  (Susceptibility) Collected: 01/05/22 1315    Order Status: Completed Specimen: Wound from Foot, Left Updated: 01/08/22 0850     Aerobic Bacterial Culture CITROBACTER FREUNDII  Many        PSEUDOMONAS AERUGINOSA  Moderate              Diagnostic Tests: n/a        Assessment:    57 y/o male with ESRD on HD (outside Nephrologist) admitted with:    - L-foot gangrene s/p amputation, debridement and washout  - Citrobacter + Pseudomonas wound infection  - Osteo  - LE PAD  - + Covid test  - Recurrent AVF malfunction s/p re-do Fistulogram 1/12/22 (HD nurse unable to needle access 1/11/22 due to swelling-pain and again poor blood flows on 1/12  - Uncontrolled HTN  - Hyperkalemia IMPROVING  - Severe  Hyperphosphatemia  - IDDM2  - Chronic diastolic HF  - Hypoalbuminemia  - Non-compliant behavior: refusing procedures, labs. V/S etc. While in Hospital, Pte. Already seen by Psychiatry.          Plan:      - IR for [facility]  - Dialysis today after above  - Epogen 3 x week  - Antibiotics per ID  - Renal ADA diet low PO4-  - Adjust oral PO4- binders as needed  - Adjust opiate dose for ESRD  - Podiatry and Vascular following  - Other problems per admitting

## 2022-01-14 NOTE — NURSING
Patient has refused Lab works this morning. Indication explained.     Patient has refused morning medication.    06:30 am Lab works collected.

## 2022-01-14 NOTE — SUBJECTIVE & OBJECTIVE
Interval History: Events noted. Less confused today. Foot wound open and undressed.     Review of Systems   Constitutional: Negative for chills and fever.   Skin: Positive for wound.   All other systems reviewed and are negative.    Objective:     Vital Signs (Most Recent):  Temp: 97.9 °F (36.6 °C) (01/14/22 0803)  Pulse: 90 (01/14/22 0803)  Resp: 19 (01/14/22 0803)  BP: (!) 143/65 (01/14/22 0803)  SpO2: 100 % (01/14/22 0803) Vital Signs (24h Range):  Temp:  [97.5 °F (36.4 °C)-99.4 °F (37.4 °C)] 97.9 °F (36.6 °C)  Pulse:  [90-99] 90  Resp:  [18-20] 19  SpO2:  [97 %-100 %] 100 %  BP: (122-143)/(63-80) 143/65     Weight: 64.3 kg (141 lb 12.1 oz)  Body mass index is 20.34 kg/m².    Estimated Creatinine Clearance: 5 mL/min (A) (based on SCr of 14.9 mg/dL (H)).    Physical Exam  Vitals and nursing note reviewed.   Constitutional:       General: He is not in acute distress.     Appearance: He is not ill-appearing, toxic-appearing or diaphoretic.   HENT:      Head: Normocephalic and atraumatic.      Right Ear: External ear normal.      Left Ear: External ear normal.   Eyes:      Extraocular Movements: Extraocular movements intact.      Conjunctiva/sclera: Conjunctivae normal.      Pupils: Pupils are equal, round, and reactive to light.   Musculoskeletal:         General: Deformity present.   Neurological:      General: No focal deficit present.      Mental Status: He is alert and oriented to person, place, and time.   Psychiatric:         Mood and Affect: Mood normal.         Behavior: Behavior normal.         Thought Content: Thought content normal.         Judgment: Judgment normal.         Significant Labs:   Blood Culture:   Recent Labs   Lab 11/11/21  1155 01/05/22  1308 01/05/22  1309 01/09/22  1216 01/09/22  1217   LABBLOO No growth after 5 days. No Growth after 4 days.  No Growth after 4 days.  No Growth after 4 days.  No Growth after 4 days.      CBC:   Recent Labs   Lab 01/13/22  1459 01/14/22  0630   WBC 19.74*  15.59*   HGB 9.5* 8.9*   HCT 31.1* 29.0*   * 505*     CMP:   Recent Labs   Lab 01/13/22  1459 01/14/22  0630   * 135*   K 5.0 4.5   CL 94* 95   CO2 21* 22*   * 126*   BUN 49* 59*   CREATININE 13.1* 14.9*   CALCIUM 9.3 8.7   PROT 9.5* 8.6*   ALBUMIN 2.1* 2.0*   BILITOT 0.3 0.3   ALKPHOS 79 78   AST 30 35   ALT 13 9*   ANIONGAP 19* 18*   EGFRNONAA 4* 3*     Urine Culture: No results for input(s): LABURIN in the last 4320 hours.  Wound Culture:   Recent Labs   Lab 01/05/22  1315 01/06/22  1100   LABAERO CITROBACTER FREUNDII  Many  *  PSEUDOMONAS AERUGINOSA  Moderate  * CITROBACTER KOSERI  Many  For susceptibility see order # [id number]  *  CITROBACTER FREUNDII  Many  For susceptibility see order # [id number]  *  PSEUDOMONAS AERUGINOSA*  CITROBACTER KOSERI  Many  *  CITROBACTER FREUNDII  Few  *       Significant Imaging: None

## 2022-01-14 NOTE — PROGRESS NOTES
"[facility] [place] [place] [facility]  Infectious Disease  Progress Note    Patient Name: Adryan Neff  MRN: [id number]  Admission Date: 1/5/2022  Length of Stay: 9 days  Attending Physician: Nghia Jordan MD  Primary Care Provider: Soren Rice MD    Isolation Status: Airborne and Contact and Droplet     Assessment/Plan:      * Gangrene of left foot  - s/p amputations, debridement, and washout  - remaining bone seems likely infected  - culture grew Citrobacter x 2 and Pseudomonas (QTc 499 ms, so I do not feel comfortable treating with a quinolone which potentiates QT prolongation)  - stable on cefepime, though that will not be curative  - recommend amputation for cure      Fausto Archer MD  Infectious Diseases      Subjective:     Principal Problem:Gangrene of left foot    HPI: This is a 56 y.o.male patient, with a PMHx of ESRD on hemodialysis, CAD, HTN, and DM, presenting to the ED for further evaluation of left foot pain. He was diagnosed with gangrene of the foot due to PAD. Coincidentally, he tested positive for COVID-19. The patient was taken to the OR and underwent: left 2-5 metatarsal amputations and left foot debridement and washout. OR cultures have grown Citrobacter and Pseudomonas. The patient has been receiving Zosyn and Vancocin since admission. ID is consulted for: "left foot gangrene, patient and family wanting to preserve limb; growing citrobacter and pseudomonas." The patient complains of pain but is otherwise, a difficult historian. No family is present.    Interval History: Events noted. Less confused today. Foot wound open and undressed.     Review of Systems   Constitutional: Negative for chills and fever.   Skin: Positive for wound.   All other systems reviewed and are negative.    Objective:     Vital Signs (Most Recent):  Temp: 97.9 °F (36.6 °C) (01/14/22 0803)  Pulse: 90 (01/14/22 0803)  Resp: 19 (01/14/22 0803)  BP: (!) 143/65 (01/14/22 0803)  SpO2: 100 % (01/14/22 0803) Vital Signs (24h " Range):  Temp:  [97.5 °F (36.4 °C)-99.4 °F (37.4 °C)] 97.9 °F (36.6 °C)  Pulse:  [90-99] 90  Resp:  [18-20] 19  SpO2:  [97 %-100 %] 100 %  BP: (122-143)/(63-80) 143/65     Weight: 64.3 kg (141 lb 12.1 oz)  Body mass index is 20.34 kg/m².    Estimated Creatinine Clearance: 5 mL/min (A) (based on SCr of 14.9 mg/dL (H)).    Physical Exam  Vitals and nursing note reviewed.   Constitutional:       General: He is not in acute distress.     Appearance: He is not ill-appearing, toxic-appearing or diaphoretic.   HENT:      Head: Normocephalic and atraumatic.      Right Ear: External ear normal.      Left Ear: External ear normal.   Eyes:      Extraocular Movements: Extraocular movements intact.      Conjunctiva/sclera: Conjunctivae normal.      Pupils: Pupils are equal, round, and reactive to light.   Musculoskeletal:         General: Deformity present.   Neurological:      General: No focal deficit present.      Mental Status: He is alert and oriented to person, place, and time.   Psychiatric:         Mood and Affect: Mood normal.         Behavior: Behavior normal.         Thought Content: Thought content normal.         Judgment: Judgment normal.         Significant Labs:   Blood Culture:   Recent Labs   Lab 11/11/21  1155 01/05/22  1308 01/05/22  1309 01/09/22  1216 01/09/22  1217   LABBLOO No growth after 5 days. No Growth after 4 days.  No Growth after 4 days.  No Growth after 4 days.  No Growth after 4 days.      CBC:   Recent Labs   Lab 01/13/22  1459 01/14/22  0630   WBC 19.74* 15.59*   HGB 9.5* 8.9*   HCT 31.1* 29.0*   * 505*     CMP:   Recent Labs   Lab 01/13/22  1459 01/14/22  0630   * 135*   K 5.0 4.5   CL 94* 95   CO2 21* 22*   * 126*   BUN 49* 59*   CREATININE 13.1* 14.9*   CALCIUM 9.3 8.7   PROT 9.5* 8.6*   ALBUMIN 2.1* 2.0*   BILITOT 0.3 0.3   ALKPHOS 79 78   AST 30 35   ALT 13 9*   ANIONGAP 19* 18*   EGFRNONAA 4* 3*     Urine Culture: No results for input(s): LABURIN in the last 4320  hours.  Wound Culture:   Recent Labs   Lab 01/05/22  1315 01/06/22  1100   LABAERO CITROBACTER FREUNDII  Many  *  PSEUDOMONAS AERUGINOSA  Moderate  * CITROBACTER KOSERI  Many  For susceptibility see order # [id number]  *  CITROBACTER FREUNDII  Many  For susceptibility see order # [id number]  *  PSEUDOMONAS AERUGINOSA*  CITROBACTER KOSERI  Many  *  CITROBACTER FREUNDII  Few  *       Significant Imaging: None

## 2022-01-14 NOTE — ASSESSMENT & PLAN NOTE
- s/p amputations, debridement, and washout  - remaining bone seems likely infected  - culture grew Citrobacter x 2 and Pseudomonas (QTc 499 ms, so I do not feel comfortable treating with a quinolone which potentiates QT prolongation)  - stable on cefepime, though that will not be curative  - recommend amputation for cure

## 2022-01-15 LAB
ALBUMIN SERPL BCP-MCNC: 2.1 G/DL (ref 3.5–5.2)
ALP SERPL-CCNC: 77 U/L (ref 55–135)
ALT SERPL W/O P-5'-P-CCNC: 13 U/L (ref 10–44)
ANION GAP SERPL CALC-SCNC: 16 MMOL/L (ref 8–16)
AST SERPL-CCNC: 36 U/L (ref 10–40)
BASOPHILS # BLD AUTO: 0.11 K/UL (ref 0–0.2)
BASOPHILS NFR BLD: 0.8 % (ref 0–1.9)
BILIRUB SERPL-MCNC: 0.1 MG/DL (ref 0.1–1)
BUN SERPL-MCNC: 29 MG/DL (ref 6–20)
CALCIUM SERPL-MCNC: 8.6 MG/DL (ref 8.7–10.5)
CHLORIDE SERPL-SCNC: 98 MMOL/L (ref 95–110)
CO2 SERPL-SCNC: 24 MMOL/L (ref 23–29)
CREAT SERPL-MCNC: 8.6 MG/DL (ref 0.5–1.4)
DIFFERENTIAL METHOD: ABNORMAL
EOSINOPHIL # BLD AUTO: 0.4 K/UL (ref 0–0.5)
EOSINOPHIL NFR BLD: 3.2 % (ref 0–8)
ERYTHROCYTE [DISTWIDTH] IN BLOOD BY AUTOMATED COUNT: 15.9 % (ref 11.5–14.5)
EST. GFR  (AFRICAN AMERICAN): 7 ML/MIN/1.73 M^2
EST. GFR  (NON AFRICAN AMERICAN): 6 ML/MIN/1.73 M^2
GLUCOSE SERPL-MCNC: 169 MG/DL (ref 70–110)
HCT VFR BLD AUTO: 29.1 % (ref 40–54)
HGB BLD-MCNC: 8.9 G/DL (ref 14–18)
IMM GRANULOCYTES # BLD AUTO: 0.15 K/UL (ref 0–0.04)
IMM GRANULOCYTES NFR BLD AUTO: 1.1 % (ref 0–0.5)
LYMPHOCYTES # BLD AUTO: 1 K/UL (ref 1–4.8)
LYMPHOCYTES NFR BLD: 7.3 % (ref 18–48)
MCH RBC QN AUTO: 27.3 PG (ref 27–31)
MCHC RBC AUTO-ENTMCNC: 30.6 G/DL (ref 32–36)
MCV RBC AUTO: 89 FL (ref 82–98)
MONOCYTES # BLD AUTO: 1.2 K/UL (ref 0.3–1)
MONOCYTES NFR BLD: 8.7 % (ref 4–15)
NEUTROPHILS # BLD AUTO: 10.6 K/UL (ref 1.8–7.7)
NEUTROPHILS NFR BLD: 78.9 % (ref 38–73)
NRBC BLD-RTO: 0 /100 WBC
PLATELET # BLD AUTO: 549 K/UL (ref 150–450)
PMV BLD AUTO: 9.5 FL (ref 9.2–12.9)
POCT GLUCOSE: 104 MG/DL (ref 70–110)
POCT GLUCOSE: 174 MG/DL (ref 70–110)
POCT GLUCOSE: 92 MG/DL (ref 70–110)
POTASSIUM SERPL-SCNC: 4.3 MMOL/L (ref 3.5–5.1)
PROT SERPL-MCNC: 9 G/DL (ref 6–8.4)
RBC # BLD AUTO: 3.26 M/UL (ref 4.6–6.2)
SODIUM SERPL-SCNC: 138 MMOL/L (ref 136–145)
WBC # BLD AUTO: 13.43 K/UL (ref 3.9–12.7)

## 2022-01-15 PROCEDURE — 25000003 PHARM REV CODE 250: Performed by: FAMILY MEDICINE

## 2022-01-15 PROCEDURE — 63600175 PHARM REV CODE 636 W HCPCS: Performed by: FAMILY MEDICINE

## 2022-01-15 PROCEDURE — 25000003 PHARM REV CODE 250: Performed by: STUDENT IN AN ORGANIZED HEALTH CARE EDUCATION/TRAINING PROGRAM

## 2022-01-15 PROCEDURE — 80053 COMPREHEN METABOLIC PANEL: CPT | Performed by: STUDENT IN AN ORGANIZED HEALTH CARE EDUCATION/TRAINING PROGRAM

## 2022-01-15 PROCEDURE — 25000003 PHARM REV CODE 250: Performed by: INTERNAL MEDICINE

## 2022-01-15 PROCEDURE — 21400001 HC TELEMETRY ROOM

## 2022-01-15 PROCEDURE — 27000207 HC ISOLATION

## 2022-01-15 PROCEDURE — 63600175 PHARM REV CODE 636 W HCPCS: Performed by: INTERNAL MEDICINE

## 2022-01-15 PROCEDURE — 36415 COLL VENOUS BLD VENIPUNCTURE: CPT | Performed by: STUDENT IN AN ORGANIZED HEALTH CARE EDUCATION/TRAINING PROGRAM

## 2022-01-15 PROCEDURE — 25000003 PHARM REV CODE 250: Performed by: HOSPITALIST

## 2022-01-15 PROCEDURE — 85025 COMPLETE CBC W/AUTO DIFF WBC: CPT | Performed by: STUDENT IN AN ORGANIZED HEALTH CARE EDUCATION/TRAINING PROGRAM

## 2022-01-15 RX ADMIN — SEVELAMER CARBONATE 2400 MG: 800 TABLET, FILM COATED ORAL at 09:01

## 2022-01-15 RX ADMIN — HEPARIN SODIUM 5000 UNITS: 5000 INJECTION INTRAVENOUS; SUBCUTANEOUS at 04:01

## 2022-01-15 RX ADMIN — CARVEDILOL 6.25 MG: 6.25 TABLET, FILM COATED ORAL at 09:01

## 2022-01-15 RX ADMIN — HEPARIN SODIUM 5000 UNITS: 5000 INJECTION INTRAVENOUS; SUBCUTANEOUS at 09:01

## 2022-01-15 RX ADMIN — OXYCODONE 5 MG: 5 TABLET ORAL at 12:01

## 2022-01-15 RX ADMIN — QUETIAPINE FUMARATE 25 MG: 25 TABLET ORAL at 09:01

## 2022-01-15 RX ADMIN — HYDRALAZINE HYDROCHLORIDE 100 MG: 25 TABLET, FILM COATED ORAL at 05:01

## 2022-01-15 RX ADMIN — NEPHROCAP 1 CAPSULE: 1 CAP ORAL at 09:01

## 2022-01-15 RX ADMIN — CEFEPIME HYDROCHLORIDE 1 G: 1 INJECTION, SOLUTION INTRAVENOUS at 04:01

## 2022-01-15 RX ADMIN — ATORVASTATIN CALCIUM 20 MG: 10 TABLET, FILM COATED ORAL at 09:01

## 2022-01-15 RX ADMIN — SEVELAMER CARBONATE 2400 MG: 800 TABLET, FILM COATED ORAL at 04:01

## 2022-01-15 RX ADMIN — HYDRALAZINE HYDROCHLORIDE 100 MG: 25 TABLET, FILM COATED ORAL at 04:01

## 2022-01-15 RX ADMIN — HEPARIN SODIUM 5000 UNITS: 5000 INJECTION INTRAVENOUS; SUBCUTANEOUS at 05:01

## 2022-01-15 RX ADMIN — HYDRALAZINE HYDROCHLORIDE 100 MG: 25 TABLET, FILM COATED ORAL at 09:01

## 2022-01-15 RX ADMIN — LISINOPRIL 5 MG: 5 TABLET ORAL at 09:01

## 2022-01-15 RX ADMIN — SEVELAMER CARBONATE 2400 MG: 800 TABLET, FILM COATED ORAL at 12:01

## 2022-01-15 RX ADMIN — PANTOPRAZOLE SODIUM 40 MG: 40 TABLET, DELAYED RELEASE ORAL at 09:01

## 2022-01-15 RX ADMIN — INSULIN ASPART 2 UNITS: 100 INJECTION, SOLUTION INTRAVENOUS; SUBCUTANEOUS at 09:01

## 2022-01-15 NOTE — ASSESSMENT & PLAN NOTE
Waxing and waning confusion likely delirium secondary to ischemic foot w/ possible superimposed infection and uremia from missing dialysis. Psychiatry deemed patient not to have capacity to make medical decisions. Overall orientation improved after starting low dose seroquel.  - psychiatry consulted, appreciate recs  - tx of underlying issues as noted elsewhere  - continue seroquel  - delirium precautions

## 2022-01-15 NOTE — PROGRESS NOTES
Renal Progress Note    Date of Admission:  1/5/2022 11:39 AM    Length of Stay: 10  Days    Subjective: n/a    Objective:    Current Facility-Administered Medications   Medication    0.9%  NaCl infusion    0.9%  NaCl infusion    acetaminophen tablet 650 mg    acetaminophen tablet 650 mg    atorvastatin tablet 20 mg    carvediloL tablet 6.25 mg    cefepime in dextrose 5 % 1 gram/50 mL IVPB 1 g    dextrose 50% injection 12.5 g    dextrose 50% injection 25 g    glucagon (human recombinant) injection 1 mg    glucose chewable tablet 16 g    glucose chewable tablet 24 g    heparin (porcine) injection 5,000 Units    heparin (porcine) injection 5,000 Units    hydrALAZINE tablet 100 mg    HYDROmorphone (PF) injection 0.5 mg    influenza (QUADRIVALENT PF) vaccine 0.5 mL    insulin aspart U-100 pen 1-10 Units    lisinopriL tablet 5 mg    magnesium oxide tablet 800 mg    magnesium oxide tablet 800 mg    naloxone 0.4 mg/mL injection 0.02 mg    OLANZapine injection 5 mg    olanzapine zydis disintegrating tablet 5 mg    ondansetron injection 8 mg    oxyCODONE immediate release tablet 5 mg    pantoprazole EC tablet 40 mg    QUEtiapine tablet 25 mg    sevelamer carbonate tablet 2,400 mg    sodium chloride 0.9% bolus 250 mL    sodium chloride 0.9% flush 10 mL    vitamin renal formula (B-complex-vitamin c-folic acid) 1 mg per capsule 1 capsule       Vitals:    01/15/22 0814 01/15/22 1112 01/15/22 1224 01/15/22 1551   BP: 132/63 (!) 102/59  133/60   BP Location:    Right arm   Patient Position:    Lying   Pulse: 93 88  86   Resp: 18 19 18 18   Temp: 99.2 °F (37.3 °C) 99.1 °F (37.3 °C)  98.2 °F (36.8 °C)   TempSrc:    Oral   SpO2: 97% 98%  98%   Weight:       Height:           I/O last 3 completed shifts:  In: 500 [Other:500]  Out: 2000 [Other:2000]  No intake/output data recorded.      Physical Exam: deferred due to Covid-19    Laboratories:    Recent Labs   Lab 01/15/22  4490    WBC 13.43*   RBC 3.26*   HGB 8.9*   HCT 29.1*   *   MCV 89   MCH 27.3   MCHC 30.6*       Recent Labs   Lab 01/15/22  0416   CALCIUM 8.6*   PROT 9.0*      K 4.3   CO2 24   CL 98   BUN 29*   CREATININE 8.6*   ALKPHOS 77   ALT 13   AST 36   BILITOT 0.1       No results for input(s): COLORU, CLARITYU, SPECGRAV, PHUR, PROTEINUA, GLUCOSEU, BLOODU, WBCU, RBCU, BACTERIA, MUCUS in the last 24 hours.    Invalid input(s):  BILIRUBINCON    Microbiology Results (last 7 days)     Procedure Component Value Units Date/Time    Blood culture [[id number]] Collected: 01/09/22 1216    Order Status: Completed Specimen: Blood Updated: 01/13/22 1303     Blood Culture, Routine No Growth after 4 days.     Blood culture [[id number]] Collected: 01/09/22 1217    Order Status: Completed Specimen: Blood Updated: 01/13/22 1303     Blood Culture, Routine No Growth after 4 days.     AFB Culture & Smear [[id number]] Collected: 01/06/22 1100    Order Status: Completed Specimen: Wound from Foot, Left Updated: 01/10/22 1600     AFB Culture & Smear Culture in progress     AFB CULTURE STAIN No acid fast bacilli seen.    Narrative:      Left foot metatarsal  Proximal margin - left foot    AFB Culture & Smear [[id number]] Collected: 01/06/22 1100    Order Status: Completed Specimen: Wound from Foot, Left Updated: 01/10/22 1600     AFB Culture & Smear Culture in progress     AFB CULTURE STAIN No acid fast bacilli seen.    Culture, Anaerobe [[id number]] Collected: 01/06/22 1100    Order Status: Completed Specimen: Wound from Foot, Left Updated: 01/10/22 1357     Anaerobic Culture No anaerobes isolated    Narrative:      Left foot metatarsal  Proximal margin - left foot    Culture, Anaerobe [[id number]] Collected: 01/06/22 1100    Order Status: Completed Specimen: Wound from Foot, Left Updated: 01/10/22 1357     Anaerobic Culture No anaerobes isolated    Aerobic culture [[id number]]  (Abnormal)  (Susceptibility) Collected: 01/06/22 1100    Order  Status: Completed Specimen: Wound from Foot, Left Updated: 01/10/22 0838     Aerobic Bacterial Culture CITROBACTER KOSERI  Many  For susceptibility see order # [id number]        CITROBACTER FREUNDII  Many  For susceptibility see order # [id number]        PSEUDOMONAS AERUGINOSA    Narrative:      Left foot metatarsal  Proximal margin - left foot    Blood culture [[id number]] Collected: 01/05/22 1309    Order Status: Completed Specimen: Blood from Peripheral, Wrist, Right Updated: 01/09/22 1503     Blood Culture, Routine No Growth after 4 days.     Blood culture [[id number]] Collected: 01/05/22 1308    Order Status: Completed Specimen: Blood from Peripheral, Hand, Right Updated: 01/09/22 1503     Blood Culture, Routine No Growth after 4 days.     Aerobic culture [[id number]]  (Abnormal)  (Susceptibility) Collected: 01/06/22 1100    Order Status: Completed Specimen: Wound from Foot, Left Updated: 01/09/22 0857     Aerobic Bacterial Culture CITROBACTER KOSERI  Many        CITROBACTER FREUNDII  Few              Diagnostic Tests: n/a        Assessment:    57 y/o male with ESRD on HD (outside Nephrologist) admitted with:    L-foot gangrene s/p amputation, debridement and washout  Citrobacter + Pseudomonas wound infection  Osteo  LE PAD  + Covid test  Recurrent AVF malfunction s/p re-do Fistulogram 1/12/22 (HD nurse unable to needle access 1/11/22 due to swelling-pain and again poor blood flows on 1/12  Uncontrolled HTN  Hyperkalemia IMPROVING  Severe Hyperphosphatemia  IDDM2  Chronic diastolic HF  Hypoalbuminemia  Non-compliant behavior: refusing procedures, labs. V/S etc. While in Hospital, Pte. Already seen by Psychiatry.    Plan:    Dialysis? MWF   Epogen 3 x week  Antibiotics per ID  Renal ADA diet low PO4-  Adjust oral PO4- binders as needed  Adjust opiate dose for ESRD  Podiatry and Vascular following  Other problems per admitting

## 2022-01-15 NOTE — ASSESSMENT & PLAN NOTE
Presents with gangrenous L foot in the setting of severe PAD. Underwent left 2-5 metatarsal amputations w/ washout and debridement w/ Vascular surgery. Initial plan for angiogram w/ intervention, however pt refused the procedure. Given the low initial probability of wound healing and infectious clearance even with vascular intervention and lack of patient ability to adhere to medical treatment, Dr. Soni recommended AKA. Ortho consulted. Family initially in agreement, but wanted to discuss more.   - possible AKA pending discussion w/ ortho, family  - antibiotics per ID (who recommend amputation)

## 2022-01-15 NOTE — PROGRESS NOTES
[facility] Medicine  Progress Note    Patient Name: Adryan Neff  MRN: [id number]  Patient Class: IP- Inpatient   Admission Date: 1/5/2022  Length of Stay: 10 days  Attending Physician: Nghia Jordan MD  Primary Care Provider: Soren Rice MD        Subjective:     Principal Problem:Gangrene of left foot        HPI:  This is a 56 y.o.male patient, with a PMHx of ESRD on hemodialysis, CAD, HTN, and DM, presenting to the ED for further evaluation of left foot pain and black discoloration that began 3 days ago. Patient states these symptoms have been ongoing for the last month. He reports being referred to a specialist but denies ever following up. He has been putting some otc medication on the foot that he believes has turned his foot more black. No trauma or injury. Patient reports associated chills. Patient states applying a creme to the foot and noticed his discoloration worsened. Patient reports he was last dialyzed yesterday. Patient denies any fever, shortness of breath, chest pain, neck pain, back pain, abdominal pain, rash, headaches, congestion, rhinorrhea, cough, sore throat, ear pain, eye pain, blurred vision, nausea, vomiting, diarrhea, dysuria, or any other associated symptoms.    Per chart review, he had an arterial u/s 12/15.   Impression:   1. High-grade severe stenosis right popliteal artery with occlusion anterior tibial artery which is reconstituted at level of DPA via collaterals.  2. 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    Cardiology has been planning to see him for revascularization options. Last podiatry visit 12/13 with Stable dry gangrene L 2nd toe and medial L 5th toe. Dorsalis pedis and posterior tibial pulses are diminished Bilaterally. Toes are cool to touch. Feet are warm proximally.There is decreased digital hair. Skin is atrophic, slightly hyperpigmented, and mildly edematous     In the ED, he was found to have worsened gangrene and a  cool extremity. Vascular surgery was consulted. He was also COVID +. Says he got a booster shot in November sometime.      Overview/Hospital Course:  Patient admitted to the hospital for eval and treatment of L foot gangrene.  Ortho and Vasc consulted. Nephrology was also consulted for chronic dialysis. Patient had several toes on L foot amputated.  Continued with dialysis. PT/OT consulted.     Pt became increasingly delirious and combative. Psychiatry evaluated, felt that he was not able to make his own medical decisions. Vascular surgery discussed with daughter, initial plan for aKA.      Interval History: No events overnight. Pt complains of pain this AM.    Review of Systems   Constitutional: Negative for chills and fever.   Respiratory: Negative for cough and shortness of breath.    Cardiovascular: Negative for chest pain and leg swelling.   Gastrointestinal: Negative for abdominal distention and abdominal pain.   Musculoskeletal:        Foot pain     Objective:     Vital Signs (Most Recent):  Temp: 99.1 °F (37.3 °C) (01/15/22 1112)  Pulse: 88 (01/15/22 1112)  Resp: 18 (01/15/22 1224)  BP: (!) 102/59 (01/15/22 1112)  SpO2: 98 % (01/15/22 1112) Vital Signs (24h Range):  Temp:  [97 °F (36.1 °C)-99.2 °F (37.3 °C)] 99.1 °F (37.3 °C)  Pulse:  [] 88  Resp:  [15-19] 18  SpO2:  [97 %-98 %] 98 %  BP: ()/(50-87) 102/59     Weight: 64.3 kg (141 lb 12.1 oz)  Body mass index is 20.34 kg/m².    Intake/Output Summary (Last 24 hours) at 1/15/2022 1425  Last data filed at 1/14/2022 1925  Gross per 24 hour   Intake 500 ml   Output 2000 ml   Net -1500 ml      Physical Exam  Constitutional:       General: He is not in acute distress.     Appearance: He is ill-appearing. He is not toxic-appearing or diaphoretic.   Cardiovascular:      Rate and Rhythm: Normal rate and regular rhythm.      Heart sounds: No murmur heard.  No gallop.    Pulmonary:      Effort: Pulmonary effort is normal. No respiratory distress.      Breath  sounds: Normal breath sounds. No wheezing.   Abdominal:      General: Bowel sounds are normal. There is no distension.      Palpations: Abdomen is soft.      Tenderness: There is no abdominal tenderness.   Skin:     Comments: L foot s/p multiple amputated digits, necrotic tissue at site of amputation         Significant Labs: All pertinent labs within the past 24 hours have been reviewed.    Significant Imaging: I have reviewed all pertinent imaging results/findings within the past 24 hours.      Assessment/Plan:      * Gangrene of left foot  Presents with gangrenous L foot in the setting of severe PAD. Underwent left 2-5 metatarsal amputations w/ washout and debridement w/ Vascular surgery. Initial plan for angiogram w/ intervention, however pt refused the procedure. Given the low initial probability of wound healing and infectious clearance even with vascular intervention and lack of patient ability to adhere to medical treatment, Dr. Soni recommended AKA. Ortho consulted. Family initially in agreement, but wanted to discuss more.   - possible AKA pending discussion w/ ortho, family  - antibiotics per ID (who recommend amputation)      Encephalopathy, metabolic  Waxing and waning confusion likely delirium secondary to ischemic foot w/ possible superimposed infection and uremia from missing dialysis. Psychiatry deemed patient not to have capacity to make medical decisions. Overall orientation improved after starting low dose seroquel.  - psychiatry consulted, appreciate recs  - tx of underlying issues as noted elsewhere  - continue seroquel  - delirium precautions    COVID-19 in immunocompromised patient  HD patient + for COVID 19. Not hypoxic or symptomatic. Supportive care prn.       ESRD on hemodialysis  Nephrology consulted to resume routine HD. Fistula malfunctioning despite IR attempts at declot, THDC placed for alternative access  - nephrology following  - needs vascular vs IR fix of fistula    Chronic  diastolic heart failure  December 2021 echo reviewed. Stable.       Essential (primary) hypertension  Titrate to goal. Pt intermittently refuses po medications, requiring IV meds      Controlled type 2 diabetes mellitus with chronic kidney disease on chronic dialysis, with long-term current use of insulin  Pt refused insulin, has intermittent PO intake.  - sliding scale insulin for now      VTE Risk Mitigation (From admission, onward)         Ordered     heparin (porcine) injection 5,000 Units  As needed (PRN)         01/14/22 1716     heparin (porcine) injection 5,000 Units  Every 8 hours         01/05/22 1404     IP VTE HIGH RISK PATIENT  Once         01/05/22 1404     Place sequential compression device  Until discontinued         01/05/22 1404                Discharge Planning   ENIO:      Code Status: Full Code   Is the patient medically ready for discharge?:     Reason for patient still in hospital (select all that apply): Patient trending condition, Laboratory test, Treatment, Consult recommendations and Pending disposition  Discharge Plan A: Skilled Nursing Facility                  Nghia Jordan MD  Department of Hospital Medicine   [facility]

## 2022-01-15 NOTE — PROGRESS NOTES
Patient is in room today  Daughter not in room       White blood cell count 13 today from 16 yesterday  Afebrile    Attempted to call daughter, no answer      Physical exam:  Unchanged    Will follow and discuss need for AKA with family once available. Will attempt daily  Given he is afebrile with an improving  White count this is not emergent.    MD Kuldeep

## 2022-01-15 NOTE — ASSESSMENT & PLAN NOTE
Nephrology consulted to resume routine HD. Fistula malfunctioning despite IR attempts at declot, THDC placed for alternative access  - nephrology following  - needs vascular vs IR fix of fistula

## 2022-01-15 NOTE — PROGRESS NOTES
Pt not in room today  Daughter not in room either  Attempted to call daughter, no answer    Will follow and discuss need for AKA with family once available. Will attempt daily    MD Kuldeep

## 2022-01-15 NOTE — SUBJECTIVE & OBJECTIVE
Interval History: No events overnight. Pt complains of pain this AM.    Review of Systems   Constitutional: Negative for chills and fever.   Respiratory: Negative for cough and shortness of breath.    Cardiovascular: Negative for chest pain and leg swelling.   Gastrointestinal: Negative for abdominal distention and abdominal pain.   Musculoskeletal:        Foot pain     Objective:     Vital Signs (Most Recent):  Temp: 99.1 °F (37.3 °C) (01/15/22 1112)  Pulse: 88 (01/15/22 1112)  Resp: 18 (01/15/22 1224)  BP: (!) 102/59 (01/15/22 1112)  SpO2: 98 % (01/15/22 1112) Vital Signs (24h Range):  Temp:  [97 °F (36.1 °C)-99.2 °F (37.3 °C)] 99.1 °F (37.3 °C)  Pulse:  [] 88  Resp:  [15-19] 18  SpO2:  [97 %-98 %] 98 %  BP: ()/(50-87) 102/59     Weight: 64.3 kg (141 lb 12.1 oz)  Body mass index is 20.34 kg/m².    Intake/Output Summary (Last 24 hours) at 1/15/2022 1425  Last data filed at 1/14/2022 1925  Gross per 24 hour   Intake 500 ml   Output 2000 ml   Net -1500 ml      Physical Exam  Constitutional:       General: He is not in acute distress.     Appearance: He is ill-appearing. He is not toxic-appearing or diaphoretic.   Cardiovascular:      Rate and Rhythm: Normal rate and regular rhythm.      Heart sounds: No murmur heard.  No gallop.    Pulmonary:      Effort: Pulmonary effort is normal. No respiratory distress.      Breath sounds: Normal breath sounds. No wheezing.   Abdominal:      General: Bowel sounds are normal. There is no distension.      Palpations: Abdomen is soft.      Tenderness: There is no abdominal tenderness.   Skin:     Comments: L foot s/p multiple amputated digits, necrotic tissue at site of amputation         Significant Labs: All pertinent labs within the past 24 hours have been reviewed.    Significant Imaging: I have reviewed all pertinent imaging results/findings within the past 24 hours.

## 2022-01-16 LAB
ALBUMIN SERPL BCP-MCNC: 2.1 G/DL (ref 3.5–5.2)
ALP SERPL-CCNC: 79 U/L (ref 55–135)
ALT SERPL W/O P-5'-P-CCNC: 12 U/L (ref 10–44)
ANION GAP SERPL CALC-SCNC: 14 MMOL/L (ref 8–16)
AST SERPL-CCNC: 28 U/L (ref 10–40)
BASOPHILS # BLD AUTO: 0.09 K/UL (ref 0–0.2)
BASOPHILS NFR BLD: 0.6 % (ref 0–1.9)
BILIRUB SERPL-MCNC: 0.3 MG/DL (ref 0.1–1)
BUN SERPL-MCNC: 49 MG/DL (ref 6–20)
CALCIUM SERPL-MCNC: 8.9 MG/DL (ref 8.7–10.5)
CHLORIDE SERPL-SCNC: 101 MMOL/L (ref 95–110)
CO2 SERPL-SCNC: 24 MMOL/L (ref 23–29)
CREAT SERPL-MCNC: 12.2 MG/DL (ref 0.5–1.4)
DIFFERENTIAL METHOD: ABNORMAL
EOSINOPHIL # BLD AUTO: 0.6 K/UL (ref 0–0.5)
EOSINOPHIL NFR BLD: 4.4 % (ref 0–8)
ERYTHROCYTE [DISTWIDTH] IN BLOOD BY AUTOMATED COUNT: 16.2 % (ref 11.5–14.5)
EST. GFR  (AFRICAN AMERICAN): 5 ML/MIN/1.73 M^2
EST. GFR  (NON AFRICAN AMERICAN): 4 ML/MIN/1.73 M^2
GLUCOSE SERPL-MCNC: 142 MG/DL (ref 70–110)
HCT VFR BLD AUTO: 29.1 % (ref 40–54)
HGB BLD-MCNC: 8.4 G/DL (ref 14–18)
IMM GRANULOCYTES # BLD AUTO: 0.14 K/UL (ref 0–0.04)
IMM GRANULOCYTES NFR BLD AUTO: 1 % (ref 0–0.5)
LYMPHOCYTES # BLD AUTO: 1.4 K/UL (ref 1–4.8)
LYMPHOCYTES NFR BLD: 9.8 % (ref 18–48)
MCH RBC QN AUTO: 26.9 PG (ref 27–31)
MCHC RBC AUTO-ENTMCNC: 28.9 G/DL (ref 32–36)
MCV RBC AUTO: 93 FL (ref 82–98)
MONOCYTES # BLD AUTO: 1.6 K/UL (ref 0.3–1)
MONOCYTES NFR BLD: 11.7 % (ref 4–15)
NEUTROPHILS # BLD AUTO: 10.2 K/UL (ref 1.8–7.7)
NEUTROPHILS NFR BLD: 72.5 % (ref 38–73)
NRBC BLD-RTO: 0 /100 WBC
PLATELET # BLD AUTO: 491 K/UL (ref 150–450)
PMV BLD AUTO: 9.4 FL (ref 9.2–12.9)
POCT GLUCOSE: 121 MG/DL (ref 70–110)
POCT GLUCOSE: 137 MG/DL (ref 70–110)
POCT GLUCOSE: 177 MG/DL (ref 70–110)
POCT GLUCOSE: 248 MG/DL (ref 70–110)
POCT GLUCOSE: 378 MG/DL (ref 70–110)
POTASSIUM SERPL-SCNC: 4.5 MMOL/L (ref 3.5–5.1)
PROT SERPL-MCNC: 8.7 G/DL (ref 6–8.4)
RBC # BLD AUTO: 3.12 M/UL (ref 4.6–6.2)
SODIUM SERPL-SCNC: 139 MMOL/L (ref 136–145)
WBC # BLD AUTO: 14.02 K/UL (ref 3.9–12.7)

## 2022-01-16 PROCEDURE — 21400001 HC TELEMETRY ROOM

## 2022-01-16 PROCEDURE — 25000003 PHARM REV CODE 250: Performed by: STUDENT IN AN ORGANIZED HEALTH CARE EDUCATION/TRAINING PROGRAM

## 2022-01-16 PROCEDURE — 63600175 PHARM REV CODE 636 W HCPCS: Performed by: FAMILY MEDICINE

## 2022-01-16 PROCEDURE — 36415 COLL VENOUS BLD VENIPUNCTURE: CPT | Performed by: STUDENT IN AN ORGANIZED HEALTH CARE EDUCATION/TRAINING PROGRAM

## 2022-01-16 PROCEDURE — 25000003 PHARM REV CODE 250: Performed by: FAMILY MEDICINE

## 2022-01-16 PROCEDURE — 63600175 PHARM REV CODE 636 W HCPCS: Performed by: INTERNAL MEDICINE

## 2022-01-16 PROCEDURE — 85025 COMPLETE CBC W/AUTO DIFF WBC: CPT | Performed by: STUDENT IN AN ORGANIZED HEALTH CARE EDUCATION/TRAINING PROGRAM

## 2022-01-16 PROCEDURE — 27000207 HC ISOLATION

## 2022-01-16 PROCEDURE — 80053 COMPREHEN METABOLIC PANEL: CPT | Performed by: STUDENT IN AN ORGANIZED HEALTH CARE EDUCATION/TRAINING PROGRAM

## 2022-01-16 PROCEDURE — 25000003 PHARM REV CODE 250: Performed by: INTERNAL MEDICINE

## 2022-01-16 PROCEDURE — 25000003 PHARM REV CODE 250: Performed by: HOSPITALIST

## 2022-01-16 RX ADMIN — NEPHROCAP 1 CAPSULE: 1 CAP ORAL at 08:01

## 2022-01-16 RX ADMIN — SEVELAMER CARBONATE 2400 MG: 800 TABLET, FILM COATED ORAL at 05:01

## 2022-01-16 RX ADMIN — ATORVASTATIN CALCIUM 20 MG: 10 TABLET, FILM COATED ORAL at 08:01

## 2022-01-16 RX ADMIN — INSULIN ASPART 10 UNITS: 100 INJECTION, SOLUTION INTRAVENOUS; SUBCUTANEOUS at 11:01

## 2022-01-16 RX ADMIN — HYDRALAZINE HYDROCHLORIDE 100 MG: 25 TABLET, FILM COATED ORAL at 05:01

## 2022-01-16 RX ADMIN — SEVELAMER CARBONATE 2400 MG: 800 TABLET, FILM COATED ORAL at 12:01

## 2022-01-16 RX ADMIN — PANTOPRAZOLE SODIUM 40 MG: 40 TABLET, DELAYED RELEASE ORAL at 08:01

## 2022-01-16 RX ADMIN — CARVEDILOL 6.25 MG: 6.25 TABLET, FILM COATED ORAL at 09:01

## 2022-01-16 RX ADMIN — CARVEDILOL 6.25 MG: 6.25 TABLET, FILM COATED ORAL at 08:01

## 2022-01-16 RX ADMIN — QUETIAPINE FUMARATE 25 MG: 25 TABLET ORAL at 09:01

## 2022-01-16 RX ADMIN — HEPARIN SODIUM 5000 UNITS: 5000 INJECTION INTRAVENOUS; SUBCUTANEOUS at 05:01

## 2022-01-16 RX ADMIN — CEFEPIME HYDROCHLORIDE 1 G: 1 INJECTION, SOLUTION INTRAVENOUS at 05:01

## 2022-01-16 RX ADMIN — HEPARIN SODIUM 5000 UNITS: 5000 INJECTION INTRAVENOUS; SUBCUTANEOUS at 09:01

## 2022-01-16 RX ADMIN — SEVELAMER CARBONATE 2400 MG: 800 TABLET, FILM COATED ORAL at 08:01

## 2022-01-16 RX ADMIN — LISINOPRIL 5 MG: 5 TABLET ORAL at 08:01

## 2022-01-16 RX ADMIN — HYDRALAZINE HYDROCHLORIDE 100 MG: 25 TABLET, FILM COATED ORAL at 09:01

## 2022-01-16 RX ADMIN — HEPARIN SODIUM 5000 UNITS: 5000 INJECTION INTRAVENOUS; SUBCUTANEOUS at 01:01

## 2022-01-16 NOTE — NURSING
Wound care completed per orders. Pt tolerated well, daughter at bedside with Dr Light for amputation surgical consent. Consent witnessed and placed in chart.

## 2022-01-16 NOTE — PROGRESS NOTES
Patient seen examined at bedside this morning.  Daughter at bedside.  Had extensive conversation with the patient's daughter regarding the risks benefits and alternatives as well as anticipated convalescence of left above knee amputation in this patient with comorbidities including severe peripheral vascular disease as well as end-stage renal disease.  Recommendations from vascular surgery for left above knee amputation noted  .      Physical exam:  Unchanged    Assessment and plan:  56-year-old male with peripheral vascular disease and end-stage renal disease and left lower extremity peripheral vascular disease.  Orthopedics was consulted for left above knee amputation.  Daughter at bedside.  Had extensive conversation with the daughter daughter wishes to proceed with left above knee amputation informed consent signed surgical site marked.      Will proceed with left above knee amputation.      MD Mary

## 2022-01-16 NOTE — SUBJECTIVE & OBJECTIVE
Interval History: No events overnight. Pt complains of pain this AM.    Review of Systems   Constitutional: Negative for chills and fever.   Respiratory: Negative for cough and shortness of breath.    Cardiovascular: Negative for chest pain and leg swelling.   Gastrointestinal: Negative for abdominal distention and abdominal pain.     Objective:     Vital Signs (Most Recent):  Temp: 97.9 °F (36.6 °C) (01/16/22 1110)  Pulse: 93 (01/16/22 1110)  Resp: 19 (01/16/22 1110)  BP: 111/73 (01/16/22 1110)  SpO2: 98 % (01/16/22 1110) Vital Signs (24h Range):  Temp:  [97.9 °F (36.6 °C)-99.6 °F (37.6 °C)] 97.9 °F (36.6 °C)  Pulse:  [86-95] 93  Resp:  [18-19] 19  SpO2:  [97 %-98 %] 98 %  BP: (111-133)/(59-73) 111/73     Weight: 64.3 kg (141 lb 12.1 oz)  Body mass index is 20.34 kg/m².  No intake or output data in the 24 hours ending 01/16/22 1132   Physical Exam  Constitutional:       General: He is not in acute distress.     Appearance: He is ill-appearing. He is not toxic-appearing or diaphoretic.   Cardiovascular:      Rate and Rhythm: Normal rate and regular rhythm.      Heart sounds: No murmur heard.  No gallop.    Pulmonary:      Effort: Pulmonary effort is normal. No respiratory distress.      Breath sounds: Normal breath sounds. No wheezing.   Abdominal:      General: Bowel sounds are normal. There is no distension.      Palpations: Abdomen is soft.      Tenderness: There is no abdominal tenderness.   Skin:     Comments: L foot s/p multiple amputated digits, necrotic tissue at site of amputation         Significant Labs: All pertinent labs within the past 24 hours have been reviewed.    Significant Imaging: I have reviewed all pertinent imaging results/findings within the past 24 hours.

## 2022-01-16 NOTE — PROGRESS NOTES
[facility] Medicine  Progress Note    Patient Name: Adryan Neff  MRN: [id number]  Patient Class: IP- Inpatient   Admission Date: 1/5/2022  Length of Stay: 11 days  Attending Physician: Nghia Jordan MD  Primary Care Provider: Soren Rice MD        Subjective:     Principal Problem:Gangrene of left foot        HPI:  This is a 56 y.o.male patient, with a PMHx of ESRD on hemodialysis, CAD, HTN, and DM, presenting to the ED for further evaluation of left foot pain and black discoloration that began 3 days ago. Patient states these symptoms have been ongoing for the last month. He reports being referred to a specialist but denies ever following up. He has been putting some otc medication on the foot that he believes has turned his foot more black. No trauma or injury. Patient reports associated chills. Patient states applying a creme to the foot and noticed his discoloration worsened. Patient reports he was last dialyzed yesterday. Patient denies any fever, shortness of breath, chest pain, neck pain, back pain, abdominal pain, rash, headaches, congestion, rhinorrhea, cough, sore throat, ear pain, eye pain, blurred vision, nausea, vomiting, diarrhea, dysuria, or any other associated symptoms.    Per chart review, he had an arterial u/s 12/15.   Impression:   1. High-grade severe stenosis right popliteal artery with occlusion anterior tibial artery which is reconstituted at level of DPA via collaterals.  2. 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    Cardiology has been planning to see him for revascularization options. Last podiatry visit 12/13 with Stable dry gangrene L 2nd toe and medial L 5th toe. Dorsalis pedis and posterior tibial pulses are diminished Bilaterally. Toes are cool to touch. Feet are warm proximally.There is decreased digital hair. Skin is atrophic, slightly hyperpigmented, and mildly edematous     In the ED, he was found to have worsened gangrene and a  cool extremity. Vascular surgery was consulted. He was also COVID +. Says he got a booster shot in November sometime.      Overview/Hospital Course:  Patient admitted to the hospital for eval and treatment of L foot gangrene.  Ortho and Vasc consulted. Nephrology was also consulted for chronic dialysis. Patient had several toes on L foot amputated.  Continued with dialysis. PT/OT consulted.     Pt became increasingly delirious and combative. Psychiatry evaluated, felt that he was not able to make his own medical decisions. Vascular surgery discussed with daughter, initial plan for aKA.      Interval History: No events overnight. Pt complains of pain this AM.    Review of Systems   Constitutional: Negative for chills and fever.   Respiratory: Negative for cough and shortness of breath.    Cardiovascular: Negative for chest pain and leg swelling.   Gastrointestinal: Negative for abdominal distention and abdominal pain.     Objective:     Vital Signs (Most Recent):  Temp: 97.9 °F (36.6 °C) (01/16/22 1110)  Pulse: 93 (01/16/22 1110)  Resp: 19 (01/16/22 1110)  BP: 111/73 (01/16/22 1110)  SpO2: 98 % (01/16/22 1110) Vital Signs (24h Range):  Temp:  [97.9 °F (36.6 °C)-99.6 °F (37.6 °C)] 97.9 °F (36.6 °C)  Pulse:  [86-95] 93  Resp:  [18-19] 19  SpO2:  [97 %-98 %] 98 %  BP: (111-133)/(59-73) 111/73     Weight: 64.3 kg (141 lb 12.1 oz)  Body mass index is 20.34 kg/m².  No intake or output data in the 24 hours ending 01/16/22 1132   Physical Exam  Constitutional:       General: He is not in acute distress.     Appearance: He is ill-appearing. He is not toxic-appearing or diaphoretic.   Cardiovascular:      Rate and Rhythm: Normal rate and regular rhythm.      Heart sounds: No murmur heard.  No gallop.    Pulmonary:      Effort: Pulmonary effort is normal. No respiratory distress.      Breath sounds: Normal breath sounds. No wheezing.   Abdominal:      General: Bowel sounds are normal. There is no distension.      Palpations:  Abdomen is soft.      Tenderness: There is no abdominal tenderness.   Skin:     Comments: L foot s/p multiple amputated digits, necrotic tissue at site of amputation         Significant Labs: All pertinent labs within the past 24 hours have been reviewed.    Significant Imaging: I have reviewed all pertinent imaging results/findings within the past 24 hours.      Assessment/Plan:      * Gangrene of left foot  Presents with gangrenous L foot in the setting of severe PAD. Underwent left 2-5 metatarsal amputations w/ washout and debridement w/ Vascular surgery. Initial plan for angiogram w/ intervention, however pt refused the procedure. Given the low initial probability of wound healing and infectious clearance even with vascular intervention and lack of patient ability to adhere to medical treatment, Dr. Soni recommended AKA. Ortho consulted. Family initially in agreement, but wanted to discuss more.   - possible AKA pending discussion w/ ortho, family  - antibiotics per ID (who recommend amputation)      Encephalopathy, metabolic  Waxing and waning confusion likely delirium secondary to ischemic foot w/ possible superimposed infection and uremia from missing dialysis. Psychiatry deemed patient not to have capacity to make medical decisions. Overall orientation improved after starting low dose seroquel.  - psychiatry consulted, appreciate recs  - tx of underlying issues as noted elsewhere  - continue seroquel  - delirium precautions    COVID-19 in immunocompromised patient  HD patient + for COVID 19. Not hypoxic or symptomatic. Supportive care prn.       ESRD on hemodialysis  Nephrology consulted to resume routine HD. Fistula malfunctioning despite IR attempts at declot, THDC placed for alternative access  - nephrology following  - needs vascular vs IR fix of fistula    Chronic diastolic heart failure  December 2021 echo reviewed. Stable.       Essential (primary) hypertension  Titrate to goal. Pt intermittently  refuses po medications, requiring IV meds      Controlled type 2 diabetes mellitus with chronic kidney disease on chronic dialysis, with long-term current use of insulin  Pt refused insulin, has intermittent PO intake.  - sliding scale insulin for now      VTE Risk Mitigation (From admission, onward)         Ordered     heparin (porcine) injection 5,000 Units  As needed (PRN)         01/14/22 1716     heparin (porcine) injection 5,000 Units  Every 8 hours         01/05/22 1404     IP VTE HIGH RISK PATIENT  Once         01/05/22 1404     Place sequential compression device  Until discontinued         01/05/22 1404                Discharge Planning   ENIO:      Code Status: Full Code   Is the patient medically ready for discharge?:     Reason for patient still in hospital (select all that apply): Patient trending condition, Laboratory test, Treatment, Consult recommendations and Pending disposition  Discharge Plan A: Skilled Nursing Facility                  Nghia Jordan MD  Department of Hospital Medicine   [facility]

## 2022-01-16 NOTE — PROGRESS NOTES
Renal Progress Note    Date of Admission:  1/5/2022 11:39 AM    Length of Stay: 11  Days    Subjective: n/a    Objective:    Current Facility-Administered Medications   Medication    0.9%  NaCl infusion    0.9%  NaCl infusion    acetaminophen tablet 650 mg    acetaminophen tablet 650 mg    atorvastatin tablet 20 mg    carvediloL tablet 6.25 mg    cefepime in dextrose 5 % 1 gram/50 mL IVPB 1 g    dextrose 50% injection 12.5 g    dextrose 50% injection 25 g    glucagon (human recombinant) injection 1 mg    glucose chewable tablet 16 g    glucose chewable tablet 24 g    heparin (porcine) injection 5,000 Units    heparin (porcine) injection 5,000 Units    hydrALAZINE tablet 100 mg    HYDROmorphone (PF) injection 0.5 mg    influenza (QUADRIVALENT PF) vaccine 0.5 mL    insulin aspart U-100 pen 1-10 Units    lisinopriL tablet 5 mg    magnesium oxide tablet 800 mg    magnesium oxide tablet 800 mg    naloxone 0.4 mg/mL injection 0.02 mg    OLANZapine injection 5 mg    olanzapine zydis disintegrating tablet 5 mg    ondansetron injection 8 mg    oxyCODONE immediate release tablet 5 mg    pantoprazole EC tablet 40 mg    QUEtiapine tablet 25 mg    sevelamer carbonate tablet 2,400 mg    sodium chloride 0.9% bolus 250 mL    sodium chloride 0.9% flush 10 mL    vitamin renal formula (B-complex-vitamin c-folic acid) 1 mg per capsule 1 capsule       Vitals:    01/16/22 0508 01/16/22 0750 01/16/22 1110 01/16/22 1602   BP: 121/60 125/60 111/73 (!) 124/59   BP Location: Left arm      Patient Position: Lying      Pulse: 88 91 93 85   Resp: 18 19 19 19   Temp: 99.6 °F (37.6 °C) 99 °F (37.2 °C) 97.9 °F (36.6 °C) 98.9 °F (37.2 °C)   TempSrc: Oral      SpO2: 97% 97% 98% 98%   Weight:       Height:           I/O last 3 completed shifts:  In: 500 [Other:500]  Out: 2000 [Other:2000]  No intake/output data recorded.      Physical Exam: deferred due to Covid-19    Laboratories:    Recent Labs    Lab 01/16/22  0432   WBC 14.02*   RBC 3.12*   HGB 8.4*   HCT 29.1*   *   MCV 93   MCH 26.9*   MCHC 28.9*       Recent Labs   Lab 01/16/22 0432   CALCIUM 8.9   PROT 8.7*      K 4.5   CO2 24      BUN 49*   CREATININE 12.2*   ALKPHOS 79   ALT 12   AST 28   BILITOT 0.3       No results for input(s): COLORU, CLARITYU, SPECGRAV, PHUR, PROTEINUA, GLUCOSEU, BLOODU, WBCU, RBCU, BACTERIA, MUCUS in the last 24 hours.    Invalid input(s):  BILIRUBINCON    Microbiology Results (last 7 days)     Procedure Component Value Units Date/Time    Blood culture [[id number]] Collected: 01/09/22 1216    Order Status: Completed Specimen: Blood Updated: 01/13/22 1303     Blood Culture, Routine No Growth after 4 days.     Blood culture [[id number]] Collected: 01/09/22 1217    Order Status: Completed Specimen: Blood Updated: 01/13/22 1303     Blood Culture, Routine No Growth after 4 days.     AFB Culture & Smear [[id number]] Collected: 01/06/22 1100    Order Status: Completed Specimen: Wound from Foot, Left Updated: 01/10/22 1600     AFB Culture & Smear Culture in progress     AFB CULTURE STAIN No acid fast bacilli seen.    Narrative:      Left foot metatarsal  Proximal margin - left foot    AFB Culture & Smear [[id number]] Collected: 01/06/22 1100    Order Status: Completed Specimen: Wound from Foot, Left Updated: 01/10/22 1600     AFB Culture & Smear Culture in progress     AFB CULTURE STAIN No acid fast bacilli seen.    Culture, Anaerobe [[id number]] Collected: 01/06/22 1100    Order Status: Completed Specimen: Wound from Foot, Left Updated: 01/10/22 1357     Anaerobic Culture No anaerobes isolated    Narrative:      Left foot metatarsal  Proximal margin - left foot    Culture, Anaerobe [[id number]] Collected: 01/06/22 1100    Order Status: Completed Specimen: Wound from Foot, Left Updated: 01/10/22 1357     Anaerobic Culture No anaerobes isolated    Aerobic culture [[id number]]  (Abnormal)  (Susceptibility) Collected:  01/06/22 1100    Order Status: Completed Specimen: Wound from Foot, Left Updated: 01/10/22 0838     Aerobic Bacterial Culture CITROBACTER KOSERI  Many  For susceptibility see order # [id number]        CITROBACTER FREUNDII  Many  For susceptibility see order # [id number]        PSEUDOMONAS AERUGINOSA    Narrative:      Left foot metatarsal  Proximal margin - left foot            Diagnostic Tests: n/a        Assessment:    57 y/o male with ESRD on HD (outside Nephrologist) admitted with:    L-foot gangrene s/p amputation, debridement and washout  Citrobacter + Pseudomonas wound infection  Osteo  LE PAD  + Covid test  Recurrent AVF malfunction s/p re-do Fistulogram 1/12/22 (HD nurse unable to needle access 1/11/22 due to swelling-pain and again poor blood flows on 1/12  Uncontrolled HTN  Hyperkalemia IMPROVING  Severe Hyperphosphatemia  IDDM2  Chronic diastolic HF  Hypoalbuminemia  Non-compliant behavior: refusing procedures, labs. V/S etc. While in Hospital, Pte. Already seen by Psychiatry.    Plan:    Dialysis? MWF   Epogen 3 x week  Antibiotics per ID  Renal ADA diet low PO4-  Adjust oral PO4- binders as needed  Adjust opiate dose for ESRD  Podiatry and Vascular following  Other problems per admitting

## 2022-01-17 LAB
ALBUMIN SERPL BCP-MCNC: 1.9 G/DL (ref 3.5–5.2)
ALP SERPL-CCNC: 89 U/L (ref 55–135)
ALT SERPL W/O P-5'-P-CCNC: 11 U/L (ref 10–44)
ANION GAP SERPL CALC-SCNC: 16 MMOL/L (ref 8–16)
AST SERPL-CCNC: 20 U/L (ref 10–40)
BASOPHILS # BLD AUTO: 0.09 K/UL (ref 0–0.2)
BASOPHILS NFR BLD: 0.5 % (ref 0–1.9)
BILIRUB SERPL-MCNC: 0.3 MG/DL (ref 0.1–1)
BUN SERPL-MCNC: 60 MG/DL (ref 6–20)
CALCIUM SERPL-MCNC: 8.6 MG/DL (ref 8.7–10.5)
CHLORIDE SERPL-SCNC: 96 MMOL/L (ref 95–110)
CO2 SERPL-SCNC: 22 MMOL/L (ref 23–29)
CREAT SERPL-MCNC: 13.9 MG/DL (ref 0.5–1.4)
DIFFERENTIAL METHOD: ABNORMAL
EOSINOPHIL # BLD AUTO: 0.9 K/UL (ref 0–0.5)
EOSINOPHIL NFR BLD: 5.1 % (ref 0–8)
ERYTHROCYTE [DISTWIDTH] IN BLOOD BY AUTOMATED COUNT: 16.1 % (ref 11.5–14.5)
EST. GFR  (AFRICAN AMERICAN): 4 ML/MIN/1.73 M^2
EST. GFR  (NON AFRICAN AMERICAN): 3 ML/MIN/1.73 M^2
GLUCOSE SERPL-MCNC: 232 MG/DL (ref 70–110)
HCT VFR BLD AUTO: 26.9 % (ref 40–54)
HGB BLD-MCNC: 7.8 G/DL (ref 14–18)
IMM GRANULOCYTES # BLD AUTO: 0.18 K/UL (ref 0–0.04)
IMM GRANULOCYTES NFR BLD AUTO: 1.1 % (ref 0–0.5)
LYMPHOCYTES # BLD AUTO: 1.8 K/UL (ref 1–4.8)
LYMPHOCYTES NFR BLD: 10.5 % (ref 18–48)
MCH RBC QN AUTO: 27 PG (ref 27–31)
MCHC RBC AUTO-ENTMCNC: 29 G/DL (ref 32–36)
MCV RBC AUTO: 93 FL (ref 82–98)
MONOCYTES # BLD AUTO: 1.4 K/UL (ref 0.3–1)
MONOCYTES NFR BLD: 8.3 % (ref 4–15)
NEUTROPHILS # BLD AUTO: 12.5 K/UL (ref 1.8–7.7)
NEUTROPHILS NFR BLD: 74.5 % (ref 38–73)
NRBC BLD-RTO: 0 /100 WBC
PLATELET # BLD AUTO: 466 K/UL (ref 150–450)
PMV BLD AUTO: 9.4 FL (ref 9.2–12.9)
POCT GLUCOSE: 140 MG/DL (ref 70–110)
POCT GLUCOSE: 146 MG/DL (ref 70–110)
POCT GLUCOSE: 220 MG/DL (ref 70–110)
POCT GLUCOSE: 389 MG/DL (ref 70–110)
POTASSIUM SERPL-SCNC: 4.4 MMOL/L (ref 3.5–5.1)
PROT SERPL-MCNC: 7.9 G/DL (ref 6–8.4)
RBC # BLD AUTO: 2.89 M/UL (ref 4.6–6.2)
SODIUM SERPL-SCNC: 134 MMOL/L (ref 136–145)
WBC # BLD AUTO: 16.77 K/UL (ref 3.9–12.7)

## 2022-01-17 PROCEDURE — 80053 COMPREHEN METABOLIC PANEL: CPT | Performed by: STUDENT IN AN ORGANIZED HEALTH CARE EDUCATION/TRAINING PROGRAM

## 2022-01-17 PROCEDURE — 85025 COMPLETE CBC W/AUTO DIFF WBC: CPT | Performed by: STUDENT IN AN ORGANIZED HEALTH CARE EDUCATION/TRAINING PROGRAM

## 2022-01-17 PROCEDURE — 25000003 PHARM REV CODE 250: Performed by: INTERNAL MEDICINE

## 2022-01-17 PROCEDURE — 25000003 PHARM REV CODE 250: Performed by: FAMILY MEDICINE

## 2022-01-17 PROCEDURE — 36415 COLL VENOUS BLD VENIPUNCTURE: CPT | Performed by: STUDENT IN AN ORGANIZED HEALTH CARE EDUCATION/TRAINING PROGRAM

## 2022-01-17 PROCEDURE — 80100016 HC MAINTENANCE HEMODIALYSIS

## 2022-01-17 PROCEDURE — 25000003 PHARM REV CODE 250: Performed by: HOSPITALIST

## 2022-01-17 PROCEDURE — 63600175 PHARM REV CODE 636 W HCPCS: Performed by: INTERNAL MEDICINE

## 2022-01-17 PROCEDURE — 63600175 PHARM REV CODE 636 W HCPCS: Performed by: FAMILY MEDICINE

## 2022-01-17 PROCEDURE — 27000207 HC ISOLATION

## 2022-01-17 PROCEDURE — 25000003 PHARM REV CODE 250: Performed by: STUDENT IN AN ORGANIZED HEALTH CARE EDUCATION/TRAINING PROGRAM

## 2022-01-17 PROCEDURE — 21400001 HC TELEMETRY ROOM

## 2022-01-17 RX ORDER — SODIUM CHLORIDE 9 MG/ML
INJECTION, SOLUTION INTRAVENOUS ONCE
Status: DISCONTINUED | OUTPATIENT
Start: 2022-01-17 | End: 2022-01-24

## 2022-01-17 RX ADMIN — INSULIN ASPART 10 UNITS: 100 INJECTION, SOLUTION INTRAVENOUS; SUBCUTANEOUS at 05:01

## 2022-01-17 RX ADMIN — CEFEPIME HYDROCHLORIDE 1 G: 1 INJECTION, SOLUTION INTRAVENOUS at 05:01

## 2022-01-17 RX ADMIN — HYDRALAZINE HYDROCHLORIDE 100 MG: 25 TABLET, FILM COATED ORAL at 03:01

## 2022-01-17 RX ADMIN — ACETAMINOPHEN 650 MG: 325 TABLET ORAL at 08:01

## 2022-01-17 RX ADMIN — HYDRALAZINE HYDROCHLORIDE 100 MG: 25 TABLET, FILM COATED ORAL at 09:01

## 2022-01-17 RX ADMIN — NEPHROCAP 1 CAPSULE: 1 CAP ORAL at 08:01

## 2022-01-17 RX ADMIN — HEPARIN SODIUM 5000 UNITS: 5000 INJECTION INTRAVENOUS; SUBCUTANEOUS at 05:01

## 2022-01-17 RX ADMIN — CARVEDILOL 6.25 MG: 6.25 TABLET, FILM COATED ORAL at 08:01

## 2022-01-17 RX ADMIN — INSULIN ASPART 4 UNITS: 100 INJECTION, SOLUTION INTRAVENOUS; SUBCUTANEOUS at 08:01

## 2022-01-17 RX ADMIN — SEVELAMER CARBONATE 2400 MG: 800 TABLET, FILM COATED ORAL at 08:01

## 2022-01-17 RX ADMIN — PANTOPRAZOLE SODIUM 40 MG: 40 TABLET, DELAYED RELEASE ORAL at 08:01

## 2022-01-17 RX ADMIN — SEVELAMER CARBONATE 2400 MG: 800 TABLET, FILM COATED ORAL at 05:01

## 2022-01-17 RX ADMIN — HEPARIN SODIUM 5000 UNITS: 5000 INJECTION INTRAVENOUS; SUBCUTANEOUS at 03:01

## 2022-01-17 RX ADMIN — HEPARIN SODIUM 5000 UNITS: 5000 INJECTION INTRAVENOUS; SUBCUTANEOUS at 09:01

## 2022-01-17 RX ADMIN — QUETIAPINE FUMARATE 25 MG: 25 TABLET ORAL at 08:01

## 2022-01-17 RX ADMIN — OXYCODONE 5 MG: 5 TABLET ORAL at 01:01

## 2022-01-17 RX ADMIN — HYDRALAZINE HYDROCHLORIDE 100 MG: 25 TABLET, FILM COATED ORAL at 05:01

## 2022-01-17 RX ADMIN — HEPARIN SODIUM 5000 UNITS: 5000 INJECTION INTRAVENOUS; SUBCUTANEOUS at 12:01

## 2022-01-17 RX ADMIN — ATORVASTATIN CALCIUM 20 MG: 10 TABLET, FILM COATED ORAL at 08:01

## 2022-01-17 NOTE — PROGRESS NOTES
[facility], operated by [facility]  Progress Note    Patient Name: Adryan Neff  MRN: [id number]  Patient Class: IP- Inpatient   Admission Date: 1/5/2022  Length of Stay: 12 days  Attending Physician: Nghia Jordan MD  Primary Care Provider: Soren Rice MD        Subjective:     Principal Problem:Gangrene of left foot        HPI:  This is a 56 y.o.male patient, with a PMHx of ESRD on hemodialysis, CAD, HTN, and DM, presenting to the ED for further evaluation of left foot pain and black discoloration that began 3 days ago. Patient states these symptoms have been ongoing for the last month. He reports being referred to a specialist but denies ever following up. He has been putting some otc medication on the foot that he believes has turned his foot more black. No trauma or injury. Patient reports associated chills. Patient states applying a creme to the foot and noticed his discoloration worsened. Patient reports he was last dialyzed yesterday. Patient denies any fever, shortness of breath, chest pain, neck pain, back pain, abdominal pain, rash, headaches, congestion, rhinorrhea, cough, sore throat, ear pain, eye pain, blurred vision, nausea, vomiting, diarrhea, dysuria, or any other associated symptoms.    Per chart review, he had an arterial u/s 12/15.   Impression:   1. High-grade severe stenosis right popliteal artery with occlusion anterior tibial artery which is reconstituted at level of DPA via collaterals.  2. 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    Cardiology has been planning to see him for revascularization options. Last podiatry visit 12/13 with Stable dry gangrene L 2nd toe and medial L 5th toe. Dorsalis pedis and posterior tibial pulses are diminished Bilaterally. Toes are cool to touch. Feet are warm proximally.There is decreased digital hair. Skin is atrophic, slightly hyperpigmented, and mildly edematous     In the ED, he was found to have worsened gangrene and a  cool extremity. Vascular surgery was consulted. He was also COVID +. Says he got a booster shot in November sometime.      Overview/Hospital Course:  Patient admitted to the hospital for eval and treatment of L foot gangrene.  ID, Ortho and Vasc consulted. Nephrology was also consulted for chronic dialysis. Patient started on broad spectrum antibiotics, and had several toes on L foot amputated by Vascular surgery.     The patient's AVF had recurrent malfunction. IR attempts at intervention without success. THDC placed for dialysis.    Pt became increasingly delirious and combative. He refused angiogram with Vascular. Psychiatry evaluated, felt that he was not able to make his own medical decisions. At this point Dr. Soni felt the patient would be a poor candidate for vascular intervention, recommended AKA. ID in agreement. Ortho consulted for AKA, daughter/POA in agreement, date of procedure TBD.       Interval History: No events overnight. Complains of leg pain    Review of Systems   Constitutional: Negative for chills and fever.   Respiratory: Negative for cough and shortness of breath.    Cardiovascular: Negative for chest pain and leg swelling.   Gastrointestinal: Negative for abdominal distention and abdominal pain.     Objective:     Vital Signs (Most Recent):  Temp: 98 °F (36.7 °C) (01/17/22 1313)  Pulse: 98 (01/17/22 1313)  Resp: 20 (01/17/22 1313)  BP: 128/61 (01/17/22 1313)  SpO2: 99 % (01/17/22 1313) Vital Signs (24h Range):  Temp:  [97.8 °F (36.6 °C)-98.9 °F (37.2 °C)] 98 °F (36.7 °C)  Pulse:  [71-98] 98  Resp:  [17-20] 20  SpO2:  [95 %-99 %] 99 %  BP: (102-161)/(54-87) 128/61     Weight: 64.3 kg (141 lb 12.1 oz)  Body mass index is 20.34 kg/m².    Intake/Output Summary (Last 24 hours) at 1/17/2022 1418  Last data filed at 1/17/2022 1254  Gross per 24 hour   Intake 700 ml   Output 82549 ml   Net -05297 ml      Physical Exam  Constitutional:       General: He is not in acute distress.     Appearance:  He is ill-appearing. He is not toxic-appearing or diaphoretic.   Cardiovascular:      Rate and Rhythm: Normal rate and regular rhythm.      Heart sounds: No murmur heard.  No gallop.    Pulmonary:      Effort: Pulmonary effort is normal. No respiratory distress.      Breath sounds: Normal breath sounds. No wheezing.   Abdominal:      General: Bowel sounds are normal. There is no distension.      Palpations: Abdomen is soft.      Tenderness: There is no abdominal tenderness.   Skin:     Comments: L foot s/p multiple amputated digits, necrotic tissue at site of amputation         Significant Labs: All pertinent labs within the past 24 hours have been reviewed.    Significant Imaging: I have reviewed all pertinent imaging results/findings within the past 24 hours.      Assessment/Plan:      * Gangrene of left foot  Presents with gangrenous L foot in the setting of severe PAD. Underwent left 2-5 metatarsal amputations w/ washout and debridement w/ Vascular surgery. Initial plan for angiogram w/ intervention, however pt refused the procedure. Given the low initial probability of wound healing and infectious clearance even with vascular intervention and lack of patient ability to adhere to medical treatment, Dr. Soni recommended AKA. Ortho consulted.   - family in agreement with AKA, date TBD  - antibiotics per ID      Encephalopathy, metabolic  Waxing and waning confusion likely delirium secondary to ischemic foot w/ possible superimposed infection and uremia from missing dialysis. Psychiatry deemed patient not to have capacity to make medical decisions. Overall orientation improved after starting low dose seroquel.  - psychiatry consulted, appreciate recs  - tx of underlying issues as noted elsewhere  - continue seroquel  - delirium precautions    COVID-19 in immunocompromised patient  HD patient + for COVID 19. Not hypoxic or symptomatic. Supportive care prn.       ESRD on hemodialysis  Nephrology consulted to  resume routine HD. Fistula malfunctioning despite IR attempts at declot, THDC placed for alternative access  - nephrology following  - needs vascular vs IR fix of fistula    Chronic diastolic heart failure  December 2021 echo reviewed. Stable.       Essential (primary) hypertension  Titrate to goal. Pt intermittently refuses po medications, requiring IV meds      Controlled type 2 diabetes mellitus with chronic kidney disease on chronic dialysis, with long-term current use of insulin  Pt refused insulin, has intermittent PO intake.  - sliding scale insulin for now      VTE Risk Mitigation (From admission, onward)         Ordered     heparin (porcine) injection 5,000 Units  As needed (PRN)         01/14/22 1716     heparin (porcine) injection 5,000 Units  Every 8 hours         01/05/22 1404     IP VTE HIGH RISK PATIENT  Once         01/05/22 1404     Place sequential compression device  Until discontinued         01/05/22 1404                Discharge Planning   ENIO:      Code Status: Full Code   Is the patient medically ready for discharge?:     Reason for patient still in hospital (select all that apply): Patient trending condition, Laboratory test, Treatment, Consult recommendations and Pending disposition  Discharge Plan A: Skilled Nursing Facility                  Nghia Jordan MD  Department of Hospital Medicine   [facility]

## 2022-01-17 NOTE — PLAN OF CARE
Problem: Adult Inpatient Plan of Care  Goal: Plan of Care Review  Outcome: Ongoing, Progressing  Goal: Patient-Specific Goal (Individualized)  Outcome: Ongoing, Progressing  Goal: Absence of Hospital-Acquired Illness or Injury  Outcome: Ongoing, Progressing  Goal: Optimal Comfort and Wellbeing  Outcome: Ongoing, Progressing  Goal: Readiness for Transition of Care  Outcome: Ongoing, Progressing     Problem: Fall Injury Risk  Goal: Absence of Fall and Fall-Related Injury  Outcome: Ongoing, Progressing     Problem: Diabetes Comorbidity  Goal: Blood Glucose Level Within Targeted Range  Outcome: Ongoing, Progressing     Problem: Impaired Wound Healing  Goal: Optimal Wound Healing  Outcome: Ongoing, Progressing     Problem: Hemodynamic Instability (Hemodialysis)  Goal: Effective Tissue Perfusion  Outcome: Ongoing, Progressing   No falls, safety maintained. Consented for AKA by ortho. No prn medications given for pain, offered, refused. Daughter at bedside with pt today

## 2022-01-17 NOTE — NURSING
Patient to dialysis via bed. Patient awake, alert, no c/o discomfort at this time. No apparent distress noted.

## 2022-01-17 NOTE — PROGRESS NOTES
Maintenance hemodialysis done x 3.5 hours/pt reinufsed. vito tx well. bp trending low and UF decreased as tolerated. No c/o during hd tx. Flushed cvc to right chest wall, heparin locked, caps applied. Pt returned to room 322 per bed via transporter.    Net fluid removed 2982 ml as tolerated

## 2022-01-17 NOTE — PROGRESS NOTES
Adryan Neff is a 56 y.o. male patient.    Follow for ESRD, dialysis    Patient seen while on dialysis  No new c/o, comfortable      Scheduled Meds:   sodium chloride 0.9%   Intravenous Once    atorvastatin  20 mg Oral Daily    carvediloL  6.25 mg Oral BID    ceFEPime (MAXIPIME) IVPB  1 g Intravenous Q24H    heparin (porcine)  5,000 Units Subcutaneous Q8H    hydrALAZINE  100 mg Oral Q8H    lisinopriL  5 mg Oral Daily    pantoprazole  40 mg Oral Daily    QUEtiapine  25 mg Oral QHS    sevelamer carbonate  2,400 mg Oral TID WM    vitamin renal formula (B-complex-vitamin c-folic acid)  1 capsule Oral Daily       Review of patient's allergies indicates:  No Known Allergies      Vital Signs Range (Last 24H):  Temp:  [97.8 °F (36.6 °C)-98.9 °F (37.2 °C)]   Pulse:  [84-93]   Resp:  [17-19]   BP: (111-161)/(57-87)   SpO2:  [95 %-99 %]     I & O (Last 24H):No intake or output data in the 24 hours ending 01/17/22 1033        Physical Exam:  General appearance: well developed, well nourished, no distress  Lungs:  clear to auscultation bilaterally and normal respiratory effort  Heart: regular rate and rhythm  Abdomen: soft, non-tender non-distented; bowel sounds normal; no masses,  no organomegaly  Extremities: no cyanosis or edema, or clubbing    Laboratory:  I have reviewed all pertinent lab results within the past 24 hours.  CBC:   Recent Labs   Lab 01/17/22  0516   WBC 16.77*   RBC 2.89*   HGB 7.8*   HCT 26.9*   *   MCV 93   MCH 27.0   MCHC 29.0*     CMP:   Recent Labs   Lab 01/17/22  0516   *   CALCIUM 8.6*   ALBUMIN 1.9*   PROT 7.9   *   K 4.4   CO2 22*   CL 96   BUN 60*   CREATININE 13.9*   ALKPHOS 89   ALT 11   AST 20   BILITOT 0.3     Imp/Plan    ESRD - on dialysis, stable, tolerated well  COVID-19 infection  (L) foot gangrene s/p amputation  AVF malfunction s/p tunneled cath placement  HTN  DM type 2  Anemia of CKD    Continue present Rx  Switch HD schedule to TTS  Next HD will be on  Thursday  We'll follow for dialysis      Wil Fine  1/17/2022

## 2022-01-17 NOTE — PROGRESS NOTES
Pt arrived to [facility] via bed per transporter. Right chest wall cvc aspirated/flushed without difficulty noted. Maintenance hemodialysis started x 3.5 hours. Plan is to remove 3 L as tolerated. Tolerating tx well at this time.

## 2022-01-17 NOTE — SUBJECTIVE & OBJECTIVE
Interval History: No events overnight. Complains of leg pain    Review of Systems   Constitutional: Negative for chills and fever.   Respiratory: Negative for cough and shortness of breath.    Cardiovascular: Negative for chest pain and leg swelling.   Gastrointestinal: Negative for abdominal distention and abdominal pain.     Objective:     Vital Signs (Most Recent):  Temp: 98 °F (36.7 °C) (01/17/22 1313)  Pulse: 98 (01/17/22 1313)  Resp: 20 (01/17/22 1313)  BP: 128/61 (01/17/22 1313)  SpO2: 99 % (01/17/22 1313) Vital Signs (24h Range):  Temp:  [97.8 °F (36.6 °C)-98.9 °F (37.2 °C)] 98 °F (36.7 °C)  Pulse:  [71-98] 98  Resp:  [17-20] 20  SpO2:  [95 %-99 %] 99 %  BP: (102-161)/(54-87) 128/61     Weight: 64.3 kg (141 lb 12.1 oz)  Body mass index is 20.34 kg/m².    Intake/Output Summary (Last 24 hours) at 1/17/2022 1418  Last data filed at 1/17/2022 1254  Gross per 24 hour   Intake 700 ml   Output 01531 ml   Net -17190 ml      Physical Exam  Constitutional:       General: He is not in acute distress.     Appearance: He is ill-appearing. He is not toxic-appearing or diaphoretic.   Cardiovascular:      Rate and Rhythm: Normal rate and regular rhythm.      Heart sounds: No murmur heard.  No gallop.    Pulmonary:      Effort: Pulmonary effort is normal. No respiratory distress.      Breath sounds: Normal breath sounds. No wheezing.   Abdominal:      General: Bowel sounds are normal. There is no distension.      Palpations: Abdomen is soft.      Tenderness: There is no abdominal tenderness.   Skin:     Comments: L foot s/p multiple amputated digits, necrotic tissue at site of amputation         Significant Labs: All pertinent labs within the past 24 hours have been reviewed.    Significant Imaging: I have reviewed all pertinent imaging results/findings within the past 24 hours.

## 2022-01-17 NOTE — ASSESSMENT & PLAN NOTE
Presents with gangrenous L foot in the setting of severe PAD. Underwent left 2-5 metatarsal amputations w/ washout and debridement w/ Vascular surgery. Initial plan for angiogram w/ intervention, however pt refused the procedure. Given the low initial probability of wound healing and infectious clearance even with vascular intervention and lack of patient ability to adhere to medical treatment, Dr. Soni recommended AKA. Ortho consulted.   - family in agreement with AKA, date TBD  - antibiotics per ID

## 2022-01-18 ENCOUNTER — ANESTHESIA (OUTPATIENT)
Dept: SURGERY | Facility: HOSPITAL | Age: 57
DRG: 239 | End: 2022-01-18
Payer: MEDICARE

## 2022-01-18 ENCOUNTER — ANESTHESIA EVENT (OUTPATIENT)
Dept: SURGERY | Facility: HOSPITAL | Age: 57
DRG: 239 | End: 2022-01-18
Payer: MEDICARE

## 2022-01-18 LAB
ABO + RH BLD: NORMAL
ALBUMIN SERPL BCP-MCNC: 2.1 G/DL (ref 3.5–5.2)
ALP SERPL-CCNC: 94 U/L (ref 55–135)
ALT SERPL W/O P-5'-P-CCNC: 13 U/L (ref 10–44)
ANION GAP SERPL CALC-SCNC: 15 MMOL/L (ref 8–16)
AST SERPL-CCNC: 20 U/L (ref 10–40)
BASOPHILS # BLD AUTO: 0.09 K/UL (ref 0–0.2)
BASOPHILS NFR BLD: 0.5 % (ref 0–1.9)
BILIRUB SERPL-MCNC: 1 MG/DL (ref 0.1–1)
BLD GP AB SCN CELLS X3 SERPL QL: NORMAL
BUN SERPL-MCNC: 41 MG/DL (ref 6–20)
CALCIUM SERPL-MCNC: 9.2 MG/DL (ref 8.7–10.5)
CHLORIDE SERPL-SCNC: 96 MMOL/L (ref 95–110)
CO2 SERPL-SCNC: 24 MMOL/L (ref 23–29)
CREAT SERPL-MCNC: 8.9 MG/DL (ref 0.5–1.4)
DIFFERENTIAL METHOD: ABNORMAL
EOSINOPHIL # BLD AUTO: 0.9 K/UL (ref 0–0.5)
EOSINOPHIL NFR BLD: 4.8 % (ref 0–8)
ERYTHROCYTE [DISTWIDTH] IN BLOOD BY AUTOMATED COUNT: 16 % (ref 11.5–14.5)
EST. GFR  (AFRICAN AMERICAN): 7 ML/MIN/1.73 M^2
EST. GFR  (NON AFRICAN AMERICAN): 6 ML/MIN/1.73 M^2
GLUCOSE SERPL-MCNC: 143 MG/DL (ref 70–110)
HCT VFR BLD AUTO: 28.3 % (ref 40–54)
HGB BLD-MCNC: 8.2 G/DL (ref 14–18)
IMM GRANULOCYTES # BLD AUTO: 0.27 K/UL (ref 0–0.04)
IMM GRANULOCYTES NFR BLD AUTO: 1.4 % (ref 0–0.5)
LYMPHOCYTES # BLD AUTO: 1.7 K/UL (ref 1–4.8)
LYMPHOCYTES NFR BLD: 8.8 % (ref 18–48)
MCH RBC QN AUTO: 27.4 PG (ref 27–31)
MCHC RBC AUTO-ENTMCNC: 29 G/DL (ref 32–36)
MCV RBC AUTO: 95 FL (ref 82–98)
MONOCYTES # BLD AUTO: 1.7 K/UL (ref 0.3–1)
MONOCYTES NFR BLD: 8.9 % (ref 4–15)
NEUTROPHILS # BLD AUTO: 14.5 K/UL (ref 1.8–7.7)
NEUTROPHILS NFR BLD: 75.6 % (ref 38–73)
NRBC BLD-RTO: 0 /100 WBC
PLATELET # BLD AUTO: 393 K/UL (ref 150–450)
PMV BLD AUTO: 9.3 FL (ref 9.2–12.9)
POCT GLUCOSE: 163 MG/DL (ref 70–110)
POCT GLUCOSE: 178 MG/DL (ref 70–110)
POCT GLUCOSE: 269 MG/DL (ref 70–110)
POTASSIUM SERPL-SCNC: 4.3 MMOL/L (ref 3.5–5.1)
PROT SERPL-MCNC: 8.7 G/DL (ref 6–8.4)
RBC # BLD AUTO: 2.99 M/UL (ref 4.6–6.2)
SODIUM SERPL-SCNC: 135 MMOL/L (ref 136–145)
TROPONIN I SERPL DL<=0.01 NG/ML-MCNC: 0.14 NG/ML (ref 0–0.03)
WBC # BLD AUTO: 19.19 K/UL (ref 3.9–12.7)

## 2022-01-18 PROCEDURE — 71000033 HC RECOVERY, INTIAL HOUR: Performed by: ORTHOPAEDIC SURGERY

## 2022-01-18 PROCEDURE — 25000003 PHARM REV CODE 250: Performed by: FAMILY MEDICINE

## 2022-01-18 PROCEDURE — 25000003 PHARM REV CODE 250: Performed by: STUDENT IN AN ORGANIZED HEALTH CARE EDUCATION/TRAINING PROGRAM

## 2022-01-18 PROCEDURE — 36415 COLL VENOUS BLD VENIPUNCTURE: CPT | Performed by: HOSPITALIST

## 2022-01-18 PROCEDURE — 99223 PR INITIAL HOSPITAL CARE,LEVL III: ICD-10-PCS | Mod: ,,, | Performed by: INTERNAL MEDICINE

## 2022-01-18 PROCEDURE — 63600175 PHARM REV CODE 636 W HCPCS: Performed by: INTERNAL MEDICINE

## 2022-01-18 PROCEDURE — 86850 RBC ANTIBODY SCREEN: CPT | Performed by: ORTHOPAEDIC SURGERY

## 2022-01-18 PROCEDURE — 25000003 PHARM REV CODE 250: Performed by: HOSPITALIST

## 2022-01-18 PROCEDURE — 25000003 PHARM REV CODE 250: Performed by: INTERNAL MEDICINE

## 2022-01-18 PROCEDURE — 86920 COMPATIBILITY TEST SPIN: CPT | Performed by: ANESTHESIOLOGY

## 2022-01-18 PROCEDURE — 99233 SBSQ HOSP IP/OBS HIGH 50: CPT | Mod: CR,,, | Performed by: INTERNAL MEDICINE

## 2022-01-18 PROCEDURE — 63600175 PHARM REV CODE 636 W HCPCS: Performed by: FAMILY MEDICINE

## 2022-01-18 PROCEDURE — 84484 ASSAY OF TROPONIN QUANT: CPT | Performed by: HOSPITALIST

## 2022-01-18 PROCEDURE — 21400001 HC TELEMETRY ROOM

## 2022-01-18 PROCEDURE — 99223 1ST HOSP IP/OBS HIGH 75: CPT | Mod: ,,, | Performed by: INTERNAL MEDICINE

## 2022-01-18 PROCEDURE — 99233 PR SUBSEQUENT HOSPITAL CARE,LEVL III: ICD-10-PCS | Mod: CR,,, | Performed by: INTERNAL MEDICINE

## 2022-01-18 PROCEDURE — 36415 COLL VENOUS BLD VENIPUNCTURE: CPT | Performed by: ORTHOPAEDIC SURGERY

## 2022-01-18 PROCEDURE — 80053 COMPREHEN METABOLIC PANEL: CPT | Performed by: STUDENT IN AN ORGANIZED HEALTH CARE EDUCATION/TRAINING PROGRAM

## 2022-01-18 PROCEDURE — 85025 COMPLETE CBC W/AUTO DIFF WBC: CPT | Performed by: STUDENT IN AN ORGANIZED HEALTH CARE EDUCATION/TRAINING PROGRAM

## 2022-01-18 PROCEDURE — 25000003 PHARM REV CODE 250: Performed by: NURSE ANESTHETIST, CERTIFIED REGISTERED

## 2022-01-18 RX ORDER — SODIUM CHLORIDE 0.9 % (FLUSH) 0.9 %
10 SYRINGE (ML) INJECTION
Status: DISCONTINUED | OUTPATIENT
Start: 2022-01-18 | End: 2022-01-21

## 2022-01-18 RX ORDER — ONDANSETRON 2 MG/ML
4 INJECTION INTRAMUSCULAR; INTRAVENOUS DAILY PRN
Status: DISCONTINUED | OUTPATIENT
Start: 2022-01-18 | End: 2022-01-21

## 2022-01-18 RX ORDER — HYDROCODONE BITARTRATE AND ACETAMINOPHEN 500; 5 MG/1; MG/1
TABLET ORAL
Status: DISCONTINUED | OUTPATIENT
Start: 2022-01-18 | End: 2022-01-21

## 2022-01-18 RX ORDER — HYDROMORPHONE HYDROCHLORIDE 2 MG/ML
0.2 INJECTION, SOLUTION INTRAMUSCULAR; INTRAVENOUS; SUBCUTANEOUS EVERY 5 MIN PRN
Status: DISCONTINUED | OUTPATIENT
Start: 2022-01-18 | End: 2022-01-21

## 2022-01-18 RX ADMIN — SEVELAMER CARBONATE 2400 MG: 800 TABLET, FILM COATED ORAL at 05:01

## 2022-01-18 RX ADMIN — INSULIN ASPART 3 UNITS: 100 INJECTION, SOLUTION INTRAVENOUS; SUBCUTANEOUS at 09:01

## 2022-01-18 RX ADMIN — CEFEPIME HYDROCHLORIDE 1 G: 1 INJECTION, SOLUTION INTRAVENOUS at 05:01

## 2022-01-18 RX ADMIN — LISINOPRIL 5 MG: 5 TABLET ORAL at 09:01

## 2022-01-18 RX ADMIN — CARVEDILOL 6.25 MG: 6.25 TABLET, FILM COATED ORAL at 09:01

## 2022-01-18 RX ADMIN — ESMOLOL HYDROCHLORIDE 20 MG: 10 INJECTION INTRAVENOUS at 01:01

## 2022-01-18 RX ADMIN — QUETIAPINE FUMARATE 25 MG: 25 TABLET ORAL at 10:01

## 2022-01-18 RX ADMIN — SODIUM CHLORIDE: 0.9 INJECTION, SOLUTION INTRAVENOUS at 01:01

## 2022-01-18 RX ADMIN — HYDROMORPHONE HYDROCHLORIDE 0.5 MG: 2 INJECTION INTRAMUSCULAR; INTRAVENOUS; SUBCUTANEOUS at 11:01

## 2022-01-18 RX ADMIN — CARVEDILOL 6.25 MG: 6.25 TABLET, FILM COATED ORAL at 10:01

## 2022-01-18 RX ADMIN — OXYCODONE 5 MG: 5 TABLET ORAL at 05:01

## 2022-01-18 RX ADMIN — HYDRALAZINE HYDROCHLORIDE 100 MG: 25 TABLET, FILM COATED ORAL at 10:01

## 2022-01-18 NOTE — SUBJECTIVE & OBJECTIVE
Past Medical History:   Diagnosis Date    CAD (coronary artery disease) 2016    CAD (non obstructive) 2016 [facility]    Diabetes mellitus type I     Diabetic retinopathy     ESRD on hemodialysis     on HD TThSat    Gangrene of left foot     Hypertension     Nuclear sclerosis of right eye 11/24/2021    PAD (peripheral artery disease)     Pulmonary HTN     TB lung, latent 04/2021       Past Surgical History:   Procedure Laterality Date    AV FISTULA PLACEMENT      CATARACT EXTRACTION Left     EYE SURGERY      TOE AMPUTATION Left 1/6/2022    Procedure: AMPUTATION, TOE;  Surgeon: Masoud Soni MD;  Location: [facility] OR;  Service: Vascular;  Laterality: Left;  Left first through fifth toes, possible open transmetatarsal amputation and all other indicated procedures       Review of patient's allergies indicates:  No Known Allergies    No current facility-administered medications on file prior to encounter.     Current Outpatient Medications on File Prior to Encounter   Medication Sig    amoxicillin-clavulanate 500-125mg (AUGMENTIN) 500-125 mg Tab Take 1 tablet (500 mg total) by mouth once daily.    atorvastatin (LIPITOR) 20 MG tablet Take 1 tablet (20 mg total) by mouth once daily. (Patient taking differently: Take 20 mg by mouth once daily.)    blood sugar diagnostic Strp 1 each by Misc.(Non-Drug; Combo Route) route 2 hours after meals and at bedtime.    doxercalciferoL (HECTOROL) 0.5 MCG capsule 2 mcg.    heparin sodium,porcine (HEPARIN, PORCINE,) 1,000 unit/mL Soln injection Heparin Sodium (Porcine) 1,000 Units/mL Systemic    hydrALAZINE (APRESOLINE) 100 MG tablet Take 1 tablet (100 mg total) by mouth every 8 (eight) hours. (Patient taking differently: Take 50 mg by mouth every 8 (eight) hours. )    insulin (BASAGLAR KWIKPEN U-100 INSULIN) glargine 100 units/mL (3mL) SubQ pen Inject 15 Units into the skin every evening.    isoniazid (NYDRAZID) 300 MG Tab Take 1 tablet (300 mg total) by mouth once  daily.    lisinopriL (PRINIVIL,ZESTRIL) 2.5 MG tablet Take 2.5 mg by mouth once daily.    lisinopriL (PRINIVIL,ZESTRIL) 5 MG tablet Take 5 mg by mouth once daily.    methoxy peg-epoetin beta (MIRCERA INJ) 75 mcg.    metoprolol succinate (TOPROL-XL) 25 MG 24 hr tablet Take 1 tablet (25 mg total) by mouth once daily.    omeprazole (PRILOSEC) 20 MG capsule TAKE 2 CAPSULES(40 MG) BY MOUTH EVERY DAY (Patient taking differently: Take 20 mg by mouth once daily.)    penicillin v potassium (VEETID) 500 MG tablet Take 500 mg by mouth 4 (four) times daily.    RENVELA 800 mg Tab Take 800 mg by mouth 3 (three) times daily with meals.    traMADoL (ULTRAM) 50 mg tablet Take 1 tablet (50 mg total) by mouth every 8 (eight) hours as needed for Pain.    traZODone (DESYREL) 50 MG tablet Take 50 mg by mouth every evening.     Family History     Problem Relation (Age of Onset)    No Known Problems Mother, Father, Sister, Brother, Daughter, Daughter, Daughter, Brother, Maternal Aunt, Maternal Uncle, Paternal Aunt, Paternal Uncle, Maternal Grandmother, Maternal Grandfather, Paternal Grandmother, Paternal Grandfather        Tobacco Use    Smoking status: Never Smoker    Smokeless tobacco: Never Used   Substance and Sexual Activity    Alcohol use: No    Drug use: No    Sexual activity: Yes     Partners: Female     Review of Systems   Constitutional: Negative for chills, diaphoresis, fever and malaise/fatigue.   HENT: Negative for nosebleeds.    Eyes: Negative for blurred vision and double vision.   Cardiovascular: Negative for chest pain, claudication, cyanosis, dyspnea on exertion, leg swelling, orthopnea, palpitations, paroxysmal nocturnal dyspnea and syncope.   Respiratory: Negative for cough, shortness of breath and wheezing.    Skin: Negative for dry skin and poor wound healing.   Musculoskeletal: Negative for back pain, joint swelling and myalgias.   Gastrointestinal: Negative for abdominal pain, nausea and vomiting.    Genitourinary: Negative for hematuria.   Neurological: Negative for dizziness, headaches, numbness, seizures and weakness.   Psychiatric/Behavioral: Negative for altered mental status and depression.     Objective:     Vital Signs (Most Recent):  Temp: 98.2 °F (36.8 °C) (01/18/22 1501)  Pulse: 86 (01/18/22 1501)  Resp: 19 (01/18/22 1501)  BP: 133/62 (01/18/22 1501)  SpO2: 100 % (01/18/22 1501) Vital Signs (24h Range):  Temp:  [98.1 °F (36.7 °C)-100.9 °F (38.3 °C)] 98.2 °F (36.8 °C)  Pulse:  [] 86  Resp:  [14-20] 19  SpO2:  [97 %-100 %] 100 %  BP: (115-165)/(55-71) 133/62     Weight: 64.3 kg (141 lb 12.1 oz)  Body mass index is 20.34 kg/m².    SpO2: 100 %  O2 Device (Oxygen Therapy): nasal cannula      Intake/Output Summary (Last 24 hours) at 1/18/2022 1619  Last data filed at 1/18/2022 1345  Gross per 24 hour   Intake 25 ml   Output --   Net 25 ml       Lines/Drains/Airways     Central Venous Catheter Line                 Hemodialysis Catheter 01/14/22 1439 right internal jugular 4 days          Drain                 Hemodialysis AV Fistula Left upper arm -- days          Peripheral Intravenous Line                 Peripheral IV - Single Lumen 01/11/22 2332 20 G Anterior;Right Forearm 6 days         Peripheral IV - Single Lumen 01/16/22 1710 22 G Posterior;Right Hand 1 day                Physical Exam  Constitutional:       General: He is not in acute distress.     Appearance: Normal appearance. He is well-developed, normal weight and well-nourished. He is not ill-appearing, toxic-appearing or diaphoretic.   HENT:      Head: Normocephalic and atraumatic.   Eyes:      General: No scleral icterus.     Extraocular Movements: Extraocular movements intact and EOM normal.      Conjunctiva/sclera: Conjunctivae normal.      Pupils: Pupils are equal, round, and reactive to light.   Neck:      Thyroid: No thyromegaly.      Vascular: No JVD.      Trachea: No tracheal deviation.   Cardiovascular:      Rate and Rhythm:  Normal rate and regular rhythm.      Heart sounds: S1 normal and S2 normal. No murmur heard.  No friction rub. No gallop.    Pulmonary:      Effort: Pulmonary effort is normal. No respiratory distress.      Breath sounds: Normal breath sounds. No stridor. No wheezing, rhonchi or rales.   Chest:      Chest wall: No tenderness.   Abdominal:      General: There is no distension.      Palpations: Abdomen is soft.   Musculoskeletal:         General: No edema.      Cervical back: Normal range of motion and neck supple. No rigidity.      Right lower leg: No edema.      Left lower leg: No edema.   Skin:     General: Skin is warm and dry.      Coloration: Skin is not jaundiced.      Findings: No rash.   Neurological:      General: No focal deficit present.      Mental Status: He is alert and oriented to person, place, and time.      Cranial Nerves: No cranial nerve deficit.      Deep Tendon Reflexes: Strength normal.   Psychiatric:         Mood and Affect: Mood and affect and mood normal.         Behavior: Behavior normal.         Current Medications:   sodium chloride 0.9%   Intravenous Once    sodium chloride 0.9%   Intravenous Once    atorvastatin  20 mg Oral Daily    carvediloL  6.25 mg Oral BID    ceFEPime (MAXIPIME) IVPB  1 g Intravenous Q24H    hydrALAZINE  100 mg Oral Q8H    lisinopriL  5 mg Oral Daily    pantoprazole  40 mg Oral Daily    QUEtiapine  25 mg Oral QHS    sevelamer carbonate  2,400 mg Oral TID WM    vitamin renal formula (B-complex-vitamin c-folic acid)  1 capsule Oral Daily       sodium chloride, sodium chloride, sodium chloride 0.9%, acetaminophen, acetaminophen, dextrose 50%, dextrose 50%, glucagon (human recombinant), glucose, glucose, heparin (porcine), HYDROmorphone, HYDROmorphone, influenza, insulin aspart U-100, magnesium oxide, magnesium oxide, naloxone, OLANZapine, olanzapine zydis, ondansetron, ondansetron, oxyCODONE, sodium chloride 0.9%, sodium chloride 0.9%, sodium chloride 0.9%,  sodium chloride 0.9%    Laboratory (all labs reviewed):  CBC:  Recent Labs   Lab 01/14/22  0630 01/15/22  0416 01/16/22  0432 01/17/22  0516 01/18/22  0441   WBC 15.59 H 13.43 H 14.02 H 16.77 H 19.19 H   Hemoglobin 8.9 L 8.9 L 8.4 L 7.8 L 8.2 L   Hematocrit 29.0 L 29.1 L 29.1 L 26.9 L 28.3 L   Platelets 505 H 549 H 491 H 466 H 393       CHEMISTRIES:  Recent Labs   Lab 01/14/22  0630 01/15/22  0416 01/16/22  0432 01/17/22  0516 01/18/22  0441   Glucose 126 H 169 H 142 H 232 H 143 H   Sodium 135 L 138 139 134 L 135 L   Potassium 4.5 4.3 4.5 4.4 4.3   BUN 59 H 29 H 49 H 60 H 41 H   Creatinine 14.9 H 8.6 H 12.2 H 13.9 H 8.9 H   eGFR if  4 A 7 A 5 A 4 A 7 A   eGFR if non African American 3 A 6 A 4 A 3 A 6 A   Calcium 8.7 8.6 L 8.9 8.6 L 9.2       CARDIAC BIOMARKERS:  Recent Labs   Lab 10/25/20  1951 10/25/20  2323 10/26/20  0413 01/18/22  1417   Troponin I 0.088 H 0.079 H 0.141 H 0.142 H       COAGS:  Recent Labs   Lab 01/07/21  1003 01/05/22  1232   INR 1.0 1.0       LIPIDS/LFTS:  Recent Labs   Lab 01/29/19  1049 10/25/20  1952 01/07/21  1003 05/19/21  1016 01/14/22 0630 01/15/22  0416 01/16/22  0432 01/17/22  0516 01/18/22  0441   Cholesterol 241 H  --  186  --   --   --   --   --   --    Triglycerides 147  --  123  --   --   --   --   --   --    HDL 63  --  67  --   --   --   --   --   --    LDL Cholesterol 148.6  --  94.4  --   --   --   --   --   --    Non-HDL Cholesterol 178  --  119  --   --   --   --   --   --    AST 11   < > 9 L   < > 35 36 28 20 20   ALT 7 L   < > <5 L   < > 9 L 13 12 11 13    < > = values in this interval not displayed.       BNP:  Recent Labs   Lab 10/25/20  1952   BNP 1,359 H       TSH:        Free T4:        Diagnostic Results:  ECG (personally reviewed and interpreted tracing(s)):  1/18/22 1353 SR, inflat ST depression, ?isch, similar to 1/5/22, less prom than 2/4/21    Chest X-Ray (personally reviewed and interpreted image(s)): 1/5/22  There are vascular stents at the  left upper chest and within the soft tissues of the left axilla/proximal arm.  Cardiac size is upper normal.  There is no pleural effusion.  The osseous structures demonstrate degenerative change.  The lungs are clear.  There is vascular calcification.    Echo: 12/15/21  · The estimated ejection fraction is 55%.  · Normal systolic function.  · Grade I left ventricular diastolic dysfunction.  · Moderate left atrial enlargement.  · Mild right atrial enlargement.  · Mild tricuspid regurgitation.  · Normal right ventricular size with normal right ventricular systolic function.  · Moderate-to-severe mitral regurgitation.  · Normal central venous pressure (3 mmHg).    Stress Test: L MPI 2/4/21    Equivocal myocardial perfusion scan.    There is a moderate intensity, large sized, equivocal defect that is mostly fixed with some reversible areas in the basal to apical lateral wall(s). This finding is equivocal due to diaphragm shadow.    Consider PET stress testing if clinically indicated.    The gated perfusion images showed an ejection fraction of 41% at rest. The gated perfusion images showed an ejection fraction of 38% post stress. Normal ejection fraction is greater than 51%.    There is mild global hypokinesis at rest and stress.    LV cavity size is mildly enlarged at rest and mildly enlarged at stress.    The EKG portion of this study is abnormal but not diagnostic.    The patient reported no chest pain during the stress test.    There were no arrhythmias during stress.    Cath: 4/1/16 (care everywhere)  · Co-dominant coronary system  · Left main: large caliber with non-obstructive disease.   · Left anterior descending: large caliber that wraps around the apex. It gives a small-mod diagonal branch. All with non-obstructive disease.  · Circumflex: large caliber and gives two OM branches and small PDA. All with non-obstructive disease.   · Right coronary artery: large caliber with non obstructive disease  Left  heart catheterization:  · Ao: 125/68 mmHg  · LV: 125/7 mmHg  · LVEDP 7 mmHg  IMPRESSIONS:   · Non-obstructive coronary artery disease.

## 2022-01-18 NOTE — SUBJECTIVE & OBJECTIVE
Interval history: NAEO. Awaiting YASMANI FLOWERS. Daughter at bedside. Denies preceeding trauma to LLE wound    Past Medical History:   Diagnosis Date    CAD (coronary artery disease) 2016    CAD (non obstructive) 2016 [facility]    Diabetes mellitus type I     Diabetic retinopathy     ESRD on hemodialysis     on HD TThSat    Gangrene of left foot     Hypertension     Nuclear sclerosis of right eye 11/24/2021    PAD (peripheral artery disease)     Pulmonary HTN     TB lung, latent 04/2021         Objective:     Vital Signs (Most Recent):  Temp: 99.5 °F (37.5 °C) (01/18/22 1143)  Pulse: 78 (01/18/22 1143)  Resp: 18 (01/18/22 1143)  BP: (!) 115/55 (01/18/22 1143)  SpO2: 99 % (01/18/22 1143) Vital Signs (24h Range):  Temp:  [98 °F (36.7 °C)-100.9 °F (38.3 °C)] 99.5 °F (37.5 °C)  Pulse:  [] 78  Resp:  [18-20] 18  SpO2:  [98 %-100 %] 99 %  BP: (104-143)/(54-72) 115/55     Weight: 64.3 kg (141 lb 12.1 oz)  Body mass index is 20.34 kg/m².    Estimated Creatinine Clearance: 8.3 mL/min (A) (based on SCr of 8.9 mg/dL (H)).    Physical Exam  Vitals and nursing note reviewed.   Constitutional:       General: He is not in acute distress.     Appearance: He is not ill-appearing, toxic-appearing or diaphoretic.   HENT:      Head: Normocephalic and atraumatic.      Right Ear: External ear normal.      Left Ear: External ear normal.   Eyes:      Extraocular Movements: Extraocular movements intact.      Conjunctiva/sclera: Conjunctivae normal.      Pupils: Pupils are equal, round, and reactive to light.   Musculoskeletal:         General: Deformity present.   Neurological:      General: No focal deficit present.      Mental Status: He is alert and oriented to person, place, and time.   Psychiatric:         Mood and Affect: Mood normal.         Behavior: Behavior normal.         Thought Content: Thought content normal.         Judgment: Judgment normal.         Significant Labs:   Blood Culture:   Recent Labs   Lab 11/11/21  1150  01/05/22  1308 01/05/22  1309 01/09/22  1216 01/09/22  1217   LABBLOO No growth after 5 days. No Growth after 4 days.  No Growth after 4 days.  No Growth after 4 days.  No Growth after 4 days.      CBC:   Recent Labs   Lab 01/17/22  0516 01/18/22  0441   WBC 16.77* 19.19*   HGB 7.8* 8.2*   HCT 26.9* 28.3*   * 393     CMP:   Recent Labs   Lab 01/17/22  0516 01/18/22  0441   * 135*   K 4.4 4.3   CL 96 96   CO2 22* 24   * 143*   BUN 60* 41*   CREATININE 13.9* 8.9*   CALCIUM 8.6* 9.2   PROT 7.9 8.7*   ALBUMIN 1.9* 2.1*   BILITOT 0.3 1.0   ALKPHOS 89 94   AST 20 20   ALT 11 13   ANIONGAP 16 15   EGFRNONAA 3* 6*     Urine Culture: No results for input(s): LABURIN in the last 4320 hours.  Wound Culture:   Recent Labs   Lab 01/05/22  1315 01/06/22  1100   LABAERO CITROBACTER FREUNDII  Many  *  PSEUDOMONAS AERUGINOSA  Moderate  * CITROBACTER KOSERI  Many  For susceptibility see order # [id number]  *  CITROBACTER FREUNDII  Many  For susceptibility see order # [id number]  *  PSEUDOMONAS AERUGINOSA*  CITROBACTER KOSERI  Many  *  CITROBACTER FREUNDII  Few  *       Significant Imaging: None    Past Surgical History:   Procedure Laterality Date    AV FISTULA PLACEMENT      CATARACT EXTRACTION Left     EYE SURGERY      TOE AMPUTATION Left 1/6/2022    Procedure: AMPUTATION, TOE;  Surgeon: Masoud Soni MD;  Location: [facility];  Service: Vascular;  Laterality: Left;  Left first through fifth toes, possible open transmetatarsal amputation and all other indicated procedures       Review of patient's allergies indicates:  No Known Allergies    Medications:  Medications Prior to Admission   Medication Sig    amoxicillin-clavulanate 500-125mg (AUGMENTIN) 500-125 mg Tab Take 1 tablet (500 mg total) by mouth once daily.    atorvastatin (LIPITOR) 20 MG tablet Take 1 tablet (20 mg total) by mouth once daily. (Patient taking differently: Take 20 mg by mouth once daily.)    blood sugar diagnostic Strp 1  each by Misc.(Non-Drug; Combo Route) route 2 hours after meals and at bedtime.    doxercalciferoL (HECTOROL) 0.5 MCG capsule 2 mcg.    heparin sodium,porcine (HEPARIN, PORCINE,) 1,000 unit/mL Soln injection Heparin Sodium (Porcine) 1,000 Units/mL Systemic    hydrALAZINE (APRESOLINE) 100 MG tablet Take 1 tablet (100 mg total) by mouth every 8 (eight) hours. (Patient taking differently: Take 50 mg by mouth every 8 (eight) hours. )    insulin (BASAGLAR KWIKPEN U-100 INSULIN) glargine 100 units/mL (3mL) SubQ pen Inject 15 Units into the skin every evening.    isoniazid (NYDRAZID) 300 MG Tab Take 1 tablet (300 mg total) by mouth once daily.    lisinopriL (PRINIVIL,ZESTRIL) 2.5 MG tablet Take 2.5 mg by mouth once daily.    lisinopriL (PRINIVIL,ZESTRIL) 5 MG tablet Take 5 mg by mouth once daily.    methoxy peg-epoetin beta (MIRCERA INJ) 75 mcg.    metoprolol succinate (TOPROL-XL) 25 MG 24 hr tablet Take 1 tablet (25 mg total) by mouth once daily.    omeprazole (PRILOSEC) 20 MG capsule TAKE 2 CAPSULES(40 MG) BY MOUTH EVERY DAY (Patient taking differently: Take 20 mg by mouth once daily.)    penicillin v potassium (VEETID) 500 MG tablet Take 500 mg by mouth 4 (four) times daily.    RENVELA 800 mg Tab Take 800 mg by mouth 3 (three) times daily with meals.    traMADoL (ULTRAM) 50 mg tablet Take 1 tablet (50 mg total) by mouth every 8 (eight) hours as needed for Pain.    traZODone (DESYREL) 50 MG tablet Take 50 mg by mouth every evening.     Antibiotics (From admission, onward)            Start     Stop Route Frequency Ordered    01/10/22 1700  cefepime in dextrose 5 % 1 gram/50 mL IVPB 1 g         -- IV Every 24 hours (non-standard times) 01/10/22 1102    01/06/22 0945  mupirocin 2 % ointment         01/11 0859 Nasl 2 times daily 01/06/22 0836        Antifungals (From admission, onward)            None        Antivirals (From admission, onward)    None           Immunization History   Administered Date(s)  Administered    COVID-19 Vaccine 01/15/2021    COVID-19, MRNA, LN-S, PF (MODERNA FULL 0.5 ML DOSE) 01/15/2021, 02/11/2021    Hepatitis B, Adult 07/10/2017, 08/14/2017, 09/11/2017, 03/30/2021, 04/22/2021    Influenza 09/29/2020    Influenza - Quadrivalent - PF *Preferred* (6 months and older) 01/16/2008, 03/30/2016, 12/07/2016    PPD Test 04/04/2017, 01/11/2022    Pneumococcal Conjugate - 13 Valent 11/27/2017    Pneumococcal Polysaccharide - 23 Valent 03/30/2016, 10/24/2018    Tdap 03/30/2016       Family History     Problem Relation (Age of Onset)    No Known Problems Mother, Father, Sister, Brother, Daughter, Daughter, Daughter, Brother, Maternal Aunt, Maternal Uncle, Paternal Aunt, Paternal Uncle, Maternal Grandmother, Maternal Grandfather, Paternal Grandmother, Paternal Grandfather        Social History     Socioeconomic History    Marital status: Single    Number of children: 5   Tobacco Use    Smoking status: Never Smoker    Smokeless tobacco: Never Used   Substance and Sexual Activity    Alcohol use: No    Drug use: No    Sexual activity: Yes     Partners: Female   Social History Narrative    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   Gastrointestinal: Negative for abdominal distention and abdominal pain.   Genitourinary: Negative for dysuria and urgency.   Musculoskeletal: Negative for arthralgias and back pain.   Skin: Positive for wound.   Neurological: Negative for headaches.   Psychiatric/Behavioral: Negative for confusion.     Objective:     Vital Signs (Most Recent):  Temp: 99.5 °F (37.5 °C) (01/18/22 1143)  Pulse: 78 (01/18/22 1143)  Resp: 18 (01/18/22 1143)  BP: (!) 115/55 (01/18/22 1143)  SpO2: 99 % (01/18/22 1143) Vital Signs (24h Range):  Temp:  [98 °F (36.7 °C)-100.9 °F (38.3 °C)] 99.5 °F (37.5 °C)  Pulse:  [] 78  Resp:  [18-20] 18  SpO2:  [98 %-100 %] 99 %  BP: (104-143)/(54-72) 115/55     Weight: 64.3 kg (141 lb 12.1  oz)  Body mass index is 20.34 kg/m².    Estimated Creatinine Clearance: 8.3 mL/min (A) (based on SCr of 8.9 mg/dL (H)).    Physical Exam    Significant Labs: All pertinent labs within the past 24 hours have been reviewed.    Significant Imaging: I have reviewed all pertinent imaging results/findings within the past 24 hours.

## 2022-01-18 NOTE — NURSING
Pt transferred to stretcher independently. Patient to OR for AKA via stretcher. Patient awake, alert. Daughter accompanying pt.  No apparent distress noted.

## 2022-01-18 NOTE — TRANSFER OF CARE
"Anesthesia Transfer of Care Note    Patient: Adryan Neff    Procedure(s) Performed: Procedure(s) (LRB):  AMPUTATION, ABOVE KNEE (Left)    Patient location: PACU    Transport from OR: Transported from OR on room air with adequate spontaneous ventilation    Post pain: adequate analgesia    Post assessment: no apparent anesthetic complications    Post vital signs: stable    Level of consciousness: awake    Complications: none    Transfer of care protocol was followedComments: Case cancelled due to ST segment depression.      Last vitals:   Visit Vitals  BP (!) 165/71 (BP Location: Right arm)   Pulse 95   Temp 36.7 °C (98.1 °F) (Skin)   Resp 17   Ht 5' 10" (1.778 m)   Wt 64.3 kg (141 lb 12.1 oz)   SpO2 97%   BMI 20.34 kg/m²     "

## 2022-01-18 NOTE — SUBJECTIVE & OBJECTIVE
Interval History: No events overnight. Complains of leg pain    Review of Systems   Constitutional: Negative for chills and fever.   Respiratory: Negative for cough and shortness of breath.    Cardiovascular: Negative for chest pain and leg swelling.   Gastrointestinal: Negative for abdominal distention and abdominal pain.     Objective:     Vital Signs (Most Recent):  Temp: 99.5 °F (37.5 °C) (01/18/22 1143)  Pulse: 78 (01/18/22 1143)  Resp: 18 (01/18/22 1143)  BP: (!) 115/55 (01/18/22 1143)  SpO2: 99 % (01/18/22 1143) Vital Signs (24h Range):  Temp:  [98 °F (36.7 °C)-100.9 °F (38.3 °C)] 99.5 °F (37.5 °C)  Pulse:  [] 78  Resp:  [18-20] 18  SpO2:  [98 %-100 %] 99 %  BP: (104-143)/(54-72) 115/55     Weight: 64.3 kg (141 lb 12.1 oz)  Body mass index is 20.34 kg/m².    Intake/Output Summary (Last 24 hours) at 1/18/2022 1155  Last data filed at 1/17/2022 1254  Gross per 24 hour   Intake 700 ml   Output 42895 ml   Net -71659 ml      Physical Exam  Constitutional:       General: He is not in acute distress.     Appearance: He is ill-appearing. He is not toxic-appearing or diaphoretic.   Cardiovascular:      Rate and Rhythm: Normal rate and regular rhythm.      Heart sounds: No murmur heard.  No gallop.    Pulmonary:      Effort: Pulmonary effort is normal. No respiratory distress.      Breath sounds: Normal breath sounds. No wheezing.   Abdominal:      General: Bowel sounds are normal. There is no distension.      Palpations: Abdomen is soft.      Tenderness: There is no abdominal tenderness.   Skin:     Comments: L foot s/p multiple amputated digits, necrotic tissue at site of amputation         Significant Labs: All pertinent labs within the past 24 hours have been reviewed.    Significant Imaging: I have reviewed all pertinent imaging results/findings within the past 24 hours.

## 2022-01-18 NOTE — PLAN OF CARE
Problem: Adult Inpatient Plan of Care  Goal: Plan of Care Review  Outcome: Ongoing, Progressing  Goal: Patient-Specific Goal (Individualized)  Outcome: Ongoing, Progressing  Goal: Absence of Hospital-Acquired Illness or Injury  Outcome: Ongoing, Progressing  Goal: Optimal Comfort and Wellbeing  Outcome: Ongoing, Progressing  Goal: Readiness for Transition of Care  Outcome: Ongoing, Progressing     Problem: Fall Injury Risk  Goal: Absence of Fall and Fall-Related Injury  Outcome: Ongoing, Progressing     Problem: Diabetes Comorbidity  Goal: Blood Glucose Level Within Targeted Range  Outcome: Ongoing, Progressing

## 2022-01-18 NOTE — ANESTHESIA PREPROCEDURE EVALUATION
01/18/2022  Adryan Neff is a 56 y.o., male.  To undergo Procedure(s) (LRB):  AMPUTATION, ABOVE KNEE (Left)     Denies CP/SOB/MI/CVA/URI symptoms.  Endorses GERD, well controlled with medication.  METS > 4  NPO > 8    Past Medical History:  Past Medical History:   Diagnosis Date    CAD (coronary artery disease) 2016    CAD (non obstructive) 2016 [facility]    Diabetes mellitus type I     Diabetic retinopathy     ESRD on hemodialysis     on HD TThSat    Gangrene of left foot     Hypertension     Nuclear sclerosis of right eye 11/24/2021    PAD (peripheral artery disease)     Pulmonary HTN     TB lung, latent 04/2021       Past Surgical History:  Past Surgical History:   Procedure Laterality Date    AV FISTULA PLACEMENT      CATARACT EXTRACTION Left     EYE SURGERY      TOE AMPUTATION Left 1/6/2022    Procedure: AMPUTATION, TOE;  Surgeon: Masoud Soni MD;  Location: [facility] OR;  Service: Vascular;  Laterality: Left;  Left first through fifth toes, possible open transmetatarsal amputation and all other indicated procedures       Social History:  Social History     Socioeconomic History    Marital status: Single    Number of children: 5   Tobacco Use    Smoking status: Never Smoker    Smokeless tobacco: Never Used   Substance and Sexual Activity    Alcohol use: No    Drug use: No    Sexual activity: Yes     Partners: Female   Social History Narrative    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     Current Outpatient Medications on File Prior to Encounter   Medication Sig Dispense Refill    amoxicillin-clavulanate 500-125mg (AUGMENTIN) 500-125 mg Tab Take 1 tablet (500 mg total) by mouth once daily. 7 tablet 0    atorvastatin (LIPITOR) 20 MG tablet Take 1 tablet (20 mg total) by mouth once daily. (Patient taking differently:  Take 20 mg by mouth once daily.) 90 tablet 3    blood sugar diagnostic Strp 1 each by Misc.(Non-Drug; Combo Route) route 2 hours after meals and at bedtime. 200 each 10    doxercalciferoL (HECTOROL) 0.5 MCG capsule 2 mcg.      heparin sodium,porcine (HEPARIN, PORCINE,) 1,000 unit/mL Soln injection Heparin Sodium (Porcine) 1,000 Units/mL Systemic      hydrALAZINE (APRESOLINE) 100 MG tablet Take 1 tablet (100 mg total) by mouth every 8 (eight) hours. (Patient taking differently: Take 50 mg by mouth every 8 (eight) hours. ) 90 tablet 11    insulin (BASAGLAR KWIKPEN U-100 INSULIN) glargine 100 units/mL (3mL) SubQ pen Inject 15 Units into the skin every evening. 15 mL 12    isoniazid (NYDRAZID) 300 MG Tab Take 1 tablet (300 mg total) by mouth once daily. 90 tablet 2    lisinopriL (PRINIVIL,ZESTRIL) 2.5 MG tablet Take 2.5 mg by mouth once daily.      lisinopriL (PRINIVIL,ZESTRIL) 5 MG tablet Take 5 mg by mouth once daily.      methoxy peg-epoetin beta (MIRCERA INJ) 75 mcg.      metoprolol succinate (TOPROL-XL) 25 MG 24 hr tablet Take 1 tablet (25 mg total) by mouth once daily. 30 tablet 11    omeprazole (PRILOSEC) 20 MG capsule TAKE 2 CAPSULES(40 MG) BY MOUTH EVERY DAY (Patient taking differently: Take 20 mg by mouth once daily.) 180 capsule 0    penicillin v potassium (VEETID) 500 MG tablet Take 500 mg by mouth 4 (four) times daily.      RENVELA 800 mg Tab Take 800 mg by mouth 3 (three) times daily with meals.      traMADoL (ULTRAM) 50 mg tablet Take 1 tablet (50 mg total) by mouth every 8 (eight) hours as needed for Pain. 30 tablet 0    traZODone (DESYREL) 50 MG tablet Take 50 mg by mouth every evening.         Allergies:  Review of patient's allergies indicates:  No Known Allergies    Active Problems:  Patient Active Problem List   Diagnosis    Controlled type 2 diabetes mellitus with chronic kidney disease on chronic dialysis, with long-term current use of insulin    Essential (primary) hypertension     Pure hypercholesterolemia    Benign hypertension with chronic kidney disease, stage V    Other insomnia    Gastroesophageal reflux disease    Intractable vomiting    Chronic diastolic heart failure    CAD (coronary artery disease) - non-obstructive from [facility] in 2016    Hyperkalemia    Pre-transplant evaluation for kidney transplant    Pulmonary HTN    Chronic pulmonary heart disease    HLD (hyperlipidemia)    NICM (nonischemic cardiomyopathy)    PDR (proliferative diabetic retinopathy)    Secondary hyperparathyroidism of renal origin    ESRD on hemodialysis    TB lung, latent    Paronychia, toe, left    Nuclear sclerosis of right eye    Pseudophakia    Gangrene of left foot    COVID-19 in immunocompromised patient    Encephalopathy, metabolic       Diagnostic Studies:    CBC:  Recent Labs   Lab 01/18/22  0441   WBC 19.19*   RBC 2.99*   HGB 8.2*   HCT 28.3*      MCV 95   MCH 27.4   MCHC 29.0*        CMP:  Recent Labs   Lab 01/18/22  0441   CALCIUM 9.2   PROT 8.7*   *   K 4.3   CO2 24   CL 96   BUN 41*   CREATININE 8.9*   ALKPHOS 94   ALT 13   AST 20   BILITOT 1.0     EKG (1/5/22):  Normal sinus rhythm   Biatrial enlargement   Cannot rule out Anterior infarct ,age undetermined     TTE (12/15/21):  · The estimated ejection fraction is 55%.  · Normal systolic function.  · Grade I left ventricular diastolic dysfunction.  · Moderate left atrial enlargement.  · Mild right atrial enlargement.  · Mild tricuspid regurgitation.  · Normal right ventricular size with normal right ventricular systolic function.  · Moderate-to-severe mitral regurgitation.  · Normal central venous pressure (3 mmHg).    24 Hour Vitals:  Temp:  [36.7 °C (98 °F)-38.3 °C (100.9 °F)] 37.5 °C (99.5 °F)  Pulse:  [] 78  Resp:  [18-20] 18  SpO2:  [98 %-100 %] 99 %  BP: (104-143)/(54-72) 115/55   See Nursing Charting For Additional Vitals    Anesthesia Evaluation    I have reviewed the Patient Summary Reports.    I  have reviewed the Nursing Notes.       Review of Systems  Anesthesia Hx:   Denies Personal Hx of Anesthesia complications.   Social:  Non-Smoker, No Alcohol Use    Hematology/Oncology:         -- Anemia:   Cardiovascular:   Hypertension CAD   CHF PVD ECG has been reviewed. pHTN   Pulmonary:  Pulmonary Normal    Renal/:   Chronic Renal Disease, ESRD HD T/Tr/Sa   Hepatic/GI:   GERD, well controlled    Musculoskeletal:   Critical right lower limb ischemia   Neurological:  Neurology Normal    Endocrine:   Diabetes, type 2        Physical Exam  General:  Well nourished    Airway/Jaw/Neck:   MP2, TMD > 3FB, teeth intact     Chest/Lungs:  Chest/Lungs Clear    Heart/Vascular:  Heart Findings: Normal            Anesthesia Plan  Type of Anesthesia, risks & benefits discussed:  Anesthesia Type:  general    Patient's Preference:   Plan Factors:          Intra-op Monitoring Plan: standard ASA monitors  Intra-op Monitoring Plan Comments:   Post Op Pain Control Plan: multimodal analgesia, IV/PO Opioids PRN and per primary service following discharge from PACU  Post Op Pain Control Plan Comments:     Induction:   IV  Beta Blocker:  Patient is on a Beta-Blocker and has received one dose within the past 24 hours (No further documentation required).       Informed Consent: Patient understands risks and agrees with Anesthesia plan.  Questions answered. Anesthesia consent signed with patient.  ASA Score: 3     Day of Surgery Review of History & Physical:        Anesthesia Plan Notes:   GA with OETT  Standard ASA monitors  Transfuse 1PRBC during procedure given anemia with surgery at high risk of bleeding  Standard ASA monitors  Recovery in PACU  PONV: 2        Ready For Surgery From Anesthesia Perspective.

## 2022-01-18 NOTE — ASSESSMENT & PLAN NOTE
56M with ESRD on HD and PAD admitted LLE gangrene, awaiting AKA. Wound cultures with citrobacter x 2 and pseudomonas. No systemic signs of infection. Has permecath for HD, also LUE fistula (with wire). Incidentally tested positive covid    Recommendations:   - amputation may be curative of infection. Would send prox margin for path and culture  - continue cefepime  - needs adequate perfusion to heal wound  - would touch base with nephrology or vascular re: plan for external wire in AVF

## 2022-01-18 NOTE — CONSULTS
[facility]  Cardiology  Consult Note    Patient Name: Adryan Neff  MRN: [id number]  Admission Date: 1/5/2022  Hospital Length of Stay: 13 days  Code Status: Full Code   Attending Provider: Dottie Kaufman MD   Consulting Provider: Víctor Sutton MD  Primary Care Physician: Soren Rice MD  Principal Problem:Gangrene of left foot    Patient information was obtained from patient, relative(s) and ER records.     Inpatient consult to Cardiology  Consult performed by: Víctor Stuton MD  Consult ordered by: Dottie Kaufman MD  Reason for consult: preop CV eval        Subjective:     Chief Complaint:  Leg pain     HPI:   Patient admitted to the hospital for eval and treatment of L foot gangrene.  ID, Ortho and Vasc consulted. Nephrology was also consulted for chronic dialysis. Patient started on broad spectrum antibiotics, and had several toes on L foot amputated by Vascular surgery.   The patient's AVF had recurrent malfunction. IR attempts at intervention without success. THDC placed for dialysis.   Pt became increasingly delirious and combative. He refused angiogram with Vascular. Psychiatry evaluated, felt that he was not able to make his own medical decisions. At this point Dr. Soni felt the patient would be a poor candidate for vascular intervention, recommended AKA. ID in agreement. Ortho consulted for AKA, daughter/POA in agreement, date of procedure TBD.     Cardiology consulted for preop cardiac risk stratification.    The patient is seen in the attendance of his daughter who aids with Creole interpretation.  He reports a reasonable exercise capacity prior to hospitalization telling me that he would be able to climb a flight of stairs if needed.  Prior cardiac history is notable for heart failure with preserved ejection fraction of prior heart catheterization 2016 noting nonobstructive CAD and a recent nuclear stress test which was read as equivocal.  His EKG reveals  chronic ischemic changes likely related to LVH.  Recent echocardiogram reveals normal LV function.  Given his reasonable exercise capacity, I do not feel that he is at prohibitive risk for the planned amputation and associated anesthesia.  No further preoperative cardiac testing is required.      Past Medical History:   Diagnosis Date    CAD (coronary artery disease) 2016    CAD (non obstructive) 2016 [facility]    Diabetes mellitus type I     Diabetic retinopathy     ESRD on hemodialysis     on HD TThSat    Gangrene of left foot     Hypertension     Nuclear sclerosis of right eye 11/24/2021    PAD (peripheral artery disease)     Pulmonary HTN     TB lung, latent 04/2021       Past Surgical History:   Procedure Laterality Date    AV FISTULA PLACEMENT      CATARACT EXTRACTION Left     EYE SURGERY      TOE AMPUTATION Left 1/6/2022    Procedure: AMPUTATION, TOE;  Surgeon: Masoud Soni MD;  Location: [facility] OR;  Service: Vascular;  Laterality: Left;  Left first through fifth toes, possible open transmetatarsal amputation and all other indicated procedures       Review of patient's allergies indicates:  No Known Allergies    No current facility-administered medications on file prior to encounter.     Current Outpatient Medications on File Prior to Encounter   Medication Sig    amoxicillin-clavulanate 500-125mg (AUGMENTIN) 500-125 mg Tab Take 1 tablet (500 mg total) by mouth once daily.    atorvastatin (LIPITOR) 20 MG tablet Take 1 tablet (20 mg total) by mouth once daily. (Patient taking differently: Take 20 mg by mouth once daily.)    blood sugar diagnostic Strp 1 each by Misc.(Non-Drug; Combo Route) route 2 hours after meals and at bedtime.    doxercalciferoL (HECTOROL) 0.5 MCG capsule 2 mcg.    heparin sodium,porcine (HEPARIN, PORCINE,) 1,000 unit/mL Soln injection Heparin Sodium (Porcine) 1,000 Units/mL Systemic    hydrALAZINE (APRESOLINE) 100 MG tablet Take 1 tablet (100 mg total) by mouth every 8  (eight) hours. (Patient taking differently: Take 50 mg by mouth every 8 (eight) hours. )    insulin (BASAGLAR KWIKPEN U-100 INSULIN) glargine 100 units/mL (3mL) SubQ pen Inject 15 Units into the skin every evening.    isoniazid (NYDRAZID) 300 MG Tab Take 1 tablet (300 mg total) by mouth once daily.    lisinopriL (PRINIVIL,ZESTRIL) 2.5 MG tablet Take 2.5 mg by mouth once daily.    lisinopriL (PRINIVIL,ZESTRIL) 5 MG tablet Take 5 mg by mouth once daily.    methoxy peg-epoetin beta (MIRCERA INJ) 75 mcg.    metoprolol succinate (TOPROL-XL) 25 MG 24 hr tablet Take 1 tablet (25 mg total) by mouth once daily.    omeprazole (PRILOSEC) 20 MG capsule TAKE 2 CAPSULES(40 MG) BY MOUTH EVERY DAY (Patient taking differently: Take 20 mg by mouth once daily.)    penicillin v potassium (VEETID) 500 MG tablet Take 500 mg by mouth 4 (four) times daily.    RENVELA 800 mg Tab Take 800 mg by mouth 3 (three) times daily with meals.    traMADoL (ULTRAM) 50 mg tablet Take 1 tablet (50 mg total) by mouth every 8 (eight) hours as needed for Pain.    traZODone (DESYREL) 50 MG tablet Take 50 mg by mouth every evening.     Family History     Problem Relation (Age of Onset)    No Known Problems Mother, Father, Sister, Brother, Daughter, Daughter, Daughter, Brother, Maternal Aunt, Maternal Uncle, Paternal Aunt, Paternal Uncle, Maternal Grandmother, Maternal Grandfather, Paternal Grandmother, Paternal Grandfather        Tobacco Use    Smoking status: Never Smoker    Smokeless tobacco: Never Used   Substance and Sexual Activity    Alcohol use: No    Drug use: No    Sexual activity: Yes     Partners: Female     Review of Systems   Constitutional: Negative for chills, diaphoresis, fever and malaise/fatigue.   HENT: Negative for nosebleeds.    Eyes: Negative for blurred vision and double vision.   Cardiovascular: Negative for chest pain, claudication, cyanosis, dyspnea on exertion, leg swelling, orthopnea, palpitations, paroxysmal  nocturnal dyspnea and syncope.   Respiratory: Negative for cough, shortness of breath and wheezing.    Skin: Negative for dry skin and poor wound healing.   Musculoskeletal: Negative for back pain, joint swelling and myalgias.   Gastrointestinal: Negative for abdominal pain, nausea and vomiting.   Genitourinary: Negative for hematuria.   Neurological: Negative for dizziness, headaches, numbness, seizures and weakness.   Psychiatric/Behavioral: Negative for altered mental status and depression.     Objective:     Vital Signs (Most Recent):  Temp: 98.2 °F (36.8 °C) (01/18/22 1501)  Pulse: 86 (01/18/22 1501)  Resp: 19 (01/18/22 1501)  BP: 133/62 (01/18/22 1501)  SpO2: 100 % (01/18/22 1501) Vital Signs (24h Range):  Temp:  [98.1 °F (36.7 °C)-100.9 °F (38.3 °C)] 98.2 °F (36.8 °C)  Pulse:  [] 86  Resp:  [14-20] 19  SpO2:  [97 %-100 %] 100 %  BP: (115-165)/(55-71) 133/62     Weight: 64.3 kg (141 lb 12.1 oz)  Body mass index is 20.34 kg/m².    SpO2: 100 %  O2 Device (Oxygen Therapy): nasal cannula      Intake/Output Summary (Last 24 hours) at 1/18/2022 1619  Last data filed at 1/18/2022 1345  Gross per 24 hour   Intake 25 ml   Output --   Net 25 ml       Lines/Drains/Airways     Central Venous Catheter Line                 Hemodialysis Catheter 01/14/22 1439 right internal jugular 4 days          Drain                 Hemodialysis AV Fistula Left upper arm -- days          Peripheral Intravenous Line                 Peripheral IV - Single Lumen 01/11/22 2332 20 G Anterior;Right Forearm 6 days         Peripheral IV - Single Lumen 01/16/22 1710 22 G Posterior;Right Hand 1 day                Physical Exam  Constitutional:       General: He is not in acute distress.     Appearance: Normal appearance. He is well-developed, normal weight and well-nourished. He is not ill-appearing, toxic-appearing or diaphoretic.   HENT:      Head: Normocephalic and atraumatic.   Eyes:      General: No scleral icterus.     Extraocular  Movements: Extraocular movements intact and EOM normal.      Conjunctiva/sclera: Conjunctivae normal.      Pupils: Pupils are equal, round, and reactive to light.   Neck:      Thyroid: No thyromegaly.      Vascular: No JVD.      Trachea: No tracheal deviation.   Cardiovascular:      Rate and Rhythm: Normal rate and regular rhythm.      Heart sounds: S1 normal and S2 normal. No murmur heard.  No friction rub. No gallop.    Pulmonary:      Effort: Pulmonary effort is normal. No respiratory distress.      Breath sounds: Normal breath sounds. No stridor. No wheezing, rhonchi or rales.   Chest:      Chest wall: No tenderness.   Abdominal:      General: There is no distension.      Palpations: Abdomen is soft.   Musculoskeletal:         General: No edema.      Cervical back: Normal range of motion and neck supple. No rigidity.      Right lower leg: No edema.      Left lower leg: No edema.   Skin:     General: Skin is warm and dry.      Coloration: Skin is not jaundiced.      Findings: No rash.   Neurological:      General: No focal deficit present.      Mental Status: He is alert and oriented to person, place, and time.      Cranial Nerves: No cranial nerve deficit.      Deep Tendon Reflexes: Strength normal.   Psychiatric:         Mood and Affect: Mood and affect and mood normal.         Behavior: Behavior normal.         Current Medications:   sodium chloride 0.9%   Intravenous Once    sodium chloride 0.9%   Intravenous Once    atorvastatin  20 mg Oral Daily    carvediloL  6.25 mg Oral BID    ceFEPime (MAXIPIME) IVPB  1 g Intravenous Q24H    hydrALAZINE  100 mg Oral Q8H    lisinopriL  5 mg Oral Daily    pantoprazole  40 mg Oral Daily    QUEtiapine  25 mg Oral QHS    sevelamer carbonate  2,400 mg Oral TID WM    vitamin renal formula (B-complex-vitamin c-folic acid)  1 capsule Oral Daily       sodium chloride, sodium chloride, sodium chloride 0.9%, acetaminophen, acetaminophen, dextrose 50%, dextrose 50%,  glucagon (human recombinant), glucose, glucose, heparin (porcine), HYDROmorphone, HYDROmorphone, influenza, insulin aspart U-100, magnesium oxide, magnesium oxide, naloxone, OLANZapine, olanzapine zydis, ondansetron, ondansetron, oxyCODONE, sodium chloride 0.9%, sodium chloride 0.9%, sodium chloride 0.9%, sodium chloride 0.9%    Laboratory (all labs reviewed):  CBC:  Recent Labs   Lab 01/14/22  0630 01/15/22  0416 01/16/22  0432 01/17/22  0516 01/18/22  0441   WBC 15.59 H 13.43 H 14.02 H 16.77 H 19.19 H   Hemoglobin 8.9 L 8.9 L 8.4 L 7.8 L 8.2 L   Hematocrit 29.0 L 29.1 L 29.1 L 26.9 L 28.3 L   Platelets 505 H 549 H 491 H 466 H 393       CHEMISTRIES:  Recent Labs   Lab 01/14/22  0630 01/15/22  0416 01/16/22  0432 01/17/22  0516 01/18/22  0441   Glucose 126 H 169 H 142 H 232 H 143 H   Sodium 135 L 138 139 134 L 135 L   Potassium 4.5 4.3 4.5 4.4 4.3   BUN 59 H 29 H 49 H 60 H 41 H   Creatinine 14.9 H 8.6 H 12.2 H 13.9 H 8.9 H   eGFR if  4 A 7 A 5 A 4 A 7 A   eGFR if non African American 3 A 6 A 4 A 3 A 6 A   Calcium 8.7 8.6 L 8.9 8.6 L 9.2       CARDIAC BIOMARKERS:  Recent Labs   Lab 10/25/20  1951 10/25/20  2323 10/26/20  0413 01/18/22  1417   Troponin I 0.088 H 0.079 H 0.141 H 0.142 H       COAGS:  Recent Labs   Lab 01/07/21  1003 01/05/22  1232   INR 1.0 1.0       LIPIDS/LFTS:  Recent Labs   Lab 01/29/19  1049 10/25/20  1952 01/07/21  1003 05/19/21  1016 01/14/22  0630 01/15/22  0416 01/16/22 0432 01/17/22  0516 01/18/22 0441   Cholesterol 241 H  --  186  --   --   --   --   --   --    Triglycerides 147  --  123  --   --   --   --   --   --    HDL 63  --  67  --   --   --   --   --   --    LDL Cholesterol 148.6  --  94.4  --   --   --   --   --   --    Non-HDL Cholesterol 178  --  119  --   --   --   --   --   --    AST 11   < > 9 L   < > 35 36 28 20 20   ALT 7 L   < > <5 L   < > 9 L 13 12 11 13    < > = values in this interval not displayed.       BNP:  Recent Labs   Lab 10/25/20  1951   BNP 1,359  H       TSH:        Free T4:        Diagnostic Results:  ECG (personally reviewed and interpreted tracing(s)):  1/18/22 1353 SR, inflat ST depression, ?isch, similar to 1/5/22, less prom than 2/4/21    Chest X-Ray (personally reviewed and interpreted image(s)): 1/5/22  There are vascular stents at the left upper chest and within the soft tissues of the left axilla/proximal arm.  Cardiac size is upper normal.  There is no pleural effusion.  The osseous structures demonstrate degenerative change.  The lungs are clear.  There is vascular calcification.    Echo: 12/15/21  · The estimated ejection fraction is 55%.  · Normal systolic function.  · Grade I left ventricular diastolic dysfunction.  · Moderate left atrial enlargement.  · Mild right atrial enlargement.  · Mild tricuspid regurgitation.  · Normal right ventricular size with normal right ventricular systolic function.  · Moderate-to-severe mitral regurgitation.  · Normal central venous pressure (3 mmHg).    Stress Test: L MPI 2/4/21    Equivocal myocardial perfusion scan.    There is a moderate intensity, large sized, equivocal defect that is mostly fixed with some reversible areas in the basal to apical lateral wall(s). This finding is equivocal due to diaphragm shadow.    Consider PET stress testing if clinically indicated.    The gated perfusion images showed an ejection fraction of 41% at rest. The gated perfusion images showed an ejection fraction of 38% post stress. Normal ejection fraction is greater than 51%.    There is mild global hypokinesis at rest and stress.    LV cavity size is mildly enlarged at rest and mildly enlarged at stress.    The EKG portion of this study is abnormal but not diagnostic.    The patient reported no chest pain during the stress test.    There were no arrhythmias during stress.    Cath: 4/1/16 (care everywhere)  · Co-dominant coronary system  · Left main: large caliber with non-obstructive disease.   · Left anterior  descending: large caliber that wraps around the apex. It gives a small-mod diagonal branch. All with non-obstructive disease.  · Circumflex: large caliber and gives two OM branches and small PDA. All with non-obstructive disease.   · Right coronary artery: large caliber with non obstructive disease  Left heart catheterization:  · Ao: 125/68 mmHg  · LV: 125/7 mmHg  · LVEDP 7 mmHg  IMPRESSIONS:   · Non-obstructive coronary artery disease.          Assessment and Plan:     * Gangrene of left foot  The patient is seen in the attendance of his daughter who aids with Creole interpretation.  He reports a reasonable exercise capacity prior to hospitalization telling me that he would be able to climb a flight of stairs if needed.  Prior cardiac history is notable for heart failure with preserved ejection fraction and prior heart catheterization 2016 noting nonobstructive CAD as well as a recent nuclear stress test which was read as equivocal.  His EKG reveals chronic ischemic changes likely related to LVH.  Recent echocardiogram reveals normal LV function.  Given his reasonable exercise capacity, I do not feel that he is at prohibitive risk for the planned amputation and associated anesthesia.  No further preoperative cardiac testing is required.    Controlled type 2 diabetes mellitus with chronic kidney disease on chronic dialysis, with long-term current use of insulin  Mgmt per IM    Essential (primary) hypertension  Cont med rx    Chronic diastolic heart failure  Appears euvolemic    ESRD on hemodialysis  Mgmt per IM/neph        VTE Risk Mitigation (From admission, onward)         Ordered     heparin (porcine) injection 5,000 Units  As needed (PRN)         01/14/22 1716     IP VTE HIGH RISK PATIENT  Once         01/05/22 1404     Place sequential compression device  Until discontinued         01/05/22 1404                Thank you for your consult. I will sign off. Please contact us if you have any additional  questions.    Víctor Sutton MD  Cardiology   [facility] - Telemetry [place]

## 2022-01-18 NOTE — HPI
Patient admitted to the hospital for eval and treatment of L foot gangrene.  ID, Ortho and Vasc consulted. Nephrology was also consulted for chronic dialysis. Patient started on broad spectrum antibiotics, and had several toes on L foot amputated by Vascular surgery.   The patient's AVF had recurrent malfunction. IR attempts at intervention without success. THDC placed for dialysis.   Pt became increasingly delirious and combative. He refused angiogram with Vascular. Psychiatry evaluated, felt that he was not able to make his own medical decisions. At this point Dr. Soni felt the patient would be a poor candidate for vascular intervention, recommended AKA. ID in agreement. Ortho consulted for AKA, daughter/POA in agreement, date of procedure TBD.     Cardiology consulted for preop cardiac risk stratification.    The patient is seen in the attendance of his daughter who aids with Creole interpretation.  He reports a reasonable exercise capacity prior to hospitalization telling me that he would be able to climb a flight of stairs if needed.  Prior cardiac history is notable for heart failure with preserved ejection fraction of prior heart catheterization 2016 noting nonobstructive CAD and a recent nuclear stress test which was read as equivocal.  His EKG reveals chronic ischemic changes likely related to LVH.  Recent echocardiogram reveals normal LV function.  Given his reasonable exercise capacity, I do not feel that he is at prohibitive risk for the planned amputation and associated anesthesia.  No further preoperative cardiac testing is required.

## 2022-01-18 NOTE — PLAN OF CARE
01/18/22 0919   Discharge Reassessment   Assessment Type Discharge Planning Reassessment   Did the patient's condition or plan change since previous assessment? No   Discharge Plan discussed with: Adult children   Communicated ENIO with patient/caregiver Date not available/Unable to determine   Discharge Plan A Skilled Nursing Facility   Discharge Barriers Identified Nursing Home rejection   Why the patient remains in the hospital Requires continued medical care   Per careport patient rejected by n SNF's:  [facility], [facility], [facility], [facility].  No other responses at this time.

## 2022-01-18 NOTE — CONSULTS
"[facility]  Infectious Disease  Consult Note    Patient Name: Adryan Neff  MRN: [id number]  Admission Date: 1/5/2022  Hospital Length of Stay: 13 days  Attending Physician: Dottie Kaufman MD  Primary Care Provider: Soren Rice MD     Isolation Status: No active isolations    Patient information was obtained from patient and ER records.      Consults  Assessment/Plan:     * Gangrene of left foot  56M with ESRD on HD and PAD admitted LLE gangrene, awaiting AKA. Wound cultures with citrobacter x 2 and pseudomonas. No systemic signs of infection. Has permecath for HD, also LUE fistula (with wire). Incidentally tested positive covid    Recommendations:   - amputation may be curative of infection. Would send prox margin for path and culture  - continue cefepime  - needs adequate perfusion to heal wound  - would touch base with nephrology or vascular re: plan for external wire in AVF        Thank you for your consult. I will follow-up with patient. Please contact us if you have any additional questions.    Ruby Salvador MD  Infectious Disease  [facility]    Subjective:     Principal Problem: Gangrene of left foot    HPI: This is a 56 y.o.male patient, with a PMHx of ESRD on hemodialysis, CAD, HTN, and DM, presenting to the ED for further evaluation of left foot pain. He was diagnosed with gangrene of the foot due to PAD. Coincidentally, he tested positive for COVID-19. The patient was taken to the OR and underwent: left 2-5 metatarsal amputations and left foot debridement and washout. OR cultures have grown Citrobacter and Pseudomonas. The patient has been receiving Zosyn and Vancocin since admission. ID is consulted for: "left foot gangrene, patient and family wanting to preserve limb; growing citrobacter and pseudomonas." The patient complains of pain but is otherwise, a difficult historian. No family is present.      Interval history: NAEO. Awaiting L AKA. Daughter at bedside. " Denies preceeding trauma to LLE wound    Past Medical History:   Diagnosis Date    CAD (coronary artery disease) 2016    CAD (non obstructive) 2016 [facility]    Diabetes mellitus type I     Diabetic retinopathy     ESRD on hemodialysis     on HD TThSat    Gangrene of left foot     Hypertension     Nuclear sclerosis of right eye 11/24/2021    PAD (peripheral artery disease)     Pulmonary HTN     TB lung, latent 04/2021         Objective:     Vital Signs (Most Recent):  Temp: 99.5 °F (37.5 °C) (01/18/22 1143)  Pulse: 78 (01/18/22 1143)  Resp: 18 (01/18/22 1143)  BP: (!) 115/55 (01/18/22 1143)  SpO2: 99 % (01/18/22 1143) Vital Signs (24h Range):  Temp:  [98 °F (36.7 °C)-100.9 °F (38.3 °C)] 99.5 °F (37.5 °C)  Pulse:  [] 78  Resp:  [18-20] 18  SpO2:  [98 %-100 %] 99 %  BP: (104-143)/(54-72) 115/55     Weight: 64.3 kg (141 lb 12.1 oz)  Body mass index is 20.34 kg/m².    Estimated Creatinine Clearance: 8.3 mL/min (A) (based on SCr of 8.9 mg/dL (H)).    Physical Exam  Vitals and nursing note reviewed.   Constitutional:       General: He is not in acute distress.     Appearance: He is not ill-appearing, toxic-appearing or diaphoretic.   HENT:      Head: Normocephalic and atraumatic.      Right Ear: External ear normal.      Left Ear: External ear normal.   Eyes:      Extraocular Movements: Extraocular movements intact.      Conjunctiva/sclera: Conjunctivae normal.      Pupils: Pupils are equal, round, and reactive to light.   Musculoskeletal:         General: Deformity present.   Neurological:      General: No focal deficit present.      Mental Status: He is alert and oriented to person, place, and time.   Psychiatric:         Mood and Affect: Mood normal.         Behavior: Behavior normal.         Thought Content: Thought content normal.         Judgment: Judgment normal.         Significant Labs:   Blood Culture:   Recent Labs   Lab 11/11/21  1155 01/05/22  1308 01/05/22  1309 01/09/22  1216 01/09/22  1217    LABBLOO No growth after 5 days. No Growth after 4 days.  No Growth after 4 days.  No Growth after 4 days.  No Growth after 4 days.      CBC:   Recent Labs   Lab 01/17/22  0516 01/18/22  0441   WBC 16.77* 19.19*   HGB 7.8* 8.2*   HCT 26.9* 28.3*   * 393     CMP:   Recent Labs   Lab 01/17/22  0516 01/18/22  0441   * 135*   K 4.4 4.3   CL 96 96   CO2 22* 24   * 143*   BUN 60* 41*   CREATININE 13.9* 8.9*   CALCIUM 8.6* 9.2   PROT 7.9 8.7*   ALBUMIN 1.9* 2.1*   BILITOT 0.3 1.0   ALKPHOS 89 94   AST 20 20   ALT 11 13   ANIONGAP 16 15   EGFRNONAA 3* 6*     Urine Culture: No results for input(s): LABURIN in the last 4320 hours.  Wound Culture:   Recent Labs   Lab 01/05/22  1315 01/06/22  1100   LABAERO CITROBACTER FREUNDII  Many  *  PSEUDOMONAS AERUGINOSA  Moderate  * CITROBACTER KOSERI  Many  For susceptibility see order # [id number]  *  CITROBACTER FREUNDII  Many  For susceptibility see order # [id number]  *  PSEUDOMONAS AERUGINOSA*  CITROBACTER KOSERI  Many  *  CITROBACTER FREUNDII  Few  *       Significant Imaging: None    Past Surgical History:   Procedure Laterality Date    AV FISTULA PLACEMENT      CATARACT EXTRACTION Left     EYE SURGERY      TOE AMPUTATION Left 1/6/2022    Procedure: AMPUTATION, TOE;  Surgeon: Masoud Soni MD;  Location: [facility] OR;  Service: Vascular;  Laterality: Left;  Left first through fifth toes, possible open transmetatarsal amputation and all other indicated procedures       Review of patient's allergies indicates:  No Known Allergies    Medications:  Medications Prior to Admission   Medication Sig    amoxicillin-clavulanate 500-125mg (AUGMENTIN) 500-125 mg Tab Take 1 tablet (500 mg total) by mouth once daily.    atorvastatin (LIPITOR) 20 MG tablet Take 1 tablet (20 mg total) by mouth once daily. (Patient taking differently: Take 20 mg by mouth once daily.)    blood sugar diagnostic Strp 1 each by Misc.(Non-Drug; Combo Route) route 2 hours after meals  and at bedtime.    doxercalciferoL (HECTOROL) 0.5 MCG capsule 2 mcg.    heparin sodium,porcine (HEPARIN, PORCINE,) 1,000 unit/mL Soln injection Heparin Sodium (Porcine) 1,000 Units/mL Systemic    hydrALAZINE (APRESOLINE) 100 MG tablet Take 1 tablet (100 mg total) by mouth every 8 (eight) hours. (Patient taking differently: Take 50 mg by mouth every 8 (eight) hours. )    insulin (BASAGLAR KWIKPEN U-100 INSULIN) glargine 100 units/mL (3mL) SubQ pen Inject 15 Units into the skin every evening.    isoniazid (NYDRAZID) 300 MG Tab Take 1 tablet (300 mg total) by mouth once daily.    lisinopriL (PRINIVIL,ZESTRIL) 2.5 MG tablet Take 2.5 mg by mouth once daily.    lisinopriL (PRINIVIL,ZESTRIL) 5 MG tablet Take 5 mg by mouth once daily.    methoxy peg-epoetin beta (MIRCERA INJ) 75 mcg.    metoprolol succinate (TOPROL-XL) 25 MG 24 hr tablet Take 1 tablet (25 mg total) by mouth once daily.    omeprazole (PRILOSEC) 20 MG capsule TAKE 2 CAPSULES(40 MG) BY MOUTH EVERY DAY (Patient taking differently: Take 20 mg by mouth once daily.)    penicillin v potassium (VEETID) 500 MG tablet Take 500 mg by mouth 4 (four) times daily.    RENVELA 800 mg Tab Take 800 mg by mouth 3 (three) times daily with meals.    traMADoL (ULTRAM) 50 mg tablet Take 1 tablet (50 mg total) by mouth every 8 (eight) hours as needed for Pain.    traZODone (DESYREL) 50 MG tablet Take 50 mg by mouth every evening.     Antibiotics (From admission, onward)            Start     Stop Route Frequency Ordered    01/10/22 1700  cefepime in dextrose 5 % 1 gram/50 mL IVPB 1 g         -- IV Every 24 hours (non-standard times) 01/10/22 1102    01/06/22 0945  mupirocin 2 % ointment         01/11 0859 Nasl 2 times daily 01/06/22 0836        Antifungals (From admission, onward)            None        Antivirals (From admission, onward)    None           Immunization History   Administered Date(s) Administered    COVID-19 Vaccine 01/15/2021    COVID-19, MRNA, LN-S,  PF (MODERNA FULL 0.5 ML DOSE) 01/15/2021, 02/11/2021    Hepatitis B, Adult 07/10/2017, 08/14/2017, 09/11/2017, 03/30/2021, 04/22/2021    Influenza 09/29/2020    Influenza - Quadrivalent - PF *Preferred* (6 months and older) 01/16/2008, 03/30/2016, 12/07/2016    PPD Test 04/04/2017, 01/11/2022    Pneumococcal Conjugate - 13 Valent 11/27/2017    Pneumococcal Polysaccharide - 23 Valent 03/30/2016, 10/24/2018    Tdap 03/30/2016       Family History     Problem Relation (Age of Onset)    No Known Problems Mother, Father, Sister, Brother, Daughter, Daughter, Daughter, Brother, Maternal Aunt, Maternal Uncle, Paternal Aunt, Paternal Uncle, Maternal Grandmother, Maternal Grandfather, Paternal Grandmother, Paternal Grandfather        Social History     Socioeconomic History    Marital status: Single    Number of children: 5   Tobacco Use    Smoking status: Never Smoker    Smokeless tobacco: Never Used   Substance and Sexual Activity    Alcohol use: No    Drug use: No    Sexual activity: Yes     Partners: Female   Social History Narrative    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   Gastrointestinal: Negative for abdominal distention and abdominal pain.   Genitourinary: Negative for dysuria and urgency.   Musculoskeletal: Negative for arthralgias and back pain.   Skin: Positive for wound.   Neurological: Negative for headaches.   Psychiatric/Behavioral: Negative for confusion.     Objective:     Vital Signs (Most Recent):  Temp: 99.5 °F (37.5 °C) (01/18/22 1143)  Pulse: 78 (01/18/22 1143)  Resp: 18 (01/18/22 1143)  BP: (!) 115/55 (01/18/22 1143)  SpO2: 99 % (01/18/22 1143) Vital Signs (24h Range):  Temp:  [98 °F (36.7 °C)-100.9 °F (38.3 °C)] 99.5 °F (37.5 °C)  Pulse:  [] 78  Resp:  [18-20] 18  SpO2:  [98 %-100 %] 99 %  BP: (104-143)/(54-72) 115/55     Weight: 64.3 kg (141 lb 12.1 oz)  Body mass index is 20.34 kg/m².    Estimated Creatinine Clearance: 8.3  mL/min (A) (based on SCr of 8.9 mg/dL (H)).    Physical Exam    Significant Labs: All pertinent labs within the past 24 hours have been reviewed.    Significant Imaging: I have reviewed all pertinent imaging results/findings within the past 24 hours.

## 2022-01-18 NOTE — NURSING
Call received from Dr. Light, states amputation is scheduled for today approx [id number] today. Instructed to hold heparin. Keep NPO. Dr. Light states he will call daughter to inform of schedule.

## 2022-01-18 NOTE — ASSESSMENT & PLAN NOTE
Presents with gangrenous L foot in the setting of severe PAD. Underwent left 2-5 metatarsal amputations w/ washout and debridement w/ Vascular surgery. Initial plan for angiogram w/ intervention, however pt refused the procedure. Given the low initial probability of wound healing and infectious clearance even with vascular intervention and lack of patient ability to adhere to medical treatment, Dr. Soni recommended AKA. Ortho consulted.   - family in agreement with AKA, date TBD  - antibiotics per ID.  Will have AKA today.

## 2022-01-18 NOTE — ASSESSMENT & PLAN NOTE
The patient is seen in the attendance of his daughter who aids with Creole interpretation.  He reports a reasonable exercise capacity prior to hospitalization telling me that he would be able to climb a flight of stairs if needed.  Prior cardiac history is notable for heart failure with preserved ejection fraction and prior heart catheterization 2016 noting nonobstructive CAD as well as a recent nuclear stress test which was read as equivocal.  His EKG reveals chronic ischemic changes likely related to LVH.  Recent echocardiogram reveals normal LV function.  Given his reasonable exercise capacity, I do not feel that he is at prohibitive risk for the planned amputation and associated anesthesia.  No further preoperative cardiac testing is required.

## 2022-01-18 NOTE — PROGRESS NOTES
Released from PACU per Dr Bingham.   12 lead EKG completed and first timed Troponin drawn in PACU.  Denies pain. VSS per flow sheet. Responds to voice.  Placed on 2LNC per Dr Bingham.  Hand off report to JEANINE Dorsey LPN.  Family updated per Dr Light.

## 2022-01-19 LAB
ALBUMIN SERPL BCP-MCNC: 2.1 G/DL (ref 3.5–5.2)
ALP SERPL-CCNC: 79 U/L (ref 55–135)
ALT SERPL W/O P-5'-P-CCNC: 9 U/L (ref 10–44)
ANION GAP SERPL CALC-SCNC: 18 MMOL/L (ref 8–16)
AST SERPL-CCNC: 18 U/L (ref 10–40)
BASOPHILS # BLD AUTO: 0.09 K/UL (ref 0–0.2)
BASOPHILS NFR BLD: 0.5 % (ref 0–1.9)
BILIRUB SERPL-MCNC: 0.4 MG/DL (ref 0.1–1)
BUN SERPL-MCNC: 57 MG/DL (ref 6–20)
CALCIUM SERPL-MCNC: 9.4 MG/DL (ref 8.7–10.5)
CHLORIDE SERPL-SCNC: 96 MMOL/L (ref 95–110)
CO2 SERPL-SCNC: 21 MMOL/L (ref 23–29)
CREAT SERPL-MCNC: 11.5 MG/DL (ref 0.5–1.4)
DIFFERENTIAL METHOD: ABNORMAL
EOSINOPHIL # BLD AUTO: 1.2 K/UL (ref 0–0.5)
EOSINOPHIL NFR BLD: 6.5 % (ref 0–8)
ERYTHROCYTE [DISTWIDTH] IN BLOOD BY AUTOMATED COUNT: 16 % (ref 11.5–14.5)
EST. GFR  (AFRICAN AMERICAN): 5 ML/MIN/1.73 M^2
EST. GFR  (NON AFRICAN AMERICAN): 4 ML/MIN/1.73 M^2
GLUCOSE SERPL-MCNC: 99 MG/DL (ref 70–110)
HCT VFR BLD AUTO: 26.8 % (ref 40–54)
HGB BLD-MCNC: 7.9 G/DL (ref 14–18)
IMM GRANULOCYTES # BLD AUTO: 0.21 K/UL (ref 0–0.04)
IMM GRANULOCYTES NFR BLD AUTO: 1.2 % (ref 0–0.5)
LYMPHOCYTES # BLD AUTO: 1.6 K/UL (ref 1–4.8)
LYMPHOCYTES NFR BLD: 8.9 % (ref 18–48)
MCH RBC QN AUTO: 27.9 PG (ref 27–31)
MCHC RBC AUTO-ENTMCNC: 29.5 G/DL (ref 32–36)
MCV RBC AUTO: 95 FL (ref 82–98)
MONOCYTES # BLD AUTO: 1.5 K/UL (ref 0.3–1)
MONOCYTES NFR BLD: 8.3 % (ref 4–15)
NEUTROPHILS # BLD AUTO: 13.5 K/UL (ref 1.8–7.7)
NEUTROPHILS NFR BLD: 74.6 % (ref 38–73)
NRBC BLD-RTO: 0 /100 WBC
PLATELET # BLD AUTO: 373 K/UL (ref 150–450)
PMV BLD AUTO: 9.7 FL (ref 9.2–12.9)
POCT GLUCOSE: 130 MG/DL (ref 70–110)
POCT GLUCOSE: 148 MG/DL (ref 70–110)
POCT GLUCOSE: 154 MG/DL (ref 70–110)
POCT GLUCOSE: 257 MG/DL (ref 70–110)
POTASSIUM SERPL-SCNC: 4.8 MMOL/L (ref 3.5–5.1)
PROT SERPL-MCNC: 8.8 G/DL (ref 6–8.4)
RBC # BLD AUTO: 2.83 M/UL (ref 4.6–6.2)
SODIUM SERPL-SCNC: 135 MMOL/L (ref 136–145)
WBC # BLD AUTO: 18.12 K/UL (ref 3.9–12.7)

## 2022-01-19 PROCEDURE — 21400001 HC TELEMETRY ROOM

## 2022-01-19 PROCEDURE — 36415 COLL VENOUS BLD VENIPUNCTURE: CPT | Performed by: STUDENT IN AN ORGANIZED HEALTH CARE EDUCATION/TRAINING PROGRAM

## 2022-01-19 PROCEDURE — 85025 COMPLETE CBC W/AUTO DIFF WBC: CPT | Performed by: STUDENT IN AN ORGANIZED HEALTH CARE EDUCATION/TRAINING PROGRAM

## 2022-01-19 PROCEDURE — 63600175 PHARM REV CODE 636 W HCPCS: Performed by: INTERNAL MEDICINE

## 2022-01-19 PROCEDURE — 25000003 PHARM REV CODE 250: Performed by: STUDENT IN AN ORGANIZED HEALTH CARE EDUCATION/TRAINING PROGRAM

## 2022-01-19 PROCEDURE — 80053 COMPREHEN METABOLIC PANEL: CPT | Performed by: STUDENT IN AN ORGANIZED HEALTH CARE EDUCATION/TRAINING PROGRAM

## 2022-01-19 PROCEDURE — 25000003 PHARM REV CODE 250: Performed by: INTERNAL MEDICINE

## 2022-01-19 PROCEDURE — 25000003 PHARM REV CODE 250: Performed by: HOSPITALIST

## 2022-01-19 PROCEDURE — 97530 THERAPEUTIC ACTIVITIES: CPT

## 2022-01-19 PROCEDURE — 25000003 PHARM REV CODE 250: Performed by: FAMILY MEDICINE

## 2022-01-19 RX ADMIN — CARVEDILOL 6.25 MG: 6.25 TABLET, FILM COATED ORAL at 09:01

## 2022-01-19 RX ADMIN — CEFEPIME HYDROCHLORIDE 1 G: 1 INJECTION, SOLUTION INTRAVENOUS at 05:01

## 2022-01-19 RX ADMIN — QUETIAPINE FUMARATE 25 MG: 25 TABLET ORAL at 09:01

## 2022-01-19 RX ADMIN — PANTOPRAZOLE SODIUM 40 MG: 40 TABLET, DELAYED RELEASE ORAL at 12:01

## 2022-01-19 RX ADMIN — ATORVASTATIN CALCIUM 20 MG: 10 TABLET, FILM COATED ORAL at 12:01

## 2022-01-19 RX ADMIN — HYDRALAZINE HYDROCHLORIDE 100 MG: 25 TABLET, FILM COATED ORAL at 05:01

## 2022-01-19 RX ADMIN — NEPHROCAP 1 CAPSULE: 1 CAP ORAL at 12:01

## 2022-01-19 RX ADMIN — HYDRALAZINE HYDROCHLORIDE 100 MG: 25 TABLET, FILM COATED ORAL at 09:01

## 2022-01-19 RX ADMIN — SEVELAMER CARBONATE 2400 MG: 800 TABLET, FILM COATED ORAL at 05:01

## 2022-01-19 RX ADMIN — SEVELAMER CARBONATE 2400 MG: 800 TABLET, FILM COATED ORAL at 12:01

## 2022-01-19 RX ADMIN — CARVEDILOL 6.25 MG: 6.25 TABLET, FILM COATED ORAL at 12:01

## 2022-01-19 RX ADMIN — OXYCODONE 5 MG: 5 TABLET ORAL at 05:01

## 2022-01-19 RX ADMIN — INSULIN ASPART 6 UNITS: 100 INJECTION, SOLUTION INTRAVENOUS; SUBCUTANEOUS at 05:01

## 2022-01-19 RX ADMIN — LISINOPRIL 5 MG: 5 TABLET ORAL at 12:01

## 2022-01-19 NOTE — PROGRESS NOTES
[facility]  Progress Note    Patient Name: Adryan Neff  MRN: [id number]  Patient Class: IP- Inpatient   Admission Date: 1/5/2022  Length of Stay: 14 days  Attending Physician: Dottie Kaufman MD  Primary Care Provider: Soren Rice MD        Subjective:     Principal Problem:Gangrene of left foot        HPI:  This is a 56 y.o.male patient, with a PMHx of ESRD on hemodialysis, CAD, HTN, and DM, presenting to the ED for further evaluation of left foot pain and black discoloration that began 3 days ago. Patient states these symptoms have been ongoing for the last month. He reports being referred to a specialist but denies ever following up. He has been putting some otc medication on the foot that he believes has turned his foot more black. No trauma or injury. Patient reports associated chills. Patient states applying a creme to the foot and noticed his discoloration worsened. Patient reports he was last dialyzed yesterday. Patient denies any fever, shortness of breath, chest pain, neck pain, back pain, abdominal pain, rash, headaches, congestion, rhinorrhea, cough, sore throat, ear pain, eye pain, blurred vision, nausea, vomiting, diarrhea, dysuria, or any other associated symptoms.    Per chart review, he had an arterial u/s 12/15.   Impression:   1. High-grade severe stenosis right popliteal artery with occlusion anterior tibial artery which is reconstituted at level of DPA via collaterals.  2. 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    Cardiology has been planning to see him for revascularization options. Last podiatry visit 12/13 with Stable dry gangrene L 2nd toe and medial L 5th toe. Dorsalis pedis and posterior tibial pulses are diminished Bilaterally. Toes are cool to touch. Feet are warm proximally.There is decreased digital hair. Skin is atrophic, slightly hyperpigmented, and mildly edematous     In the ED, he was found to have worsened gangrene and  a cool extremity. Vascular surgery was consulted. He was also COVID +. Says he got a booster shot in November sometime.      Overview/Hospital Course:  Patient admitted to the hospital for eval and treatment of L foot gangrene.  ID, Ortho and Vasc consulted. Nephrology was also consulted for chronic dialysis. Patient started on broad spectrum antibiotics, and had several toes on L foot amputated by Vascular surgery.     The patient's AVF had recurrent malfunction. IR attempts at intervention without success. THDC placed for dialysis.    Pt became increasingly delirious and combative. He refused angiogram with Vascular. Psychiatry evaluated, felt that he was not able to make his own medical decisions. At this point Dr. Soni felt the patient would be a poor candidate for vascular intervention, recommended AKA. ID in agreement. Ortho consulted for AKA, daughter/POA in agreement, date of procedure TBD.   Had some EKG changes with no chest pain,cardiology cleared patient for procedure.      Interval History: No events overnight.CC today is  Complains of leg pain    Review of Systems   Constitutional: Negative for chills and fever.   Respiratory: Negative for cough and shortness of breath.    Cardiovascular: Negative for chest pain and leg swelling.   Gastrointestinal: Negative for abdominal distention and abdominal pain.     Objective:     Vital Signs (Most Recent):  Temp: 98.3 °F (36.8 °C) (01/19/22 1144)  Pulse: 91 (01/19/22 1144)  Resp: 19 (01/19/22 1144)  BP: (!) 165/75 (01/19/22 1144)  SpO2: 99 % (01/19/22 1144) Vital Signs (24h Range):  Temp:  [98.1 °F (36.7 °C)-98.9 °F (37.2 °C)] 98.3 °F (36.8 °C)  Pulse:  [79-98] 91  Resp:  [14-20] 19  SpO2:  [97 %-100 %] 99 %  BP: ()/(55-78) 165/75     Weight: 64.3 kg (141 lb 12.1 oz)  Body mass index is 20.34 kg/m².    Intake/Output Summary (Last 24 hours) at 1/19/2022 1201  Last data filed at 1/18/2022 1345  Gross per 24 hour   Intake 25 ml   Output --   Net 25 ml       Physical Exam  Constitutional:       General: He is not in acute distress.     Appearance: He is ill-appearing. He is not toxic-appearing or diaphoretic.   Cardiovascular:      Rate and Rhythm: Normal rate and regular rhythm.      Heart sounds: No murmur heard.  No gallop.    Pulmonary:      Effort: Pulmonary effort is normal. No respiratory distress.      Breath sounds: Normal breath sounds. No wheezing.   Abdominal:      General: Bowel sounds are normal. There is no distension.      Palpations: Abdomen is soft.      Tenderness: There is no abdominal tenderness.   Skin:     Comments: L foot s/p multiple amputated digits, necrotic tissue at site of amputation         Significant Labs: All pertinent labs within the past 24 hours have been reviewed.    Significant Imaging: I have reviewed all pertinent imaging results/findings within the past 24 hours.      Assessment/Plan:      * Gangrene of left foot  Presents with gangrenous L foot in the setting of severe PAD. Underwent left 2-5 metatarsal amputations w/ washout and debridement w/ Vascular surgery. Initial plan for angiogram w/ intervention, however pt refused the procedure. Given the low initial probability of wound healing and infectious clearance even with vascular intervention and lack of patient ability to adhere to medical treatment, Dr. Soni recommended AKA. Ortho consulted.   - family in agreement with AKA, date TBD  - antibiotics per ID.    Had some EKG changes with no chest pain,cardiology cleared patient for procedure.    Encephalopathy, metabolic  Waxing and waning confusion likely delirium secondary to ischemic foot w/ possible superimposed infection and uremia from missing dialysis. Psychiatry deemed patient not to have capacity to make medical decisions. Overall orientation improved after starting low dose seroquel.  - psychiatry consulted, appreciate recs  - tx of underlying issues as noted elsewhere  - continue seroquel  - delirium  precautions    COVID-19 in immunocompromised patient  HD patient + for COVID 19. Not hypoxic or symptomatic. Supportive care prn.       ESRD on hemodialysis  Nephrology consulted to resume routine HD. Fistula malfunctioning despite IR attempts at declot, THDC placed for alternative access  - nephrology following  - needs vascular vs IR fix of fistula    Chronic diastolic heart failure  December 2021 echo reviewed. Stable.       Essential (primary) hypertension  Titrate to goal. Pt intermittently refuses po medications, requiring IV meds      Controlled type 2 diabetes mellitus with chronic kidney disease on chronic dialysis, with long-term current use of insulin  Pt refused insulin, has intermittent PO intake.  - sliding scale insulin for now      VTE Risk Mitigation (From admission, onward)         Ordered     heparin (porcine) injection 5,000 Units  As needed (PRN)         01/14/22 1716     IP VTE HIGH RISK PATIENT  Once         01/05/22 1404     Place sequential compression device  Until discontinued         01/05/22 1404                Discharge Planning   ENIO:      Code Status: Full Code   Is the patient medically ready for discharge?:     Reason for patient still in hospital (select all that apply): Patient trending condition  Discharge Plan A: Skilled Nursing Facility                  Dottie Kaufman MD  Department of Hospital Medicine   [facility]

## 2022-01-19 NOTE — PLAN OF CARE
01/19/22 1451   Medicare Message   Important Message from Medicare regarding Discharge Appeal Rights Given to patient/caregiver;Explained to patient/caregiver  (SW explained IMM to patient's daughter, Jonn, via telephone. Patient's daughter verbally expressed understanding. Copy of IMM mailed to address on file via certified mail.)   Date IMM was signed 01/19/22   Time IMM was signed [id number]     Certified mail #[phone]

## 2022-01-19 NOTE — PT/OT/SLP PROGRESS
Occupational Therapy   Treatment    Name: Adryan Neff  MRN: [id number]  Admitting Diagnosis:  Gangrene of left foot  1 Day Post-Op    Recommendations:     Discharge Recommendations: nursing facility, skilled  Discharge Equipment Recommendations:  bedside commode,walker, rolling  Barriers to discharge:   (Pt is at risk for falls, readmission and morbidity if d/c home at this time.)    Assessment:     Adryan Neff is a 56 y.o. male with a medical diagnosis of Gangrene of left foot. Performance deficits affecting function are weakness,impaired endurance,impaired self care skills,impaired functional mobilty,decreased upper extremity function,decreased lower extremity function,decreased coordination,impaired cognition,pain,decreased safety awareness,decreased ROM,impaired skin,edema,orthopedic precautions.     Pt pleasant and willing to participate in tx session this date w/ min encouragement. Pt still benefits from mod-max redirection and repetition for following commands but was able to perform supine>sit w/ min A to stand from EOB w/ mod A and ambulate very short household distance w/ min A and use of RW. Pt fluctuating between NWB and TTWB throughout, requiring cueing for using BUE/RLE support. Pt participated in EOB BUE TE for increasing overall strength and endurance. Pt will continue to benefit from skilled acute OT serivces 3-5x/wk to maximize functional capacity for safe performance w/ ADLs and functional mobility.     Rehab Prognosis:  Good; patient would benefit from acute skilled OT services to address these deficits and reach maximum level of function.       Plan:     Patient to be seen  (3-5x/wk) to address the above listed problems via self-care/home management,therapeutic activities,therapeutic exercises  · Plan of Care Expires: 02/08/22  · Plan of Care Reviewed with: patient    Subjective     Pain/Comfort:  · Pain Rating 1:  (Pt did not rate.)  · Location - Side 1: Left  · Location 1: foot  · Pain Addressed  "1: Reposition,Cessation of Activity,Nurse notified    Objective:     Communicated with: nurse (Janis) prior to session. Nurse reports pt did NOT have L AKA as initially scheduled.. Patient found HOB elevated with bed alarm,peripheral IV (Avasys, dialysis fistula) upon OT entry to room.    General Precautions: Standard, fall,diabetic   Orthopedic Precautions: (Per chart, LLE "touch down WB")   Braces: N/A  Respiratory Status: Room air; pt w/ SPO2 98% on RA     Occupational Performance:     Bed Mobility:    · Patient completed Scooting hips to EOB with contact guard assistance and minimum assistance  · Patient completed Supine to Sit with minimum assistance for lifting trunk and managing LLE   · Patient completed Sit to Supine with minimum assistance for managing LLE    Functional Mobility/Transfers:  · Patient completed Sit <> Stand Transfer x 1 trial with moderate assistance  with  rolling walker   · Functional Mobility: Pt was able to ambulate very short household distances (~6 ft) w/ min A and use of RW; pt initially demo'd LLE NWB but w/ fatigue, began to place partial weight to balls of feet.     Activities of Daily Living:  · Upper Body Dressing: minimum assistance for donning gown over back     [facility] 6 Click ADL: 15    Treatment & Education:  -Pt performed ADLs and functional mobility as noted above.   -Pt re-educated on LLE precautions prior to OOB activity.  -Pt performed BUE elbow flexion, scapular retraction and horizontal shoulder flex/ext for 1 set x 10 reps to increase UB strength and endurance for safe performance w/ ADLs and functional mobility. Pt required mod-max redirection for performing w/ proper form.   -Questions and concerns addressed within OT scope.      Patient left HOB elevated with all lines intact, call button in reach, bed alarm on and nurse notifiedEducation:      GOALS:   Multidisciplinary Problems     Occupational Therapy Goals        Problem: Occupational Therapy Goal    Goal " "Priority Disciplines Outcome Interventions   Occupational Therapy Goal     OT, PT/OT Ongoing, Progressing    Description: Goals to be met by: 2/8/2022    Patient will increase functional independence with ADLs by performing:    LE Dressing with Minimal Assistance.  Grooming while standing with Stand-by Assistance  Sit to stand w/ min A and use of RW w/ LLE "touch down weigth bearing" compliance.   Toileting from bedside commode with Stand-by Assistance for hygiene and clothing management  Supine to sit with Stand-by Assistance.  Toilet transfer to bedside commode with Stand-by Assistance                     Time Tracking:     OT Date of Treatment: 01/19/22  OT Start Time: 1012  OT Stop Time: 1030  OT Total Time (min): 18 min    Billable Minutes:Therapeutic Activity 18  Total Time 18    OT/GLENDA: OT     GLENDA Visit Number: 0    1/19/2022    "

## 2022-01-19 NOTE — NURSING
Call placed to dialysis center regarding orders for dialysis today MWF and possible surgery tomorrow. Ashley in dialysis states dialysis scheduled for tomorrow 1/20 per Dr. Fine's request.

## 2022-01-19 NOTE — SUBJECTIVE & OBJECTIVE
Interval History: No events overnight.CC today is  Complains of leg pain    Review of Systems   Constitutional: Negative for chills and fever.   Respiratory: Negative for cough and shortness of breath.    Cardiovascular: Negative for chest pain and leg swelling.   Gastrointestinal: Negative for abdominal distention and abdominal pain.     Objective:     Vital Signs (Most Recent):  Temp: 98.3 °F (36.8 °C) (01/19/22 1144)  Pulse: 91 (01/19/22 1144)  Resp: 19 (01/19/22 1144)  BP: (!) 165/75 (01/19/22 1144)  SpO2: 99 % (01/19/22 1144) Vital Signs (24h Range):  Temp:  [98.1 °F (36.7 °C)-98.9 °F (37.2 °C)] 98.3 °F (36.8 °C)  Pulse:  [79-98] 91  Resp:  [14-20] 19  SpO2:  [97 %-100 %] 99 %  BP: ()/(55-78) 165/75     Weight: 64.3 kg (141 lb 12.1 oz)  Body mass index is 20.34 kg/m².    Intake/Output Summary (Last 24 hours) at 1/19/2022 1201  Last data filed at 1/18/2022 1345  Gross per 24 hour   Intake 25 ml   Output --   Net 25 ml      Physical Exam  Constitutional:       General: He is not in acute distress.     Appearance: He is ill-appearing. He is not toxic-appearing or diaphoretic.   Cardiovascular:      Rate and Rhythm: Normal rate and regular rhythm.      Heart sounds: No murmur heard.  No gallop.    Pulmonary:      Effort: Pulmonary effort is normal. No respiratory distress.      Breath sounds: Normal breath sounds. No wheezing.   Abdominal:      General: Bowel sounds are normal. There is no distension.      Palpations: Abdomen is soft.      Tenderness: There is no abdominal tenderness.   Skin:     Comments: L foot s/p multiple amputated digits, necrotic tissue at site of amputation         Significant Labs: All pertinent labs within the past 24 hours have been reviewed.    Significant Imaging: I have reviewed all pertinent imaging results/findings within the past 24 hours.

## 2022-01-19 NOTE — PLAN OF CARE
1345- [facility] [place] responded to [facility] requesting more clinical and information regarding pt's COVID status. They are re-looking at referral.     Messages sent to [facility] [place], [facility] and [facility] requesting re-consideration of referral due to patient being discontinued from isolation and no longer being COVID positive.

## 2022-01-19 NOTE — PLAN OF CARE
Problem: Adult Inpatient Plan of Care  Goal: Plan of Care Review  Outcome: Ongoing, Progressing  Goal: Patient-Specific Goal (Individualized)  Outcome: Ongoing, Progressing  Goal: Absence of Hospital-Acquired Illness or Injury  Outcome: Ongoing, Progressing  Goal: Optimal Comfort and Wellbeing  Outcome: Ongoing, Progressing  Goal: Readiness for Transition of Care  Outcome: Ongoing, Progressing     Problem: Fall Injury Risk  Goal: Absence of Fall and Fall-Related Injury  Outcome: Ongoing, Progressing     Problem: Diabetes Comorbidity  Goal: Blood Glucose Level Within Targeted Range  Outcome: Ongoing, Progressing     Problem: Device-Related Complication Risk (Hemodialysis)  Goal: Safe, Effective Therapy Delivery  Outcome: Ongoing, Progressing     Problem: Hemodynamic Instability (Hemodialysis)  Goal: Effective Tissue Perfusion  Outcome: Ongoing, Progressing

## 2022-01-19 NOTE — ASSESSMENT & PLAN NOTE
Presents with gangrenous L foot in the setting of severe PAD. Underwent left 2-5 metatarsal amputations w/ washout and debridement w/ Vascular surgery. Initial plan for angiogram w/ intervention, however pt refused the procedure. Given the low initial probability of wound healing and infectious clearance even with vascular intervention and lack of patient ability to adhere to medical treatment, Dr. Soni recommended AKA. Ortho consulted.   - family in agreement with AKA, date TBD  - antibiotics per ID.    Had some EKG changes with no chest pain,cardiology cleared patient for procedure.

## 2022-01-19 NOTE — PLAN OF CARE
"  Problem: Occupational Therapy Goal  Goal: Occupational Therapy Goal  Description: Goals to be met by: 2/8/2022    Patient will increase functional independence with ADLs by performing:    LE Dressing with Minimal Assistance.  Grooming while standing with Stand-by Assistance  Sit to stand w/ min A and use of RW w/ LLE "touch down weigth bearing" compliance.   Toileting from bedside commode with Stand-by Assistance for hygiene and clothing management  Supine to sit with Stand-by Assistance.  Toilet transfer to bedside commode with Stand-by Assistance    Outcome: Ongoing, Progressing     Pt pleasant and willing to participate in tx session this date w/ min encouragement. Pt still benefits from mod-max redirection and repetition for following commands but was able to perform supine>sit w/ min A to stand from EOB w/ mod A and ambulate very short household distance w/ min A and use of RW. Pt fluctuating between NWB and TTWB throughout, requiring cueing for using BUE/RLE support. Pt participated in EOB BUE TE for increasing overall strength and endurance. Pt will continue to benefit from skilled acute OT serivces 3-5x/wk to maximize functional capacity for safe performance w/ ADLs and functional mobility.   "

## 2022-01-19 NOTE — NURSING
Call placed to [facility] regarding possible AKA surgery (or not) today, spoke to Ruby, who states that MD says surgery will NOT be today. New orders placed for diabetic renal diet

## 2022-01-19 NOTE — PROGRESS NOTES
Per rasheed Angulo [facility] requesting more info.  All requesting clinicals sent via [facility].

## 2022-01-20 LAB
ALBUMIN SERPL BCP-MCNC: 2 G/DL (ref 3.5–5.2)
ALP SERPL-CCNC: 79 U/L (ref 55–135)
ALT SERPL W/O P-5'-P-CCNC: 8 U/L (ref 10–44)
ANION GAP SERPL CALC-SCNC: 17 MMOL/L (ref 8–16)
AST SERPL-CCNC: 14 U/L (ref 10–40)
BASOPHILS # BLD AUTO: 0.07 K/UL (ref 0–0.2)
BASOPHILS NFR BLD: 0.4 % (ref 0–1.9)
BILIRUB SERPL-MCNC: 0.4 MG/DL (ref 0.1–1)
BUN SERPL-MCNC: 74 MG/DL (ref 6–20)
CALCIUM SERPL-MCNC: 8.9 MG/DL (ref 8.7–10.5)
CHLORIDE SERPL-SCNC: 97 MMOL/L (ref 95–110)
CO2 SERPL-SCNC: 20 MMOL/L (ref 23–29)
CREAT SERPL-MCNC: 14.2 MG/DL (ref 0.5–1.4)
DIFFERENTIAL METHOD: ABNORMAL
EOSINOPHIL # BLD AUTO: 1.1 K/UL (ref 0–0.5)
EOSINOPHIL NFR BLD: 6.4 % (ref 0–8)
ERYTHROCYTE [DISTWIDTH] IN BLOOD BY AUTOMATED COUNT: 16 % (ref 11.5–14.5)
EST. GFR  (AFRICAN AMERICAN): 4 ML/MIN/1.73 M^2
EST. GFR  (NON AFRICAN AMERICAN): 3 ML/MIN/1.73 M^2
GLUCOSE SERPL-MCNC: 182 MG/DL (ref 70–110)
HCT VFR BLD AUTO: 25.2 % (ref 40–54)
HGB BLD-MCNC: 7.4 G/DL (ref 14–18)
IMM GRANULOCYTES # BLD AUTO: 0.12 K/UL (ref 0–0.04)
IMM GRANULOCYTES NFR BLD AUTO: 0.7 % (ref 0–0.5)
LYMPHOCYTES # BLD AUTO: 1.4 K/UL (ref 1–4.8)
LYMPHOCYTES NFR BLD: 8.1 % (ref 18–48)
MCH RBC QN AUTO: 27.7 PG (ref 27–31)
MCHC RBC AUTO-ENTMCNC: 29.4 G/DL (ref 32–36)
MCV RBC AUTO: 94 FL (ref 82–98)
MONOCYTES # BLD AUTO: 1.5 K/UL (ref 0.3–1)
MONOCYTES NFR BLD: 8.5 % (ref 4–15)
NEUTROPHILS # BLD AUTO: 13.3 K/UL (ref 1.8–7.7)
NEUTROPHILS NFR BLD: 75.9 % (ref 38–73)
NRBC BLD-RTO: 0 /100 WBC
PLATELET # BLD AUTO: 321 K/UL (ref 150–450)
PMV BLD AUTO: 9.8 FL (ref 9.2–12.9)
POCT GLUCOSE: 149 MG/DL (ref 70–110)
POCT GLUCOSE: 168 MG/DL (ref 70–110)
POTASSIUM SERPL-SCNC: 4.8 MMOL/L (ref 3.5–5.1)
PROT SERPL-MCNC: 8 G/DL (ref 6–8.4)
RBC # BLD AUTO: 2.67 M/UL (ref 4.6–6.2)
SODIUM SERPL-SCNC: 134 MMOL/L (ref 136–145)
WBC # BLD AUTO: 17.55 K/UL (ref 3.9–12.7)

## 2022-01-20 PROCEDURE — 80053 COMPREHEN METABOLIC PANEL: CPT | Performed by: STUDENT IN AN ORGANIZED HEALTH CARE EDUCATION/TRAINING PROGRAM

## 2022-01-20 PROCEDURE — 63600175 PHARM REV CODE 636 W HCPCS: Performed by: INTERNAL MEDICINE

## 2022-01-20 PROCEDURE — 21400001 HC TELEMETRY ROOM

## 2022-01-20 PROCEDURE — 25000003 PHARM REV CODE 250: Performed by: INTERNAL MEDICINE

## 2022-01-20 PROCEDURE — 80100016 HC MAINTENANCE HEMODIALYSIS

## 2022-01-20 PROCEDURE — 25000003 PHARM REV CODE 250: Performed by: STUDENT IN AN ORGANIZED HEALTH CARE EDUCATION/TRAINING PROGRAM

## 2022-01-20 PROCEDURE — 25000003 PHARM REV CODE 250: Performed by: FAMILY MEDICINE

## 2022-01-20 PROCEDURE — 36415 COLL VENOUS BLD VENIPUNCTURE: CPT | Performed by: STUDENT IN AN ORGANIZED HEALTH CARE EDUCATION/TRAINING PROGRAM

## 2022-01-20 PROCEDURE — 85025 COMPLETE CBC W/AUTO DIFF WBC: CPT | Performed by: STUDENT IN AN ORGANIZED HEALTH CARE EDUCATION/TRAINING PROGRAM

## 2022-01-20 PROCEDURE — 25000003 PHARM REV CODE 250: Performed by: HOSPITALIST

## 2022-01-20 RX ADMIN — HYDRALAZINE HYDROCHLORIDE 100 MG: 25 TABLET, FILM COATED ORAL at 05:01

## 2022-01-20 RX ADMIN — QUETIAPINE FUMARATE 25 MG: 25 TABLET ORAL at 09:01

## 2022-01-20 RX ADMIN — LISINOPRIL 5 MG: 5 TABLET ORAL at 03:01

## 2022-01-20 RX ADMIN — HYDRALAZINE HYDROCHLORIDE 100 MG: 25 TABLET, FILM COATED ORAL at 09:01

## 2022-01-20 RX ADMIN — CEFEPIME HYDROCHLORIDE 1 G: 1 INJECTION, SOLUTION INTRAVENOUS at 04:01

## 2022-01-20 RX ADMIN — SEVELAMER CARBONATE 2400 MG: 800 TABLET, FILM COATED ORAL at 04:01

## 2022-01-20 RX ADMIN — NEPHROCAP 1 CAPSULE: 1 CAP ORAL at 03:01

## 2022-01-20 RX ADMIN — OXYCODONE 5 MG: 5 TABLET ORAL at 05:01

## 2022-01-20 RX ADMIN — CARVEDILOL 6.25 MG: 6.25 TABLET, FILM COATED ORAL at 09:01

## 2022-01-20 RX ADMIN — HYDRALAZINE HYDROCHLORIDE 100 MG: 25 TABLET, FILM COATED ORAL at 03:01

## 2022-01-20 RX ADMIN — ATORVASTATIN CALCIUM 20 MG: 10 TABLET, FILM COATED ORAL at 03:01

## 2022-01-20 RX ADMIN — PANTOPRAZOLE SODIUM 40 MG: 40 TABLET, DELAYED RELEASE ORAL at 03:01

## 2022-01-20 RX ADMIN — INSULIN ASPART 2 UNITS: 100 INJECTION, SOLUTION INTRAVENOUS; SUBCUTANEOUS at 04:01

## 2022-01-20 NOTE — NURSING
Spoke with IR nurse to inform them of hospitalist request to remove stent wire from prior fistulogram. Patient is currently in dialysis. Per IR stent will be removed when patient returns from dialysis.

## 2022-01-20 NOTE — PLAN OF CARE
Problem: Adult Inpatient Plan of Care  Goal: Plan of Care Review  Outcome: Ongoing, Progressing  Goal: Patient-Specific Goal (Individualized)  Outcome: Ongoing, Progressing  Goal: Absence of Hospital-Acquired Illness or Injury  Outcome: Ongoing, Progressing  Goal: Optimal Comfort and Wellbeing  Outcome: Ongoing, Progressing  Goal: Readiness for Transition of Care  Outcome: Ongoing, Progressing     Problem: Device-Related Complication Risk (Hemodialysis)  Goal: Safe, Effective Therapy Delivery  Outcome: Ongoing, Progressing

## 2022-01-20 NOTE — PLAN OF CARE
Recommendations:  1) ADA medically able and tolerated back to renal, consistent carbohydrate diet, encouraging PO intake.  2) Add Suplena to assist in meeting needs  3) Consider alternate forms of nutrition (pt has had NPO orders for 11 days of the 15 days). Per delirious state and safety issues TPN is recommended.   TPN Recs: Initiate Clinimix 4.25/5 @ 30 mL titrate q6h until goal rate of 75 mL is reached + daily lipid emulsion to provide 1106 kcal, 76.5 g AA, 90 g dextrose. GIR = 0.94.   **Monitor triglycerides, if >300 d/c lipid emulsion.   **Monitor potassium, magnesium, phosphorous, and blood glucose levels.   4) Continue bowel regimen (LBM - 1/16)  5) Monitor renal labs and hyponatremia    Goals:   1) Pt to be on a consistent form of nutrition by RD f/u and consistently meeting >75% of EEN/EPN  2) Pt to have consistent bm by RD f/u  3) Nutrition related labs to trend WNL    Nutrition Goal Status: new  Communication of RD Recs:  (POC)

## 2022-01-20 NOTE — NURSING
PATIENT ON THE BED  Not oriented to anything.  Patient refused his meds during night including Seroquel.  He took his Antihypertensive during morning.  He is kind of drowsy so needs to wake him up often.

## 2022-01-20 NOTE — NURSING
Received report from KIAH Wright. Patient lying in bed resting, NAD noted. Safety Precautions maintained, will monitor. Airborne/Contact/Droplet Precautions in progress.

## 2022-01-20 NOTE — PROGRESS NOTES
[facility] Medicine  Progress Note    Patient Name: Adryan Neff  MRN: [id number]  Patient Class: IP- Inpatient   Admission Date: 1/5/2022  Length of Stay: 15 days  Attending Physician: Dottie Kaufman MD  Primary Care Provider: Soren Rice MD        Subjective:     Principal Problem:Gangrene of left foot        HPI:  This is a 56 y.o.male patient, with a PMHx of ESRD on hemodialysis, CAD, HTN, and DM, presenting to the ED for further evaluation of left foot pain and black discoloration that began 3 days ago. Patient states these symptoms have been ongoing for the last month. He reports being referred to a specialist but denies ever following up. He has been putting some otc medication on the foot that he believes has turned his foot more black. No trauma or injury. Patient reports associated chills. Patient states applying a creme to the foot and noticed his discoloration worsened. Patient reports he was last dialyzed yesterday. Patient denies any fever, shortness of breath, chest pain, neck pain, back pain, abdominal pain, rash, headaches, congestion, rhinorrhea, cough, sore throat, ear pain, eye pain, blurred vision, nausea, vomiting, diarrhea, dysuria, or any other associated symptoms.    Per chart review, he had an arterial u/s 12/15.   Impression:   1. High-grade severe stenosis right popliteal artery with occlusion anterior tibial artery which is reconstituted at level of DPA via collaterals.  2. 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    Cardiology has been planning to see him for revascularization options. Last podiatry visit 12/13 with Stable dry gangrene L 2nd toe and medial L 5th toe. Dorsalis pedis and posterior tibial pulses are diminished Bilaterally. Toes are cool to touch. Feet are warm proximally.There is decreased digital hair. Skin is atrophic, slightly hyperpigmented, and mildly edematous     In the ED, he was found to have worsened gangrene and  a cool extremity. Vascular surgery was consulted. He was also COVID +. Says he got a booster shot in November sometime.      Overview/Hospital Course:  Patient admitted to the hospital for eval and treatment of L foot gangrene.  ID, Ortho and Vasc consulted. Nephrology was also consulted for chronic dialysis. Patient started on broad spectrum antibiotics, and had several toes on L foot amputated by Vascular surgery.     The patient's AVF had recurrent malfunction. IR attempts at intervention without success. THDC placed for dialysis.    Pt became increasingly delirious and combative. He refused angiogram with Vascular. Psychiatry evaluated, felt that he was not able to make his own medical decisions. At this point Dr. Soni felt the patient would be a poor candidate for vascular intervention, recommended AKA. ID in agreement. Ortho consulted for AKA, daughter/POA in agreement, date of procedure TBD.   Had some EKG changes with no chest pain,cardiology cleared patient for procedure.      Interval History: No events overnight.CC today is  Complains of leg pain    Review of Systems   Constitutional: Negative for chills and fever.   Respiratory: Negative for cough and shortness of breath.    Cardiovascular: Negative for chest pain and leg swelling.   Gastrointestinal: Negative for abdominal distention and abdominal pain.     Objective:     Vital Signs (Most Recent):  Temp: 99.7 °F (37.6 °C) (01/20/22 0746)  Pulse: 80 (01/20/22 0746)  Resp: 19 (01/20/22 0746)  BP: (!) 152/66 (01/20/22 0746)  SpO2: 99 % (01/20/22 0746) Vital Signs (24h Range):  Temp:  [97.4 °F (36.3 °C)-99.7 °F (37.6 °C)] 99.7 °F (37.6 °C)  Pulse:  [79-90] 80  Resp:  [17-19] 19  SpO2:  [98 %-99 %] 99 %  BP: (102-176)/(49-70) 152/66     Weight: 66.5 kg (146 lb 9.7 oz)  Body mass index is 21.04 kg/m².  No intake or output data in the 24 hours ending 01/20/22 1245   Physical Exam  Constitutional:       General: He is not in acute distress.     Appearance:  He is ill-appearing. He is not toxic-appearing or diaphoretic.   Cardiovascular:      Rate and Rhythm: Normal rate and regular rhythm.      Heart sounds: No murmur heard.  No gallop.    Pulmonary:      Effort: Pulmonary effort is normal. No respiratory distress.      Breath sounds: Normal breath sounds. No wheezing.   Abdominal:      General: Bowel sounds are normal. There is no distension.      Palpations: Abdomen is soft.      Tenderness: There is no abdominal tenderness.   Skin:     Comments: L foot s/p multiple amputated digits, necrotic tissue at site of amputation         Significant Labs: All pertinent labs within the past 24 hours have been reviewed.    Significant Imaging: I have reviewed all pertinent imaging results/findings within the past 24 hours.      Assessment/Plan:      * Gangrene of left foot  Presents with gangrenous L foot in the setting of severe PAD. Underwent left 2-5 metatarsal amputations w/ washout and debridement w/ Vascular surgery. Initial plan for angiogram w/ intervention, however pt refused the procedure. Given the low initial probability of wound healing and infectious clearance even with vascular intervention and lack of patient ability to adhere to medical treatment, Dr. Soni recommended AKA. Ortho consulted.   - family in agreement with AKA, date TBD  - antibiotics per ID.    Had some EKG changes with no chest pain,cardiology cleared patient for procedure.    Encephalopathy, metabolic  Waxing and waning confusion likely delirium secondary to ischemic foot w/ possible superimposed infection and uremia from missing dialysis. Psychiatry deemed patient not to have capacity to make medical decisions. Overall orientation improved after starting low dose seroquel.  - psychiatry consulted, appreciate recs  - tx of underlying issues as noted elsewhere  - continue seroquel  - delirium precautions    COVID-19 in immunocompromised patient  HD patient + for COVID 19. Not hypoxic or  symptomatic. Supportive care prn.       ESRD on hemodialysis  Nephrology consulted to resume routine HD. Fistula malfunctioning despite IR attempts at declot, THDC placed for alternative access  - nephrology following  - needs vascular vs IR fix of fistula    Chronic diastolic heart failure  December 2021 echo reviewed. Stable.       Essential (primary) hypertension  Titrate to goal. Pt intermittently refuses po medications, requiring IV meds      Controlled type 2 diabetes mellitus with chronic kidney disease on chronic dialysis, with long-term current use of insulin  Pt refused insulin, has intermittent PO intake.  - sliding scale insulin for now      VTE Risk Mitigation (From admission, onward)         Ordered     heparin (porcine) injection 5,000 Units  As needed (PRN)         01/14/22 1716     IP VTE HIGH RISK PATIENT  Once         01/05/22 1404     Place sequential compression device  Until discontinued         01/05/22 1404                Discharge Planning   ENIO:      Code Status: Full Code   Is the patient medically ready for discharge?:     Reason for patient still in hospital (select all that apply): Patient trending condition  Discharge Plan A: Skilled Nursing Facility                  Dottie Kaufman MD  Department of Hospital Medicine   [facility]
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
## 2022-01-20 NOTE — PROGRESS NOTES
[facility] - Telemetry Almo  Adult Nutrition   Progress Note (Follow-Up)    SUMMARY     Recommendations/Interventions:  1) ADA medically able and tolerated back to renal, consistent carbohydrate diet, encouraging PO intake.  2) Add Suplena to assist in meeting needs  3) Consider alternate forms of nutrition (pt has had NPO orders for 11 days of the 15 days). Per delirious state and safety issues TPN is recommended.   TPN Recs: Initiate Clinimix 4.25/5 @ 30 mL titrate q6h until goal rate of 75 mL is reached + daily lipid emulsion to provide 1106 kcal, 76.5 g AA, 90 g dextrose. GIR = 0.94.   **Monitor triglycerides, if >300 d/c lipid emulsion.   **Monitor potassium, magnesium, phosphorous, and blood glucose levels.   4) Continue bowel regimen (LBM - 1/16)  5) Monitor renal labs and hyponatremia    Goals:   1) Pt to be on a consistent form of nutrition by RD f/u and consistently meeting >75% of EEN/EPN  2) Pt to have consistent bm by RD f/u  3) Nutrition related labs to trend WNL    Nutrition Goal Status: new  Communication of RD Recs:  (POC)    Dietitian Rounds Brief  D/t to AMS unable to get accurate diet information from pt. Per pt chart he has had an inconsistent diet order since being admitted. In total he has been NPO for 6-7 days out of his 15 day stay. When he did have a diet order, his reported intake was 25-50% on average. This puts him at risk for malnutrition. Pt might benefit from alternate form of nutrition. Please consult if TF or TPN recs needed.     Assessment and Plan  Nutrition Problem  Inadequate oral intake     Related to (etiology):   Inconsistent diet order     Signs and Symptoms (as evidenced by):   NPO on/off adding up to 11 days     Interventions/Recommendations (treatment strategy):  Commercial beverage (suplena)  Mineral modified diet (Renal)  TPN  Collaboration of care with other providers     Nutrition Diagnosis Status:   Ongoing      Malnutrition Assessment:  Malnutrition Type: acute  "illness or injury  Energy Intake: severe energy intake  Weight Loss (Malnutrition): greater than 5% in 1 month  Energy Intake (Malnutrition): less than or equal to 50% for greater than or equal to 5 days   Severe Weight Loss (Malnutrition): greater than 5% in 1 month    Reason for Assessment    Reason For Assessment: length of stay  Diagnosis:  (Gangrene of left foot)  Relevant Medical History: Covid19, T2DM, CKD on dialysis, HTN, chronic diastolic heart failure, GERD    Nutrition Risk Screen    Nutrition Risk Screen: no indicators present    Nutrition/Diet History    Spiritual, Cultural Beliefs, [employer]yb[employer]ian Practices, Values that Affect Care: no  Food Allergies: NKFA  Factors Affecting Nutritional Intake: NPO    Anthropometrics    Temp: 97.5 °F (36.4 °C)  Height Method: Stated  Height: 5' 10" (177.8 cm)  Height (inches): 70 in  Weight Method: Bed Scale  Weight: 66.5 kg (146 lb 9.7 oz)  Weight (lb): 146.61 lb  Ideal Body Weight (IBW), Male: 166 lb  % Ideal Body Weight, Male (lb): 85.4 %  BMI (Calculated): 21  BMI Grade: 18.5-24.9 - normal  Usual Body Weight (UBW), k.7 kg  % Usual Body Weight: 88.63  % Weight Change From Usual Weight: -11.56 %       Weight History:  Wt Readings from Last 10 Encounters:   22 66.5 kg (146 lb 9.7 oz)   21 72.8 kg (160 lb 7.9 oz)   21 72.8 kg (160 lb 7.9 oz)   21 71.2 kg (156 lb 15.5 oz)   21 72.7 kg (160 lb 6.2 oz)   21 72.8 kg (160 lb 7.9 oz)   21 74.1 kg (163 lb 5.8 oz)   21 75.9 kg (167 lb 5.3 oz)   21 81.6 kg (180 lb)   21 74 kg (163 lb 2.3 oz)     Lab/Procedures/Meds: Pertinent Labs Reviewed  CBC:  Recent Labs   Lab 22  0338   WBC 17.55*   HGB 7.4*   HCT 25.2*        CMP:  Recent Labs   Lab 22  0338   CALCIUM 8.9   ALBUMIN 2.0*   PROT 8.0   *   K 4.8   CO2 20*   CL 97   BUN 74*   CREATININE 14.2*   ALKPHOS 79   ALT 8*   AST 14   BILITOT 0.4       Medications:Pertinent Medications " Reviewed  Scheduled Meds:   sodium chloride 0.9%   Intravenous Once    sodium chloride 0.9%   Intravenous Once    atorvastatin  20 mg Oral Daily    carvediloL  6.25 mg Oral BID    ceFEPime (MAXIPIME) IVPB  1 g Intravenous Q24H    hydrALAZINE  100 mg Oral Q8H    lisinopriL  5 mg Oral Daily    pantoprazole  40 mg Oral Daily    QUEtiapine  25 mg Oral QHS    sevelamer carbonate  2,400 mg Oral TID WM    vitamin renal formula (B-complex-vitamin c-folic acid)  1 capsule Oral Daily     Continuous Infusions:  PRN Meds:.sodium chloride, sodium chloride, sodium chloride 0.9%, acetaminophen, acetaminophen, dextrose 50%, dextrose 50%, glucagon (human recombinant), glucose, glucose, heparin (porcine), HYDROmorphone, HYDROmorphone, influenza, insulin aspart U-100, magnesium oxide, magnesium oxide, naloxone, OLANZapine, olanzapine zydis, ondansetron, ondansetron, oxyCODONE, sodium chloride 0.9%, sodium chloride 0.9%, sodium chloride 0.9%, sodium chloride 0.9%    Estimated/Assessed Needs    Weight Used For Calorie Calculations: 66.5 kg (146 lb 9.7 oz)  Energy Calorie Requirements (kcal): 3222-7672  Energy Need Method: Kcal/kg (25-35 per CKD HD)  Protein Requirements: 67-80 g (1.0-1.2)  Weight Used For Protein Calculations: 66.5 kg (146 lb 9.7 oz)  Fluid Requirements (mL): 1500  Estimated Fluid Requirement Method: other (see comments) (per HD or MD orders)   CHO Requirement: 208 g    Nutrition Prescription Ordered    Current Diet Order: NPO    Evaluation of Received Nutrient/Fluid Intake    Energy Calories Required: not meeting needs  Protein Required: not meeting needs  Fluid Required: not meeting needs  % Intake of Estimated Energy Needs: 0 - 25 %  % Meal Intake: NPO    Intake/Output Summary (Last 24 hours) at 1/20/2022 1616  Last data filed at 1/20/2022 1300  Gross per 24 hour   Intake 700 ml   Output 2825 ml   Net -2125 ml      Nutrition Risk    Level of Risk/Frequency of Follow-up:  (1-2x/week)     Monitor and  Evaluation    Food and Nutrient Intake: energy intake,food and beverage intake  Food and Nutrient Adminstration: diet order  Knowledge/Beliefs/Attitudes: food and nutrition knowledge/skill  Physical Activity and Function: nutrition-related ADLs and IADLs  Anthropometric Measurements: weight,weight change,body mass index  Biochemical Data, Medical Tests and Procedures: electrolyte and renal panel,gastrointestinal profile,glucose/endocrine profile,inflammatory profile,lipid profile  Nutrition-Focused Physical Findings: overall appearance,extremities, muscles and bones,head and eyes,skin     Nutrition Follow-Up    RD Follow-up?: Yes

## 2022-01-20 NOTE — SUBJECTIVE & OBJECTIVE
Interval History: No events overnight.CC today is  Complains of leg pain    Review of Systems   Constitutional: Negative for chills and fever.   Respiratory: Negative for cough and shortness of breath.    Cardiovascular: Negative for chest pain and leg swelling.   Gastrointestinal: Negative for abdominal distention and abdominal pain.     Objective:     Vital Signs (Most Recent):  Temp: 99.7 °F (37.6 °C) (01/20/22 0746)  Pulse: 80 (01/20/22 0746)  Resp: 19 (01/20/22 0746)  BP: (!) 152/66 (01/20/22 0746)  SpO2: 99 % (01/20/22 0746) Vital Signs (24h Range):  Temp:  [97.4 °F (36.3 °C)-99.7 °F (37.6 °C)] 99.7 °F (37.6 °C)  Pulse:  [79-90] 80  Resp:  [17-19] 19  SpO2:  [98 %-99 %] 99 %  BP: (102-176)/(49-70) 152/66     Weight: 66.5 kg (146 lb 9.7 oz)  Body mass index is 21.04 kg/m².  No intake or output data in the 24 hours ending 01/20/22 1245   Physical Exam  Constitutional:       General: He is not in acute distress.     Appearance: He is ill-appearing. He is not toxic-appearing or diaphoretic.   Cardiovascular:      Rate and Rhythm: Normal rate and regular rhythm.      Heart sounds: No murmur heard.  No gallop.    Pulmonary:      Effort: Pulmonary effort is normal. No respiratory distress.      Breath sounds: Normal breath sounds. No wheezing.   Abdominal:      General: Bowel sounds are normal. There is no distension.      Palpations: Abdomen is soft.      Tenderness: There is no abdominal tenderness.   Skin:     Comments: L foot s/p multiple amputated digits, necrotic tissue at site of amputation         Significant Labs: All pertinent labs within the past 24 hours have been reviewed.    Significant Imaging: I have reviewed all pertinent imaging results/findings within the past 24 hours.

## 2022-01-20 NOTE — PROGRESS NOTES
Adryan Neff is a 56 y.o. male patient.    Follow for ESRD, dialysis    Patient seen while on dialysis  No new c/o, comfortable  Feeling OK    Scheduled Meds:   sodium chloride 0.9%   Intravenous Once    sodium chloride 0.9%   Intravenous Once    atorvastatin  20 mg Oral Daily    carvediloL  6.25 mg Oral BID    ceFEPime (MAXIPIME) IVPB  1 g Intravenous Q24H    hydrALAZINE  100 mg Oral Q8H    lisinopriL  5 mg Oral Daily    pantoprazole  40 mg Oral Daily    QUEtiapine  25 mg Oral QHS    sevelamer carbonate  2,400 mg Oral TID WM    vitamin renal formula (B-complex-vitamin c-folic acid)  1 capsule Oral Daily       Review of patient's allergies indicates:  No Known Allergies      Vital Signs Range (Last 24H):  Temp:  [97.4 °F (36.3 °C)-99.7 °F (37.6 °C)]   Pulse:  [79-91]   Resp:  [17-19]   BP: (102-176)/(49-75)   SpO2:  [98 %-99 %]     I & O (Last 24H):No intake or output data in the 24 hours ending 01/20/22 1027        Physical Exam:  General appearance: well developed, well nourished, no distress  Lungs:  clear to auscultation bilaterally and normal respiratory effort  Heart: regular rate and rhythm  Abdomen: soft, non-tender non-distented; bowel sounds normal; no masses,  no organomegaly  Extremities: no cyanosis or edema, or clubbing    Laboratory:  I have reviewed all pertinent lab results within the past 24 hours.  CBC:   Recent Labs   Lab 01/20/22  0338   WBC 17.55*   RBC 2.67*   HGB 7.4*   HCT 25.2*      MCV 94   MCH 27.7   MCHC 29.4*     CMP:   Recent Labs   Lab 01/20/22  0338   *   CALCIUM 8.9   ALBUMIN 2.0*   PROT 8.0   *   K 4.8   CO2 20*   CL 97   BUN 74*   CREATININE 14.2*   ALKPHOS 79   ALT 8*   AST 14   BILITOT 0.4       Imp/Plan    ESRD - on dialysis, stable, tolerated well  COVID-19 infection  (L) foot gangrene s/p amputation  HTN  DM type 2  Anemia of CKD    Continue present Rx  HD q TTs  We'll follow for dialysis      Trahenna Fine  1/20/2022

## 2022-01-21 ENCOUNTER — ANESTHESIA EVENT (OUTPATIENT)
Dept: SURGERY | Facility: HOSPITAL | Age: 57
DRG: 239 | End: 2022-01-21
Payer: MEDICARE

## 2022-01-21 ENCOUNTER — ANESTHESIA (OUTPATIENT)
Dept: SURGERY | Facility: HOSPITAL | Age: 57
DRG: 239 | End: 2022-01-21
Payer: MEDICARE

## 2022-01-21 LAB
ABO + RH BLD: NORMAL
ALBUMIN SERPL BCP-MCNC: 2.2 G/DL (ref 3.5–5.2)
ALP SERPL-CCNC: 71 U/L (ref 55–135)
ALT SERPL W/O P-5'-P-CCNC: 10 U/L (ref 10–44)
ANION GAP SERPL CALC-SCNC: 17 MMOL/L (ref 8–16)
AST SERPL-CCNC: 20 U/L (ref 10–40)
BASOPHILS # BLD AUTO: 0.1 K/UL (ref 0–0.2)
BASOPHILS NFR BLD: 0.6 % (ref 0–1.9)
BILIRUB SERPL-MCNC: 0.5 MG/DL (ref 0.1–1)
BLD GP AB SCN CELLS X3 SERPL QL: NORMAL
BLD PROD TYP BPU: NORMAL
BLOOD UNIT EXPIRATION DATE: NORMAL
BLOOD UNIT TYPE CODE: 5100
BLOOD UNIT TYPE: NORMAL
BUN SERPL-MCNC: 39 MG/DL (ref 6–20)
CALCIUM SERPL-MCNC: 9.7 MG/DL (ref 8.7–10.5)
CHLORIDE SERPL-SCNC: 93 MMOL/L (ref 95–110)
CO2 SERPL-SCNC: 21 MMOL/L (ref 23–29)
CODING SYSTEM: NORMAL
CREAT SERPL-MCNC: 9.1 MG/DL (ref 0.5–1.4)
DIFFERENTIAL METHOD: ABNORMAL
DISPENSE STATUS: NORMAL
EOSINOPHIL # BLD AUTO: 0.7 K/UL (ref 0–0.5)
EOSINOPHIL NFR BLD: 4.1 % (ref 0–8)
ERYTHROCYTE [DISTWIDTH] IN BLOOD BY AUTOMATED COUNT: 16 % (ref 11.5–14.5)
EST. GFR  (AFRICAN AMERICAN): 7 ML/MIN/1.73 M^2
EST. GFR  (NON AFRICAN AMERICAN): 6 ML/MIN/1.73 M^2
GLUCOSE SERPL-MCNC: 128 MG/DL (ref 70–110)
HCT VFR BLD AUTO: 29.4 % (ref 40–54)
HGB BLD-MCNC: 8.7 G/DL (ref 14–18)
IMM GRANULOCYTES # BLD AUTO: 0.15 K/UL (ref 0–0.04)
IMM GRANULOCYTES NFR BLD AUTO: 0.8 % (ref 0–0.5)
LYMPHOCYTES # BLD AUTO: 1 K/UL (ref 1–4.8)
LYMPHOCYTES NFR BLD: 5.8 % (ref 18–48)
MCH RBC QN AUTO: 27.4 PG (ref 27–31)
MCHC RBC AUTO-ENTMCNC: 29.6 G/DL (ref 32–36)
MCV RBC AUTO: 93 FL (ref 82–98)
MONOCYTES # BLD AUTO: 1.5 K/UL (ref 0.3–1)
MONOCYTES NFR BLD: 8.3 % (ref 4–15)
NEUTROPHILS # BLD AUTO: 14.2 K/UL (ref 1.8–7.7)
NEUTROPHILS NFR BLD: 80.4 % (ref 38–73)
NRBC BLD-RTO: 0 /100 WBC
NUM UNITS TRANS PACKED RBC: NORMAL
PLATELET # BLD AUTO: 341 K/UL (ref 150–450)
PMV BLD AUTO: 9.9 FL (ref 9.2–12.9)
POCT GLUCOSE: 163 MG/DL (ref 70–110)
POCT GLUCOSE: 172 MG/DL (ref 70–110)
POCT GLUCOSE: 176 MG/DL (ref 70–110)
POCT GLUCOSE: 189 MG/DL (ref 70–110)
POCT GLUCOSE: 230 MG/DL (ref 70–110)
POTASSIUM SERPL-SCNC: 5.1 MMOL/L (ref 3.5–5.1)
PROT SERPL-MCNC: 9.2 G/DL (ref 6–8.4)
RBC # BLD AUTO: 3.17 M/UL (ref 4.6–6.2)
SODIUM SERPL-SCNC: 131 MMOL/L (ref 136–145)
WBC # BLD AUTO: 17.72 K/UL (ref 3.9–12.7)

## 2022-01-21 PROCEDURE — 88307 TISSUE EXAM BY PATHOLOGIST: CPT | Performed by: PATHOLOGY

## 2022-01-21 PROCEDURE — 97530 THERAPEUTIC ACTIVITIES: CPT

## 2022-01-21 PROCEDURE — 88307 PR  SURG PATH,LEVEL V: ICD-10-PCS | Mod: 26,,, | Performed by: PATHOLOGY

## 2022-01-21 PROCEDURE — 85025 COMPLETE CBC W/AUTO DIFF WBC: CPT | Performed by: STUDENT IN AN ORGANIZED HEALTH CARE EDUCATION/TRAINING PROGRAM

## 2022-01-21 PROCEDURE — 36000711: Performed by: ORTHOPAEDIC SURGERY

## 2022-01-21 PROCEDURE — 88307 TISSUE EXAM BY PATHOLOGIST: CPT | Mod: 26,,, | Performed by: PATHOLOGY

## 2022-01-21 PROCEDURE — 27201423 OPTIME MED/SURG SUP & DEVICES STERILE SUPPLY: Performed by: ORTHOPAEDIC SURGERY

## 2022-01-21 PROCEDURE — 86901 BLOOD TYPING SEROLOGIC RH(D): CPT | Performed by: ANESTHESIOLOGY

## 2022-01-21 PROCEDURE — 25000003 PHARM REV CODE 250: Performed by: INTERNAL MEDICINE

## 2022-01-21 PROCEDURE — D9220A PRA ANESTHESIA: ICD-10-PCS | Mod: ANES,,, | Performed by: ANESTHESIOLOGY

## 2022-01-21 PROCEDURE — 36415 COLL VENOUS BLD VENIPUNCTURE: CPT | Performed by: ANESTHESIOLOGY

## 2022-01-21 PROCEDURE — 86920 COMPATIBILITY TEST SPIN: CPT | Performed by: STUDENT IN AN ORGANIZED HEALTH CARE EDUCATION/TRAINING PROGRAM

## 2022-01-21 PROCEDURE — 63600175 PHARM REV CODE 636 W HCPCS: Performed by: STUDENT IN AN ORGANIZED HEALTH CARE EDUCATION/TRAINING PROGRAM

## 2022-01-21 PROCEDURE — 21400001 HC TELEMETRY ROOM

## 2022-01-21 PROCEDURE — 63600175 PHARM REV CODE 636 W HCPCS: Performed by: ORTHOPAEDIC SURGERY

## 2022-01-21 PROCEDURE — D9220A PRA ANESTHESIA: ICD-10-PCS | Mod: CRNA,,, | Performed by: STUDENT IN AN ORGANIZED HEALTH CARE EDUCATION/TRAINING PROGRAM

## 2022-01-21 PROCEDURE — 63600175 PHARM REV CODE 636 W HCPCS: Performed by: ANESTHESIOLOGY

## 2022-01-21 PROCEDURE — 25000003 PHARM REV CODE 250: Performed by: STUDENT IN AN ORGANIZED HEALTH CARE EDUCATION/TRAINING PROGRAM

## 2022-01-21 PROCEDURE — 37000008 HC ANESTHESIA 1ST 15 MINUTES: Performed by: ORTHOPAEDIC SURGERY

## 2022-01-21 PROCEDURE — 36415 COLL VENOUS BLD VENIPUNCTURE: CPT | Performed by: STUDENT IN AN ORGANIZED HEALTH CARE EDUCATION/TRAINING PROGRAM

## 2022-01-21 PROCEDURE — 25000003 PHARM REV CODE 250: Performed by: ORTHOPAEDIC SURGERY

## 2022-01-21 PROCEDURE — 80053 COMPREHEN METABOLIC PANEL: CPT | Performed by: STUDENT IN AN ORGANIZED HEALTH CARE EDUCATION/TRAINING PROGRAM

## 2022-01-21 PROCEDURE — 25000003 PHARM REV CODE 250: Performed by: FAMILY MEDICINE

## 2022-01-21 PROCEDURE — 71000033 HC RECOVERY, INTIAL HOUR: Performed by: ORTHOPAEDIC SURGERY

## 2022-01-21 PROCEDURE — 25000003 PHARM REV CODE 250: Performed by: HOSPITALIST

## 2022-01-21 PROCEDURE — D9220A PRA ANESTHESIA: Mod: CRNA,,, | Performed by: STUDENT IN AN ORGANIZED HEALTH CARE EDUCATION/TRAINING PROGRAM

## 2022-01-21 PROCEDURE — 36000710: Performed by: ORTHOPAEDIC SURGERY

## 2022-01-21 PROCEDURE — 37000009 HC ANESTHESIA EA ADD 15 MINS: Performed by: ORTHOPAEDIC SURGERY

## 2022-01-21 PROCEDURE — D9220A PRA ANESTHESIA: Mod: ANES,,, | Performed by: ANESTHESIOLOGY

## 2022-01-21 RX ORDER — CISATRACURIUM BESYLATE 10 MG/ML
INJECTION, SOLUTION INTRAVENOUS
Status: DISCONTINUED | OUTPATIENT
Start: 2022-01-21 | End: 2022-01-21

## 2022-01-21 RX ORDER — CEFAZOLIN SODIUM 1 G/50ML
1 SOLUTION INTRAVENOUS
Status: COMPLETED | OUTPATIENT
Start: 2022-01-21 | End: 2022-01-22

## 2022-01-21 RX ORDER — FENTANYL CITRATE 50 UG/ML
INJECTION, SOLUTION INTRAMUSCULAR; INTRAVENOUS
Status: DISCONTINUED | OUTPATIENT
Start: 2022-01-21 | End: 2022-01-21

## 2022-01-21 RX ORDER — ONDANSETRON 2 MG/ML
INJECTION INTRAMUSCULAR; INTRAVENOUS
Status: DISCONTINUED | OUTPATIENT
Start: 2022-01-21 | End: 2022-01-21

## 2022-01-21 RX ORDER — PHENYLEPHRINE HYDROCHLORIDE 10 MG/ML
INJECTION INTRAVENOUS
Status: DISCONTINUED | OUTPATIENT
Start: 2022-01-21 | End: 2022-01-21

## 2022-01-21 RX ORDER — VASOPRESSIN 20 [USP'U]/ML
INJECTION, SOLUTION INTRAMUSCULAR; SUBCUTANEOUS
Status: DISCONTINUED | OUTPATIENT
Start: 2022-01-21 | End: 2022-01-21

## 2022-01-21 RX ORDER — LIDOCAINE HYDROCHLORIDE 20 MG/ML
INJECTION INTRAVENOUS
Status: DISCONTINUED | OUTPATIENT
Start: 2022-01-21 | End: 2022-01-21

## 2022-01-21 RX ORDER — PROPOFOL 10 MG/ML
VIAL (ML) INTRAVENOUS
Status: DISCONTINUED | OUTPATIENT
Start: 2022-01-21 | End: 2022-01-21

## 2022-01-21 RX ORDER — NEOSTIGMINE METHYLSULFATE 1 MG/ML
INJECTION, SOLUTION INTRAVENOUS
Status: DISCONTINUED | OUTPATIENT
Start: 2022-01-21 | End: 2022-01-21

## 2022-01-21 RX ORDER — SODIUM CHLORIDE 0.9 % (FLUSH) 0.9 %
10 SYRINGE (ML) INJECTION
Status: DISCONTINUED | OUTPATIENT
Start: 2022-01-21 | End: 2022-01-21

## 2022-01-21 RX ORDER — BUPIVACAINE HYDROCHLORIDE 2.5 MG/ML
INJECTION, SOLUTION INFILTRATION; PERINEURAL
Status: DISCONTINUED | OUTPATIENT
Start: 2022-01-21 | End: 2022-01-21 | Stop reason: HOSPADM

## 2022-01-21 RX ORDER — HYDROCODONE BITARTRATE AND ACETAMINOPHEN 500; 5 MG/1; MG/1
TABLET ORAL
Status: DISCONTINUED | OUTPATIENT
Start: 2022-01-21 | End: 2022-01-27 | Stop reason: HOSPADM

## 2022-01-21 RX ORDER — HYDROMORPHONE HYDROCHLORIDE 2 MG/ML
0.2 INJECTION, SOLUTION INTRAMUSCULAR; INTRAVENOUS; SUBCUTANEOUS EVERY 5 MIN PRN
Status: DISCONTINUED | OUTPATIENT
Start: 2022-01-21 | End: 2022-01-21

## 2022-01-21 RX ADMIN — PHENYLEPHRINE HYDROCHLORIDE 300 MCG: 10 INJECTION INTRAVENOUS at 02:01

## 2022-01-21 RX ADMIN — PHENYLEPHRINE HYDROCHLORIDE 100 MCG: 10 INJECTION INTRAVENOUS at 02:01

## 2022-01-21 RX ADMIN — PHENYLEPHRINE HYDROCHLORIDE 200 MCG: 10 INJECTION INTRAVENOUS at 03:01

## 2022-01-21 RX ADMIN — PHENYLEPHRINE HYDROCHLORIDE 400 MCG: 10 INJECTION INTRAVENOUS at 02:01

## 2022-01-21 RX ADMIN — VASOPRESSIN 1 UNITS: 20 INJECTION INTRAVENOUS at 03:01

## 2022-01-21 RX ADMIN — HYDROMORPHONE HYDROCHLORIDE 0.2 MG: 2 INJECTION, SOLUTION INTRAMUSCULAR; INTRAVENOUS; SUBCUTANEOUS at 03:01

## 2022-01-21 RX ADMIN — HYDRALAZINE HYDROCHLORIDE 100 MG: 25 TABLET, FILM COATED ORAL at 09:01

## 2022-01-21 RX ADMIN — CEFEPIME HYDROCHLORIDE 1 G: 1 INJECTION, SOLUTION INTRAVENOUS at 05:01

## 2022-01-21 RX ADMIN — PHENYLEPHRINE HYDROCHLORIDE 200 MCG: 10 INJECTION INTRAVENOUS at 02:01

## 2022-01-21 RX ADMIN — SODIUM CHLORIDE: 0.9 INJECTION, SOLUTION INTRAVENOUS at 02:01

## 2022-01-21 RX ADMIN — ATORVASTATIN CALCIUM 20 MG: 10 TABLET, FILM COATED ORAL at 09:01

## 2022-01-21 RX ADMIN — INSULIN ASPART 1 UNITS: 100 INJECTION, SOLUTION INTRAVENOUS; SUBCUTANEOUS at 09:01

## 2022-01-21 RX ADMIN — PHENYLEPHRINE HYDROCHLORIDE 600 MCG: 10 INJECTION INTRAVENOUS at 02:01

## 2022-01-21 RX ADMIN — ONDANSETRON 8 MG: 2 INJECTION, SOLUTION INTRAMUSCULAR; INTRAVENOUS at 03:01

## 2022-01-21 RX ADMIN — INSULIN ASPART 2 UNITS: 100 INJECTION, SOLUTION INTRAVENOUS; SUBCUTANEOUS at 12:01

## 2022-01-21 RX ADMIN — HYDROMORPHONE HYDROCHLORIDE 0.5 MG: 2 INJECTION INTRAMUSCULAR; INTRAVENOUS; SUBCUTANEOUS at 11:01

## 2022-01-21 RX ADMIN — LIDOCAINE HYDROCHLORIDE 100 MG: 20 INJECTION, SOLUTION INTRAVENOUS at 02:01

## 2022-01-21 RX ADMIN — PHENYLEPHRINE HYDROCHLORIDE 100 MCG: 10 INJECTION INTRAVENOUS at 03:01

## 2022-01-21 RX ADMIN — PROPOFOL 50 MG: 10 INJECTION, EMULSION INTRAVENOUS at 02:01

## 2022-01-21 RX ADMIN — FENTANYL CITRATE 100 MCG: 50 INJECTION, SOLUTION INTRAMUSCULAR; INTRAVENOUS at 02:01

## 2022-01-21 RX ADMIN — CEFAZOLIN SODIUM 1 G: 1 SOLUTION INTRAVENOUS at 11:01

## 2022-01-21 RX ADMIN — CARVEDILOL 6.25 MG: 6.25 TABLET, FILM COATED ORAL at 09:01

## 2022-01-21 RX ADMIN — CEFAZOLIN SODIUM 2 G: 1 POWDER, FOR SOLUTION INTRAMUSCULAR; INTRAVENOUS at 02:01

## 2022-01-21 RX ADMIN — QUETIAPINE FUMARATE 25 MG: 25 TABLET ORAL at 09:01

## 2022-01-21 RX ADMIN — HYDRALAZINE HYDROCHLORIDE 100 MG: 25 TABLET, FILM COATED ORAL at 05:01

## 2022-01-21 RX ADMIN — CISATRACURIUM BESYLATE 10 MG: 10 INJECTION, SOLUTION INTRAVENOUS at 02:01

## 2022-01-21 NOTE — TRANSFER OF CARE
"Anesthesia Transfer of Care Note    Patient: Adryan Neff    Procedure(s) Performed: Procedure(s) (LRB):  AMPUTATION, ABOVE KNEE (Left)    Patient location: PACU    Anesthesia Type: general    Transport from OR: Transported from OR on room air with adequate spontaneous ventilation    Post pain: adequate analgesia    Post assessment: no apparent anesthetic complications and tolerated procedure well    Post vital signs: stable    Level of consciousness: responds to stimulation    Nausea/Vomiting: no nausea/vomiting    Complications: none    Transfer of care protocol was followed      Last vitals:   Visit Vitals  BP (!) 155/67   Pulse 71   Temp 36.7 °C (98 °F) (Oral)   Resp 18   Ht 5' 10" (1.778 m)   Wt 66.5 kg (146 lb 9.7 oz)   SpO2 100%   BMI 21.04 kg/m²     "

## 2022-01-21 NOTE — PROGRESS NOTES
01/20/22 2000      Offer of free  was accepted or rejected? accepted   Interpreted items include Nursing rounding;Patient education activities;Other (See comment)  (blood sugar, pm medication)    service used Video Remote   Patient getting agitated with staff. Will check on patient at a later time.

## 2022-01-21 NOTE — NURSING
Reported off to oncoming nurse, patient resting in bed, aaox2. Patient can make needs known to staff, max assist required for adls and transfers, no acute distress noted, safety precautions maintained. Avasys at bedside. Airborne/Contact/Droplet Precautions ongoing.         Chart check completed.

## 2022-01-21 NOTE — ANESTHESIA PROCEDURE NOTES
Intubation    Date/Time: 1/21/2022 2:41 PM  Performed by: Guillermina RODRIGUEZ Do, CRNA  Authorized by: Nikolay Castellanos MD     Intubation:     Induction:  Intravenous    Intubated:  Postinduction    Mask Ventilation:  Easy with oral airway    Attempts:  1    Attempted By:  CRNA    Method of Intubation:  Direct and bougie    Blade:  Payton 3    Laryngeal View Grade: Grade I - full view of cords      Difficult Airway Encountered?: No      Complications:  None    Airway Device:  Oral endotracheal tube    Airway Device Size:  7.5    Style/Cuff Inflation:  Cuffed (inflated to minimal occlusive pressure)    Tube secured:  23    Secured at:  The lips    Placement Verified By:  Capnometry    Complicating Factors:  Anterior larynx    Findings Post-Intubation:  BS equal bilateral and atraumatic/condition of teeth unchanged

## 2022-01-21 NOTE — ANESTHESIA PREPROCEDURE EVALUATION
01/21/2022  Adryan Neff is a 56 y.o., male.    Anesthesia Evaluation          Review of Systems  Anesthesia Hx:  No previous Anesthesia   Social:  Non-Smoker    Hematology/Oncology:  Hematology Normal   Oncology Normal     EENT/Dental:EENT/Dental Normal   Cardiovascular:   Hypertension CAD   ECG has been reviewed. 12/21 echo 40 perc with mod mr ..  2/21 stress test ef 40perc..reversble lateral wall defects  Mild global hypokinesis  Patient seen by dr kennedy and cleared for anesthesia   Pulmonary:  Pulmonary Normal    Renal/:   Chronic Renal Disease, Dialysis Received dialysis 1/20   Hepatic/GI:   GERD    Musculoskeletal:  Musculoskeletal Normal    Neurological:  Neurology Normal    Endocrine:   Diabetes    Dermatological:  Skin Normal    Psych:  Psychiatric Normal           Physical Exam  General:  Well nourished    Airway/Jaw/Neck:  Airway Findings: Mallampati: II TM Distance: < 4 cm      Dental:  Dental Findings: Periodontal disease, Severe   Chest/Lungs:  Chest/Lungs Clear    Heart/Vascular:  Heart Findings: Normal       Mental Status:  Mental Status Findings:  Confusion, Lethargic         Anesthesia Plan  Type of Anesthesia, risks & benefits discussed:  Anesthesia Type:  general    Patient's Preference:   Plan Factors:          Intra-op Monitoring Plan: standard ASA monitors  Intra-op Monitoring Plan Comments:   Post Op Pain Control Plan: multimodal analgesia, IV/PO Opioids PRN and per primary service following discharge from PACU  Post Op Pain Control Plan Comments:     Induction:    Beta Blocker:  Patient is not currently on a Beta-Blocker (No further documentation required).       Informed Consent: Patient understands risks and agrees with Anesthesia plan.  Questions answered. Anesthesia consent signed with patient.  ASA Score: 3     Day of Surgery Review of History & Physical:    H&P update referred  to the provider.  H&P completed by Anesthesiologist.   Anesthesia Plan Notes: Npo  A line        Ready For Surgery From Anesthesia Perspective.

## 2022-01-21 NOTE — NURSING
Patient returned from procedure. Telemetry monitor continued. Patient accompanied back in bed. Denies pain or discomfort. Will continue to monitor. Site to left lower extremity clean, dry and intact. Will continue with plan of care

## 2022-01-21 NOTE — NURSING
Report received from off going nurse, KIAH Crews. Patient AAO. No signs of distress noted. Call light in reach. Bed low and locked. Will continue plan of care.

## 2022-01-21 NOTE — PROGRESS NOTES
[facility] Medicine  Progress Note    Patient Name: Adryan Neff  MRN: [id number]  Patient Class: IP- Inpatient   Admission Date: 1/5/2022  Length of Stay: 16 days  Attending Physician: Dottie Kaufman MD  Primary Care Provider: Soren Rice MD        Subjective:     Principal Problem:Gangrene of left foot        HPI:  This is a 56 y.o.male patient, with a PMHx of ESRD on hemodialysis, CAD, HTN, and DM, presenting to the ED for further evaluation of left foot pain and black discoloration that began 3 days ago. Patient states these symptoms have been ongoing for the last month. He reports being referred to a specialist but denies ever following up. He has been putting some otc medication on the foot that he believes has turned his foot more black. No trauma or injury. Patient reports associated chills. Patient states applying a creme to the foot and noticed his discoloration worsened. Patient reports he was last dialyzed yesterday. Patient denies any fever, shortness of breath, chest pain, neck pain, back pain, abdominal pain, rash, headaches, congestion, rhinorrhea, cough, sore throat, ear pain, eye pain, blurred vision, nausea, vomiting, diarrhea, dysuria, or any other associated symptoms.    Per chart review, he had an arterial u/s 12/15.   Impression:   1. High-grade severe stenosis right popliteal artery with occlusion anterior tibial artery which is reconstituted at level of DPA via collaterals.  2. 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    Cardiology has been planning to see him for revascularization options. Last podiatry visit 12/13 with Stable dry gangrene L 2nd toe and medial L 5th toe. Dorsalis pedis and posterior tibial pulses are diminished Bilaterally. Toes are cool to touch. Feet are warm proximally.There is decreased digital hair. Skin is atrophic, slightly hyperpigmented, and mildly edematous     In the ED, he was found to have worsened gangrene and  a cool extremity. Vascular surgery was consulted. He was also COVID +. Says he got a booster shot in November sometime.      Overview/Hospital Course:  Patient admitted to the hospital for eval and treatment of L foot gangrene.  ID, Ortho and Vasc consulted. Nephrology was also consulted for chronic dialysis. Patient started on broad spectrum antibiotics, and had several toes on L foot amputated by Vascular surgery.     The patient's AVF had recurrent malfunction. IR attempts at intervention without success. THDC placed for dialysis.    Pt became increasingly delirious and combative. He refused angiogram with Vascular. Psychiatry evaluated, felt that he was not able to make his own medical decisions. At this point Dr. Soni felt the patient would be a poor candidate for vascular intervention, recommended AKA. ID in agreement. Ortho consulted for AKA, daughter/POA in agreement, date of procedure planned to today..   Had some EKG changes with no chest pain,cardiology cleared patient for procedure.      Interval History: No events overnight.CC today is  Complains of leg pain    Review of Systems   Constitutional: Negative for chills and fever.   Respiratory: Negative for cough and shortness of breath.    Cardiovascular: Negative for chest pain and leg swelling.   Gastrointestinal: Negative for abdominal distention and abdominal pain.     Objective:     Vital Signs (Most Recent):  Temp: 99.4 °F (37.4 °C) (01/21/22 1101)  Pulse: 85 (01/21/22 1101)  Resp: 19 (01/21/22 1101)  BP: 139/63 (01/21/22 1101)  SpO2: 98 % (01/21/22 1101) Vital Signs (24h Range):  Temp:  [97.5 °F (36.4 °C)-99.4 °F (37.4 °C)] 99.4 °F (37.4 °C)  Pulse:  [] 85  Resp:  [18-20] 19  SpO2:  [97 %-100 %] 98 %  BP: (112-174)/(53-79) 139/63     Weight: 66.5 kg (146 lb 9.7 oz)  Body mass index is 21.04 kg/m².    Intake/Output Summary (Last 24 hours) at 1/21/2022 1129  Last data filed at 1/20/2022 1800  Gross per 24 hour   Intake 750 ml   Output 2825  ml   Net -2075 ml      Physical Exam  Constitutional:       General: He is not in acute distress.     Appearance: He is ill-appearing. He is not toxic-appearing or diaphoretic.   Cardiovascular:      Rate and Rhythm: Normal rate and regular rhythm.      Heart sounds: No murmur heard.  No gallop.    Pulmonary:      Effort: Pulmonary effort is normal. No respiratory distress.      Breath sounds: Normal breath sounds. No wheezing.   Abdominal:      General: Bowel sounds are normal. There is no distension.      Palpations: Abdomen is soft.      Tenderness: There is no abdominal tenderness.   Skin:     Comments: L foot s/p multiple amputated digits, necrotic tissue at site of amputation         Significant Labs: All pertinent labs within the past 24 hours have been reviewed.    Significant Imaging: I have reviewed all pertinent imaging results/findings within the past 24 hours.      Assessment/Plan:      * Gangrene of left foot  Presents with gangrenous L foot in the setting of severe PAD. Underwent left 2-5 metatarsal amputations w/ washout and debridement w/ Vascular surgery. Initial plan for angiogram w/ intervention, however pt refused the procedure. Given the low initial probability of wound healing and infectious clearance even with vascular intervention and lack of patient ability to adhere to medical treatment, Dr. Soni recommended AKA. Ortho consulted.   - family in agreement with AKA,date of procedure planned to today..   - antibiotics per ID.    Had some EKG changes with no chest pain,cardiology cleared patient for procedure.    Encephalopathy, metabolic  Waxing and waning confusion likely delirium secondary to ischemic foot w/ possible superimposed infection and uremia from missing dialysis. Psychiatry deemed patient not to have capacity to make medical decisions. Overall orientation improved after starting low dose seroquel.  - psychiatry consulted, appreciate recs  - tx of underlying issues as noted  elsewhere  - continue seroquel  - delirium precautions    COVID-19 in immunocompromised patient  HD patient + for COVID 19. Not hypoxic or symptomatic. Supportive care prn.       ESRD on hemodialysis  Nephrology consulted to resume routine HD. Fistula malfunctioning despite IR attempts at declot, THDC placed for alternative access  - nephrology following  - needs vascular vs IR fix of fistula    Chronic diastolic heart failure  December 2021 echo reviewed. Stable.       Essential (primary) hypertension  Titrate to goal. Pt intermittently refuses po medications, requiring IV meds      Controlled type 2 diabetes mellitus with chronic kidney disease on chronic dialysis, with long-term current use of insulin  Pt refused insulin, has intermittent PO intake.  - sliding scale insulin for now      VTE Risk Mitigation (From admission, onward)         Ordered     heparin (porcine) injection 5,000 Units  As needed (PRN)         01/14/22 1716     IP VTE HIGH RISK PATIENT  Once         01/05/22 1404     Place sequential compression device  Until discontinued         01/05/22 1404                Discharge Planning   ENIO:      Code Status: Full Code   Is the patient medically ready for discharge?:     Reason for patient still in hospital (select all that apply): Patient trending condition  Discharge Plan A: Skilled Nursing Facility                  Dottie Kaufman MD  Department of Hospital Medicine   [facility]

## 2022-01-21 NOTE — NURSING
"Informed by olena white patient refusing glucometer check. Went to try and see if patient would let me check his blood sugar patient screaming "no sugar, no sugar". Crossing his arms, hyperventilating, explained to patient the need for blood sugar control to help with wound healing patient. Pt refused. Made patient comfortable, frequent rounding. AAOX1. Reoriented patient to time, place, and situation. No family at bedside.   "

## 2022-01-21 NOTE — PT/OT/SLP PROGRESS
Occupational Therapy   Treatment    Name: Adryan Neff  MRN: [id number]  Admitting Diagnosis:  Gangrene of left foot  3 Days Post-Op    Recommendations:     Discharge Recommendations: nursing facility, skilled  Discharge Equipment Recommendations:  bedside commode,walker, rolling  Barriers to discharge:   (Pt is at risk for falls, readmission and morbidity if d/c home at this time.)    Assessment:     Adryan Neff is a 56 y.o. male with a medical diagnosis of Gangrene of left foot.  Performance deficits affecting function are weakness,impaired endurance,impaired self care skills,impaired functional mobilty,impaired balance,decreased coordination,decreased upper extremity function,decreased lower extremity function,decreased safety awareness,pain,decreased ROM,impaired skin,edema,orthopedic precautions.     Pt resisting most activities this date, requiring max encouragement to participate. Pt required mod A for performing supine>sit to stand from EOB x 1 trial w/ max A and use of RW. Pt was able to pivot hips towards HOB w/ mod A using RLE. Pt will continue to benefit from skilled acute OT services to maximize functional capacity for safe performance w/ ADLs and functional mobility.     Rehab Prognosis:  Fair; patient would benefit from acute skilled OT services to address these deficits and reach maximum level of function.       Plan:     Patient to be seen  (3-5x/wk) to address the above listed problems via self-care/home management,therapeutic activities,therapeutic exercises  · Plan of Care Expires: 02/08/22  · Plan of Care Reviewed with: patient    Subjective     Pain/Comfort:  · Pain Rating 1:  (Pt did not rate.)  · Location - Side 1: Left  · Location 1: foot  · Pain Addressed 1: Reposition,Cessation of Activity    Objective:     Communicated with: nurse (Wendy) prior to session.  Patient found right sidelying with bed alarm (hemodialysis catheter/fistula) upon OT entry to room.    General Precautions: Standard,  "fall,diabetic   Orthopedic Precautions: (Per chart, LLE "touch down WB")   Braces: N/A  Respiratory Status: Room air     Occupational Performance:     Bed Mobility:  Pt performed supine>sit x 1 trial and impulsively returned to R side-lying. Pt perform supine>sit for 2nd trial w/ max encouragement; pt very reluctant to stand or perform further activities. Pt then ultimately refused to return to supine, requiring max A as noted below.    · Patient completed Scooting hips to EOB with moderate assistance; pt laterally scooting towards HOB x 1 trial w/ SBA  · Patient completed Supine to Sit with moderate assistance for managing BLEs   · Patient completed Sit to Supine with maximal assistance due to pt refusing     Functional Mobility/Transfers:  · Patient completed Sit <> Stand Transfer with maximal assistance  with  rolling walker   · Pt noted w/ maintaining LLE NWB due to pain   · Functional Mobility: Pt refused to ambulate or laterally ambulate to HOB; pt required max A for pivoting hips towards HOB.     Activities of Daily Living:  · Upper Body Dressing: minimum assistance for donning gown over back   · Lower Body Dressing: dependence for donning sock for RLE      [facility] 6 Click ADL: 15    Treatment & Education:  -Pt performed ADLs and functional mobility as noted above.   -Pt re-educated on LLE precautions prior to OOB activity.  -Pt declined most E/OOB activities this date, requiring max encouragement. Pt was able to laterally pivot hips to EOB for better positioning in bed.   -Questions and concerns addressed within OT scope.      Patient left left sidelying with all lines intact, call button in reach, bed alarm on and nurse notifiedEducation:      GOALS:   Multidisciplinary Problems     Occupational Therapy Goals        Problem: Occupational Therapy Goal    Goal Priority Disciplines Outcome Interventions   Occupational Therapy Goal     OT, PT/OT Ongoing, Progressing    Description: Goals to be met by: " "2/8/2022    Patient will increase functional independence with ADLs by performing:    LE Dressing with Minimal Assistance.  Grooming while standing with Stand-by Assistance  Sit to stand w/ min A and use of RW w/ LLE "touch down weigth bearing" compliance.   Toileting from bedside commode with Stand-by Assistance for hygiene and clothing management  Supine to sit with Stand-by Assistance.  Toilet transfer to bedside commode with Stand-by Assistance                     Time Tracking:     OT Date of Treatment: 01/21/22  OT Start Time: 1220  OT Stop Time: 1231  OT Total Time (min): 11 min    Billable Minutes:Therapeutic Activity 11  Total Time 11    OT/GLENDA: OT     GLENDA Visit Number: 0    1/21/2022    "

## 2022-01-21 NOTE — BRIEF OP NOTE
Operative Note       Surgery Date: 1/21/2022     Surgeon(s) and Role:     * Rusty Light MD - Primary    Pre-op Diagnosis:  Gangrene of left foot [I96]    Post-op Diagnosis:  Status post left above knee amputation    Procedure(s) (LRB):  AMPUTATION, ABOVE KNEE (Left)    Anesthesia: Choice    Findings/Key Components:  Consistent with preoperative diagnosis    Core Measure Documentation:  Were antibiotics extended? No  Was the patient administered a VTE Prophylaxis? No. Short procedure; low risk  Estimated Blood Loss:  20 mL  IV fluid:  500 mL           Specimens (From admission, onward)    None        Implants: * No implants in log *    Complications: none           Disposition: PACU - hemodynamically stable.           Condition: Stable    Attestation:  I was present for the entire procedure.

## 2022-01-21 NOTE — PROGRESS NOTES
Pt to need left AKA  No available OR time today   Will plan for AKA Friday or Saturday   Will discuss with daughter as she wants to be [facility] for his surgery   Pt seen by cardiologist     MD Kuldeep

## 2022-01-21 NOTE — PLAN OF CARE
01/21/22 1629   Discharge Reassessment   Assessment Type Discharge Planning Reassessment   Did the patient's condition or plan change since previous assessment? No   Kev Kamara interested and requested additional information.  TN sent as requested.  8 denials.  6 no responses.

## 2022-01-21 NOTE — ANESTHESIA POSTPROCEDURE EVALUATION
Anesthesia Post Evaluation    Patient: Adryan Neff    Procedure(s) Performed: Procedure(s) (LRB):  AMPUTATION, ABOVE KNEE (Left)    Final Anesthesia Type: general      Patient location during evaluation: PACU  Patient participation: Yes- Able to Participate  Level of consciousness: awake and alert  Post-procedure vital signs: reviewed and stable  Pain management: adequate  Airway patency: patent  ARTHUR mitigation strategies: Multimodal analgesia and Extubation while patient is awake  PONV status at discharge: No PONV  Anesthetic complications: no      Cardiovascular status: blood pressure returned to baseline  Respiratory status: unassisted and spontaneous ventilation  Hydration status: euvolemic  Follow-up not needed.          Vitals Value Taken Time   /66 01/21/22 1636   Temp 36.6 °C (97.9 °F) 01/21/22 1636   Pulse 79 01/21/22 1636   Resp 19 01/21/22 1636   SpO2 98 % 01/21/22 1636         Event Time   Out of Recovery 01/21/2022 16:27:22         Pain/Azul Score: Pain Rating Prior to Med Admin: 2 (1/21/2022  3:50 PM)  Azul Score: 8 (1/21/2022  4:25 PM)

## 2022-01-21 NOTE — SUBJECTIVE & OBJECTIVE
Interval History: No events overnight.CC today is  Complains of leg pain    Review of Systems   Constitutional: Negative for chills and fever.   Respiratory: Negative for cough and shortness of breath.    Cardiovascular: Negative for chest pain and leg swelling.   Gastrointestinal: Negative for abdominal distention and abdominal pain.     Objective:     Vital Signs (Most Recent):  Temp: 99.4 °F (37.4 °C) (01/21/22 1101)  Pulse: 85 (01/21/22 1101)  Resp: 19 (01/21/22 1101)  BP: 139/63 (01/21/22 1101)  SpO2: 98 % (01/21/22 1101) Vital Signs (24h Range):  Temp:  [97.5 °F (36.4 °C)-99.4 °F (37.4 °C)] 99.4 °F (37.4 °C)  Pulse:  [] 85  Resp:  [18-20] 19  SpO2:  [97 %-100 %] 98 %  BP: (112-174)/(53-79) 139/63     Weight: 66.5 kg (146 lb 9.7 oz)  Body mass index is 21.04 kg/m².    Intake/Output Summary (Last 24 hours) at 1/21/2022 1129  Last data filed at 1/20/2022 1800  Gross per 24 hour   Intake 750 ml   Output 2825 ml   Net -2075 ml      Physical Exam  Constitutional:       General: He is not in acute distress.     Appearance: He is ill-appearing. He is not toxic-appearing or diaphoretic.   Cardiovascular:      Rate and Rhythm: Normal rate and regular rhythm.      Heart sounds: No murmur heard.  No gallop.    Pulmonary:      Effort: Pulmonary effort is normal. No respiratory distress.      Breath sounds: Normal breath sounds. No wheezing.   Abdominal:      General: Bowel sounds are normal. There is no distension.      Palpations: Abdomen is soft.      Tenderness: There is no abdominal tenderness.   Skin:     Comments: L foot s/p multiple amputated digits, necrotic tissue at site of amputation         Significant Labs: All pertinent labs within the past 24 hours have been reviewed.    Significant Imaging: I have reviewed all pertinent imaging results/findings within the past 24 hours.

## 2022-01-21 NOTE — ASSESSMENT & PLAN NOTE
Presents with gangrenous L foot in the setting of severe PAD. Underwent left 2-5 metatarsal amputations w/ washout and debridement w/ Vascular surgery. Initial plan for angiogram w/ intervention, however pt refused the procedure. Given the low initial probability of wound healing and infectious clearance even with vascular intervention and lack of patient ability to adhere to medical treatment, Dr. Soni recommended AKA. Ortho consulted.   - family in agreement with AKA,date of procedure planned to today..   - antibiotics per ID.    Had some EKG changes with no chest pain,cardiology cleared patient for procedure.

## 2022-01-21 NOTE — PLAN OF CARE
"  Problem: Occupational Therapy Goal  Goal: Occupational Therapy Goal  Description: Goals to be met by: 2/8/2022    Patient will increase functional independence with ADLs by performing:    LE Dressing with Minimal Assistance.  Grooming while standing with Stand-by Assistance  Sit to stand w/ min A and use of RW w/ LLE "touch down weigth bearing" compliance.   Toileting from bedside commode with Stand-by Assistance for hygiene and clothing management  Supine to sit with Stand-by Assistance.  Toilet transfer to bedside commode with Stand-by Assistance    Outcome: Ongoing, Progressing    Pt resisting most activities this date, requiring max encouragement to participate. Pt required mod A for performing supine>sit to stand from EOB x 1 trial w/ max A and use of RW. Pt was able to pivot hips towards HOB w/ mod A using RLE. Pt will continue to benefit from skilled acute OT services to maximize functional capacity for safe performance w/ ADLs and functional mobility.      "

## 2022-01-21 NOTE — PLAN OF CARE
Problem: Adult Inpatient Plan of Care  Goal: Plan of Care Review  Outcome: Ongoing, Progressing  Goal: Patient-Specific Goal (Individualized)  Outcome: Ongoing, Progressing  Goal: Absence of Hospital-Acquired Illness or Injury  Outcome: Ongoing, Progressing  Goal: Optimal Comfort and Wellbeing  Outcome: Ongoing, Progressing  Goal: Readiness for Transition of Care  Outcome: Ongoing, Progressing     Problem: Fall Injury Risk  Goal: Absence of Fall and Fall-Related Injury  Outcome: Ongoing, Progressing     Problem: Diabetes Comorbidity  Goal: Blood Glucose Level Within Targeted Range  Outcome: Ongoing, Progressing     Problem: Impaired Wound Healing  Goal: Optimal Wound Healing  Outcome: Ongoing, Progressing     Problem: Device-Related Complication Risk (Hemodialysis)  Goal: Safe, Effective Therapy Delivery  Outcome: Ongoing, Progressing     Problem: Hemodynamic Instability (Hemodialysis)  Goal: Effective Tissue Perfusion  Outcome: Ongoing, Progressing     Problem: Infection (Hemodialysis)  Goal: Absence of Infection Signs and Symptoms  Outcome: Ongoing, Progressing     Problem: Skin Injury Risk Increased  Goal: Skin Health and Integrity  Outcome: Ongoing, Progressing     Problem: Infection  Goal: Absence of Infection Signs and Symptoms  Outcome: Ongoing, Progressing

## 2022-01-21 NOTE — OP NOTE
Date of procedure:  01/21/2022    Surgeon:  Rusty Light MD  Assistant:  None  Preoperative diagnosis:  Left leg gangrene  Postop diagnosis:  Status post left above knee amputation  Procedure performed left above knee amputation  Antibiotics:  Ancef  IV fluid:  500 mL  Estimated blood loss:  20 mL  Specimen:  Left leg  Tourniquet time:  None  Anesthesia:  General    Brief indication for procedure:  The patient is a 56-year-old male with peripheral vascular disease and end-stage renal disease with left lower extremity gangrene.  The risks benefits and alternatives as well as anticipated convalescence of left above knee amputation in this very sick patient was explained in detail to the patient as well as the patient's daughter Alfredo.  Vascular surgery recommendation was for an above knee amputation.  See epic chart for details.  Informed consent was signed for left above knee amputation.    Procedure detail:  Date of surgery patient brought to the operating room general anesthesia was administered subsequently he was placed in the supine position prepped and draped in typical sterile fashion for left lower extremity surgery all bony extremities were padded subsequently a time-out was called indicating correct patient laterality surgery to be performed as administration of IV antibiotics everybody agreed elected to proceed with surgery.  Subsequently a fishmouth incision was made over distal aspect of the femur dissection was carried down with a scalpel and electrocautery to the level of the extensor mechanism which was transected.  All vessels that were encountered were double ligated.  All encountered nerves were placed on traction and transected with a flash scalpel the bone was cut with a bone saw the edges of it were smoothed.  The specimen was placed on the back table and sent to pathology the wound was irrigated copiously with normal saline extensor structures were sewed posterior structures/fascia and  tendon this was found to be a tension-free closure with minimal dead space 1. Vicryl suture in inverted fashion was used to close in a circumferential fashion the skin was closed with staples there is found to be adequate hemostasis throughout which was obtained with electrocautery as well as with ligation of any vessels a sterile compressive dressing was placed and all surgical sponge counts were correct in the case the patient was transferred to PACU in stable condition.      Postop check:  Afebrile stable vital signs pain controlled    Rusty Light MD

## 2022-01-21 NOTE — PLAN OF CARE
Problem: Adult Inpatient Plan of Care  Goal: Plan of Care Review  Outcome: Ongoing, Progressing  Goal: Patient-Specific Goal (Individualized)  Outcome: Ongoing, Progressing  Goal: Absence of Hospital-Acquired Illness or Injury  Outcome: Ongoing, Progressing  Goal: Optimal Comfort and Wellbeing  Outcome: Ongoing, Progressing  Goal: Readiness for Transition of Care  Outcome: Ongoing, Progressing     Problem: Fall Injury Risk  Goal: Absence of Fall and Fall-Related Injury  Outcome: Ongoing, Progressing     Problem: Diabetes Comorbidity  Goal: Blood Glucose Level Within Targeted Range  Outcome: Ongoing, Progressing     Problem: Impaired Wound Healing  Goal: Optimal Wound Healing  Outcome: Ongoing, Progressing     Problem: Device-Related Complication Risk (Hemodialysis)  Goal: Safe, Effective Therapy Delivery  Outcome: Ongoing, Progressing     Problem: Infection (Hemodialysis)  Goal: Absence of Infection Signs and Symptoms  Outcome: Ongoing, Progressing     Problem: Skin Injury Risk Increased  Goal: Skin Health and Integrity  Outcome: Ongoing, Progressing     Problem: Infection  Goal: Absence of Infection Signs and Symptoms  Outcome: Ongoing, Progressing

## 2022-01-22 LAB
ALBUMIN SERPL BCP-MCNC: 2.2 G/DL (ref 3.5–5.2)
ALP SERPL-CCNC: 68 U/L (ref 55–135)
ALT SERPL W/O P-5'-P-CCNC: 9 U/L (ref 10–44)
ANION GAP SERPL CALC-SCNC: 18 MMOL/L (ref 8–16)
AST SERPL-CCNC: 24 U/L (ref 10–40)
BASOPHILS # BLD AUTO: 0.1 K/UL (ref 0–0.2)
BASOPHILS NFR BLD: 0.6 % (ref 0–1.9)
BILIRUB SERPL-MCNC: 0.4 MG/DL (ref 0.1–1)
BUN SERPL-MCNC: 55 MG/DL (ref 6–20)
CALCIUM SERPL-MCNC: 9.2 MG/DL (ref 8.7–10.5)
CHLORIDE SERPL-SCNC: 93 MMOL/L (ref 95–110)
CO2 SERPL-SCNC: 22 MMOL/L (ref 23–29)
CREAT SERPL-MCNC: 11.6 MG/DL (ref 0.5–1.4)
DIFFERENTIAL METHOD: ABNORMAL
EOSINOPHIL # BLD AUTO: 0.9 K/UL (ref 0–0.5)
EOSINOPHIL NFR BLD: 5.2 % (ref 0–8)
ERYTHROCYTE [DISTWIDTH] IN BLOOD BY AUTOMATED COUNT: 15.8 % (ref 11.5–14.5)
EST. GFR  (AFRICAN AMERICAN): 5 ML/MIN/1.73 M^2
EST. GFR  (NON AFRICAN AMERICAN): 4 ML/MIN/1.73 M^2
GLUCOSE SERPL-MCNC: 122 MG/DL (ref 70–110)
HCT VFR BLD AUTO: 26.8 % (ref 40–54)
HGB BLD-MCNC: 7.9 G/DL (ref 14–18)
IMM GRANULOCYTES # BLD AUTO: 0.15 K/UL (ref 0–0.04)
IMM GRANULOCYTES NFR BLD AUTO: 0.9 % (ref 0–0.5)
LYMPHOCYTES # BLD AUTO: 1.2 K/UL (ref 1–4.8)
LYMPHOCYTES NFR BLD: 6.6 % (ref 18–48)
MCH RBC QN AUTO: 27.4 PG (ref 27–31)
MCHC RBC AUTO-ENTMCNC: 29.5 G/DL (ref 32–36)
MCV RBC AUTO: 93 FL (ref 82–98)
MONOCYTES # BLD AUTO: 1.4 K/UL (ref 0.3–1)
MONOCYTES NFR BLD: 8.2 % (ref 4–15)
NEUTROPHILS # BLD AUTO: 13.7 K/UL (ref 1.8–7.7)
NEUTROPHILS NFR BLD: 78.5 % (ref 38–73)
NRBC BLD-RTO: 0 /100 WBC
PLATELET # BLD AUTO: 312 K/UL (ref 150–450)
PMV BLD AUTO: 9.9 FL (ref 9.2–12.9)
POCT GLUCOSE: 119 MG/DL (ref 70–110)
POCT GLUCOSE: 200 MG/DL (ref 70–110)
POTASSIUM SERPL-SCNC: 5.7 MMOL/L (ref 3.5–5.1)
PROT SERPL-MCNC: 9 G/DL (ref 6–8.4)
RBC # BLD AUTO: 2.88 M/UL (ref 4.6–6.2)
SODIUM SERPL-SCNC: 133 MMOL/L (ref 136–145)
WBC # BLD AUTO: 17.46 K/UL (ref 3.9–12.7)

## 2022-01-22 PROCEDURE — 63600175 PHARM REV CODE 636 W HCPCS: Performed by: ORTHOPAEDIC SURGERY

## 2022-01-22 PROCEDURE — 25000003 PHARM REV CODE 250: Performed by: ORTHOPAEDIC SURGERY

## 2022-01-22 PROCEDURE — 36415 COLL VENOUS BLD VENIPUNCTURE: CPT | Performed by: ORTHOPAEDIC SURGERY

## 2022-01-22 PROCEDURE — 21400001 HC TELEMETRY ROOM

## 2022-01-22 PROCEDURE — 63600175 PHARM REV CODE 636 W HCPCS: Mod: JG | Performed by: INTERNAL MEDICINE

## 2022-01-22 PROCEDURE — 99233 PR SUBSEQUENT HOSPITAL CARE,LEVL III: ICD-10-PCS | Mod: CR,,, | Performed by: INTERNAL MEDICINE

## 2022-01-22 PROCEDURE — 63600175 PHARM REV CODE 636 W HCPCS: Performed by: HOSPITALIST

## 2022-01-22 PROCEDURE — 99233 SBSQ HOSP IP/OBS HIGH 50: CPT | Mod: CR,,, | Performed by: INTERNAL MEDICINE

## 2022-01-22 PROCEDURE — 80053 COMPREHEN METABOLIC PANEL: CPT | Performed by: ORTHOPAEDIC SURGERY

## 2022-01-22 PROCEDURE — 80100016 HC MAINTENANCE HEMODIALYSIS

## 2022-01-22 PROCEDURE — 85025 COMPLETE CBC W/AUTO DIFF WBC: CPT | Performed by: ORTHOPAEDIC SURGERY

## 2022-01-22 RX ORDER — HEPARIN SODIUM 5000 [USP'U]/ML
5000 INJECTION, SOLUTION INTRAVENOUS; SUBCUTANEOUS EVERY 8 HOURS
Status: DISCONTINUED | OUTPATIENT
Start: 2022-01-22 | End: 2022-01-27 | Stop reason: HOSPADM

## 2022-01-22 RX ADMIN — QUETIAPINE FUMARATE 25 MG: 25 TABLET ORAL at 08:01

## 2022-01-22 RX ADMIN — OXYCODONE 5 MG: 5 TABLET ORAL at 04:01

## 2022-01-22 RX ADMIN — EPOETIN ALFA-EPBX 10000 UNITS: 10000 INJECTION, SOLUTION INTRAVENOUS; SUBCUTANEOUS at 03:01

## 2022-01-22 RX ADMIN — CEFAZOLIN SODIUM 1 G: 1 SOLUTION INTRAVENOUS at 06:01

## 2022-01-22 RX ADMIN — OXYCODONE 5 MG: 5 TABLET ORAL at 10:01

## 2022-01-22 RX ADMIN — HEPARIN SODIUM 5000 UNITS: 5000 INJECTION INTRAVENOUS; SUBCUTANEOUS at 03:01

## 2022-01-22 RX ADMIN — HYDRALAZINE HYDROCHLORIDE 100 MG: 25 TABLET, FILM COATED ORAL at 06:01

## 2022-01-22 RX ADMIN — CARVEDILOL 6.25 MG: 6.25 TABLET, FILM COATED ORAL at 08:01

## 2022-01-22 RX ADMIN — HEPARIN SODIUM 5000 UNITS: 5000 INJECTION INTRAVENOUS; SUBCUTANEOUS at 09:01

## 2022-01-22 RX ADMIN — HEPARIN SODIUM 5000 UNITS: 5000 INJECTION INTRAVENOUS; SUBCUTANEOUS at 12:01

## 2022-01-22 RX ADMIN — CEFEPIME HYDROCHLORIDE 1 G: 1 INJECTION, SOLUTION INTRAVENOUS at 04:01

## 2022-01-22 NOTE — ASSESSMENT & PLAN NOTE
Waxing and waning confusion likely delirium secondary to ischemic foot w/ possible superimposed infection and uremia from missing dialysis. Psychiatry deemed patient not to have capacity to make medical decisions. Overall orientation improved after starting low dose seroquel.  - psychiatry consulted, appreciate recs  - tx of underlying issues as noted elsewhere  - continue seroquel  - delirium precautions  Improved.

## 2022-01-22 NOTE — PROGRESS NOTES
Patient off the floor in dialysis. No issues per chart review/nursing. Reports pain controlled.     S/p L AKA.   - Keep dressings in place.  - Continue abx per ID/primary.     Claudette Andrews MD  [facility]

## 2022-01-22 NOTE — NURSING
Pt completed HD. Net 1.7 L fluid removed as tolerated by pt. Pt was restless during tx d/t pain- primary RN medicated pt during tx. VSS. NAD. CVC lines heparin locked, clamped, and capped. Report given to primary RN.

## 2022-01-22 NOTE — PROGRESS NOTES
[facility] Medicine  Progress Note    Patient Name: Adyran Neff  MRN: [id number]  Patient Class: IP- Inpatient   Admission Date: 1/5/2022  Length of Stay: 17 days  Attending Physician: Dottie Kaufman MD  Primary Care Provider: Soren Rice MD        Subjective:     Principal Problem:Gangrene of left foot        HPI:  This is a 56 y.o.male patient, with a PMHx of ESRD on hemodialysis, CAD, HTN, and DM, presenting to the ED for further evaluation of left foot pain and black discoloration that began 3 days ago. Patient states these symptoms have been ongoing for the last month. He reports being referred to a specialist but denies ever following up. He has been putting some otc medication on the foot that he believes has turned his foot more black. No trauma or injury. Patient reports associated chills. Patient states applying a creme to the foot and noticed his discoloration worsened. Patient reports he was last dialyzed yesterday. Patient denies any fever, shortness of breath, chest pain, neck pain, back pain, abdominal pain, rash, headaches, congestion, rhinorrhea, cough, sore throat, ear pain, eye pain, blurred vision, nausea, vomiting, diarrhea, dysuria, or any other associated symptoms.    Per chart review, he had an arterial u/s 12/15.   Impression:   1. High-grade severe stenosis right popliteal artery with occlusion anterior tibial artery which is reconstituted at level of DPA via collaterals.  2. 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    Cardiology has been planning to see him for revascularization options. Last podiatry visit 12/13 with Stable dry gangrene L 2nd toe and medial L 5th toe. Dorsalis pedis and posterior tibial pulses are diminished Bilaterally. Toes are cool to touch. Feet are warm proximally.There is decreased digital hair. Skin is atrophic, slightly hyperpigmented, and mildly edematous     In the ED, he was found to have worsened gangrene and  a cool extremity. Vascular surgery was consulted. He was also COVID +. Says he got a booster shot in November sometime.      Overview/Hospital Course:  Patient admitted to the hospital for eval and treatment of L foot gangrene.  ID, Ortho and Vasc consulted. Nephrology was also consulted for chronic dialysis. Patient started on broad spectrum antibiotics, and had several toes on L foot amputated by Vascular surgery.     The patient's AVF had recurrent malfunction. IR attempts at intervention without success. THDC placed for dialysis.    Pt became increasingly delirious and combative. He refused angiogram with Vascular. Psychiatry evaluated, felt that he was not able to make his own medical decisions. At this point Dr. Soni felt the patient would be a poor candidate for vascular intervention, recommended AKA. ID in agreement. Ortho consulted for AKA, daughter/POA in agreement,   S/P left AKA on 1.21.22,  Had some EKG changes with no chest pain,cardiology cleared patient for procedure.  CC today is leg pain.      Interval History: No events overnight.CC today is  Complains of leg pain  CC today is leg pain.  Review of Systems   Constitutional: Negative for chills and fever.   Respiratory: Negative for cough and shortness of breath.    Cardiovascular: Negative for chest pain and leg swelling.   Gastrointestinal: Negative for abdominal distention and abdominal pain.     Objective:     Vital Signs (Most Recent):  Temp: 96.9 °F (36.1 °C) (01/22/22 0404)  Pulse: 106 (01/22/22 1045)  Resp: 18 (01/22/22 1033)  BP: (!) 109/53 (01/22/22 1045)  SpO2: 97 % (01/22/22 0404) Vital Signs (24h Range):  Temp:  [96.9 °F (36.1 °C)-98 °F (36.7 °C)] 96.9 °F (36.1 °C)  Pulse:  [] 106  Resp:  [12-22] 18  SpO2:  [97 %-100 %] 97 %  BP: (102-166)/(53-82) 109/53     Weight: 66.5 kg (146 lb 9.7 oz)  Body mass index is 21.04 kg/m².    Intake/Output Summary (Last 24 hours) at 1/22/2022 1118  Last data filed at 1/21/2022 1537  Gross per  24 hour   Intake 600 ml   Output --   Net 600 ml      Physical Exam  Constitutional:       General: He is not in acute distress.     Appearance: He is ill-appearing. He is not toxic-appearing or diaphoretic.   Cardiovascular:      Rate and Rhythm: Normal rate and regular rhythm.      Heart sounds: No murmur heard.  No gallop.    Pulmonary:      Effort: Pulmonary effort is normal. No respiratory distress.      Breath sounds: Normal breath sounds. No wheezing.   Abdominal:      General: Bowel sounds are normal. There is no distension.      Palpations: Abdomen is soft.      Tenderness: There is no abdominal tenderness.   Skin:     Comments: L foot s/p multiple amputated digits, necrotic tissue at site of amputation         Significant Labs: All pertinent labs within the past 24 hours have been reviewed.    Significant Imaging: I have reviewed all pertinent imaging results/findings within the past 24 hours.      Assessment/Plan:      * Gangrene of left foot  Presents with gangrenous L foot in the setting of severe PAD. Underwent left 2-5 metatarsal amputations w/ washout and debridement w/ Vascular surgery. Initial plan for angiogram w/ intervention, however pt refused the procedure. Given the low initial probability of wound healing and infectious clearance even with vascular intervention and lack of patient ability to adhere to medical treatment, Dr. Soni recommended AKA. Ortho consulted.   - family in agreement with AKA,date of procedure planned to today..   - antibiotics per ID.    Had some EKG changes with no chest pain,cardiology cleared patient for procedure.  S/P left AKA on 1.21.22,    Encephalopathy, metabolic  Waxing and waning confusion likely delirium secondary to ischemic foot w/ possible superimposed infection and uremia from missing dialysis. Psychiatry deemed patient not to have capacity to make medical decisions. Overall orientation improved after starting low dose seroquel.  - psychiatry  consulted, appreciate recs  - tx of underlying issues as noted elsewhere  - continue seroquel  - delirium precautions  Improved.    COVID-19 in immunocompromised patient  HD patient + for COVID 19. Not hypoxic or symptomatic. Supportive care prn.       ESRD on hemodialysis  Nephrology consulted to resume routine HD. Fistula malfunctioning despite IR attempts at declot, THDC placed for alternative access  - nephrology following  - needs vascular vs IR fix of fistula    NICM (nonischemic cardiomyopathy)  On medical treatment.      Chronic pulmonary heart disease  On medical treatment.      Pulmonary HTN  On medical treatment.      Hyperkalemia  Correction with HD.    CAD (coronary artery disease) - non-obstructive from [facility] in 2016  On medical treatment.      Chronic diastolic heart failure  December 2021 echo reviewed. Stable.       Benign hypertension with chronic kidney disease, stage V  On medical treatment.      Essential (primary) hypertension  Titrate to goal. Pt intermittently refuses po medications, requiring IV meds      Controlled type 2 diabetes mellitus with chronic kidney disease on chronic dialysis, with long-term current use of insulin  Pt refused insulin, has intermittent PO intake.  - sliding scale insulin for now      VTE Risk Mitigation (From admission, onward)         Ordered     heparin (porcine) injection 5,000 Units  Every 8 hours         01/22/22 0750     heparin (porcine) injection 5,000 Units  As needed (PRN)         01/14/22 1716     IP VTE HIGH RISK PATIENT  Once         01/05/22 1404     Place sequential compression device  Until discontinued         01/05/22 1404                Discharge Planning   ENIO:      Code Status: Full Code   Is the patient medically ready for discharge?:     Reason for patient still in hospital (select all that apply): Patient trending condition  Discharge Plan A: Skilled Nursing Facility                  Dottie Kaufman MD  Department of Hospital Medicine    [facility] - [place] - [facility]

## 2022-01-22 NOTE — SUBJECTIVE & OBJECTIVE
Interval History: No events overnight.CC today is  Complains of leg pain  CC today is leg pain.  Review of Systems   Constitutional: Negative for chills and fever.   Respiratory: Negative for cough and shortness of breath.    Cardiovascular: Negative for chest pain and leg swelling.   Gastrointestinal: Negative for abdominal distention and abdominal pain.     Objective:     Vital Signs (Most Recent):  Temp: 96.9 °F (36.1 °C) (01/22/22 0404)  Pulse: 106 (01/22/22 1045)  Resp: 18 (01/22/22 1033)  BP: (!) 109/53 (01/22/22 1045)  SpO2: 97 % (01/22/22 0404) Vital Signs (24h Range):  Temp:  [96.9 °F (36.1 °C)-98 °F (36.7 °C)] 96.9 °F (36.1 °C)  Pulse:  [] 106  Resp:  [12-22] 18  SpO2:  [97 %-100 %] 97 %  BP: (102-166)/(53-82) 109/53     Weight: 66.5 kg (146 lb 9.7 oz)  Body mass index is 21.04 kg/m².    Intake/Output Summary (Last 24 hours) at 1/22/2022 1118  Last data filed at 1/21/2022 1537  Gross per 24 hour   Intake 600 ml   Output --   Net 600 ml      Physical Exam  Constitutional:       General: He is not in acute distress.     Appearance: He is ill-appearing. He is not toxic-appearing or diaphoretic.   Cardiovascular:      Rate and Rhythm: Normal rate and regular rhythm.      Heart sounds: No murmur heard.  No gallop.    Pulmonary:      Effort: Pulmonary effort is normal. No respiratory distress.      Breath sounds: Normal breath sounds. No wheezing.   Abdominal:      General: Bowel sounds are normal. There is no distension.      Palpations: Abdomen is soft.      Tenderness: There is no abdominal tenderness.   Skin:     Comments: L foot s/p multiple amputated digits, necrotic tissue at site of amputation         Significant Labs: All pertinent labs within the past 24 hours have been reviewed.    Significant Imaging: I have reviewed all pertinent imaging results/findings within the past 24 hours.

## 2022-01-22 NOTE — PROGRESS NOTES
Date of Admission:1/5/2022    SUBJECTIVE:confused, talking out of his head    Current Facility-Administered Medications   Medication    0.9%  NaCl infusion (for blood administration)    0.9%  NaCl infusion    0.9%  NaCl infusion    0.9%  NaCl infusion    acetaminophen tablet 650 mg    acetaminophen tablet 650 mg    atorvastatin tablet 20 mg    carvediloL tablet 6.25 mg    cefepime in dextrose 5 % 1 gram/50 mL IVPB 1 g    dextrose 50% injection 12.5 g    dextrose 50% injection 25 g    epoetin paola-epbx injection 10,000 Units    glucagon (human recombinant) injection 1 mg    glucose chewable tablet 16 g    glucose chewable tablet 24 g    heparin (porcine) injection 5,000 Units    heparin (porcine) injection 5,000 Units    HYDROmorphone (PF) injection 0.5 mg    influenza (QUADRIVALENT PF) vaccine 0.5 mL    insulin aspart U-100 pen 1-10 Units    magnesium oxide tablet 800 mg    magnesium oxide tablet 800 mg    naloxone 0.4 mg/mL injection 0.02 mg    OLANZapine injection 5 mg    olanzapine zydis disintegrating tablet 5 mg    ondansetron injection 8 mg    oxyCODONE immediate release tablet 5 mg    pantoprazole EC tablet 40 mg    QUEtiapine tablet 25 mg    sevelamer carbonate tablet 2,400 mg    sodium chloride 0.9% bolus 250 mL    sodium chloride 0.9% bolus 250 mL    sodium chloride 0.9% flush 10 mL    vitamin renal formula (B-complex-vitamin c-folic acid) 1 mg per capsule 1 capsule       Wt Readings from Last 3 Encounters:   01/20/22 66.5 kg (146 lb 9.7 oz)   12/13/21 72.8 kg (160 lb 7.9 oz)   11/23/21 72.8 kg (160 lb 7.9 oz)     Temp Readings from Last 3 Encounters:   01/22/22 96.9 °F (36.1 °C) (Axillary)   11/11/21 98.5 °F (36.9 °C) (Oral)   08/09/21 97.6 °F (36.4 °C) (Oral)     BP Readings from Last 3 Encounters:   01/22/22 (!) 117/58   01/12/22 (!) 142/65   01/10/22 (!) 173/73     Pulse Readings from Last 3 Encounters:   01/22/22 95   01/12/22 100   01/10/22 81       Intake/Output  Summary (Last 24 hours) at 1/22/2022 1250  Last data filed at 1/22/2022 1220  Gross per 24 hour   Intake 1100 ml   Output 2220 ml   Net -1120 ml       PE:  GEN:wd male agitated  HEENT:ncat,eomi,mm  CVS:s1s2 regular  PULM:ctab  ABD:+bs,soft  EXT:aka, no leg edema, no thrill left avf with  device  NEURO:awake, not oriented  pc of chest  Recent Labs   Lab 01/22/22  0526   *   *   K 5.7*   CL 93*   CO2 22*   BUN 55*   CREATININE 11.6*   CALCIUM 9.2       Lab Results   Component Value Date    .0 (H) 01/07/2021    CALCIUM 9.2 01/22/2022    PHOS 9.5 (HH) 01/12/2022       Recent Labs   Lab 01/22/22  0526   WBC 17.46*   RBC 2.88*   HGB 7.9*   HCT 26.8*      MCV 93   MCH 27.4   MCHC 29.5*           A/P:  1.esrd. seen on hd. Resume MWF.  2.htn. uf with hd.  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 Cont epo.  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 Cont binders. James phos.  5.dm2. following sugars.  7.gas gangrene. Aka.  Cont postop care wound.  8.hyperkalemia. 2k bath.

## 2022-01-22 NOTE — PT/OT/SLP PROGRESS
Occupational Therapy      Patient Name:  Adryan Neff   MRN:  [id number]    Patient not seen today secondary to Dialysis. Will follow-up later as able.    1/22/2022

## 2022-01-22 NOTE — NURSING
Patient awake, alert,resting comfortably. No signs of distress observed. bed low and locked. Call light in reach. Report given to oncoming nurse, KIAH Crews. 12hour chart check complete. Will continue plan of care.

## 2022-01-22 NOTE — ASSESSMENT & PLAN NOTE
Presents with gangrenous L foot in the setting of severe PAD. Underwent left 2-5 metatarsal amputations w/ washout and debridement w/ Vascular surgery. Initial plan for angiogram w/ intervention, however pt refused the procedure. Given the low initial probability of wound healing and infectious clearance even with vascular intervention and lack of patient ability to adhere to medical treatment, Dr. Soni recommended AKA. Ortho consulted.   - family in agreement with AKA,date of procedure planned to today..   - antibiotics per ID.    Had some EKG changes with no chest pain,cardiology cleared patient for procedure.  S/P left AKA on 1.21.22,

## 2022-01-22 NOTE — PT/OT/SLP PROGRESS
Occupational Therapy      Patient Name:  Adryan Neff   MRN:  [id number]    Patient not seen today secondary to Dialysis. Will follow-up later as able.    1/22/2022

## 2022-01-22 NOTE — PT/OT/SLP PROGRESS
Physical Therapy      Patient Name:  Adryan eNff   MRN:  [id number]    Patient not seen today secondary to Dialysis. Will follow-up .

## 2022-01-23 LAB
ALBUMIN SERPL BCP-MCNC: 2.2 G/DL (ref 3.5–5.2)
ALP SERPL-CCNC: 70 U/L (ref 55–135)
ALT SERPL W/O P-5'-P-CCNC: <5 U/L (ref 10–44)
ANION GAP SERPL CALC-SCNC: 18 MMOL/L (ref 8–16)
AST SERPL-CCNC: 27 U/L (ref 10–40)
BASOPHILS # BLD AUTO: 0.1 K/UL (ref 0–0.2)
BASOPHILS NFR BLD: 0.5 % (ref 0–1.9)
BILIRUB SERPL-MCNC: 0.4 MG/DL (ref 0.1–1)
BUN SERPL-MCNC: 34 MG/DL (ref 6–20)
CALCIUM SERPL-MCNC: 9.3 MG/DL (ref 8.7–10.5)
CHLORIDE SERPL-SCNC: 93 MMOL/L (ref 95–110)
CO2 SERPL-SCNC: 22 MMOL/L (ref 23–29)
CREAT SERPL-MCNC: 7.9 MG/DL (ref 0.5–1.4)
DIFFERENTIAL METHOD: ABNORMAL
EOSINOPHIL # BLD AUTO: 0.3 K/UL (ref 0–0.5)
EOSINOPHIL NFR BLD: 1.6 % (ref 0–8)
ERYTHROCYTE [DISTWIDTH] IN BLOOD BY AUTOMATED COUNT: 16 % (ref 11.5–14.5)
EST. GFR  (AFRICAN AMERICAN): 8 ML/MIN/1.73 M^2
EST. GFR  (NON AFRICAN AMERICAN): 7 ML/MIN/1.73 M^2
GLUCOSE SERPL-MCNC: 153 MG/DL (ref 70–110)
HCT VFR BLD AUTO: 28.4 % (ref 40–54)
HGB BLD-MCNC: 8.5 G/DL (ref 14–18)
IMM GRANULOCYTES # BLD AUTO: 0.2 K/UL (ref 0–0.04)
IMM GRANULOCYTES NFR BLD AUTO: 1 % (ref 0–0.5)
LYMPHOCYTES # BLD AUTO: 1.2 K/UL (ref 1–4.8)
LYMPHOCYTES NFR BLD: 6.2 % (ref 18–48)
MCH RBC QN AUTO: 27.6 PG (ref 27–31)
MCHC RBC AUTO-ENTMCNC: 29.9 G/DL (ref 32–36)
MCV RBC AUTO: 92 FL (ref 82–98)
MONOCYTES # BLD AUTO: 2.2 K/UL (ref 0.3–1)
MONOCYTES NFR BLD: 11.1 % (ref 4–15)
NEUTROPHILS # BLD AUTO: 15.5 K/UL (ref 1.8–7.7)
NEUTROPHILS NFR BLD: 79.6 % (ref 38–73)
NRBC BLD-RTO: 0 /100 WBC
PLATELET # BLD AUTO: 336 K/UL (ref 150–450)
PMV BLD AUTO: 10 FL (ref 9.2–12.9)
POCT GLUCOSE: 168 MG/DL (ref 70–110)
POCT GLUCOSE: 168 MG/DL (ref 70–110)
POCT GLUCOSE: 173 MG/DL (ref 70–110)
POCT GLUCOSE: 257 MG/DL (ref 70–110)
POTASSIUM SERPL-SCNC: 4.5 MMOL/L (ref 3.5–5.1)
PROT SERPL-MCNC: 9.3 G/DL (ref 6–8.4)
RBC # BLD AUTO: 3.08 M/UL (ref 4.6–6.2)
SODIUM SERPL-SCNC: 133 MMOL/L (ref 136–145)
WBC # BLD AUTO: 19.45 K/UL (ref 3.9–12.7)

## 2022-01-23 PROCEDURE — 97168 OT RE-EVAL EST PLAN CARE: CPT

## 2022-01-23 PROCEDURE — 80053 COMPREHEN METABOLIC PANEL: CPT | Performed by: ORTHOPAEDIC SURGERY

## 2022-01-23 PROCEDURE — 25000003 PHARM REV CODE 250: Performed by: ORTHOPAEDIC SURGERY

## 2022-01-23 PROCEDURE — 85025 COMPLETE CBC W/AUTO DIFF WBC: CPT | Performed by: ORTHOPAEDIC SURGERY

## 2022-01-23 PROCEDURE — 97164 PT RE-EVAL EST PLAN CARE: CPT

## 2022-01-23 PROCEDURE — 21400001 HC TELEMETRY ROOM

## 2022-01-23 PROCEDURE — 63600175 PHARM REV CODE 636 W HCPCS: Performed by: ORTHOPAEDIC SURGERY

## 2022-01-23 PROCEDURE — 25000003 PHARM REV CODE 250: Performed by: HOSPITALIST

## 2022-01-23 PROCEDURE — 36415 COLL VENOUS BLD VENIPUNCTURE: CPT | Performed by: ORTHOPAEDIC SURGERY

## 2022-01-23 PROCEDURE — 63600175 PHARM REV CODE 636 W HCPCS: Performed by: HOSPITALIST

## 2022-01-23 RX ORDER — CLONIDINE HYDROCHLORIDE 0.1 MG/1
0.2 TABLET ORAL EVERY 4 HOURS PRN
Status: DISCONTINUED | OUTPATIENT
Start: 2022-01-23 | End: 2022-01-27 | Stop reason: HOSPADM

## 2022-01-23 RX ORDER — AMLODIPINE BESYLATE 5 MG/1
10 TABLET ORAL DAILY
Status: DISCONTINUED | OUTPATIENT
Start: 2022-01-23 | End: 2022-01-24

## 2022-01-23 RX ADMIN — OXYCODONE 5 MG: 5 TABLET ORAL at 05:01

## 2022-01-23 RX ADMIN — ATORVASTATIN CALCIUM 20 MG: 10 TABLET, FILM COATED ORAL at 10:01

## 2022-01-23 RX ADMIN — NEPHROCAP 1 CAPSULE: 1 CAP ORAL at 10:01

## 2022-01-23 RX ADMIN — CARVEDILOL 6.25 MG: 6.25 TABLET, FILM COATED ORAL at 10:01

## 2022-01-23 RX ADMIN — SEVELAMER CARBONATE 2400 MG: 800 TABLET, FILM COATED ORAL at 05:01

## 2022-01-23 RX ADMIN — PANTOPRAZOLE SODIUM 40 MG: 40 TABLET, DELAYED RELEASE ORAL at 10:01

## 2022-01-23 RX ADMIN — CEFEPIME HYDROCHLORIDE 1 G: 1 INJECTION, SOLUTION INTRAVENOUS at 05:01

## 2022-01-23 RX ADMIN — INSULIN ASPART 6 UNITS: 100 INJECTION, SOLUTION INTRAVENOUS; SUBCUTANEOUS at 05:01

## 2022-01-23 RX ADMIN — OXYCODONE 5 MG: 5 TABLET ORAL at 01:01

## 2022-01-23 RX ADMIN — SEVELAMER CARBONATE 2400 MG: 800 TABLET, FILM COATED ORAL at 01:01

## 2022-01-23 RX ADMIN — OXYCODONE 5 MG: 5 TABLET ORAL at 10:01

## 2022-01-23 RX ADMIN — HEPARIN SODIUM 5000 UNITS: 5000 INJECTION INTRAVENOUS; SUBCUTANEOUS at 09:01

## 2022-01-23 RX ADMIN — HEPARIN SODIUM 5000 UNITS: 5000 INJECTION INTRAVENOUS; SUBCUTANEOUS at 01:01

## 2022-01-23 RX ADMIN — QUETIAPINE FUMARATE 25 MG: 25 TABLET ORAL at 08:01

## 2022-01-23 RX ADMIN — AMLODIPINE BESYLATE 10 MG: 5 TABLET ORAL at 10:01

## 2022-01-23 NOTE — PT/OT/SLP EVAL
Physical Therapy Re-Evaluation    Patient Name:  Adryan Neff   MRN:  [id number]    Recommendations:     Discharge Recommendations:  nursing facility, skilled   Discharge Equipment Recommendations: walker, rolling,wheelchair   Barriers to discharge: lack of mobility secondary to new AKA    Assessment:     Adryan Neff is a 56 y.o. male admitted with a medical diagnosis of Gangrene of left foot.  He presents with the following impairments/functional limitations:  weakness,impaired endurance,impaired functional mobilty,impaired self care skills,gait instability,impaired balance,decreased coordination,decreased lower extremity function,decreased safety awareness,pain,decreased ROM,orthopedic precautions .    Patient had challenges with sitting posture and had difficulty with a posterior lean. He was confused today and had challenges following commands with reassessment today. He will benefit from continued therapy to focus on sitting balance and functional transfers with walker s/p AKA.  Bed mobility Mod A to sitting EOB  Mod A to SPV sitting balance  Challenges with knee ROM    Rehab Prognosis: Fair; patient would benefit from acute skilled PT services to address these deficits and reach maximum level of function.    Recent Surgery: Procedure(s) (LRB):  AMPUTATION, ABOVE KNEE (Left) 2 Days Post-Op    Plan:     During this hospitalization, patient to be seen 5 x/week to address the identified rehab impairments via gait training,therapeutic activities,therapeutic exercises,neuromuscular re-education,wheelchair management/training and progress toward the following goals:    · Plan of Care Expires:  02/22/22    Subjective     Chief Complaint: Pain (L) foot  Patient/Family Comments/goals: unstated; language barrier  Pain/Comfort:  Pain Rating 1: 0/10 (patient confused and unable to report)  Location - Side 1: Left  Location 1: leg  Pain Addressed 2: Pre-medicate for activity,Nurse notified    Patients cultural, spiritual,  Caodaism conflicts given the current situation: no    Living Environment:  PTA pt reports living with his son and daughter in an apartment.  Prior to admission, patients level of function was independent.  Equipment used at home: none.  DME owned (not currently used): none.  Upon discharge, patient will have assistance from family.    Objective:     Communicated with nurseRoberta prior to session.  Patient found supine with bed alarm,peripheral IV,Other (comments),telemetry  upon PT entry to room.    General Precautions: Standard, fall,diabetic   Orthopedic Precautions:LLE non weight bearing   Braces: N/A  Respiratory Status: Room air    Exams:  · RLE ROM: WFL  · RLE Strength: WFL  · LLE ROM: Deficits: limited hip extension and flexion - guarded due to pain  · LLE Strength: Deficits: unable to asses hip due to discomfort    Functional Mobility:  · Bed Mobility:  Supine to Sit: moderate assistance  · Sit to Supine: moderate assistance    Therapeutic Activities and Exercises:   Pt sat EOB, however reluctant - attempted knee flex/ext on right lower extremity but difficulty following commands    AM-PAC 6 CLICK MOBILITY  Total Score:8     Patient left supine with all lines intact and call button in reach.    GOALS:   Multidisciplinary Problems     Physical Therapy Goals        Problem: Physical Therapy Goal    Goal Priority Disciplines Outcome Goal Variances Interventions   Physical Therapy Goal     PT, PT/OT Ongoing, Progressing     Description: 1. Pt to be mod I with bed mobility.  2. Pt to transfer with SBA.  3. Pt to ambulate 15' w/RW TDWB LLE.                   History:     Past Medical History:   Diagnosis Date    CAD (coronary artery disease) 2016    CAD (non obstructive) 2016 [facility]    Diabetes mellitus type I     Diabetic retinopathy     ESRD on hemodialysis     on HD TThSat    Gangrene of left foot     Hypertension     Nuclear sclerosis of right eye 11/24/2021    PAD (peripheral artery disease)      Pulmonary HTN     TB lung, latent 04/2021       Past Surgical History:   Procedure Laterality Date    ABOVE-KNEE AMPUTATION Left 1/18/2022    Procedure: AMPUTATION, ABOVE KNEE;  Surgeon: Rusty Light MD;  Location: [facility] OR;  Service: Orthopedics;  Laterality: Left;    AV FISTULA PLACEMENT      CATARACT EXTRACTION Left     EYE SURGERY      TOE AMPUTATION Left 1/6/2022    Procedure: AMPUTATION, TOE;  Surgeon: Masoud Soni MD;  Location: [facility] OR;  Service: Vascular;  Laterality: Left;  Left first through fifth toes, possible open transmetatarsal amputation and all other indicated procedures       Time Tracking:     PT Received On: 01/23/22  PT Start Time: 1148     PT Stop Time: 1208  PT Total Time (min): 20 min     Billable Minutes: Evaluation 14      01/23/2022

## 2022-01-23 NOTE — PT/OT/SLP RE-EVAL
Occupational Therapy   Re-evaluation    Name: Adryan Neff  MRN: [id number]  Admitting Diagnosis:  Gangrene of left foot  Recent Surgery: Procedure(s) (LRB):  AMPUTATION, ABOVE KNEE (Left) 2 Days Post-Op    Recommendations:     Discharge Recommendations: nursing facility, skilled  Discharge Equipment Recommendations:   (TBD)  Barriers to discharge:   (not at PLOF; s/p L AKA with increased assist for ADLs and all aspects of functional mobility; confusion)    Assessment:     Adryan Neff is a 56 y.o. male with a medical diagnosis of Gangrene of left foot. Performance deficits affecting function are weakness,impaired endurance,impaired balance,decreased upper extremity function,decreased ROM,decreased lower extremity function,decreased safety awareness,impaired cognition,pain,impaired skin,orthopedic precautions,impaired self care skills,impaired functional mobilty,decreased coordination,edema.      Re-eval completed s/p L AKA. Goals adjusted and pt remains appropriate for SNF at d/c in order to increase safety and independence with ADLs and all aspects of functional mobility    Rehab Prognosis:  Fair +; patient would benefit from acute skilled OT services to address these deficits and reach maximum level of function.       Plan:     Patient to be seen 5 x/week to address the above listed problems via self-care/home management,therapeutic activities,therapeutic exercises  · Plan of Care Expires: 02/08/22  · Plan of Care Reviewed with: patient    Subjective     Chief Complaint: confusion, moaned and appeared in pain with supine>sit   Patient/Family stated goals: pleasantly confused but able to redirect at times to engage with simple activities; calm     Pain/Comfort:  · Pain Rating 1:  (pt moaned in pain with supine<>sit but unable to report pain likely 2* confusion)  · Pain Addressed 1: Pre-medicate for activity,Reposition,Distraction,Cessation of Activity    Objective:     Communicated with: nurseJanis, prior to  session.  Patient found HOB elevated with: bed alarm,peripheral IV,telemetry (avasys, Suture-lock device placed over AVG access site LUE) upon OT entry to room.    General Precautions: Standard, fall,diabetic (ESRD)   Orthopedic Precautions:LLE non weight bearing (L AKA)   Braces: N/A  Respiratory Status: Room air    Occupational Performance:    Bed Mobility:  Pt was able to sustain grasp onto railings with total assist for hand placement for bed mobility; however, pt was limited with active participation likely 2* pain and increased confusion   · Patient completed Scooting in supine to HOB with maximal assistance and 2 persons  · Patient completed Supine to Sit with total assistance, 2 persons and HOB elevated  · Patient completed Sit to Supine with total assistance and 2 persons    Functional Mobility/Transfers:  · Pt unable to follow commands to attempt scooting laterally at EOB.     Activities of Daily Living:  · Feeding:  minimum assistance for dynamic sit balance and LUE supported and hand-held assist for cup in RUE to take sips of water seated EOB  · Upper Body Dressing: maximal assistance to adjust gown at bed level     Cognitive/Visual Perceptual:  Cognitive/Psychosocial Skills:     -       Oriented to: pt unable to answer questions, but responded to being called Mr. Neff   -       Follows Commands/attention:Inattentive, Easily distracted and followed some one step commands with max prompting  -       Communication: fluent, some accent (from [place])  -       Memory: Impaired STM, Impaired LTM and Poor immediate recall  -       Safety awareness/insight to disability: impaired   -       Mood/Affect/Coping skills/emotional control: confused, but pleasant; frequently rambling confused speech  Visual/Perceptual:      -unable to assess 2* confusion      Physical Exam:  Balance:    -       static sit: initially posterior lean with MAX A but able to progress to CGA/MIN A  Postural examination/scapula alignment:     -       L AKA; increased resting L hip flexion in the bed and at EOB  Skin integrity: dressing intact to L residual limb  Edema:  no BUE edema noted  Sensation:    -       Intact  light/touch BUE  Dominant hand:    -       Right  Upper Extremity Range of Motion:     -       Right Upper Extremity: WFL  -       Left Upper Extremity: WFL except shoulder flexion AROM less than full ROM, but greater than partial ROM  Upper Extremity Strength:    -       R/L Upper Extremity: pt unable to follow commands for MMT assessment, but observed to be WFL through functional observation   Strength:    -       Right Upper Extremity: WFL  -       Left Upper Extremity: WFL  Fine Motor Coordination:    -       Impaired  Left hand, manipulation of objects   and Right hand, manipulation of objects 2* confusion  Gross motor coordination:   impaired 2* deconditioning, weakness, and confusion    AMPAC 6 Click:  AMPAC Total Score: 11    Treatment & Education:  · Education:  · Pt re-educated on OT role; re-oriented to situation.   · Importance of EOB activity with therapy  · Safety with sit balance and bed mobility   · White board updated: level 1 progressive mobility   · Functional reach with BUE seated EOB with trunk anterior lean with max cueing x3 reps with CGA/MIN A.  · Self-care tasks/functional mobility completed- assistance level noted above   · All questions/concerns answered within OT scope of practice       Patient left HOB elevated with all lines intact, call button in reach, bed alarm on, nurse, Janis, notified, avasys present and tv on; door left open     GOALS:   Multidisciplinary Problems     Occupational Therapy Goals        Problem: Occupational Therapy Goal    Goal Priority Disciplines Outcome Interventions   Occupational Therapy Goal     OT, PT/OT Ongoing, Progressing    Description: Goals to be met by: 2/8/2022    Patient will increase functional independence with ADLs by performing:    Feeding with modified  independence  Upper body dressing with supervision  Lower Body dressing with minimal assistance  Supine <> sit with minimal assistance   Toileting at bed level with urinal with modified independence  Pt will sit EOB for 10 min with supervision     Transfers to be assessed pending further pt participation                      History:     Past Medical History:   Diagnosis Date    CAD (coronary artery disease) 2016    CAD (non obstructive) 2016 [facility]    Diabetes mellitus type I     Diabetic retinopathy     ESRD on hemodialysis     on HD TThSat    Gangrene of left foot     Hypertension     Nuclear sclerosis of right eye 11/24/2021    PAD (peripheral artery disease)     Pulmonary HTN     TB lung, latent 04/2021       Past Surgical History:   Procedure Laterality Date    ABOVE-KNEE AMPUTATION Left 1/18/2022    Procedure: AMPUTATION, ABOVE KNEE;  Surgeon: Rusty Light MD;  Location: [facility] OR;  Service: Orthopedics;  Laterality: Left;    AV FISTULA PLACEMENT      CATARACT EXTRACTION Left     EYE SURGERY      TOE AMPUTATION Left 1/6/2022    Procedure: AMPUTATION, TOE;  Surgeon: Masoud Soni MD;  Location: [facility] OR;  Service: Vascular;  Laterality: Left;  Left first through fifth toes, possible open transmetatarsal amputation and all other indicated procedures       Time Tracking:     OT Date of Treatment: 01/23/22  OT Start Time: 1148  OT Stop Time: 1208  OT Total Time (min): 20 min    Billable Minutes: re-evaluation 20 min (co-eval with PT)    1/23/2022

## 2022-01-23 NOTE — PROGRESS NOTES
[facility], [place], operated by [facility]  Progress Note    Patient Name: Adryan Neff  MRN: [id number]  Patient Class: IP- Inpatient   Admission Date: 1/5/2022  Length of Stay: 18 days  Attending Physician: Dottie Kaufman MD  Primary Care Provider: Soren Rice MD        Subjective:     Principal Problem:Gangrene of left foot        HPI:  This is a 56 y.o.male patient, with a PMHx of ESRD on hemodialysis, CAD, HTN, and DM, presenting to the ED for further evaluation of left foot pain and black discoloration that began 3 days ago. Patient states these symptoms have been ongoing for the last month. He reports being referred to a specialist but denies ever following up. He has been putting some otc medication on the foot that he believes has turned his foot more black. No trauma or injury. Patient reports associated chills. Patient states applying a creme to the foot and noticed his discoloration worsened. Patient reports he was last dialyzed yesterday. Patient denies any fever, shortness of breath, chest pain, neck pain, back pain, abdominal pain, rash, headaches, congestion, rhinorrhea, cough, sore throat, ear pain, eye pain, blurred vision, nausea, vomiting, diarrhea, dysuria, or any other associated symptoms.    Per chart review, he had an arterial u/s 12/15.   Impression:   1. High-grade severe stenosis right popliteal artery with occlusion anterior tibial artery which is reconstituted at level of DPA via collaterals.  2. 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    Cardiology has been planning to see him for revascularization options. Last podiatry visit 12/13 with Stable dry gangrene L 2nd toe and medial L 5th toe. Dorsalis pedis and posterior tibial pulses are diminished Bilaterally. Toes are cool to touch. Feet are warm proximally.There is decreased digital hair. Skin is atrophic, slightly hyperpigmented, and mildly edematous     In the ED, he was found to have worsened gangrene and  a cool extremity. Vascular surgery was consulted. He was also COVID +. Says he got a booster shot in November sometime.      Overview/Hospital Course:  Patient admitted to the hospital for eval and treatment of L foot gangrene.  ID, Ortho and Vasc consulted. Nephrology was also consulted for chronic dialysis. Patient started on broad spectrum antibiotics, and had several toes on L foot amputated by Vascular surgery.     The patient's AVF had recurrent malfunction. IR attempts at intervention without success. THDC placed for dialysis.    Pt became increasingly delirious and combative. He refused angiogram with Vascular. Psychiatry evaluated, felt that he was not able to make his own medical decisions. At this point Dr. Soni felt the patient would be a poor candidate for vascular intervention, recommended AKA. ID in agreement. Ortho consulted for AKA, daughter/POA in agreement,   S/P left AKA on 1.21.22,  Had some EKG changes with no chest pain,cardiology cleared patient for procedure.  CC today is leg pain.      Interval History: No events overnight.CC today is  Complains of leg pain  CC today is leg pain.  Review of Systems   Constitutional: Negative for chills and fever.   Respiratory: Negative for cough and shortness of breath.    Cardiovascular: Negative for chest pain and leg swelling.   Gastrointestinal: Negative for abdominal distention and abdominal pain.     Objective:     Vital Signs (Most Recent):  Temp: 98.1 °F (36.7 °C) (01/23/22 0543)  Pulse: 97 (01/23/22 0841)  Resp: 14 (01/23/22 0543)  BP: (!) 167/72 (01/23/22 0841)  SpO2: 96 % (01/22/22 1959) Vital Signs (24h Range):  Temp:  [98.1 °F (36.7 °C)-98.3 °F (36.8 °C)] 98.1 °F (36.7 °C)  Pulse:  [] 97  Resp:  [14-19] 14  SpO2:  [96 %] 96 %  BP: ()/(53-72) 167/72     Weight: 66.5 kg (146 lb 9.7 oz)  Body mass index is 21.04 kg/m².    Intake/Output Summary (Last 24 hours) at 1/23/2022 0857  Last data filed at 1/22/2022 2045  Gross per 24 hour    Intake 620 ml   Output 2220 ml   Net -1600 ml      Physical Exam  Constitutional:       General: He is not in acute distress.     Appearance: He is ill-appearing. He is not toxic-appearing or diaphoretic.   Cardiovascular:      Rate and Rhythm: Normal rate and regular rhythm.      Heart sounds: No murmur heard.  No gallop.    Pulmonary:      Effort: Pulmonary effort is normal. No respiratory distress.      Breath sounds: Normal breath sounds. No wheezing.   Abdominal:      General: Bowel sounds are normal. There is no distension.      Palpations: Abdomen is soft.      Tenderness: There is no abdominal tenderness.   Skin:     Comments: L foot s/p multiple amputated digits, necrotic tissue at site of amputation         Significant Labs: All pertinent labs within the past 24 hours have been reviewed.    Significant Imaging: I have reviewed all pertinent imaging results/findings within the past 24 hours.      Assessment/Plan:      * Gangrene of left foot  Presents with gangrenous L foot in the setting of severe PAD. Underwent left 2-5 metatarsal amputations w/ washout and debridement w/ Vascular surgery. Initial plan for angiogram w/ intervention, however pt refused the procedure. Given the low initial probability of wound healing and infectious clearance even with vascular intervention and lack of patient ability to adhere to medical treatment, Dr. Soni recommended AKA. Ortho consulted.   - family in agreement with AKA,date of procedure planned to today..   - antibiotics per ID.    Had some EKG changes with no chest pain,cardiology cleared patient for procedure.  S/P left AKA on 1.21.22,    Encephalopathy, metabolic  Waxing and waning confusion likely delirium secondary to ischemic foot w/ possible superimposed infection and uremia from missing dialysis. Psychiatry deemed patient not to have capacity to make medical decisions. Overall orientation improved after starting low dose seroquel.  - psychiatry consulted,  appreciate recs  - tx of underlying issues as noted elsewhere  - continue seroquel  - delirium precautions  Improved.    COVID-19 in immunocompromised patient  HD patient + for COVID 19. Not hypoxic or symptomatic. Supportive care prn.       ESRD on hemodialysis  Nephrology consulted to resume routine HD. Fistula malfunctioning despite IR attempts at declot, THDC placed for alternative access  - nephrology following  - needs vascular vs IR fix of fistula    NICM (nonischemic cardiomyopathy)  On medical treatment.      Chronic pulmonary heart disease  On medical treatment.      Pulmonary HTN  On medical treatment.      Hyperkalemia  Correction with HD.resolved with HD     CAD (coronary artery disease) - non-obstructive from [facility] in 2016  On medical treatment.      Chronic diastolic heart failure  December 2021 echo reviewed. Stable.       Benign hypertension with chronic kidney disease, stage V  On medical treatment.      Essential (primary) hypertension  Titrate to goal. Pt intermittently refuses po medications, requiring IV meds      Controlled type 2 diabetes mellitus with chronic kidney disease on chronic dialysis, with long-term current use of insulin  Pt refused insulin, has intermittent PO intake.  - sliding scale insulin for now      VTE Risk Mitigation (From admission, onward)         Ordered     heparin (porcine) injection 5,000 Units  Every 8 hours         01/22/22 0750     heparin (porcine) injection 5,000 Units  As needed (PRN)         01/14/22 1716     IP VTE HIGH RISK PATIENT  Once         01/05/22 1404     Place sequential compression device  Until discontinued         01/05/22 1404                Discharge Planning   ENIO:      Code Status: Full Code   Is the patient medically ready for discharge?:     Reason for patient still in hospital (select all that apply): Patient trending condition  Discharge Plan A: Skilled Nursing Facility                  Dottie Kaufman MD  Department of Hospital  Medicine   [facility] - Telemetry [place]

## 2022-01-23 NOTE — PLAN OF CARE
Problem: Occupational Therapy Goal  Goal: Occupational Therapy Goal  Description: Goals to be met by: 2/8/2022    Patient will increase functional independence with ADLs by performing:    Feeding with modified independence  Upper body dressing with supervision  Lower Body dressing with minimal assistance  Supine <> sit with minimal assistance   Toileting at bed level with urinal with modified independence  Pt will sit EOB for 10 min with supervision     Transfers to be assessed pending further pt participation     Outcome: Ongoing, Progressing     Re-eval completed s/p L AKA. Goals adjusted and pt remains appropriate for SNF at d/c in order to increase safety and independence with ADLs and all aspects of functional mobility.

## 2022-01-23 NOTE — PROGRESS NOTES
Orthopedic Surgery Progress Note    Subjective:  S/p L AKA on 1/21.     Pain is controlled.    Objective:  Vital signs in last 24 hours:  Temp:  [98.1 °F (36.7 °C)-98.3 °F (36.8 °C)] 98.1 °F (36.7 °C)  Pulse:  [] 97  Resp:  [14-19] 14  SpO2:  [96 %] 96 %  BP: ()/(53-72) 167/72    Physical Exam:   NAD, AAOx3, nonlabored respirations. Afebrile.  LLE dressings clean, dry, intact. Mild tenderness as appropriate.    Data Review  CBC:   Lab Results   Component Value Date    WBC 19.45 (H) 01/23/2022    RBC 3.08 (L) 01/23/2022    HGB 8.5 (L) 01/23/2022    HCT 28.4 (L) 01/23/2022     01/23/2022       Assessment/Plan: s/p L AKA.   - Keep dressings in place for now. Will plan to change prior to discharge.   - Continue abx per ID/primary.    Claudette Andrews MD  Orthopedics  [facility]

## 2022-01-23 NOTE — SUBJECTIVE & OBJECTIVE
Interval History: No events overnight.CC today is  Complains of leg pain  CC today is leg pain.  Review of Systems   Constitutional: Negative for chills and fever.   Respiratory: Negative for cough and shortness of breath.    Cardiovascular: Negative for chest pain and leg swelling.   Gastrointestinal: Negative for abdominal distention and abdominal pain.     Objective:     Vital Signs (Most Recent):  Temp: 98.1 °F (36.7 °C) (01/23/22 0543)  Pulse: 97 (01/23/22 0841)  Resp: 14 (01/23/22 0543)  BP: (!) 167/72 (01/23/22 0841)  SpO2: 96 % (01/22/22 1959) Vital Signs (24h Range):  Temp:  [98.1 °F (36.7 °C)-98.3 °F (36.8 °C)] 98.1 °F (36.7 °C)  Pulse:  [] 97  Resp:  [14-19] 14  SpO2:  [96 %] 96 %  BP: ()/(53-72) 167/72     Weight: 66.5 kg (146 lb 9.7 oz)  Body mass index is 21.04 kg/m².    Intake/Output Summary (Last 24 hours) at 1/23/2022 0857  Last data filed at 1/22/2022 2045  Gross per 24 hour   Intake 620 ml   Output 2220 ml   Net -1600 ml      Physical Exam  Constitutional:       General: He is not in acute distress.     Appearance: He is ill-appearing. He is not toxic-appearing or diaphoretic.   Cardiovascular:      Rate and Rhythm: Normal rate and regular rhythm.      Heart sounds: No murmur heard.  No gallop.    Pulmonary:      Effort: Pulmonary effort is normal. No respiratory distress.      Breath sounds: Normal breath sounds. No wheezing.   Abdominal:      General: Bowel sounds are normal. There is no distension.      Palpations: Abdomen is soft.      Tenderness: There is no abdominal tenderness.   Skin:     Comments: L foot s/p multiple amputated digits, necrotic tissue at site of amputation         Significant Labs: All pertinent labs within the past 24 hours have been reviewed.    Significant Imaging: I have reviewed all pertinent imaging results/findings within the past 24 hours.

## 2022-01-23 NOTE — PLAN OF CARE
Problem: Physical Therapy Goal  Goal: Physical Therapy Goal  Description: 1. Pt to be mod I with bed mobility.  2. Pt to transfer with SBA with RW  3. Pt to ambulate 15' w/RW post operatively after AKA.  Outcome: Ongoing, Progressing     Pt able to sit EOB but had strong posterior lean present with 2 HH needed to pull trunk forward. Attempted to scoot but patient limited by confusion and difficulty following commands. Co treat needed at this time.   Right lower extremity remains weak, once confusion lifts, will benefit from right lower extremity strengthening ex.

## 2022-01-23 NOTE — PLAN OF CARE
Problem: Fall Injury Risk  Goal: Absence of Fall and Fall-Related Injury  Outcome: Ongoing, Progressing     Problem: Impaired Wound Healing  Goal: Optimal Wound Healing  Outcome: Ongoing, Progressing     Problem: Infection  Goal: Absence of Infection Signs and Symptoms  Outcome: Ongoing, Progressing   POD 1 from AKA. Dialysis completed today per orders. Pt more confused today, refusing some care and meds.

## 2022-01-24 LAB
GLUCOSE SERPL-MCNC: 303 MG/DL (ref 70–110)
POCT GLUCOSE: 133 MG/DL (ref 70–110)
POCT GLUCOSE: 163 MG/DL (ref 70–110)
POCT GLUCOSE: 217 MG/DL (ref 70–110)
POCT GLUCOSE: >500 MG/DL (ref 70–110)
SARS-COV-2 RDRP RESP QL NAA+PROBE: NEGATIVE

## 2022-01-24 PROCEDURE — 25000003 PHARM REV CODE 250: Performed by: ORTHOPAEDIC SURGERY

## 2022-01-24 PROCEDURE — 63600175 PHARM REV CODE 636 W HCPCS: Performed by: HOSPITALIST

## 2022-01-24 PROCEDURE — 63600175 PHARM REV CODE 636 W HCPCS: Performed by: ORTHOPAEDIC SURGERY

## 2022-01-24 PROCEDURE — 36415 COLL VENOUS BLD VENIPUNCTURE: CPT | Performed by: HOSPITALIST

## 2022-01-24 PROCEDURE — 21400001 HC TELEMETRY ROOM

## 2022-01-24 PROCEDURE — 82947 ASSAY GLUCOSE BLOOD QUANT: CPT | Performed by: HOSPITALIST

## 2022-01-24 PROCEDURE — U0002 COVID-19 LAB TEST NON-CDC: HCPCS | Performed by: HOSPITALIST

## 2022-01-24 RX ORDER — ACETAMINOPHEN 325 MG/1
650 TABLET ORAL EVERY 6 HOURS PRN
Status: DISCONTINUED | OUTPATIENT
Start: 2022-01-24 | End: 2022-01-27 | Stop reason: HOSPADM

## 2022-01-24 RX ADMIN — HEPARIN SODIUM 5000 UNITS: 5000 INJECTION INTRAVENOUS; SUBCUTANEOUS at 09:01

## 2022-01-24 RX ADMIN — HEPARIN SODIUM 5000 UNITS: 5000 INJECTION INTRAVENOUS; SUBCUTANEOUS at 06:01

## 2022-01-24 RX ADMIN — ATORVASTATIN CALCIUM 20 MG: 10 TABLET, FILM COATED ORAL at 11:01

## 2022-01-24 RX ADMIN — NEPHROCAP 1 CAPSULE: 1 CAP ORAL at 12:01

## 2022-01-24 RX ADMIN — PANTOPRAZOLE SODIUM 40 MG: 40 TABLET, DELAYED RELEASE ORAL at 11:01

## 2022-01-24 RX ADMIN — CARVEDILOL 6.25 MG: 6.25 TABLET, FILM COATED ORAL at 09:01

## 2022-01-24 RX ADMIN — ACETAMINOPHEN 650 MG: 325 TABLET ORAL at 11:01

## 2022-01-24 RX ADMIN — SEVELAMER CARBONATE 2400 MG: 800 TABLET, FILM COATED ORAL at 06:01

## 2022-01-24 RX ADMIN — INSULIN ASPART 8 UNITS: 100 INJECTION, SOLUTION INTRAVENOUS; SUBCUTANEOUS at 12:01

## 2022-01-24 RX ADMIN — SEVELAMER CARBONATE 2400 MG: 800 TABLET, FILM COATED ORAL at 11:01

## 2022-01-24 RX ADMIN — OXYCODONE 5 MG: 5 TABLET ORAL at 04:01

## 2022-01-24 RX ADMIN — QUETIAPINE FUMARATE 25 MG: 25 TABLET ORAL at 09:01

## 2022-01-24 RX ADMIN — HEPARIN SODIUM 5000 UNITS: 5000 INJECTION INTRAVENOUS; SUBCUTANEOUS at 05:01

## 2022-01-24 NOTE — SUBJECTIVE & OBJECTIVE
Interval history: NAEO. S/p AKA on 1/21. Pt denies complaints. Afebrile. No cultures sent. Path pending    Past Medical History:   Diagnosis Date    CAD (coronary artery disease) 2016    CAD (non obstructive) 2016 [facility]    Diabetes mellitus type I     Diabetic retinopathy     ESRD on hemodialysis     on HD TThSat    Gangrene of left foot     Hypertension     Nuclear sclerosis of right eye 11/24/2021    PAD (peripheral artery disease)     Pulmonary HTN     TB lung, latent 04/2021         Objective:     Vital Signs (Most Recent):  Temp: 98.6 °F (37 °C) (01/24/22 0749)  Pulse: 86 (01/24/22 0749)  Resp: 19 (01/24/22 0749)  BP: (!) 108/59 (01/24/22 0749)  SpO2: 98 % (01/24/22 0749) Vital Signs (24h Range):  Temp:  [97.1 °F (36.2 °C)-98.6 °F (37 °C)] 98.6 °F (37 °C)  Pulse:  [83-96] 86  Resp:  [14-19] 19  SpO2:  [96 %-100 %] 98 %  BP: (108-180)/(56-79) 108/59     Weight: 66.5 kg (146 lb 9.7 oz)  Body mass index is 21.04 kg/m².    Estimated Creatinine Clearance: 9.7 mL/min (A) (based on SCr of 7.9 mg/dL (H)).    Physical Exam  Vitals and nursing note reviewed.   Constitutional:       General: He is not in acute distress.     Appearance: He is not ill-appearing, toxic-appearing or diaphoretic.   HENT:      Head: Normocephalic and atraumatic.      Right Ear: External ear normal.      Left Ear: External ear normal.   Eyes:      Extraocular Movements: Extraocular movements intact.      Conjunctiva/sclera: Conjunctivae normal.      Pupils: Pupils are equal, round, and reactive to light.   Musculoskeletal:         General: Deformity present.   Neurological:      General: No focal deficit present.      Mental Status: He is alert and oriented to person, place, and time.   Psychiatric:         Mood and Affect: Mood normal.         Behavior: Behavior normal.         Thought Content: Thought content normal.         Judgment: Judgment normal.         Significant Labs:   Blood Culture:   Recent Labs   Lab 11/11/21  1155  01/05/22  1308 01/05/22  1309 01/09/22  1216 01/09/22  1217   LABBLOO No growth after 5 days. No Growth after 4 days.  No Growth after 4 days.  No Growth after 4 days.  No Growth after 4 days.      CBC:   Recent Labs   Lab 01/23/22  0532   WBC 19.45*   HGB 8.5*   HCT 28.4*        CMP:   Recent Labs   Lab 01/23/22  0532   *   K 4.5   CL 93*   CO2 22*   *   BUN 34*   CREATININE 7.9*   CALCIUM 9.3   PROT 9.3*   ALBUMIN 2.2*   BILITOT 0.4   ALKPHOS 70   AST 27   ALT <5*   ANIONGAP 18*   EGFRNONAA 7*     Urine Culture: No results for input(s): LABURIN in the last 4320 hours.  Wound Culture:   Recent Labs   Lab 01/05/22  1315 01/06/22  1100   LABAERO CITROBACTER FREUNDII  Many  *  PSEUDOMONAS AERUGINOSA  Moderate  * CITROBACTER KOSERI  Many  For susceptibility see order # [id number]  *  CITROBACTER FREUNDII  Many  For susceptibility see order # [id number]  *  PSEUDOMONAS AERUGINOSA*  CITROBACTER KOSERI  Many  *  CITROBACTER FREUNDII  Few  *       Significant Imaging: None    Past Surgical History:   Procedure Laterality Date    ABOVE-KNEE AMPUTATION Left 1/18/2022    Procedure: AMPUTATION, ABOVE KNEE;  Surgeon: Rusty Light MD;  Location: [facility] OR;  Service: Orthopedics;  Laterality: Left;    AV FISTULA PLACEMENT      CATARACT EXTRACTION Left     EYE SURGERY      TOE AMPUTATION Left 1/6/2022    Procedure: AMPUTATION, TOE;  Surgeon: Masoud Soni MD;  Location: [facility] OR;  Service: Vascular;  Laterality: Left;  Left first through fifth toes, possible open transmetatarsal amputation and all other indicated procedures       Review of patient's allergies indicates:  No Known Allergies    Medications:  Medications Prior to Admission   Medication Sig    amoxicillin-clavulanate 500-125mg (AUGMENTIN) 500-125 mg Tab Take 1 tablet (500 mg total) by mouth once daily.    atorvastatin (LIPITOR) 20 MG tablet Take 1 tablet (20 mg total) by mouth once daily. (Patient taking differently: Take 20 mg  by mouth once daily.)    blood sugar diagnostic Strp 1 each by Misc.(Non-Drug; Combo Route) route 2 hours after meals and at bedtime.    doxercalciferoL (HECTOROL) 0.5 MCG capsule 2 mcg.    heparin sodium,porcine (HEPARIN, PORCINE,) 1,000 unit/mL Soln injection Heparin Sodium (Porcine) 1,000 Units/mL Systemic    hydrALAZINE (APRESOLINE) 100 MG tablet Take 1 tablet (100 mg total) by mouth every 8 (eight) hours. (Patient taking differently: Take 50 mg by mouth every 8 (eight) hours. )    insulin (BASAGLAR KWIKPEN U-100 INSULIN) glargine 100 units/mL (3mL) SubQ pen Inject 15 Units into the skin every evening.    isoniazid (NYDRAZID) 300 MG Tab Take 1 tablet (300 mg total) by mouth once daily.    lisinopriL (PRINIVIL,ZESTRIL) 2.5 MG tablet Take 2.5 mg by mouth once daily.    lisinopriL (PRINIVIL,ZESTRIL) 5 MG tablet Take 5 mg by mouth once daily.    methoxy peg-epoetin beta (MIRCERA INJ) 75 mcg.    metoprolol succinate (TOPROL-XL) 25 MG 24 hr tablet Take 1 tablet (25 mg total) by mouth once daily.    omeprazole (PRILOSEC) 20 MG capsule TAKE 2 CAPSULES(40 MG) BY MOUTH EVERY DAY (Patient taking differently: Take 20 mg by mouth once daily.)    penicillin v potassium (VEETID) 500 MG tablet Take 500 mg by mouth 4 (four) times daily.    RENVELA 800 mg Tab Take 800 mg by mouth 3 (three) times daily with meals.    traMADoL (ULTRAM) 50 mg tablet Take 1 tablet (50 mg total) by mouth every 8 (eight) hours as needed for Pain.    traZODone (DESYREL) 50 MG tablet Take 50 mg by mouth every evening.     Antibiotics (From admission, onward)            Start     Stop Route Frequency Ordered    01/10/22 1700  cefepime in dextrose 5 % 1 gram/50 mL IVPB 1 g         -- IV Every 24 hours (non-standard times) 01/10/22 1102    01/06/22 0945  mupirocin 2 % ointment         01/11 0859 Nasl 2 times daily 01/06/22 0836        Antifungals (From admission, onward)            None        Antivirals (From admission, onward)    None            Immunization History   Administered Date(s) Administered    COVID-19 Vaccine 01/15/2021    COVID-19, MRNA, LN-S, PF (MODERNA FULL 0.5 ML DOSE) 01/15/2021, 02/11/2021    Hepatitis B, Adult 07/10/2017, 08/14/2017, 09/11/2017, 03/30/2021, 04/22/2021    Influenza 09/29/2020    Influenza - Quadrivalent - PF *Preferred* (6 months and older) 01/16/2008, 03/30/2016, 12/07/2016    PPD Test 04/04/2017, 01/11/2022    Pneumococcal Conjugate - 13 Valent 11/27/2017    Pneumococcal Polysaccharide - 23 Valent 03/30/2016, 10/24/2018    Tdap 03/30/2016       Family History     Problem Relation (Age of Onset)    No Known Problems Mother, Father, Sister, Brother, Daughter, Daughter, Daughter, Brother, Maternal Aunt, Maternal Uncle, Paternal Aunt, Paternal Uncle, Maternal Grandmother, Maternal Grandfather, Paternal Grandmother, Paternal Grandfather        Social History     Socioeconomic History    Marital status: Single    Number of children: 5   Tobacco Use    Smoking status: Never Smoker    Smokeless tobacco: Never Used   Substance and Sexual Activity    Alcohol use: No    Drug use: No    Sexual activity: Yes     Partners: Female   Social History Narrative    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   Gastrointestinal: Negative for abdominal distention and abdominal pain.   Genitourinary: Negative for dysuria and urgency.   Musculoskeletal: Negative for arthralgias and back pain.   Skin: Positive for wound.   Neurological: Negative for headaches.   Psychiatric/Behavioral: Negative for confusion.     Objective:     Vital Signs (Most Recent):  Temp: 98.6 °F (37 °C) (01/24/22 0749)  Pulse: 86 (01/24/22 0749)  Resp: 19 (01/24/22 0749)  BP: (!) 108/59 (01/24/22 0749)  SpO2: 98 % (01/24/22 0749) Vital Signs (24h Range):  Temp:  [97.1 °F (36.2 °C)-98.6 °F (37 °C)] 98.6 °F (37 °C)  Pulse:  [83-96] 86  Resp:  [14-19] 19  SpO2:  [96 %-100 %] 98 %  BP: (108-180)/(56-79)  108/59     Weight: 66.5 kg (146 lb 9.7 oz)  Body mass index is 21.04 kg/m².    Estimated Creatinine Clearance: 9.7 mL/min (A) (based on SCr of 7.9 mg/dL (H)).    Physical Exam      Significant Labs: All pertinent labs within the past 24 hours have been reviewed.    Significant Imaging: I have reviewed all pertinent imaging results/findings within the past 24 hours.

## 2022-01-24 NOTE — PROGRESS NOTES
Renal Progress Note    Date of Admission:  1/5/2022 11:39 AM    Length of Stay: 19  Days    Subjective: n/a    Objective:    Current Facility-Administered Medications   Medication    0.9%  NaCl infusion (for blood administration)    0.9%  NaCl infusion    0.9%  NaCl infusion    0.9%  NaCl infusion    acetaminophen tablet 650 mg    acetaminophen tablet 650 mg    amLODIPine tablet 10 mg    atorvastatin tablet 20 mg    carvediloL tablet 6.25 mg    cefepime in dextrose 5 % 1 gram/50 mL IVPB 1 g    cloNIDine tablet 0.2 mg    dextrose 50% injection 12.5 g    dextrose 50% injection 25 g    epoetin paola-epbx injection 10,000 Units    glucagon (human recombinant) injection 1 mg    glucose chewable tablet 16 g    glucose chewable tablet 24 g    heparin (porcine) injection 5,000 Units    heparin (porcine) injection 5,000 Units    HYDROmorphone (PF) injection 0.5 mg    influenza (QUADRIVALENT PF) vaccine 0.5 mL    insulin aspart U-100 pen 1-10 Units    magnesium oxide tablet 800 mg    magnesium oxide tablet 800 mg    naloxone 0.4 mg/mL injection 0.02 mg    OLANZapine injection 5 mg    olanzapine zydis disintegrating tablet 5 mg    ondansetron injection 8 mg    oxyCODONE immediate release tablet 5 mg    pantoprazole EC tablet 40 mg    QUEtiapine tablet 25 mg    sevelamer carbonate tablet 2,400 mg    sodium chloride 0.9% bolus 250 mL    sodium chloride 0.9% bolus 250 mL    sodium chloride 0.9% flush 10 mL    vitamin renal formula (B-complex-vitamin c-folic acid) 1 mg per capsule 1 capsule       Vitals:    01/23/22 2025 01/24/22 0026 01/24/22 0510 01/24/22 0749   BP: (!) 119/56 130/63 133/68 (!) 108/59   BP Location: Right arm Right arm Right arm    Patient Position: Lying Lying Lying    Pulse: 93 88 83 86   Resp: 14 17 14 19   Temp: 97.8 °F (36.6 °C) 97.9 °F (36.6 °C) 97.5 °F (36.4 °C) 98.6 °F (37 °C)   TempSrc: Axillary Oral Axillary    SpO2: 96% 100% 98% 98%   Weight:        Height:           I/O last 3 completed shifts:  In: 120 [P.O.:120]  Out: -   No intake/output data recorded.      Physical Exam:     NAD  Seen during Dialysis  Asleep    Laboratories:    No results for input(s): WBC, RBC, HGB, HCT, PLT, MCV, MCH, MCHC in the last 24 hours.    No results for input(s): GLUCOSE, CALCIUM, PROT, NA, K, CO2, CL, BUN, CREATININE, ALKPHOS, ALT, AST, BILITOT in the last 24 hours.    Invalid input(s):  ALBUMIN    No results for input(s): COLORU, CLARITYU, SPECGRAV, PHUR, PROTEINUA, GLUCOSEU, BLOODU, WBCU, RBCU, BACTERIA, MUCUS in the last 24 hours.    Invalid input(s):  BILIRUBINCON    Microbiology Results (last 7 days)     Procedure Component Value Units Date/Time    Fungus culture [[id number]] Collected: 01/06/22 1100    Order Status: Completed Specimen: Wound from Foot, Left Updated: 01/17/22 1409     Fungus (Mycology) Culture No fungal growth to date    Fungus culture [[id number]] Collected: 01/06/22 1100    Order Status: Completed Specimen: Wound from Foot, Left Updated: 01/17/22 1409     Fungus (Mycology) Culture No fungal growth to date    Narrative:      Left foot metatarsal  Proximal margin - left foot            Diagnostic Tests: n/a        Assessment:    57 y/o male with ESRD on HD (outside Nephrologist) admitted with:    - L-foot gangrene s/p amputation, debridement and washout  - Citrobacter + Pseudomonas wound infection  - Osteo  - LE PAD  - + Covid test  - Recurrent AVF malfunction s/p re-do Fistulogram 1/12/22 (HD nurse unable to needle access 1/11/22 due to swelling-pain and again poor blood flows on 1/12 (pte. Needs new access)  - Uncontrolled HTN  - Hyperkalemia IMPROVING  - Severe Hyperphosphatemia  - IDDM2  - Chronic diastolic HF  - Hypoalbuminemia  - Non-compliant behavior: refusing procedures, labs. V/S etc. While in Hospital, Pte. Already seen by Psychiatry.          Plan:        - Dialysis today and MWF  - Epogen 3 x week  - Antibiotics per ID  - Renal ADA diet low  PO4-  - Adjust oral PO4- binders as needed  - Adjust opiate dose for ESRD  - Podiatry and Vascular following  - Other problems per admitting    Will f/u on HD days.

## 2022-01-24 NOTE — PLAN OF CARE
Problem: Fall Injury Risk  Goal: Absence of Fall and Fall-Related Injury  Outcome: Ongoing, Progressing     Problem: Diabetes Comorbidity  Goal: Blood Glucose Level Within Targeted Range  Outcome: Ongoing, Progressing     Problem: Impaired Wound Healing  Goal: Optimal Wound Healing  Outcome: Ongoing, Progressing     Problem: Infection  Goal: Absence of Infection Signs and Symptoms  Outcome: Ongoing, Progressing   No falls, safety maintained. Pt allowing care, taking medication, and eating with encouragement. Prn pain tablet given twice today, effective. VSS, afebrile. Participated with PT first time today.

## 2022-01-24 NOTE — CONSULTS
"[facility]  Infectious Disease  Consult Note    Patient Name: Adryan Neff  MRN: [id number]  Admission Date: 1/5/2022  Hospital Length of Stay: 19 days  Attending Physician: Dottie Kaufman MD  Primary Care Provider: Soren Rice MD     Isolation Status: No active isolations    Patient information was obtained from patient and ER records.      Consults  Assessment/Plan:     * Gangrene of left foot  56M with ESRD on HD and PAD admitted LLE gangrene, s/p AKA 1/21/22. Wound cultures with citrobacter x 2 and pseudomonas. No systemic signs of infection. Has been on broad spectrum antibiotics since 1/5.  Incidentally tested positive covid on 1/5/22.     Recommendations:   - suspect amputation was curative of infection.   - stop cefepime          Thank you for your consult. I will sign off. Please contact us if you have any additional questions.    Ruby Salvador MD  Infectious Disease  [facility]    Subjective:     Principal Problem: Gangrene of left foot    HPI: This is a 56 y.o.male patient, with a PMHx of ESRD on hemodialysis, CAD, HTN, and DM, presenting to the ED for further evaluation of left foot pain. He was diagnosed with gangrene of the foot due to PAD. Coincidentally, he tested positive for COVID-19. The patient was taken to the OR and underwent: left 2-5 metatarsal amputations and left foot debridement and washout. OR cultures have grown Citrobacter and Pseudomonas. The patient has been receiving Zosyn and Vancocin since admission. ID is consulted for: "left foot gangrene, patient and family wanting to preserve limb; growing citrobacter and pseudomonas." The patient complains of pain but is otherwise, a difficult historian. No family is present.      Interval history: NAEO. S/p AKA on 1/21. Pt denies complaints. Afebrile. No cultures sent. Path pending    Past Medical History:   Diagnosis Date    CAD (coronary artery disease) 2016    CAD (non obstructive) 2016 [facility]    " Diabetes mellitus type I     Diabetic retinopathy     ESRD on hemodialysis     on HD TThSat    Gangrene of left foot     Hypertension     Nuclear sclerosis of right eye 11/24/2021    PAD (peripheral artery disease)     Pulmonary HTN     TB lung, latent 04/2021         Objective:     Vital Signs (Most Recent):  Temp: 98.6 °F (37 °C) (01/24/22 0749)  Pulse: 86 (01/24/22 0749)  Resp: 19 (01/24/22 0749)  BP: (!) 108/59 (01/24/22 0749)  SpO2: 98 % (01/24/22 0749) Vital Signs (24h Range):  Temp:  [97.1 °F (36.2 °C)-98.6 °F (37 °C)] 98.6 °F (37 °C)  Pulse:  [83-96] 86  Resp:  [14-19] 19  SpO2:  [96 %-100 %] 98 %  BP: (108-180)/(56-79) 108/59     Weight: 66.5 kg (146 lb 9.7 oz)  Body mass index is 21.04 kg/m².    Estimated Creatinine Clearance: 9.7 mL/min (A) (based on SCr of 7.9 mg/dL (H)).    Physical Exam  Vitals and nursing note reviewed.   Constitutional:       General: He is not in acute distress.     Appearance: He is not ill-appearing, toxic-appearing or diaphoretic.   HENT:      Head: Normocephalic and atraumatic.      Right Ear: External ear normal.      Left Ear: External ear normal.   Eyes:      Extraocular Movements: Extraocular movements intact.      Conjunctiva/sclera: Conjunctivae normal.      Pupils: Pupils are equal, round, and reactive to light.   Musculoskeletal:         General: Deformity present.   Neurological:      General: No focal deficit present.      Mental Status: He is alert and oriented to person, place, and time.   Psychiatric:         Mood and Affect: Mood normal.         Behavior: Behavior normal.         Thought Content: Thought content normal.         Judgment: Judgment normal.         Significant Labs:   Blood Culture:   Recent Labs   Lab 11/11/21  1155 01/05/22  1308 01/05/22  1309 01/09/22  1216 01/09/22  1217   LABBLOO No growth after 5 days. No Growth after 4 days.  No Growth after 4 days.  No Growth after 4 days.  No Growth after 4 days.      CBC:   Recent Labs   Lab  01/23/22  0532   WBC 19.45*   HGB 8.5*   HCT 28.4*        CMP:   Recent Labs   Lab 01/23/22  0532   *   K 4.5   CL 93*   CO2 22*   *   BUN 34*   CREATININE 7.9*   CALCIUM 9.3   PROT 9.3*   ALBUMIN 2.2*   BILITOT 0.4   ALKPHOS 70   AST 27   ALT <5*   ANIONGAP 18*   EGFRNONAA 7*     Urine Culture: No results for input(s): LABURIN in the last 4320 hours.  Wound Culture:   Recent Labs   Lab 01/05/22  1315 01/06/22  1100   LABAERO CITROBACTER FREUNDII  Many  *  PSEUDOMONAS AERUGINOSA  Moderate  * CITROBACTER KOSERI  Many  For susceptibility see order # [id number]  *  CITROBACTER FREUNDII  Many  For susceptibility see order # [id number]  *  PSEUDOMONAS AERUGINOSA*  CITROBACTER KOSERI  Many  *  CITROBACTER FREUNDII  Few  *       Significant Imaging: None    Past Surgical History:   Procedure Laterality Date    ABOVE-KNEE AMPUTATION Left 1/18/2022    Procedure: AMPUTATION, ABOVE KNEE;  Surgeon: Rusty Light MD;  Location: [facility] OR;  Service: Orthopedics;  Laterality: Left;    AV FISTULA PLACEMENT      CATARACT EXTRACTION Left     EYE SURGERY      TOE AMPUTATION Left 1/6/2022    Procedure: AMPUTATION, TOE;  Surgeon: Masoud Soni MD;  Location: [facility] OR;  Service: Vascular;  Laterality: Left;  Left first through fifth toes, possible open transmetatarsal amputation and all other indicated procedures       Review of patient's allergies indicates:  No Known Allergies    Medications:  Medications Prior to Admission   Medication Sig    amoxicillin-clavulanate 500-125mg (AUGMENTIN) 500-125 mg Tab Take 1 tablet (500 mg total) by mouth once daily.    atorvastatin (LIPITOR) 20 MG tablet Take 1 tablet (20 mg total) by mouth once daily. (Patient taking differently: Take 20 mg by mouth once daily.)    blood sugar diagnostic Strp 1 each by Misc.(Non-Drug; Combo Route) route 2 hours after meals and at bedtime.    doxercalciferoL (HECTOROL) 0.5 MCG capsule 2 mcg.    heparin sodium,porcine  (HEPARIN, PORCINE,) 1,000 unit/mL Soln injection Heparin Sodium (Porcine) 1,000 Units/mL Systemic    hydrALAZINE (APRESOLINE) 100 MG tablet Take 1 tablet (100 mg total) by mouth every 8 (eight) hours. (Patient taking differently: Take 50 mg by mouth every 8 (eight) hours. )    insulin (BASAGLAR KWIKPEN U-100 INSULIN) glargine 100 units/mL (3mL) SubQ pen Inject 15 Units into the skin every evening.    isoniazid (NYDRAZID) 300 MG Tab Take 1 tablet (300 mg total) by mouth once daily.    lisinopriL (PRINIVIL,ZESTRIL) 2.5 MG tablet Take 2.5 mg by mouth once daily.    lisinopriL (PRINIVIL,ZESTRIL) 5 MG tablet Take 5 mg by mouth once daily.    methoxy peg-epoetin beta (MIRCERA INJ) 75 mcg.    metoprolol succinate (TOPROL-XL) 25 MG 24 hr tablet Take 1 tablet (25 mg total) by mouth once daily.    omeprazole (PRILOSEC) 20 MG capsule TAKE 2 CAPSULES(40 MG) BY MOUTH EVERY DAY (Patient taking differently: Take 20 mg by mouth once daily.)    penicillin v potassium (VEETID) 500 MG tablet Take 500 mg by mouth 4 (four) times daily.    RENVELA 800 mg Tab Take 800 mg by mouth 3 (three) times daily with meals.    traMADoL (ULTRAM) 50 mg tablet Take 1 tablet (50 mg total) by mouth every 8 (eight) hours as needed for Pain.    traZODone (DESYREL) 50 MG tablet Take 50 mg by mouth every evening.     Antibiotics (From admission, onward)            Start     Stop Route Frequency Ordered    01/10/22 1700  cefepime in dextrose 5 % 1 gram/50 mL IVPB 1 g         -- IV Every 24 hours (non-standard times) 01/10/22 1102    01/06/22 0945  mupirocin 2 % ointment         01/11 0859 Nasl 2 times daily 01/06/22 0836        Antifungals (From admission, onward)            None        Antivirals (From admission, onward)    None           Immunization History   Administered Date(s) Administered    COVID-19 Vaccine 01/15/2021    COVID-19, MRNA, LN-S, PF (MODERNA FULL 0.5 ML DOSE) 01/15/2021, 02/11/2021    Hepatitis B, Adult 07/10/2017,  08/14/2017, 09/11/2017, 03/30/2021, 04/22/2021    Influenza 09/29/2020    Influenza - Quadrivalent - PF *Preferred* (6 months and older) 01/16/2008, 03/30/2016, 12/07/2016    PPD Test 04/04/2017, 01/11/2022    Pneumococcal Conjugate - 13 Valent 11/27/2017    Pneumococcal Polysaccharide - 23 Valent 03/30/2016, 10/24/2018    Tdap 03/30/2016       Family History     Problem Relation (Age of Onset)    No Known Problems Mother, Father, Sister, Brother, Daughter, Daughter, Daughter, Brother, Maternal Aunt, Maternal Uncle, Paternal Aunt, Paternal Uncle, Maternal Grandmother, Maternal Grandfather, Paternal Grandmother, Paternal Grandfather        Social History     Socioeconomic History    Marital status: Single    Number of children: 5   Tobacco Use    Smoking status: Never Smoker    Smokeless tobacco: Never Used   Substance and Sexual Activity    Alcohol use: No    Drug use: No    Sexual activity: Yes     Partners: Female   Social History Narrative    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   Gastrointestinal: Negative for abdominal distention and abdominal pain.   Genitourinary: Negative for dysuria and urgency.   Musculoskeletal: Negative for arthralgias and back pain.   Skin: Positive for wound.   Neurological: Negative for headaches.   Psychiatric/Behavioral: Negative for confusion.     Objective:     Vital Signs (Most Recent):  Temp: 98.6 °F (37 °C) (01/24/22 0749)  Pulse: 86 (01/24/22 0749)  Resp: 19 (01/24/22 0749)  BP: (!) 108/59 (01/24/22 0749)  SpO2: 98 % (01/24/22 0749) Vital Signs (24h Range):  Temp:  [97.1 °F (36.2 °C)-98.6 °F (37 °C)] 98.6 °F (37 °C)  Pulse:  [83-96] 86  Resp:  [14-19] 19  SpO2:  [96 %-100 %] 98 %  BP: (108-180)/(56-79) 108/59     Weight: 66.5 kg (146 lb 9.7 oz)  Body mass index is 21.04 kg/m².    Estimated Creatinine Clearance: 9.7 mL/min (A) (based on SCr of 7.9 mg/dL (H)).    Physical Exam      Significant Labs: All pertinent  labs within the past 24 hours have been reviewed.    Significant Imaging: I have reviewed all pertinent imaging results/findings within the past 24 hours.

## 2022-01-24 NOTE — PROGRESS NOTES
[facility] Medicine  Progress Note    Patient Name: Adryan Neff  MRN: [id number]  Patient Class: IP- Inpatient   Admission Date: 1/5/2022  Length of Stay: 19 days  Attending Physician: Dottie Kaufman MD  Primary Care Provider: Soren Rice MD        Subjective:     Principal Problem:Gangrene of left foot        HPI:  This is a 56 y.o.male patient, with a PMHx of ESRD on hemodialysis, CAD, HTN, and DM, presenting to the ED for further evaluation of left foot pain and black discoloration that began 3 days ago. Patient states these symptoms have been ongoing for the last month. He reports being referred to a specialist but denies ever following up. He has been putting some otc medication on the foot that he believes has turned his foot more black. No trauma or injury. Patient reports associated chills. Patient states applying a creme to the foot and noticed his discoloration worsened. Patient reports he was last dialyzed yesterday. Patient denies any fever, shortness of breath, chest pain, neck pain, back pain, abdominal pain, rash, headaches, congestion, rhinorrhea, cough, sore throat, ear pain, eye pain, blurred vision, nausea, vomiting, diarrhea, dysuria, or any other associated symptoms.    Per chart review, he had an arterial u/s 12/15.   Impression:   1. High-grade severe stenosis right popliteal artery with occlusion anterior tibial artery which is reconstituted at level of DPA via collaterals.  2. 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    Cardiology has been planning to see him for revascularization options. Last podiatry visit 12/13 with Stable dry gangrene L 2nd toe and medial L 5th toe. Dorsalis pedis and posterior tibial pulses are diminished Bilaterally. Toes are cool to touch. Feet are warm proximally.There is decreased digital hair. Skin is atrophic, slightly hyperpigmented, and mildly edematous     In the ED, he was found to have worsened gangrene and  a cool extremity. Vascular surgery was consulted. He was also COVID +. Says he got a booster shot in November sometime.      Overview/Hospital Course:  Patient admitted to the hospital for eval and treatment of L foot gangrene.  ID, Ortho and Vasc consulted. Nephrology was also consulted for chronic dialysis. Patient started on broad spectrum antibiotics, and had several toes on L foot amputated by Vascular surgery.     The patient's AVF had recurrent malfunction. IR attempts at intervention without success. THDC placed for dialysis.    Pt became increasingly delirious and combative. He refused angiogram with Vascular. Psychiatry evaluated, felt that he was not able to make his own medical decisions. At this point Dr. Soni felt the patient would be a poor candidate for vascular intervention, recommended AKA. ID in agreement. Ortho consulted for AKA, daughter/POA in agreement,   S/P left AKA on 1.21.22,plan is SNF placement,SW is on case.  Had some EKG changes with no chest pain,cardiology cleared patient for procedure.  CC today is leg pain.      Interval History: No events overnight.CC today is  Complains of leg pain  CC today is leg pain.  Review of Systems   Constitutional: Negative for chills and fever.   Respiratory: Negative for cough and shortness of breath.    Cardiovascular: Negative for chest pain and leg swelling.   Gastrointestinal: Negative for abdominal distention and abdominal pain.     Objective:     Vital Signs (Most Recent):  Temp: 98.6 °F (37 °C) (01/24/22 0749)  Pulse: 86 (01/24/22 0749)  Resp: 19 (01/24/22 0749)  BP: (!) 108/59 (01/24/22 0749)  SpO2: 98 % (01/24/22 0749) Vital Signs (24h Range):  Temp:  [97.1 °F (36.2 °C)-98.6 °F (37 °C)] 98.6 °F (37 °C)  Pulse:  [83-96] 86  Resp:  [14-19] 19  SpO2:  [96 %-100 %] 98 %  BP: (108-180)/(56-79) 108/59     Weight: 66.5 kg (146 lb 9.7 oz)  Body mass index is 21.04 kg/m².  No intake or output data in the 24 hours ending 01/24/22 1032   Physical  Exam  Constitutional:       General: He is not in acute distress.     Appearance: He is ill-appearing. He is not toxic-appearing or diaphoretic.   Cardiovascular:      Rate and Rhythm: Normal rate and regular rhythm.      Heart sounds: No murmur heard.  No gallop.    Pulmonary:      Effort: Pulmonary effort is normal. No respiratory distress.      Breath sounds: Normal breath sounds. No wheezing.   Abdominal:      General: Bowel sounds are normal. There is no distension.      Palpations: Abdomen is soft.      Tenderness: There is no abdominal tenderness.   Skin:     Comments: L foot s/p multiple amputated digits, necrotic tissue at site of amputation         Significant Labs: All pertinent labs within the past 24 hours have been reviewed.    Significant Imaging: I have reviewed all pertinent imaging results/findings within the past 24 hours.      Assessment/Plan:      * Gangrene of left foot  Presents with gangrenous L foot in the setting of severe PAD. Underwent left 2-5 metatarsal amputations w/ washout and debridement w/ Vascular surgery. Initial plan for angiogram w/ intervention, however pt refused the procedure. Given the low initial probability of wound healing and infectious clearance even with vascular intervention and lack of patient ability to adhere to medical treatment, Dr. Soni recommended AKA. Ortho consulted.   - family in agreement with AKA,date of procedure planned to today..   - antibiotics per ID.    Had some EKG changes with no chest pain,cardiology cleared patient for procedure.  S/P left AKA on 1.21.22,plan is SNF placement,SW is on case.    Encephalopathy, metabolic  Waxing and waning confusion likely delirium secondary to ischemic foot w/ possible superimposed infection and uremia from missing dialysis. Psychiatry deemed patient not to have capacity to make medical decisions. Overall orientation improved after starting low dose seroquel.  - psychiatry consulted, appreciate recs  - tx of  underlying issues as noted elsewhere  - continue seroquel  - delirium precautions  Improved.    COVID-19 in immunocompromised patient  HD patient + for COVID 19. Not hypoxic or symptomatic. Supportive care prn.       ESRD on hemodialysis  Nephrology consulted to resume routine HD. Fistula malfunctioning despite IR attempts at declot, THDC placed for alternative access  - nephrology following  - needs vascular vs IR fix of fistula    NICM (nonischemic cardiomyopathy)  On medical treatment.      Chronic pulmonary heart disease  On medical treatment.      Pulmonary HTN  On medical treatment.      Hyperkalemia  Correction with HD.resolved with HD     CAD (coronary artery disease) - non-obstructive from [facility] in 2016  On medical treatment.      Chronic diastolic heart failure  December 2021 echo reviewed. Stable.       Benign hypertension with chronic kidney disease, stage V  On medical treatment.      Essential (primary) hypertension  Titrate to goal. Pt intermittently refuses po medications, requiring IV meds      Controlled type 2 diabetes mellitus with chronic kidney disease on chronic dialysis, with long-term current use of insulin  Pt refused insulin, has intermittent PO intake.  - sliding scale insulin for now      VTE Risk Mitigation (From admission, onward)         Ordered     heparin (porcine) injection 5,000 Units  Every 8 hours         01/22/22 0750     heparin (porcine) injection 5,000 Units  As needed (PRN)         01/14/22 1716     IP VTE HIGH RISK PATIENT  Once         01/05/22 1404     Place sequential compression device  Until discontinued         01/05/22 1404                Discharge Planning   ENIO:      Code Status: Full Code   Is the patient medically ready for discharge?:     Reason for patient still in hospital (select all that apply): Patient trending condition  Discharge Plan A: Skilled Nursing Facility                  Dottie Kaufman MD  Department of Hospital Medicine   [facility]  Telemetry [facility]

## 2022-01-24 NOTE — SUBJECTIVE & OBJECTIVE
Interval History: No events overnight.CC today is  Complains of leg pain  CC today is leg pain.  Review of Systems   Constitutional: Negative for chills and fever.   Respiratory: Negative for cough and shortness of breath.    Cardiovascular: Negative for chest pain and leg swelling.   Gastrointestinal: Negative for abdominal distention and abdominal pain.     Objective:     Vital Signs (Most Recent):  Temp: 98.6 °F (37 °C) (01/24/22 0749)  Pulse: 86 (01/24/22 0749)  Resp: 19 (01/24/22 0749)  BP: (!) 108/59 (01/24/22 0749)  SpO2: 98 % (01/24/22 0749) Vital Signs (24h Range):  Temp:  [97.1 °F (36.2 °C)-98.6 °F (37 °C)] 98.6 °F (37 °C)  Pulse:  [83-96] 86  Resp:  [14-19] 19  SpO2:  [96 %-100 %] 98 %  BP: (108-180)/(56-79) 108/59     Weight: 66.5 kg (146 lb 9.7 oz)  Body mass index is 21.04 kg/m².  No intake or output data in the 24 hours ending 01/24/22 1032   Physical Exam  Constitutional:       General: He is not in acute distress.     Appearance: He is ill-appearing. He is not toxic-appearing or diaphoretic.   Cardiovascular:      Rate and Rhythm: Normal rate and regular rhythm.      Heart sounds: No murmur heard.  No gallop.    Pulmonary:      Effort: Pulmonary effort is normal. No respiratory distress.      Breath sounds: Normal breath sounds. No wheezing.   Abdominal:      General: Bowel sounds are normal. There is no distension.      Palpations: Abdomen is soft.      Tenderness: There is no abdominal tenderness.   Skin:     Comments: L foot s/p multiple amputated digits, necrotic tissue at site of amputation         Significant Labs: All pertinent labs within the past 24 hours have been reviewed.    Significant Imaging: I have reviewed all pertinent imaging results/findings within the past 24 hours.

## 2022-01-24 NOTE — PLAN OF CARE
Nghia VELAZQUEZ requested SNF placement follow up in [facility]. It was noted that [facility] and [facility] is interested in the patient and has requested updated progress notes. [facility] [place] and sent updated progress notes in [facility]. Nghia VELAZQUEZ notified.

## 2022-01-24 NOTE — ASSESSMENT & PLAN NOTE
Presents with gangrenous L foot in the setting of severe PAD. Underwent left 2-5 metatarsal amputations w/ washout and debridement w/ Vascular surgery. Initial plan for angiogram w/ intervention, however pt refused the procedure. Given the low initial probability of wound healing and infectious clearance even with vascular intervention and lack of patient ability to adhere to medical treatment, Dr. Soni recommended AKA. Ortho consulted.   - family in agreement with AKA,date of procedure planned to today..   - antibiotics per ID.    Had some EKG changes with no chest pain,cardiology cleared patient for procedure.  S/P left AKA on 1.21.22,plan is SNF placement,SW is on case.

## 2022-01-24 NOTE — PT/OT/SLP PROGRESS
Occupational Therapy      Patient Name:  Adryan Neff   MRN:  [id number]    Patient not seen today secondary to Dialysis. Will follow-up 1/25/22.    1/24/2022

## 2022-01-24 NOTE — PT/OT/SLP PROGRESS
Physical Therapy      Patient Name:  Adryan Neff   MRN:  [id number]    Patient not seen today secondary to Dialysis. Will follow-up .

## 2022-01-24 NOTE — PLAN OF CARE
1/21/22 Pt with new Left AKA and confusion.    Pt attends outpatient HD At [facility]     Therapy recommending SNF.    Daughter, Jonn [phone] agreeable, understands placement may be outside the [place] area.     Several facilities have denied in [facility].      [facility] is sending representative to meet pt to screen for acceptance on 1/25/2022, awaiting their determination.    [facility] is now improving and is out of COVID isolation.  Awaiting their review.      New COVID test ordered today.

## 2022-01-24 NOTE — PLAN OF CARE
[facility]  Discharge Reassessment    Primary Care Provider: Soren Rice MD    Expected Discharge Date:      Reassessment (most recent)       Discharge Reassessment - 01/24/22 1411          Discharge Reassessment    Assessment Type Discharge Planning Reassessment (P)      Did the patient's condition or plan change since previous assessment? No (P)      Discharge Plan discussed with: Adult children (P)      Name(s) and Number(s) Jonn Neff (Daughter)   [phone] (P)      Communicated ENIO with patient/caregiver Date not available/Unable to determine (P)      Discharge Plan A Skilled Nursing Facility (P)      Discharge Plan B Home Health (P)      DME Needed Upon Discharge  none (P)      Discharge Barriers Identified Other (see comments) (P)    SNF placement    Why the patient remains in the hospital Placement issues (P)         Post-Acute Status    Post-Acute Authorization Placement (P)      Post-Acute Placement Status Pending payor review/awaiting authorization (if required) (P)      Coverage Medicare (P)      Discharge Delays Post-Acute Set-up (P)                      CHANEL spoke with patient's daughter. SW explained to patient's daughter SNF and HH. Patient's daughter is still interested in pursuing SNF placement at this time.

## 2022-01-24 NOTE — PLAN OF CARE
Problem: Adult Inpatient Plan of Care  Goal: Plan of Care Review  Outcome: Ongoing, Progressing  Flowsheets (Taken 1/24/2022 0443)  Plan of Care Reviewed With: patient     Problem: Adult Inpatient Plan of Care  Goal: Patient-Specific Goal (Individualized)  Outcome: Ongoing, Progressing     Problem: Diabetes Comorbidity  Goal: Blood Glucose Level Within Targeted Range  Outcome: Ongoing, Progressing     Problem: Impaired Wound Healing  Goal: Optimal Wound Healing  Intervention: Promote Wound Healing  Flowsheets (Taken 1/24/2022 0443)  Activity Management:   Straight leg raise - L1   Leg kicks - L2  Pain Management Interventions:   quiet environment facilitated   relaxation techniques promoted   warm blanket provided

## 2022-01-24 NOTE — CONSULTS
"IR consulted in regard to left upper extremity AV dialysis fistula.  History reviewed and discussed with IR team.  Attempts to address issues with the dialysis graft by both Dr. Mcghee (IR) and Dr. Soni (Surgery) were unsuccessful.  Dr. Soni reportedly feels that the patient needs a new dialysis fistula.  Patient does have a THDC in place.  When IR team discussed consult with patient's care team this morning, the main concern was the "wire" left in place at the dialysis fistula.  Patient was evaluated by IR team and the "wire" was found to be a purse string suture placed at the time of recent intervention.  As requested, this suture was removed.      Dario Espana MD  Interventional Radiologist  "

## 2022-01-24 NOTE — ASSESSMENT & PLAN NOTE
56M with ESRD on HD and PAD admitted LLE gangrene, s/p AKA 1/21/22. Wound cultures with citrobacter x 2 and pseudomonas. No systemic signs of infection. Has been on broad spectrum antibiotics since 1/5.  Incidentally tested positive covid on 1/5/22.     Recommendations:   - suspect amputation was curative of infection.   - stop cefepime

## 2022-01-25 LAB
ANION GAP SERPL CALC-SCNC: 16 MMOL/L (ref 8–16)
BASOPHILS # BLD AUTO: 0.14 K/UL (ref 0–0.2)
BASOPHILS NFR BLD: 0.8 % (ref 0–1.9)
BLD PROD TYP BPU: NORMAL
BLD PROD TYP BPU: NORMAL
BLOOD UNIT EXPIRATION DATE: NORMAL
BLOOD UNIT EXPIRATION DATE: NORMAL
BLOOD UNIT TYPE CODE: 5100
BLOOD UNIT TYPE CODE: 5100
BLOOD UNIT TYPE: NORMAL
BLOOD UNIT TYPE: NORMAL
BUN SERPL-MCNC: 25 MG/DL (ref 6–20)
CALCIUM SERPL-MCNC: 9.1 MG/DL (ref 8.7–10.5)
CHLORIDE SERPL-SCNC: 93 MMOL/L (ref 95–110)
CO2 SERPL-SCNC: 25 MMOL/L (ref 23–29)
CODING SYSTEM: NORMAL
CODING SYSTEM: NORMAL
CREAT SERPL-MCNC: 5.7 MG/DL (ref 0.5–1.4)
DIFFERENTIAL METHOD: ABNORMAL
DISPENSE STATUS: NORMAL
DISPENSE STATUS: NORMAL
EOSINOPHIL # BLD AUTO: 0.9 K/UL (ref 0–0.5)
EOSINOPHIL NFR BLD: 4.8 % (ref 0–8)
ERYTHROCYTE [DISTWIDTH] IN BLOOD BY AUTOMATED COUNT: 15.5 % (ref 11.5–14.5)
EST. GFR  (AFRICAN AMERICAN): 12 ML/MIN/1.73 M^2
EST. GFR  (NON AFRICAN AMERICAN): 10 ML/MIN/1.73 M^2
GLUCOSE SERPL-MCNC: 149 MG/DL (ref 70–110)
HCT VFR BLD AUTO: 26.5 % (ref 40–54)
HGB BLD-MCNC: 7.8 G/DL (ref 14–18)
IMM GRANULOCYTES # BLD AUTO: 0.2 K/UL (ref 0–0.04)
IMM GRANULOCYTES NFR BLD AUTO: 1.1 % (ref 0–0.5)
LYMPHOCYTES # BLD AUTO: 1.6 K/UL (ref 1–4.8)
LYMPHOCYTES NFR BLD: 8.8 % (ref 18–48)
MCH RBC QN AUTO: 27.2 PG (ref 27–31)
MCHC RBC AUTO-ENTMCNC: 29.4 G/DL (ref 32–36)
MCV RBC AUTO: 92 FL (ref 82–98)
MONOCYTES # BLD AUTO: 1.8 K/UL (ref 0.3–1)
MONOCYTES NFR BLD: 9.8 % (ref 4–15)
NEUTROPHILS # BLD AUTO: 13.4 K/UL (ref 1.8–7.7)
NEUTROPHILS NFR BLD: 74.7 % (ref 38–73)
NRBC BLD-RTO: 0 /100 WBC
NUM UNITS TRANS PACKED RBC: NORMAL
NUM UNITS TRANS PACKED RBC: NORMAL
PLATELET # BLD AUTO: 305 K/UL (ref 150–450)
PMV BLD AUTO: 10.2 FL (ref 9.2–12.9)
POCT GLUCOSE: 144 MG/DL (ref 70–110)
POCT GLUCOSE: 146 MG/DL (ref 70–110)
POCT GLUCOSE: 162 MG/DL (ref 70–110)
POCT GLUCOSE: 170 MG/DL (ref 70–110)
POTASSIUM SERPL-SCNC: 4.2 MMOL/L (ref 3.5–5.1)
RBC # BLD AUTO: 2.87 M/UL (ref 4.6–6.2)
SODIUM SERPL-SCNC: 134 MMOL/L (ref 136–145)
WBC # BLD AUTO: 17.94 K/UL (ref 3.9–12.7)

## 2022-01-25 PROCEDURE — 99232 PR SUBSEQUENT HOSPITAL CARE,LEVL II: ICD-10-PCS | Mod: ,,, | Performed by: SURGERY

## 2022-01-25 PROCEDURE — 25000003 PHARM REV CODE 250: Performed by: ORTHOPAEDIC SURGERY

## 2022-01-25 PROCEDURE — 97530 THERAPEUTIC ACTIVITIES: CPT

## 2022-01-25 PROCEDURE — 80048 BASIC METABOLIC PNL TOTAL CA: CPT | Performed by: HOSPITALIST

## 2022-01-25 PROCEDURE — 63600175 PHARM REV CODE 636 W HCPCS: Mod: JG | Performed by: INTERNAL MEDICINE

## 2022-01-25 PROCEDURE — 99232 SBSQ HOSP IP/OBS MODERATE 35: CPT | Mod: ,,, | Performed by: SURGERY

## 2022-01-25 PROCEDURE — 97535 SELF CARE MNGMENT TRAINING: CPT

## 2022-01-25 PROCEDURE — 85025 COMPLETE CBC W/AUTO DIFF WBC: CPT | Performed by: HOSPITALIST

## 2022-01-25 PROCEDURE — 36415 COLL VENOUS BLD VENIPUNCTURE: CPT | Performed by: HOSPITALIST

## 2022-01-25 PROCEDURE — 63600175 PHARM REV CODE 636 W HCPCS: Performed by: HOSPITALIST

## 2022-01-25 PROCEDURE — 21400001 HC TELEMETRY ROOM

## 2022-01-25 RX ADMIN — CARVEDILOL 6.25 MG: 6.25 TABLET, FILM COATED ORAL at 08:01

## 2022-01-25 RX ADMIN — OXYCODONE 5 MG: 5 TABLET ORAL at 08:01

## 2022-01-25 RX ADMIN — HEPARIN SODIUM 5000 UNITS: 5000 INJECTION INTRAVENOUS; SUBCUTANEOUS at 05:01

## 2022-01-25 RX ADMIN — CARVEDILOL 6.25 MG: 6.25 TABLET, FILM COATED ORAL at 09:01

## 2022-01-25 RX ADMIN — SEVELAMER CARBONATE 2400 MG: 800 TABLET, FILM COATED ORAL at 12:01

## 2022-01-25 RX ADMIN — SEVELAMER CARBONATE 2400 MG: 800 TABLET, FILM COATED ORAL at 09:01

## 2022-01-25 RX ADMIN — ATORVASTATIN CALCIUM 20 MG: 10 TABLET, FILM COATED ORAL at 09:01

## 2022-01-25 RX ADMIN — INSULIN ASPART 2 UNITS: 100 INJECTION, SOLUTION INTRAVENOUS; SUBCUTANEOUS at 12:01

## 2022-01-25 RX ADMIN — PANTOPRAZOLE SODIUM 40 MG: 40 TABLET, DELAYED RELEASE ORAL at 09:01

## 2022-01-25 RX ADMIN — NEPHROCAP 1 CAPSULE: 1 CAP ORAL at 09:01

## 2022-01-25 RX ADMIN — SEVELAMER CARBONATE 2400 MG: 800 TABLET, FILM COATED ORAL at 05:01

## 2022-01-25 RX ADMIN — EPOETIN ALFA-EPBX 10000 UNITS: 10000 INJECTION, SOLUTION INTRAVENOUS; SUBCUTANEOUS at 12:01

## 2022-01-25 RX ADMIN — HEPARIN SODIUM 5000 UNITS: 5000 INJECTION INTRAVENOUS; SUBCUTANEOUS at 02:01

## 2022-01-25 RX ADMIN — QUETIAPINE FUMARATE 25 MG: 25 TABLET ORAL at 08:01

## 2022-01-25 RX ADMIN — OXYCODONE 5 MG: 5 TABLET ORAL at 05:01

## 2022-01-25 RX ADMIN — HEPARIN SODIUM 5000 UNITS: 5000 INJECTION INTRAVENOUS; SUBCUTANEOUS at 09:01

## 2022-01-25 NOTE — SUBJECTIVE & OBJECTIVE
Interval History: No events overnight.CC today is  Complains of leg pain  CC today is leg pain.  Review of Systems   Constitutional: Negative for chills and fever.   Respiratory: Negative for cough and shortness of breath.    Cardiovascular: Negative for chest pain and leg swelling.   Gastrointestinal: Negative for abdominal distention and abdominal pain.     Objective:     Vital Signs (Most Recent):  Temp: 98.2 °F (36.8 °C) (01/25/22 0847)  Pulse: 90 (01/25/22 0847)  Resp: 18 (01/25/22 0847)  BP: 138/67 (01/25/22 0847)  SpO2: 97 % (01/25/22 0847) Vital Signs (24h Range):  Temp:  [97.9 °F (36.6 °C)-98.6 °F (37 °C)] 98.2 °F (36.8 °C)  Pulse:  [69-90] 90  Resp:  [16-19] 18  SpO2:  [96 %-100 %] 97 %  BP: ()/(52-73) 138/67     Weight: 66.5 kg (146 lb 9.7 oz)  Body mass index is 21.04 kg/m².    Intake/Output Summary (Last 24 hours) at 1/25/2022 1147  Last data filed at 1/24/2022 1800  Gross per 24 hour   Intake 500 ml   Output 2000 ml   Net -1500 ml      Physical Exam  Constitutional:       General: He is not in acute distress.     Appearance: He is ill-appearing. He is not toxic-appearing or diaphoretic.   Cardiovascular:      Rate and Rhythm: Normal rate and regular rhythm.      Heart sounds: No murmur heard.  No gallop.    Pulmonary:      Effort: Pulmonary effort is normal. No respiratory distress.      Breath sounds: Normal breath sounds. No wheezing.   Abdominal:      General: Bowel sounds are normal. There is no distension.      Palpations: Abdomen is soft.      Tenderness: There is no abdominal tenderness.   Skin:     Comments: L foot s/p multiple amputated digits, necrotic tissue at site of amputation         Significant Labs: All pertinent labs within the past 24 hours have been reviewed.    Significant Imaging: I have reviewed all pertinent imaging results/findings within the past 24 hours.

## 2022-01-25 NOTE — NURSING
Hemodialysis x 4 hours with 1.5L volume removed.  CVC worked well at prescribed blood flow rate.   Several episodes of hypotension during hemodialysis.

## 2022-01-25 NOTE — SUBJECTIVE & OBJECTIVE
Medications:  Continuous Infusions:  Scheduled Meds:   atorvastatin  20 mg Oral Daily    carvediloL  6.25 mg Oral BID    epoetin paola-epbx  10,000 Units Subcutaneous Every Tues, Thurs, Sat    heparin (porcine)  5,000 Units Subcutaneous Q8H    insulin detemir U-100  15 Units Subcutaneous Once    pantoprazole  40 mg Oral Daily    QUEtiapine  25 mg Oral QHS    sevelamer carbonate  2,400 mg Oral TID WM    vitamin renal formula (B-complex-vitamin c-folic acid)  1 capsule Oral Daily     PRN Meds:sodium chloride, sodium chloride 0.9%, acetaminophen, acetaminophen, cloNIDine, dextrose 50%, dextrose 50%, glucagon (human recombinant), glucose, glucose, heparin (porcine), HYDROmorphone, influenza, insulin aspart U-100, magnesium oxide, magnesium oxide, naloxone, OLANZapine, olanzapine zydis, ondansetron, oxyCODONE, sodium chloride 0.9%, sodium chloride 0.9%, sodium chloride 0.9%     Objective:     Vital Signs (Most Recent):  Temp: 98.4 °F (36.9 °C) (01/25/22 1242)  Pulse: 83 (01/25/22 1242)  Resp: 18 (01/25/22 1242)  BP: 122/60 (01/25/22 1242)  SpO2: 98 % (01/25/22 1242) Vital Signs (24h Range):  Temp:  [97.9 °F (36.6 °C)-98.6 °F (37 °C)] 98.4 °F (36.9 °C)  Pulse:  [72-90] 83  Resp:  [16-19] 18  SpO2:  [96 %-100 %] 98 %  BP: ()/(57-68) 122/60     Date 01/25/22 0700 - 01/26/22 0659   Shift 3438-5459 8802-3048 8574-0587 24 Hour Total   INTAKE   P.O. 240   240   Shift Total(mL/kg) 240(3.6)   240(3.6)   OUTPUT   Shift Total(mL/kg)       Weight (kg) 66.5 66.5 66.5 66.5       Physical Exam  Vitals reviewed.   Constitutional:       General: He is not in acute distress.     Appearance: He is well-developed. He is not diaphoretic.   HENT:      Head: Normocephalic and atraumatic.   Eyes:      Conjunctiva/sclera: Conjunctivae normal.   Cardiovascular:      Rate and Rhythm: Normal rate.      Pulses:           Femoral pulses are 2+ on the right side and 2+ on the left side.       Popliteal pulses are 2+ on the left side.    Pulmonary:      Effort: Pulmonary effort is normal.   Abdominal:      General: There is no distension.      Palpations: Abdomen is soft. There is no mass.      Tenderness: There is no abdominal tenderness. There is no guarding or rebound.      Hernia: No hernia is present.   Musculoskeletal:         General: Tenderness present. No deformity. Normal range of motion.      Cervical back: Neck supple.      Right lower leg: No edema.      Left lower leg: No edema.      Left Lower Extremity: Left leg is amputated above knee.   Skin:     General: Skin is warm and dry.      Capillary Refill: Capillary refill takes more than 3 seconds.      Findings: No erythema or rash.      Comments: L AKA with staples intact and no infection noted   Neurological:      General: No focal deficit present.      Mental Status: He is alert and oriented to person, place, and time.   Psychiatric:         Mood and Affect: Mood normal.         Significant Labs:  All pertinent labs from the last 24 hours have been reviewed.    Significant Diagnostics:  I have reviewed all pertinent imaging results/findings within the past 24 hours.

## 2022-01-25 NOTE — PLAN OF CARE
CHANEL received call from [facility] ([facility]). Neftali stated she is able to accept patient at [facility]. She stated she will need patient's last BM, PPD and plan of care/discharge orders. She also stated that patient would be able to go to [facility] dialysis MWF @ 7:00 am.     CHANEL left voice message for patient's daughter requesting a return call re: SNF placement.       CHANEL spoke with patient's daughter informing her that [facility] accepted patient. Patient's daughter stated ok and said that is good. CHANEL explained to patient's daughter that he will contact her back with an update.     15:30  CHANEL received call from [facility] ([facility]) inquiring about patient discharging today. CAHNEL explained to [facility] that MD plans to discharge patient on tomorrow. Neftali stated ok and to let her know. CHANEL inquired time frame of BM needed for patient? Neftali stated patient has to have a BM within 3 days.     CHANEL secure chat nurse inquiring about ordering something for patient to have a BM.     CHANEL spoke with Rebecca ([facility]) @ [phone]. Rebecca stated she is commendateing [facility] with dialysis chair for MWF @ 7:00 am. CHANEL voiced understanding. Rebecca explained to SW that it is temporary chair time for patient. CHANEL voiced understanding. Rebecca also asked for CHANEL to provide plan of care/discharge orders at time of discharge. Fax # [phone].    CHANEL spoke with patient's daughter providing her with update. CHANEL explained to patient's daughter that [facility] will be contacting her to complete paperwork and plan is to discharge patient on tomorrow. Patient's daughter voiced understanding. CHANEL also explained IMM to patient's daughter.

## 2022-01-25 NOTE — PLAN OF CARE
[facility] following up on [facility] SNF referrals:     [facility] to come screen pt today for acceptance, acceptance pending.     Referral under review at [facility] and [facility].     Denied by:   1. [facility]   2. [facility]   3. [facility]   4. [facility]   5. [facility]   6. [facility]   7. [facility]   8. [facility]   9. [facility]  10. [facility]    11. [facility]   12. [facility]   13. [facility]     Wait listed at:   1. [facility] (number 20 on list)    Calls placed to Mervin Chowdary, [facility], voicemails left.

## 2022-01-25 NOTE — ASSESSMENT & PLAN NOTE
Presents with gangrenous L foot in the setting of severe PAD. Underwent left 2-5 metatarsal amputations w/ washout and debridement w/ Vascular surgery. Initial plan for angiogram w/ intervention, however pt refused the procedure. Given the low initial probability of wound healing and infectious clearance even with vascular intervention and lack of patient ability to adhere to medical treatment, Dr. Soni recommended AKA. Ortho consulted.   - family in agreement with AKA,date of procedure planned to today..   - antibiotics per ID.    Had some EKG changes with no chest pain,cardiology cleared patient for procedure.  S/P left AKA on 1.21.22,plan is SNF placement,SW is on case.  afebrile.

## 2022-01-25 NOTE — PT/OT/SLP PROGRESS
Occupational Therapy   Treatment    Name: Adryan Neff  MRN: [id number]  Admitting Diagnosis:  Gangrene of left foot  4 Days Post-Op    Recommendations:     Discharge Recommendations: nursing facility, skilled  Discharge Equipment Recommendations:  walker, rolling,wheelchair  Barriers to discharge:   (Pt is at risk for falls, readmission and morbidity if d/c home at this time.)    Assessment:     Adryan Neff is a 56 y.o. male with a medical diagnosis of Gangrene of left foot.  Performance deficits affecting function are weakness,impaired endurance,impaired self care skills,impaired functional mobilty,impaired cognition,decreased coordination,decreased upper extremity function,decreased lower extremity function,decreased safety awareness,pain,orthopedic precautions,impaired skin.     Pt was able to intermittently follow follow commands for participating this date, observed to still be confused and required mod redirection throughout tx session. Pt required max A for performing supine>sit for participating in seated activities; pt observed w/ decreased alertness and jerky-like movement at EOB, limiting further activities despite vital signs being stable. Pt will continue to benefit from skilled acute OT services to maximize functional capacity for safe performance w/ ADLs and functional mobility.    Rehab Prognosis:  Good; patient would benefit from acute skilled OT services to address these deficits and reach maximum level of function.       Plan:     Patient to be seen 5 x/week to address the above listed problems via self-care/home management,therapeutic activities,therapeutic exercises  · Plan of Care Expires: 02/08/22  · Plan of Care Reviewed with: patient    Subjective     Pain/Comfort:  · Pain Rating 1: 0/10  · Pain Rating Post-Intervention 1: 0/10    Objective:     Communicated with: nurse prior to session.  Patient found HOB elevated with bed alarm,peripheral IV,telemetry,central line upon OT entry to  room.    General Precautions: Standard, fall,diabetic   Orthopedic Precautions:LLE non weight bearing (L AKA)   Braces: N/A  Respiratory Status: Room air     Occupational Performance:     Bed Mobility:    · Patient completed Scooting hips to EOB with maximal assistance despite increased and demonstration for initiating/performing   · Patient completed Supine to Sit with maximal assistance for managing trunk and BLEs; pt somewhat reluctant throughout  · Patient completed Sit to Supine with maximal assistance for managing all parts     Functional Mobility/Transfers:  · Unable to assess at this time due to pt w/ poor command following and safety concerns     Activities of Daily Living:  · Grooming: maximal assistance for washign face; OT attempted to hand towel to pt x 3 attempts w/ time in between; pt was able to take towel and wipe forehead but ceased activity despite encouragement     [facility] 6 Click ADL: 13    Treatment & Education:  -Pt performed ADLs and functional mobility as noted above; pt w/ poor command following, limiting session to brief EOB activity.   -Pt was able to perform BUE elbow flexion for 1 set x 5 reps but ceased activity and was unable to be redirected for finishing remainder of TE.   -Pt was repositioned in bed w/ pressure relief boot to maintain skin integrity.   -Questions and concerns addressed within OT scope.     Patient left HOB elevated with all lines intact, call button in reach, bed alarm on and RLE pressure releif boot on Education:      GOALS:   Multidisciplinary Problems     Occupational Therapy Goals        Problem: Occupational Therapy Goal    Goal Priority Disciplines Outcome Interventions   Occupational Therapy Goal     OT, PT/OT Ongoing, Progressing    Description: Goals to be met by: 2/8/2022    Patient will increase functional independence with ADLs by performing:    Feeding with modified independence  Upper body dressing with supervision  Lower Body dressing with minimal  assistance  Supine <> sit with minimal assistance   Toileting at bed level with urinal with modified independence  Pt will sit EOB for 10 min with supervision     Transfers to be assessed pending further pt participation                      Time Tracking:     OT Date of Treatment: 01/25/22  OT Start Time: 1021  OT Stop Time: 1033  OT Total Time (min): 12 min    Billable Minutes:Self Care/Home Management 12  Total Time 12 (partial co-tx w/ PT)     OT/GLENDA: OT     GLENDA Visit Number: 0    1/25/2022

## 2022-01-25 NOTE — PROGRESS NOTES
[facility]  Vascular Surgery  Progress Note    Patient Name: Adryan Neff  MRN: [id number]  Admission Date: 1/5/2022  Primary Care Provider: Soren Rice MD    Subjective:     Interval History: Feels better s/p L AKA    Post-Op Info:  Procedure(s) (LRB):  AMPUTATION, ABOVE KNEE (Left)   4 Days Post-Op       Medications:  Continuous Infusions:  Scheduled Meds:   atorvastatin  20 mg Oral Daily    carvediloL  6.25 mg Oral BID    epoetin paloa-epbx  10,000 Units Subcutaneous Every Tues, Thurs, Sat    heparin (porcine)  5,000 Units Subcutaneous Q8H    insulin detemir U-100  15 Units Subcutaneous Once    pantoprazole  40 mg Oral Daily    QUEtiapine  25 mg Oral QHS    sevelamer carbonate  2,400 mg Oral TID WM    vitamin renal formula (B-complex-vitamin c-folic acid)  1 capsule Oral Daily     PRN Meds:sodium chloride, sodium chloride 0.9%, acetaminophen, acetaminophen, cloNIDine, dextrose 50%, dextrose 50%, glucagon (human recombinant), glucose, glucose, heparin (porcine), HYDROmorphone, influenza, insulin aspart U-100, magnesium oxide, magnesium oxide, naloxone, OLANZapine, olanzapine zydis, ondansetron, oxyCODONE, sodium chloride 0.9%, sodium chloride 0.9%, sodium chloride 0.9%     Objective:     Vital Signs (Most Recent):  Temp: 98.4 °F (36.9 °C) (01/25/22 1242)  Pulse: 83 (01/25/22 1242)  Resp: 18 (01/25/22 1242)  BP: 122/60 (01/25/22 1242)  SpO2: 98 % (01/25/22 1242) Vital Signs (24h Range):  Temp:  [97.9 °F (36.6 °C)-98.6 °F (37 °C)] 98.4 °F (36.9 °C)  Pulse:  [72-90] 83  Resp:  [16-19] 18  SpO2:  [96 %-100 %] 98 %  BP: ()/(57-68) 122/60     Date 01/25/22 0700 - 01/26/22 0659   Shift 1097-2728 7436-3069 3527-3832 24 Hour Total   INTAKE   P.O. 240   240   Shift Total(mL/kg) 240(3.6)   240(3.6)   OUTPUT   Shift Total(mL/kg)       Weight (kg) 66.5 66.5 66.5 66.5       Physical Exam  Vitals reviewed.   Constitutional:       General: He is not in acute distress.     Appearance: He is  well-developed. He is not diaphoretic.   HENT:      Head: Normocephalic and atraumatic.   Eyes:      Conjunctiva/sclera: Conjunctivae normal.   Cardiovascular:      Rate and Rhythm: Normal rate.      Pulses:           Femoral pulses are 2+ on the right side and 2+ on the left side.       Popliteal pulses are 2+ on the left side.   Pulmonary:      Effort: Pulmonary effort is normal.   Abdominal:      General: There is no distension.      Palpations: Abdomen is soft. There is no mass.      Tenderness: There is no abdominal tenderness. There is no guarding or rebound.      Hernia: No hernia is present.   Musculoskeletal:         General: Tenderness present. No deformity. Normal range of motion.      Cervical back: Neck supple.      Right lower leg: No edema.      Left lower leg: No edema.      Left Lower Extremity: Left leg is amputated above knee.   Skin:     General: Skin is warm and dry.      Capillary Refill: Capillary refill takes more than 3 seconds.      Findings: No erythema or rash.      Comments: L AKA with staples intact and no infection noted   Neurological:      General: No focal deficit present.      Mental Status: He is alert and oriented to person, place, and time.   Psychiatric:         Mood and Affect: Mood normal.         Significant Labs:  All pertinent labs from the last 24 hours have been reviewed.    Significant Diagnostics:  I have reviewed all pertinent imaging results/findings within the past 24 hours.    Assessment/Plan:     * Gangrene of left foot  -Ortho deferred mgmt of L foot wound to vascular surgery s/p open 2-5 toe amputations  -ID consulted - f/u recs  -Cont wound care and monitor WBC closely - plan for angiogram/revascularization if safely able 1/11/22.  Discussed risks and benefits with patient and ; pt refusing procedure.  Psych deemed no capacity.  Discussed risks and benefits of treatment plan with daughter; rec L AKA due to patient's infection and current clinical  condition as risks outweigh benefits for L fem-tib bypass with preop angiogram. Will coordinate L AKA as soon as possible with Ortho.    ESRD on hemodialysis  -will evaluate HD access once acute clinic issues are resolved        Masoud Soni MD  Vascular Surgery  [facility]

## 2022-01-25 NOTE — PLAN OF CARE
Pt improving but still with confusion. Awaiting MD clearance to DC to SNF.     Therapy recommending SNF following  new Left AKA on 1/21/2022.      Pt attends outpatient HD [facility] TTS 0700. Spoke with Rebecca at [facility] [phone], they may be able to move chair time to accommodate requests of SNF facility.     Daughter, Jonn [phone] agreeable, understands placement may be outside the [place] area.      Several facilities have denied in [employer].       [facility] is sending representative to meet pt to screen for acceptance on 1/25/2022, awaiting their determination.     Referral re-sent in [facility] today as pt is now improving and tested COVID negative on 1/25/2022.  Awaiting responses.    [facility] to call facilities today to check status.

## 2022-01-25 NOTE — PLAN OF CARE
01/25/22 1531   Medicare Message   Important Message from Medicare regarding Discharge Appeal Rights Given to patient/caregiver;Explained to patient/caregiver   Date IMM was signed 01/25/22   Time IMM was signed 1528     CHANEL explained IMM to patient's daughter. Patient's daughter voiced understanding. Copy mailed to patient's daughter.

## 2022-01-25 NOTE — PROGRESS NOTES
[facility] Medicine  Progress Note    Patient Name: Adryan Neff  MRN: [id number]  Patient Class: IP- Inpatient   Admission Date: 1/5/2022  Length of Stay: 20 days  Attending Physician: Dottie Kaufman MD  Primary Care Provider: Soren Rice MD        Subjective:     Principal Problem:Gangrene of left foot        HPI:  This is a 56 y.o.male patient, with a PMHx of ESRD on hemodialysis, CAD, HTN, and DM, presenting to the ED for further evaluation of left foot pain and black discoloration that began 3 days ago. Patient states these symptoms have been ongoing for the last month. He reports being referred to a specialist but denies ever following up. He has been putting some otc medication on the foot that he believes has turned his foot more black. No trauma or injury. Patient reports associated chills. Patient states applying a creme to the foot and noticed his discoloration worsened. Patient reports he was last dialyzed yesterday. Patient denies any fever, shortness of breath, chest pain, neck pain, back pain, abdominal pain, rash, headaches, congestion, rhinorrhea, cough, sore throat, ear pain, eye pain, blurred vision, nausea, vomiting, diarrhea, dysuria, or any other associated symptoms.    Per chart review, he had an arterial u/s 12/15.   Impression:   1. High-grade severe stenosis right popliteal artery with occlusion anterior tibial artery which is reconstituted at level of DPA via collaterals.  2. 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    Cardiology has been planning to see him for revascularization options. Last podiatry visit 12/13 with Stable dry gangrene L 2nd toe and medial L 5th toe. Dorsalis pedis and posterior tibial pulses are diminished Bilaterally. Toes are cool to touch. Feet are warm proximally.There is decreased digital hair. Skin is atrophic, slightly hyperpigmented, and mildly edematous     In the ED, he was found to have worsened gangrene and  a cool extremity. Vascular surgery was consulted. He was also COVID +. Says he got a booster shot in November sometime.      Overview/Hospital Course:  Patient admitted to the hospital for eval and treatment of L foot gangrene.  ID, Ortho and Vasc consulted. Nephrology was also consulted for chronic dialysis. Patient started on broad spectrum antibiotics, and had several toes on L foot amputated by Vascular surgery.     The patient's AVF had recurrent malfunction. IR attempts at intervention without success. THDC placed for dialysis.    Pt became increasingly delirious and combative. He refused angiogram with Vascular. Psychiatry evaluated, felt that he was not able to make his own medical decisions. At this point Dr. Soni felt the patient would be a poor candidate for vascular intervention, recommended AKA. ID in agreement. Ortho consulted for AKA, daughter/POA in agreement,   S/P left AKA on 1.21.22,plan is SNF placement,SW is on case.afebrile.  Had some EKG changes with no chest pain,cardiology cleared patient for procedure.  CC today is leg pain.      Interval History: No events overnight.CC today is  Complains of leg pain  CC today is leg pain.  Review of Systems   Constitutional: Negative for chills and fever.   Respiratory: Negative for cough and shortness of breath.    Cardiovascular: Negative for chest pain and leg swelling.   Gastrointestinal: Negative for abdominal distention and abdominal pain.     Objective:     Vital Signs (Most Recent):  Temp: 98.2 °F (36.8 °C) (01/25/22 0847)  Pulse: 90 (01/25/22 0847)  Resp: 18 (01/25/22 0847)  BP: 138/67 (01/25/22 0847)  SpO2: 97 % (01/25/22 0847) Vital Signs (24h Range):  Temp:  [97.9 °F (36.6 °C)-98.6 °F (37 °C)] 98.2 °F (36.8 °C)  Pulse:  [69-90] 90  Resp:  [16-19] 18  SpO2:  [96 %-100 %] 97 %  BP: ()/(52-73) 138/67     Weight: 66.5 kg (146 lb 9.7 oz)  Body mass index is 21.04 kg/m².    Intake/Output Summary (Last 24 hours) at 1/25/2022 1147  Last data  filed at 1/24/2022 1800  Gross per 24 hour   Intake 500 ml   Output 2000 ml   Net -1500 ml      Physical Exam  Constitutional:       General: He is not in acute distress.     Appearance: He is ill-appearing. He is not toxic-appearing or diaphoretic.   Cardiovascular:      Rate and Rhythm: Normal rate and regular rhythm.      Heart sounds: No murmur heard.  No gallop.    Pulmonary:      Effort: Pulmonary effort is normal. No respiratory distress.      Breath sounds: Normal breath sounds. No wheezing.   Abdominal:      General: Bowel sounds are normal. There is no distension.      Palpations: Abdomen is soft.      Tenderness: There is no abdominal tenderness.   Skin:     Comments: L foot s/p multiple amputated digits, necrotic tissue at site of amputation         Significant Labs: All pertinent labs within the past 24 hours have been reviewed.    Significant Imaging: I have reviewed all pertinent imaging results/findings within the past 24 hours.      Assessment/Plan:      * Gangrene of left foot  Presents with gangrenous L foot in the setting of severe PAD. Underwent left 2-5 metatarsal amputations w/ washout and debridement w/ Vascular surgery. Initial plan for angiogram w/ intervention, however pt refused the procedure. Given the low initial probability of wound healing and infectious clearance even with vascular intervention and lack of patient ability to adhere to medical treatment, Dr. Soni recommended AKA. Ortho consulted.   - family in agreement with AKA,date of procedure planned to today..   - antibiotics per ID.    Had some EKG changes with no chest pain,cardiology cleared patient for procedure.  S/P left AKA on 1.21.22,plan is SNF placement,SW is on case.  afebrile.    Encephalopathy, metabolic  Waxing and waning confusion likely delirium secondary to ischemic foot w/ possible superimposed infection and uremia from missing dialysis. Psychiatry deemed patient not to have capacity to make medical  decisions. Overall orientation improved after starting low dose seroquel.  - psychiatry consulted, appreciate recs  - tx of underlying issues as noted elsewhere  - continue seroquel  - delirium precautions  Improved.    COVID-19 in immunocompromised patient  HD patient + for COVID 19. Not hypoxic or symptomatic. Supportive care prn.       ESRD on hemodialysis  Nephrology consulted to resume routine HD. Fistula malfunctioning despite IR attempts at declot, THDC placed for alternative access  - nephrology following  - needs vascular vs IR fix of fistula    NICM (nonischemic cardiomyopathy)  On medical treatment.      Chronic pulmonary heart disease  On medical treatment.      Pulmonary HTN  On medical treatment.      Hyperkalemia  Correction with HD.resolved with HD     CAD (coronary artery disease) - non-obstructive from [facility] in 2016  On medical treatment.      Chronic diastolic heart failure  December 2021 echo reviewed. Stable.       Benign hypertension with chronic kidney disease, stage V  On medical treatment.      Essential (primary) hypertension  Titrate to goal. Pt intermittently refuses po medications, requiring IV meds      Controlled type 2 diabetes mellitus with chronic kidney disease on chronic dialysis, with long-term current use of insulin  Pt refused insulin, has intermittent PO intake.  - sliding scale insulin for now      VTE Risk Mitigation (From admission, onward)         Ordered     heparin (porcine) injection 5,000 Units  Every 8 hours         01/22/22 0750     heparin (porcine) injection 5,000 Units  As needed (PRN)         01/14/22 1716     IP VTE HIGH RISK PATIENT  Once         01/05/22 1404     Place sequential compression device  Until discontinued         01/05/22 1404                Discharge Planning   ENIO:      Code Status: Full Code   Is the patient medically ready for discharge?:     Reason for patient still in hospital (select all that apply): Patient trending condition  Discharge Plan  A: Skilled Nursing Facility   Discharge Delays: (!) Post-Acute Set-up              Dottie Kaufman MD  Department of Hospital Medicine   [facility]

## 2022-01-25 NOTE — NURSING
easily aroused by verbal command, denies pain or sob at this time, call ball in reach and bed alarm on.

## 2022-01-25 NOTE — PROGRESS NOTES
[facility] - [place] - Telemetry [place]  Adult Nutrition   Progress Note (Follow-Up)    SUMMARY     Recommendations/Interventions:  1) Continue current renal diet as tolerated by pt, encouraging PO intake  2) Add Suplena and Layton both BID to assist in meeting needs/wound healing  3) Get updated wt post left AKA  4) Continue bowel regimen (LBM 1/21)  5) hyperglycemia, hyponatremia, renal labs    Goals:   1) Pt to consistently meet >75% of EEN/EPN by PO/ONS intake  2) Have updated wt by RD f/u  3) Consistent bowel movements by RD f/u  4) Nutrition related labs to trend within target ranges     Nutrition Goal Status: new  Communication of RD Recs:  (POC)    Dietitian Rounds Brief  1/25: Pt had left AKA 1/21. He is waiting for SNF placement. Per RN note (1/23) pt is eating well with encouragement. LBM 1/21.    1/20: D/t to AMS unable to get accurate diet information from pt. Per pt chart he has had an inconsistent diet order since being admitted. In total he has been NPO for 6-7 days out of his 15 day stay. When he did have a diet order, his reported intake was 25-50% on average. This puts him at risk for malnutrition. Pt might benefit from alternate form of nutrition. Please consult if TF or TPN recs needed.     Assessment and Plan  Nutrition Problem  Inadequate oral intake     Related to (etiology):   Inconsistent diet order, poor PO intake     Signs and Symptoms (as evidenced by):   NPO on/off adding up to 11 days, PO intake <25% on days with diet order     Interventions/Recommendations (treatment strategy):  Commercial beverage (suplena and Layton)  Mineral modified diet (Renal)  Collaboration of care with other providers     Nutrition Diagnosis Status:   Ongoing      Malnutrition Assessment:  Malnutrition Type: acute illness or injury  Energy Intake: severe energy intake  Weight Loss (Malnutrition): greater than 5% in 1 month  Energy Intake (Malnutrition): less than or equal to 50% for greater than or equal to 5 days   Severe  "Weight Loss (Malnutrition): greater than 5% in 1 month    Reason for Assessment    Reason For Assessment: length of stay  Diagnosis:  (Gangrene of left foot)  Relevant Medical History: Covid19, T2DM, CKD on dialysis, HTN, chronic diastolic heart failure, GERD    Nutrition Risk Screen    Nutrition Risk Screen: no indicators present    Nutrition/Diet History    Spiritual, Cultural Beliefs, Hinduism Practices, Values that Affect Care: no  Food Allergies: NKFA  Factors Affecting Nutritional Intake: NPO    Anthropometrics    Temp: 98.2 °F (36.8 °C)  Height Method: Stated  Height: 5' 10" (177.8 cm)  Height (inches): 70 in  Weight Method: Bed Scale  Weight: 66.5 kg (146 lb 9.7 oz)  Weight (lb): 146.61 lb  Ideal Body Weight (IBW), Male: 166 lb  % Ideal Body Weight, Male (lb): 88.32 %  BMI (Calculated): 21  BMI Grade: 18.5-24.9 - normal  Usual Body Weight (UBW), k.7 kg  % Usual Body Weight: 88.63  % Weight Change From Usual Weight: -11.56 %  Amputation %: 5.9  Total Amputation %: 5.9  Amputation Ideal Body Weight (IBW), Male (lb): 160.1 lb  Amputee BMI (kg/m2): 22.42 kg/m2       Weight History:  Wt Readings from Last 10 Encounters:   22 66.5 kg (146 lb 9.7 oz)   21 72.8 kg (160 lb 7.9 oz)   21 72.8 kg (160 lb 7.9 oz)   21 71.2 kg (156 lb 15.5 oz)   21 72.7 kg (160 lb 6.2 oz)   21 72.8 kg (160 lb 7.9 oz)   21 74.1 kg (163 lb 5.8 oz)   21 75.9 kg (167 lb 5.3 oz)   21 81.6 kg (180 lb)   21 74 kg (163 lb 2.3 oz)     Lab/Procedures/Meds: Pertinent Labs Reviewed  CBC:  Recent Labs   Lab 22  0349   WBC 17.94*   HGB 7.8*   HCT 26.5*        CMP:  Recent Labs   Lab 22  0349   CALCIUM 9.1   *   K 4.2   CO2 25   CL 93*   BUN 25*   CREATININE 5.7*     Medications:Pertinent Medications Reviewed  Scheduled Meds:   atorvastatin  20 mg Oral Daily    carvediloL  6.25 mg Oral BID    epoetin paola-epbx  10,000 Units Subcutaneous Every Tues, Thurs, Sat "    heparin (porcine)  5,000 Units Subcutaneous Q8H    insulin detemir U-100  15 Units Subcutaneous Once    pantoprazole  40 mg Oral Daily    QUEtiapine  25 mg Oral QHS    sevelamer carbonate  2,400 mg Oral TID WM    vitamin renal formula (B-complex-vitamin c-folic acid)  1 capsule Oral Daily     Continuous Infusions:  PRN Meds:.sodium chloride, sodium chloride 0.9%, acetaminophen, acetaminophen, cloNIDine, dextrose 50%, dextrose 50%, glucagon (human recombinant), glucose, glucose, heparin (porcine), HYDROmorphone, influenza, insulin aspart U-100, magnesium oxide, magnesium oxide, naloxone, OLANZapine, olanzapine zydis, ondansetron, oxyCODONE, sodium chloride 0.9%, sodium chloride 0.9%, sodium chloride 0.9%    Estimated/Assessed Needs    Weight Used For Calorie Calculations: 66.5 kg (146 lb 9.7 oz)  Energy Calorie Requirements (kcal): 3058-2608  Energy Need Method: Kcal/kg (25-35 per CKD HD)  Protein Requirements: 67-80 g (1.0-1.2)  Weight Used For Protein Calculations: 66.5 kg (146 lb 9.7 oz)  Fluid Requirements (mL): 1500  Estimated Fluid Requirement Method: other (see comments) (per HD or MD orders)   CHO Requirement: 208 g    Nutrition Prescription Ordered    Current Diet Order: NPO    Evaluation of Received Nutrient/Fluid Intake    Energy Calories Required: not meeting needs  Protein Required: not meeting needs  Fluid Required: not meeting needs  % Intake of Estimated Energy Needs: 50 - 75 %  % Meal Intake: 25 - 50 %    Intake/Output Summary (Last 24 hours) at 1/25/2022 1153  Last data filed at 1/24/2022 1800  Gross per 24 hour   Intake 500 ml   Output 2000 ml   Net -1500 ml      Nutrition Risk    Level of Risk/Frequency of Follow-up:  (1-2x/week)     Monitor and Evaluation    Food and Nutrient Intake: energy intake,food and beverage intake  Food and Nutrient Adminstration: diet order  Knowledge/Beliefs/Attitudes: food and nutrition knowledge/skill  Physical Activity and Function: nutrition-related ADLs  and IADLs  Anthropometric Measurements: weight,weight change,body mass index  Biochemical Data, Medical Tests and Procedures: electrolyte and renal panel,gastrointestinal profile,glucose/endocrine profile,inflammatory profile,lipid profile  Nutrition-Focused Physical Findings: overall appearance,extremities, muscles and bones,head and eyes,skin     Nutrition Follow-Up    RD Follow-up?: Yes

## 2022-01-25 NOTE — PLAN OF CARE
Problem: Occupational Therapy Goal  Goal: Occupational Therapy Goal  Description: Goals to be met by: 2/8/2022    Patient will increase functional independence with ADLs by performing:    Feeding with modified independence  Upper body dressing with supervision  Lower Body dressing with minimal assistance  Supine <> sit with minimal assistance   Toileting at bed level with urinal with modified independence  Pt will sit EOB for 10 min with supervision     Transfers to be assessed pending further pt participation     Outcome: Ongoing, Progressing    Pt was able to intermittently follow follow commands for participating this date, observed to still be confused and required mod redirection throughout tx session. Pt required max A for performing supine>sit for participating in seated activities; pt observed w/ decreased alertness and jerky-like movement at EOB, limiting further activities despite vital signs being stable. Pt will continue to benefit from skilled acute OT services to maximize functional capacity for safe performance w/ ADLs and functional mobility.

## 2022-01-25 NOTE — PLAN OF CARE
Recommendations:  1) Continue current renal diet as tolerated by pt, encouraging PO intake  2) Add Suplena and Layton both BID to assist in meeting needs/wound healing  3) Get updated wt post left AKA  4) Continue bowel regimen (LBM 1/21)  5) hyperglycemia, hyponatremia, renal labs    Goals:   1) Pt to consistently meet >75% of EEN/EPN by PO/ONS intake  2) Have updated wt by RD f/u  3) Consistent bowel movements by RD f/u  4) Nutrition related labs to trend within target ranges     Nutrition Goal Status: new  Communication of RD Recs:  (POC)

## 2022-01-25 NOTE — PT/OT/SLP PROGRESS
Physical Therapy Treatment    Patient Name:  Adryan Neff   MRN:  [id number]    Recommendations:     Discharge Recommendations:  nursing facility, skilled . Recommend stretcher for DC to SNF  Discharge Equipment Recommendations: walker, rolling,wheelchair   Barriers to discharge: None    Assessment:     Adryan Neff is a 56 y.o. male admitted with a medical diagnosis of Gangrene of left foot.  He presents with the following impairments/functional limitations:  weakness,decreased ROM,impaired cognition,impaired endurance,decreased coordination,impaired self care skills,decreased upper extremity function,impaired fine motor,impaired skin,decreased lower extremity function,impaired functional mobilty,decreased safety awareness,impaired balance .    Unable to progress 2/2 ongoing confusion. Poor tolerance to upright sitting at side of bed today, VSS however.     Rehab Prognosis: Fair; patient would benefit from acute skilled PT services to address these deficits and reach maximum level of function.    Recent Surgery: Procedure(s) (LRB):  AMPUTATION, ABOVE KNEE (Left) 4 Days Post-Op    Plan:     During this hospitalization, patient to be seen 5 x/week to address the identified rehab impairments via therapeutic activities,therapeutic exercises,neuromuscular re-education,wheelchair management/training and progress toward the following goals:    · Plan of Care Expires:  02/08/22    Subjective     Chief Complaint: pt unable to rationally verbalize  Patient/Family Comments/goals: n/a  Pain/Comfort:  · Pain Rating 1: 0/10      Objective:     Communicated with nurse Mane prior to session.  Patient found supine with bed alarm,peripheral IV,telemetry,central line upon PT entry to room.     General Precautions: Standard, fall,diabetic,other (see comments) (no BP LUE 2/2 AVF)   Orthopedic Precautions:LLE non weight bearing   Braces: N/A  Respiratory Status: Room air     Pt found in bed, lethargic but comfortable-appearing. Smiles. O x  "name. Confused to age ("25"). Stated he has 2 daughters, no wife.  ACE wrap on L AKA, no visible drainage noted  Follows </= 50% simple motor commands appropriately.      Functional Mobility:  · Bed Mobility:     · Scooting: dependence  · To HOB  · Supine to Sit: maximal assistance  · Pt initiates however poor follow through and difficulty following commands. Withdraws leg back in bed twice after he advances it forward off side of bed  · Sit to Supine: maximal assistance  · Balance: sits Edge of bed wih close SBA  · Intermittent, occasional jarring jerks/myoclonus of trunk and UEs  · Visibly uncomfortable. Closes eyes. Less verbal than when supine.   · BP = 128/63 mmHg; HR = 95 bpm.   · Decreased ability to follow commands therefore returned supine      AM-PAC 6 CLICK MOBILITY  Turning over in bed (including adjusting bedclothes, sheets and blankets)?: 3  Sitting down on and standing up from a chair with arms (e.g., wheelchair, bedside commode, etc.): 1  Moving from lying on back to sitting on the side of the bed?: 2  Moving to and from a bed to a chair (including a wheelchair)?: 1  Need to walk in hospital room?: 1  Climbing 3-5 steps with a railing?: 1  Basic Mobility Total Score: 9       Patient left HOB elevated with call button in reach and bed alarm on..    GOALS:   Multidisciplinary Problems     Physical Therapy Goals        Problem: Physical Therapy Goal    Goal Priority Disciplines Outcome Goal Variances Interventions   Physical Therapy Goal     PT, PT/OT Ongoing, Progressing     Description: 1. Pt to be mod I with bed mobility.  2. Pt to transfer with bed to chair with SBA.  3. Pt to propel W/C x 50'  4. B LE ther ex per handout x 10 reps with assistance as needed                   Time Tracking:     PT Received On: 01/25/22  PT Start Time: 1019     PT Stop Time: 1042  PT Total Time (min): 23 min     Billable Minutes: Therapeutic Activity 8    Treatment Type: Treatment,6th Visit  PT/PTA: PT     PTA Visit " Number: 0     01/25/2022

## 2022-01-26 VITALS
RESPIRATION RATE: 18 BRPM | SYSTOLIC BLOOD PRESSURE: 133 MMHG | HEIGHT: 70 IN | DIASTOLIC BLOOD PRESSURE: 66 MMHG | BODY MASS INDEX: 20.99 KG/M2 | TEMPERATURE: 99 F | WEIGHT: 146.63 LBS | OXYGEN SATURATION: 96 % | HEART RATE: 99 BPM

## 2022-01-26 LAB
FINAL PATHOLOGIC DIAGNOSIS: NORMAL
GROSS: NORMAL
Lab: NORMAL
POCT GLUCOSE: 161 MG/DL (ref 70–110)
POCT GLUCOSE: 215 MG/DL (ref 70–110)
POCT GLUCOSE: 232 MG/DL (ref 70–110)
POCT GLUCOSE: 264 MG/DL (ref 70–110)

## 2022-01-26 PROCEDURE — 25000003 PHARM REV CODE 250: Performed by: ORTHOPAEDIC SURGERY

## 2022-01-26 PROCEDURE — 63600175 PHARM REV CODE 636 W HCPCS: Performed by: ORTHOPAEDIC SURGERY

## 2022-01-26 PROCEDURE — 90935 HEMODIALYSIS ONE EVALUATION: CPT

## 2022-01-26 PROCEDURE — 25000003 PHARM REV CODE 250: Performed by: HOSPITALIST

## 2022-01-26 PROCEDURE — 63600175 PHARM REV CODE 636 W HCPCS: Performed by: HOSPITALIST

## 2022-01-26 RX ORDER — TRAMADOL HYDROCHLORIDE 50 MG/1
50 TABLET ORAL EVERY 6 HOURS PRN
Qty: 20 TABLET | Refills: 0 | Status: SHIPPED | OUTPATIENT
Start: 2022-01-26 | End: 2022-01-26 | Stop reason: SDUPTHER

## 2022-01-26 RX ORDER — AMLODIPINE BESYLATE 10 MG/1
10 TABLET ORAL DAILY
Qty: 30 TABLET | Refills: 11 | Status: ON HOLD
Start: 2022-01-26 | End: 2022-12-02 | Stop reason: SDUPTHER

## 2022-01-26 RX ORDER — DOCUSATE SODIUM 100 MG/1
100 CAPSULE, LIQUID FILLED ORAL 2 TIMES DAILY
Status: DISCONTINUED | OUTPATIENT
Start: 2022-01-26 | End: 2022-01-27 | Stop reason: HOSPADM

## 2022-01-26 RX ORDER — POLYETHYLENE GLYCOL 3350 17 G/17G
17 POWDER, FOR SOLUTION ORAL 2 TIMES DAILY
Status: DISCONTINUED | OUTPATIENT
Start: 2022-01-26 | End: 2022-01-27 | Stop reason: HOSPADM

## 2022-01-26 RX ORDER — LACTULOSE 10 G/15ML
30 SOLUTION ORAL DAILY
Status: DISCONTINUED | OUTPATIENT
Start: 2022-01-26 | End: 2022-01-27 | Stop reason: HOSPADM

## 2022-01-26 RX ORDER — AMLODIPINE BESYLATE 5 MG/1
10 TABLET ORAL DAILY
Status: DISCONTINUED | OUTPATIENT
Start: 2022-01-26 | End: 2022-01-27 | Stop reason: HOSPADM

## 2022-01-26 RX ORDER — TRAMADOL HYDROCHLORIDE 50 MG/1
50 TABLET ORAL EVERY 6 HOURS PRN
Qty: 20 TABLET | Refills: 0 | Status: SHIPPED | OUTPATIENT
Start: 2022-01-26 | End: 2022-02-01

## 2022-01-26 RX ADMIN — HEPARIN SODIUM 5000 UNITS: 5000 INJECTION INTRAVENOUS; SUBCUTANEOUS at 09:01

## 2022-01-26 RX ADMIN — POLYETHYLENE GLYCOL 3350 17 G: 17 POWDER, FOR SOLUTION ORAL at 12:01

## 2022-01-26 RX ADMIN — NEPHROCAP 1 CAPSULE: 1 CAP ORAL at 08:01

## 2022-01-26 RX ADMIN — SEVELAMER CARBONATE 2400 MG: 800 TABLET, FILM COATED ORAL at 04:01

## 2022-01-26 RX ADMIN — Medication 1 ENEMA: at 02:01

## 2022-01-26 RX ADMIN — DOCUSATE SODIUM 100 MG: 100 CAPSULE ORAL at 12:01

## 2022-01-26 RX ADMIN — ATORVASTATIN CALCIUM 20 MG: 10 TABLET, FILM COATED ORAL at 08:01

## 2022-01-26 RX ADMIN — HEPARIN SODIUM 5000 UNITS: 5000 INJECTION INTRAVENOUS; SUBCUTANEOUS at 10:01

## 2022-01-26 RX ADMIN — HEPARIN SODIUM 5000 UNITS: 5000 INJECTION INTRAVENOUS; SUBCUTANEOUS at 02:01

## 2022-01-26 RX ADMIN — SEVELAMER CARBONATE 2400 MG: 800 TABLET, FILM COATED ORAL at 08:01

## 2022-01-26 RX ADMIN — LACTULOSE 30 G: 10 SOLUTION ORAL at 12:01

## 2022-01-26 RX ADMIN — DOCUSATE SODIUM 100 MG: 100 CAPSULE ORAL at 09:01

## 2022-01-26 RX ADMIN — PANTOPRAZOLE SODIUM 40 MG: 40 TABLET, DELAYED RELEASE ORAL at 08:01

## 2022-01-26 RX ADMIN — QUETIAPINE FUMARATE 25 MG: 25 TABLET ORAL at 09:01

## 2022-01-26 RX ADMIN — HEPARIN SODIUM 5000 UNITS: 5000 INJECTION INTRAVENOUS; SUBCUTANEOUS at 05:01

## 2022-01-26 RX ADMIN — CARVEDILOL 6.25 MG: 6.25 TABLET, FILM COATED ORAL at 09:01

## 2022-01-26 RX ADMIN — OXYCODONE 5 MG: 5 TABLET ORAL at 12:01

## 2022-01-26 RX ADMIN — POLYETHYLENE GLYCOL 3350 17 G: 17 POWDER, FOR SOLUTION ORAL at 09:01

## 2022-01-26 NOTE — NURSING
[facility] nurse[facility]iza given a report. LLE dressing change completed. Made nurse aware pt has HD access on R chest. LBM today. All questions asked and answered. Call back number and nurse full name given.

## 2022-01-26 NOTE — NURSING
Pt had Large BM. Tramadol prescription and PPD read sheet in the chart.  Made  Kristian aware of it. Will continue to monitor.

## 2022-01-26 NOTE — NURSING
Pt given pain med and all constipation meds except for enema at Dialysis department. Will continue to monitor.

## 2022-01-26 NOTE — PROGRESS NOTES
[facility] Medicine  Progress Note    Patient Name: Adryan Neff  MRN: [id number]  Patient Class: IP- Inpatient   Admission Date: 1/5/2022  Length of Stay: 21 days  Attending Physician: Dottie Kaufman MD  Primary Care Provider: Soren Rice MD        Subjective:     Principal Problem:Gangrene of left foot        HPI:  This is a 56 y.o.male patient, with a PMHx of ESRD on hemodialysis, CAD, HTN, and DM, presenting to the ED for further evaluation of left foot pain and black discoloration that began 3 days ago. Patient states these symptoms have been ongoing for the last month. He reports being referred to a specialist but denies ever following up. He has been putting some otc medication on the foot that he believes has turned his foot more black. No trauma or injury. Patient reports associated chills. Patient states applying a creme to the foot and noticed his discoloration worsened. Patient reports he was last dialyzed yesterday. Patient denies any fever, shortness of breath, chest pain, neck pain, back pain, abdominal pain, rash, headaches, congestion, rhinorrhea, cough, sore throat, ear pain, eye pain, blurred vision, nausea, vomiting, diarrhea, dysuria, or any other associated symptoms.    Per chart review, he had an arterial u/s 12/15.   Impression:   1. High-grade severe stenosis right popliteal artery with occlusion anterior tibial artery which is reconstituted at level of DPA via collaterals.  2. 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    Cardiology has been planning to see him for revascularization options. Last podiatry visit 12/13 with Stable dry gangrene L 2nd toe and medial L 5th toe. Dorsalis pedis and posterior tibial pulses are diminished Bilaterally. Toes are cool to touch. Feet are warm proximally.There is decreased digital hair. Skin is atrophic, slightly hyperpigmented, and mildly edematous     In the ED, he was found to have worsened gangrene and  a cool extremity. Vascular surgery was consulted. He was also COVID +. Says he got a booster shot in November sometime.      Overview/Hospital Course:  Patient admitted to the hospital for eval and treatment of L foot gangrene.  ID, Ortho and Vasc consulted. Nephrology was also consulted for chronic dialysis. Patient started on broad spectrum antibiotics, and had several toes on L foot amputated by Vascular surgery.     The patient's AVF had recurrent malfunction. IR attempts at intervention without success. THDC placed for dialysis.    Pt became increasingly delirious and combative. He refused angiogram with Vascular. Psychiatry evaluated, felt that he was not able to make his own medical decisions. At this point Dr. Soni felt the patient would be a poor candidate for vascular intervention, recommended AKA. ID in agreement. Ortho consulted for AKA, daughter/POA in agreement,   S/P left AKA on 1.21.22,plan is SNF placement,SW is on case.afebrile.  Had some EKG changes with no chest pain,cardiology cleared patient for procedure.  CC today is leg pain.      Interval History: No events overnight.CC today is  Complains of leg pain  CC today is leg pain.  Review of Systems   Constitutional: Negative for chills and fever.   Respiratory: Negative for cough and shortness of breath.    Cardiovascular: Negative for chest pain and leg swelling.   Gastrointestinal: Negative for abdominal distention and abdominal pain.     Objective:     Vital Signs (Most Recent):  Temp: 97.9 °F (36.6 °C) (01/26/22 0910)  Pulse: 88 (01/26/22 1130)  Resp: 18 (01/26/22 0910)  BP: 126/69 (01/26/22 1130)  SpO2: 97 % (01/26/22 0813) Vital Signs (24h Range):  Temp:  [97.1 °F (36.2 °C)-98.8 °F (37.1 °C)] 97.9 °F (36.6 °C)  Pulse:  [79-92] 88  Resp:  [18] 18  SpO2:  [97 %-98 %] 97 %  BP: (109-173)/(59-84) 126/69     Weight: 66.5 kg (146 lb 9.7 oz)  Body mass index is 21.04 kg/m².    Intake/Output Summary (Last 24 hours) at 1/26/2022 1157  Last data  filed at 1/26/2022 0800  Gross per 24 hour   Intake 440 ml   Output --   Net 440 ml      Physical Exam  Constitutional:       General: He is not in acute distress.     Appearance: He is ill-appearing. He is not toxic-appearing or diaphoretic.   Cardiovascular:      Rate and Rhythm: Normal rate and regular rhythm.      Heart sounds: No murmur heard.  No gallop.    Pulmonary:      Effort: Pulmonary effort is normal. No respiratory distress.      Breath sounds: Normal breath sounds. No wheezing.   Abdominal:      General: Bowel sounds are normal. There is no distension.      Palpations: Abdomen is soft.      Tenderness: There is no abdominal tenderness.   Skin:     Comments: L foot s/p multiple amputated digits, necrotic tissue at site of amputation         Significant Labs: All pertinent labs within the past 24 hours have been reviewed.    Significant Imaging: I have reviewed all pertinent imaging results/findings within the past 24 hours.      Assessment/Plan:      * Gangrene of left foot  Presents with gangrenous L foot in the setting of severe PAD. Underwent left 2-5 metatarsal amputations w/ washout and debridement w/ Vascular surgery. Initial plan for angiogram w/ intervention, however pt refused the procedure. Given the low initial probability of wound healing and infectious clearance even with vascular intervention and lack of patient ability to adhere to medical treatment, Dr. Soni recommended AKA. Ortho consulted.   - family in agreement with AKA,date of procedure planned to today..   - antibiotics per ID.    Had some EKG changes with no chest pain,cardiology cleared patient for procedure.  S/P left AKA on 1.21.22,plan is SNF placement,SW is on case.  afebrile.    Encephalopathy, metabolic  Waxing and waning confusion likely delirium secondary to ischemic foot w/ possible superimposed infection and uremia from missing dialysis. Psychiatry deemed patient not to have capacity to make medical decisions.  Overall orientation improved after starting low dose seroquel.  - psychiatry consulted, appreciate recs  - tx of underlying issues as noted elsewhere  - continue seroquel  - delirium precautions  Improved.    COVID-19 in immunocompromised patient  HD patient + for COVID 19. Not hypoxic or symptomatic. Supportive care prn.       ESRD on hemodialysis  Nephrology consulted to resume routine HD. Fistula malfunctioning despite IR attempts at declot, THDC placed for alternative access  - nephrology following  - needs vascular vs IR fix of fistula    NICM (nonischemic cardiomyopathy)  On medical treatment.      Chronic pulmonary heart disease  On medical treatment.      Pulmonary HTN  On medical treatment.      Hyperkalemia  Correction with HD.resolved with HD     CAD (coronary artery disease) - non-obstructive from [facility] in 2016  On medical treatment.      Chronic diastolic heart failure  December 2021 echo reviewed. Stable.       Benign hypertension with chronic kidney disease, stage V  On medical treatment.      Essential (primary) hypertension  Titrate to goal. Pt intermittently refuses po medications, requiring IV meds      Controlled type 2 diabetes mellitus with chronic kidney disease on chronic dialysis, with long-term current use of insulin  Pt refused insulin, has intermittent PO intake.  - sliding scale insulin for now      VTE Risk Mitigation (From admission, onward)         Ordered     heparin (porcine) injection 5,000 Units  Every 8 hours         01/22/22 0750     heparin (porcine) injection 5,000 Units  As needed (PRN)         01/14/22 1716     IP VTE HIGH RISK PATIENT  Once         01/05/22 1404     Place sequential compression device  Until discontinued         01/05/22 1404                Discharge Planning   ENIO: 1/26/2022     Code Status: Full Code   Is the patient medically ready for discharge?:     Reason for patient still in hospital (select all that apply): Patient trending condition  Discharge Plan  A: Skilled Nursing Facility   Discharge Delays: (!) Post-Acute Set-up              Dottie Kaufman MD  Department of Hospital Medicine   [facility]

## 2022-01-26 NOTE — PLAN OF CARE
[facility] CASE MANAGEMENT                  WRITTEN DISCHARGE INFORMATION      APPOINTMENTS AND RESOURCES TO HELP YOU MANAGE YOUR CARE AT HOME BASED ON YOUR PREFERENCES:  (If an appointment is not scheduled for you when you leave the hospital, call your doctor to schedule a follow up visit within a week)     Follow-up Information     Soren Rice MD In 1 week.    Specialty: Family Medicine  Contact information:  [address] [id number]  [phone]                             Healthy Living Instructions to HELP MANAGE YOUR CARE AT HOME:  Things You are responsible for:  1.    Getting your prescriptions filled   2.    Taking your medications as directed, DO NOT MISS ANY DOSES!  3.    Following the diet and exercise recommended by your doctor  4.    Going to your follow-up doctor appointment. This is important because it allows the doctor to monitor your progress and determine if any changes need to made to your treatment plan.  5. If you have any questions about MANAGING YOUR CARE AT HOME Call the Nurse Care Line for 24/7 Assistance [phone]       Please answer any calls you may receive from [facility]. We want to continue to support you as you manage your healthcare needs. [facility] is happy to have the opportunity to serve you.      Thank you for choosing [facility] for your healthcare needs!  Your [facility] Case Management Team,

## 2022-01-26 NOTE — PROGRESS NOTES
Adryan Neff is a 56 y.o. male patient.    Follow for ESRD, dialysis    Patient seen while on dialysis  No new c/o, comfortable    Scheduled Meds:   amLODIPine  10 mg Oral Daily    atorvastatin  20 mg Oral Daily    carvediloL  6.25 mg Oral BID    docusate sodium  100 mg Oral BID    epoetin paola-epbx  10,000 Units Subcutaneous Every Tues, Thurs, Sat    heparin (porcine)  5,000 Units Subcutaneous Q8H    insulin detemir U-100  15 Units Subcutaneous Once    lactulose  30 g Oral Daily    pantoprazole  40 mg Oral Daily    polyethylene glycol  17 g Oral BID    QUEtiapine  25 mg Oral QHS    sevelamer carbonate  2,400 mg Oral TID WM    sodium phosphates  1 enema Rectal Once    vitamin renal formula (B-complex-vitamin c-folic acid)  1 capsule Oral Daily       Review of patient's allergies indicates:  No Known Allergies      Vital Signs Range (Last 24H):  Temp:  [97.1 °F (36.2 °C)-98.8 °F (37.1 °C)]   Pulse:  [79-92]   Resp:  [18]   BP: (109-173)/(59-84)   SpO2:  [97 %-98 %]     I & O (Last 24H):    Intake/Output Summary (Last 24 hours) at 1/26/2022 1112  Last data filed at 1/25/2022 1900  Gross per 24 hour   Intake 240 ml   Output --   Net 240 ml           Physical Exam:  General appearance: well developed, well nourished, no distress  Lungs:  clear to auscultation bilaterally and normal respiratory effort  Heart: regular rate and rhythm  Abdomen: soft, non-tender non-distented; bowel sounds normal; no masses,  no organomegaly  Extremities: no cyanosis or edema, or clubbing    Laboratory:  I have reviewed all pertinent lab results within the past 24 hours.  CBC:   Recent Labs   Lab 01/25/22  0349   WBC 17.94*   RBC 2.87*   HGB 7.8*   HCT 26.5*      MCV 92   MCH 27.2   MCHC 29.4*     CMP:   Recent Labs   Lab 01/23/22  0532 01/24/22  1202 01/25/22  0349   *   < > 149*   CALCIUM 9.3  --  9.1   ALBUMIN 2.2*  --   --    PROT 9.3*  --   --    *  --  134*   K 4.5  --  4.2   CO2 22*  --  25   CL  93*  --  93*   BUN 34*  --  25*   CREATININE 7.9*  --  5.7*   ALKPHOS 70  --   --    ALT <5*  --   --    AST 27  --   --    BILITOT 0.4  --   --     < > = values in this interval not displayed.       Imp/Plan    ESRD - on dialysis, stable, tolerated well  COVID-19 infection  (L) foot gangrene s/p amputation  HTN  DM type 2  Anemia of CKD    Continue present Rx  We'll follow for dialysis      Wil Fine  1/26/2022

## 2022-01-26 NOTE — PLAN OF CARE
[facility]     Department of Hospital Medicine     [address] [place] [address]     [phone] after hours  [phone] fax       NURSING HOME ORDERS    01/26/2022    Admit to Nursing Home:     Skilled Bed                                              Diagnoses:  Active Hospital Problems    Diagnosis  POA    *Gangrene of left foot [I96]  Yes    Encephalopathy, metabolic [G93.41]  No    COVID-19 in immunocompromised patient [U07.1, D84.9]  Yes    ESRD on hemodialysis [N18.6, Z99.2]  Not Applicable     on HD TThSat      Chronic pulmonary heart disease [I27.9]  Yes    Pulmonary HTN [I27.20]  Yes    Chronic diastolic heart failure [I50.32]  Yes    CAD (coronary artery disease) - non-obstructive from [facility] in 2016 [I25.10]  Yes    Hyperkalemia [E87.5]  Yes    Benign hypertension with chronic kidney disease, stage V [I12.0, N18.5]  Yes    Controlled type 2 diabetes mellitus with chronic kidney disease on chronic dialysis, with long-term current use of insulin [E11.22, N18.6, Z79.4, Z99.2]  Not Applicable    Essential (primary) hypertension [I10]  Yes    HLD (hyperlipidemia) [E78.5]  Yes    NICM (nonischemic cardiomyopathy) [I42.8]  Yes      Resolved Hospital Problems   No resolved problems to display.       Patient is homebound due to:  Gangrene of left foot    Allergies:Review of patient's allergies indicates:  No Known Allergies    Vitals:       Every shift (Skilled Nursing patients)    Diet,ADA 2000 calory,renal diet     Acitivities:     - Up in a chair each morning as tolerated   - Ambulate with assistance to bathroom     - May use walker, cane, or self-propelled wheelchair   - Weight bearing: as tolerated     LABS:  Per facility protocol    Nursing Precautions:     - Aspiration precautions:             - Total assistance with meals            -  Upright 90 degrees befor during and after meals             -  Suction at bedside          - Fall  precautions per nursing home protocol     - Decubitus precautions:        -  for positioning   - Pressure reducing foam mattress   - Turn patient every two hours. Use wedge pillows to anchor patient    CONSULTS:     Physical Therapy five time a week     Occupational Therapy five time a week        MISCELLANEOUS CARE:       Routine Skin for Bedridden Patients:  Apply moisture barrier cream to all    skin folds and wet areas in perineal area daily and after baths and                           all bowel movements.    Wound care ,dry dressing on left stump. Daily     Follow up with orthoppedic Aden Estrada in joint clinic next week                        DIABETES CARE:      Check blood sugar:    Fingerstick blood sugar a.m and p.m.    Fingerstick blood sugar AC and HS   Fingerstick blood sugar every 6 hours if unable to eat      Report CBG < 60 or > 400 to physician.                                          Insulin Sliding Scale          Glucose  Novolog Insulin Subcutaneous        0 - 60   Orange juice or glucose tablet, hold insulin      No insulin   201-250  2 units   251-300  4 units   301-350  6 units   351-400  8 units   >400   10 units then call physician      Medications: Discontinue all previous medication orders, if any. See new list below.       Medication List      START taking these medications    amLODIPine 10 MG tablet  Commonly known as: NORVASC  Take 1 tablet (10 mg total) by mouth once daily.        CHANGE how you take these medications    omeprazole 20 MG capsule  Commonly known as: PRILOSEC  TAKE 2 CAPSULES(40 MG) BY MOUTH EVERY DAY  What changed: how much to take     traMADoL 50 mg tablet  Commonly known as: ULTRAM  Take 1 tablet (50 mg total) by mouth every 6 (six) hours as needed for Pain.  What changed: when to take this        CONTINUE taking these medications    atorvastatin 20 MG tablet  Commonly known as: LIPITOR  Take 1 tablet (20 mg total) by mouth once daily.     insulin  glargine 100 units/mL (3mL) SubQ pen  Commonly known as: BASAGLAR KWIKPEN U-100 INSULIN  Inject 15 Units into the skin every evening.     metoprolol succinate 25 MG 24 hr tablet  Commonly known as: TOPROL-XL  Take 1 tablet (25 mg total) by mouth once daily.     RENVELA 800 mg Tab  Generic drug: sevelamer carbonate  Take 800 mg by mouth 3 (three) times daily with meals.        STOP taking these medications    amoxicillin-clavulanate 500-125mg 500-125 mg Tab  Commonly known as: AUGMENTIN     blood sugar diagnostic Strp     doxercalciferoL 0.5 MCG capsule  Commonly known as: HECTOROL     heparin (porcine) 1,000 unit/mL Soln injection     hydrALAZINE 100 MG tablet  Commonly known as: APRESOLINE     isoniazid 300 MG Tab  Commonly known as: NYDRAZID     lisinopriL 2.5 MG tablet  Commonly known as: PRINIVIL,ZESTRIL     lisinopriL 5 MG tablet  Commonly known as: PRINIVIL,ZESTRIL     MIRCERA INJ     penicillin v potassium 500 MG tablet  Commonly known as: VEETID     traZODone 50 MG tablet  Commonly known as: DESYREL                  _________________________________  Dottie Kaufman MD  01/26/2022

## 2022-01-26 NOTE — PLAN OF CARE
ADT 30 order placed for Van Transportation.  Requested  time: 5:30 pm  If transportation does not arrive at ETA time nurse will be instructed to follow protocol for transportation below:   How can I get in touch directly with dispatch, if needed?                 Non-emergent dispatch: [phone]      +++NURSING:  If Van does not arrive at requested time please call the above Non Emergent Dispatcher.  If issue not resolved please escalate to your charge nurse for further instructions.

## 2022-01-26 NOTE — PLAN OF CARE
CHANEL received call from [facility] ([facility]) informing SW that she will need nursing home to have PT/OT 5x a week. [facility] also stated she will need PPD, last BM and copy of hard script for tramadol.     CHANEL manually faxed patient PPD, today's BM and copy of tramadol script to [facility] F# [phone].    15:45  CHANEL received report information from [facility] ([facility]). Patient is going to Room 120 and [facility] is to call report to [facility] at [phone].     CHANEL provided patient's nurse Eusebio via secure chat: report information, and ambulance  for within the hour.     CHANEL spoke with patient's daughter informing her that patient will be discharging to [facility] today. Patient's daughter voiced understanding. No further needs from patient's daughter at this time.

## 2022-01-26 NOTE — NURSING
Pt completed HD. Net 2 L fluid removed. Tolerated well. VSS. NAD. Pt was medicated for pain during tx by primary RN. CVC lines heparin locked, clamped, and capped. Report given to primary RN.

## 2022-01-26 NOTE — PLAN OF CARE
[facility]  [facility] Final Note    Primary Care Provider: Soren Rice MD    Expected Discharge Date: 1/26/2022    SW spoke with patient's daughter informing her that patient will be discharging to [facility] today. Patient's daughter voiced understanding. SW discussed patient responsibilities for MANAGING YOUR HEALTH AT HOME  EDUCATION:  Things You are responsible For To Manage Your Care At Home:  1.    Getting your prescriptions filled   2.    Taking your medications as directed, DO NOT MISS ANY DOSES!  3.    Going to your follow-up doctor appointment. This is important because it  allow the doctor to monitor your progress and determine if  any changes need to made to your treatment plan.  Call [employer] Help at home number for new or repeated problems / symptoms   Call 911 for CP and / or SOB  CHANEL informed nurse Tami via secure chat that patient is ready for discharge from case management standpoint and provided report information to Tami.    Final Discharge Note (most recent)       Final Note - 01/26/22 1606          Final Note    Assessment Type Final Discharge Note     Anticipated Discharge Disposition Skilled Nursing Facility     What phone number can be called within the next 1-3 days to see how you are doing after discharge? [phone]     Hospital Resources/Appts/Education Provided Provided patient/caregiver with written discharge plan information;Appointments scheduled and added to AVS;Appointments scheduled by Navigator/Coordinator        Post-Acute Status    Post-Acute Authorization Placement     Post-Acute Placement Status Set-up Complete/Auth obtained     Coverage Medicare     Discharge Delays None known at this time                     Important Message from Medicare  Important Message from Medicare regarding Discharge Appeal Rights: Given to patient/caregiver,Explained to patient/caregiver     Date IMM was signed: 01/25/22  Time IMM was signed: 5288    Contact Info        Soren Rice MD   Specialty: Family Medicine   Relationship: PCP - General    [address] [id number]   Phone: [phone]       Next Steps: Follow up in 1 week(s)

## 2022-01-26 NOTE — PT/OT/SLP PROGRESS
Occupational Therapy      Patient Name:  Adryan Neff   MRN:  [id number]    Patient not seen today secondary to pt in dialysis. Will follow-up as able.    1/26/2022

## 2022-01-26 NOTE — SUBJECTIVE & OBJECTIVE
Interval History: No events overnight.CC today is  Complains of leg pain  CC today is leg pain.  Review of Systems   Constitutional: Negative for chills and fever.   Respiratory: Negative for cough and shortness of breath.    Cardiovascular: Negative for chest pain and leg swelling.   Gastrointestinal: Negative for abdominal distention and abdominal pain.     Objective:     Vital Signs (Most Recent):  Temp: 97.9 °F (36.6 °C) (01/26/22 0910)  Pulse: 88 (01/26/22 1130)  Resp: 18 (01/26/22 0910)  BP: 126/69 (01/26/22 1130)  SpO2: 97 % (01/26/22 0813) Vital Signs (24h Range):  Temp:  [97.1 °F (36.2 °C)-98.8 °F (37.1 °C)] 97.9 °F (36.6 °C)  Pulse:  [79-92] 88  Resp:  [18] 18  SpO2:  [97 %-98 %] 97 %  BP: (109-173)/(59-84) 126/69     Weight: 66.5 kg (146 lb 9.7 oz)  Body mass index is 21.04 kg/m².    Intake/Output Summary (Last 24 hours) at 1/26/2022 1157  Last data filed at 1/26/2022 0800  Gross per 24 hour   Intake 440 ml   Output --   Net 440 ml      Physical Exam  Constitutional:       General: He is not in acute distress.     Appearance: He is ill-appearing. He is not toxic-appearing or diaphoretic.   Cardiovascular:      Rate and Rhythm: Normal rate and regular rhythm.      Heart sounds: No murmur heard.  No gallop.    Pulmonary:      Effort: Pulmonary effort is normal. No respiratory distress.      Breath sounds: Normal breath sounds. No wheezing.   Abdominal:      General: Bowel sounds are normal. There is no distension.      Palpations: Abdomen is soft.      Tenderness: There is no abdominal tenderness.   Skin:     Comments: L foot s/p multiple amputated digits, necrotic tissue at site of amputation         Significant Labs: All pertinent labs within the past 24 hours have been reviewed.    Significant Imaging: I have reviewed all pertinent imaging results/findings within the past 24 hours.

## 2022-01-26 NOTE — PROGRESS NOTES
Received report per M. Yeon, RN via secure chat/pt arrived to [facility] via bed per transporter. Right chest wall perm cvc aspirated/flushed without difficulty noted. Maintenance hemodialysis started x 4 hours. Plan is to remove 2 L as tolerated. Tolerating tx well at this time.

## 2022-01-26 NOTE — PLAN OF CARE
Problem: Adult Inpatient Plan of Care  Goal: Plan of Care Review  Outcome: Met  Goal: Patient-Specific Goal (Individualized)  Outcome: Met  Goal: Absence of Hospital-Acquired Illness or Injury  Outcome: Met  Goal: Optimal Comfort and Wellbeing  Outcome: Met  Goal: Readiness for Transition of Care  Outcome: Met     Problem: Fall Injury Risk  Goal: Absence of Fall and Fall-Related Injury  Outcome: Met     Problem: Diabetes Comorbidity  Goal: Blood Glucose Level Within Targeted Range  Outcome: Met     Problem: Impaired Wound Healing  Goal: Optimal Wound Healing  Outcome: Met     Problem: Device-Related Complication Risk (Hemodialysis)  Goal: Safe, Effective Therapy Delivery  Outcome: Met     Problem: Hemodynamic Instability (Hemodialysis)  Goal: Effective Tissue Perfusion  Outcome: Met     Problem: Infection (Hemodialysis)  Goal: Absence of Infection Signs and Symptoms  Outcome: Met     Problem: Skin Injury Risk Increased  Goal: Skin Health and Integrity  Outcome: Met     Problem: Infection  Goal: Absence of Infection Signs and Symptoms  Outcome: Met

## 2022-01-27 NOTE — DISCHARGE SUMMARY
[facility] Medicine  Discharge Summary      Patient Name: Adryan Neff  MRN: [id number]  Patient Class: IP- Inpatient  Admission Date: 1/5/2022  Hospital Length of Stay: 21 days  Discharge Date and Time:  1.26.22  Attending Physician: No att. providers found   Discharging Provider: Dottie Kaufman MD  Primary Care Provider: Soren Rice MD      HPI:   This is a 56 y.o.male patient, with a PMHx of ESRD on hemodialysis, CAD, HTN, and DM, presenting to the ED for further evaluation of left foot pain and black discoloration that began 3 days ago. Patient states these symptoms have been ongoing for the last month. He reports being referred to a specialist but denies ever following up. He has been putting some otc medication on the foot that he believes has turned his foot more black. No trauma or injury. Patient reports associated chills. Patient states applying a creme to the foot and noticed his discoloration worsened. Patient reports he was last dialyzed yesterday. Patient denies any fever, shortness of breath, chest pain, neck pain, back pain, abdominal pain, rash, headaches, congestion, rhinorrhea, cough, sore throat, ear pain, eye pain, blurred vision, nausea, vomiting, diarrhea, dysuria, or any other associated symptoms.    Per chart review, he had an arterial u/s 12/15.   Impression:   1. High-grade severe stenosis right popliteal artery with occlusion anterior tibial artery which is reconstituted at level of DPA via collaterals.  2. 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    Cardiology has been planning to see him for revascularization options. Last podiatry visit 12/13 with Stable dry gangrene L 2nd toe and medial L 5th toe. Dorsalis pedis and posterior tibial pulses are diminished Bilaterally. Toes are cool to touch. Feet are warm proximally.There is decreased digital hair. Skin is atrophic, slightly hyperpigmented, and mildly edematous     In the ED, he was  found to have worsened gangrene and a cool extremity. Vascular surgery was consulted. He was also COVID +. Says he got a booster shot in November sometime.      Procedure(s) (LRB):  AMPUTATION, ABOVE KNEE (Left)      Hospital Course:   Patient admitted to the hospital for eval and treatment of L foot gangrene.  ID, Ortho and Vasc consulted. Nephrology was also consulted for chronic dialysis. Patient started on broad spectrum antibiotics, and had several toes on L foot amputated by Vascular surgery.     The patient's AVF had recurrent malfunction. IR attempts at intervention without success. THDC placed for dialysis.    Pt became increasingly delirious and combative. He refused angiogram with Vascular. Psychiatry evaluated, felt that he was not able to make his own medical decisions. At this point Dr. Soni felt the patient would be a poor candidate for vascular intervention, recommended AKA. ID in agreement. Ortho consulted for AKA, daughter/POA in agreement,   S/P left AKA on 1.21.22,plan is SNF placement,SW was on case.afebrile.  Had some EKG changes with no chest pain,cardiology cleared patient for procedure.  Overall his mental status is improved,his pain was controlled,patient was discharged to SNF with PCP and Ortho. Follow up as out patient.       Goals of Care Treatment Preferences:  Code Status: Full Code      Consults:   Consults (From admission, onward)        Status Ordering Provider     Inpatient consult to Social Work  Once        Provider:  (Not yet assigned)    Completed SUNITHA MISHRA     Inpatient consult to Social Work  Once        Provider:  (Not yet assigned)    Completed SUNITHA MISHRA     Inpatient consult to Interventional Radiology  Once        Provider:  Dario Espana MD    Completed SUNITHA MISHRA     Inpatient consult to Cardiology  Once        Provider:  Víctor Sutton MD    Completed SUNITHA MISHRA     Inpatient consult to Interventional Radiology   Once        Provider:  Gt Mcghee MD    Completed VICENTE JEROME     Inpatient consult to Orthopedic Surgery  Once        Provider:  Rusty Light MD    Completed ARTURO MORALES     Inpatient consult to Psychiatry  Once        Provider:  Mekhi Ndiaye MD    Completed ARTURO MORALES     Inpatient consult to Interventional Radiology  Once        Provider:  Gt Mcghee MD    Completed VICENTE JEROME     Inpatient consult to Infectious Diseases  Once        Provider:  Edi Moreno MD    Completed EDI MORENO     Inpatient consult to Interventional Radiology  Once        Provider:  (Not yet assigned)    Completed LIZBET SANTOS     Inpatient virtual consult to Hospital Medicine  Once        Provider:  Edi Moreno MD    Completed KHAI HUFF     Inpatient consult to Nephrology  Once        Provider:  Reshma Gee MD    Completed LEATHA HANKINS     Inpatient consult to Orthopedic Surgery  Once        Provider:  Joel Silva MD    Completed LEATHA HANKINS     Inpatient consult to Vascular Surgery  Once        Provider:  Arturo Morales MD    Completed LEATHA HANKINS          No new Assessment & Plan notes have been filed under this hospital service since the last note was generated.  Service: Hospital Medicine    Final Active Diagnoses:    Diagnosis Date Noted POA    PRINCIPAL PROBLEM:  Gangrene of left foot [I96] 01/05/2022 Yes    Encephalopathy, metabolic [G93.41] 01/12/2022 No    COVID-19 in immunocompromised patient [U07.1, D84.9] 01/05/2022 Yes    ESRD on hemodialysis [N18.6, Z99.2]  Not Applicable    Chronic pulmonary heart disease [I27.9]  Yes    Pulmonary HTN [I27.20] 01/26/2021 Yes    Chronic diastolic heart failure [I50.32] 10/26/2020 Yes    CAD (coronary artery disease) - non-obstructive from [facility] in 2016 [I25.10] 10/26/2020 Yes    Hyperkalemia [E87.5] 10/26/2020 Yes    Benign hypertension with chronic kidney  disease, stage V [I12.0, N18.5] 01/31/2019 Yes    Controlled type 2 diabetes mellitus with chronic kidney disease on chronic dialysis, with long-term current use of insulin [E11.22, N18.6, Z79.4, Z99.2] 01/29/2019 Not Applicable    Essential (primary) hypertension [I10] 01/29/2019 Yes    HLD (hyperlipidemia) [E78.5] 01/24/2017 Yes    NICM (nonischemic cardiomyopathy) [I42.8] 09/14/2016 Yes      Problems Resolved During this Admission:       Discharged Condition: stable    Disposition: Skilled Nursing Facility    Follow Up:   Follow-up Information     Soren Rice MD In 1 week.    Specialty: Family Medicine  Contact information:  [facility] [facility]  [place] [address] [phone]                       Patient Instructions:      Activity as tolerated       Significant Diagnostic Studies: Labs: BMP: No results for input(s): GLU, NA, K, CL, CO2, BUN, CREATININE, CALCIUM, MG in the last 48 hours., CMP No results for input(s): NA, K, CL, CO2, GLU, BUN, CREATININE, CALCIUM, PROT, ALBUMIN, BILITOT, ALKPHOS, AST, ALT, ANIONGAP, ESTGFRAFRICA, EGFRNONAA in the last 48 hours. and CBC No results for input(s): WBC, HGB, HCT, PLT in the last 48 hours.  Microbiology:   Blood Culture   Lab Results   Component Value Date    LABBLOO No Growth after 4 days.  01/09/2022    and Wound Culture: positive  Radiology: X-Ray: CXR: X-Ray Chest 1 View (CXR): No results found for this visit on 01/05/22. and X-Ray Chest PA and Lateral (CXR): No results found for this visit on 01/05/22.    Pending Diagnostic Studies:     None         Medications:  Reconciled Home Medications:      Medication List      START taking these medications    amLODIPine 10 MG tablet  Commonly known as: NORVASC  Take 1 tablet (10 mg total) by mouth once daily.        CHANGE how you take these medications    omeprazole 20 MG capsule  Commonly known as: PRILOSEC  TAKE 2 CAPSULES(40 MG) BY MOUTH EVERY DAY  What changed: how much to take     traMADoL 50 mg tablet  Commonly  known as: ULTRAM  Take 1 tablet (50 mg total) by mouth every 6 (six) hours as needed for Pain.  What changed: when to take this        CONTINUE taking these medications    atorvastatin 20 MG tablet  Commonly known as: LIPITOR  Take 1 tablet (20 mg total) by mouth once daily.     insulin glargine 100 units/mL (3mL) SubQ pen  Commonly known as: BASAGLAR KWIKPEN U-100 INSULIN  Inject 15 Units into the skin every evening.     metoprolol succinate 25 MG 24 hr tablet  Commonly known as: TOPROL-XL  Take 1 tablet (25 mg total) by mouth once daily.     RENVELA 800 mg Tab  Generic drug: sevelamer carbonate  Take 800 mg by mouth 3 (three) times daily with meals.        STOP taking these medications    amoxicillin-clavulanate 500-125mg 500-125 mg Tab  Commonly known as: AUGMENTIN     blood sugar diagnostic Strp     doxercalciferoL 0.5 MCG capsule  Commonly known as: HECTOROL     heparin (porcine) 1,000 unit/mL Soln injection     hydrALAZINE 100 MG tablet  Commonly known as: APRESOLINE     isoniazid 300 MG Tab  Commonly known as: NYDRAZID     lisinopriL 2.5 MG tablet  Commonly known as: PRINIVIL,ZESTRIL     lisinopriL 5 MG tablet  Commonly known as: PRINIVIL,ZESTRIL     MIRCERA INJ     penicillin v potassium 500 MG tablet  Commonly known as: VEETID     traZODone 50 MG tablet  Commonly known as: DESYREL            Indwelling Lines/Drains at time of discharge:   Lines/Drains/Airways     Central Venous Catheter Line                 Hemodialysis Catheter 01/14/22 1439 right internal jugular 13 days          Drain                 Hemodialysis AV Fistula Left upper arm -- days                Time spent on the discharge of patient: over 30 minutes         Dottie Kaufman MD  Department of Hospital Medicine  [facility]

## 2022-01-27 NOTE — PT/OT/SLP DISCHARGE
Physical Therapy Discharge Summary    Name: Adryan Neff  MRN: [id number]   Principal Problem: Gangrene of left foot     Patient Discharged from acute Physical Therapy on 1/26/22.  Please refer to prior PT noted date on 1/25/22 for functional status.     Assessment:     Patient appropriate for care in another setting.    Objective:     GOALS:   Multidisciplinary Problems     Physical Therapy Goals        Problem: Physical Therapy Goal    Goal Priority Disciplines Outcome Goal Variances Interventions   Physical Therapy Goal     PT, PT/OT Ongoing, Progressing     Description: 1. Pt to be mod I with bed mobility.  2. Pt to transfer with bed to chair with SBA.  3. Pt to propel W/C x 50'  4. B LE ther ex per handout x 10 reps with assistance as needed                   Reasons for Discontinuation of Therapy Services  Transfer to alternate level of care.      Plan:     Patient Discharged to: Skilled Nursing Facility.      1/27/2022

## 2022-01-27 NOTE — NURSING
EMS here to  patient. Patient's discharge packet given to EMS service. Patient unable to sign discharge paperwork. Report called to facility by previous shift.

## 2022-02-03 ENCOUNTER — TELEPHONE (OUTPATIENT)
Dept: CARDIOLOGY | Facility: CLINIC | Age: 57
End: 2022-02-03
Payer: MEDICARE

## 2022-02-03 NOTE — TELEPHONE ENCOUNTER
I called patient and I spoke to the Daughter Jonn     @ [phone], and she stated that the patient is residing at     [facility] [address] [place] [address]  Phone: [phone]    I spoke to the nurse there and letting her know that the patient     has an appointment with      monday 2-7-22 At  1;40pm.    The nurse stated & Confirmed that  Patient will be brought to    The clinic.            Nw

## 2022-02-03 NOTE — PLAN OF CARE
Patient discharged.  I will continue to follow his RLE PVD.  I will also manage his LUE AVF with an HD US on f/u appt with fistulogram / possible fistula revision.  Call with any questions or concerns.

## 2022-02-04 ENCOUNTER — TELEPHONE (OUTPATIENT)
Dept: CARDIOLOGY | Facility: CLINIC | Age: 57
End: 2022-02-04
Payer: MEDICARE

## 2022-02-04 NOTE — TELEPHONE ENCOUNTER
Returned:[facility]ias Call, they called     To confirmed appointment for Monday 2-7-2022.        Nw                                              ----- Message from Leah Benítez sent at 2/4/2022  2:51 PM CST -----  Regarding: Call  Type: Patient Call Back    Who called:[facility] Macias    What is the request in detail: Patient is requesting a call back. Please advise.    Can the clinic reply by [facility]? No    Would the patient rather a call back or a response via [facility]? Call    Best call back number: [phone]    Additional Information:    Thanks

## 2022-02-07 ENCOUNTER — OFFICE VISIT (OUTPATIENT)
Dept: CARDIOLOGY | Facility: CLINIC | Age: 57
End: 2022-02-07
Payer: MEDICARE

## 2022-02-07 VITALS
BODY MASS INDEX: 22.4 KG/M2 | SYSTOLIC BLOOD PRESSURE: 120 MMHG | DIASTOLIC BLOOD PRESSURE: 59 MMHG | WEIGHT: 160 LBS | HEART RATE: 96 BPM | HEIGHT: 71 IN

## 2022-02-07 DIAGNOSIS — I50.32 CHRONIC DIASTOLIC HEART FAILURE: ICD-10-CM

## 2022-02-07 DIAGNOSIS — I27.9 CHRONIC PULMONARY HEART DISEASE: ICD-10-CM

## 2022-02-07 DIAGNOSIS — I25.10 CORONARY ARTERY DISEASE INVOLVING NATIVE HEART WITHOUT ANGINA PECTORIS, UNSPECIFIED VESSEL OR LESION TYPE: ICD-10-CM

## 2022-02-07 DIAGNOSIS — Z79.4 CONTROLLED TYPE 2 DIABETES MELLITUS WITH CHRONIC KIDNEY DISEASE ON CHRONIC DIALYSIS, WITH LONG-TERM CURRENT USE OF INSULIN: ICD-10-CM

## 2022-02-07 DIAGNOSIS — N18.6 ESRD ON HEMODIALYSIS: ICD-10-CM

## 2022-02-07 DIAGNOSIS — I73.9 PAD (PERIPHERAL ARTERY DISEASE): ICD-10-CM

## 2022-02-07 DIAGNOSIS — I27.20 PULMONARY HTN: ICD-10-CM

## 2022-02-07 DIAGNOSIS — Z99.2 ESRD ON HEMODIALYSIS: ICD-10-CM

## 2022-02-07 DIAGNOSIS — Z99.2 CONTROLLED TYPE 2 DIABETES MELLITUS WITH CHRONIC KIDNEY DISEASE ON CHRONIC DIALYSIS, WITH LONG-TERM CURRENT USE OF INSULIN: ICD-10-CM

## 2022-02-07 DIAGNOSIS — E11.22 CONTROLLED TYPE 2 DIABETES MELLITUS WITH CHRONIC KIDNEY DISEASE ON CHRONIC DIALYSIS, WITH LONG-TERM CURRENT USE OF INSULIN: ICD-10-CM

## 2022-02-07 DIAGNOSIS — S88.112A UNILATERAL COMPLETE BKA, LEFT, INITIAL ENCOUNTER: Primary | ICD-10-CM

## 2022-02-07 DIAGNOSIS — N18.6 CONTROLLED TYPE 2 DIABETES MELLITUS WITH CHRONIC KIDNEY DISEASE ON CHRONIC DIALYSIS, WITH LONG-TERM CURRENT USE OF INSULIN: ICD-10-CM

## 2022-02-07 DIAGNOSIS — E78.00 PURE HYPERCHOLESTEROLEMIA: ICD-10-CM

## 2022-02-07 DIAGNOSIS — I10 ESSENTIAL (PRIMARY) HYPERTENSION: ICD-10-CM

## 2022-02-07 LAB
FUNGUS SPEC CULT: NORMAL
FUNGUS SPEC CULT: NORMAL

## 2022-02-07 PROCEDURE — 99214 OFFICE O/P EST MOD 30 MIN: CPT | Mod: S$PBB,,, | Performed by: INTERNAL MEDICINE

## 2022-02-07 PROCEDURE — 99213 OFFICE O/P EST LOW 20 MIN: CPT | Mod: PBBFAC,PO | Performed by: INTERNAL MEDICINE

## 2022-02-07 PROCEDURE — 99999 PR PBB SHADOW E&M-EST. PATIENT-LVL III: ICD-10-PCS | Mod: PBBFAC,,, | Performed by: INTERNAL MEDICINE

## 2022-02-07 PROCEDURE — 99214 PR OFFICE/OUTPT VISIT, EST, LEVL IV, 30-39 MIN: ICD-10-PCS | Mod: S$PBB,,, | Performed by: INTERNAL MEDICINE

## 2022-02-07 PROCEDURE — 99999 PR PBB SHADOW E&M-EST. PATIENT-LVL III: CPT | Mod: PBBFAC,,, | Performed by: INTERNAL MEDICINE

## 2022-02-07 RX ORDER — TRAMADOL HYDROCHLORIDE 50 MG/1
50 TABLET ORAL EVERY 6 HOURS PRN
Status: ON HOLD | COMMUNITY
End: 2022-11-10 | Stop reason: HOSPADM

## 2022-02-07 RX ORDER — NAPROXEN SODIUM 220 MG/1
81 TABLET, FILM COATED ORAL DAILY
COMMUNITY

## 2022-02-07 NOTE — PATIENT INSTRUCTIONS
Patient Education       Peripheral Vascular (Arterial) Disease Discharge Instructions   About this topic   Peripheral vascular disease is also called PVD. It is where the arteries that carry blood, oxygen, and nutrients to your legs, arms, or pelvis become narrow or blocked. PVD is caused by the buildup of fatty material in your arteries. This fatty material is called plaque. Plaque is a sticky substance found in the blood. The arteries begin to narrow and limit blood flow to your body.  PVD can cause numbness and pain in the leg. If not treated, it can make walking painful. PVD may lead to more serious health problems.  Treatments may lower your signs and slow how fast the disease progresses. It may also prevent other problems.  This illness is managed with drugs and lifestyle changes. There is no cure for PVD. Surgery may be needed if you have a more serious case.  What care is needed at home?   · Ask your doctor what you need to do when you go home. Make sure you ask questions if you do not understand what the doctor says. This way you will know what you need to do.  · When lying down or relaxing, raise your legs above the level of your heart.  · Wear loose fitting pants and other clothes. This will help keep good blood flow to your legs.  · When sitting, do not cross your legs.  · If you smoke, ask your doctor how to quit. Quitting is very important in treatment.  · Work with your doctor to treat other illnesses like high cholesterol or diabetes.  What follow-up care is needed?   · Your doctor may ask you to make visits to the office to check on your progress. Be sure to keep these visits.  · There may be other tests needed to check your progress.  What drugs may be needed?   The doctor may order drugs to:  · Prevent blood clots  · Help you stop smoking  · Lower high blood pressure  · Lower cholesterol levels  · Improve blood sugar control if you have diabetes  Will physical activity be limited?   Walking is  good for this illness. Talk to your doctor about an exercise program for you. You may need to see a physical therapist to find the best exercises for you.  What changes to diet are needed?   · Follow a heart healthy diet. Ask the dietitian for a plan.  · Eat foods high in fiber like fruits, vegetables, cereals, whole grains, and beans.  · Do not eat foods high in cholesterol and saturated fats like fried foods, fatty meats, sausages, and canned meats.  · Limit salty foods. Avoid canned, dried, and processed foods. Do not add salt to your food. Season food with herbs when you cook.  · Limit caffeine intake.  · Avoid beer, wine, and mixed drinks (alcohol). Ask for help to stop you from drinking.  What problems could happen?   · Problems with your heart  · Stroke  · Less able to get around  When do I need to call the doctor?   Activate the emergency medical system right away if you have signs of stroke or heart attack. Call 911 in the [place] or [place]. The sooner treatment begins, the better your chances for recovery. Call for emergency help right away if you have:  · Signs of stroke:  ? Sudden numbness or weakness of the face, arm, or leg, especially on one side of the body  ? Sudden confusion, trouble speaking or understanding  ? Sudden trouble seeing in one or both eyes  ? Sudden trouble walking, dizziness, loss of balance or coordination  ? Sudden severe headache with no known cause  · Signs of heart attack:  ? Chest pain  ? Trouble breathing  ? Fast heartbeat  ? Feeling dizzy  Call your doctor if you have:  · Very bad pain in the leg or foot  · Color of foot changes to blue or gray and becomes cold  · Feelings of being very tired or weak  Teach Back: Helping You Understand   The Teach Back Method helps you understand the information we are giving you. After you talk with the staff, tell them in your own words what you learned. This helps to make sure the staff has described each thing clearly. It also helps  to explain things that may have been confusing. Before going home, make sure you can do these:  · I can tell you about my condition.  · I can tell you what I can do to help keep good blood flow to my legs.  · I can tell you what I will do if I have signs of a heart attack or stroke.  Where can I learn more?   Centers for Disease Control and Prevention  https://www.cdc.gov/heartdisease/PAD.htm   NHS Choices  https://www.nhs.uk/conditions/peripheral-arterial-disease-pad/treatment/   Last Reviewed Date   2020-02-26  Consumer Information Use and Disclaimer   This information is not specific medical advice and does not replace information you receive from your health care provider. This is only a brief summary of general information. It does NOT include all information about conditions, illnesses, injuries, tests, procedures, treatments, therapies, discharge instructions or life-style choices that may apply to you. You must talk with your health care provider for complete information about your health and treatment options. This information should not be used to decide whether or not to accept your health care providers advice, instructions or recommendations. Only your health care provider has the knowledge and training to provide advice that is right for you.  Copyright   Copyright © 2021 [employer] and its affiliates and/or licensors. All rights reserved.

## 2022-02-07 NOTE — PROGRESS NOTES
Subjective:   Patient ID:  Adryan Neff is a 56 y.o. male who presents for management of Peripheral Arterial Disease, Hyperlipidemia, and Hypertension      HPI:     56-year-old male with past medical history of nonobstructive coronary disease, hypertension, hyperlipidemia, diabetes, end-stage renal disease on dialysis, deconditioned, frail, malnourished, PAD, nursing home resident, and complete left BKA due to chronic limb threatening ischemia presents today to establish care.        He developed a left toe ulcer in November 2021. He was seen in podiatry referred for vascular evaluation.  He had an DELLA, ultrasound, and echocardiogram.  Unfortunately he did not follow up immediately. He did not show up for his visit December 8, 2021.  Thereafter gangrene of left toe deteriorated involving all the toes. He had 2-5th toes amputated. Thereafter he was offered revascularization by he declined the procedure.  He ultimately had a left BKA January 21, 2022. He is currently in a nursing home for rehabilitation.       Echo 12/2021      Normal EF   Grade I DD   Mild DARREL   Mod LAE   Mod to severe MR   Normal RV fn       DELLA 12/2021     R 1.34   L 1.34    TBI 12/2021     R 0   L 0          US 12/2021 and 1/2022 were reviewed             Patient Active Problem List    Diagnosis Date Noted    Unilateral complete BKA, left, initial encounter 02/07/2022    PAD (peripheral artery disease) 02/07/2022    Encephalopathy, metabolic 01/12/2022    Gangrene of left foot 01/05/2022    COVID-19 in immunocompromised patient 01/05/2022    Nuclear sclerosis of right eye 11/24/2021    Pseudophakia 11/24/2021    Paronychia, toe, left 11/11/2021    ESRD on hemodialysis      on HD TThSat      Chronic pulmonary heart disease     TB lung, latent 04/2021    Pre-transplant evaluation for kidney transplant 01/07/2021    Intractable vomiting 10/26/2020    Chronic diastolic heart failure 10/26/2020    CAD (coronary artery disease) -  non-obstructive from [facility] in 2016 10/26/2020    Hyperkalemia 10/26/2020    Other insomnia 2019    Gastroesophageal reflux disease 2019    Pure hypercholesterolemia 2019    Benign hypertension with chronic kidney disease, stage V 2019    Controlled type 2 diabetes mellitus with chronic kidney disease on chronic dialysis, with long-term current use of insulin 2019    Essential (primary) hypertension 2019    Secondary hyperparathyroidism of renal origin 10/02/2017    PDR (proliferative diabetic retinopathy) 2017    HLD (hyperlipidemia) 2017    NICM (nonischemic cardiomyopathy) 2016           Right Arm BP - Sittin/59  Reason for not completing BP on both arms: AV shunt        LABS      LAST HbA1c  Lab Results   Component Value Date    HGBA1C 6.1 (H) 2021       Lipid panel  Lab Results   Component Value Date    CHOL 186 2021    CHOL 241 (H) 2019     Lab Results   Component Value Date    HDL 67 2021    HDL 63 2019     Lab Results   Component Value Date    LDLCALC 94.4 2021    LDLCALC 148.6 2019     Lab Results   Component Value Date    TRIG 123 2021    TRIG 147 2019     Lab Results   Component Value Date    CHOLHDL 36.0 2021    CHOLHDL 26.1 2019            Review of Systems   Constitutional: Positive for malaise/fatigue and weight loss.   Musculoskeletal: Positive for muscle weakness.   Genitourinary: Positive for bladder incontinence and incomplete emptying.   Psychiatric/Behavioral: Positive for depression.       Objective:   Physical Exam  Constitutional:       Appearance: He is underweight.   HENT:      Head: Normocephalic and atraumatic.      Jaw: There is normal jaw occlusion.   Eyes:      General: Lids are normal.      Extraocular Movements: Extraocular movements intact.   Cardiovascular:      Rate and Rhythm: Regular rhythm.      Pulses:           Femoral pulses are 2+ on the  right side and 2+ on the left side.       Popliteal pulses are 1+ on the right side and 1+ on the left side.        Dorsalis pedis pulses are 0 on the right side. Left dorsalis pedis pulse not accessible.        Posterior tibial pulses are 0 on the right side. Left posterior tibial pulse not accessible.      Heart sounds: Murmur heard.        Comments:     Faint R DP and PT doppler signals          Pulmonary:      Effort: Pulmonary effort is normal.      Breath sounds: Normal breath sounds.   Abdominal:      General: Abdomen is flat.      Palpations: Abdomen is soft.   Genitourinary:     Comments:     Wearing adult diapers       Musculoskeletal:      Cervical back: Full passive range of motion without pain, normal range of motion and neck supple.      Comments:   Global weakness       S/p L BKA        Skin:     Comments:     Stable L BKA site     No right foot ulcers    See images          Neurological:      Sensory: Sensation is intact.      Motor: Motor function is intact.      Comments:   Weak      Sitting in a wheelchair          Psychiatric:         Attention and Perception: He is inattentive.         Mood and Affect: Mood is depressed. Affect is blunt.         Behavior: Behavior is cooperative.                                       2/2022                Assessment:     1. Unilateral complete BKA, left, initial encounter    2. Pulmonary HTN    3. Chronic pulmonary heart disease    4. Essential (primary) hypertension    5. Pure hypercholesterolemia    6. Chronic diastolic heart failure    7. Coronary artery disease involving native heart without angina pectoris, unspecified vessel or lesion type    8. ESRD on hemodialysis    9. Controlled type 2 diabetes mellitus with chronic kidney disease on chronic dialysis, with long-term current use of insulin    10. PAD (peripheral artery disease)        Plan:       Continue rehabilitation at the nursing facility.  Continue wound care of L BKA stump. Once left BKA is  declared completely healed hopefully can be fitted for a prosthetic.  Medical therapy for nonobstructive coronary disease and peripheral tear disease.  Address malnutrition with supplementation.  Medical management of depression.      Follow-up with cardiology 6 months to adjust guideline directed medical therapy for peripheral arterial disease and non obstructive   coronary artery disease.        Aspirin + statin + beta/blocker  Will add acei/arb after follow up         Continue with current medical plan and lifestyle changes.  Return sooner for concerns or questions. If symptoms persist go to the ED  I have reviewed all pertinent data on this patient       I have reviewed the patient's medical history in detail and updated the computerized patient record.    Orders Placed This Encounter   Procedures    CBC Auto Differential     Standing Status:   Future     Standing Expiration Date:   8/7/2023    Comprehensive Metabolic Panel     Standing Status:   Future     Standing Expiration Date:   8/7/2023    Lipid Panel     Standing Status:   Future     Standing Expiration Date:   8/7/2023       Follow up as scheduled. Return sooner for concerns or questions            He expressed verbal understanding and agreed with the plan        Patient's Medications   New Prescriptions    No medications on file   Previous Medications    AMLODIPINE (NORVASC) 10 MG TABLET    Take 1 tablet (10 mg total) by mouth once daily.    ASPIRIN 81 MG CHEW    Take 81 mg by mouth once daily.    ATORVASTATIN (LIPITOR) 20 MG TABLET    Take 1 tablet (20 mg total) by mouth once daily.    INSULIN (BASAGLAR KWIKPEN U-100 INSULIN) GLARGINE 100 UNITS/ML (3ML) SUBQ PEN    Inject 15 Units into the skin every evening.    METOPROLOL SUCCINATE (TOPROL-XL) 25 MG 24 HR TABLET    Take 1 tablet (25 mg total) by mouth once daily.    OMEPRAZOLE (PRILOSEC) 20 MG CAPSULE    TAKE 2 CAPSULES(40 MG) BY MOUTH EVERY DAY    RENVELA 800 MG TAB    Take 800 mg by mouth 3  (three) times daily with meals.    TRAMADOL (ULTRAM) 50 MG TABLET    Take 50 mg by mouth every 6 (six) hours as needed for Pain.   Modified Medications    No medications on file   Discontinued Medications    No medications on file

## 2022-02-18 ENCOUNTER — TELEPHONE (OUTPATIENT)
Dept: CARDIOLOGY | Facility: CLINIC | Age: 57
End: 2022-02-18
Payer: MEDICARE

## 2022-02-18 ENCOUNTER — TELEPHONE (OUTPATIENT)
Dept: FAMILY MEDICINE | Facility: CLINIC | Age: 57
End: 2022-02-18
Payer: MEDICARE

## 2022-02-18 NOTE — TELEPHONE ENCOUNTER
Reached out to [facility] today ,    They were looking for someone to sign orders for    Nurse visit for vitals, or assisting pt with their needs.    I advised them to call patient   PCP  .        Nw                             ----- Message from Anita Prasad sent at 2/18/2022 10:59 AM CST -----  Type: Requesting to speak with nurse         Who Called: Yanni- [facility]  Regarding: Home health came from hospital- wondering if the dr would sign the order for him please advise   Would the patient rather a call back or a response via [facility]? Call back  Best Call Back Number: [phone]  Additional Information: fax# [phone]

## 2022-02-18 NOTE — TELEPHONE ENCOUNTER
----- Message from Gema Hammond sent at 2/18/2022 11:24 AM CST -----  Regarding: Requesting call back  Name of Who is Calling:  Yanni with [facility]         What is the request in detail: Yanni said she received home health order regarding pt and wants to know if the Dr will follow the home health orders and also sign. Ekta is requesting call back           Can the clinic reply by [facility]: NO           What Number to Call Back if not in [facility]: [phone]

## 2022-02-21 ENCOUNTER — TELEPHONE (OUTPATIENT)
Dept: VASCULAR SURGERY | Facility: CLINIC | Age: 57
End: 2022-02-21
Payer: MEDICARE

## 2022-02-21 NOTE — TELEPHONE ENCOUNTER
LM to return call.    ----- Message from Katie El sent at 2/18/2022 10:50 AM CST -----  Regarding: concerns  Name of Who is Calling: Srinivasan Liao [facility]           What is the request in detail: Yanni is requesting a call back in regards to orders for patient to get home health . Fax number is [phone]           Can the clinic reply by [facility]: No           What Number to Call Back if not in [facility]: [phone]

## 2022-02-23 ENCOUNTER — TELEPHONE (OUTPATIENT)
Dept: VASCULAR SURGERY | Facility: CLINIC | Age: 57
End: 2022-02-23
Payer: MEDICARE

## 2022-02-23 NOTE — TELEPHONE ENCOUNTER
----- Message from Andreina Ramirez sent at 2/23/2022  9:14 AM CST -----  Contact: [facility] [phone]  Type: Patient Call Back    Who called: [facility]    What is the request in detail: Need to speak to nurse in regards to patient's AV Graft. Please call.     Would the patient rather a call back or a response via [facility]? Call back    Best call back number: [phone]

## 2022-02-25 LAB
ACID FAST MOD KINY STN SPEC: NORMAL
ACID FAST MOD KINY STN SPEC: NORMAL
MYCOBACTERIUM SPEC QL CULT: NORMAL
MYCOBACTERIUM SPEC QL CULT: NORMAL

## 2022-03-09 ENCOUNTER — TELEPHONE (OUTPATIENT)
Dept: VASCULAR SURGERY | Facility: CLINIC | Age: 57
End: 2022-03-09
Payer: MEDICARE

## 2022-03-09 NOTE — TELEPHONE ENCOUNTER
Spoke to [employer] and informed the home health company that the pt has not seen the DrBrittney In several months due to no shows or him cancelling the appts.       ----- Message from Mary Balderas sent at 3/9/2022  1:01 PM CST -----  Regarding: radha with [employer]  .Type: Patient Call Back    Who called: Radha with [employer]     What is the request in detail:left previous messages - 2/18/22 & 2/22/22 confirming that Dr Soni would be following care and signing home health orders. Please call     Can the clinic reply by [facility]?    Would the patient rather a call back or a response via [facility]? Call     Best call back number: [phone]

## 2022-03-11 ENCOUNTER — TELEPHONE (OUTPATIENT)
Dept: VASCULAR SURGERY | Facility: CLINIC | Age: 57
End: 2022-03-11
Payer: MEDICARE

## 2022-03-11 NOTE — TELEPHONE ENCOUNTER
Spoke to pt and appt was scheduled and confirmed    ----- Message from Camacho Sanchez MA sent at 3/11/2022  1:57 PM CST -----  Type: Patient Call Back    Who called:, call dropped before I could get her name ..    What is the request in detail:pt. Needs a appt. To address a catheter being taken out ...    Can the clinic reply by [facility]?no    Would the patient rather a call back or a response via [facility]? yes    Best call back number:[phone]

## 2022-03-16 ENCOUNTER — NURSE TRIAGE (OUTPATIENT)
Dept: ADMINISTRATIVE | Facility: CLINIC | Age: 57
End: 2022-03-16
Payer: MEDICARE

## 2022-03-16 ENCOUNTER — HOSPITAL ENCOUNTER (EMERGENCY)
Facility: HOSPITAL | Age: 57
Discharge: HOME | End: 2022-03-16
Attending: EMERGENCY MEDICINE
Payer: MEDICARE

## 2022-03-16 VITALS
SYSTOLIC BLOOD PRESSURE: 168 MMHG | DIASTOLIC BLOOD PRESSURE: 82 MMHG | WEIGHT: 140 LBS | RESPIRATION RATE: 20 BRPM | HEART RATE: 82 BPM | BODY MASS INDEX: 19.6 KG/M2 | OXYGEN SATURATION: 100 % | TEMPERATURE: 99 F | HEIGHT: 71 IN

## 2022-03-16 DIAGNOSIS — L08.9 WOUND INFECTION: Primary | ICD-10-CM

## 2022-03-16 DIAGNOSIS — N18.6 ESRD (END STAGE RENAL DISEASE): ICD-10-CM

## 2022-03-16 DIAGNOSIS — D64.9 ANEMIA, UNSPECIFIED TYPE: ICD-10-CM

## 2022-03-16 DIAGNOSIS — T14.8XXA WOUND INFECTION: Primary | ICD-10-CM

## 2022-03-16 LAB
ALBUMIN SERPL BCP-MCNC: 3.3 G/DL (ref 3.5–5.2)
ALP SERPL-CCNC: 113 U/L (ref 55–135)
ALT SERPL W/O P-5'-P-CCNC: 19 U/L (ref 10–44)
ANION GAP SERPL CALC-SCNC: 10 MMOL/L (ref 8–16)
AST SERPL-CCNC: 12 U/L (ref 10–40)
BASOPHILS # BLD AUTO: 0.07 K/UL (ref 0–0.2)
BASOPHILS NFR BLD: 0.9 % (ref 0–1.9)
BILIRUB SERPL-MCNC: 0.4 MG/DL (ref 0.1–1)
BUN SERPL-MCNC: 42 MG/DL (ref 6–20)
CALCIUM SERPL-MCNC: 9.3 MG/DL (ref 8.7–10.5)
CHLORIDE SERPL-SCNC: 104 MMOL/L (ref 95–110)
CO2 SERPL-SCNC: 26 MMOL/L (ref 23–29)
CREAT SERPL-MCNC: 9.5 MG/DL (ref 0.5–1.4)
DIFFERENTIAL METHOD: ABNORMAL
EOSINOPHIL # BLD AUTO: 0.7 K/UL (ref 0–0.5)
EOSINOPHIL NFR BLD: 8 % (ref 0–8)
ERYTHROCYTE [DISTWIDTH] IN BLOOD BY AUTOMATED COUNT: 16.6 % (ref 11.5–14.5)
EST. GFR  (AFRICAN AMERICAN): 6 ML/MIN/1.73 M^2
EST. GFR  (NON AFRICAN AMERICAN): 5 ML/MIN/1.73 M^2
GLUCOSE SERPL-MCNC: 138 MG/DL (ref 70–110)
HCT VFR BLD AUTO: 31.6 % (ref 40–54)
HGB BLD-MCNC: 9.4 G/DL (ref 14–18)
IMM GRANULOCYTES # BLD AUTO: 0.03 K/UL (ref 0–0.04)
IMM GRANULOCYTES NFR BLD AUTO: 0.4 % (ref 0–0.5)
LACTATE SERPL-SCNC: 1.3 MMOL/L (ref 0.5–2.2)
LYMPHOCYTES # BLD AUTO: 1.4 K/UL (ref 1–4.8)
LYMPHOCYTES NFR BLD: 16.6 % (ref 18–48)
MCH RBC QN AUTO: 28.4 PG (ref 27–31)
MCHC RBC AUTO-ENTMCNC: 29.7 G/DL (ref 32–36)
MCV RBC AUTO: 96 FL (ref 82–98)
MONOCYTES # BLD AUTO: 0.6 K/UL (ref 0.3–1)
MONOCYTES NFR BLD: 7.4 % (ref 4–15)
NEUTROPHILS # BLD AUTO: 5.5 K/UL (ref 1.8–7.7)
NEUTROPHILS NFR BLD: 66.7 % (ref 38–73)
NRBC BLD-RTO: 0 /100 WBC
PLATELET # BLD AUTO: 255 K/UL (ref 150–450)
PMV BLD AUTO: 10.3 FL (ref 9.2–12.9)
POCT GLUCOSE: 189 MG/DL (ref 70–110)
POTASSIUM SERPL-SCNC: 4.9 MMOL/L (ref 3.5–5.1)
PROT SERPL-MCNC: 8.8 G/DL (ref 6–8.4)
RBC # BLD AUTO: 3.31 M/UL (ref 4.6–6.2)
SODIUM SERPL-SCNC: 140 MMOL/L (ref 136–145)
WBC # BLD AUTO: 8.15 K/UL (ref 3.9–12.7)

## 2022-03-16 PROCEDURE — 80053 COMPREHEN METABOLIC PANEL: CPT | Performed by: EMERGENCY MEDICINE

## 2022-03-16 PROCEDURE — 87040 BLOOD CULTURE FOR BACTERIA: CPT | Mod: 59 | Performed by: EMERGENCY MEDICINE

## 2022-03-16 PROCEDURE — 99285 EMERGENCY DEPT VISIT HI MDM: CPT | Mod: 25

## 2022-03-16 PROCEDURE — 25000003 PHARM REV CODE 250: Performed by: EMERGENCY MEDICINE

## 2022-03-16 PROCEDURE — 82962 GLUCOSE BLOOD TEST: CPT

## 2022-03-16 PROCEDURE — 96365 THER/PROPH/DIAG IV INF INIT: CPT

## 2022-03-16 PROCEDURE — 85025 COMPLETE CBC W/AUTO DIFF WBC: CPT | Performed by: EMERGENCY MEDICINE

## 2022-03-16 PROCEDURE — 83605 ASSAY OF LACTIC ACID: CPT | Performed by: EMERGENCY MEDICINE

## 2022-03-16 RX ORDER — DOXYCYCLINE 100 MG/1
100 CAPSULE ORAL 2 TIMES DAILY
Qty: 20 CAPSULE | Refills: 0 | Status: SHIPPED | OUTPATIENT
Start: 2022-03-16 | End: 2022-03-26

## 2022-03-16 RX ADMIN — DOXYCYCLINE 100 MG: 100 INJECTION, POWDER, LYOPHILIZED, FOR SOLUTION INTRAVENOUS at 06:03

## 2022-03-16 NOTE — ED PROVIDER NOTES
Encounter Date: 3/16/2022       History     Chief Complaint   Patient presents with    Staple Problem     Pt to triage with family stating that one of his staples fell out of his knee. Family poor historian and couldn't remember when surgery happened. Family states minor bleeding after staple fell out controlled with a band-aid. There is a strong foul odor eminating from surgery site through clothing.     56-year-old male with history of diabetes, end-stage renal disease on HD Monday Wednesday Friday, hypertension, history of left above the knee amputation January 22nd presents for wound evaluation.  Patient has staples in place from procedure done January 22nd.  The patient has not had recent follow-up in clinic due to no-shows and cancelling appointments.  The patient did not go to dialysis today but went yesterday due to transportation issues.  He denies fever or worsening pain.  He has noticed some draining from the wound over the past several days.    The history is provided by the patient and a relative. The history is limited by a language barrier. A  was used ([id number]).     Review of patient's allergies indicates:  No Known Allergies  Past Medical History:   Diagnosis Date    CAD (coronary artery disease) 2016    CAD (non obstructive) 2016 [facility]    Diabetes mellitus type I     Diabetic retinopathy     ESRD on hemodialysis     on HD TThSat    Gangrene of left foot     Hypertension     Nuclear sclerosis of right eye 11/24/2021    PAD (peripheral artery disease)     Pulmonary HTN     TB lung, latent 04/2021     Past Surgical History:   Procedure Laterality Date    ABOVE-KNEE AMPUTATION Left 1/18/2022    Procedure: AMPUTATION, ABOVE KNEE;  Surgeon: Rusty Light MD;  Location: [facility] OR;  Service: Orthopedics;  Laterality: Left;    ABOVE-KNEE AMPUTATION Left 1/21/2022    Procedure: AMPUTATION, ABOVE KNEE;  Surgeon: Rusty Lgiht MD;  Location: [facility] OR;  Service:  Orthopedics;  Laterality: Left;    AV FISTULA PLACEMENT      CATARACT EXTRACTION Left     EYE SURGERY      TOE AMPUTATION Left 1/6/2022    Procedure: AMPUTATION, TOE;  Surgeon: Masoud Soni MD;  Location: [facility] OR;  Service: Vascular;  Laterality: Left;  Left first through fifth toes, possible open transmetatarsal amputation and all other indicated procedures     Family History   Problem Relation Age of Onset    No Known Problems Mother     No Known Problems Father     No Known Problems Sister     No Known Problems Brother     No Known Problems Daughter     No Known Problems Daughter     No Known Problems Daughter     No Known Problems Brother     No Known Problems Maternal Aunt     No Known Problems Maternal Uncle     No Known Problems Paternal Aunt     No Known Problems Paternal Uncle     No Known Problems Maternal Grandmother     No Known Problems Maternal Grandfather     No Known Problems Paternal Grandmother     No Known Problems Paternal Grandfather      Social History     Tobacco Use    Smoking status: Never Smoker    Smokeless tobacco: Never Used   Substance Use Topics    Alcohol use: No    Drug use: No     Review of Systems   Constitutional: Negative for chills and fever.   HENT: Negative for facial swelling.    Respiratory: Negative for shortness of breath.    Cardiovascular: Negative for chest pain.   Gastrointestinal: Negative for abdominal pain and vomiting.   Skin: Positive for wound. Negative for color change.   Allergic/Immunologic: Negative for immunocompromised state.   Neurological: Negative for headaches.       Physical Exam     Initial Vitals [03/16/22 1701]   BP Pulse Resp Temp SpO2   (!) 182/77 94 16 98.3 °F (36.8 °C) 100 %      MAP       --         Physical Exam    Nursing note and vitals reviewed.  Constitutional: He appears well-developed and well-nourished. He is not diaphoretic. No distress.   HENT:   Head: Normocephalic and atraumatic.   Eyes: Conjunctivae  are normal.   Neck: Neck supple.   Cardiovascular: Normal rate and regular rhythm.   Fistula to left upper extremity with palpable thrill   Pulmonary/Chest: Breath sounds normal. No respiratory distress.   Abdominal: Abdomen is soft. Bowel sounds are normal.   Musculoskeletal:         General: No edema.      Cervical back: Neck supple.     Neurological: He is alert and oriented to person, place, and time. GCS score is 15. GCS eye subscore is 4. GCS verbal subscore is 5. GCS motor subscore is 6.   Skin: Skin is warm and dry.   Left above the knee amputation, staples in present.  At the middle of the incision, there is some wound dehiscence with slight purulent exudate verses fibrinous discharge present.  There is no surrounding erythema, there is no fluctuant swelling, there is tenderness to palpation.   Psychiatric: His affect is labile.   Patient cries intermittently during exam, family member at bedside states this is his baseline             ED Course   Suture Removal    Date/Time: 3/16/2022 7:03 PM  Location procedure was performed: [facility] EMERGENCY DEPARTMENT  Performed by: Roberta Vaughan MD  Authorized by: Roberta Vaughan MD   Wound Appearance: draining and purulent  Staples Removed: 56  Post-removal: no dressing applied and antibiotic ointment applied  Patient tolerance: Patient tolerated the procedure well with no immediate complications        Labs Reviewed   CBC W/ AUTO DIFFERENTIAL - Abnormal; Notable for the following components:       Result Value    RBC 3.31 (*)     Hemoglobin 9.4 (*)     Hematocrit 31.6 (*)     MCHC 29.7 (*)     RDW 16.6 (*)     Eos # 0.7 (*)     Lymph % 16.6 (*)     All other components within normal limits   COMPREHENSIVE METABOLIC PANEL - Abnormal; Notable for the following components:    Glucose 138 (*)     BUN 42 (*)     Creatinine 9.5 (*)     Total Protein 8.8 (*)     Albumin 3.3 (*)     eGFR if  6 (*)     eGFR if non  5 (*)     All other  components within normal limits   POCT GLUCOSE - Abnormal; Notable for the following components:    POCT Glucose 189 (*)     All other components within normal limits   CULTURE, BLOOD   CULTURE, BLOOD   LACTIC ACID, PLASMA   LACTIC ACID, PLASMA     EKG Readings: (Independently Interpreted)   Sinus tachycardia, rate 104 beats per minute, normal CA interval, QTC 49 milliseconds, there is some ST depression with T-wave inversions       Imaging Results          US Soft Tissue, Lower Extremity, Left (Final result)  Result time 03/16/22 20:12:16   Procedure changed from US Extremity Non Vascular Complete Left     Final result by Lalita Mianya MD (03/16/22 20:12:16)                 Impression:      Phlegmonous tissue.  Subcutaneous edema.      Electronically signed by: Lalita Minaya  Date:    03/16/2022  Time:    20:12             Narrative:    EXAMINATION:  ULTRASOUND SOFT TISSUE LOWER EXTREMITY, LEFT    CLINICAL HISTORY:  Staple fell out at the incision site of an above the knee amputation.  Yellow discharge along the amputation incision.    TECHNIQUE:  Real-time ultrasound of the stump of an above knee amputation was performed.    COMPARISON:  None.    FINDINGS:  Edematous changes are seen involving the soft tissue.  There is increased vascularity near the incision site, which may suggest phlegmonous tissue.  No discrete drainable abscess is seen at this time.                               X-Ray Chest AP Portable (Final result)  Result time 03/16/22 18:49:10    Final result by Jerad Blanco MD (03/16/22 18:49:10)                 Impression:      New perihilar airspace opacities.    Cardiomegaly with mild pulmonary vascular congestion, suggestive of underlying fluid overload state.    Right-sided catheter tip in the right atrium.      Electronically signed by: Jerad Blanco MD  Date:    03/16/2022  Time:    18:49             Narrative:    EXAMINATION:  XR CHEST AP PORTABLE    CLINICAL  HISTORY:  Sepsis;    TECHNIQUE:  Single frontal view of the chest was performed.    COMPARISON:  01/05/2022.    FINDINGS:  There is a new right-sided vascular access catheter with its tip in the right atrium.  There is stable appearance of a vascular stent in the left subclavian region.  Monitoring EKG leads are present.    The trachea is unremarkable.  There is stable enlargement of the cardiac silhouette.  There is no evidence of free air beneath the hemidiaphragms.  There are no pleural effusions.  There is no evidence of a pneumothorax.  There is no evidence of pneumomediastinum.  There are new perihilar airspace opacities.  There is also mild pulmonary vascular congestion.  The osseous structures are unremarkable.                                 Medications   doxycycline (VIBRAMYCIN) 100 mg in dextrose 5 % 250 mL IVPB (0 mg Intravenous Stopped 3/16/22 1932)     Medical Decision Making:   Initial Assessment:   56-year-old male with history diabetes, end-stage renal disease on hemodialysis, 2 months status post a left above the knee amputation presents with wound infection.  Patient with staples in place, on chart review, he has canceled or no showed to several appointments.  On exam, he has staples in place, at the center of the incision there is slight dehiscence with slight purulence verses fibrinous tissue.  No surrounding erythema, no fluctuance swelling, no tenderness.  I suspect local wound infection verses reaction to staples.  I plan on removing the staples, placing Steri-Strips.  Workup initiated with labs to assess for any systemic infection although I think it is unlikely.  ED Management:  I removed 56 staples from the patient's above the knee amputation, noted to have some purulent drainage coming from the wound with staple removal, there is no surrounding erythema.  The patient tolerated the procedure with some difficulty but overall did a good job.    Update: Care signed out to Dr. Mathew pending  US and final disposition. Patient and family member at bedside updated.   Roberta Vaughan MD   7:24 PM    Morgan Mathew  1924 - accepted care patient pending ultrasound.  Ultrasound without evidence of abscess.  Wound with an area of drainage though no redness, cellulitis, tenderness.  No other secondary signs of infection.  Prescription for doxycycline given.  Discussed follow up with surgery and return precautions..             ED Course as of 03/16/22 2033   Wed Mar 16, 2022   1911 Labs show no leukocytosis, anemia peers improved from baseline, lactic acid within normal limits, patient's chest x-ray does show some vascular congestion and pulmonary edema, there is no hypoxia and patient is not short of breath.  I suspect these are chronic and related to his end-stage renal disease.  Staples were removed as above, I suspect local wound infection, also suspect patient reacting to retained staples as the should have been taken out previously.  I placed another referral for him to be followed up at [facility], I also have placed a referral to wound care.  Will continue doxycycline. []   1917 Case reviewed with Dr. Andrews (orthopedics)- recommends MRI to assess for abscess. Our MRI machine is down for several hours, as an alternative we discussed obtaining US. [LH]      ED Course User Index  [LH] Roberta Vaughan MD             Clinical Impression:   Final diagnoses:  [T14.8XXA, L08.9] Wound infection (Primary)  [N18.6] ESRD (end stage renal disease)  [D64.9] Anemia, unspecified type          ED Disposition Condition    Discharge Stable       This dictation has been generated using M-Modal Fluency Direct dictation; some phonetic errors may occur.     ED Prescriptions     Medication Sig Dispense Start Date End Date Auth. Provider    doxycycline (VIBRAMYCIN) 100 MG Cap Take 1 capsule (100 mg total) by mouth 2 (two) times daily. for 10 days 20 capsule 3/16/2022 3/26/2022 Roberta Vaughan MD         Follow-up Information     Follow up With Specialties Details Why Contact Info Additional Information    Rusty Light MD Orthopedic Surgery Schedule an appointment as soon as possible for a visit   [address]  [facility] [address]  [phone]       [facility] Wound Care Schedule an appointment as soon as possible for a visit   [address]  [facility] [address] [phone] Please park in garage or rear surface lot and enter through Patient Registration entrance. Check in at first floor main registration.     [facility] Emergency Dept Emergency Medicine   [address]  [facility] [address] [phone]            Morgan Mathew MD  03/16/22 2033

## 2022-03-16 NOTE — TELEPHONE ENCOUNTER
Went to dialysis and the nurse put a dressing on the leg. Caller states that the patient says that some staples have come out. States steri strips intact. Wound is not open and there is no drainage. States the patient says the wound has a bad odor. Not sure when the sutures are to be removed. Calling because he need the staples to be put back in the incision. Dr. Arndt contacted from information obtained from hospital . MD states that the patient needs to call the office to schedule an appt to be seen today. Call [phone]    Reason for Disposition   [1] Pus or bad-smelling fluid draining from incision AND [2] no fever    Additional Information   Negative: [1] Major abdominal surgical incision AND [2] wound gaping open AND [3] visible internal organs   Negative: Sounds like a life-threatening emergency to the triager   Negative: Patient has a Negative Pressure Wound Therapy device   Negative: Patient is followed by a wound clinic or wound specialist for this wound   Negative: [1] Bleeding from incision AND [2] won't stop after 10 minutes of direct pressure   Negative: [1] Widespread rash AND [2] bright red, sunburn-like   Negative: Severe pain in the incision   Negative: [1] Incision gaping open AND [2] < 48 hours since wound re-opened   Negative: [1] Incision gaping open AND [2] length of opening > 2 inches (5 cm)   Negative: Patient sounds very sick or weak to the triager   Negative: Sounds like a serious complication to the triager   Negative: [1] Red streak runs from the incision AND [2] longer than 1 inch (2.5 cm)   Negative: [1] Incision looks infected (spreading redness, pain) AND [2] face wound   Negative: [1] Incision looks infected (spreading redness, pain) AND [2] large red area (> 2 in. or 5 cm)   Negative: [1] Incision looks infected (spreading redness, pain) AND [2] fever > 99.5 F (37.5 C)   Negative: Fever > 100.4 F (38.0 C)    Protocols used: POST-OP INCISION SYMPTOMS AND  [id number]

## 2022-03-16 NOTE — ED NOTES
Pt is Somali creole speaking only.  Granddaughter at bedside to translate.  Pt post-op from AKA 1/21/22 and did not followup w MD.  Caridad are inplace  w foul yellowish drainage noted. Family brought pt in because they were concerned when one of the staples fell out of his incision site. Pt is alert and talkative

## 2022-03-17 NOTE — ED NOTES
Cardiovascular: Normal rate and regular rhythm.   Fistula to left upper extremity with palpable thrill   Pulmonary/Chest: Breath sounds normal. No respiratory distress.   Abdominal: Abdomen is soft. Bowel sounds are normal.   Musculoskeletal:         General: No edema.  Left above the knee amputation, staples in present.  At the middle of the incision, there is some wound dehiscence with slight purulent exudate verses fibrinous discharge present.  There is no surrounding erythema, there is no fluctuant swelling, there is tenderness to palpation.   Psychiatric: His affect is labile.   Patient cries intermittently during exam, family member at bedside states this is his baseline     Alert and oriented, very labile emotional status.

## 2022-03-20 LAB
BACTERIA BLD CULT: NORMAL
BACTERIA BLD CULT: NORMAL

## 2022-03-23 ENCOUNTER — DOCUMENT SCAN (OUTPATIENT)
Dept: HOME HEALTH SERVICES | Facility: HOSPITAL | Age: 57
End: 2022-03-23
Payer: MEDICARE

## 2022-03-24 ENCOUNTER — DOCUMENT SCAN (OUTPATIENT)
Dept: HOME HEALTH SERVICES | Facility: HOSPITAL | Age: 57
End: 2022-03-24
Payer: MEDICARE

## 2022-03-29 ENCOUNTER — EXTERNAL HOME HEALTH (OUTPATIENT)
Dept: HOME HEALTH SERVICES | Facility: HOSPITAL | Age: 57
End: 2022-03-29
Payer: MEDICARE

## 2022-04-14 ENCOUNTER — DOCUMENT SCAN (OUTPATIENT)
Dept: HOME HEALTH SERVICES | Facility: HOSPITAL | Age: 57
End: 2022-04-14
Payer: MEDICARE

## 2022-04-21 ENCOUNTER — OFFICE VISIT (OUTPATIENT)
Dept: VASCULAR SURGERY | Facility: CLINIC | Age: 57
End: 2022-04-21
Payer: MEDICARE

## 2022-04-21 VITALS
HEIGHT: 71 IN | BODY MASS INDEX: 19.6 KG/M2 | DIASTOLIC BLOOD PRESSURE: 68 MMHG | WEIGHT: 140 LBS | SYSTOLIC BLOOD PRESSURE: 132 MMHG

## 2022-04-21 DIAGNOSIS — Z89.612 HX OF AKA (ABOVE KNEE AMPUTATION), LEFT: Primary | ICD-10-CM

## 2022-04-21 DIAGNOSIS — T82.858D ARTERIOVENOUS FISTULA STENOSIS, SUBSEQUENT ENCOUNTER: ICD-10-CM

## 2022-04-21 DIAGNOSIS — S88.112A UNILATERAL COMPLETE BKA, LEFT, INITIAL ENCOUNTER: Primary | ICD-10-CM

## 2022-04-21 DIAGNOSIS — I73.9 PAD (PERIPHERAL ARTERY DISEASE): ICD-10-CM

## 2022-04-21 PROCEDURE — 99999 PR PBB SHADOW E&M-EST. PATIENT-LVL II: ICD-10-PCS | Mod: PBBFAC,,, | Performed by: SURGERY

## 2022-04-21 PROCEDURE — 99999 PR PBB SHADOW E&M-EST. PATIENT-LVL II: CPT | Mod: PBBFAC,,, | Performed by: SURGERY

## 2022-04-21 PROCEDURE — 99214 PR OFFICE/OUTPT VISIT, EST, LEVL IV, 30-39 MIN: ICD-10-PCS | Mod: S$PBB,,, | Performed by: SURGERY

## 2022-04-21 PROCEDURE — 99214 OFFICE O/P EST MOD 30 MIN: CPT | Mod: S$PBB,,, | Performed by: SURGERY

## 2022-04-21 PROCEDURE — 99212 OFFICE O/P EST SF 10 MIN: CPT | Mod: PBBFAC | Performed by: SURGERY

## 2022-04-21 NOTE — PROGRESS NOTES
Masoud Soni MD [facility]                         04/21/2022    HPI:  Adryan Neff is a 56 y.o. male with   Patient Active Problem List   Diagnosis    Controlled type 2 diabetes mellitus with chronic kidney disease on chronic dialysis, with long-term current use of insulin    Essential (primary) hypertension    Pure hypercholesterolemia    Benign hypertension with chronic kidney disease, stage V    Other insomnia    Gastroesophageal reflux disease    Intractable vomiting    Chronic diastolic heart failure    CAD (coronary artery disease) - [facility]-obstructive from [facility] in 2016    Hyperkalemia    Pre-transplant evaluation for kidney transplant    Chronic pulmonary heart disease    HLD (hyperlipidemia)    NICM (nonischemic cardiomyopathy)    PDR (proliferative diabetic retinopathy)    Secondary hyperparathyroidism of renal origin    ESRD on hemodialysis    TB lung, latent    Paronychia, toe, left    Nuclear sclerosis of right eye    Pseudophakia    Gangrene of left foot    COVID-19 in immunocompromised patient    Encephalopathy, metabolic    Unilateral complete BKA, left, initial encounter    PAD (peripheral artery disease)   This is a 56 y.o.male patient, with a PMHx of ESRD on hemodialysis, CAD, HTN, and DM, presenting to the ED for further evaluation of left foot pain and black discoloration that began 3 days ago. Patient states these symptoms have been ongoing for the last month. He reports being referred to a specialist but denies ever following up. He has been putting some otc medication on the foot that he believes has turned his foot more black. No trauma or injury. Patient reports associated chills. Patient states applying a creme to the foot and noticed his discoloration worsened. Patient reports he was last dialyzed yesterday. Patient denies any fever, shortness of breath, chest pain, neck pain, back pain, abdominal pain, rash,  headaches, congestion, rhinorrhea, cough, sore throat, ear pain, eye pain, blurred vision, nausea, vomiting, diarrhea, dysuria, or any other associated symptoms.     Per chart review, he had an arterial u/s 12/15.   Impression:   1. High-grade severe stenosis right popliteal artery with occlusion anterior tibial artery which is reconstituted at level of DPA via collaterals.  2. 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     Cardiology has been planning to see him for revascularization options. Last podiatry visit 12/13 with Stable dry gangrene L 2nd toe and medial L 5th toe. Dorsalis pedis and posterior tibial pulses are diminished Bilaterally. Toes are cool to touch. Feet are warm proximally.There is decreased digital hair. Skin is atrophic, slightly hyperpigmented, and mildly edematous      In the ED, he was found to have worsened gangrene and a cool extremity. Vascular surgery was consulted. He was also COVID +. Says he got a booster shot in November sometime.        Procedure(s) (LRB):  AMPUTATION, ABOVE KNEE (Left)       Hospital Course:   Patient admitted to the hospital for eval and treatment of L foot gangrene.  ID, Ortho and Vasc consulted. Nephrology was also consulted for chronic dialysis. Patient started on broad spectrum antibiotics, and had several toes on L foot amputated by Vascular surgery.      The patient's AVF had recurrent malfunction. IR attempts at intervention without success. THDC placed for dialysis.     Pt became increasingly delirious and combative. He refused angiogram with Vascular. Psychiatry evaluated, felt that he was not able to make his own medical decisions. At this point Dr. Soni felt the patient would be a poor candidate for vascular intervention, recommended AKA. ID in agreement. Ortho consulted for AKA, daughter/POA in agreement,   S/P left AKA on 1.21.22,plan is SNF placement,SW was on case.afebrile.  Had some EKG changes with no chest pain,cardiology  cleared patient for procedure.  Overall his mental status is improved,his pain was controlled,patient was discharged to SNF with PCP and Ortho. Follow up as out patient.    5/2022:  No complaints.  HD without issue    Past Medical History:   Diagnosis Date    CAD (coronary artery disease) 2016    CAD (non obstructive) 2016 [facility]    Diabetes mellitus type I     Diabetic retinopathy     ESRD on hemodialysis     on HD TThSat    Gangrene of left foot     Hypertension     Nuclear sclerosis of right eye 11/24/2021    PAD (peripheral artery disease)     Pulmonary HTN     TB lung, latent 04/2021     Past Surgical History:   Procedure Laterality Date    ABOVE-KNEE AMPUTATION Left 1/18/2022    Procedure: AMPUTATION, ABOVE KNEE;  Surgeon: Rusty Light MD;  Location: [facility] OR;  Service: Orthopedics;  Laterality: Left;    ABOVE-KNEE AMPUTATION Left 1/21/2022    Procedure: AMPUTATION, ABOVE KNEE;  Surgeon: Rusty Light MD;  Location: [facility] OR;  Service: Orthopedics;  Laterality: Left;    AV FISTULA PLACEMENT      CATARACT EXTRACTION Left     EYE SURGERY      TOE AMPUTATION Left 1/6/2022    Procedure: AMPUTATION, TOE;  Surgeon: Masoud Soni MD;  Location: [facility] OR;  Service: Vascular;  Laterality: Left;  Left first through fifth toes, possible open transmetatarsal amputation and all other indicated procedures     Family History   Problem Relation Age of Onset    No Known Problems Mother     No Known Problems Father     No Known Problems Sister     No Known Problems Brother     No Known Problems Daughter     No Known Problems Daughter     No Known Problems Daughter     No Known Problems Brother     No Known Problems Maternal Aunt     No Known Problems Maternal Uncle     No Known Problems Paternal Aunt     No Known Problems Paternal Uncle     No Known Problems Maternal Grandmother     No Known Problems Maternal Grandfather     No Known Problems Paternal Grandmother     No Known Problems  Paternal Grandfather      Social History     Socioeconomic History    Marital status: Single    Number of children: 5   Tobacco Use    Smoking status: Never Smoker    Smokeless tobacco: Never Used   Substance and Sexual Activity    Alcohol use: No    Drug use: No    Sexual activity: Yes     Partners: Female   Social History Narrative    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       Current Outpatient Medications:     amLODIPine (NORVASC) 10 MG tablet, Take 1 tablet (10 mg total) by mouth once daily., Disp: 30 tablet, Rfl: 11    aspirin 81 MG Chew, Take 81 mg by mouth once daily., Disp: , Rfl:     insulin (BASAGLAR KWIKPEN U-100 INSULIN) glargine 100 units/mL (3mL) SubQ pen, Inject 15 Units into the skin every evening., Disp: 15 mL, Rfl: 12    omeprazole (PRILOSEC) 20 MG capsule, TAKE 2 CAPSULES(40 MG) BY MOUTH EVERY DAY (Patient taking differently: Take 20 mg by mouth once daily.), Disp: 180 capsule, Rfl: 0    RENVELA 800 mg Tab, Take 800 mg by mouth 3 (three) times daily with meals., Disp: , Rfl:     traMADoL (ULTRAM) 50 mg tablet, Take 50 mg by mouth every 6 (six) hours as needed for Pain., Disp: , Rfl:     atorvastatin (LIPITOR) 20 MG tablet, Take 1 tablet (20 mg total) by mouth once daily. (Patient taking differently: Take 20 mg by mouth once daily.), Disp: 90 tablet, Rfl: 3    metoprolol succinate (TOPROL-XL) 25 MG 24 hr tablet, Take 1 tablet (25 mg total) by mouth once daily., Disp: 30 tablet, Rfl: 11  No current facility-administered medications for this visit.    REVIEW OF SYSTEMS:  General: No fevers or chills; ENT: No sore throat; Allergy and Immunology: no persistent infections; Hematological and Lymphatic: No history of bleeding or easy bruising; Endocrine: negative; Respiratory: no cough, shortness of breath, or wheezing; Cardiovascular: no chest pain or dyspnea on exertion; no claudication, no rest pain; Gastrointestinal: no abdominal pain/back, change in bowel habits, or bloody stools; Genito-Urinary: no  dysuria, trouble voiding, or hematuria; Musculoskeletal: negative, no wound; Neurological: no TIA or stroke symptoms; Psychiatric: no nervousness, anxiety or depression.    PHYSICAL EXAM:                General appearance:  Alert, well-appearing, and in no distress.  Oriented to person, place, and time                    Neurological: Normal speech, no focal findings noted; CN II - XII grossly intact. RLE with sensation to light touch, LLE with sensation to light touch.            Musculoskeletal: Digits/nail without cyanosis/clubbing.  Strength 5/5 BLE.                    Neck: Supple, no significant adenopathy, no carotid bruit can be auscultated                  Chest:  Clear to auscultation, no wheezes, rales or rhonchi, symmetric air entry. No use of accessory muscles               Cardiac: Normal rate and regular rhythm, S1 and S2 normal            Abdomen: Soft, nontender, nondistended, no masses or organomegaly, no hernia     No rebound tenderness noted; bowel sounds normal     Pulsatile aortic mass is non palpable.     No groin adenopathy      Extremities:2+ R femoral pulse, 2+ L femoral pulse     doppler+ R popliteal pulse     1+ R PT pulse     1+ R DP pulse     no RLE edema, no L AKA stump edema    Skin: RLE no tissue loss    LAB RESULTS:  No results found for: CBC  Lab Results   Component Value Date    LABPROT 11.2 01/05/2022    INR 1.0 01/05/2022     Lab Results   Component Value Date     03/16/2022    K 4.9 03/16/2022     03/16/2022    CO2 26 03/16/2022     (H) 03/16/2022    BUN 42 (H) 03/16/2022    CREATININE 9.5 (H) 03/16/2022    CALCIUM 9.3 03/16/2022    ANIONGAP 10 03/16/2022    EGFRNONAA 5 (A) 03/16/2022     Lab Results   Component Value Date    WBC 8.15 03/16/2022    RBC 3.31 (L) 03/16/2022    HGB 9.4 (L) 03/16/2022    HCT 31.6 (L) 03/16/2022    MCV 96 03/16/2022    MCH 28.4 03/16/2022    MCHC 29.7 (L) 03/16/2022    RDW 16.6 (H) 03/16/2022     03/16/2022    MPV 10.3  03/16/2022    GRAN 5.5 03/16/2022    GRAN 66.7 03/16/2022    LYMPH 1.4 03/16/2022    LYMPH 16.6 (L) 03/16/2022    MONO 0.6 03/16/2022    MONO 7.4 03/16/2022    EOS 0.7 (H) 03/16/2022    BASO 0.07 03/16/2022    EOSINOPHIL 8.0 03/16/2022    BASOPHIL 0.9 03/16/2022    DIFFMETHOD Automated 03/16/2022     .  Lab Results   Component Value Date    HGBA1C 6.1 (H) 01/07/2021       IMAGING:  All pertinent imaging has been reviewed and interpreted independently.    IMP/PLAN:  56 y.o. male with   Patient Active Problem List   Diagnosis    Controlled type 2 diabetes mellitus with chronic kidney disease on chronic dialysis, with long-term current use of insulin    Essential (primary) hypertension    Pure hypercholesterolemia    Benign hypertension with chronic kidney disease, stage V    Other insomnia    Gastroesophageal reflux disease    Intractable vomiting    Chronic diastolic heart failure    CAD (coronary [facility]) - [facility]obstruct[facility] in 2016    Hyperkalemia    Pre-transplant evaluation for kidney transplant    Chronic pulmonary heart disease    HLD (hyperlipidemia)    NICM (nonischemic cardiomyopathy)    PDR (proliferative diabetic retinopathy)    Secondary hyperparathyroidism of renal origin    ESRD on hemodialysis    TB lung, latent    Paronychia, toe, left    Nuclear sclerosis of right eye    Pseudophakia    Gangrene of left foot    COVID-19 in immunocompromised patient    Encephalopathy, metabolic    Unilateral complete BKA, left, initial encounter    PAD (peripheral artery disease)    being managed by PCP and specialists who is here today for evaluation of PVD.    -RLE PVD with no claudication, no rest pain, no wound.  Imaging reviewed. - rec daily ASA, cont monitoring of PVD  -Prosthetics eval L AKA  -Exercise  -Heart healthy lifestyle  -HD US - cont surveillance / mgmt of AV access    I spent 12 minutes evaluating this patient and greater than 50% of the time was spent  counseling, coordinator care and discussing the plan of care.  All questions were answered and patient stated understanding with agreement with the above treatment plan.    Masoud Soni MD [facility]  Vascular and Endovascular Surgery

## 2022-04-25 ENCOUNTER — DOCUMENT SCAN (OUTPATIENT)
Dept: HOME HEALTH SERVICES | Facility: HOSPITAL | Age: 57
End: 2022-04-25
Payer: MEDICARE

## 2022-05-02 ENCOUNTER — DOCUMENT SCAN (OUTPATIENT)
Dept: HOME HEALTH SERVICES | Facility: HOSPITAL | Age: 57
End: 2022-05-02
Payer: MEDICARE

## 2022-05-12 ENCOUNTER — DOCUMENT SCAN (OUTPATIENT)
Dept: HOME HEALTH SERVICES | Facility: HOSPITAL | Age: 57
End: 2022-05-12
Payer: MEDICARE

## 2022-05-17 ENCOUNTER — OFFICE VISIT (OUTPATIENT)
Dept: FAMILY MEDICINE | Facility: CLINIC | Age: 57
End: 2022-05-17
Payer: MEDICARE

## 2022-05-17 ENCOUNTER — HOSPITAL ENCOUNTER (OUTPATIENT)
Dept: CARDIOLOGY | Facility: HOSPITAL | Age: 57
Discharge: HOME | End: 2022-05-17
Attending: SURGERY
Payer: MEDICARE

## 2022-05-17 VITALS
TEMPERATURE: 98 F | SYSTOLIC BLOOD PRESSURE: 138 MMHG | DIASTOLIC BLOOD PRESSURE: 76 MMHG | OXYGEN SATURATION: 99 % | BODY MASS INDEX: 19.52 KG/M2 | HEART RATE: 74 BPM | HEIGHT: 71 IN

## 2022-05-17 DIAGNOSIS — I73.9 PAD (PERIPHERAL ARTERY DISEASE): ICD-10-CM

## 2022-05-17 DIAGNOSIS — N18.6 CONTROLLED TYPE 2 DIABETES MELLITUS WITH CHRONIC KIDNEY DISEASE ON CHRONIC DIALYSIS, WITH LONG-TERM CURRENT USE OF INSULIN: Primary | ICD-10-CM

## 2022-05-17 DIAGNOSIS — E11.22 CONTROLLED TYPE 2 DIABETES MELLITUS WITH CHRONIC KIDNEY DISEASE ON CHRONIC DIALYSIS, WITH LONG-TERM CURRENT USE OF INSULIN: Primary | ICD-10-CM

## 2022-05-17 DIAGNOSIS — Z79.4 CONTROLLED TYPE 2 DIABETES MELLITUS WITH CHRONIC KIDNEY DISEASE ON CHRONIC DIALYSIS, WITH LONG-TERM CURRENT USE OF INSULIN: Primary | ICD-10-CM

## 2022-05-17 DIAGNOSIS — Z99.2 CONTROLLED TYPE 2 DIABETES MELLITUS WITH CHRONIC KIDNEY DISEASE ON CHRONIC DIALYSIS, WITH LONG-TERM CURRENT USE OF INSULIN: Primary | ICD-10-CM

## 2022-05-17 DIAGNOSIS — Z99.2 ESRD ON HEMODIALYSIS: ICD-10-CM

## 2022-05-17 DIAGNOSIS — I10 ESSENTIAL (PRIMARY) HYPERTENSION: ICD-10-CM

## 2022-05-17 DIAGNOSIS — N18.6 ESRD ON HEMODIALYSIS: ICD-10-CM

## 2022-05-17 DIAGNOSIS — Z89.512 HX OF BKA, LEFT: ICD-10-CM

## 2022-05-17 PROCEDURE — 93990 CV US HEMODIALYSIS ACCESS (CUPID ONLY): ICD-10-PCS | Mod: 26,,, | Performed by: SURGERY

## 2022-05-17 PROCEDURE — 93924 ANKLE BRACHIAL INDICES (ABI): ICD-10-PCS | Mod: 26,,, | Performed by: SURGERY

## 2022-05-17 PROCEDURE — 93990 DOPPLER FLOW TESTING: CPT | Mod: TC

## 2022-05-17 PROCEDURE — 93924 LWR XTR VASC STDY BILAT: CPT

## 2022-05-17 PROCEDURE — 99214 PR OFFICE/OUTPT VISIT, EST, LEVL IV, 30-39 MIN: ICD-10-PCS | Mod: S$PBB,,, | Performed by: STUDENT IN AN ORGANIZED HEALTH CARE EDUCATION/TRAINING PROGRAM

## 2022-05-17 PROCEDURE — 99999 PR PBB SHADOW E&M-EST. PATIENT-LVL IV: ICD-10-PCS | Mod: PBBFAC,,, | Performed by: STUDENT IN AN ORGANIZED HEALTH CARE EDUCATION/TRAINING PROGRAM

## 2022-05-17 PROCEDURE — 99214 OFFICE O/P EST MOD 30 MIN: CPT | Mod: PBBFAC,25,PO | Performed by: STUDENT IN AN ORGANIZED HEALTH CARE EDUCATION/TRAINING PROGRAM

## 2022-05-17 PROCEDURE — 93924 LWR XTR VASC STDY BILAT: CPT | Mod: 26,,, | Performed by: SURGERY

## 2022-05-17 PROCEDURE — 99999 PR PBB SHADOW E&M-EST. PATIENT-LVL IV: CPT | Mod: PBBFAC,,, | Performed by: STUDENT IN AN ORGANIZED HEALTH CARE EDUCATION/TRAINING PROGRAM

## 2022-05-17 PROCEDURE — 93990 DOPPLER FLOW TESTING: CPT | Mod: 26,,, | Performed by: SURGERY

## 2022-05-17 PROCEDURE — 99214 OFFICE O/P EST MOD 30 MIN: CPT | Mod: S$PBB,,, | Performed by: STUDENT IN AN ORGANIZED HEALTH CARE EDUCATION/TRAINING PROGRAM

## 2022-05-17 RX ORDER — ATORVASTATIN CALCIUM 20 MG/1
1 TABLET, FILM COATED ORAL
COMMUNITY
Start: 2022-01-30

## 2022-05-17 RX ORDER — TRAMADOL HYDROCHLORIDE 50 MG/1
1 TABLET ORAL
Status: ON HOLD | COMMUNITY
Start: 2022-01-30 | End: 2022-11-10 | Stop reason: HOSPADM

## 2022-05-17 RX ORDER — SEVELAMER CARBONATE 800 MG/1
2 TABLET, FILM COATED ORAL
Status: ON HOLD | COMMUNITY
Start: 2022-01-30 | End: 2022-11-10 | Stop reason: HOSPADM

## 2022-05-17 RX ORDER — AMLODIPINE BESYLATE 10 MG/1
1 TABLET ORAL
Status: ON HOLD | COMMUNITY
Start: 2022-01-30 | End: 2022-11-10 | Stop reason: HOSPADM

## 2022-05-17 RX ORDER — NAPROXEN SODIUM 220 MG/1
1 TABLET, FILM COATED ORAL
Status: ON HOLD | COMMUNITY
Start: 2022-01-30 | End: 2022-11-10 | Stop reason: HOSPADM

## 2022-05-17 RX ORDER — PRAVASTATIN SODIUM 20 MG/1
20 TABLET ORAL DAILY
Status: ON HOLD | COMMUNITY
Start: 2022-02-22 | End: 2022-12-02 | Stop reason: HOSPADM

## 2022-05-17 RX ORDER — OMEPRAZOLE 40 MG/1
40 CAPSULE, DELAYED RELEASE ORAL DAILY
COMMUNITY
Start: 2022-02-22 | End: 2023-05-06 | Stop reason: SDUPTHER

## 2022-05-17 RX ORDER — CLOPIDOGREL BISULFATE 75 MG/1
75 TABLET ORAL
COMMUNITY
Start: 2022-03-24 | End: 2022-07-22

## 2022-05-17 RX ORDER — INSULIN GLARGINE 100 [IU]/ML
INJECTION, SOLUTION SUBCUTANEOUS
COMMUNITY
Start: 2022-01-31 | End: 2022-05-17 | Stop reason: DRUGHIGH

## 2022-05-17 RX ORDER — METOPROLOL SUCCINATE 25 MG/1
1 TABLET, EXTENDED RELEASE ORAL
COMMUNITY
Start: 2022-01-30 | End: 2023-05-06 | Stop reason: SDUPTHER

## 2022-05-17 RX ORDER — CLOPIDOGREL BISULFATE 75 MG/1
75 TABLET ORAL DAILY
COMMUNITY
Start: 2022-05-10 | End: 2023-05-06 | Stop reason: SDUPTHER

## 2022-05-17 RX ORDER — INSULIN GLARGINE 100 [IU]/ML
15 INJECTION, SOLUTION SUBCUTANEOUS NIGHTLY
Qty: 3 ML | Refills: 0 | Status: SHIPPED | OUTPATIENT
Start: 2022-05-17 | End: 2022-06-13 | Stop reason: SDUPTHER

## 2022-05-17 RX ORDER — HYDROCHLOROTHIAZIDE 12.5 MG/1
12.5 TABLET ORAL DAILY
COMMUNITY
Start: 2022-02-22 | End: 2022-05-27

## 2022-05-17 RX ORDER — OMEPRAZOLE 40 MG/1
CAPSULE, DELAYED RELEASE ORAL
Status: ON HOLD | COMMUNITY
Start: 2022-01-30 | End: 2022-11-10 | Stop reason: HOSPADM

## 2022-05-17 NOTE — PROGRESS NOTES
05/17/2022    Adryan Neff  [id number]    CC: estabilish care    HPI    56-year-old male with history of diabetes, end-stage renal disease on HD Monday Wednesday Friday, hypertension, history of left above the knee amputation January 22nd 2022 presents for wound evaluation.  Pt presents with family but unsure of medication regimen. Unfortunately, they are both poor historians and have no concerns outside of medication refills.      Past Medical History:   Diagnosis Date    CAD (coronary artery disease) 2016    CAD (non obstructive) 2016 [facility]    Diabetes mellitus type I     Diabetic retinopathy     ESRD on hemodialysis     on HD TThSat    Gangrene of left foot     Hypertension     Nuclear sclerosis of right eye 11/24/2021    PAD (peripheral artery disease)     Pulmonary HTN     TB lung, latent 04/2021       Past Surgical History:   Procedure Laterality Date    ABOVE-KNEE AMPUTATION Left 1/18/2022    Procedure: AMPUTATION, ABOVE KNEE;  Surgeon: Rusty Light MD;  Location: [facility] OR;  Service: Orthopedics;  Laterality: Left;    ABOVE-KNEE AMPUTATION Left 1/21/2022    Procedure: AMPUTATION, ABOVE KNEE;  Surgeon: Rusty Light MD;  Location: [facility] OR;  Service: Orthopedics;  Laterality: Left;    AV FISTULA PLACEMENT      CATARACT EXTRACTION Left     EYE SURGERY      TOE AMPUTATION Left 1/6/2022    Procedure: AMPUTATION, TOE;  Surgeon: Masoud oSni MD;  Location: [facility] OR;  Service: Vascular;  Laterality: Left;  Left first through fifth toes, possible open transmetatarsal amputation and all other indicated procedures       Family History   Problem Relation Age of Onset    No Known Problems Mother     No Known Problems Father     No Known Problems Sister     No Known Problems Brother     No Known Problems Daughter     No Known Problems Daughter     No Known Problems Daughter     No Known Problems Brother     No Known Problems Maternal Aunt     No Known Problems Maternal Uncle     No Known  Problems Paternal Aunt     No Known Problems Paternal Uncle     No Known Problems Maternal Grandmother     No Known Problems Maternal Grandfather     No Known Problems Paternal Grandmother     No Known Problems Paternal Grandfather        Social History     Socioeconomic History    Marital status: Single    Number of children: 5   Tobacco Use    Smoking status: Never Smoker    Smokeless tobacco: Never Used   Substance and Sexual Activity    Alcohol use: No    Drug use: No    Sexual activity: Yes     Partners: Female   Social History Narrative    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         Current Outpatient Medications:     amLODIPine (NORVASC) 10 MG tablet, Take 1 tablet (10 mg total) by mouth once daily., Disp: 30 tablet, Rfl: 11    amLODIPine (NORVASC) 10 MG tablet, Take 1 tablet by mouth., Disp: , Rfl:     aspirin 81 MG Chew, Take 81 mg by mouth once daily., Disp: , Rfl:     aspirin 81 MG Chew, Take 1 tablet by mouth., Disp: , Rfl:     atorvastatin (LIPITOR) 20 MG tablet, Take 1 tablet by mouth., Disp: , Rfl:     clopidogreL (PLAVIX) 75 mg tablet, Take 75 mg by mouth once daily., Disp: , Rfl:     clopidogreL (PLAVIX) 75 mg tablet, Take 75 mg by mouth., Disp: , Rfl:     hydroCHLOROthiazide (HYDRODIURIL) 12.5 MG Tab, Take 12.5 mg by mouth once daily., Disp: , Rfl:     insulin (BASAGLAR KWIKPEN U-100 INSULIN) glargine 100 units/mL (3mL) SubQ pen, Inject 15 Units into the skin every evening., Disp: 15 mL, Rfl: 12    insulin glargine 100 units/mL (3mL) SubQ pen, Inject into the skin., Disp: , Rfl:     methoxy peg-epoetin beta (MIRCERA INJ), 50 mcg., Disp: , Rfl:     methoxy peg-epoetin beta (MIRCERA INJ), 30 mcg., Disp: , Rfl:     methoxy peg-epoetin beta (MIRCERA INJ), 75 mcg., Disp: , Rfl:     metoprolol succinate (TOPROL-XL) 25 MG 24 hr tablet, Take 1 tablet by mouth., Disp: , Rfl:     omeprazole (PRILOSEC) 20 MG capsule, TAKE 2 CAPSULES(40 MG) BY MOUTH EVERY DAY (Patient taking differently: Take 20 mg  by mouth once daily.), Disp: 180 capsule, Rfl: 0    omeprazole (PRILOSEC) 40 MG capsule, Take by mouth., Disp: , Rfl:     omeprazole (PRILOSEC) 40 MG capsule, Take 40 mg by mouth once daily., Disp: , Rfl:     pravastatin (PRAVACHOL) 20 MG tablet, Take 20 mg by mouth once daily., Disp: , Rfl:     RENVELA 800 mg Tab, Take 800 mg by mouth 3 (three) times daily with meals., Disp: , Rfl:     sevelamer carbonate (RENVELA) 800 mg Tab, Take 2 tablets by mouth., Disp: , Rfl:     traMADoL (ULTRAM) 50 mg tablet, Take 50 mg by mouth every 6 (six) hours as needed for Pain., Disp: , Rfl:     traMADoL (ULTRAM) 50 mg tablet, Take 1 tablet by mouth., Disp: , Rfl:         Vitals:    05/17/22 1457   BP: 138/76   Pulse: 74   Temp: 98.2 °F (36.8 °C)       PHYSICAL EXAM    GEN: NAD, AAox3, well nourished, wheelchair  HEENT: NCAT, EOMI, PEERL, no scleral injection, TM normal, moist mucous membranes, oropharynx clear, no erythema, no exudates  NECK: full rom, no cervical lymphadenopathy, no thyroidmegally  CV: RRR, no m/r/g, trace LE edema  LUNGS: CTAB, non-labored breathing, no wheezes, no crackles  ABD: soft, non-distended, no rebound/guarding, no organomegaly  EXT: left leg AKA      1. Controlled type 2 diabetes mellitus with chronic kidney disease on chronic dialysis, with long-term current use of insulin  - insulin glargine 100 units/mL (3mL) SubQ pen; Inject 15 Units into the skin every evening.  Dispense: 3 mL; Refill: 0    2. ESRD on hemodialysis  Management per nephrology; HD on MWF    3. Hx of BKA, left  Continue current management per vascular surgery based on chart review    4. Essential (primary) hypertension  Well controlled. Continue current regimen    RTC in 4-6 weeks for establish care visit    Carola Pedro MD  Family Medicine       [[email or web address]] : 39 y/o male with 2 week hx of cough with phlegm. Nasal congestion as well. Taking OTC medications without relief. No fevers. Kids have been sick.

## 2022-05-18 DIAGNOSIS — E11.22 CONTROLLED TYPE 2 DIABETES MELLITUS WITH CHRONIC KIDNEY DISEASE ON CHRONIC DIALYSIS, WITH LONG-TERM CURRENT USE OF INSULIN: ICD-10-CM

## 2022-05-18 DIAGNOSIS — Z99.2 CONTROLLED TYPE 2 DIABETES MELLITUS WITH CHRONIC KIDNEY DISEASE ON CHRONIC DIALYSIS, WITH LONG-TERM CURRENT USE OF INSULIN: ICD-10-CM

## 2022-05-18 DIAGNOSIS — Z79.4 CONTROLLED TYPE 2 DIABETES MELLITUS WITH CHRONIC KIDNEY DISEASE ON CHRONIC DIALYSIS, WITH LONG-TERM CURRENT USE OF INSULIN: ICD-10-CM

## 2022-05-18 DIAGNOSIS — N18.6 CONTROLLED TYPE 2 DIABETES MELLITUS WITH CHRONIC KIDNEY DISEASE ON CHRONIC DIALYSIS, WITH LONG-TERM CURRENT USE OF INSULIN: ICD-10-CM

## 2022-05-18 RX ORDER — INSULIN GLARGINE 100 [IU]/ML
15 INJECTION, SOLUTION SUBCUTANEOUS NIGHTLY
Qty: 3 ML | Refills: 0 | Status: CANCELLED | OUTPATIENT
Start: 2022-05-18

## 2022-05-18 RX ORDER — INSULIN GLARGINE 100 [IU]/ML
15 INJECTION, SOLUTION SUBCUTANEOUS NIGHTLY
Qty: 3 ML | Refills: 0 | OUTPATIENT
Start: 2022-05-18

## 2022-05-18 NOTE — TELEPHONE ENCOUNTER
This medication has already been handled/signed by PCP/ORC Team. Will remove this duplicate and close out encounter.

## 2022-05-20 LAB
HD DISTAL ANASTAMOSIS LOCATION: NORMAL
HD MID GRAFT LOCATION: NORMAL
HD NATIVE ARTERY VESSEL: NORMAL
HD OUTFLOW VEIN LOCATION: NORMAL
HD OUTFLOW VEIN VESSEL: NORMAL
LEFT DIST ANA PSV: 505 CM/S
LEFT DIST GRAFT PSV: 123 CM/S
LEFT INFLOW PSV: 187 CM/S
LEFT MID GRAFT DIA: 0.7 CM
LEFT MID GRAFT PSV: 194 CM/S
LEFT OUTFLOW PSV: 139 CM/S
LEFT PROX ANA PSV: 288 CM/S
LEFT PROX GRAFT PSV: 322 CM/S
LEFT VOLUME FLOW PSV: 1193 ML/MIN
RIGHT ABI: 1.39
RIGHT ARM BP: 184 MMHG
RIGHT DORSALIS PEDIS: 255 MMHG
RIGHT POSTERIOR TIBIAL: 255 MMHG
RIGHT TBI: 0
RIGHT TOE PRESSURE: 0 MMHG

## 2022-05-30 DIAGNOSIS — E11.22 CONTROLLED TYPE 2 DIABETES MELLITUS WITH CHRONIC KIDNEY DISEASE ON CHRONIC DIALYSIS, WITH LONG-TERM CURRENT USE OF INSULIN: ICD-10-CM

## 2022-05-30 DIAGNOSIS — Z79.4 CONTROLLED TYPE 2 DIABETES MELLITUS WITH CHRONIC KIDNEY DISEASE ON CHRONIC DIALYSIS, WITH LONG-TERM CURRENT USE OF INSULIN: ICD-10-CM

## 2022-05-30 DIAGNOSIS — N18.6 CONTROLLED TYPE 2 DIABETES MELLITUS WITH CHRONIC KIDNEY DISEASE ON CHRONIC DIALYSIS, WITH LONG-TERM CURRENT USE OF INSULIN: ICD-10-CM

## 2022-05-30 DIAGNOSIS — Z99.2 CONTROLLED TYPE 2 DIABETES MELLITUS WITH CHRONIC KIDNEY DISEASE ON CHRONIC DIALYSIS, WITH LONG-TERM CURRENT USE OF INSULIN: ICD-10-CM

## 2022-05-31 RX ORDER — INSULIN GLARGINE 100 [IU]/ML
15 INJECTION, SOLUTION SUBCUTANEOUS NIGHTLY
Qty: 3 ML | Refills: 0 | OUTPATIENT
Start: 2022-05-31

## 2022-05-31 NOTE — TELEPHONE ENCOUNTER
I just filled this two weeks ago. Can you call the pharmacy and find out if he actually needs a refill?

## 2022-06-05 NOTE — ASSESSMENT & PLAN NOTE
-Ortho deferred mgmt of L foot wound to vascular surgery s/p open 2-5 toe amputations  -ID consulted - f/u recs  -Cont wound care and monitor WBC closely - plan for angiogram/revascularization if safely able 1/11/22   The patient is a 40y Male complaining of flank pain.

## 2022-06-06 RX ORDER — LANCETS
1 EACH MISCELLANEOUS DAILY
Qty: 200 EACH | Refills: 1 | Status: SHIPPED | OUTPATIENT
Start: 2022-06-06 | End: 2022-07-06 | Stop reason: SDUPTHER

## 2022-06-06 NOTE — TELEPHONE ENCOUNTER
----- Message from Luis Galan sent at 6/6/2022 11:40 AM CDT -----  Type: RX Refill Request    Who Called: Dialysis clinic- [facility]    Have you contacted your pharmacy:no    Refill or New Rx:refill    RX Name and Strength:Lancets      Preferred Pharmacy with phone number:[facility] #[id number] - [place] [address] AT [address]   Phone:  [phone]  Fax:  [phone]      Local or Mail Order:local    Ordering Provider:Dr. Pedro    Would the patient rather a call back or a response via [facility]? call    Best Call Back Number:[phone] (home)       Additional Information: Pt states he is out, unsure of name. Pt's appt isn't until August, however he is out of multiple meds and possibly needs appt sooner.

## 2022-06-07 ENCOUNTER — EXTERNAL HOME HEALTH (OUTPATIENT)
Dept: HOME HEALTH SERVICES | Facility: HOSPITAL | Age: 57
End: 2022-06-07
Payer: MEDICARE

## 2022-06-13 DIAGNOSIS — N18.6 CONTROLLED TYPE 2 DIABETES MELLITUS WITH CHRONIC KIDNEY DISEASE ON CHRONIC DIALYSIS, WITH LONG-TERM CURRENT USE OF INSULIN: ICD-10-CM

## 2022-06-13 DIAGNOSIS — Z99.2 CONTROLLED TYPE 2 DIABETES MELLITUS WITH CHRONIC KIDNEY DISEASE ON CHRONIC DIALYSIS, WITH LONG-TERM CURRENT USE OF INSULIN: ICD-10-CM

## 2022-06-13 DIAGNOSIS — E11.22 CONTROLLED TYPE 2 DIABETES MELLITUS WITH CHRONIC KIDNEY DISEASE ON CHRONIC DIALYSIS, WITH LONG-TERM CURRENT USE OF INSULIN: ICD-10-CM

## 2022-06-13 DIAGNOSIS — Z79.4 CONTROLLED TYPE 2 DIABETES MELLITUS WITH CHRONIC KIDNEY DISEASE ON CHRONIC DIALYSIS, WITH LONG-TERM CURRENT USE OF INSULIN: ICD-10-CM

## 2022-06-13 NOTE — TELEPHONE ENCOUNTER
----- Message from Dariana Doran sent at 6/13/2022  2:26 PM CDT -----  Refill : insulin glargine 100 units/mL (3mL) [facility] #[id number] - [place], [place] [facility] [address] [place] [address]  [place] [address]  Phone: [phone] Fax: [phone]

## 2022-06-15 ENCOUNTER — TELEPHONE (OUTPATIENT)
Dept: FAMILY MEDICINE | Facility: CLINIC | Age: 57
End: 2022-06-15
Payer: MEDICARE

## 2022-06-15 RX ORDER — INSULIN GLARGINE 100 [IU]/ML
15 INJECTION, SOLUTION SUBCUTANEOUS NIGHTLY
Qty: 3 ML | Refills: 11 | Status: ON HOLD | OUTPATIENT
Start: 2022-06-15 | End: 2022-11-10 | Stop reason: HOSPADM

## 2022-06-16 ENCOUNTER — OFFICE VISIT (OUTPATIENT)
Dept: FAMILY MEDICINE | Facility: CLINIC | Age: 57
End: 2022-06-16
Payer: MEDICARE

## 2022-06-16 VITALS
WEIGHT: 140 LBS | HEIGHT: 71 IN | OXYGEN SATURATION: 99 % | HEART RATE: 111 BPM | DIASTOLIC BLOOD PRESSURE: 60 MMHG | SYSTOLIC BLOOD PRESSURE: 110 MMHG | BODY MASS INDEX: 19.6 KG/M2 | TEMPERATURE: 99 F

## 2022-06-16 DIAGNOSIS — I10 ESSENTIAL (PRIMARY) HYPERTENSION: ICD-10-CM

## 2022-06-16 DIAGNOSIS — E78.00 PURE HYPERCHOLESTEROLEMIA: ICD-10-CM

## 2022-06-16 DIAGNOSIS — Z99.2 ESRD ON HEMODIALYSIS: ICD-10-CM

## 2022-06-16 DIAGNOSIS — Z79.4 CONTROLLED TYPE 2 DIABETES MELLITUS WITH CHRONIC KIDNEY DISEASE ON CHRONIC DIALYSIS, WITH LONG-TERM CURRENT USE OF INSULIN: Primary | ICD-10-CM

## 2022-06-16 DIAGNOSIS — Z99.2 CONTROLLED TYPE 2 DIABETES MELLITUS WITH CHRONIC KIDNEY DISEASE ON CHRONIC DIALYSIS, WITH LONG-TERM CURRENT USE OF INSULIN: Primary | ICD-10-CM

## 2022-06-16 DIAGNOSIS — N18.6 CONTROLLED TYPE 2 DIABETES MELLITUS WITH CHRONIC KIDNEY DISEASE ON CHRONIC DIALYSIS, WITH LONG-TERM CURRENT USE OF INSULIN: Primary | ICD-10-CM

## 2022-06-16 DIAGNOSIS — N18.6 ESRD ON HEMODIALYSIS: ICD-10-CM

## 2022-06-16 DIAGNOSIS — E11.22 CONTROLLED TYPE 2 DIABETES MELLITUS WITH CHRONIC KIDNEY DISEASE ON CHRONIC DIALYSIS, WITH LONG-TERM CURRENT USE OF INSULIN: Primary | ICD-10-CM

## 2022-06-16 PROCEDURE — 99214 OFFICE O/P EST MOD 30 MIN: CPT | Mod: S$PBB,,, | Performed by: FAMILY MEDICINE

## 2022-06-16 PROCEDURE — 99999 PR PBB SHADOW E&M-EST. PATIENT-LVL IV: CPT | Mod: PBBFAC,,, | Performed by: FAMILY MEDICINE

## 2022-06-16 PROCEDURE — 99999 PR PBB SHADOW E&M-EST. PATIENT-LVL IV: ICD-10-PCS | Mod: PBBFAC,,, | Performed by: FAMILY MEDICINE

## 2022-06-16 PROCEDURE — 99214 PR OFFICE/OUTPT VISIT, EST, LEVL IV, 30-39 MIN: ICD-10-PCS | Mod: S$PBB,,, | Performed by: FAMILY MEDICINE

## 2022-06-16 PROCEDURE — 99214 OFFICE O/P EST MOD 30 MIN: CPT | Mod: PBBFAC,PO | Performed by: FAMILY MEDICINE

## 2022-06-16 NOTE — PROGRESS NOTES
"  Physical Exam  /60   Pulse (!) 111   Temp 98.7 °F (37.1 °C) (Oral)   Ht 5' 11" (1.803 m)   Wt 63.5 kg (139 lb 15.9 oz)   SpO2 99%   BMI 19.52 kg/m²      Office Visit    Patient Name: Adryan Neff    : 1965  MRN: [id number]      Assessment/Plan:  Adryan Neff is a 56 y.o. male who presents today for :    Controlled type 2 diabetes mellitus with chronic kidney disease on chronic dialysis, with long-term current use of insulin  -     Hemoglobin A1C; Future; Expected date: 2022  -     CBC Without Differential; Future; Expected date: 2022  -     Comprehensive Metabolic Panel; Future; Expected date: 2022  -     Microalbumin/Creatinine Ratio, Urine; Future; Expected date: 2022  -previous A1c reviewed:   Lab Results   Component Value Date    HGBA1C 6.1 (H) 2021     -continue current medication regimen. Insulin refilled. Reviewed with patient about routine diabetic care. Caution on hypoglycemic Sx and if patient experiences such Sx to consume a small cracker or cup of juice and call our on-call doc for further instructions, or go to ER if she doesn't better right away.    Essential (primary) hypertension  ESRD on hemodialysis  -     Comprehensive Metabolic Panel; Future; Expected date: 2022  Pure hypercholesterolemia  -     Lipid Panel; Future; Expected date: 2022  -continue current medication regimen  -DASH diet, regular cardiovascular exercises  -weight loss and avoid NSAIDs        Follow up 6mo    This note was created by combination of typed  and MModal dictation.  Transcription errors may be present.  If there are any questions, please contact me.      ----------------------------------------------------------------------------------------------------------------------      HPI:  Patient Care Team:  Carola Pedro MD as PCP - General (Family Medicine)  Fidelina Rodrigez (Ophthalmology)  Cammie Shukla MA (Inactive) as Care Coordinator  Lee Bedoya MD as " Consulting Physician (Gastroenterology)    Adryan is a 56 y.o. male with      Patient Active Problem List   Diagnosis    Controlled type 2 diabetes mellitus with chronic kidney disease on chronic dialysis, with long-term current use of insulin    Essential (primary) hypertension    Pure hypercholesterolemia    Benign hypertension with chronic kidney disease, stage V    Other insomnia    Gastroesophageal reflux disease    Intractable vomiting    Chronic diastolic heart failure    CAD (coronary artery disease) - [facility]    Hyperkalemia    Pre-transplant evaluation for kidney transplant    Chronic pulmonary heart disease    HLD (hyperlipidemia)    NICM (nonischemic cardiomyopathy)    PDR (proliferative diabetic retinopathy)    Secondary hyperparathyroidism of renal origin    ESRD on hemodialysis    TB lung, latent    Paronychia, toe, left    Nuclear sclerosis of right eye    Pseudophakia    Gangrene of left foot    COVID-19 in immunocompromised patient    Encephalopathy, metabolic    Unilateral complete BKA, left, initial encounter    PAD (peripheral artery disease)       Patient with PMHx as above presents today with his son for medication refills.  He had seen his PCP a month ago for routine follow up but apparently didn't have any refills at this time on his Insulin, which he takes 15 units nightly. He denies any hypoglycemia - FBG <130s at home, no other side effects. His BP is well controlled on current regimen. He has ESRD on HD MWF, which is going well. No polyuria/polydipsia. No foot numbness/tingling/vision changes/hypoglycemia. No other concerns today.          Hemoglobin A1C   Date Value Ref Range Status   01/07/2021 6.1 (H) 4.0 - 5.6 % Final     Comment:     ADA Screening Guidelines:  5.7-6.4%  Consistent with prediabetes  >or=6.5%  Consistent with diabetes  High levels of fetal hemoglobin interfere with the HbA1C  assay. Heterozygous hemoglobin variants  (HbS, HgC, etc)do  not significantly interfere with this assay.   However, presence of multiple variants may affect accuracy.     10/26/2020 5.6 4.0 - 5.6 % Final     Comment:     ADA Screening Guidelines:  5.7-6.4%  Consistent with prediabetes  >or=6.5%  Consistent with diabetes  High levels of fetal hemoglobin interfere with the HbA1C  assay. Heterozygous hemoglobin variants (HbS, HgC, etc)do  not significantly interfere with this assay.   However, presence of multiple variants may affect accuracy.     01/29/2019 6.1 (H) 4.0 - 5.6 % Final     Comment:     ADA Screening Guidelines:  5.7-6.4%  Consistent with prediabetes  >or=6.5%  Consistent with diabetes  High levels of fetal hemoglobin interfere with the HbA1C  assay. Heterozygous hemoglobin variants (HbS, HgC, etc)do  not significantly interfere with this assay.   However, presence of multiple variants may affect accuracy.           Additional ROS      CONST: no fever, no activity change, weight stable.   EYES: no vision change.   ENT: no sore throat. No dysphagia.   CV: no CP with exertion  RESP: no SOB  GI: no N/V/diarrhea/constipation  : no urinary concerns  MSK: no new myalgias or arthralgias.   SKIN: no new rashes  NEURO: no focal deficits.   PSYCH: no new issues.   ENDOCRINE: no polyuria.                   Patient Active Problem List   Diagnosis    Controlled type 2 diabetes mellitus with chronic kidney disease on chronic dialysis, with long-term current use of insulin    Essential (primary) hypertension    Pure hypercholesterolemia    Benign hypertension with chronic kidney disease, stage V    Other insomnia    Gastroesophageal reflux disease    Intractable vomiting    Chronic diastolic heart failure    CAD (coronary artery disease) - [facility]obstructive [facility] in 2016    Hyperkalemia    Pre-transplant evaluation for kidney transplant    Chronic pulmonary heart disease    HLD (hyperlipidemia)    NICM (nonischemic cardiomyopathy)    PDR  (proliferative diabetic retinopathy)    Secondary hyperparathyroidism of renal origin    ESRD on hemodialysis    TB lung, latent    Paronychia, toe, left    Nuclear sclerosis of right eye    Pseudophakia    Gangrene of left foot    COVID-19 in immunocompromised patient    Encephalopathy, metabolic    Unilateral complete BKA, left, initial encounter    PAD (peripheral artery disease)       Current Medications  Medications reviewed/updated.     Current Outpatient Medications on File Prior to Visit   Medication Sig Dispense Refill    amLODIPine (NORVASC) 10 MG tablet Take 1 tablet (10 mg total) by mouth once daily. 30 tablet 11    aspirin 81 MG Chew Take 81 mg by mouth once daily.      atorvastatin (LIPITOR) 20 MG tablet Take 1 tablet by mouth.      clopidogreL (PLAVIX) 75 mg tablet Take 75 mg by mouth once daily.      lancets (ACCU-CHEK SOFTCLIX LANCETS) Misc 1 each by Misc.(Non-Drug; Combo Route) route once daily at 6am. 200 each 1    methoxy peg-epoetin beta (MIRCERA INJ) 50 mcg.      metoprolol succinate (TOPROL-XL) 25 MG 24 hr tablet Take 1 tablet by mouth.      omeprazole (PRILOSEC) 20 MG capsule TAKE 2 CAPSULES(40 MG) BY MOUTH EVERY DAY (Patient taking differently: Take 20 mg by mouth once daily.) 180 capsule 0    omeprazole (PRILOSEC) 40 MG capsule Take by mouth.      amLODIPine (NORVASC) 10 MG tablet Take 1 tablet by mouth.      aspirin 81 MG Chew Take 1 tablet by mouth.      clopidogreL (PLAVIX) 75 mg tablet Take 75 mg by mouth.      insulin glargine 100 units/mL (3mL) SubQ pen Inject 15 Units into the skin every evening. (Patient not taking: Reported on 6/16/2022) 3 mL 11    insulin glargine 100 units/mL (3mL) SubQ pen Inject 15 Units into the skin every evening. (Patient not taking: Reported on 6/16/2022) 3 mL 11    methoxy peg-epoetin beta (MIRCERA INJ) 30 mcg.      methoxy peg-epoetin beta (MIRCERA INJ) 75 mcg.      omeprazole (PRILOSEC) 40 MG capsule Take 40 mg by mouth once  daily.      pravastatin (PRAVACHOL) 20 MG tablet Take 20 mg by mouth once daily.      RENVELA 800 mg Tab Take 800 mg by mouth 3 (three) times daily with meals.      sevelamer carbonate (RENVELA) 800 mg Tab Take 2 tablets by mouth.      traMADoL (ULTRAM) 50 mg tablet Take 50 mg by mouth every 6 (six) hours as needed for Pain.      traMADoL (ULTRAM) 50 mg tablet Take 1 tablet by mouth.       No current facility-administered medications on file prior to visit.           Past Surgical History:   Procedure Laterality Date    ABOVE-KNEE AMPUTATION Left 1/18/2022    Procedure: AMPUTATION, ABOVE KNEE;  Surgeon: Rusty Light MD;  Location: [facility] OR;  Service: Orthopedics;  Laterality: Left;    ABOVE-KNEE AMPUTATION Left 1/21/2022    Procedure: AMPUTATION, ABOVE KNEE;  Surgeon: Rusty Light MD;  Location: [facility] OR;  Service: Orthopedics;  Laterality: Left;    AV FISTULA PLACEMENT      CATARACT EXTRACTION Left     EYE SURGERY      TOE AMPUTATION Left 1/6/2022    Procedure: AMPUTATION, TOE;  Surgeon: Masoud Soni MD;  Location: [facility] OR;  Service: Vascular;  Laterality: Left;  Left first through fifth toes, possible open transmetatarsal amputation and all other indicated procedures       Family History   Problem Relation Age of Onset    No Known Problems Mother     No Known Problems Father     No Known Problems Sister     No Known Problems Brother     No Known Problems Daughter     No Known Problems Daughter     No Known Problems Daughter     No Known Problems Brother     No Known Problems Maternal Aunt     No Known Problems Maternal Uncle     No Known Problems Paternal Aunt     No Known Problems Paternal Uncle     No Known Problems Maternal Grandmother     No Known Problems Maternal Grandfather     No Known Problems Paternal Grandmother     No Known Problems Paternal Grandfather        Social History     Socioeconomic History    Marital status: Single    Number of children: 5  "  Tobacco Use    Smoking status: Never Smoker    Smokeless tobacco: Never Used   Substance and Sexual Activity    Alcohol use: No    Drug use: No    Sexual activity: Yes     Partners: Female   Social History Narrative    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   PSYCH: AOx3, appropriate mood and affect.   MSK: extremities warm/well perfused, normal ROM in all 4 extremities (with Left knee AKA), no c/c/e.   NEURO: normal without focal findings, CN II-XII are intact    "

## 2022-06-17 ENCOUNTER — TELEPHONE (OUTPATIENT)
Dept: TRANSPLANT | Facility: CLINIC | Age: 57
End: 2022-06-17
Payer: MEDICARE

## 2022-06-17 ENCOUNTER — TELEPHONE (OUTPATIENT)
Dept: TRANSPLANT | Facility: CLINIC | Age: 57
End: 2022-06-17

## 2022-06-22 PROCEDURE — G0179 MD RECERTIFICATION HHA PT: HCPCS | Mod: ,,, | Performed by: STUDENT IN AN ORGANIZED HEALTH CARE EDUCATION/TRAINING PROGRAM

## 2022-06-22 PROCEDURE — G0179 PR HOME HEALTH MD RECERTIFICATION: ICD-10-PCS | Mod: ,,, | Performed by: STUDENT IN AN ORGANIZED HEALTH CARE EDUCATION/TRAINING PROGRAM

## 2022-06-29 ENCOUNTER — EXTERNAL HOME HEALTH (OUTPATIENT)
Dept: HOME HEALTH SERVICES | Facility: HOSPITAL | Age: 57
End: 2022-06-29
Payer: MEDICARE

## 2022-06-29 ENCOUNTER — TELEPHONE (OUTPATIENT)
Dept: FAMILY MEDICINE | Facility: CLINIC | Age: 57
End: 2022-06-29
Payer: MEDICARE

## 2022-06-29 NOTE — TELEPHONE ENCOUNTER
----- Message from Ciara Modi sent at 6/29/2022  8:24 AM CDT -----  Type: RX Refill Request    Who Called: self     Have you contacted your pharmacy: no    Refill or New Rx: refill    RX Name and Strength: lancets (ACCU-CHEK SOFTCLIX LANCETS) Misc   Pin needles     Preferred Pharmacy with phone number: [id number] [address] AT [address]   Phone:  [phone]  Fax:  [phone]    Local or Mail Order: Mail    Would the patient rather a call back or a response via [facility]?  call    Best Call Back Number: .[phone] (home)

## 2022-07-01 ENCOUNTER — TELEPHONE (OUTPATIENT)
Dept: FAMILY MEDICINE | Facility: CLINIC | Age: 57
End: 2022-07-01
Payer: MEDICARE

## 2022-07-01 NOTE — TELEPHONE ENCOUNTER
----- Message from Kaylie Alberts sent at 7/1/2022  9:26 AM CDT -----  .Type: RX Refill Request    Who Called: self    Have you contacted your pharmacy yes    Refill or New Rx refill    RX Name and Strength:lancets (ACCU-CHEK SOFTCLIX LANCETS) [facility]      Preferred Pharmacy with phone number:[id number] - [place], [place] - [address] AT [address]   Phone:  [phone]  Fax:  [phone]          Local or Mail Order:local    Ordering Provider:Dr Pedro    Would the patient rather a call back or a response via [facility]?call    Best Call Back Number:.[phone] (home)       Additional Information:  Patient needs needles too, I dont know where to pull that in here

## 2022-07-05 NOTE — TELEPHONE ENCOUNTER
----- Message from Naida Munguia sent at 7/5/2022 11:57 AM CDT -----  .Type: Patient Call Back    Who called:Marianna collier [facility]    What is the request in detail:  wanted to know if dr baker would follow his home health care and sign off on it     Can the clinic reply by [facility]?no    Would the patient rather a call back or a response via [facility]? call    Best call back number:[phone]

## 2022-07-07 RX ORDER — LANCETS
1 EACH MISCELLANEOUS DAILY
Qty: 200 EACH | Refills: 1 | Status: SHIPPED | OUTPATIENT
Start: 2022-07-07

## 2022-07-08 ENCOUNTER — EXTERNAL HOME HEALTH (OUTPATIENT)
Dept: HOME HEALTH SERVICES | Facility: HOSPITAL | Age: 57
End: 2022-07-08
Payer: MEDICARE

## 2022-07-08 DIAGNOSIS — Z99.2 TYPE 2 DIABETES MELLITUS WITH CHRONIC KIDNEY DISEASE ON CHRONIC DIALYSIS, WITH LONG-TERM CURRENT USE OF INSULIN: Primary | ICD-10-CM

## 2022-07-08 DIAGNOSIS — N18.6 TYPE 2 DIABETES MELLITUS WITH CHRONIC KIDNEY DISEASE ON CHRONIC DIALYSIS, WITH LONG-TERM CURRENT USE OF INSULIN: Primary | ICD-10-CM

## 2022-07-08 DIAGNOSIS — Z79.4 TYPE 2 DIABETES MELLITUS WITH CHRONIC KIDNEY DISEASE ON CHRONIC DIALYSIS, WITH LONG-TERM CURRENT USE OF INSULIN: Primary | ICD-10-CM

## 2022-07-08 DIAGNOSIS — E11.22 TYPE 2 DIABETES MELLITUS WITH CHRONIC KIDNEY DISEASE ON CHRONIC DIALYSIS, WITH LONG-TERM CURRENT USE OF INSULIN: Primary | ICD-10-CM

## 2022-07-11 ENCOUNTER — TELEPHONE (OUTPATIENT)
Dept: FAMILY MEDICINE | Facility: CLINIC | Age: 57
End: 2022-07-11
Payer: MEDICARE

## 2022-07-11 NOTE — TELEPHONE ENCOUNTER
----- Message from Dasha Ambriz sent at 7/11/2022 10:44 AM CDT -----  Type: Patient Call Back    Who called:pt    What is the request in detail:pt requesting needles to go with his lancets. Please call pt and send to   [id number] [place] [id number]  [address] [place]  [address]  [place] [address]  Phone: [phone] Fax: [phone]        Can the clinic reply by [facility]?    Would the patient rather a call back or a response via [facility]? call    Best call back number:[phone] (home)       Additional Information:

## 2022-07-15 RX ORDER — PEN NEEDLE, DIABETIC 30 GX3/16"
NEEDLE, DISPOSABLE MISCELLANEOUS
Qty: 100 EACH | Refills: 11 | Status: ON HOLD | OUTPATIENT
Start: 2022-07-15 | End: 2022-11-10 | Stop reason: HOSPADM

## 2022-07-28 ENCOUNTER — OFFICE VISIT (OUTPATIENT)
Dept: VASCULAR SURGERY | Facility: CLINIC | Age: 57
End: 2022-07-28
Payer: MEDICARE

## 2022-07-28 VITALS
DIASTOLIC BLOOD PRESSURE: 70 MMHG | WEIGHT: 139.75 LBS | BODY MASS INDEX: 19.49 KG/M2 | SYSTOLIC BLOOD PRESSURE: 132 MMHG

## 2022-07-28 DIAGNOSIS — Z89.612 HX OF AKA (ABOVE KNEE AMPUTATION), LEFT: ICD-10-CM

## 2022-07-28 DIAGNOSIS — T82.858D ARTERIOVENOUS FISTULA STENOSIS, SUBSEQUENT ENCOUNTER: Primary | ICD-10-CM

## 2022-07-28 PROCEDURE — 99214 PR OFFICE/OUTPT VISIT, EST, LEVL IV, 30-39 MIN: ICD-10-PCS | Mod: S$PBB,,, | Performed by: SURGERY

## 2022-07-28 PROCEDURE — 99999 PR PBB SHADOW E&M-EST. PATIENT-LVL III: CPT | Mod: PBBFAC,,, | Performed by: SURGERY

## 2022-07-28 PROCEDURE — 99213 OFFICE O/P EST LOW 20 MIN: CPT | Mod: PBBFAC | Performed by: SURGERY

## 2022-07-28 PROCEDURE — 99999 PR PBB SHADOW E&M-EST. PATIENT-LVL III: ICD-10-PCS | Mod: PBBFAC,,, | Performed by: SURGERY

## 2022-07-28 PROCEDURE — 99214 OFFICE O/P EST MOD 30 MIN: CPT | Mod: S$PBB,,, | Performed by: SURGERY

## 2022-07-28 NOTE — PROGRESS NOTES
Masoud Soni MD [facility]                         07/28/2022    HPI:  Adryan Neff is a 56 y.o. male with   Patient Active Problem List   Diagnosis    Controlled type 2 diabetes mellitus with chronic kidney disease on chronic dialysis, with long-term current use of insulin    Essential (primary) hypertension    Pure hypercholesterolemia    Benign hypertension with chronic kidney disease, stage V    Other insomnia    Gastroesophageal reflux disease    Intractable vomiting    Chronic diastolic heart failure    CAD (coronary artery disease) - [facility]obstructive from [facility] in 2016    Hyperkalemia    Pre-transplant evaluation for kidney transplant    Chronic pulmonary heart disease    HLD (hyperlipidemia)    NICM (nonischemic cardiomyopathy)    PDR (proliferative diabetic retinopathy)    Secondary hyperparathyroidism of renal origin    ESRD on hemodialysis    TB lung, latent    Paronychia, toe, left    Nuclear sclerosis of right eye    Pseudophakia    Gangrene of left foot    COVID-19 in immunocompromised patient    Encephalopathy, metabolic    Unilateral complete BKA, left, initial encounter    PAD (peripheral artery disease)   This is a 56 y.o.male patient, with a PMHx of ESRD on hemodialysis, CAD, HTN, and DM, presenting to the ED for further evaluation of left foot pain and black discoloration that began 3 days ago. Patient states these symptoms have been ongoing for the last month. He reports being referred to a specialist but denies ever following up. He has been putting some otc medication on the foot that he believes has turned his foot more black. No trauma or injury. Patient reports associated chills. Patient states applying a creme to the foot and noticed his discoloration worsened. Patient reports he was last dialyzed yesterday. Patient denies any fever, shortness of breath, chest pain, neck pain, back pain, abdominal pain, rash,  headaches, congestion, rhinorrhea, cough, sore throat, ear pain, eye pain, blurred vision, nausea, vomiting, diarrhea, dysuria, or any other associated symptoms.     Per chart review, he had an arterial u/s 12/15.   Impression:   1. High-grade severe stenosis right popliteal artery with occlusion anterior tibial artery which is reconstituted at level of DPA via collaterals.  2. 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     Cardiology has been planning to see him for revascularization options. Last podiatry visit 12/13 with Stable dry gangrene L 2nd toe and medial L 5th toe. Dorsalis pedis and posterior tibial pulses are diminished Bilaterally. Toes are cool to touch. Feet are warm proximally.There is decreased digital hair. Skin is atrophic, slightly hyperpigmented, and mildly edematous      In the ED, he was found to have worsened gangrene and a cool extremity. Vascular surgery was consulted. He was also COVID +. Says he got a booster shot in November sometime.        Procedure(s) (LRB):  AMPUTATION, ABOVE KNEE (Left)       Hospital Course:   Patient admitted to the hospital for eval and treatment of L foot gangrene.  ID, Ortho and Vasc consulted. Nephrology was also consulted for chronic dialysis. Patient started on broad spectrum antibiotics, and had several toes on L foot amputated by Vascular surgery.      The patient's AVF had recurrent malfunction. IR attempts at intervention without success. THDC placed for dialysis.     Pt became increasingly delirious and combative. He refused angiogram with Vascular. Psychiatry evaluated, felt that he was not able to make his own medical decisions. At this point Dr. Soni felt the patient would be a poor candidate for vascular intervention, recommended AKA. ID in agreement. Ortho consulted for AKA, daughter/POA in agreement,   S/P left AKA on 1.21.22,plan is SNF placement,SW was on case.afebrile.  Had some EKG changes with no chest pain,cardiology  cleared patient for procedure.  Overall his mental status is improved,his pain was controlled,patient was discharged to SNF with PCP and Ortho. Follow up as out patient.    5/2022:  No complaints.  HD without issue.    7/2022:  +prolonged bleeding during HD.    Past Medical History:   Diagnosis Date    CAD (coronary artery disease) 2016    CAD (non obstructive) 2016 [facility]    Diabetes mellitus type I     Diabetic retinopathy     ESRD on hemodialysis     on HD TThSat    Gangrene of left foot     Hypertension     Nuclear sclerosis of right eye 11/24/2021    PAD (peripheral artery disease)     Pulmonary HTN     TB lung, latent 04/2021     Past Surgical History:   Procedure Laterality Date    ABOVE-KNEE AMPUTATION Left 1/18/2022    Procedure: AMPUTATION, ABOVE KNEE;  Surgeon: Rusty Light MD;  Location: [facility] OR;  Service: Orthopedics;  Laterality: Left;    ABOVE-KNEE AMPUTATION Left 1/21/2022    Procedure: AMPUTATION, ABOVE KNEE;  Surgeon: Rusty Light MD;  Location: [facility] OR;  Service: Orthopedics;  Laterality: Left;    AV FISTULA PLACEMENT      CATARACT EXTRACTION Left     EYE SURGERY      TOE AMPUTATION Left 1/6/2022    Procedure: AMPUTATION, TOE;  Surgeon: Masoud Soni MD;  Location: [facility] OR;  Service: Vascular;  Laterality: Left;  Left first through fifth toes, possible open transmetatarsal amputation and all other indicated procedures     Family History   Problem Relation Age of Onset    No Known Problems Mother     No Known Problems Father     No Known Problems Sister     No Known Problems Brother     No Known Problems Daughter     No Known Problems Daughter     No Known Problems Daughter     No Known Problems Brother     No Known Problems Maternal Aunt     No Known Problems Maternal Uncle     No Known Problems Paternal Aunt     No Known Problems Paternal Uncle     No Known Problems Maternal Grandmother     No Known Problems Maternal Grandfather     No Known Problems  Paternal Grandmother     No Known Problems Paternal Grandfather      Social History     Socioeconomic History    Marital status: Single    Number of children: 5   Tobacco Use    Smoking status: Never Smoker    Smokeless tobacco: Never Used   Substance and Sexual Activity    Alcohol use: No    Drug use: No    Sexual activity: Yes     Partners: Female   Social History Narrative    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       Current Outpatient Medications:     amLODIPine (NORVASC) 10 MG tablet, Take 1 tablet (10 mg total) by mouth once daily., Disp: 30 tablet, Rfl: 11    amLODIPine (NORVASC) 10 MG tablet, Take 1 tablet by mouth., Disp: , Rfl:     aspirin 81 MG Chew, Take 81 mg by mouth once daily., Disp: , Rfl:     aspirin 81 MG Chew, Take 1 tablet by mouth., Disp: , Rfl:     atorvastatin (LIPITOR) 20 MG tablet, Take 1 tablet by mouth., Disp: , Rfl:     clopidogreL (PLAVIX) 75 mg tablet, Take 75 mg by mouth once daily., Disp: , Rfl:     insulin glargine 100 units/mL (3mL) SubQ pen, Inject 15 Units into the skin every evening., Disp: 3 mL, Rfl: 11    insulin glargine 100 units/mL (3mL) SubQ pen, Inject 15 Units into the skin every evening., Disp: 3 mL, Rfl: 11    lancets (ACCU-CHEK SOFTCLIX LANCETS) Misc, 1 each by Misc.(Non-Drug; Combo Route) route once daily at 6am., Disp: 200 each, Rfl: 1    methoxy peg-epoetin beta (MIRCERA INJ), 50 mcg., Disp: , Rfl:     methoxy peg-epoetin beta (MIRCERA INJ), 30 mcg., Disp: , Rfl:     methoxy peg-epoetin beta (MIRCERA INJ), 75 mcg., Disp: , Rfl:     metoprolol succinate (TOPROL-XL) 25 MG 24 hr tablet, Take 1 tablet by mouth., Disp: , Rfl:     omeprazole (PRILOSEC) 20 MG capsule, TAKE 2 CAPSULES(40 MG) BY MOUTH EVERY DAY (Patient taking differently: Take 20 mg by mouth once daily.), Disp: 180 capsule, Rfl: 0    omeprazole (PRILOSEC) 40 MG capsule, Take by mouth., Disp: , Rfl:     omeprazole (PRILOSEC) 40 MG capsule, Take 40 mg by mouth once daily., Disp: , Rfl:     pen  "needle, diabetic 32 gauge x 5/32" Ndle, To use with insulin pen 4x daily, Disp: 100 each, Rfl: 11    pravastatin (PRAVACHOL) 20 MG tablet, Take 20 mg by mouth once daily., Disp: , Rfl:     RENVELA 800 mg Tab, Take 800 mg by mouth 3 (three) times daily with meals., Disp: , Rfl:     sevelamer carbonate (RENVELA) 800 mg Tab, Take 2 tablets by mouth., Disp: , Rfl:     traMADoL (ULTRAM) 50 mg tablet, Take 50 mg by mouth every 6 (six) hours as needed for Pain., Disp: , Rfl:     traMADoL (ULTRAM) 50 mg tablet, Take 1 tablet by mouth., Disp: , Rfl:     Current Facility-Administered Medications:     blood sugar diagnostic Strp, , Misc.(Non-Drug; Combo Route), BID, Bam Hansen MD    REVIEW OF SYSTEMS:  General: No fevers or chills; ENT: No sore throat; Allergy and Immunology: no persistent infections; Hematological and Lymphatic: No history of bleeding or easy bruising; Endocrine: negative; Respiratory: no cough, shortness of breath, or wheezing; Cardiovascular: no chest pain or dyspnea on exertion; no claudication, no rest pain; Gastrointestinal: no abdominal pain/back, change in bowel habits, or bloody stools; Genito-Urinary: no dysuria, trouble voiding, or hematuria; Musculoskeletal: negative, no wound; Neurological: no TIA or stroke symptoms; Psychiatric: no nervousness, anxiety or depression.    PHYSICAL EXAM:                General appearance:  Alert, well-appearing, and in no distress.  Oriented to person, place, and time                    Neurological: Normal speech, no focal findings noted; CN II - XII grossly intact. RLE with sensation to light touch, LLE with sensation to light touch.            Musculoskeletal: Digits/nail without cyanosis/clubbing.  Strength 5/5 BLE.                    Neck: Supple, no significant adenopathy, no carotid bruit can be auscultated                  Chest:  Clear to auscultation, no wheezes, rales or rhonchi, symmetric air entry. No use of accessory muscles               " Cardiac: Normal rate and regular rhythm, S1 and S2 normal            Abdomen: Soft, nontender, nondistended, no masses or organomegaly, no hernia     No rebound tenderness noted; bowel sounds normal     Pulsatile aortic mass is non palpable.     No groin adenopathy      Extremities:2+ R femoral pulse, 2+ L femoral pulse     doppler+ R popliteal pulse     1+ R PT pulse     1+ R DP pulse     no RLE edema, no L AKA stump edema    Skin: RLE no tissue loss, RUE AVF pulsatile thrill, no wounds    LAB RESULTS:  No results found for: CBC  Lab Results   Component Value Date    LABPROT 11.2 01/05/2022    INR 1.0 01/05/2022     Lab Results   Component Value Date     03/16/2022    K 4.9 03/16/2022     03/16/2022    CO2 26 03/16/2022     (H) 03/16/2022    BUN 42 (H) 03/16/2022    CREATININE 9.5 (H) 03/16/2022    CALCIUM 9.3 03/16/2022    ANIONGAP 10 03/16/2022    EGFRNONAA 5 (A) 03/16/2022     Lab Results   Component Value Date    WBC 8.15 03/16/2022    RBC 3.31 (L) 03/16/2022    HGB 9.4 (L) 03/16/2022    HCT 31.6 (L) 03/16/2022    MCV 96 03/16/2022    MCH 28.4 03/16/2022    MCHC 29.7 (L) 03/16/2022    RDW 16.6 (H) 03/16/2022     03/16/2022    MPV 10.3 03/16/2022    GRAN 5.5 03/16/2022    GRAN 66.7 03/16/2022    LYMPH 1.4 03/16/2022    LYMPH 16.6 (L) 03/16/2022    MONO 0.6 03/16/2022    MONO 7.4 03/16/2022    EOS 0.7 (H) 03/16/2022    BASO 0.07 03/16/2022    EOSINOPHIL 8.0 03/16/2022    BASOPHIL 0.9 03/16/2022    DIFFMETHOD Automated 03/16/2022     .  Lab Results   Component Value Date    HGBA1C 6.1 (H) 01/07/2021       IMAGING:  All pertinent imaging has been reviewed and interpreted independently.    HD US 5/2022 reviewed with HD significant venous outflow stenosis.    IMP/PLAN:  56 y.o. male with   Patient Active Problem List   Diagnosis    Controlled type 2 diabetes mellitus with chronic kidney disease on chronic dialysis, with long-term current use of insulin    Essential (primary) hypertension     Pure hypercholesterolemia    Benign hypertension with chronic kidney disease, stage V    Other insomnia    Gastroesophageal reflux disease    Intractable vomiting    Chronic diastolic heart failure    CAD (coronary artery disease) - [facility]obstruct[facility] in 2016    Hyperkalemia    Pre-transplant evaluation for kidney transplant    Chronic pulmonary heart disease    HLD (hyperlipidemia)    NICM (nonischemic cardiomyopathy)    PDR (proliferative diabetic retinopathy)    Secondary hyperparathyroidism of renal origin    ESRD on hemodialysis    TB lung, latent    Paronychia, toe, left    Nuclear sclerosis of right eye    Pseudophakia    Gangrene of left foot    COVID-19 in immunocompromised patient    Encephalopathy, metabolic    Unilateral complete BKA, left, initial encounter    PAD (peripheral artery disease)    being managed by PCP and specialists who is here today for evaluation of PVD.    -RLE PVD with no claudication, no rest pain, no wound.  Imaging reviewed. - rec daily ASA, cont monitoring of PVD  -Prosthetics eval L AKA  -Exercise  -Heart healthy lifestyle  -HD US reviewed - rec LUE fistulogram 8/10/22    I spent 12 minutes evaluating this patient and greater than 50% of the time was spent counseling, coordinator care and discussing the plan of care.  All questions were answered and patient stated understanding with agreement with the above treatment plan.    Masoud Soni MD [facility]  Vascular and Endovascular Surgery

## 2022-07-28 NOTE — H&P (VIEW-ONLY)
Masoud Soni MD [facility]                         07/28/2022    HPI:  Adryan Neff is a 56 y.o. male with   Patient Active Problem List   Diagnosis    Controlled type 2 diabetes mellitus with chronic kidney disease on chronic dialysis, with long-term current use of insulin    Essential (primary) hypertension    Pure hypercholesterolemia    Benign hypertension with chronic kidney disease, stage V    Other insomnia    Gastroesophageal reflux disease    Intractable vomiting    Chronic diastolic heart failure    CAD (coronary artery disease) - non-obstructive [facility] in 2016    Hyperkalemia    Pre-transplant evaluation for kidney transplant    Chronic pulmonary heart disease    HLD (hyperlipidemia)    NICM (nonischemic cardiomyopathy)    PDR (proliferative diabetic retinopathy)    Secondary hyperparathyroidism of renal origin    ESRD on hemodialysis    TB lung, latent    Paronychia, toe, left    Nuclear sclerosis of right eye    Pseudophakia    Gangrene of left foot    COVID-19 in immunocompromised patient    Encephalopathy, metabolic    Unilateral complete BKA, left, initial encounter    PAD (peripheral artery disease)   This is a 56 y.o.male patient, with a PMHx of ESRD on hemodialysis, CAD, HTN, and DM, presenting to the ED for further evaluation of left foot pain and black discoloration that began 3 days ago. Patient states these symptoms have been ongoing for the last month. He reports being referred to a specialist but denies ever following up. He has been putting some otc medication on the foot that he believes has turned his foot more black. No trauma or injury. Patient reports associated chills. Patient states applying a creme to the foot and noticed his discoloration worsened. Patient reports he was last dialyzed yesterday. Patient denies any fever, shortness of breath, chest pain, neck pain, back pain, abdominal pain, rash,  headaches, congestion, rhinorrhea, cough, sore throat, ear pain, eye pain, blurred vision, nausea, vomiting, diarrhea, dysuria, or any other associated symptoms.     Per chart review, he had an arterial u/s 12/15.   Impression:   1. High-grade severe stenosis right popliteal artery with occlusion anterior tibial artery which is reconstituted at level of DPA via collaterals.  2. 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     Cardiology has been planning to see him for revascularization options. Last podiatry visit 12/13 with Stable dry gangrene L 2nd toe and medial L 5th toe. Dorsalis pedis and posterior tibial pulses are diminished Bilaterally. Toes are cool to touch. Feet are warm proximally.There is decreased digital hair. Skin is atrophic, slightly hyperpigmented, and mildly edematous      In the ED, he was found to have worsened gangrene and a cool extremity. Vascular surgery was consulted. He was also COVID +. Says he got a booster shot in November sometime.        Procedure(s) (LRB):  AMPUTATION, ABOVE KNEE (Left)       Hospital Course:   Patient admitted to the hospital for eval and treatment of L foot gangrene.  ID, Ortho and Vasc consulted. Nephrology was also consulted for chronic dialysis. Patient started on broad spectrum antibiotics, and had several toes on L foot amputated by Vascular surgery.      The patient's AVF had recurrent malfunction. IR attempts at intervention without success. THDC placed for dialysis.     Pt became increasingly delirious and combative. He refused angiogram with Vascular. Psychiatry evaluated, felt that he was not able to make his own medical decisions. At this point Dr. Soni felt the patient would be a poor candidate for vascular intervention, recommended AKA. ID in agreement. Ortho consulted for AKA, daughter/POA in agreement,   S/P left AKA on 1.21.22,plan is SNF placement,SW was on case.afebrile.  Had some EKG changes with no chest pain,cardiology  cleared patient for procedure.  Overall his mental status is improved,his pain was controlled,patient was discharged to SNF with PCP and Ortho. Follow up as out patient.    5/2022:  No complaints.  HD without issue.    7/2022:  +prolonged bleeding during HD.    Past Medical History:   Diagnosis Date    CAD (coronary artery disease) 2016    CAD (non obstructive) 2016 [facility]    Diabetes mellitus type I     Diabetic retinopathy     ESRD on hemodialysis     on HD TThSat    Gangrene of left foot     Hypertension     Nuclear sclerosis of right eye 11/24/2021    PAD (peripheral artery disease)     Pulmonary HTN     TB lung, latent 04/2021     Past Surgical History:   Procedure Laterality Date    ABOVE-KNEE AMPUTATION Left 1/18/2022    Procedure: AMPUTATION, ABOVE KNEE;  Surgeon: Rusty Light MD;  Location: [facility] OR;  Service: Orthopedics;  Laterality: Left;    ABOVE-KNEE AMPUTATION Left 1/21/2022    Procedure: AMPUTATION, ABOVE KNEE;  Surgeon: Rusty Light MD;  Location: [facility] OR;  Service: Orthopedics;  Laterality: Left;    AV FISTULA PLACEMENT      CATARACT EXTRACTION Left     EYE SURGERY      TOE AMPUTATION Left 1/6/2022    Procedure: AMPUTATION, TOE;  Surgeon: Masoud Soni MD;  Location: [facility] OR;  Service: Vascular;  Laterality: Left;  Left first through fifth toes, possible open transmetatarsal amputation and all other indicated procedures     Family History   Problem Relation Age of Onset    No Known Problems Mother     No Known Problems Father     No Known Problems Sister     No Known Problems Brother     No Known Problems Daughter     No Known Problems Daughter     No Known Problems Daughter     No Known Problems Brother     No Known Problems Maternal Aunt     No Known Problems Maternal Uncle     No Known Problems Paternal Aunt     No Known Problems Paternal Uncle     No Known Problems Maternal Grandmother     No Known Problems Maternal Grandfather     No Known Problems  Paternal Grandmother     No Known Problems Paternal Grandfather      Social History     Socioeconomic History    Marital status: Single    Number of children: 5   Tobacco Use    Smoking status: Never Smoker    Smokeless tobacco: Never Used   Substance and Sexual Activity    Alcohol use: No    Drug use: No    Sexual activity: Yes     Partners: Female   Social History Narrative    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       Current Outpatient Medications:     amLODIPine (NORVASC) 10 MG tablet, Take 1 tablet (10 mg total) by mouth once daily., Disp: 30 tablet, Rfl: 11    amLODIPine (NORVASC) 10 MG tablet, Take 1 tablet by mouth., Disp: , Rfl:     aspirin 81 MG Chew, Take 81 mg by mouth once daily., Disp: , Rfl:     aspirin 81 MG Chew, Take 1 tablet by mouth., Disp: , Rfl:     atorvastatin (LIPITOR) 20 MG tablet, Take 1 tablet by mouth., Disp: , Rfl:     clopidogreL (PLAVIX) 75 mg tablet, Take 75 mg by mouth once daily., Disp: , Rfl:     insulin glargine 100 units/mL (3mL) SubQ pen, Inject 15 Units into the skin every evening., Disp: 3 mL, Rfl: 11    insulin glargine 100 units/mL (3mL) SubQ pen, Inject 15 Units into the skin every evening., Disp: 3 mL, Rfl: 11    lancets (ACCU-CHEK SOFTCLIX LANCETS) Misc, 1 each by Misc.(Non-Drug; Combo Route) route once daily at 6am., Disp: 200 each, Rfl: 1    methoxy peg-epoetin beta (MIRCERA INJ), 50 mcg., Disp: , Rfl:     methoxy peg-epoetin beta (MIRCERA INJ), 30 mcg., Disp: , Rfl:     methoxy peg-epoetin beta (MIRCERA INJ), 75 mcg., Disp: , Rfl:     metoprolol succinate (TOPROL-XL) 25 MG 24 hr tablet, Take 1 tablet by mouth., Disp: , Rfl:     omeprazole (PRILOSEC) 20 MG capsule, TAKE 2 CAPSULES(40 MG) BY MOUTH EVERY DAY (Patient taking differently: Take 20 mg by mouth once daily.), Disp: 180 capsule, Rfl: 0    omeprazole (PRILOSEC) 40 MG capsule, Take by mouth., Disp: , Rfl:     omeprazole (PRILOSEC) 40 MG capsule, Take 40 mg by mouth once daily., Disp: , Rfl:     pen  "needle, diabetic 32 gauge x 5/32" Ndle, To use with insulin pen 4x daily, Disp: 100 each, Rfl: 11    pravastatin (PRAVACHOL) 20 MG tablet, Take 20 mg by mouth once daily., Disp: , Rfl:     RENVELA 800 mg Tab, Take 800 mg by mouth 3 (three) times daily with meals., Disp: , Rfl:     sevelamer carbonate (RENVELA) 800 mg Tab, Take 2 tablets by mouth., Disp: , Rfl:     traMADoL (ULTRAM) 50 mg tablet, Take 50 mg by mouth every 6 (six) hours as needed for Pain., Disp: , Rfl:     traMADoL (ULTRAM) 50 mg tablet, Take 1 tablet by mouth., Disp: , Rfl:     Current Facility-Administered Medications:     blood sugar diagnostic Strp, , Misc.(Non-Drug; Combo Route), BID, Bam Hansen MD    REVIEW OF SYSTEMS:  General: No fevers or chills; ENT: No sore throat; Allergy and Immunology: no persistent infections; Hematological and Lymphatic: No history of bleeding or easy bruising; Endocrine: negative; Respiratory: no cough, shortness of breath, or wheezing; Cardiovascular: no chest pain or dyspnea on exertion; no claudication, no rest pain; Gastrointestinal: no abdominal pain/back, change in bowel habits, or bloody stools; Genito-Urinary: no dysuria, trouble voiding, or hematuria; Musculoskeletal: negative, no wound; Neurological: no TIA or stroke symptoms; Psychiatric: no nervousness, anxiety or depression.    PHYSICAL EXAM:                General appearance:  Alert, well-appearing, and in no distress.  Oriented to person, place, and time                    Neurological: Normal speech, no focal findings noted; CN II - XII grossly intact. RLE with sensation to light touch, LLE with sensation to light touch.            Musculoskeletal: Digits/nail without cyanosis/clubbing.  Strength 5/5 BLE.                    Neck: Supple, no significant adenopathy, no carotid bruit can be auscultated                  Chest:  Clear to auscultation, no wheezes, rales or rhonchi, symmetric air entry. No use of accessory muscles               " Cardiac: Normal rate and regular rhythm, S1 and S2 normal            Abdomen: Soft, nontender, nondistended, no masses or organomegaly, no hernia     No rebound tenderness noted; bowel sounds normal     Pulsatile aortic mass is non palpable.     No groin adenopathy      Extremities:2+ R femoral pulse, 2+ L femoral pulse     doppler+ R popliteal pulse     1+ R PT pulse     1+ R DP pulse     no RLE edema, no L AKA stump edema    Skin: RLE no tissue loss, RUE AVF pulsatile thrill, no wounds    LAB RESULTS:  No results found for: CBC  Lab Results   Component Value Date    LABPROT 11.2 01/05/2022    INR 1.0 01/05/2022     Lab Results   Component Value Date     03/16/2022    K 4.9 03/16/2022     03/16/2022    CO2 26 03/16/2022     (H) 03/16/2022    BUN 42 (H) 03/16/2022    CREATININE 9.5 (H) 03/16/2022    CALCIUM 9.3 03/16/2022    ANIONGAP 10 03/16/2022    EGFRNONAA 5 (A) 03/16/2022     Lab Results   Component Value Date    WBC 8.15 03/16/2022    RBC 3.31 (L) 03/16/2022    HGB 9.4 (L) 03/16/2022    HCT 31.6 (L) 03/16/2022    MCV 96 03/16/2022    MCH 28.4 03/16/2022    MCHC 29.7 (L) 03/16/2022    RDW 16.6 (H) 03/16/2022     03/16/2022    MPV 10.3 03/16/2022    GRAN 5.5 03/16/2022    GRAN 66.7 03/16/2022    LYMPH 1.4 03/16/2022    LYMPH 16.6 (L) 03/16/2022    MONO 0.6 03/16/2022    MONO 7.4 03/16/2022    EOS 0.7 (H) 03/16/2022    BASO 0.07 03/16/2022    EOSINOPHIL 8.0 03/16/2022    BASOPHIL 0.9 03/16/2022    DIFFMETHOD Automated 03/16/2022     .  Lab Results   Component Value Date    HGBA1C 6.1 (H) 01/07/2021       IMAGING:  All pertinent imaging has been reviewed and interpreted independently.    HD US 5/2022 reviewed with HD significant venous outflow stenosis.    IMP/PLAN:  56 y.o. male with   Patient Active Problem List   Diagnosis    Controlled type 2 diabetes mellitus with chronic kidney disease on chronic dialysis, with long-term current use of insulin    Essential (primary) hypertension     Pure hypercholesterolemia    Benign hypertension with chronic kidney disease, stage V    Other insomnia    Gastroesophageal reflux disease    Intractable vomiting    Chronic diastolic heart failure    CAD (coronary artery disease) - [facility]obstruct[facility] in 2016    Hyperkalemia    Pre-transplant evaluation for kidney transplant    Chronic pulmonary heart disease    HLD (hyperlipidemia)    NICM (nonischemic cardiomyopathy)    PDR (proliferative diabetic retinopathy)    Secondary hyperparathyroidism of renal origin    ESRD on hemodialysis    TB lung, latent    Paronychia, toe, left    Nuclear sclerosis of right eye    Pseudophakia    Gangrene of left foot    COVID-19 in immunocompromised patient    Encephalopathy, metabolic    Unilateral complete BKA, left, initial encounter    PAD (peripheral artery disease)    being managed by PCP and specialists who is here today for evaluation of PVD.    -RLE PVD with no claudication, no rest pain, no wound.  Imaging reviewed. - rec daily ASA, cont monitoring of PVD  -Prosthetics eval L AKA  -Exercise  -Heart healthy lifestyle  -HD US reviewed - rec LUE fistulogram 8/10/22    I spent 12 minutes evaluating this patient and greater than 50% of the time was spent counseling, coordinator care and discussing the plan of care.  All questions were answered and patient stated understanding with agreement with the above treatment plan.    Masoud Soni MD [facility]  Vascular and Endovascular Surgery

## 2022-08-03 ENCOUNTER — PATIENT OUTREACH (OUTPATIENT)
Dept: ADMINISTRATIVE | Facility: HOSPITAL | Age: 57
End: 2022-08-03
Payer: MEDICARE

## 2022-08-04 ENCOUNTER — EXTERNAL HOME HEALTH (OUTPATIENT)
Dept: HOME HEALTH SERVICES | Facility: HOSPITAL | Age: 57
End: 2022-08-04
Payer: MEDICARE

## 2022-08-09 ENCOUNTER — HOSPITAL ENCOUNTER (OUTPATIENT)
Facility: HOSPITAL | Age: 57
Discharge: HOME | End: 2022-08-09
Attending: SURGERY | Admitting: SURGERY
Payer: MEDICARE

## 2022-08-09 VITALS
RESPIRATION RATE: 16 BRPM | TEMPERATURE: 98 F | DIASTOLIC BLOOD PRESSURE: 72 MMHG | HEART RATE: 75 BPM | SYSTOLIC BLOOD PRESSURE: 166 MMHG | OXYGEN SATURATION: 98 %

## 2022-08-09 DIAGNOSIS — T82.858D ARTERIOVENOUS FISTULA STENOSIS, SUBSEQUENT ENCOUNTER: Primary | ICD-10-CM

## 2022-08-09 DIAGNOSIS — T82.858D ARTERIOVENOUS FISTULA STENOSIS, SUBSEQUENT ENCOUNTER: ICD-10-CM

## 2022-08-09 DIAGNOSIS — Z01.818 PREOPERATIVE TESTING: Primary | ICD-10-CM

## 2022-08-09 LAB — POCT GLUCOSE: 101 MG/DL (ref 70–110)

## 2022-08-09 PROCEDURE — 36907 BALO ANGIOP CTR DIALYSIS SEG: CPT | Performed by: SURGERY

## 2022-08-09 PROCEDURE — 36902 INTRO CATH DIALYSIS CIRCUIT: CPT | Performed by: SURGERY

## 2022-08-09 PROCEDURE — 36907 BALO ANGIOP CTR DIALYSIS SEG: CPT | Mod: ,,, | Performed by: SURGERY

## 2022-08-09 PROCEDURE — 63600175 PHARM REV CODE 636 W HCPCS: Performed by: SURGERY

## 2022-08-09 PROCEDURE — 99152 MOD SED SAME PHYS/QHP 5/>YRS: CPT | Performed by: SURGERY

## 2022-08-09 PROCEDURE — 99152 PR MOD CONSCIOUS SEDATION, SAME PHYS, 5+ YRS, FIRST 15 MIN: ICD-10-PCS | Mod: ,,, | Performed by: SURGERY

## 2022-08-09 PROCEDURE — 99152 MOD SED SAME PHYS/QHP 5/>YRS: CPT | Mod: ,,, | Performed by: SURGERY

## 2022-08-09 PROCEDURE — 99153 MOD SED SAME PHYS/QHP EA: CPT | Performed by: SURGERY

## 2022-08-09 PROCEDURE — 63600175 PHARM REV CODE 636 W HCPCS: Performed by: NURSE PRACTITIONER

## 2022-08-09 PROCEDURE — 36902 INTRO CATH DIALYSIS CIRCUIT: CPT | Mod: ,,, | Performed by: SURGERY

## 2022-08-09 PROCEDURE — 36907 PR TRANSLML BALLOON ANGIO, CTR DIALYSIS SEGMENT THRU DIALYSIS CIRCUIT: ICD-10-PCS | Mod: ,,, | Performed by: SURGERY

## 2022-08-09 PROCEDURE — 25000003 PHARM REV CODE 250: Performed by: NURSE PRACTITIONER

## 2022-08-09 PROCEDURE — 36902 PR INTRO CATH, DIALYSIS CIRCUIT W/TRANSLML BALLOON ANGIO: ICD-10-PCS | Mod: ,,, | Performed by: SURGERY

## 2022-08-09 RX ORDER — CEFAZOLIN SODIUM 2 G/50ML
2 SOLUTION INTRAVENOUS ONCE
Status: DISCONTINUED | OUTPATIENT
Start: 2022-08-09 | End: 2022-10-07

## 2022-08-09 RX ORDER — FENTANYL CITRATE 50 UG/ML
INJECTION, SOLUTION INTRAMUSCULAR; INTRAVENOUS CODE/TRAUMA/SEDATION MEDICATION
Status: DISCONTINUED | OUTPATIENT
Start: 2022-08-09 | End: 2022-08-09 | Stop reason: HOSPADM

## 2022-08-09 RX ORDER — HEPARIN SODIUM 1000 [USP'U]/ML
INJECTION, SOLUTION INTRAVENOUS; SUBCUTANEOUS CODE/TRAUMA/SEDATION MEDICATION
Status: DISCONTINUED | OUTPATIENT
Start: 2022-08-09 | End: 2022-08-09 | Stop reason: HOSPADM

## 2022-08-09 RX ORDER — MIDAZOLAM HYDROCHLORIDE 1 MG/ML
INJECTION INTRAMUSCULAR; INTRAVENOUS CODE/TRAUMA/SEDATION MEDICATION
Status: DISCONTINUED | OUTPATIENT
Start: 2022-08-09 | End: 2022-08-09 | Stop reason: HOSPADM

## 2022-08-09 RX ORDER — AMLODIPINE BESYLATE 5 MG/1
10 TABLET ORAL DAILY
Status: COMPLETED | OUTPATIENT
Start: 2022-08-09 | End: 2022-08-09

## 2022-08-09 RX ORDER — HYDRALAZINE HYDROCHLORIDE 20 MG/ML
10 INJECTION INTRAMUSCULAR; INTRAVENOUS ONCE
Status: COMPLETED | OUTPATIENT
Start: 2022-08-09 | End: 2022-08-09

## 2022-08-09 RX ORDER — HYDRALAZINE HYDROCHLORIDE 20 MG/ML
10 INJECTION INTRAMUSCULAR; INTRAVENOUS EVERY 6 HOURS PRN
Status: DISCONTINUED | OUTPATIENT
Start: 2022-08-09 | End: 2022-08-09 | Stop reason: HOSPADM

## 2022-08-09 RX ADMIN — MIDAZOLAM HYDROCHLORIDE 0.5 MG: 1 INJECTION INTRAMUSCULAR; INTRAVENOUS at 12:08

## 2022-08-09 RX ADMIN — HYDRALAZINE HYDROCHLORIDE 10 MG: 20 INJECTION INTRAMUSCULAR; INTRAVENOUS at 09:08

## 2022-08-09 RX ADMIN — HYDRALAZINE HYDROCHLORIDE 10 MG: 20 INJECTION INTRAMUSCULAR; INTRAVENOUS at 11:08

## 2022-08-09 RX ADMIN — FENTANYL CITRATE 50 MCG: 50 INJECTION, SOLUTION INTRAMUSCULAR; INTRAVENOUS at 12:08

## 2022-08-09 RX ADMIN — HEPARIN SODIUM 3000 UNITS: 1000 INJECTION, SOLUTION INTRAVENOUS; SUBCUTANEOUS at 12:08

## 2022-08-09 RX ADMIN — FENTANYL CITRATE 25 MCG: 50 INJECTION, SOLUTION INTRAMUSCULAR; INTRAVENOUS at 12:08

## 2022-08-09 RX ADMIN — AMLODIPINE BESYLATE 10 MG: 5 TABLET ORAL at 10:08

## 2022-08-09 NOTE — PLAN OF CARE
Patient  And daughter state readiness to go home and verbalizes understanding of discharge instructions.

## 2022-08-09 NOTE — OP NOTE
Date: 8/9/2022     Surgeon(s) and Role:   Masoud Soni MD    Assistant:  Francoise Palmer MS3    Pre-op Diagnosis:   1. T82.858A: Stenosis of vascular prosthetic devices, implants and grafts, initial encounter  2. Final diagnoses:  [T82.858D] Arteriovenous fistula stenosis, subsequent encounter      Post-op Diagnosis: Same     Procedure(s):   1. US guided L radial artery   2. LUE fistulogram  3. Central venogram  4. Moderate sedation  5. Balloon angioplasty of proximal LUE AVF with 6mm balloon  6. Balloon angioplasty of proximal LUE AVF stent with 7mm balloon  7. Balloon angioplasty of LUE AVF central stent with 7-10 mm balloon  8. Balloon angioplasty of L Brachiocephalic stent with 7-10 mm balloon    Anesthesia: Local MAC     Findings/Key Components:   Successful treatment of > 70% stenosis  Strong palpable thrill     EBL: Minimal    PROCEDURE IN DETAIL:After an informed consent was obtained the patient was taken to the interventional suite and placed in the supine position.  The patient was brought to the IR suite, placed in supine. Arm was prepped and draped in the standard surgical fashion. Under ultrasound guidance, the L radial artery was accessed with a micropuncture needle; ultrasound confirmed vessel patency, followed by placement of 4/3-Italian micropuncture dilator. Through this, an 0.035-inch wire was placed in the short 6-Italian sheath.   A fistulogram and central venogram was performed.  After independent review and interpretation, based upon the fistulogram results, I decided to intervene. Through this, an 0.035-inch Glidewire was placed through the high-grade stenosis, which was demonstrated by the angiogram.  A high-grade stenosis in the LUE AVF and venous outflow stents and L brachiocephalic stent  Were found, which was crossed with a hyrophilic 0.035-in glidewire. This was treated with the high-pressure, noncompliant balloon(s) listed above. Resolution of the stenosis was noted. Strong thrill  could be felt. The sheath was removed, a vasc band was placed with good hemostasis.    MODERATE SEDATION   I was present for and monitored the patients cardio respiratory functions during the moderate sedation. See nurses notes for Intra-service start and end times, the medications their doseage and route.    Time of sedation:  56 mins.

## 2022-08-09 NOTE — BRIEF OP NOTE
[facility] [place] - Surgery  Brief Operative Note    Surgery Date: 8/9/2022     Surgeon(s) and Role:     * Masoud Soni MD - Primary    Assistant:  Francoise Palmer MS3    Pre-op Diagnosis:  Arteriovenous fistula stenosis, subsequent encounter [T82.858D]    Post-op Diagnosis:  Post-Op Diagnosis Codes:     * Arteriovenous fistula stenosis, subsequent encounter [T82.858D]    Procedure(s) (LRB):  Fistulogram (Left)    Anesthesia: RN IV Sedation    Operative Findings: severe in-stent stenosis and prox AVF stenosis, improved with PTA     Estimated Blood Loss: * No values recorded between 8/9/2022 12:00 AM and 8/9/2022  1:21 PM *         Specimens:   Specimen (24h ago, onward)            None            Discharge Note    OUTCOME: Patient tolerated treatment/procedure well without complication and is now ready for discharge.    DISPOSITION: Home or Self Care    FINAL DIAGNOSIS:  <principal problem not specified>    FOLLOWUP: In clinic     DISCHARGE INSTRUCTIONS:  No discharge procedures on file.

## 2022-08-09 NOTE — DISCHARGE INSTRUCTIONS
"DIET: You may resume your home diet. If nausea is present, increase your diet gradually with fluids and bland foods    ACTIVITY LEVEL: You have received sedation or an anesthetic, you may feel sleepy for several hours. Rest until you are more awake.     Limit movement of wrist for the next 24 hours.  No lifting over 5 pounds for the next 24 hours.     If Oozing occurs at the injection site, lie down. Apply pressure with a clean wash cloth for 20-30 minutes. Call the doctor.     If severe bleeding occurs, lie down, apply pressure. Call 911    DRESSING: Remove dressing in 48 hours, and you may shower. Clean area with soap and water, apply band aid for the next 5 days.       Medications: Pain medication should be taken only if needed and as directed. If antibiotics are prescribed, the medication should be taken until completed. You will be given an updated list of you medications.    No driving, alcoholic beverages or signing legal documents for next 24 hours or while taking pain medication.       CALL 911  Chest pain   Shortness of breath           CALL THE DOCTOR:   Fever of 100.4 degrees Fahrenheit or higher, or as directed by your healthcare provider.  Red, hot, or swollen area around the site.   Loss of pulsing feeling ("thrill") over the fistula  Pain, cold, or numbness in the hand          If any unusual problems or difficulties occur contact your doctor. If you cannot contact your doctor but feel your signs and symptoms warrant a physicians attention return to the emergency room.        Fall Prevention  Millions of people fall every year and injure themselves. You may have had anesthesia or sedation which may increase your risk of falling. You may have health issues that put you at an increased risk of falling.     Here are ways to reduce your risk of falling.    Make your home safe by keeping walkways clear of objects you may trip over.  Use non-slip pads under rugs. Do not use area rugs or small throw " rugs.  Use non-slip mats in bathtubs and showers.  Install handrails and lights on staircases.  Do not walk in poorly lit areas.  Do not stand on chairs or wobbly ladders.  Use caution when reaching overhead or looking upward. This position can cause a loss of balance.  Be sure your shoes fit properly, have non-slip bottoms and are in good condition.   Wear shoes both inside and out. Avoid going barefoot or wearing slippers.  Be cautious when going up and down stairs, curbs, and when walking on uneven sidewalks.  If your balance is poor, consider using a cane or walker.  If your fall was related to alcohol use, stop or limit alcohol intake.   If your fall was related to use of sleeping medicines, talk to your doctor about this. You may need to reduce your dosage at bedtime if you awaken during the night to go to the bathroom.    To reduce the need for nighttime bathroom trips:  Avoid drinking fluids for several hours before going to bed  Empty your bladder before going to bed  Men can keep a urinal at the bedside  Stay as active as you can. Balance, flexibility, strength, and endurance all come from exercise. They all play a role in preventing falls. Ask your healthcare provider which types of activity are right for you.  Get your vision checked on a regular basis.  If you have pets, know where they are before you stand up or walk so you don't trip over them.  Use night lights.

## 2022-09-14 ENCOUNTER — TELEPHONE (OUTPATIENT)
Dept: PODIATRY | Facility: CLINIC | Age: 57
End: 2022-09-14
Payer: MEDICARE

## 2022-09-14 NOTE — TELEPHONE ENCOUNTER
Spoke to pt's family member and scheduled an appointment on the [facility] per request.

## 2022-09-14 NOTE — TELEPHONE ENCOUNTER
----- Message from Rosa Tariq sent at 9/14/2022 10:39 AM CDT -----  Regarding: schedule  Contact: [phone]  Request Appt      Appt Type:Wound care  Call Back Number:[phone] or [phone]  Additional Information:

## 2022-09-22 ENCOUNTER — OFFICE VISIT (OUTPATIENT)
Dept: PODIATRY | Facility: CLINIC | Age: 57
End: 2022-09-22
Payer: MEDICARE

## 2022-09-22 ENCOUNTER — LAB VISIT (OUTPATIENT)
Dept: LAB | Facility: HOSPITAL | Age: 57
End: 2022-09-22
Attending: PODIATRIST
Payer: MEDICARE

## 2022-09-22 ENCOUNTER — TELEPHONE (OUTPATIENT)
Dept: VASCULAR SURGERY | Facility: CLINIC | Age: 57
End: 2022-09-22
Payer: MEDICARE

## 2022-09-22 VITALS — HEIGHT: 71 IN | WEIGHT: 139.75 LBS | BODY MASS INDEX: 19.56 KG/M2

## 2022-09-22 DIAGNOSIS — I73.9 PAD (PERIPHERAL ARTERY DISEASE): Primary | ICD-10-CM

## 2022-09-22 DIAGNOSIS — I96 GANGRENE OF RIGHT FOOT: ICD-10-CM

## 2022-09-22 DIAGNOSIS — I99.8 ISCHEMIC PAIN OF RIGHT FOOT: ICD-10-CM

## 2022-09-22 DIAGNOSIS — M79.671 ISCHEMIC PAIN OF RIGHT FOOT: ICD-10-CM

## 2022-09-22 DIAGNOSIS — I73.9 PAD (PERIPHERAL ARTERY DISEASE): ICD-10-CM

## 2022-09-22 LAB
BASOPHILS # BLD AUTO: 0.06 K/UL (ref 0–0.2)
BASOPHILS NFR BLD: 0.6 % (ref 0–1.9)
CRP SERPL-MCNC: 113.1 MG/L (ref 0–8.2)
DIFFERENTIAL METHOD: ABNORMAL
EOSINOPHIL # BLD AUTO: 0.3 K/UL (ref 0–0.5)
EOSINOPHIL NFR BLD: 3.3 % (ref 0–8)
ERYTHROCYTE [DISTWIDTH] IN BLOOD BY AUTOMATED COUNT: 14 % (ref 11.5–14.5)
ERYTHROCYTE [SEDIMENTATION RATE] IN BLOOD BY PHOTOMETRIC METHOD: 96 MM/HR (ref 0–23)
HCT VFR BLD AUTO: 38.4 % (ref 40–54)
HGB BLD-MCNC: 11 G/DL (ref 14–18)
IMM GRANULOCYTES # BLD AUTO: 0.03 K/UL (ref 0–0.04)
IMM GRANULOCYTES NFR BLD AUTO: 0.3 % (ref 0–0.5)
LYMPHOCYTES # BLD AUTO: 1.3 K/UL (ref 1–4.8)
LYMPHOCYTES NFR BLD: 13 % (ref 18–48)
MCH RBC QN AUTO: 27.6 PG (ref 27–31)
MCHC RBC AUTO-ENTMCNC: 28.6 G/DL (ref 32–36)
MCV RBC AUTO: 97 FL (ref 82–98)
MONOCYTES # BLD AUTO: 0.8 K/UL (ref 0.3–1)
MONOCYTES NFR BLD: 8.2 % (ref 4–15)
NEUTROPHILS # BLD AUTO: 7.2 K/UL (ref 1.8–7.7)
NEUTROPHILS NFR BLD: 74.6 % (ref 38–73)
NRBC BLD-RTO: 0 /100 WBC
PLATELET # BLD AUTO: 369 K/UL (ref 150–450)
PMV BLD AUTO: 10.1 FL (ref 9.2–12.9)
RBC # BLD AUTO: 3.98 M/UL (ref 4.6–6.2)
WBC # BLD AUTO: 9.63 K/UL (ref 3.9–12.7)

## 2022-09-22 PROCEDURE — 86140 C-REACTIVE PROTEIN: CPT | Performed by: PODIATRIST

## 2022-09-22 PROCEDURE — 99215 OFFICE O/P EST HI 40 MIN: CPT | Mod: PBBFAC,PO | Performed by: PODIATRIST

## 2022-09-22 PROCEDURE — 85025 COMPLETE CBC W/AUTO DIFF WBC: CPT | Performed by: PODIATRIST

## 2022-09-22 PROCEDURE — 99214 PR OFFICE/OUTPT VISIT, EST, LEVL IV, 30-39 MIN: ICD-10-PCS | Mod: S$PBB,,, | Performed by: PODIATRIST

## 2022-09-22 PROCEDURE — 85652 RBC SED RATE AUTOMATED: CPT | Performed by: PODIATRIST

## 2022-09-22 PROCEDURE — 36415 COLL VENOUS BLD VENIPUNCTURE: CPT | Mod: PO | Performed by: PODIATRIST

## 2022-09-22 PROCEDURE — 99999 PR PBB SHADOW E&M-EST. PATIENT-LVL V: ICD-10-PCS | Mod: PBBFAC,,, | Performed by: PODIATRIST

## 2022-09-22 PROCEDURE — 99999 PR PBB SHADOW E&M-EST. PATIENT-LVL V: CPT | Mod: PBBFAC,,, | Performed by: PODIATRIST

## 2022-09-22 PROCEDURE — 99214 OFFICE O/P EST MOD 30 MIN: CPT | Mod: S$PBB,,, | Performed by: PODIATRIST

## 2022-09-22 RX ORDER — DOXYCYCLINE 100 MG/1
100 CAPSULE ORAL EVERY 12 HOURS
Qty: 20 CAPSULE | Refills: 0 | Status: ON HOLD | OUTPATIENT
Start: 2022-09-22 | End: 2022-11-10 | Stop reason: HOSPADM

## 2022-09-22 NOTE — TELEPHONE ENCOUNTER
Spoke with pt daughter. Confirmed date and time for upcoming appt    ----- Message from Radha Judge sent at 9/22/2022  3:19 PM CDT -----  Contact: MARTHA COSBY [[id number]]  Type:  Patient Returning Call      Who Called:  MARTHA COSBY [[id number]]      Who Left Message for Patient: Unsure      Does the patient know what this is regarding?: No      Would the patient rather a call back or a response via [facility]?         Best Call Back Number: [phone] (mobile)      Additional Information:

## 2022-09-22 NOTE — PROGRESS NOTES
Subjective:      Patient ID: Adryan Neff is a 56 y.o. male.    Chief Complaint: Diabetes Mellitus (6/16/22 Dr Rice ), Nail Care, and Diabetic Foot Exam    Adryan is a 56 y.o. male who presents to the clinic for evaluation and treatment of high risk feet. Adryan has a past medical history of CAD (coronary artery disease) (2016), Diabetes mellitus type I, Diabetic retinopathy, ESRD on hemodialysis, Gangrene of left foot, Hypertension, Nuclear sclerosis of right eye (11/24/2021), PAD (peripheral artery disease), Pulmonary HTN, and TB lung, latent (04/2021). The patient's chief complaint is right foot pain worse at night also has eschar right medial foot unsure of inciting event. Presents with family member who provides interpretation.  This patient has documented high risk feet requiring routine maintenance secondary to peripheral vascular disease. H/o L BKA    PCP: Rasheeda Jose NP    Date Last Seen by PCP: per above        Hemoglobin A1C   Date Value Ref Range Status   01/07/2021 6.1 (H) 4.0 - 5.6 % Final     Comment:     ADA Screening Guidelines:  5.7-6.4%  Consistent with prediabetes  >or=6.5%  Consistent with diabetes  High levels of fetal hemoglobin interfere with the HbA1C  assay. Heterozygous hemoglobin variants (HbS, HgC, etc)do  not significantly interfere with this assay.   However, presence of multiple variants may affect accuracy.     10/26/2020 5.6 4.0 - 5.6 % Final     Comment:     ADA Screening Guidelines:  5.7-6.4%  Consistent with prediabetes  >or=6.5%  Consistent with diabetes  High levels of fetal hemoglobin interfere with the HbA1C  assay. Heterozygous hemoglobin variants (HbS, HgC, etc)do  not significantly interfere with this assay.   However, presence of multiple variants may affect accuracy.     01/29/2019 6.1 (H) 4.0 - 5.6 % Final     Comment:     ADA Screening Guidelines:  5.7-6.4%  Consistent with prediabetes  >or=6.5%  Consistent with diabetes  High levels of fetal hemoglobin interfere  with the HbA1C  assay. Heterozygous hemoglobin variants (HbS, HgC, etc)do  not significantly interfere with this assay.   However, presence of multiple variants may affect accuracy.         Review of Systems   Constitutional: Negative for chills.   Cardiovascular:  Negative for chest pain and claudication.   Respiratory:  Negative for cough.    Skin:  Positive for color change, dry skin and nail changes.   Musculoskeletal:  Positive for joint pain.   Gastrointestinal:  Negative for nausea.   Neurological:  Positive for paresthesias. Negative for numbness.   Psychiatric/Behavioral:  The patient is not nervous/anxious.          Objective:      Physical Exam  Constitutional:       Appearance: He is well-developed.   Cardiovascular:      Comments: Dorsalis pedis and posterior tibial pulses are diminished Bilaterally. Toes are cool to touch. Feet are warm proximally.There is decreased digital hair. Skin is atrophic, slightly hyperpigmented, and mildly edematous   Pulmonary:      Effort: No respiratory distress.   Musculoskeletal:         General: Tenderness present.      Comments: H/o L BKA    Feet:      Right foot:      Skin integrity: Callus and dry skin present. No ulcer or skin breakdown.      Left foot:      Skin integrity: Dry skin present. No ulcer.   Skin:     General: Skin is dry.      Findings: No erythema.      Comments: Nails x 5 are elongated by 2-6mm's, thickened by 3-5 mm's, dystrophic, and are darkened in coloration .     Necrotic eschar right medial foot with surrounding HPK.    Neurological:      Mental Status: He is alert.      Comments: [place]-Kathleen 5.07 monofilamant testing is diminished Deangelo feet. Sharp/dull sensation diminished Bilaterally. Light touch absent Bilaterally.      9/22/22                      Assessment:       Encounter Diagnoses   Name Primary?    PAD (peripheral artery disease) Yes    Ischemic pain of right foot     Gangrene of right foot          Plan:       Adryan was seen today  for diabetes mellitus, nail care and diabetic foot exam.    Diagnoses and all orders for this visit:    PAD (peripheral artery disease)  -     Ankle Brachial Indices (DELLA); Future  -     CBC auto differential; Future  -     X-Ray Foot Complete Right; Future  -     Sedimentation rate; Future  -     C-REACTIVE PROTEIN; Future  -     Ambulatory referral/consult to Vascular Surgery; Future    Ischemic pain of right foot    Gangrene of right foot    Other orders  -     doxycycline (VIBRAMYCIN) 100 MG Cap; Take 1 capsule (100 mg total) by mouth every 12 (twelve) hours.    I counseled the patient on his conditions, their implications and medical management.      Xray right foot ordered  DELLA ordered referral placed to vascular sx. Message sent to Dr. Soni.   CBC, ESR, CRP ordered.     Rx. Doxycycline    Instructed on daily betadine application leave open to air.   DARCO shoe dispensed.     - Shoe inspection. Diabetic Foot Education. Patient reminded of the importance of good nutrition and blood sugar control to help prevent podiatric complications of diabetes. Patient instructed on proper foot hygeine. We discussed wearing proper shoe gear, daily foot inspections, never walking without protective shoe gear, caution putting sharp instruments to feet     - Discussed DM foot care:  Wear comfortable, proper fitting shoes. Wash feet daily. Dry well. After drying, apply moisturizer to feet (no lotion to webspaces). Inspect feet daily for skin breaks, blisters, swelling, or redness. Wear cotton socks (preferably white)  Change socks every day. Do NOT walk barefoot. Do NOT use heating pads or warm/hot water soaks   Nails 1-5 right foot trimmed.     F/u 2 weeks.

## 2022-10-04 ENCOUNTER — OFFICE VISIT (OUTPATIENT)
Dept: OPTOMETRY | Facility: CLINIC | Age: 57
DRG: 239 | End: 2022-10-04
Payer: MEDICARE

## 2022-10-04 DIAGNOSIS — E11.3593 PROLIFERATIVE DIABETIC RETINOPATHY OF BOTH EYES ASSOCIATED WITH TYPE 2 DIABETES MELLITUS, MACULAR EDEMA PRESENCE UNSPECIFIED: Primary | ICD-10-CM

## 2022-10-04 DIAGNOSIS — H25.041 POSTERIOR SUBCAPSULAR AGE-RELATED CATARACT OF RIGHT EYE: ICD-10-CM

## 2022-10-04 DIAGNOSIS — Z13.5 GLAUCOMA SCREENING: ICD-10-CM

## 2022-10-04 PROCEDURE — 92014 PR EYE EXAM, EST PATIENT,COMPREHESV: ICD-10-PCS | Mod: S$PBB,,, | Performed by: OPTOMETRIST

## 2022-10-04 PROCEDURE — 99213 OFFICE O/P EST LOW 20 MIN: CPT | Mod: PBBFAC,PO | Performed by: OPTOMETRIST

## 2022-10-04 PROCEDURE — 99999 PR PBB SHADOW E&M-EST. PATIENT-LVL III: ICD-10-PCS | Mod: PBBFAC,,, | Performed by: OPTOMETRIST

## 2022-10-04 PROCEDURE — 92014 COMPRE OPH EXAM EST PT 1/>: CPT | Mod: S$PBB,,, | Performed by: OPTOMETRIST

## 2022-10-04 PROCEDURE — 99999 PR PBB SHADOW E&M-EST. PATIENT-LVL III: CPT | Mod: PBBFAC,,, | Performed by: OPTOMETRIST

## 2022-10-04 NOTE — PROGRESS NOTES
HPI    Diabetic eye exam    First Time Patient    56 y.o. is here for diabetic eye exam  Pt states he does not wear gl  Pt states he did not check pressure this morning  Pt states he does not see a change in vision    (-)Flashes (-)Floaters  (-)Itch, (+)tear, (-)burn, (-)Dryness.  (-) OTC Drops  (-)Photophobia(-)Glare   (-) Headaches (-) Eye Pain (-) Double vision    Eye Medications: None    Past Ocular history   No major ocular sx    Hemoglobin A1C       Date                     Value               Ref Range             Status                01/07/2021               6.1 (H)             4.0 - 5.6 %           Final                  10/26/2020               5.6                 4.0 - 5.6 %           Final                   01/29/2019               6.1 (H)             4.0 - 5.6 %           Final            Last edited by Hair Sheehan, OD on 10/4/2022 10:49 AM.            Assessment /Plan     For exam results, see Encounter Report.    Proliferative diabetic retinopathy of both eyes associated with type 2 diabetes mellitus, macular edema presence unspecified  -Stable with prior, no DME or vessels today     Posterior subcapsular age-related cataract of right eye  -pt declines. Ok to monitor     Glaucoma screening  -Monitor with annual eye exam and IOP check      RTC 1 yr

## 2022-10-06 ENCOUNTER — HOSPITAL ENCOUNTER (INPATIENT)
Facility: HOSPITAL | Age: 57
LOS: 35 days | DRG: 239 | End: 2022-11-10
Attending: EMERGENCY MEDICINE | Admitting: STUDENT IN AN ORGANIZED HEALTH CARE EDUCATION/TRAINING PROGRAM
Payer: MEDICARE

## 2022-10-06 ENCOUNTER — HOSPITAL ENCOUNTER (OUTPATIENT)
Dept: RADIOLOGY | Facility: HOSPITAL | Age: 57
Discharge: HOME | DRG: 239 | End: 2022-10-06
Attending: PODIATRIST
Payer: MEDICARE

## 2022-10-06 ENCOUNTER — OFFICE VISIT (OUTPATIENT)
Dept: PODIATRY | Facility: CLINIC | Age: 57
DRG: 239 | End: 2022-10-06
Payer: MEDICARE

## 2022-10-06 ENCOUNTER — OFFICE VISIT (OUTPATIENT)
Dept: VASCULAR SURGERY | Facility: CLINIC | Age: 57
DRG: 239 | End: 2022-10-06
Payer: MEDICARE

## 2022-10-06 VITALS
SYSTOLIC BLOOD PRESSURE: 131 MMHG | BODY MASS INDEX: 19.49 KG/M2 | WEIGHT: 139.75 LBS | DIASTOLIC BLOOD PRESSURE: 65 MMHG

## 2022-10-06 VITALS — HEIGHT: 71 IN | BODY MASS INDEX: 19.56 KG/M2 | WEIGHT: 139.75 LBS

## 2022-10-06 DIAGNOSIS — I70.234 ATHEROSCLEROSIS OF NATIVE ARTERY OF RIGHT LOWER EXTREMITY WITH ULCERATION OF MIDFOOT: Primary | ICD-10-CM

## 2022-10-06 DIAGNOSIS — Z99.2 ESRD ON HEMODIALYSIS: ICD-10-CM

## 2022-10-06 DIAGNOSIS — I46.9 CARDIAC ARREST: ICD-10-CM

## 2022-10-06 DIAGNOSIS — I73.9 PAD (PERIPHERAL ARTERY DISEASE): ICD-10-CM

## 2022-10-06 DIAGNOSIS — E11.22 CONTROLLED TYPE 2 DIABETES MELLITUS WITH CHRONIC KIDNEY DISEASE ON CHRONIC DIALYSIS, WITH LONG-TERM CURRENT USE OF INSULIN: ICD-10-CM

## 2022-10-06 DIAGNOSIS — I96 GANGRENE OF RIGHT FOOT: ICD-10-CM

## 2022-10-06 DIAGNOSIS — T82.590D MALFUNCTION OF ARTERIOVENOUS GRAFT, SUBSEQUENT ENCOUNTER: ICD-10-CM

## 2022-10-06 DIAGNOSIS — N18.6 ANEMIA DUE TO CHRONIC KIDNEY DISEASE, ON CHRONIC DIALYSIS: ICD-10-CM

## 2022-10-06 DIAGNOSIS — E11.621 DIABETIC FOOT ULCER: ICD-10-CM

## 2022-10-06 DIAGNOSIS — Z89.612 HX OF AKA (ABOVE KNEE AMPUTATION), LEFT: Primary | ICD-10-CM

## 2022-10-06 DIAGNOSIS — N18.6 CONTROLLED TYPE 2 DIABETES MELLITUS WITH CHRONIC KIDNEY DISEASE ON CHRONIC DIALYSIS, WITH LONG-TERM CURRENT USE OF INSULIN: Primary | ICD-10-CM

## 2022-10-06 DIAGNOSIS — Z99.2 ANEMIA DUE TO CHRONIC KIDNEY DISEASE, ON CHRONIC DIALYSIS: ICD-10-CM

## 2022-10-06 DIAGNOSIS — N18.6 ESRD ON HEMODIALYSIS: ICD-10-CM

## 2022-10-06 DIAGNOSIS — L97.509 DIABETIC FOOT ULCER: ICD-10-CM

## 2022-10-06 DIAGNOSIS — Z79.4 CONTROLLED TYPE 2 DIABETES MELLITUS WITH CHRONIC KIDNEY DISEASE ON CHRONIC DIALYSIS, WITH LONG-TERM CURRENT USE OF INSULIN: ICD-10-CM

## 2022-10-06 DIAGNOSIS — Z79.4 CONTROLLED TYPE 2 DIABETES MELLITUS WITH CHRONIC KIDNEY DISEASE ON CHRONIC DIALYSIS, WITH LONG-TERM CURRENT USE OF INSULIN: Primary | ICD-10-CM

## 2022-10-06 DIAGNOSIS — R07.9 CHEST PAIN: ICD-10-CM

## 2022-10-06 DIAGNOSIS — E11.22 CONTROLLED TYPE 2 DIABETES MELLITUS WITH CHRONIC KIDNEY DISEASE ON CHRONIC DIALYSIS, WITH LONG-TERM CURRENT USE OF INSULIN: Primary | ICD-10-CM

## 2022-10-06 DIAGNOSIS — Z99.2 CONTROLLED TYPE 2 DIABETES MELLITUS WITH CHRONIC KIDNEY DISEASE ON CHRONIC DIALYSIS, WITH LONG-TERM CURRENT USE OF INSULIN: ICD-10-CM

## 2022-10-06 DIAGNOSIS — D63.1 ANEMIA DUE TO CHRONIC KIDNEY DISEASE, ON CHRONIC DIALYSIS: ICD-10-CM

## 2022-10-06 DIAGNOSIS — I46.9 PEA (PULSELESS ELECTRICAL ACTIVITY): ICD-10-CM

## 2022-10-06 DIAGNOSIS — I96 GANGRENE OF RIGHT FOOT: Primary | ICD-10-CM

## 2022-10-06 DIAGNOSIS — Z99.2 CONTROLLED TYPE 2 DIABETES MELLITUS WITH CHRONIC KIDNEY DISEASE ON CHRONIC DIALYSIS, WITH LONG-TERM CURRENT USE OF INSULIN: Primary | ICD-10-CM

## 2022-10-06 DIAGNOSIS — N18.6 CONTROLLED TYPE 2 DIABETES MELLITUS WITH CHRONIC KIDNEY DISEASE ON CHRONIC DIALYSIS, WITH LONG-TERM CURRENT USE OF INSULIN: ICD-10-CM

## 2022-10-06 LAB
ALBUMIN SERPL BCP-MCNC: 2.4 G/DL (ref 3.5–5.2)
ALLENS TEST: ABNORMAL
ALP SERPL-CCNC: 85 U/L (ref 55–135)
ALT SERPL W/O P-5'-P-CCNC: <5 U/L (ref 10–44)
ANION GAP SERPL CALC-SCNC: 13 MMOL/L (ref 8–16)
AST SERPL-CCNC: 10 U/L (ref 10–40)
B-OH-BUTYR BLD STRIP-SCNC: 0.1 MMOL/L (ref 0–0.5)
BASOPHILS # BLD AUTO: 0.05 K/UL (ref 0–0.2)
BASOPHILS NFR BLD: 0.3 % (ref 0–1.9)
BILIRUB SERPL-MCNC: 0.3 MG/DL (ref 0.1–1)
BUN SERPL-MCNC: 29 MG/DL (ref 6–20)
CALCIUM SERPL-MCNC: 9.7 MG/DL (ref 8.7–10.5)
CHLORIDE SERPL-SCNC: 97 MMOL/L (ref 95–110)
CO2 SERPL-SCNC: 26 MMOL/L (ref 23–29)
CREAT SERPL-MCNC: 9.1 MG/DL (ref 0.5–1.4)
CRP SERPL-MCNC: 237.2 MG/L (ref 0–8.2)
CTP QC/QA: YES
DIFFERENTIAL METHOD: ABNORMAL
EOSINOPHIL # BLD AUTO: 0.3 K/UL (ref 0–0.5)
EOSINOPHIL NFR BLD: 1.6 % (ref 0–8)
ERYTHROCYTE [DISTWIDTH] IN BLOOD BY AUTOMATED COUNT: 14.4 % (ref 11.5–14.5)
ERYTHROCYTE [SEDIMENTATION RATE] IN BLOOD BY WESTERGREN METHOD: 125 MM/HR (ref 0–10)
EST. GFR  (NO RACE VARIABLE): 6 ML/MIN/1.73 M^2
GLUCOSE SERPL-MCNC: 211 MG/DL (ref 70–110)
HCO3 UR-SCNC: 28.7 MMOL/L (ref 24–28)
HCT VFR BLD AUTO: 32.1 % (ref 40–54)
HGB BLD-MCNC: 9.7 G/DL (ref 14–18)
IMM GRANULOCYTES # BLD AUTO: 0.1 K/UL (ref 0–0.04)
IMM GRANULOCYTES NFR BLD AUTO: 0.6 % (ref 0–0.5)
INR PPP: 1.1 (ref 0.8–1.2)
LACTATE SERPL-SCNC: 1.6 MMOL/L (ref 0.5–2.2)
LYMPHOCYTES # BLD AUTO: 0.9 K/UL (ref 1–4.8)
LYMPHOCYTES NFR BLD: 5.4 % (ref 18–48)
MAGNESIUM SERPL-MCNC: 2.1 MG/DL (ref 1.6–2.6)
MCH RBC QN AUTO: 27 PG (ref 27–31)
MCHC RBC AUTO-ENTMCNC: 30.2 G/DL (ref 32–36)
MCV RBC AUTO: 89 FL (ref 82–98)
MONOCYTES # BLD AUTO: 1.3 K/UL (ref 0.3–1)
MONOCYTES NFR BLD: 7.2 % (ref 4–15)
NEUTROPHILS # BLD AUTO: 14.8 K/UL (ref 1.8–7.7)
NEUTROPHILS NFR BLD: 84.9 % (ref 38–73)
NRBC BLD-RTO: 0 /100 WBC
PCO2 BLDA: 46.3 MMHG (ref 35–45)
PH SMN: 7.4 [PH] (ref 7.35–7.45)
PHOSPHATE SERPL-MCNC: 3.3 MG/DL (ref 2.7–4.5)
PLATELET # BLD AUTO: 464 K/UL (ref 150–450)
PMV BLD AUTO: 9.7 FL (ref 9.2–12.9)
PO2 BLDA: 32 MMHG (ref 40–60)
POC BE: 3 MMOL/L
POC SATURATED O2: 62 % (ref 95–100)
POC TCO2: 30 MMOL/L (ref 24–29)
POCT GLUCOSE: 164 MG/DL (ref 70–110)
POCT GLUCOSE: 333 MG/DL (ref 70–110)
POTASSIUM SERPL-SCNC: 4 MMOL/L (ref 3.5–5.1)
PROCALCITONIN SERPL IA-MCNC: 1.42 NG/ML
PROT SERPL-MCNC: 9.4 G/DL (ref 6–8.4)
PROTHROMBIN TIME: 11.3 SEC (ref 9–12.5)
RBC # BLD AUTO: 3.59 M/UL (ref 4.6–6.2)
SAMPLE: ABNORMAL
SARS-COV-2 RDRP RESP QL NAA+PROBE: NEGATIVE
SITE: ABNORMAL
SODIUM SERPL-SCNC: 136 MMOL/L (ref 136–145)
WBC # BLD AUTO: 17.41 K/UL (ref 3.9–12.7)

## 2022-10-06 PROCEDURE — 85652 RBC SED RATE AUTOMATED: CPT | Performed by: EMERGENCY MEDICINE

## 2022-10-06 PROCEDURE — 86140 C-REACTIVE PROTEIN: CPT | Performed by: EMERGENCY MEDICINE

## 2022-10-06 PROCEDURE — 87040 BLOOD CULTURE FOR BACTERIA: CPT | Performed by: EMERGENCY MEDICINE

## 2022-10-06 PROCEDURE — 82010 KETONE BODYS QUAN: CPT | Performed by: EMERGENCY MEDICINE

## 2022-10-06 PROCEDURE — 96368 THER/DIAG CONCURRENT INF: CPT

## 2022-10-06 PROCEDURE — 99215 PR OFFICE/OUTPT VISIT, EST, LEVL V, 40-54 MIN: ICD-10-PCS | Mod: S$PBB,,, | Performed by: SURGERY

## 2022-10-06 PROCEDURE — 93010 EKG 12-LEAD: ICD-10-PCS | Mod: ,,, | Performed by: INTERNAL MEDICINE

## 2022-10-06 PROCEDURE — 99214 OFFICE O/P EST MOD 30 MIN: CPT | Mod: S$PBB,,, | Performed by: PODIATRIST

## 2022-10-06 PROCEDURE — 84145 PROCALCITONIN (PCT): CPT | Performed by: EMERGENCY MEDICINE

## 2022-10-06 PROCEDURE — 99214 OFFICE O/P EST MOD 30 MIN: CPT | Mod: PBBFAC,PO | Performed by: PODIATRIST

## 2022-10-06 PROCEDURE — 21400001 HC TELEMETRY ROOM

## 2022-10-06 PROCEDURE — 99213 OFFICE O/P EST LOW 20 MIN: CPT | Mod: PBBFAC,27,25 | Performed by: SURGERY

## 2022-10-06 PROCEDURE — 25000003 PHARM REV CODE 250: Performed by: EMERGENCY MEDICINE

## 2022-10-06 PROCEDURE — 80053 COMPREHEN METABOLIC PANEL: CPT | Performed by: EMERGENCY MEDICINE

## 2022-10-06 PROCEDURE — 99999 PR PBB SHADOW E&M-EST. PATIENT-LVL IV: ICD-10-PCS | Mod: PBBFAC,,, | Performed by: PODIATRIST

## 2022-10-06 PROCEDURE — 85610 PROTHROMBIN TIME: CPT | Performed by: EMERGENCY MEDICINE

## 2022-10-06 PROCEDURE — 73630 X-RAY EXAM OF FOOT: CPT | Mod: 26,RT,, | Performed by: RADIOLOGY

## 2022-10-06 PROCEDURE — 83605 ASSAY OF LACTIC ACID: CPT | Performed by: EMERGENCY MEDICINE

## 2022-10-06 PROCEDURE — 83735 ASSAY OF MAGNESIUM: CPT | Performed by: EMERGENCY MEDICINE

## 2022-10-06 PROCEDURE — 87635 SARS-COV-2 COVID-19 AMP PRB: CPT | Performed by: EMERGENCY MEDICINE

## 2022-10-06 PROCEDURE — 96365 THER/PROPH/DIAG IV INF INIT: CPT

## 2022-10-06 PROCEDURE — 99999 PR PBB SHADOW E&M-EST. PATIENT-LVL IV: CPT | Mod: PBBFAC,,, | Performed by: PODIATRIST

## 2022-10-06 PROCEDURE — 99285 EMERGENCY DEPT VISIT HI MDM: CPT | Mod: 25,27

## 2022-10-06 PROCEDURE — 73630 XR FOOT COMPLETE 3 VIEW RIGHT: ICD-10-PCS | Mod: 26,RT,, | Performed by: RADIOLOGY

## 2022-10-06 PROCEDURE — 93005 ELECTROCARDIOGRAM TRACING: CPT

## 2022-10-06 PROCEDURE — 63600175 PHARM REV CODE 636 W HCPCS: Performed by: STUDENT IN AN ORGANIZED HEALTH CARE EDUCATION/TRAINING PROGRAM

## 2022-10-06 PROCEDURE — 82803 BLOOD GASES ANY COMBINATION: CPT

## 2022-10-06 PROCEDURE — 99215 OFFICE O/P EST HI 40 MIN: CPT | Mod: S$PBB,,, | Performed by: SURGERY

## 2022-10-06 PROCEDURE — 63600175 PHARM REV CODE 636 W HCPCS: Performed by: EMERGENCY MEDICINE

## 2022-10-06 PROCEDURE — 93010 ELECTROCARDIOGRAM REPORT: CPT | Mod: ,,, | Performed by: INTERNAL MEDICINE

## 2022-10-06 PROCEDURE — 99999 PR PBB SHADOW E&M-EST. PATIENT-LVL III: CPT | Mod: PBBFAC,,, | Performed by: SURGERY

## 2022-10-06 PROCEDURE — 99900035 HC TECH TIME PER 15 MIN (STAT)

## 2022-10-06 PROCEDURE — 99999 PR PBB SHADOW E&M-EST. PATIENT-LVL III: ICD-10-PCS | Mod: PBBFAC,,, | Performed by: SURGERY

## 2022-10-06 PROCEDURE — 25000003 PHARM REV CODE 250: Performed by: STUDENT IN AN ORGANIZED HEALTH CARE EDUCATION/TRAINING PROGRAM

## 2022-10-06 PROCEDURE — 85025 COMPLETE CBC W/AUTO DIFF WBC: CPT | Performed by: EMERGENCY MEDICINE

## 2022-10-06 PROCEDURE — 73630 X-RAY EXAM OF FOOT: CPT | Mod: TC,FY,PO,RT

## 2022-10-06 PROCEDURE — 84100 ASSAY OF PHOSPHORUS: CPT | Performed by: EMERGENCY MEDICINE

## 2022-10-06 PROCEDURE — 99214 PR OFFICE/OUTPT VISIT, EST, LEVL IV, 30-39 MIN: ICD-10-PCS | Mod: S$PBB,,, | Performed by: PODIATRIST

## 2022-10-06 RX ORDER — TRAMADOL HYDROCHLORIDE 50 MG/1
50 TABLET ORAL EVERY 4 HOURS PRN
Status: DISCONTINUED | OUTPATIENT
Start: 2022-10-06 | End: 2022-10-13

## 2022-10-06 RX ORDER — NALOXONE HCL 0.4 MG/ML
0.02 VIAL (ML) INJECTION
Status: DISCONTINUED | OUTPATIENT
Start: 2022-10-06 | End: 2022-11-10 | Stop reason: HOSPADM

## 2022-10-06 RX ORDER — ATORVASTATIN CALCIUM 10 MG/1
20 TABLET, FILM COATED ORAL NIGHTLY
Status: DISCONTINUED | OUTPATIENT
Start: 2022-10-06 | End: 2022-10-06

## 2022-10-06 RX ORDER — METOPROLOL SUCCINATE 25 MG/1
25 TABLET, EXTENDED RELEASE ORAL DAILY
Status: DISCONTINUED | OUTPATIENT
Start: 2022-10-07 | End: 2022-11-10 | Stop reason: HOSPADM

## 2022-10-06 RX ORDER — ACETAMINOPHEN 325 MG/1
650 TABLET ORAL EVERY 4 HOURS PRN
Status: DISCONTINUED | OUTPATIENT
Start: 2022-10-06 | End: 2022-10-11

## 2022-10-06 RX ORDER — HYDROCODONE BITARTRATE AND ACETAMINOPHEN 5; 325 MG/1; MG/1
1 TABLET ORAL EVERY 6 HOURS PRN
Status: DISCONTINUED | OUTPATIENT
Start: 2022-10-06 | End: 2022-10-17

## 2022-10-06 RX ORDER — ATORVASTATIN CALCIUM 40 MG/1
40 TABLET, FILM COATED ORAL NIGHTLY
Status: DISCONTINUED | OUTPATIENT
Start: 2022-10-06 | End: 2022-11-10 | Stop reason: HOSPADM

## 2022-10-06 RX ORDER — VANCOMYCIN HCL IN 5 % DEXTROSE 1G/250ML
15 PLASTIC BAG, INJECTION (ML) INTRAVENOUS
Status: COMPLETED | OUTPATIENT
Start: 2022-10-06 | End: 2022-10-06

## 2022-10-06 RX ORDER — IBUPROFEN 200 MG
24 TABLET ORAL
Status: DISCONTINUED | OUTPATIENT
Start: 2022-10-06 | End: 2022-11-10 | Stop reason: HOSPADM

## 2022-10-06 RX ORDER — SODIUM CHLORIDE 0.9 % (FLUSH) 0.9 %
10 SYRINGE (ML) INJECTION EVERY 12 HOURS PRN
Status: DISCONTINUED | OUTPATIENT
Start: 2022-10-06 | End: 2022-11-10 | Stop reason: HOSPADM

## 2022-10-06 RX ORDER — SODIUM CHLORIDE 0.9 % (FLUSH) 0.9 %
10 SYRINGE (ML) INJECTION
Status: DISCONTINUED | OUTPATIENT
Start: 2022-10-06 | End: 2022-11-10 | Stop reason: HOSPADM

## 2022-10-06 RX ORDER — GLUCAGON 1 MG
1 KIT INJECTION
Status: DISCONTINUED | OUTPATIENT
Start: 2022-10-06 | End: 2022-11-10 | Stop reason: HOSPADM

## 2022-10-06 RX ORDER — ACETAMINOPHEN 325 MG/1
650 TABLET ORAL EVERY 8 HOURS PRN
Status: DISCONTINUED | OUTPATIENT
Start: 2022-10-06 | End: 2022-11-10 | Stop reason: HOSPADM

## 2022-10-06 RX ORDER — INSULIN ASPART 100 [IU]/ML
0-5 INJECTION, SOLUTION INTRAVENOUS; SUBCUTANEOUS
Status: DISCONTINUED | OUTPATIENT
Start: 2022-10-06 | End: 2022-11-10 | Stop reason: HOSPADM

## 2022-10-06 RX ORDER — CLOPIDOGREL BISULFATE 75 MG/1
75 TABLET ORAL DAILY
Status: DISCONTINUED | OUTPATIENT
Start: 2022-10-07 | End: 2022-10-10

## 2022-10-06 RX ORDER — IBUPROFEN 200 MG
16 TABLET ORAL
Status: DISCONTINUED | OUTPATIENT
Start: 2022-10-06 | End: 2022-11-10 | Stop reason: HOSPADM

## 2022-10-06 RX ORDER — ONDANSETRON 8 MG/1
8 TABLET, ORALLY DISINTEGRATING ORAL EVERY 8 HOURS PRN
Status: DISCONTINUED | OUTPATIENT
Start: 2022-10-06 | End: 2022-11-10 | Stop reason: HOSPADM

## 2022-10-06 RX ORDER — HEPARIN SODIUM 5000 [USP'U]/ML
5000 INJECTION, SOLUTION INTRAVENOUS; SUBCUTANEOUS EVERY 8 HOURS
Status: DISCONTINUED | OUTPATIENT
Start: 2022-10-06 | End: 2022-10-10

## 2022-10-06 RX ORDER — SEVELAMER CARBONATE 800 MG/1
800 TABLET, FILM COATED ORAL
Status: DISCONTINUED | OUTPATIENT
Start: 2022-10-07 | End: 2022-10-11

## 2022-10-06 RX ORDER — PANTOPRAZOLE SODIUM 40 MG/1
40 TABLET, DELAYED RELEASE ORAL DAILY
Status: DISCONTINUED | OUTPATIENT
Start: 2022-10-07 | End: 2022-11-10 | Stop reason: HOSPADM

## 2022-10-06 RX ORDER — NAPROXEN SODIUM 220 MG/1
81 TABLET, FILM COATED ORAL DAILY
Status: DISCONTINUED | OUTPATIENT
Start: 2022-10-07 | End: 2022-11-10 | Stop reason: HOSPADM

## 2022-10-06 RX ORDER — AMLODIPINE BESYLATE 5 MG/1
10 TABLET ORAL DAILY
Status: DISCONTINUED | OUTPATIENT
Start: 2022-10-07 | End: 2022-11-10 | Stop reason: HOSPADM

## 2022-10-06 RX ADMIN — PIPERACILLIN AND TAZOBACTAM 4.5 G: 4; .5 INJECTION, POWDER, LYOPHILIZED, FOR SOLUTION INTRAVENOUS; PARENTERAL at 03:10

## 2022-10-06 RX ADMIN — ATORVASTATIN CALCIUM 40 MG: 40 TABLET, FILM COATED ORAL at 11:10

## 2022-10-06 RX ADMIN — VANCOMYCIN HYDROCHLORIDE 1000 MG: 1 INJECTION, POWDER, LYOPHILIZED, FOR SOLUTION INTRAVENOUS at 03:10

## 2022-10-06 RX ADMIN — HEPARIN SODIUM 5000 UNITS: 5000 INJECTION INTRAVENOUS; SUBCUTANEOUS at 11:10

## 2022-10-06 RX ADMIN — INSULIN DETEMIR 15 UNITS: 100 INJECTION, SOLUTION SUBCUTANEOUS at 11:10

## 2022-10-06 NOTE — PROGRESS NOTES
Subjective:      Patient ID: Adryan Neff is a 56 y.o. male.    Chief Complaint: Peripheral Arterial Disease and Diabetes Mellitus    Adryan is a 56 y.o. male who presents to the clinic for evaluation and treatment of high risk feet. Adryan has a past medical history of CAD (coronary artery disease) (2016), Diabetes mellitus type I, Diabetic retinopathy, ESRD on hemodialysis, Gangrene of left foot, Hypertension, Nuclear sclerosis of right eye (11/24/2021), PAD (peripheral artery disease), Pulmonary HTN, and TB lung, latent (04/2021). The patient's chief complaint is right foot pain worse at night also has eschar right medial foot unsure of inciting event. Presents with family member who provides interpretation.  This patient has documented high risk feet requiring routine maintenance secondary to peripheral vascular disease. H/o L BKA    10/6/22: F/u right foot eschar reports new area of blistering to right dorsal foot. Presents with family member who provides interpretation.     PCP: Rasheeda Jose NP    Date Last Seen by PCP: per above        Hemoglobin A1C   Date Value Ref Range Status   01/07/2021 6.1 (H) 4.0 - 5.6 % Final     Comment:     ADA Screening Guidelines:  5.7-6.4%  Consistent with prediabetes  >or=6.5%  Consistent with diabetes  High levels of fetal hemoglobin interfere with the HbA1C  assay. Heterozygous hemoglobin variants (HbS, HgC, etc)do  not significantly interfere with this assay.   However, presence of multiple variants may affect accuracy.     10/26/2020 5.6 4.0 - 5.6 % Final     Comment:     ADA Screening Guidelines:  5.7-6.4%  Consistent with prediabetes  >or=6.5%  Consistent with diabetes  High levels of fetal hemoglobin interfere with the HbA1C  assay. Heterozygous hemoglobin variants (HbS, HgC, etc)do  not significantly interfere with this assay.   However, presence of multiple variants may affect accuracy.     01/29/2019 6.1 (H) 4.0 - 5.6 % Final     Comment:     ADA Screening  Guidelines:  5.7-6.4%  Consistent with prediabetes  >or=6.5%  Consistent with diabetes  High levels of fetal hemoglobin interfere with the HbA1C  assay. Heterozygous hemoglobin variants (HbS, HgC, etc)do  not significantly interfere with this assay.   However, presence of multiple variants may affect accuracy.         Review of Systems   Constitutional: Negative for chills.   Cardiovascular:  Negative for chest pain and claudication.   Respiratory:  Negative for cough.    Skin:  Positive for color change, dry skin and nail changes.   Musculoskeletal:  Positive for joint pain.   Gastrointestinal:  Negative for nausea.   Neurological:  Positive for paresthesias. Negative for numbness.   Psychiatric/Behavioral:  The patient is not nervous/anxious.          Objective:      Physical Exam  Constitutional:       Appearance: He is well-developed.   Cardiovascular:      Comments: Dorsalis pedis and posterior tibial pulses are diminished Bilaterally. Toes are cool to touch. Feet are warm proximally.There is decreased digital hair. Skin is atrophic, slightly hyperpigmented, and mildly edematous   Pulmonary:      Effort: No respiratory distress.   Musculoskeletal:         General: Tenderness present.      Comments: H/o L BKA    Feet:      Right foot:      Skin integrity: Callus and dry skin present. No ulcer or skin breakdown.      Left foot:      Skin integrity: Dry skin present. No ulcer.   Skin:     General: Skin is dry.      Findings: No erythema.      Comments: Nails x 5 are elongated by 2-6mm's, thickened by 3-5 mm's, dystrophic, and are darkened in coloration .     Necrotic eschar right medial foot with surrounding HPK.    Neurological:      Mental Status: He is alert.      Comments: [place]-Kathleen 5.07 monofilamant testing is diminished Deangelo feet. Sharp/dull sensation diminished Bilaterally. Light touch absent Bilaterally.        10/6/22:      S/p drainage bullae formation right dorsal foot.            9/22/22                      Assessment:       Encounter Diagnoses   Name Primary?    Controlled type 2 diabetes mellitus with chronic kidney disease on chronic dialysis, with long-term current use of insulin Yes    Gangrene of right foot            Plan:       Adryan was seen today for peripheral arterial disease and diabetes mellitus.    Diagnoses and all orders for this visit:    Controlled type 2 diabetes mellitus with chronic kidney disease on chronic dialysis, with long-term current use of insulin    Gangrene of right foot      I counseled the patient on his conditions, their implications and medical management.      Attempted to perform I&D right foot with patient's permission 3cc of lidocaine plain injected to right dorsal foot,  however after further discussion of procedure patient declined. Would like to discuss with vascular surgeon first. Message sent to Dr. Soni.  Xray right foot ordered- did not complete last visit will complete today.   DELLA ordered referral placed to vascular sx. Message sent to Dr. Soni. Appt scheduled for today 10/6/22      Rx. Doxycycline    Instructed on daily betadine application leave open to air.   DARCO shoe dispensed.     - Shoe inspection. Diabetic Foot Education. Patient reminded of the importance of good nutrition and blood sugar control to help prevent podiatric complications of diabetes. Patient instructed on proper foot hygeine. We discussed wearing proper shoe gear, daily foot inspections, never walking without protective shoe gear, caution putting sharp instruments to feet     - Discussed DM foot care:  Wear comfortable, proper fitting shoes. Wash feet daily. Dry well. After drying, apply moisturizer to feet (no lotion to webspaces). Inspect feet daily for skin breaks, blisters, swelling, or redness. Wear cotton socks (preferably white)  Change socks every day. Do NOT walk barefoot. Do NOT use heating pads or warm/hot water soaks       F/u 2 weeks.

## 2022-10-06 NOTE — ED TRIAGE NOTES
Per daughter reports worsening right foot wound infection. Reports was seen by podiatrist and told to come to ER. Denies taking any abx at this time or current treatment. Reports hx diabetes.

## 2022-10-06 NOTE — PROGRESS NOTES
Pharmacokinetic Initial Assessment: IV Vancomycin    Assessment/Plan:    Initiate intravenous vancomycin with loading dose of 1000 mg once with subsequent doses when random concentrations are less than 20 mcg/mL  Desired empiric serum trough concentration is 10 to 20 mcg/mL  Draw vancomycin random level on 10/7/22 at 06:00.  Pharmacy will continue to follow and monitor vancomycin.      Please contact pharmacy at extension [phone] with any questions regarding this assessment.     Thank you for the consult,   Cristina Rice       Patient brief summary:  Adryan Neff is a 56 y.o. male initiated on antimicrobial therapy with IV Vancomycin for treatment of suspected skin & soft tissue infection    Drug Allergies:   Review of patient's allergies indicates:  No Known Allergies    Actual Body Weight:   70.3 kg    Renal Function:   Estimated Creatinine Clearance: 8.9 mL/min (A) (based on SCr of 9.1 mg/dL (H)).,     Dialysis Method (if applicable):  N/A    CBC (last 72 hours):  Recent Labs   Lab Result Units 10/06/22  1512   WBC K/uL 17.41*   Hemoglobin g/dL 9.7*   Hematocrit % 32.1*   Platelets K/uL 464*   Gran % % 84.9*   Lymph % % 5.4*   Mono % % 7.2   Eosinophil % % 1.6   Basophil % % 0.3   Differential Method  Automated       Metabolic Panel (last 72 hours):  Recent Labs   Lab Result Units 10/06/22  1512   Sodium mmol/L 136   Potassium mmol/L 4.0   Chloride mmol/L 97   CO2 mmol/L 26   Glucose mg/dL 211*   BUN mg/dL 29*   Creatinine mg/dL 9.1*   Albumin g/dL 2.4*   Total Bilirubin mg/dL 0.3   Alkaline Phosphatase U/L 85   AST U/L 10   ALT U/L <5*   Magnesium mg/dL 2.1   Phosphorus mg/dL 3.3       Drug levels (last 3 results):  No results for input(s): VANCOMYCINRA, VANCORANDOM, VANCOMYCINPE, VANCOPEAK, VANCOMYCINTR, VANCOTROUGH in the last 72 hours.    Microbiologic Results:  Microbiology Results (last 7 days)       Procedure Component Value Units Date/Time    Blood culture #2 **CANNOT BE ORDERED STAT** [[id number]]  Collected: 10/06/22 1536    Order Status: Sent Specimen: Blood from Peripheral, Hand, Right Updated: 10/06/22 1540    Blood culture #1 **CANNOT BE ORDERED STAT** [[id number]] Collected: 10/06/22 1511    Order Status: Sent Specimen: Blood from Peripheral, Upper Arm, Right Updated: 10/06/22 1513

## 2022-10-06 NOTE — ED PROVIDER NOTES
Encounter Date: 10/6/2022    SCRIBE #1 NOTE: I, Richard Garcia, am scribing for, and in the presence of,  Lois Urbina MD. Other sections scribed: HPI, ROS, PE.     History     Chief Complaint   Patient presents with    Foot Pain     Pt's daughter translating for patient per request. Reports they were at the podiatrist and sent to the ED for a right foot infection. Reports hx of DM. Pt has left AKA.     CC: Foot pain    HPI: This is a 56 y.o. F who has  PMHx of CAD (non obstructive) 2016 [facility], Diabetes mellitus type I, Diabetic retinopathy, ESRD on hemodialysis, Gangrene of left foot s/p AKA, Hypertension, Nuclear sclerosis of right eye, PAD, Pulmonary HTN, who presents to the ED from a PAD follow-up appointment from Dr. Soni's office for concern of wound infection to the right leg with a plan for admission and podiatry. Pt declined amputation at the office. The wound was noticed in September and worsened at the end of September per patient. Pt complains of pain to the dorsum of the foot. There is drainage, malodorous brown discharge. Pt denies fever, or injury. He is usually able to bear weight to transfer, but no longer can bear weight to transfer due to the pain. He has previous AKA to the left leg for diabetic foot wound. He denies any other surgeries, or complaints. He currently takes Tramadol. He is not on antibiotics. He has no known drug allergies. He is not sure of medications, but he believes that his medication list is up to date in [facility]. Pt denies alcohol use, tobacco use, or recreational drug use.    The history is provided by the patient. The history is limited by a language barrier ([employer]  [id number] used for interpretation.). A  was used.   Review of patient's allergies indicates:  No Known Allergies  Past Medical History:   Diagnosis Date    CAD (coronary artery disease) 2016    CAD (non obstructive) 2016 [facility]    Diabetes mellitus type I     Diabetic retinopathy      ESRD on hemodialysis     on HD TThSat    Gangrene of left foot     Hypertension     Nuclear sclerosis of right eye 11/24/2021    PAD (peripheral artery disease)     Pulmonary HTN     Right foot infection     TB lung, latent 04/2021     Past Surgical History:   Procedure Laterality Date    ABOVE-KNEE AMPUTATION Left 1/18/2022    Procedure: AMPUTATION, ABOVE KNEE;  Surgeon: Rusty Light MD;  Location: [facility] OR;  Service: Orthopedics;  Laterality: Left;    ABOVE-KNEE AMPUTATION Left 1/21/2022    Procedure: AMPUTATION, ABOVE KNEE;  Surgeon: Rusty Light MD;  Location: [facility] OR;  Service: Orthopedics;  Laterality: Left;    AV FISTULA PLACEMENT      CATARACT EXTRACTION Left     EYE SURGERY      FISTULOGRAM Left 8/9/2022    Procedure: Fistulogram;  Surgeon: Masoud Soni MD;  Location: [facility] OR;  Service: Vascular;  Laterality: Left;  LEFT VM ON DAUGHTER'S PHONE----PHONE PREOP NOT COMPLETED  CALLED PATIENT ON 8/8/2022 @ 3:34. HE STATED HE IS RESCHEDULING PROCEDURE. NOTIFIED LAARVINIA @ 3:35PM-LO    TOE AMPUTATION Left 1/6/2022    Procedure: AMPUTATION, TOE;  Surgeon: Masoud Soni MD;  Location: [facility] OR;  Service: Vascular;  Laterality: Left;  Left first through fifth toes, possible open transmetatarsal amputation and all other indicated procedures     Family History   Problem Relation Age of Onset    No Known Problems Mother     No Known Problems Father     No Known Problems Sister     No Known Problems Brother     No Known Problems Daughter     No Known Problems Daughter     No Known Problems Daughter     No Known Problems Brother     No Known Problems Maternal Aunt     No Known Problems Maternal Uncle     No Known Problems Paternal Aunt     No Known Problems Paternal Uncle     No Known Problems Maternal Grandmother     No Known Problems Maternal Grandfather     No Known Problems Paternal Grandmother     No Known Problems Paternal Grandfather      Social History     Tobacco Use    Smoking status:  Never    Smokeless tobacco: Never   Substance Use Topics    Alcohol use: No    Drug use: No     Review of Systems   Constitutional:  Negative for chills, diaphoresis and fever.   HENT:  Negative for drooling, sore throat and voice change.    Eyes:  Negative for photophobia and visual disturbance.   Respiratory:  Negative for cough, shortness of breath and wheezing.    Cardiovascular:  Negative for chest pain and leg swelling.   Gastrointestinal:  Negative for abdominal pain, blood in stool, constipation, diarrhea, nausea and vomiting.   Genitourinary:  Negative for dysuria, frequency, hematuria and urgency.   Musculoskeletal:  Positive for myalgias (left foot pain). Negative for neck pain and neck stiffness.   Skin:  Positive for wound (left foot). Negative for rash.        (+) Malodorous discharge to the left foot   Neurological:  Negative for syncope, weakness, light-headedness, numbness and headaches.   Hematological:  Does not bruise/bleed easily.   Psychiatric/Behavioral:  Negative for agitation, confusion and suicidal ideas.    All other systems reviewed and are negative.    Physical Exam     Initial Vitals [10/06/22 1352]   BP Pulse Resp Temp SpO2   (!) 182/81 103 16 99.8 °F (37.7 °C) 99 %      MAP       --         Physical Exam    Nursing note and vitals reviewed.  Constitutional: He appears well-developed and well-nourished. He is not diaphoretic. No distress.   Tearful at the end of the exam.   HENT:   Head: Normocephalic and atraumatic.   Right Ear: External ear normal.   Left Ear: External ear normal.   Mouth/Throat: Oropharynx is clear and moist. No oropharyngeal exudate.   Eyes: Conjunctivae and EOM are normal. Pupils are equal, round, and reactive to light. Right eye exhibits no discharge. Left eye exhibits no discharge.   Neck: Neck supple. No JVD present.   Normal range of motion.  Cardiovascular:  Normal rate, regular rhythm, normal heart sounds and intact distal pulses.     Exam reveals no gallop  and no friction rub.       No murmur heard.  1+ DP pulse proximal to the wound.   Pulmonary/Chest: Breath sounds normal. No respiratory distress. He has no wheezes. He has no rhonchi. He has no rales.   Abdominal: Abdomen is soft. Bowel sounds are normal. He exhibits no distension. There is no abdominal tenderness. There is no rebound and no guarding.   Musculoskeletal:         General: No tenderness or edema. Normal range of motion.      Cervical back: Normal range of motion and neck supple.      Comments: Fistula to the left arm with good thrill. Full ROM right ankle.     Lymphadenopathy:     He has no cervical adenopathy.   Neurological: He is alert and oriented to person, place, and time. He has normal strength. No cranial nerve deficit. GCS score is 15. GCS eye subscore is 4. GCS verbal subscore is 5. GCS motor subscore is 6.   Skin: Skin is warm and dry. Capillary refill takes less than 2 seconds.   Necrotic /gangrenous right foot with purulent drainage to the dorsum of the foot. No cap refill on toes. Foul smelling wound. No surrounding cellulitis. See pictures.   Psychiatric: He has a normal mood and affect. Thought content normal.             ED Course   Procedures  Labs Reviewed   CBC W/ AUTO DIFFERENTIAL - Abnormal; Notable for the following components:       Result Value    WBC 17.41 (*)     RBC 3.59 (*)     Hemoglobin 9.7 (*)     Hematocrit 32.1 (*)     MCHC 30.2 (*)     Platelets 464 (*)     Immature Granulocytes 0.6 (*)     Gran # (ANC) 14.8 (*)     Immature Grans (Abs) 0.10 (*)     Lymph # 0.9 (*)     Mono # 1.3 (*)     Gran % 84.9 (*)     Lymph % 5.4 (*)     All other components within normal limits   COMPREHENSIVE METABOLIC PANEL - Abnormal; Notable for the following components:    Glucose 211 (*)     BUN 29 (*)     Creatinine 9.1 (*)     Total Protein 9.4 (*)     Albumin 2.4 (*)     ALT <5 (*)     eGFR 6 (*)     All other components within normal limits   PROCALCITONIN - Abnormal; Notable for the  following components:    Procalcitonin 1.42 (*)     All other components within normal limits   SEDIMENTATION RATE - Abnormal; Notable for the following components:    Sed Rate 125 (*)     All other components within normal limits   C-REACTIVE PROTEIN - Abnormal; Notable for the following components:    .2 (*)     All other components within normal limits   POCT GLUCOSE - Abnormal; Notable for the following components:    POCT Glucose 333 (*)     All other components within normal limits   ISTAT PROCEDURE - Abnormal; Notable for the following components:    POC PCO2 46.3 (*)     POC PO2 32 (*)     POC HCO3 28.7 (*)     POC SATURATED O2 62 (*)     POC TCO2 30 (*)     All other components within normal limits   CULTURE, BLOOD   CULTURE, BLOOD   MAGNESIUM   PHOSPHORUS   PROTIME-INR   BETA - HYDROXYBUTYRATE, SERUM   LACTIC ACID, PLASMA   SARS-COV-2 RDRP GENE          Imaging Results              X-Ray Foot Complete Right (Final result)  Result time 10/06/22 16:40:53      Final result by Lee Amaro Jr., MD (10/06/22 16:40:53)                   Impression:      No radiographic evidence of osteomyelitis      Electronically signed by: Lee Liu Jr  Date:    10/06/2022  Time:    16:40               Narrative:    EXAMINATION:  XR FOOT COMPLETE 3 VIEW RIGHT    CLINICAL HISTORY:  Type 2 diabetes mellitus with foot ulcer    TECHNIQUE:  XR FOOT COMPLETE 3 VIEW RIGHT    COMPARISON:  None    FINDINGS:  Joint spaces are preserved.  Soft tissue ulceration is seen medial to 1st MTP joint but no gross bone erosion or destruction is seen.  Atherosclerotic vascular calcifications are present.                                       Medications   amLODIPine tablet 10 mg (has no administration in time range)   aspirin chewable tablet 81 mg (has no administration in time range)   clopidogreL tablet 75 mg (has no administration in time range)   metoprolol succinate (TOPROL-XL) 24 hr tablet 25 mg (has no administration  in time range)   pantoprazole EC tablet 40 mg (has no administration in time range)   sevelamer carbonate tablet 800 mg (has no administration in time range)   sodium chloride 0.9% flush 10 mL (has no administration in time range)   acetaminophen tablet 650 mg (has no administration in time range)   acetaminophen tablet 650 mg (has no administration in time range)   naloxone 0.4 mg/mL injection 0.02 mg (has no administration in time range)   glucose chewable tablet 16 g (has no administration in time range)   glucose chewable tablet 24 g (has no administration in time range)   glucagon (human recombinant) injection 1 mg (has no administration in time range)   dextrose 10% bolus 125 mL (has no administration in time range)   dextrose 10% bolus 250 mL (has no administration in time range)   heparin (porcine) injection 5,000 Units (has no administration in time range)   HYDROcodone-acetaminophen 5-325 mg per tablet 1 tablet (has no administration in time range)   ondansetron disintegrating tablet 8 mg (has no administration in time range)   insulin aspart U-100 pen 0-5 Units (has no administration in time range)   piperacillin-tazobactam 4.5 g in dextrose 5 % 100 mL IVPB (ready to mix system) (has no administration in time range)   vancomycin - pharmacy to dose (has no administration in time range)   insulin detemir U-100 pen 15 Units (has no administration in time range)   traMADoL tablet 50 mg (has no administration in time range)   sodium chloride 0.9% flush 10 mL (has no administration in time range)   atorvastatin tablet 40 mg (has no administration in time range)   vancomycin in dextrose 5 % 1 gram/250 mL IVPB 1,000 mg (0 mg Intravenous Stopped 10/6/22 1655)   piperacillin-tazobactam 4.5 g in dextrose 5 % 100 mL IVPB (ready to mix system) (0 g Intravenous Stopped 10/6/22 7968)   MDM  57 yo male with PMh of DM, ESRD, PAD s/p AKA to left foot presents from vascular surgery clinic for infected foot wound. Offered  amputation in clinic which patient refused. Per Dr Soni note he recommends Antibiotics and podiatry evaluation for further management and care. Foot is necrotic and gangrenous with foul smelling purulent wound at dorsum. No cap refill to toes. Labs with elevated BG, no s/s of DKA. WBC of 17, elevated procal, elevated ESR/CRP. Labs consistent with ESRD, no findings requiring emergent dialysis. Vanc (pharm doses) and zosyn given.  Case discussed with Dr Lee Mccormack who agrees to admit the patient. Case discussed with Dr. Baird with podiatry who agrees to consult on the patient. Patient updated on plan for admission and in agreement.           Scribe Attestation:   Scribe #1: I performed the above scribed service and the documentation accurately describes the services I performed. I attest to the accuracy of the note.      ED Course as of 10/06/22 2150   Thu Oct 06, 2022   1450 EKG 12-lead  Normal sinus rhythm at 91.  No ST elevation.  T-wave inversions in 1 3 AVF as well as V5 and V6.  Minimal depression in inferior leads.  QTC is 464.  Normal axis.  Patient previously had these T-wave inversions and depression in January of 2022    [JT]      ED Course User Index  [JT] Lois Urbina MD               I, Lois Urbina, personally performed the services described in this documentation. All medical record entries made by the scribe were at my direction and in my presence. I have reviewed the chart and agree that the record reflects my personal performance and is accurate and complete.  Clinical Impression:   Final diagnoses:  [E11.621, L97.509] Diabetic foot ulcer        ED Disposition Condition    Admit                 Lois Urbina MD  10/06/22 2406

## 2022-10-06 NOTE — HPI
This is a 56 year old male with a PMHx of severe PAD s/p gangrene of left foot s/p left AKA (1/2022), ESRD on HD MWF, NICM (EF: 55%, GIDD), pulmonary hypertension, GERD and latent TB filiberto presents for concern for leg infection.       The patient presented to the vascular surgery clinic for a follow up. He reports worsening pain and developing eschar of the right dorsal foot along with foul smelling odor. Given concern for an infection and the need for IV antibiotics & expedited podiatry evaluation, the patient was sent to the ER. The patient denies having any fevers, chills, or injury to his foot. He has a previous AKA and uses a wheelchair to ambulate. While in the ED, the patient was hypertensive but otherwise hemodynamically stable. Labs showed leukocytosis (17.41 - with neutrophilic predominance), normocytic anemia (9.7), elevated sed rate (125), elevated CRP (237.2), negative lactate (1.6). X-ray foot showed no radiographic evidence of osteomyelitis. He was administered vancomycin, zosyn and was admitted for further management.     I attempted to call the daughter to obtain further history on 1902 without a response.

## 2022-10-06 NOTE — LETTER
October 14, 2022         [place] [id number]  Phone: [phone]       Patient: Adryan Neff   YOB: 1965  Date of Visit: 10/14/2022    To Whom It May Concern:    Yehuda Neff  is admitted to [facility] on 10/14/2022 to present. The patient has had surgery and is in need of continuous care after discharge from the hospital and has limited support at home.  Any assistance that could be provided to have his family return to his home to provide care would benefit the patient's recovery and improve his medical condition.  If you have any questions or concerns, or if I can be of further assistance, please do not hesitate to contact me.    Sincerely,        Ban Aldana, RN   Supervisor of Case Management

## 2022-10-06 NOTE — PROGRESS NOTES
Masoud Soni MD [facility]                         10/06/2022    HPI:  Adryan Neff is a 56 y.o. male with   Patient Active Problem List   Diagnosis    Controlled type 2 diabetes mellitus with chronic kidney disease on chronic dialysis, with long-term current use of insulin    Essential (primary) hypertension    Pure hypercholesterolemia    Benign hypertension with chronic kidney disease, stage V    Other insomnia    Gastroesophageal reflux disease    Intractable vomiting    Chronic diastolic heart failure    CAD (coronary artery disease) - non-obstructive from [facility] in 2016    Hyperkalemia    Pre-transplant evaluation for kidney transplant    Chronic pulmonary heart disease    HLD (hyperlipidemia)    NICM (nonischemic cardiomyopathy)    PDR (proliferative diabetic retinopathy)    Secondary hyperparathyroidism of renal origin    ESRD on hemodialysis    TB lung, latent    Paronychia, toe, left    Nuclear sclerosis of right eye    Pseudophakia    Gangrene of left foot    COVID-19 in immunocompromised patient    Encephalopathy, metabolic    Unilateral complete BKA, left, initial encounter    PAD (peripheral artery disease)   This is a 56 y.o.male patient, with a PMHx of ESRD on hemodialysis, CAD, HTN, and DM, presenting to the ED for further evaluation of left foot pain and black discoloration that began 3 days ago. Patient states these symptoms have been ongoing for the last month. He reports being referred to a specialist but denies ever following up. He has been putting some otc medication on the foot that he believes has turned his foot more black. No trauma or injury. Patient reports associated chills. Patient states applying a creme to the foot and noticed his discoloration worsened. Patient reports he was last dialyzed yesterday. Patient denies any fever, shortness of breath, chest pain, neck pain, back pain, abdominal pain, rash, headaches, congestion,  rhinorrhea, cough, sore throat, ear pain, eye pain, blurred vision, nausea, vomiting, diarrhea, dysuria, or any other associated symptoms.     Per chart review, he had an arterial u/s 12/15.   Impression:   1. High-grade severe stenosis right popliteal artery with occlusion anterior tibial artery which is reconstituted at level of DPA via collaterals.  2. 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     Cardiology has been planning to see him for revascularization options. Last podiatry visit 12/13 with Stable dry gangrene L 2nd toe and medial L 5th toe. Dorsalis pedis and posterior tibial pulses are diminished Bilaterally. Toes are cool to touch. Feet are warm proximally.There is decreased digital hair. Skin is atrophic, slightly hyperpigmented, and mildly edematous      In the ED, he was found to have worsened gangrene and a cool extremity. Vascular surgery was consulted. He was also COVID +. Says he got a booster shot in November sometime.        Procedure(s) (LRB):  AMPUTATION, ABOVE KNEE (Left)       Hospital Course:   Patient admitted to the hospital for eval and treatment of L foot gangrene.  ID, Ortho and Vasc consulted. Nephrology was also consulted for chronic dialysis. Patient started on broad spectrum antibiotics, and had several toes on L foot amputated by Vascular surgery.      The patient's AVF had recurrent malfunction. IR attempts at intervention without success. THDC placed for dialysis.     Pt became increasingly delirious and combative. He refused angiogram with Vascular. Psychiatry evaluated, felt that he was not able to make his own medical decisions. At this point Dr. Soni felt the patient would be a poor candidate for vascular intervention, recommended AKA. ID in agreement. Ortho consulted for AKA, daughter/POA in agreement,   S/P left AKA on 1.21.22,plan is SNF placement,SW was on case.afebrile.  Had some EKG changes with no chest pain,cardiology cleared patient for  procedure.  Overall his mental status is improved,his pain was controlled,patient was discharged to SNF with PCP and Ortho. Follow up as out patient.    5/2022:  No complaints.  HD without issue.    7/2022:  +prolonged bleeding during HD.    10/2022:  Patient with complaints of right foot pain.  Refused incision and drainage in the podiatry clinic earlier today.  Denies fevers.  Does not have a prosthetic    Past Medical History:   Diagnosis Date    CAD (coronary artery disease) 2016    CAD (non obstructive) 2016 [facility]    Diabetes mellitus type I     Diabetic retinopathy     ESRD on hemodialysis     on HD TThSat    Gangrene of left foot     Hypertension     Nuclear sclerosis of right eye 11/24/2021    PAD (peripheral artery disease)     Pulmonary HTN     TB lung, latent 04/2021     Past Surgical History:   Procedure Laterality Date    ABOVE-KNEE AMPUTATION Left 1/18/2022    Procedure: AMPUTATION, ABOVE KNEE;  Surgeon: Rusty Light MD;  Location: [facility] OR;  Service: Orthopedics;  Laterality: Left;    ABOVE-KNEE AMPUTATION Left 1/21/2022    Procedure: AMPUTATION, ABOVE KNEE;  Surgeon: Rusty Light MD;  Location: [facility] OR;  Service: Orthopedics;  Laterality: Left;    AV FISTULA PLACEMENT      CATARACT EXTRACTION Left     EYE SURGERY      FISTULOGRAM Left 8/9/2022    Procedure: Fistulogram;  Surgeon: Masoud Soni MD;  Location: [facility] OR;  Service: Vascular;  Laterality: Left;  LEFT VM ON DAUGHTER'S PHONE----PHONE PREOP NOT COMPLETED  CALLED PATIENT ON 8/8/2022 @ 3:34. HE STATED HE IS RESCHEDULING PROCEDURE. NOTIFIED ERWIN @ 3:35PM-LO    TOE AMPUTATION Left 1/6/2022    Procedure: AMPUTATION, TOE;  Surgeon: Masoud Soni MD;  Location: [facility] OR;  Service: Vascular;  Laterality: Left;  Left first through fifth toes, possible open transmetatarsal amputation and all other indicated procedures     Family History   Problem Relation Age of Onset    No Known Problems Mother     No Known Problems Father      No Known Problems Sister     No Known Problems Brother     No Known Problems Daughter     No Known Problems Daughter     No Known Problems Daughter     No Known Problems Brother     No Known Problems Maternal Aunt     No Known Problems Maternal Uncle     No Known Problems Paternal Aunt     No Known Problems Paternal Uncle     No Known Problems Maternal Grandmother     No Known Problems Maternal Grandfather     No Known Problems Paternal Grandmother     No Known Problems Paternal Grandfather      Social History     Socioeconomic History    Marital status: Single    Number of children: 5   Tobacco Use    Smoking status: Never    Smokeless tobacco: Never   Substance and Sexual Activity    Alcohol use: No    Drug use: No    Sexual activity: Yes     Partners: Female   Social History Narrative    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       Current Outpatient Medications:     amLODIPine (NORVASC) 10 MG tablet, Take 1 tablet (10 mg total) by mouth once daily., Disp: 30 tablet, Rfl: 11    amLODIPine (NORVASC) 10 MG tablet, Take 1 tablet by mouth., Disp: , Rfl:     aspirin 81 MG Chew, Take 81 mg by mouth once daily., Disp: , Rfl:     aspirin 81 MG Chew, Take 1 tablet by mouth., Disp: , Rfl:     atorvastatin (LIPITOR) 20 MG tablet, Take 1 tablet by mouth., Disp: , Rfl:     clopidogreL (PLAVIX) 75 mg tablet, Take 75 mg by mouth once daily., Disp: , Rfl:     doxycycline (VIBRAMYCIN) 100 MG Cap, Take 1 capsule (100 mg total) by mouth every 12 (twelve) hours., Disp: 20 capsule, Rfl: 0    insulin glargine 100 units/mL (3mL) SubQ pen, Inject 15 Units into the skin every evening., Disp: 3 mL, Rfl: 11    insulin glargine 100 units/mL (3mL) SubQ pen, Inject 15 Units into the skin every evening., Disp: 3 mL, Rfl: 11    lancets (ACCU-CHEK SOFTCLIX LANCETS) Misc, 1 each by Misc.(Non-Drug; Combo Route) route once daily at 6am., Disp: 200 each, Rfl: 1    methoxy peg-epoetin beta (MIRCERA INJ), 50 mcg., Disp: , Rfl:     methoxy peg-epoetin beta (MIRCERA INJ),  "30 mcg., Disp: , Rfl:     methoxy peg-epoetin beta (MIRCERA INJ), 75 mcg., Disp: , Rfl:     metoprolol succinate (TOPROL-XL) 25 MG 24 hr tablet, Take 1 tablet by mouth., Disp: , Rfl:     omeprazole (PRILOSEC) 20 MG capsule, TAKE 2 CAPSULES(40 MG) BY MOUTH EVERY DAY (Patient taking differently: Take 20 mg by mouth once daily.), Disp: 180 capsule, Rfl: 0    omeprazole (PRILOSEC) 40 MG capsule, Take by mouth., Disp: , Rfl:     omeprazole (PRILOSEC) 40 MG capsule, Take 40 mg by mouth once daily., Disp: , Rfl:     pen needle, diabetic 32 gauge x 5/32" Ndle, To use with insulin pen 4x daily, Disp: 100 each, Rfl: 11    pravastatin (PRAVACHOL) 20 MG tablet, Take 20 mg by mouth once daily., Disp: , Rfl:     RENVELA 800 mg Tab, Take 800 mg by mouth 3 (three) times daily with meals., Disp: , Rfl:     sevelamer carbonate (RENVELA) 800 mg Tab, Take 2 tablets by mouth., Disp: , Rfl:     traMADoL (ULTRAM) 50 mg tablet, Take 50 mg by mouth every 6 (six) hours as needed for Pain., Disp: , Rfl:     traMADoL (ULTRAM) 50 mg tablet, Take 1 tablet by mouth., Disp: , Rfl:     Current Facility-Administered Medications:     blood sugar diagnostic Strp, , Misc.(Non-Drug; Combo Route), BID, Bam Hansen MD    ceFAZolin (ANCEF) 2 g in dextrose 5 % 50 mL IVPB, 2 g, Intravenous, Once, Masoud Soni MD    Facility-Administered Medications Ordered in Other Visits:     cefazolin (ANCEF) 2 gram in dextrose 5% 50 mL IVPB (premix), 2 g, Intravenous, Once, Masoud Soni MD    REVIEW OF SYSTEMS:  General: No fevers or chills; ENT: No sore throat; Allergy and Immunology: no persistent infections; Hematological and Lymphatic: No history of bleeding or easy bruising; Endocrine: negative; Respiratory: no cough, shortness of breath, or wheezing; Cardiovascular: no chest pain or dyspnea on exertion; no claudication, no rest pain; Gastrointestinal: no abdominal pain/back, change in bowel habits, or bloody stools; Genito-Urinary: no dysuria, " trouble voiding, or hematuria; Musculoskeletal: negative, no wound; Neurological: no TIA or stroke symptoms; Psychiatric: no nervousness, anxiety or depression.    PHYSICAL EXAM:                General appearance:  Alert, well-appearing, and in no distress.  Oriented to person, place, and time                    Neurological: Normal speech, no focal findings noted; CN II - XII grossly intact. RLE with sensation to light touch, LLE with sensation to light touch.            Musculoskeletal: Digits/nail without cyanosis/clubbing.  Strength 5/5 BLE.                    Neck: Supple, no significant adenopathy, no carotid bruit can be auscultated                  Chest:  Clear to auscultation, no wheezes, rales or rhonchi, symmetric air entry. No use of accessory muscles               Cardiac: Normal rate and regular rhythm, S1 and S2 normal            Abdomen: Soft, nontender, nondistended, no masses or organomegaly, no hernia     No rebound tenderness noted; bowel sounds normal     Pulsatile aortic mass is non palpable.     No groin adenopathy      Extremities:2+ R femoral pulse, 2+ L femoral pulse     doppler+ R popliteal pulse     1+ R PT pulse     1+ R DP pulse     no RLE edema, no L AKA stump edema    Skin: RLE with local infection to distal aspect of foot and toes, RUE AVF pulsatile thrill, no wounds to AV access    LAB RESULTS:  No results found for: CBC  Lab Results   Component Value Date    LABPROT 11.2 01/05/2022    INR 1.0 01/05/2022     Lab Results   Component Value Date     03/16/2022    K 4.9 03/16/2022     03/16/2022    CO2 26 03/16/2022     (H) 03/16/2022    BUN 42 (H) 03/16/2022    CREATININE 9.5 (H) 03/16/2022    CALCIUM 9.3 03/16/2022    ANIONGAP 10 03/16/2022    EGFRNONAA 5 (A) 03/16/2022     Lab Results   Component Value Date    WBC 9.63 09/22/2022    RBC 3.98 (L) 09/22/2022    HGB 11.0 (L) 09/22/2022    HCT 38.4 (L) 09/22/2022    MCV 97 09/22/2022    MCH 27.6 09/22/2022    MCHC  28.6 (L) 09/22/2022    RDW 14.0 09/22/2022     09/22/2022    MPV 10.1 09/22/2022    GRAN 7.2 09/22/2022    GRAN 74.6 (H) 09/22/2022    LYMPH 1.3 09/22/2022    LYMPH 13.0 (L) 09/22/2022    MONO 0.8 09/22/2022    MONO 8.2 09/22/2022    EOS 0.3 09/22/2022    BASO 0.06 09/22/2022    EOSINOPHIL 3.3 09/22/2022    BASOPHIL 0.6 09/22/2022    DIFFMETHOD Automated 09/22/2022     .  Lab Results   Component Value Date    HGBA1C 6.1 (H) 01/07/2021       IMAGING:  All pertinent imaging has been reviewed and interpreted independently.    HD US 5/2022 reviewed with HD significant venous outflow stenosis.    IMP/PLAN:  56 y.o. male with   Patient Active Problem List   Diagnosis    Controlled type 2 diabetes mellitus with chronic kidney disease on chronic dialysis, with long-term current use of insulin    Essential (primary) hypertension    Pure hypercholesterolemia    Benign hypertension with chronic kidney disease, stage V    Other insomnia    Gastroesophageal reflux disease    Intractable vomiting    Chronic diastolic heart failure    CAD (coronary artery disease) - non-obstructive from [facility] in 2016    Hyperkalemia    Pre-transplant evaluation for kidney transplant    Chronic pulmonary heart disease    HLD (hyperlipidemia)    NICM (nonischemic cardiomyopathy)    PDR (proliferative diabetic retinopathy)    Secondary hyperparathyroidism of renal origin    ESRD on hemodialysis    TB lung, latent    Paronychia, toe, left    Nuclear sclerosis of right eye    Pseudophakia    Gangrene of left foot    COVID-19 in immunocompromised patient    Encephalopathy, metabolic    Unilateral complete BKA, left, initial encounter    PAD (peripheral artery disease)    being managed by PCP and specialists who is here today for evaluation of PVD.    -RLE PVD with right foot infection-recommend evaluation in the emergency room with x-ray, antibiotics, podiatry consult.  I will continue to follow.  Patient refusing major amputation at this time.  I  will continue to follow for revascularization as needed.  -Prosthetics eval L AKA  -Exercise  -Heart healthy lifestyle  -HD US reviewed status post AV fistula angioplasty August 2020 to-continue routine surveillance    I spent 12 minutes evaluating this patient and greater than 50% of the time was spent counseling, coordinator care and discussing the plan of care.  All questions were answered and patient stated understanding with agreement with the above treatment plan.    Masoud Soni MD [facility]  Vascular and Endovascular Surgery

## 2022-10-07 LAB
BASOPHILS # BLD AUTO: 0.03 K/UL (ref 0–0.2)
BASOPHILS NFR BLD: 0.3 % (ref 0–1.9)
DIFFERENTIAL METHOD: ABNORMAL
EOSINOPHIL # BLD AUTO: 0.1 K/UL (ref 0–0.5)
EOSINOPHIL NFR BLD: 1.3 % (ref 0–8)
ERYTHROCYTE [DISTWIDTH] IN BLOOD BY AUTOMATED COUNT: 14.4 % (ref 11.5–14.5)
HCT VFR BLD AUTO: 27.5 % (ref 40–54)
HGB BLD-MCNC: 8.6 G/DL (ref 14–18)
IMM GRANULOCYTES # BLD AUTO: 0.05 K/UL (ref 0–0.04)
IMM GRANULOCYTES NFR BLD AUTO: 0.5 % (ref 0–0.5)
LYMPHOCYTES # BLD AUTO: 0.9 K/UL (ref 1–4.8)
LYMPHOCYTES NFR BLD: 8 % (ref 18–48)
MCH RBC QN AUTO: 27.4 PG (ref 27–31)
MCHC RBC AUTO-ENTMCNC: 31.3 G/DL (ref 32–36)
MCV RBC AUTO: 88 FL (ref 82–98)
MONOCYTES # BLD AUTO: 0.3 K/UL (ref 0.3–1)
MONOCYTES NFR BLD: 3.2 % (ref 4–15)
NEUTROPHILS # BLD AUTO: 9.2 K/UL (ref 1.8–7.7)
NEUTROPHILS NFR BLD: 86.7 % (ref 38–73)
NRBC BLD-RTO: 0 /100 WBC
PLATELET # BLD AUTO: 418 K/UL (ref 150–450)
PMV BLD AUTO: 9.5 FL (ref 9.2–12.9)
POCT GLUCOSE: 282 MG/DL (ref 70–110)
POCT GLUCOSE: 65 MG/DL (ref 70–110)
POCT GLUCOSE: 69 MG/DL (ref 70–110)
RBC # BLD AUTO: 3.14 M/UL (ref 4.6–6.2)
VANCOMYCIN SERPL-MCNC: 16.9 UG/ML
WBC # BLD AUTO: 10.61 K/UL (ref 3.9–12.7)

## 2022-10-07 PROCEDURE — 99223 1ST HOSP IP/OBS HIGH 75: CPT | Mod: ,,, | Performed by: PODIATRIST

## 2022-10-07 PROCEDURE — 80100014 HC HEMODIALYSIS 1:1

## 2022-10-07 PROCEDURE — 21400001 HC TELEMETRY ROOM

## 2022-10-07 PROCEDURE — 80100016 HC MAINTENANCE HEMODIALYSIS

## 2022-10-07 PROCEDURE — 80202 ASSAY OF VANCOMYCIN: CPT | Performed by: STUDENT IN AN ORGANIZED HEALTH CARE EDUCATION/TRAINING PROGRAM

## 2022-10-07 PROCEDURE — 63600175 PHARM REV CODE 636 W HCPCS: Performed by: STUDENT IN AN ORGANIZED HEALTH CARE EDUCATION/TRAINING PROGRAM

## 2022-10-07 PROCEDURE — 99233 SBSQ HOSP IP/OBS HIGH 50: CPT | Mod: ,,, | Performed by: INTERNAL MEDICINE

## 2022-10-07 PROCEDURE — 85025 COMPLETE CBC W/AUTO DIFF WBC: CPT | Performed by: STUDENT IN AN ORGANIZED HEALTH CARE EDUCATION/TRAINING PROGRAM

## 2022-10-07 PROCEDURE — 25000003 PHARM REV CODE 250: Performed by: INTERNAL MEDICINE

## 2022-10-07 PROCEDURE — 36415 COLL VENOUS BLD VENIPUNCTURE: CPT | Performed by: STUDENT IN AN ORGANIZED HEALTH CARE EDUCATION/TRAINING PROGRAM

## 2022-10-07 PROCEDURE — 25000003 PHARM REV CODE 250: Performed by: STUDENT IN AN ORGANIZED HEALTH CARE EDUCATION/TRAINING PROGRAM

## 2022-10-07 PROCEDURE — 99233 PR SUBSEQUENT HOSPITAL CARE,LEVL III: ICD-10-PCS | Mod: ,,, | Performed by: INTERNAL MEDICINE

## 2022-10-07 PROCEDURE — 99223 PR INITIAL HOSPITAL CARE,LEVL III: ICD-10-PCS | Mod: ,,, | Performed by: PODIATRIST

## 2022-10-07 RX ORDER — HYDRALAZINE HYDROCHLORIDE 20 MG/ML
10 INJECTION INTRAMUSCULAR; INTRAVENOUS EVERY 8 HOURS PRN
Status: DISCONTINUED | OUTPATIENT
Start: 2022-10-07 | End: 2022-11-10 | Stop reason: HOSPADM

## 2022-10-07 RX ORDER — MUPIROCIN 20 MG/G
OINTMENT TOPICAL 2 TIMES DAILY
Status: DISPENSED | OUTPATIENT
Start: 2022-10-07 | End: 2022-10-12

## 2022-10-07 RX ADMIN — INSULIN DETEMIR 15 UNITS: 100 INJECTION, SOLUTION SUBCUTANEOUS at 10:10

## 2022-10-07 RX ADMIN — MUPIROCIN: 20 OINTMENT TOPICAL at 10:10

## 2022-10-07 RX ADMIN — ATORVASTATIN CALCIUM 40 MG: 40 TABLET, FILM COATED ORAL at 10:10

## 2022-10-07 RX ADMIN — CLOPIDOGREL 75 MG: 75 TABLET, FILM COATED ORAL at 09:10

## 2022-10-07 RX ADMIN — AMLODIPINE BESYLATE 10 MG: 5 TABLET ORAL at 09:10

## 2022-10-07 RX ADMIN — ASPIRIN 81 MG CHEWABLE TABLET 81 MG: 81 TABLET CHEWABLE at 09:10

## 2022-10-07 RX ADMIN — PIPERACILLIN AND TAZOBACTAM 4.5 G: 4; .5 INJECTION, POWDER, LYOPHILIZED, FOR SOLUTION INTRAVENOUS; PARENTERAL at 03:10

## 2022-10-07 RX ADMIN — VANCOMYCIN HYDROCHLORIDE 500 MG: 500 INJECTION, POWDER, LYOPHILIZED, FOR SOLUTION INTRAVENOUS at 03:10

## 2022-10-07 RX ADMIN — PANTOPRAZOLE SODIUM 40 MG: 40 TABLET, DELAYED RELEASE ORAL at 09:10

## 2022-10-07 RX ADMIN — TRAMADOL HYDROCHLORIDE 50 MG: 50 TABLET, COATED ORAL at 04:10

## 2022-10-07 RX ADMIN — SEVELAMER CARBONATE 800 MG: 800 TABLET, FILM COATED ORAL at 04:10

## 2022-10-07 RX ADMIN — METOPROLOL SUCCINATE 25 MG: 25 TABLET, EXTENDED RELEASE ORAL at 09:10

## 2022-10-07 RX ADMIN — PIPERACILLIN AND TAZOBACTAM 4.5 G: 4; .5 INJECTION, POWDER, LYOPHILIZED, FOR SOLUTION INTRAVENOUS; PARENTERAL at 05:10

## 2022-10-07 RX ADMIN — HEPARIN SODIUM 5000 UNITS: 5000 INJECTION INTRAVENOUS; SUBCUTANEOUS at 03:10

## 2022-10-07 NOTE — HPI
"56M with ESRD on HD and severe PAD admitted 10/6 with Gangrene of R BKA. Last seen by ID 1/2022 for L foot gangrene s/p AKA- at that time, amputation thought to be definitive treatment of infection and antibiotics were stopped. Now reports worsening pain and developing eschar of the right dorsal foot along with foul smelling odor. Given concern for an infection and the need for IV antibiotics & expedited podiatry evaluation, the patient was sent to the ER. Brian f/c. Says he was taking medicine but not sure if it was an antibiotics. Reports severe pain and drainage and odor from foot.     Seen by podiatry- amputation planned. R BKA being considered    Xray-No radiographic evidence of osteomyelitis    Ila ordered, pending    Tmax 99.9    Bcx ngtd x 1 day    Has received vanc/zosyn    ID consulted for ": Concern for wet gangrene"  "

## 2022-10-07 NOTE — NURSING
Pt back on the floor from dialysis. Pt resting in bed, VSS. Dressing to fistula site c/d/I. No complaints, no evident distress. Contacted cardiology to see if they can take him now for his DELLA since he was in dialysis when they called for him earlier, but was told he'll have to wait till Monday now. Bed locked in lowest position, call light in reach. Will touch base with MD to see if he is to remain NPO.

## 2022-10-07 NOTE — CONSULTS
[facility] - Telemetry  Podiatry  Consult Note    Patient Name: Adryan Neff  MRN: [id number]  Admission Date: 10/6/2022  Hospital Length of Stay: 1 days  Attending Physician: Lee Mccormack III, MD  Primary Care Provider: Rasheeda Jose NP     Inpatient consult to Podiatry  Consult performed by: Lalita Hassan DPM  Consult ordered by: Lois Urbina MD      Subjective:     History of Present Illness: 55 y/o male PMH ESRD on HD, L BKA admitted for right foot gangrene. Seen in podiatry clinic initially by myself on 9/22 noted ischemic changes to right medial foot. At that time vascular sx appt scheduled rx. PO Doxycycline. Patient then returned yesterday with worsening right foot infection. Discussed I&D in office however patient declined. Patient was then seen later that day by Dr. Soni, patient sent to ED for admission. Primary team Dr. Mccormack present at bedside as well.  utilized for encounter Emmett Dodd ID# [id number]    Scheduled Meds:   amLODIPine  10 mg Oral Daily    aspirin  81 mg Oral Daily    atorvastatin  40 mg Oral QHS    clopidogreL  75 mg Oral Daily    heparin (porcine)  5,000 Units Subcutaneous Q8H    insulin detemir U-100  15 Units Subcutaneous QHS    metoprolol succinate  25 mg Oral Daily    mupirocin   Nasal BID    pantoprazole  40 mg Oral Daily    piperacillin-tazobactam (ZOSYN) IVPB  4.5 g Intravenous Q12H    sevelamer carbonate  800 mg Oral TID WM     Continuous Infusions:  PRN Meds:acetaminophen, acetaminophen, dextrose 10%, dextrose 10%, glucagon (human recombinant), glucose, glucose, HYDROcodone-acetaminophen, insulin aspart U-100, naloxone, ondansetron, sodium chloride 0.9%, sodium chloride 0.9%, sodium chloride 0.9%, traMADoL, Pharmacy to dose Vancomycin consult **AND** vancomycin - pharmacy to dose    Review of patient's allergies indicates:  No Known Allergies     Past Medical History:   Diagnosis Date    CAD (coronary artery disease) 2016    CAD (non obstructive) 2016  [facility]    Diabetes mellitus type I     Diabetic retinopathy     ESRD on hemodialysis     on HD TThSat    Gangrene of left foot     Hypertension     Nuclear sclerosis of right eye 11/24/2021    PAD (peripheral artery disease)     Pulmonary HTN     Right foot infection     TB lung, latent 04/2021     Past Surgical History:   Procedure Laterality Date    ABOVE-KNEE AMPUTATION Left 1/18/2022    Procedure: AMPUTATION, ABOVE KNEE;  Surgeon: Rusty Light MD;  Location: [facility] OR;  Service: Orthopedics;  Laterality: Left;    ABOVE-KNEE AMPUTATION Left 1/21/2022    Procedure: AMPUTATION, ABOVE KNEE;  Surgeon: Rusty Light MD;  Location: [facility] OR;  Service: Orthopedics;  Laterality: Left;    AV FISTULA PLACEMENT      CATARACT EXTRACTION Left     EYE SURGERY      FISTULOGRAM Left 8/9/2022    Procedure: Fistulogram;  Surgeon: Masoud Soni MD;  Location: [facility] OR;  Service: Vascular;  Laterality: Left;  LEFT VM ON DAUGHTER'S PHONE----PHONE PREOP NOT COMPLETED  CALLED PATIENT ON 8/8/2022 @ 3:34. HE STATED HE IS RESCHEDULING PROCEDURE. NOTIFIED LAARVINIA @ 3:35PM-LO    TOE AMPUTATION Left 1/6/2022    Procedure: AMPUTATION, TOE;  Surgeon: Masoud Soni MD;  Location: [facility] OR;  Service: Vascular;  Laterality: Left;  Left first through fifth toes, possible open transmetatarsal amputation and all other indicated procedures       Family History       Problem Relation (Age of Onset)    No Known Problems Mother, Father, Sister, Brother, Daughter, Daughter, Daughter, Brother, Maternal Aunt, Maternal Uncle, Paternal Aunt, Paternal Uncle, Maternal Grandmother, Maternal Grandfather, Paternal Grandmother, Paternal Grandfather          Tobacco Use    Smoking status: Never    Smokeless tobacco: Never   Substance and Sexual Activity    Alcohol use: No    Drug use: No    Sexual activity: Yes     Partners: Female     Review of Systems   Constitutional: Negative.    Respiratory: Negative.     Gastrointestinal: Negative.     Genitourinary: Negative.    Musculoskeletal: Negative.    Skin:  Positive for wound.   Neurological: Negative.    Psychiatric/Behavioral: Negative.     Objective:     Vital Signs (Most Recent):  Temp: 98.4 °F (36.9 °C) (10/07/22 0720)  Pulse: 72 (10/07/22 0720)  Resp: 18 (10/07/22 0720)  BP: (!) 195/79 (10/07/22 0720)  SpO2: 97 % (10/07/22 0720)   Vital Signs (24h Range):  Temp:  [98.4 °F (36.9 °C)-99.9 °F (37.7 °C)] 98.4 °F (36.9 °C)  Pulse:  [] 72  Resp:  [16-18] 18  SpO2:  [96 %-100 %] 97 %  BP: (131-195)/(65-83) 195/79     Weight: 70.3 kg (155 lb)  Body mass index is 21.62 kg/m².    Foot Exam    General  Orientation: alert and oriented to person, place, and time       Right Foot/Ankle     Neurovascular  Dorsalis pedis: absent  Posterior tibial: absent  Saphenous nerve sensation: diminished  Tibial nerve sensation: diminished  Superficial peroneal nerve sensation: diminished  Deep peroneal nerve sensation: diminished  Sural nerve sensation: diminished        Right foot  Gangrene toes 1-3   Fluctuance noted right dorsal foot. Mottled appearance right 4th and 5th toe.                   H/o L BKA   Laboratory:  CBC:   Recent Labs   Lab 10/07/22  1151   WBC 10.61   RBC 3.14*   HGB 8.6*   HCT 27.5*      MCV 88   MCH 27.4   MCHC 31.3*     CMP:   Recent Labs   Lab 10/06/22  1512   *   CALCIUM 9.7   ALBUMIN 2.4*   PROT 9.4*      K 4.0   CO2 26   CL 97   BUN 29*   CREATININE 9.1*   ALKPHOS 85   ALT <5*   AST 10   BILITOT 0.3       Diagnostic Results:  Xray:X-Ray Foot Complete Right  Order: [id number]  Status: Final result     Visible to patient: No (inaccessible in Patient Portal)     Next appt: 10/20/2022 at 10:45 AM in Podiatry (Lalita Hassan DPM)     Dx: Diabetic foot ulcer     0 Result Notes  Details    Reading Physician Reading Date Result Priority   Lee Amaro Jr., MD  [phone] 10/6/2022 STAT     Narrative & Impression  EXAMINATION:  XR FOOT COMPLETE 3 VIEW  RIGHT     CLINICAL HISTORY:  Type 2 diabetes mellitus with foot ulcer     TECHNIQUE:  XR FOOT COMPLETE 3 VIEW RIGHT     COMPARISON:  None     FINDINGS:  Joint spaces are preserved.  Soft tissue ulceration is seen medial to 1st MTP joint but no gross bone erosion or destruction is seen.  Atherosclerotic vascular calcifications are present.     Impression:     No radiographic evidence of osteomyelitis          Clinical Findings:  Gangrene toes 1-3 right foot fluctuance noted right dorsal foot. Mottled appearance right 4th and 5th toe.     Assessment/Plan:     Active Diagnoses:    Diagnosis Date Noted POA    PRINCIPAL PROBLEM:  PAD (peripheral artery disease) [I73.9] 02/07/2022 Yes    Dry gangrene [I96] 01/05/2022 Yes    ESRD on hemodialysis [N18.6, Z99.2]  Not Applicable    Chronic diastolic heart failure [I50.32] 10/26/2020 Yes    CAD (coronary artery disease) - non-obstructive from [facility] in 2016 [I25.10] 10/26/2020 Yes    Gastroesophageal reflux disease [K21.9] 06/04/2019 Yes    Pure hypercholesterolemia [E78.00] 01/31/2019 Yes    Controlled type 2 diabetes mellitus with chronic kidney disease on chronic dialysis, with long-term current use of insulin [E11.22, N18.6, Z79.4, Z99.2] 01/29/2019 Not Applicable    Essential (primary) hypertension [I10] 01/29/2019 Yes      Problems Resolved During this Admission:       Discussed  options with patient  R TMA likely staged procedure likely extended course of wound care vs. R BKA. Patient opting to pursue more definitive option R BKA. Ortho consult placed. Will defer any surgical intervention to ortho.     Painted with betadine left open to air.     Discussed case with primary team Dr. Mccormack.     Thank you for your consult. I will follow-up with patient. Please contact us if you have any additional questions.    Lalita Hassan DPM  Podiatry  [facility]

## 2022-10-07 NOTE — ASSESSMENT & PLAN NOTE
"56M with ESRD on HD and severe PAD admitted 10/6 with Gangrene of R BKA with foul smelling odor/drainage. Seen by podiatry- amputation planned. R BKA being considered. Ila ordered, pending. Tmax 99.9. Bcx ngtd x 1 day. Has received vanc/zosyn. ID consulted for ": Concern for wet gangrene"    Recommendations:  - continue antibiotics- v/zosyn ok (or vanc/cefepime) for wet gangrene  - please send path/cultures of proximal margin at the time of amputation. It's possible that amputation will be definitive treatment of infections, but waiting path/cultures to confirm  - needs adequate perfusion to heal wounds  - wound care as per surgery  "

## 2022-10-07 NOTE — SUBJECTIVE & OBJECTIVE
"Interval History: Pt discussing amputation with podiatry and nephrology providers upon entering the room. Pt is very upset and initially states "he wants to just go back to his [place] to die", but after further conversation pt is calmed and understands need for amputation. Pt denies cp, sob, fever chills, n/v at this time. Interview done through  services.    Review of Systems  Objective:     Vital Signs (Most Recent):  Temp: 98.4 °F (36.9 °C) (10/07/22 0720)  Pulse: 72 (10/07/22 0720)  Resp: 18 (10/07/22 0720)  BP: (!) 195/79 (10/07/22 0720)  SpO2: 97 % (10/07/22 0720)   Vital Signs (24h Range):  Temp:  [98.4 °F (36.9 °C)-99.9 °F (37.7 °C)] 98.4 °F (36.9 °C)  Pulse:  [] 72  Resp:  [16-18] 18  SpO2:  [96 %-100 %] 97 %  BP: (131-195)/(65-83) 195/79     Weight: 70.3 kg (155 lb)  Body mass index is 21.62 kg/m².    Intake/Output Summary (Last 24 hours) at 10/7/2022 0931  Last data filed at 10/7/2022 0630  Gross per 24 hour   Intake 470 ml   Output 0 ml   Net 470 ml      Physical Exam  Vitals reviewed.   Constitutional:       General: He is in acute distress (emotional).   HENT:      Head: Normocephalic and atraumatic.      Mouth/Throat:      Mouth: Mucous membranes are moist.      Pharynx: Oropharynx is clear.   Eyes:      General: No scleral icterus.     Extraocular Movements: Extraocular movements intact.   Pulmonary:      Effort: Pulmonary effort is normal. No respiratory distress.   Abdominal:      Palpations: Abdomen is soft.      Tenderness: There is no abdominal tenderness.   Musculoskeletal:      Comments: Left aka. Right foot with wound present on dorsum and pungent odor noted.    Skin:     General: Skin is warm and dry.   Neurological:      General: No focal deficit present.      Mental Status: He is alert and oriented to person, place, and time.       Significant Labs: All pertinent labs within the past 24 hours have been reviewed.    Significant Imaging: I have reviewed all pertinent " imaging results/findings within the past 24 hours.

## 2022-10-07 NOTE — ASSESSMENT & PLAN NOTE
- History of severe PAD   - C/b LEFT foot gangrene s/p left AKA   - Now presenting with right foot gangrene with concern of superimposed infection   - Home medications: aspirin, plavix and statin     Plan;   - Vascular surgery, podiatry consultation   - NPO after MN for possible intervention (however, patient refused major surgery in clinic)   - Vancomycin/Zosyn for concern for superimposed infection   - Infectious disease consult   - Resume home medications

## 2022-10-07 NOTE — ASSESSMENT & PLAN NOTE
- Echocardiogram (11/24/2021): EF: 55%, GIDD, mild TR, moderate to severe MR  - Compensated on physical examination, on room air   - Resume home medications

## 2022-10-07 NOTE — PROGRESS NOTES
"[facility] Medicine  Progress Note    Patient Name: Adryan Neff  MRN: [id number]  Patient Class: IP- Inpatient   Admission Date: 10/6/2022  Length of Stay: 1 days  Attending Physician: Lee Mccormack III, MD  Primary Care Provider: Rasheeda Jose NP        Subjective:     Principal Problem:PAD (peripheral artery disease)        HPI:  This is a 56 year old male with a PMHx of severe PAD s/p gangrene of left foot s/p left AKA (1/2022), ESRD on HD MWF, NICM (EF: 55%, GIDD), pulmonary hypertension, GERD and latent TB filiberto presents for concern for leg infection.       The patient presented to the vascular surgery clinic for a follow up. He reports worsening pain and developing eschar of the right dorsal foot along with foul smelling odor. Given concern for an infection and the need for IV antibiotics & expedited podiatry evaluation, the patient was sent to the ER. The patient denies having any fevers, chills, or injury to his foot. He has a previous AKA and uses a wheelchair to ambulate. While in the ED, the patient was hypertensive but otherwise hemodynamically stable. Labs showed leukocytosis (17.41 - with neutrophilic predominance), normocytic anemia (9.7), elevated sed rate (125), elevated CRP (237.2), negative lactate (1.6). X-ray foot showed no radiographic evidence of osteomyelitis. He was administered vancomycin, zosyn and was admitted for further management.     I attempted to call the daughter to obtain further history on 1902 without a response.         Overview/Hospital Course:  No notes on file    Interval History: Pt discussing amputation with podiatry and nephrology providers upon entering the room. Pt is very upset and initially states "he wants to just go back to his country to die", but after further conversation pt is calmed and understands need for amputation. Pt denies cp, sob, fever chills, n/v at this time. Interview done through  services.    Review of " Systems  Objective:     Vital Signs (Most Recent):  Temp: 98.4 °F (36.9 °C) (10/07/22 0720)  Pulse: 72 (10/07/22 0720)  Resp: 18 (10/07/22 0720)  BP: (!) 195/79 (10/07/22 0720)  SpO2: 97 % (10/07/22 0720)   Vital Signs (24h Range):  Temp:  [98.4 °F (36.9 °C)-99.9 °F (37.7 °C)] 98.4 °F (36.9 °C)  Pulse:  [] 72  Resp:  [16-18] 18  SpO2:  [96 %-100 %] 97 %  BP: (131-195)/(65-83) 195/79     Weight: 70.3 kg (155 lb)  Body mass index is 21.62 kg/m².    Intake/Output Summary (Last 24 hours) at 10/7/2022 0931  Last data filed at 10/7/2022 0630  Gross per 24 hour   Intake 470 ml   Output 0 ml   Net 470 ml      Physical Exam  Vitals reviewed.   Constitutional:       General: He is in acute distress (emotional).   HENT:      Head: Normocephalic and atraumatic.      Mouth/Throat:      Mouth: Mucous membranes are moist.      Pharynx: Oropharynx is clear.   Eyes:      General: No scleral icterus.     Extraocular Movements: Extraocular movements intact.   Pulmonary:      Effort: Pulmonary effort is normal. No respiratory distress.   Abdominal:      Palpations: Abdomen is soft.      Tenderness: There is no abdominal tenderness.   Musculoskeletal:      Comments: Left aka. Right foot with wound present on dorsum and pungent odor noted.    Skin:     General: Skin is warm and dry.   Neurological:      General: No focal deficit present.      Mental Status: He is alert and oriented to person, place, and time.       Significant Labs: All pertinent labs within the past 24 hours have been reviewed.    Significant Imaging: I have reviewed all pertinent imaging results/findings within the past 24 hours.      Assessment/Plan:      * PAD (peripheral artery disease)  - History of severe PAD   - C/b LEFT foot gangrene s/p left AKA   - Now presenting with right foot gangrene with concern of superimposed infection   - Home medications: aspirin, plavix and statin  - RCRI score of 4    Plan;   - Vascular surgery, podiatry consultation   -  Podiatry recommending bka/aka. Ortho to be consulted  - Vancomycin/Zosyn for concern for superimposed infection   - Infectious disease consult   - Resume home medications     Dry gangrene  As stated above in PAD      ESRD on hemodialysis  - Dialysis type: iHD  - Access: L AVF  - Schedule: MWF    Plan:  - Nephrology consult for dialysis management  - Daily weights to guide UF  - Continue dialysis MWF    CAD (coronary artery disease) - non-obstructive from [facility] in 2016  - Resume aspirin, statin       Chronic diastolic heart failure  - Echocardiogram (11/24/2021): EF: 55%, GIDD, mild TR, moderate to severe MR  - Compensated on physical examination, on room air   - Resume home medications     Gastroesophageal reflux disease  - Resume PPI     Pure hypercholesterolemia  - Resume statin     Essential (primary) hypertension  - Resume home medications (amlodipine 10 mg, Toprol-XL 50 mg)     Controlled type 2 diabetes mellitus with chronic kidney disease on chronic dialysis, with long-term current use of insulin  - Last A1c:   Lab Results   Component Value Date    HGBA1C 6.1 (H) 01/07/2021     - Home regimen: Lantus 15 units nightly     Plan:  - Continue home regimen   - Low-sensitivity sliding scale insulin  - Initiate and maintain glycemic protocol, monitor POC glucose   - Follow up with PCP as outpatient      VTE Risk Mitigation (From admission, onward)         Ordered     heparin (porcine) injection 5,000 Units  Every 8 hours         10/06/22 1819     IP VTE HIGH RISK PATIENT  Once         10/06/22 1819     Place sequential compression device  Until discontinued         10/06/22 1819                Discharge Planning   ENIO:      Code Status: Full Code   Is the patient medically ready for discharge?:     Reason for patient still in hospital (select all that apply): Treatment                     Lee Mccormack III, MD  Department of Hospital Medicine   [facility] [place]

## 2022-10-07 NOTE — ASSESSMENT & PLAN NOTE
- Last A1c:   Lab Results   Component Value Date    HGBA1C 6.1 (H) 01/07/2021     - Home regimen: Lantus 15 units nightly     Plan:  - Continue home regimen   - Low-sensitivity sliding scale insulin  - Initiate and maintain glycemic protocol, monitor POC glucose   - Follow up with PCP as outpatient

## 2022-10-07 NOTE — ASSESSMENT & PLAN NOTE
- History of severe PAD   - C/b LEFT foot gangrene s/p left AKA   - Now presenting with right foot gangrene with concern of superimposed infection   - Home medications: aspirin, plavix and statin  - RCRI score of 4    Plan;   - Vascular surgery, podiatry consultation   - Podiatry recommending bka/aka. Ortho to be consulted  - Vancomycin/Zosyn for concern for superimposed infection   - Infectious disease consult   - Resume home medications

## 2022-10-07 NOTE — SUBJECTIVE & OBJECTIVE
Past Medical History:   Diagnosis Date    CAD (coronary artery disease) 2016    CAD (non obstructive) 2016 [facility]    Diabetes mellitus type I     Diabetic retinopathy     ESRD on hemodialysis     on HD TThSat    Gangrene of left foot     Hypertension     Nuclear sclerosis of right eye 11/24/2021    PAD (peripheral artery disease)     Pulmonary HTN     Right foot infection     TB lung, latent 04/2021       Past Surgical History:   Procedure Laterality Date    ABOVE-KNEE AMPUTATION Left 1/18/2022    Procedure: AMPUTATION, ABOVE KNEE;  Surgeon: Rusty Light MD;  Location: [facility] OR;  Service: Orthopedics;  Laterality: Left;    ABOVE-KNEE AMPUTATION Left 1/21/2022    Procedure: AMPUTATION, ABOVE KNEE;  Surgeon: Rusty Light MD;  Location: [facility] OR;  Service: Orthopedics;  Laterality: Left;    AV FISTULA PLACEMENT      CATARACT EXTRACTION Left     EYE SURGERY      FISTULOGRAM Left 8/9/2022    Procedure: Fistulogram;  Surgeon: Masoud Soni MD;  Location: [facility] OR;  Service: Vascular;  Laterality: Left;  LEFT VM ON DAUGHTER'S PHONE----PHONE PREOP NOT COMPLETED  CALLED PATIENT ON 8/8/2022 @ 3:34. HE STATED HE IS RESCHEDULING PROCEDURE. NOTIFIED ERWIN @ 3:35PM-LO    TOE AMPUTATION Left 1/6/2022    Procedure: AMPUTATION, TOE;  Surgeon: Masoud Soni MD;  Location: [facility] OR;  Service: Vascular;  Laterality: Left;  Left first through fifth toes, possible open transmetatarsal amputation and all other indicated procedures       Review of patient's allergies indicates:  No Known Allergies    Current Facility-Administered Medications on File Prior to Encounter   Medication    blood sugar diagnostic Strp    ceFAZolin (ANCEF) 2 g in dextrose 5 % 50 mL IVPB    cefazolin (ANCEF) 2 gram in dextrose 5% 50 mL IVPB (premix)     Current Outpatient Medications on File Prior to Encounter   Medication Sig    traMADoL (ULTRAM) 50 mg tablet Take 50 mg by mouth every 6 (six) hours as needed for Pain.    amLODIPine (NORVASC) 10  "MG tablet Take 1 tablet (10 mg total) by mouth once daily.    amLODIPine (NORVASC) 10 MG tablet Take 1 tablet by mouth.    aspirin 81 MG Chew Take 81 mg by mouth once daily.    aspirin 81 MG Chew Take 1 tablet by mouth.    atorvastatin (LIPITOR) 20 MG tablet Take 1 tablet by mouth.    clopidogreL (PLAVIX) 75 mg tablet Take 75 mg by mouth once daily.    doxycycline (VIBRAMYCIN) 100 MG Cap Take 1 capsule (100 mg total) by mouth every 12 (twelve) hours.    insulin glargine 100 units/mL (3mL) SubQ pen Inject 15 Units into the skin every evening.    insulin glargine 100 units/mL (3mL) SubQ pen Inject 15 Units into the skin every evening.    lancets (ACCU-CHEK SOFTCLIX LANCETS) Misc 1 each by Misc.(Non-Drug; Combo Route) route once daily at 6am.    methoxy peg-epoetin beta (MIRCERA INJ) 50 mcg.    methoxy peg-epoetin beta (MIRCERA INJ) 30 mcg.    methoxy peg-epoetin beta (MIRCERA INJ) 75 mcg.    metoprolol succinate (TOPROL-XL) 25 MG 24 hr tablet Take 1 tablet by mouth.    omeprazole (PRILOSEC) 20 MG capsule TAKE 2 CAPSULES(40 MG) BY MOUTH EVERY DAY (Patient taking differently: Take 20 mg by mouth once daily.)    omeprazole (PRILOSEC) 40 MG capsule Take by mouth.    omeprazole (PRILOSEC) 40 MG capsule Take 40 mg by mouth once daily.    pen needle, diabetic 32 gauge x 5/32" Ndle To use with insulin pen 4x daily    pravastatin (PRAVACHOL) 20 MG tablet Take 20 mg by mouth once daily.    RENVELA 800 mg Tab Take 800 mg by mouth 3 (three) times daily with meals.    sevelamer carbonate (RENVELA) 800 mg Tab Take 2 tablets by mouth.    traMADoL (ULTRAM) 50 mg tablet Take 1 tablet by mouth.     Family History       Problem Relation (Age of Onset)    No Known Problems Mother, Father, Sister, Brother, Daughter, Daughter, Daughter, Brother, Maternal Aunt, Maternal Uncle, Paternal Aunt, Paternal Uncle, Maternal Grandmother, Maternal Grandfather, Paternal Grandmother, Paternal Grandfather          Tobacco Use    Smoking status: Never    " Smokeless tobacco: Never   Substance and Sexual Activity    Alcohol use: No    Drug use: No    Sexual activity: Yes     Partners: Female     Review of Systems   Constitutional: Negative.    HENT: Negative.     Eyes: Negative.    Respiratory: Negative.     Cardiovascular: Negative.  Negative for chest pain.   Gastrointestinal: Negative.    Endocrine: Negative.    Genitourinary:  Negative for difficulty urinating.        Does not make urine    Musculoskeletal:  Positive for joint swelling.        + foot pain and gangrene    Skin:  Positive for color change and wound.   Allergic/Immunologic: Negative.    Neurological: Negative.    Hematological: Negative.    Psychiatric/Behavioral: Negative.     Objective:     Vital Signs (Most Recent):  Temp: 99.8 °F (37.7 °C) (10/06/22 1352)  Pulse: 90 (10/06/22 1642)  Resp: 18 (10/06/22 1642)  BP: (!) 154/70 (10/06/22 1642)  SpO2: 100 % (10/06/22 1642)   Vital Signs (24h Range):  Temp:  [99.8 °F (37.7 °C)] 99.8 °F (37.7 °C)  Pulse:  [] 90  Resp:  [16-18] 18  SpO2:  [99 %-100 %] 100 %  BP: (131-182)/(65-81) 154/70     Weight: 70.3 kg (155 lb)  Body mass index is 21.62 kg/m².    Physical Exam  Constitutional:       General: He is not in acute distress.     Appearance: Normal appearance.   HENT:      Head: Normocephalic and atraumatic.      Nose: Nose normal.      Mouth/Throat:      Mouth: Mucous membranes are moist.   Eyes:      Extraocular Movements: Extraocular movements intact.   Cardiovascular:      Rate and Rhythm: Normal rate.      Pulses: Normal pulses.      Heart sounds: No murmur heard.  Pulmonary:      Effort: Pulmonary effort is normal. No respiratory distress.   Abdominal:      General: Abdomen is flat. There is no distension.   Musculoskeletal:      Comments: Left AVF with palpable thrill.   Left AKA with well healed wound   Right foot eschar with overlying foot ulcer. TP/DP pulses are palpable but diminished. Tender to palpation.    Skin:     General: Skin is warm.       Capillary Refill: Capillary refill takes less than 2 seconds.   Neurological:      Mental Status: He is alert.      Comments: A&Ox2    Psychiatric:         Mood and Affect: Mood normal.               Significant Labs: All pertinent labs within the past 24 hours have been reviewed.    Significant Imaging: I have reviewed all pertinent imaging results/findings within the past 24 hours.

## 2022-10-07 NOTE — ASSESSMENT & PLAN NOTE
- Dialysis type: iHD  - Access: L AVF  - Schedule: MWF    Plan:  - Nephrology consult for dialysis management  - Daily weights to guide UF  - Continue dialysis MWF

## 2022-10-07 NOTE — PROGRESS NOTES
Pharmacokinetic Assessment Follow Up: IV Vancomycin    Vancomycin serum concentration assessment(s):    The random level was drawn correctly and can be used to guide therapy at this time. The measurement is within the desired definitive target range of 10 to 20 mcg/mL.    Vancomycin Regimen Plan:    Give 500 mg after dialysis today.  Re-dose when the random level is less than 20 mcg/mL, next level to be drawn at 0400 on 10/10/2022    Drug levels (last 3 results):  Recent Labs   Lab Result Units 10/07/22  1151   Vancomycin, Random ug/mL 16.9       Pharmacy will continue to follow and monitor vancomycin.    Please contact pharmacy at extension [phone] for questions regarding this assessment.    Thank you for the consult,   Evan Rosa Jr       Patient brief summary:  Adryan Neff is a 56 y.o. male initiated on antimicrobial therapy with IV Vancomycin for treatment of skin & soft tissue infection    Drug Allergies:   Review of patient's allergies indicates:  No Known Allergies    Actual Body Weight:   70.3 kg    Renal Function:   Estimated Creatinine Clearance: 8.9 mL/min (A) (based on SCr of 9.1 mg/dL (H)).,     Dialysis Method (if applicable):  intermittent HD    CBC (last 72 hours):  Recent Labs   Lab Result Units 10/06/22  1512 10/07/22  1151   WBC K/uL 17.41* 10.61   Hemoglobin g/dL 9.7* 8.6*   Hematocrit % 32.1* 27.5*   Platelets K/uL 464* 418   Gran % % 84.9* 86.7*   Lymph % % 5.4* 8.0*   Mono % % 7.2 3.2*   Eosinophil % % 1.6 1.3   Basophil % % 0.3 0.3   Differential Method  Automated Automated       Metabolic Panel (last 72 hours):  Recent Labs   Lab Result Units 10/06/22  1512   Sodium mmol/L 136   Potassium mmol/L 4.0   Chloride mmol/L 97   CO2 mmol/L 26   Glucose mg/dL 211*   BUN mg/dL 29*   Creatinine mg/dL 9.1*   Albumin g/dL 2.4*   Total Bilirubin mg/dL 0.3   Alkaline Phosphatase U/L 85   AST U/L 10   ALT U/L <5*   Magnesium mg/dL 2.1   Phosphorus mg/dL 3.3       Vancomycin Administrations:  vancomycin  given in the last 96 hours                     vancomycin in dextrose 5 % 1 gram/250 mL IVPB 1,000 mg (mg) 1,000 mg New Bag 10/06/22 1525                    Microbiologic Results:  Microbiology Results (last 7 days)       Procedure Component Value Units Date/Time    Blood culture #1 **CANNOT BE ORDERED STAT** [[id number]] Collected: 10/06/22 1511    Order Status: Completed Specimen: Blood from Peripheral, Upper Arm, Right Updated: 10/07/22 0312     Blood Culture, Routine No Growth to date    Blood culture #2 **CANNOT BE ORDERED STAT** [[id number]] Collected: 10/06/22 1536    Order Status: Completed Specimen: Blood from Peripheral, Hand, Right Updated: 10/07/22 0312     Blood Culture, Routine No Growth to date

## 2022-10-07 NOTE — PLAN OF CARE
[facility] - Telemetry  Initial Discharge Assessment       Primary Care Provider: Rasheeda Jose NP    Admission Diagnosis: Diabetic foot ulcer [E11.621, L97.509]  Chest pain [R07.9]    Admission Date: 10/6/2022  Expected Discharge Date: Pending    CM discussed discharge planning with pt's daughterMariel. Assessment completed and role of CM discussed. Plan A ( home with family) and Plan B ( other-tbd).    Discharge Barriers Identified: (P) None    Payor: MEDICARE / Plan: MEDICARE PART A & B / Product Type: Government /     Extended Emergency Contact Information  Primary Emergency Contact: Jonn Neff  Mobile Phone: [phone]  Relation: Daughter  Preferred language: English   needed? No    Discharge Plan A: (P) Home with family  Discharge Plan B: (P) Other (TBD)      [id number] - [place], [place] - [address]  [address]  [facility] [place] [address]  Phone: [phone] Fax: [phone]      Initial Assessment (most recent)       Adult Discharge Assessment - 10/07/22 1335          Discharge Assessment    Assessment Type Discharge Planning Assessment (P)      Confirmed/corrected address, phone number and insurance Yes (P)      Confirmed Demographics Correct on Facesheet (P)      Source of Information family (P)      When was your last doctors appointment? -- (P)    Pt's daughterMariel stated it has been awhile.    Reason For Admission PAD (P)      Lives With child(ramesh), adult (P)      Facility Arrived From: HOME (P)      Do you expect to return to your current living situation? Yes (P)      Do you have help at home or someone to help you manage your care at home? Yes (P)      Who are your caregiver(s) and their phone number(s)? Mariel- daughter [phone] (P)      Prior to hospitilization cognitive status: Alert/Oriented (P)      Current cognitive status: Unable to Assess (P)      Walking or Climbing Stairs  Difficulty ambulation difficulty, dependent;transferring difficulty, requires equipment;stair climbing difficulty, requires equipment (P)      Mobility Management wheelchair and walker (P)      Dressing/Bathing Difficulty bathing difficulty, assistance 1 person (P)      Equipment Currently Used at Home glucometer;walker, rolling;wheelchair;bath bench (P)      Readmission within 30 days? No (P)      Patient currently being followed by outpatient case management? No (P)      Do you currently have service(s) that help you manage your care at home? No (P)      Do you take prescription medications? Yes (P)      Do you have prescription coverage? Yes (P)      Coverage Medicare AB (P)      Do you have any problems affording any of your prescribed medications? No (P)      Is the patient taking medications as prescribed? yes (P)      Who is going to help you get home at discharge? Mariel- daughter [phone] (P)      How do you get to doctors appointments? family or friend will provide (P)      Are you on dialysis? Yes (P)      Dialysis Name and Scheduled days MWF (P)      Do you take coumadin? No (P)      Discharge Plan A Home with family (P)      Discharge Plan B Other (P)    TBD    DME Needed Upon Discharge  none (P)      Discharge Plan discussed with: Adult children (P)      Discharge Barriers Identified None (P)         Physical Activity    On average, how many days per week do you engage in moderate to strenuous exercise (like a brisk walk)? 0 days (P)      On average, how many minutes do you engage in exercise at this level? 0 min (P)         Financial Resource Strain    How hard is it for you to pay for the very basics like food, housing, medical care, and heating? Not very hard (P)         Housing Stability    In the last 12 months, was there a time when you were not able to pay the mortgage or rent on time? No (P)      In the last 12 months, how many places have you lived? 1 (P)      In the last 12 months, was  there a time when you did not have a steady place to sleep or slept in a shelter (including now)? No (P)         Transportation Needs    In the past 12 months, has lack of transportation kept you from medical appointments or from getting medications? No (P)      In the past 12 months, has lack of transportation kept you from meetings, work, or from getting things needed for daily living? No (P)         Food Insecurity    Within the past 12 months, you worried that your food would run out before you got the money to buy more. Never true (P)      Within the past 12 months, the food you bought just didn't last and you didn't have money to get more. Never true (P)         Stress    Do you feel stress - tense, restless, nervous, or anxious, or unable to sleep at night because your mind is troubled all the time - these days? Rather much (P)         Social Connections    In a typical week, how many times do you talk on the phone with family, friends, or neighbors? More than three times a week (P)      How often do you get together with friends or relatives? More than three times a week (P)      How often do you attend Congregational or Church services? Never (P)      Do you belong to any clubs or organizations such as Congregational groups, unions, fraternal or athletic groups, or school groups? No (P)      How often do you attend meetings of the clubs or organizations you belong to? Never (P)      Are you , , , , never , or living with a partner? Never  (P)         Alcohol Use    Q1: How often do you have a drink containing alcohol? Never (P)      Q2: How many drinks containing alcohol do you have on a typical day when you are drinking? Patient does not drink (P)      Q3: How often do you have six or more drinks on one occasion? Never (P)         Relationship/Environment    Name(s) of Who Lives With Patient Mariel- daughter and Rene- son (P)

## 2022-10-07 NOTE — SUBJECTIVE & OBJECTIVE
Past Medical History:   Diagnosis Date    CAD (coronary artery disease) 2016    CAD (non obstructive) 2016 [facility]    Diabetes mellitus type I     Diabetic retinopathy     ESRD on hemodialysis     on HD TThSat    Gangrene of left foot     Hypertension     Nuclear sclerosis of right eye 11/24/2021    PAD (peripheral artery disease)     Pulmonary HTN     Right foot infection     TB lung, latent 04/2021       Past Surgical History:   Procedure Laterality Date    ABOVE-KNEE AMPUTATION Left 1/18/2022    Procedure: AMPUTATION, ABOVE KNEE;  Surgeon: Rusty Light MD;  Location: [facility] OR;  Service: Orthopedics;  Laterality: Left;    ABOVE-KNEE AMPUTATION Left 1/21/2022    Procedure: AMPUTATION, ABOVE KNEE;  Surgeon: Rusty Light MD;  Location: [facility] OR;  Service: Orthopedics;  Laterality: Left;    AV FISTULA PLACEMENT      CATARACT EXTRACTION Left     EYE SURGERY      FISTULOGRAM Left 8/9/2022    Procedure: Fistulogram;  Surgeon: Masoud Soni MD;  Location: [facility] OR;  Service: Vascular;  Laterality: Left;  LEFT VM ON DAUGHTER'S PHONE----PHONE PREOP NOT COMPLETED  CALLED PATIENT ON 8/8/2022 @ 3:34. HE STATED HE IS RESCHEDULING PROCEDURE. NOTIFIED ERWIN @ 3:35PM-LO    TOE AMPUTATION Left 1/6/2022    Procedure: AMPUTATION, TOE;  Surgeon: Masoud Soni MD;  Location: [facility] OR;  Service: Vascular;  Laterality: Left;  Left first through fifth toes, possible open transmetatarsal amputation and all other indicated procedures       Review of patient's allergies indicates:  No Known Allergies    Current Facility-Administered Medications on File Prior to Encounter   Medication    [DISCONTINUED] cefazolin (ANCEF) 2 gram in dextrose 5% 50 mL IVPB (premix)     Current Outpatient Medications on File Prior to Encounter   Medication Sig    traMADoL (ULTRAM) 50 mg tablet Take 50 mg by mouth every 6 (six) hours as needed for Pain.    amLODIPine (NORVASC) 10 MG tablet Take 1 tablet (10 mg total) by mouth once daily.     "amLODIPine (NORVASC) 10 MG tablet Take 1 tablet by mouth.    aspirin 81 MG Chew Take 81 mg by mouth once daily.    aspirin 81 MG Chew Take 1 tablet by mouth.    atorvastatin (LIPITOR) 20 MG tablet Take 1 tablet by mouth.    clopidogreL (PLAVIX) 75 mg tablet Take 75 mg by mouth once daily.    doxycycline (VIBRAMYCIN) 100 MG Cap Take 1 capsule (100 mg total) by mouth every 12 (twelve) hours.    insulin glargine 100 units/mL (3mL) SubQ pen Inject 15 Units into the skin every evening.    insulin glargine 100 units/mL (3mL) SubQ pen Inject 15 Units into the skin every evening.    lancets (ACCU-CHEK SOFTCLIX LANCETS) Misc 1 each by Misc.(Non-Drug; Combo Route) route once daily at 6am.    methoxy peg-epoetin beta (MIRCERA INJ) 50 mcg.    methoxy peg-epoetin beta (MIRCERA INJ) 30 mcg.    methoxy peg-epoetin beta (MIRCERA INJ) 75 mcg.    metoprolol succinate (TOPROL-XL) 25 MG 24 hr tablet Take 1 tablet by mouth.    omeprazole (PRILOSEC) 20 MG capsule TAKE 2 CAPSULES(40 MG) BY MOUTH EVERY DAY (Patient taking differently: Take 20 mg by mouth once daily.)    omeprazole (PRILOSEC) 40 MG capsule Take by mouth.    omeprazole (PRILOSEC) 40 MG capsule Take 40 mg by mouth once daily.    pen needle, diabetic 32 gauge x 5/32" Ndle To use with insulin pen 4x daily    pravastatin (PRAVACHOL) 20 MG tablet Take 20 mg by mouth once daily.    RENVELA 800 mg Tab Take 800 mg by mouth 3 (three) times daily with meals.    sevelamer carbonate (RENVELA) 800 mg Tab Take 2 tablets by mouth.    traMADoL (ULTRAM) 50 mg tablet Take 1 tablet by mouth.     Family History       Problem Relation (Age of Onset)    No Known Problems Mother, Father, Sister, Brother, Daughter, Daughter, Daughter, Brother, Maternal Aunt, Maternal Uncle, Paternal Aunt, Paternal Uncle, Maternal Grandmother, Maternal Grandfather, Paternal Grandmother, Paternal Grandfather          Tobacco Use    Smoking status: Never    Smokeless tobacco: Never   Substance and Sexual Activity    " Alcohol use: No    Drug use: No    Sexual activity: Yes     Partners: Female     Review of Systems   Constitutional: Negative.    HENT: Negative.     Eyes: Negative.    Respiratory: Negative.     Cardiovascular: Negative.  Negative for chest pain.   Gastrointestinal: Negative.    Endocrine: Negative.    Genitourinary:  Negative for difficulty urinating.        Does not make urine    Musculoskeletal:  Positive for joint swelling.        + foot pain and gangrene    Skin:  Positive for color change and wound.   Allergic/Immunologic: Negative.    Neurological: Negative.    Hematological: Negative.    Psychiatric/Behavioral: Negative.     Objective:     Vital Signs (Most Recent):  Temp: 98.4 °F (36.9 °C) (10/07/22 0720)  Pulse: 72 (10/07/22 0720)  Resp: 18 (10/07/22 0720)  BP: (!) 195/79 (10/07/22 0720)  SpO2: 97 % (10/07/22 0720)   Vital Signs (24h Range):  Temp:  [98.4 °F (36.9 °C)-99.9 °F (37.7 °C)] 98.4 °F (36.9 °C)  Pulse:  [] 72  Resp:  [16-18] 18  SpO2:  [96 %-100 %] 97 %  BP: (154-195)/(70-83) 195/79     Weight: 70.3 kg (155 lb)  Body mass index is 21.62 kg/m².    Physical Exam  Constitutional:       General: He is not in acute distress.     Appearance: Normal appearance.   HENT:      Head: Normocephalic and atraumatic.      Nose: Nose normal.      Mouth/Throat:      Mouth: Mucous membranes are moist.   Eyes:      Extraocular Movements: Extraocular movements intact.   Cardiovascular:      Rate and Rhythm: Normal rate.      Pulses: Normal pulses.      Heart sounds: No murmur heard.  Pulmonary:      Effort: Pulmonary effort is normal. No respiratory distress.   Abdominal:      General: Abdomen is flat. There is no distension.   Musculoskeletal:      Comments: Left AVF with palpable thrill.   Left AKA with well healed wound   Right foot eschar with overlying foot ulcer. TP/DP pulses are palpable but diminished. Tender to palpation.    Skin:     General: Skin is warm.      Capillary Refill: Capillary refill  takes less than 2 seconds.   Neurological:      Mental Status: He is alert.      Comments: A&Ox2    Psychiatric:         Mood and Affect: Mood normal.                             Significant Labs: All pertinent labs within the past 24 hours have been reviewed.    Significant Imaging: I have reviewed all pertinent imaging results/findings within the past 24 hours.

## 2022-10-07 NOTE — PLAN OF CARE
10/07/22 1329   Medicare Message   Important Message from Medicare regarding Discharge Appeal Rights Given to patient/caregiver;Explained to patient/caregiver;Signed/date by patient/caregiver   Date IMM was signed 10/07/22   Time IMM was signed [id number]

## 2022-10-07 NOTE — CONSULTS
Patient seen and examined yesterday.  Recommended admission to the emergency room.  Leukocytosis noted.  Afebrile.  No soft tissue gas on foot x-ray.  Patient refusing major amputation.  Agree with Podiatry evaluation and debridement or open transmetatarsal amputation if patient is agreeable.  Agree with antibiotics and wound care with appropriate offloading and optimization of comorbidities.  Arterial imaging ordered to evaluate perfusion for healing.  We will continue to follow

## 2022-10-07 NOTE — CONSULTS
Chief Complaint:  Right lower extremity ischemia and gangrene    History of Present Illness:  Adryan Neff is a very pleasant 56 y.o. 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      Review of Systems:    Noncontributory except for above      Past Medical History:   Diagnosis Date    CAD (coronary artery disease) 2016    CAD (non obstructive) 2016 [facility]    Diabetes mellitus type I     Diabetic retinopathy     ESRD on hemodialysis     on HD TThSat    Gangrene of left foot     Hypertension     Nuclear sclerosis of right eye 11/24/2021    PAD (peripheral artery disease)     Pulmonary HTN     Right foot infection     TB lung, latent 04/2021       Past Surgical History:   Past Surgical History:   Procedure Laterality Date    ABOVE-KNEE AMPUTATION Left 1/18/2022    Procedure: AMPUTATION, ABOVE KNEE;  Surgeon: Rusty Light MD;  Location: [facility] OR;  Service: Orthopedics;  Laterality: Left;    ABOVE-KNEE AMPUTATION Left 1/21/2022    Procedure: AMPUTATION, ABOVE KNEE;  Surgeon: Rusty Light MD;  Location: [facility] OR;  Service: Orthopedics;  Laterality: Left;    AV FISTULA PLACEMENT      CATARACT EXTRACTION Left     EYE SURGERY      FISTULOGRAM Left 8/9/2022    Procedure: Fistulogram;  Surgeon: Masoud Soni MD;  Location: [facility] OR;  Service: Vascular;  Laterality: Left;  LEFT VM ON DAUGHTER'S PHONE----PHONE PREOP NOT COMPLETED  CALLED PATIENT ON 8/8/2022 @ 3:34. HE STATED HE IS RESCHEDULING PROCEDURE. NOTIFIED ERWIN @ 3:35PM-LO    TOE AMPUTATION Left 1/6/2022    Procedure: AMPUTATION, TOE;  Surgeon: Masoud Soni MD;  Location: [facility] OR;  Service: Vascular;  Laterality: Left;  Left first through fifth toes, possible open transmetatarsal amputation and all other indicated procedures       Social  History:  negative tobacco abuse    Allergies:  Review of patient's allergies indicates:  No Known Allergies    Medications:  Current Facility-Administered Medications   Medication Dose Route Frequency Provider Last Rate Last Admin    acetaminophen tablet 650 mg  650 mg Oral Q8H PRN Lee Mccormack III, MD        acetaminophen tablet 650 mg  650 mg Oral Q4H PRN Lee Mccormack III, MD        amLODIPine tablet 10 mg  10 mg Oral Daily Lee Mccormack III, MD   10 mg at 10/07/22 0946    aspirin chewable tablet 81 mg  81 mg Oral Daily Lee Mccormack III, MD   81 mg at 10/07/22 0946    atorvastatin tablet 40 mg  40 mg Oral QHS Ernesto Charles MD   40 mg at 10/06/22 2308    clopidogreL tablet 75 mg  75 mg Oral Daily Lee Mccormack III, MD   75 mg at 10/07/22 0946    dextrose 10% bolus 125 mL  12.5 g Intravenous PRN Lee Mccormack III, MD        dextrose 10% bolus 250 mL  25 g Intravenous PRN Lee Mccormack III, MD        glucagon (human recombinant) injection 1 mg  1 mg Intramuscular PRN Lee Mccormack III, MD        glucose chewable tablet 16 g  16 g Oral PRN Lee Mccormack III, MD        glucose chewable tablet 24 g  24 g Oral PRN Lee Mccormack III, MD        heparin (porcine) injection 5,000 Units  5,000 Units Subcutaneous Q8H Lee Mccormack III, MD   5,000 Units at 10/06/22 2308    hydrALAZINE injection 10 mg  10 mg Intravenous Q8H PRN Lee Mccormack III, MD        HYDROcodone-acetaminophen 5-325 mg per tablet 1 tablet  1 tablet Oral Q6H PRN Lee Mccormack III, MD        insulin aspart U-100 pen 0-5 Units  0-5 Units Subcutaneous QID (AC + HS) PRN Lee Mccormack III, MD        insulin detemir U-100 pen 15 Units  15 Units Subcutaneous QHS Ernesto Charles MD   15 Units at 10/06/22 2308    metoprolol succinate (TOPROL-XL) 24 hr tablet 25 mg  25 mg Oral Daily Lee Mccormack III, MD   25 mg at 10/07/22 0946    mupirocin 2 % ointment   Nasal BID Bonifacio Garcias MD        naloxone 0.4 mg/mL injection 0.02 mg   0.02 mg Intravenous PRN Lee Mccormack III, MD        ondansetron disintegrating tablet 8 mg  8 mg Oral Q8H PRN Lee Mccormack III, MD        pantoprazole EC tablet 40 mg  40 mg Oral Daily Lee Mccormack III, MD   40 mg at 10/07/22 0946    piperacillin-tazobactam 4.5 g in dextrose 5 % 100 mL IVPB (ready to mix system)  4.5 g Intravenous Q12H Lee Mccormack III, MD   Stopped at 10/07/22 0734    sevelamer carbonate tablet 800 mg  800 mg Oral TID WM Lee Mccormack III, MD        sodium chloride 0.9% bolus 250 mL  250 mL Intravenous PRN Bonifacio Garcias MD        sodium chloride 0.9% flush 10 mL  10 mL Intravenous PRN Lee Mccormack III, MD        sodium chloride 0.9% flush 10 mL  10 mL Intravenous Q12H PRN Ernesto Charles MD        traMADoL tablet 50 mg  50 mg Oral Q4H PRN Ernesto Charles MD        vancomycin - pharmacy to dose   Intravenous pharmacy to manage frequency Lee Mccormack III, MD        vancomycin 500 mg in dextrose 5 % 100 mL IVPB (ready to mix system)  500 mg Intravenous Once Lee Mccormack III, MD           Physical Exam:   General:  Well developed and well nourished age appropriate male in no acute distress  Cardiovascular:  Cool feeling right lower extremity below popliteal fossa  Respiratory:  Clear to Auscultation bilaterally, no wheezing  Abdomen: soft, non-tender, non-distended  Musculoskeletal:  Dry gangrene diffusely about right foot malodorous drainage diffusely about leg. Limb is cool to touch below the knee with darkly discolored skin from the mid tibia distally. Pulse not palpable. Great toe is partially mummified  Neuro:  A sensate about right foot     Imaging:  Imaging revealed:no fracture or dislocation no subcutaneous gas    Diagnosis:  [age] year male extensive peripheral vascular disease history diabetes mellitus end-stage renal disease with right lower extremity gangrene.  Patient reports he is amenable to amputation as right side discuss further with vascular surgery  and family.  Attempted to call the daughter answer.  Please keep NPO past midnight to preserve surgical options for the morning.    Plan:   1. Please keep NPO past midnight we will attempt to discuss with daughter  Plan for Right AKA tomorrow

## 2022-10-07 NOTE — H&P
[facility]  [facility] Medicine  History & Physical    Patient Name: Adryan Neff  MRN: [id number]  Patient Class: IP- Inpatient  Admission Date: 10/6/2022  Attending Physician: Ernesto Charles MD  Primary Care Provider: Rasheeda Jose NP         Patient information was obtained from patient and ER records.     Subjective:     Principal Problem:PAD (peripheral artery disease)    Chief Complaint:   Chief Complaint   Patient presents with    Foot Pain     Pt's daughter translating for patient per request. Reports they were at the podiatrist and sent to the ED for a right foot infection. Reports hx of DM. Pt has left AKA.        HPI: This is a 56 year old male with a PMHx of severe PAD s/p gangrene of left foot s/p left AKA (1/2022), ESRD on HD MWF, NICM (EF: 55%, GIDD), pulmonary hypertension, GERD and latent TB filiberto presents for concern for leg infection.       The patient presented to the vascular surgery clinic for a follow up. He reports worsening pain and developing eschar of the right dorsal foot along with foul smelling odor. Given concern for an infection and the need for IV antibiotics & expedited podiatry evaluation, the patient was sent to the ER. The patient denies having any fevers, chills, or injury to his foot. He has a previous AKA and uses a wheelchair to ambulate. While in the ED, the patient was hypertensive but otherwise hemodynamically stable. Labs showed leukocytosis (17.41 - with neutrophilic predominance), normocytic anemia (9.7), elevated sed rate (125), elevated CRP (237.2), negative lactate (1.6). X-ray foot showed no radiographic evidence of osteomyelitis. He was administered vancomycin, zosyn and was admitted for further management.     I attempted to call the daughter to obtain further history on 1902 without a response.         Past Medical History:   Diagnosis Date    CAD (coronary artery disease) 2016    CAD (non obstructive) 2016 [facility]    Diabetes mellitus type I      Diabetic retinopathy     ESRD on hemodialysis     on HD TThSat    Gangrene of left foot     Hypertension     Nuclear sclerosis of right eye 11/24/2021    PAD (peripheral artery disease)     Pulmonary HTN     Right foot infection     TB lung, latent 04/2021       Past Surgical History:   Procedure Laterality Date    ABOVE-KNEE AMPUTATION Left 1/18/2022    Procedure: AMPUTATION, ABOVE KNEE;  Surgeon: Rusty Light MD;  Location: [facility] OR;  Service: Orthopedics;  Laterality: Left;    ABOVE-KNEE AMPUTATION Left 1/21/2022    Procedure: AMPUTATION, ABOVE KNEE;  Surgeon: Rusty Light MD;  Location: [facility] OR;  Service: Orthopedics;  Laterality: Left;    AV FISTULA PLACEMENT      CATARACT EXTRACTION Left     EYE SURGERY      FISTULOGRAM Left 8/9/2022    Procedure: Fistulogram;  Surgeon: Masoud Soni MD;  Location: [facility] OR;  Service: Vascular;  Laterality: Left;  LEFT VM ON DAUGHTER'S PHONE----PHONE PREOP NOT COMPLETED  CALLED PATIENT ON 8/8/2022 @ 3:34. HE STATED HE IS RESCHEDULING PROCEDURE. NOTIFIED ERWIN @ 3:35PM-LO    TOE AMPUTATION Left 1/6/2022    Procedure: AMPUTATION, TOE;  Surgeon: Masoud Soni MD;  Location: [facility] OR;  Service: Vascular;  Laterality: Left;  Left first through fifth toes, possible open transmetatarsal amputation and all other indicated procedures       Review of patient's allergies indicates:  No Known Allergies    Current Facility-Administered Medications on File Prior to Encounter   Medication    blood sugar diagnostic Strp    ceFAZolin (ANCEF) 2 g in dextrose 5 % 50 mL IVPB    cefazolin (ANCEF) 2 gram in dextrose 5% 50 mL IVPB (premix)     Current Outpatient Medications on File Prior to Encounter   Medication Sig    traMADoL (ULTRAM) 50 mg tablet Take 50 mg by mouth every 6 (six) hours as needed for Pain.    amLODIPine (NORVASC) 10 MG tablet Take 1 tablet (10 mg total) by mouth once daily.    amLODIPine (NORVASC) 10 MG tablet Take 1 tablet by mouth.  "   aspirin 81 MG Chew Take 81 mg by mouth once daily.    aspirin 81 MG Chew Take 1 tablet by mouth.    atorvastatin (LIPITOR) 20 MG tablet Take 1 tablet by mouth.    clopidogreL (PLAVIX) 75 mg tablet Take 75 mg by mouth once daily.    doxycycline (VIBRAMYCIN) 100 MG Cap Take 1 capsule (100 mg total) by mouth every 12 (twelve) hours.    insulin glargine 100 units/mL (3mL) SubQ pen Inject 15 Units into the skin every evening.    insulin glargine 100 units/mL (3mL) SubQ pen Inject 15 Units into the skin every evening.    lancets (ACCU-CHEK SOFTCLIX LANCETS) Misc 1 each by Misc.(Non-Drug; Combo Route) route once daily at 6am.    methoxy peg-epoetin beta (MIRCERA INJ) 50 mcg.    methoxy peg-epoetin beta (MIRCERA INJ) 30 mcg.    methoxy peg-epoetin beta (MIRCERA INJ) 75 mcg.    metoprolol succinate (TOPROL-XL) 25 MG 24 hr tablet Take 1 tablet by mouth.    omeprazole (PRILOSEC) 20 MG capsule TAKE 2 CAPSULES(40 MG) BY MOUTH EVERY DAY (Patient taking differently: Take 20 mg by mouth once daily.)    omeprazole (PRILOSEC) 40 MG capsule Take by mouth.    omeprazole (PRILOSEC) 40 MG capsule Take 40 mg by mouth once daily.    pen needle, diabetic 32 gauge x 5/32" Ndle To use with insulin pen 4x daily    pravastatin (PRAVACHOL) 20 MG tablet Take 20 mg by mouth once daily.    RENVELA 800 mg Tab Take 800 mg by mouth 3 (three) times daily with meals.    sevelamer carbonate (RENVELA) 800 mg Tab Take 2 tablets by mouth.    traMADoL (ULTRAM) 50 mg tablet Take 1 tablet by mouth.     Family History       Problem Relation (Age of Onset)    No Known Problems Mother, Father, Sister, Brother, Daughter, Daughter, Daughter, Brother, Maternal Aunt, Maternal Uncle, Paternal Aunt, Paternal Uncle, Maternal Grandmother, Maternal Grandfather, Paternal Grandmother, Paternal Grandfather          Tobacco Use    Smoking status: Never    Smokeless tobacco: Never   Substance and Sexual Activity    Alcohol use: No    Drug use: No    " Sexual activity: Yes     Partners: Female     Review of Systems   Constitutional: Negative.    HENT: Negative.     Eyes: Negative.    Respiratory: Negative.     Cardiovascular: Negative.  Negative for chest pain.   Gastrointestinal: Negative.    Endocrine: Negative.    Genitourinary:  Negative for difficulty urinating.        Does not make urine    Musculoskeletal:  Positive for joint swelling.        + foot pain and gangrene    Skin:  Positive for color change and wound.   Allergic/Immunologic: Negative.    Neurological: Negative.    Hematological: Negative.    Psychiatric/Behavioral: Negative.     Objective:     Vital Signs (Most Recent):  Temp: 99.8 °F (37.7 °C) (10/06/22 1352)  Pulse: 90 (10/06/22 1642)  Resp: 18 (10/06/22 1642)  BP: (!) 154/70 (10/06/22 1642)  SpO2: 100 % (10/06/22 1642)   Vital Signs (24h Range):  Temp:  [99.8 °F (37.7 °C)] 99.8 °F (37.7 °C)  Pulse:  [] 90  Resp:  [16-18] 18  SpO2:  [99 %-100 %] 100 %  BP: (131-182)/(65-81) 154/70     Weight: 70.3 kg (155 lb)  Body mass index is 21.62 kg/m².    Physical Exam  Constitutional:       General: He is not in acute distress.     Appearance: Normal appearance.   HENT:      Head: Normocephalic and atraumatic.      Nose: Nose normal.      Mouth/Throat:      Mouth: Mucous membranes are moist.   Eyes:      Extraocular Movements: Extraocular movements intact.   Cardiovascular:      Rate and Rhythm: Normal rate.      Pulses: Normal pulses.      Heart sounds: No murmur heard.  Pulmonary:      Effort: Pulmonary effort is normal. No respiratory distress.   Abdominal:      General: Abdomen is flat. There is no distension.   Musculoskeletal:      Comments: Left AVF with palpable thrill.   Left AKA with well healed wound   Right foot eschar with overlying foot ulcer. TP/DP pulses are palpable but diminished. Tender to palpation.    Skin:     General: Skin is warm.      Capillary Refill: Capillary refill takes less than 2 seconds.   Neurological:      Mental  Status: He is alert.      Comments: A&Ox2    Psychiatric:         Mood and Affect: Mood normal.               Significant Labs: All pertinent labs within the past 24 hours have been reviewed.    Significant Imaging: I have reviewed all pertinent imaging results/findings within the past 24 hours.      Assessment/Plan:     * PAD (peripheral artery disease)  - History of severe PAD   - C/b LEFT foot gangrene s/p left AKA   - Now presenting with right foot gangrene with concern of superimposed infection   - Home medications: aspirin, plavix and statin     Plan;   - Vascular surgery, podiatry consultation   - NPO after MN for possible intervention (however, patient refused major surgery in clinic)   - Vancomycin/Zosyn for concern for superimposed infection   - Infectious disease consult   - Resume home medications     ESRD on hemodialysis  - Dialysis type: iHD  - Access: L AVF  - Schedule: MWF    Plan:  - Nephrology consult for dialysis management  - Daily weights to guide UF  - Continue dialysis MWF    CAD (coronary artery disease) - non-obstructive from [facility] in 2016  - Resume aspirin, statin       Chronic diastolic heart failure  - Echocardiogram (11/24/2021): EF: 55%, GIDD, mild TR, moderate to severe MR  - Compensated on physical examination, on room air   - Resume home medications     Gastroesophageal reflux disease  - Resume PPI     Pure hypercholesterolemia  - Resume statin     Essential (primary) hypertension  - Resume home medications (amlodipine 10 mg, Toprol-XL 50 mg)     Controlled type 2 diabetes mellitus with chronic kidney disease on chronic dialysis, with long-term current use of insulin  - Last A1c:   Lab Results   Component Value Date    HGBA1C 6.1 (H) 01/07/2021     - Home regimen: Lantus 15 units nightly     Plan:  - Continue home regimen   - Low-sensitivity sliding scale insulin  - Initiate and maintain glycemic protocol, monitor POC glucose   - Follow up with PCP as outpatient      VTE Risk  Mitigation (From admission, onward)         Ordered     heparin (porcine) injection 5,000 Units  Every 8 hours         10/06/22 1819     IP VTE HIGH RISK PATIENT  Once         10/06/22 1819     Place sequential compression device  Until discontinued         10/06/22 1819                   Ernesto Charles MD  Department of Hospital Medicine   [facility] - [place] - Emergency Dept

## 2022-10-07 NOTE — CONSULTS
56 y o m native of [place] on chronic HD at [facility] [place]  admitted with ischemic R foot. Renal input requested to help with care. He dialyzes MWF with DR Kuhn at [facility]. Mr Neff has hx of DM CAD HTN anemia 2nd hyperpth pulm htn  latent TB anemia of ckd and 2nd hyperpth     Denies CNS ENT CP GI  OR DERM SX  Past Medical History:   Diagnosis Date    CAD (coronary artery disease) 2016    CAD (non obstructive) 2016 [facility]    Diabetes mellitus type I     Diabetic retinopathy     ESRD on hemodialysis     on HD TThSat    Gangrene of left foot     Hypertension     Nuclear sclerosis of right eye 11/24/2021    PAD (peripheral artery disease)     Pulmonary HTN     Right foot infection     TB lung, latent 04/2021     Past Surgical History:   Procedure Laterality Date    ABOVE-KNEE AMPUTATION Left 1/18/2022    Procedure: AMPUTATION, ABOVE KNEE;  Surgeon: Rusty Light MD;  Location: [facility] OR;  Service: Orthopedics;  Laterality: Left;    ABOVE-KNEE AMPUTATION Left 1/21/2022    Procedure: AMPUTATION, ABOVE KNEE;  Surgeon: Rusty Light MD;  Location: [facility] OR;  Service: Orthopedics;  Laterality: Left;    AV FISTULA PLACEMENT      CATARACT EXTRACTION Left     EYE SURGERY      FISTULOGRAM Left 8/9/2022    Procedure: Fistulogram;  Surgeon: Masoud Soni MD;  Location: [facility] OR;  Service: Vascular;  Laterality: Left;  LEFT VM ON DAUGHTER'S PHONE----PHONE PREOP NOT COMPLETED  CALLED PATIENT ON 8/8/2022 @ 3:34. HE STATED HE IS RESCHEDULING PROCEDURE. NOTIFIED ERWIN @ 3:35PM-LO    TOE AMPUTATION Left 1/6/2022    Procedure: AMPUTATION, TOE;  Surgeon: Masoud Soni MD;  Location: [facility] OR;  Service: Vascular;  Laterality: Left;  Left first through fifth toes, possible open transmetatarsal amputation and all other indicated procedures     Review of patient's allergies indicates:  No Known Allergies    Current Facility-Administered Medications   Medication    acetaminophen tablet 650 mg    acetaminophen tablet 650 mg     amLODIPine tablet 10 mg    aspirin chewable tablet 81 mg    atorvastatin tablet 40 mg    clopidogreL tablet 75 mg    dextrose 10% bolus 125 mL    dextrose 10% bolus 250 mL    glucagon (human recombinant) injection 1 mg    glucose chewable tablet 16 g    glucose chewable tablet 24 g    heparin (porcine) injection 5,000 Units    HYDROcodone-acetaminophen 5-325 mg per tablet 1 tablet    insulin aspart U-100 pen 0-5 Units    insulin detemir U-100 pen 15 Units    metoprolol succinate (TOPROL-XL) 24 hr tablet 25 mg    naloxone 0.4 mg/mL injection 0.02 mg    ondansetron disintegrating tablet 8 mg    pantoprazole EC tablet 40 mg    piperacillin-tazobactam 4.5 g in dextrose 5 % 100 mL IVPB (ready to mix system)    sevelamer carbonate tablet 800 mg    sodium chloride 0.9% flush 10 mL    sodium chloride 0.9% flush 10 mL    traMADoL tablet 50 mg    vancomycin - pharmacy to dose     Facility-Administered Medications Ordered in Other Encounters   Medication    cefazolin (ANCEF) 2 gram in dextrose 5% 50 mL IVPB (premix)       LABS    Recent Results (from the past 24 hour(s))   POCT glucose    Collection Time: 10/06/22  2:08 PM   Result Value Ref Range    POCT Glucose 333 (H) 70 - 110 mg/dL   Blood culture #1 **CANNOT BE ORDERED STAT**    Collection Time: 10/06/22  3:11 PM    Specimen: Peripheral, Upper Arm, Right; Blood   Result Value Ref Range    Blood Culture, Routine No Growth to date    CBC auto differential    Collection Time: 10/06/22  3:12 PM   Result Value Ref Range    WBC 17.41 (H) 3.90 - 12.70 K/uL    RBC 3.59 (L) 4.60 - 6.20 M/uL    Hemoglobin 9.7 (L) 14.0 - 18.0 g/dL    Hematocrit 32.1 (L) 40.0 - 54.0 %    MCV 89 82 - 98 fL    MCH 27.0 27.0 - 31.0 pg    MCHC 30.2 (L) 32.0 - 36.0 g/dL    RDW 14.4 11.5 - 14.5 %    Platelets 464 (H) 150 - 450 K/uL    MPV 9.7 9.2 - 12.9 fL    Immature Granulocytes 0.6 (H) 0.0 - 0.5 %    Gran # (ANC) 14.8 (H) 1.8 - 7.7 K/uL    Immature Grans (Abs) 0.10 (H) 0.00 - 0.04 K/uL    Lymph # 0.9  (L) 1.0 - 4.8 K/uL    Mono # 1.3 (H) 0.3 - 1.0 K/uL    Eos # 0.3 0.0 - 0.5 K/uL    Baso # 0.05 0.00 - 0.20 K/uL    nRBC 0 0 /100 WBC    Gran % 84.9 (H) 38.0 - 73.0 %    Lymph % 5.4 (L) 18.0 - 48.0 %    Mono % 7.2 4.0 - 15.0 %    Eosinophil % 1.6 0.0 - 8.0 %    Basophil % 0.3 0.0 - 1.9 %    Differential Method Automated    Comprehensive metabolic panel    Collection Time: 10/06/22  3:12 PM   Result Value Ref Range    Sodium 136 136 - 145 mmol/L    Potassium 4.0 3.5 - 5.1 mmol/L    Chloride 97 95 - 110 mmol/L    CO2 26 23 - 29 mmol/L    Glucose 211 (H) 70 - 110 mg/dL    BUN 29 (H) 6 - 20 mg/dL    Creatinine 9.1 (H) 0.5 - 1.4 mg/dL    Calcium 9.7 8.7 - 10.5 mg/dL    Total Protein 9.4 (H) 6.0 - 8.4 g/dL    Albumin 2.4 (L) 3.5 - 5.2 g/dL    Total Bilirubin 0.3 0.1 - 1.0 mg/dL    Alkaline Phosphatase 85 55 - 135 U/L    AST 10 10 - 40 U/L    ALT <5 (L) 10 - 44 U/L    Anion Gap 13 8 - 16 mmol/L    eGFR 6 (A) >60 mL/min/1.73 m^2   Magnesium    Collection Time: 10/06/22  3:12 PM   Result Value Ref Range    Magnesium 2.1 1.6 - 2.6 mg/dL   Phosphorus    Collection Time: 10/06/22  3:12 PM   Result Value Ref Range    Phosphorus 3.3 2.7 - 4.5 mg/dL   Protime-INR    Collection Time: 10/06/22  3:12 PM   Result Value Ref Range    Prothrombin Time 11.3 9.0 - 12.5 sec    INR 1.1 0.8 - 1.2   Beta - Hydroxybutyrate, Serum    Collection Time: 10/06/22  3:12 PM   Result Value Ref Range    Beta-Hydroxybutyrate 0.1 0.0 - 0.5 mmol/L   Lactic acid, plasma    Collection Time: 10/06/22  3:12 PM   Result Value Ref Range    Lactate (Lactic Acid) 1.6 0.5 - 2.2 mmol/L   Procalcitonin    Collection Time: 10/06/22  3:12 PM   Result Value Ref Range    Procalcitonin 1.42 (H) <0.25 ng/mL   Sedimentation rate    Collection Time: 10/06/22  3:12 PM   Result Value Ref Range    Sed Rate 125 (H) 0 - 10 mm/Hr   C-reactive protein    Collection Time: 10/06/22  3:12 PM   Result Value Ref Range    .2 (H) 0.0 - 8.2 mg/L   Blood culture #2 **CANNOT BE ORDERED  STAT**    Collection Time: 10/06/22  3:36 PM    Specimen: Peripheral, Hand, Right; Blood   Result Value Ref Range    Blood Culture, Routine No Growth to date    POCT COVID-19 Rapid Screening    Collection Time: 10/06/22  3:36 PM   Result Value Ref Range    POC Rapid COVID Negative Negative     Acceptable Yes    ISTAT PROCEDURE    Collection Time: 10/06/22  4:41 PM   Result Value Ref Range    POC PH 7.400 7.35 - 7.45    POC PCO2 46.3 (H) 35 - 45 mmHg    POC PO2 32 (L) 40 - 60 mmHg    POC HCO3 28.7 (H) 24 - 28 mmol/L    POC BE 3 -2 to 2 mmol/L    POC SATURATED O2 62 (L) 95 - 100 %    POC TCO2 30 (H) 24 - 29 mmol/L    Sample VENOUS     Site Other     Allens Test N/A    POCT glucose    Collection Time: 10/06/22  7:58 PM   Result Value Ref Range    POCT Glucose 164 (H) 70 - 110 mg/dL   POCT glucose    Collection Time: 10/07/22  7:20 AM   Result Value Ref Range    POCT Glucose 65 (L) 70 - 110 mg/dL     Family History   Problem Relation Age of Onset    No Known Problems Mother     No Known Problems Father     No Known Problems Sister     No Known Problems Brother     No Known Problems Daughter     No Known Problems Daughter     No Known Problems Daughter     No Known Problems Brother     No Known Problems Maternal Aunt     No Known Problems Maternal Uncle     No Known Problems Paternal Aunt     No Known Problems Paternal Uncle     No Known Problems Maternal Grandmother     No Known Problems Maternal Grandfather     No Known Problems Paternal Grandmother     No Known Problems Paternal Grandfather      Social History     Socioeconomic History    Marital status: Single    Number of children: 5   Tobacco Use    Smoking status: Never    Smokeless tobacco: Never   Substance and Sexual Activity    Alcohol use: No    Drug use: No    Sexual activity: Yes     Partners: Female   Social History Narrative    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    A)    ESRD  DM  PAD  CAD  Anemia   2nd hyperpth  L aka  Advanced vasculopathy R leg  Hx of MGUS     P)    HD MWF  Renal Diet  Home meds  Protect access  EPO   Binders  Adjust all meds to the degree of renal fx  Close follow up I/O and weights  Maintain Hydration     Seems to me like a R BKA is needed asap

## 2022-10-07 NOTE — CONSULTS
"[facility]  Infectious Disease  Consult Note    Patient Name: Adryan Neff  MRN: [id number]  Admission Date: 10/6/2022  Hospital Length of Stay: 1 days  Attending Physician: Lee Mccormack III, MD  Primary Care Provider: Rasheeda Jose NP     Isolation Status: No active isolations    Patient information was obtained from patient and ER records.      Inpatient consult to Infectious Diseases  Consult performed by: Ruby Salvador MD  Consult ordered by: Ernesto Charles MD        Assessment/Plan:     Wet gangrene  56M with ESRD on HD and severe PAD admitted 10/6 with Gangrene of R BKA with foul smelling odor/drainage. Seen by podiatry- amputation planned. R BKA being considered. Ila ordered, pending. Tmax 99.9. Bcx ngtd x 1 day. Has received vanc/zosyn. ID consulted for ": Concern for wet gangrene"    Recommendations:  - continue antibiotics- v/zosyn ok (or vanc/cefepime) for wet gangrene  - please send path/cultures of proximal margin at the time of amputation. It's possible that amputation will be definitive treatment of infections, but waiting path/cultures to confirm  - needs adequate perfusion to heal wounds  - wound care as per surgery        Thank you for your consult. I will follow-up with patient. Please contact us if you have any additional questions.    Ruby Salvador MD  Infectious Disease  [facility]    Subjective:     Principal Problem: PAD (peripheral artery disease)    HPI: 56M with ESRD on HD and severe PAD admitted 10/6 with Gangrene of R BKA. Last seen by ID 1/2022 for L foot gangrene s/p AKA- at that time, amputation thought to be definitive treatment of infection and antibiotics were stopped. Now reports worsening pain and developing eschar of the right dorsal foot along with foul smelling odor. Given concern for an infection and the need for IV antibiotics & expedited podiatry evaluation, the patient was sent to the ER. Denies f/c. Says he was taking medicine but not sure " "if it was an antibiotics. Reports severe pain and drainage and odor from foot.     Seen by podiatry- amputation planned. R BKA being considered    Xray-No radiographic evidence of osteomyelitis    Ila ordered, pending    Tmax 99.9    Bcx ngtd x 1 day    Has received vanc/zosyn    ID consulted for ": Concern for wet gangrene"      Past Medical History:   Diagnosis Date    CAD (coronary artery disease) 2016    CAD (non obstructive) 2016 [facility]    Diabetes mellitus type I     Diabetic retinopathy     ESRD on hemodialysis     on HD TThSat    Gangrene of left foot     Hypertension     Nuclear sclerosis of right eye 11/24/2021    PAD (peripheral artery disease)     Pulmonary HTN     Right foot infection     TB lung, latent 04/2021       Past Surgical History:   Procedure Laterality Date    ABOVE-KNEE AMPUTATION Left 1/18/2022    Procedure: AMPUTATION, ABOVE KNEE;  Surgeon: Rusty Light MD;  Location: [facility] OR;  Service: Orthopedics;  Laterality: Left;    ABOVE-KNEE AMPUTATION Left 1/21/2022    Procedure: AMPUTATION, ABOVE KNEE;  Surgeon: Rusty Light MD;  Location: [facility] OR;  Service: Orthopedics;  Laterality: Left;    AV FISTULA PLACEMENT      CATARACT EXTRACTION Left     EYE SURGERY      FISTULOGRAM Left 8/9/2022    Procedure: Fistulogram;  Surgeon: Masoud Soni MD;  Location: [facility] OR;  Service: Vascular;  Laterality: Left;  LEFT VM ON DAUGHTER'S PHONE----PHONE PREOP NOT COMPLETED  CALLED PATIENT ON 8/8/2022 @ 3:34. HE STATED HE IS RESCHEDULING PROCEDURE. NOTIFIED ERWIN @ 3:35PM-LO    TOE AMPUTATION Left 1/6/2022    Procedure: AMPUTATION, TOE;  Surgeon: Masoud Soni MD;  Location: [facility] OR;  Service: Vascular;  Laterality: Left;  Left first through fifth toes, possible open transmetatarsal amputation and all other indicated procedures       Review of patient's allergies indicates:  No Known Allergies    Current Facility-Administered Medications on File Prior to Encounter " "  Medication    [DISCONTINUED] cefazolin (ANCEF) 2 gram in dextrose 5% 50 mL IVPB (premix)     Current Outpatient Medications on File Prior to Encounter   Medication Sig    traMADoL (ULTRAM) 50 mg tablet Take 50 mg by mouth every 6 (six) hours as needed for Pain.    amLODIPine (NORVASC) 10 MG tablet Take 1 tablet (10 mg total) by mouth once daily.    amLODIPine (NORVASC) 10 MG tablet Take 1 tablet by mouth.    aspirin 81 MG Chew Take 81 mg by mouth once daily.    aspirin 81 MG Chew Take 1 tablet by mouth.    atorvastatin (LIPITOR) 20 MG tablet Take 1 tablet by mouth.    clopidogreL (PLAVIX) 75 mg tablet Take 75 mg by mouth once daily.    doxycycline (VIBRAMYCIN) 100 MG Cap Take 1 capsule (100 mg total) by mouth every 12 (twelve) hours.    insulin glargine 100 units/mL (3mL) SubQ pen Inject 15 Units into the skin every evening.    insulin glargine 100 units/mL (3mL) SubQ pen Inject 15 Units into the skin every evening.    lancets (ACCU-CHEK SOFTCLIX LANCETS) Misc 1 each by Misc.(Non-Drug; Combo Route) route once daily at 6am.    methoxy peg-epoetin beta (MIRCERA INJ) 50 mcg.    methoxy peg-epoetin beta (MIRCERA INJ) 30 mcg.    methoxy peg-epoetin beta (MIRCERA INJ) 75 mcg.    metoprolol succinate (TOPROL-XL) 25 MG 24 hr tablet Take 1 tablet by mouth.    omeprazole (PRILOSEC) 20 MG capsule TAKE 2 CAPSULES(40 MG) BY MOUTH EVERY DAY (Patient taking differently: Take 20 mg by mouth once daily.)    omeprazole (PRILOSEC) 40 MG capsule Take by mouth.    omeprazole (PRILOSEC) 40 MG capsule Take 40 mg by mouth once daily.    pen needle, diabetic 32 gauge x 5/32" Ndle To use with insulin pen 4x daily    pravastatin (PRAVACHOL) 20 MG tablet Take 20 mg by mouth once daily.    RENVELA 800 mg Tab Take 800 mg by mouth 3 (three) times daily with meals.    sevelamer carbonate (RENVELA) 800 mg Tab Take 2 tablets by mouth.    traMADoL (ULTRAM) 50 mg tablet Take 1 tablet by mouth.     Family History       Problem " Relation (Age of Onset)    No Known Problems Mother, Father, Sister, Brother, Daughter, Daughter, Daughter, Brother, Maternal Aunt, Maternal Uncle, Paternal Aunt, Paternal Uncle, Maternal Grandmother, Maternal Grandfather, Paternal Grandmother, Paternal Grandfather          Tobacco Use    Smoking status: Never    Smokeless tobacco: Never   Substance and Sexual Activity    Alcohol use: No    Drug use: No    Sexual activity: Yes     Partners: Female     Review of Systems   Constitutional: Negative.    HENT: Negative.     Eyes: Negative.    Respiratory: Negative.     Cardiovascular: Negative.  Negative for chest pain.   Gastrointestinal: Negative.    Endocrine: Negative.    Genitourinary:  Negative for difficulty urinating.        Does not make urine    Musculoskeletal:  Positive for joint swelling.        + foot pain and gangrene    Skin:  Positive for color change and wound.   Allergic/Immunologic: Negative.    Neurological: Negative.    Hematological: Negative.    Psychiatric/Behavioral: Negative.     Objective:     Vital Signs (Most Recent):  Temp: 98.4 °F (36.9 °C) (10/07/22 0720)  Pulse: 72 (10/07/22 0720)  Resp: 18 (10/07/22 0720)  BP: (!) 195/79 (10/07/22 0720)  SpO2: 97 % (10/07/22 0720)   Vital Signs (24h Range):  Temp:  [98.4 °F (36.9 °C)-99.9 °F (37.7 °C)] 98.4 °F (36.9 °C)  Pulse:  [] 72  Resp:  [16-18] 18  SpO2:  [96 %-100 %] 97 %  BP: (154-195)/(70-83) 195/79     Weight: 70.3 kg (155 lb)  Body mass index is 21.62 kg/m².    Physical Exam  Constitutional:       General: He is not in acute distress.     Appearance: Normal appearance.   HENT:      Head: Normocephalic and atraumatic.      Nose: Nose normal.      Mouth/Throat:      Mouth: Mucous membranes are moist.   Eyes:      Extraocular Movements: Extraocular movements intact.   Cardiovascular:      Rate and Rhythm: Normal rate.      Pulses: Normal pulses.      Heart sounds: No murmur heard.  Pulmonary:      Effort: Pulmonary effort is normal.  No respiratory distress.   Abdominal:      General: Abdomen is flat. There is no distension.   Musculoskeletal:      Comments: Left AVF with palpable thrill.   Left AKA with well healed wound   Right foot eschar with overlying foot ulcer. TP/DP pulses are palpable but diminished. Tender to palpation.    Skin:     General: Skin is warm.      Capillary Refill: Capillary refill takes less than 2 seconds.   Neurological:      Mental Status: He is alert.      Comments: A&Ox2    Psychiatric:         Mood and Affect: Mood normal.                             Significant Labs: All pertinent labs within the past 24 hours have been reviewed.    Significant Imaging: I have reviewed all pertinent imaging results/findings within the past 24 hours.

## 2022-10-08 LAB
POCT GLUCOSE: 102 MG/DL (ref 70–110)
POCT GLUCOSE: 138 MG/DL (ref 70–110)
POCT GLUCOSE: 166 MG/DL (ref 70–110)
POCT GLUCOSE: 51 MG/DL (ref 70–110)
POCT GLUCOSE: 95 MG/DL (ref 70–110)

## 2022-10-08 PROCEDURE — 21400001 HC TELEMETRY ROOM

## 2022-10-08 PROCEDURE — 25000003 PHARM REV CODE 250: Performed by: STUDENT IN AN ORGANIZED HEALTH CARE EDUCATION/TRAINING PROGRAM

## 2022-10-08 PROCEDURE — 63600175 PHARM REV CODE 636 W HCPCS: Performed by: STUDENT IN AN ORGANIZED HEALTH CARE EDUCATION/TRAINING PROGRAM

## 2022-10-08 PROCEDURE — 25000003 PHARM REV CODE 250: Performed by: INTERNAL MEDICINE

## 2022-10-08 PROCEDURE — 94761 N-INVAS EAR/PLS OXIMETRY MLT: CPT

## 2022-10-08 RX ADMIN — PANTOPRAZOLE SODIUM 40 MG: 40 TABLET, DELAYED RELEASE ORAL at 08:10

## 2022-10-08 RX ADMIN — MUPIROCIN: 20 OINTMENT TOPICAL at 08:10

## 2022-10-08 RX ADMIN — PIPERACILLIN AND TAZOBACTAM 4.5 G: 4; .5 INJECTION, POWDER, LYOPHILIZED, FOR SOLUTION INTRAVENOUS; PARENTERAL at 03:10

## 2022-10-08 RX ADMIN — METOPROLOL SUCCINATE 25 MG: 25 TABLET, EXTENDED RELEASE ORAL at 08:10

## 2022-10-08 RX ADMIN — ATORVASTATIN CALCIUM 40 MG: 40 TABLET, FILM COATED ORAL at 09:10

## 2022-10-08 RX ADMIN — SEVELAMER CARBONATE 800 MG: 800 TABLET, FILM COATED ORAL at 04:10

## 2022-10-08 RX ADMIN — HYDROCODONE BITARTRATE AND ACETAMINOPHEN 1 TABLET: 5; 325 TABLET ORAL at 09:10

## 2022-10-08 RX ADMIN — SEVELAMER CARBONATE 800 MG: 800 TABLET, FILM COATED ORAL at 12:10

## 2022-10-08 RX ADMIN — ASPIRIN 81 MG CHEWABLE TABLET 81 MG: 81 TABLET CHEWABLE at 02:10

## 2022-10-08 RX ADMIN — MUPIROCIN: 20 OINTMENT TOPICAL at 09:10

## 2022-10-08 RX ADMIN — INSULIN DETEMIR 10 UNITS: 100 INJECTION, SOLUTION SUBCUTANEOUS at 09:10

## 2022-10-08 RX ADMIN — HEPARIN SODIUM 5000 UNITS: 5000 INJECTION INTRAVENOUS; SUBCUTANEOUS at 02:10

## 2022-10-08 RX ADMIN — CLOPIDOGREL 75 MG: 75 TABLET, FILM COATED ORAL at 02:10

## 2022-10-08 RX ADMIN — AMLODIPINE BESYLATE 10 MG: 5 TABLET ORAL at 08:10

## 2022-10-08 RX ADMIN — PIPERACILLIN AND TAZOBACTAM 4.5 G: 4; .5 INJECTION, POWDER, LYOPHILIZED, FOR SOLUTION INTRAVENOUS; PARENTERAL at 02:10

## 2022-10-08 NOTE — SUBJECTIVE & OBJECTIVE
Interval History: Pt states he is feeling good today. Pt denies cp, sob, fever, chills, or leg pain at this time. Discussed continued need for amputation. Pt with hypoglycemia this am. Will decrease overnight insulin.     Review of Systems  Objective:     Vital Signs (Most Recent):  Temp: 98 °F (36.7 °C) (10/08/22 1105)  Pulse: 69 (10/08/22 1105)  Resp: 18 (10/08/22 1105)  BP: (!) 116/51 (10/08/22 1105)  SpO2: 99 % (10/08/22 1105)   Vital Signs (24h Range):  Temp:  [97.9 °F (36.6 °C)-98.4 °F (36.9 °C)] 98 °F (36.7 °C)  Pulse:  [69-88] 69  Resp:  [17-18] 18  SpO2:  [97 %-100 %] 99 %  BP: (103-169)/(44-72) 116/51     Weight: 70.2 kg (154 lb 12.2 oz)  Body mass index is 21.59 kg/m².    Intake/Output Summary (Last 24 hours) at 10/8/2022 1108  Last data filed at 10/8/2022 0422  Gross per 24 hour   Intake 740 ml   Output 1500 ml   Net -760 ml      Physical Exam  Vitals reviewed.   Constitutional:       General: He is not in acute distress.  HENT:      Head: Normocephalic and atraumatic.      Nose: No congestion or rhinorrhea.   Cardiovascular:      Rate and Rhythm: Normal rate and regular rhythm.   Pulmonary:      Effort: Pulmonary effort is normal. No respiratory distress.   Abdominal:      Palpations: Abdomen is soft.      Tenderness: There is no abdominal tenderness.   Musculoskeletal:         General: No swelling or tenderness.      Cervical back: Neck supple. No rigidity.   Skin:     General: Skin is warm and dry.   Neurological:      General: No focal deficit present.      Mental Status: He is alert and oriented to person, place, and time.   Psychiatric:         Mood and Affect: Mood normal.         Behavior: Behavior normal.       Significant Labs: All pertinent labs within the past 24 hours have been reviewed.    Significant Imaging: I have reviewed all pertinent imaging results/findings within the past 24 hours.

## 2022-10-08 NOTE — PROGRESS NOTES
[facility] Medicine  Progress Note    Patient Name: Adryan Neff  MRN: [id number]  Patient Class: IP- Inpatient   Admission Date: 10/6/2022  Length of Stay: 2 days  Attending Physician: Lee Mccormack III, MD  Primary Care Provider: Rasheeda Jose NP        Subjective:     Principal Problem:PAD (peripheral artery disease)        HPI:  This is a 56 year old male with a PMHx of severe PAD s/p gangrene of left foot s/p left AKA (1/2022), ESRD on HD MWF, NICM (EF: 55%, GIDD), pulmonary hypertension, GERD and latent TB filiberto presents for concern for leg infection.       The patient presented to the vascular surgery clinic for a follow up. He reports worsening pain and developing eschar of the right dorsal foot along with foul smelling odor. Given concern for an infection and the need for IV antibiotics & expedited podiatry evaluation, the patient was sent to the ER. The patient denies having any fevers, chills, or injury to his foot. He has a previous AKA and uses a wheelchair to ambulate. While in the ED, the patient was hypertensive but otherwise hemodynamically stable. Labs showed leukocytosis (17.41 - with neutrophilic predominance), normocytic anemia (9.7), elevated sed rate (125), elevated CRP (237.2), negative lactate (1.6). X-ray foot showed no radiographic evidence of osteomyelitis. He was administered vancomycin, zosyn and was admitted for further management.     I attempted to call the daughter to obtain further history on 1902 without a response.         Overview/Hospital Course:  No notes on file    Interval History: Pt states he is feeling good today. Pt denies cp, sob, fever, chills, or leg pain at this time. Discussed continued need for amputation. Pt with hypoglycemia this am. Will decrease overnight insulin.     Review of Systems  Objective:     Vital Signs (Most Recent):  Temp: 98 °F (36.7 °C) (10/08/22 1105)  Pulse: 69 (10/08/22 1105)  Resp: 18 (10/08/22 1105)  BP: (!) 116/51  (10/08/22 1105)  SpO2: 99 % (10/08/22 1105)   Vital Signs (24h Range):  Temp:  [97.9 °F (36.6 °C)-98.4 °F (36.9 °C)] 98 °F (36.7 °C)  Pulse:  [69-88] 69  Resp:  [17-18] 18  SpO2:  [97 %-100 %] 99 %  BP: (103-169)/(44-72) 116/51     Weight: 70.2 kg (154 lb 12.2 oz)  Body mass index is 21.59 kg/m².    Intake/Output Summary (Last 24 hours) at 10/8/2022 1108  Last data filed at 10/8/2022 0422  Gross per 24 hour   Intake 740 ml   Output 1500 ml   Net -760 ml      Physical Exam  Vitals reviewed.   Constitutional:       General: He is not in acute distress.  HENT:      Head: Normocephalic and atraumatic.      Nose: No congestion or rhinorrhea.   Cardiovascular:      Rate and Rhythm: Normal rate and regular rhythm.   Pulmonary:      Effort: Pulmonary effort is normal. No respiratory distress.   Abdominal:      Palpations: Abdomen is soft.      Tenderness: There is no abdominal tenderness.   Musculoskeletal:         General: No swelling or tenderness.      Cervical back: Neck supple. No rigidity.   Skin:     General: Skin is warm and dry.   Neurological:      General: No focal deficit present.      Mental Status: He is alert and oriented to person, place, and time.   Psychiatric:         Mood and Affect: Mood normal.         Behavior: Behavior normal.       Significant Labs: All pertinent labs within the past 24 hours have been reviewed.    Significant Imaging: I have reviewed all pertinent imaging results/findings within the past 24 hours.      Assessment/Plan:      * PAD (peripheral artery disease)  - History of severe PAD   - C/b LEFT foot gangrene s/p left AKA   - Now presenting with right foot gangrene with concern of superimposed infection   - Home medications: aspirin, plavix and statin  - RCRI score of 4    Plan;   - Vascular surgery, podiatry consultation   - Podiatry recommending bka/aka. Ortho to be consulted  - Vancomycin/Zosyn for concern for superimposed infection   - Infectious disease consult   - Resume home  medications     Wet gangrene  As stated above in PAD      ESRD on hemodialysis  - Dialysis type: iHD  - Access: L AVF  - Schedule: MWF    Plan:  - Nephrology consult for dialysis management  - Daily weights to guide UF  - Continue dialysis MWF    CAD (coronary artery disease) - non-obstructive from [facility] in 2016  - Resume aspirin, statin       Chronic diastolic heart failure  - Echocardiogram (11/24/2021): EF: 55%, GIDD, mild TR, moderate to severe MR  - Compensated on physical examination, on room air   - Resume home medications     Gastroesophageal reflux disease  - Resume PPI     Pure hypercholesterolemia  - Resume statin     Essential (primary) hypertension  - Resume home medications (amlodipine 10 mg, Toprol-XL 50 mg)     Controlled type 2 diabetes mellitus with chronic kidney disease on chronic dialysis, with long-term current use of insulin  - Last A1c:   Lab Results   Component Value Date    HGBA1C 6.1 (H) 01/07/2021     - Home regimen: Lantus 15 units nightly, decreased due to hypoglycemia in am of 10/8.  - Continue home regimen   - Low-sensitivity sliding scale insulin  - Initiate and maintain glycemic protocol, monitor POC glucose   - Follow up with PCP as outpatient      VTE Risk Mitigation (From admission, onward)         Ordered     heparin (porcine) injection 5,000 Units  Every 8 hours         10/06/22 1819     IP VTE HIGH RISK PATIENT  Once         10/06/22 1819     Place sequential compression device  Until discontinued         10/06/22 1819                Discharge Planning   ENIO:      Code Status: Full Code   Is the patient medically ready for discharge?:     Reason for patient still in hospital (select all that apply): Treatment  Discharge Plan A: Home with family                  Lee Mccormack III, MD  Department of Hospital Medicine   [facility] [place] - Telemetry

## 2022-10-08 NOTE — PLAN OF CARE
Please send updated cultures and path at time amputation. Will de-escalate abx based on cultures and proximal margin pathology

## 2022-10-08 NOTE — PROGRESS NOTES
Patient has no new complaints.  His pain is fairly well controlled   Vital signs stable     Right lower extremity-minimal changes.  He has mummified great toe and darkly discolored skin throughout his foot to the lower leg.  Limb is cool to touch below the knee    Right lower extremity ischemia with gangrene   Recommendation is for right above knee amputation.  Patient and discussed it with his daughter.  We tried in touch with his daughter but were unable to.  She was here earlier today and they discussed at length.  He is ready to proceed with above knee amputation but wants to wait until Monday.  We will make plans for right AKA on Monday

## 2022-10-08 NOTE — PROGRESS NOTES
[facility]  Nephrology  Progress Note     Patient Name: Adryan Neff  MRN: [id number]  Admission Date: 10/6/2022  Hospital Length of Stay: 2 days  Attending Provider: Lee Mccormack III, MD   Primary Care Physician: Rasheeda Jose NP  Principal Problem: PAD (peripheral artery disease)    Subjective:       Interval History: Seen at the bedside no event overnight       Review of patient's allergies indicates:  No Known Allergies   Current Facility-Administered Medications   Medication Frequency    acetaminophen tablet 650 mg Q8H PRN    acetaminophen tablet 650 mg Q4H PRN    amLODIPine tablet 10 mg Daily    aspirin chewable tablet 81 mg Daily    atorvastatin tablet 40 mg QHS    clopidogreL tablet 75 mg Daily    dextrose 10% bolus 125 mL PRN    dextrose 10% bolus 250 mL PRN    glucagon (human recombinant) injection 1 mg PRN    glucose chewable tablet 16 g PRN    glucose chewable tablet 24 g PRN    heparin (porcine) injection 5,000 Units Q8H    hydrALAZINE injection 10 mg Q8H PRN    HYDROcodone-acetaminophen 5-325 mg per tablet 1 tablet Q6H PRN    insulin aspart U-100 pen 0-5 Units QID (AC + HS) PRN    insulin detemir U-100 pen 10 Units QHS    metoprolol succinate (TOPROL-XL) 24 hr tablet 25 mg Daily    mupirocin 2 % ointment BID    naloxone 0.4 mg/mL injection 0.02 mg PRN    ondansetron disintegrating tablet 8 mg Q8H PRN    pantoprazole EC tablet 40 mg Daily    piperacillin-tazobactam 4.5 g in dextrose 5 % 100 mL IVPB (ready to mix system) Q12H    sevelamer carbonate tablet 800 mg TID WM    sodium chloride 0.9% bolus 250 mL PRN    sodium chloride 0.9% flush 10 mL PRN    sodium chloride 0.9% flush 10 mL Q12H PRN    traMADoL tablet 50 mg Q4H PRN    vancomycin - pharmacy to dose pharmacy to manage frequency       Objective:     Vital Signs (Most Recent):  Temp: 98 °F (36.7 °C) (10/08/22 1105)  Pulse: 69 (10/08/22 1105)  Resp: 18 (10/08/22 1105)  BP: (!) 116/51 (10/08/22 1105)  SpO2: 99 % (10/08/22  1105)  O2 Device (Oxygen Therapy): room air (10/08/22 0919)   Vital Signs (24h Range):  Temp:  [97.9 °F (36.6 °C)-98.4 °F (36.9 °C)] 98 °F (36.7 °C)  Pulse:  [69-88] 69  Resp:  [17-18] 18  SpO2:  [97 %-100 %] 99 %  BP: (103-169)/(44-72) 116/51   Weight: 70.2 kg (154 lb 12.2 oz) (10/08/22 0600)  Body mass index is 21.59 kg/m².  Body surface area is 1.88 meters squared.      Intake/Output Summary (Last 24 hours) at 10/8/2022 1244  Last data filed at 10/8/2022 0422  Gross per 24 hour   Intake 740 ml   Output 1500 ml   Net -760 ml     I/O last 3 completed shifts:  In: 860 [P.O.:360; Other:500]  Out: 1500 [Other:1500]  Net IO Since Admission: -290 mL [10/08/22 1244]     Physical Exam  Constitutional:       Appearance: He is ill-appearing.   Cardiovascular:      Rate and Rhythm: Normal rate.      Pulses: Normal pulses.   Pulmonary:      Effort: Pulmonary effort is normal.   Abdominal:      General: Abdomen is flat.      Palpations: Abdomen is soft.   Skin:     General: Skin is warm.   Neurological:      Mental Status: He is alert and oriented to person, place, and time.   Psychiatric:         Mood and Affect: Mood normal.       Recent Labs   Lab 10/06/22  1512 10/07/22  1151   WBC 17.41* 10.61   HGB 9.7* 8.6*   HCT 32.1* 27.5*   * 418   MONO 7.2  1.3* 3.2*  0.3      Recent Labs   Lab 10/06/22  1512      K 4.0   CL 97   CO2 26   BUN 29*   CREATININE 9.1*   CALCIUM 9.7   PROT 9.4*   BILITOT 0.3   ALKPHOS 85   ALT <5*   AST 10      Recent Labs     10/06/22  1641   PH 7.400   PCO2 46.3*   PO2 32*   HCO3 28.7*   POCSATURATED 62*   BE 3       Assessment/Plan:       PAD (peripheral artery disease)    Controlled type 2 diabetes mellitus with chronic kidney disease on chronic dialysis, with long-term current use of insulin    Essential (primary) hypertension    Pure hypercholesterolemia    Gastroesophageal reflux disease    Chronic diastolic heart failure    CAD (coronary artery disease) - non-obstructive from [facility]  in 2016    ESRD on hemodialysis    Wet gangrene       ESRD:  - Last HD yesterday  - Tolerated 3 hrs  - HD monday or metabolic clearance and volume management.  - Strict I/O and chart   - Daily weights   - Pre/post weights with HD in an attempt to establish an EDW  - No lab stick/BP intake on access site  - Adjust medications to CrCl < 15  - Avoid nephrotoxic medications, NSAIDs, IV contrast, ACE/ARB.  - Will follow closely and continue dialysis treatments while in-patient    Anemia of Chronic Kidney Disease   - Hb    Lab Results   Component Value Date    HGB 8.6 (L) 10/07/2022     Maintain Hb > 7  - Goal in ESRD is Hb of 10-11.   - Will check iron studies in am.     Mineral Bone Disease in CKD   - Phospate   - Continue phos binder.  - Novasource with meals  - Renal diet with protein intake goal 1.5 g/kg/d  - 1.5L fluid restrictions  - Daily renal panel  - Monitor phosphate and albumin daily.   - Vitamin D and PTH to be checked with am labs.     HTN (hypertension)  - BP Normal  - Goal for BP <140mmHg SBP and <90 mmHg DBP.   - Maintain MAP> 65            Thank you for your consult. I will follow-up with patient. Please contact us if you have any additional questions.    Beka Davila MD  Nephrology

## 2022-10-08 NOTE — ASSESSMENT & PLAN NOTE
- Last A1c:   Lab Results   Component Value Date    HGBA1C 6.1 (H) 01/07/2021     - Home regimen: Lantus 15 units nightly, decreased due to hypoglycemia in am of 10/8.  - Continue home regimen   - Low-sensitivity sliding scale insulin  - Initiate and maintain glycemic protocol, monitor POC glucose   - Follow up with PCP as outpatient

## 2022-10-09 LAB
POCT GLUCOSE: 118 MG/DL (ref 70–110)
POCT GLUCOSE: 168 MG/DL (ref 70–110)
POCT GLUCOSE: 184 MG/DL (ref 70–110)
POCT GLUCOSE: 238 MG/DL (ref 70–110)

## 2022-10-09 PROCEDURE — 25000003 PHARM REV CODE 250: Performed by: STUDENT IN AN ORGANIZED HEALTH CARE EDUCATION/TRAINING PROGRAM

## 2022-10-09 PROCEDURE — 63600175 PHARM REV CODE 636 W HCPCS: Performed by: STUDENT IN AN ORGANIZED HEALTH CARE EDUCATION/TRAINING PROGRAM

## 2022-10-09 PROCEDURE — 21400001 HC TELEMETRY ROOM

## 2022-10-09 RX ADMIN — PIPERACILLIN AND TAZOBACTAM 4.5 G: 4; .5 INJECTION, POWDER, LYOPHILIZED, FOR SOLUTION INTRAVENOUS; PARENTERAL at 02:10

## 2022-10-09 RX ADMIN — HYDROCODONE BITARTRATE AND ACETAMINOPHEN 1 TABLET: 5; 325 TABLET ORAL at 05:10

## 2022-10-09 RX ADMIN — ASPIRIN 81 MG CHEWABLE TABLET 81 MG: 81 TABLET CHEWABLE at 08:10

## 2022-10-09 RX ADMIN — SEVELAMER CARBONATE 800 MG: 800 TABLET, FILM COATED ORAL at 04:10

## 2022-10-09 RX ADMIN — MUPIROCIN: 20 OINTMENT TOPICAL at 11:10

## 2022-10-09 RX ADMIN — PANTOPRAZOLE SODIUM 40 MG: 40 TABLET, DELAYED RELEASE ORAL at 08:10

## 2022-10-09 RX ADMIN — INSULIN ASPART 2 UNITS: 100 INJECTION, SOLUTION INTRAVENOUS; SUBCUTANEOUS at 11:10

## 2022-10-09 RX ADMIN — HYDROCODONE BITARTRATE AND ACETAMINOPHEN 1 TABLET: 5; 325 TABLET ORAL at 11:10

## 2022-10-09 RX ADMIN — SEVELAMER CARBONATE 800 MG: 800 TABLET, FILM COATED ORAL at 08:10

## 2022-10-09 RX ADMIN — INSULIN DETEMIR 10 UNITS: 100 INJECTION, SOLUTION SUBCUTANEOUS at 11:10

## 2022-10-09 RX ADMIN — METOPROLOL SUCCINATE 25 MG: 25 TABLET, EXTENDED RELEASE ORAL at 08:10

## 2022-10-09 RX ADMIN — HEPARIN SODIUM 5000 UNITS: 5000 INJECTION INTRAVENOUS; SUBCUTANEOUS at 02:10

## 2022-10-09 RX ADMIN — SEVELAMER CARBONATE 800 MG: 800 TABLET, FILM COATED ORAL at 11:10

## 2022-10-09 RX ADMIN — ATORVASTATIN CALCIUM 40 MG: 40 TABLET, FILM COATED ORAL at 11:10

## 2022-10-09 RX ADMIN — MUPIROCIN: 20 OINTMENT TOPICAL at 08:10

## 2022-10-09 RX ADMIN — AMLODIPINE BESYLATE 10 MG: 5 TABLET ORAL at 08:10

## 2022-10-09 RX ADMIN — CLOPIDOGREL 75 MG: 75 TABLET, FILM COATED ORAL at 08:10

## 2022-10-09 RX ADMIN — HYDROCODONE BITARTRATE AND ACETAMINOPHEN 1 TABLET: 5; 325 TABLET ORAL at 08:10

## 2022-10-09 NOTE — HOSPITAL COURSE
Mr Adryan Neff is a 56 y.o. man with PAD. He is s/p L AKA due to gangrene and now presents with worsening R foot infection. He also has ESRD on HD via LUE AVG. Started antibiotics. Podiatry and Ortho consulted with plans for amputation. This was delayed due to PEA on 10/10 with quick ROSC. Unclear etiology but suspect vasovagal event. Cardiology was consulted. He then had R AKA on 10/13/22. Antibiotics were discontinued. He has had malfunctioning of his LUE AVG; Vascular was consulted and fistulogram performed.  SW/CM consulted for discharge planning.  He required blood transfusions for blood loss anemia from R AKA surgical site. Now stable and working on SNF placement. Patient was discharged to SNF on 11/10/22.  Activity as tolerated. Diet low NA, ADA 2000 edilma. Diet. Follow up with PCP in one week

## 2022-10-09 NOTE — ASSESSMENT & PLAN NOTE
- History of severe PAD   - C/b LEFT foot gangrene s/p left AKA   - Now presenting with right foot gangrene with concern of superimposed infection   - Home medications: aspirin, plavix and statin  - RCRI score of 4    Plan;   - Vascular surgery, podiatry consultation   - Podiatry recommending bka/aka. Ortho consulted, surgery 10/10  - Vancomycin/Zosyn for concern for superimposed infection   - Infectious disease consult   - Resume home medications

## 2022-10-09 NOTE — PROGRESS NOTES
[facility]  Nephrology  Progress Note     Patient Name: Adryan Neff  MRN: [id number]  Admission Date: 10/6/2022  Hospital Length of Stay: 2 days  Attending Provider: Lee Mccormack III, MD   Primary Care Physician: Rasheeda Jose NP  Principal Problem: PAD (peripheral artery disease)    Subjective:       Interval History: Seen at the bedside no event overnight       Review of patient's allergies indicates:  No Known Allergies   Current Facility-Administered Medications   Medication Frequency    acetaminophen tablet 650 mg Q8H PRN    acetaminophen tablet 650 mg Q4H PRN    amLODIPine tablet 10 mg Daily    aspirin chewable tablet 81 mg Daily    atorvastatin tablet 40 mg QHS    clopidogreL tablet 75 mg Daily    dextrose 10% bolus 125 mL PRN    dextrose 10% bolus 250 mL PRN    glucagon (human recombinant) injection 1 mg PRN    glucose chewable tablet 16 g PRN    glucose chewable tablet 24 g PRN    heparin (porcine) injection 5,000 Units Q8H    hydrALAZINE injection 10 mg Q8H PRN    HYDROcodone-acetaminophen 5-325 mg per tablet 1 tablet Q6H PRN    insulin aspart U-100 pen 0-5 Units QID (AC + HS) PRN    insulin detemir U-100 pen 10 Units QHS    metoprolol succinate (TOPROL-XL) 24 hr tablet 25 mg Daily    mupirocin 2 % ointment BID    naloxone 0.4 mg/mL injection 0.02 mg PRN    ondansetron disintegrating tablet 8 mg Q8H PRN    pantoprazole EC tablet 40 mg Daily    piperacillin-tazobactam 4.5 g in dextrose 5 % 100 mL IVPB (ready to mix system) Q12H    sevelamer carbonate tablet 800 mg TID WM    sodium chloride 0.9% bolus 250 mL PRN    sodium chloride 0.9% flush 10 mL PRN    sodium chloride 0.9% flush 10 mL Q12H PRN    traMADoL tablet 50 mg Q4H PRN    vancomycin - pharmacy to dose pharmacy to manage frequency       Objective:     Vital Signs (Most Recent):  Temp: 98 °F (36.7 °C) (10/08/22 1105)  Pulse: 69 (10/08/22 1105)  Resp: 18 (10/08/22 1105)  BP: (!) 116/51 (10/08/22 1105)  SpO2: 99 % (10/08/22  1105)  O2 Device (Oxygen Therapy): room air (10/08/22 0919)   Vital Signs (24h Range):  Temp:  [97.9 °F (36.6 °C)-98.4 °F (36.9 °C)] 98 °F (36.7 °C)  Pulse:  [69-88] 69  Resp:  [17-18] 18  SpO2:  [97 %-100 %] 99 %  BP: (103-169)/(44-72) 116/51   Weight: 70.2 kg (154 lb 12.2 oz) (10/08/22 0600)  Body mass index is 21.59 kg/m².  Body surface area is 1.88 meters squared.      Intake/Output Summary (Last 24 hours) at 10/8/2022 1244  Last data filed at 10/8/2022 0422  Gross per 24 hour   Intake 740 ml   Output 1500 ml   Net -760 ml     I/O last 3 completed shifts:  In: 860 [P.O.:360; Other:500]  Out: 1500 [Other:1500]  Net IO Since Admission: -290 mL [10/08/22 1244]     Physical Exam  Constitutional:       Appearance: He is ill-appearing.   Cardiovascular:      Rate and Rhythm: Normal rate.      Pulses: Normal pulses.   Pulmonary:      Effort: Pulmonary effort is normal.   Abdominal:      General: Abdomen is flat.      Palpations: Abdomen is soft.   Skin:     General: Skin is warm.   Neurological:      Mental Status: He is alert and oriented to person, place, and time.   Psychiatric:         Mood and Affect: Mood normal.       Recent Labs   Lab 10/06/22  1512 10/07/22  1151   WBC 17.41* 10.61   HGB 9.7* 8.6*   HCT 32.1* 27.5*   * 418   MONO 7.2  1.3* 3.2*  0.3      Recent Labs   Lab 10/06/22  1512      K 4.0   CL 97   CO2 26   BUN 29*   CREATININE 9.1*   CALCIUM 9.7   PROT 9.4*   BILITOT 0.3   ALKPHOS 85   ALT <5*   AST 10      Recent Labs     10/06/22  1641   PH 7.400   PCO2 46.3*   PO2 32*   HCO3 28.7*   POCSATURATED 62*   BE 3       Assessment/Plan:       PAD (peripheral artery disease)    Controlled type 2 diabetes mellitus with chronic kidney disease on chronic dialysis, with long-term current use of insulin    Essential (primary) hypertension    Pure hypercholesterolemia    Gastroesophageal reflux disease    Chronic diastolic heart failure    CAD (coronary artery disease) - non-obstructive from [facility]  in 2016    ESRD on hemodialysis    Wet gangrene       ESRD:  - Last HD Friday   - Tolerated 3 hrs  - HD monday or metabolic clearance and volume management.  - Strict I/O and chart   - Daily weights   - Pre/post weights with HD in an attempt to establish an EDW  - No lab stick/BP intake on access site  - Adjust medications to CrCl < 15  - Avoid nephrotoxic medications, NSAIDs, IV contrast, ACE/ARB.  - Will follow closely and continue dialysis treatments while in-patient    Anemia of Chronic Kidney Disease   - Hb    Lab Results   Component Value Date    HGB 8.6 (L) 10/07/2022     Maintain Hb > 7  - Goal in ESRD is Hb of 10-11.   - Will check iron studies in am.     Mineral Bone Disease in CKD   - Phospate   - Continue phos binder.  - Novasource with meals  - Renal diet with protein intake goal 1.5 g/kg/d  - 1.5L fluid restrictions  - Daily renal panel  - Monitor phosphate and albumin daily.   - Vitamin D and PTH to be checked with am labs.     HTN (hypertension)  - BP Normal  - Goal for BP <140mmHg SBP and <90 mmHg DBP.   - Maintain MAP> 65            Thank you for your consult. I will follow-up with patient. Please contact us if you have any additional questions.    Beka Davila MD  Nephrology

## 2022-10-09 NOTE — PROGRESS NOTES
[facility] Medicine  Progress Note    Patient Name: Adryan Neff  MRN: [id number]  Patient Class: IP- Inpatient   Admission Date: 10/6/2022  Length of Stay: 3 days  Attending Physician: Lee Mccormack III, MD  Primary Care Provider: Rasheeda Jose NP        Subjective:     Principal Problem:PAD (peripheral artery disease)        HPI:  This is a 56 year old male with a PMHx of severe PAD s/p gangrene of left foot s/p left AKA (1/2022), ESRD on HD MWF, NICM (EF: 55%, GIDD), pulmonary hypertension, GERD and latent TB filiberto presents for concern for leg infection.       The patient presented to the vascular surgery clinic for a follow up. He reports worsening pain and developing eschar of the right dorsal foot along with foul smelling odor. Given concern for an infection and the need for IV antibiotics & expedited podiatry evaluation, the patient was sent to the ER. The patient denies having any fevers, chills, or injury to his foot. He has a previous AKA and uses a wheelchair to ambulate. While in the ED, the patient was hypertensive but otherwise hemodynamically stable. Labs showed leukocytosis (17.41 - with neutrophilic predominance), normocytic anemia (9.7), elevated sed rate (125), elevated CRP (237.2), negative lactate (1.6). X-ray foot showed no radiographic evidence of osteomyelitis. He was administered vancomycin, zosyn and was admitted for further management.     I attempted to call the daughter to obtain further history on 1902 without a response.         Overview/Hospital Course:  No notes on file    Interval History: Pt states he is feeling fine today and the plan is for surgery tomorrow. Pt denies cp, sob, fever, chills, n/v at this time      Review of Systems  Objective:     Vital Signs (Most Recent):  Temp: 98.4 °F (36.9 °C) (10/09/22 0717)  Pulse: 79 (10/09/22 0717)  Resp: 18 (10/09/22 0846)  BP: (!) 125/57 (10/09/22 0717)  SpO2: 98 % (10/09/22 0717)   Vital Signs (24h  Range):  Temp:  [97.5 °F (36.4 °C)-99 °F (37.2 °C)] 98.4 °F (36.9 °C)  Pulse:  [69-79] 79  Resp:  [17-20] 18  SpO2:  [98 %-100 %] 98 %  BP: (109-125)/(46-57) 125/57     Weight: 70.2 kg (154 lb 12.2 oz)  Body mass index is 21.59 kg/m².    Intake/Output Summary (Last 24 hours) at 10/9/2022 1100  Last data filed at 10/9/2022 0900  Gross per 24 hour   Intake 600 ml   Output --   Net 600 ml      Physical Exam  Vitals reviewed.   Constitutional:       General: He is not in acute distress.  HENT:      Head: Normocephalic and atraumatic.      Nose: No congestion or rhinorrhea.   Cardiovascular:      Rate and Rhythm: Normal rate and regular rhythm.   Pulmonary:      Effort: Pulmonary effort is normal. No respiratory distress.   Musculoskeletal:      Cervical back: Neck supple. No rigidity.   Neurological:      General: No focal deficit present.      Mental Status: He is alert and oriented to person, place, and time.   Psychiatric:         Mood and Affect: Mood normal.         Behavior: Behavior normal.       Significant Labs: All pertinent labs within the past 24 hours have been reviewed.    Significant Imaging: I have reviewed all pertinent imaging results/findings within the past 24 hours.      Assessment/Plan:      * PAD (peripheral artery disease)  - History of severe PAD   - C/b LEFT foot gangrene s/p left AKA   - Now presenting with right foot gangrene with concern of superimposed infection   - Home medications: aspirin, plavix and statin  - RCRI score of 4    Plan;   - Vascular surgery, podiatry consultation   - Podiatry recommending bka/aka. Ortho consulted, surgery 10/10  - Vancomycin/Zosyn for concern for superimposed infection   - Infectious disease consult   - Resume home medications     Wet gangrene  As stated above in PAD      ESRD on hemodialysis  - Dialysis type: iHD  - Access: L AVF  - Schedule: MWF    Plan:  - Nephrology consult for dialysis management  - Daily weights to guide UF  - Continue dialysis  MWF    CAD (coronary artery disease) - non-obstructive from [facility] in 2016  - Resume aspirin, statin       Chronic diastolic heart failure  - Echocardiogram (11/24/2021): EF: 55%, GIDD, mild TR, moderate to severe MR  - Compensated on physical examination, on room air   - Resume home medications     Gastroesophageal reflux disease  - Resume PPI     Pure hypercholesterolemia  - Resume statin     Essential (primary) hypertension  - Resume home medications (amlodipine 10 mg, Toprol-XL 50 mg)     Controlled type 2 diabetes mellitus with chronic kidney disease on chronic dialysis, with long-term current use of insulin  - Last A1c:   Lab Results   Component Value Date    HGBA1C 6.1 (H) 01/07/2021     - Home regimen: Lantus 15 units nightly, decreased due to hypoglycemia in am of 10/8.  - Continue home regimen   - Low-sensitivity sliding scale insulin  - Initiate and maintain glycemic protocol, monitor POC glucose   - Follow up with PCP as outpatient      VTE Risk Mitigation (From admission, onward)         Ordered     heparin (porcine) injection 5,000 Units  Every 8 hours         10/06/22 1819     IP VTE HIGH RISK PATIENT  Once         10/06/22 1819     Place sequential compression device  Until discontinued         10/06/22 1819                Discharge Planning   ENIO:      Code Status: Full Code   Is the patient medically ready for discharge?:     Reason for patient still in hospital (select all that apply): Treatment and Consult recommendations  Discharge Plan A: Home with family                  Lee Mccormack III, MD  Department of Hospital Medicine   [facility] - Telemetry

## 2022-10-09 NOTE — PROGRESS NOTES
Ortho Daily Progress Note    Adryan Neff is a 56 y.o. male admitted on 10/6/2022      Chief Complaint/Reason for admission: Foot Pain (Pt's daughter translating for patient per request. Reports they were at the podiatrist and sent to the ED for a right foot infection. Reports hx of DM. Pt has left AKA.)       Hospital Day: 3  Post Op Day: * No surgery found *     The patient was seen and examined this morning at the bedside. Patient reports no acute issues overnight.  Patient reports that pain is adequately controlled.    _______________    Vitals:    10/09/22 0025 10/09/22 0425 10/09/22 0717 10/09/22 0846   BP: (!) 115/52 (!) 122/55 (!) 125/57    Pulse: 71 78 79    Resp: 20 20 18 18   Temp: 97.5 °F (36.4 °C) 98.4 °F (36.9 °C) 98.4 °F (36.9 °C)    TempSrc: Oral Oral Oral    SpO2: 100% 98% 98%    Weight:       Height:           Vital Signs (Most Recent)  Temp: 98.4 °F (36.9 °C) (10/09/22 0717)  Pulse: 79 (10/09/22 0717)  Resp: 18 (10/09/22 0846)  BP: (!) 125/57 (10/09/22 0717)  SpO2: 98 % (10/09/22 0717)    Vital Signs Range (Last 24H):  Temp:  [97.5 °F (36.4 °C)-99 °F (37.2 °C)]   Pulse:  [69-79]   Resp:  [17-20]   BP: (109-125)/(46-57)   SpO2:  [98 %-100 %]       Physical:    Nonviable right lower extremity with mummified distal toes.  Nonpalpable pulse.      Recent Labs     10/06/22  1512 10/07/22  1151   K 4.0  --    MG 2.1  --    PHOS 3.3  --    CALCIUM 9.7  --    WBC 17.41* 10.61   HGB 9.7* 8.6*   HCT 32.1* 27.5*   * 418   INR 1.1  --        I/O last 3 completed shifts:  In: 480 [P.O.:480]  Out: -           Assessment:  A/P nonviable right lower extremity     Plan:     planning for right above-the-knee amputation tomorrow.      Joss Samaniego MD  [facility]

## 2022-10-09 NOTE — PLAN OF CARE
No significant events overnight. Patient refusing Heparin despite being educated on its purpose. Pain controlled overnight with the use of Norco 5.    Problem: Adult Inpatient Plan of Care  Goal: Plan of Care Review  Outcome: Ongoing, Progressing  Goal: Patient-Specific Goal (Individualized)  Outcome: Ongoing, Progressing  Goal: Absence of Hospital-Acquired Illness or Injury  Outcome: Ongoing, Progressing  Goal: Optimal Comfort and Wellbeing  Outcome: Ongoing, Progressing  Goal: Readiness for Transition of Care  Outcome: Ongoing, Progressing     Problem: Skin Injury Risk Increased  Goal: Skin Health and Integrity  Outcome: Ongoing, Progressing     Problem: Fall Injury Risk  Goal: Absence of Fall and Fall-Related Injury  Outcome: Ongoing, Progressing     Problem: Diabetes Comorbidity  Goal: Blood Glucose Level Within Targeted Range  Outcome: Ongoing, Progressing     Problem: Infection  Goal: Absence of Infection Signs and Symptoms  Outcome: Ongoing, Progressing     Problem: Impaired Wound Healing  Goal: Optimal Wound Healing  Outcome: Ongoing, Progressing     Problem: Device-Related Complication Risk (Hemodialysis)  Goal: Safe, Effective Therapy Delivery  Outcome: Ongoing, Progressing     Problem: Hemodynamic Instability (Hemodialysis)  Goal: Effective Tissue Perfusion  Outcome: Ongoing, Progressing     Problem: Infection (Hemodialysis)  Goal: Absence of Infection Signs and Symptoms  Outcome: Ongoing, Progressing

## 2022-10-09 NOTE — PROGRESS NOTES
"Mariel ( daughter) aware of surgery tomorrow and needed consent. Mariel request that provider call her phone TWICE since it may be on "Do No Disturb " @ [phone]   "

## 2022-10-09 NOTE — SUBJECTIVE & OBJECTIVE
Interval History: Pt states he is feeling fine today and the plan is for surgery tomorrow. Pt denies cp, sob, fever, chills, n/v at this time      Review of Systems  Objective:     Vital Signs (Most Recent):  Temp: 98.4 °F (36.9 °C) (10/09/22 0717)  Pulse: 79 (10/09/22 0717)  Resp: 18 (10/09/22 0846)  BP: (!) 125/57 (10/09/22 0717)  SpO2: 98 % (10/09/22 0717)   Vital Signs (24h Range):  Temp:  [97.5 °F (36.4 °C)-99 °F (37.2 °C)] 98.4 °F (36.9 °C)  Pulse:  [69-79] 79  Resp:  [17-20] 18  SpO2:  [98 %-100 %] 98 %  BP: (109-125)/(46-57) 125/57     Weight: 70.2 kg (154 lb 12.2 oz)  Body mass index is 21.59 kg/m².    Intake/Output Summary (Last 24 hours) at 10/9/2022 1100  Last data filed at 10/9/2022 0900  Gross per 24 hour   Intake 600 ml   Output --   Net 600 ml      Physical Exam  Vitals reviewed.   Constitutional:       General: He is not in acute distress.  HENT:      Head: Normocephalic and atraumatic.      Nose: No congestion or rhinorrhea.   Cardiovascular:      Rate and Rhythm: Normal rate and regular rhythm.   Pulmonary:      Effort: Pulmonary effort is normal. No respiratory distress.   Musculoskeletal:      Cervical back: Neck supple. No rigidity.   Neurological:      General: No focal deficit present.      Mental Status: He is alert and oriented to person, place, and time.   Psychiatric:         Mood and Affect: Mood normal.         Behavior: Behavior normal.       Significant Labs: All pertinent labs within the past 24 hours have been reviewed.    Significant Imaging: I have reviewed all pertinent imaging results/findings within the past 24 hours.

## 2022-10-10 ENCOUNTER — ANESTHESIA (OUTPATIENT)
Dept: SURGERY | Facility: HOSPITAL | Age: 57
DRG: 239 | End: 2022-10-10
Payer: MEDICARE

## 2022-10-10 ENCOUNTER — ANESTHESIA EVENT (OUTPATIENT)
Dept: SURGERY | Facility: HOSPITAL | Age: 57
DRG: 239 | End: 2022-10-10
Payer: MEDICARE

## 2022-10-10 PROBLEM — I46.9 PEA (PULSELESS ELECTRICAL ACTIVITY): Status: ACTIVE | Noted: 2022-10-10

## 2022-10-10 LAB
ABO + RH BLD: NORMAL
ALBUMIN SERPL BCP-MCNC: 2 G/DL (ref 3.5–5.2)
ALBUMIN SERPL BCP-MCNC: 2.1 G/DL (ref 3.5–5.2)
ALLENS TEST: ABNORMAL
ALP SERPL-CCNC: 62 U/L (ref 55–135)
ALP SERPL-CCNC: 65 U/L (ref 55–135)
ALT SERPL W/O P-5'-P-CCNC: 5 U/L (ref 10–44)
ALT SERPL W/O P-5'-P-CCNC: 7 U/L (ref 10–44)
ANION GAP SERPL CALC-SCNC: 14 MMOL/L (ref 8–16)
ANION GAP SERPL CALC-SCNC: 15 MMOL/L (ref 8–16)
AST SERPL-CCNC: 20 U/L (ref 10–40)
AST SERPL-CCNC: 8 U/L (ref 10–40)
AV INDEX (PROSTH): 0.8
AV MEAN GRADIENT: 4 MMHG
AV PEAK GRADIENT: 7 MMHG
AV VALVE AREA: 1.34 CM2
AV VELOCITY RATIO: 0.73
BACTERIA BLD CULT: NORMAL
BACTERIA BLD CULT: NORMAL
BASOPHILS # BLD AUTO: 0.07 K/UL (ref 0–0.2)
BASOPHILS # BLD AUTO: 0.08 K/UL (ref 0–0.2)
BASOPHILS NFR BLD: 0.4 % (ref 0–1.9)
BASOPHILS NFR BLD: 0.5 % (ref 0–1.9)
BILIRUB SERPL-MCNC: 0.3 MG/DL (ref 0.1–1)
BILIRUB SERPL-MCNC: 0.4 MG/DL (ref 0.1–1)
BLD GP AB SCN CELLS X3 SERPL QL: NORMAL
BSA FOR ECHO PROCEDURE: 1.88 M2
BUN SERPL-MCNC: 22 MG/DL (ref 6–20)
BUN SERPL-MCNC: 34 MG/DL (ref 6–20)
CALCIUM SERPL-MCNC: 9.4 MG/DL (ref 8.7–10.5)
CALCIUM SERPL-MCNC: 9.9 MG/DL (ref 8.7–10.5)
CHLORIDE SERPL-SCNC: 101 MMOL/L (ref 95–110)
CHLORIDE SERPL-SCNC: 102 MMOL/L (ref 95–110)
CO2 SERPL-SCNC: 19 MMOL/L (ref 23–29)
CO2 SERPL-SCNC: 23 MMOL/L (ref 23–29)
CREAT SERPL-MCNC: 7.8 MG/DL (ref 0.5–1.4)
CREAT SERPL-MCNC: 8.8 MG/DL (ref 0.5–1.4)
CV ECHO LV RWT: 0.51 CM
DIFFERENTIAL METHOD: ABNORMAL
DIFFERENTIAL METHOD: ABNORMAL
DOP CALC AO PEAK VEL: 1.3 M/S
DOP CALC AO VTI: 15 CM
DOP CALC LVOT AREA: 1.7 CM2
DOP CALC LVOT DIAMETER: 1.46 CM
DOP CALC LVOT PEAK VEL: 0.95 M/S
DOP CALC LVOT STROKE VOLUME: 20.08 CM3
DOP CALCLVOT PEAK VEL VTI: 12 CM
E WAVE DECELERATION TIME: 136.69 MSEC
E/A RATIO: 0.96
E/E' RATIO: 19.6 M/S
ECHO LV POSTERIOR WALL: 1.15 CM (ref 0.6–1.1)
EJECTION FRACTION: 40 %
EOSINOPHIL # BLD AUTO: 0.3 K/UL (ref 0–0.5)
EOSINOPHIL # BLD AUTO: 0.4 K/UL (ref 0–0.5)
EOSINOPHIL NFR BLD: 1.3 % (ref 0–8)
EOSINOPHIL NFR BLD: 2.2 % (ref 0–8)
ERYTHROCYTE [DISTWIDTH] IN BLOOD BY AUTOMATED COUNT: 14.6 % (ref 11.5–14.5)
ERYTHROCYTE [DISTWIDTH] IN BLOOD BY AUTOMATED COUNT: 14.6 % (ref 11.5–14.5)
EST. GFR  (NO RACE VARIABLE): 6 ML/MIN/1.73 M^2
EST. GFR  (NO RACE VARIABLE): 7 ML/MIN/1.73 M^2
FRACTIONAL SHORTENING: 18 % (ref 28–44)
GLUCOSE SERPL-MCNC: 112 MG/DL (ref 70–110)
GLUCOSE SERPL-MCNC: 79 MG/DL (ref 70–110)
HBV SURFACE AG SERPL QL IA: NORMAL
HCO3 UR-SCNC: 20.3 MMOL/L (ref 24–28)
HCT VFR BLD AUTO: 29 % (ref 40–54)
HCT VFR BLD AUTO: 31.1 % (ref 40–54)
HGB BLD-MCNC: 9 G/DL (ref 14–18)
HGB BLD-MCNC: 9.5 G/DL (ref 14–18)
IMM GRANULOCYTES # BLD AUTO: 0.1 K/UL (ref 0–0.04)
IMM GRANULOCYTES # BLD AUTO: 0.33 K/UL (ref 0–0.04)
IMM GRANULOCYTES NFR BLD AUTO: 0.6 % (ref 0–0.5)
IMM GRANULOCYTES NFR BLD AUTO: 1.7 % (ref 0–0.5)
INTERVENTRICULAR SEPTUM: 1.32 CM (ref 0.6–1.1)
LA MAJOR: 5.88 CM
LA MINOR: 5.73 CM
LA WIDTH: 4.9 CM
LACTATE SERPL-SCNC: 2.7 MMOL/L (ref 0.5–2.2)
LEFT ATRIUM SIZE: 4.86 CM
LEFT ATRIUM VOLUME INDEX: 65.3 ML/M2
LEFT ATRIUM VOLUME: 117.48 CM3
LEFT INTERNAL DIMENSION IN SYSTOLE: 3.69 CM (ref 2.1–4)
LEFT VENTRICLE DIASTOLIC VOLUME INDEX: 50.73 ML/M2
LEFT VENTRICLE DIASTOLIC VOLUME: 91.31 ML
LEFT VENTRICLE MASS INDEX: 114 G/M2
LEFT VENTRICLE SYSTOLIC VOLUME INDEX: 32 ML/M2
LEFT VENTRICLE SYSTOLIC VOLUME: 57.66 ML
LEFT VENTRICULAR INTERNAL DIMENSION IN DIASTOLE: 4.48 CM (ref 3.5–6)
LEFT VENTRICULAR MASS: 205.09 G
LV LATERAL E/E' RATIO: 16.33 M/S
LV SEPTAL E/E' RATIO: 24.5 M/S
LVOT MG: 2 MMHG
LVOT MV: 0.67 CM/S
LYMPHOCYTES # BLD AUTO: 1.5 K/UL (ref 1–4.8)
LYMPHOCYTES # BLD AUTO: 1.6 K/UL (ref 1–4.8)
LYMPHOCYTES NFR BLD: 7.9 % (ref 18–48)
LYMPHOCYTES NFR BLD: 9 % (ref 18–48)
MCH RBC QN AUTO: 26.7 PG (ref 27–31)
MCH RBC QN AUTO: 27.3 PG (ref 27–31)
MCHC RBC AUTO-ENTMCNC: 30.5 G/DL (ref 32–36)
MCHC RBC AUTO-ENTMCNC: 31 G/DL (ref 32–36)
MCV RBC AUTO: 87 FL (ref 82–98)
MCV RBC AUTO: 88 FL (ref 82–98)
MONOCYTES # BLD AUTO: 0.7 K/UL (ref 0.3–1)
MONOCYTES # BLD AUTO: 0.9 K/UL (ref 0.3–1)
MONOCYTES NFR BLD: 3.6 % (ref 4–15)
MONOCYTES NFR BLD: 5.4 % (ref 4–15)
MV PEAK A VEL: 0.51 M/S
MV PEAK E VEL: 0.49 M/S
MV STENOSIS PRESSURE HALF TIME: 39.64 MS
MV VALVE AREA P 1/2 METHOD: 5.55 CM2
NEUTROPHILS # BLD AUTO: 13.3 K/UL (ref 1.8–7.7)
NEUTROPHILS # BLD AUTO: 16.8 K/UL (ref 1.8–7.7)
NEUTROPHILS NFR BLD: 82.3 % (ref 38–73)
NEUTROPHILS NFR BLD: 85.1 % (ref 38–73)
NRBC BLD-RTO: 0 /100 WBC
NRBC BLD-RTO: 0 /100 WBC
PCO2 BLDA: 28.6 MMHG (ref 35–45)
PH SMN: 7.46 [PH] (ref 7.35–7.45)
PLATELET # BLD AUTO: 578 K/UL (ref 150–450)
PLATELET # BLD AUTO: 646 K/UL (ref 150–450)
PMV BLD AUTO: 9.2 FL (ref 9.2–12.9)
PMV BLD AUTO: 9.4 FL (ref 9.2–12.9)
PO2 BLDA: 98 MMHG (ref 80–100)
POC BE: -3 MMOL/L
POC SATURATED O2: 98 % (ref 95–100)
POC TCO2: 21 MMOL/L (ref 23–27)
POCT GLUCOSE: 108 MG/DL (ref 70–110)
POCT GLUCOSE: 137 MG/DL (ref 70–110)
POCT GLUCOSE: 84 MG/DL (ref 70–110)
POTASSIUM SERPL-SCNC: 4.3 MMOL/L (ref 3.5–5.1)
POTASSIUM SERPL-SCNC: 4.5 MMOL/L (ref 3.5–5.1)
PROT SERPL-MCNC: 8.6 G/DL (ref 6–8.4)
PROT SERPL-MCNC: 9 G/DL (ref 6–8.4)
RA MAJOR: 5.1 CM
RA WIDTH: 4.2 CM
RBC # BLD AUTO: 3.3 M/UL (ref 4.6–6.2)
RBC # BLD AUTO: 3.56 M/UL (ref 4.6–6.2)
RIGHT VENTRICULAR END-DIASTOLIC DIMENSION: 4.74 CM
RV TISSUE DOPPLER FREE WALL SYSTOLIC VELOCITY 1 (APICAL 4 CHAMBER VIEW): 0.01 CM/S
SAMPLE: ABNORMAL
SINUS: 2.88 CM
SITE: ABNORMAL
SODIUM SERPL-SCNC: 136 MMOL/L (ref 136–145)
SODIUM SERPL-SCNC: 138 MMOL/L (ref 136–145)
TDI LATERAL: 0.03 M/S
TDI SEPTAL: 0.02 M/S
TDI: 0.03 M/S
TRICUSPID ANNULAR PLANE SYSTOLIC EXCURSION: 1.88 CM
TROPONIN I SERPL DL<=0.01 NG/ML-MCNC: 0.04 NG/ML (ref 0–0.03)
VANCOMYCIN SERPL-MCNC: 21.5 UG/ML
WBC # BLD AUTO: 16.14 K/UL (ref 3.9–12.7)
WBC # BLD AUTO: 19.76 K/UL (ref 3.9–12.7)

## 2022-10-10 PROCEDURE — 99223 PR INITIAL HOSPITAL CARE,LEVL III: ICD-10-PCS | Mod: ,,, | Performed by: SURGERY

## 2022-10-10 PROCEDURE — 86706 HEP B SURFACE ANTIBODY: CPT | Performed by: STUDENT IN AN ORGANIZED HEALTH CARE EDUCATION/TRAINING PROGRAM

## 2022-10-10 PROCEDURE — 82803 BLOOD GASES ANY COMBINATION: CPT

## 2022-10-10 PROCEDURE — 25000003 PHARM REV CODE 250: Performed by: INTERNAL MEDICINE

## 2022-10-10 PROCEDURE — 36415 COLL VENOUS BLD VENIPUNCTURE: CPT | Performed by: ANESTHESIOLOGY

## 2022-10-10 PROCEDURE — 36415 COLL VENOUS BLD VENIPUNCTURE: CPT | Performed by: STUDENT IN AN ORGANIZED HEALTH CARE EDUCATION/TRAINING PROGRAM

## 2022-10-10 PROCEDURE — 20000000 HC ICU ROOM

## 2022-10-10 PROCEDURE — 80053 COMPREHEN METABOLIC PANEL: CPT | Performed by: ANESTHESIOLOGY

## 2022-10-10 PROCEDURE — 86920 COMPATIBILITY TEST SPIN: CPT | Performed by: PHYSICIAN ASSISTANT

## 2022-10-10 PROCEDURE — 86901 BLOOD TYPING SEROLOGIC RH(D): CPT | Performed by: ANESTHESIOLOGY

## 2022-10-10 PROCEDURE — 80053 COMPREHEN METABOLIC PANEL: CPT | Mod: 91 | Performed by: STUDENT IN AN ORGANIZED HEALTH CARE EDUCATION/TRAINING PROGRAM

## 2022-10-10 PROCEDURE — 80100016 HC MAINTENANCE HEMODIALYSIS

## 2022-10-10 PROCEDURE — 80202 ASSAY OF VANCOMYCIN: CPT | Performed by: STUDENT IN AN ORGANIZED HEALTH CARE EDUCATION/TRAINING PROGRAM

## 2022-10-10 PROCEDURE — 84484 ASSAY OF TROPONIN QUANT: CPT | Performed by: STUDENT IN AN ORGANIZED HEALTH CARE EDUCATION/TRAINING PROGRAM

## 2022-10-10 PROCEDURE — 86920 COMPATIBILITY TEST SPIN: CPT | Performed by: ANESTHESIOLOGY

## 2022-10-10 PROCEDURE — 87340 HEPATITIS B SURFACE AG IA: CPT | Performed by: STUDENT IN AN ORGANIZED HEALTH CARE EDUCATION/TRAINING PROGRAM

## 2022-10-10 PROCEDURE — 83605 ASSAY OF LACTIC ACID: CPT | Performed by: STUDENT IN AN ORGANIZED HEALTH CARE EDUCATION/TRAINING PROGRAM

## 2022-10-10 PROCEDURE — 36600 WITHDRAWAL OF ARTERIAL BLOOD: CPT

## 2022-10-10 PROCEDURE — 99900035 HC TECH TIME PER 15 MIN (STAT)

## 2022-10-10 PROCEDURE — 25500020 PHARM REV CODE 255: Performed by: STUDENT IN AN ORGANIZED HEALTH CARE EDUCATION/TRAINING PROGRAM

## 2022-10-10 PROCEDURE — 85025 COMPLETE CBC W/AUTO DIFF WBC: CPT | Performed by: ANESTHESIOLOGY

## 2022-10-10 PROCEDURE — 99291 CRITICAL CARE FIRST HOUR: CPT | Mod: ,,, | Performed by: INTERNAL MEDICINE

## 2022-10-10 PROCEDURE — 63600175 PHARM REV CODE 636 W HCPCS: Performed by: STUDENT IN AN ORGANIZED HEALTH CARE EDUCATION/TRAINING PROGRAM

## 2022-10-10 PROCEDURE — 85025 COMPLETE CBC W/AUTO DIFF WBC: CPT | Mod: 91 | Performed by: STUDENT IN AN ORGANIZED HEALTH CARE EDUCATION/TRAINING PROGRAM

## 2022-10-10 PROCEDURE — 99291 PR CRITICAL CARE, E/M 30-74 MINUTES: ICD-10-PCS | Mod: ,,, | Performed by: INTERNAL MEDICINE

## 2022-10-10 PROCEDURE — 25000003 PHARM REV CODE 250: Performed by: STUDENT IN AN ORGANIZED HEALTH CARE EDUCATION/TRAINING PROGRAM

## 2022-10-10 PROCEDURE — 99223 1ST HOSP IP/OBS HIGH 75: CPT | Mod: ,,, | Performed by: SURGERY

## 2022-10-10 RX ADMIN — MUPIROCIN: 20 OINTMENT TOPICAL at 09:10

## 2022-10-10 RX ADMIN — INSULIN DETEMIR 10 UNITS: 100 INJECTION, SOLUTION SUBCUTANEOUS at 09:10

## 2022-10-10 RX ADMIN — HYDROCODONE BITARTRATE AND ACETAMINOPHEN 1 TABLET: 5; 325 TABLET ORAL at 07:10

## 2022-10-10 RX ADMIN — HYDROCODONE BITARTRATE AND ACETAMINOPHEN 1 TABLET: 5; 325 TABLET ORAL at 08:10

## 2022-10-10 RX ADMIN — IOHEXOL 75 ML: 350 INJECTION, SOLUTION INTRAVENOUS at 06:10

## 2022-10-10 RX ADMIN — ATORVASTATIN CALCIUM 40 MG: 40 TABLET, FILM COATED ORAL at 09:10

## 2022-10-10 RX ADMIN — PIPERACILLIN AND TAZOBACTAM 4.5 G: 4; .5 INJECTION, POWDER, LYOPHILIZED, FOR SOLUTION INTRAVENOUS; PARENTERAL at 02:10

## 2022-10-10 NOTE — PROGRESS NOTES
[facility]  Nephrology  Progress Note     Patient Name: Adryan Neff  MRN: [id number]  Admission Date: 10/6/2022  Hospital Length of Stay: 2 days  Attending Provider: Lee Mccormack III, MD   Primary Care Physician: Rasheeda Jose NP  Principal Problem: PAD (peripheral artery disease)    Subjective:       Interval History: Seen at the bedside no event overnight       Review of patient's allergies indicates:  No Known Allergies   Current Facility-Administered Medications   Medication Frequency    acetaminophen tablet 650 mg Q8H PRN    acetaminophen tablet 650 mg Q4H PRN    amLODIPine tablet 10 mg Daily    aspirin chewable tablet 81 mg Daily    atorvastatin tablet 40 mg QHS    clopidogreL tablet 75 mg Daily    dextrose 10% bolus 125 mL PRN    dextrose 10% bolus 250 mL PRN    glucagon (human recombinant) injection 1 mg PRN    glucose chewable tablet 16 g PRN    glucose chewable tablet 24 g PRN    heparin (porcine) injection 5,000 Units Q8H    hydrALAZINE injection 10 mg Q8H PRN    HYDROcodone-acetaminophen 5-325 mg per tablet 1 tablet Q6H PRN    insulin aspart U-100 pen 0-5 Units QID (AC + HS) PRN    insulin detemir U-100 pen 10 Units QHS    metoprolol succinate (TOPROL-XL) 24 hr tablet 25 mg Daily    mupirocin 2 % ointment BID    naloxone 0.4 mg/mL injection 0.02 mg PRN    ondansetron disintegrating tablet 8 mg Q8H PRN    pantoprazole EC tablet 40 mg Daily    piperacillin-tazobactam 4.5 g in dextrose 5 % 100 mL IVPB (ready to mix system) Q12H    sevelamer carbonate tablet 800 mg TID WM    sodium chloride 0.9% bolus 250 mL PRN    sodium chloride 0.9% flush 10 mL PRN    sodium chloride 0.9% flush 10 mL Q12H PRN    traMADoL tablet 50 mg Q4H PRN    vancomycin - pharmacy to dose pharmacy to manage frequency       Objective:     Vital Signs (Most Recent):  Temp: 98 °F (36.7 °C) (10/08/22 1105)  Pulse: 69 (10/08/22 1105)  Resp: 18 (10/08/22 1105)  BP: (!) 116/51 (10/08/22 1105)  SpO2: 99 % (10/08/22 1105)  O2  Device (Oxygen Therapy): room air (10/08/22 0919)   Vital Signs (24h Range):  Temp:  [97.9 °F (36.6 °C)-98.4 °F (36.9 °C)] 98 °F (36.7 °C)  Pulse:  [69-88] 69  Resp:  [17-18] 18  SpO2:  [97 %-100 %] 99 %  BP: (103-169)/(44-72) 116/51   Weight: 70.2 kg (154 lb 12.2 oz) (10/08/22 0600)  Body mass index is 21.59 kg/m².  Body surface area is 1.88 meters squared.      Intake/Output Summary (Last 24 hours) at 10/8/2022 1244  Last data filed at 10/8/2022 0422  Gross per 24 hour   Intake 740 ml   Output 1500 ml   Net -760 ml     I/O last 3 completed shifts:  In: 860 [P.O.:360; Other:500]  Out: 1500 [Other:1500]  Net IO Since Admission: -290 mL [10/08/22 1244]     Physical Exam  Constitutional:       Appearance: He is ill-appearing.   Cardiovascular:      Rate and Rhythm: Normal rate.      Pulses: Normal pulses.   Pulmonary:      Effort: Pulmonary effort is normal.   Abdominal:      General: Abdomen is flat.      Palpations: Abdomen is soft.   Skin:     General: Skin is warm.   Neurological:      Mental Status: He is alert and oriented to person, place, and time.   Psychiatric:         Mood and Affect: Mood normal.       Recent Labs   Lab 10/06/22  1512 10/07/22  1151   WBC 17.41* 10.61   HGB 9.7* 8.6*   HCT 32.1* 27.5*   * 418   MONO 7.2  1.3* 3.2*  0.3      Recent Labs   Lab 10/06/22  1512      K 4.0   CL 97   CO2 26   BUN 29*   CREATININE 9.1*   CALCIUM 9.7   PROT 9.4*   BILITOT 0.3   ALKPHOS 85   ALT <5*   AST 10      Recent Labs     10/06/22  1641   PH 7.400   PCO2 46.3*   PO2 32*   HCO3 28.7*   POCSATURATED 62*   BE 3       Assessment/Plan:       PAD (peripheral artery disease)    Controlled type 2 diabetes mellitus with chronic kidney disease on chronic dialysis, with long-term current use of insulin    Essential (primary) hypertension    Pure hypercholesterolemia    Gastroesophageal reflux disease    Chronic diastolic heart failure    CAD (coronary artery disease) - non-obstructive from [facility] in 2016     ESRD on hemodialysis    Wet gangrene       End-Stage Renal Disease on HD  - Will provide dialysis for metabolic clearance and volume management  - Seen and examined today during hemodialysis; tolerating treatment well without issues. Denied headaches, chest pain, abdominal pain, or muscle cramps   - Target ultrafiltration 1-2 l   - Dialysate adjusted to current labs   - Avoid nephrotoxic medications  - Medications to renal parameters  - Strict Input and Output and chart  - Daily standing weights      Anemia of Chronic Kidney Disease   - Hb > 7 gm/dL  - Will resume LUPE as outpatient   - Adequate iron stores as per most recent profile   - Will request iron studies to assess further needs of supplementation     Mineral Bone Disease in CKD   - Renal Function Panel Daily for electrolytes monitoring  - Already on binders as outpatient Please continue binder       HTN   - will continue to monitor. Goal for BP is <140 mmHg SBP and BDP <90 mmHg, maintain MAP > 65.    Nutrition   - Renal Diet                Thank you for your consult. I will follow-up with patient. Please contact us if you have any additional questions.    Beka Davila MD  Nephrology

## 2022-10-10 NOTE — SIGNIFICANT EVENT
Code blue called overhead. I reported to the patient bedside and pt was breathing and staring straight ahead minimally responsive. Pt had defecated on himself and there were blood soaked sheets around with bloody bandaging noted at fistula sight on the left arm. Pt mentation slowly improved. Pt iv access was not working and no knew access could be obtained. ABG with ph 7.45. BG 80. Anesthesia paged and US guided iv placed in the right arm. Labs including cmp, cbc, troponin ordered and pending.  According to nursing staff they came to the bedside and pt was not breathing shortly after returning from dialysis. Pt was checked for pulse and none was found. Compressions were then started and pt quickly started to gasp for breath. Compressions stopped and pt found to have regained pulses.     Pt mentation improved and bp with sbp>110.    On telemetry review pt appeared to be in sinus tachycardia leading up to the arrest. Could have PEA resulting in need for cpr.     EKG with sinus tachycardia rate of 106 with new onset rbbb. Cardiology consulted and Echo pending in ICU.    Of note pt has been intermittently refusing his heparin shots while inpatient. We have ordered CTA chest to evaluate for PE.    On entering the ICU pt noted to be hypotensive and 500 ml ns bolus ordered.   Lab results notable for wbc 19, h&h 9 and 29, co2 19, k 4.3, glucose 79, la 2.7. Troponin still pending.    Critical care time spent on the evaluation and treatment of severe organ dysfunction, review of pertinent labs and imaging studies, discussions with consulting providers and discussions with patient/family: 75 minutes.

## 2022-10-10 NOTE — SUBJECTIVE & OBJECTIVE
Interval History: Pt states he is doing ok today. Pt denies cp, sob, fever, chills, n/v, abd pain at this time. Pt understands plan for surgery this morning.     Review of Systems  Objective:     Vital Signs (Most Recent):  Temp: 98.3 °F (36.8 °C) (10/10/22 0810)  Pulse: 79 (10/10/22 0810)  Resp: 16 (10/10/22 0814)  BP: (!) 122/52 (10/10/22 0810)  SpO2: 98 % (10/10/22 0810)   Vital Signs (24h Range):  Temp:  [96.9 °F (36.1 °C)-98.9 °F (37.2 °C)] 98.3 °F (36.8 °C)  Pulse:  [70-85] 79  Resp:  [16-18] 16  SpO2:  [96 %-100 %] 98 %  BP: (101-138)/(47-63) 122/52     Weight: 70.2 kg (154 lb 12.2 oz)  Body mass index is 21.59 kg/m².    Intake/Output Summary (Last 24 hours) at 10/10/2022 0855  Last data filed at 10/10/2022 0648  Gross per 24 hour   Intake 720 ml   Output 200 ml   Net 520 ml      Physical Exam  Vitals reviewed.   Constitutional:       General: He is not in acute distress.  HENT:      Head: Normocephalic and atraumatic.      Mouth/Throat:      Mouth: Mucous membranes are moist.      Pharynx: Oropharynx is clear.   Eyes:      General: No scleral icterus.     Extraocular Movements: Extraocular movements intact.   Cardiovascular:      Rate and Rhythm: Normal rate and regular rhythm.   Pulmonary:      Effort: Pulmonary effort is normal. No respiratory distress.   Musculoskeletal:      Cervical back: Neck supple. No rigidity.   Skin:     General: Skin is warm and dry.   Neurological:      General: No focal deficit present.      Mental Status: He is alert and oriented to person, place, and time.   Psychiatric:         Mood and Affect: Mood normal.         Behavior: Behavior normal.       Significant Labs: All pertinent labs within the past 24 hours have been reviewed.    Significant Imaging: I have reviewed all pertinent imaging results/findings within the past 24 hours.

## 2022-10-10 NOTE — ASSESSMENT & PLAN NOTE
No significant CAD on cath 2016  Variable LVEF, was 55% in Dec 2021->35% in Mar 2022.  Repeat echo pending.

## 2022-10-10 NOTE — HPI
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 The patient presented to the vascular surgery clinic for a follow up. He reports worsening pain and developing eschar of the right dorsal foot along with foul smelling odor. Given concern for an infection and the need for IV antibiotics & expedited podiatry evaluation, the patient was sent to the ER. In ed labs with leukocytosis, normocytic anemia (9.7), elevated sed rate (125), elevated CRP (237.2), negative lactate (1.6). X-ray foot showed no radiographic evidence of osteomyelitis.Pt started on vancomycin and zosyn for possible infection. Initially evaluated by podiatry who recommended ortho consult for possible amputation. Amputation to be done on 10/10. ID also on board for abx recommendations.     Cardiology is consulted for assistance status post cardiac arrest.      The patient is seen on the telemetry floor prior to moving to the ICU.  Case discussed with Dr. Mccormack.  The patient is not able to provide much in the way of useful history at this point.  He does seem to have some encephalopathy after the event today, but is breathing on his own and hemodynamically stable.  Review of the chart, including telemetry, notes sinus rhythm on telemetry during the event.  The nursing staff state that the patient was unresponsive and pulseless.  This suggest pulses electrical activity.  The differential diagnosis of this includes electrolyte abnormalities, as well as pulmonary thromboembolism.  His EKG notes a new right bundle and left anterior hemiblock.  There is no ST elevation.  There is no evidence of high-degree AV block.  At this time, I recommended we check echocardiogram, initiate IV heparin, and transfer the patient to the unit.  There was plan for right lower extremity AKA for gas gangrene which will need to  hold off on in order to make sure his cardiovascular status has been stabilized.

## 2022-10-10 NOTE — PROGRESS NOTES
Rapid response team just called for pt. Overall Condition is currently unknown, but at least one round of chest compressions were performed. Pt was reportedly noted to have bleeding per rectum and from the UE.  Will hold off on AKA at this time. Case cancelled today and no plans for surgery tomorrow. Will follow overall status before planning to proceed

## 2022-10-10 NOTE — SUBJECTIVE & OBJECTIVE
Past Medical History:   Diagnosis Date    CAD (coronary artery disease) 2016    CAD (non obstructive) 2016 [facility]    Diabetes mellitus type I     Diabetic retinopathy     ESRD on hemodialysis     on HD TThSat    Gangrene of left foot     Hypertension     Nuclear sclerosis of right eye 11/24/2021    PAD (peripheral artery disease)     Pulmonary HTN     Right foot infection     TB lung, latent 04/2021       Past Surgical History:   Procedure Laterality Date    ABOVE-KNEE AMPUTATION Left 1/18/2022    Procedure: AMPUTATION, ABOVE KNEE;  Surgeon: Rusty Light MD;  Location: [facility] OR;  Service: Orthopedics;  Laterality: Left;    ABOVE-KNEE AMPUTATION Left 1/21/2022    Procedure: AMPUTATION, ABOVE KNEE;  Surgeon: Rusty Light MD;  Location: [facility] OR;  Service: Orthopedics;  Laterality: Left;    AV FISTULA PLACEMENT      CATARACT EXTRACTION Left     EYE SURGERY      FISTULOGRAM Left 8/9/2022    Procedure: Fistulogram;  Surgeon: Masoud Soni MD;  Location: [facility] OR;  Service: Vascular;  Laterality: Left;  LEFT VM ON DAUGHTER'S PHONE----PHONE PREOP NOT COMPLETED  CALLED PATIENT ON 8/8/2022 @ 3:34. HE STATED HE IS RESCHEDULING PROCEDURE. NOTIFIED ERWIN @ 3:35PM-LO    TOE AMPUTATION Left 1/6/2022    Procedure: AMPUTATION, TOE;  Surgeon: Masoud Soni MD;  Location: [facility] OR;  Service: Vascular;  Laterality: Left;  Left first through fifth toes, possible open transmetatarsal amputation and all other indicated procedures       Review of patient's allergies indicates:  No Known Allergies    No current facility-administered medications on file prior to encounter.     Current Outpatient Medications on File Prior to Encounter   Medication Sig    traMADoL (ULTRAM) 50 mg tablet Take 50 mg by mouth every 6 (six) hours as needed for Pain.    amLODIPine (NORVASC) 10 MG tablet Take 1 tablet (10 mg total) by mouth once daily.    amLODIPine (NORVASC) 10 MG tablet Take 1 tablet by mouth.    aspirin 81 MG Chew Take 81 mg  "by mouth once daily.    aspirin 81 MG Chew Take 1 tablet by mouth.    atorvastatin (LIPITOR) 20 MG tablet Take 1 tablet by mouth.    clopidogreL (PLAVIX) 75 mg tablet Take 75 mg by mouth once daily.    doxycycline (VIBRAMYCIN) 100 MG Cap Take 1 capsule (100 mg total) by mouth every 12 (twelve) hours.    insulin glargine 100 units/mL (3mL) SubQ pen Inject 15 Units into the skin every evening.    insulin glargine 100 units/mL (3mL) SubQ pen Inject 15 Units into the skin every evening.    lancets (ACCU-CHEK SOFTCLIX LANCETS) Misc 1 each by Misc.(Non-Drug; Combo Route) route once daily at 6am.    methoxy peg-epoetin beta (MIRCERA INJ) 50 mcg.    methoxy peg-epoetin beta (MIRCERA INJ) 30 mcg.    methoxy peg-epoetin beta (MIRCERA INJ) 75 mcg.    metoprolol succinate (TOPROL-XL) 25 MG 24 hr tablet Take 1 tablet by mouth.    omeprazole (PRILOSEC) 20 MG capsule TAKE 2 CAPSULES(40 MG) BY MOUTH EVERY DAY (Patient taking differently: Take 20 mg by mouth once daily.)    omeprazole (PRILOSEC) 40 MG capsule Take by mouth.    omeprazole (PRILOSEC) 40 MG capsule Take 40 mg by mouth once daily.    pen needle, diabetic 32 gauge x 5/32" Ndle To use with insulin pen 4x daily    pravastatin (PRAVACHOL) 20 MG tablet Take 20 mg by mouth once daily.    RENVELA 800 mg Tab Take 800 mg by mouth 3 (three) times daily with meals.    sevelamer carbonate (RENVELA) 800 mg Tab Take 2 tablets by mouth.    traMADoL (ULTRAM) 50 mg tablet Take 1 tablet by mouth.     Family History       Problem Relation (Age of Onset)    No Known Problems Mother, Father, Sister, Brother, Daughter, Daughter, Daughter, Brother, Maternal Aunt, Maternal Uncle, Paternal Aunt, Paternal Uncle, Maternal Grandmother, Maternal Grandfather, Paternal Grandmother, Paternal Grandfather          Tobacco Use    Smoking status: Never    Smokeless tobacco: Never   Substance and Sexual Activity    Alcohol use: No    Drug use: No    Sexual activity: Yes     Partners: Female     Review of " Systems   Unable to perform ROS: Acuity of condition   Objective:     Vital Signs (Most Recent):  Temp: 98.3 °F (36.8 °C) (10/10/22 0810)  Pulse: (!) 115 (once pulse returned 115) (10/10/22 1624)  Resp: 16 (10/10/22 0814)  BP: 115/70 (10/10/22 1604)  SpO2: 98 % (10/10/22 0810)   Vital Signs (24h Range):  Temp:  [96.9 °F (36.1 °C)-98.9 °F (37.2 °C)] 98.3 °F (36.8 °C)  Pulse:  [] 115  Resp:  [16-18] 16  SpO2:  [96 %-100 %] 98 %  BP: ()/(47-70) 115/70     Weight: 70.2 kg (154 lb 12.2 oz)  Body mass index is 21.59 kg/m².    SpO2: 98 %  O2 Device (Oxygen Therapy): room air      Intake/Output Summary (Last 24 hours) at 10/10/2022 1722  Last data filed at 10/10/2022 1443  Gross per 24 hour   Intake 860 ml   Output 2700 ml   Net -1840 ml       Lines/Drains/Airways       Central Venous Catheter Line  Duration                  Hemodialysis Catheter 01/14/22 1439 right internal jugular 269 days              Drain  Duration                  Hemodialysis AV Fistula Left upper arm -- days              Peripheral Intravenous Line  Duration                  Peripheral IV - Single Lumen 10/06/22 1510 20 G Anterior;Distal;Right Upper Arm 4 days                    Physical Exam  Constitutional:       General: He is not in acute distress.  HENT:      Head: Normocephalic and atraumatic.   Eyes:      General:         Right eye: No discharge.         Left eye: No discharge.      Extraocular Movements: Extraocular movements intact.      Pupils: Pupils are equal, round, and reactive to light.   Cardiovascular:      Rate and Rhythm: Regular rhythm. Tachycardia present.      Heart sounds: S1 normal and S2 normal. Heart sounds are distant. No murmur heard.    No friction rub. No gallop.   Pulmonary:      Effort: Pulmonary effort is normal. No respiratory distress.      Breath sounds: Normal breath sounds. No wheezing or rales.   Abdominal:      General: There is no distension.      Palpations: Abdomen is soft.   Musculoskeletal:       Cervical back: Normal range of motion. No rigidity.   Skin:     General: Skin is warm.      Coloration: Skin is not jaundiced.   Neurological:      General: No focal deficit present.      Comments: Post-code, UTO.   Psychiatric:      Comments: UTO       Current Medications:   amLODIPine  10 mg Oral Daily    aspirin  81 mg Oral Daily    atorvastatin  40 mg Oral QHS    insulin detemir U-100  10 Units Subcutaneous QHS    metoprolol succinate  25 mg Oral Daily    mupirocin   Nasal BID    pantoprazole  40 mg Oral Daily    piperacillin-tazobactam (ZOSYN) IVPB  4.5 g Intravenous Q12H    sevelamer carbonate  800 mg Oral TID WM       acetaminophen, acetaminophen, dextrose 10%, dextrose 10%, glucagon (human recombinant), glucose, glucose, hydrALAZINE, HYDROcodone-acetaminophen, insulin aspart U-100, naloxone, ondansetron, sodium chloride 0.9%, sodium chloride 0.9%, sodium chloride 0.9%, traMADoL, Pharmacy to dose Vancomycin consult **AND** vancomycin - pharmacy to dose    Laboratory (all labs reviewed):  CBC:  Recent Labs   Lab 09/22/22  1238 10/06/22  1512 10/07/22  1151 10/10/22  1225 10/10/22  1650   WBC 9.63 17.41 H 10.61 16.14 H 19.76 H   Hemoglobin 11.0 L 9.7 L 8.6 L 9.5 L 9.0 L   Hematocrit 38.4 L 32.1 L 27.5 L 31.1 L 29.0 L   Platelets 369 464 H 418 578 H 646 H       CHEMISTRIES:  Recent Labs   Lab 01/21/22  0422 01/22/22  0526 01/23/22  0532 01/24/22  1202 01/25/22  0349 03/16/22  1754 10/06/22  1512 10/10/22  1225   Glucose 128 H 122 H 153 H 303 H 149 H 138 H 211 H 79   Sodium 131 L 133 L 133 L  --  134 L 140 136 136   Potassium 5.1 5.7 H 4.5  --  4.2 4.9 4.0 4.3   BUN 39 H 55 H 34 H  --  25 H 42 H 29 H 34 H   Creatinine 9.1 H 11.6 H 7.9 H  --  5.7 H 9.5 H 9.1 H 8.8 H   eGFR if  7 A 5 A 8 A  --  12 A 6 A  --   --    eGFR if non  6 A 4 A 7 A  --  10 A 5 A  --   --    Calcium 9.7 9.2 9.3  --  9.1 9.3 9.7 9.4   Magnesium  --   --   --   --   --   --  2.1  --        CARDIAC  BIOMARKERS:  Recent Labs   Lab 10/25/20  1951 10/25/20  2323 10/26/20  0413 01/18/22  1417   Troponin I 0.088 H 0.079 H 0.141 H 0.142 H       COAGS:  Recent Labs   Lab 01/07/21  1003 01/05/22  1232 10/06/22  1512   INR 1.0 1.0 1.1       LIPIDS/LFTS:  Recent Labs   Lab 01/07/21  1003 05/19/21  1016 01/22/22  0526 01/23/22  0532 03/16/22  1754 10/06/22  1512 10/10/22  1225   Cholesterol 186  --   --   --   --   --   --    Triglycerides 123  --   --   --   --   --   --    HDL 67  --   --   --   --   --   --    LDL Cholesterol 94.4  --   --   --   --   --   --    Non-HDL Cholesterol 119  --   --   --   --   --   --    AST 9 L   < > 24 27 12 10 8 L   ALT <5 L   < > 9 L <5 L 19 <5 L 5 L    < > = values in this interval not displayed.       BNP:  Recent Labs   Lab 10/25/20  1951   BNP 1,359 H       TSH:        Free T4:        Diagnostic Results:  ECG (personally reviewed and interpreted tracing(s)):  10/6/22 1440 SR 91, inf ST abnl ?isch, similar to 1/18/22  10/10/22 1600 tele (during code) SR with single PVC (suggesting PEA)  10/10/22 1642 , RBBB/LAD    CTA Chest: pending    RLE art US 10/7/22  High-grade stenosis of the distal right popliteal artery.  Less than 50% diameter stenosis of the distal right common femoral artery.    Echo: 3/23/22 (care everywhere, repeat ordered).  EF 55% on report 12/2021.    Normal left ventricular cavity size.     Moderately decreased left ventricular systolic function.     Left ventricular ejection fraction is estimated at 35-40%.     Grade II/IV diastolic dysfunction, moderately elevated filling pressures.     Normal right ventricular systolic function.     Moderately increased left atrial size.     Severe mitral annular calcification.     Mild-moderate mitral valve regurgitation.     Structurally normal trileaflet aortic valve.     Mild tricuspid valve regurgitation.     Stress Test: L MPI 2/4/21    Equivocal myocardial perfusion scan.    There is a moderate intensity, large  sized, equivocal defect that is mostly fixed with some reversible areas in the basal to apical lateral wall(s). This finding is equivocal due to diaphragm shadow.    Consider PET stress testing if clinically indicated.    The gated perfusion images showed an ejection fraction of 41% at rest. The gated perfusion images showed an ejection fraction of 38% post stress. Normal ejection fraction is greater than 51%.    There is mild global hypokinesis at rest and stress.    LV cavity size is mildly enlarged at rest and mildly enlarged at stress.    The EKG portion of this study is abnormal but not diagnostic.    The patient reported no chest pain during the stress test.    There were no arrhythmias during stress.     Cath: 4/1/16 (care everywhere)  · Co-dominant coronary system  · Left main: large caliber with non-obstructive disease.   · Left anterior descending: large caliber that wraps around the apex. It gives a small-mod diagonal branch. All with non-obstructive disease.  · Circumflex: large caliber and gives two OM branches and small PDA. All with non-obstructive disease.   · Right coronary artery: large caliber with non obstructive disease  Left heart catheterization:  · Ao: 125/68 mmHg  · LV: 125/7 mmHg  · LVEDP 7 mmHg  IMPRESSIONS:   · Non-obstructive coronary artery disease.

## 2022-10-10 NOTE — CONSULTS
[place] - Intensive Care  Cardiology  Consult Note    Patient Name: Adryan Neff  MRN: [id number]  Admission Date: 10/6/2022  Hospital Length of Stay: 4 days  Code Status: Full Code   Attending Provider: Lee Mccormack III, MD   Consulting Provider: Víctor Sutton MD  Primary Care Physician: Rasheeda Jose NP  Principal Problem:PAD (peripheral artery disease)    Patient information was obtained from primary team.     Inpatient consult to Cardiology  Consult performed by: Víctor Sutton MD  Consult ordered by: Lee Mccormack III, MD  Reason for consult: Cardiac arrest        Subjective:     Chief Complaint:  RLE gas gangrene     HPI:   56M with pmh of severe pad s/p gangangrene of left foot s/p left AKA (1/2022), ESRD on HD MWF, NICM (EF: 55%, GIDD), pulmonary hypertension, GERD and latent TB filiberto presents for concern for leg infection. The patient presented to the vascular surgery clinic for a follow up. He reports worsening pain and developing eschar of the right dorsal foot along with foul smelling odor. Given concern for an infection and the need for IV antibiotics & expedited podiatry evaluation, the patient was sent to the ER. In ed labs with leukocytosis, normocytic anemia (9.7), elevated sed rate (125), elevated CRP (237.2), negative lactate (1.6). X-ray foot showed no radiographic evidence of osteomyelitis.Pt started on vancomycin and zosyn for possible infection. Initially evaluated by podiatry who recommended ortho consult for possible amputation. Amputation to be done on 10/10. ID also on board for abx recommendations.     Cardiology is consulted for assistance status post cardiac arrest.      The patient is seen on the telemetry floor prior to moving to the ICU.  Case discussed with Dr. Mccormack.  The patient is not able to provide much in the way of useful history at this point.  He does seem to have some encephalopathy after the event today, but is breathing on his own and hemodynamically  stable.  Review of the chart, including telemetry, notes sinus rhythm on telemetry during the event.  The nursing staff state that the patient was unresponsive and pulseless.  This suggest pulses electrical activity.  The differential diagnosis of this includes electrolyte abnormalities, as well as pulmonary thromboembolism.  His EKG notes a new right bundle and left anterior hemiblock.  There is no ST elevation.  There is no evidence of high-degree AV block.  At this time, I recommended we check echocardiogram, initiate IV heparin, and transfer the patient to the unit.  There was plan for right lower extremity AKA for gas gangrene which will need to hold off on in order to make sure his cardiovascular status has been stabilized.      Past Medical History:   Diagnosis Date    CAD (coronary artery disease) 2016    CAD (non obstructive) 2016 [facility]    Diabetes mellitus type I     Diabetic retinopathy     ESRD on hemodialysis     on HD TThSat    Gangrene of left foot     Hypertension     Nuclear sclerosis of right eye 11/24/2021    PAD (peripheral artery disease)     Pulmonary HTN     Right foot infection     TB lung, latent 04/2021       Past Surgical History:   Procedure Laterality Date    ABOVE-KNEE AMPUTATION Left 1/18/2022    Procedure: AMPUTATION, ABOVE KNEE;  Surgeon: Rusty Light MD;  Location: [facility] OR;  Service: Orthopedics;  Laterality: Left;    ABOVE-KNEE AMPUTATION Left 1/21/2022    Procedure: AMPUTATION, ABOVE KNEE;  Surgeon: Rusty Light MD;  Location: [facility] OR;  Service: Orthopedics;  Laterality: Left;    AV FISTULA PLACEMENT      CATARACT EXTRACTION Left     EYE SURGERY      FISTULOGRAM Left 8/9/2022    Procedure: Fistulogram;  Surgeon: Masoud Soni MD;  Location: [facility] OR;  Service: Vascular;  Laterality: Left;  LEFT VM ON DAUGHTER'S PHONE----PHONE PREOP NOT COMPLETED  CALLED PATIENT ON 8/8/2022 @ 3:34. HE STATED HE IS RESCHEDULING PROCEDURE. NOTIFIED ERWIN @ 3:35PM-LO  "   TOE AMPUTATION Left 1/6/2022    Procedure: AMPUTATION, TOE;  Surgeon: Masoud Soni MD;  Location: [facility] OR;  Service: Vascular;  Laterality: Left;  Left first through fifth toes, possible open transmetatarsal amputation and all other indicated procedures       Review of patient's allergies indicates:  No Known Allergies    No current facility-administered medications on file prior to encounter.     Current Outpatient Medications on File Prior to Encounter   Medication Sig    traMADoL (ULTRAM) 50 mg tablet Take 50 mg by mouth every 6 (six) hours as needed for Pain.    amLODIPine (NORVASC) 10 MG tablet Take 1 tablet (10 mg total) by mouth once daily.    amLODIPine (NORVASC) 10 MG tablet Take 1 tablet by mouth.    aspirin 81 MG Chew Take 81 mg by mouth once daily.    aspirin 81 MG Chew Take 1 tablet by mouth.    atorvastatin (LIPITOR) 20 MG tablet Take 1 tablet by mouth.    clopidogreL (PLAVIX) 75 mg tablet Take 75 mg by mouth once daily.    doxycycline (VIBRAMYCIN) 100 MG Cap Take 1 capsule (100 mg total) by mouth every 12 (twelve) hours.    insulin glargine 100 units/mL (3mL) SubQ pen Inject 15 Units into the skin every evening.    insulin glargine 100 units/mL (3mL) SubQ pen Inject 15 Units into the skin every evening.    lancets (ACCU-CHEK SOFTCLIX LANCETS) Misc 1 each by Misc.(Non-Drug; Combo Route) route once daily at 6am.    methoxy peg-epoetin beta (MIRCERA INJ) 50 mcg.    methoxy peg-epoetin beta (MIRCERA INJ) 30 mcg.    methoxy peg-epoetin beta (MIRCERA INJ) 75 mcg.    metoprolol succinate (TOPROL-XL) 25 MG 24 hr tablet Take 1 tablet by mouth.    omeprazole (PRILOSEC) 20 MG capsule TAKE 2 CAPSULES(40 MG) BY MOUTH EVERY DAY (Patient taking differently: Take 20 mg by mouth once daily.)    omeprazole (PRILOSEC) 40 MG capsule Take by mouth.    omeprazole (PRILOSEC) 40 MG capsule Take 40 mg by mouth once daily.    pen needle, diabetic 32 gauge x 5/32" Ndle To use with insulin pen 4x " daily    pravastatin (PRAVACHOL) 20 MG tablet Take 20 mg by mouth once daily.    RENVELA 800 mg Tab Take 800 mg by mouth 3 (three) times daily with meals.    sevelamer carbonate (RENVELA) 800 mg Tab Take 2 tablets by mouth.    traMADoL (ULTRAM) 50 mg tablet Take 1 tablet by mouth.     Family History       Problem Relation (Age of Onset)    No Known Problems Mother, Father, Sister, Brother, Daughter, Daughter, Daughter, Brother, Maternal Aunt, Maternal Uncle, Paternal Aunt, Paternal Uncle, Maternal Grandmother, Maternal Grandfather, Paternal Grandmother, Paternal Grandfather          Tobacco Use    Smoking status: Never    Smokeless tobacco: Never   Substance and Sexual Activity    Alcohol use: No    Drug use: No    Sexual activity: Yes     Partners: Female     Review of Systems   Unable to perform ROS: Acuity of condition   Objective:     Vital Signs (Most Recent):  Temp: 98.3 °F (36.8 °C) (10/10/22 0810)  Pulse: (!) 115 (once pulse returned 115) (10/10/22 1624)  Resp: 16 (10/10/22 0814)  BP: 115/70 (10/10/22 1604)  SpO2: 98 % (10/10/22 0810)   Vital Signs (24h Range):  Temp:  [96.9 °F (36.1 °C)-98.9 °F (37.2 °C)] 98.3 °F (36.8 °C)  Pulse:  [] 115  Resp:  [16-18] 16  SpO2:  [96 %-100 %] 98 %  BP: ()/(47-70) 115/70     Weight: 70.2 kg (154 lb 12.2 oz)  Body mass index is 21.59 kg/m².    SpO2: 98 %  O2 Device (Oxygen Therapy): room air      Intake/Output Summary (Last 24 hours) at 10/10/2022 1722  Last data filed at 10/10/2022 1443  Gross per 24 hour   Intake 860 ml   Output 2700 ml   Net -1840 ml       Lines/Drains/Airways       Central Venous Catheter Line  Duration                  Hemodialysis Catheter 01/14/22 1439 right internal jugular 269 days              Drain  Duration                  Hemodialysis AV Fistula Left upper arm -- days              Peripheral Intravenous Line  Duration                  Peripheral IV - Single Lumen 10/06/22 1510 20 G Anterior;Distal;Right Upper Arm 4 days                     Physical Exam  Constitutional:       General: He is not in acute distress.  HENT:      Head: Normocephalic and atraumatic.   Eyes:      General:         Right eye: No discharge.         Left eye: No discharge.      Extraocular Movements: Extraocular movements intact.      Pupils: Pupils are equal, round, and reactive to light.   Cardiovascular:      Rate and Rhythm: Regular rhythm. Tachycardia present.      Heart sounds: S1 normal and S2 normal. Heart sounds are distant. No murmur heard.    No friction rub. No gallop.   Pulmonary:      Effort: Pulmonary effort is normal. No respiratory distress.      Breath sounds: Normal breath sounds. No wheezing or rales.   Abdominal:      General: There is no distension.      Palpations: Abdomen is soft.   Musculoskeletal:      Cervical back: Normal range of motion. No rigidity.   Skin:     General: Skin is warm.      Coloration: Skin is not jaundiced.   Neurological:      General: No focal deficit present.      Comments: Post-code, UTO.   Psychiatric:      Comments: UTO       Current Medications:   amLODIPine  10 mg Oral Daily    aspirin  81 mg Oral Daily    atorvastatin  40 mg Oral QHS    insulin detemir U-100  10 Units Subcutaneous QHS    metoprolol succinate  25 mg Oral Daily    mupirocin   Nasal BID    pantoprazole  40 mg Oral Daily    piperacillin-tazobactam (ZOSYN) IVPB  4.5 g Intravenous Q12H    sevelamer carbonate  800 mg Oral TID WM       acetaminophen, acetaminophen, dextrose 10%, dextrose 10%, glucagon (human recombinant), glucose, glucose, hydrALAZINE, HYDROcodone-acetaminophen, insulin aspart U-100, naloxone, ondansetron, sodium chloride 0.9%, sodium chloride 0.9%, sodium chloride 0.9%, traMADoL, Pharmacy to dose Vancomycin consult **AND** vancomycin - pharmacy to dose    Laboratory (all labs reviewed):  CBC:  Recent Labs   Lab 09/22/22  1238 10/06/22  1512 10/07/22  1151 10/10/22  1225 10/10/22  1650   WBC 9.63 17.41 H 10.61 16.14 H  19.76 H   Hemoglobin 11.0 L 9.7 L 8.6 L 9.5 L 9.0 L   Hematocrit 38.4 L 32.1 L 27.5 L 31.1 L 29.0 L   Platelets 369 464 H 418 578 H 646 H       CHEMISTRIES:  Recent Labs   Lab 01/21/22  0422 01/22/22  0526 01/23/22  0532 01/24/22  1202 01/25/22  0349 03/16/22  1754 10/06/22  1512 10/10/22  1225   Glucose 128 H 122 H 153 H 303 H 149 H 138 H 211 H 79   Sodium 131 L 133 L 133 L  --  134 L 140 136 136   Potassium 5.1 5.7 H 4.5  --  4.2 4.9 4.0 4.3   BUN 39 H 55 H 34 H  --  25 H 42 H 29 H 34 H   Creatinine 9.1 H 11.6 H 7.9 H  --  5.7 H 9.5 H 9.1 H 8.8 H   eGFR if  7 A 5 A 8 A  --  12 A 6 A  --   --    eGFR if non  6 A 4 A 7 A  --  10 A 5 A  --   --    Calcium 9.7 9.2 9.3  --  9.1 9.3 9.7 9.4   Magnesium  --   --   --   --   --   --  2.1  --        CARDIAC BIOMARKERS:  Recent Labs   Lab 10/25/20  1951 10/25/20  2323 10/26/20  0413 01/18/22  1417   Troponin I 0.088 H 0.079 H 0.141 H 0.142 H       COAGS:  Recent Labs   Lab 01/07/21  1003 01/05/22  1232 10/06/22  1512   INR 1.0 1.0 1.1       LIPIDS/LFTS:  Recent Labs   Lab 01/07/21  1003 05/19/21  1016 01/22/22  0526 01/23/22  0532 03/16/22  1754 10/06/22  1512 10/10/22  1225   Cholesterol 186  --   --   --   --   --   --    Triglycerides 123  --   --   --   --   --   --    HDL 67  --   --   --   --   --   --    LDL Cholesterol 94.4  --   --   --   --   --   --    Non-HDL Cholesterol 119  --   --   --   --   --   --    AST 9 L   < > 24 27 12 10 8 L   ALT <5 L   < > 9 L <5 L 19 <5 L 5 L    < > = values in this interval not displayed.       BNP:  Recent Labs   Lab 10/25/20  1951   BNP 1,359 H       TSH:        Free T4:        Diagnostic Results:  ECG (personally reviewed and interpreted tracing(s)):  10/6/22 1440 SR 91, inf ST abnl ?isch, similar to 1/18/22  10/10/22 1600 tele (during code) SR with single PVC (suggesting PEA)  10/10/22 1642 , RBBB/LAD    CTA Chest: pending    RLE art US 10/7/22  High-grade stenosis of the distal right  popliteal artery.  Less than 50% diameter stenosis of the distal right common femoral artery.    Echo: 3/23/22 (care everywhere, repeat ordered).  EF 55% on report 12/2021.    Normal left ventricular cavity size.     Moderately decreased left ventricular systolic function.     Left ventricular ejection fraction is estimated at 35-40%.     Grade II/IV diastolic dysfunction, moderately elevated filling pressures.     Normal right ventricular systolic function.     Moderately increased left atrial size.     Severe mitral annular calcification.     Mild-moderate mitral valve regurgitation.     Structurally normal trileaflet aortic valve.     Mild tricuspid valve regurgitation.     Stress Test: L MPI 2/4/21    Equivocal myocardial perfusion scan.    There is a moderate intensity, large sized, equivocal defect that is mostly fixed with some reversible areas in the basal to apical lateral wall(s). This finding is equivocal due to diaphragm shadow.    Consider PET stress testing if clinically indicated.    The gated perfusion images showed an ejection fraction of 41% at rest. The gated perfusion images showed an ejection fraction of 38% post stress. Normal ejection fraction is greater than 51%.    There is mild global hypokinesis at rest and stress.    LV cavity size is mildly enlarged at rest and mildly enlarged at stress.    The EKG portion of this study is abnormal but not diagnostic.    The patient reported no chest pain during the stress test.    There were no arrhythmias during stress.     Cath: 4/1/16 (care everywhere)  · Co-dominant coronary system  · Left main: large caliber with non-obstructive disease.   · Left anterior descending: large caliber that wraps around the apex. It gives a small-mod diagonal branch. All with non-obstructive disease.  · Circumflex: large caliber and gives two OM branches and small PDA. All with non-obstructive disease.   · Right coronary artery: large caliber with non  obstructive disease  Left heart catheterization:  · Ao: 125/68 mmHg  · LV: 125/7 mmHg  · LVEDP 7 mmHg  IMPRESSIONS:   · Non-obstructive coronary artery disease.    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 Soni).    PEA (Pulseless electrical activity)  Pt returned from HD on afternoon 10/10/22  Subsequently noted to be pulseless and unresponsive  Review of tele during this time notes SR  The above is consistent with PEA  ROSC achieved with brief CPR, no other ACLS per report.  DDx wide, includes PE, ACS, electrolyte abnormalities.  Suggest IV heparin, echo, CTA chest.    Wet gangrene  As per PAD section    NICM (nonischemic cardiomyopathy)  No significant CAD on cath 2016  Variable LVEF, was 55% in Dec 2021->35% in Mar 2022.  Repeat echo pending.     Essential (primary) hypertension  Cont med rx    Controlled type 2 diabetes mellitus with chronic kidney disease on chronic dialysis, with long-term current use of insulin  Per IM    Chronic diastolic heart failure  Does not appear grossly vol overloaded    ESRD on hemodialysis  Per IM/neph    Pure hypercholesterolemia  Cont statin        VTE Risk Mitigation (From admission, onward)         Ordered     IP VTE HIGH RISK PATIENT  Once         10/06/22 1819     Place sequential compression device  Until discontinued         10/06/22 1819              Critical care time 45min.  Case d/w RN staff and Dr. Mccormack.    Thank you for your consult. I will follow-up with patient. Please contact us if you have any additional questions.    Víctor Sutton MD  Cardiology   [facility] [place] [facility] [facility]

## 2022-10-10 NOTE — PLAN OF CARE
No significant events overnight. Pain well controlled.  Problem: Adult Inpatient Plan of Care  Goal: Plan of Care Review  Outcome: Ongoing, Progressing  Goal: Patient-Specific Goal (Individualized)  Outcome: Ongoing, Progressing  Goal: Absence of Hospital-Acquired Illness or Injury  Outcome: Ongoing, Progressing  Goal: Optimal Comfort and Wellbeing  Outcome: Ongoing, Progressing  Goal: Readiness for Transition of Care  Outcome: Ongoing, Progressing     Problem: Skin Injury Risk Increased  Goal: Skin Health and Integrity  Outcome: Ongoing, Progressing     Problem: Fall Injury Risk  Goal: Absence of Fall and Fall-Related Injury  Outcome: Ongoing, Progressing     Problem: Diabetes Comorbidity  Goal: Blood Glucose Level Within Targeted Range  Outcome: Ongoing, Progressing     Problem: Infection  Goal: Absence of Infection Signs and Symptoms  Outcome: Ongoing, Progressing     Problem: Impaired Wound Healing  Goal: Optimal Wound Healing  Outcome: Ongoing, Progressing     Problem: Device-Related Complication Risk (Hemodialysis)  Goal: Safe, Effective Therapy Delivery  Outcome: Ongoing, Progressing     Problem: Hemodynamic Instability (Hemodialysis)  Goal: Effective Tissue Perfusion  Outcome: Ongoing, Progressing     Problem: Infection (Hemodialysis)  Goal: Absence of Infection Signs and Symptoms  Outcome: Ongoing, Progressing

## 2022-10-10 NOTE — NURSING
Pt arrived from the floor. NAD noted. . Attached to monitor, bed in lowest position and wheels locked, call bell within reach. Primary language Paraguayan-Creole, with minimal English.

## 2022-10-10 NOTE — PROGRESS NOTES
[facility] Medicine  Progress Note    Patient Name: Adryan Neff  MRN: [id number]  Patient Class: IP- Inpatient   Admission Date: 10/6/2022  Length of Stay: 4 days  Attending Physician: Lee Mccormack III, MD  Primary Care Provider: Rasheeda Jose NP        Subjective:     Principal Problem:PAD (peripheral artery disease)        HPI:  This is a 56 year old male with a PMHx of severe PAD s/p gangrene of left foot s/p left AKA (1/2022), ESRD on HD MWF, NICM (EF: 55%, GIDD), pulmonary hypertension, GERD and latent TB filiberto presents for concern for leg infection.       The patient presented to the vascular surgery clinic for a follow up. He reports worsening pain and developing eschar of the right dorsal foot along with foul smelling odor. Given concern for an infection and the need for IV antibiotics & expedited podiatry evaluation, the patient was sent to the ER. The patient denies having any fevers, chills, or injury to his foot. He has a previous AKA and uses a wheelchair to ambulate. While in the ED, the patient was hypertensive but otherwise hemodynamically stable. Labs showed leukocytosis (17.41 - with neutrophilic predominance), normocytic anemia (9.7), elevated sed rate (125), elevated CRP (237.2), negative lactate (1.6). X-ray foot showed no radiographic evidence of osteomyelitis. He was administered vancomycin, zosyn and was admitted for further management.     I attempted to call the daughter to obtain further history on 1902 without a response.         Overview/Hospital Course:  56M with pmh of severe pad s/p gangangrene of left foot s/p left AKA (1/2022), ESRD on HD MWF, NICM (EF: 55%, GIDD), pulmonary hypertension, GERD and latent TB filiberto presents for concern for leg infection. The patient presented to the vascular surgery clinic for a follow up. He reports worsening pain and developing eschar of the right dorsal foot along with foul smelling odor. Given concern for an infection and  the need for IV antibiotics & expedited podiatry evaluation, the patient was sent to the ER. In ed labs with leukocytosis, normocytic anemia (9.7), elevated sed rate (125), elevated CRP (237.2), negative lactate (1.6). X-ray foot showed no radiographic evidence of osteomyelitis.Pt started on vancomycin and zosyn for possible infection. Initially evaluated by podiatry who recommended ortho consult for possible amputation. Amputation to be done on 10/10. ID also on board for abx recommendations.       Interval History: Pt states he is doing ok today. Pt denies cp, sob, fever, chills, n/v, abd pain at this time. Pt understands plan for surgery this morning.     Review of Systems  Objective:     Vital Signs (Most Recent):  Temp: 98.3 °F (36.8 °C) (10/10/22 0810)  Pulse: 79 (10/10/22 0810)  Resp: 16 (10/10/22 0814)  BP: (!) 122/52 (10/10/22 0810)  SpO2: 98 % (10/10/22 0810)   Vital Signs (24h Range):  Temp:  [96.9 °F (36.1 °C)-98.9 °F (37.2 °C)] 98.3 °F (36.8 °C)  Pulse:  [70-85] 79  Resp:  [16-18] 16  SpO2:  [96 %-100 %] 98 %  BP: (101-138)/(47-63) 122/52     Weight: 70.2 kg (154 lb 12.2 oz)  Body mass index is 21.59 kg/m².    Intake/Output Summary (Last 24 hours) at 10/10/2022 0855  Last data filed at 10/10/2022 0648  Gross per 24 hour   Intake 720 ml   Output 200 ml   Net 520 ml      Physical Exam  Vitals reviewed.   Constitutional:       General: He is not in acute distress.  HENT:      Head: Normocephalic and atraumatic.      Mouth/Throat:      Mouth: Mucous membranes are moist.      Pharynx: Oropharynx is clear.   Eyes:      General: No scleral icterus.     Extraocular Movements: Extraocular movements intact.   Cardiovascular:      Rate and Rhythm: Normal rate and regular rhythm.   Pulmonary:      Effort: Pulmonary effort is normal. No respiratory distress.   Musculoskeletal:      Cervical back: Neck supple. No rigidity.   Skin:     General: Skin is warm and dry.   Neurological:      General: No focal deficit  present.      Mental Status: He is alert and oriented to person, place, and time.   Psychiatric:         Mood and Affect: Mood normal.         Behavior: Behavior normal.       Significant Labs: All pertinent labs within the past 24 hours have been reviewed.    Significant Imaging: I have reviewed all pertinent imaging results/findings within the past 24 hours.      Assessment/Plan:      * PAD (peripheral artery disease)  - History of severe PAD   - C/b LEFT foot gangrene s/p left AKA   - Now presenting with right foot gangrene with concern of superimposed infection   - Home medications: aspirin, plavix and statin  - RCRI score of 4    Plan;   - Vascular surgery, podiatry consultation   - Podiatry recommending bka/aka. Ortho consulted, surgery 10/10  - Vancomycin/Zosyn for concern for superimposed infection   - Infectious disease consult   - Resume home medications     Wet gangrene  As stated above in PAD      ESRD on hemodialysis  - Dialysis type: iHD  - Access: L AVF  - Schedule: MWF    Plan:  - Nephrology consult for dialysis management  - Daily weights to guide UF  - Continue dialysis MWF    CAD (coronary artery disease) - non-obstructive from [facility] in 2016  - Resume aspirin, statin       Chronic diastolic heart failure  - Echocardiogram (11/24/2021): EF: 55%, GIDD, mild TR, moderate to severe MR  - Compensated on physical examination, on room air   - Resume home medications     Gastroesophageal reflux disease  - Resume PPI     Pure hypercholesterolemia  - Resume statin     Essential (primary) hypertension  - Resume home medications (amlodipine 10 mg, Toprol-XL 50 mg)     Controlled type 2 diabetes mellitus with chronic kidney disease on chronic dialysis, with long-term current use of insulin  - Last A1c:   Lab Results   Component Value Date    HGBA1C 6.1 (H) 01/07/2021     - Home regimen: Lantus 15 units nightly, decreased due to hypoglycemia in am of 10/8.  - Continue home regimen   - Low-sensitivity sliding  scale insulin  - Initiate and maintain glycemic protocol, monitor POC glucose   - Follow up with PCP as outpatient      VTE Risk Mitigation (From admission, onward)         Ordered     heparin (porcine) injection 5,000 Units  Every 8 hours         10/06/22 1819     IP VTE HIGH RISK PATIENT  Once         10/06/22 1819     Place sequential compression device  Until discontinued         10/06/22 1819                Discharge Planning   ENIO:      Code Status: Full Code   Is the patient medically ready for discharge?:     Reason for patient still in hospital (select all that apply): Treatment  Discharge Plan A: Home with family                  Lee Mccormack III, MD  Department of Hospital Medicine   [facility] [place] [facility]

## 2022-10-10 NOTE — ASSESSMENT & PLAN NOTE
Pt returned from HD on afternoon 10/10/22  Subsequently noted to be pulseless and unresponsive  Review of tele during this time notes SR  The above is consistent with PEA  ROSC achieved with brief CPR, no other ACLS per report.  DDx wide, includes PE, ACS, electrolyte abnormalities.  Suggest IV heparin, echo, CTA chest.

## 2022-10-10 NOTE — PLAN OF CARE
Chart reviewed - No fevers documented overnight. Blcx remain ngtd. Tentative plan for R AKA today with ortho.     Recommendations:  -please send clean margin path/cultures, if source control achieved, pt may not need prolonged course of abx therapy  -continue current broad spectrum abx while awaiting new cx data

## 2022-10-10 NOTE — ASSESSMENT & PLAN NOTE
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 Soni).

## 2022-10-10 NOTE — PLAN OF CARE
GI PRE-PROCEDURE NOTE    REQUESTING PROVIDER:  LISSETH Lynne MD    CHIEF COMPLAINT:  Egd       SUBJECTIVE:    Favius Humes is a 39 year old male   See GI consult      MEDICATIONS:       Current Outpatient Medications   Medication Sig Dispense Refill   • ciprofloxacin (Cipro) 500 MG tablet Take 1 tablet by mouth 2 times daily. 28 tablet 1   • ketoconazole (NIZORAL) 2 % shampoo APPLY BID TO BACK AND FLANKS ALLOW TO ABSORB FOR 5 MIN PRIOR TO RINSING     • econazole (SPECTAZOLE) 1 % cream SAMPSON AA ON BACK AND FLANKS BID     • doxycycline hyclate (VIBRAMYCIN) 100 MG capsule TAKE 1 CAPSULE BY MOUTH TWICE A DAY WITH FOOD AND WATER     • clindamycin (CLEOCIN T) 1 % lotion SAMPSON TO CYST BID     • amoxicillin-clavulanate (Augmentin) 875-125 MG per tablet Take 1 tablet by mouth every 12 hours. Take with food. 20 tablet 0   • ibuprofen (MOTRIN) 800 MG tablet Take 1 tablet by mouth every 6 hours as needed for Pain. 30 tablet 1     No current facility-administered medications for this visit.        PROBLEM LIST:                  Patient Active Problem List   Diagnosis   • Lower back pain       HISTORIES:    ALLERGIES:   Allergen Reactions   • Watermelon   (Food Or Med) Other (See Comments)     No past medical history on file.  Past Surgical History:   Procedure Laterality Date   • Achilles tendon surgery       Social History     Tobacco Use   • Smoking status: Never Smoker   • Smokeless tobacco: Never Used   Substance Use Topics   • Alcohol use: Yes   • Drug use: Not Currently          REVIEW OF SYSTEMS:   Review of Systems   All other systems reviewed and are negative.         PHYSICAL EXAM:      Physical Exam   Constitutional: He is oriented to person, place, and time. He appears well-developed and well-nourished.   HENT:   Head: Normocephalic and atraumatic.   Neck: Normal range of motion. Neck supple.   Cardiovascular: Normal rate, regular rhythm and normal heart sounds.   Pulmonary/Chest: Effort normal and breath sounds normal.  [place] - Telemetry  Discharge Reassessment    Primary Care Provider: Rasheeda Jose NP    Expected Discharge Date: Pending    CM met with pt at bedside to follow up on dc needs. CM will continue to follow up.    Reassessment (most recent)       Discharge Reassessment - 10/10/22 1628          Discharge Reassessment    Assessment Type Discharge Planning Reassessment (P)      Did the patient's condition or plan change since previous assessment? No (P)      Discharge Plan discussed with: Patient (P)      Discharge Plan A Home with family (P)      Discharge Plan B Other (P)    tbd    DME Needed Upon Discharge  other (see comments) (P)    tbd    Discharge Barriers Identified None (P)      Why the patient remains in the hospital Requires continued medical care (P)         Post-Acute Status    Coverage Medicare AB (P)      Discharge Delays None known at this time (P)                           Abdominal: Soft. Bowel sounds are normal.   Neurological: He is alert and oriented to person, place, and time.   Skin: Skin is warm and dry.         ASSESSMENT:       1. Atypical chest pain  2. Odynophagia      PLAN:       EGD at [facility]        3/8/2021  David Comer MD

## 2022-10-10 NOTE — PROGRESS NOTES
Surgery moved to tomorrow morning. Ok to eat. NPO at midnight. Hold heparin until after surgery. Will still need femur xray.

## 2022-10-10 NOTE — PROGRESS NOTES
Pharmacokinetic Assessment Follow Up: IV Vancomycin    Vancomycin serum concentration assessment(s):    The random level was drawn correctly and can be used to guide therapy at this time. The measurement is above the desired definitive target range of 10 to 20 mcg/mL.    Vancomycin Regimen Plan:    No dose today.  Re-dose when the random level is less than 20 mcg/mL, next level to be drawn at 0400 on 10/12/2022    Drug levels (last 3 results):  Recent Labs   Lab Result Units 10/07/22  1151 10/10/22  0339   Vancomycin, Random ug/mL 16.9 21.5       Pharmacy will continue to follow and monitor vancomycin.    Please contact pharmacy at extension [phone] for questions regarding this assessment.    Thank you for the consult,   Evan Rosa Jr       Patient brief summary:  Adryan Neff is a 56 y.o. male initiated on antimicrobial therapy with IV Vancomycin for treatment of skin & soft tissue infection    Drug Allergies:   Review of patient's allergies indicates:  No Known Allergies    Actual Body Weight:   70.2 kg    Renal Function:   Estimated Creatinine Clearance: 8.9 mL/min (A) (based on SCr of 9.1 mg/dL (H)).,     Dialysis Method (if applicable):  intermittent HD    CBC (last 72 hours):  Recent Labs   Lab Result Units 10/07/22  1151   WBC K/uL 10.61   Hemoglobin g/dL 8.6*   Hematocrit % 27.5*   Platelets K/uL 418   Gran % % 86.7*   Lymph % % 8.0*   Mono % % 3.2*   Eosinophil % % 1.3   Basophil % % 0.3   Differential Method  Automated       Metabolic Panel (last 72 hours):  No results for input(s): SODIUM, POTASSIUM, CHLORIDE, CO2, GLUCOSE, BUN BLD, CREATININE, ALBUMIN, BILIRUBIN TOTAL, ALK PHOS, AST, ALT, MAGNESIUM, PHOSPHORUS in the last 72 hours.    Vancomycin Administrations:  vancomycin given in the last 96 hours                     vancomycin 500 mg in dextrose 5 % 100 mL IVPB (ready to mix system) (mg) 500 mg New Bag 10/07/22 1530    vancomycin in dextrose 5 % 1 gram/250 mL IVPB 1,000 mg (mg) 1,000 mg New Bag  10/06/22 1525                    Microbiologic Results:  Microbiology Results (last 7 days)       Procedure Component Value Units Date/Time    Blood culture #1 **CANNOT BE ORDERED STAT** [[id number]] Collected: 10/06/22 1511    Order Status: Completed Specimen: Blood from Peripheral, Upper Arm, Right Updated: 10/09/22 2103     Blood Culture, Routine No Growth to date      No Growth to date      No Growth to date      No Growth to date    Blood culture #2 **CANNOT BE ORDERED STAT** [[id number]] Collected: 10/06/22 1536    Order Status: Completed Specimen: Blood from Peripheral, Hand, Right Updated: 10/09/22 2103     Blood Culture, Routine No Growth to date      No Growth to date      No Growth to date      No Growth to date

## 2022-10-10 NOTE — NURSING
"10/10/22 1116 Patient to dialysis at this time.     10/10/22 1514 Patient back from dialysis at this time. Patient alert and oriented. AVF site clean, dry and intact.     10/10/22 1555 Patient's son called nurse into room. Son states "My dad is bleeding." Patient found to be lying right side covered in blood. Nurse applied to pressure to left AVF site. Nurse called for a vitals machine, gauze. Charge nurse at bedside.      10/10/22 1600 Code blue called at this time.     10/10/22 1657 Nurse called report at this time to ICU.  "

## 2022-10-11 LAB
ALBUMIN SERPL BCP-MCNC: 2.2 G/DL (ref 3.5–5.2)
ALP SERPL-CCNC: 62 U/L (ref 55–135)
ALT SERPL W/O P-5'-P-CCNC: 6 U/L (ref 10–44)
ANION GAP SERPL CALC-SCNC: 17 MMOL/L (ref 8–16)
AST SERPL-CCNC: 16 U/L (ref 10–40)
BASOPHILS # BLD AUTO: 0.08 K/UL (ref 0–0.2)
BASOPHILS NFR BLD: 0.3 % (ref 0–1.9)
BILIRUB SERPL-MCNC: 0.4 MG/DL (ref 0.1–1)
BUN SERPL-MCNC: 34 MG/DL (ref 6–20)
CALCIUM SERPL-MCNC: 9.3 MG/DL (ref 8.7–10.5)
CHLORIDE SERPL-SCNC: 96 MMOL/L (ref 95–110)
CO2 SERPL-SCNC: 18 MMOL/L (ref 23–29)
CREAT SERPL-MCNC: 9.5 MG/DL (ref 0.5–1.4)
DIFFERENTIAL METHOD: ABNORMAL
EOSINOPHIL # BLD AUTO: 0.2 K/UL (ref 0–0.5)
EOSINOPHIL NFR BLD: 0.7 % (ref 0–8)
ERYTHROCYTE [DISTWIDTH] IN BLOOD BY AUTOMATED COUNT: 14.8 % (ref 11.5–14.5)
EST. GFR  (NO RACE VARIABLE): 6 ML/MIN/1.73 M^2
GLUCOSE SERPL-MCNC: 245 MG/DL (ref 70–110)
HCT VFR BLD AUTO: 30 % (ref 40–54)
HGB BLD-MCNC: 8.8 G/DL (ref 14–18)
IMM GRANULOCYTES # BLD AUTO: 0.29 K/UL (ref 0–0.04)
IMM GRANULOCYTES NFR BLD AUTO: 1.2 % (ref 0–0.5)
LYMPHOCYTES # BLD AUTO: 1.5 K/UL (ref 1–4.8)
LYMPHOCYTES NFR BLD: 6 % (ref 18–48)
MAGNESIUM SERPL-MCNC: 2.2 MG/DL (ref 1.6–2.6)
MCH RBC QN AUTO: 26.4 PG (ref 27–31)
MCHC RBC AUTO-ENTMCNC: 29.3 G/DL (ref 32–36)
MCV RBC AUTO: 90 FL (ref 82–98)
MONOCYTES # BLD AUTO: 1.1 K/UL (ref 0.3–1)
MONOCYTES NFR BLD: 4.6 % (ref 4–15)
NEUTROPHILS # BLD AUTO: 21.7 K/UL (ref 1.8–7.7)
NEUTROPHILS NFR BLD: 87.2 % (ref 38–73)
NRBC BLD-RTO: 0 /100 WBC
PHOSPHATE SERPL-MCNC: 6.6 MG/DL (ref 2.7–4.5)
PLATELET # BLD AUTO: 674 K/UL (ref 150–450)
PLATELET BLD QL SMEAR: ABNORMAL
PMV BLD AUTO: 9.5 FL (ref 9.2–12.9)
POCT GLUCOSE: 212 MG/DL (ref 70–110)
POCT GLUCOSE: 249 MG/DL (ref 70–110)
POCT GLUCOSE: 255 MG/DL (ref 70–110)
POTASSIUM SERPL-SCNC: 5.2 MMOL/L (ref 3.5–5.1)
PROT SERPL-MCNC: 9.2 G/DL (ref 6–8.4)
RBC # BLD AUTO: 3.33 M/UL (ref 4.6–6.2)
SODIUM SERPL-SCNC: 131 MMOL/L (ref 136–145)
TOXIC GRANULES BLD QL SMEAR: PRESENT
TROPONIN I SERPL DL<=0.01 NG/ML-MCNC: 0.27 NG/ML (ref 0–0.03)
WBC # BLD AUTO: 24.87 K/UL (ref 3.9–12.7)

## 2022-10-11 PROCEDURE — 63600175 PHARM REV CODE 636 W HCPCS: Performed by: STUDENT IN AN ORGANIZED HEALTH CARE EDUCATION/TRAINING PROGRAM

## 2022-10-11 PROCEDURE — 99232 SBSQ HOSP IP/OBS MODERATE 35: CPT | Mod: ,,, | Performed by: SURGERY

## 2022-10-11 PROCEDURE — 99233 SBSQ HOSP IP/OBS HIGH 50: CPT | Mod: ,,, | Performed by: STUDENT IN AN ORGANIZED HEALTH CARE EDUCATION/TRAINING PROGRAM

## 2022-10-11 PROCEDURE — 99233 PR SUBSEQUENT HOSPITAL CARE,LEVL III: ICD-10-PCS | Mod: ,,, | Performed by: STUDENT IN AN ORGANIZED HEALTH CARE EDUCATION/TRAINING PROGRAM

## 2022-10-11 PROCEDURE — 83735 ASSAY OF MAGNESIUM: CPT | Performed by: STUDENT IN AN ORGANIZED HEALTH CARE EDUCATION/TRAINING PROGRAM

## 2022-10-11 PROCEDURE — 84484 ASSAY OF TROPONIN QUANT: CPT | Performed by: INTERNAL MEDICINE

## 2022-10-11 PROCEDURE — 84100 ASSAY OF PHOSPHORUS: CPT | Performed by: STUDENT IN AN ORGANIZED HEALTH CARE EDUCATION/TRAINING PROGRAM

## 2022-10-11 PROCEDURE — 87040 BLOOD CULTURE FOR BACTERIA: CPT | Mod: 59 | Performed by: STUDENT IN AN ORGANIZED HEALTH CARE EDUCATION/TRAINING PROGRAM

## 2022-10-11 PROCEDURE — 25000003 PHARM REV CODE 250: Performed by: STUDENT IN AN ORGANIZED HEALTH CARE EDUCATION/TRAINING PROGRAM

## 2022-10-11 PROCEDURE — 85025 COMPLETE CBC W/AUTO DIFF WBC: CPT | Performed by: INTERNAL MEDICINE

## 2022-10-11 PROCEDURE — 25000003 PHARM REV CODE 250: Performed by: INTERNAL MEDICINE

## 2022-10-11 PROCEDURE — 99232 PR SUBSEQUENT HOSPITAL CARE,LEVL II: ICD-10-PCS | Mod: ,,, | Performed by: SURGERY

## 2022-10-11 PROCEDURE — 99291 CRITICAL CARE FIRST HOUR: CPT | Mod: ,,, | Performed by: INTERNAL MEDICINE

## 2022-10-11 PROCEDURE — 21400001 HC TELEMETRY ROOM

## 2022-10-11 PROCEDURE — 99291 PR CRITICAL CARE, E/M 30-74 MINUTES: ICD-10-PCS | Mod: ,,, | Performed by: INTERNAL MEDICINE

## 2022-10-11 PROCEDURE — 80053 COMPREHEN METABOLIC PANEL: CPT | Performed by: INTERNAL MEDICINE

## 2022-10-11 RX ORDER — SEVELAMER CARBONATE 800 MG/1
2400 TABLET, FILM COATED ORAL
Status: DISCONTINUED | OUTPATIENT
Start: 2022-10-11 | End: 2022-11-10 | Stop reason: HOSPADM

## 2022-10-11 RX ADMIN — Medication 1 TABLET: at 12:10

## 2022-10-11 RX ADMIN — TRAMADOL HYDROCHLORIDE 50 MG: 50 TABLET, COATED ORAL at 08:10

## 2022-10-11 RX ADMIN — ATORVASTATIN CALCIUM 40 MG: 40 TABLET, FILM COATED ORAL at 08:10

## 2022-10-11 RX ADMIN — MUPIROCIN: 20 OINTMENT TOPICAL at 08:10

## 2022-10-11 RX ADMIN — INSULIN ASPART 1 UNITS: 100 INJECTION, SOLUTION INTRAVENOUS; SUBCUTANEOUS at 08:10

## 2022-10-11 RX ADMIN — AMLODIPINE BESYLATE 10 MG: 5 TABLET ORAL at 08:10

## 2022-10-11 RX ADMIN — PIPERACILLIN AND TAZOBACTAM 4.5 G: 4; .5 INJECTION, POWDER, LYOPHILIZED, FOR SOLUTION INTRAVENOUS; PARENTERAL at 02:10

## 2022-10-11 RX ADMIN — SEVELAMER CARBONATE 800 MG: 800 TABLET, FILM COATED ORAL at 08:10

## 2022-10-11 RX ADMIN — HYDROCODONE BITARTRATE AND ACETAMINOPHEN 1 TABLET: 5; 325 TABLET ORAL at 04:10

## 2022-10-11 RX ADMIN — METOPROLOL SUCCINATE 25 MG: 25 TABLET, EXTENDED RELEASE ORAL at 08:10

## 2022-10-11 RX ADMIN — SEVELAMER CARBONATE 2400 MG: 800 TABLET, FILM COATED ORAL at 11:10

## 2022-10-11 RX ADMIN — PANTOPRAZOLE SODIUM 40 MG: 40 TABLET, DELAYED RELEASE ORAL at 08:10

## 2022-10-11 RX ADMIN — ASPIRIN 81 MG CHEWABLE TABLET 81 MG: 81 TABLET CHEWABLE at 08:10

## 2022-10-11 RX ADMIN — PIPERACILLIN AND TAZOBACTAM 4.5 G: 4; .5 INJECTION, POWDER, LYOPHILIZED, FOR SOLUTION INTRAVENOUS; PARENTERAL at 04:10

## 2022-10-11 RX ADMIN — INSULIN ASPART 3 UNITS: 100 INJECTION, SOLUTION INTRAVENOUS; SUBCUTANEOUS at 11:10

## 2022-10-11 RX ADMIN — HYDROCODONE BITARTRATE AND ACETAMINOPHEN 1 TABLET: 5; 325 TABLET ORAL at 09:10

## 2022-10-11 RX ADMIN — INSULIN DETEMIR 10 UNITS: 100 INJECTION, SOLUTION SUBCUTANEOUS at 08:10

## 2022-10-11 RX ADMIN — INSULIN ASPART 2 UNITS: 100 INJECTION, SOLUTION INTRAVENOUS; SUBCUTANEOUS at 04:10

## 2022-10-11 NOTE — PROGRESS NOTES
[facility] Intensive Care  Cardiology  Progress Note    Patient Name: Adryan Neff  MRN: [id number]  Admission Date: 10/6/2022  Hospital Length of Stay: 5 days  Code Status: Full Code   Attending Physician: Nikolay Sawyer MD   Primary Care Physician: Rasheeda Jose NP  Expected Discharge Date:   Principal Problem:PAD (peripheral artery disease)    Subjective:     Interval Hx: pt seen in ICU, case d/w RN.  Has reproducible CP (CPR).  Otherwise back to baseline.  Complains of R foot pain.  No anginal sxs or SOB.  No events overnight.    Tele: SR 90s (personally reviewed and interpreted)        Past Medical History:   Diagnosis Date    CAD (coronary artery disease) 2016    CAD (non obstructive) 2016 [facility]    Diabetes mellitus type I     Diabetic retinopathy     ESRD on hemodialysis     on HD TThSat    Gangrene of left foot     Hypertension     Nuclear sclerosis of right eye 11/24/2021    PAD (peripheral artery disease)     Pulmonary HTN     Right foot infection     TB lung, latent 04/2021       Past Surgical History:   Procedure Laterality Date    ABOVE-KNEE AMPUTATION Left 1/18/2022    Procedure: AMPUTATION, ABOVE KNEE;  Surgeon: Rusty Light MD;  Location: [facility] OR;  Service: Orthopedics;  Laterality: Left;    ABOVE-KNEE AMPUTATION Left 1/21/2022    Procedure: AMPUTATION, ABOVE KNEE;  Surgeon: Rusty Light MD;  Location: [facility] OR;  Service: Orthopedics;  Laterality: Left;    AV FISTULA PLACEMENT      CATARACT EXTRACTION Left     EYE SURGERY      FISTULOGRAM Left 8/9/2022    Procedure: Fistulogram;  Surgeon: Masoud Soni MD;  Location: [facility] OR;  Service: Vascular;  Laterality: Left;  LEFT VM ON DAUGHTER'S PHONE----PHONE PREOP NOT COMPLETED  CALLED PATIENT ON 8/8/2022 @ 3:34. HE STATED HE IS RESCHEDULING PROCEDURE. NOTIFIED ERWIN @ 3:35PM-LO    TOE AMPUTATION Left 1/6/2022    Procedure: AMPUTATION, TOE;  Surgeon: Masoud Soni MD;  Location: [facility] OR;  Service:  "Vascular;  Laterality: Left;  Left first through fifth toes, possible open transmetatarsal amputation and all other indicated procedures       Review of patient's allergies indicates:  No Known Allergies    No current facility-administered medications on file prior to encounter.     Current Outpatient Medications on File Prior to Encounter   Medication Sig    traMADoL (ULTRAM) 50 mg tablet Take 50 mg by mouth every 6 (six) hours as needed for Pain.    amLODIPine (NORVASC) 10 MG tablet Take 1 tablet (10 mg total) by mouth once daily.    amLODIPine (NORVASC) 10 MG tablet Take 1 tablet by mouth.    aspirin 81 MG Chew Take 81 mg by mouth once daily.    aspirin 81 MG Chew Take 1 tablet by mouth.    atorvastatin (LIPITOR) 20 MG tablet Take 1 tablet by mouth.    clopidogreL (PLAVIX) 75 mg tablet Take 75 mg by mouth once daily.    doxycycline (VIBRAMYCIN) 100 MG Cap Take 1 capsule (100 mg total) by mouth every 12 (twelve) hours.    insulin glargine 100 units/mL (3mL) SubQ pen Inject 15 Units into the skin every evening.    insulin glargine 100 units/mL (3mL) SubQ pen Inject 15 Units into the skin every evening.    lancets (ACCU-CHEK SOFTCLIX LANCETS) Misc 1 each by Misc.(Non-Drug; Combo Route) route once daily at 6am.    methoxy peg-epoetin beta (MIRCERA INJ) 50 mcg.    methoxy peg-epoetin beta (MIRCERA INJ) 30 mcg.    methoxy peg-epoetin beta (MIRCERA INJ) 75 mcg.    metoprolol succinate (TOPROL-XL) 25 MG 24 hr tablet Take 1 tablet by mouth.    omeprazole (PRILOSEC) 20 MG capsule TAKE 2 CAPSULES(40 MG) BY MOUTH EVERY DAY (Patient taking differently: Take 20 mg by mouth once daily.)    omeprazole (PRILOSEC) 40 MG capsule Take by mouth.    omeprazole (PRILOSEC) 40 MG capsule Take 40 mg by mouth once daily.    pen needle, diabetic 32 gauge x 5/32" Ndle To use with insulin pen 4x daily    pravastatin (PRAVACHOL) 20 MG tablet Take 20 mg by mouth once daily.    RENVELA 800 mg Tab Take 800 mg by mouth 3 " (three) times daily with meals.    sevelamer carbonate (RENVELA) 800 mg Tab Take 2 tablets by mouth.    traMADoL (ULTRAM) 50 mg tablet Take 1 tablet by mouth.     Family History       Problem Relation (Age of Onset)    No Known Problems Mother, Father, Sister, Brother, Daughter, Daughter, Daughter, Brother, Maternal Aunt, Maternal Uncle, Paternal Aunt, Paternal Uncle, Maternal Grandmother, Maternal Grandfather, Paternal Grandmother, Paternal Grandfather          Tobacco Use    Smoking status: Never    Smokeless tobacco: Never   Substance and Sexual Activity    Alcohol use: No    Drug use: No    Sexual activity: Yes     Partners: Female     Review of Systems   Gastrointestinal:  Negative for melena.   Genitourinary:  Negative for hematuria.   Objective:     Vital Signs (Most Recent):  Temp: 98.3 °F (36.8 °C) (10/11/22 0000)  Pulse: 84 (10/11/22 0607)  Resp: 11 (10/11/22 0607)  BP: (!) 116/56 (10/11/22 0607)  SpO2: 100 % (10/11/22 0607)   Vital Signs (24h Range):  Temp:  [98.1 °F (36.7 °C)-98.6 °F (37 °C)] 98.3 °F (36.8 °C)  Pulse:  [] 84  Resp:  [0-30] 11  SpO2:  [98 %-100 %] 100 %  BP: ()/(50-75) 116/56     Weight: 62.6 kg (138 lb 0.1 oz)  Body mass index is 19.25 kg/m².    SpO2: 100 %  O2 Device (Oxygen Therapy): room air      Intake/Output Summary (Last 24 hours) at 10/11/2022 0746  Last data filed at 10/11/2022 0600  Gross per 24 hour   Intake 1696.48 ml   Output 2500 ml   Net -803.52 ml         Lines/Drains/Airways       Central Venous Catheter Line  Duration                  Hemodialysis Catheter 01/14/22 1439 right internal jugular 269 days              Drain  Duration                  Hemodialysis AV Fistula Left upper arm -- days              Peripheral Intravenous Line  Duration                  Peripheral IV - Single Lumen 10/06/22 1510 20 G Anterior;Distal;Right Upper Arm 4 days         Peripheral IV - Single Lumen 10/10/22 18 G Anterior;Proximal;Right Forearm 1 day                     Physical Exam  Constitutional:       General: He is not in acute distress.     Appearance: Normal appearance. He is normal weight. He is not ill-appearing, toxic-appearing or diaphoretic.   HENT:      Head: Normocephalic and atraumatic.   Eyes:      General:         Right eye: No discharge.         Left eye: No discharge.      Extraocular Movements: Extraocular movements intact.      Pupils: Pupils are equal, round, and reactive to light.   Cardiovascular:      Rate and Rhythm: Normal rate and regular rhythm.      Heart sounds: S1 normal and S2 normal. Heart sounds are distant. No murmur heard.    No friction rub. No gallop.   Pulmonary:      Effort: Pulmonary effort is normal. No respiratory distress.      Breath sounds: Normal breath sounds. No stridor. No wheezing, rhonchi or rales.   Chest:      Chest wall: Tenderness present.   Abdominal:      General: There is no distension.      Palpations: Abdomen is soft.   Musculoskeletal:      Cervical back: Normal range of motion and neck supple. No rigidity.      Right lower leg: No edema.      Left lower leg: No edema.      Comments: L AKA  R foot gangrene   Skin:     General: Skin is warm.      Coloration: Skin is not jaundiced.   Neurological:      General: No focal deficit present.      Mental Status: He is oriented to person, place, and time. Mental status is at baseline.   Psychiatric:         Mood and Affect: Mood normal.         Behavior: Behavior normal.       Current Medications:   amLODIPine  10 mg Oral Daily    aspirin  81 mg Oral Daily    atorvastatin  40 mg Oral QHS    insulin detemir U-100  10 Units Subcutaneous QHS    metoprolol succinate  25 mg Oral Daily    mupirocin   Nasal BID    pantoprazole  40 mg Oral Daily    piperacillin-tazobactam (ZOSYN) IVPB  4.5 g Intravenous Q12H    sevelamer carbonate  800 mg Oral TID WM       acetaminophen, acetaminophen, dextrose 10%, dextrose 10%, glucagon (human recombinant), glucose, glucose,  hydrALAZINE, HYDROcodone-acetaminophen, insulin aspart U-100, naloxone, ondansetron, sodium chloride 0.9%, sodium chloride 0.9%, sodium chloride 0.9%, traMADoL, Pharmacy to dose Vancomycin consult **AND** vancomycin - pharmacy to dose    Laboratory (all labs reviewed):  CBC:  Recent Labs   Lab 09/22/22  1238 10/06/22  1512 10/07/22  1151 10/10/22  1225 10/10/22  1650   WBC 9.63 17.41 H 10.61 16.14 H 19.76 H   Hemoglobin 11.0 L 9.7 L 8.6 L 9.5 L 9.0 L   Hematocrit 38.4 L 32.1 L 27.5 L 31.1 L 29.0 L   Platelets 369 464 H 418 578 H 646 H         CHEMISTRIES:  Recent Labs   Lab 01/21/22  0422 01/22/22  0526 01/23/22  0532 01/24/22  1202 01/25/22  0349 03/16/22  1754 10/06/22  1512 10/10/22  1225 10/10/22  1650   Glucose 128 H 122 H 153 H   < > 149 H 138 H 211 H 79 112 H   Sodium 131 L 133 L 133 L  --  134 L 140 136 136 138   Potassium 5.1 5.7 H 4.5  --  4.2 4.9 4.0 4.3 4.5   BUN 39 H 55 H 34 H  --  25 H 42 H 29 H 34 H 22 H   Creatinine 9.1 H 11.6 H 7.9 H  --  5.7 H 9.5 H 9.1 H 8.8 H 7.8 H   eGFR if  7 A 5 A 8 A  --  12 A 6 A  --   --   --    eGFR if non  6 A 4 A 7 A  --  10 A 5 A  --   --   --    Calcium 9.7 9.2 9.3  --  9.1 9.3 9.7 9.4 9.9   Magnesium  --   --   --   --   --   --  2.1  --   --     < > = values in this interval not displayed.         CARDIAC BIOMARKERS:  Recent Labs   Lab 10/25/20  1951 10/25/20  2323 10/26/20  0413 01/18/22  1417 10/10/22  1650   Troponin I 0.088 H 0.079 H 0.141 H 0.142 H 0.041 H         COAGS:  Recent Labs   Lab 01/07/21  1003 01/05/22  1232 10/06/22  1512   INR 1.0 1.0 1.1         LIPIDS/LFTS:  Recent Labs   Lab 01/07/21  1003 05/19/21  1016 01/23/22  0532 03/16/22  1754 10/06/22  1512 10/10/22  1225 10/10/22  1650   Cholesterol 186  --   --   --   --   --   --    Triglycerides 123  --   --   --   --   --   --    HDL 67  --   --   --   --   --   --    LDL Cholesterol 94.4  --   --   --   --   --   --    Non-HDL Cholesterol 119  --   --   --   --   --    --    AST 9 L   < > 27 12 10 8 L 20   ALT <5 L   < > <5 L 19 <5 L 5 L 7 L    < > = values in this interval not displayed.         BNP:  Recent Labs   Lab 10/25/20  1951   BNP 1,359 H         TSH:        Free T4:        Diagnostic Results:  ECG (personally reviewed and interpreted tracing(s)):  10/6/22 1440 SR 91, inf ST abnl ?isch, similar to 1/18/22  10/10/22 1600 tele (during code) SR with single PVC (suggesting PEA)  10/10/22 1642 , RBBB/LAD    CTA Chest: 10/10/22  1. No evidence of PE.  2. Mild ground-glass opacities at the right lung base which could be secondary to aspiration, infectious process, or possible mild asymmetric right basilar edema.  No focal consolidation.  3. Acute lower sternal fracture with questionable right anterolateral subtle nondisplaced rib fractures.    RLE art US 10/7/22  High-grade stenosis of the distal right popliteal artery.  Less than 50% diameter stenosis of the distal right common femoral artery.    Echo: 10/10/22 (images personally reviewed and interpreted). EF unchanged vs report 3/2022.   The left ventricle is normal in size with mild concentric hypertrophy and mildly decreased systolic function.   The estimated ejection fraction is 40%.   There is moderate left ventricular global hypokinesis.   Grade II left ventricular diastolic dysfunction.   Normal right ventricular size with normal right ventricular systolic function.   Mild-to-moderate mitral regurgitation.    Stress Test: L MPI 2/4/21    Equivocal myocardial perfusion scan.    There is a moderate intensity, large sized, equivocal defect that is mostly fixed with some reversible areas in the basal to apical lateral wall(s). This finding is equivocal due to diaphragm shadow.    Consider PET stress testing if clinically indicated.    The gated perfusion images showed an ejection fraction of 41% at rest. The gated perfusion images showed an ejection fraction of 38% post stress. Normal ejection fraction is  greater than 51%.    There is mild global hypokinesis at rest and stress.    LV cavity size is mildly enlarged at rest and mildly enlarged at stress.    The EKG portion of this study is abnormal but not diagnostic.    The patient reported no chest pain during the stress test.    There were no arrhythmias during stress.     Cath: 4/1/16 (care everywhere)  · Co-dominant coronary system  · Left main: large caliber with non-obstructive disease.   · Left anterior descending: large caliber that wraps around the apex. It gives a small-mod diagonal branch. All with non-obstructive disease.  · Circumflex: large caliber and gives two OM branches and small PDA. All with non-obstructive disease.   · Right coronary artery: large caliber with non obstructive disease  Left heart catheterization:  · Ao: 125/68 mmHg  · LV: 125/7 mmHg  · LVEDP 7 mmHg  IMPRESSIONS:   · Non-obstructive coronary artery disease.    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 Soni).      PEA (Pulseless electrical activity)  Pt returned from HD on afternoon 10/10/22  Subsequently noted to be pulseless and unresponsive  Review of tele during this time notes SR  The above is consistent with PEA (but possible vagal episode)  ROSC achieved with brief CPR, no other ACLS per report.  CTA chest neg for PE  Echo without new WMA, EF stable  No evidence of high deg AVB on tele  Repeat trop    Wet gangrene  As per PAD section    NICM (nonischemic cardiomyopathy)  No significant CAD on cath 2016  Variable LVEF, was 55% in Dec 2021->35% in Mar 2022->40% 10/11/22.  Med rx planned.    Essential (primary) hypertension  Cont med rx    Controlled type 2 diabetes mellitus with chronic kidney disease on chronic dialysis, with long-term current use of insulin  Per IM    Chronic diastolic heart failure  Does not appear  grossly vol overloaded    ESRD on hemodialysis  Per IM/neph    Pure hypercholesterolemia  Cont statin        VTE Risk Mitigation (From admission, onward)         Ordered     IP VTE HIGH RISK PATIENT  Once         10/06/22 1819     Place sequential compression device  Until discontinued         10/06/22 1819              Pt seen in ICU, case d/w RN  Critical care time 35min    Víctor Sutton MD  Cardiology  [facility] - [place] - Intensive Care

## 2022-10-11 NOTE — HOSPITAL COURSE
Interval Hx: no events overnight.  Still with reproducible CP.  Case d.w Dr. Soni.  No prohibitive cardiac contraindication to fistulogram.    Tele: SR 80s (personally reviewed and interpreted)

## 2022-10-11 NOTE — PLAN OF CARE
Patient with accuchecks ac/hs- on sliding scale for coverage - tolerating diet - will continue to monitor - will also check for recent a1c result

## 2022-10-11 NOTE — SUBJECTIVE & OBJECTIVE
Interval History: Coded yesterday s/p CPR and transferred to ICU. Pt offered  - deferred and requested daughter at bedside for interpretation. Pt reports feeling ok - has some post-CPR chest pain.     Review of Systems   Constitutional:  Negative for chills and fever.   Cardiovascular:  Positive for chest pain.   All other systems reviewed and are negative.  Objective:     Vital Signs (Most Recent):  Temp: 98.3 °F (36.8 °C) (10/11/22 0000)  Pulse: 84 (10/11/22 0607)  Resp: 11 (10/11/22 0607)  BP: (!) 116/56 (10/11/22 0607)  SpO2: 100 % (10/11/22 0607)   Vital Signs (24h Range):  Temp:  [98.1 °F (36.7 °C)-98.6 °F (37 °C)] 98.3 °F (36.8 °C)  Pulse:  [] 84  Resp:  [0-30] 11  SpO2:  [100 %] 100 %  BP: ()/(50-75) 116/56     Weight: 62.6 kg (138 lb 0.1 oz)  Body mass index is 19.25 kg/m².    Estimated Creatinine Clearance: 9.3 mL/min (A) (based on SCr of 7.8 mg/dL (H)).    Physical Exam  Constitutional:       General: He is not in acute distress.     Appearance: He is not ill-appearing or toxic-appearing.   HENT:      Head: Normocephalic and atraumatic.      Right Ear: External ear normal.      Left Ear: External ear normal.   Eyes:      General:         Right eye: No discharge.         Left eye: No discharge.   Cardiovascular:      Rate and Rhythm: Normal rate and regular rhythm.   Pulmonary:      Effort: Pulmonary effort is normal. No respiratory distress.      Breath sounds: No stridor. No wheezing or rhonchi.   Abdominal:      General: There is no distension.      Palpations: Abdomen is soft.      Tenderness: There is no abdominal tenderness. There is no guarding.   Musculoskeletal:         General: Deformity (L AKA - well healed) present.      Right lower leg: No edema.      Left lower leg: No edema.   Skin:     General: Skin is warm and dry.      Coloration: Skin is not jaundiced.      Findings: Lesion present. No bruising or erythema.      Comments: Foul smelling R foot gangrene    Neurological:      Mental Status: He is alert and oriented to person, place, and time. Mental status is at baseline.      Motor: No weakness.   Psychiatric:         Mood and Affect: Mood normal.         Behavior: Behavior normal.       Significant Labs:   Microbiology Results (last 7 days)       Procedure Component Value Units Date/Time    Blood culture #1 **CANNOT BE ORDERED STAT** [[id number]] Collected: 10/06/22 1511    Order Status: Completed Specimen: Blood from Peripheral, Upper Arm, Right Updated: 10/10/22 2103     Blood Culture, Routine No Growth after 4 days.    Blood culture #2 **CANNOT BE ORDERED STAT** [[id number]] Collected: 10/06/22 1536    Order Status: Completed Specimen: Blood from Peripheral, Hand, Right Updated: 10/10/22 2103     Blood Culture, Routine No Growth after 4 days.            Significant Imaging: I have reviewed all pertinent imaging results/findings within the past 24 hours.

## 2022-10-11 NOTE — PROGRESS NOTES
0430 Phlebotomist at bedside attempting to obtain blood work on patient. Pt refusing. Explained to patient need to monitor blood work. Pt still refusing    0615 2nd attempt of obtaining blood for AM labwork. Pt still refusing.     0629 Pt daughter on phone at this time attempting to convince pt to allow phlebotomy to draw blood for AM labs. Md made aware.

## 2022-10-11 NOTE — NURSING
Patient tolerating diet - plans for transfer to the floor - pleasant - in no acute distress - some soreness to the chest as on am assessment but improved since medicated -

## 2022-10-11 NOTE — NURSING
Called to room by staff due to patient bleeding from dialysis access.  Pt awake and talking.  Pressure held to site,  bleeding controlled.  Pt became lethargic  BP 82/50.  Lowered patient head.  Patient became unresponsive.  No pulse and patient not breathing.  CPR started.  Code blue called.

## 2022-10-11 NOTE — PROGRESS NOTES
[facility]  Infectious Disease  Progress Note    Patient Name: Adryan Neff  MRN: [id number]  Admission Date: 10/6/2022  Length of Stay: 5 days  Attending Physician: Nikolay Sawyer MD  Primary Care Provider: No primary care provider on file.    Isolation Status: No active isolations  Assessment/Plan:      Wet gangrene  R foot gangrene with amputation pending. Hospital course notable for cardiac arrest on 10/10 s/p CPR and subsequent transfer to ICU. Blcx repeated post-arrest. CTA with R opacities - possible aspiration with cardiac arrest.     Recommendations:  -continue vancomycin pharm to dose and zosyn pending cx data/amputation  -follow up repeat blcx post-arrest  -follow up surgical disposition  - please send path/cultures of proximal margin at the time of amputation. It's possible that amputation will be definitive treatment of infection and may not need prolonged course of abx therapy      Leukocytosis  -likely due to wet gangrene + recent cardiac arrest  -trend        Thank you for your consult. I will follow-up with patient. Please contact us if you have any additional questions. D/w nursing.     Time: 35 minutes   50% of time spent on face-to-face counseling and coordination of care. Counseling included review of test results, diagnosis, and treatment plan with patient and/or family.        Anita Guzman MD  Infectious Disease  [facility]    Subjective:     Principal Problem:PAD (peripheral artery disease)    HPI: 56M with ESRD on HD and severe PAD admitted 10/6 with Gangrene of R BKA. Last seen by ID 1/2022 for L foot gangrene s/p AKA- at that time, amputation thought to be definitive treatment of infection and antibiotics were stopped. Now reports worsening pain and developing eschar of the right dorsal foot along with foul smelling odor. Given concern for an infection and the need for IV antibiotics & expedited podiatry evaluation, the patient was sent to the ER.  "Denies f/c. Says he was taking medicine but not sure if it was an antibiotics. Reports severe pain and drainage and odor from foot.     Seen by podiatry- amputation planned. R BKA being considered    Xray-No radiographic evidence of osteomyelitis    Ila ordered, pending    Tmax 99.9    Bcx ngtd x 1 day    Has received vanc/zosyn    ID consulted for ": Concern for wet gangrene"    Interval History: Coded yesterday s/p CPR and transferred to ICU. Pt offered  - deferred and requested daughter at bedside for interpretation. Pt reports feeling ok - has some post-CPR chest pain.     Review of Systems   Constitutional:  Negative for chills and fever.   Cardiovascular:  Positive for chest pain.   All other systems reviewed and are negative.  Objective:     Vital Signs (Most Recent):  Temp: 98.3 °F (36.8 °C) (10/11/22 0000)  Pulse: 84 (10/11/22 0607)  Resp: 11 (10/11/22 0607)  BP: (!) 116/56 (10/11/22 0607)  SpO2: 100 % (10/11/22 0607)   Vital Signs (24h Range):  Temp:  [98.1 °F (36.7 °C)-98.6 °F (37 °C)] 98.3 °F (36.8 °C)  Pulse:  [] 84  Resp:  [0-30] 11  SpO2:  [100 %] 100 %  BP: ()/(50-75) 116/56     Weight: 62.6 kg (138 lb 0.1 oz)  Body mass index is 19.25 kg/m².    Estimated Creatinine Clearance: 9.3 mL/min (A) (based on SCr of 7.8 mg/dL (H)).    Physical Exam  Constitutional:       General: He is not in acute distress.     Appearance: He is not ill-appearing or toxic-appearing.   HENT:      Head: Normocephalic and atraumatic.      Right Ear: External ear normal.      Left Ear: External ear normal.   Eyes:      General:         Right eye: No discharge.         Left eye: No discharge.   Cardiovascular:      Rate and Rhythm: Normal rate and regular rhythm.   Pulmonary:      Effort: Pulmonary effort is normal. No respiratory distress.      Breath sounds: No stridor. No wheezing or rhonchi.   Abdominal:      General: There is no distension.      Palpations: Abdomen is soft.      Tenderness: There is " no abdominal tenderness. There is no guarding.   Musculoskeletal:         General: Deformity (L AKA - well healed) present.      Right lower leg: No edema.      Left lower leg: No edema.   Skin:     General: Skin is warm and dry.      Coloration: Skin is not jaundiced.      Findings: Lesion present. No bruising or erythema.      Comments: Foul smelling R foot gangrene   Neurological:      Mental Status: He is alert and oriented to person, place, and time. Mental status is at baseline.      Motor: No weakness.   Psychiatric:         Mood and Affect: Mood normal.         Behavior: Behavior normal.       Significant Labs:   Microbiology Results (last 7 days)       Procedure Component Value Units Date/Time    Blood culture #1 **CANNOT BE ORDERED STAT** [[id number]] Collected: 10/06/22 1511    Order Status: Completed Specimen: Blood from Peripheral, Upper Arm, Right Updated: 10/10/22 2103     Blood Culture, Routine No Growth after 4 days.    Blood culture #2 **CANNOT BE ORDERED STAT** [[id number]] Collected: 10/06/22 1536    Order Status: Completed Specimen: Blood from Peripheral, Hand, Right Updated: 10/10/22 2103     Blood Culture, Routine No Growth after 4 days.            Significant Imaging: I have reviewed all pertinent imaging results/findings within the past 24 hours.

## 2022-10-11 NOTE — PROGRESS NOTES
Date of Admission:10/6/2022    SUBJECTIVE:notes doing better, refused labs earlier  Family at bedside    Current Facility-Administered Medications   Medication    acetaminophen tablet 650 mg    acetaminophen tablet 650 mg    amLODIPine tablet 10 mg    aspirin chewable tablet 81 mg    atorvastatin tablet 40 mg    dextrose 10% bolus 125 mL    dextrose 10% bolus 250 mL    [START ON 10/12/2022] epoetin paola-epbx injection 4,000 Units    glucagon (human recombinant) injection 1 mg    glucose chewable tablet 16 g    glucose chewable tablet 24 g    hydrALAZINE injection 10 mg    HYDROcodone-acetaminophen 5-325 mg per tablet 1 tablet    insulin aspart U-100 pen 0-5 Units    insulin detemir U-100 pen 10 Units    metoprolol succinate (TOPROL-XL) 24 hr tablet 25 mg    mupirocin 2 % ointment    naloxone 0.4 mg/mL injection 0.02 mg    ondansetron disintegrating tablet 8 mg    pantoprazole EC tablet 40 mg    piperacillin-tazobactam 4.5 g in dextrose 5 % 100 mL IVPB (ready to mix system)    sevelamer carbonate tablet 800 mg    sodium chloride 0.9% bolus 250 mL    sodium chloride 0.9% flush 10 mL    sodium chloride 0.9% flush 10 mL    traMADoL tablet 50 mg    vancomycin - pharmacy to dose       Wt Readings from Last 3 Encounters:   10/10/22 62.6 kg (138 lb 0.1 oz)   10/06/22 63.4 kg (139 lb 12.4 oz)   10/06/22 63.4 kg (139 lb 12.4 oz)     Temp Readings from Last 3 Encounters:   10/11/22 98.1 °F (36.7 °C) (Oral)   08/09/22 98.2 °F (36.8 °C) (Oral)   06/16/22 98.7 °F (37.1 °C) (Oral)     BP Readings from Last 3 Encounters:   10/11/22 136/62   10/06/22 131/65   08/09/22 (!) 166/72     Pulse Readings from Last 3 Encounters:   10/11/22 86   08/09/22 75   06/16/22 (!) 111       Intake/Output Summary (Last 24 hours) at 10/11/2022 1128  Last data filed at 10/11/2022 0700  Gross per 24 hour   Intake 1936.48 ml   Output 2500 ml   Net -563.52 ml       PE:  GEN:wd male in nad  HEENT:ncat,eomi,mm  CVS:s1s2  regular  PULM:ctab  ABD:bs,soft  EXT:black toes,no leg edema, avg -bruit, no bleeding, no hematoma  NEURO:awake    Recent Labs   Lab 10/11/22  0914   *   *   K 5.2*   CL 96   CO2 18*   BUN 34*   CREATININE 9.5*   CALCIUM 9.3   MG 2.2       Lab Results   Component Value Date    .0 (H) 01/07/2021    CALCIUM 9.3 10/11/2022    PHOS 6.6 (H) 10/11/2022       Recent Labs   Lab 10/11/22  0914   WBC 24.87*   RBC 3.33*   HGB 8.8*   HCT 30.0*   *   MCV 90   MCH 26.4*   MCHC 29.3*           A/P:  1.esrd. plan hd in am.  2.malfxn avg. Consult vascular. Needs fistulogram.  3.pea arrest. S/p. Following. Better now.  4.gangrene. aka pending.  5.htn. sbp ok.  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 Increase binders..  7.anemia. cont epo.  8.dm2. needs better control.

## 2022-10-11 NOTE — ASSESSMENT & PLAN NOTE
Pt returned from HD on afternoon 10/10/22  Subsequently noted to be pulseless and unresponsive  Review of tele during this time notes SR  The above is consistent with PEA (but possible vagal episode)  ROSC achieved with brief CPR, no other ACLS per report.  CTA chest neg for PE  Echo without new WMA, EF stable  No evidence of high deg AVB on tele  Repeat trop

## 2022-10-11 NOTE — NURSING
Patient did not eat supper - resting on and off since medicated for complaints of soreness to the chest - report called to the floor - belongings sent with the patient - transported with cardiac monitoring

## 2022-10-11 NOTE — NURSING
"Patient continues to complain of nausea and " not feeling well" Dr Sawyer notified - patient with minimal oral intact - may need ivfs and recent lab work- tachycardic 130's  "

## 2022-10-11 NOTE — PLAN OF CARE
Problem: Adult Inpatient Plan of Care  Goal: Optimal Comfort and Wellbeing  Outcome: Ongoing, Progressing  Goal: Readiness for Transition of Care  Outcome: Ongoing, Progressing     Problem: Fall Injury Risk  Goal: Absence of Fall and Fall-Related Injury  Outcome: Ongoing, Progressing     Problem: Diabetes Comorbidity  Goal: Blood Glucose Level Within Targeted Range  Outcome: Ongoing, Progressing     Problem: Skin Injury Risk Increased  Goal: Skin Health and Integrity  Outcome: Ongoing, Not Progressing

## 2022-10-11 NOTE — NURSING
Pt alert. BP improved after 500 mL NS bolus, other vitals stable. Echo done at bedside, CTA chest and x ray done. Complains of midsternal CP 7/10, EKG done and sent to muse, without change from previous EKG. Connected to monitor, bed in lowest position and locked, call bell within reach.

## 2022-10-11 NOTE — NURSING
Patient lying in bed connected to cardiac monitoring - vital signs stable see flowsheet - complaints of soreness to his chest- lungs with few fine crackles to the bases - on room air- abdomen is soft with hypoactive bowel sounds- anuric - left arm with fistula - dressing dry and intact - thrill and bruit noted- weak peripheral pulses present to the radials - amputation to the left - popliteal pulse present but weak - right foot with dry necrotic areas - odor noted - popliteal pulse on the right as well -  patient with complaints of pain to the right foot - daughter at the bedside - patient had refused blood draw this am however agreeable now and labs completed - white count noted creat up to 9.5 Dr Gee notified - call brooks within reach- plans to transfer to the floor

## 2022-10-11 NOTE — PROGRESS NOTES
Patient sitting up in bed and able to participate in exam. States that is he is cleared, he still wishes to move forward with amputation when cleared. Plan for Thursday afternoon if he continues to improve and is cleared by primary and consulting teams.

## 2022-10-11 NOTE — ASSESSMENT & PLAN NOTE
No significant CAD on cath 2016  Variable LVEF, was 55% in Dec 2021->35% in Mar 2022->40% 10/11/22.  Med rx planned.

## 2022-10-11 NOTE — NURSING
10/11/22 1840 Patient arrives to the floor at this time. Patient is alert and oriented. Patient on room air. Patient denies needs. Patient left AVF noted to be GLENDA. Patient also noted to have right foot wound. Bed exit on, call light within reach.

## 2022-10-11 NOTE — ASSESSMENT & PLAN NOTE
R foot gangrene with amputation pending. Hospital course notable for cardiac arrest on 10/10 s/p CPR and subsequent transfer to ICU. Blcx repeated post-arrest. CTA with R opacities - possible aspiration with cardiac arrest.     Recommendations:  -continue vancomycin pharm to dose and zosyn pending cx data/amputation  -follow up repeat blcx post-arrest  -follow up surgical disposition  - please send path/cultures of proximal margin at the time of amputation. It's possible that amputation will be definitive treatment of infection and may not need prolonged course of abx therapy

## 2022-10-11 NOTE — CARE UPDATE
Provider Transfer:    56M with pmh of severe pad s/p gangangrene of left foot s/p left AKA (1/2022), ESRD on HD MWF, NICM (EF: 55%, GIDD), pulmonary hypertension, GERD and latent TB filiberto presents for concern for leg infection. The patient presented to the vascular surgery clinic for a follow up. He reports worsening pain and developing eschar of the right dorsal foot along with foul smelling odor. Given concern for an infection and the need for IV antibiotics & expedited podiatry evaluation, the patient was sent to the ER. In ed labs with leukocytosis, normocytic anemia (9.7), elevated sed rate (125), elevated CRP (237.2), negative lactate (1.6). X-ray foot showed no radiographic evidence of osteomyelitis.Pt started on vancomycin and zosyn for possible infection. Initially evaluated by podiatry who recommended ortho consult for possible amputation. Amputation to be done on 10/10. ID also on board for abx recommendations. Patient had a coded blue called on 10/10 for possible PEA vs. Vasovagal.  ROSC returned quickly.Cards consulted to follow. The patient was transferred to the floor on 10/11.

## 2022-10-11 NOTE — SUBJECTIVE & OBJECTIVE
Past Medical History:   Diagnosis Date    CAD (coronary artery disease) 2016    CAD (non obstructive) 2016 [facility]    Diabetes mellitus type I     Diabetic retinopathy     ESRD on hemodialysis     on HD TThSat    Gangrene of left foot     Hypertension     Nuclear sclerosis of right eye 11/24/2021    PAD (peripheral artery disease)     Pulmonary HTN     Right foot infection     TB lung, latent 04/2021       Past Surgical History:   Procedure Laterality Date    ABOVE-KNEE AMPUTATION Left 1/18/2022    Procedure: AMPUTATION, ABOVE KNEE;  Surgeon: Rusty Light MD;  Location: [facility] OR;  Service: Orthopedics;  Laterality: Left;    ABOVE-KNEE AMPUTATION Left 1/21/2022    Procedure: AMPUTATION, ABOVE KNEE;  Surgeon: Rusty Light MD;  Location: [facility] OR;  Service: Orthopedics;  Laterality: Left;    AV FISTULA PLACEMENT      CATARACT EXTRACTION Left     EYE SURGERY      FISTULOGRAM Left 8/9/2022    Procedure: Fistulogram;  Surgeon: Masoud Soni MD;  Location: [facility] OR;  Service: Vascular;  Laterality: Left;  LEFT VM ON DAUGHTER'S PHONE----PHONE PREOP NOT COMPLETED  CALLED PATIENT ON 8/8/2022 @ 3:34. HE STATED HE IS RESCHEDULING PROCEDURE. NOTIFIED ERWIN @ 3:35PM-LO    TOE AMPUTATION Left 1/6/2022    Procedure: AMPUTATION, TOE;  Surgeon: Masoud Soni MD;  Location: [facility] OR;  Service: Vascular;  Laterality: Left;  Left first through fifth toes, possible open transmetatarsal amputation and all other indicated procedures       Review of patient's allergies indicates:  No Known Allergies    No current facility-administered medications on file prior to encounter.     Current Outpatient Medications on File Prior to Encounter   Medication Sig    traMADoL (ULTRAM) 50 mg tablet Take 50 mg by mouth every 6 (six) hours as needed for Pain.    amLODIPine (NORVASC) 10 MG tablet Take 1 tablet (10 mg total) by mouth once daily.    amLODIPine (NORVASC) 10 MG tablet Take 1 tablet by mouth.    aspirin 81 MG Chew Take 81 mg  "by mouth once daily.    aspirin 81 MG Chew Take 1 tablet by mouth.    atorvastatin (LIPITOR) 20 MG tablet Take 1 tablet by mouth.    clopidogreL (PLAVIX) 75 mg tablet Take 75 mg by mouth once daily.    doxycycline (VIBRAMYCIN) 100 MG Cap Take 1 capsule (100 mg total) by mouth every 12 (twelve) hours.    insulin glargine 100 units/mL (3mL) SubQ pen Inject 15 Units into the skin every evening.    insulin glargine 100 units/mL (3mL) SubQ pen Inject 15 Units into the skin every evening.    lancets (ACCU-CHEK SOFTCLIX LANCETS) Misc 1 each by Misc.(Non-Drug; Combo Route) route once daily at 6am.    methoxy peg-epoetin beta (MIRCERA INJ) 50 mcg.    methoxy peg-epoetin beta (MIRCERA INJ) 30 mcg.    methoxy peg-epoetin beta (MIRCERA INJ) 75 mcg.    metoprolol succinate (TOPROL-XL) 25 MG 24 hr tablet Take 1 tablet by mouth.    omeprazole (PRILOSEC) 20 MG capsule TAKE 2 CAPSULES(40 MG) BY MOUTH EVERY DAY (Patient taking differently: Take 20 mg by mouth once daily.)    omeprazole (PRILOSEC) 40 MG capsule Take by mouth.    omeprazole (PRILOSEC) 40 MG capsule Take 40 mg by mouth once daily.    pen needle, diabetic 32 gauge x 5/32" Ndle To use with insulin pen 4x daily    pravastatin (PRAVACHOL) 20 MG tablet Take 20 mg by mouth once daily.    RENVELA 800 mg Tab Take 800 mg by mouth 3 (three) times daily with meals.    sevelamer carbonate (RENVELA) 800 mg Tab Take 2 tablets by mouth.    traMADoL (ULTRAM) 50 mg tablet Take 1 tablet by mouth.     Family History       Problem Relation (Age of Onset)    No Known Problems Mother, Father, Sister, Brother, Daughter, Daughter, Daughter, Brother, Maternal Aunt, Maternal Uncle, Paternal Aunt, Paternal Uncle, Maternal Grandmother, Maternal Grandfather, Paternal Grandmother, Paternal Grandfather          Tobacco Use    Smoking status: Never    Smokeless tobacco: Never   Substance and Sexual Activity    Alcohol use: No    Drug use: No    Sexual activity: Yes     Partners: Female     Review of " Systems   Gastrointestinal:  Negative for melena.   Genitourinary:  Negative for hematuria.   Objective:     Vital Signs (Most Recent):  Temp: 98.3 °F (36.8 °C) (10/11/22 0000)  Pulse: 84 (10/11/22 0607)  Resp: 11 (10/11/22 0607)  BP: (!) 116/56 (10/11/22 0607)  SpO2: 100 % (10/11/22 0607)   Vital Signs (24h Range):  Temp:  [98.1 °F (36.7 °C)-98.6 °F (37 °C)] 98.3 °F (36.8 °C)  Pulse:  [] 84  Resp:  [0-30] 11  SpO2:  [98 %-100 %] 100 %  BP: ()/(50-75) 116/56     Weight: 62.6 kg (138 lb 0.1 oz)  Body mass index is 19.25 kg/m².    SpO2: 100 %  O2 Device (Oxygen Therapy): room air      Intake/Output Summary (Last 24 hours) at 10/11/2022 0746  Last data filed at 10/11/2022 0600  Gross per 24 hour   Intake 1696.48 ml   Output 2500 ml   Net -803.52 ml         Lines/Drains/Airways       Central Venous Catheter Line  Duration                  Hemodialysis Catheter 01/14/22 1439 right internal jugular 269 days              Drain  Duration                  Hemodialysis AV Fistula Left upper arm -- days              Peripheral Intravenous Line  Duration                  Peripheral IV - Single Lumen 10/06/22 1510 20 G Anterior;Distal;Right Upper Arm 4 days         Peripheral IV - Single Lumen 10/10/22 18 G Anterior;Proximal;Right Forearm 1 day                    Physical Exam  Constitutional:       General: He is not in acute distress.     Appearance: Normal appearance. He is normal weight. He is not ill-appearing, toxic-appearing or diaphoretic.   HENT:      Head: Normocephalic and atraumatic.   Eyes:      General:         Right eye: No discharge.         Left eye: No discharge.      Extraocular Movements: Extraocular movements intact.      Pupils: Pupils are equal, round, and reactive to light.   Cardiovascular:      Rate and Rhythm: Normal rate and regular rhythm.      Heart sounds: S1 normal and S2 normal. Heart sounds are distant. No murmur heard.    No friction rub. No gallop.   Pulmonary:      Effort:  Pulmonary effort is normal. No respiratory distress.      Breath sounds: Normal breath sounds. No stridor. No wheezing, rhonchi or rales.   Chest:      Chest wall: Tenderness present.   Abdominal:      General: There is no distension.      Palpations: Abdomen is soft.   Musculoskeletal:      Cervical back: Normal range of motion and neck supple. No rigidity.      Right lower leg: No edema.      Left lower leg: No edema.      Comments: L AKA  R foot gangrene   Skin:     General: Skin is warm.      Coloration: Skin is not jaundiced.   Neurological:      General: No focal deficit present.      Mental Status: He is oriented to person, place, and time. Mental status is at baseline.   Psychiatric:         Mood and Affect: Mood normal.         Behavior: Behavior normal.       Current Medications:   amLODIPine  10 mg Oral Daily    aspirin  81 mg Oral Daily    atorvastatin  40 mg Oral QHS    insulin detemir U-100  10 Units Subcutaneous QHS    metoprolol succinate  25 mg Oral Daily    mupirocin   Nasal BID    pantoprazole  40 mg Oral Daily    piperacillin-tazobactam (ZOSYN) IVPB  4.5 g Intravenous Q12H    sevelamer carbonate  800 mg Oral TID WM       acetaminophen, acetaminophen, dextrose 10%, dextrose 10%, glucagon (human recombinant), glucose, glucose, hydrALAZINE, HYDROcodone-acetaminophen, insulin aspart U-100, naloxone, ondansetron, sodium chloride 0.9%, sodium chloride 0.9%, sodium chloride 0.9%, traMADoL, Pharmacy to dose Vancomycin consult **AND** vancomycin - pharmacy to dose    Laboratory (all labs reviewed):  CBC:  Recent Labs   Lab 09/22/22  1238 10/06/22  1512 10/07/22  1151 10/10/22  1225 10/10/22  1650   WBC 9.63 17.41 H 10.61 16.14 H 19.76 H   Hemoglobin 11.0 L 9.7 L 8.6 L 9.5 L 9.0 L   Hematocrit 38.4 L 32.1 L 27.5 L 31.1 L 29.0 L   Platelets 369 464 H 418 578 H 646 H         CHEMISTRIES:  Recent Labs   Lab 01/21/22  0422 01/22/22  0526 01/23/22  0532 01/24/22  1202 01/25/22  0349 03/16/22  8734  10/06/22  1512 10/10/22  1225 10/10/22  1650   Glucose 128 H 122 H 153 H   < > 149 H 138 H 211 H 79 112 H   Sodium 131 L 133 L 133 L  --  134 L 140 136 136 138   Potassium 5.1 5.7 H 4.5  --  4.2 4.9 4.0 4.3 4.5   BUN 39 H 55 H 34 H  --  25 H 42 H 29 H 34 H 22 H   Creatinine 9.1 H 11.6 H 7.9 H  --  5.7 H 9.5 H 9.1 H 8.8 H 7.8 H   eGFR if  7 A 5 A 8 A  --  12 A 6 A  --   --   --    eGFR if non  6 A 4 A 7 A  --  10 A 5 A  --   --   --    Calcium 9.7 9.2 9.3  --  9.1 9.3 9.7 9.4 9.9   Magnesium  --   --   --   --   --   --  2.1  --   --     < > = values in this interval not displayed.         CARDIAC BIOMARKERS:  Recent Labs   Lab 10/25/20  1951 10/25/20  2323 10/26/20  0413 01/18/22  1417 10/10/22  1650   Troponin I 0.088 H 0.079 H 0.141 H 0.142 H 0.041 H         COAGS:  Recent Labs   Lab 01/07/21  1003 01/05/22  1232 10/06/22  1512   INR 1.0 1.0 1.1         LIPIDS/LFTS:  Recent Labs   Lab 01/07/21  1003 05/19/21  1016 01/23/22  0532 03/16/22  1754 10/06/22  1512 10/10/22  1225 10/10/22  1650   Cholesterol 186  --   --   --   --   --   --    Triglycerides 123  --   --   --   --   --   --    HDL 67  --   --   --   --   --   --    LDL Cholesterol 94.4  --   --   --   --   --   --    Non-HDL Cholesterol 119  --   --   --   --   --   --    AST 9 L   < > 27 12 10 8 L 20   ALT <5 L   < > <5 L 19 <5 L 5 L 7 L    < > = values in this interval not displayed.         BNP:  Recent Labs   Lab 10/25/20  1951   BNP 1,359 H         TSH:        Free T4:        Diagnostic Results:  ECG (personally reviewed and interpreted tracing(s)):  10/6/22 1440 SR 91, inf ST abnl ?isch, similar to 1/18/22  10/10/22 1600 tele (during code) SR with single PVC (suggesting PEA)  10/10/22 1642 , RBBB/LAD    CTA Chest: 10/10/22  1. No evidence of PE.  2. Mild ground-glass opacities at the right lung base which could be secondary to aspiration, infectious process, or possible mild asymmetric right basilar edema.  No  focal consolidation.  3. Acute lower sternal fracture with questionable right anterolateral subtle nondisplaced rib fractures.    RLE art US 10/7/22  High-grade stenosis of the distal right popliteal artery.  Less than 50% diameter stenosis of the distal right common femoral artery.    Echo: 10/10/22 (images personally reviewed and interpreted). EF unchanged vs report 3/2022.  The left ventricle is normal in size with mild concentric hypertrophy and mildly decreased systolic function.  The estimated ejection fraction is 40%.  There is moderate left ventricular global hypokinesis.  Grade II left ventricular diastolic dysfunction.  Normal right ventricular size with normal right ventricular systolic function.  Mild-to-moderate mitral regurgitation.    Stress Test: L MPI 2/4/21    Equivocal myocardial perfusion scan.    There is a moderate intensity, large sized, equivocal defect that is mostly fixed with some reversible areas in the basal to apical lateral wall(s). This finding is equivocal due to diaphragm shadow.    Consider PET stress testing if clinically indicated.    The gated perfusion images showed an ejection fraction of 41% at rest. The gated perfusion images showed an ejection fraction of 38% post stress. Normal ejection fraction is greater than 51%.    There is mild global hypokinesis at rest and stress.    LV cavity size is mildly enlarged at rest and mildly enlarged at stress.    The EKG portion of this study is abnormal but not diagnostic.    The patient reported no chest pain during the stress test.    There were no arrhythmias during stress.     Cath: 4/1/16 (care everywhere)  · Co-dominant coronary system  · Left main: large caliber with non-obstructive disease.   · Left anterior descending: large caliber that wraps around the apex. It gives a small-mod diagonal branch. All with non-obstructive disease.  · Circumflex: large caliber and gives two OM branches and small PDA. All with non-obstructive  disease.   · Right coronary artery: large caliber with non obstructive disease  Left heart catheterization:  · Ao: 125/68 mmHg  · LV: 125/7 mmHg  · LVEDP 7 mmHg  IMPRESSIONS:   · Non-obstructive coronary artery disease.

## 2022-10-11 NOTE — PROGRESS NOTES
Vancomycin consult follow-up:    Patient reviewed, dialysis scheduled every Mon, Wed, Fri, cultures reviewed, no new levels, no dose today; Next levels due: trough due 10/12/2022 at 0300

## 2022-10-11 NOTE — SUBJECTIVE & OBJECTIVE
Interval History: No new issues    Review of Systems   Constitutional:  Negative for activity change and appetite change.   HENT:  Negative for congestion and dental problem.    Eyes:  Negative for discharge.   Respiratory:  Negative for apnea and chest tightness.    Cardiovascular:  Negative for chest pain.   Gastrointestinal:  Negative for abdominal distention.   Endocrine: Negative for cold intolerance.   Genitourinary:  Negative for difficulty urinating and dysuria.   Musculoskeletal:  Negative for arthralgias.   Neurological:  Negative for facial asymmetry and headaches.   Psychiatric/Behavioral:  Negative for agitation and behavioral problems.    Objective:     Vital Signs (Most Recent):  Temp: 98.3 °F (36.8 °C) (10/11/22 0000)  Pulse: 90 (10/11/22 0836)  Resp: 11 (10/11/22 0607)  BP: (!) 158/75 (10/11/22 0836)  SpO2: 100 % (10/11/22 0607)   Vital Signs (24h Range):  Temp:  [98.1 °F (36.7 °C)-98.6 °F (37 °C)] 98.3 °F (36.8 °C)  Pulse:  [] 90  Resp:  [0-30] 11  SpO2:  [100 %] 100 %  BP: ()/(50-75) 158/75     Weight: 62.6 kg (138 lb 0.1 oz)  Body mass index is 19.25 kg/m².    Intake/Output Summary (Last 24 hours) at 10/11/2022 0846  Last data filed at 10/11/2022 0600  Gross per 24 hour   Intake 1696.48 ml   Output 2500 ml   Net -803.52 ml      Physical Exam  Vitals and nursing note reviewed.   Constitutional:       General: He is not in acute distress.     Appearance: Normal appearance. He is not ill-appearing, toxic-appearing or diaphoretic.   HENT:      Head: Normocephalic and atraumatic.   Eyes:      Conjunctiva/sclera: Conjunctivae normal.   Cardiovascular:      Rate and Rhythm: Normal rate and regular rhythm.   Pulmonary:      Effort: Pulmonary effort is normal. No respiratory distress.   Skin:     General: Skin is warm and dry.   Neurological:      Mental Status: He is alert and oriented to person, place, and time.   Psychiatric:         Mood and Affect: Mood normal.         Behavior: Behavior  normal.     Significant Labs: All pertinent labs within the past 24 hours have been reviewed.  BMP:   Recent Labs   Lab 10/10/22  1650   *      K 4.5      CO2 23   BUN 22*   CREATININE 7.8*   CALCIUM 9.9     CBC:   Recent Labs   Lab 10/10/22  1225 10/10/22  1650   WBC 16.14* 19.76*   HGB 9.5* 9.0*   HCT 31.1* 29.0*   * 646*       Significant Imaging:

## 2022-10-12 LAB
POCT GLUCOSE: 115 MG/DL (ref 70–110)
POCT GLUCOSE: 140 MG/DL (ref 70–110)
POCT GLUCOSE: 263 MG/DL (ref 70–110)
VANCOMYCIN SERPL-MCNC: 14.3 UG/ML

## 2022-10-12 PROCEDURE — 21400001 HC TELEMETRY ROOM

## 2022-10-12 PROCEDURE — 63600175 PHARM REV CODE 636 W HCPCS: Mod: JG | Performed by: INTERNAL MEDICINE

## 2022-10-12 PROCEDURE — 99233 PR SUBSEQUENT HOSPITAL CARE,LEVL III: ICD-10-PCS | Mod: ,,, | Performed by: INTERNAL MEDICINE

## 2022-10-12 PROCEDURE — 99233 SBSQ HOSP IP/OBS HIGH 50: CPT | Mod: ,,, | Performed by: INTERNAL MEDICINE

## 2022-10-12 PROCEDURE — 63600175 PHARM REV CODE 636 W HCPCS: Performed by: INTERNAL MEDICINE

## 2022-10-12 PROCEDURE — 90935 HEMODIALYSIS ONE EVALUATION: CPT

## 2022-10-12 PROCEDURE — 25000003 PHARM REV CODE 250: Performed by: INTERNAL MEDICINE

## 2022-10-12 PROCEDURE — 80202 ASSAY OF VANCOMYCIN: CPT | Performed by: INTERNAL MEDICINE

## 2022-10-12 PROCEDURE — 80100016 HC MAINTENANCE HEMODIALYSIS

## 2022-10-12 PROCEDURE — 36415 COLL VENOUS BLD VENIPUNCTURE: CPT | Performed by: INTERNAL MEDICINE

## 2022-10-12 RX ADMIN — ATORVASTATIN CALCIUM 40 MG: 40 TABLET, FILM COATED ORAL at 09:10

## 2022-10-12 RX ADMIN — INSULIN DETEMIR 12 UNITS: 100 INJECTION, SOLUTION SUBCUTANEOUS at 09:10

## 2022-10-12 RX ADMIN — Medication 1 TABLET: at 09:10

## 2022-10-12 RX ADMIN — VANCOMYCIN HYDROCHLORIDE 750 MG: 750 INJECTION, POWDER, LYOPHILIZED, FOR SOLUTION INTRAVENOUS at 09:10

## 2022-10-12 RX ADMIN — SEVELAMER CARBONATE 2400 MG: 800 TABLET, FILM COATED ORAL at 08:10

## 2022-10-12 RX ADMIN — HYDROCODONE BITARTRATE AND ACETAMINOPHEN 1 TABLET: 5; 325 TABLET ORAL at 03:10

## 2022-10-12 RX ADMIN — EPOETIN ALFA-EPBX 10000 UNITS: 10000 INJECTION, SOLUTION INTRAVENOUS; SUBCUTANEOUS at 08:10

## 2022-10-12 RX ADMIN — PANTOPRAZOLE SODIUM 40 MG: 40 TABLET, DELAYED RELEASE ORAL at 08:10

## 2022-10-12 RX ADMIN — PIPERACILLIN AND TAZOBACTAM 4.5 G: 4; .5 INJECTION, POWDER, LYOPHILIZED, FOR SOLUTION INTRAVENOUS; PARENTERAL at 02:10

## 2022-10-12 RX ADMIN — TRAMADOL HYDROCHLORIDE 50 MG: 50 TABLET, COATED ORAL at 06:10

## 2022-10-12 RX ADMIN — HYDROCODONE BITARTRATE AND ACETAMINOPHEN 1 TABLET: 5; 325 TABLET ORAL at 02:10

## 2022-10-12 RX ADMIN — PIPERACILLIN AND TAZOBACTAM 4.5 G: 4; .5 INJECTION, POWDER, LYOPHILIZED, FOR SOLUTION INTRAVENOUS; PARENTERAL at 03:10

## 2022-10-12 RX ADMIN — INSULIN ASPART 1 UNITS: 100 INJECTION, SOLUTION INTRAVENOUS; SUBCUTANEOUS at 09:10

## 2022-10-12 RX ADMIN — SEVELAMER CARBONATE 2400 MG: 800 TABLET, FILM COATED ORAL at 05:10

## 2022-10-12 RX ADMIN — HYDROCODONE BITARTRATE AND ACETAMINOPHEN 1 TABLET: 5; 325 TABLET ORAL at 09:10

## 2022-10-12 NOTE — NURSING
Dr. Morales notified of information as below, per nurse's note, patient received Dialysis today, 900 mL was removed, and patient's Dialysis days are MWF.

## 2022-10-12 NOTE — SUBJECTIVE & OBJECTIVE
Past Medical History:   Diagnosis Date    CAD (coronary artery disease) 2016    CAD (non obstructive) 2016 [facility]    Diabetes mellitus type I     Diabetic retinopathy     ESRD on hemodialysis     on HD TThSat    Gangrene of left foot     Hypertension     Nuclear sclerosis of right eye 11/24/2021    PAD (peripheral artery disease)     Pulmonary HTN     Right foot infection     TB lung, latent 04/2021       Past Surgical History:   Procedure Laterality Date    ABOVE-KNEE AMPUTATION Left 1/18/2022    Procedure: AMPUTATION, ABOVE KNEE;  Surgeon: Rusty Light MD;  Location: [facility] OR;  Service: Orthopedics;  Laterality: Left;    ABOVE-KNEE AMPUTATION Left 1/21/2022    Procedure: AMPUTATION, ABOVE KNEE;  Surgeon: Rusty Light MD;  Location: [facility] OR;  Service: Orthopedics;  Laterality: Left;    AV FISTULA PLACEMENT      CATARACT EXTRACTION Left     EYE SURGERY      FISTULOGRAM Left 8/9/2022    Procedure: Fistulogram;  Surgeon: Masoud Soni MD;  Location: [facility] OR;  Service: Vascular;  Laterality: Left;  LEFT VM ON DAUGHTER'S PHONE----PHONE PREOP NOT COMPLETED  CALLED PATIENT ON 8/8/2022 @ 3:34. HE STATED HE IS RESCHEDULING PROCEDURE. NOTIFIED ERWIN @ 3:35PM-LO    TOE AMPUTATION Left 1/6/2022    Procedure: AMPUTATION, TOE;  Surgeon: Masoud Soni MD;  Location: [facility] OR;  Service: Vascular;  Laterality: Left;  Left first through fifth toes, possible open transmetatarsal amputation and all other indicated procedures       Review of patient's allergies indicates:  No Known Allergies    No current facility-administered medications on file prior to encounter.     Current Outpatient Medications on File Prior to Encounter   Medication Sig    traMADoL (ULTRAM) 50 mg tablet Take 50 mg by mouth every 6 (six) hours as needed for Pain.    amLODIPine (NORVASC) 10 MG tablet Take 1 tablet (10 mg total) by mouth once daily.    amLODIPine (NORVASC) 10 MG tablet Take 1 tablet by mouth.    aspirin 81 MG Chew Take 81 mg  "by mouth once daily.    aspirin 81 MG Chew Take 1 tablet by mouth.    atorvastatin (LIPITOR) 20 MG tablet Take 1 tablet by mouth.    clopidogreL (PLAVIX) 75 mg tablet Take 75 mg by mouth once daily.    doxycycline (VIBRAMYCIN) 100 MG Cap Take 1 capsule (100 mg total) by mouth every 12 (twelve) hours.    insulin glargine 100 units/mL (3mL) SubQ pen Inject 15 Units into the skin every evening.    insulin glargine 100 units/mL (3mL) SubQ pen Inject 15 Units into the skin every evening.    lancets (ACCU-CHEK SOFTCLIX LANCETS) Misc 1 each by Misc.(Non-Drug; Combo Route) route once daily at 6am.    methoxy peg-epoetin beta (MIRCERA INJ) 50 mcg.    methoxy peg-epoetin beta (MIRCERA INJ) 30 mcg.    methoxy peg-epoetin beta (MIRCERA INJ) 75 mcg.    metoprolol succinate (TOPROL-XL) 25 MG 24 hr tablet Take 1 tablet by mouth.    omeprazole (PRILOSEC) 20 MG capsule TAKE 2 CAPSULES(40 MG) BY MOUTH EVERY DAY (Patient taking differently: Take 20 mg by mouth once daily.)    omeprazole (PRILOSEC) 40 MG capsule Take by mouth.    omeprazole (PRILOSEC) 40 MG capsule Take 40 mg by mouth once daily.    pen needle, diabetic 32 gauge x 5/32" Ndle To use with insulin pen 4x daily    pravastatin (PRAVACHOL) 20 MG tablet Take 20 mg by mouth once daily.    RENVELA 800 mg Tab Take 800 mg by mouth 3 (three) times daily with meals.    sevelamer carbonate (RENVELA) 800 mg Tab Take 2 tablets by mouth.    traMADoL (ULTRAM) 50 mg tablet Take 1 tablet by mouth.     Family History       Problem Relation (Age of Onset)    No Known Problems Mother, Father, Sister, Brother, Daughter, Daughter, Daughter, Brother, Maternal Aunt, Maternal Uncle, Paternal Aunt, Paternal Uncle, Maternal Grandmother, Maternal Grandfather, Paternal Grandmother, Paternal Grandfather          Tobacco Use    Smoking status: Never    Smokeless tobacco: Never   Substance and Sexual Activity    Alcohol use: No    Drug use: No    Sexual activity: Yes     Partners: Female     Review of " Systems   Gastrointestinal:  Negative for melena.   Genitourinary:  Negative for hematuria.   Objective:     Vital Signs (Most Recent):  Temp: 98.4 °F (36.9 °C) (10/12/22 0739)  Pulse: 90 (10/12/22 0739)  Resp: 19 (10/12/22 0739)  BP: 136/64 (10/12/22 0739)  SpO2: 96 % (10/12/22 0739)   Vital Signs (24h Range):  Temp:  [97.4 °F (36.3 °C)-98.4 °F (36.9 °C)] 98.4 °F (36.9 °C)  Pulse:  [] 90  Resp:  [16-30] 19  SpO2:  [96 %-100 %] 96 %  BP: (114-172)/(56-92) 136/64     Weight: (!) 139.3 kg (307 lb 1.6 oz)  Body mass index is 42.83 kg/m².    SpO2: 96 %  O2 Device (Oxygen Therapy): room air      Intake/Output Summary (Last 24 hours) at 10/12/2022 0816  Last data filed at 10/11/2022 1806  Gross per 24 hour   Intake 268.45 ml   Output 1 ml   Net 267.45 ml         Lines/Drains/Airways       Central Venous Catheter Line  Duration                  Hemodialysis Catheter 01/14/22 1439 right internal jugular 270 days              Drain  Duration                  Hemodialysis AV Fistula Left upper arm -- days              Peripheral Intravenous Line  Duration                  Peripheral IV - Single Lumen 10/06/22 1510 20 G Anterior;Distal;Right Upper Arm 5 days         Peripheral IV - Single Lumen 10/10/22 18 G Anterior;Proximal;Right Forearm 2 days                  Exam unchanged vs 10/11/22  Physical Exam  Constitutional:       General: He is not in acute distress.     Appearance: Normal appearance. He is normal weight. He is not ill-appearing, toxic-appearing or diaphoretic.   HENT:      Head: Normocephalic and atraumatic.   Eyes:      General:         Right eye: No discharge.         Left eye: No discharge.      Extraocular Movements: Extraocular movements intact.      Pupils: Pupils are equal, round, and reactive to light.   Cardiovascular:      Rate and Rhythm: Normal rate and regular rhythm.      Heart sounds: S1 normal and S2 normal. Heart sounds are distant. No murmur heard.    No friction rub. No gallop.    Pulmonary:      Effort: Pulmonary effort is normal. No respiratory distress.      Breath sounds: Normal breath sounds. No stridor. No wheezing, rhonchi or rales.   Chest:      Chest wall: Tenderness present.   Abdominal:      General: There is no distension.      Palpations: Abdomen is soft.   Musculoskeletal:      Cervical back: Normal range of motion and neck supple. No rigidity.      Right lower leg: No edema.      Left lower leg: No edema.      Comments: L AKA  R foot gangrene   Skin:     General: Skin is warm.      Coloration: Skin is not jaundiced.   Neurological:      General: No focal deficit present.      Mental Status: He is oriented to person, place, and time. Mental status is at baseline.   Psychiatric:         Mood and Affect: Mood normal.         Behavior: Behavior normal.       Current Medications:   amLODIPine  10 mg Oral Daily    aspirin  81 mg Oral Daily    atorvastatin  40 mg Oral QHS    epoetin paola-epbx  10,000 Units Subcutaneous Every Mon, Wed, Fri    folic acid-vit B6-vit B12 2.5-25-2 mg  1 tablet Oral Daily    insulin detemir U-100  10 Units Subcutaneous QHS    metoprolol succinate  25 mg Oral Daily    mupirocin   Nasal BID    pantoprazole  40 mg Oral Daily    piperacillin-tazobactam (ZOSYN) IVPB  4.5 g Intravenous Q12H    sevelamer carbonate  2,400 mg Oral TID WM       acetaminophen, dextrose 10%, dextrose 10%, glucagon (human recombinant), glucose, glucose, hydrALAZINE, HYDROcodone-acetaminophen, insulin aspart U-100, naloxone, ondansetron, sodium chloride 0.9%, sodium chloride 0.9%, sodium chloride 0.9%, traMADoL, Pharmacy to dose Vancomycin consult **AND** vancomycin - pharmacy to dose    Laboratory (all labs reviewed):  CBC:  Recent Labs   Lab 10/06/22  1512 10/07/22  1151 10/10/22  1225 10/10/22  1650 10/11/22  0914   WBC 17.41 H 10.61 16.14 H 19.76 H 24.87 H   Hemoglobin 9.7 L 8.6 L 9.5 L 9.0 L 8.8 L   Hematocrit 32.1 L 27.5 L 31.1 L 29.0 L 30.0 L   Platelets 464 H 418 578 H 646 H  674 H         CHEMISTRIES:  Recent Labs   Lab 01/21/22  0422 01/22/22  0526 01/23/22  0532 01/24/22  1202 01/25/22  0349 03/16/22  1754 10/06/22  1512 10/10/22  1225 10/10/22  1650 10/11/22  0914   Glucose 128 H 122 H 153 H   < > 149 H 138 H 211 H 79 112 H 245 H   Sodium 131 L 133 L 133 L  --  134 L 140 136 136 138 131 L   Potassium 5.1 5.7 H 4.5  --  4.2 4.9 4.0 4.3 4.5 5.2 H   BUN 39 H 55 H 34 H  --  25 H 42 H 29 H 34 H 22 H 34 H   Creatinine 9.1 H 11.6 H 7.9 H  --  5.7 H 9.5 H 9.1 H 8.8 H 7.8 H 9.5 H   eGFR if  7 A 5 A 8 A  --  12 A 6 A  --   --   --   --    eGFR if non  6 A 4 A 7 A  --  10 A 5 A  --   --   --   --    Calcium 9.7 9.2 9.3  --  9.1 9.3 9.7 9.4 9.9 9.3   Magnesium  --   --   --   --   --   --  2.1  --   --  2.2    < > = values in this interval not displayed.         CARDIAC BIOMARKERS:  Recent Labs   Lab 10/25/20  2323 10/26/20  0413 01/18/22  1417 10/10/22  1650 10/11/22  0914   Troponin I 0.079 H 0.141 H 0.142 H 0.041 H 0.267 H         COAGS:  Recent Labs   Lab 01/07/21  1003 01/05/22  1232 10/06/22  1512   INR 1.0 1.0 1.1         LIPIDS/LFTS:  Recent Labs   Lab 01/07/21  1003 05/19/21  1016 03/16/22  1754 10/06/22  1512 10/10/22  1225 10/10/22  1650 10/11/22  0914   Cholesterol 186  --   --   --   --   --   --    Triglycerides 123  --   --   --   --   --   --    HDL 67  --   --   --   --   --   --    LDL Cholesterol 94.4  --   --   --   --   --   --    Non-HDL Cholesterol 119  --   --   --   --   --   --    AST 9 L   < > 12 10 8 L 20 16   ALT <5 L   < > 19 <5 L 5 L 7 L 6 L    < > = values in this interval not displayed.         BNP:  Recent Labs   Lab 10/25/20  1951   BNP 1,359 H         TSH:        Free T4:        Diagnostic Results:  ECG (personally reviewed and interpreted tracing(s)):  10/6/22 1440 SR 91, inf ST abnl ?isch, similar to 1/18/22  10/10/22 1600 tele (during code) SR with single PVC (suggesting PEA)  10/10/22 1642 , RBBB/LAD    CTA Chest:  10/10/22  1. No evidence of PE.  2. Mild ground-glass opacities at the right lung base which could be secondary to aspiration, infectious process, or possible mild asymmetric right basilar edema.  No focal consolidation.  3. Acute lower sternal fracture with questionable right anterolateral subtle nondisplaced rib fractures.    RLE art US 10/7/22  High-grade stenosis of the distal right popliteal artery.  Less than 50% diameter stenosis of the distal right common femoral artery.    Echo: 10/10/22 (images personally reviewed and interpreted). EF unchanged vs report 3/2022.  The left ventricle is normal in size with mild concentric hypertrophy and mildly decreased systolic function.  The estimated ejection fraction is 40%.  There is moderate left ventricular global hypokinesis.  Grade II left ventricular diastolic dysfunction.  Normal right ventricular size with normal right ventricular systolic function.  Mild-to-moderate mitral regurgitation.    Stress Test: L MPI 2/4/21    Equivocal myocardial perfusion scan.    There is a moderate intensity, large sized, equivocal defect that is mostly fixed with some reversible areas in the basal to apical lateral wall(s). This finding is equivocal due to diaphragm shadow.    Consider PET stress testing if clinically indicated.    The gated perfusion images showed an ejection fraction of 41% at rest. The gated perfusion images showed an ejection fraction of 38% post stress. Normal ejection fraction is greater than 51%.    There is mild global hypokinesis at rest and stress.    LV cavity size is mildly enlarged at rest and mildly enlarged at stress.    The EKG portion of this study is abnormal but not diagnostic.    The patient reported no chest pain during the stress test.    There were no arrhythmias during stress.     Cath: 4/1/16 (care everywhere)  · Co-dominant coronary system  · Left main: large caliber with non-obstructive disease.   · Left anterior descending: large  caliber that wraps around the apex. It gives a small-mod diagonal branch. All with non-obstructive disease.  · Circumflex: large caliber and gives two OM branches and small PDA. All with non-obstructive disease.   · Right coronary artery: large caliber with non obstructive disease  Left heart catheterization:  · Ao: 125/68 mmHg  · LV: 125/7 mmHg  · LVEDP 7 mmHg  IMPRESSIONS:   · Non-obstructive coronary artery disease.

## 2022-10-12 NOTE — PHYSICIAN QUERY
PT Name: Adryan Neff  MR #: [id number]     Documentation Clarification      CDS/: Madhuri Lacy RN              Contact information:[email or web address]    This form is a permanent document in the medical record.     Query Date: October 12, 2022    By submitting this query, we are merely seeking further clarification of documentation. Please utilize your independent clinical judgment when addressing the question(s) below.    The Medical Record reflects the following:    Supporting Clinical Findings Location in Medical Record   Controlled type 2 diabetes mellitus with chronic kidney disease on chronic dialysis, with long-term current use of insulin    HGBA1C 6.1 (H) 01/07/2021     - Home regimen: Lantus 15 units nightly        Past medical history Diabetes mellitus type 1    H&P                                                                                Provider, please clarify the conflicting type of Diabetes diagnoses associated with above clinical findings.    [ XXX  ] Diabetes mellitus type 2   [   ] Diabetes mellitus type 1____________________   [   ] Other (please specify): ____________   [  ] Clinically undetermined

## 2022-10-12 NOTE — ASSESSMENT & PLAN NOTE
Pt returned from HD on afternoon 10/10/22  Subsequently noted to be pulseless and unresponsive  Review of tele during this time notes SR  The above is consistent with PEA (but possible vagal episode)  ROSC achieved with brief CPR, no other ACLS per report.  CTA chest neg for PE  Echo without new WMA, EF stable  No evidence of high deg AVB on tele  Repeat trop without significant rise.  Prior cath 2016 neg.

## 2022-10-12 NOTE — NURSING
Report received from night nurse KIAH Sorensen. Visualized patient and assessed patient's overall condition and appearance. No acute distress noted. Will continue to monitor

## 2022-10-12 NOTE — ASSESSMENT & PLAN NOTE
- Last A1c:   Lab Results   Component Value Date    HGBA1C 6.1 (H) 01/07/2021     - Home regimen: Lantus 12 units nightly, increased from 10 due to hyperglycemia. 15u caused hypoglycemia at last week  - Continue home regimen   - Low-sensitivity sliding scale insulin  - Initiate and maintain glycemic protocol, monitor POC glucose   - Follow up with PCP as outpatient

## 2022-10-12 NOTE — SUBJECTIVE & OBJECTIVE
Interval History: NAEON. On dialysis during interview. No changes. Denies any new symptoms.    Review of Systems   All other systems reviewed and are negative.  Objective:     Vital Signs (Most Recent):  Temp: 98.4 °F (36.9 °C) (10/12/22 0739)  Pulse: 90 (10/12/22 0739)  Resp: 19 (10/12/22 0739)  BP: 136/64 (10/12/22 0739)  SpO2: 96 % (10/12/22 0739) Vital Signs (24h Range):  Temp:  [97.4 °F (36.3 °C)-98.4 °F (36.9 °C)] 98.4 °F (36.9 °C)  Pulse:  [71-92] 90  Resp:  [16-29] 19  SpO2:  [96 %-100 %] 96 %  BP: (114-172)/(56-92) 136/64     Weight: (!) 139.3 kg (307 lb 1.6 oz)  Body mass index is 42.83 kg/m².    Intake/Output Summary (Last 24 hours) at 10/12/2022 1211  Last data filed at 10/11/2022 1806  Gross per 24 hour   Intake 148.45 ml   Output 1 ml   Net 147.45 ml      Physical Exam  Vitals and nursing note reviewed.   Constitutional:       General: He is not in acute distress.     Appearance: Normal appearance.   HENT:      Head: Normocephalic and atraumatic.      Mouth/Throat:      Mouth: Mucous membranes are moist.   Eyes:      Extraocular Movements: Extraocular movements intact.      Pupils: Pupils are equal, round, and reactive to light.   Cardiovascular:      Rate and Rhythm: Normal rate and regular rhythm.      Pulses:           Dorsalis pedis pulses are detected w/ Doppler on the right side.        Posterior tibial pulses are detected w/ Doppler on the right side.      Heart sounds: Murmur heard.   Pulmonary:      Effort: Pulmonary effort is normal. No respiratory distress.      Breath sounds: Normal breath sounds. No rales.   Abdominal:      General: Abdomen is flat. Bowel sounds are normal.      Palpations: Abdomen is soft.   Musculoskeletal:      Left Lower Extremity: Left leg is amputated below knee.   Feet:      Comments: Right foot wet gangrene  Neurological:      Mental Status: He is alert.       Significant Labs: All pertinent labs within the past 24 hours have been reviewed.    Significant Imaging:  I have reviewed all pertinent imaging results/findings within the past 24 hours.

## 2022-10-12 NOTE — PROGRESS NOTES
[facility]  Cardiology  Progress Note    Patient Name: Adryan Neff  MRN: [id number]  Admission Date: 10/6/2022  Hospital Length of Stay: 6 days  Code Status: Full Code   Attending Physician: Aureliano Trimble MD   Primary Care Physician: No primary care provider on file.  Expected Discharge Date:   Principal Problem:PAD (peripheral artery disease)    Subjective:     Interval Hx: no events overnight.  Still with reproducible CP.  Case d.w Dr. Soni.  No prohibitive cardiac contraindication to fistulogram.    Tele: SR 80s (personally reviewed and interpreted)        Past Medical History:   Diagnosis Date    CAD (coronary artery disease) 2016    CAD (non obstructive) 2016 [facility]    Diabetes mellitus type I     Diabetic retinopathy     ESRD on hemodialysis     on HD TThSat    Gangrene of left foot     Hypertension     Nuclear sclerosis of right eye 11/24/2021    PAD (peripheral artery disease)     Pulmonary HTN     Right foot infection     TB lung, latent 04/2021       Past Surgical History:   Procedure Laterality Date    ABOVE-KNEE AMPUTATION Left 1/18/2022    Procedure: AMPUTATION, ABOVE KNEE;  Surgeon: Rusty Light MD;  Location: [facility] OR;  Service: Orthopedics;  Laterality: Left;    ABOVE-KNEE AMPUTATION Left 1/21/2022    Procedure: AMPUTATION, ABOVE KNEE;  Surgeon: Rusty Light MD;  Location: [facility] OR;  Service: Orthopedics;  Laterality: Left;    AV FISTULA PLACEMENT      CATARACT EXTRACTION Left     EYE SURGERY      FISTULOGRAM Left 8/9/2022    Procedure: Fistulogram;  Surgeon: Masoud Soni MD;  Location: [facility] OR;  Service: Vascular;  Laterality: Left;  LEFT VM ON DAUGHTER'S PHONE----PHONE PREOP NOT COMPLETED  CALLED PATIENT ON 8/8/2022 @ 3:34. HE STATED HE IS RESCHEDULING PROCEDURE. NOTIFIED ERWIN @ 3:35PM-LO    TOE AMPUTATION Left 1/6/2022    Procedure: AMPUTATION, TOE;  Surgeon: Masoud Soni MD;  Location: [facility] OR;  Service: Vascular;  Laterality: Left;  Left  "first through fifth toes, possible open transmetatarsal amputation and all other indicated procedures       Review of patient's allergies indicates:  No Known Allergies    No current facility-administered medications on file prior to encounter.     Current Outpatient Medications on File Prior to Encounter   Medication Sig    traMADoL (ULTRAM) 50 mg tablet Take 50 mg by mouth every 6 (six) hours as needed for Pain.    amLODIPine (NORVASC) 10 MG tablet Take 1 tablet (10 mg total) by mouth once daily.    amLODIPine (NORVASC) 10 MG tablet Take 1 tablet by mouth.    aspirin 81 MG Chew Take 81 mg by mouth once daily.    aspirin 81 MG Chew Take 1 tablet by mouth.    atorvastatin (LIPITOR) 20 MG tablet Take 1 tablet by mouth.    clopidogreL (PLAVIX) 75 mg tablet Take 75 mg by mouth once daily.    doxycycline (VIBRAMYCIN) 100 MG Cap Take 1 capsule (100 mg total) by mouth every 12 (twelve) hours.    insulin glargine 100 units/mL (3mL) SubQ pen Inject 15 Units into the skin every evening.    insulin glargine 100 units/mL (3mL) SubQ pen Inject 15 Units into the skin every evening.    lancets (ACCU-CHEK SOFTCLIX LANCETS) Misc 1 each by Misc.(Non-Drug; Combo Route) route once daily at 6am.    methoxy peg-epoetin beta (MIRCERA INJ) 50 mcg.    methoxy peg-epoetin beta (MIRCERA INJ) 30 mcg.    methoxy peg-epoetin beta (MIRCERA INJ) 75 mcg.    metoprolol succinate (TOPROL-XL) 25 MG 24 hr tablet Take 1 tablet by mouth.    omeprazole (PRILOSEC) 20 MG capsule TAKE 2 CAPSULES(40 MG) BY MOUTH EVERY DAY (Patient taking differently: Take 20 mg by mouth once daily.)    omeprazole (PRILOSEC) 40 MG capsule Take by mouth.    omeprazole (PRILOSEC) 40 MG capsule Take 40 mg by mouth once daily.    pen needle, diabetic 32 gauge x 5/32" Ndle To use with insulin pen 4x daily    pravastatin (PRAVACHOL) 20 MG tablet Take 20 mg by mouth once daily.    RENVELA 800 mg Tab Take 800 mg by mouth 3 (three) times daily with meals.    " sevelamer carbonate (RENVELA) 800 mg Tab Take 2 tablets by mouth.    traMADoL (ULTRAM) 50 mg tablet Take 1 tablet by mouth.     Family History       Problem Relation (Age of Onset)    No Known Problems Mother, Father, Sister, Brother, Daughter, Daughter, Daughter, Brother, Maternal Aunt, Maternal Uncle, Paternal Aunt, Paternal Uncle, Maternal Grandmother, Maternal Grandfather, Paternal Grandmother, Paternal Grandfather          Tobacco Use    Smoking status: Never    Smokeless tobacco: Never   Substance and Sexual Activity    Alcohol use: No    Drug use: No    Sexual activity: Yes     Partners: Female     Review of Systems   Gastrointestinal:  Negative for melena.   Genitourinary:  Negative for hematuria.   Objective:     Vital Signs (Most Recent):  Temp: 98.4 °F (36.9 °C) (10/12/22 0739)  Pulse: 90 (10/12/22 0739)  Resp: 19 (10/12/22 0739)  BP: 136/64 (10/12/22 0739)  SpO2: 96 % (10/12/22 0739)   Vital Signs (24h Range):  Temp:  [97.4 °F (36.3 °C)-98.4 °F (36.9 °C)] 98.4 °F (36.9 °C)  Pulse:  [] 90  Resp:  [16-30] 19  SpO2:  [96 %-100 %] 96 %  BP: (114-172)/(56-92) 136/64     Weight: (!) 139.3 kg (307 lb 1.6 oz)  Body mass index is 42.83 kg/m².    SpO2: 96 %  O2 Device (Oxygen Therapy): room air      Intake/Output Summary (Last 24 hours) at 10/12/2022 0816  Last data filed at 10/11/2022 1806  Gross per 24 hour   Intake 268.45 ml   Output 1 ml   Net 267.45 ml         Lines/Drains/Airways       Central Venous Catheter Line  Duration                  Hemodialysis Catheter 01/14/22 1439 right internal jugular 270 days              Drain  Duration                  Hemodialysis AV Fistula Left upper arm -- days              Peripheral Intravenous Line  Duration                  Peripheral IV - Single Lumen 10/06/22 1510 20 G Anterior;Distal;Right Upper Arm 5 days         Peripheral IV - Single Lumen 10/10/22 18 G Anterior;Proximal;Right Forearm 2 days                  Exam unchanged vs 10/11/22  Physical  Exam  Constitutional:       General: He is not in acute distress.     Appearance: Normal appearance. He is normal weight. He is not ill-appearing, toxic-appearing or diaphoretic.   HENT:      Head: Normocephalic and atraumatic.   Eyes:      General:         Right eye: No discharge.         Left eye: No discharge.      Extraocular Movements: Extraocular movements intact.      Pupils: Pupils are equal, round, and reactive to light.   Cardiovascular:      Rate and Rhythm: Normal rate and regular rhythm.      Heart sounds: S1 normal and S2 normal. Heart sounds are distant. No murmur heard.    No friction rub. No gallop.   Pulmonary:      Effort: Pulmonary effort is normal. No respiratory distress.      Breath sounds: Normal breath sounds. No stridor. No wheezing, rhonchi or rales.   Chest:      Chest wall: Tenderness present.   Abdominal:      General: There is no distension.      Palpations: Abdomen is soft.   Musculoskeletal:      Cervical back: Normal range of motion and neck supple. No rigidity.      Right lower leg: No edema.      Left lower leg: No edema.      Comments: L AKA  R foot gangrene   Skin:     General: Skin is warm.      Coloration: Skin is not jaundiced.   Neurological:      General: No focal deficit present.      Mental Status: He is oriented to person, place, and time. Mental status is at baseline.   Psychiatric:         Mood and Affect: Mood normal.         Behavior: Behavior normal.       Current Medications:   amLODIPine  10 mg Oral Daily    aspirin  81 mg Oral Daily    atorvastatin  40 mg Oral QHS    epoetin paola-epbx  10,000 Units Subcutaneous Every Mon, Wed, Fri    folic acid-vit B6-vit B12 2.5-25-2 mg  1 tablet Oral Daily    insulin detemir U-100  10 Units Subcutaneous QHS    metoprolol succinate  25 mg Oral Daily    mupirocin   Nasal BID    pantoprazole  40 mg Oral Daily    piperacillin-tazobactam (ZOSYN) IVPB  4.5 g Intravenous Q12H    sevelamer carbonate  2,400 mg Oral TID WM        acetaminophen, dextrose 10%, dextrose 10%, glucagon (human recombinant), glucose, glucose, hydrALAZINE, HYDROcodone-acetaminophen, insulin aspart U-100, naloxone, ondansetron, sodium chloride 0.9%, sodium chloride 0.9%, sodium chloride 0.9%, traMADoL, Pharmacy to dose Vancomycin consult **AND** vancomycin - pharmacy to dose    Laboratory (all labs reviewed):  CBC:  Recent Labs   Lab 10/06/22  1512 10/07/22  1151 10/10/22  1225 10/10/22  1650 10/11/22  0914   WBC 17.41 H 10.61 16.14 H 19.76 H 24.87 H   Hemoglobin 9.7 L 8.6 L 9.5 L 9.0 L 8.8 L   Hematocrit 32.1 L 27.5 L 31.1 L 29.0 L 30.0 L   Platelets 464 H 418 578 H 646 H 674 H         CHEMISTRIES:  Recent Labs   Lab 01/21/22  0422 01/22/22  0526 01/23/22  0532 01/24/22  1202 01/25/22  0349 03/16/22  1754 10/06/22  1512 10/10/22  1225 10/10/22  1650 10/11/22  0914   Glucose 128 H 122 H 153 H   < > 149 H 138 H 211 H 79 112 H 245 H   Sodium 131 L 133 L 133 L  --  134 L 140 136 136 138 131 L   Potassium 5.1 5.7 H 4.5  --  4.2 4.9 4.0 4.3 4.5 5.2 H   BUN 39 H 55 H 34 H  --  25 H 42 H 29 H 34 H 22 H 34 H   Creatinine 9.1 H 11.6 H 7.9 H  --  5.7 H 9.5 H 9.1 H 8.8 H 7.8 H 9.5 H   eGFR if  7 A 5 A 8 A  --  12 A 6 A  --   --   --   --    eGFR if non  6 A 4 A 7 A  --  10 A 5 A  --   --   --   --    Calcium 9.7 9.2 9.3  --  9.1 9.3 9.7 9.4 9.9 9.3   Magnesium  --   --   --   --   --   --  2.1  --   --  2.2    < > = values in this interval not displayed.         CARDIAC BIOMARKERS:  Recent Labs   Lab 10/25/20  2323 10/26/20  0413 01/18/22  1417 10/10/22  1650 10/11/22  0914   Troponin I 0.079 H 0.141 H 0.142 H 0.041 H 0.267 H         COAGS:  Recent Labs   Lab 01/07/21  1003 01/05/22  1232 10/06/22  1512   INR 1.0 1.0 1.1         LIPIDS/LFTS:  Recent Labs   Lab 01/07/21  1003 05/19/21  1016 03/16/22  1754 10/06/22  1512 10/10/22  1225 10/10/22  1650 10/11/22  0914   Cholesterol 186  --   --   --   --   --   --    Triglycerides 123  --   --    --   --   --   --    HDL 67  --   --   --   --   --   --    LDL Cholesterol 94.4  --   --   --   --   --   --    Non-HDL Cholesterol 119  --   --   --   --   --   --    AST 9 L   < > 12 10 8 L 20 16   ALT <5 L   < > 19 <5 L 5 L 7 L 6 L    < > = values in this interval not displayed.         BNP:  Recent Labs   Lab 10/25/20  1951   BNP 1,359 H         TSH:        Free T4:        Diagnostic Results:  ECG (personally reviewed and interpreted tracing(s)):  10/6/22 1440 SR 91, inf ST abnl ?isch, similar to 1/18/22  10/10/22 1600 tele (during code) SR with single PVC (suggesting PEA)  10/10/22 1642 , RBBB/LAD    CTA Chest: 10/10/22  1. No evidence of PE.  2. Mild ground-glass opacities at the right lung base which could be secondary to aspiration, infectious process, or possible mild asymmetric right basilar edema.  No focal consolidation.  3. Acute lower sternal fracture with questionable right anterolateral subtle nondisplaced rib fractures.    RLE art US 10/7/22  High-grade stenosis of the distal right popliteal artery.  Less than 50% diameter stenosis of the distal right common femoral artery.    Echo: 10/10/22 (images personally reviewed and interpreted). EF unchanged vs report 3/2022.   The left ventricle is normal in size with mild concentric hypertrophy and mildly decreased systolic function.   The estimated ejection fraction is 40%.   There is moderate left ventricular global hypokinesis.   Grade II left ventricular diastolic dysfunction.   Normal right ventricular size with normal right ventricular systolic function.   Mild-to-moderate mitral regurgitation.    Stress Test: L MPI 2/4/21    Equivocal myocardial perfusion scan.    There is a moderate intensity, large sized, equivocal defect that is mostly fixed with some reversible areas in the basal to apical lateral wall(s). This finding is equivocal due to diaphragm shadow.    Consider PET stress testing if clinically indicated.    The gated  perfusion images showed an ejection fraction of 41% at rest. The gated perfusion images showed an ejection fraction of 38% post stress. Normal ejection fraction is greater than 51%.    There is mild global hypokinesis at rest and stress.    LV cavity size is mildly enlarged at rest and mildly enlarged at stress.    The EKG portion of this study is abnormal but not diagnostic.    The patient reported no chest pain during the stress test.    There were no arrhythmias during stress.     Cath: 4/1/16 (care everywhere)  · Co-dominant coronary system  · Left main: large caliber with non-obstructive disease.   · Left anterior descending: large caliber that wraps around the apex. It gives a small-mod diagonal branch. All with non-obstructive disease.  · Circumflex: large caliber and gives two OM branches and small PDA. All with non-obstructive disease.   · Right coronary artery: large caliber with non obstructive disease  Left heart catheterization:  · Ao: 125/68 mmHg  · LV: 125/7 mmHg  · LVEDP 7 mmHg  IMPRESSIONS:   · Non-obstructive coronary artery disease.    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 Soni).  No prohibitive cardiac contraindication to fistulogram or AKA as planned.      PEA (Pulseless electrical activity)  Pt returned from HD on afternoon 10/10/22  Subsequently noted to be pulseless and unresponsive  Review of tele during this time notes SR  The above is consistent with PEA (but possible vagal episode)  ROSC achieved with brief CPR, no other ACLS per report.  CTA chest neg for PE  Echo without new WMA, EF stable  No evidence of high deg AVB on tele  Repeat trop without significant rise.  Prior cath 2016 neg.    Wet gangrene  As per PAD section    NICM (nonischemic cardiomyopathy)  No significant CAD on cath 2016  Variable LVEF, was 55% in Dec  2021->35% in Mar 2022->40% 10/11/22.  Med rx planned.    Essential (primary) hypertension  Cont med rx    Controlled type 2 diabetes mellitus with chronic kidney disease on chronic dialysis, with long-term current use of insulin  Per IM    Chronic diastolic heart failure  Does not appear grossly vol overloaded    ESRD on hemodialysis  Per IM/neph    Pure hypercholesterolemia  Cont statin        VTE Risk Mitigation (From admission, onward)         Ordered     IP VTE HIGH RISK PATIENT  Once         10/06/22 1819     Place sequential compression device  Until discontinued         10/06/22 1819              Cardiology will sign off, pls call with questions.    Víctor Sutton MD  Cardiology  [facility] - Telemetry

## 2022-10-12 NOTE — ANESTHESIA PREPROCEDURE EVALUATION
10/12/2022  Adryan Neff is a 56 y.o., male.  To undergo Procedure(s) (LRB):  AMPUTATION, ABOVE KNEE, RIGHT (Right)     Denies CP/SOB/GERD/MI/CVA/URI symptoms.  METS < 4  NPO > 8    Past Medical History:  Past Medical History:   Diagnosis Date    CAD (coronary artery disease) 2016    CAD (non obstructive) 2016 [facility]    Diabetes mellitus type I     Diabetic retinopathy     ESRD on hemodialysis     on HD TThSat    Gangrene of left foot     Hypertension     Nuclear sclerosis of right eye 11/24/2021    PAD (peripheral artery disease)     Pulmonary HTN     Right foot infection     TB lung, latent 04/2021       Past Surgical History:  Past Surgical History:   Procedure Laterality Date    ABOVE-KNEE AMPUTATION Left 1/18/2022    Procedure: AMPUTATION, ABOVE KNEE;  Surgeon: Rusty Light MD;  Location: [facility] OR;  Service: Orthopedics;  Laterality: Left;    ABOVE-KNEE AMPUTATION Left 1/21/2022    Procedure: AMPUTATION, ABOVE KNEE;  Surgeon: Rusty Light MD;  Location: [facility] OR;  Service: Orthopedics;  Laterality: Left;    AV FISTULA PLACEMENT      CATARACT EXTRACTION Left     EYE SURGERY      FISTULOGRAM Left 8/9/2022    Procedure: Fistulogram;  Surgeon: Masoud Soni MD;  Location: [facility] OR;  Service: Vascular;  Laterality: Left;  LEFT VM ON DAUGHTER'S PHONE----PHONE PREOP NOT COMPLETED  CALLED PATIENT ON 8/8/2022 @ 3:34. HE STATED HE IS RESCHEDULING PROCEDURE. NOTIFIED ERWIN @ 3:35PM-LO    TOE AMPUTATION Left 1/6/2022    Procedure: AMPUTATION, TOE;  Surgeon: Masoud Soni MD;  Location: [facility] OR;  Service: Vascular;  Laterality: Left;  Left first through fifth toes, possible open transmetatarsal amputation and all other indicated procedures       Social History:  Social History     Socioeconomic History    Marital status: Single    Number of children: 5   Tobacco Use    Smoking  status: Never    Smokeless tobacco: Never   Substance and Sexual Activity    Alcohol use: No    Drug use: No    Sexual activity: Yes     Partners: Female   Social History Narrative    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     Social Determinants of Health     Financial Resource Strain: Low Risk     Difficulty of Paying Living Expenses: Not very hard   Food Insecurity: No Food Insecurity    Worried About Running Out of Food in the Last Year: Never true    Ran Out of Food in the Last Year: Never true   Transportation Needs: No Transportation Needs    Lack of Transportation (Medical): No    Lack of Transportation (Non-Medical): No   Physical Activity: Inactive    Days of Exercise per Week: 0 days    Minutes of Exercise per Session: 0 min   Stress: Stress Concern Present    Feeling of Stress : Rather much   Social Connections: Socially Isolated    Frequency of Communication with Friends and Family: More than three times a week    Frequency of Social Gatherings with Friends and Family: More than three times a week    Attends Gnosticism Services: Never    Active Member of Clubs or Organizations: No    Attends Club or Organization Meetings: Never    Marital Status: Never    Housing Stability: Low Risk     Unable to Pay for Housing in the Last Year: No    Number of Places Lived in the Last Year: 1    Unstable Housing in the Last Year: No       Medications:  No current facility-administered medications on file prior to encounter.     Current Outpatient Medications on File Prior to Encounter   Medication Sig Dispense Refill    traMADoL (ULTRAM) 50 mg tablet Take 50 mg by mouth every 6 (six) hours as needed for Pain.      amLODIPine (NORVASC) 10 MG tablet Take 1 tablet (10 mg total) by mouth once daily. 30 tablet 11    amLODIPine (NORVASC) 10 MG tablet Take 1 tablet by mouth.      aspirin 81 MG Chew Take 81 mg by mouth once daily.      aspirin 81 MG Chew Take 1 tablet by mouth.      atorvastatin (LIPITOR) 20 MG  "tablet Take 1 tablet by mouth.      clopidogreL (PLAVIX) 75 mg tablet Take 75 mg by mouth once daily.      doxycycline (VIBRAMYCIN) 100 MG Cap Take 1 capsule (100 mg total) by mouth every 12 (twelve) hours. 20 capsule 0    insulin glargine 100 units/mL (3mL) SubQ pen Inject 15 Units into the skin every evening. 3 mL 11    insulin glargine 100 units/mL (3mL) SubQ pen Inject 15 Units into the skin every evening. 3 mL 11    lancets (ACCU-CHEK SOFTCLIX LANCETS) Misc 1 each by Misc.(Non-Drug; Combo Route) route once daily at 6am. 200 each 1    methoxy peg-epoetin beta (MIRCERA INJ) 50 mcg.      methoxy peg-epoetin beta (MIRCERA INJ) 30 mcg.      methoxy peg-epoetin beta (MIRCERA INJ) 75 mcg.      metoprolol succinate (TOPROL-XL) 25 MG 24 hr tablet Take 1 tablet by mouth.      omeprazole (PRILOSEC) 20 MG capsule TAKE 2 CAPSULES(40 MG) BY MOUTH EVERY DAY (Patient taking differently: Take 20 mg by mouth once daily.) 180 capsule 0    omeprazole (PRILOSEC) 40 MG capsule Take by mouth.      omeprazole (PRILOSEC) 40 MG capsule Take 40 mg by mouth once daily.      pen needle, diabetic 32 gauge x 5/32" Ndle To use with insulin pen 4x daily 100 each 11    pravastatin (PRAVACHOL) 20 MG tablet Take 20 mg by mouth once daily.      RENVELA 800 mg Tab Take 800 mg by mouth 3 (three) times daily with meals.      sevelamer carbonate (RENVELA) 800 mg Tab Take 2 tablets by mouth.      traMADoL (ULTRAM) 50 mg tablet Take 1 tablet by mouth.         Allergies:  Review of patient's allergies indicates:  No Known Allergies    Active Problems:  Patient Active Problem List   Diagnosis    Controlled type 2 diabetes mellitus with chronic kidney disease on chronic dialysis, with long-term current use of insulin    Essential (primary) hypertension    Pure hypercholesterolemia    Benign hypertension with chronic kidney disease, stage V    Other insomnia    Gastroesophageal reflux disease    Intractable vomiting    Chronic " diastolic heart failure    CAD (coronary artery disease) - [facility]-obstruct[facility] in 2016    Hyperkalemia    Pre-transplant evaluation for kidney transplant    Chronic pulmonary heart disease    HLD (hyperlipidemia)    PDR (proliferative diabetic retinopathy)    Secondary hyperparathyroidism of renal origin    ESRD on hemodialysis    TB lung, latent    Paronychia, toe, left    Nuclear sclerosis of right eye    Pseudophakia    Wet gangrene    COVID-19 in immunocompromised patient    Encephalopathy, metabolic    Unilateral complete BKA, left, initial encounter    PAD (peripheral artery disease)    PEA (Pulseless electrical activity)       Diagnostic Studies:   Latest Reference Range & Units 10/13/22 04:57   WBC 3.90 - 12.70 K/uL 17.75 (H)   RBC 4.60 - 6.20 M/uL 2.69 (L)   Hemoglobin 14.0 - 18.0 g/dL 7.2 (L)   Hematocrit 40.0 - 54.0 % 23.5 (L)   MCV 82 - 98 fL 87   MCH 27.0 - 31.0 pg 26.8 (L)   MCHC 32.0 - 36.0 g/dL 30.6 (L)   RDW 11.5 - 14.5 % 14.7 (H)   Platelets 150 - 450 K/uL 528 (H)   MPV 9.2 - 12.9 fL 9.3      Latest Reference Range & Units 10/13/22 04:57   Sodium 136 - 145 mmol/L 135 (L)   Potassium 3.5 - 5.1 mmol/L 4.1   Chloride 95 - 110 mmol/L 95   CO2 23 - 29 mmol/L 27   Anion Gap 8 - 16 mmol/L 13   BUN 6 - 20 mg/dL 29 (H)   Creatinine 0.5 - 1.4 mg/dL 9.1 (H)   eGFR >60 mL/min/1.73 m^2 6 !   Glucose 70 - 110 mg/dL 119 (H)   Calcium 8.7 - 10.5 mg/dL 8.7     EKG (10/10/22):  Sinus rhythm with short GA   Right atrial enlargement   Right bundle branch block   Left anterior fascicular block   Bifascicular block   Possible Lateral infarct ,age undetermined     TTE (10/10/22):   The left ventricle is normal in size with mild concentric hypertrophy and mildly decreased systolic function.   The estimated ejection fraction is 40%.   There is moderate left ventricular global hypokinesis.   Grade II left ventricular diastolic dysfunction.   Normal right ventricular size with normal right  ventricular systolic function.   Mild-to-moderate mitral regurgitation.    CXR (10/12/22):  Diminished depth of inspiration without additional radiographic evidence for superimposed acute intrathoracic process.    24 Hour Vitals:  Temp:  [36.5 °C (97.7 °F)-37.4 °C (99.3 °F)] 37.1 °C (98.7 °F)  Pulse:  [] 101  Resp:  [18-20] 20  SpO2:  [96 %-99 %] 97 %  BP: (127-154)/(63-74) 136/65   See Nursing Charting For Additional Vitals    Pre-op Assessment    I have reviewed the Patient Summary Reports.     I have reviewed the Nursing Notes.       Review of Systems  Anesthesia Hx:  No previous Anesthesia   Denies Personal Hx of Anesthesia complications.   Social:  Non-Smoker, No Alcohol Use    Hematology/Oncology:  Hematology Normal   Oncology Normal     EENT/Dental:EENT/Dental Normal   Cardiovascular:   Exercise tolerance: poor Hypertension CAD   CHF PVD hyperlipidemia ECG has been reviewed. pHTN   Pulmonary:  Pulmonary Normal Note sternal fractures and and possible rib fractures after code 10/10   Renal/:   Chronic Renal Disease, ESRD, Dialysis Last partial HD 10/12/22   Hepatic/GI:   GERD, well controlled    Musculoskeletal:  Musculoskeletal Normal    Neurological:  Neurology Normal    Endocrine:   Diabetes, type 2    Dermatological:  Skin Normal    Psych:  Psychiatric Normal           Physical Exam  General: Well nourished    Airway:  Mallampati: III   Mouth Opening: Normal  TM Distance: Normal      Dental:  Intact    Chest/Lungs:  Clear to auscultation, Normal Respiratory Rate    Heart:  Rate: Normal  Rhythm: Regular Rhythm        Anesthesia Plan  Type of Anesthesia, risks & benefits discussed:    Anesthesia Type: Gen ETT  Intra-op Monitoring Plan: Standard ASA Monitors  Post Op Pain Control Plan: multimodal analgesia and IV/PO Opioids PRN  Airway Plan: Direct and Video, Post-Induction  Informed Consent: Informed consent signed with the Patient and all parties understand the risks and agree with anesthesia plan.   All questions answered. Patient consented to blood products? Yes  ASA Score: 3  Anesthesia Plan Notes:   GA with OETT  Standard ASA monitors  Recovery in PACU  PONV: 2    Ready For Surgery From Anesthesia Perspective.     .

## 2022-10-12 NOTE — PROGRESS NOTES
Patient having a fistulogram with vascular today. He has been cleared by cardiology to proceed with right AKA. If procedure today prohibits him from having dialysis tomorrow, we are able to proceed with AKA if anesthesia is comfortable. If he needs to have dialysis tomorrow, we will plan for AKA at a later date. NPO at midnight.

## 2022-10-12 NOTE — ASSESSMENT & PLAN NOTE
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 Soni).  No prohibitive cardiac contraindication to fistulogram or AKA as planned.

## 2022-10-12 NOTE — NURSING
Received report from Brandin in dialysis. 900ml was removed, /74 and P 93. Patient dialysis was cut short due to scheduled procedure. Awaiting patient's arrival to floor.

## 2022-10-12 NOTE — PROGRESS NOTES
Date of Admission:10/6/2022    SUBJECTIVE:notes feeling ok on hd    Current Facility-Administered Medications   Medication    acetaminophen tablet 650 mg    amLODIPine tablet 10 mg    aspirin chewable tablet 81 mg    atorvastatin tablet 40 mg    dextrose 10% bolus 125 mL    dextrose 10% bolus 250 mL    epoetin paola-epbx injection 10,000 Units    folic acid-vit B6-vit B12 2.5-25-2 mg tablet 1 tablet    glucagon (human recombinant) injection 1 mg    glucose chewable tablet 16 g    glucose chewable tablet 24 g    hydrALAZINE injection 10 mg    HYDROcodone-acetaminophen 5-325 mg per tablet 1 tablet    insulin aspart U-100 pen 0-5 Units    insulin detemir U-100 pen 10 Units    metoprolol succinate (TOPROL-XL) 24 hr tablet 25 mg    naloxone 0.4 mg/mL injection 0.02 mg    ondansetron disintegrating tablet 8 mg    pantoprazole EC tablet 40 mg    piperacillin-tazobactam 4.5 g in dextrose 5 % 100 mL IVPB (ready to mix system)    sevelamer carbonate tablet 2,400 mg    sodium chloride 0.9% bolus 250 mL    sodium chloride 0.9% flush 10 mL    sodium chloride 0.9% flush 10 mL    traMADoL tablet 50 mg    vancomycin - pharmacy to dose       Wt Readings from Last 3 Encounters:   10/12/22 (!) 139.3 kg (307 lb 1.6 oz)   10/06/22 63.4 kg (139 lb 12.4 oz)   10/06/22 63.4 kg (139 lb 12.4 oz)     Temp Readings from Last 3 Encounters:   10/12/22 98.4 °F (36.9 °C)   08/09/22 98.2 °F (36.8 °C) (Oral)   06/16/22 98.7 °F (37.1 °C) (Oral)     BP Readings from Last 3 Encounters:   10/12/22 136/64   10/06/22 131/65   08/09/22 (!) 166/72     Pulse Readings from Last 3 Encounters:   10/12/22 90   08/09/22 75   06/16/22 (!) 111       Intake/Output Summary (Last 24 hours) at 10/12/2022 1047  Last data filed at 10/11/2022 1806  Gross per 24 hour   Intake 268.45 ml   Output 1 ml   Net 267.45 ml         PE:  GEN:wd male in nad  HEENT:ncat,eomi,mm  CVS:s1s2 regular  PULM:ctab  ABD:bs,soft  EXT:gangrene, no leg edema, avg -bruit, no bleeding, no hematoma,  aka  NEURO:awake    No results for input(s): GLU, NA, K, CL, CO2, BUN, CREATININE, CALCIUM, MG in the last 24 hours.      Lab Results   Component Value Date    .0 (H) 01/07/2021    CALCIUM 9.3 10/11/2022    PHOS 6.6 (H) 10/11/2022       No results for input(s): WBC, RBC, HGB, HCT, PLT, MCV, MCH, MCHC in the last 24 hours.          A/P:  1.esrd. cont hd MWF. Seen on hd.  2.malfxn avg. Consult vascular. Needs fistulogram.  3.pea arrest. S/p. Following.decrease blood flow.  4.gangrene. aka pending.  5.htn. sbp ok.  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 Cont  binders..  7.anemia. cont epo.  8.dm2. Following sugars.

## 2022-10-12 NOTE — ASSESSMENT & PLAN NOTE
- History of severe PAD   - C/b LEFT foot gangrene s/p left AKA   - Now presenting with right foot gangrene with concern of superimposed infection   - Home medications: aspirin, plavix and statin  - RCRI score of 4    Plan;   - Vascular surgery, podiatry consultation   - Podiatry recommending bka/aka. Ortho consulted, surgery 10/12  - Vancomycin/Zosyn for concern for superimposed infection   - Infectious disease consult: recommending amputation and continued abx for now   - Resume home medications

## 2022-10-12 NOTE — PLAN OF CARE
Problem: Adult Inpatient Plan of Care  Goal: Plan of Care Review  Outcome: Ongoing, Progressing  Goal: Patient-Specific Goal (Individualized)  Outcome: Ongoing, Progressing  Goal: Absence of Hospital-Acquired Illness or Injury  Outcome: Ongoing, Progressing  Goal: Optimal Comfort and Wellbeing  Outcome: Ongoing, Progressing  Goal: Readiness for Transition of Care  Outcome: Ongoing, Progressing     Problem: Skin Injury Risk Increased  Goal: Skin Health and Integrity  Outcome: Ongoing, Progressing     Problem: Fall Injury Risk  Goal: Absence of Fall and Fall-Related Injury  Outcome: Ongoing, Progressing     Problem: Diabetes Comorbidity  Goal: Blood Glucose Level Within Targeted Range  Outcome: Ongoing, Progressing     Problem: Infection  Goal: Absence of Infection Signs and Symptoms  Outcome: Ongoing, Progressing     Problem: Impaired Wound Healing  Goal: Optimal Wound Healing  Outcome: Ongoing, Progressing     Problem: Device-Related Complication Risk (Hemodialysis)  Goal: Safe, Effective Therapy Delivery  Outcome: Ongoing, Progressing     Problem: Hemodynamic Instability (Hemodialysis)  Goal: Effective Tissue Perfusion  Outcome: Ongoing, Progressing     Problem: Infection (Hemodialysis)  Goal: Absence of Infection Signs and Symptoms  Outcome: Ongoing, Progressing

## 2022-10-12 NOTE — PROGRESS NOTES
[facility] Medicine  Progress Note    Patient Name: Adryan Neff  MRN: [id number]  Patient Class: IP- Inpatient   Admission Date: 10/6/2022  Length of Stay: 6 days  Attending Physician: Aureliano Trimble MD  Primary Care Provider: No primary care provider on file.        Subjective:     Principal Problem:PAD (peripheral artery disease)        HPI:  This is a 56 year old male with a PMHx of severe PAD s/p gangrene of left foot s/p left AKA (1/2022), ESRD on HD MWF, NICM (EF: 55%, GIDD), pulmonary hypertension, GERD and latent TB filiberto presents for concern for leg infection.       The patient presented to the vascular surgery clinic for a follow up. He reports worsening pain and developing eschar of the right dorsal foot along with foul smelling odor. Given concern for an infection and the need for IV antibiotics & expedited podiatry evaluation, the patient was sent to the ER. The patient denies having any fevers, chills, or injury to his foot. He has a previous AKA and uses a wheelchair to ambulate. While in the ED, the patient was hypertensive but otherwise hemodynamically stable. Labs showed leukocytosis (17.41 - with neutrophilic predominance), normocytic anemia (9.7), elevated sed rate (125), elevated CRP (237.2), negative lactate (1.6). X-ray foot showed no radiographic evidence of osteomyelitis. He was administered vancomycin, zosyn and was admitted for further management.     I attempted to call the daughter to obtain further history on 1902 without a response.         Overview/Hospital Course:  56M with pmh of severe pad s/p gangangrene of left foot s/p left AKA (1/2022), ESRD on HD MWF, NICM (EF: 55%, GIDD), pulmonary hypertension, GERD and latent TB filiberto presents for concern for leg infection. The patient presented to the vascular surgery clinic for a follow up. He reports worsening pain and developing eschar of the right dorsal foot along with foul smelling odor. Given concern for an  infection and the need for IV antibiotics & expedited podiatry evaluation, the patient was sent to the ER. In ed labs with leukocytosis, normocytic anemia (9.7), elevated sed rate (125), elevated CRP (237.2), negative lactate (1.6). X-ray foot showed no radiographic evidence of osteomyelitis.Pt started on vancomycin and zosyn for possible infection. Initially evaluated by podiatry who recommended ortho consult for possible amputation. Amputation to be done on 10/10. ID also on board for abx recommendations. Patient had a coded blue called on 10/10 for possible PEA vs. Vasovagal.  ROSC returned quickly. Cards consulted to follow. The patient was transferred to the floor on 10/11.    On the floor, patient remained stable. Underwent fistulogram without issues and planned amputation of RLE on Thursday.      Interval History: NAEON. On dialysis during interview. No changes. Denies any new symptoms.    Review of Systems   All other systems reviewed and are negative.  Objective:     Vital Signs (Most Recent):  Temp: 98.4 °F (36.9 °C) (10/12/22 0739)  Pulse: 90 (10/12/22 0739)  Resp: 19 (10/12/22 0739)  BP: 136/64 (10/12/22 0739)  SpO2: 96 % (10/12/22 0739) Vital Signs (24h Range):  Temp:  [97.4 °F (36.3 °C)-98.4 °F (36.9 °C)] 98.4 °F (36.9 °C)  Pulse:  [71-92] 90  Resp:  [16-29] 19  SpO2:  [96 %-100 %] 96 %  BP: (114-172)/(56-92) 136/64     Weight: (!) 139.3 kg (307 lb 1.6 oz)  Body mass index is 42.83 kg/m².    Intake/Output Summary (Last 24 hours) at 10/12/2022 1211  Last data filed at 10/11/2022 1806  Gross per 24 hour   Intake 148.45 ml   Output 1 ml   Net 147.45 ml      Physical Exam  Vitals and nursing note reviewed.   Constitutional:       General: He is not in acute distress.     Appearance: Normal appearance.   HENT:      Head: Normocephalic and atraumatic.      Mouth/Throat:      Mouth: Mucous membranes are moist.   Eyes:      Extraocular Movements: Extraocular movements intact.      Pupils: Pupils are equal,  round, and reactive to light.   Cardiovascular:      Rate and Rhythm: Normal rate and regular rhythm.      Pulses:           Dorsalis pedis pulses are detected w/ Doppler on the right side.        Posterior tibial pulses are detected w/ Doppler on the right side.      Heart sounds: Murmur heard.   Pulmonary:      Effort: Pulmonary effort is normal. No respiratory distress.      Breath sounds: Normal breath sounds. No rales.   Abdominal:      General: Abdomen is flat. Bowel sounds are normal.      Palpations: Abdomen is soft.   Musculoskeletal:      Left Lower Extremity: Left leg is amputated below knee.   Feet:      Comments: Right foot wet gangrene  Neurological:      Mental Status: He is alert.       Significant Labs: All pertinent labs within the past 24 hours have been reviewed.    Significant Imaging: I have reviewed all pertinent imaging results/findings within the past 24 hours.      Assessment/Plan:      * PAD (peripheral artery disease)  - History of severe PAD   - C/b LEFT foot gangrene s/p left AKA   - Now presenting with right foot gangrene with concern of superimposed infection   - Home medications: aspirin, plavix and statin  - RCRI score of 4    Plan;   - Vascular surgery, podiatry consultation   - Podiatry recommending bka/aka. Ortho consulted, surgery 10/12  - Vancomycin/Zosyn for concern for superimposed infection   - Infectious disease consult: recommending amputation and continued abx for now   - Resume home medications     Wet gangrene  As stated above in PAD      PEA (Pulseless electrical activity)  On 10/10. Return of ROSC quickly. Now in NSR and awake and alert. Will transfer to tele.      ESRD on hemodialysis  - Dialysis type: iHD  - Access: L AVF  - Schedule: MWF    Plan:  - Nephrology consult for dialysis management  - Daily weights to guide UF  - Continue dialysis MWF    CAD (coronary artery disease) - non-obstructive from [facility] in 2016  - Resume aspirin, statin       Chronic diastolic  heart failure  - Echocardiogram (11/24/2021): EF: 55%, GIDD, mild TR, moderate to severe MR  - Compensated on physical examination, on room air   - Resume home medications     Gastroesophageal reflux disease  - Resume PPI     Pure hypercholesterolemia  - Resume statin     Essential (primary) hypertension  - Resume home medications (amlodipine 10 mg, Toprol-XL 50 mg)     Controlled type 2 diabetes mellitus with chronic kidney disease on chronic dialysis, with long-term current use of insulin  - Last A1c:   Lab Results   Component Value Date    HGBA1C 6.1 (H) 01/07/2021     - Home regimen: Lantus 12 units nightly, increased from 10 due to hyperglycemia. 15u caused hypoglycemia at last week  - Continue home regimen   - Low-sensitivity sliding scale insulin  - Initiate and maintain glycemic protocol, monitor POC glucose   - Follow up with PCP as outpatient      VTE Risk Mitigation (From admission, onward)         Ordered     IP VTE HIGH RISK PATIENT  Once         10/06/22 1819     Place sequential compression device  Until discontinued         10/06/22 1819                Discharge Planning   ENIO:      Code Status: Full Code   Is the patient medically ready for discharge?:     Reason for patient still in hospital (select all that apply): Treatment and Consult recommendations  Discharge Plan A: Home with family   Discharge Delays: None known at this time          Aureliano Trimble MD  Department of Hospital Medicine   [facility] [place] [facility]

## 2022-10-12 NOTE — PROGRESS NOTES
Pharmacokinetic Assessment Follow Up: IV Vancomycin    Vancomycin serum concentration assessment(s):    The random level was drawn correctly and can be used to guide therapy at this time. The measurement is within the desired definitive target range of 10 to 20 mcg/mL.    Vancomycin Regimen Plan:    Give 750 mg after dialysis today.  Re-dose when the random level is less than 20 mcg/mL, next level to be drawn at 0400 on 10/14/2022    Drug levels (last 3 results):  Recent Labs   Lab Result Units 10/10/22  0339 10/12/22  0347   Vancomycin, Random ug/mL 21.5 14.3       Pharmacy will continue to follow and monitor vancomycin.    Please contact pharmacy at extension [phone] for questions regarding this assessment.    Thank you for the consult,   Evan Rosa Jr       Patient brief summary:  Adryan Neff is a 56 y.o. male initiated on antimicrobial therapy with IV Vancomycin for treatment of skin & soft tissue infection    Drug Allergies:   Review of patient's allergies indicates:  No Known Allergies    Actual Body Weight:   139.3 kg    Renal Function:   Estimated Creatinine Clearance: 12.2 mL/min (A) (based on SCr of 9.5 mg/dL (H)).,     Dialysis Method (if applicable):  intermittent HD    CBC (last 72 hours):  Recent Labs   Lab Result Units 10/10/22  1225 10/10/22  1650 10/11/22  0914   WBC K/uL 16.14* 19.76* 24.87*   Hemoglobin g/dL 9.5* 9.0* 8.8*   Hematocrit % 31.1* 29.0* 30.0*   Platelets K/uL 578* 646* 674*   Gran % % 82.3* 85.1* 87.2*   Lymph % % 9.0* 7.9* 6.0*   Mono % % 5.4 3.6* 4.6   Eosinophil % % 2.2 1.3 0.7   Basophil % % 0.5 0.4 0.3   Differential Method  Automated Automated Automated       Metabolic Panel (last 72 hours):  Recent Labs   Lab Result Units 10/10/22  1225 10/10/22  1650 10/11/22  0914   Sodium mmol/L 136 138 131*   Potassium mmol/L 4.3 4.5 5.2*   Chloride mmol/L 102 101 96   CO2 mmol/L 19* 23 18*   Glucose mg/dL 79 112* 245*   BUN mg/dL 34* 22* 34*   Creatinine mg/dL 8.8* 7.8* 9.5*   Albumin  g/dL 2.0* 2.1* 2.2*   Total Bilirubin mg/dL 0.3 0.4 0.4   Alkaline Phosphatase U/L 62 65 62   AST U/L 8* 20 16   ALT U/L 5* 7* 6*   Magnesium mg/dL  --   --  2.2   Phosphorus mg/dL  --   --  6.6*       Vancomycin Administrations:  vancomycin given in the last 96 hours        No antibiotic orders with administrations found.                    Microbiologic Results:  Microbiology Results (last 7 days)       Procedure Component Value Units Date/Time    Blood culture [[id number]] Collected: 10/11/22 0914    Order Status: Completed Specimen: Blood Updated: 10/11/22 1712     Blood Culture, Routine No Growth to date    Blood culture [[id number]] Collected: 10/11/22 0915    Order Status: Completed Specimen: Blood from Peripheral, Right Wrist Updated: 10/11/22 1712     Blood Culture, Routine No Growth to date    Blood culture #1 **CANNOT BE ORDERED STAT** [[id number]] Collected: 10/06/22 1511    Order Status: Completed Specimen: Blood from Peripheral, Upper Arm, Right Updated: 10/10/22 2103     Blood Culture, Routine No Growth after 4 days.    Blood culture #2 **CANNOT BE ORDERED STAT** [[id number]] Collected: 10/06/22 1536    Order Status: Completed Specimen: Blood from Peripheral, Hand, Right Updated: 10/10/22 2103     Blood Culture, Routine No Growth after 4 days.

## 2022-10-13 PROBLEM — N18.6 ANEMIA DUE TO CHRONIC KIDNEY DISEASE, ON CHRONIC DIALYSIS: Status: ACTIVE | Noted: 2022-10-13

## 2022-10-13 PROBLEM — Z99.2 ANEMIA DUE TO CHRONIC KIDNEY DISEASE, ON CHRONIC DIALYSIS: Status: ACTIVE | Noted: 2022-10-13

## 2022-10-13 PROBLEM — D63.1 ANEMIA DUE TO CHRONIC KIDNEY DISEASE, ON CHRONIC DIALYSIS: Status: ACTIVE | Noted: 2022-10-13

## 2022-10-13 LAB
ANION GAP SERPL CALC-SCNC: 13 MMOL/L (ref 8–16)
BASOPHILS # BLD AUTO: 0.09 K/UL (ref 0–0.2)
BASOPHILS # BLD AUTO: 0.09 K/UL (ref 0–0.2)
BASOPHILS NFR BLD: 0.4 % (ref 0–1.9)
BASOPHILS NFR BLD: 0.5 % (ref 0–1.9)
BLD PROD TYP BPU: NORMAL
BLOOD UNIT EXPIRATION DATE: NORMAL
BLOOD UNIT TYPE CODE: 5100
BLOOD UNIT TYPE: NORMAL
BUN SERPL-MCNC: 29 MG/DL (ref 6–20)
CALCIUM SERPL-MCNC: 8.7 MG/DL (ref 8.7–10.5)
CHLORIDE SERPL-SCNC: 95 MMOL/L (ref 95–110)
CO2 SERPL-SCNC: 27 MMOL/L (ref 23–29)
CODING SYSTEM: NORMAL
CREAT SERPL-MCNC: 9.1 MG/DL (ref 0.5–1.4)
DIFFERENTIAL METHOD: ABNORMAL
DIFFERENTIAL METHOD: ABNORMAL
DISPENSE STATUS: NORMAL
EOSINOPHIL # BLD AUTO: 0.3 K/UL (ref 0–0.5)
EOSINOPHIL # BLD AUTO: 0.4 K/UL (ref 0–0.5)
EOSINOPHIL NFR BLD: 1.5 % (ref 0–8)
EOSINOPHIL NFR BLD: 2 % (ref 0–8)
ERYTHROCYTE [DISTWIDTH] IN BLOOD BY AUTOMATED COUNT: 14.7 % (ref 11.5–14.5)
ERYTHROCYTE [DISTWIDTH] IN BLOOD BY AUTOMATED COUNT: 15 % (ref 11.5–14.5)
EST. GFR  (NO RACE VARIABLE): 6 ML/MIN/1.73 M^2
GLUCOSE SERPL-MCNC: 119 MG/DL (ref 70–110)
HBV SURFACE AB SER QL IA: POSITIVE
HBV SURFACE AB SERPL IA-ACNC: NORMAL MIU/ML
HCT VFR BLD AUTO: 23.4 % (ref 40–54)
HCT VFR BLD AUTO: 23.5 % (ref 40–54)
HCT VFR BLD AUTO: 24.7 % (ref 40–54)
HGB BLD-MCNC: 7.2 G/DL (ref 14–18)
HGB BLD-MCNC: 7.4 G/DL (ref 14–18)
IMM GRANULOCYTES # BLD AUTO: 0.2 K/UL (ref 0–0.04)
IMM GRANULOCYTES # BLD AUTO: 0.2 K/UL (ref 0–0.04)
IMM GRANULOCYTES NFR BLD AUTO: 0.9 % (ref 0–0.5)
IMM GRANULOCYTES NFR BLD AUTO: 1.1 % (ref 0–0.5)
INR PPP: 1.1 (ref 0.8–1.2)
LYMPHOCYTES # BLD AUTO: 1.3 K/UL (ref 1–4.8)
LYMPHOCYTES # BLD AUTO: 1.6 K/UL (ref 1–4.8)
LYMPHOCYTES NFR BLD: 7 % (ref 18–48)
LYMPHOCYTES NFR BLD: 7.4 % (ref 18–48)
MAGNESIUM SERPL-MCNC: 2.9 MG/DL (ref 1.6–2.6)
MCH RBC QN AUTO: 26.3 PG (ref 27–31)
MCH RBC QN AUTO: 26.8 PG (ref 27–31)
MCHC RBC AUTO-ENTMCNC: 30 G/DL (ref 32–36)
MCHC RBC AUTO-ENTMCNC: 30.6 G/DL (ref 32–36)
MCV RBC AUTO: 87 FL (ref 82–98)
MCV RBC AUTO: 88 FL (ref 82–98)
MONOCYTES # BLD AUTO: 1.3 K/UL (ref 0.3–1)
MONOCYTES # BLD AUTO: 1.5 K/UL (ref 0.3–1)
MONOCYTES NFR BLD: 6.9 % (ref 4–15)
MONOCYTES NFR BLD: 7.4 % (ref 4–15)
NEUTROPHILS # BLD AUTO: 14.5 K/UL (ref 1.8–7.7)
NEUTROPHILS # BLD AUTO: 17.7 K/UL (ref 1.8–7.7)
NEUTROPHILS NFR BLD: 82 % (ref 38–73)
NEUTROPHILS NFR BLD: 82.9 % (ref 38–73)
NRBC BLD-RTO: 0 /100 WBC
NRBC BLD-RTO: 0 /100 WBC
NUM UNITS TRANS PACKED RBC: NORMAL
PLATELET # BLD AUTO: 528 K/UL (ref 150–450)
PLATELET # BLD AUTO: 583 K/UL (ref 150–450)
PMV BLD AUTO: 9 FL (ref 9.2–12.9)
PMV BLD AUTO: 9.3 FL (ref 9.2–12.9)
POCT GLUCOSE: 113 MG/DL (ref 70–110)
POCT GLUCOSE: 115 MG/DL (ref 70–110)
POCT GLUCOSE: 127 MG/DL (ref 70–110)
POCT GLUCOSE: 91 MG/DL (ref 70–110)
POTASSIUM SERPL-SCNC: 4.1 MMOL/L (ref 3.5–5.1)
PROTHROMBIN TIME: 11.5 SEC (ref 9–12.5)
RBC # BLD AUTO: 2.69 M/UL (ref 4.6–6.2)
RBC # BLD AUTO: 2.81 M/UL (ref 4.6–6.2)
SODIUM SERPL-SCNC: 135 MMOL/L (ref 136–145)
WBC # BLD AUTO: 17.75 K/UL (ref 3.9–12.7)
WBC # BLD AUTO: 21.38 K/UL (ref 3.9–12.7)

## 2022-10-13 PROCEDURE — 88307 PR  SURG PATH,LEVEL V: ICD-10-PCS | Mod: 26,,, | Performed by: PATHOLOGY

## 2022-10-13 PROCEDURE — 99232 SBSQ HOSP IP/OBS MODERATE 35: CPT | Mod: ,,, | Performed by: SURGERY

## 2022-10-13 PROCEDURE — 36415 COLL VENOUS BLD VENIPUNCTURE: CPT | Performed by: ANESTHESIOLOGY

## 2022-10-13 PROCEDURE — 63600175 PHARM REV CODE 636 W HCPCS: Performed by: STUDENT IN AN ORGANIZED HEALTH CARE EDUCATION/TRAINING PROGRAM

## 2022-10-13 PROCEDURE — 36000710: Performed by: ORTHOPAEDIC SURGERY

## 2022-10-13 PROCEDURE — 25000003 PHARM REV CODE 250: Performed by: NURSE PRACTITIONER

## 2022-10-13 PROCEDURE — D9220A PRA ANESTHESIA: Mod: CRNA,,, | Performed by: NURSE ANESTHETIST, CERTIFIED REGISTERED

## 2022-10-13 PROCEDURE — D9220A PRA ANESTHESIA: ICD-10-PCS | Mod: CRNA,,, | Performed by: NURSE ANESTHETIST, CERTIFIED REGISTERED

## 2022-10-13 PROCEDURE — 36000711: Performed by: ORTHOPAEDIC SURGERY

## 2022-10-13 PROCEDURE — 63600175 PHARM REV CODE 636 W HCPCS: Performed by: INTERNAL MEDICINE

## 2022-10-13 PROCEDURE — D9220A PRA ANESTHESIA: ICD-10-PCS | Mod: ANES,,, | Performed by: ANESTHESIOLOGY

## 2022-10-13 PROCEDURE — 37000009 HC ANESTHESIA EA ADD 15 MINS: Performed by: ORTHOPAEDIC SURGERY

## 2022-10-13 PROCEDURE — 83735 ASSAY OF MAGNESIUM: CPT | Performed by: INTERNAL MEDICINE

## 2022-10-13 PROCEDURE — 25000003 PHARM REV CODE 250: Performed by: NURSE ANESTHETIST, CERTIFIED REGISTERED

## 2022-10-13 PROCEDURE — 88307 TISSUE EXAM BY PATHOLOGIST: CPT | Mod: 26,,, | Performed by: PATHOLOGY

## 2022-10-13 PROCEDURE — 93005 ELECTROCARDIOGRAM TRACING: CPT

## 2022-10-13 PROCEDURE — 25000003 PHARM REV CODE 250: Performed by: ORTHOPAEDIC SURGERY

## 2022-10-13 PROCEDURE — 93010 ELECTROCARDIOGRAM REPORT: CPT | Mod: ,,, | Performed by: INTERNAL MEDICINE

## 2022-10-13 PROCEDURE — D9220A PRA ANESTHESIA: Mod: ANES,,, | Performed by: ANESTHESIOLOGY

## 2022-10-13 PROCEDURE — 85025 COMPLETE CBC W/AUTO DIFF WBC: CPT | Performed by: INTERNAL MEDICINE

## 2022-10-13 PROCEDURE — 36415 COLL VENOUS BLD VENIPUNCTURE: CPT | Performed by: INTERNAL MEDICINE

## 2022-10-13 PROCEDURE — 37000008 HC ANESTHESIA 1ST 15 MINUTES: Performed by: ORTHOPAEDIC SURGERY

## 2022-10-13 PROCEDURE — 63600175 PHARM REV CODE 636 W HCPCS: Performed by: ORTHOPAEDIC SURGERY

## 2022-10-13 PROCEDURE — 21400001 HC TELEMETRY ROOM

## 2022-10-13 PROCEDURE — 27201423 OPTIME MED/SURG SUP & DEVICES STERILE SUPPLY: Performed by: ORTHOPAEDIC SURGERY

## 2022-10-13 PROCEDURE — 36415 COLL VENOUS BLD VENIPUNCTURE: CPT | Performed by: ORTHOPAEDIC SURGERY

## 2022-10-13 PROCEDURE — 25000003 PHARM REV CODE 250: Performed by: INTERNAL MEDICINE

## 2022-10-13 PROCEDURE — 85610 PROTHROMBIN TIME: CPT | Performed by: INTERNAL MEDICINE

## 2022-10-13 PROCEDURE — 80048 BASIC METABOLIC PNL TOTAL CA: CPT | Performed by: INTERNAL MEDICINE

## 2022-10-13 PROCEDURE — 25000003 PHARM REV CODE 250: Performed by: STUDENT IN AN ORGANIZED HEALTH CARE EDUCATION/TRAINING PROGRAM

## 2022-10-13 PROCEDURE — 63600175 PHARM REV CODE 636 W HCPCS: Performed by: NURSE ANESTHETIST, CERTIFIED REGISTERED

## 2022-10-13 PROCEDURE — P9016 RBC LEUKOCYTES REDUCED: HCPCS | Performed by: ANESTHESIOLOGY

## 2022-10-13 PROCEDURE — 93010 EKG 12-LEAD: ICD-10-PCS | Mod: ,,, | Performed by: INTERNAL MEDICINE

## 2022-10-13 PROCEDURE — 85025 COMPLETE CBC W/AUTO DIFF WBC: CPT | Mod: 91 | Performed by: ANESTHESIOLOGY

## 2022-10-13 PROCEDURE — 88307 TISSUE EXAM BY PATHOLOGIST: CPT | Performed by: PATHOLOGY

## 2022-10-13 PROCEDURE — 71000033 HC RECOVERY, INTIAL HOUR: Performed by: ORTHOPAEDIC SURGERY

## 2022-10-13 PROCEDURE — 99232 PR SUBSEQUENT HOSPITAL CARE,LEVL II: ICD-10-PCS | Mod: ,,, | Performed by: SURGERY

## 2022-10-13 PROCEDURE — 85014 HEMATOCRIT: CPT | Performed by: ORTHOPAEDIC SURGERY

## 2022-10-13 RX ORDER — PHENYLEPHRINE HYDROCHLORIDE 10 MG/ML
INJECTION INTRAVENOUS
Status: DISCONTINUED | OUTPATIENT
Start: 2022-10-13 | End: 2022-10-13

## 2022-10-13 RX ORDER — ONDANSETRON 2 MG/ML
INJECTION INTRAMUSCULAR; INTRAVENOUS
Status: DISCONTINUED | OUTPATIENT
Start: 2022-10-13 | End: 2022-10-13

## 2022-10-13 RX ORDER — ROCURONIUM BROMIDE 10 MG/ML
INJECTION, SOLUTION INTRAVENOUS
Status: DISCONTINUED | OUTPATIENT
Start: 2022-10-13 | End: 2022-10-13

## 2022-10-13 RX ORDER — HYDROCODONE BITARTRATE AND ACETAMINOPHEN 500; 5 MG/1; MG/1
TABLET ORAL ONCE
Status: DISCONTINUED | OUTPATIENT
Start: 2022-10-13 | End: 2022-10-17

## 2022-10-13 RX ORDER — MORPHINE SULFATE 4 MG/ML
2 INJECTION, SOLUTION INTRAMUSCULAR; INTRAVENOUS
Status: DISCONTINUED | OUTPATIENT
Start: 2022-10-13 | End: 2022-10-17

## 2022-10-13 RX ORDER — MAG HYDROX/ALUMINUM HYD/SIMETH 200-200-20
30 SUSPENSION, ORAL (FINAL DOSE FORM) ORAL
Status: DISCONTINUED | OUTPATIENT
Start: 2022-10-13 | End: 2022-10-17

## 2022-10-13 RX ORDER — NEOSTIGMINE METHYLSULFATE 1 MG/ML
INJECTION, SOLUTION INTRAVENOUS
Status: DISCONTINUED | OUTPATIENT
Start: 2022-10-13 | End: 2022-10-13

## 2022-10-13 RX ORDER — SODIUM CHLORIDE 0.9 G/100ML
IRRIGANT IRRIGATION
Status: DISCONTINUED | OUTPATIENT
Start: 2022-10-13 | End: 2022-10-13 | Stop reason: HOSPADM

## 2022-10-13 RX ORDER — SODIUM CHLORIDE 0.9 % (FLUSH) 0.9 %
10 SYRINGE (ML) INJECTION
Status: DISCONTINUED | OUTPATIENT
Start: 2022-10-13 | End: 2022-10-13 | Stop reason: HOSPADM

## 2022-10-13 RX ORDER — PROCHLORPERAZINE EDISYLATE 5 MG/ML
5 INJECTION INTRAMUSCULAR; INTRAVENOUS EVERY 30 MIN PRN
Status: DISCONTINUED | OUTPATIENT
Start: 2022-10-13 | End: 2022-10-13 | Stop reason: HOSPADM

## 2022-10-13 RX ORDER — HYDROCODONE BITARTRATE AND ACETAMINOPHEN 500; 5 MG/1; MG/1
TABLET ORAL
Status: DISCONTINUED | OUTPATIENT
Start: 2022-10-13 | End: 2022-10-13

## 2022-10-13 RX ORDER — TALC
6 POWDER (GRAM) TOPICAL NIGHTLY PRN
Status: DISCONTINUED | OUTPATIENT
Start: 2022-10-13 | End: 2022-11-10 | Stop reason: HOSPADM

## 2022-10-13 RX ORDER — ONDANSETRON 2 MG/ML
4 INJECTION INTRAMUSCULAR; INTRAVENOUS DAILY PRN
Status: DISCONTINUED | OUTPATIENT
Start: 2022-10-13 | End: 2022-10-13 | Stop reason: HOSPADM

## 2022-10-13 RX ORDER — FENTANYL CITRATE 50 UG/ML
INJECTION, SOLUTION INTRAMUSCULAR; INTRAVENOUS
Status: DISCONTINUED | OUTPATIENT
Start: 2022-10-13 | End: 2022-10-13

## 2022-10-13 RX ORDER — SUCCINYLCHOLINE CHLORIDE 20 MG/ML
INJECTION INTRAMUSCULAR; INTRAVENOUS
Status: DISCONTINUED | OUTPATIENT
Start: 2022-10-13 | End: 2022-10-13

## 2022-10-13 RX ORDER — LIDOCAINE HYDROCHLORIDE 20 MG/ML
INJECTION INTRAVENOUS
Status: DISCONTINUED | OUTPATIENT
Start: 2022-10-13 | End: 2022-10-13

## 2022-10-13 RX ORDER — HYDROMORPHONE HYDROCHLORIDE 2 MG/ML
0.2 INJECTION, SOLUTION INTRAMUSCULAR; INTRAVENOUS; SUBCUTANEOUS EVERY 5 MIN PRN
Status: DISCONTINUED | OUTPATIENT
Start: 2022-10-13 | End: 2022-10-13 | Stop reason: HOSPADM

## 2022-10-13 RX ORDER — HYDROCODONE BITARTRATE AND ACETAMINOPHEN 500; 5 MG/1; MG/1
TABLET ORAL
Status: DISCONTINUED | OUTPATIENT
Start: 2022-10-13 | End: 2022-10-17

## 2022-10-13 RX ORDER — ETOMIDATE 2 MG/ML
INJECTION INTRAVENOUS
Status: DISCONTINUED | OUTPATIENT
Start: 2022-10-13 | End: 2022-10-13

## 2022-10-13 RX ADMIN — FENTANYL CITRATE 50 MCG: 50 INJECTION, SOLUTION INTRAMUSCULAR; INTRAVENOUS at 03:10

## 2022-10-13 RX ADMIN — NEOSTIGMINE METHYLSULFATE 3 MG: 1 INJECTION INTRAVENOUS at 04:10

## 2022-10-13 RX ADMIN — Medication 1 TABLET: at 09:10

## 2022-10-13 RX ADMIN — ASPIRIN 81 MG CHEWABLE TABLET 81 MG: 81 TABLET CHEWABLE at 09:10

## 2022-10-13 RX ADMIN — ATORVASTATIN CALCIUM 40 MG: 40 TABLET, FILM COATED ORAL at 08:10

## 2022-10-13 RX ADMIN — PANTOPRAZOLE SODIUM 40 MG: 40 TABLET, DELAYED RELEASE ORAL at 09:10

## 2022-10-13 RX ADMIN — LIDOCAINE HYDROCHLORIDE 100 MG: 20 INJECTION, SOLUTION INTRAVENOUS at 03:10

## 2022-10-13 RX ADMIN — ROCURONIUM BROMIDE 20 MG: 10 INJECTION, SOLUTION INTRAVENOUS at 03:10

## 2022-10-13 RX ADMIN — HYDROCODONE BITARTRATE AND ACETAMINOPHEN 1 TABLET: 5; 325 TABLET ORAL at 07:10

## 2022-10-13 RX ADMIN — ALUMINUM HYDROXIDE, MAGNESIUM HYDROXIDE, AND DIMETHICONE 30 ML: 200; 20; 200 SUSPENSION ORAL at 08:10

## 2022-10-13 RX ADMIN — ETOMIDATE 12 MG: 2 INJECTION, SOLUTION INTRAVENOUS at 03:10

## 2022-10-13 RX ADMIN — INSULIN DETEMIR 12 UNITS: 100 INJECTION, SOLUTION SUBCUTANEOUS at 08:10

## 2022-10-13 RX ADMIN — METOPROLOL SUCCINATE 25 MG: 25 TABLET, EXTENDED RELEASE ORAL at 09:10

## 2022-10-13 RX ADMIN — GLYCOPYRROLATE 0.6 MG: 0.2 INJECTION, SOLUTION INTRAMUSCULAR; INTRAVITREAL at 04:10

## 2022-10-13 RX ADMIN — PIPERACILLIN AND TAZOBACTAM 4.5 G: 4; .5 INJECTION, POWDER, LYOPHILIZED, FOR SOLUTION INTRAVENOUS; PARENTERAL at 03:10

## 2022-10-13 RX ADMIN — SODIUM CHLORIDE: 0.9 INJECTION, SOLUTION INTRAVENOUS at 02:10

## 2022-10-13 RX ADMIN — SUCCINYLCHOLINE CHLORIDE 100 MG: 20 INJECTION, SOLUTION INTRAMUSCULAR; INTRAVENOUS at 03:10

## 2022-10-13 RX ADMIN — MORPHINE SULFATE 2 MG: 4 INJECTION INTRAVENOUS at 08:10

## 2022-10-13 RX ADMIN — Medication 6 MG: at 08:10

## 2022-10-13 RX ADMIN — AMLODIPINE BESYLATE 10 MG: 5 TABLET ORAL at 09:10

## 2022-10-13 RX ADMIN — HYDROCODONE BITARTRATE AND ACETAMINOPHEN 1 TABLET: 5; 325 TABLET ORAL at 03:10

## 2022-10-13 RX ADMIN — PHENYLEPHRINE HYDROCHLORIDE 100 MCG: 10 INJECTION INTRAVENOUS at 03:10

## 2022-10-13 RX ADMIN — PHENYLEPHRINE HYDROCHLORIDE 200 MCG: 10 INJECTION INTRAVENOUS at 03:10

## 2022-10-13 RX ADMIN — ONDANSETRON 4 MG: 2 INJECTION, SOLUTION INTRAMUSCULAR; INTRAVENOUS at 03:10

## 2022-10-13 NOTE — NURSING
Received report from KIAH Beltran from PACU. Pt with (R) AKA. Site is wrapped with 4x4, abd pads, cast padding, and ace wrap with no drainage. /57, HR 83, O2 97%, R 17. Awaiting pt's arrival to the floor.

## 2022-10-13 NOTE — OP NOTE
[facility]  Surgery Department  Operative Note    SUMMARY     Date of Procedure: 10/13/2022     Procedure: Procedure(s) (LRB):  AMPUTATION, ABOVE KNEE, RIGHT (Right)     Surgeon(s) and Role:     * Dimitris Sanabria MD - Primary    Assisting Surgeon: Gabriel    Pre-Operative Diagnosis: Diabetic foot ulcer [E11.621, L97.509]  Ulcer of foot [L97.509] Rt leg gangrene    Post-Operative Diagnosis: Post-Op Diagnosis Codes:     * Diabetic foot ulcer [E11.621, L97.509]     * Ulcer of foot [L97.509]  Same    Anesthesia: General    Technical Procedures Used: AKA    Description of the Findings of the Procedure  GANGRENE    Significant Surgical Tasks Conducted by the Assistant(s), if Applicable:  Position, prep, drape, assistance with instrumentation, wound closure, dressing application and transfer of patient    Complications: No    Estimated Blood Loss (EBL): 200ml           Implants: * No implants in log * none    Specimens:   Specimen (24h ago, onward)       Start     Ordered    10/13/22 1528  Specimen to Pathology, Surgery Orthopedics  Once        Comments: Pre-op Diagnosis: Diabetic foot ulcer [E11.621, L97.509]Ulcer of foot [L97.509]Procedure(s):AMPUTATION, ABOVE KNEE, RIGHT Number of specimens 1Name of specimens:Right above knee amputation     References:    Click here for ordering Quick Tip   Question Answer Comment   Procedure Type: Orthopedics    Which provider would you like to cc? DIMITRIS SANABRIA    Release to patient Immediate        10/13/22 1539                            Condition: Good    Disposition: PACU - hemodynamically stable.    Attestation: I performed the procedure.    Procedure in detail:     After proper consents were obtained, patient was taken to the operating room administered general anesthesia.  Right lower extremity was then prepped and draped in normal sterile fashion.  Incision was made about the distal femur and dissection carried down to the extensor mechanism which was divided.   Further dissection was performed medial side in 100s canal was opened.  Femoral artery and vein were identified and was freed up from each other and surrounding tissue.  But the femoral artery and femoral nerve were ligated and divided.  Hemostasis was achieved.  Further dissection was then performed medially and laterally.  Muscular tissue.  Skin incision was then completed posteriorly and hemostasis was achieved osteotomy was then made with the oscillating saw and the amputation was then completed with a large amputation knife.  Bleeders were isolated and either cauterized or ligated.  The edge of the residual femur was then smoothed off with the oscillating saw.  Entire wound was then irrigated with the Pulsavac with Betadine and normal saline.  The muscular layer was then closed over the femur with 0 Vicryl.  Fascial layer was closed with 0 Vicryl.  Subcutaneous layers closed with 2-0 Vicryl.  Staples were placed in the skin.  Sterile dressings were applied.  Patient was roused operating room and transferred to recovery in stable condition

## 2022-10-13 NOTE — TRANSFER OF CARE
"Anesthesia Transfer of Care Note    Patient: Adryan Neff    Procedure(s) Performed: Procedure(s) (LRB):  AMPUTATION, ABOVE KNEE, RIGHT (Right)    Patient location: PACU    Anesthesia Type: general    Transport from OR: Transported from OR on room air with adequate spontaneous ventilation    Post pain: adequate analgesia    Post assessment: no apparent anesthetic complications    Post vital signs: stable    Level of consciousness: alert and responds to stimulation    Nausea/Vomiting: no nausea/vomiting    Complications: none    Transfer of care protocol was followed      Last vitals:   Visit Vitals  BP (!) 177/75 (BP Location: Right arm, Patient Position: Lying)   Pulse 101   Temp 36.8 °C (98.2 °F) (Temporal)   Resp 20   Ht 5' 11" (1.803 m)   Wt (!) 139.3 kg (307 lb 1.6 oz)   SpO2 99%   BMI 42.83 kg/m²     "

## 2022-10-13 NOTE — PROGRESS NOTES
Will attempt to get in touch with daughter for consent. If she is reached and consents,still plan for right AKA this afternoon.

## 2022-10-13 NOTE — CLINICAL REVIEW
IP Sepsis Screen (most recent)       Sepsis Screen (IP) - 10/13/22 1046       Is the patient's history or complaint suggestive of a possible infection? Yes  -CB    Are there at least two of the following signs and symptoms present? Yes  -CB    Sepsis signs/symptoms - Tachycardia Tachycardia     >90  -CB    Sepsis signs/symptoms - WBC WBC < 4,000 or WBC > 12,000  -CB    Are any of the following organ dysfunction criteria present and not considered to be due to a chronic condition? Yes  -CB    Organ Dysfunction Criteria Creatinine > 2.0  -CB    Organ Dysfunction Criteria Lactate > 2.0  -CB    Initiate Sepsis Protocol No  -CB    Reason sepsis not considered Pt. receiving appropriate management  -CB              User Key  (r) = Recorded By, (t) = Taken By, (c) = Cosigned By      Initials Name    CB Delicia Dominguez RN

## 2022-10-13 NOTE — NURSING
Bedside report given to night nurse KIAH Lieberman. Walking rounds completed. Visualized and assessed patient. NAD noted. Safety precautions maintained and call light within reach.    Chart check completed.

## 2022-10-13 NOTE — NURSING
Received report from IR nurse who stated that pt is confused and would not give consent for procedure. Dr. Trimble notified. Awaiting pt's arrival back to the floor.

## 2022-10-13 NOTE — ASSESSMENT & PLAN NOTE
- History of severe PAD   - C/b LEFT foot gangrene s/p left AKA   - Now presenting with right foot gangrene with concern of superimposed infection   - Home medications: aspirin, plavix and statin  - RCRI score of 4    Plan;   - Vascular surgery, podiatry consultation   - Podiatry recommending bka/aka. Ortho consulted, surgery 10/13  - Vancomycin/Zosyn for concern for superimposed infection   - Infectious disease consult: recommending amputation and continued abx for now   - Resume home medications

## 2022-10-13 NOTE — PLAN OF CARE
Pt A&O x2, VSS, on RA, denies pain/nausea, Azul 10. Pt meets PACU discharge criteria. Report called to floor RN. Pt leaving PACU via bed at this time, transported by RN's x2. No visible signs of distress noted.

## 2022-10-13 NOTE — CONSULTS
Attempted to see patient with  as transporter arrived to pick him up for surgery.  I quickly asked if he was going to have surgery on his leg, and he said that he was OK and ready to have the surgery.    No consult performed.        Mekhi Ndiaye M.D., [facility]  Senior Physician  [facility]

## 2022-10-13 NOTE — PROGRESS NOTES
Vancomycin consult follow-up:    Patient reviewed, dialysis scheduled every Mon, Wed, Fri, no new levels, no dose today; Next levels due: random due 10/14/2022 at 0400

## 2022-10-13 NOTE — ANESTHESIA POSTPROCEDURE EVALUATION
Anesthesia Post Evaluation    Patient: Adryan Neff    Procedure(s) Performed: Procedure(s) (LRB):  AMPUTATION, ABOVE KNEE, RIGHT (Right)    Final Anesthesia Type: general      Patient location during evaluation: PACU  Patient participation: Yes- Able to Participate  Level of consciousness: awake and alert and oriented  Post-procedure vital signs: reviewed and stable  Pain management: adequate  Airway patency: patent    PONV status at discharge: No PONV  Anesthetic complications: no      Cardiovascular status: blood pressure returned to baseline, hemodynamically stable and stable  Respiratory status: unassisted, spontaneous ventilation and room air  Hydration status: euvolemic  Follow-up not needed.          Vitals Value Taken Time   /70 10/13/22 1732   Temp 37.2 °C (98.9 °F) 10/13/22 1732   Pulse 79 10/13/22 1732   Resp 20 10/13/22 1732   SpO2 97 % 10/13/22 1732         Event Time   Out of Recovery 10/13/2022 17:20:00         Pain/Azul Score: Pain Rating Prior to Med Admin: 9 (10/13/2022  3:36 AM)  Azul Score: 9 (10/13/2022  4:48 PM)

## 2022-10-13 NOTE — NURSING
History and Physical      Patient: Beata Montes    Physician: Genie Bower MD    Referring Physician: Tanner Ahuja MD    Chief Complaint: For colonoscopy, EGD    History of Present Illness: Pt presents for colonoscopy. Her last colonoscopy was 6/28/17- single tubular adenoma. . Prior exams done in 2008 (had polyps) and remote exam in her 35s    PCP has also requested EGD due to pt experiencing 3 episodes of dysphagia with dyspnea- she describes 2 associated with eating crackers, and third episode occurred with trying to swallow water. She does take omeprazole for GERD with good Sx (pyrosis) control. 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    Osteoarthritis of multiple joints 12/6/2017    Osteopenia of multiple sites 5/22/2019    PONV (postoperative nausea and vomiting)     pre med needed to keep nausea at [address] hypercholesterolemia 12/6/2017     Past Surgical History:   Procedure Laterality Date    CARPAL TUNNEL RELEASE Left     left tendon surgery    COLONOSCOPY      COLONOSCOPY N/A 6/28/2017    COLONOSCOPY/ 30 performed by Ashlee Mariano MD at [facility] 162 FLX W/RMVL OF TUMOR POLYP LESION SNARE TQ  6/28/2017         HYSTERECTOMY (CERVIX STATUS UNKNOWN)      with BSO- endometriosis    ORTHOPEDIC SURGERY Right     Foot surgery    ORTHOPEDIC SURGERY      lt.  CTS    ROTATOR CUFF REPAIR Right     ROTATOR CUFF REPAIR Left 2017      Social History     Socioeconomic History    Marital status:      Spouse name: None    Number of children: None    Years of education: None    Highest education level: None   Tobacco Use    Smoking status: Never    Smokeless tobacco: Pt transported to IR for fistulagram via transport to scheduled testing. NAD noted.    Never   Vaping Use    Vaping Use: Never used   Substance and Sexual Activity    Alcohol use: No    Drug use: No      Family History   Problem Relation Age of Onset    Breast Cancer Neg Hx     Heart Disease Mother     Diabetes Mother     Hypertension Mother     Elevated Lipids Mother     Asthma Mother        Medications:   Prior to Admission medications    Medication Sig Start Date End Date Taking? Authorizing Provider   cyclobenzaprine (FLEXERIL) 5 MG tablet Take 5 mg by mouth 3 times daily as needed 12/8/21  Yes Historical Provider, MD   meclizine (ANTIVERT) 25 MG tablet Take 25 mg by mouth 3 times daily as needed 2/23/21  Yes Historical Provider, MD   montelukast (SINGULAIR) 10 MG tablet Take 10 mg by mouth as needed 3/10/22  Yes Historical Provider, MD   naloxone 4 MG/0.1ML LIQD nasal spray 4 mg once as needed  Patient not taking: Reported on 8/12/2022 9/8/21  Yes Historical Provider, MD   ondansetron (ZOFRAN) 4 MG tablet Take 4 mg by mouth 3 times daily as needed 7/21/17  Yes Historical Provider, MD   Multiple Vitamin (MULTIVITAMINS PO) Take by mouth daily   Yes Historical Provider, MD   Multiple Vitamins-Minerals (LUTEIN-ZEAXANTHIN PO) Take by mouth    Historical Provider, MD   ALPRAZolam (XANAX) 0.5 MG tablet Take 0.5 mg by mouth 3 times daily as needed.  7/23/20   Ar Automatic Reconciliation   benzonatate (TESSALON) 200 MG capsule Take 200 mg by mouth 3 times daily as needed 1/9/19   Ar Automatic Reconciliation   buPROPion (WELLBUTRIN XL) 300 MG extended release tablet Take 300 mg by mouth 10/17/19   Ar Automatic Reconciliation   Calcium Carbonate-Vitamin D (CALCIUM-VITAMIN D) 600-125 MG-UNIT TABS Take by mouth    Ar Automatic Reconciliation   doxepin (ZONALON) 5 % cream Apply topically 3 times daily 12/6/17   Ar Automatic Reconciliation   escitalopram (LEXAPRO) 10 MG tablet Take 10 mg by mouth daily 7/12/19   Ar Automatic Reconciliation   estradiol (ESTRACE) 1 MG tablet Take 1 mg by mouth daily 10/17/19   Ar Automatic Reconciliation   hydroCHLOROthiazide (HYDRODIURIL) 25 MG tablet Take 25 mg by mouth daily 10/17/19   Ar Automatic Reconciliation   Hyoscyamine Sulfate SL 0.125 MG SUBL Place 0.125 mg under the tongue every 4 hours as needed 10/19/18   Ar Automatic Reconciliation   mometasone (ELOCON) 0.1 % cream Apply topically daily 10/16/19   Ar Automatic Reconciliation   omeprazole (PRILOSEC) 20 MG delayed release capsule Take 20 mg by mouth daily 10/16/19   Ar Automatic Reconciliation   pseudoephedrine (SUDAFED) 30 MG tablet Take 30 mg by mouth every 4 hours as needed    Ar Automatic Reconciliation   simvastatin (ZOCOR) 40 MG tablet Take 40 mg by mouth 10/16/19   Ar Automatic Reconciliation   traMADol-acetaminophen (ULTRACET) 37.5-325 MG per tablet Take 1 tablet by mouth every 8 hours as needed. 6/22/20   Ar Automatic Reconciliation       Allergies: Allergies   Allergen Reactions    Hydrocodone-Acetaminophen Nausea And Vomiting     Norco        Physical Exam:     Vital Signs: BP (!) 149/68   Pulse 71   Temp 97.8 °F (36.6 °C) (Oral)   Resp 18   Ht 5' 2\" (1.575 m)   Wt 160 lb (72.6 kg)   LMP  (LMP Unknown) Comment: hysterectomy  SpO2 97%   BMI 29.26 kg/m²   . General: no distress      Heart: regular   Lungs: unlabored   Abdominal: soft   Neurological: Grossly normal        Findings/Diagnosis: Encounter for colorectal cancer screening due to personal history of adenomatous polyp(s), GERD w/dysphagia    Plan of Care/Planned Procedure: Colonoscopy, possible polypectomy. Pt/designee has reviewed the colonoscopy information sheet. Any questions have been discussed. They agree to proceed.       Signed:  Andrade Zapata MD   8/12/2022

## 2022-10-13 NOTE — SUBJECTIVE & OBJECTIVE
Interval History: Slightly confused today; delirious at times. Discussed case with oldest daughter. Main complaint is epigastric/hunger pain. ECG normal. Asking to eat but reminded him about upcoming surgery.    Review of Systems   All other systems reviewed and are negative.  Objective:     Vital Signs (Most Recent):  Temp: 99 °F (37.2 °C) (10/13/22 1118)  Pulse: 88 (10/13/22 1118)  Resp: 19 (10/13/22 1118)  BP: (!) 149/67 (10/13/22 1118)  SpO2: (!) 94 % (10/13/22 1118)   Vital Signs (24h Range):  Temp:  [97.7 °F (36.5 °C)-99.3 °F (37.4 °C)] 99 °F (37.2 °C)  Pulse:  [] 88  Resp:  [18-20] 19  SpO2:  [94 %-99 %] 94 %  BP: (127-152)/(63-70) 149/67     Weight: (!) 139.3 kg (307 lb 1.6 oz)  Body mass index is 42.83 kg/m².  No intake or output data in the 24 hours ending 10/13/22 1439     Constitutional:       General: He is not in acute distress.     Appearance: Normal appearance.   HENT:      Head: Normocephalic and atraumatic.      Mouth/Throat:      Mouth: Mucous membranes are moist.   Eyes:      Extraocular Movements: Extraocular movements intact.      Pupils: Pupils are equal, round, and reactive to light.   Cardiovascular:      Rate and Rhythm: Normal rate and regular rhythm.      Pulses:           Dorsalis pedis pulses are detected w/ Doppler on the right side.        Posterior tibial pulses are detected w/ Doppler on the right side.      Heart sounds: Murmur heard.   Pulmonary:      Effort: Pulmonary effort is normal. No respiratory distress.      Breath sounds: Normal breath sounds. No rales.   Abdominal:      General: Abdomen is flat. Bowel sounds are normal.      Palpations: Abdomen is soft.   Musculoskeletal:      Left Lower Extremity: Left leg is amputated below knee.   Feet:      Comments: Right foot wet gangrene  Neurological:      Mental Status: He is alert.        Significant Labs: All pertinent labs within the past 24 hours have been reviewed.    Significant Imaging: I have reviewed all pertinent  imaging results/findings within the past 24 hours.

## 2022-10-13 NOTE — ANESTHESIA PROCEDURE NOTES
Intubation    Date/Time: 10/13/2022 3:13 PM  Performed by: Coco Wilson CRNA  Authorized by: Evelia Kirk MD     Intubation:     Induction:  Intravenous    Intubated:  Postinduction    Mask Ventilation:  N/a    Attempts:  1    Attempted By:  CRNA    Method of Intubation:  Direct    Blade:  Carlos 2    Laryngeal View Grade: Grade I - full view of cords      Difficult Airway Encountered?: No      Complications:  None    Airway Device:  Oral endotracheal tube    Airway Device Size:  7.5    Style/Cuff Inflation:  Cuffed (inflated to minimal occlusive pressure)    Inflation Amount (mL):  5    Tube secured:  22    Secured at:  The lips    Placement Verified By:  Capnometry    Complicating Factors:  None    Findings Post-Intubation:  BS equal bilateral and atraumatic/condition of teeth unchanged

## 2022-10-13 NOTE — PROGRESS NOTES
Date of Admission:10/6/2022    SUBJECTIVE:asking about leg surgery  Notes feeling ok  Current Facility-Administered Medications   Medication    0.9%  NaCl infusion (for blood administration)    acetaminophen tablet 650 mg    aluminum-magnesium hydroxide-simethicone 200-200-20 mg/5 mL suspension 30 mL    amLODIPine tablet 10 mg    aspirin chewable tablet 81 mg    atorvastatin tablet 40 mg    dextrose 10% bolus 125 mL    dextrose 10% bolus 250 mL    epoetin paola-epbx injection 10,000 Units    folic acid-vit B6-vit B12 2.5-25-2 mg tablet 1 tablet    glucagon (human recombinant) injection 1 mg    glucose chewable tablet 16 g    glucose chewable tablet 24 g    hydrALAZINE injection 10 mg    HYDROcodone-acetaminophen 5-325 mg per tablet 1 tablet    insulin aspart U-100 pen 0-5 Units    insulin detemir U-100 pen 12 Units    metoprolol succinate (TOPROL-XL) 24 hr tablet 25 mg    naloxone 0.4 mg/mL injection 0.02 mg    ondansetron disintegrating tablet 8 mg    pantoprazole EC tablet 40 mg    piperacillin-tazobactam 4.5 g in dextrose 5 % 100 mL IVPB (ready to mix system)    sevelamer carbonate tablet 2,400 mg    sodium chloride 0.9% flush 10 mL    sodium chloride 0.9% flush 10 mL    traMADoL tablet 50 mg    vancomycin - pharmacy to dose       Wt Readings from Last 3 Encounters:   10/12/22 (!) 139.3 kg (307 lb 1.6 oz)   10/06/22 63.4 kg (139 lb 12.4 oz)   10/06/22 63.4 kg (139 lb 12.4 oz)     Temp Readings from Last 3 Encounters:   10/13/22 99 °F (37.2 °C)   08/09/22 98.2 °F (36.8 °C) (Oral)   06/16/22 98.7 °F (37.1 °C) (Oral)     BP Readings from Last 3 Encounters:   10/13/22 (!) 149/67   10/06/22 131/65   08/09/22 (!) 166/72     Pulse Readings from Last 3 Encounters:   10/13/22 88   08/09/22 75   06/16/22 (!) 111     No intake or output data in the 24 hours ending 10/13/22 1419      PE:  GEN:wd male in nad  HEENT:ncat,eomi,mm  CVS:s1s2 regular  PULM:ctab  ABD:bs,soft  EXT:no leg edema, avf left ar m  NEURO:awake    Recent  Labs   Lab 10/13/22  0457   *   *   K 4.1   CL 95   CO2 27   BUN 29*   CREATININE 9.1*   CALCIUM 8.7   MG 2.9*         Lab Results   Component Value Date    .0 (H) 01/07/2021    CALCIUM 8.7 10/13/2022    PHOS 6.6 (H) 10/11/2022       Recent Labs   Lab 10/13/22  0457   WBC 17.75*   RBC 2.69*   HGB 7.2*   HCT 23.5*   *   MCV 87   MCH 26.8*   MCHC 30.6*             A/P:  1.esrd. plan hd tomorrow.  2.malfxn avg. Vascular following. Watch for bleeding.  3.pea arrest. S/p. Following. Better now.  4.gangrene. aka pending.  5.htn. sbp ok.  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  7.anemia. cont epo. Recommend prbc wth surgery.  8.dm2. needs better control.

## 2022-10-13 NOTE — NURSING
Report received from night nurse KIAH Lieberman. Visualized patient and assessed patient's overall condition and appearance. No acute distress noted. Will continue to monitor

## 2022-10-13 NOTE — ASSESSMENT & PLAN NOTE
- Hgb 7.2 today; no signs of bleeding  - Transfuse 1U pRBC prior to OR  - Consents obtained via daughter  - Continue EPO per nephro

## 2022-10-13 NOTE — PROGRESS NOTES
Patient seen and examined. States he does not wish to have his procedure today and that he would rather die. Explained the risks and benefits of the procedure and of not having the procedure. This was done with a . Patient is not oriented to place or time, likely due to encephalopathy from foot infection. Patient refusing procedure. Plan for amputation today with Ortho to remove infection. Recommend consultation to psychiatry  to determine if patient has capacity and will speak with family regarding fistulogram as soon as able.

## 2022-10-13 NOTE — PROGRESS NOTES
12:50 pm  CM received a call from central scheduling stating a pt's daughter school was calling because the pt called to check his daughter out of high school.     Central scheduling transferred pt's daughter high school representative, Ms. Adorno. Ms. Adorno stated she needed an e-mail stating the pt was in hospital  and she could leave school. CM informed Ms. Adorno that the request wasn't possible.     MARCUS contacted [facility]  but I was placed on hold. nurse Maddi requested olena Mccray to translate. CM informed pt that [facility] has  services to help him translate. CM informed pt not to check his daughter out school because it may cause trouble for pt's daughter. Pt verbally expressed understanding.     nurse Maddi will get the older sister's contact information.    MARCUS will follow up.

## 2022-10-13 NOTE — PLAN OF CARE
[facility] [place]  Discharge Reassessment    Primary Care Provider: No primary care provider on file.    Expected Discharge Date: Pending    CM discussed dc needs with pt's daughter, Luis F. Pt's daughter inquired about sitting services. CM explained she would have to pay for services. Pt's daughter also inquired about nursing home placement, but she stated she would have to discuss with pt's family. Pt requested a letter to present to [facility] for pt's wife to come to [place] to assist with pt's care.CM will follow up.    Reassessment (most recent)       Discharge Reassessment - 10/13/22 9564          Discharge Reassessment    Assessment Type Discharge Planning Reassessment     Did the patient's condition or plan change since previous assessment? Yes     Discharge Plan discussed with: Patient     Discharge Plan A Home with family     Discharge Plan B Other   tbd    DME Needed Upon Discharge  other (see comments) (P)    tbd    Discharge Barriers Identified None (P)      Why the patient remains in the hospital Requires continued medical care (P)         Post-Acute Status    Coverage Medicare AB (P)      Discharge Delays None known at this time (P)

## 2022-10-13 NOTE — NURSING
Patient arrived to floor in stable condition. Visualized patient and assessed patient's overall condition and appearance. No complaints. NAD noted. Will continue to monitor.

## 2022-10-14 PROBLEM — R41.89 COGNITIVE CHANGES: Status: ACTIVE | Noted: 2022-10-14

## 2022-10-14 PROBLEM — I46.9 PEA (PULSELESS ELECTRICAL ACTIVITY): Status: RESOLVED | Noted: 2022-10-10 | Resolved: 2022-10-14

## 2022-10-14 PROBLEM — I96 WET GANGRENE: Status: RESOLVED | Noted: 2022-01-05 | Resolved: 2022-10-14

## 2022-10-14 PROBLEM — T82.590A AV GRAFT MALFUNCTION: Status: ACTIVE | Noted: 2022-10-14

## 2022-10-14 LAB
BASOPHILS # BLD AUTO: 0.05 K/UL (ref 0–0.2)
BASOPHILS NFR BLD: 0.3 % (ref 0–1.9)
BLD PROD TYP BPU: NORMAL
BLOOD UNIT EXPIRATION DATE: NORMAL
BLOOD UNIT TYPE CODE: 5100
BLOOD UNIT TYPE: NORMAL
CODING SYSTEM: NORMAL
DIFFERENTIAL METHOD: ABNORMAL
DISPENSE STATUS: NORMAL
EOSINOPHIL # BLD AUTO: 0.2 K/UL (ref 0–0.5)
EOSINOPHIL NFR BLD: 1 % (ref 0–8)
ERYTHROCYTE [DISTWIDTH] IN BLOOD BY AUTOMATED COUNT: 14.7 % (ref 11.5–14.5)
HCT VFR BLD AUTO: 23.6 % (ref 40–54)
HGB BLD-MCNC: 7.6 G/DL (ref 14–18)
IMM GRANULOCYTES # BLD AUTO: 0.1 K/UL (ref 0–0.04)
IMM GRANULOCYTES NFR BLD AUTO: 0.6 % (ref 0–0.5)
LEFT ARM BP: 178 MMHG
LYMPHOCYTES # BLD AUTO: 1 K/UL (ref 1–4.8)
LYMPHOCYTES NFR BLD: 6.1 % (ref 18–48)
MCH RBC QN AUTO: 27.8 PG (ref 27–31)
MCHC RBC AUTO-ENTMCNC: 32.2 G/DL (ref 32–36)
MCV RBC AUTO: 86 FL (ref 82–98)
MONOCYTES # BLD AUTO: 0.6 K/UL (ref 0.3–1)
MONOCYTES NFR BLD: 3.5 % (ref 4–15)
NEUTROPHILS # BLD AUTO: 14.6 K/UL (ref 1.8–7.7)
NEUTROPHILS NFR BLD: 88.5 % (ref 38–73)
NRBC BLD-RTO: 0 /100 WBC
NUM UNITS TRANS PACKED RBC: NORMAL
PLATELET # BLD AUTO: 527 K/UL (ref 150–450)
PMV BLD AUTO: 9.1 FL (ref 9.2–12.9)
POCT GLUCOSE: 101 MG/DL (ref 70–110)
POCT GLUCOSE: 110 MG/DL (ref 70–110)
POCT GLUCOSE: 146 MG/DL (ref 70–110)
POCT GLUCOSE: 181 MG/DL (ref 70–110)
RBC # BLD AUTO: 2.73 M/UL (ref 4.6–6.2)
RIGHT ABI: 1.38
RIGHT ARM BP: 185 MMHG
RIGHT DORSALIS PEDIS: 255 MMHG
RIGHT POSTERIOR TIBIAL: 255 MMHG
RIGHT TBI: 0
RIGHT TOE PRESSURE: 0 MMHG
VANCOMYCIN SERPL-MCNC: 28.3 UG/ML
WBC # BLD AUTO: 16.49 K/UL (ref 3.9–12.7)

## 2022-10-14 PROCEDURE — 25000003 PHARM REV CODE 250: Performed by: ORTHOPAEDIC SURGERY

## 2022-10-14 PROCEDURE — 85025 COMPLETE CBC W/AUTO DIFF WBC: CPT | Performed by: EMERGENCY MEDICINE

## 2022-10-14 PROCEDURE — 63600175 PHARM REV CODE 636 W HCPCS: Performed by: ORTHOPAEDIC SURGERY

## 2022-10-14 PROCEDURE — 36415 COLL VENOUS BLD VENIPUNCTURE: CPT | Performed by: EMERGENCY MEDICINE

## 2022-10-14 PROCEDURE — 80202 ASSAY OF VANCOMYCIN: CPT | Performed by: ORTHOPAEDIC SURGERY

## 2022-10-14 PROCEDURE — 90792 PR PSYCHIATRIC DIAGNOSTIC EVALUATION W/MEDICAL SERVICES: ICD-10-PCS | Mod: ,,, | Performed by: PSYCHIATRY & NEUROLOGY

## 2022-10-14 PROCEDURE — 99232 PR SUBSEQUENT HOSPITAL CARE,LEVL II: ICD-10-PCS | Mod: ,,, | Performed by: STUDENT IN AN ORGANIZED HEALTH CARE EDUCATION/TRAINING PROGRAM

## 2022-10-14 PROCEDURE — 63600175 PHARM REV CODE 636 W HCPCS: Mod: JG | Performed by: ORTHOPAEDIC SURGERY

## 2022-10-14 PROCEDURE — 25000003 PHARM REV CODE 250: Performed by: NURSE PRACTITIONER

## 2022-10-14 PROCEDURE — 80100016 HC MAINTENANCE HEMODIALYSIS

## 2022-10-14 PROCEDURE — 21400001 HC TELEMETRY ROOM

## 2022-10-14 PROCEDURE — 36415 COLL VENOUS BLD VENIPUNCTURE: CPT | Performed by: ORTHOPAEDIC SURGERY

## 2022-10-14 PROCEDURE — 90792 PSYCH DIAG EVAL W/MED SRVCS: CPT | Mod: ,,, | Performed by: PSYCHIATRY & NEUROLOGY

## 2022-10-14 PROCEDURE — 99232 SBSQ HOSP IP/OBS MODERATE 35: CPT | Mod: ,,, | Performed by: STUDENT IN AN ORGANIZED HEALTH CARE EDUCATION/TRAINING PROGRAM

## 2022-10-14 RX ORDER — HYDROCODONE BITARTRATE AND ACETAMINOPHEN 500; 5 MG/1; MG/1
TABLET ORAL
Status: CANCELLED | OUTPATIENT
Start: 2022-10-14

## 2022-10-14 RX ADMIN — ALUMINUM HYDROXIDE, MAGNESIUM HYDROXIDE, AND DIMETHICONE 30 ML: 200; 20; 200 SUSPENSION ORAL at 06:10

## 2022-10-14 RX ADMIN — EPOETIN ALFA-EPBX 10000 UNITS: 10000 INJECTION, SOLUTION INTRAVENOUS; SUBCUTANEOUS at 11:10

## 2022-10-14 RX ADMIN — INSULIN DETEMIR 12 UNITS: 100 INJECTION, SOLUTION SUBCUTANEOUS at 08:10

## 2022-10-14 RX ADMIN — ALUMINUM HYDROXIDE, MAGNESIUM HYDROXIDE, AND DIMETHICONE 30 ML: 200; 20; 200 SUSPENSION ORAL at 04:10

## 2022-10-14 RX ADMIN — PIPERACILLIN AND TAZOBACTAM 4.5 G: 4; .5 INJECTION, POWDER, LYOPHILIZED, FOR SOLUTION INTRAVENOUS; PARENTERAL at 02:10

## 2022-10-14 RX ADMIN — Medication 6 MG: at 08:10

## 2022-10-14 RX ADMIN — HYDROCODONE BITARTRATE AND ACETAMINOPHEN 1 TABLET: 5; 325 TABLET ORAL at 02:10

## 2022-10-14 RX ADMIN — ATORVASTATIN CALCIUM 40 MG: 40 TABLET, FILM COATED ORAL at 08:10

## 2022-10-14 RX ADMIN — ASPIRIN 81 MG CHEWABLE TABLET 81 MG: 81 TABLET CHEWABLE at 08:10

## 2022-10-14 RX ADMIN — SEVELAMER CARBONATE 1600 MG: 800 TABLET, FILM COATED ORAL at 12:10

## 2022-10-14 RX ADMIN — Medication 1 TABLET: at 02:10

## 2022-10-14 RX ADMIN — ALUMINUM HYDROXIDE, MAGNESIUM HYDROXIDE, AND DIMETHICONE 30 ML: 200; 20; 200 SUSPENSION ORAL at 08:10

## 2022-10-14 RX ADMIN — PANTOPRAZOLE SODIUM 40 MG: 40 TABLET, DELAYED RELEASE ORAL at 08:10

## 2022-10-14 RX ADMIN — HYDROCODONE BITARTRATE AND ACETAMINOPHEN 1 TABLET: 5; 325 TABLET ORAL at 08:10

## 2022-10-14 RX ADMIN — SEVELAMER CARBONATE 800 MG: 800 TABLET, FILM COATED ORAL at 08:10

## 2022-10-14 NOTE — PROGRESS NOTES
[facility] Medicine  Progress Note      Admit Date: 10/6/2022  LOS: 8  Code Status: Full Code   Date of Consult: 10/14/2022  : 1965  Age: 56 y.o.  Weight:   Wt Readings from Last 1 Encounters:   10/14/22 124.9 kg (275 lb 5.7 oz)     Sex: male  Bed: W0/W0 A:   MRN: [id number]  Attending Physician: Richard Frausto MD  Primary Service: Networked reference to record PCT        Subjective:      Principal Problem:PAD (peripheral artery disease)           HPI:  This is a 56 year old male with a PMHx of severe PAD s/p gangrene of left foot s/p left AKA (2022), ESRD on HD MWF, NICM (EF: 55%, GIDD), pulmonary hypertension, GERD and latent TB filiberto presents for concern for leg infection.       The patient presented to the vascular surgery clinic for a follow up. He reports worsening pain and developing eschar of the right dorsal foot along with foul smelling odor. Given concern for an infection and the need for IV antibiotics & expedited podiatry evaluation, the patient was sent to the ER. The patient denies having any fevers, chills, or injury to his foot. He has a previous AKA and uses a wheelchair to ambulate. While in the ED, the patient was hypertensive but otherwise hemodynamically stable. Labs showed leukocytosis (17.41 - with neutrophilic predominance), normocytic anemia (9.7), elevated sed rate (125), elevated CRP (237.2), negative lactate (1.6). X-ray foot showed no radiographic evidence of osteomyelitis. He was administered vancomycin, zosyn and was admitted for further management.      I attempted to call the daughter to obtain further history on  without a response.            Overview/Hospital Course:  56M with pmh of severe pad s/p gangangrene of left foot s/p left AKA (2022), ESRD on HD MWF, NICM (EF: 55%, GIDD), pulmonary hypertension, GERD and latent TB filiberto presents for concern for leg infection. The patient presented to the vascular surgery clinic for a follow  up. He reports worsening pain and developing eschar of the right dorsal foot along with foul smelling odor. Given concern for an infection and the need for IV antibiotics & expedited podiatry evaluation, the patient was sent to the ER. In ed labs with leukocytosis, normocytic anemia (9.7), elevated sed rate (125), elevated CRP (237.2), negative lactate (1.6). X-ray foot showed no radiographic evidence of osteomyelitis.Pt started on vancomycin and zosyn for possible infection. Initially evaluated by podiatry who recommended ortho consult for possible amputation. Amputation to be done on 10/10. ID also on board for abx recommendations. Patient had a coded blue called on 10/10 for possible PEA vs. Vasovagal.  ROSC returned quickly. Cards consulted to follow. The patient was transferred to the floor on 10/11.     On the floor, patient remained stable. Underwent fistulogram without issues and planned amputation of RLE on Thursday. Slightly confused and expressed hunger on 10/13     Interval History: no acute overnight events  Review of Systems   Patient preference to finish HD  Objective:        PHYSICAL EXAM  Vital Signs (Most Recent):  Temp: 98 °F (36.7 °C) (10/14/22 0737)  Pulse: 89 (10/14/22 0737)  Resp: 19 (10/14/22 0737)  BP: (!) 123/52 (10/14/22 0737)  SpO2: 98 % (10/14/22 0737)   Vital Signs (24h Range):  Temp:  [97.2 °F (36.2 °C)-99 °F (37.2 °C)] 98 °F (36.7 °C)  Pulse:  [] 89  Resp:  [16-20] 19  SpO2:  [94 %-100 %] 98 %  BP: (116-177)/(52-75) 123/52     PHYSICAL EXAMINATION: limited as patient is having HD  Vitals and nursing note reviewed. afebrile  GENERAL: NAD, alert ,well-developed, well-nourished  HEENT: Head is normocephalic and atraumatic. Extraocular muscles are intact. Pupils are equal, round, and reactive to light and accommodation. Nares appeared normal. Mouth is without lesions. Mucous membranes are moist. no nystagmus  LUNGS: equal and bilateral chest rise, no obvious wheezing,  no  respiratory distress  CV:  no obvious JVD  ABDOMEN:  nondistended.  No guarding  EXTREMITIES: R AKA  SKIN: warm, dry, intact, no rash/lesions, no pallor  NEUROLOGIC: Cranial nerves II through XII are grossly intact. No obvious motor deficit  PSYCH: MATEO    Tests        Latest Reference Range & Units 10/13/22 14:07   WBC 3.90 - 12.70 K/uL 21.38 (H)   RBC 4.60 - 6.20 M/uL 2.81 (L)   Hemoglobin 14.0 - 18.0 g/dL 7.4 (L)   Hematocrit 40.0 - 54.0 % 24.7 (L)   MCV 82 - 98 fL 88   MCH 27.0 - 31.0 pg 26.3 (L)   MCHC 32.0 - 36.0 g/dL 30.0 (L)   RDW 11.5 - 14.5 % 15.0 (H)   Platelets 150 - 450 K/uL 583 (H)   MPV 9.2 - 12.9 fL 9.0 (L)   Gran % 38.0 - 73.0 % 82.9 (H)   Lymph % 18.0 - 48.0 % 7.4 (L)   Mono % 4.0 - 15.0 % 6.9   Eosinophil % 0.0 - 8.0 % 1.5   Basophil % 0.0 - 1.9 % 0.4   Immature Granulocytes 0.0 - 0.5 % 0.9 (H)   Gran # (ANC) 1.8 - 7.7 K/uL 17.7 (H)   Lymph # 1.0 - 4.8 K/uL 1.6   Mono # 0.3 - 1.0 K/uL 1.5 (H)   Eos # 0.0 - 0.5 K/uL 0.3   Baso # 0.00 - 0.20 K/uL 0.09   Immature Grans (Abs) 0.00 - 0.04 K/uL 0.20 (H)   nRBC 0 /100 WBC 0   Differential Method  Automated      Latest Reference Range & Units 10/13/22 17:31 10/13/22 20:39 10/14/22 05:02 10/14/22 07:35   Vancomycin, Random Not established ug/mL   28.3    POCT Glucose 70 - 110 mg/dL 127 (H) 113 (H)  101       ASSESSMENT/PLAN:     * PAD (peripheral artery disease)  - History of severe PAD   - C/b LEFT foot gangrene s/p left AKA   - POD#1 Right AKA for gangrene         - leave current dsg in place and Ortho will change in 3-4 days   - continue antibiotics as concern for infection  - Home medications: aspirin, plavix and statin  - RCRI score of 4     Plan;   - Vascular surgery, podiatry consultation   - Podiatry recommending bka/aka. Ortho consulted, surgery 10/13  - Vancomycin/Zosyn for concern for superimposed infection   - Infectious disease consult recommends discontinue abx after he receives his dose after HD since he has effective source control with his  AKA.   - Resume home medications      Wet gangrene  As stated above in PAD        Anemia due to chronic kidney disease, on chronic dialysis  - Hgb 7.2 today; no signs of bleeding  - Transfuse 1U pRBC prior to OR  - Consents obtained via daughter  - Continue EPO per nephro        PEA (Pulseless electrical activity)  On 10/10. Return of ROSC quickly. Now in NSR and awake and alert. Will transfer to tele.        ESRD on hemodialysis  - Dialysis type: iHD  - Access: L AVF  - Schedule: MWF     Plan:  - Nephrology consult for dialysis management  - Daily weights to guide UF  - Continue dialysis MWF     CAD (coronary artery disease) - non-obstructive from [facility] in 2016  - Resume aspirin, statin         Chronic diastolic heart failure  - Echocardiogram (11/24/2021): EF: 55%, GIDD, mild TR, moderate to severe MR  - Compensated on physical examination, on room air   - Resume home medications      Gastroesophageal reflux disease  - Resume PPI      Pure hypercholesterolemia  - Resume statin      Essential (primary) hypertension  - Resume home medications (amlodipine 10 mg, Toprol-XL 50 mg)      Controlled type 2 diabetes mellitus with chronic kidney disease on chronic dialysis, with long-term current use of insulin  - Last A1c:         Lab Results   Component Value Date     HGBA1C 6.1 (H) 01/07/2021      - Home regimen: Lantus 12 units nightly, increased from 10 due to hyperglycemia. 15u caused hypoglycemia at last week  - Continue home regimen   - Low-sensitivity sliding scale insulin  - Initiate and maintain glycemic protocol, monitor POC glucose   - Follow up with PCP as outpatient            VTE Risk Mitigation (From admission, onward)                  Ordered        IP VTE HIGH RISK PATIENT  Once         10/06/22 1819        Place sequential compression device  Until discontinued         10/06/22 1819                     Discharge Planning   ENIO:      Code Status: Full Code   Is the patient medically ready for discharge?:      Reason for patient still in hospital (select all that apply): Patient trending condition  Discharge Plan A: Home with family   Discharge Delays: None known at this time         Richard Frausto MD  Department of [facility] Medicine   [facility]

## 2022-10-14 NOTE — CONSULTS
"[facility] - Telemetry  Psychiatry  Consult Note    Patient Name: Adryan Neff  MRN: [id number]   Code Status: Full Code  Admission Date: 10/6/2022  Hospital Length of Stay: 8 days  Attending Physician: Richard Frausto MD  Primary Care Provider: Carola Pedro MD    Current Legal Status: Uncontested    Patient information was obtained from patient, ER records and chart review, nursing.   Inpatient consult to Psychiatry  Consult performed by: Mekhi Ndiaye MD  Consult ordered by: Masoud Soni MD        Subjective:     Principal Problem:PAD (peripheral artery disease)    Chief Complaint:  confusion     HPI: Patient Adryan Neff presents to the hospital with a gangrenous foot which has required AKA.  He is in need of a fistula study and there is question of capacity.  Patient is [place]ian Creole and speaks very little to no english.  Hospital interpretor utilized at bedside.  Patient is eating his food and is pleasantly confused.  He is oriented to name and "hospital" only.  Not able to provide name of hospital.  Says that it is August, then December, 2012.  At first says that he is living with his 2 daughters, then says 1 daughter/1 son.  He is not sure why he is in the hospital.  When asked about his dialysis shunt, he says that they already looked at it but is willing to let the doctor look at it again.  Not able to obtain any history or background information due to his confusion.  Of note, he was evaluated by me many months ago for capacity and lacked medical decision making at that time also.      Hospital Course: No notes on file         Patient History               Medical as of 10/14/2022       Past Medical History       Diagnosis Date Comments Source    CAD (coronary artery disease) 2016 CAD (non obstructive) 2016 [facility] Provider    Diabetes mellitus type I -- -- Provider    Diabetic retinopathy -- -- Provider    ESRD on hemodialysis -- on HD TThSat Provider    Gangrene of left foot -- -- Provider "    Hypertension -- -- Provider    Nuclear sclerosis of right eye 11/24/2021 -- Provider    PAD (peripheral artery disease) -- -- Provider    Pulmonary HTN -- -- Provider    Right foot infection -- -- Provider    TB lung, latent 04/2021 -- Provider              Pertinent Negatives       Diagnosis Date Noted Comments Source    Amblyopia 11/24/2021 -- Provider    Arthritis 11/24/2021 -- Provider    Glaucoma 11/24/2021 -- Provider    History of psychiatric hospitalization 01/12/2022 -- Provider    Hx of psychiatric care 01/12/2022 -- Provider    Macular degeneration 11/24/2021 -- Provider    Retinal detachment 11/24/2021 -- Provider    Sickle cell anemia 11/24/2021 -- Provider    Sickle cell trait 11/24/2021 -- Provider    Strabismus 11/24/2021 -- Provider    Suicide attempt 01/12/2022 -- Provider    Therapy 01/12/2022 -- Provider    Uveitis 11/24/2021 -- Provider                          Surgical as of 10/14/2022       Past Surgical History       Procedure Laterality Date Comments Source    EYE SURGERY -- -- -- Provider    AV FISTULA PLACEMENT -- -- -- Provider    CATARACT EXTRACTION Left -- -- Provider    TOE AMPUTATION Left 1/6/2022 Procedure: AMPUTATION, TOE;  Surgeon: Masoud Soni MD;  Location: [facility] OR;  Service: Vascular;  Laterality: Left;  Left first through fifth toes, possible open transmetatarsal amputation and all other indicated procedures Provider    ABOVE-KNEE AMPUTATION Left 1/18/2022 Procedure: AMPUTATION, ABOVE KNEE;  Surgeon: Rusty Light MD;  Location: [facility] OR;  Service: Orthopedics;  Laterality: Left; Provider    ABOVE-KNEE AMPUTATION Left 1/21/2022 Procedure: AMPUTATION, ABOVE KNEE;  Surgeon: Rusty Light MD;  Location: [facility] OR;  Service: Orthopedics;  Laterality: Left; Provider    FISTULOGRAM Left 8/9/2022 Procedure: Fistulogram;  Surgeon: Masoud Soni MD;  Location: [facility] OR;  Service: Vascular;  Laterality: Left;  LEFT VM ON DAUGHTER'S PHONE----PHONE PREOP NOT  COMPLETED  CALLED PATIENT ON 8/8/2022 @ 3:34. HE STATED HE IS RESCHEDULING PROCEDURE. NOTIFIED ERWIN @ 3:35PM-LO Provider    ABOVE-KNEE AMPUTATION Right 10/13/2022 Procedure: AMPUTATION, ABOVE KNEE, RIGHT;  Surgeon: Dimitris Davis MD;  Location: [facility];  Service: Orthopedics;  Laterality: Right; Provider                          Family as of 10/14/2022       Problem Relation Name Age of Onset Comments Source    No Known Problems Mother -- -- -- Provider    No Known Problems Father -- -- -- Provider    No Known Problems Sister -- -- -- Provider    No Known Problems Brother -- -- -- Provider    No Known Problems Daughter -- -- -- Provider    No Known Problems Daughter -- -- -- Provider    No Known Problems Daughter -- -- -- Provider    No Known Problems Brother -- -- -- Provider    No Known Problems Maternal Aunt -- -- -- Provider    No Known Problems Maternal Uncle -- -- -- Provider    No Known Problems Paternal Aunt -- -- -- Provider    No Known Problems Paternal Uncle -- -- -- Provider    No Known Problems Maternal Grandmother -- -- -- Provider    No Known Problems Maternal Grandfather -- -- -- Provider    No Known Problems Paternal Grandmother -- -- -- Provider    No Known Problems Paternal Grandfather -- -- -- Provider                  Tobacco Use as of 10/14/2022       Smoking Status Smoking Start Date Quit Date Smoking Frequency    Never -- --       Smokeless Status Smokeless Type Smokeless Quit Date    Never -- --      Source    Provider                  Alcohol Use as of 10/14/2022       Alcohol Use Drinks/Week Alcohol/Week Comments Source    No   -- -- Provider                  Drug Use as of 10/14/2022       Drug Use Types Frequency Comments Source    No -- -- -- Provider                  Sexual Activity as of 10/14/2022       Sexually Active Birth Control Partners Comments Source    Yes -- Female -- Provider                  Activities of Daily Living as of 10/14/2022       Activities of Daily Living  Question Response Comments Source    Patient feels they ought to cut down on drinking/drug use Not Asked -- Provider    Patient annoyed by others criticizing their drinking/drug use Not Asked -- Provider    Patient has felt bad or guilty about drinking/drug use Not Asked -- Provider    Patient has had a drink/used drugs as an eye opener in the AM Not Asked -- Provider                  Social Documentation as of 10/14/2022    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  Source: Provider               Occupational as of 10/14/2022    None               Socioeconomic as of 10/14/2022       Marital Status Spouse Name Number of Children Years Education Education Level Preferred Language Ethnicity Race Source    Single -- 5 -- -- Creole-Danish Not  or /a Black or  Provider                  Pertinent History       Question Response Comments    Lives with family --    Place in Birth Order -- --    Lives in -- --    Number of Siblings -- --    Raised by -- --    Legal Involvement -- --    Childhood Trauma -- --    Criminal History of -- --    Financial Status -- --    Highest Level of Education -- --    Does patient have access to a firearm? -- --     Service -- --    Primary Leisure Activity -- --    Spirituality -- --          Past Medical History:   Diagnosis Date    CAD (coronary artery disease) 2016    CAD (non obstructive) 2016 [facility]    Diabetes mellitus type I     Diabetic retinopathy     ESRD on hemodialysis     on HD TThSat    Gangrene of left foot     Hypertension     Nuclear sclerosis of right eye 11/24/2021    PAD (peripheral artery disease)     Pulmonary HTN     Right foot infection     TB lung, latent 04/2021     Past Surgical History:   Procedure Laterality Date    ABOVE-KNEE AMPUTATION Left 1/18/2022    Procedure: AMPUTATION, ABOVE KNEE;  Surgeon: Rusty Light MD;  Location: [facility];  Service: Orthopedics;  Laterality: Left;    ABOVE-KNEE AMPUTATION Left 1/21/2022    Procedure:  AMPUTATION, ABOVE KNEE;  Surgeon: Rusty Light MD;  Location: [facility] OR;  Service: Orthopedics;  Laterality: Left;    ABOVE-KNEE AMPUTATION Right 10/13/2022    Procedure: AMPUTATION, ABOVE KNEE, RIGHT;  Surgeon: Dimitris Davis MD;  Location: [facility] OR;  Service: Orthopedics;  Laterality: Right;    AV FISTULA PLACEMENT      CATARACT EXTRACTION Left     EYE SURGERY      FISTULOGRAM Left 8/9/2022    Procedure: Fistulogram;  Surgeon: Masoud Soni MD;  Location: [facility] OR;  Service: Vascular;  Laterality: Left;  LEFT VM ON DAUGHTER'S PHONE----PHONE PREOP NOT COMPLETED  CALLED PATIENT ON 8/8/2022 @ 3:34. HE STATED HE IS RESCHEDULING PROCEDURE. NOTIFIED ERWIN @ 3:35PM-LO    TOE AMPUTATION Left 1/6/2022    Procedure: AMPUTATION, TOE;  Surgeon: Masoud Soni MD;  Location: [facility] OR;  Service: Vascular;  Laterality: Left;  Left first through fifth toes, possible open transmetatarsal amputation and all other indicated procedures     Family History       Problem Relation (Age of Onset)    No Known Problems Mother, Father, Sister, Brother, Daughter, Daughter, Daughter, Brother, Maternal Aunt, Maternal Uncle, Paternal Aunt, Paternal Uncle, Maternal Grandmother, Maternal Grandfather, Paternal Grandmother, Paternal Grandfather          Tobacco Use    Smoking status: Never    Smokeless tobacco: Never   Substance and Sexual Activity    Alcohol use: No    Drug use: No    Sexual activity: Yes     Partners: Female     Review of patient's allergies indicates:  No Known Allergies    No current facility-administered medications on file prior to encounter.     Current Outpatient Medications on File Prior to Encounter   Medication Sig    traMADoL (ULTRAM) 50 mg tablet Take 50 mg by mouth every 6 (six) hours as needed for Pain.    amLODIPine (NORVASC) 10 MG tablet Take 1 tablet (10 mg total) by mouth once daily.    amLODIPine (NORVASC) 10 MG tablet Take 1 tablet by mouth.    aspirin 81 MG Chew Take 81 mg by  "mouth once daily.    aspirin 81 MG Chew Take 1 tablet by mouth.    atorvastatin (LIPITOR) 20 MG tablet Take 1 tablet by mouth.    clopidogreL (PLAVIX) 75 mg tablet Take 75 mg by mouth once daily.    doxycycline (VIBRAMYCIN) 100 MG Cap Take 1 capsule (100 mg total) by mouth every 12 (twelve) hours.    insulin glargine 100 units/mL (3mL) SubQ pen Inject 15 Units into the skin every evening.    insulin glargine 100 units/mL (3mL) SubQ pen Inject 15 Units into the skin every evening.    lancets (ACCU-CHEK SOFTCLIX LANCETS) Misc 1 each by Misc.(Non-Drug; Combo Route) route once daily at 6am.    methoxy peg-epoetin beta (MIRCERA INJ) 50 mcg.    methoxy peg-epoetin beta (MIRCERA INJ) 30 mcg.    methoxy peg-epoetin beta (MIRCERA INJ) 75 mcg.    metoprolol succinate (TOPROL-XL) 25 MG 24 hr tablet Take 1 tablet by mouth.    omeprazole (PRILOSEC) 20 MG capsule TAKE 2 CAPSULES(40 MG) BY MOUTH EVERY DAY (Patient taking differently: Take 20 mg by mouth once daily.)    omeprazole (PRILOSEC) 40 MG capsule Take by mouth.    omeprazole (PRILOSEC) 40 MG capsule Take 40 mg by mouth once daily.    pen needle, diabetic 32 gauge x 5/32" Ndle To use with insulin pen 4x daily    pravastatin (PRAVACHOL) 20 MG tablet Take 20 mg by mouth once daily.    RENVELA 800 mg Tab Take 800 mg by mouth 3 (three) times daily with meals.    sevelamer carbonate (RENVELA) 800 mg Tab Take 2 tablets by mouth.    traMADoL (ULTRAM) 50 mg tablet Take 1 tablet by mouth.     Psychotherapeutics (From admission, onward)      None          Review of Systems   Unable to perform ROS: Mental status change   Strengths and Liabilities: Liability: Patient has poor health., Liability: Patient has possible cognitive impairment., Liability: Patient lacks coping skills.    Objective:     Vital Signs (Most Recent):  Temp: 99.5 °F (37.5 °C) (10/14/22 1340)  Pulse: 100 (10/14/22 1340)  Resp: 19 (10/14/22 1340)  BP: (!) 115/53 (10/14/22 1340)  SpO2: 99 % " "(10/14/22 1340)   Vital Signs (24h Range):  Temp:  [97.2 °F (36.2 °C)-99.5 °F (37.5 °C)] 99.5 °F (37.5 °C)  Pulse:  [] 100  Resp:  [16-20] 19  SpO2:  [97 %-100 %] 99 %  BP: (115-177)/(52-75) 115/53     Height: 5' 11" (180.3 cm)  Weight: 124.9 kg (275 lb 5.7 oz)  Body mass index is 38.4 kg/m².      Intake/Output Summary (Last 24 hours) at 10/14/2022 1428  Last data filed at 10/14/2022 0815  Gross per 24 hour   Intake 820 ml   Output --   Net 820 ml       Physical Exam  Psychiatric:      Comments: EXAMINATION    CONSTITUTIONAL  General Appearance: hospital attire    MUSCULOSKELETAL  Muscle Strength and Tone: weak  Abnormal Involuntary Movements: none noted  Gait and Station: not observed    PSYCHIATRIC MENTAL STATUS EXAM   Level of Consciousness: awake and alert  Orientation: name and "hospital" only  Grooming: limited  Psychomotor Behavior: slowed  Speech: some delay, very soft  Language: no abnormalities  Mood: "good"  Affect: blunted and confused  Thought Process: concrete, if any  Associations: not intact most of the time  Thought Content: poverty  Memory: poor to recent and remote  Attention: diminished  Fund of Knowledge: simple to conversation  Insight: poor into current state  Judgment: fair into cooperative with care          Significant Labs: All pertinent labs within the past 24 hours have been reviewed.    Significant Imaging: I have reviewed all pertinent imaging results/findings within the past 24 hours.    Assessment/Plan:     Cognitive changes  Patient with lack of orientation and understanding of his current situation but is agreeable to care and intervention.  Pleasantly confused today.  Given his lack of capacity at last hospitalization and again today, would consider possible underlying cognitive process changes, such as dementia.      Capacity: the ability to accept or refuse treatment recommendations. Capacity is determined by a clinician upon specific elements of a mental status exam (does " not have to be performed by a psychiatrist). To demonstrate capacity, a patient must be able to cognitively utilize information provided to them:   Communicate and Express a choice that is stable over time - although he expresses a choice, this seems to change  Understand the relevant information - does not understand why he is in the hospital  Appreciate the consequences of the decision (risks vs benefits) - does not know the risks vs benefits of hospitalization and procedures  Manipulate all of the data in a logical fashion (rationalize) - limited rationalization given his poor self care      -Based on the above assessment, pt Adryan Neff appears to lack capacity to refuse fistula evaluation at this time.     Enact family as surrogate decision maker in his care.  Likely consider long term placement in nursing home.  No psychiatric medications warranted at this time.               Total Time:  60 minutes      Mekhi Ndiaye MD   Psychiatry  [facility]

## 2022-10-14 NOTE — PROGRESS NOTES
Pharmacokinetic Assessment Follow Up: IV Vancomycin    Vancomycin serum concentration assessment(s):    The random level was drawn correctly and can be used to guide therapy at this time. The measurement is above the desired definitive target range of 10 to 20 mcg/mL.    Vancomycin Regimen Plan:    No dose today.  Re-dose when the random level is less than 20 mcg/mL, next level to be drawn at 0400 on 10/15/2022    Drug levels (last 3 results):  Recent Labs   Lab Result Units 10/12/22  0347 10/14/22  0502   Vancomycin, Random ug/mL 14.3 28.3       Pharmacy will continue to follow and monitor vancomycin.    Please contact pharmacy at extension [phone] for questions regarding this assessment.    Thank you for the consult,   Evan Rosa Jr       Patient brief summary:  Adryan Neff is a 56 y.o. male initiated on antimicrobial therapy with IV Vancomycin for treatment of skin & soft tissue infection    Drug Allergies:   Review of patient's allergies indicates:  No Known Allergies    Actual Body Weight:   124.9 kg    Renal Function:   Estimated Creatinine Clearance: 12 mL/min (A) (based on SCr of 9.1 mg/dL (H)).,     Dialysis Method (if applicable):  intermittent HD    CBC (last 72 hours):  Recent Labs   Lab Result Units 10/11/22  0914 10/13/22  0457 10/13/22  1407 10/13/22  1641   WBC K/uL 24.87* 17.75* 21.38*  --    Hemoglobin g/dL 8.8* 7.2* 7.4*  --    Hematocrit % 30.0* 23.5* 24.7* 23.4*   Platelets K/uL 674* 528* 583*  --    Gran % % 87.2* 82.0* 82.9*  --    Lymph % % 6.0* 7.0* 7.4*  --    Mono % % 4.6 7.4 6.9  --    Eosinophil % % 0.7 2.0 1.5  --    Basophil % % 0.3 0.5 0.4  --    Differential Method  Automated Automated Automated  --        Metabolic Panel (last 72 hours):  Recent Labs   Lab Result Units 10/11/22  0914 10/13/22  0457   Sodium mmol/L 131* 135*   Potassium mmol/L 5.2* 4.1   Chloride mmol/L 96 95   CO2 mmol/L 18* 27   Glucose mg/dL 245* 119*   BUN mg/dL 34* 29*   Creatinine mg/dL 9.5* 9.1*   Albumin  g/dL 2.2*  --    Total Bilirubin mg/dL 0.4  --    Alkaline Phosphatase U/L 62  --    AST U/L 16  --    ALT U/L 6*  --    Magnesium mg/dL 2.2 2.9*   Phosphorus mg/dL 6.6*  --        Vancomycin Administrations:  vancomycin given in the last 96 hours                     vancomycin 750 mg in dextrose 5 % 250 mL IVPB (ready to mix system) (mg) 750 mg New Bag 10/12/22 2148                    Microbiologic Results:  Microbiology Results (last 7 days)       Procedure Component Value Units Date/Time    Blood culture [[id number]] Collected: 10/11/22 0914    Order Status: Completed Specimen: Blood Updated: 10/13/22 1103     Blood Culture, Routine No Growth to date      No Growth to date      No Growth to date    Blood culture [[id number]] Collected: 10/11/22 0915    Order Status: Completed Specimen: Blood from Peripheral, Right Wrist Updated: 10/13/22 1103     Blood Culture, Routine No Growth to date      No Growth to date      No Growth to date    Blood culture #1 **CANNOT BE ORDERED STAT** [[id number]] Collected: 10/06/22 1511    Order Status: Completed Specimen: Blood from Peripheral, Upper Arm, Right Updated: 10/10/22 2103     Blood Culture, Routine No Growth after 4 days.    Blood culture #2 **CANNOT BE ORDERED STAT** [[id number]] Collected: 10/06/22 1536    Order Status: Completed Specimen: Blood from Peripheral, Hand, Right Updated: 10/10/22 2103     Blood Culture, Routine No Growth after 4 days.

## 2022-10-14 NOTE — CLINICAL REVIEW
IP Sepsis Screen (most recent)       Sepsis Screen (IP) - 10/14/22 1401       Is the patient's history or complaint suggestive of a possible infection? Yes  -DD    Are there at least two of the following signs and symptoms present? Yes  -DD    Sepsis signs/symptoms - Tachycardia Tachycardia     >90  -DD    Sepsis signs/symptoms - WBC WBC < 4,000 or WBC > 12,000  -DD    Are any of the following organ dysfunction criteria present and not considered to be due to a chronic condition? Yes  -DD    Organ Dysfunction Criteria Creatinine > 2.0  -DD    Initiate Sepsis Protocol No  -DD    Reason sepsis not considered Pt. receiving appropriate management  -DD              User Key  (r) = Recorded By, (t) = Taken By, (c) = Cosigned By      Initials Name    SAURABH Flynn RN

## 2022-10-14 NOTE — PLAN OF CARE
Recommendations  Continue renal diet, add diabetic restrictions if glucose labs continue to be poor.  Encourage PO intake  Supplement with Suplena TID to aid in recovery and nutrition status per HD  Monitor nutrition related labs  Renal  Cardiac  Diabetes  Take new weights of patient  Goals:   Pt to have new, accurate weights taken by RD f/u  Pt to receive >50% EEN/EPN by RD f/u  Pt to receive ONS by RD f/u  Nutrition Goal Status: new  Communication of RD Recs: other (comment) (POC)\    Kendall Delarosa, MPH, RDN, LDN

## 2022-10-14 NOTE — PLAN OF CARE
Problem: Adult Inpatient Plan of Care  Goal: Plan of Care Review  Outcome: Ongoing, Progressing     Problem: Diabetes Comorbidity  Goal: Blood Glucose Level Within Targeted Range  Outcome: Ongoing, Progressing     Problem: Infection  Goal: Absence of Infection Signs and Symptoms  Outcome: Ongoing, Progressing     Problem: Impaired Wound Healing  Goal: Optimal Wound Healing  Outcome: Ongoing, Progressing     Problem: Device-Related Complication Risk (Hemodialysis)  Goal: Safe, Effective Therapy Delivery  Outcome: Ongoing, Progressing

## 2022-10-14 NOTE — PROGRESS NOTES
MARCUS met with pt's daughter, Mariel [phone] and she requested a letter to present to immigration to get pt's wife to [place] CM will speak to supervisor.     Pt and pt's daughter agreed to a NH placement because no one is able to take care of pt until pt's wife is able to arrive from [facility]. CM will notify MD.    MARCUS inquired where and who will they live with. Pt's daughter stated they could stay with pt's ex- wife, Eli [phone] and her daughter, Casie [phone]. Cm will follow up.    3:35 pm  CM spoke to Ban, supervisor and she typed a letter for immigration. CM contacted pt's daughter, Luis F [phone] and she will  the letter on López 10/17/22.    3:50 pm   Kati # [id number] translated. MARCUS spoke to pt's ex- wife , Genet and she has agreed to allow pt's children to live with her. CM will follow up.

## 2022-10-14 NOTE — PROGRESS NOTES
Date of Admission:10/6/2022    SUBJECTIVE: notes pain  Current Facility-Administered Medications   Medication    0.9%  NaCl infusion (for blood administration)    0.9%  NaCl infusion (for blood administration)    acetaminophen tablet 650 mg    aluminum-magnesium hydroxide-simethicone 200-200-20 mg/5 mL suspension 30 mL    amLODIPine tablet 10 mg    aspirin chewable tablet 81 mg    atorvastatin tablet 40 mg    dextrose 10% bolus 125 mL    dextrose 10% bolus 250 mL    epoetin paola-epbx injection 10,000 Units    folic acid-vit B6-vit B12 2.5-25-2 mg tablet 1 tablet    glucagon (human recombinant) injection 1 mg    glucose chewable tablet 16 g    glucose chewable tablet 24 g    hydrALAZINE injection 10 mg    HYDROcodone-acetaminophen 5-325 mg per tablet 1 tablet    insulin aspart U-100 pen 0-5 Units    insulin detemir U-100 pen 12 Units    melatonin tablet 6 mg    metoprolol succinate (TOPROL-XL) 24 hr tablet 25 mg    morphine injection 2 mg    naloxone 0.4 mg/mL injection 0.02 mg    ondansetron disintegrating tablet 8 mg    pantoprazole EC tablet 40 mg    piperacillin-tazobactam 4.5 g in dextrose 5 % 100 mL IVPB (ready to mix system)    sevelamer carbonate tablet 2,400 mg    sodium chloride 0.9% flush 10 mL    sodium chloride 0.9% flush 10 mL    vancomycin - pharmacy to dose       Wt Readings from Last 3 Encounters:   10/14/22 124.9 kg (275 lb 5.7 oz)   10/06/22 63.4 kg (139 lb 12.4 oz)   10/06/22 63.4 kg (139 lb 12.4 oz)     Temp Readings from Last 3 Encounters:   10/14/22 98 °F (36.7 °C) (Oral)   08/09/22 98.2 °F (36.8 °C) (Oral)   06/16/22 98.7 °F (37.1 °C) (Oral)     BP Readings from Last 3 Encounters:   10/14/22 (!) 123/52   10/06/22 131/65   08/09/22 (!) 166/72     Pulse Readings from Last 3 Encounters:   10/14/22 89   08/09/22 75   06/16/22 (!) 111       Intake/Output Summary (Last 24 hours) at 10/14/2022 1116  Last data filed at 10/14/2022 0815  Gross per 24 hour   Intake 820 ml   Output --   Net 820 ml          PE:  GEN:wd male in nad  HEENT:ncat,eomi,mm  CVS:s1s2 regular  PULM:ctab  ABD:bs,soft  EXT:aka wrappededema, avf left arm, no thigh edema  NEURO:awake,alert    No results for input(s): GLU, NA, K, CL, CO2, BUN, CREATININE, CALCIUM, MG in the last 24 hours.      Lab Results   Component Value Date    .0 (H) 01/07/2021    CALCIUM 8.7 10/13/2022    PHOS 6.6 (H) 10/11/2022       Recent Labs   Lab 10/14/22  0929   WBC 16.49*   RBC 2.73*   HGB 7.6*   HCT 23.6*   *   MCV 86   MCH 27.8   MCHC 32.2             A/P:  1.esrd. seen on hd cont tx mwf.  2.malfxn avg. Vascular following. Watch for bleeding. No heparin.   3.pea arrest. S/p. Following. Following.  4.gangrene. aka noted.   5.htn. sbp ok.  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  7.anemia. cont epo. Prbc prn.  8.dm2. folowing sugars.

## 2022-10-14 NOTE — NURSING
Received report from KIAH Ghotra from dialysis. 1 Liter removed. /76, HR 93. Awaiting pt's arrival to the floor.

## 2022-10-14 NOTE — SUBJECTIVE & OBJECTIVE
Interval History: Seen in HD - s/p R AKA; leukocytosis improving post surgery. Tolerating abx without issues.     Review of Systems   Constitutional:  Negative for chills and fever.   All other systems reviewed and are negative.  Objective:     Vital Signs (Most Recent):  Temp: 98 °F (36.7 °C) (10/14/22 0737)  Pulse: 89 (10/14/22 0737)  Resp: 19 (10/14/22 0737)  BP: (!) 123/52 (10/14/22 0737)  SpO2: 98 % (10/14/22 0737)   Vital Signs (24h Range):  Temp:  [97.2 °F (36.2 °C)-99 °F (37.2 °C)] 98 °F (36.7 °C)  Pulse:  [] 89  Resp:  [16-20] 19  SpO2:  [94 %-100 %] 98 %  BP: (116-177)/(52-75) 123/52     Weight: 124.9 kg (275 lb 5.7 oz)  Body mass index is 38.4 kg/m².    Estimated Creatinine Clearance: 12 mL/min (A) (based on SCr of 9.1 mg/dL (H)).    Physical Exam  Constitutional:       General: He is not in acute distress.     Appearance: He is not ill-appearing or toxic-appearing.   HENT:      Head: Normocephalic and atraumatic.      Right Ear: External ear normal.      Left Ear: External ear normal.   Eyes:      General: No scleral icterus.        Right eye: No discharge.         Left eye: No discharge.   Cardiovascular:      Rate and Rhythm: Normal rate and regular rhythm.   Pulmonary:      Effort: Pulmonary effort is normal. No respiratory distress.      Breath sounds: No stridor. No wheezing or rhonchi.   Abdominal:      General: There is no distension.      Palpations: Abdomen is soft.      Tenderness: There is no abdominal tenderness. There is no guarding.   Musculoskeletal:      Comments: Deangelo AKA - R aka site dressed; L aka site well healed   Skin:     General: Skin is warm and dry.      Coloration: Skin is not jaundiced.      Findings: No bruising, erythema or rash.   Neurological:      Mental Status: He is alert and oriented to person, place, and time. Mental status is at baseline.      Motor: No weakness.   Psychiatric:         Mood and Affect: Mood normal.         Behavior: Behavior normal.        Significant Labs:   Microbiology Results (last 7 days)       Procedure Component Value Units Date/Time    Blood culture [[id number]] Collected: 10/11/22 0914    Order Status: Completed Specimen: Blood Updated: 10/13/22 1103     Blood Culture, Routine No Growth to date      No Growth to date      No Growth to date    Blood culture [[id number]] Collected: 10/11/22 0915    Order Status: Completed Specimen: Blood from Peripheral, Right Wrist Updated: 10/13/22 1103     Blood Culture, Routine No Growth to date      No Growth to date      No Growth to date    Blood culture #1 **CANNOT BE ORDERED STAT** [[id number]] Collected: 10/06/22 1511    Order Status: Completed Specimen: Blood from Peripheral, Upper Arm, Right Updated: 10/10/22 2103     Blood Culture, Routine No Growth after 4 days.    Blood culture #2 **CANNOT BE ORDERED STAT** [[id number]] Collected: 10/06/22 1536    Order Status: Completed Specimen: Blood from Peripheral, Hand, Right Updated: 10/10/22 2103     Blood Culture, Routine No Growth after 4 days.            Significant Imaging: I have reviewed all pertinent imaging results/findings within the past 24 hours.

## 2022-10-14 NOTE — PROGRESS NOTES
"[facility]  Infectious Disease  Progress Note    Patient Name: Adryan Neff  MRN: [id number]  Admission Date: 10/6/2022  Length of Stay: 8 days  Attending Physician: Richard Frausto MD  Primary Care Provider: Carola Pedro MD    Isolation Status: No active isolations  Assessment/Plan:      Wet gangrene  Hospital course notable for cardiac arrest on 10/10 s/p CPR and subsequent transfer to ICU. S/p R AKA with ortho 10/13 - leukocytosis improving post procedure.    Recommendations:  -can stop abx after last dose of abx today given presumed source control with amputation  -blcx ngtd          Thank you for your consult. I will sign off. Please contact us if you have any additional questions. Above d/w primary team.     Time: 35 minutes   50% of time spent on face-to-face counseling and coordination of care. Counseling included review of test results, diagnosis, and treatment plan with patient and/or family.        Anita Guzman MD  Infectious Disease  [facility]    Subjective:     Principal Problem:PAD (peripheral artery disease)    HPI: 56M with ESRD on HD and severe PAD admitted 10/6 with Gangrene of R BKA. Last seen by ID 1/2022 for L foot gangrene s/p AKA- at that time, amputation thought to be definitive treatment of infection and antibiotics were stopped. Now reports worsening pain and developing eschar of the right dorsal foot along with foul smelling odor. Given concern for an infection and the need for IV antibiotics & expedited podiatry evaluation, the patient was sent to the ER. Denies f/c. Says he was taking medicine but not sure if it was an antibiotics. Reports severe pain and drainage and odor from foot.     Seen by podiatry- amputation planned. R BKA being considered    Xray-No radiographic evidence of osteomyelitis    Ila ordered, pending    Tmax 99.9    Bcx ngtd x 1 day    Has received vanc/zosyn    ID consulted for ": Concern for wet gangrene"    Interval History: Seen in " HD - s/p R AKA; leukocytosis improving post surgery. Tolerating abx without issues.     Review of Systems   Constitutional:  Negative for chills and fever.   All other systems reviewed and are negative.  Objective:     Vital Signs (Most Recent):  Temp: 98 °F (36.7 °C) (10/14/22 0737)  Pulse: 89 (10/14/22 0737)  Resp: 19 (10/14/22 0737)  BP: (!) 123/52 (10/14/22 0737)  SpO2: 98 % (10/14/22 0737)   Vital Signs (24h Range):  Temp:  [97.2 °F (36.2 °C)-99 °F (37.2 °C)] 98 °F (36.7 °C)  Pulse:  [] 89  Resp:  [16-20] 19  SpO2:  [94 %-100 %] 98 %  BP: (116-177)/(52-75) 123/52     Weight: 124.9 kg (275 lb 5.7 oz)  Body mass index is 38.4 kg/m².    Estimated Creatinine Clearance: 12 mL/min (A) (based on SCr of 9.1 mg/dL (H)).    Physical Exam  Constitutional:       General: He is not in acute distress.     Appearance: He is not ill-appearing or toxic-appearing.   HENT:      Head: Normocephalic and atraumatic.      Right Ear: External ear normal.      Left Ear: External ear normal.   Eyes:      General: No scleral icterus.        Right eye: No discharge.         Left eye: No discharge.   Cardiovascular:      Rate and Rhythm: Normal rate and regular rhythm.   Pulmonary:      Effort: Pulmonary effort is normal. No respiratory distress.      Breath sounds: No stridor. No wheezing or rhonchi.   Abdominal:      General: There is no distension.      Palpations: Abdomen is soft.      Tenderness: There is no abdominal tenderness. There is no guarding.   Musculoskeletal:      Comments: Deangelo AKA - R aka site dressed; L aka site well healed   Skin:     General: Skin is warm and dry.      Coloration: Skin is not jaundiced.      Findings: No bruising, erythema or rash.   Neurological:      Mental Status: He is alert and oriented to person, place, and time. Mental status is at baseline.      Motor: No weakness.   Psychiatric:         Mood and Affect: Mood normal.         Behavior: Behavior normal.       Significant Labs:   Microbiology  Results (last 7 days)       Procedure Component Value Units Date/Time    Blood culture [[id number]] Collected: 10/11/22 0914    Order Status: Completed Specimen: Blood Updated: 10/13/22 1103     Blood Culture, Routine No Growth to date      No Growth to date      No Growth to date    Blood culture [[id number]] Collected: 10/11/22 0915    Order Status: Completed Specimen: Blood from Peripheral, Right Wrist Updated: 10/13/22 1103     Blood Culture, Routine No Growth to date      No Growth to date      No Growth to date    Blood culture #1 **CANNOT BE ORDERED STAT** [[id number]] Collected: 10/06/22 1511    Order Status: Completed Specimen: Blood from Peripheral, Upper Arm, Right Updated: 10/10/22 2103     Blood Culture, Routine No Growth after 4 days.    Blood culture #2 **CANNOT BE ORDERED STAT** [[id number]] Collected: 10/06/22 1536    Order Status: Completed Specimen: Blood from Peripheral, Hand, Right Updated: 10/10/22 2103     Blood Culture, Routine No Growth after 4 days.            Significant Imaging: I have reviewed all pertinent imaging results/findings within the past 24 hours.

## 2022-10-14 NOTE — HPI
"Patient Adryan Neff presents to the hospital with a gangrenous foot which has required AKA.  He is in need of a fistula study and there is question of capacity.  Patient is [place] Creole and speaks very little to no english.  Hospital interpretor utilized at bedside.  Patient is eating his food and is pleasantly confused.  He is oriented to name and "hospital" only.  Not able to provide name of hospital.  Says that it is August, then December, 2012.  At first says that he is living with his 2 daughters, then says 1 daughter/1 son.  He is not sure why he is in the hospital.  When asked about his dialysis shunt, he says that they already looked at it but is willing to let the doctor look at it again.  Not able to obtain any history or background information due to his confusion.  Of note, he was evaluated by me many months ago for capacity and lacked medical decision making at that time also.  "

## 2022-10-14 NOTE — PROGRESS NOTES
POD#1  Patient eating breakfast. No complaints of pain.  VSSAF    Right LE: Dressing c/d/I.  Hct: 23.4    A/P: POD#1 Right AKA  1) leave current dsg in place- will change in 3-4 days  2) med mgmt per primary and consulting teams  3) discharge placement

## 2022-10-14 NOTE — NURSING
Pt's daughter,Jonn, came to visit her dad after his surgical procedure. After seeing dad in his condition, the daughter stated that she can not take care of her dad in this state. She stated that her and her brother, which are both minors (17 & 14) live alone with their dad in apartment. She does have a part time job and gets off work between 11-11:30pm. Nurse asked pt's daughter about a legal guardian who they can live with while awaiting to receive [place] about proper care for her dad. Pt's daughter begin to share that both her mom and her brother's mom live in [facility] and Mr. Neff has been trying for four years to get his current wife to the [facility], which has been unsuccessful. Pt's daughter continued to ask me if I can get her a letter from the hospital to send to immigration to help get her stepmom here. Also, the pt has an older daughter who we spoke with to gain consent for surgery name Yanar. I asked Jonn if she can live with her older sister. Jonn  stated that she does not know where her sister lives and she only hears from her from time to from. After having this conversation with the daughter, I reported to charge nurse Preeti to have  involved. Nurse Manager June was contacted. When nurse attempted to go back to to the pt's room to discuss further, both children were gone. Escalated to nurse manager June.

## 2022-10-14 NOTE — SUBJECTIVE & OBJECTIVE
Patient History               Medical as of 10/14/2022       Past Medical History       Diagnosis Date Comments Source    CAD (coronary artery disease) 2016 CAD (non obstructive) 2016 [facility] Provider    Diabetes mellitus type I -- -- Provider    Diabetic retinopathy -- -- Provider    ESRD on hemodialysis -- on HD TThSat Provider    Gangrene of left foot -- -- Provider    Hypertension -- -- Provider    Nuclear sclerosis of right eye 11/24/2021 -- Provider    PAD (peripheral artery disease) -- -- Provider    Pulmonary HTN -- -- Provider    Right foot infection -- -- Provider    TB lung, latent 04/2021 -- Provider              Pertinent Negatives       Diagnosis Date Noted Comments Source    Amblyopia 11/24/2021 -- Provider    Arthritis 11/24/2021 -- Provider    Glaucoma 11/24/2021 -- Provider    History of psychiatric hospitalization 01/12/2022 -- Provider    Hx of psychiatric care 01/12/2022 -- Provider    Macular degeneration 11/24/2021 -- Provider    Retinal detachment 11/24/2021 -- Provider    Sickle cell anemia 11/24/2021 -- Provider    Sickle cell trait 11/24/2021 -- Provider    Strabismus 11/24/2021 -- Provider    Suicide attempt 01/12/2022 -- Provider    Therapy 01/12/2022 -- Provider    Uveitis 11/24/2021 -- Provider                          Surgical as of 10/14/2022       Past Surgical History       Procedure Laterality Date Comments Source    EYE SURGERY -- -- -- Provider    AV FISTULA PLACEMENT -- -- -- Provider    CATARACT EXTRACTION Left -- -- Provider    TOE AMPUTATION Left 1/6/2022 Procedure: AMPUTATION, TOE;  Surgeon: Masoud Soni MD;  Location: [facility] OR;  Service: Vascular;  Laterality: Left;  Left first through fifth toes, possible open transmetatarsal amputation and all other indicated procedures Provider    ABOVE-KNEE AMPUTATION Left 1/18/2022 Procedure: AMPUTATION, ABOVE KNEE;  Surgeon: Rusty Light MD;  Location: [facility] OR;  Service: Orthopedics;  Laterality: Left; Provider     ABOVE-KNEE AMPUTATION Left 1/21/2022 Procedure: AMPUTATION, ABOVE KNEE;  Surgeon: Rusty Light MD;  Location: [facility] OR;  Service: Orthopedics;  Laterality: Left; Provider    FISTULOGRAM Left 8/9/2022 Procedure: Fistulogram;  Surgeon: Masoud Soni MD;  Location: [facility] OR;  Service: Vascular;  Laterality: Left;  LEFT VM ON DAUGHTER'S PHONE----PHONE PREOP NOT COMPLETED  CALLED PATIENT ON 8/8/2022 @ 3:34. HE STATED HE IS RESCHEDULING PROCEDURE. NOTIFIED ERWIN @ 3:35PM- Provider    ABOVE-KNEE AMPUTATION Right 10/13/2022 Procedure: AMPUTATION, ABOVE KNEE, RIGHT;  Surgeon: Dimitris Davis MD;  Location: [facility] OR;  Service: Orthopedics;  Laterality: Right; Provider                          Family as of 10/14/2022       Problem Relation Name Age of Onset Comments Source    No Known Problems Mother -- -- -- Provider    No Known Problems Father -- -- -- Provider    No Known Problems Sister -- -- -- Provider    No Known Problems Brother -- -- -- Provider    No Known Problems Daughter -- -- -- Provider    No Known Problems Daughter -- -- -- Provider    No Known Problems Daughter -- -- -- Provider    No Known Problems Brother -- -- -- Provider    No Known Problems Maternal Aunt -- -- -- Provider    No Known Problems Maternal Uncle -- -- -- Provider    No Known Problems Paternal Aunt -- -- -- Provider    No Known Problems Paternal Uncle -- -- -- Provider    No Known Problems Maternal Grandmother -- -- -- Provider    No Known Problems Maternal Grandfather -- -- -- Provider    No Known Problems Paternal Grandmother -- -- -- Provider    No Known Problems Paternal Grandfather -- -- -- Provider                  Tobacco Use as of 10/14/2022       Smoking Status Smoking Start Date Quit Date Smoking Frequency    Never -- --       Smokeless Status Smokeless Type Smokeless Quit Date    Never -- --      Source    Provider                  Alcohol Use as of 10/14/2022       Alcohol Use Drinks/Week Alcohol/Week Comments Source     No   -- -- Provider                  Drug Use as of 10/14/2022       Drug Use Types Frequency Comments Source    No -- -- -- Provider                  Sexual Activity as of 10/14/2022       Sexually Active Birth Control Partners Comments Source    Yes -- Female -- Provider                  Activities of Daily Living as of 10/14/2022       Activities of Daily Living Question Response Comments Source    Patient feels they ought to cut down on drinking/drug use Not Asked -- Provider    Patient annoyed by others criticizing their drinking/drug use Not Asked -- Provider    Patient has felt bad or guilty about drinking/drug use Not Asked -- Provider    Patient has had a drink/used drugs as an eye opener in the AM Not Asked -- Provider                  Social Documentation as of 10/14/2022    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  Source: Provider               Occupational as of 10/14/2022    None               Socioeconomic as of 10/14/2022       Marital Status Spouse Name Number of Children Years Education Education Level Preferred Language Ethnicity Race Source    Single -- 5 -- -- Creole-Eritrean Not  or /a Black or  Provider                  Pertinent History       Question Response Comments    Lives with family --    Place in Birth Order -- --    Lives in -- --    Number of Siblings -- --    Raised by -- --    Legal Involvement -- --    Childhood Trauma -- --    Criminal History of -- --    Financial Status -- --    Highest Level of Education -- --    Does patient have access to a firearm? -- --     Service -- --    Primary Leisure Activity -- --    Spirituality -- --          Past Medical History:   Diagnosis Date    CAD (coronary artery disease) 2016    CAD (non obstructive) 2016 [facility]    Diabetes mellitus type I     Diabetic retinopathy     ESRD on hemodialysis     on HD TThSat    Gangrene of left foot     Hypertension     Nuclear sclerosis of right eye 11/24/2021    PAD (peripheral artery  disease)     Pulmonary HTN     Right foot infection     TB lung, latent 04/2021     Past Surgical History:   Procedure Laterality Date    ABOVE-KNEE AMPUTATION Left 1/18/2022    Procedure: AMPUTATION, ABOVE KNEE;  Surgeon: Rusyt Light MD;  Location: [facility] OR;  Service: Orthopedics;  Laterality: Left;    ABOVE-KNEE AMPUTATION Left 1/21/2022    Procedure: AMPUTATION, ABOVE KNEE;  Surgeon: Rusty Light MD;  Location: [facility] OR;  Service: Orthopedics;  Laterality: Left;    ABOVE-KNEE AMPUTATION Right 10/13/2022    Procedure: AMPUTATION, ABOVE KNEE, RIGHT;  Surgeon: Dimitris Davis MD;  Location: [facility] OR;  Service: Orthopedics;  Laterality: Right;    AV FISTULA PLACEMENT      CATARACT EXTRACTION Left     EYE SURGERY      FISTULOGRAM Left 8/9/2022    Procedure: Fistulogram;  Surgeon: Masoud Soni MD;  Location: [facility] OR;  Service: Vascular;  Laterality: Left;  LEFT VM ON DAUGHTER'S PHONE----PHONE PREOP NOT COMPLETED  CALLED PATIENT ON 8/8/2022 @ 3:34. HE STATED HE IS RESCHEDULING PROCEDURE. NOTIFIED LAQUESIA @ 3:35PM-LO    TOE AMPUTATION Left 1/6/2022    Procedure: AMPUTATION, TOE;  Surgeon: Masoud Soni MD;  Location: [facility] OR;  Service: Vascular;  Laterality: Left;  Left first through fifth toes, possible open transmetatarsal amputation and all other indicated procedures     Family History       Problem Relation (Age of Onset)    No Known Problems Mother, Father, Sister, Brother, Daughter, Daughter, Daughter, Brother, Maternal Aunt, Maternal Uncle, Paternal Aunt, Paternal Uncle, Maternal Grandmother, Maternal Grandfather, Paternal Grandmother, Paternal Grandfather          Tobacco Use    Smoking status: Never    Smokeless tobacco: Never   Substance and Sexual Activity    Alcohol use: No    Drug use: No    Sexual activity: Yes     Partners: Female     Review of patient's allergies indicates:  No Known Allergies    No current facility-administered medications on file prior to encounter.     Current  "Outpatient Medications on File Prior to Encounter   Medication Sig    traMADoL (ULTRAM) 50 mg tablet Take 50 mg by mouth every 6 (six) hours as needed for Pain.    amLODIPine (NORVASC) 10 MG tablet Take 1 tablet (10 mg total) by mouth once daily.    amLODIPine (NORVASC) 10 MG tablet Take 1 tablet by mouth.    aspirin 81 MG Chew Take 81 mg by mouth once daily.    aspirin 81 MG Chew Take 1 tablet by mouth.    atorvastatin (LIPITOR) 20 MG tablet Take 1 tablet by mouth.    clopidogreL (PLAVIX) 75 mg tablet Take 75 mg by mouth once daily.    doxycycline (VIBRAMYCIN) 100 MG Cap Take 1 capsule (100 mg total) by mouth every 12 (twelve) hours.    insulin glargine 100 units/mL (3mL) SubQ pen Inject 15 Units into the skin every evening.    insulin glargine 100 units/mL (3mL) SubQ pen Inject 15 Units into the skin every evening.    lancets (ACCU-CHEK SOFTCLIX LANCETS) Misc 1 each by Misc.(Non-Drug; Combo Route) route once daily at 6am.    methoxy peg-epoetin beta (MIRCERA INJ) 50 mcg.    methoxy peg-epoetin beta (MIRCERA INJ) 30 mcg.    methoxy peg-epoetin beta (MIRCERA INJ) 75 mcg.    metoprolol succinate (TOPROL-XL) 25 MG 24 hr tablet Take 1 tablet by mouth.    omeprazole (PRILOSEC) 20 MG capsule TAKE 2 CAPSULES(40 MG) BY MOUTH EVERY DAY (Patient taking differently: Take 20 mg by mouth once daily.)    omeprazole (PRILOSEC) 40 MG capsule Take by mouth.    omeprazole (PRILOSEC) 40 MG capsule Take 40 mg by mouth once daily.    pen needle, diabetic 32 gauge x 5/32" Ndle To use with insulin pen 4x daily    pravastatin (PRAVACHOL) 20 MG tablet Take 20 mg by mouth once daily.    RENVELA 800 mg Tab Take 800 mg by mouth 3 (three) times daily with meals.    sevelamer carbonate (RENVELA) 800 mg Tab Take 2 tablets by mouth.    traMADoL (ULTRAM) 50 mg tablet Take 1 tablet by mouth.     Psychotherapeutics (From admission, onward)      None          Review of Systems   Unable to perform ROS: Mental status change   Strengths and Liabilities: " "Liability: Patient has poor health., Liability: Patient has possible cognitive impairment., Liability: Patient lacks coping skills.    Objective:     Vital Signs (Most Recent):  Temp: 99.5 °F (37.5 °C) (10/14/22 1340)  Pulse: 100 (10/14/22 1340)  Resp: 19 (10/14/22 1340)  BP: (!) 115/53 (10/14/22 1340)  SpO2: 99 % (10/14/22 1340)   Vital Signs (24h Range):  Temp:  [97.2 °F (36.2 °C)-99.5 °F (37.5 °C)] 99.5 °F (37.5 °C)  Pulse:  [] 100  Resp:  [16-20] 19  SpO2:  [97 %-100 %] 99 %  BP: (115-177)/(52-75) 115/53     Height: 5' 11" (180.3 cm)  Weight: 124.9 kg (275 lb 5.7 oz)  Body mass index is 38.4 kg/m².      Intake/Output Summary (Last 24 hours) at 10/14/2022 1428  Last data filed at 10/14/2022 0815  Gross per 24 hour   Intake 820 ml   Output --   Net 820 ml       Physical Exam  Psychiatric:      Comments: EXAMINATION    CONSTITUTIONAL  General Appearance: hospital attire    MUSCULOSKELETAL  Muscle Strength and Tone: weak  Abnormal Involuntary Movements: none noted  Gait and Station: not observed    PSYCHIATRIC MENTAL STATUS EXAM   Level of Consciousness: awake and alert  Orientation: name and "hospital" only  Grooming: limited  Psychomotor Behavior: slowed  Speech: some delay, very soft  Language: no abnormalities  Mood: "good"  Affect: blunted and confused  Thought Process: concrete, if any  Associations: not intact most of the time  Thought Content: poverty  Memory: poor to recent and remote  Attention: diminished  Fund of Knowledge: simple to conversation  Insight: poor into current state  Judgment: fair into cooperative with care          Significant Labs: All pertinent labs within the past 24 hours have been reviewed.    Significant Imaging: I have reviewed all pertinent imaging results/findings within the past 24 hours.  "

## 2022-10-14 NOTE — ASSESSMENT & PLAN NOTE
Patient with lack of orientation and understanding of his current situation but is agreeable to care and intervention.  Pleasantly confused today.  Given his lack of capacity at last hospitalization and again today, would consider possible underlying cognitive process changes, such as dementia.      Capacity: the ability to accept or refuse treatment recommendations. Capacity is determined by a clinician upon specific elements of a mental status exam (does not have to be performed by a psychiatrist). To demonstrate capacity, a patient must be able to cognitively utilize information provided to them:   Communicate and Express a choice that is stable over time - although he expresses a choice, this seems to change  Understand the relevant information - does not understand why he is in the hospital  Appreciate the consequences of the decision (risks vs benefits) - does not know the risks vs benefits of hospitalization and procedures  Manipulate all of the data in a logical fashion (rationalize) - limited rationalization given his poor self care      -Based on the above assessment, pt Adryan Neff appears to lack capacity to refuse fistula evaluation at this time.     Enact family as surrogate decision maker in his care.  Likely consider long term placement in nursing home.  No psychiatric medications warranted at this time.

## 2022-10-14 NOTE — ASSESSMENT & PLAN NOTE
R foot gangrene with amputation pending. Hospital course notable for cardiac arrest on 10/10 s/p CPR and subsequent transfer to ICU. S/p R AKA with ortho 10/13 - leukocytosis improving.     Recommendations:  -can stop abx after last dose of abx today given presumed source control with amputation

## 2022-10-14 NOTE — NURSING
Report received from night nurse KIAH Pope. Visualized patient and assessed patient's overall condition and appearance. No acute distress noted. Will continue to monitor

## 2022-10-14 NOTE — PROGRESS NOTES
Detail Level: [facility] [place]  Adult Nutrition  Progress Note    SUMMARY       Recommendations  Continue renal diet, add diabetic restrictions if glucose labs continue to be poor.  Encourage PO intake  Supplement with Suplena TID to aid in recovery and nutrition status per HD  Monitor nutrition related labs  Renal  Cardiac  Diabetes  Take new weights of patient  Goals:   Pt to have new, accurate weights taken by RD f/u  Pt to receive >50% EEN/EPN by RD f/u  Pt to receive ONS by RD f/u  Nutrition Goal Status: new  Communication of DEMARIO Recs: other (comment) (POC)    Assessment and Plan  Nutrition Problem  Inadequate energy consumption    Related to (etiology):   Increased protein/calorie needs    Signs and Symptoms (as evidenced by):   On HD  Amputation (10/13)  Non-healing wounds    Interventions/Recommendations (treatment strategy):  Collaboration of care with other providers  Commercial     Nutrition Diagnosis Status:   New        Reason for Assessment    Reason For Assessment: length of stay  Diagnosis:  (Peripheral artery disease)  Relevant Medical History:   Patient Active Problem List   Diagnosis    Controlled type 2 diabetes mellitus with chronic kidney disease on chronic dialysis, with long-term current use of insulin    Essential (primary) hypertension    Pure hypercholesterolemia    Benign hypertension with chronic kidney disease, stage V    Other insomnia    Gastroesophageal reflux disease    Intractable vomiting    Chronic diastolic heart failure    CAD (coronary artery disease) - non-obstructive from [facility] in 2016    Hyperkalemia    Pre-transplant evaluation for kidney transplant    Chronic pulmonary heart disease    HLD (hyperlipidemia)    PDR (proliferative diabetic retinopathy)    Secondary hyperparathyroidism of renal origin    ESRD on hemodialysis    TB lung, latent    Paronychia, toe, left    Nuclear sclerosis of right eye    Pseudophakia    Wet gangrene    COVID-19 in immunocompromised patient     "Encephalopathy, metabolic    Unilateral complete BKA, left, initial encounter    PAD (peripheral artery disease)    PEA (Pulseless electrical activity)    Anemia due to chronic kidney disease, on chronic dialysis       Interdisciplinary Rounds: did not attend  General Information Comments: This is a 56 year old male with a PMHx of severe PAD s/p gangrene of left foot s/p left AKA (1/2022), ESRD on HD MWF, NICM (EF: 55%, GIDD), pulmonary hypertension, GERD and latent TB filiberto presents for concern for leg infection.  Pt presents with foot infection with foul odor. Pt had Right BKA 10/13. Pt with history of amputation (Left BKA in January 2022). Pt on HD. Pt reporting a fair appetite with 50% intake. Weights being taken are likely inaccurate. Unsure of wt gain/loss per hospital stay. Pt poor renal and glucose labs. Continue to monitor.   Nutrition Discharge Planning: Renal diet    Nutrition Risk Screen    Nutrition Risk Screen: large or nonhealing wound, burn or pressure injury    Nutrition/Diet History    Spiritual, Cultural Beliefs, Yazidi Practices, Values that Affect Care: no    Anthropometrics    Temp: 98 °F (36.7 °C)  Height Method: Stated  Height: 5' 11" (180.3 cm)  Height (inches): 71 in  Weight Method: Bed Scale  Weight: 124.9 kg (275 lb 5.7 oz)  Weight (lb): 275.36 lb  Ideal Body Weight (IBW), Male: 172 lb  % Ideal Body Weight, Male (lb): 90.12 %  BMI (Calculated): 38.4  Amputation %: 5.9, 5.9  Total Amputation %: 11.8       Lab/Procedures/Meds    Pertinent Labs Reviewed: reviewed  Pertinent Labs Comments: CBC:  Recent Labs   Lab 10/14/22  0929   WBC 16.49*   HGB 7.6*   HCT 23.6*   *     CMP:  No results for input(s): GLUCOSE, CALCIUM, ALBUMIN, PROT, NA, K, CO2, CL, BUN, CREATININE, ALKPHOS, ALT, AST, BILITOT in the last 24 hours.      Pertinent Medications Reviewed: reviewed  Pertinent Medications Comments: Scheduled Meds:   sodium chloride   Intravenous Once    aluminum-magnesium " hydroxide-simethicone  30 mL Oral QID (AC & HS)    amLODIPine  10 mg Oral Daily    aspirin  81 mg Oral Daily    atorvastatin  40 mg Oral QHS    epoetin paola-epbx  10,000 Units Subcutaneous Every Mon, Wed, Fri    folic acid-vit B6-vit B12 2.5-25-2 mg  1 tablet Oral Daily    insulin detemir U-100  12 Units Subcutaneous QHS    metoprolol succinate  25 mg Oral Daily    pantoprazole  40 mg Oral Daily    piperacillin-tazobactam (ZOSYN) IVPB  4.5 g Intravenous Q12H    sevelamer carbonate  2,400 mg Oral TID WM     Continuous Infusions:  PRN Meds:.sodium chloride, acetaminophen, dextrose 10%, dextrose 10%, glucagon (human recombinant), glucose, glucose, hydrALAZINE, HYDROcodone-acetaminophen, insulin aspart U-100, melatonin, morphine, naloxone, ondansetron, sodium chloride 0.9%, sodium chloride 0.9%, Pharmacy to dose Vancomycin consult **AND** vancomycin - pharmacy to dose        Estimated/Assessed Needs    Weight Used For Calorie Calculations: 62.7 kg (138 lb 3.7 oz)  Energy Calorie Requirements (kcal): 1881-2195kcal/day (3-350kcal/kg (amputation recovery, skin integrity, on HD))  Energy Need Method: Kcal/kg  Protein Requirements: 63-75g/day (1.0-1.2g/kg)  Weight Used For Protein Calculations: 62.7 kg (138 lb 3.7 oz)  Fluid Requirements (mL): 1mL per kcal or per MD     RDA Method (mL): 1881  CHO Requirement: 390 g      Nutrition Prescription Ordered    Current Diet Order: Renal diet    Evaluation of Received Nutrient/Fluid Intake    I/O: +.33L since admit  Energy Calories Required: not meeting needs  Protein Required: not meeting needs  Fluid Required: not meeting needs  Comments: LBM 10/11  Tolerance: tolerating  % Intake of Estimated Energy Needs: 25 - 50 %  % Meal Intake: 25 - 50 %    Nutrition Risk    Level of Risk/Frequency of Follow-up: moderate - high     Monitor and Evaluation    Food and Nutrient Intake: energy intake, food and beverage intake  Food and Nutrient Adminstration: diet  order  Knowledge/Beliefs/Attitudes: beliefs and attitudes, food and nutrition knowledge/skill  Physical Activity and Function: nutrition-related ADLs and IADLs  Anthropometric Measurements: height/length, weight, weight change, body mass index  Biochemical Data, Medical Tests and Procedures: electrolyte and renal panel, glucose/endocrine profile, gastrointestinal profile, inflammatory profile, lipid profile  Nutrition-Focused Physical Findings: overall appearance     Nutrition Follow-Up    RD Follow-up?: Yes    Kendall Delarosa, MPH, RDN, LDN     Detail Level: Detailed

## 2022-10-14 NOTE — NURSING
Bedside Report given to night nurse KIAH Pope. Walking rounds completed. Visualized and assessed patient NAD noted. Safety precautions maintained and call light within reach.     Chart check completed.

## 2022-10-14 NOTE — PLAN OF CARE
Pt is AAOx4. Room air. Tele maintained.  Prn pain meds adm for RLE; moderate relief noted.  No falls or new injuries reported during shift, safety precautions maintained.     Problem: Adult Inpatient Plan of Care  Goal: Plan of Care Review  Outcome: Ongoing, Progressing     Problem: Adult Inpatient Plan of Care  Goal: Optimal Comfort and Wellbeing  Outcome: Ongoing, Progressing

## 2022-10-15 LAB
ABO + RH BLD: NORMAL
BACTERIA BLD CULT: NORMAL
BACTERIA BLD CULT: NORMAL
BASOPHILS # BLD AUTO: 0.06 K/UL (ref 0–0.2)
BASOPHILS NFR BLD: 0.4 % (ref 0–1.9)
BLD GP AB SCN CELLS X3 SERPL QL: NORMAL
BLD PROD TYP BPU: NORMAL
BLOOD UNIT EXPIRATION DATE: NORMAL
BLOOD UNIT TYPE CODE: 5100
BLOOD UNIT TYPE: NORMAL
CODING SYSTEM: NORMAL
DIFFERENTIAL METHOD: ABNORMAL
DISPENSE STATUS: NORMAL
EOSINOPHIL # BLD AUTO: 0.2 K/UL (ref 0–0.5)
EOSINOPHIL NFR BLD: 1.2 % (ref 0–8)
ERYTHROCYTE [DISTWIDTH] IN BLOOD BY AUTOMATED COUNT: 14.9 % (ref 11.5–14.5)
HCT VFR BLD AUTO: 22.3 % (ref 40–54)
HCT VFR BLD AUTO: 26.7 % (ref 40–54)
HGB BLD-MCNC: 6.8 G/DL (ref 14–18)
HGB BLD-MCNC: 8.6 G/DL (ref 14–18)
IMM GRANULOCYTES # BLD AUTO: 0.16 K/UL (ref 0–0.04)
IMM GRANULOCYTES NFR BLD AUTO: 1 % (ref 0–0.5)
LYMPHOCYTES # BLD AUTO: 1 K/UL (ref 1–4.8)
LYMPHOCYTES NFR BLD: 6.3 % (ref 18–48)
MCH RBC QN AUTO: 27.4 PG (ref 27–31)
MCHC RBC AUTO-ENTMCNC: 30.5 G/DL (ref 32–36)
MCV RBC AUTO: 90 FL (ref 82–98)
MONOCYTES # BLD AUTO: 1.2 K/UL (ref 0.3–1)
MONOCYTES NFR BLD: 7.4 % (ref 4–15)
NEUTROPHILS # BLD AUTO: 13.9 K/UL (ref 1.8–7.7)
NEUTROPHILS NFR BLD: 83.7 % (ref 38–73)
NRBC BLD-RTO: 0 /100 WBC
NUM UNITS TRANS PACKED RBC: NORMAL
PLATELET # BLD AUTO: 503 K/UL (ref 150–450)
PMV BLD AUTO: 9.3 FL (ref 9.2–12.9)
POCT GLUCOSE: 176 MG/DL (ref 70–110)
POCT GLUCOSE: 181 MG/DL (ref 70–110)
POCT GLUCOSE: 194 MG/DL (ref 70–110)
POCT GLUCOSE: 203 MG/DL (ref 70–110)
RBC # BLD AUTO: 2.48 M/UL (ref 4.6–6.2)
WBC # BLD AUTO: 16.56 K/UL (ref 3.9–12.7)

## 2022-10-15 PROCEDURE — 63600175 PHARM REV CODE 636 W HCPCS: Performed by: ORTHOPAEDIC SURGERY

## 2022-10-15 PROCEDURE — 21400001 HC TELEMETRY ROOM

## 2022-10-15 PROCEDURE — 36430 TRANSFUSION BLD/BLD COMPNT: CPT

## 2022-10-15 PROCEDURE — P9016 RBC LEUKOCYTES REDUCED: HCPCS | Performed by: EMERGENCY MEDICINE

## 2022-10-15 PROCEDURE — 99232 SBSQ HOSP IP/OBS MODERATE 35: CPT | Mod: ,,, | Performed by: SURGERY

## 2022-10-15 PROCEDURE — 36415 COLL VENOUS BLD VENIPUNCTURE: CPT | Performed by: EMERGENCY MEDICINE

## 2022-10-15 PROCEDURE — 86920 COMPATIBILITY TEST SPIN: CPT | Performed by: EMERGENCY MEDICINE

## 2022-10-15 PROCEDURE — 99232 PR SUBSEQUENT HOSPITAL CARE,LEVL II: ICD-10-PCS | Mod: ,,, | Performed by: SURGERY

## 2022-10-15 PROCEDURE — 25000003 PHARM REV CODE 250: Performed by: ORTHOPAEDIC SURGERY

## 2022-10-15 PROCEDURE — 25000003 PHARM REV CODE 250: Performed by: NURSE PRACTITIONER

## 2022-10-15 PROCEDURE — 86850 RBC ANTIBODY SCREEN: CPT | Performed by: EMERGENCY MEDICINE

## 2022-10-15 PROCEDURE — 85025 COMPLETE CBC W/AUTO DIFF WBC: CPT | Performed by: EMERGENCY MEDICINE

## 2022-10-15 PROCEDURE — 85014 HEMATOCRIT: CPT | Performed by: EMERGENCY MEDICINE

## 2022-10-15 PROCEDURE — 85018 HEMOGLOBIN: CPT | Performed by: EMERGENCY MEDICINE

## 2022-10-15 RX ORDER — HYDROCODONE BITARTRATE AND ACETAMINOPHEN 500; 5 MG/1; MG/1
TABLET ORAL
Status: DISCONTINUED | OUTPATIENT
Start: 2022-10-15 | End: 2022-10-17

## 2022-10-15 RX ADMIN — Medication 6 MG: at 08:10

## 2022-10-15 RX ADMIN — METOPROLOL SUCCINATE 25 MG: 25 TABLET, EXTENDED RELEASE ORAL at 09:10

## 2022-10-15 RX ADMIN — HYDROCODONE BITARTRATE AND ACETAMINOPHEN 1 TABLET: 5; 325 TABLET ORAL at 02:10

## 2022-10-15 RX ADMIN — HYDROCODONE BITARTRATE AND ACETAMINOPHEN 1 TABLET: 5; 325 TABLET ORAL at 04:10

## 2022-10-15 RX ADMIN — ALUMINUM HYDROXIDE, MAGNESIUM HYDROXIDE, AND DIMETHICONE 30 ML: 200; 20; 200 SUSPENSION ORAL at 06:10

## 2022-10-15 RX ADMIN — INSULIN ASPART 2 UNITS: 100 INJECTION, SOLUTION INTRAVENOUS; SUBCUTANEOUS at 12:10

## 2022-10-15 RX ADMIN — PIPERACILLIN AND TAZOBACTAM 4.5 G: 4; .5 INJECTION, POWDER, LYOPHILIZED, FOR SOLUTION INTRAVENOUS; PARENTERAL at 02:10

## 2022-10-15 RX ADMIN — INSULIN DETEMIR 12 UNITS: 100 INJECTION, SOLUTION SUBCUTANEOUS at 08:10

## 2022-10-15 RX ADMIN — ATORVASTATIN CALCIUM 40 MG: 40 TABLET, FILM COATED ORAL at 08:10

## 2022-10-15 RX ADMIN — AMLODIPINE BESYLATE 10 MG: 5 TABLET ORAL at 09:10

## 2022-10-15 RX ADMIN — ALUMINUM HYDROXIDE, MAGNESIUM HYDROXIDE, AND DIMETHICONE 30 ML: 200; 20; 200 SUSPENSION ORAL at 08:10

## 2022-10-15 RX ADMIN — PANTOPRAZOLE SODIUM 40 MG: 40 TABLET, DELAYED RELEASE ORAL at 09:10

## 2022-10-15 NOTE — PROGRESS NOTES
[facility] Medicine  Progress Note      Admit Date: 10/6/2022  LOS: 9  Code Status: Full Code   Date of Consult: 10/15/2022  : 1965  Age: 56 y.o.  Weight:   Wt Readings from Last 1 Encounters:   10/14/22 124.9 kg (275 lb 5.7 oz)     Sex: male  Bed: W0/W0 A:   MRN: [id number]  Attending Physician: Richard Frausto MD  Primary Service: Networked reference to record PCT        Subjective:      Principal Problem:PAD (peripheral artery disease)           HPI:  This is a 56 year old male with a PMHx of severe PAD s/p gangrene of left foot s/p left AKA (2022), ESRD on HD MWF, NICM (EF: 55%, GIDD), pulmonary hypertension, GERD and latent TB filiberto presents for concern for leg infection.       The patient presented to the vascular surgery clinic for a follow up. He reports worsening pain and developing eschar of the right dorsal foot along with foul smelling odor. Given concern for an infection and the need for IV antibiotics & expedited podiatry evaluation, the patient was sent to the ER. The patient denies having any fevers, chills, or injury to his foot. He has a previous AKA and uses a wheelchair to ambulate. While in the ED, the patient was hypertensive but otherwise hemodynamically stable. Labs showed leukocytosis (17.41 - with neutrophilic predominance), normocytic anemia (9.7), elevated sed rate (125), elevated CRP (237.2), negative lactate (1.6). X-ray foot showed no radiographic evidence of osteomyelitis. He was administered vancomycin, zosyn and was admitted for further management.      I attempted to call the daughter to obtain further history on  without a response.            Overview/Hospital Course:  56M with pmh of severe pad s/p gangangrene of left foot s/p left AKA (2022), ESRD on HD MWF, NICM (EF: 55%, GIDD), pulmonary hypertension, GERD and latent TB filiberto presents for concern for leg infection. The patient presented to the vascular surgery clinic for a follow up.  He reports worsening pain and developing eschar of the right dorsal foot along with foul smelling odor. Given concern for an infection and the need for IV antibiotics & expedited podiatry evaluation, the patient was sent to the ER. In ed labs with leukocytosis, normocytic anemia (9.7), elevated sed rate (125), elevated CRP (237.2), negative lactate (1.6). X-ray foot showed no radiographic evidence of osteomyelitis.Pt started on vancomycin and zosyn for possible infection. Initially evaluated by podiatry who recommended ortho consult for possible amputation. Amputation to be done on 10/10. ID also on board for abx recommendations. Patient had a coded blue called on 10/10 for possible PEA vs. Vasovagal.  ROSC returned quickly. Cards consulted to follow. The patient was transferred to the floor on 10/11.     On the floor, patient remained stable. Underwent fistulogram without issues and planned amputation of RLE on Thursday. Slightly confused and expressed hunger on 10/13     Interval History: Some blood noted on dressing    Review of Systems   Unable to perform ROS: Mental status change     Objective:        PHYSICAL EXAM  Vital Signs (Most Recent):  Temp: 99 °F (37.2 °C) (10/15/22 1619)  Pulse: 83 (10/15/22 1619)  Resp: 20 (10/15/22 1619)  BP: (!) 157/71 (10/15/22 1619)  SpO2: 97 % (10/15/22 1619)   Vital Signs (24h Range):  Temp:  [97.5 °F (36.4 °C)-99 °F (37.2 °C)] 99 °F (37.2 °C)  Pulse:  [80-99] 83  Resp:  [17-20] 20  SpO2:  [97 %-100 %] 97 %  BP: (125-179)/(62-80) 157/71       Vital signs and nursing assessment noted: afebrile    GEN:   NAD, A & disoriented, atraumatic, well appearing, nontoxic appearing  HEENT:  PERRLA, EOMI, moist membranes, nl conjunctiva, no scleral icterus, no nystagmus, no nodes/nodules, soft, supple, FROM, no trachial deviation  CV:   RRR no m/r/g, 2+ radial pulses, <2sec cap refill, no obvious JVD  RESP:  CTA B, no w/r/r, equal and bilateral chest rise, no respiratory distress  ABD:    soft, Nontender, Nondistended, +BS, no guarding/rebound  :   Deferred  EXT:   B AKA, R AKA in dressing  LYMPH:  no gross adenopathy  NEURO:  GCS 1r, CN II-XII grossly intact, no obvious motor/sensory deficit, no tremor  PSYCH:   MATEO  SKIN:  Warm, dry, intact, no rashes/lesions or masses, nl color, no pallor    Tests      Latest Reference Range & Units 10/14/22 09:29 10/15/22 05:08   WBC 3.90 - 12.70 K/uL 16.49 (H) 16.56 (H)   Hemoglobin 14.0 - 18.0 g/dL 7.6 (L) 6.8 (L)   Platelets 150 - 450 K/uL 527 (H) 503 (H)   Gran % 38.0 - 73.0 % 88.5 (H) 83.7 (H)   Lymph % 18.0 - 48.0 % 6.1 (L) 6.3 (L)      Latest Reference Range & Units 10/11/22 09:14 10/13/22 04:57   Sodium 136 - 145 mmol/L 131 (L) 135 (L)   Potassium 3.5 - 5.1 mmol/L 5.2 (H) 4.1   Chloride 95 - 110 mmol/L 96 95   CO2 23 - 29 mmol/L 18 (L) 27   Anion Gap 8 - 16 mmol/L 17 (H) 13   BUN 6 - 20 mg/dL 34 (H) 29 (H)   Creatinine 0.5 - 1.4 mg/dL 9.5 (H) 9.1 (H)   eGFR >60 mL/min/1.73 m^2 6 ! 6 !   Glucose 70 - 110 mg/dL 245 (H) 119 (H)   Calcium 8.7 - 10.5 mg/dL 9.3 8.7   Phosphorus 2.7 - 4.5 mg/dL 6.6 (H)    Magnesium 1.6 - 2.6 mg/dL 2.2 2.9 (H)       ASSESSMENT/PLAN:     * PAD (peripheral artery disease)  - History of severe PAD   - C/b LEFT foot gangrene s/p left AKA   - POD#2 Right AKA for gangrene          - leave current dsg in place and Ortho will change in 2-3 days          - continue antibiotics as concern for infection  - Home medications: aspirin, plavix and statin  - RCRI score of 4     Plan;   - Vascular surgery, podiatry consultation   - Podiatry recommending bka/aka. Ortho consulted, surgery 10/13  - Vanc/Zosyn for concern for superimposed infection discontinued today as Infectious disease notes he has effective source control with his AKA.   - Resume home medications      Wet gangrene  As stated above in PAD        Anemia due to chronic kidney disease, on chronic dialysis  - Hgb 6.8 today; no signs of bleeding  - Transfuse 1U pRBC   - Consents  obtained via daughter  - Continue EPO per nephro        PEA (Pulseless electrical activity)  On 10/10. Return of ROSC quickly. Now in NSR and awake and alert. Will transfer to tele.        ESRD on hemodialysis  - Dialysis type: iHD  - Access: L AVF  - Schedule: MWF     Plan:  - Nephrology consult for dialysis management  - Daily weights to guide UF  - Continue dialysis MWF     CAD (coronary artery disease) - [facility]-obstructive from [facility] in 2016  - Resume aspirin, statin         Chronic diastolic heart failure  - Echocardiogram (11/24/2021): EF: 55%, GIDD, mild TR, moderate to severe MR  - Compensated on physical examination, on room air   - Resume home medications      Gastroesophageal reflux disease  - Resume PPI      Pure hypercholesterolemia  - Resume statin      Essential (primary) hypertension  - Resume home medications (amlodipine 10 mg, Toprol-XL 50 mg)      Controlled type 2 diabetes mellitus with chronic kidney disease on chronic dialysis, with long-term current use of insulin   - Last A1c:             Lab Results   Component Value Date     HGBA1C 6.1 (H) 01/07/2021      - Home regimen: Lantus 12 units nightly, increased from 10 due to hyperglycemia. 15u caused hypoglycemia at last week  - Continue home regimen   - Low-sensitivity sliding scale insulin  - Initiate and maintain glycemic protocol, monitor POC glucose   - Follow up with PCP as outpatient     Confusion      Lacks medical decision making at this timer\        VTE Risk Mitigation (From admission, onward)                       Ordered         IP VTE HIGH RISK PATIENT  Once         10/06/22 1819         Place sequential compression device  Until discontinued         10/06/22 1819                          Discharge Planning   ENIO:      Code Status: Full Code   Is the patient medically ready for discharge?:     Reason for patient still in hospital (select all that apply): Patient trending condition  Discharge Plan A: Home with family   Discharge Delays:  None known at this time    Richard Frausto MD  Department of Hospital Medicine   [facility] - Telemetry

## 2022-10-15 NOTE — PLAN OF CARE
Pt is AAOx4. Room air. Tele maintained.   No falls or new injures reported during shift, safety precautions maintained.     Problem: Adult Inpatient Plan of Care  Goal: Plan of Care Review  Outcome: Ongoing, Progressing     Problem: Adult Inpatient Plan of Care  Goal: Optimal Comfort and Wellbeing  Outcome: Ongoing, Progressing

## 2022-10-15 NOTE — PROGRESS NOTES
[facility]  Vascular Surgery  Progress Note    Patient Name: Adryan Neff  MRN: [id number]  Admission Date: 10/6/2022  Primary Care Provider: Carola Pedro MD    Subjective:     Interval History: Not oriented to place or time.    Post-Op Info:  Procedure(s) (LRB):  AMPUTATION, ABOVE KNEE, RIGHT (Right)   1 Day Post-Op       Medications:  Continuous Infusions:  Scheduled Meds:   sodium chloride   Intravenous Once    aluminum-magnesium hydroxide-simethicone  30 mL Oral QID (AC & HS)    amLODIPine  10 mg Oral Daily    aspirin  81 mg Oral Daily    atorvastatin  40 mg Oral QHS    epoetin paola-epbx  10,000 Units Subcutaneous Every Mon, Wed, Fri    folic acid-vit B6-vit B12 2.5-25-2 mg  1 tablet Oral Daily    insulin detemir U-100  12 Units Subcutaneous QHS    metoprolol succinate  25 mg Oral Daily    pantoprazole  40 mg Oral Daily    piperacillin-tazobactam (ZOSYN) IVPB  4.5 g Intravenous Q12H    sevelamer carbonate  2,400 mg Oral TID WM     PRN Meds:sodium chloride, acetaminophen, dextrose 10%, dextrose 10%, glucagon (human recombinant), glucose, glucose, hydrALAZINE, HYDROcodone-acetaminophen, insulin aspart U-100, melatonin, morphine, naloxone, ondansetron, sodium chloride 0.9%, sodium chloride 0.9%, Pharmacy to dose Vancomycin consult **AND** vancomycin - pharmacy to dose     Objective:     Vital Signs (Most Recent):  Temp: 98.7 °F (37.1 °C) (10/14/22 2020)  Pulse: 99 (10/14/22 2020)  Resp: 17 (10/14/22 2020)  BP: 125/62 (10/14/22 2020)  SpO2: 99 % (10/14/22 2020) Vital Signs (24h Range):  Temp:  [97.2 °F (36.2 °C)-99.5 °F (37.5 °C)] 98.7 °F (37.1 °C)  Pulse:  [] 99  Resp:  [17-20] 17  SpO2:  [98 %-100 %] 99 %  BP: (109-162)/(50-78) 125/62     Date 10/14/22 0700 - 10/15/22 0659   Shift 3858-2779 8746-4409 3668-9438 24 Hour Total   INTAKE   P.O. 360   360   Other 500   500   Shift Total(mL/kg) 860(6.9)   860(6.9)   OUTPUT   Other 1500   1500   Shift Total(mL/kg) 1500(12)   1500(12)   Weight  (kg) 124.9 124.9 124.9 124.9       Physical Exam  Vitals reviewed.   Constitutional:       General: He is not in acute distress.     Appearance: He is well-developed. He is not diaphoretic.   HENT:      Head: Normocephalic and atraumatic.   Eyes:      Conjunctiva/sclera: Conjunctivae normal.   Cardiovascular:      Rate and Rhythm: Normal rate.      Pulses:           Radial pulses are 2+ on the left side.      Comments: LUE AV access +thrill  Pulmonary:      Effort: Pulmonary effort is normal.   Abdominal:      General: There is no distension.      Palpations: Abdomen is soft. There is no mass.      Tenderness: There is no abdominal tenderness. There is no guarding or rebound.      Hernia: No hernia is present.   Musculoskeletal:         General: No deformity. Normal range of motion.      Cervical back: Neck supple.      Left Lower Extremity: Left leg is amputated above knee.   Feet:      Right foot:      Skin integrity: Ulcer, skin breakdown and dry skin present.   Skin:     Findings: No rash.   Neurological:      Mental Status: He is alert and oriented to person, place, and time.       Significant Labs:  All pertinent labs from the last 24 hours have been reviewed.    Significant Diagnostics:  I have reviewed all pertinent imaging results/findings within the past 24 hours.    Assessment/Plan:     AV graft malfunction  States he does not wish to have his procedure today and that he would rather die. Explained the risks and benefits of the procedure and of not having the procedure. This was done with a . Patient is not oriented to place or time, likely due to encephalopathy from foot infection. Patient refusing procedure. Plan for amputation today with Ortho to remove infection. Recommend consultation to psychiatry  to determine if patient has capacity and will speak with family regarding fistulogram as soon as able.         Masoud Soni MD  Vascular Surgery  [facility] - [place] - [facility], later [facility]

## 2022-10-15 NOTE — PLAN OF CARE
Problem: Adult Inpatient Plan of Care  Goal: Plan of Care Review  Outcome: Ongoing, Progressing  Goal: Patient-Specific Goal (Individualized)  Outcome: Ongoing, Progressing  Goal: Absence of Hospital-Acquired Illness or Injury  Outcome: Ongoing, Progressing  Goal: Optimal Comfort and Wellbeing  Outcome: Ongoing, Progressing  Goal: Readiness for Transition of Care  Outcome: Ongoing, Progressing     Problem: Skin Injury Risk Increased  Goal: Skin Health and Integrity  Outcome: Ongoing, Progressing     Problem: Fall Injury Risk  Goal: Absence of Fall and Fall-Related Injury  Outcome: Ongoing, Progressing     Problem: Diabetes Comorbidity  Goal: Blood Glucose Level Within Targeted Range  Outcome: Ongoing, Progressing     Problem: Infection  Goal: Absence of Infection Signs and Symptoms  Outcome: Ongoing, Progressing     Problem: Impaired Wound Healing  Goal: Optimal Wound Healing  Outcome: Ongoing, Progressing     Problem: Device-Related Complication Risk (Hemodialysis)  Goal: Safe, Effective Therapy Delivery  Outcome: Ongoing, Progressing     Problem: Hemodynamic Instability (Hemodialysis)  Goal: Effective Tissue Perfusion  Outcome: Ongoing, Progressing     Problem: Infection (Hemodialysis)  Goal: Absence of Infection Signs and Symptoms  Outcome: Ongoing, Progressing     Problem: Bariatric Environmental Safety  Goal: Safety Maintained with Care  Outcome: Ongoing, Progressing

## 2022-10-15 NOTE — NURSING
Pt lying in bed awake and alert. Bed locked in lowest position. Call light within reach. Discussed with pt that he would be receiving a unit of blood today due to low blood count. Verbalized understanding.

## 2022-10-15 NOTE — SUBJECTIVE & OBJECTIVE
Medications:  Continuous Infusions:  Scheduled Meds:   sodium chloride   Intravenous Once    aluminum-magnesium hydroxide-simethicone  30 mL Oral QID (AC & HS)    amLODIPine  10 mg Oral Daily    aspirin  81 mg Oral Daily    atorvastatin  40 mg Oral QHS    epoetin paola-epbx  10,000 Units Subcutaneous Every Mon, Wed, Fri    folic acid-vit B6-vit B12 2.5-25-2 mg  1 tablet Oral Daily    insulin detemir U-100  12 Units Subcutaneous QHS    metoprolol succinate  25 mg Oral Daily    pantoprazole  40 mg Oral Daily    piperacillin-tazobactam (ZOSYN) IVPB  4.5 g Intravenous Q12H    sevelamer carbonate  2,400 mg Oral TID WM     PRN Meds:sodium chloride, acetaminophen, dextrose 10%, dextrose 10%, glucagon (human recombinant), glucose, glucose, hydrALAZINE, HYDROcodone-acetaminophen, insulin aspart U-100, melatonin, morphine, naloxone, ondansetron, sodium chloride 0.9%, sodium chloride 0.9%, Pharmacy to dose Vancomycin consult **AND** vancomycin - pharmacy to dose     Objective:     Vital Signs (Most Recent):  Temp: 98.7 °F (37.1 °C) (10/14/22 2020)  Pulse: 99 (10/14/22 2020)  Resp: 17 (10/14/22 2020)  BP: 125/62 (10/14/22 2020)  SpO2: 99 % (10/14/22 2020) Vital Signs (24h Range):  Temp:  [97.2 °F (36.2 °C)-99.5 °F (37.5 °C)] 98.7 °F (37.1 °C)  Pulse:  [] 99  Resp:  [17-20] 17  SpO2:  [98 %-100 %] 99 %  BP: (109-162)/(50-78) 125/62     Date 10/14/22 0700 - 10/15/22 0659   Shift 1828-7901 8980-0844 2180-0806 24 Hour Total   INTAKE   P.O. 360   360   Other 500   500   Shift Total(mL/kg) 860(6.9)   860(6.9)   OUTPUT   Other 1500   1500   Shift Total(mL/kg) 1500(12)   1500(12)   Weight (kg) 124.9 124.9 124.9 124.9       Physical Exam  Vitals reviewed.   Constitutional:       General: He is not in acute distress.     Appearance: He is well-developed. He is not diaphoretic.   HENT:      Head: Normocephalic and atraumatic.   Eyes:      Conjunctiva/sclera: Conjunctivae normal.   Cardiovascular:      Rate and Rhythm: Normal rate.       Pulses:           Radial pulses are 2+ on the left side.      Comments: LUE AV access +thrill  Pulmonary:      Effort: Pulmonary effort is normal.   Abdominal:      General: There is no distension.      Palpations: Abdomen is soft. There is no mass.      Tenderness: There is no abdominal tenderness. There is no guarding or rebound.      Hernia: No hernia is present.   Musculoskeletal:         General: No deformity. Normal range of motion.      Cervical back: Neck supple.      Left Lower Extremity: Left leg is amputated above knee.   Feet:      Right foot:      Skin integrity: Ulcer, skin breakdown and dry skin present.   Skin:     Findings: No rash.   Neurological:      Mental Status: He is alert and oriented to person, place, and time.       Significant Labs:  All pertinent labs from the last 24 hours have been reviewed.    Significant Diagnostics:  I have reviewed all pertinent imaging results/findings within the past 24 hours.

## 2022-10-15 NOTE — NURSING
MD made aware HH is 6.8 and 22.3 this morning.  RLE is bleeding through dressing, approx 20cc on agnieszka pad. Dressing reinforced. MD aware.    MD made aware type and screen , and placed lab draw to collect new one.  Pending collection.  Will make day shift nurse aware.

## 2022-10-15 NOTE — ASSESSMENT & PLAN NOTE
States he does not wish to have his procedure today and that he would rather die. Explained the risks and benefits of the procedure and of not having the procedure. This was done with a . Patient is not oriented to place or time, likely due to encephalopathy from foot infection. Patient refusing procedure. Plan for amputation today with Ortho to remove infection. Recommend consultation to psychiatry  to determine if patient has capacity and will speak with family regarding fistulogram as soon as able.

## 2022-10-15 NOTE — PROGRESS NOTES
Patient seen examined at bedside this morning.  No acute events overnight per nurse  Dressing reinforced for mild bleeding.  Patient receiving blood      Physical exam:  Alert awake and oriented to baseline  Dressing with mild amount of blood posteriorly not saturated      56-year-old male postop day 2 status post below-knee amputation on the right  Hemoglobin is low however the patient started off anemic and there is no current concern for significant surgical bleeding as the blood on the dressing is minimal pain  Will leave it dressed as the compression will help should he not respond appropriately to transfusion would consider pre dressing wound however his current anemia is not concerning.    Will follow    Continue current care    Rusty Light MD   [facility]

## 2022-10-16 LAB
BASOPHILS # BLD AUTO: 0.07 K/UL (ref 0–0.2)
BASOPHILS NFR BLD: 0.4 % (ref 0–1.9)
DIFFERENTIAL METHOD: ABNORMAL
EOSINOPHIL # BLD AUTO: 0.6 K/UL (ref 0–0.5)
EOSINOPHIL NFR BLD: 2.9 % (ref 0–8)
ERYTHROCYTE [DISTWIDTH] IN BLOOD BY AUTOMATED COUNT: 14.9 % (ref 11.5–14.5)
HCT VFR BLD AUTO: 27 % (ref 40–54)
HGB BLD-MCNC: 8.6 G/DL (ref 14–18)
IMM GRANULOCYTES # BLD AUTO: 0.15 K/UL (ref 0–0.04)
IMM GRANULOCYTES NFR BLD AUTO: 0.8 % (ref 0–0.5)
LYMPHOCYTES # BLD AUTO: 1.7 K/UL (ref 1–4.8)
LYMPHOCYTES NFR BLD: 9.2 % (ref 18–48)
MCH RBC QN AUTO: 28.1 PG (ref 27–31)
MCHC RBC AUTO-ENTMCNC: 31.9 G/DL (ref 32–36)
MCV RBC AUTO: 88 FL (ref 82–98)
MONOCYTES # BLD AUTO: 1.4 K/UL (ref 0.3–1)
MONOCYTES NFR BLD: 7.3 % (ref 4–15)
NEUTROPHILS # BLD AUTO: 14.8 K/UL (ref 1.8–7.7)
NEUTROPHILS NFR BLD: 79.4 % (ref 38–73)
NRBC BLD-RTO: 0 /100 WBC
PLATELET # BLD AUTO: 496 K/UL (ref 150–450)
PMV BLD AUTO: 9.2 FL (ref 9.2–12.9)
POCT GLUCOSE: 149 MG/DL (ref 70–110)
POCT GLUCOSE: 150 MG/DL (ref 70–110)
POCT GLUCOSE: 154 MG/DL (ref 70–110)
POCT GLUCOSE: 171 MG/DL (ref 70–110)
POCT GLUCOSE: 49 MG/DL (ref 70–110)
RBC # BLD AUTO: 3.06 M/UL (ref 4.6–6.2)
WBC # BLD AUTO: 18.68 K/UL (ref 3.9–12.7)

## 2022-10-16 PROCEDURE — 85025 COMPLETE CBC W/AUTO DIFF WBC: CPT | Performed by: EMERGENCY MEDICINE

## 2022-10-16 PROCEDURE — 25000003 PHARM REV CODE 250: Performed by: ORTHOPAEDIC SURGERY

## 2022-10-16 PROCEDURE — 21400001 HC TELEMETRY ROOM

## 2022-10-16 PROCEDURE — 36415 COLL VENOUS BLD VENIPUNCTURE: CPT | Performed by: EMERGENCY MEDICINE

## 2022-10-16 RX ADMIN — INSULIN DETEMIR 5 UNITS: 100 INJECTION, SOLUTION SUBCUTANEOUS at 09:10

## 2022-10-16 RX ADMIN — ALUMINUM HYDROXIDE, MAGNESIUM HYDROXIDE, AND DIMETHICONE 30 ML: 200; 20; 200 SUSPENSION ORAL at 09:10

## 2022-10-16 RX ADMIN — ALUMINUM HYDROXIDE, MAGNESIUM HYDROXIDE, AND DIMETHICONE 30 ML: 200; 20; 200 SUSPENSION ORAL at 11:10

## 2022-10-16 RX ADMIN — ALUMINUM HYDROXIDE, MAGNESIUM HYDROXIDE, AND DIMETHICONE 30 ML: 200; 20; 200 SUSPENSION ORAL at 05:10

## 2022-10-16 RX ADMIN — Medication 1 TABLET: at 09:10

## 2022-10-16 RX ADMIN — AMLODIPINE BESYLATE 10 MG: 5 TABLET ORAL at 09:10

## 2022-10-16 RX ADMIN — DEXTROSE 250 ML: 10 SOLUTION INTRAVENOUS at 08:10

## 2022-10-16 RX ADMIN — METOPROLOL SUCCINATE 25 MG: 25 TABLET, EXTENDED RELEASE ORAL at 09:10

## 2022-10-16 RX ADMIN — ALUMINUM HYDROXIDE, MAGNESIUM HYDROXIDE, AND DIMETHICONE 30 ML: 200; 20; 200 SUSPENSION ORAL at 04:10

## 2022-10-16 RX ADMIN — PANTOPRAZOLE SODIUM 40 MG: 40 TABLET, DELAYED RELEASE ORAL at 09:10

## 2022-10-16 RX ADMIN — ATORVASTATIN CALCIUM 40 MG: 40 TABLET, FILM COATED ORAL at 09:10

## 2022-10-16 NOTE — SUBJECTIVE & OBJECTIVE
Interval History: Seems to be doing well today. RN states he is not really confused and ate most of his breakfast. He tells me he is doing well, pain is controlled.     Review of Systems   Respiratory:  Negative for shortness of breath.    Cardiovascular:  Negative for chest pain and leg swelling.   Musculoskeletal:  Negative for arthralgias.   Objective:     Vital Signs (Most Recent):  Temp: 97.2 °F (36.2 °C) (10/16/22 0724)  Pulse: 69 (10/16/22 0724)  Resp: 18 (10/16/22 0724)  BP: (!) 163/68 (10/16/22 0724)  SpO2: 100 % (10/16/22 0724)   Vital Signs (24h Range):  Temp:  [97.2 °F (36.2 °C)-99 °F (37.2 °C)] 97.2 °F (36.2 °C)  Pulse:  [69-85] 69  Resp:  [18-20] 18  SpO2:  [97 %-100 %] 100 %  BP: (131-169)/(63-73) 163/68       Physical Exam  Vitals and nursing note reviewed.   Constitutional:       General: He is not in acute distress.     Appearance: Normal appearance.   HENT:      Head: Normocephalic and atraumatic.      Mouth/Throat:      Mouth: Mucous membranes are moist.   Eyes:      Extraocular Movements: Extraocular movements intact.      Pupils: Pupils are equal, round, and reactive to light.   Cardiovascular:      Rate and Rhythm: Normal rate and regular rhythm.      Heart sounds: Murmur heard.   Pulmonary:      Effort: Pulmonary effort is normal. No respiratory distress.      Breath sounds: Normal breath sounds. No rales.   Abdominal:      General: Abdomen is flat. Bowel sounds are normal.      Palpations: Abdomen is soft.   Musculoskeletal:      Comments: Right BKA dressing with some saturation      Right Lower Extremity: Right leg is amputated below knee.      Left Lower Extremity: Left leg is amputated below knee.   Neurological:      Mental Status: He is alert.         Significant Labs: All pertinent labs within the past 24 hours have been reviewed.    Significant Imaging: I have reviewed all pertinent imaging results/findings within the past 24 hours.

## 2022-10-16 NOTE — PROGRESS NOTES
Some blood on dsg   Pt sleeping   Good response to PRBC transfusion       Will follow   Will likely take down dsg in upcoming days. No current evidence of concerning surgical site bleeding.    MD Kuldeep  [facility]

## 2022-10-16 NOTE — ASSESSMENT & PLAN NOTE
- Anemic - has been transfused this admission pre and post surgery  - Consent via daughter  - Continue EPO per Nephrology  - stable today

## 2022-10-16 NOTE — PLAN OF CARE
Problem: Adult Inpatient Plan of Care  Goal: Plan of Care Review  Outcome: Ongoing, Progressing  Goal: Patient-Specific Goal (Individualized)  Outcome: Ongoing, Progressing  Goal: Absence of Hospital-Acquired Illness or Injury  Outcome: Ongoing, Progressing  Goal: Optimal Comfort and Wellbeing  Outcome: Ongoing, Progressing  Goal: Readiness for Transition of Care  Outcome: Ongoing, Progressing     Problem: Skin Injury Risk Increased  Goal: Skin Health and Integrity  Outcome: Ongoing, Progressing     Problem: Fall Injury Risk  Goal: Absence of Fall and Fall-Related Injury  Outcome: Ongoing, Progressing     Problem: Infection  Goal: Absence of Infection Signs and Symptoms  Outcome: Ongoing, Progressing     Problem: Diabetes Comorbidity  Goal: Blood Glucose Level Within Targeted Range  Outcome: Ongoing, Progressing     Problem: Impaired Wound Healing  Goal: Optimal Wound Healing  Outcome: Ongoing, Progressing     Problem: Device-Related Complication Risk (Hemodialysis)  Goal: Safe, Effective Therapy Delivery  Outcome: Ongoing, Progressing     Problem: Hemodynamic Instability (Hemodialysis)  Goal: Effective Tissue Perfusion  Outcome: Ongoing, Progressing     Problem: Infection (Hemodialysis)  Goal: Absence of Infection Signs and Symptoms  Outcome: Ongoing, Progressing     Problem: Bariatric Environmental Safety  Goal: Safety Maintained with Care  Outcome: Ongoing, Progressing

## 2022-10-16 NOTE — ASSESSMENT & PLAN NOTE
- History of severe PAD   - C/b LEFT foot gangrene s/p left AKA   - Now presenting with right foot gangrene with concern of superimposed infection   - Home medications: aspirin, plavix and statin  - RCRI score of 4    Plan;   - Vascular surgery, podiatry consultation   - Podiatry recommending bka/aka. Ortho consulted, surgery 10/13 with RIGHT BKA  - Vancomycin/Zosyn for concern for superimposed infection. These have now been stopped since source control.  - Infectious disease consult: recommending amputation and stop after amputation.  - Resume home medications

## 2022-10-16 NOTE — PROGRESS NOTES
Date of Admission:10/6/2022    SUBJECTIVE: sleeping comfortably    Current Facility-Administered Medications   Medication    0.9%  NaCl infusion (for blood administration)    0.9%  NaCl infusion (for blood administration)    0.9%  NaCl infusion (for blood administration)    acetaminophen tablet 650 mg    aluminum-magnesium hydroxide-simethicone 200-200-20 mg/5 mL suspension 30 mL    amLODIPine tablet 10 mg    aspirin chewable tablet 81 mg    atorvastatin tablet 40 mg    dextrose 10% bolus 125 mL    dextrose 10% bolus 250 mL    epoetin paola-epbx injection 10,000 Units    folic acid-vit B6-vit B12 2.5-25-2 mg tablet 1 tablet    glucagon (human recombinant) injection 1 mg    glucose chewable tablet 16 g    glucose chewable tablet 24 g    hydrALAZINE injection 10 mg    HYDROcodone-acetaminophen 5-325 mg per tablet 1 tablet    insulin aspart U-100 pen 0-5 Units    insulin detemir U-100 pen 12 Units    melatonin tablet 6 mg    metoprolol succinate (TOPROL-XL) 24 hr tablet 25 mg    morphine injection 2 mg    naloxone 0.4 mg/mL injection 0.02 mg    ondansetron disintegrating tablet 8 mg    pantoprazole EC tablet 40 mg    sevelamer carbonate tablet 2,400 mg    sodium chloride 0.9% flush 10 mL    sodium chloride 0.9% flush 10 mL    vancomycin - pharmacy to dose       Wt Readings from Last 3 Encounters:   10/14/22 124.9 kg (275 lb 5.7 oz)   10/06/22 63.4 kg (139 lb 12.4 oz)   10/06/22 63.4 kg (139 lb 12.4 oz)     Temp Readings from Last 3 Encounters:   10/16/22 98.2 °F (36.8 °C) (Oral)   08/09/22 98.2 °F (36.8 °C) (Oral)   06/16/22 98.7 °F (37.1 °C) (Oral)     BP Readings from Last 3 Encounters:   10/16/22 (!) 175/74   10/06/22 131/65   08/09/22 (!) 166/72     Pulse Readings from Last 3 Encounters:   10/16/22 77   08/09/22 75   06/16/22 (!) 111       Intake/Output Summary (Last 24 hours) at 10/16/2022 1214  Last data filed at 10/16/2022 0931  Gross per 24 hour   Intake 717.08 ml   Output --   Net 717.08 ml         PE:  GEN:wd  male in nad  HEENT:ncat,eyes closed,mm  CVS:s1s2 regular  PULM:ctab  ABD:bs,soft  EXT:akas, no thigh edema, avf arm  NEURO:somnolent    No results for input(s): GLU, NA, K, CL, CO2, BUN, CREATININE, CALCIUM, MG in the last 24 hours.      Lab Results   Component Value Date    .0 (H) 01/07/2021    CALCIUM 8.7 10/13/2022    PHOS 6.6 (H) 10/11/2022       Recent Labs   Lab 10/16/22  0346   WBC 18.68*   RBC 3.06*   HGB 8.6*   HCT 27.0*   *   MCV 88   MCH 28.1   MCHC 31.9*             A/P:  1.esrd. cont hd mwf.  2.malfxn avg. Holding heparin  with hd.  3.pea arrest. S/p. Following. Following.  4.gangrene. aka noted.   5.htn. sbp ok.  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  7.anemia. cont epo. S/p prbc.  8.dm2. folowing sugars.

## 2022-10-16 NOTE — PROGRESS NOTES
[facility]  Progress Note    Patient Name: Adryan Neff  MRN: [id number]  Patient Class: IP- Inpatient   Admission Date: 10/6/2022  Length of Stay: 10 days  Attending Physician: Mekhi Dorsey MD  Primary Care Provider: aCrola Pedro MD        Subjective:     Principal Problem:PAD (peripheral artery disease)        HPI:  This is a 56 year old male with a PMHx of severe PAD s/p gangrene of left foot s/p left AKA (1/2022), ESRD on HD MWF, NICM (EF: 55%, GIDD), pulmonary hypertension, GERD and latent TB filiberto presents for concern for leg infection.       The patient presented to the vascular surgery clinic for a follow up. He reports worsening pain and developing eschar of the right dorsal foot along with foul smelling odor. Given concern for an infection and the need for IV antibiotics & expedited podiatry evaluation, the patient was sent to the ER. The patient denies having any fevers, chills, or injury to his foot. He has a previous AKA and uses a wheelchair to ambulate. While in the ED, the patient was hypertensive but otherwise hemodynamically stable. Labs showed leukocytosis (17.41 - with neutrophilic predominance), normocytic anemia (9.7), elevated sed rate (125), elevated CRP (237.2), negative lactate (1.6). X-ray foot showed no radiographic evidence of osteomyelitis. He was administered vancomycin, zosyn and was admitted for further management.     I attempted to call the daughter to obtain further history on 1902 without a response.         Overview/Hospital Course:  56M with pmh of severe pad s/p gangangrene of left foot s/p left AKA (1/2022), ESRD on HD MWF, NICM (EF: 55%, GIDD), pulmonary hypertension, GERD and latent TB filiberto presents for concern for leg infection. The patient presented to the vascular surgery clinic for a follow up. He reports worsening pain and developing eschar of the right dorsal foot along with foul smelling odor. Given concern for an infection and the need  for IV antibiotics & expedited podiatry evaluation, the patient was sent to the ER. In ed labs with leukocytosis, normocytic anemia (9.7), elevated sed rate (125), elevated CRP (237.2), negative lactate (1.6). X-ray foot showed no radiographic evidence of osteomyelitis.Pt started on vancomycin and zosyn for possible infection. Initially evaluated by podiatry who recommended ortho consult for possible amputation. Amputation to be done on 10/10. ID also on board for abx recommendations. Patient had a coded blue called on 10/10 for possible PEA vs. Vasovagal.  ROS[facility] returned quickly. Cards consulted to follow. The patient was transferred to the floor on 10/11. Underwent fistuloagram without issues, amputation of left BKA performed by Orthopedic surgery on 10/13. Had some blood loss, transfused 1 unit pRBC on 10/15 with improvement. Antibiotics stopped now that there is source control. Still with elevation in WBC. Social history and dispo complicated with younger children, SW discussed with family and children will stay with family while plan for patient is to d/c to NH.          Interval History: Seems to be doing well today. RN states he is not really confused and ate most of his breakfast. He tells me he is doing well, pain is controlled.     Review of Systems   Respiratory:  Negative for shortness of breath.    Cardiovascular:  Negative for chest pain and leg swelling.   Musculoskeletal:  Negative for arthralgias.   Objective:     Vital Signs (Most Recent):  Temp: 97.2 °F (36.2 °C) (10/16/22 0724)  Pulse: 69 (10/16/22 0724)  Resp: 18 (10/16/22 0724)  BP: (!) 163/68 (10/16/22 0724)  SpO2: 100 % (10/16/22 0724)   Vital Signs (24h Range):  Temp:  [97.2 °F (36.2 °C)-99 °F (37.2 °C)] 97.2 °F (36.2 °C)  Pulse:  [69-85] 69  Resp:  [18-20] 18  SpO2:  [97 %-100 %] 100 %  BP: (131-169)/(63-73) 163/68       Physical Exam  Vitals and nursing note reviewed.   Constitutional:       General: He is not in acute distress.      Appearance: Normal appearance.   HENT:      Head: Normocephalic and atraumatic.      Mouth/Throat:      Mouth: Mucous membranes are moist.   Eyes:      Extraocular Movements: Extraocular movements intact.      Pupils: Pupils are equal, round, and reactive to light.   Cardiovascular:      Rate and Rhythm: Normal rate and regular rhythm.      Heart sounds: Murmur heard.   Pulmonary:      Effort: Pulmonary effort is normal. No respiratory distress.      Breath sounds: Normal breath sounds. No rales.   Abdominal:      General: Abdomen is flat. Bowel sounds are normal.      Palpations: Abdomen is soft.   Musculoskeletal:      Comments: Right BKA dressing with some saturation      Right Lower Extremity: Right leg is amputated below knee.      Left Lower Extremity: Left leg is amputated below knee.   Neurological:      Mental Status: He is alert.         Significant Labs: All pertinent labs within the past 24 hours have been reviewed.    Significant Imaging: I have reviewed all pertinent imaging results/findings within the past 24 hours.      Assessment/Plan:      * PAD (peripheral artery disease)  - History of severe PAD   - C/b LEFT foot gangrene s/p left AKA   - Now presenting with right foot gangrene with concern of superimposed infection   - Home medications: aspirin, plavix and statin  - RCRI score of 4    Plan;   - Vascular surgery, podiatry consultation   - Podiatry recommending bka/aka. Ortho consulted, surgery 10/13 with RIGHT BKA  - Vancomycin/Zosyn for concern for superimposed infection. These have now been stopped since source control.  - Infectious disease consult: recommending amputation and stop after amputation.  - Resume home medications     AV graft malfunction  S/p fistulogram      Anemia due to chronic kidney disease, on chronic dialysis  - Anemic - has been transfused this admission pre and post surgery  - Consent via daughter  - Continue EPO per Nephrology  - stable today        ESRD on  hemodialysis  - Dialysis type: iHD  - Access: L AVF  - Schedule: MWF    Plan:  - Nephrology consult for dialysis management  - Daily weights to guide UF  - Continue dialysis MWF    CAD (coronary artery disease) - [facility]-obstructive [facility] in 2016  - Resume aspirin, statin       Chronic diastolic heart failure  - Echocardiogram (11/24/2021): EF: 55%, GIDD, mild TR, moderate to severe MR  - Compensated on physical examination, on room air   - Resume home medications     Gastroesophageal reflux disease  - Resume PPI     Pure hypercholesterolemia  - Resume statin     Essential (primary) hypertension  - Resume home medications (amlodipine 10 mg, Toprol-XL 50 mg)     Controlled type 2 diabetes mellitus with chronic kidney disease on chronic dialysis, with long-term current use of insulin  - Last A1c:   Lab Results   Component Value Date    HGBA1C 6.1 (H) 01/07/2021     - Home regimen: Lantus 12 units nightly, increased from 10 due to hyperglycemia. 15u caused hypoglycemia at last week  - Low-sensitivity sliding scale insulin  - Initiate and maintain glycemic protocol, monitor POC glucose   - Follow up with PCP as outpatient      VTE Risk Mitigation (From admission, onward)         Ordered     IP VTE HIGH RISK PATIENT  Once         10/06/22 1819     Place sequential compression device  Until discontinued         10/06/22 1819                Discharge Planning   ENIO:      Code Status: Full Code   Is the patient medically ready for discharge?:     Reason for patient still in hospital (select all that apply): Treatment and Pending disposition  Discharge Plan A: Home with family   Discharge Delays: None known at this time              Mekhi Dorsey MD  Department of Hospital Medicine   [facility]

## 2022-10-17 LAB
BASOPHILS # BLD AUTO: 0.12 K/UL (ref 0–0.2)
BASOPHILS NFR BLD: 0.6 % (ref 0–1.9)
DIFFERENTIAL METHOD: ABNORMAL
EOSINOPHIL # BLD AUTO: 0.6 K/UL (ref 0–0.5)
EOSINOPHIL NFR BLD: 2.9 % (ref 0–8)
ERYTHROCYTE [DISTWIDTH] IN BLOOD BY AUTOMATED COUNT: 14.9 % (ref 11.5–14.5)
HCT VFR BLD AUTO: 27.6 % (ref 40–54)
HGB BLD-MCNC: 8.9 G/DL (ref 14–18)
IMM GRANULOCYTES # BLD AUTO: 0.15 K/UL (ref 0–0.04)
IMM GRANULOCYTES NFR BLD AUTO: 0.8 % (ref 0–0.5)
LYMPHOCYTES # BLD AUTO: 1.5 K/UL (ref 1–4.8)
LYMPHOCYTES NFR BLD: 8 % (ref 18–48)
MCH RBC QN AUTO: 29.1 PG (ref 27–31)
MCHC RBC AUTO-ENTMCNC: 32.2 G/DL (ref 32–36)
MCV RBC AUTO: 90 FL (ref 82–98)
MONOCYTES # BLD AUTO: 1.2 K/UL (ref 0.3–1)
MONOCYTES NFR BLD: 6.4 % (ref 4–15)
NEUTROPHILS # BLD AUTO: 15.5 K/UL (ref 1.8–7.7)
NEUTROPHILS NFR BLD: 81.3 % (ref 38–73)
NRBC BLD-RTO: 0 /100 WBC
PLATELET # BLD AUTO: 490 K/UL (ref 150–450)
PMV BLD AUTO: 8.7 FL (ref 9.2–12.9)
POCT GLUCOSE: 143 MG/DL (ref 70–110)
POCT GLUCOSE: 223 MG/DL (ref 70–110)
POCT GLUCOSE: 92 MG/DL (ref 70–110)
POCT GLUCOSE: 99 MG/DL (ref 70–110)
RBC # BLD AUTO: 3.06 M/UL (ref 4.6–6.2)
VANCOMYCIN SERPL-MCNC: 15.4 UG/ML
WBC # BLD AUTO: 19.1 K/UL (ref 3.9–12.7)

## 2022-10-17 PROCEDURE — 85025 COMPLETE CBC W/AUTO DIFF WBC: CPT | Performed by: HOSPITALIST

## 2022-10-17 PROCEDURE — 63600175 PHARM REV CODE 636 W HCPCS: Mod: JG | Performed by: ORTHOPAEDIC SURGERY

## 2022-10-17 PROCEDURE — 36415 COLL VENOUS BLD VENIPUNCTURE: CPT | Performed by: ORTHOPAEDIC SURGERY

## 2022-10-17 PROCEDURE — 25000003 PHARM REV CODE 250: Performed by: ORTHOPAEDIC SURGERY

## 2022-10-17 PROCEDURE — 87040 BLOOD CULTURE FOR BACTERIA: CPT | Performed by: HOSPITALIST

## 2022-10-17 PROCEDURE — 36415 COLL VENOUS BLD VENIPUNCTURE: CPT | Performed by: HOSPITALIST

## 2022-10-17 PROCEDURE — 25000003 PHARM REV CODE 250: Performed by: INTERNAL MEDICINE

## 2022-10-17 PROCEDURE — 80100016 HC MAINTENANCE HEMODIALYSIS

## 2022-10-17 PROCEDURE — 80202 ASSAY OF VANCOMYCIN: CPT | Performed by: ORTHOPAEDIC SURGERY

## 2022-10-17 PROCEDURE — 21400001 HC TELEMETRY ROOM

## 2022-10-17 PROCEDURE — 25000003 PHARM REV CODE 250: Performed by: HOSPITALIST

## 2022-10-17 RX ORDER — ASCORBIC ACID 500 MG
1000 TABLET ORAL DAILY
Status: DISCONTINUED | OUTPATIENT
Start: 2022-10-17 | End: 2022-11-03

## 2022-10-17 RX ORDER — HYDROCODONE BITARTRATE AND ACETAMINOPHEN 5; 325 MG/1; MG/1
1 TABLET ORAL EVERY 6 HOURS PRN
Status: DISCONTINUED | OUTPATIENT
Start: 2022-10-17 | End: 2022-10-29

## 2022-10-17 RX ADMIN — EPOETIN ALFA-EPBX 10000 UNITS: 10000 INJECTION, SOLUTION INTRAVENOUS; SUBCUTANEOUS at 10:10

## 2022-10-17 RX ADMIN — ACETAMINOPHEN 650 MG: 325 TABLET ORAL at 08:10

## 2022-10-17 RX ADMIN — INSULIN DETEMIR 12 UNITS: 100 INJECTION, SOLUTION SUBCUTANEOUS at 08:10

## 2022-10-17 RX ADMIN — METOPROLOL SUCCINATE 25 MG: 25 TABLET, EXTENDED RELEASE ORAL at 01:10

## 2022-10-17 RX ADMIN — PANTOPRAZOLE SODIUM 40 MG: 40 TABLET, DELAYED RELEASE ORAL at 01:10

## 2022-10-17 RX ADMIN — AMLODIPINE BESYLATE 10 MG: 5 TABLET ORAL at 01:10

## 2022-10-17 RX ADMIN — ASPIRIN 81 MG CHEWABLE TABLET 81 MG: 81 TABLET CHEWABLE at 01:10

## 2022-10-17 RX ADMIN — HYDROCODONE BITARTRATE AND ACETAMINOPHEN 1 TABLET: 5; 325 TABLET ORAL at 06:10

## 2022-10-17 RX ADMIN — OXYCODONE HYDROCHLORIDE AND ACETAMINOPHEN 1000 MG: 500 TABLET ORAL at 01:10

## 2022-10-17 RX ADMIN — ATORVASTATIN CALCIUM 40 MG: 40 TABLET, FILM COATED ORAL at 08:10

## 2022-10-17 RX ADMIN — Medication 1 TABLET: at 01:10

## 2022-10-17 NOTE — PLAN OF CARE
Received call from Nya with [facility], pending review for NH placement. TN to continue to follow.     Placed call to patient contact listed in chart, Jonn Neff (Daughter)   [phone] , left detailed voice message.

## 2022-10-17 NOTE — ASSESSMENT & PLAN NOTE
S/p L AKA then presented with PAD causing R foot wound. Now s/p R AKA on 10/13  - previously on antibiotics. These were discontinued when source control achieved. However, WBC is rising. Will repeat blood cultures and ask Ortho to look at wound today   - continue asa, statin

## 2022-10-17 NOTE — ASSESSMENT & PLAN NOTE
Noted by Psychiatry that he has had multiple episodes of confusion and not understanding treatment plan. Unclear if this is delirium associated or beginnings of cognitive dysfunction. Recommend outpatient evaluation.

## 2022-10-17 NOTE — PLAN OF CARE
[facility] - Telemetry  Discharge Reassessment    Placed call to pt's daughter,Mariel [phone], no answer left detailed voice message regarding discharge planning. Multiple NH referrals sent via care port, pending review. TN to continue to follow.     Primary Care Provider: Carola Pedro MD    Expected Discharge Date:     Reassessment (most recent)       Discharge Reassessment - 10/17/22 1241          Discharge Reassessment    Assessment Type Discharge Planning Reassessment     Did the patient's condition or plan change since previous assessment? No     Communicated ENIO with patient/caregiver Date not available/Unable to determine     Discharge Plan A New Nursing Home placement - group home care facility     Discharge Plan B Home with family     DME Needed Upon Discharge  other (see comments)   TBD    Discharge Barriers Identified Social     Why the patient remains in the hospital Requires continued medical care        Post-Acute Status    Coverage Medicare     Discharge Delays None known at this time

## 2022-10-17 NOTE — SUBJECTIVE & OBJECTIVE
Medications:  Continuous Infusions:  Scheduled Meds:   amLODIPine  10 mg Oral Daily    ascorbic acid (vitamin C)  1,000 mg Oral Daily    aspirin  81 mg Oral Daily    atorvastatin  40 mg Oral QHS    epoetin paola-epbx  10,000 Units Subcutaneous Every Mon, Wed, Fri    folic acid-vit B6-vit B12 2.5-25-2 mg  1 tablet Oral Daily    insulin detemir U-100  12 Units Subcutaneous QHS    metoprolol succinate  25 mg Oral Daily    pantoprazole  40 mg Oral Daily    sevelamer carbonate  2,400 mg Oral TID WM     PRN Meds:acetaminophen, dextrose 10%, dextrose 10%, glucagon (human recombinant), glucose, glucose, hydrALAZINE, HYDROcodone-acetaminophen, insulin aspart U-100, melatonin, naloxone, ondansetron, sodium chloride 0.9%, sodium chloride 0.9%     Objective:     Vital Signs (Most Recent):  Temp: 97.6 °F (36.4 °C) (10/17/22 1259)  Pulse: 100 (10/17/22 1259)  Resp: 18 (10/17/22 1259)  BP: (!) 143/67 (10/17/22 1259)  SpO2: 100 % (10/17/22 1259) Vital Signs (24h Range):  Temp:  [97.5 °F (36.4 °C)-98.5 °F (36.9 °C)] 97.6 °F (36.4 °C)  Pulse:  [] 100  Resp:  [18-20] 18  SpO2:  [98 %-100 %] 100 %  BP: (119-166)/(53-75) 143/67     Date 10/17/22 0700 - 10/18/22 0659   Shift 6050-0533 9454-1645 2401-7688 24 Hour Total   INTAKE   P.O. 240   240   Shift Total(mL/kg) 240(1.9)   240(1.9)   OUTPUT   Shift Total(mL/kg)       Weight (kg) 124.9 124.9 124.9 124.9         Physical Exam  Vitals reviewed.   Constitutional:       General: He is not in acute distress.     Appearance: He is well-developed. He is not diaphoretic.   HENT:      Head: Normocephalic and atraumatic.   Eyes:      Conjunctiva/sclera: Conjunctivae normal.   Cardiovascular:      Rate and Rhythm: Normal rate.      Pulses:           Radial pulses are 2+ on the left side.      Comments: LUE AV access +thrill  Pulmonary:      Effort: Pulmonary effort is normal.   Abdominal:      General: There is no distension.      Palpations: Abdomen is soft. There is no mass.       Tenderness: There is no abdominal tenderness. There is no guarding or rebound.      Hernia: No hernia is present.   Musculoskeletal:         General: No deformity. Normal range of motion.      Cervical back: Neck supple.      Left Lower Extremity: Left leg is amputated above knee.   Feet:      Right foot:      Skin integrity: Ulcer, skin breakdown and dry skin present.   Skin:     Findings: No rash.   Neurological:      Mental Status: He is alert and oriented to person, place, and time.       Significant Labs:  All pertinent labs from the last 24 hours have been reviewed.    Significant Diagnostics:  I have reviewed all pertinent imaging results/findings within the past 24 hours.

## 2022-10-17 NOTE — PROGRESS NOTES
[facility] [place] - Telemetry  Adult Nutrition  Progress Note    SUMMARY       Recommendations  Continue renal diabetic diet. Encourage PO intake.   Supplement with Suplena TID (due to renal and glucose labs) to aid in recovery and nutrition status per HD  Monitor nutrition related labs  Renal  Cardiac  Diabetes  Take new weights of patient  Goals:   Pt to have new, accurate weights taken by RD f/u  Pt to receive >50% EEN/EPN by RD f/u  Pt to receive ONS by RD f/u  Nutrition Goal Status: progressing toward goal   Communication of RD Recs: other (comment) (POC)    Assessment and Plan  Nutrition Problem  Inadequate energy consumption    Related to (etiology):   Increased protein/calorie needs    Signs and Symptoms (as evidenced by):   On HD  Amputation (10/13)  Non-healing wounds    Interventions/Recommendations (treatment strategy):  Collaboration of care with other providers  [employer]    Nutrition Diagnosis Status:   Continue        Reason for Assessment    Reason For Assessment: length of stay  Diagnosis:  (Peripheral artery disease)  Relevant Medical History:   Patient Active Problem List   Diagnosis    Controlled type 2 diabetes mellitus with chronic kidney disease on chronic dialysis, with long-term current use of insulin    Essential (primary) hypertension    Pure hypercholesterolemia    Benign hypertension with chronic kidney disease, stage V    Other insomnia    Gastroesophageal reflux disease    Intractable vomiting    Chronic diastolic heart failure    CAD (coronary artery disease) - non-obstructive from [facility] in 2016    Hyperkalemia    Pre-transplant evaluation for kidney transplant    Chronic pulmonary heart disease    HLD (hyperlipidemia)    PDR (proliferative diabetic retinopathy)    Secondary hyperparathyroidism of renal origin    ESRD on hemodialysis    TB lung, latent    Paronychia, toe, left    Nuclear sclerosis of right eye    Pseudophakia    COVID-19 in immunocompromised patient     "Encephalopathy, metabolic    Unilateral complete BKA, left, initial encounter    PAD (peripheral artery disease)    Anemia due to chronic kidney disease, on chronic dialysis    Cognitive changes    AV graft malfunction       Interdisciplinary Rounds: did not attend  General Information Comments:   10/17- Pt with good appetite. Only eating around 25% of meals. Receiving Novasource renal BID-- rec'd switching to Suplena BID due to renal and glucose labs. Pt still with poor renal and glucose labs. Unsure of weight status due to not taking a new weight since last visit. Continue to monitor. Pt on HD MWF.   10/14-This is a 56 year old male with a PMHx of severe PAD s/p gangrene of left foot s/p left AKA (1/2022), ESRD on HD MWF, NICM (EF: 55%, GIDD), pulmonary hypertension, GERD and latent TB filiberto presents for concern for leg infection.  Pt presents with foot infection with foul odor. Pt had Right BKA 10/13. Pt with history of amputation (Left BKA in January 2022). Pt on HD. Pt reporting a fair appetite with 50% intake. Weights being taken are likely inaccurate. Unsure of wt gain/loss per hospital stay. Pt poor renal and glucose labs. Continue to monitor.   Nutrition Discharge Planning: Renal diet    Nutrition Risk Screen    Nutrition Risk Screen: large or nonhealing wound, burn or pressure injury    Nutrition/Diet History    Spiritual, Cultural Beliefs, [employer] Practices, Values that Affect Care: no    Anthropometrics    Temp: 97.5 °F (36.4 °C)  Height Method: Stated  Height: 5' 11" (180.3 cm)  Height (inches): 71 in  Weight Method: Bed Scale  Weight: 124.9 kg (275 lb 5.7 oz)  Weight (lb): 275.36 lb  Ideal Body Weight (IBW), Male: 172 lb  % Ideal Body Weight, Male (lb): 90.12 %  BMI (Calculated): 38.4  Amputation %: 5.9, 5.9  Total Amputation %: 11.8       Lab/Procedures/Meds    Pertinent Labs Reviewed: reviewed  Pertinent Labs Comments: CBC:  Recent Labs   Lab 10/17/22  0752   WBC 19.10*   HGB 8.9*   HCT 27.6*   PLT " 490*     CMP:  No results for input(s): GLUCOSE, CALCIUM, ALBUMIN, PROT, NA, K, CO2, CL, BUN, CREATININE, ALKPHOS, ALT, AST, BILITOT in the last 24 hours.      Pertinent Medications Reviewed: reviewed  Pertinent Medications Comments: Scheduled Meds:   amLODIPine  10 mg Oral Daily    ascorbic acid (vitamin C)  1,000 mg Oral Daily    aspirin  81 mg Oral Daily    atorvastatin  40 mg Oral QHS    epoetin paola-epbx  10,000 Units Subcutaneous Every Mon, Wed, Fri    folic acid-vit B6-vit B12 2.5-25-2 mg  1 tablet Oral Daily    insulin detemir U-100  12 Units Subcutaneous QHS    metoprolol succinate  25 mg Oral Daily    pantoprazole  40 mg Oral Daily    sevelamer carbonate  2,400 mg Oral TID WM     Continuous Infusions:  PRN Meds:.acetaminophen, dextrose 10%, dextrose 10%, glucagon (human recombinant), glucose, glucose, hydrALAZINE, HYDROcodone-acetaminophen, insulin aspart U-100, melatonin, naloxone, ondansetron, sodium chloride 0.9%, sodium chloride 0.9%        Estimated/Assessed Needs    Weight Used For Calorie Calculations: 62.7 kg (138 lb 3.7 oz)  Energy Calorie Requirements (kcal): 1881-2195kcal/day (3-350kcal/kg (amputation recovery, skin integrity, on HD))  Energy Need Method: Kcal/kg  Protein Requirements: 63-75g/day (1.0-1.2g/kg)  Weight Used For Protein Calculations: 62.7 kg (138 lb 3.7 oz)  Fluid Requirements (mL): 1mL per kcal or per MD     RDA Method (mL): 1881  CHO Requirement: 390 g      Nutrition Prescription Ordered    Current Diet Order: Renal diet    Evaluation of Received Nutrient/Fluid Intake    I/O: +.541L since admit  Energy Calories Required: not meeting needs  Protein Required: not meeting needs  Fluid Required: not meeting needs  Comments: LBM 10/11  Tolerance: tolerating  % Intake of Estimated Energy Needs: 25 - 50 %  % Meal Intake: 25 - 50 %    Nutrition Risk    Level of Risk/Frequency of Follow-up: moderate - high     Monitor and Evaluation    Food and Nutrient Intake: energy intake, food and  beverage intake  Food and Nutrient Adminstration: diet order  Knowledge/Beliefs/Attitudes: beliefs and attitudes, food and nutrition knowledge/skill  Physical Activity and Function: nutrition-related ADLs and IADLs  Anthropometric Measurements: height/length, weight, weight change, body mass index  Biochemical Data, Medical Tests and Procedures: electrolyte and renal panel, glucose/endocrine profile, gastrointestinal profile, inflammatory profile, lipid profile  Nutrition-Focused Physical Findings: overall appearance     Nutrition Follow-Up    RD Follow-up?: Yes    Kendall Delarosa, MPH, RDN, LDN

## 2022-10-17 NOTE — PROGRESS NOTES
[facility] [place]  [facility]  Progress Note    Patient Name: Adryan Neff  MRN: [id number]  Admission Date: 10/6/2022  Primary Care Provider: Carola Pedro MD    Subjective:     Interval History: Resting comfortably.    Post-Op Info:  Procedure(s) (LRB):  AMPUTATION, ABOVE KNEE, RIGHT (Right)   4 Days Post-Op       Medications:  Continuous Infusions:  Scheduled Meds:   amLODIPine  10 mg Oral Daily    ascorbic acid (vitamin C)  1,000 mg Oral Daily    aspirin  81 mg Oral Daily    atorvastatin  40 mg Oral QHS    epoetin paola-epbx  10,000 Units Subcutaneous Every Mon, Wed, Fri    folic acid-vit B6-vit B12 2.5-25-2 mg  1 tablet Oral Daily    insulin detemir U-100  12 Units Subcutaneous QHS    metoprolol succinate  25 mg Oral Daily    pantoprazole  40 mg Oral Daily    sevelamer carbonate  2,400 mg Oral TID WM     PRN Meds:acetaminophen, dextrose 10%, dextrose 10%, glucagon (human recombinant), glucose, glucose, hydrALAZINE, HYDROcodone-acetaminophen, insulin aspart U-100, melatonin, naloxone, ondansetron, sodium chloride 0.9%, sodium chloride 0.9%     Objective:     Vital Signs (Most Recent):  Temp: 97.6 °F (36.4 °C) (10/17/22 1259)  Pulse: 100 (10/17/22 1259)  Resp: 18 (10/17/22 1259)  BP: (!) 143/67 (10/17/22 1259)  SpO2: 100 % (10/17/22 1259) Vital Signs (24h Range):  Temp:  [97.5 °F (36.4 °C)-98.5 °F (36.9 °C)] 97.6 °F (36.4 °C)  Pulse:  [] 100  Resp:  [18-20] 18  SpO2:  [98 %-100 %] 100 %  BP: (119-166)/(53-75) 143/67     Date 10/17/22 0700 - 10/18/22 0659   Shift 8742-8013 9282-4995 9883-2923 24 Hour Total   INTAKE   P.O. 240   240   Shift Total(mL/kg) 240(1.9)   240(1.9)   OUTPUT   Shift Total(mL/kg)       Weight (kg) 124.9 124.9 124.9 124.9         Physical Exam  Vitals reviewed.   Constitutional:       General: He is not in acute distress.     Appearance: He is well-developed. He is not diaphoretic.   HENT:      Head: Normocephalic and atraumatic.   Eyes:      Conjunctiva/sclera:  Conjunctivae normal.   Cardiovascular:      Rate and Rhythm: Normal rate.      Pulses:           Radial pulses are 2+ on the left side.      Comments: LUE AV access +thrill  Pulmonary:      Effort: Pulmonary effort is normal.   Abdominal:      General: There is no distension.      Palpations: Abdomen is soft. There is no mass.      Tenderness: There is no abdominal tenderness. There is no guarding or rebound.      Hernia: No hernia is present.   Musculoskeletal:         General: No deformity. Normal range of motion.      Cervical back: Neck supple.      Left Lower Extremity: Left leg is amputated above knee.   Feet:      Right foot:      Skin integrity: Ulcer, skin breakdown and dry skin present.   Skin:     Findings: No rash.   Neurological:      Mental Status: He is alert and oriented to person, place, and time.       Significant Labs:  All pertinent labs from the last 24 hours have been reviewed.    Significant Diagnostics:  I have reviewed all pertinent imaging results/findings within the past 24 hours.    Assessment/Plan:     AV graft malfunction  Psychiatry recommendations reviewed.  Patient without capacity to make medical decisions.  Spoke with patient and granddaughter present.  Will discuss with Anesthesia regarding operative interventions for fistulogram.        Masoud Soni MD  Vascular Surgery  [facility] - Telemetry

## 2022-10-17 NOTE — SUBJECTIVE & OBJECTIVE
Interval History: Says he has pain in his R foot today. He understands that this has been amputated.     Review of Systems   Constitutional:  Negative for chills and fever.   Respiratory:  Negative for shortness of breath.    Cardiovascular:  Negative for chest pain.   Gastrointestinal:  Negative for abdominal pain.   Musculoskeletal:  Negative for arthralgias and myalgias.   Skin:  Positive for wound.   Neurological:  Negative for weakness and numbness.   Objective:     Vital Signs (Most Recent):  Temp: 97.5 °F (36.4 °C) (10/17/22 0743)  Pulse: 76 (10/17/22 0743)  Resp: 18 (10/17/22 0743)  BP: (!) 166/74 (10/17/22 0743)  SpO2: 100 % (10/17/22 0743)   Vital Signs (24h Range):  Temp:  [97.5 °F (36.4 °C)-98.5 °F (36.9 °C)] 97.5 °F (36.4 °C)  Pulse:  [70-77] 76  Resp:  [18] 18  SpO2:  [99 %-100 %] 100 %  BP: (119-175)/(53-74) 166/74     Weight: 124.9 kg (275 lb 5.7 oz)  Body mass index is 38.4 kg/m².  No intake or output data in the 24 hours ending 10/17/22 1115   Physical Exam  Vitals and nursing note reviewed.   Constitutional:       General: He is in acute distress.      Appearance: He is ill-appearing. He is not toxic-appearing.   HENT:      Head: Normocephalic and atraumatic.      Nose: Nose normal.      Mouth/Throat:      Mouth: Mucous membranes are moist.   Cardiovascular:      Rate and Rhythm: Normal rate and regular rhythm.      Heart sounds: Normal heart sounds. No murmur heard.    No gallop.   Pulmonary:      Effort: Pulmonary effort is normal. No respiratory distress.      Breath sounds: No wheezing or rales.      Comments: Room air  Abdominal:      General: Bowel sounds are normal. There is no distension.      Palpations: Abdomen is soft.      Tenderness: There is no abdominal tenderness. There is no guarding.   Musculoskeletal:      Comments: Right AKA dressing in place. L AKA also present   Skin:     Comments: LUE AVG with thrill   Neurological:      Mental Status: He is alert.       Significant Labs:  All pertinent labs within the past 24 hours have been reviewed.    Significant Imaging: I have reviewed all pertinent imaging results/findings within the past 24 hours.

## 2022-10-17 NOTE — PROGRESS NOTES
Renal Progress Note    Date of Admission:  10/6/2022  1:54 PM    Length of Stay: 11  Days    Subjective: n/a    Objective:    Current Facility-Administered Medications   Medication    acetaminophen tablet 650 mg    amLODIPine tablet 10 mg    aspirin chewable tablet 81 mg    atorvastatin tablet 40 mg    dextrose 10% bolus 125 mL    dextrose 10% bolus 250 mL    epoetin paola-epbx injection 10,000 Units    folic acid-vit B6-vit B12 2.5-25-2 mg tablet 1 tablet    glucagon (human recombinant) injection 1 mg    glucose chewable tablet 16 g    glucose chewable tablet 24 g    hydrALAZINE injection 10 mg    HYDROcodone-acetaminophen 5-325 mg per tablet 1 tablet    insulin aspart U-100 pen 0-5 Units    insulin detemir U-100 pen 12 Units    melatonin tablet 6 mg    metoprolol succinate (TOPROL-XL) 24 hr tablet 25 mg    naloxone 0.4 mg/mL injection 0.02 mg    ondansetron disintegrating tablet 8 mg    pantoprazole EC tablet 40 mg    sevelamer carbonate tablet 2,400 mg    sodium chloride 0.9% flush 10 mL    sodium chloride 0.9% flush 10 mL       Vitals:    10/17/22 0025 10/17/22 0506 10/17/22 0632 10/17/22 0743   BP: (!) 155/70 (!) 119/53  (!) 166/74   BP Location: Right arm Right arm  Right arm   Patient Position: Lying Lying  Lying   Pulse: 70 73  76   Resp: 18 18 18 18   Temp: 98.3 °F (36.8 °C) 98.2 °F (36.8 °C)  97.5 °F (36.4 °C)   TempSrc: Oral Oral  Oral   SpO2: 100% 99%  100%   Weight:       Height:           I/O last 3 completed shifts:  In: 120 [P.O.:120]  Out: -   No intake/output data recorded.      Physical Exam:     General: NAD  Neck: supple  Heart: RRR  Lungs: unlabored breathing  Abdomen: n/a  Limbs: n/a  Neurologic: asleep      Laboratories:    Recent Labs   Lab 10/17/22  0752   WBC 19.10*   RBC 3.06*   HGB 8.9*   HCT 27.6*   *   MCV 90   MCH 29.1   MCHC 32.2       No results for input(s): GLUCOSE, CALCIUM, PROT, NA, K, CO2, CL, BUN, CREATININE, ALKPHOS, ALT, AST, BILITOT in the last  24 hours.    Invalid input(s):  ALBUMIN    No results for input(s): COLORU, CLARITYU, SPECGRAV, PHUR, PROTEINUA, GLUCOSEU, BLOODU, WBCU, RBCU, BACTERIA, MUCUS in the last 24 hours.    Invalid input(s):  BILIRUBINCON    Microbiology Results (last 7 days)       Procedure Component Value Units Date/Time    Blood culture [[id number]] Collected: 10/11/22 0914    Order Status: Completed Specimen: Blood Updated: 10/15/22 1103     Blood Culture, Routine No Growth after 4 days.    Blood culture [[id number]] Collected: 10/11/22 0915    Order Status: Completed Specimen: Blood from Peripheral, Right Wrist Updated: 10/15/22 1103     Blood Culture, Routine No Growth after 4 days.    Blood culture #1 **CANNOT BE ORDERED STAT** [[id number]] Collected: 10/06/22 1511    Order Status: Completed Specimen: Blood from Peripheral, Upper Arm, Right Updated: 10/10/22 2103     Blood Culture, Routine No Growth after 4 days.    Blood culture #2 **CANNOT BE ORDERED STAT** [[id number]] Collected: 10/06/22 1536    Order Status: Completed Specimen: Blood from Peripheral, Hand, Right Updated: 10/10/22 2103     Blood Culture, Routine No Growth after 4 days.              Diagnostic Tests: n/a        Assessment:    55 y/o male with ESRD on HD admitted with:    - R-foot gangrene  - Hx. Severe PAD (s/p L-AKA)  - AV access malfunction s/p fistulogram  - Chronic dyast. HF  - Anemia of ckd  - IDDM-2 with end-organ damage  - HyperPO4-  - Hypoalbuminemia  - HTN  - HLD  - Cognitive impairment  - Chronic non-compliance        Plan:    - Dialysis Q MWF  - Epogen  - Transfuse during HD PRN Hgb < 7  - Renal ADA diet with low PO4-  - Oral protein supplements + MVI  - Oral PO4- binders  - Antib. Per ID  - Psych, Ortho, Vascular and Cardiology following  - Glycemic control and other problems per admitting

## 2022-10-17 NOTE — PROGRESS NOTES
[facility] Medicine  Progress Note    Patient Name: Adryan Neff  MRN: [id number]  Patient Class: IP- Inpatient   Admission Date: 10/6/2022  Length of Stay: 11 days  Attending Physician: Marta Black MD  Primary Care Provider: Carola Pedro MD        Subjective:     Principal Problem:PAD (peripheral artery disease)        HPI:  This is a 56 year old male with a PMHx of severe PAD s/p gangrene of left foot s/p left AKA (1/2022), ESRD on HD MWF, NICM (EF: 55%, GIDD), pulmonary hypertension, GERD and latent TB filiberto presents for concern for leg infection.       The patient presented to the vascular surgery clinic for a follow up. He reports worsening pain and developing eschar of the right dorsal foot along with foul smelling odor. Given concern for an infection and the need for IV antibiotics & expedited podiatry evaluation, the patient was sent to the ER. The patient denies having any fevers, chills, or injury to his foot. He has a previous AKA and uses a wheelchair to ambulate. While in the ED, the patient was hypertensive but otherwise hemodynamically stable. Labs showed leukocytosis (17.41 - with neutrophilic predominance), normocytic anemia (9.7), elevated sed rate (125), elevated CRP (237.2), negative lactate (1.6). X-ray foot showed no radiographic evidence of osteomyelitis. He was administered vancomycin, zosyn and was admitted for further management.     I attempted to call the daughter to obtain further history on 1902 without a response.         Overview/Hospital Course:  Mr Adryan Neff is a 56 y.o. man with PAD. He is s/p L AKA due to gangrene and now presents with worsening R foot infection. He also has ESRD on HD via LUE AVG. Started antibiotics. Podiatry and Ortho consulted with plans for amputation. This was delayed due to PEA on 10/10 with quick ROSC. Unclear etiology but suspect vasovagal event. Cardiology was consulted. He then had R AKA on 10/13/22. Antibiotics were  discontinued. He has had malfunctioning of his LUE AVG; Vascular was consulted and there was discussion of fistulogram but does not appear that it happened. He has had anemia requiring transfusions.     His disposition is complicated due to young children. Social work/case management consulted.       Interval History: Says he has pain in his R foot today. He understands that this has been amputated.     Review of Systems   Constitutional:  Negative for chills and fever.   Respiratory:  Negative for shortness of breath.    Cardiovascular:  Negative for chest pain.   Gastrointestinal:  Negative for abdominal pain.   Musculoskeletal:  Negative for arthralgias and myalgias.   Skin:  Positive for wound.   Neurological:  Negative for weakness and numbness.   Objective:     Vital Signs (Most Recent):  Temp: 97.5 °F (36.4 °C) (10/17/22 0743)  Pulse: 76 (10/17/22 0743)  Resp: 18 (10/17/22 0743)  BP: (!) 166/74 (10/17/22 0743)  SpO2: 100 % (10/17/22 0743)   Vital Signs (24h Range):  Temp:  [97.5 °F (36.4 °C)-98.5 °F (36.9 °C)] 97.5 °F (36.4 °C)  Pulse:  [70-77] 76  Resp:  [18] 18  SpO2:  [99 %-100 %] 100 %  BP: (119-175)/(53-74) 166/74     Weight: 124.9 kg (275 lb 5.7 oz)  Body mass index is 38.4 kg/m².  No intake or output data in the 24 hours ending 10/17/22 1115   Physical Exam  Vitals and nursing note reviewed.   Constitutional:       General: He is in acute distress.      Appearance: He is ill-appearing. He is not toxic-appearing.   HENT:      Head: Normocephalic and atraumatic.      Nose: Nose normal.      Mouth/Throat:      Mouth: Mucous membranes are moist.   Cardiovascular:      Rate and Rhythm: Normal rate and regular rhythm.      Heart sounds: Normal heart sounds. No murmur heard.    No gallop.   Pulmonary:      Effort: Pulmonary effort is normal. No respiratory distress.      Breath sounds: No wheezing or rales.      Comments: Room air  Abdominal:      General: Bowel sounds are normal. There is no distension.       Palpations: Abdomen is soft.      Tenderness: There is no abdominal tenderness. There is no guarding.   Musculoskeletal:      Comments: Right AKA dressing in place. L AKA also present   Skin:     Comments: LUE AVG with thrill   Neurological:      Mental Status: He is alert.       Significant Labs: All pertinent labs within the past 24 hours have been reviewed.    Significant Imaging: I have reviewed all pertinent imaging results/findings within the past 24 hours.      Assessment/Plan:      * PAD (peripheral artery disease)  S/p L AKA then presented with PAD causing R foot wound. Now s/p R AKA on 10/13  - previously on antibiotics. These were discontinued when source control achieved. However, WBC is rising. Will repeat blood cultures and ask Ortho to look at wound today   - continue asa, statin     AV graft malfunction  Vascular consulted. Reportedly fistulogram was discussed, but I do not see procedure note      Cognitive changes  Noted by Psychiatry that he has had multiple episodes of confusion and not understanding treatment plan. Unclear if this is delirium associated or beginnings of cognitive dysfunction. Recommend outpatient evaluation.       Anemia due to chronic kidney disease, on chronic dialysis  - Anemic - has been transfused this admission pre and post surgery  - Continue EPO per Nephrology  - Hgb stable. Monitor with CBC in AM        ESRD on hemodialysis  Dialyzes via LUE AVG  There is concern about AVG malfunction. Vascular surgery consulted  Nephrology following       CAD (coronary artery disease) - non-obstructive from [facility] in 2016  - Resume aspirin, statin       Chronic diastolic heart failure  - Echocardiogram (11/24/2021): EF: 55%, GIDD, mild TR, moderate to severe MR  - no signs of acute exacerbation  - Continue home medications     Gastroesophageal reflux disease  - Coninue PPI     Pure hypercholesterolemia  - Continue statin     Essential (primary) hypertension  - Continue home medications  (amlodipine 10 mg, Toprol-XL 50 mg)     Controlled type 2 diabetes mellitus with chronic kidney disease on chronic dialysis, with long-term current use of insulin  - Last A1c:   Lab Results   Component Value Date    HGBA1C 6.1 (H) 01/07/2021     - Home regimen: Lantus 12 units nightly  - Low-sensitivity sliding scale insulin  - Initiate and maintain glycemic protocol, monitor POC glucose   - Follow up with PCP as outpatient      VTE Risk Mitigation (From admission, onward)         Ordered     IP VTE HIGH RISK PATIENT  Once         10/06/22 1819     Place sequential compression device  Until discontinued         10/06/22 1819                Discharge Planning   ENIO:      Code Status: Full Code   Is the patient medically ready for discharge?:     Reason for patient still in hospital (select all that apply): Patient trending condition  Discharge Plan A: Home with family   Discharge Delays: None known at this time              Marta Black MD  Department of Hospital Medicine   [facility]

## 2022-10-17 NOTE — PLAN OF CARE
Recommendations  Continue renal diabetic diet. Encourage PO intake.   Supplement with Suplena TID (due to renal and glucose labs) to aid in recovery and nutrition status per HD  Monitor nutrition related labs  Renal  Cardiac  Diabetes  Take new weights of patient  Goals:   Pt to have new, accurate weights taken by RD f/u  Pt to receive >50% EEN/EPN by RD f/u  Pt to receive ONS by RD f/u  Nutrition Goal Status: progressing toward goal   Communication of RD Recs: other (comment) (POC)    Kendall Delarosa, MPH, RDN, LDN

## 2022-10-17 NOTE — ASSESSMENT & PLAN NOTE
- Last A1c:   Lab Results   Component Value Date    HGBA1C 6.1 (H) 01/07/2021     - Home regimen: Lantus 12 units nightly  - Low-sensitivity sliding scale insulin  - Initiate and maintain glycemic protocol, monitor POC glucose   - Follow up with PCP as outpatient

## 2022-10-17 NOTE — ASSESSMENT & PLAN NOTE
- Echocardiogram (11/24/2021): EF: 55%, GIDD, mild TR, moderate to severe MR  - no signs of acute exacerbation  - Continue home medications

## 2022-10-17 NOTE — ASSESSMENT & PLAN NOTE
Dialyzes via LUE AVG  There is concern about AVG malfunction. Vascular surgery consulted  Nephrology following

## 2022-10-17 NOTE — ASSESSMENT & PLAN NOTE
- Anemic - has been transfused this admission pre and post surgery  - Continue EPO per Nephrology  - Hgb stable. Monitor with CBC in AM

## 2022-10-17 NOTE — PLAN OF CARE
Remain SR on telemetry monitor. Explained plan of care, verbalized understanding. No injury during shift, Side rails up x 2, call light by bedside.      Problem: Adult Inpatient Plan of Care  Goal: Plan of Care Review  Outcome: Ongoing, Progressing  Goal: Patient-Specific Goal (Individualized)  Outcome: Ongoing, Progressing  Goal: Absence of Hospital-Acquired Illness or Injury  Outcome: Ongoing, Progressing  Goal: Optimal Comfort and Wellbeing  Outcome: Ongoing, Progressing  Goal: Readiness for Transition of Care  Outcome: Ongoing, Progressing     Problem: Skin Injury Risk Increased  Goal: Skin Health and Integrity  Outcome: Ongoing, Progressing     Problem: Fall Injury Risk  Goal: Absence of Fall and Fall-Related Injury  Outcome: Ongoing, Progressing     Problem: Diabetes Comorbidity  Goal: Blood Glucose Level Within Targeted Range  Outcome: Ongoing, Progressing     Problem: Infection  Goal: Absence of Infection Signs and Symptoms  Outcome: Ongoing, Progressing     Problem: Device-Related Complication Risk (Hemodialysis)  Goal: Safe, Effective Therapy Delivery  Outcome: Ongoing, Progressing     Problem: Hemodynamic Instability (Hemodialysis)  Goal: Effective Tissue Perfusion  Outcome: Ongoing, Progressing     Problem: Infection (Hemodialysis)  Goal: Absence of Infection Signs and Symptoms  Outcome: Ongoing, Progressing     Problem: Bariatric Environmental Safety  Goal: Safety Maintained with Care  Outcome: Ongoing, Progressing

## 2022-10-18 LAB
PHOSPHATE SERPL-MCNC: 5.5 MG/DL (ref 2.7–4.5)
POCT GLUCOSE: 122 MG/DL (ref 70–110)
POCT GLUCOSE: 155 MG/DL (ref 70–110)
POCT GLUCOSE: 197 MG/DL (ref 70–110)
POCT GLUCOSE: 57 MG/DL (ref 70–110)

## 2022-10-18 PROCEDURE — 25000003 PHARM REV CODE 250: Performed by: INTERNAL MEDICINE

## 2022-10-18 PROCEDURE — 25000003 PHARM REV CODE 250: Performed by: ORTHOPAEDIC SURGERY

## 2022-10-18 PROCEDURE — 30200315 PPD INTRADERMAL TEST REV CODE 302: Performed by: HOSPITALIST

## 2022-10-18 PROCEDURE — 84100 ASSAY OF PHOSPHORUS: CPT | Performed by: HOSPITALIST

## 2022-10-18 PROCEDURE — 36415 COLL VENOUS BLD VENIPUNCTURE: CPT | Performed by: HOSPITALIST

## 2022-10-18 PROCEDURE — 21400001 HC TELEMETRY ROOM

## 2022-10-18 PROCEDURE — 86580 TB INTRADERMAL TEST: CPT | Performed by: HOSPITALIST

## 2022-10-18 PROCEDURE — 25000003 PHARM REV CODE 250: Performed by: HOSPITALIST

## 2022-10-18 RX ADMIN — INSULIN DETEMIR 12 UNITS: 100 INJECTION, SOLUTION SUBCUTANEOUS at 10:10

## 2022-10-18 RX ADMIN — ASPIRIN 81 MG CHEWABLE TABLET 81 MG: 81 TABLET CHEWABLE at 08:10

## 2022-10-18 RX ADMIN — ATORVASTATIN CALCIUM 40 MG: 40 TABLET, FILM COATED ORAL at 10:10

## 2022-10-18 RX ADMIN — METOPROLOL SUCCINATE 25 MG: 25 TABLET, EXTENDED RELEASE ORAL at 08:10

## 2022-10-18 RX ADMIN — OXYCODONE HYDROCHLORIDE AND ACETAMINOPHEN 1000 MG: 500 TABLET ORAL at 08:10

## 2022-10-18 RX ADMIN — SEVELAMER CARBONATE 2400 MG: 800 TABLET, FILM COATED ORAL at 11:10

## 2022-10-18 RX ADMIN — HYDROCODONE BITARTRATE AND ACETAMINOPHEN 1 TABLET: 5; 325 TABLET ORAL at 08:10

## 2022-10-18 RX ADMIN — SEVELAMER CARBONATE 2400 MG: 800 TABLET, FILM COATED ORAL at 08:10

## 2022-10-18 RX ADMIN — TUBERCULIN PURIFIED PROTEIN DERIVATIVE 5 UNITS: 5 INJECTION, SOLUTION INTRADERMAL at 01:10

## 2022-10-18 RX ADMIN — Medication 1 TABLET: at 08:10

## 2022-10-18 RX ADMIN — PANTOPRAZOLE SODIUM 40 MG: 40 TABLET, DELAYED RELEASE ORAL at 08:10

## 2022-10-18 NOTE — ASSESSMENT & PLAN NOTE
- Last A1c:   Lab Results   Component Value Date    HGBA1C 6.1 (H) 01/07/2021     Continue current management.  - Low-sensitivity sliding scale insulin  - Initiate and maintain glycemic protocol, monitor POC glucose   - Follow up with PCP as outpatient

## 2022-10-18 NOTE — PLAN OF CARE
Problem: Adult Inpatient Plan of Care  Goal: Plan of Care Review  Outcome: Ongoing, Progressing  Goal: Patient-Specific Goal (Individualized)  Outcome: Ongoing, Progressing  Goal: Absence of Hospital-Acquired Illness or Injury  Outcome: Ongoing, Progressing  Intervention: Identify and Manage Fall Risk  Flowsheets (Taken 10/18/2022 0640)  Safety Promotion/Fall Prevention:   assistive device/personal item within reach   side rails raised x 2  Intervention: Prevent Skin Injury  Flowsheets (Taken 10/18/2022 0640)  Body Position: weight shifting  Skin Protection: incontinence pads utilized  Intervention: Prevent and Manage VTE (Venous Thromboembolism) Risk  Flowsheets (Taken 10/18/2022 0640)  Activity Management: Rolling - L1  Range of Motion: ROM (range of motion) performed  Intervention: Prevent Infection  Flowsheets (Taken 10/18/2022 0640)  Infection Prevention:   single patient room provided   rest/sleep promoted  Goal: Optimal Comfort and Wellbeing  Outcome: Ongoing, Progressing  Intervention: Monitor Pain and Promote Comfort  Flowsheets (Taken 10/18/2022 0640)  Pain Management Interventions:   relaxation techniques promoted   quiet environment facilitated   medication offered  Intervention: Provide Person-Centered Care  Flowsheets (Taken 10/18/2022 0640)  Trust Relationship/Rapport:   care explained   choices provided   emotional support provided   empathic listening provided  Goal: Readiness for Transition of Care  Outcome: Ongoing, Progressing     Problem: Skin Injury Risk Increased  Goal: Skin Health and Integrity  Outcome: Ongoing, Progressing  Intervention: Optimize Skin Protection  Flowsheets (Taken 10/18/2022 0640)  Pressure Reduction Techniques: frequent weight shift encouraged  Skin Protection: incontinence pads utilized  Head of Bed (HOB) Positioning: HOB elevated     Problem: Fall Injury Risk  Goal: Absence of Fall and Fall-Related Injury  Outcome: Ongoing, Progressing  Intervention: Identify and  Manage Contributors  Flowsheets (Taken 10/18/2022 0640)  Self-Care Promotion:   independence encouraged   BADL personal objects within reach   BADL personal routines maintained  Medication Review/Management: medications reviewed  Intervention: Promote Injury-Free Environment  Flowsheets (Taken 10/18/2022 0640)  Safety Promotion/Fall Prevention:   assistive device/personal item within reach   side rails raised x 2     Problem: Diabetes Comorbidity  Goal: Blood Glucose Level Within Targeted Range  Outcome: Ongoing, Progressing  Intervention: Monitor and Manage Glycemia  Flowsheets (Taken 10/18/2022 0640)  Glycemic Management: blood glucose monitored     Problem: Infection  Goal: Absence of Infection Signs and Symptoms  Outcome: Ongoing, Progressing  Intervention: Prevent or Manage Infection  Flowsheets (Taken 10/18/2022 0640)  Infection Management: aseptic technique maintained

## 2022-10-18 NOTE — PROGRESS NOTES
NH Placement:     [facility] - Willing to accept    [facility] - Willing to accept    [facility] - Willing to accept    [facility] - Interested    [facility] - Reviewing     [facility] - Left message for Zenaida, will call back after meeting

## 2022-10-18 NOTE — PROGRESS NOTES
[facility] Medicine  Progress Note    Patient Name: Adryan Neff  MRN: [id number]  Patient Class: IP- Inpatient   Admission Date: 10/6/2022  Length of Stay: 12 days  Attending Physician: Venkat Strauss MD  Primary Care Provider: Carola Pedro MD        Subjective:     Principal Problem:PAD (peripheral artery disease)        HPI:  This is a 56 year old male with a PMHx of severe PAD s/p gangrene of left foot s/p left AKA (1/2022), ESRD on HD MWF, NICM (EF: 55%, GIDD), pulmonary hypertension, GERD and latent TB filiberto presents for concern for leg infection.       The patient presented to the vascular surgery clinic for a follow up. He reports worsening pain and developing eschar of the right dorsal foot along with foul smelling odor. Given concern for an infection and the need for IV antibiotics & expedited podiatry evaluation, the patient was sent to the ER. The patient denies having any fevers, chills, or injury to his foot. He has a previous AKA and uses a wheelchair to ambulate. While in the ED, the patient was hypertensive but otherwise hemodynamically stable. Labs showed leukocytosis (17.41 - with neutrophilic predominance), normocytic anemia (9.7), elevated sed rate (125), elevated CRP (237.2), negative lactate (1.6). X-ray foot showed no radiographic evidence of osteomyelitis. He was administered vancomycin, zosyn and was admitted for further management.     I attempted to call the daughter to obtain further history on 1902 without a response.         Overview/Hospital Course:  Mr Adryan Neff is a 56 y.o. man with PAD. He is s/p L AKA due to gangrene and now presents with worsening R foot infection. He also has ESRD on HD via LUE AVG. Started antibiotics. Podiatry and Ortho consulted with plans for amputation. This was delayed due to PEA on 10/10 with quick ROSC. Unclear etiology but suspect vasovagal event. Cardiology was consulted. He then had R AKA on 10/13/22. Antibiotics were  discontinued. He has had malfunctioning of his LUE AVG; Vascular was consulted and there was discussion of fistulogram but does not appear that it happened. He has had anemia requiring transfusions.     His disposition is complicated due to young children. Social work/case management consulted.       Interval History: no new complaints.    Review of Systems   HENT:  Negative for ear discharge and ear pain.    Eyes:  Negative for discharge and itching.   Endocrine: Negative for cold intolerance and heat intolerance.   Neurological:  Negative for seizures and syncope.   Objective:     Vital Signs (Most Recent):  Temp: 98.3 °F (36.8 °C) (10/18/22 1058)  Pulse: 72 (10/18/22 1058)  Resp: 19 (10/18/22 1058)  BP: (!) 164/67 (10/18/22 1058)  SpO2: 100 % (10/18/22 1058)   Vital Signs (24h Range):  Temp:  [97.6 °F (36.4 °C)-98.9 °F (37.2 °C)] 98.3 °F (36.8 °C)  Pulse:  [] 72  Resp:  [18-19] 19  SpO2:  [96 %-100 %] 100 %  BP: (120-174)/(53-77) 164/67     Weight: 57.5 kg (126 lb 12.2 oz)  Body mass index is 17.68 kg/m².    Intake/Output Summary (Last 24 hours) at 10/18/2022 1212  Last data filed at 10/18/2022 0913  Gross per 24 hour   Intake 980 ml   Output 1502 ml   Net -522 ml      Physical Exam  Vitals and nursing note reviewed.   Constitutional:       Appearance: He is ill-appearing. He is not toxic-appearing.   HENT:      Head: Normocephalic and atraumatic.      Nose: Nose normal.      Mouth/Throat:      Mouth: Mucous membranes are moist.   Cardiovascular:      Rate and Rhythm: Normal rate and regular rhythm.      Heart sounds: Normal heart sounds. No murmur heard.    No gallop.   Pulmonary:      Effort: Pulmonary effort is normal. No respiratory distress.      Breath sounds: No wheezing or rales.   Abdominal:      General: Bowel sounds are normal. There is no distension.      Palpations: Abdomen is soft.      Tenderness: There is no abdominal tenderness. There is no guarding.   Musculoskeletal:      Comments: Right  AKA dressing in place. L AKA also present   Skin:     Comments: LUE AVG with thrill   Neurological:      Mental Status: He is alert.       Significant Labs: All pertinent labs within the past 24 hours have been reviewed.  BMP: No results for input(s): GLU, NA, K, CL, CO2, BUN, CREATININE, CALCIUM, MG in the last 48 hours.  CBC:   Recent Labs   Lab 10/17/22  0752   WBC 19.10*   HGB 8.9*   HCT 27.6*   *       Significant Imaging: I have reviewed all pertinent imaging results/findings within the past 24 hours.      Assessment/Plan:      * PAD (peripheral artery disease)  S/p L AKA then presented with PAD causing R foot wound. Now s/p R AKA on 10/13  - previously on antibiotics. These were discontinued when source control achieved.   - continue asa, statin   Increase in WBC.  Afebrile.  Repeat labs in Am.  May need ID input if still increasing.    AV graft malfunction  Vascular following.      Cognitive changes  Noted by Psychiatry that he has had multiple episodes of confusion and not understanding treatment plan. Unclear if this is delirium associated or beginnings of cognitive dysfunction. Recommend outpatient evaluation.       Anemia due to chronic kidney disease, on chronic dialysis  - Anemic - has been transfused this admission pre and post surgery  - Continue EPO per Nephrology  - Hgb stable. Monitor with CBC in AM        ESRD on hemodialysis  Dialyzes via LUE AVG  There is concern about AVG malfunction. Vascular surgery consulted  Nephrology following       CAD (coronary artery disease) - non-obstructive from [facility] in 2016  - Resume aspirin, statin       Chronic diastolic heart failure  - Echocardiogram (11/24/2021): EF: 55%, GIDD, mild TR, moderate to severe MR  - no signs of acute exacerbation  - Continue home medications     Gastroesophageal reflux disease  - Coninue PPI     Pure hypercholesterolemia  - Continue statin     Essential (primary) hypertension  - Continue home medications    Controlled type 2  diabetes mellitus with chronic kidney disease on chronic dialysis, with long-term current use of insulin  - Last A1c:   Lab Results   Component Value Date    HGBA1C 6.1 (H) 01/07/2021     Continue current management.  - Low-sensitivity sliding scale insulin  - Initiate and maintain glycemic protocol, monitor POC glucose   - Follow up with PCP as outpatient      VTE Risk Mitigation (From admission, onward)         Ordered     IP VTE HIGH RISK PATIENT  Once         10/06/22 1819     Place sequential compression device  Until discontinued         10/06/22 1819                Discharge Planning   ENIO:      Code Status: Full Code   Is the patient medically ready for discharge?:     Reason for patient still in hospital (select all that apply): Patient trending condition  Discharge Plan A: New Nursing Home placement - snf care facility   Discharge Delays: None known at this time              Venkat Strauss MD  Department of Hospital Medicine   [facility] [place]

## 2022-10-18 NOTE — PROGRESS NOTES
Renal Progress Note    Date of Admission:  10/6/2022  1:54 PM    Length of Stay: 12  Days    Subjective: n/a    Objective:    Current Facility-Administered Medications   Medication    acetaminophen tablet 650 mg    amLODIPine tablet 10 mg    ascorbic acid (vitamin C) tablet 1,000 mg    aspirin chewable tablet 81 mg    atorvastatin tablet 40 mg    dextrose 10% bolus 125 mL    dextrose 10% bolus 250 mL    epoetin paola-epbx injection 10,000 Units    folic acid-vit B6-vit B12 2.5-25-2 mg tablet 1 tablet    glucagon (human recombinant) injection 1 mg    glucose chewable tablet 16 g    glucose chewable tablet 24 g    hydrALAZINE injection 10 mg    HYDROcodone-acetaminophen 5-325 mg per tablet 1 tablet    insulin aspart U-100 pen 0-5 Units    insulin detemir U-100 pen 12 Units    melatonin tablet 6 mg    metoprolol succinate (TOPROL-XL) 24 hr tablet 25 mg    naloxone 0.4 mg/mL injection 0.02 mg    ondansetron disintegrating tablet 8 mg    pantoprazole EC tablet 40 mg    sevelamer carbonate tablet 2,400 mg    sodium chloride 0.9% flush 10 mL    sodium chloride 0.9% flush 10 mL       Vitals:    10/17/22 2016 10/17/22 2350 10/18/22 0329 10/18/22 0346   BP: (!) 156/67 (!) 158/70 (!) 174/77 (!) 152/66   BP Location: Right arm Right arm Right arm Right arm   Patient Position: Lying Lying Lying Lying   Pulse: 80 73 80    Resp: 19 19 19    Temp: 98.6 °F (37 °C) 97.9 °F (36.6 °C) 98.5 °F (36.9 °C)    TempSrc: Oral Oral Oral    SpO2: 98% 100% 100%    Weight:       Height:           I/O last 3 completed shifts:  In: 980 [P.O.:480; Other:500]  Out: 1501 [Other:1500; Stool:1]  I/O this shift:  In: -   Out: 1 [Stool:1]      Physical Exam:     General: NAD  Neck: supple  Heart: RRR  Lungs: unlabored breathing  Abdomen: n/a  Limbs: n/a  Neurologic: asleep      Laboratories:    Recent Labs   Lab 10/17/22  0752   WBC 19.10*   RBC 3.06*   HGB 8.9*   HCT 27.6*   *   MCV 90   MCH 29.1   MCHC 32.2         No  results for input(s): GLUCOSE, CALCIUM, PROT, NA, K, CO2, CL, BUN, CREATININE, ALKPHOS, ALT, AST, BILITOT in the last 24 hours.    Invalid input(s):  ALBUMIN    No results for input(s): COLORU, CLARITYU, SPECGRAV, PHUR, PROTEINUA, GLUCOSEU, BLOODU, WBCU, RBCU, BACTERIA, MUCUS in the last 24 hours.    Invalid input(s):  BILIRUBINCON    Microbiology Results (last 7 days)       Procedure Component Value Units Date/Time    Blood culture [[id number]] Collected: 10/17/22 1427    Order Status: Completed Specimen: Blood from Antecubital, Right Arm Updated: 10/17/22 2112     Blood Culture, Routine No Growth to date    Narrative:      rt.forearm Collection has been rescheduled by CPD at 10/17/2022 11:43   Reason: Pt is in dialysis  Collection has been rescheduled by CMO at 10/17/2022 14:23 Reason: Do   im am per Rn Griselda   Collection has been rescheduled by CPD at 10/17/2022 11:43 Reason: Pt   is in dialysis  Collection has been rescheduled by CMO at 10/17/2022 14:23 Reason: Do   im am per Rn Griselda     Blood culture [[id number]] Collected: 10/17/22 1415    Order Status: Completed Specimen: Blood from Antecubital, Right Arm Updated: 10/17/22 2112     Blood Culture, Routine No Growth to date    Narrative:      Collection has been rescheduled by CPD at 10/17/2022 11:43 Reason: Pt   is in dialysis  Collection has been rescheduled by CMO at 10/17/2022 14:23 Reason: Do   im am per Rn Griselda   Collection has been rescheduled by CPD at 10/17/2022 11:43 Reason: Pt   is in dialysis  Collection has been rescheduled by CMO at 10/17/2022 14:23 Reason: Do   im am per Rn Griselda     Blood culture [[id number]] Collected: 10/11/22 0914    Order Status: Completed Specimen: Blood Updated: 10/15/22 1103     Blood Culture, Routine No Growth after 4 days.    Blood culture [[id number]] Collected: 10/11/22 0915    Order Status: Completed Specimen: Blood from Peripheral, Right Wrist Updated: 10/15/22 1103     Blood Culture, Routine No Growth  after 4 days.              Diagnostic Tests: n/a        Assessment:    55 y/o male with ESRD on HD admitted with:    - R-foot gangrene  - Hx. Severe PAD (s/p L-AKA)  - AV access malfunction s/p fistulogram  - Chronic dyast. HF  - Anemia of ckd  - IDDM-2 with end-organ damage  - HyperPO4-  - Hypoalbuminemia  - HTN  - HLD  - Cognitive impairment  - Chronic non-compliance        Plan:    - Dialysis Q MWF  - Epogen  - Transfuse during HD PRN Hgb < 7  - Renal ADA diet with low PO4-  - Oral protein supplements + MVI  - Oral PO4- binders  - Antib. Per ID  - Psych, Ortho, Vascular and Cardiology following  - Glycemic control and other problems per admitting

## 2022-10-18 NOTE — SUBJECTIVE & OBJECTIVE
Interval History: no new complaints.    Review of Systems   HENT:  Negative for ear discharge and ear pain.    Eyes:  Negative for discharge and itching.   Endocrine: Negative for cold intolerance and heat intolerance.   Neurological:  Negative for seizures and syncope.   Objective:     Vital Signs (Most Recent):  Temp: 98.3 °F (36.8 °C) (10/18/22 1058)  Pulse: 72 (10/18/22 1058)  Resp: 19 (10/18/22 1058)  BP: (!) 164/67 (10/18/22 1058)  SpO2: 100 % (10/18/22 1058)   Vital Signs (24h Range):  Temp:  [97.6 °F (36.4 °C)-98.9 °F (37.2 °C)] 98.3 °F (36.8 °C)  Pulse:  [] 72  Resp:  [18-19] 19  SpO2:  [96 %-100 %] 100 %  BP: (120-174)/(53-77) 164/67     Weight: 57.5 kg (126 lb 12.2 oz)  Body mass index is 17.68 kg/m².    Intake/Output Summary (Last 24 hours) at 10/18/2022 1212  Last data filed at 10/18/2022 0913  Gross per 24 hour   Intake 980 ml   Output 1502 ml   Net -522 ml      Physical Exam  Vitals and nursing note reviewed.   Constitutional:       Appearance: He is ill-appearing. He is not toxic-appearing.   HENT:      Head: Normocephalic and atraumatic.      Nose: Nose normal.      Mouth/Throat:      Mouth: Mucous membranes are moist.   Cardiovascular:      Rate and Rhythm: Normal rate and regular rhythm.      Heart sounds: Normal heart sounds. No murmur heard.    No gallop.   Pulmonary:      Effort: Pulmonary effort is normal. No respiratory distress.      Breath sounds: No wheezing or rales.   Abdominal:      General: Bowel sounds are normal. There is no distension.      Palpations: Abdomen is soft.      Tenderness: There is no abdominal tenderness. There is no guarding.   Musculoskeletal:      Comments: Right AKA dressing in place. L AKA also present   Skin:     Comments: LUE AVG with thrill   Neurological:      Mental Status: He is alert.       Significant Labs: All pertinent labs within the past 24 hours have been reviewed.  BMP: No results for input(s): GLU, NA, K, CL, CO2, BUN, CREATININE, CALCIUM, MG  in the last 48 hours.  CBC:   Recent Labs   Lab 10/17/22  0752   WBC 19.10*   HGB 8.9*   HCT 27.6*   *       Significant Imaging: I have reviewed all pertinent imaging results/findings within the past 24 hours.

## 2022-10-18 NOTE — ASSESSMENT & PLAN NOTE
S/p L AKA then presented with PAD causing R foot wound. Now s/p R AKA on 10/13  - previously on antibiotics. These were discontinued when source control achieved.   - continue asa, statin   Increase in WBC.  Afebrile.  Repeat labs in Am.  May need ID input if still increasing.

## 2022-10-18 NOTE — NURSING
Report received from night nurse KIAH Claudio. Visualized patient and assessed patient's overall condition and appearance. No acute distress noted. Will continue to monitor

## 2022-10-18 NOTE — PLAN OF CARE
Problem: Adult Inpatient Plan of Care  Goal: Plan of Care Review  Outcome: Ongoing, Progressing  Goal: Optimal Comfort and Wellbeing  Outcome: Ongoing, Progressing  Intervention: Monitor Pain and Promote Comfort  Flowsheets (Taken 10/18/2022 1809)  Pain Management Interventions:   care clustered   pillow support provided   position adjusted

## 2022-10-19 LAB
ANION GAP SERPL CALC-SCNC: 14 MMOL/L (ref 8–16)
BASOPHILS # BLD AUTO: 0.09 K/UL (ref 0–0.2)
BASOPHILS NFR BLD: 0.5 % (ref 0–1.9)
BUN SERPL-MCNC: 51 MG/DL (ref 6–20)
CALCIUM SERPL-MCNC: 8.7 MG/DL (ref 8.7–10.5)
CHLORIDE SERPL-SCNC: 91 MMOL/L (ref 95–110)
CO2 SERPL-SCNC: 27 MMOL/L (ref 23–29)
CREAT SERPL-MCNC: 10.1 MG/DL (ref 0.5–1.4)
DIFFERENTIAL METHOD: ABNORMAL
EOSINOPHIL # BLD AUTO: 0.7 K/UL (ref 0–0.5)
EOSINOPHIL NFR BLD: 3.4 % (ref 0–8)
ERYTHROCYTE [DISTWIDTH] IN BLOOD BY AUTOMATED COUNT: 14.6 % (ref 11.5–14.5)
EST. GFR  (NO RACE VARIABLE): 5 ML/MIN/1.73 M^2
GLUCOSE SERPL-MCNC: 140 MG/DL (ref 70–110)
HCT VFR BLD AUTO: 22.5 % (ref 40–54)
HGB BLD-MCNC: 7.1 G/DL (ref 14–18)
IMM GRANULOCYTES # BLD AUTO: 0.13 K/UL (ref 0–0.04)
IMM GRANULOCYTES NFR BLD AUTO: 0.7 % (ref 0–0.5)
LYMPHOCYTES # BLD AUTO: 1.7 K/UL (ref 1–4.8)
LYMPHOCYTES NFR BLD: 8.8 % (ref 18–48)
MCH RBC QN AUTO: 28.9 PG (ref 27–31)
MCHC RBC AUTO-ENTMCNC: 31.6 G/DL (ref 32–36)
MCV RBC AUTO: 92 FL (ref 82–98)
MONOCYTES # BLD AUTO: 1.3 K/UL (ref 0.3–1)
MONOCYTES NFR BLD: 6.6 % (ref 4–15)
NEUTROPHILS # BLD AUTO: 15.4 K/UL (ref 1.8–7.7)
NEUTROPHILS NFR BLD: 80 % (ref 38–73)
NRBC BLD-RTO: 0 /100 WBC
PLATELET # BLD AUTO: 467 K/UL (ref 150–450)
PMV BLD AUTO: 8.8 FL (ref 9.2–12.9)
POCT GLUCOSE: 198 MG/DL (ref 70–110)
POCT GLUCOSE: 75 MG/DL (ref 70–110)
POCT GLUCOSE: 99 MG/DL (ref 70–110)
POTASSIUM SERPL-SCNC: 5 MMOL/L (ref 3.5–5.1)
RBC # BLD AUTO: 2.46 M/UL (ref 4.6–6.2)
SODIUM SERPL-SCNC: 132 MMOL/L (ref 136–145)
WBC # BLD AUTO: 19.25 K/UL (ref 3.9–12.7)

## 2022-10-19 PROCEDURE — 97166 OT EVAL MOD COMPLEX 45 MIN: CPT

## 2022-10-19 PROCEDURE — 80100016 HC MAINTENANCE HEMODIALYSIS

## 2022-10-19 PROCEDURE — 36415 COLL VENOUS BLD VENIPUNCTURE: CPT | Performed by: HOSPITALIST

## 2022-10-19 PROCEDURE — 25000003 PHARM REV CODE 250: Performed by: INTERNAL MEDICINE

## 2022-10-19 PROCEDURE — 25000003 PHARM REV CODE 250: Performed by: HOSPITALIST

## 2022-10-19 PROCEDURE — 25000003 PHARM REV CODE 250: Performed by: ORTHOPAEDIC SURGERY

## 2022-10-19 PROCEDURE — 21400001 HC TELEMETRY ROOM

## 2022-10-19 PROCEDURE — 80048 BASIC METABOLIC PNL TOTAL CA: CPT | Performed by: HOSPITALIST

## 2022-10-19 PROCEDURE — 97530 THERAPEUTIC ACTIVITIES: CPT

## 2022-10-19 PROCEDURE — 84630 ASSAY OF ZINC: CPT | Performed by: INTERNAL MEDICINE

## 2022-10-19 PROCEDURE — 85025 COMPLETE CBC W/AUTO DIFF WBC: CPT | Performed by: HOSPITALIST

## 2022-10-19 PROCEDURE — 63600175 PHARM REV CODE 636 W HCPCS: Mod: JG | Performed by: ORTHOPAEDIC SURGERY

## 2022-10-19 PROCEDURE — 25000003 PHARM REV CODE 250: Performed by: NURSE PRACTITIONER

## 2022-10-19 PROCEDURE — 97161 PT EVAL LOW COMPLEX 20 MIN: CPT

## 2022-10-19 RX ADMIN — Medication 6 MG: at 09:10

## 2022-10-19 RX ADMIN — EPOETIN ALFA-EPBX 10000 UNITS: 10000 INJECTION, SOLUTION INTRAVENOUS; SUBCUTANEOUS at 09:10

## 2022-10-19 RX ADMIN — HYDROCODONE BITARTRATE AND ACETAMINOPHEN 1 TABLET: 5; 325 TABLET ORAL at 09:10

## 2022-10-19 RX ADMIN — ASPIRIN 81 MG CHEWABLE TABLET 81 MG: 81 TABLET CHEWABLE at 08:10

## 2022-10-19 RX ADMIN — INSULIN DETEMIR 12 UNITS: 100 INJECTION, SOLUTION SUBCUTANEOUS at 09:10

## 2022-10-19 RX ADMIN — ATORVASTATIN CALCIUM 40 MG: 40 TABLET, FILM COATED ORAL at 09:10

## 2022-10-19 RX ADMIN — HYDROCODONE BITARTRATE AND ACETAMINOPHEN 1 TABLET: 5; 325 TABLET ORAL at 06:10

## 2022-10-19 NOTE — PLAN OF CARE
Problem: Occupational Therapy  Goal: Occupational Therapy Goal  Description: Goals to be met by: 11/02/2022     Patient will increase functional independence with ADL's by performing:    UE Dressing with Modified [employer]/Set-up Assistance.  LE Dressing with Minimal Assistance.  Grooming while EOB with Set-up Assistance.  Increase functional strength to 5/5 for improved ease of scoot transfers.  Patient will tolerate sitting at EOB for 10 mins to allow time for grooming tasks.  Patient will perform scoot transfer bed <---> W/C with Minimal Assistance.       Outcome: Ongoing, Progressing     Patient was co-evaluated by OT and PT.  Patient to be treated 5 x's a week and discharged to SNF.  DME Needs: Per SNF.

## 2022-10-19 NOTE — PROGRESS NOTES
CM spoke to pt's daughterLuis F to follow up on dc needs. CM informed pt's daughter that the pt has been accepted to the the following facilities: [facility], [facility], [facility] and [facility]. Pt was denied for [facility] and [facility] is still  under review.    Pt's daughter stated she wasn't interested in the [facility] facilities.    CM explained that pt is medically clear for discharge and a decision is needed ASAP. CM provided pt's daughter with [facility] address [address] [place] [address] [phone]. Pt's daughter stated she will contact [facility]. CM will follow up.

## 2022-10-19 NOTE — PT/OT/SLP EVAL
"Occupational Therapy   Evaluation    Name: Adryan Neff  MRN: [id number]  Admitting Diagnosis:  PAD (peripheral artery disease)  Recent Surgery: Procedure(s) (LRB):  AMPUTATION, ABOVE KNEE, RIGHT (Right) 6 Days Post-Op    Recommendations:     Discharge Recommendations: nursing facility, skilled  Discharge Equipment Recommendations: other (see comments) (Per SNF.)  Barriers to Discharge: Other (Comment) (Patient is not at his PLOF.  He is at risk of falls and injuries at present.)    Assessment:     Adryan Neff is a 56 y.o. male with a medical diagnosis of PAD (peripheral artery disease).  He presents with mild RLE pain but good participation.  Performance deficits affecting function are weakness, impaired endurance, impaired sensation, impaired self-care skills, impaired functional mobility, impaired balance, decreased lower extremity function, pain, impaired skin, and bleeding from RLE surgical site.      Rehab Prognosis: Good; patient would benefit from acute skilled OT services to address these deficits and reach maximum level of function.       Plan:     Patient to be seen 5 x/week to address the above listed problems via self-care/home management, therapeutic activities, and therapeutic exercises.  Plan of Care Expires: 11/02/22  Plan of Care Reviewed with: patient    Subjective     Chief Complaint: Mild RLE pain   Patient/Family Comments/Goals: "Thank you, God bless you!"      Occupational Profile:  Living Environment: The patient lives with his son and daughter in an apartment.   Previous Level of Function: Patient was in his W/C most of the time while at home.   Roles and Routines: Patient attends dialysis each M/W/F.   Equipment Used at Home: Glucometer, rolling walker, wheelchair, bath bench and/or shower chair, and bedside commode  Assistance upon Discharge: Patient will have assistance from SNF staff and from his adult children upon D/C.     Pain/Comfort:  Pain Rating 1: (Patient stated that his leg was " "feeling better but that he still had "a little bit of pain" at his RLE.)  Location - Side 1: Right  Location - Orientation 1: Distal  Location 1: Leg  Pain Addressed 1: Pre-medicate for activity, Reposition, Nurse notified  Pain Rating Post-Intervention 1: (No s/s of pain were observed.)    Patients cultural, spiritual, Worship conflicts discussed given the current situation: Yes     Objective:     Communicated with: Nurse, Crystal, prior to session.  Patient found in bed with bed alarm, telemetry, and two IV sites at RUE upon OT entry to room.    General Precautions: Standard precautions apply.  (Patient is s/p R AKA.)   Orthopedic Precautions: RLE non weight-bearing   Braces: N/A  Respiratory Status: Room air    Occupational Performance:    Bed Mobility:    Min A for supine in bed to sitting upright in bed    Functional Mobility/Transfers:  Transfers: Scoot transfer from bed to W/C with Max A of 2 persons and max cues for directions/hand placement/body positioning   Functional Mobility: N/A    Activities of Daily Living:  Feeding: Set-Up A for lunch  LE Dressing: Total A/Max A to doff wrap to RLE and to re-wrap leg once dressing was changed     Cognitive/Visual Perceptual:  Cognitive/Psychosocial Skills:     -       Oriented to: Person, Place, Time, and Situation   -       Follows Commands/Attention: Follows one-step commands and some two-step commands  -       Communication: Patient with clear but somewhat delayed verbal responses at time; Creole Frisian is the patient's native language but he also speaks conversational English.   -       Memory: No deficits noted   -       Safety Awareness/Insight to Disability: Intact; patient is aware that he needs assistance for transfers.     -       Mood/Affect/Coping Skills/Emotional Control: Pleasant, Cooperative, Motivated, Labile  Visual/Perceptual: Intact    Physical Exam:  Balance:    -       Sitting balance is impaired.  Skin Integrity: Recent amputation (RLE AKA) " with copious amounts of blood to inner layers of dressing; this was addressed/changed during today's session.   Sensation:    -       Impaired: Sensation is intact to all limbs other than RLE; patient reports that sensation here is still mildly impaired.  Upper Extremity Range of Motion:     -       Right Upper Extremity: WFL  -       Left Upper Extremity: WFL  Upper Extremity Strength:    -       Right Upper Extremity: 4-/5  -       Left Upper Extremity: 4-/5  Fine Motor Coordination:    -       Impaired: Very mild deficits were noted; patient needed assistance to set-up his tray and to cut his chicken.     AMPA 6 Click ADL:  AMPAC Total Score: 17    Treatment & Education:  Patient was co-evaluated by OT and PT.  Patient is appropriate for SNF placement at this time.  Please see per above for functional levels and further info.      Patient left sitting upright in W/C to eat lunch with call button within his reach, tray table and personal items in front of him, and nurse informed.     GOALS:   Multidisciplinary Problems       Occupational Therapy Goals          Problem: Occupational Therapy    Goal Priority Disciplines Outcome Interventions   Occupational Therapy Goal     OT, PT/OT Ongoing, Progressing    Description: Goals to be met by: 11/02/2022     Patient will increase functional independence with ADL's by performing:    UE Dressing with Modified [place]/Set-up Assistance.  LE Dressing with Minimal Assistance.  Grooming while EOB with Set-up Assistance.  Increase functional strength to 5/5 for improved ease of scoot transfers.  Patient will tolerate sitting at EOB for 10 mins to allow time for grooming tasks.  Patient will perform scoot transfer bed <---> W/C with Minimal Assistance.                            History:     Past Medical History:   Diagnosis Date    CAD (coronary artery disease) 2016    CAD (non obstructive) 2016 [facility]    Diabetes mellitus type I     Diabetic retinopathy     ESRD on  hemodialysis     on HD TThSat    Gangrene of left foot     Hypertension     Nuclear sclerosis of right eye 11/24/2021    PAD (peripheral artery disease)     PEA (Pulseless electrical activity) 10/10/2022    Pulmonary HTN     Right foot infection     TB lung, latent 04/2021    Wet gangrene 1/5/2022         Past Surgical History:   Procedure Laterality Date    ABOVE-KNEE AMPUTATION Left 1/18/2022    Procedure: AMPUTATION, ABOVE KNEE;  Surgeon: Rusty Light MD;  Location: [facility] OR;  Service: Orthopedics;  Laterality: Left;    ABOVE-KNEE AMPUTATION Left 1/21/2022    Procedure: AMPUTATION, ABOVE KNEE;  Surgeon: Rusty Light MD;  Location: [facility] OR;  Service: Orthopedics;  Laterality: Left;    ABOVE-KNEE AMPUTATION Right 10/13/2022    Procedure: AMPUTATION, ABOVE KNEE, RIGHT;  Surgeon: Dimitris Davis MD;  Location: [facility] OR;  Service: Orthopedics;  Laterality: Right;    AV FISTULA PLACEMENT      CATARACT EXTRACTION Left     EYE SURGERY      FISTULOGRAM Left 8/9/2022    Procedure: Fistulogram;  Surgeon: Masoud Soni MD;  Location: [facility] OR;  Service: Vascular;  Laterality: Left;  LEFT VM ON DAUGHTER'S PHONE----PHONE PREOP NOT COMPLETED  CALLED PATIENT ON 8/8/2022 @ 3:34. HE STATED HE IS RESCHEDULING PROCEDURE. NOTIFIED ERWIN @ 3:35PM-LO    TOE AMPUTATION Left 1/6/2022    Procedure: AMPUTATION, TOE;  Surgeon: Masoud Soni MD;  Location: [facility] OR;  Service: Vascular;  Laterality: Left;  Left first through fifth toes, possible open transmetatarsal amputation and all other indicated procedures       Time Tracking:     OT Date of Treatment: 10/19/2022  OT Start Time: 11:43 AM  OT Stop Time: 12:06 PM  OT Total Time (min): 23 mins     Billable Minutes:Evaluation 15 mins  Therapeutic Activity 8 mins  Co-eval/treatment with PT    10/19/2022

## 2022-10-19 NOTE — SUBJECTIVE & OBJECTIVE
Interval History: no new complaints.    Review of Systems   HENT:  Negative for ear discharge and ear pain.    Eyes:  Negative for discharge and itching.   Endocrine: Negative for cold intolerance and heat intolerance.   Neurological:  Negative for seizures and syncope.   Objective:     Vital Signs (Most Recent):  Temp: 98.4 °F (36.9 °C) (10/19/22 1107)  Pulse: 71 (10/19/22 1107)  Resp: 19 (10/19/22 1107)  BP: (!) 128/54 (10/19/22 1107)  SpO2: 100 % (10/19/22 1107)   Vital Signs (24h Range):  Temp:  [97.8 °F (36.6 °C)-98.5 °F (36.9 °C)] 98.4 °F (36.9 °C)  Pulse:  [71-81] 71  Resp:  [16-19] 19  SpO2:  [98 %-100 %] 100 %  BP: (125-167)/(52-72) 128/54     Weight: 58.1 kg (128 lb 1.4 oz)  Body mass index is 17.86 kg/m².    Intake/Output Summary (Last 24 hours) at 10/19/2022 1419  Last data filed at 10/19/2022 1259  Gross per 24 hour   Intake 360 ml   Output 1 ml   Net 359 ml        Physical Exam  Vitals and nursing note reviewed.   Constitutional:       Appearance: He is ill-appearing. He is not toxic-appearing.   HENT:      Head: Normocephalic and atraumatic.      Nose: Nose normal.      Mouth/Throat:      Mouth: Mucous membranes are moist.   Cardiovascular:      Rate and Rhythm: Normal rate and regular rhythm.      Heart sounds: Normal heart sounds. No murmur heard.    No gallop.   Pulmonary:      Effort: Pulmonary effort is normal. No respiratory distress.      Breath sounds: No wheezing or rales.   Abdominal:      General: Bowel sounds are normal. There is no distension.      Palpations: Abdomen is soft.      Tenderness: There is no abdominal tenderness. There is no guarding.   Musculoskeletal:      Comments: Right AKA dressing in place. L AKA also present   Skin:     Comments: LUE AVG with thrill   Neurological:      Mental Status: He is alert.       Significant Labs: All pertinent labs within the past 24 hours have been reviewed.  BMP:   Recent Labs   Lab 10/19/22  0025   *   *   K 5.0   CL 91*   CO2  27   BUN 51*   CREATININE 10.1*   CALCIUM 8.7     CBC:   Recent Labs   Lab 10/19/22  0025   WBC 19.25*   HGB 7.1*   HCT 22.5*   *         Significant Imaging: I have reviewed all pertinent imaging results/findings within the past 24 hours.

## 2022-10-19 NOTE — NURSING
Bedside report given to night nurse KIAH Claudio. Walking rounds completed. Visualized and assessed patient. NAD noted. Safety precautions maintained and call light within reach.    Chart check completed.

## 2022-10-19 NOTE — PROGRESS NOTES
Renal Progress Note    Date of Admission:  10/6/2022  1:54 PM    Length of Stay: 13  Days    Subjective: n/a    Objective:    Current Facility-Administered Medications   Medication    acetaminophen tablet 650 mg    amLODIPine tablet 10 mg    ascorbic acid (vitamin C) tablet 1,000 mg    aspirin chewable tablet 81 mg    atorvastatin tablet 40 mg    dextrose 10% bolus 125 mL    dextrose 10% bolus 250 mL    epoetin paola-epbx injection 10,000 Units    folic acid-vit B6-vit B12 2.5-25-2 mg tablet 1 tablet    glucagon (human recombinant) injection 1 mg    glucose chewable tablet 16 g    glucose chewable tablet 24 g    hydrALAZINE injection 10 mg    HYDROcodone-acetaminophen 5-325 mg per tablet 1 tablet    insulin aspart U-100 pen 0-5 Units    insulin detemir U-100 pen 12 Units    melatonin tablet 6 mg    metoprolol succinate (TOPROL-XL) 24 hr tablet 25 mg    naloxone 0.4 mg/mL injection 0.02 mg    ondansetron disintegrating tablet 8 mg    pantoprazole EC tablet 40 mg    sevelamer carbonate tablet 2,400 mg    sodium chloride 0.9% flush 10 mL    sodium chloride 0.9% flush 10 mL       Vitals:    10/18/22 1552 10/18/22 2017 10/18/22 2319 10/19/22 0403   BP: (!) 143/64 (!) 127/59 (!) 166/68 (!) 167/72   BP Location: Right arm Right arm Right arm Right arm   Patient Position: Lying Lying Lying Lying   Pulse: 75 81 80 77   Resp: 18 19 18 19   Temp: 98.5 °F (36.9 °C) 97.8 °F (36.6 °C) 98.3 °F (36.8 °C) 98 °F (36.7 °C)   TempSrc: Oral Oral Oral Oral   SpO2: 98% 99% 100% 99%   Weight:    58.1 kg (128 lb 1.4 oz)   Height:           I/O last 3 completed shifts:  In: 580 [P.O.:580]  Out: 3 [Stool:3]  No intake/output data recorded.      Physical Exam:     General: NAD  Neck: supple  Heart: RRR  Lungs: unlabored breathing  Abdomen: n/a  Limbs: n/a  Neurologic: asleep      Laboratories:    Recent Labs   Lab 10/19/22  0025   WBC 19.25*   RBC 2.46*   HGB 7.1*   HCT 22.5*   *   MCV 92   MCH 28.9   MCHC 31.6*          Recent Labs   Lab 10/19/22  0025   CALCIUM 8.7   *   K 5.0   CO2 27   CL 91*   BUN 51*   CREATININE 10.1*       No results for input(s): COLORU, CLARITYU, SPECGRAV, PHUR, PROTEINUA, GLUCOSEU, BLOODU, WBCU, RBCU, BACTERIA, MUCUS in the last 24 hours.    Invalid input(s):  BILIRUBINCON    Microbiology Results (last 7 days)       Procedure Component Value Units Date/Time    Blood culture [[id number]] Collected: 10/17/22 1415    Order Status: Completed Specimen: Blood from Antecubital, Right Arm Updated: 10/18/22 1503     Blood Culture, Routine No Growth to date      No Growth to date    Narrative:      Collection has been rescheduled by CPD at 10/17/2022 11:43 Reason: Pt   is in dialysis  Collection has been rescheduled by CMO at 10/17/2022 14:23 Reason: Do   im am per Rn Griselda   Collection has been rescheduled by CPD at 10/17/2022 11:43 Reason: Pt   is in dialysis  Collection has been rescheduled by CMO at 10/17/2022 14:23 Reason: Do   im am per Rn Griselda     Blood culture [[id number]] Collected: 10/17/22 1427    Order Status: Completed Specimen: Blood from Antecubital, Right Arm Updated: 10/18/22 1503     Blood Culture, Routine No Growth to date      No Growth to date    Narrative:      rt.forearm Collection has been rescheduled by CPD at 10/17/2022 11:43   Reason: Pt is in dialysis  Collection has been rescheduled by CMO at 10/17/2022 14:23 Reason: Do   im am per Rn Griselda   Collection has been rescheduled by CPD at 10/17/2022 11:43 Reason: Pt   is in dialysis  Collection has been rescheduled by CMO at 10/17/2022 14:23 Reason: Do   im am per Rn Griselda     Blood culture [[id number]] Collected: 10/11/22 0914    Order Status: Completed Specimen: Blood Updated: 10/15/22 1103     Blood Culture, Routine No Growth after 4 days.    Blood culture [[id number]] Collected: 10/11/22 0915    Order Status: Completed Specimen: Blood from Peripheral, Right Wrist Updated: 10/15/22 1103     Blood Culture, Routine No  Growth after 4 days.              Diagnostic Tests: n/a        Assessment:    57 y/o male with ESRD on HD admitted with:    - R-foot gangrene  - Hx. Severe PAD (s/p L-AKA)  - AV access malfunction s/p fistulogram  - Chronic dyast. HF  - Anemia of ckd  - IDDM-2 with end-organ damage  - HyperPO4- IMPROVING  - Hypoalbuminemia  - HTN  - HLD  - Cognitive impairment  - Chronic non-compliance        Plan:    - Dialysis Q MWF  - Epogen  - Transfuse during HD PRN Hgb < 7  - Renal ADA diet with low PO4-  - Oral protein supplements + MVI  - Oral PO4- binders  - Antib. Per ID  - Psych, Ortho, Vascular and Cardiology following  - Glycemic control and other problems per admitting    Will f/u on Dialysis days.

## 2022-10-19 NOTE — PROGRESS NOTES
NH Placement:     [facility] - Willing to accept    [facility] - Willing to accept    [place] - Willing to accept    [place] [facility] - Reviewing   Care needs exceeds    [place] - Left message for Zenaida, will call back after meeting   [place] - Reviewing

## 2022-10-19 NOTE — PROGRESS NOTES
[facility] Medicine  Progress Note    Patient Name: Adryan Neff  MRN: [id number]  Patient Class: IP- Inpatient   Admission Date: 10/6/2022  Length of Stay: 13 days  Attending Physician: Venkat Strauss MD  Primary Care Provider: Carola Pedro MD        Subjective:     Principal Problem:PAD (peripheral artery disease)        HPI:  This is a 56 year old male with a PMHx of severe PAD s/p gangrene of left foot s/p left AKA (1/2022), ESRD on HD MWF, NICM (EF: 55%, GIDD), pulmonary hypertension, GERD and latent TB filiberto presents for concern for leg infection.       The patient presented to the vascular surgery clinic for a follow up. He reports worsening pain and developing eschar of the right dorsal foot along with foul smelling odor. Given concern for an infection and the need for IV antibiotics & expedited podiatry evaluation, the patient was sent to the ER. The patient denies having any fevers, chills, or injury to his foot. He has a previous AKA and uses a wheelchair to ambulate. While in the ED, the patient was hypertensive but otherwise hemodynamically stable. Labs showed leukocytosis (17.41 - with neutrophilic predominance), normocytic anemia (9.7), elevated sed rate (125), elevated CRP (237.2), negative lactate (1.6). X-ray foot showed no radiographic evidence of osteomyelitis. He was administered vancomycin, zosyn and was admitted for further management.     I attempted to call the daughter to obtain further history on 1902 without a response.         Overview/Hospital Course:  Mr Adryan Neff is a 56 y.o. man with PAD. He is s/p L AKA due to gangrene and now presents with worsening R foot infection. He also has ESRD on HD via LUE AVG. Started antibiotics. Podiatry and Ortho consulted with plans for amputation. This was delayed due to PEA on 10/10 with quick ROSC. Unclear etiology but suspect vasovagal event. Cardiology was consulted. He then had R AKA on 10/13/22. Antibiotics were  discontinued. He has had malfunctioning of his LUE AVG; Vascular was consulted and there was discussion of fistulogram but does not appear that it happened. He has had anemia requiring transfusions.     His disposition is complicated due to young children. Social work/case management consulted.       Interval History: no new complaints.    Review of Systems   HENT:  Negative for ear discharge and ear pain.    Eyes:  Negative for discharge and itching.   Endocrine: Negative for cold intolerance and heat intolerance.   Neurological:  Negative for seizures and syncope.   Objective:     Vital Signs (Most Recent):  Temp: 98.4 °F (36.9 °C) (10/19/22 1107)  Pulse: 71 (10/19/22 1107)  Resp: 19 (10/19/22 1107)  BP: (!) 128/54 (10/19/22 1107)  SpO2: 100 % (10/19/22 1107)   Vital Signs (24h Range):  Temp:  [97.8 °F (36.6 °C)-98.5 °F (36.9 °C)] 98.4 °F (36.9 °C)  Pulse:  [71-81] 71  Resp:  [16-19] 19  SpO2:  [98 %-100 %] 100 %  BP: (125-167)/(52-72) 128/54     Weight: 58.1 kg (128 lb 1.4 oz)  Body mass index is 17.86 kg/m².    Intake/Output Summary (Last 24 hours) at 10/19/2022 1419  Last data filed at 10/19/2022 1259  Gross per 24 hour   Intake 360 ml   Output 1 ml   Net 359 ml        Physical Exam  Vitals and nursing note reviewed.   Constitutional:       Appearance: He is ill-appearing. He is not toxic-appearing.   HENT:      Head: Normocephalic and atraumatic.      Nose: Nose normal.      Mouth/Throat:      Mouth: Mucous membranes are moist.   Cardiovascular:      Rate and Rhythm: Normal rate and regular rhythm.      Heart sounds: Normal heart sounds. No murmur heard.    No gallop.   Pulmonary:      Effort: Pulmonary effort is normal. No respiratory distress.      Breath sounds: No wheezing or rales.   Abdominal:      General: Bowel sounds are normal. There is no distension.      Palpations: Abdomen is soft.      Tenderness: There is no abdominal tenderness. There is no guarding.   Musculoskeletal:      Comments: Right AKA  dressing in place. L AKA also present   Skin:     Comments: LUE AVG with thrill   Neurological:      Mental Status: He is alert.       Significant Labs: All pertinent labs within the past 24 hours have been reviewed.  BMP:   Recent Labs   Lab 10/19/22  0025   *   *   K 5.0   CL 91*   CO2 27   BUN 51*   CREATININE 10.1*   CALCIUM 8.7     CBC:   Recent Labs   Lab 10/19/22  0025   WBC 19.25*   HGB 7.1*   HCT 22.5*   *         Significant Imaging: I have reviewed all pertinent imaging results/findings within the past 24 hours.      Assessment/Plan:      * PAD (peripheral artery disease)  S/p L KRISTIE then presented with PAD causing R foot wound. Now s/p R AKA on 10/13  - previously on antibiotics. These were discontinued when source control achieved.   - continue asa, statin   Pending NH placement.    AV graft malfunction  Vascular following.      Cognitive changes  Noted by Psychiatry that he has had multiple episodes of confusion and not understanding treatment plan. Unclear if this is delirium associated or beginnings of cognitive dysfunction. Recommend outpatient evaluation.       Anemia due to chronic kidney disease, on chronic dialysis  - Anemic - has been transfused this admission pre and post surgery  - Continue EPO per Nephrology  - Hgb stable. Monitor with CBC in AM        ESRD on hemodialysis  Dialyzes via LUE AVG  There is concern about AVG malfunction. Vascular surgery consulted  Nephrology following       CAD (coronary artery disease) - non-obstructive from [facility] in 2016  - Resume aspirin, statin       Chronic diastolic heart failure  - Echocardiogram (11/24/2021): EF: 55%, GIDD, mild TR, moderate to severe MR  - no signs of acute exacerbation  - Continue home medications     Gastroesophageal reflux disease  - Coninue PPI     Pure hypercholesterolemia  - Continue statin     Essential (primary) hypertension  - Continue home medications    Controlled type 2 diabetes mellitus with chronic  kidney disease on chronic dialysis, with long-term current use of insulin  - Last A1c:   Lab Results   Component Value Date    HGBA1C 6.1 (H) 01/07/2021     Continue current management.  - Low-sensitivity sliding scale insulin  - Initiate and maintain glycemic protocol, monitor POC glucose   - Follow up with PCP as outpatient      VTE Risk Mitigation (From admission, onward)         Ordered     IP VTE HIGH RISK PATIENT  Once         10/06/22 1819     Place sequential compression device  Until discontinued         10/06/22 1819                Discharge Planning   ENIO:      Code Status: Full Code   Is the patient medically ready for discharge?:     Reason for patient still in hospital (select all that apply): Pending disposition  Discharge Plan A: New Nursing Home placement - senior living care facility   Discharge Delays: None known at this time              Venkat Strauss MD  Department of Hospital Medicine   [facility] - Telemetry

## 2022-10-19 NOTE — PT/OT/SLP EVAL
Physical Therapy Evaluation    Patient Name:  Adryan Neff   MRN:  [id number]    Recommendations:     Discharge Recommendations:  nursing facility, skilled   Discharge Equipment Recommendations: none     Assessment:     Adryan Neff is a 56 y.o. male admitted with a medical diagnosis of PAD (peripheral artery disease).  He presents with the following impairments/functional limitations:  impaired functional mobility, impaired balance, decreased lower extremity function, pain, impaired skin .    Rehab Prognosis: Good; patient would benefit from acute skilled PT services to address these deficits and reach maximum level of function.    Recent Surgery: Procedure(s) (LRB):  AMPUTATION, ABOVE KNEE, RIGHT (Right) 6 Days Post-Op    Plan:     During this hospitalization, patient to be seen 5 x/week to address the identified rehab impairments via therapeutic activities, therapeutic exercises, wheelchair management/training and progress toward the following goals:    Plan of Care Expires:  10/26/22    Subjective     Chief Complaint: Pain at initial contact however, reports decreased /p meds and time.  Patient/Family Comments/goals: Pt agreeable to therapy eval and treatment.  Pain/Comfort:  Pain Rating 1: 7/10  Location - Side 1: Right (residual limb)  Location 1: other (see comments)  Pain Addressed 1: Reposition, Pre-medicate for activity    Patients cultural, spiritual, Amish conflicts given the current situation: no    Living Environment:  PTA pt lived with his son and daughter in an apartment.  Prior to admission, patients level of function was mod I.  Equipment used at home: wheelchair, shower chair, bedside commode.  DME owned (not currently used): rolling walker.  Upon discharge, patient will have assistance from facility/family.    Objective:     Communicated with nurseCrystal prior to session.  Patient found supine with bed alarm, telemetry  upon PT entry to room.    General Precautions: Standard,     Orthopedic  Precautions:RLE non weight bearing   Braces: N/A  Respiratory Status: Room air    Exams:  Skin Integrity/Edema:      -       (R) incision site with bloody discharge however intact;ortho presented to change dressing.  RLE ROM: WFL except AKA  RLE Strength: WFL except AKA  LLE ROM: WFL except AKA  LLE Strength: WFL except AKA    Functional Mobility:  Bed Mobility:     Supine to Sit: minimum assistance  Transfers:     Bed to Chair: maximal assistance and of 2 persons with  lift to back into wheelchair from bed  using  TA x2  Balance: Good static sitting/Fair to Fair- dynamic sitting.  Pt with LOB posteriorly x1 requiring max A to recover.      AM-PAC 6 CLICK MOBILITY  Total Score:11       Treatment & Education:  Educated on role of PT and POC    Patient left  in wheelchair  with call button in reach, nurse notified, and set up with lunch tray and assistance to cut up chicken.    GOALS:   Multidisciplinary Problems       Physical Therapy Goals          Problem: Physical Therapy    Goal Priority Disciplines Outcome Goal Variances Interventions   Physical Therapy Goal     PT, PT/OT Ongoing, Progressing     Description: Goals to be met by: 10/26/22     Patient will increase functional independence with mobility by performin. Pt to be supervision with bed mobility.  2. Pt to transfer with SBA.  3. Pt to be (I) with wheelchair mobility 150'.                         History:     Past Medical History:   Diagnosis Date    CAD (coronary artery disease)     CAD (non obstructive)  [facility]    Diabetes mellitus type I     Diabetic retinopathy     ESRD on hemodialysis     on HD TThSat    Gangrene of left foot     Hypertension     Nuclear sclerosis of right eye 2021    PAD (peripheral artery disease)     PEA (Pulseless electrical activity) 10/10/2022    Pulmonary HTN     Right foot infection     TB lung, latent 2021    Wet gangrene 2022       Past Surgical History:   Procedure Laterality Date    ABOVE-KNEE  AMPUTATION Left 1/18/2022    Procedure: AMPUTATION, ABOVE KNEE;  Surgeon: Rusty Light MD;  Location: [facility] OR;  Service: Orthopedics;  Laterality: Left;    ABOVE-KNEE AMPUTATION Left 1/21/2022    Procedure: AMPUTATION, ABOVE KNEE;  Surgeon: Rusty Light MD;  Location: [facility] OR;  Service: Orthopedics;  Laterality: Left;    ABOVE-KNEE AMPUTATION Right 10/13/2022    Procedure: AMPUTATION, ABOVE KNEE, RIGHT;  Surgeon: Dimitris Davis MD;  Location: [facility] OR;  Service: Orthopedics;  Laterality: Right;    AV FISTULA PLACEMENT      CATARACT EXTRACTION Left     EYE SURGERY      FISTULOGRAM Left 8/9/2022    Procedure: Fistulogram;  Surgeon: Masoud Soni MD;  Location: [facility] OR;  Service: Vascular;  Laterality: Left;  LEFT VM ON DAUGHTER'S PHONE----PHONE PREOP NOT COMPLETED  CALLED PATIENT ON 8/8/2022 @ 3:34. HE STATED HE IS RESCHEDULING PROCEDURE. NOTIFIED ERWIN @ 3:35PM-LO    TOE AMPUTATION Left 1/6/2022    Procedure: AMPUTATION, TOE;  Surgeon: Masoud Soni MD;  Location: [facility] OR;  Service: Vascular;  Laterality: Left;  Left first through fifth toes, possible open transmetatarsal amputation and all other indicated procedures       Time Tracking:     PT Received On: 10/19/22  PT Start Time: 1143     PT Stop Time: 1210  PT Total Time (min): 27 min     Billable Minutes: Evaluation 15 and Therapeutic Activity 12      10/19/2022

## 2022-10-19 NOTE — PLAN OF CARE
Problem: Physical Therapy  Goal: Physical Therapy Goal  Description: Goals to be met by: 10/26/22     Patient will increase functional independence with mobility by performin. Pt to be supervision with bed mobility.  2. Pt to transfer with SBA.  3. Pt to be (I) with wheelchair mobility 150'.    Outcome: Ongoing, Progressing   Initial eval completed.  See in chart for details.

## 2022-10-19 NOTE — PLAN OF CARE
Problem: Adult Inpatient Plan of Care  Goal: Plan of Care Review  Outcome: Ongoing, Progressing  Goal: Patient-Specific Goal (Individualized)  Outcome: Ongoing, Progressing  Goal: Absence of Hospital-Acquired Illness or Injury  Outcome: Ongoing, Progressing  Intervention: Identify and Manage Fall Risk  Flowsheets (Taken 10/19/2022 0634)  Safety Promotion/Fall Prevention:   assistive device/personal item within reach   side rails raised x 2  Intervention: Prevent Skin Injury  Flowsheets (Taken 10/19/2022 0634)  Skin Protection: incontinence pads utilized  Intervention: Prevent and Manage VTE (Venous Thromboembolism) Risk  Flowsheets (Taken 10/19/2022 0634)  Activity Management: Rolling - L1  Range of Motion: ROM (range of motion) performed  Intervention: Prevent Infection  Flowsheets (Taken 10/19/2022 0634)  Infection Prevention:   single patient room provided   rest/sleep promoted  Goal: Optimal Comfort and Wellbeing  Outcome: Ongoing, Progressing  Intervention: Monitor Pain and Promote Comfort  Flowsheets (Taken 10/19/2022 0634)  Pain Management Interventions:   relaxation techniques promoted   quiet environment facilitated  Intervention: Provide Person-Centered Care  Flowsheets (Taken 10/19/2022 0634)  Trust Relationship/Rapport:   care explained   choices provided   emotional support provided   empathic listening provided  Goal: Readiness for Transition of Care  Outcome: Ongoing, Progressing     Problem: Fall Injury Risk  Goal: Absence of Fall and Fall-Related Injury  Outcome: Ongoing, Progressing  Intervention: Identify and Manage Contributors  Flowsheets (Taken 10/19/2022 0634)  Self-Care Promotion:   independence encouraged   BADL personal objects within reach   BADL personal routines maintained  Medication Review/Management: medications reviewed  Intervention: Promote Injury-Free Environment  Flowsheets (Taken 10/19/2022 0634)  Safety Promotion/Fall Prevention:   assistive device/personal item within  reach   side rails raised x 2     Problem: Diabetes Comorbidity  Goal: Blood Glucose Level Within Targeted Range  Outcome: Ongoing, Progressing  Intervention: Monitor and Manage Glycemia  Flowsheets (Taken 10/19/2022 0634)  Glycemic Management: blood glucose monitored     Problem: Infection  Goal: Absence of Infection Signs and Symptoms  Outcome: Ongoing, Progressing  Intervention: Prevent or Manage Infection  Flowsheets (Taken 10/19/2022 0634)  Infection Management: aseptic technique maintained

## 2022-10-19 NOTE — ASSESSMENT & PLAN NOTE
S/p L AKA then presented with PAD causing R foot wound. Now s/p R AKA on 10/13  - previously on antibiotics. These were discontinued when source control achieved.   - continue asa, statin   Pending NH placement.

## 2022-10-20 ENCOUNTER — ANESTHESIA EVENT (OUTPATIENT)
Dept: ANESTHESIOLOGY | Facility: HOSPITAL | Age: 57
DRG: 239 | End: 2022-10-20
Payer: MEDICARE

## 2022-10-20 ENCOUNTER — ANESTHESIA (OUTPATIENT)
Dept: ANESTHESIOLOGY | Facility: HOSPITAL | Age: 57
DRG: 239 | End: 2022-10-20
Payer: MEDICARE

## 2022-10-20 LAB
POCT GLUCOSE: 146 MG/DL (ref 70–110)
POCT GLUCOSE: 173 MG/DL (ref 70–110)
POCT GLUCOSE: 207 MG/DL (ref 70–110)
TB INDURATION 48 - 72 HR READ: 0 MM

## 2022-10-20 PROCEDURE — 25500020 PHARM REV CODE 255: Performed by: HOSPITALIST

## 2022-10-20 PROCEDURE — 25000003 PHARM REV CODE 250: Performed by: HOSPITALIST

## 2022-10-20 PROCEDURE — D9220A PRA ANESTHESIA: Mod: CRNA,,, | Performed by: NURSE ANESTHETIST, CERTIFIED REGISTERED

## 2022-10-20 PROCEDURE — 25000003 PHARM REV CODE 250: Performed by: ORTHOPAEDIC SURGERY

## 2022-10-20 PROCEDURE — 99152 MOD SED SAME PHYS/QHP 5/>YRS: CPT | Mod: ,,, | Performed by: SURGERY

## 2022-10-20 PROCEDURE — 63600175 PHARM REV CODE 636 W HCPCS: Performed by: NURSE ANESTHETIST, CERTIFIED REGISTERED

## 2022-10-20 PROCEDURE — 63600175 PHARM REV CODE 636 W HCPCS: Performed by: SURGERY

## 2022-10-20 PROCEDURE — 37000008 HC ANESTHESIA 1ST 15 MINUTES: Performed by: SURGERY

## 2022-10-20 PROCEDURE — 36902 INTRO CATH DIALYSIS CIRCUIT: CPT | Performed by: SURGERY

## 2022-10-20 PROCEDURE — 36902 INTRO CATH DIALYSIS CIRCUIT: CPT | Mod: ,,, | Performed by: SURGERY

## 2022-10-20 PROCEDURE — 99152 PR MOD CONSCIOUS SEDATION, SAME PHYS, 5+ YRS, FIRST 15 MIN: ICD-10-PCS | Mod: ,,, | Performed by: SURGERY

## 2022-10-20 PROCEDURE — 21400001 HC TELEMETRY ROOM

## 2022-10-20 PROCEDURE — D9220A PRA ANESTHESIA: ICD-10-PCS | Mod: CRNA,,, | Performed by: NURSE ANESTHETIST, CERTIFIED REGISTERED

## 2022-10-20 PROCEDURE — 25000003 PHARM REV CODE 250: Performed by: INTERNAL MEDICINE

## 2022-10-20 PROCEDURE — 36902 PR INTRO CATH, DIALYSIS CIRCUIT W/TRANSLML BALLOON ANGIO: ICD-10-PCS | Mod: ,,, | Performed by: SURGERY

## 2022-10-20 PROCEDURE — 36907 BALO ANGIOP CTR DIALYSIS SEG: CPT | Performed by: SURGERY

## 2022-10-20 PROCEDURE — 25000003 PHARM REV CODE 250: Performed by: NURSE ANESTHETIST, CERTIFIED REGISTERED

## 2022-10-20 PROCEDURE — 37000009 HC ANESTHESIA EA ADD 15 MINS: Performed by: SURGERY

## 2022-10-20 PROCEDURE — 36907 PR TRANSLML BALLOON ANGIO, CTR DIALYSIS SEGMENT THRU DIALYSIS CIRCUIT: ICD-10-PCS | Mod: ,,, | Performed by: SURGERY

## 2022-10-20 PROCEDURE — 36907 BALO ANGIOP CTR DIALYSIS SEG: CPT | Mod: ,,, | Performed by: SURGERY

## 2022-10-20 PROCEDURE — D9220A PRA ANESTHESIA: Mod: ANES,,, | Performed by: ANESTHESIOLOGY

## 2022-10-20 PROCEDURE — D9220A PRA ANESTHESIA: ICD-10-PCS | Mod: ANES,,, | Performed by: ANESTHESIOLOGY

## 2022-10-20 RX ORDER — FENTANYL CITRATE 50 UG/ML
INJECTION, SOLUTION INTRAMUSCULAR; INTRAVENOUS
Status: DISCONTINUED | OUTPATIENT
Start: 2022-10-20 | End: 2022-10-20

## 2022-10-20 RX ORDER — MIDAZOLAM HYDROCHLORIDE 1 MG/ML
INJECTION, SOLUTION INTRAMUSCULAR; INTRAVENOUS
Status: DISCONTINUED | OUTPATIENT
Start: 2022-10-20 | End: 2022-10-20

## 2022-10-20 RX ORDER — HEPARIN SODIUM 1000 [USP'U]/ML
INJECTION, SOLUTION INTRAVENOUS; SUBCUTANEOUS
Status: COMPLETED | OUTPATIENT
Start: 2022-10-20 | End: 2022-10-20

## 2022-10-20 RX ADMIN — IOHEXOL 70 ML: 350 INJECTION, SOLUTION INTRAVENOUS at 01:10

## 2022-10-20 RX ADMIN — INSULIN DETEMIR 12 UNITS: 100 INJECTION, SOLUTION SUBCUTANEOUS at 09:10

## 2022-10-20 RX ADMIN — HEPARIN SODIUM 3000 UNITS: 1000 INJECTION, SOLUTION INTRAVENOUS; SUBCUTANEOUS at 11:10

## 2022-10-20 RX ADMIN — MIDAZOLAM HYDROCHLORIDE 0.5 MG: 1 INJECTION, SOLUTION INTRAMUSCULAR; INTRAVENOUS at 11:10

## 2022-10-20 RX ADMIN — METOPROLOL SUCCINATE 25 MG: 25 TABLET, EXTENDED RELEASE ORAL at 10:10

## 2022-10-20 RX ADMIN — ASPIRIN 81 MG CHEWABLE TABLET 81 MG: 81 TABLET CHEWABLE at 10:10

## 2022-10-20 RX ADMIN — AMLODIPINE BESYLATE 10 MG: 5 TABLET ORAL at 10:10

## 2022-10-20 RX ADMIN — SODIUM CHLORIDE: 0.9 INJECTION, SOLUTION INTRAVENOUS at 11:10

## 2022-10-20 RX ADMIN — ATORVASTATIN CALCIUM 40 MG: 40 TABLET, FILM COATED ORAL at 09:10

## 2022-10-20 RX ADMIN — PANTOPRAZOLE SODIUM 40 MG: 40 TABLET, DELAYED RELEASE ORAL at 10:10

## 2022-10-20 RX ADMIN — Medication 1 TABLET: at 10:10

## 2022-10-20 RX ADMIN — OXYCODONE HYDROCHLORIDE AND ACETAMINOPHEN 1000 MG: 500 TABLET ORAL at 10:10

## 2022-10-20 RX ADMIN — HYDROCODONE BITARTRATE AND ACETAMINOPHEN 1 TABLET: 5; 325 TABLET ORAL at 07:10

## 2022-10-20 RX ADMIN — FENTANYL CITRATE 25 MCG: 50 INJECTION, SOLUTION INTRAMUSCULAR; INTRAVENOUS at 11:10

## 2022-10-20 NOTE — PROGRESS NOTES
[facility] Medicine  Progress Note    Patient Name: Adryan Neff  MRN: [id number]  Patient Class: IP- Inpatient   Admission Date: 10/6/2022  Length of Stay: 14 days  Attending Physician: Venkat Strauss MD  Primary Care Provider: Carola Pedro MD        Subjective:     Principal Problem:PAD (peripheral artery disease)        HPI:  This is a 56 year old male with a PMHx of severe PAD s/p gangrene of left foot s/p left AKA (1/2022), ESRD on HD MWF, NICM (EF: 55%, GIDD), pulmonary hypertension, GERD and latent TB filiberto presents for concern for leg infection.       The patient presented to the vascular surgery clinic for a follow up. He reports worsening pain and developing eschar of the right dorsal foot along with foul smelling odor. Given concern for an infection and the need for IV antibiotics & expedited podiatry evaluation, the patient was sent to the ER. The patient denies having any fevers, chills, or injury to his foot. He has a previous AKA and uses a wheelchair to ambulate. While in the ED, the patient was hypertensive but otherwise hemodynamically stable. Labs showed leukocytosis (17.41 - with neutrophilic predominance), normocytic anemia (9.7), elevated sed rate (125), elevated CRP (237.2), negative lactate (1.6). X-ray foot showed no radiographic evidence of osteomyelitis. He was administered vancomycin, zosyn and was admitted for further management.     I attempted to call the daughter to obtain further history on 1902 without a response.         Overview/Hospital Course:  Mr Adryan Neff is a 56 y.o. man with PAD. He is s/p L AKA due to gangrene and now presents with worsening R foot infection. He also has ESRD on HD via LUE AVG. Started antibiotics. Podiatry and Ortho consulted with plans for amputation. This was delayed due to PEA on 10/10 with quick ROSC. Unclear etiology but suspect vasovagal event. Cardiology was consulted. He then had R AKA on 10/13/22. Antibiotics were  discontinued. He has had malfunctioning of his LUE AVG; Vascular was consulted and fistulogram performed.  SW/CM consulted for discharge planning.  Working on NH placement.      Interval History: about the same.    Review of Systems   HENT:  Negative for ear discharge and ear pain.    Eyes:  Negative for discharge and itching.   Endocrine: Negative for cold intolerance and heat intolerance.   Neurological:  Negative for seizures and syncope.   Objective:     Vital Signs (Most Recent):  Temp: 99 °F (37.2 °C) (10/20/22 1331)  Pulse: 92 (10/20/22 1331)  Resp: 16 (10/20/22 1331)  BP: 126/70 (10/20/22 1331)  SpO2: 100 % (10/20/22 1331)   Vital Signs (24h Range):  Temp:  [98 °F (36.7 °C)-99 °F (37.2 °C)] 99 °F (37.2 °C)  Pulse:  [] 92  Resp:  [16-19] 16  SpO2:  [98 %-100 %] 100 %  BP: ()/(53-91) 126/70     Weight: 58.1 kg (128 lb 1.4 oz)  Body mass index is 17.86 kg/m².    Intake/Output Summary (Last 24 hours) at 10/20/2022 1515  Last data filed at 10/19/2022 2015  Gross per 24 hour   Intake 700 ml   Output 2326 ml   Net -1626 ml        Physical Exam  Vitals and nursing note reviewed.   Constitutional:       Appearance: He is ill-appearing. He is not toxic-appearing.   HENT:      Head: Normocephalic and atraumatic.      Nose: Nose normal.      Mouth/Throat:      Mouth: Mucous membranes are moist.   Cardiovascular:      Rate and Rhythm: Normal rate and regular rhythm.      Heart sounds: Normal heart sounds. No murmur heard.    No gallop.   Pulmonary:      Effort: Pulmonary effort is normal. No respiratory distress.      Breath sounds: No wheezing or rales.   Abdominal:      General: Bowel sounds are normal. There is no distension.      Palpations: Abdomen is soft.      Tenderness: There is no abdominal tenderness. There is no guarding.   Musculoskeletal:      Comments: Right AKA dressing in place. L AKA also present   Skin:     Comments: LUE AVG with thrill   Neurological:      Mental Status: He is alert.        Significant Labs: All pertinent labs within the past 24 hours have been reviewed.  BMP:   Recent Labs   Lab 10/19/22  0025   *   *   K 5.0   CL 91*   CO2 27   BUN 51*   CREATININE 10.1*   CALCIUM 8.7       CBC:   Recent Labs   Lab 10/19/22  0025   WBC 19.25*   HGB 7.1*   HCT 22.5*   *         Significant Imaging: I have reviewed all pertinent imaging results/findings within the past 24 hours.      Assessment/Plan:      * PAD (peripheral artery disease)  S/p L AKA then presented with PAD causing R foot wound. Now s/p R AKA on 10/13  - previously on antibiotics. These were discontinued when source control achieved.   - continue asa, statin   Pending NH placement.    AV graft malfunction  Vascular following.  S/p fistulogram      Cognitive changes  Noted by Psychiatry that he has had multiple episodes of confusion and not understanding treatment plan. Unclear if this is delirium associated or beginnings of cognitive dysfunction. Recommend outpatient evaluation.       Anemia due to chronic kidney disease, on chronic dialysis  - Anemic - has been transfused this admission pre and post surgery  - Continue EPO per Nephrology  - Hgb stable. Monitor with CBC in AM        ESRD on hemodialysis  Dialyzes via LUE AVG  There is concern about AVG malfunction. Vascular surgery consulted  S/p fistulogram.  Nephrology following       CAD (coronary artery disease) - non-obstructive from [facility] in 2016  - Resume aspirin, statin       Chronic diastolic heart failure  - Echocardiogram (11/24/2021): EF: 55%, GIDD, mild TR, moderate to severe MR  - no signs of acute exacerbation  - Continue home medications     Gastroesophageal reflux disease  - Coninue PPI     Pure hypercholesterolemia  - Continue statin     Essential (primary) hypertension  - Continue home medications    Controlled type 2 diabetes mellitus with chronic kidney disease on chronic dialysis, with long-term current use of insulin  - Last A1c:   Lab  Results   Component Value Date    HGBA1C 6.1 (H) 01/07/2021     Continue current management.  - Low-sensitivity sliding scale insulin  - Initiate and maintain glycemic protocol, monitor POC glucose   - Follow up with PCP as outpatient      VTE Risk Mitigation (From admission, onward)         Ordered     IP VTE HIGH RISK PATIENT  Once         10/06/22 1819     Place sequential compression device  Until discontinued         10/06/22 1819                Discharge Planning   ENIO:      Code Status: Full Code   Is the patient medically ready for discharge?:     Reason for patient still in hospital (select all that apply): Pending disposition  Discharge Plan A: New Nursing Home placement - detention care facility   Discharge Delays: (!) Patient and Family Barriers              Venkat Strauss MD  Department of Hospital Medicine   [facility] - Telemetry

## 2022-10-20 NOTE — SEDATION DOCUMENTATION
Patient is under the care of anesthesia for this procedure. Refer to anesthesia documentation for vitals, medications and assessments.

## 2022-10-20 NOTE — ASSESSMENT & PLAN NOTE
Dialyzes via LUE AVG  There is concern about AVG malfunction. Vascular surgery consulted  S/p fistulogram.  Nephrology following

## 2022-10-20 NOTE — ANESTHESIA PREPROCEDURE EVALUATION
10/20/2022  Adryan Neff is a 56 y.o., male.  To undergo Procedure(s) (LRB):  Fistulogram (Left)     Denies CP/SOB/GERD/MI/CVA/URI symptoms.  METS > 4  NPO > 8    Past Medical History:  Past Medical History:   Diagnosis Date    CAD (coronary artery disease) 2016    CAD (non obstructive) 2016 [facility]    Diabetes mellitus type I     Diabetic retinopathy     ESRD on hemodialysis     on HD TThSat    Gangrene of left foot     Hypertension     Nuclear sclerosis of right eye 11/24/2021    PAD (peripheral artery disease)     PEA (Pulseless electrical activity) 10/10/2022    Pulmonary HTN     Right foot infection     TB lung, latent 04/2021    Wet gangrene 1/5/2022       Past Surgical History:  Past Surgical History:   Procedure Laterality Date    ABOVE-KNEE AMPUTATION Left 1/18/2022    Procedure: AMPUTATION, ABOVE KNEE;  Surgeon: Rusty Light MD;  Location: [facility] OR;  Service: Orthopedics;  Laterality: Left;    ABOVE-KNEE AMPUTATION Left 1/21/2022    Procedure: AMPUTATION, ABOVE KNEE;  Surgeon: Rusty Light MD;  Location: [facility] OR;  Service: Orthopedics;  Laterality: Left;    ABOVE-KNEE AMPUTATION Right 10/13/2022    Procedure: AMPUTATION, ABOVE KNEE, RIGHT;  Surgeon: Dimitris Davis MD;  Location: [facility] OR;  Service: Orthopedics;  Laterality: Right;    AV FISTULA PLACEMENT      CATARACT EXTRACTION Left     EYE SURGERY      FISTULOGRAM Left 8/9/2022    Procedure: Fistulogram;  Surgeon: Masoud Soni MD;  Location: [facility] OR;  Service: Vascular;  Laterality: Left;  LEFT VM ON DAUGHTER'S PHONE----PHONE PREOP NOT COMPLETED  CALLED PATIENT ON 8/8/2022 @ 3:34. HE STATED HE IS RESCHEDULING PROCEDURE. NOTIFIED ERWIN @ 3:35PM-LO    TOE AMPUTATION Left 1/6/2022    Procedure: AMPUTATION, TOE;  Surgeon: Masoud Soni MD;  Location: [facility] OR;  Service: Vascular;  Laterality: Left;  Left first  through fifth toes, possible open transmetatarsal amputation and all other indicated procedures       Social History:  Social History     Socioeconomic History    Marital status: Single    Number of children: 5   Tobacco Use    Smoking status: Never    Smokeless tobacco: Never   Substance and Sexual Activity    Alcohol use: No    Drug use: No    Sexual activity: Yes     Partners: Female   Social History Narrative    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     Social Determinants of Health     Financial Resource Strain: Low Risk     Difficulty of Paying Living Expenses: Not very hard   Food Insecurity: No Food Insecurity    Worried About Running Out of Food in the Last Year: Never true    Ran Out of Food in the Last Year: Never true   Transportation Needs: No Transportation Needs    Lack of Transportation (Medical): No    Lack of Transportation (Non-Medical): No   Physical Activity: Inactive    Days of Exercise per Week: 0 days    Minutes of Exercise per Session: 0 min   Stress: Stress Concern Present    Feeling of Stress : Rather much   Social Connections: Socially Isolated    Frequency of Communication with Friends and Family: More than three times a week    Frequency of Social Gatherings with Friends and Family: More than three times a week    Attends Rastafari Services: Never    Active Member of Clubs or Organizations: No    Attends Club or Organization Meetings: Never    Marital Status: Never    Housing Stability: Low Risk     Unable to Pay for Housing in the Last Year: No    Number of Places Lived in the Last Year: 1    Unstable Housing in the Last Year: No       Medications:  No current facility-administered medications on file prior to encounter.     Current Outpatient Medications on File Prior to Encounter   Medication Sig Dispense Refill    traMADoL (ULTRAM) 50 mg tablet Take 50 mg by mouth every 6 (six) hours as needed for Pain.      amLODIPine (NORVASC) 10 MG tablet Take 1 tablet (10 mg total)  "by mouth once daily. 30 tablet 11    amLODIPine (NORVASC) 10 MG tablet Take 1 tablet by mouth.      aspirin 81 MG Chew Take 81 mg by mouth once daily.      aspirin 81 MG Chew Take 1 tablet by mouth.      atorvastatin (LIPITOR) 20 MG tablet Take 1 tablet by mouth.      clopidogreL (PLAVIX) 75 mg tablet Take 75 mg by mouth once daily.      doxycycline (VIBRAMYCIN) 100 MG Cap Take 1 capsule (100 mg total) by mouth every 12 (twelve) hours. 20 capsule 0    insulin glargine 100 units/mL (3mL) SubQ pen Inject 15 Units into the skin every evening. 3 mL 11    insulin glargine 100 units/mL (3mL) SubQ pen Inject 15 Units into the skin every evening. 3 mL 11    lancets (ACCU-CHEK SOFTCLIX LANCETS) Misc 1 each by Misc.(Non-Drug; Combo Route) route once daily at 6am. 200 each 1    methoxy peg-epoetin beta (MIRCERA INJ) 50 mcg.      methoxy peg-epoetin beta (MIRCERA INJ) 30 mcg.      methoxy peg-epoetin beta (MIRCERA INJ) 75 mcg.      metoprolol succinate (TOPROL-XL) 25 MG 24 hr tablet Take 1 tablet by mouth.      omeprazole (PRILOSEC) 20 MG capsule TAKE 2 CAPSULES(40 MG) BY MOUTH EVERY DAY (Patient taking differently: Take 20 mg by mouth once daily.) 180 capsule 0    omeprazole (PRILOSEC) 40 MG capsule Take by mouth.      omeprazole (PRILOSEC) 40 MG capsule Take 40 mg by mouth once daily.      pen needle, diabetic 32 gauge x 5/32" Ndle To use with insulin pen 4x daily 100 each 11    pravastatin (PRAVACHOL) 20 MG tablet Take 20 mg by mouth once daily.      RENVELA 800 mg Tab Take 800 mg by mouth 3 (three) times daily with meals.      sevelamer carbonate (RENVELA) 800 mg Tab Take 2 tablets by mouth.      traMADoL (ULTRAM) 50 mg tablet Take 1 tablet by mouth.         Allergies:  Review of patient's allergies indicates:  No Known Allergies    Active Problems:  Patient Active Problem List   Diagnosis    Controlled type 2 diabetes mellitus with chronic kidney disease on chronic dialysis, with long-term current use " of insulin    Essential (primary) hypertension    Pure hypercholesterolemia    Benign hypertension with chronic kidney disease, stage V    Other insomnia    Gastroesophageal reflux disease    Intractable vomiting    Chronic diastolic heart failure    CAD (coronary artery disease) - [facility]obstruct[facility] in 2016    Hyperkalemia    Pre-transplant evaluation for kidney transplant    Chronic pulmonary heart disease    HLD (hyperlipidemia)    PDR (proliferative diabetic retinopathy)    Secondary hyperparathyroidism of renal origin    ESRD on hemodialysis    TB lung, latent    Paronychia, toe, left    Nuclear sclerosis of right eye    Pseudophakia    COVID-19 in immunocompromised patient    Encephalopathy, metabolic    Unilateral complete BKA, left, initial encounter    PAD (peripheral artery disease)    Anemia due to chronic kidney disease, on chronic dialysis    Cognitive changes    AV graft malfunction       Diagnostic Studies:   Latest Reference Range & Units 10/19/22 00:25   WBC 3.90 - 12.70 K/uL 19.25 (H)   RBC 4.60 - 6.20 M/uL 2.46 (L)   Hemoglobin 14.0 - 18.0 g/dL 7.1 (L)   Hematocrit 40.0 - 54.0 % 22.5 (L)   MCV 82 - 98 fL 92   MCH 27.0 - 31.0 pg 28.9   MCHC 32.0 - 36.0 g/dL 31.6 (L)   RDW 11.5 - 14.5 % 14.6 (H)   Platelets 150 - 450 K/uL 467 (H)   MPV 9.2 - 12.9 fL 8.8 (L)   Gran % 38.0 - 73.0 % 80.0 (H)   Lymph % 18.0 - 48.0 % 8.8 (L)   Mono % 4.0 - 15.0 % 6.6   Eosinophil % 0.0 - 8.0 % 3.4   Basophil % 0.0 - 1.9 % 0.5   Immature Granulocytes 0.0 - 0.5 % 0.7 (H)   Gran # (ANC) 1.8 - 7.7 K/uL 15.4 (H)   Lymph # 1.0 - 4.8 K/uL 1.7   Mono # 0.3 - 1.0 K/uL 1.3 (H)   Eos # 0.0 - 0.5 K/uL 0.7 (H)   Baso # 0.00 - 0.20 K/uL 0.09   Immature Grans (Abs) 0.00 - 0.04 K/uL 0.13 (H)   nRBC 0 /100 WBC 0   Differential Method  Automated      Latest Reference Range & Units 10/19/22 00:25   Sodium 136 - 145 mmol/L 132 (L)   Potassium 3.5 - 5.1 mmol/L 5.0   Chloride 95 - 110 mmol/L 91 (L)   CO2 23 -  29 mmol/L 27   Anion Gap 8 - 16 mmol/L 14   BUN 6 - 20 mg/dL 51 (H)   Creatinine 0.5 - 1.4 mg/dL 10.1 (H)   eGFR >60 mL/min/1.73 m^2 5 !   Glucose 70 - 110 mg/dL 140 (H)   Calcium 8.7 - 10.5 mg/dL 8.7     EKG (10/13/22):  Normal sinus rhythm   Right ventricular conduction delay   Nonspecific ST and T wave abnormality   Prolonged QT     TTE (10/10/22):   The left ventricle is normal in size with mild concentric hypertrophy and mildly decreased systolic function.   The estimated ejection fraction is 40%.   There is moderate left ventricular global hypokinesis.   Grade II left ventricular diastolic dysfunction.   Normal right ventricular size with normal right ventricular systolic function.   Mild-to-moderate mitral regurgitation.    24 Hour Vitals:  Temp:  [36.7 °C (98 °F)-37.2 °C (98.9 °F)] 37.2 °C (98.9 °F)  Pulse:  [] 94  Resp:  [16-19] 19  SpO2:  [98 %-100 %] 100 %  BP: ()/(53-91) 142/83   See Nursing Charting For Additional Vitals      Pre-op Assessment    I have reviewed the Patient Summary Reports.     I have reviewed the Nursing Notes.       Review of Systems  Anesthesia Hx:  No problems with previous Anesthesia   Denies Personal Hx of Anesthesia complications.   Social:  Non-Smoker, No Alcohol Use    Hematology/Oncology:         -- Anemia:   Cardiovascular:   Exercise tolerance: poor Hypertension CAD   CHF PVD hyperlipidemia ECG has been reviewed.    Pulmonary:  Pulmonary Normal    Renal/:   Chronic Renal Disease, ESRD    Hepatic/GI:  Hepatic/GI Normal    Neurological:  Neurology Normal    Endocrine:   Diabetes        Physical Exam  General: Well nourished    Airway:  Mallampati: III   Mouth Opening: Normal  TM Distance: Normal      Dental:  Intact    Chest/Lungs:  Clear to auscultation, Normal Respiratory Rate    Heart:  Rate: Normal  Rhythm: Regular Rhythm        Anesthesia Plan  Type of Anesthesia, risks & benefits discussed:    Anesthesia Type: MAC  Intra-op Monitoring Plan: Standard  ASA Monitors  Post Op Pain Control Plan: multimodal analgesia and IV/PO Opioids PRN  Induction:  IV  Informed Consent: Informed consent signed with the Patient and all parties understand the risks and agree with anesthesia plan.  All questions answered.   ASA Score: 3    Ready For Surgery From Anesthesia Perspective.     .

## 2022-10-20 NOTE — PLAN OF CARE
Problem: Adult Inpatient Plan of Care  Goal: Plan of Care Review  Flowsheets (Taken 10/20/2022 1845)  Plan of Care Reviewed With: patient     Problem: Fall Injury Risk  Goal: Absence of Fall and Fall-Related Injury  Intervention: Identify and Manage Contributors  Flowsheets (Taken 10/20/2022 1845)  Self-Care Promotion:   independence encouraged   BADL personal objects within reach   BADL personal routines maintained   safe use of adaptive equipment encouraged   meal set-up provided  Medication Review/Management: medications reviewed     Problem: Diabetes Comorbidity  Goal: Blood Glucose Level Within Targeted Range  Intervention: Monitor and Manage Glycemia  Flowsheets (Taken 10/20/2022 1845)  Glycemic Management:   blood glucose monitored   supplemental insulin given     Problem: Pain Acute  Goal: Acceptable Pain Control and Functional Ability  Intervention: Develop Pain Management Plan  Flowsheets (Taken 10/20/2022 1845)  Pain Management Interventions:   care clustered   pillow support provided   pain management plan reviewed with patient/caregiver  Intervention: Prevent or Manage Pain  Flowsheets (Taken 10/20/2022 1845)  Sleep/Rest Enhancement:   awakenings minimized   regular sleep/rest pattern promoted

## 2022-10-20 NOTE — SUBJECTIVE & OBJECTIVE
Interval History: about the same.    Review of Systems   HENT:  Negative for ear discharge and ear pain.    Eyes:  Negative for discharge and itching.   Endocrine: Negative for cold intolerance and heat intolerance.   Neurological:  Negative for seizures and syncope.   Objective:     Vital Signs (Most Recent):  Temp: 99 °F (37.2 °C) (10/20/22 1331)  Pulse: 92 (10/20/22 1331)  Resp: 16 (10/20/22 1331)  BP: 126/70 (10/20/22 1331)  SpO2: 100 % (10/20/22 1331)   Vital Signs (24h Range):  Temp:  [98 °F (36.7 °C)-99 °F (37.2 °C)] 99 °F (37.2 °C)  Pulse:  [] 92  Resp:  [16-19] 16  SpO2:  [98 %-100 %] 100 %  BP: ()/(53-91) 126/70     Weight: 58.1 kg (128 lb 1.4 oz)  Body mass index is 17.86 kg/m².    Intake/Output Summary (Last 24 hours) at 10/20/2022 1515  Last data filed at 10/19/2022 2015  Gross per 24 hour   Intake 700 ml   Output 2326 ml   Net -1626 ml        Physical Exam  Vitals and nursing note reviewed.   Constitutional:       Appearance: He is ill-appearing. He is not toxic-appearing.   HENT:      Head: Normocephalic and atraumatic.      Nose: Nose normal.      Mouth/Throat:      Mouth: Mucous membranes are moist.   Cardiovascular:      Rate and Rhythm: Normal rate and regular rhythm.      Heart sounds: Normal heart sounds. No murmur heard.    No gallop.   Pulmonary:      Effort: Pulmonary effort is normal. No respiratory distress.      Breath sounds: No wheezing or rales.   Abdominal:      General: Bowel sounds are normal. There is no distension.      Palpations: Abdomen is soft.      Tenderness: There is no abdominal tenderness. There is no guarding.   Musculoskeletal:      Comments: Right AKA dressing in place. L AKA also present   Skin:     Comments: LUE AVG with thrill   Neurological:      Mental Status: He is alert.       Significant Labs: All pertinent labs within the past 24 hours have been reviewed.  BMP:   Recent Labs   Lab 10/19/22  0025   *   *   K 5.0   CL 91*   CO2 27   BUN  51*   CREATININE 10.1*   CALCIUM 8.7       CBC:   Recent Labs   Lab 10/19/22  0025   WBC 19.25*   HGB 7.1*   HCT 22.5*   *         Significant Imaging: I have reviewed all pertinent imaging results/findings within the past 24 hours.

## 2022-10-20 NOTE — PT/OT/SLP PROGRESS
Physical Therapy      Patient Name:  Adryan Neff   MRN:  [id number]    Patient not seen today secondary to Other (Comment) (IR to check fistula). Will follow-up tomorrow.

## 2022-10-20 NOTE — INTERVAL H&P NOTE
The patient has been examined and the H&P has been reviewed:    I concur with the findings and changes have occurred since H&P was written.  Patient does not have capacity to make medical decisions per Psychiatry.  Risks and benefits d/w daughter who states understanding and desires to proceed with fistulogram/possible intervention.    Surgery risks, benefits and alternative options discussed and understood by patient/family.    Mallampati Scale Class 2   ASA Classification Class III           Active Hospital Problems    Diagnosis  POA    *PAD (peripheral artery disease) [I73.9]  Yes    Cognitive changes [R41.89]  Yes    AV graft malfunction [T82.590A]  Yes    Anemia due to chronic kidney disease, on chronic dialysis [N18.6, D63.1, Z99.2]  Not Applicable    ESRD on hemodialysis [N18.6, Z99.2]  Not Applicable     on HD TThSat      Chronic diastolic heart failure [I50.32]  Yes    Gastroesophageal reflux disease [K21.9]  Yes    Pure hypercholesterolemia [E78.00]  Yes    Controlled type 2 diabetes mellitus with chronic kidney disease on chronic dialysis, with long-term current use of insulin [E11.22, N18.6, Z79.4, Z99.2]  Not Applicable    Essential (primary) hypertension [I10]  Yes      Resolved Hospital Problems    Diagnosis Date Resolved POA    PEA (Pulseless electrical activity) [I46.9] 10/14/2022 No    Wet gangrene [I96] 10/14/2022 Yes    NICM (nonischemic cardiomyopathy) [I42.8] 10/12/2022 Yes

## 2022-10-20 NOTE — TRANSFER OF CARE
"Anesthesia Transfer of Care Note    Patient: Adryan Neff    Procedure(s) Performed: Procedure(s) (LRB):  Fistulogram (Left)    Patient location: Other: (Report given to IR nurse) Interventional Radiology    Anesthesia Type: MAC    Post assessment: no apparent anesthetic complications and tolerated procedure well    Post vital signs: stable    Level of consciousness: awake    Complications: none          Last vitals:   Visit Vitals  BP (!) 142/83 (BP Location: Right arm, Patient Position: Lying)   Pulse 94   Temp 37.2 °C (98.9 °F) (Oral)   Resp 19   Ht 5' 11" (1.803 m)   Wt 58.1 kg (128 lb 1.4 oz)   SpO2 100%   BMI 17.86 kg/m²     "

## 2022-10-20 NOTE — BRIEF OP NOTE
[facility] [place]  Brief Operative Note    SUMMARY     Surgery Date: 10/20/2022     Surgeon(s) and Role:     * Masoud Soni MD - Primary    Assisting Surgeon: None    Pre-op Diagnosis:  ESRD on hemodialysis [N18.6, Z99.2]    Post-op Diagnosis:  Post-Op Diagnosis Codes:     * ESRD on hemodialysis [N18.6, Z99.2]    Procedure(s) (LRB):  Fistulogram (Left)    Anesthesia: Local MAC    Operative Findings: inadequate radial for access    Estimated Blood Loss: * No values recorded between 10/20/2022 12:00 AM and 10/20/2022 12:58 PM *    Estimated Blood Loss has been documented.         Specimens:   Specimen (24h ago, onward)      None            [id number]

## 2022-10-20 NOTE — PLAN OF CARE
Remain SR on telemetry monitor. Explained plan of care, verbalized understanding. No injury during shift, Side rails up x 3, call light by bedside.      Problem: Adult Inpatient Plan of Care  Goal: Plan of Care Review  Outcome: Ongoing, Progressing  Goal: Patient-Specific Goal (Individualized)  Outcome: Ongoing, Progressing  Goal: Absence of Hospital-Acquired Illness or Injury  Outcome: Ongoing, Progressing  Goal: Optimal Comfort and Wellbeing  Outcome: Ongoing, Progressing  Goal: Readiness for Transition of Care  Outcome: Ongoing, Progressing     Problem: Skin Injury Risk Increased  Goal: Skin Health and Integrity  Outcome: Ongoing, Progressing     Problem: Fall Injury Risk  Goal: Absence of Fall and Fall-Related Injury  Outcome: Ongoing, Progressing     Problem: Diabetes Comorbidity  Goal: Blood Glucose Level Within Targeted Range  Outcome: Ongoing, Progressing     Problem: Infection  Goal: Absence of Infection Signs and Symptoms  Outcome: Ongoing, Progressing     Problem: Impaired Wound Healing  Goal: Optimal Wound Healing  Outcome: Ongoing, Progressing     Problem: Device-Related Complication Risk (Hemodialysis)  Goal: Safe, Effective Therapy Delivery  Outcome: Ongoing, Progressing     Problem: Hemodynamic Instability (Hemodialysis)  Goal: Effective Tissue Perfusion  Outcome: Ongoing, Progressing     Problem: Infection (Hemodialysis)  Goal: Absence of Infection Signs and Symptoms  Outcome: Ongoing, Progressing     Problem: Bariatric Environmental Safety  Goal: Safety Maintained with Care  Outcome: Ongoing, Progressing

## 2022-10-20 NOTE — PROGRESS NOTES
CM met with pt to discuss dc needs.Drake ( tech) translated .because  service placed nurse, Roberta on hold for 15 minutes.  CM informed pt that [facility] needed financial information to complete admission process. Pt stated his banking information is in his room at home. Pt informed CM to contact his daughters Mariel or Luis F to get the financial statements from home. CM will contact Luis F.    2:02 pm    CM spoke to pt's daughter, Luis F and she stated she will contact Mariel, sister to retrieve the financial statements and bring to hospital. CM will follow up.

## 2022-10-20 NOTE — PROGRESS NOTES
Patient working with PT. States he is feeling ok. CM working on placement.   VSSAF    Right LE: Dressing removed. Large blood clot on interior of dressing. No active bleeding from surgical incision. Staples intact.   Hct: 22.5    A/P: s/p right BKA  1) cont to work with PT for tranfers  2) dressing changed  3) discharge planning  4) f/u with Dr Davis 2 weeks postop

## 2022-10-20 NOTE — OP NOTE
Date: 10/20/2022     Surgeon(s) and Role:   Masoud Soni MD    Assistant: None    Pre-op Diagnosis:   1. T82.858A: Stenosis of vascular prosthetic devices, implants and grafts, initial encounter  2.   Patient Active Problem List    Diagnosis Date Noted    Cognitive changes 10/14/2022    AV graft malfunction 10/14/2022    Anemia due to chronic kidney disease, on chronic dialysis 10/13/2022    Unilateral complete BKA, left, initial encounter 02/07/2022    PAD (peripheral artery disease) 02/07/2022    Encephalopathy, metabolic 01/12/2022    COVID-19 in immunocompromised patient 01/05/2022    Nuclear sclerosis of right eye 11/24/2021    Pseudophakia 11/24/2021    Paronychia, toe, left 11/11/2021    ESRD on hemodialysis     Chronic pulmonary heart disease     TB lung, latent 04/2021    Pre-transplant evaluation for kidney transplant 01/07/2021    Intractable vomiting 10/26/2020    Chronic diastolic heart failure 10/26/2020    CAD (coronary artery disease) - non-obstructive from [facility] in 2016 10/26/2020    Hyperkalemia 10/26/2020    Other insomnia 06/04/2019    Gastroesophageal reflux disease 06/04/2019    Pure hypercholesterolemia 01/31/2019    Benign hypertension with chronic kidney disease, stage V 01/31/2019    Controlled type 2 diabetes mellitus with chronic kidney disease on chronic dialysis, with long-term current use of insulin 01/29/2019    Essential (primary) hypertension 01/29/2019    Secondary hyperparathyroidism of renal origin 10/02/2017    PDR (proliferative diabetic retinopathy) 09/21/2017    HLD (hyperlipidemia) 01/24/2017          Post-op Diagnosis: Same     Procedure(s):   1. US guided LUE AVG percutaneous access  2. LUE fistulogram  3. Central venogram  4. Moderate sedation  5. Balloon angioplasty of LUE AVG venous anastomotic stent with 6-7 balloon  6. Balloon angioplasty of L axillary stent with 7-8 balloon  7. Balloon angioplasty of L brachiocephalic stent with 8-10 Oxford balloons and 10mm  Lutonix balloon    Anesthesia: Local MAC     Findings/Key Components:   Successful treatment of > 70% stenosis  Strong palpable thrill     EBL: Minimal    PROCEDURE IN DETAIL:After an informed consent was obtained the patient was taken to the room and placed in the supine position.  Arm was prepped and draped in the standard surgical fashion. Under ultrasound guidance, the LUE AVG was accessed with a micropuncture needle; ultrasound confirmed vessel patency, followed by placement of 4/3-Bruneian micropuncture dilator. Through this, an 0.035-inch wire was placed in the short 6-Bruneian sheath.   A fistulogram and central venogram was performed.  After independent review and interpretation, based upon the fistulogram results, I decided to intervene. Through this, an 0.035-inch Glidewire was placed through the high-grade stenosis, which was demonstrated by the angiogram.  A high-grade stenosis in the AVG and venous outflow and central venous stent was found, which was crossed with a hyrophilic 0.035-in glidewire. This was treated with the high-pressure, noncompliant balloon(s) listed above. Resolution of the stenosis was noted. Strong thrill could be felt. The sheath was removed, a stitch was placed with good hemostasis.    MODERATE SEDATION   I was present for and monitored the patients cardio respiratory functions during the moderate sedation. See nurses notes for Intra-service start and end times, the medications their doseage and route.    Time of sedation:  55 mins.

## 2022-10-20 NOTE — NURSING
Patient has arrived back on the unit AAO, via bed on room air, No distress noted. Site assessed @ [facility]. Gauze and Tegaderm Clean dry and intact. Bed in lowest position, wheels locked, call light in reach. Will continue to monitor.

## 2022-10-20 NOTE — PT/OT/SLP PROGRESS
Occupational Therapy      Patient Name:  Adryan Neff   MRN:  [id number]    Attempted to co-treat patient with PT at 11:11 AM.  Patient not seen at this time secondary to undergoing fistulogram in IR.  Will follow-up tomorrow or later today as able.     10/20/2022

## 2022-10-20 NOTE — PLAN OF CARE
[facility] - Telemetry  Discharge Reassessment    Primary Care Provider: Carola Pedro MD    Expected Discharge Date: Pending    CM spoke to pt's daughterLuis F to follow up on dc needs. Pt's daughter stated she doesn't have access to financial documents needed for NH placement. CM will follow up with pt.    Reassessment (most recent)       Discharge Reassessment - 10/20/22 0858          Discharge Reassessment    Assessment Type Discharge Planning Reassessment (P)      Did the patient's condition or plan change since previous assessment? Yes (P)      Discharge Plan discussed with: Adult children (P)      Discharge Plan A [facility] placement [facility] (P)      Discharge Plan B Home with family (P)      DME Needed Upon Discharge  other (see comments) (P)    tbd    Discharge Barriers Identified None (P)         Post-Acute Status    Coverage Medicare (P)      Discharge Delays Patient and Family Barriers (P)

## 2022-10-21 PROBLEM — D72.829 LEUKOCYTOSIS: Status: ACTIVE | Noted: 2022-10-21

## 2022-10-21 LAB
ABO + RH BLD: ABNORMAL
ANION GAP SERPL CALC-SCNC: 13 MMOL/L (ref 8–16)
BACTERIA BLD CULT: NORMAL
BACTERIA BLD CULT: NORMAL
BASOPHILS # BLD AUTO: 0.06 K/UL (ref 0–0.2)
BASOPHILS NFR BLD: 0.3 % (ref 0–1.9)
BLD GP AB SCN CELLS X3 SERPL QL: ABNORMAL
BLD PROD TYP BPU: NORMAL
BLOOD GROUP ANTIBODIES SERPL: NORMAL
BLOOD UNIT EXPIRATION DATE: NORMAL
BLOOD UNIT TYPE CODE: 5100
BLOOD UNIT TYPE: NORMAL
BUN SERPL-MCNC: 41 MG/DL (ref 6–20)
CALCIUM SERPL-MCNC: 9 MG/DL (ref 8.7–10.5)
CHLORIDE SERPL-SCNC: 102 MMOL/L (ref 95–110)
CO2 SERPL-SCNC: 19 MMOL/L (ref 23–29)
CODING SYSTEM: NORMAL
CREAT SERPL-MCNC: 8.8 MG/DL (ref 0.5–1.4)
DIFFERENTIAL METHOD: ABNORMAL
DISPENSE STATUS: NORMAL
EOSINOPHIL # BLD AUTO: 0.9 K/UL (ref 0–0.5)
EOSINOPHIL NFR BLD: 3.8 % (ref 0–8)
ERYTHROCYTE [DISTWIDTH] IN BLOOD BY AUTOMATED COUNT: 15.8 % (ref 11.5–14.5)
EST. GFR  (NO RACE VARIABLE): 6 ML/MIN/1.73 M^2
FINAL PATHOLOGIC DIAGNOSIS: NORMAL
GLUCOSE SERPL-MCNC: 80 MG/DL (ref 70–110)
GROSS: NORMAL
HCT VFR BLD AUTO: 21.7 % (ref 40–54)
HGB BLD-MCNC: 6.3 G/DL (ref 14–18)
IMM GRANULOCYTES # BLD AUTO: 0.12 K/UL (ref 0–0.04)
IMM GRANULOCYTES NFR BLD AUTO: 0.5 % (ref 0–0.5)
LYMPHOCYTES # BLD AUTO: 1.6 K/UL (ref 1–4.8)
LYMPHOCYTES NFR BLD: 7.2 % (ref 18–48)
Lab: NORMAL
MCH RBC QN AUTO: 27.9 PG (ref 27–31)
MCHC RBC AUTO-ENTMCNC: 29 G/DL (ref 32–36)
MCV RBC AUTO: 96 FL (ref 82–98)
MONOCYTES # BLD AUTO: 1.3 K/UL (ref 0.3–1)
MONOCYTES NFR BLD: 5.8 % (ref 4–15)
NEUTROPHILS # BLD AUTO: 18.5 K/UL (ref 1.8–7.7)
NEUTROPHILS NFR BLD: 82.4 % (ref 38–73)
NRBC BLD-RTO: 0 /100 WBC
NUM UNITS TRANS PACKED RBC: NORMAL
PLATELET # BLD AUTO: 562 K/UL (ref 150–450)
PMV BLD AUTO: 9 FL (ref 9.2–12.9)
POCT GLUCOSE: 122 MG/DL (ref 70–110)
POCT GLUCOSE: 154 MG/DL (ref 70–110)
POCT GLUCOSE: 167 MG/DL (ref 70–110)
POCT GLUCOSE: 88 MG/DL (ref 70–110)
POTASSIUM SERPL-SCNC: 6 MMOL/L (ref 3.5–5.1)
RBC # BLD AUTO: 2.26 M/UL (ref 4.6–6.2)
SODIUM SERPL-SCNC: 134 MMOL/L (ref 136–145)
WBC # BLD AUTO: 22.49 K/UL (ref 3.9–12.7)
ZINC SERPL-MCNC: 76 UG/DL (ref 60–130)

## 2022-10-21 PROCEDURE — 36430 TRANSFUSION BLD/BLD COMPNT: CPT

## 2022-10-21 PROCEDURE — 25000003 PHARM REV CODE 250: Performed by: ORTHOPAEDIC SURGERY

## 2022-10-21 PROCEDURE — 63600175 PHARM REV CODE 636 W HCPCS: Mod: JG | Performed by: ORTHOPAEDIC SURGERY

## 2022-10-21 PROCEDURE — 86901 BLOOD TYPING SEROLOGIC RH(D): CPT | Performed by: HOSPITALIST

## 2022-10-21 PROCEDURE — 25000003 PHARM REV CODE 250: Performed by: INTERNAL MEDICINE

## 2022-10-21 PROCEDURE — 86870 RBC ANTIBODY IDENTIFICATION: CPT | Performed by: HOSPITALIST

## 2022-10-21 PROCEDURE — 90935 HEMODIALYSIS ONE EVALUATION: CPT

## 2022-10-21 PROCEDURE — 36415 COLL VENOUS BLD VENIPUNCTURE: CPT | Performed by: INTERNAL MEDICINE

## 2022-10-21 PROCEDURE — 80048 BASIC METABOLIC PNL TOTAL CA: CPT | Performed by: INTERNAL MEDICINE

## 2022-10-21 PROCEDURE — 80100016 HC MAINTENANCE HEMODIALYSIS

## 2022-10-21 PROCEDURE — P9016 RBC LEUKOCYTES REDUCED: HCPCS | Performed by: HOSPITALIST

## 2022-10-21 PROCEDURE — 86922 COMPATIBILITY TEST ANTIGLOB: CPT | Performed by: HOSPITALIST

## 2022-10-21 PROCEDURE — 85025 COMPLETE CBC W/AUTO DIFF WBC: CPT | Performed by: INTERNAL MEDICINE

## 2022-10-21 PROCEDURE — 21400001 HC TELEMETRY ROOM

## 2022-10-21 RX ORDER — HYDROCODONE BITARTRATE AND ACETAMINOPHEN 500; 5 MG/1; MG/1
TABLET ORAL
Status: DISCONTINUED | OUTPATIENT
Start: 2022-10-21 | End: 2022-10-31

## 2022-10-21 RX ADMIN — ATORVASTATIN CALCIUM 40 MG: 40 TABLET, FILM COATED ORAL at 09:10

## 2022-10-21 RX ADMIN — INSULIN DETEMIR 12 UNITS: 100 INJECTION, SOLUTION SUBCUTANEOUS at 09:10

## 2022-10-21 RX ADMIN — SEVELAMER CARBONATE 2400 MG: 800 TABLET, FILM COATED ORAL at 04:10

## 2022-10-21 RX ADMIN — EPOETIN ALFA-EPBX 10000 UNITS: 10000 INJECTION, SOLUTION INTRAVENOUS; SUBCUTANEOUS at 02:10

## 2022-10-21 RX ADMIN — OXYCODONE HYDROCHLORIDE AND ACETAMINOPHEN 1000 MG: 500 TABLET ORAL at 08:10

## 2022-10-21 RX ADMIN — PANTOPRAZOLE SODIUM 40 MG: 40 TABLET, DELAYED RELEASE ORAL at 08:10

## 2022-10-21 RX ADMIN — Medication 1 TABLET: at 08:10

## 2022-10-21 RX ADMIN — ASPIRIN 81 MG CHEWABLE TABLET 81 MG: 81 TABLET CHEWABLE at 08:10

## 2022-10-21 NOTE — PT/OT/SLP PROGRESS
Physical Therapy      Patient Name:  Adryan Neff   MRN:  [id number]    Patient not seen today secondary to Dialysis. Will follow-up later.

## 2022-10-21 NOTE — PROGRESS NOTES
Renal Progress Note    Date of Admission:  10/6/2022  1:54 PM    Length of Stay: 15  Days    Subjective: n/a    Objective:    Current Facility-Administered Medications   Medication    acetaminophen tablet 650 mg    amLODIPine tablet 10 mg    ascorbic acid (vitamin C) tablet 1,000 mg    aspirin chewable tablet 81 mg    atorvastatin tablet 40 mg    dextrose 10% bolus 125 mL    dextrose 10% bolus 250 mL    epoetin paola-epbx injection 10,000 Units    folic acid-vit B6-vit B12 2.5-25-2 mg tablet 1 tablet    glucagon (human recombinant) injection 1 mg    glucose chewable tablet 16 g    glucose chewable tablet 24 g    hydrALAZINE injection 10 mg    HYDROcodone-acetaminophen 5-325 mg per tablet 1 tablet    insulin aspart U-100 pen 0-5 Units    insulin detemir U-100 pen 12 Units    melatonin tablet 6 mg    metoprolol succinate (TOPROL-XL) 24 hr tablet 25 mg    naloxone 0.4 mg/mL injection 0.02 mg    ondansetron disintegrating tablet 8 mg    pantoprazole EC tablet 40 mg    sevelamer carbonate tablet 2,400 mg    sodium chloride 0.9% flush 10 mL    sodium chloride 0.9% flush 10 mL       Vitals:    10/20/22 1714 10/20/22 1916 10/20/22 2334 10/21/22 0404   BP: (!) 140/63 136/63 134/63 139/65   BP Location: Right arm Right arm Right arm Right arm   Patient Position: Lying Lying Lying Lying   Pulse: 86 87 80 83   Resp: 20 17 18 18   Temp: 98.8 °F (37.1 °C) 99.6 °F (37.6 °C) 98.3 °F (36.8 °C) 99 °F (37.2 °C)   TempSrc: Oral Oral Oral Oral   SpO2: 100% 99% 95% 100%   Weight:       Height:           I/O last 3 completed shifts:  In: 1140 [P.O.:440; Other:700]  Out: 2326 [Other:2326]  No intake/output data recorded.      Physical Exam:     General: NAD, seen during Dialysis  Neck: supple  Heart: RRR  Lungs: unlabored breathing  Abdomen: n/a  Limbs: n/a  Neurologic: asleep      Laboratories:    No results for input(s): WBC, RBC, HGB, HCT, PLT, MCV, MCH, MCHC in the last 24 hours.      No results for input(s):  GLUCOSE, CALCIUM, PROT, NA, K, CO2, CL, BUN, CREATININE, ALKPHOS, ALT, AST, BILITOT in the last 24 hours.    Invalid input(s):  ALBUMIN      No results for input(s): COLORU, CLARITYU, SPECGRAV, PHUR, PROTEINUA, GLUCOSEU, BLOODU, WBCU, RBCU, BACTERIA, MUCUS in the last 24 hours.    Invalid input(s):  BILIRUBINCON    Microbiology Results (last 7 days)       Procedure Component Value Units Date/Time    Blood culture [[id number]] Collected: 10/17/22 1427    Order Status: Completed Specimen: Blood from Antecubital, Right Arm Updated: 10/20/22 1503     Blood Culture, Routine No Growth to date      No Growth to date      No Growth to date      No Growth to date    Narrative:      rt.forearm Collection has been rescheduled by CPD at 10/17/2022 11:43   Reason: Pt is in dialysis  Collection has been rescheduled by CMO at 10/17/2022 14:23 Reason: Do   im am per Rn Griselda   Collection has been rescheduled by CPD at 10/17/2022 11:43 Reason: Pt   is in dialysis  Collection has been rescheduled by CMO at 10/17/2022 14:23 Reason: Do   im am per Rn Griselda     Blood culture [[id number]] Collected: 10/17/22 1415    Order Status: Completed Specimen: Blood from Antecubital, Right Arm Updated: 10/20/22 1503     Blood Culture, Routine No Growth to date      No Growth to date      No Growth to date      No Growth to date    Narrative:      Collection has been rescheduled by CPD at 10/17/2022 11:43 Reason: Pt   is in dialysis  Collection has been rescheduled by CMO at 10/17/2022 14:23 Reason: Do   im am per Rn Griselda   Collection has been rescheduled by CPD at 10/17/2022 11:43 Reason: Pt   is in dialysis  Collection has been rescheduled by CMO at 10/17/2022 14:23 Reason: Do   im am per Rn Griselda     Blood culture [[id number]] Collected: 10/11/22 0914    Order Status: Completed Specimen: Blood Updated: 10/15/22 1103     Blood Culture, Routine No Growth after 4 days.    Blood culture [[id number]] Collected: 10/11/22 0915    Order Status:  Completed Specimen: Blood from Peripheral, Right Wrist Updated: 10/15/22 1103     Blood Culture, Routine No Growth after 4 days.              Diagnostic Tests: n/a        Assessment:    55 y/o male with ESRD on HD admitted with:    - R-foot gangrene  - Hx. Severe PAD (s/p L-AKA)  - AV access malfunction s/p fistulogram and angioplasty  - Chronic dyast. HF  - Anemia of ckd  - IDDM-2 with end-organ damage  - HyperPO4- IMPROVING  - Hypoalbuminemia  - HTN  - HLD  - Cognitive impairment  - Chronic non-compliance        Plan:    - Dialysis Q MWF  - Epogen  - Transfuse during HD PRN Hgb < 7  - Renal ADA diet with low PO4-  - Oral protein supplements + MVI  - Oral PO4- binders  - Antib. Per ID  - Psych, Ortho, Vascular and Cardiology following  - Glycemic control and other problems per admitting    Will f/u on Dialysis days.

## 2022-10-21 NOTE — ASSESSMENT & PLAN NOTE
Persistent leukocytosis over past couple of weeks, but afebrile with no source of infection.  Monitor.

## 2022-10-21 NOTE — PROGRESS NOTES
[facility] Medicine  Progress Note    Patient Name: Adryan Neff  MRN: [id number]  Patient Class: IP- Inpatient   Admission Date: 10/6/2022  Length of Stay: 15 days  Attending Physician: Venkat Strauss MD  Primary Care Provider: Carola Pedro MD        Subjective:     Principal Problem:PAD (peripheral artery disease)        HPI:  This is a 56 year old male with a PMHx of severe PAD s/p gangrene of left foot s/p left AKA (1/2022), ESRD on HD MWF, NICM (EF: 55%, GIDD), pulmonary hypertension, GERD and latent TB filiberto presents for concern for leg infection.       The patient presented to the vascular surgery clinic for a follow up. He reports worsening pain and developing eschar of the right dorsal foot along with foul smelling odor. Given concern for an infection and the need for IV antibiotics & expedited podiatry evaluation, the patient was sent to the ER. The patient denies having any fevers, chills, or injury to his foot. He has a previous AKA and uses a wheelchair to ambulate. While in the ED, the patient was hypertensive but otherwise hemodynamically stable. Labs showed leukocytosis (17.41 - with neutrophilic predominance), normocytic anemia (9.7), elevated sed rate (125), elevated CRP (237.2), negative lactate (1.6). X-ray foot showed no radiographic evidence of osteomyelitis. He was administered vancomycin, zosyn and was admitted for further management.     I attempted to call the daughter to obtain further history on 1902 without a response.         Overview/Hospital Course:  Mr Adryan Neff is a 56 y.o. man with PAD. He is s/p L AKA due to gangrene and now presents with worsening R foot infection. He also has ESRD on HD via LUE AVG. Started antibiotics. Podiatry and Ortho consulted with plans for amputation. This was delayed due to PEA on 10/10 with quick ROSC. Unclear etiology but suspect vasovagal event. Cardiology was consulted. He then had R AKA on 10/13/22. Antibiotics were  discontinued. He has had malfunctioning of his LUE AVG; Vascular was consulted and fistulogram performed.  SW/CM consulted for discharge planning.  Working on NH placement.      Interval History: no new complaints.    Review of Systems   HENT:  Negative for ear discharge and ear pain.    Eyes:  Negative for discharge and itching.   Endocrine: Negative for cold intolerance and heat intolerance.   Neurological:  Negative for seizures and syncope.   Objective:     Vital Signs (Most Recent):  Temp: 98.4 °F (36.9 °C) (10/21/22 0736)  Pulse: 85 (10/21/22 0736)  Resp: 18 (10/21/22 0736)  BP: (!) 159/67 (10/21/22 0736)  SpO2: 100 % (10/21/22 0736)   Vital Signs (24h Range):  Temp:  [98.3 °F (36.8 °C)-99.6 °F (37.6 °C)] 98.4 °F (36.9 °C)  Pulse:  [80-92] 85  Resp:  [16-20] 18  SpO2:  [95 %-100 %] 100 %  BP: (126-159)/(63-70) 159/67     Weight: 58.1 kg (128 lb 1.4 oz)  Body mass index is 17.86 kg/m².    Intake/Output Summary (Last 24 hours) at 10/21/2022 1046  Last data filed at 10/21/2022 0833  Gross per 24 hour   Intake 560 ml   Output --   Net 560 ml        Physical Exam  Vitals and nursing note reviewed.   Constitutional:       Appearance: He is ill-appearing. He is not toxic-appearing.   HENT:      Head: Normocephalic and atraumatic.      Nose: Nose normal.      Mouth/Throat:      Mouth: Mucous membranes are moist.   Cardiovascular:      Rate and Rhythm: Normal rate and regular rhythm.      Heart sounds: Normal heart sounds. No murmur heard.    No gallop.   Pulmonary:      Effort: Pulmonary effort is normal. No respiratory distress.      Breath sounds: No wheezing or rales.   Abdominal:      General: Bowel sounds are normal. There is no distension.      Palpations: Abdomen is soft.      Tenderness: There is no abdominal tenderness. There is no guarding.   Musculoskeletal:      Comments: Right AKA dressing in place. L AKA also present   Skin:     Comments: LUE AVG with thrill   Neurological:      Mental Status: He is  alert.       Significant Labs: All pertinent labs within the past 24 hours have been reviewed.  BMP:   Recent Labs   Lab 10/21/22  0751   GLU 80   *   K 6.0*      CO2 19*   BUN 41*   CREATININE 8.8*   CALCIUM 9.0       CBC:   Recent Labs   Lab 10/21/22  0751   WBC 22.49*   HGB 6.3*   HCT 21.7*   *         Significant Imaging: I have reviewed all pertinent imaging results/findings within the past 24 hours.      Assessment/Plan:      * PAD (peripheral artery disease)  S/p L AKA then presented with PAD causing R foot wound. Now s/p R AKA on 10/13  - previously on antibiotics. These were discontinued when source control achieved.   - continue asa, statin   Pending NH placement.    Leukocytosis  Persistent leukocytosis over past couple of weeks, but afebrile with no source of infection.  Monitor.      AV graft malfunction  Vascular following.  S/p fistulogram      Cognitive changes  Noted by Psychiatry that he has had multiple episodes of confusion and not understanding treatment plan. Unclear if this is delirium associated or beginnings of cognitive dysfunction. Recommend outpatient evaluation.       Anemia due to chronic kidney disease, on chronic dialysis  - Anemic - has been transfused this admission pre and post surgery  - Continue EPO per Nephrology  Hgb of 6.3 today.  Will transfuse one unit of blood.        ESRD on hemodialysis  Dialyzes via LUE AVG  There is concern about AVG malfunction. Vascular surgery consulted  S/p fistulogram.  Nephrology following       Hyperkalemia  HD today.      CAD (coronary artery disease) - non-obstructive from [facility] in 2016  - Resume aspirin, statin       Chronic diastolic heart failure  - Echocardiogram (11/24/2021): EF: 55%, GIDD, mild TR, moderate to severe MR  - no signs of acute exacerbation  - Continue home medications     Gastroesophageal reflux disease  - Coninue PPI     Pure hypercholesterolemia  - Continue statin     Essential (primary) hypertension  -  Continue home medications    Controlled type 2 diabetes mellitus with chronic kidney disease on chronic dialysis, with long-term current use of insulin  - Last A1c:   Lab Results   Component Value Date    HGBA1C 6.1 (H) 01/07/2021     Continue current management.  - Low-sensitivity sliding scale insulin  - Initiate and maintain glycemic protocol, monitor POC glucose   - Follow up with PCP as outpatient      VTE Risk Mitigation (From admission, onward)         Ordered     IP VTE HIGH RISK PATIENT  Once         10/06/22 1819     Place sequential compression device  Until discontinued         10/06/22 1819                Discharge Planning   ENIO:      Code Status: Full Code   Is the patient medically ready for discharge?:     Reason for patient still in hospital (select all that apply): Pending disposition  Discharge Plan A: New Nursing Home placement - detention care facility   Discharge Delays: (!) Patient and Family Barriers              Venkat Strauss MD  Department of Hospital Medicine   [facility] - Telemetry

## 2022-10-21 NOTE — PLAN OF CARE
Problem: Adult Inpatient Plan of Care  Goal: Plan of Care Review  Flowsheets (Taken 10/21/2022 1616)  Plan of Care Reviewed With: patient     Problem: Diabetes Comorbidity  Goal: Blood Glucose Level Within Targeted Range  Intervention: Monitor and Manage Glycemia  Flowsheets (Taken 10/21/2022 1616)  Glycemic Management: blood glucose monitored     Problem: Impaired Wound Healing  Goal: Optimal Wound Healing  Intervention: Promote Wound Healing  Flowsheets (Taken 10/21/2022 1616)  Sleep/Rest Enhancement: awakenings minimized  Pain Management Interventions:   care clustered   position adjusted   pillow support provided     Problem: Pain Acute  Goal: Acceptable Pain Control and Functional Ability  Intervention: Develop Pain Management Plan  Flowsheets (Taken 10/21/2022 1616)  Pain Management Interventions:   care clustered   position adjusted   pillow support provided  Intervention: Prevent or Manage Pain  Flowsheets (Taken 10/21/2022 1616)  Sleep/Rest Enhancement: awakenings minimized  Medication Review/Management: medications reviewed  Intervention: Optimize Psychosocial Wellbeing  Flowsheets (Taken 10/21/2022 1616)  Supportive Measures:   active listening utilized   verbalization of feelings encouraged

## 2022-10-21 NOTE — SUBJECTIVE & OBJECTIVE
Interval History: no new complaints.    Review of Systems   HENT:  Negative for ear discharge and ear pain.    Eyes:  Negative for discharge and itching.   Endocrine: Negative for cold intolerance and heat intolerance.   Neurological:  Negative for seizures and syncope.   Objective:     Vital Signs (Most Recent):  Temp: 98.4 °F (36.9 °C) (10/21/22 0736)  Pulse: 85 (10/21/22 0736)  Resp: 18 (10/21/22 0736)  BP: (!) 159/67 (10/21/22 0736)  SpO2: 100 % (10/21/22 0736)   Vital Signs (24h Range):  Temp:  [98.3 °F (36.8 °C)-99.6 °F (37.6 °C)] 98.4 °F (36.9 °C)  Pulse:  [80-92] 85  Resp:  [16-20] 18  SpO2:  [95 %-100 %] 100 %  BP: (126-159)/(63-70) 159/67     Weight: 58.1 kg (128 lb 1.4 oz)  Body mass index is 17.86 kg/m².    Intake/Output Summary (Last 24 hours) at 10/21/2022 1046  Last data filed at 10/21/2022 0833  Gross per 24 hour   Intake 560 ml   Output --   Net 560 ml        Physical Exam  Vitals and nursing note reviewed.   Constitutional:       Appearance: He is ill-appearing. He is not toxic-appearing.   HENT:      Head: Normocephalic and atraumatic.      Nose: Nose normal.      Mouth/Throat:      Mouth: Mucous membranes are moist.   Cardiovascular:      Rate and Rhythm: Normal rate and regular rhythm.      Heart sounds: Normal heart sounds. No murmur heard.    No gallop.   Pulmonary:      Effort: Pulmonary effort is normal. No respiratory distress.      Breath sounds: No wheezing or rales.   Abdominal:      General: Bowel sounds are normal. There is no distension.      Palpations: Abdomen is soft.      Tenderness: There is no abdominal tenderness. There is no guarding.   Musculoskeletal:      Comments: Right AKA dressing in place. L AKA also present   Skin:     Comments: LUE AVG with thrill   Neurological:      Mental Status: He is alert.       Significant Labs: All pertinent labs within the past 24 hours have been reviewed.  BMP:   Recent Labs   Lab 10/21/22  0751   GLU 80   *   K 6.0*      CO2 19*    BUN 41*   CREATININE 8.8*   CALCIUM 9.0       CBC:   Recent Labs   Lab 10/21/22  0751   WBC 22.49*   HGB 6.3*   HCT 21.7*   *         Significant Imaging: I have reviewed all pertinent imaging results/findings within the past 24 hours.

## 2022-10-21 NOTE — PLAN OF CARE
Problem: Adult Inpatient Plan of Care  Goal: Plan of Care Review  Outcome: Ongoing, Progressing  Goal: Patient-Specific Goal (Individualized)  Outcome: Ongoing, Progressing  Goal: Absence of Hospital-Acquired Illness or Injury  Outcome: Ongoing, Progressing  Goal: Optimal Comfort and Wellbeing  Outcome: Ongoing, Progressing  Goal: Readiness for Transition of Care  Outcome: Ongoing, Progressing     Problem: Skin Injury Risk Increased  Goal: Skin Health and Integrity  Outcome: Ongoing, Progressing     Problem: Fall Injury Risk  Goal: Absence of Fall and Fall-Related Injury  Outcome: Ongoing, Progressing     Problem: Diabetes Comorbidity  Goal: Blood Glucose Level Within Targeted Range  Outcome: Ongoing, Progressing     Problem: Infection  Goal: Absence of Infection Signs and Symptoms  Outcome: Ongoing, Progressing     Problem: Impaired Wound Healing  Goal: Optimal Wound Healing  Outcome: Ongoing, Progressing     Problem: Device-Related Complication Risk (Hemodialysis)  Goal: Safe, Effective Therapy Delivery  Outcome: Ongoing, Progressing     Problem: Hemodynamic Instability (Hemodialysis)  Goal: Effective Tissue Perfusion  Outcome: Ongoing, Progressing     Problem: Infection (Hemodialysis)  Goal: Absence of Infection Signs and Symptoms  Outcome: Ongoing, Progressing     Problem: Bariatric Environmental Safety  Goal: Safety Maintained with Care  Outcome: Ongoing, Progressing     Problem: Pain Acute  Goal: Acceptable Pain Control and Functional Ability  Outcome: Ongoing, Progressing

## 2022-10-21 NOTE — NURSING
RAPID RESPONSE NURSE PROACTIVE ROUNDING NOTE       Time of Visit: 1750    Admit Date: 10/6/2022  LOS: 15  Code Status: Full Code   Date of Visit: 10/21/2022  : 1965  Age: 56 y.o.  Sex: male  Race: Black or African American  [facility]   MRN: [id number]  Was the patient discharged from an ICU this admission? Yes   Was the patient discharged from a PACU within last 24 hours? No   Did the patient receive conscious sedation/general anesthesia in last 24 hours? No   Was the patient in the ED within the past 24 hours? No   Was the patient on NIPPV within the past 24 hours? No   Attending Physician: Venkat Strauss MD  Primary Service: Hospitalist   Time spent at the bedside: 15 -30 min    SITUATION    Notified by charge RN via phone call  Reason for alert: Bleeding from recent sx site    Diagnosis: PAD (peripheral artery disease)   has a past medical history of CAD (coronary artery disease), Diabetes mellitus type I, Diabetic retinopathy, ESRD on hemodialysis, Gangrene of left foot, Hypertension, Nuclear sclerosis of right eye, PAD (peripheral artery disease), PEA (Pulseless electrical activity), Pulmonary HTN, Right foot infection, TB lung, latent, and Wet gangrene.    Last Vitals:  Temp: 99.4 °F (37.4 °C) (10/21 1745)  Pulse: 106 (10/21 1745)  Resp: 16 (10/21 1745)  BP: 139/63 (10/21 1745)  SpO2: 100 % (10/21 1745)    24 Hour Vitals Range:  Temp:  [98.3 °F (36.8 °C)-99.6 °F (37.6 °C)]   Pulse:  []   Resp:  [16-18]   BP: (114-159)/(59-69)   SpO2:  [95 %-100 %]     Clinical Issues:  bleeding    ASSESSMENT/INTERVENTIONS    RN re-dressed wound; Dr. Samaniego notified and will see pt in AM. 1 uPRBC's transfusing.     RECOMMENDATIONS  See above    Discussed plan of care with charge RNTata    PROVIDER ESCALATION    Physician escalation: Yes    Orders received and case discussed with Dr. Samaniego .    Disposition:Remain in room 330    FOLLOW UP    Call back the Rapid Response NurseNelly at  [phone] for additional questions or concerns.

## 2022-10-21 NOTE — ASSESSMENT & PLAN NOTE
- Anemic - has been transfused this admission pre and post surgery  - Continue EPO per Nephrology  Hgb of 6.3 today.  Will transfuse one unit of blood.

## 2022-10-21 NOTE — PROGRESS NOTES
MARCUS spoke to pt's daughter, Luis F and she  stated they have the financial statements. Luis F stated she left several messages for [facility] but no one has returned her call. CM informed pt's daughter that they may not have a bed available but CM will follow up. CM informed pt that pt may have to go to the [facility]. Pt's daughter expressed that it would be difficult for family to visit the pt.     MARCUS sent messages through [employer] to the following NH:    [facility]    MARCUS will follow up.

## 2022-10-21 NOTE — PT/OT/SLP PROGRESS
Occupational Therapy      Patient Name:  Adryan Neff   MRN:  [id number]    9:45 AM: Patient not seen at this time secondary to being in dialysis Tx.  Will follow-up at a later day/time as able.     10/21/2022

## 2022-10-21 NOTE — PROGRESS NOTES
[facility] [place] - Telemetry  Adult Nutrition  Progress Note    SUMMARY       Recommendations  Continue renal diabetic diet. Encourage PO intake.   Continue Novasource renal BID to aid in recovery and nutrition status per HD  Monitor nutrition related labs  Renal  Cardiac  Diabetes  Goals:   Pt to receive >50% EEN/EPN by RD f/u  Pt to receive ONS by RD f/u  Nutrition Goal Status: goal met  Communication of RD Recs: other (comment) (POC)    Assessment and Plan  Nutrition Problem  Inadequate energy consumption    Related to (etiology):   Increased protein/calorie needs    Signs and Symptoms (as evidenced by):   On HD  Amputation (10/13)  Non-healing wounds    Interventions/Recommendations (treatment strategy):  Collaboration of care with other providers  [employer]    Nutrition Diagnosis Status:   Continue        Reason for Assessment    Reason For Assessment: length of stay  Diagnosis:  (Peripheral artery disease)  Relevant Medical History:   Patient Active Problem List   Diagnosis    Controlled type 2 diabetes mellitus with chronic kidney disease on chronic dialysis, with long-term current use of insulin    Essential (primary) hypertension    Pure hypercholesterolemia    Benign hypertension with chronic kidney disease, stage V    Other insomnia    Gastroesophageal reflux disease    Intractable vomiting    Chronic diastolic heart failure    CAD (coronary artery disease) - non-obstructive from [facility] in 2016    Hyperkalemia    Pre-transplant evaluation for kidney transplant    Chronic pulmonary heart disease    HLD (hyperlipidemia)    PDR (proliferative diabetic retinopathy)    Secondary hyperparathyroidism of renal origin    ESRD on hemodialysis    TB lung, latent    Paronychia, toe, left    Nuclear sclerosis of right eye    Pseudophakia    COVID-19 in immunocompromised patient    Encephalopathy, metabolic    Unilateral complete BKA, left, initial encounter    PAD (peripheral artery disease)    Anemia due to chronic  "kidney disease, on chronic dialysis    Cognitive changes    AV graft malfunction    Leukocytosis       Interdisciplinary Rounds: did not attend  General Information Comments:   10/21- Pt with good appetite. Eating closer to 75% of meals now. Still receiving Novasource renal-- glucose labs looking a little better. Wt remains stable post amputation. Continue to monitor.   10/17- Pt with good appetite. Only eating around 25% of meals. Receiving Novasource renal BID-- rec'd switching to Suplena BID due to renal and glucose labs. Pt still with poor renal and glucose labs. Unsure of weight status due to not taking a new weight since last visit. Continue to monitor. Pt on HD MWF.   10/14-This is a 56 year old male with a PMHx of severe PAD s/p gangrene of left foot s/p left AKA (1/2022), ESRD on HD MWF, NICM (EF: 55%, GIDD), pulmonary hypertension, GERD and latent TB filiberto presents for concern for leg infection.  Pt presents with foot infection with foul odor. Pt had Right BKA 10/13. Pt with history of amputation (Left BKA in January 2022). Pt on HD. Pt reporting a fair appetite with 50% intake. Weights being taken are likely inaccurate. Unsure of wt gain/loss per hospital stay. Pt poor renal and glucose labs. Continue to monitor.   Nutrition Discharge Planning: Renal diet    Nutrition Risk Screen    Nutrition Risk Screen: large or nonhealing wound, burn or pressure injury    Nutrition/Diet History    Spiritual, Cultural Beliefs, Ep[employer] Practices, Values that Affect Care: yes    Anthropometrics    Temp: 98.3 °F (36.8 °C)  Height Method: Stated  Height: 5' 11" (180.3 cm)  Height (inches): 71 in  Weight Method: Bed Scale  Weight: 58.1 kg (128 lb 1.4 oz)  Weight (lb): 128.09 lb  Ideal Body Weight (IBW), Male: 172 lb  % Ideal Body Weight, Male (lb): 90.12 %  BMI (Calculated): 17.9  Amputation %: 5.9, 5.9  Total Amputation %: 11.8       Lab/Procedures/Meds    Pertinent Labs Reviewed: reviewed  Pertinent Labs Comments: " CBC:  Recent Labs   Lab 10/21/22  0751   WBC 22.49*   HGB 6.3*   HCT 21.7*   *     CMP:  Recent Labs   Lab 10/21/22  0751   CALCIUM 9.0   *   K 6.0*   CO2 19*      BUN 41*   CREATININE 8.8*         Pertinent Medications Reviewed: reviewed  Pertinent Medications Comments: Scheduled Meds:   amLODIPine  10 mg Oral Daily    ascorbic acid (vitamin C)  1,000 mg Oral Daily    aspirin  81 mg Oral Daily    atorvastatin  40 mg Oral QHS    epoetin paola-epbx  10,000 Units Subcutaneous Every Mon, Wed, Fri    folic acid-vit B6-vit B12 2.5-25-2 mg  1 tablet Oral Daily    insulin detemir U-100  12 Units Subcutaneous QHS    metoprolol succinate  25 mg Oral Daily    pantoprazole  40 mg Oral Daily    sevelamer carbonate  2,400 mg Oral TID WM     Continuous Infusions:  PRN Meds:.sodium chloride, acetaminophen, dextrose 10%, dextrose 10%, glucagon (human recombinant), glucose, glucose, hydrALAZINE, HYDROcodone-acetaminophen, insulin aspart U-100, melatonin, naloxone, ondansetron, sodium chloride 0.9%, sodium chloride 0.9%        Estimated/Assessed Needs    Weight Used For Calorie Calculations: 62.7 kg (138 lb 3.7 oz)  Energy Calorie Requirements (kcal): 1881-2195kcal/day (3-350kcal/kg (amputation recovery, skin integrity, on HD))  Energy Need Method: Kcal/kg  Protein Requirements: 63-75g/day (1.0-1.2g/kg)  Weight Used For Protein Calculations: 62.7 kg (138 lb 3.7 oz)  Fluid Requirements (mL): 1mL per kcal or per MD     RDA Method (mL): 1881  CHO Requirement: 390 g      Nutrition Prescription Ordered    Current Diet Order: Renal diet    Evaluation of Received Nutrient/Fluid Intake    I/O: -.65L since admit  Energy Calories Required: meeting needs  Protein Required: meeting needs  Fluid Required: meeting needs  Comments: LB 10/19  Tolerance: tolerating  % Intake of Estimated Energy Needs: 75 - 100 %  % Meal Intake: 75 - 100 %    Nutrition Risk    Level of Risk/Frequency of Follow-up: moderate - high     Monitor and  Evaluation    Food and Nutrient Intake: energy intake, food and beverage intake  Food and Nutrient Adminstration: diet order  Knowledge/Beliefs/Attitudes: beliefs and attitudes, food and nutrition knowledge/skill  Physical Activity and Function: nutrition-related ADLs and IADLs  Anthropometric Measurements: height/length, weight, weight change, body mass index  Biochemical Data, Medical Tests and Procedures: electrolyte and renal panel, glucose/endocrine profile, gastrointestinal profile, inflammatory profile, lipid profile  Nutrition-Focused Physical Findings: overall appearance     Nutrition Follow-Up    RD Follow-up?: Yes    Kendall Delarosa, MPH, RDN, LDN

## 2022-10-21 NOTE — ANESTHESIA POSTPROCEDURE EVALUATION
Anesthesia Post Evaluation    Patient: Adryan Neff    Procedure(s) Performed: Procedure(s) (LRB):  Fistulogram (Left)    Final Anesthesia Type: MAC      Patient location during evaluation: PACU  Patient participation: Yes- Able to Participate  Level of consciousness: awake and alert and oriented  Post-procedure vital signs: reviewed and stable  Pain management: adequate  Airway patency: patent    PONV status at discharge: No PONV  Anesthetic complications: no      Cardiovascular status: hemodynamically stable and blood pressure returned to baseline  Respiratory status: spontaneous ventilation, room air and unassisted  Hydration status: euvolemic  Follow-up not needed.          Vitals Value Taken Time   /64 10/21/22 0910   Temp 36.9 °C (98.4 °F) 10/21/22 0910   Pulse 80 10/21/22 0910   Resp 18 10/21/22 0910   SpO2 100 % 10/21/22 0736         No case tracking events are documented in the log.      Pain/Azul Score: Pain Rating Prior to Med Admin: 9 (10/20/2022  7:16 PM)

## 2022-10-21 NOTE — PT/OT/SLP PROGRESS
Physical Therapy      Patient Name:  Adryan Neff   MRN:  [id number]    Patient not seen today secondary to Other (Comment) (eating). Will follow-up Monday.

## 2022-10-21 NOTE — PLAN OF CARE
Recommendations  Continue renal diabetic diet. Encourage PO intake.   Continue Novasource renal BID to aid in recovery and nutrition status per HD  Monitor nutrition related labs  Renal  Cardiac  Diabetes  Goals:   Pt to receive >50% EEN/EPN by RD f/u  Pt to receive ONS by RD f/u  Nutrition Goal Status: goal met  Communication of RD Recs: other (comment) (POC)    Assessment and Plan  Nutrition Problem  Inadequate energy consumption    Related to (etiology):   Increased protein/calorie needs    Signs and Symptoms (as evidenced by):   On HD  Amputation (10/13)  Non-healing wounds    Interventions/Recommendations (treatment strategy):  Collaboration of care with other providers  [employer]    Nutrition Diagnosis Status:   Continue    Kendall Delarosa, MPH, RDN, LDN

## 2022-10-22 LAB
HGB BLD-MCNC: 7.5 G/DL (ref 14–18)
POCT GLUCOSE: 170 MG/DL (ref 70–110)
POCT GLUCOSE: 174 MG/DL (ref 70–110)
POCT GLUCOSE: 188 MG/DL (ref 70–110)
POCT GLUCOSE: 213 MG/DL (ref 70–110)

## 2022-10-22 PROCEDURE — 25000003 PHARM REV CODE 250: Performed by: HOSPITALIST

## 2022-10-22 PROCEDURE — 36415 COLL VENOUS BLD VENIPUNCTURE: CPT | Performed by: HOSPITALIST

## 2022-10-22 PROCEDURE — 85018 HEMOGLOBIN: CPT | Performed by: HOSPITALIST

## 2022-10-22 PROCEDURE — 25000003 PHARM REV CODE 250: Performed by: ORTHOPAEDIC SURGERY

## 2022-10-22 PROCEDURE — 21400001 HC TELEMETRY ROOM

## 2022-10-22 PROCEDURE — 25000003 PHARM REV CODE 250: Performed by: INTERNAL MEDICINE

## 2022-10-22 PROCEDURE — 63600175 PHARM REV CODE 636 W HCPCS: Performed by: HOSPITALIST

## 2022-10-22 RX ORDER — MORPHINE SULFATE 4 MG/ML
2 INJECTION, SOLUTION INTRAMUSCULAR; INTRAVENOUS ONCE
Status: COMPLETED | OUTPATIENT
Start: 2022-10-22 | End: 2022-10-22

## 2022-10-22 RX ADMIN — HYDROCODONE BITARTRATE AND ACETAMINOPHEN 1 TABLET: 5; 325 TABLET ORAL at 04:10

## 2022-10-22 RX ADMIN — MORPHINE SULFATE 2 MG: 4 INJECTION INTRAVENOUS at 08:10

## 2022-10-22 RX ADMIN — ACETAMINOPHEN 650 MG: 325 TABLET ORAL at 01:10

## 2022-10-22 RX ADMIN — HYDROCODONE BITARTRATE AND ACETAMINOPHEN 1 TABLET: 5; 325 TABLET ORAL at 10:10

## 2022-10-22 RX ADMIN — INSULIN DETEMIR 12 UNITS: 100 INJECTION, SOLUTION SUBCUTANEOUS at 10:10

## 2022-10-22 RX ADMIN — ASPIRIN 81 MG CHEWABLE TABLET 81 MG: 81 TABLET CHEWABLE at 10:10

## 2022-10-22 RX ADMIN — SEVELAMER CARBONATE 2400 MG: 800 TABLET, FILM COATED ORAL at 12:10

## 2022-10-22 RX ADMIN — ATORVASTATIN CALCIUM 40 MG: 40 TABLET, FILM COATED ORAL at 10:10

## 2022-10-22 RX ADMIN — HYDROCODONE BITARTRATE AND ACETAMINOPHEN 1 TABLET: 5; 325 TABLET ORAL at 03:10

## 2022-10-22 RX ADMIN — SEVELAMER CARBONATE 2400 MG: 800 TABLET, FILM COATED ORAL at 04:10

## 2022-10-22 RX ADMIN — AMLODIPINE BESYLATE 10 MG: 5 TABLET ORAL at 10:10

## 2022-10-22 RX ADMIN — OXYCODONE HYDROCHLORIDE AND ACETAMINOPHEN 1000 MG: 500 TABLET ORAL at 10:10

## 2022-10-22 RX ADMIN — METOPROLOL SUCCINATE 25 MG: 25 TABLET, EXTENDED RELEASE ORAL at 10:10

## 2022-10-22 RX ADMIN — Medication 1 TABLET: at 10:10

## 2022-10-22 RX ADMIN — PANTOPRAZOLE SODIUM 40 MG: 40 TABLET, DELAYED RELEASE ORAL at 10:10

## 2022-10-22 NOTE — NURSING
Dr. Samaniego at bedside changing patient's dressing.   Will continue to monitor for any drainage.

## 2022-10-22 NOTE — PROGRESS NOTES
[facility] Medicine  Progress Note    Patient Name: Adryan Neff  MRN: [id number]  Patient Class: IP- Inpatient   Admission Date: 10/6/2022  Length of Stay: 16 days  Attending Physician: Venkat Strauss MD  Primary Care Provider: Carola Pedro MD        Subjective:     Principal Problem:PAD (peripheral artery disease)        HPI:  This is a 56 year old male with a PMHx of severe PAD s/p gangrene of left foot s/p left AKA (1/2022), ESRD on HD MWF, NICM (EF: 55%, GIDD), pulmonary hypertension, GERD and latent TB filiberto presents for concern for leg infection.       The patient presented to the vascular surgery clinic for a follow up. He reports worsening pain and developing eschar of the right dorsal foot along with foul smelling odor. Given concern for an infection and the need for IV antibiotics & expedited podiatry evaluation, the patient was sent to the ER. The patient denies having any fevers, chills, or injury to his foot. He has a previous AKA and uses a wheelchair to ambulate. While in the ED, the patient was hypertensive but otherwise hemodynamically stable. Labs showed leukocytosis (17.41 - with neutrophilic predominance), normocytic anemia (9.7), elevated sed rate (125), elevated CRP (237.2), negative lactate (1.6). X-ray foot showed no radiographic evidence of osteomyelitis. He was administered vancomycin, zosyn and was admitted for further management.     I attempted to call the daughter to obtain further history on 1902 without a response.         Overview/Hospital Course:  Mr Adryan Neff is a 56 y.o. man with PAD. He is s/p L AKA due to gangrene and now presents with worsening R foot infection. He also has ESRD on HD via LUE AVG. Started antibiotics. Podiatry and Ortho consulted with plans for amputation. This was delayed due to PEA on 10/10 with quick ROSC. Unclear etiology but suspect vasovagal event. Cardiology was consulted. He then had R AKA on 10/13/22. Antibiotics were  discontinued. He has had malfunctioning of his LUE AVG; Vascular was consulted and fistulogram performed.  SW/CM consulted for discharge planning.  Working on NH placement.  Some persistent bleeding from right stump.  Hgb of 6.3 on 10/21 and transfused one unit of blood.      Interval History: in a lot of pain after dressing changed to right stump.    Review of Systems   HENT:  Negative for ear discharge and ear pain.    Eyes:  Negative for discharge and itching.   Endocrine: Negative for cold intolerance and heat intolerance.   Neurological:  Negative for seizures and syncope.   Objective:     Vital Signs (Most Recent):  Temp: 98.5 °F (36.9 °C) (10/22/22 0818)  Pulse: 109 (10/22/22 0818)  Resp: 18 (10/22/22 1019)  BP: (!) 172/81 (10/22/22 1010)  SpO2: (!) 94 % (10/22/22 0818)   Vital Signs (24h Range):  Temp:  [98.3 °F (36.8 °C)-99.5 °F (37.5 °C)] 98.5 °F (36.9 °C)  Pulse:  [] 109  Resp:  [16-20] 18  SpO2:  [94 %-100 %] 94 %  BP: (114-172)/(59-81) 172/81     Weight: 59.4 kg (130 lb 15.3 oz)  Body mass index is 18.26 kg/m².    Intake/Output Summary (Last 24 hours) at 10/22/2022 1117  Last data filed at 10/22/2022 0818  Gross per 24 hour   Intake 2074 ml   Output 1500 ml   Net 574 ml        Physical Exam  Vitals and nursing note reviewed.   Constitutional:       Appearance: He is ill-appearing. He is not toxic-appearing.   HENT:      Head: Normocephalic and atraumatic.      Nose: Nose normal.      Mouth/Throat:      Mouth: Mucous membranes are moist.   Cardiovascular:      Rate and Rhythm: Normal rate and regular rhythm.      Heart sounds: Normal heart sounds. No murmur heard.    No gallop.   Pulmonary:      Effort: Pulmonary effort is normal. No respiratory distress.      Breath sounds: No wheezing or rales.   Abdominal:      General: Bowel sounds are normal. There is no distension.      Palpations: Abdomen is soft.      Tenderness: There is no abdominal tenderness. There is no guarding.   Musculoskeletal:       Comments: Right AKA dressing in place. L AKA also present   Skin:     Comments: LUE AVG with thrill   Neurological:      Mental Status: He is alert.       Significant Labs: All pertinent labs within the past 24 hours have been reviewed.  BMP:   Recent Labs   Lab 10/21/22  0751   GLU 80   *   K 6.0*      CO2 19*   BUN 41*   CREATININE 8.8*   CALCIUM 9.0       CBC:   Recent Labs   Lab 10/21/22  0751   WBC 22.49*   HGB 6.3*   HCT 21.7*   *         Significant Imaging: I have reviewed all pertinent imaging results/findings within the past 24 hours.      Assessment/Plan:      * PAD (peripheral artery disease)  S/p L AKA then presented with PAD causing R foot wound. Now s/p R AKA on 10/13  - previously on antibiotics. These were discontinued when source control achieved.   - continue asa, statin   Pending NH placement.    Leukocytosis  Persistent leukocytosis over past couple of weeks, but afebrile with no source of infection.  Monitor.      AV graft malfunction  Vascular following.  S/p fistulogram      Cognitive changes  Noted by Psychiatry that he has had multiple episodes of confusion and not understanding treatment plan. Unclear if this is delirium associated or beginnings of cognitive dysfunction. Recommend outpatient evaluation.       Anemia due to chronic kidney disease, on chronic dialysis  - Anemic - has been transfused this admission pre and post surgery  - Continue EPO per Nephrology  With anemia of acute blood loss from bleeding from right stump.  Transfused another unit of blod on 10/21        ESRD on hemodialysis  Dialyzes via LUE AVG  There is concern about AVG malfunction. Vascular surgery consulted  S/p fistulogram.  Nephrology following       Hyperkalemia  HD today.      CAD (coronary artery disease) - [facility]-obstructive from [facility] in 2016  - Resume aspirin, statin       Chronic diastolic heart failure  - Echocardiogram (11/24/2021): EF: 55%, GIDD, mild TR, moderate to severe MR  - no  signs of acute exacerbation  - Continue home medications     Gastroesophageal reflux disease  - Coninue PPI     Pure hypercholesterolemia  - Continue statin     Essential (primary) hypertension  - Continue home medications    Controlled type 2 diabetes mellitus with chronic kidney disease on chronic dialysis, with long-term current use of insulin  - Last A1c:   Lab Results   Component Value Date    HGBA1C 6.1 (H) 01/07/2021     Continue current management.  - Low-sensitivity sliding scale insulin  - Initiate and maintain glycemic protocol, monitor POC glucose   - Follow up with PCP as outpatient      VTE Risk Mitigation (From admission, onward)         Ordered     IP VTE HIGH RISK PATIENT  Once         10/06/22 1819     Place sequential compression device  Until discontinued         10/06/22 1819                Discharge Planning   ENIO:      Code Status: Full Code   Is the patient medically ready for discharge?:     Reason for patient still in hospital (select all that apply): Pending disposition  Discharge Plan A: New Nursing Home placement - snf care facility   Discharge Delays: (!) Patient and Family Barriers              Venkat Strauss MD  Department of Hospital Medicine   [facility] [place] - Telemetry

## 2022-10-22 NOTE — SUBJECTIVE & OBJECTIVE
Interval History: in a lot of pain after dressing changed to right stump.    Review of Systems   HENT:  Negative for ear discharge and ear pain.    Eyes:  Negative for discharge and itching.   Endocrine: Negative for cold intolerance and heat intolerance.   Neurological:  Negative for seizures and syncope.   Objective:     Vital Signs (Most Recent):  Temp: 98.5 °F (36.9 °C) (10/22/22 0818)  Pulse: 109 (10/22/22 0818)  Resp: 18 (10/22/22 1019)  BP: (!) 172/81 (10/22/22 1010)  SpO2: (!) 94 % (10/22/22 0818)   Vital Signs (24h Range):  Temp:  [98.3 °F (36.8 °C)-99.5 °F (37.5 °C)] 98.5 °F (36.9 °C)  Pulse:  [] 109  Resp:  [16-20] 18  SpO2:  [94 %-100 %] 94 %  BP: (114-172)/(59-81) 172/81     Weight: 59.4 kg (130 lb 15.3 oz)  Body mass index is 18.26 kg/m².    Intake/Output Summary (Last 24 hours) at 10/22/2022 1117  Last data filed at 10/22/2022 0818  Gross per 24 hour   Intake 2074 ml   Output 1500 ml   Net 574 ml        Physical Exam  Vitals and nursing note reviewed.   Constitutional:       Appearance: He is ill-appearing. He is not toxic-appearing.   HENT:      Head: Normocephalic and atraumatic.      Nose: Nose normal.      Mouth/Throat:      Mouth: Mucous membranes are moist.   Cardiovascular:      Rate and Rhythm: Normal rate and regular rhythm.      Heart sounds: Normal heart sounds. No murmur heard.    No gallop.   Pulmonary:      Effort: Pulmonary effort is normal. No respiratory distress.      Breath sounds: No wheezing or rales.   Abdominal:      General: Bowel sounds are normal. There is no distension.      Palpations: Abdomen is soft.      Tenderness: There is no abdominal tenderness. There is no guarding.   Musculoskeletal:      Comments: Right AKA dressing in place. L AKA also present   Skin:     Comments: LUE AVG with thrill   Neurological:      Mental Status: He is alert.       Significant Labs: All pertinent labs within the past 24 hours have been reviewed.  BMP:   Recent Labs   Lab  10/21/22  0751   GLU 80   *   K 6.0*      CO2 19*   BUN 41*   CREATININE 8.8*   CALCIUM 9.0       CBC:   Recent Labs   Lab 10/21/22  0751   WBC 22.49*   HGB 6.3*   HCT 21.7*   *         Significant Imaging: I have reviewed all pertinent imaging results/findings within the past 24 hours.

## 2022-10-22 NOTE — NURSING
"Notified per pharmacist patient refusing to take his morning medication.   Patient took the medicine cup, and threw the pills on the floor, and told her "Fuck you!"  This nurse will attempt to give patient his morning medication.  "

## 2022-10-22 NOTE — ASSESSMENT & PLAN NOTE
- Anemic - has been transfused this admission pre and post surgery  - Continue EPO per Nephrology  With anemia of acute blood loss from bleeding from right stump.  Transfused another unit of blod on 10/21

## 2022-10-22 NOTE — PLAN OF CARE
Problem: Skin Injury Risk Increased  Goal: Skin Health and Integrity  Outcome: Ongoing, Progressing  Intervention: Optimize Skin Protection  Flowsheets (Taken 10/22/2022 1736)  Pressure Reduction Techniques: frequent weight shift encouraged  Skin Protection:   adhesive use limited   incontinence pads utilized   skin-to-device areas padded   skin-to-skin areas padded   transparent dressing maintained  Head of Bed (HOB) Positioning: HOB elevated

## 2022-10-22 NOTE — PROGRESS NOTES
Ortho Daily Progress Note    Adryan Neff is a 56 y.o. male admitted on 10/6/2022      Chief Complaint/Reason for admission: Foot Pain (Pt's daughter translating for patient per request. Reports they were at the podiatrist and sent to the ED for a right foot infection. Reports hx of DM. Pt has left AKA.)       Hospital Day: 16  Post Op Day: 2 Days Post-Op     The patient was seen and examined this morning at the bedside.    The patient has had some persistent bleeding to the right AKA site.  Dressing was changed today.  There is no active bleeding.Patient does go to dialysis appears he is only taking aspirin however he may receive heparin at dialysis.  This may be potentiating the bleeding as well  _______________    Vitals:    10/21/22 2123 10/22/22 0023 10/22/22 0308 10/22/22 0346   BP: (!) 163/74 131/61  (!) 154/71   Pulse: 101 95  96   Resp: 18 18 18 18   Temp: 99 °F (37.2 °C) 99 °F (37.2 °C)  98.8 °F (37.1 °C)   TempSrc: Oral Oral  Oral   SpO2: 100% 99%  100%   Weight:    59.4 kg (130 lb 15.3 oz)   Height:           Vital Signs (Most Recent)  Temp: 98.8 °F (37.1 °C) (10/22/22 0346)  Pulse: 96 (10/22/22 0346)  Resp: 18 (10/22/22 0346)  BP: (!) 154/71 (10/22/22 0346)  SpO2: 100 % (10/22/22 0346)    Vital Signs Range (Last 24H):  Temp:  [98.3 °F (36.8 °C)-99.5 °F (37.5 °C)]   Pulse:  []   Resp:  [16-18]   BP: (114-163)/(59-74)   SpO2:  [99 %-100 %]       Physical:    Right above knee amputation site    Dressing was changed today to the right above-the-knee amputation site.  There was some clot within the dressing but no active bleeding.  No cellulitis noted erythema no worrisome signs for infection.        Recent Labs     10/21/22  0751   K 6.0*   CALCIUM 9.0   WBC 22.49*   HGB 6.3*   HCT 21.7*   *       I/O last 3 completed shifts:  In: 1954 [P.O.:700; Blood:754; Other:500]  Out: 1500 [Other:1500]          Assessment:  A/P status post right above knee amputation with some persistent bleeding                Plan:    The patient has had some persistent bleeding to the right AKA site.  Dressing was changed today.  There is no active bleeding.Patient does go to dialysis appears he is only taking aspirin however he may receive heparin at dialysis.  This may be potentiating the bleeding as well        Joss Samaniego MD  [facility]

## 2022-10-23 LAB
POCT GLUCOSE: 119 MG/DL (ref 70–110)
POCT GLUCOSE: 129 MG/DL (ref 70–110)
POCT GLUCOSE: 165 MG/DL (ref 70–110)
POCT GLUCOSE: 98 MG/DL (ref 70–110)

## 2022-10-23 PROCEDURE — 25000003 PHARM REV CODE 250: Performed by: ORTHOPAEDIC SURGERY

## 2022-10-23 PROCEDURE — 25000003 PHARM REV CODE 250: Performed by: HOSPITALIST

## 2022-10-23 PROCEDURE — 21400001 HC TELEMETRY ROOM

## 2022-10-23 PROCEDURE — 25000003 PHARM REV CODE 250: Performed by: INTERNAL MEDICINE

## 2022-10-23 RX ADMIN — SEVELAMER CARBONATE 2400 MG: 800 TABLET, FILM COATED ORAL at 12:10

## 2022-10-23 RX ADMIN — HYDROCODONE BITARTRATE AND ACETAMINOPHEN 1 TABLET: 5; 325 TABLET ORAL at 07:10

## 2022-10-23 RX ADMIN — ASPIRIN 81 MG CHEWABLE TABLET 81 MG: 81 TABLET CHEWABLE at 09:10

## 2022-10-23 RX ADMIN — AMLODIPINE BESYLATE 10 MG: 5 TABLET ORAL at 09:10

## 2022-10-23 RX ADMIN — HYDROCODONE BITARTRATE AND ACETAMINOPHEN 1 TABLET: 5; 325 TABLET ORAL at 01:10

## 2022-10-23 RX ADMIN — Medication 1 TABLET: at 09:10

## 2022-10-23 RX ADMIN — PANTOPRAZOLE SODIUM 40 MG: 40 TABLET, DELAYED RELEASE ORAL at 09:10

## 2022-10-23 RX ADMIN — ACETAMINOPHEN 650 MG: 325 TABLET ORAL at 06:10

## 2022-10-23 RX ADMIN — OXYCODONE HYDROCHLORIDE AND ACETAMINOPHEN 1000 MG: 500 TABLET ORAL at 09:10

## 2022-10-23 RX ADMIN — METOPROLOL SUCCINATE 25 MG: 25 TABLET, EXTENDED RELEASE ORAL at 09:10

## 2022-10-23 RX ADMIN — ATORVASTATIN CALCIUM 40 MG: 40 TABLET, FILM COATED ORAL at 09:10

## 2022-10-23 NOTE — PLAN OF CARE
Problem: Skin Injury Risk Increased  Goal: Skin Health and Integrity  Intervention: Optimize Skin Protection  Flowsheets (Taken 10/23/2022 2289)  Pressure Reduction Techniques: frequent weight shift encouraged  Skin Protection:   adhesive use limited   incontinence pads utilized  Head of Bed (HOB) Positioning: HOB lowered  Intervention: Promote and Optimize Oral Intake  Flowsheets (Taken 10/23/2022 0151)  Oral Nutrition Promotion:   social interaction promoted   medicated

## 2022-10-23 NOTE — PROGRESS NOTES
Patient resting comfortably. No drainage from dressing. Dressing c/d/I. H&H ordered for tomorrow morning. Discharge planning. Will need to f/u outpt 2 weeks postop.

## 2022-10-23 NOTE — SUBJECTIVE & OBJECTIVE
Interval History: feeling well today.    Review of Systems   HENT:  Negative for ear discharge and ear pain.    Eyes:  Negative for discharge and itching.   Endocrine: Negative for cold intolerance and heat intolerance.   Neurological:  Negative for seizures and syncope.   Objective:     Vital Signs (Most Recent):  Temp: 98.4 °F (36.9 °C) (10/23/22 0724)  Pulse: 86 (10/23/22 0724)  Resp: 20 (10/23/22 0724)  BP: (!) 141/65 (10/23/22 0724)  SpO2: 99 % (10/23/22 0724)   Vital Signs (24h Range):  Temp:  [97.5 °F (36.4 °C)-98.5 °F (36.9 °C)] 98.4 °F (36.9 °C)  Pulse:  [77-86] 86  Resp:  [18-20] 20  SpO2:  [99 %-100 %] 99 %  BP: (132-144)/(62-68) 141/65     Weight: 59 kg (130 lb 1.1 oz)  Body mass index is 18.14 kg/m².    Intake/Output Summary (Last 24 hours) at 10/23/2022 1118  Last data filed at 10/23/2022 0325  Gross per 24 hour   Intake 410 ml   Output 0 ml   Net 410 ml        Physical Exam  Vitals and nursing note reviewed.   Constitutional:       Appearance: He is ill-appearing. He is not toxic-appearing.   HENT:      Head: Normocephalic and atraumatic.      Nose: Nose normal.      Mouth/Throat:      Mouth: Mucous membranes are moist.   Cardiovascular:      Rate and Rhythm: Normal rate and regular rhythm.      Heart sounds: Normal heart sounds. No murmur heard.    No gallop.   Pulmonary:      Effort: Pulmonary effort is normal. No respiratory distress.      Breath sounds: No wheezing or rales.   Abdominal:      General: Bowel sounds are normal. There is no distension.      Palpations: Abdomen is soft.      Tenderness: There is no abdominal tenderness. There is no guarding.   Musculoskeletal:      Comments: Right AKA dressing in place. L AKA also present   Skin:     Comments: LUE AVG with thrill   Neurological:      Mental Status: He is alert.       Significant Labs: All pertinent labs within the past 24 hours have been reviewed.  BMP: No results for input(s): GLU, NA, K, CL, CO2, BUN, CREATININE, CALCIUM, MG in the  last 48 hours.    CBC:   Recent Labs   Lab 10/22/22  1315   HGB 7.5*         Significant Imaging: I have reviewed all pertinent imaging results/findings within the past 24 hours.

## 2022-10-23 NOTE — ASSESSMENT & PLAN NOTE
- Anemic - has been transfused this admission pre and post surgery  - Continue EPO per Nephrology  With anemia of acute blood loss from bleeding from right stump.  Transfused another unit of blod on 10/21 with good correction.

## 2022-10-23 NOTE — PLAN OF CARE
Problem: Pain Acute  Goal: Acceptable Pain Control and Functional Ability  Outcome: Ongoing, Progressing  Intervention: Develop Pain Management Plan  Flowsheets (Taken 10/23/2022 1749)  Pain Management Interventions:   medication offered   pain management plan reviewed with patient/caregiver   pillow support provided   position adjusted   quiet environment facilitated   relaxation techniques promoted  Intervention: Prevent or Manage Pain  Flowsheets (Taken 10/23/2022 1749)  Sleep/Rest Enhancement: regular sleep/rest pattern promoted  Sensory Stimulation Regulation:   quiet environment promoted   lighting decreased   care clustered   television on  Medication Review/Management: medications reviewed  Intervention: Optimize Psychosocial Wellbeing  Flowsheets (Taken 10/23/2022 1749)  Supportive Measures: active listening utilized  Diversional Activities:   television   smartphone

## 2022-10-23 NOTE — PROGRESS NOTES
[facility] Medicine  Progress Note    Patient Name: Adryan Neff  MRN: [id number]  Patient Class: IP- Inpatient   Admission Date: 10/6/2022  Length of Stay: 17 days  Attending Physician: Venkat Strauss MD  Primary Care Provider: Carola Pedro MD        Subjective:     Principal Problem:PAD (peripheral artery disease)        HPI:  This is a 56 year old male with a PMHx of severe PAD s/p gangrene of left foot s/p left AKA (1/2022), ESRD on HD MWF, NICM (EF: 55%, GIDD), pulmonary hypertension, GERD and latent TB filiberto presents for concern for leg infection.       The patient presented to the vascular surgery clinic for a follow up. He reports worsening pain and developing eschar of the right dorsal foot along with foul smelling odor. Given concern for an infection and the need for IV antibiotics & expedited podiatry evaluation, the patient was sent to the ER. The patient denies having any fevers, chills, or injury to his foot. He has a previous AKA and uses a wheelchair to ambulate. While in the ED, the patient was hypertensive but otherwise hemodynamically stable. Labs showed leukocytosis (17.41 - with neutrophilic predominance), normocytic anemia (9.7), elevated sed rate (125), elevated CRP (237.2), negative lactate (1.6). X-ray foot showed no radiographic evidence of osteomyelitis. He was administered vancomycin, zosyn and was admitted for further management.     I attempted to call the daughter to obtain further history on 1902 without a response.         Overview/Hospital Course:  Mr Adryan Neff is a 56 y.o. man with PAD. He is s/p L AKA due to gangrene and now presents with worsening R foot infection. He also has ESRD on HD via LUE AVG. Started antibiotics. Podiatry and Ortho consulted with plans for amputation. This was delayed due to PEA on 10/10 with quick ROSC. Unclear etiology but suspect vasovagal event. Cardiology was consulted. He then had R AKA on 10/13/22. Antibiotics were  discontinued. He has had malfunctioning of his LUE AVG; Vascular was consulted and fistulogram performed.  SW/CM consulted for discharge planning.  Working on NH placement.  Some persistent bleeding from right stump.  Hgb of 6.3 on 10/21 and transfused one unit of blood.      Interval History: feeling well today.    Review of Systems   HENT:  Negative for ear discharge and ear pain.    Eyes:  Negative for discharge and itching.   Endocrine: Negative for cold intolerance and heat intolerance.   Neurological:  Negative for seizures and syncope.   Objective:     Vital Signs (Most Recent):  Temp: 98.4 °F (36.9 °C) (10/23/22 0724)  Pulse: 86 (10/23/22 0724)  Resp: 20 (10/23/22 0724)  BP: (!) 141/65 (10/23/22 0724)  SpO2: 99 % (10/23/22 0724)   Vital Signs (24h Range):  Temp:  [97.5 °F (36.4 °C)-98.5 °F (36.9 °C)] 98.4 °F (36.9 °C)  Pulse:  [77-86] 86  Resp:  [18-20] 20  SpO2:  [99 %-100 %] 99 %  BP: (132-144)/(62-68) 141/65     Weight: 59 kg (130 lb 1.1 oz)  Body mass index is 18.14 kg/m².    Intake/Output Summary (Last 24 hours) at 10/23/2022 1118  Last data filed at 10/23/2022 0325  Gross per 24 hour   Intake 410 ml   Output 0 ml   Net 410 ml        Physical Exam  Vitals and nursing note reviewed.   Constitutional:       Appearance: He is ill-appearing. He is not toxic-appearing.   HENT:      Head: Normocephalic and atraumatic.      Nose: Nose normal.      Mouth/Throat:      Mouth: Mucous membranes are moist.   Cardiovascular:      Rate and Rhythm: Normal rate and regular rhythm.      Heart sounds: Normal heart sounds. No murmur heard.    No gallop.   Pulmonary:      Effort: Pulmonary effort is normal. No respiratory distress.      Breath sounds: No wheezing or rales.   Abdominal:      General: Bowel sounds are normal. There is no distension.      Palpations: Abdomen is soft.      Tenderness: There is no abdominal tenderness. There is no guarding.   Musculoskeletal:      Comments: Right AKA dressing in place. L AKA  also present   Skin:     Comments: LUE AVG with thrill   Neurological:      Mental Status: He is alert.       Significant Labs: All pertinent labs within the past 24 hours have been reviewed.  BMP: No results for input(s): GLU, NA, K, CL, CO2, BUN, CREATININE, CALCIUM, MG in the last 48 hours.    CBC:   Recent Labs   Lab 10/22/22  1315   HGB 7.5*         Significant Imaging: I have reviewed all pertinent imaging results/findings within the past 24 hours.      Assessment/Plan:      * PAD (peripheral artery disease)  S/p L AKA then presented with PAD causing R foot wound. Now s/p R AKA on 10/13  - previously on antibiotics. These were discontinued when source control achieved.   - continue asa, statin   Pending NH placement.    Leukocytosis  Persistent leukocytosis over past couple of weeks, but afebrile with no source of infection.  Monitor.      AV graft malfunction  Vascular following.  S/p fistulogram      Cognitive changes  Noted by Psychiatry that he has had multiple episodes of confusion and not understanding treatment plan. Unclear if this is delirium associated or beginnings of cognitive dysfunction. Recommend outpatient evaluation.       Anemia due to chronic kidney disease, on chronic dialysis  - Anemic - has been transfused this admission pre and post surgery  - Continue EPO per Nephrology  With anemia of acute blood loss from bleeding from right stump.  Transfused another unit of blod on 10/21 with good correction.        ESRD on hemodialysis  Dialyzes via LUE AVG  There is concern about AVG malfunction. Vascular surgery consulted  S/p fistulogram.  Nephrology following       Hyperkalemia        CAD (coronary artery disease) - non-obstructive from [facility] in 2016  - Resume aspirin, statin       Chronic diastolic heart failure  - Echocardiogram (11/24/2021): EF: 55%, GIDD, mild TR, moderate to severe MR  - no signs of acute exacerbation  - Continue home medications     Gastroesophageal reflux disease  -  Coninue PPI     Pure hypercholesterolemia  - Continue statin     Essential (primary) hypertension  - Continue home medications    Controlled type 2 diabetes mellitus with chronic kidney disease on chronic dialysis, with long-term current use of insulin  - Last A1c:   Lab Results   Component Value Date    HGBA1C 6.1 (H) 01/07/2021     Continue current management.  - Low-sensitivity sliding scale insulin  - Initiate and maintain glycemic protocol, monitor POC glucose   - Follow up with PCP as outpatient      VTE Risk Mitigation (From admission, onward)         Ordered     IP VTE HIGH RISK PATIENT  Once         10/06/22 1819     Place sequential compression device  Until discontinued         10/06/22 1819                Discharge Planning   ENIO:      Code Status: Full Code   Is the patient medically ready for discharge?:     Reason for patient still in hospital (select all that apply): Pending disposition  Discharge Plan A: New Nursing Home placement - [facility] care facility   Discharge Delays: (!) Patient and Family Barriers              Venkat Strauss MD  Department of Hospital Medicine   [facility] - Telemetry

## 2022-10-24 LAB
ANION GAP SERPL CALC-SCNC: 13 MMOL/L (ref 8–16)
BLD PROD TYP BPU: NORMAL
BLOOD UNIT EXPIRATION DATE: NORMAL
BLOOD UNIT TYPE CODE: 5100
BLOOD UNIT TYPE: NORMAL
BUN SERPL-MCNC: 50 MG/DL (ref 6–20)
CALCIUM SERPL-MCNC: 8.9 MG/DL (ref 8.7–10.5)
CHLORIDE SERPL-SCNC: 99 MMOL/L (ref 95–110)
CO2 SERPL-SCNC: 22 MMOL/L (ref 23–29)
CODING SYSTEM: NORMAL
CREAT SERPL-MCNC: 10 MG/DL (ref 0.5–1.4)
DISPENSE STATUS: NORMAL
EST. GFR  (NO RACE VARIABLE): 6 ML/MIN/1.73 M^2
GLUCOSE SERPL-MCNC: 165 MG/DL (ref 70–110)
HCT VFR BLD AUTO: 22.4 % (ref 40–54)
HGB BLD-MCNC: 6.9 G/DL (ref 14–18)
NUM UNITS TRANS PACKED RBC: NORMAL
PHOSPHATE SERPL-MCNC: 3.9 MG/DL (ref 2.7–4.5)
POCT GLUCOSE: 146 MG/DL (ref 70–110)
POCT GLUCOSE: 164 MG/DL (ref 70–110)
POCT GLUCOSE: 89 MG/DL (ref 70–110)
POTASSIUM SERPL-SCNC: 5.5 MMOL/L (ref 3.5–5.1)
SODIUM SERPL-SCNC: 134 MMOL/L (ref 136–145)

## 2022-10-24 PROCEDURE — 84100 ASSAY OF PHOSPHORUS: CPT | Performed by: INTERNAL MEDICINE

## 2022-10-24 PROCEDURE — 21400001 HC TELEMETRY ROOM

## 2022-10-24 PROCEDURE — 25000003 PHARM REV CODE 250: Performed by: NURSE PRACTITIONER

## 2022-10-24 PROCEDURE — 85018 HEMOGLOBIN: CPT | Performed by: HOSPITALIST

## 2022-10-24 PROCEDURE — 36430 TRANSFUSION BLD/BLD COMPNT: CPT

## 2022-10-24 PROCEDURE — 99233 SBSQ HOSP IP/OBS HIGH 50: CPT | Mod: ,,, | Performed by: NURSE PRACTITIONER

## 2022-10-24 PROCEDURE — 85014 HEMATOCRIT: CPT | Performed by: HOSPITALIST

## 2022-10-24 PROCEDURE — 25000003 PHARM REV CODE 250: Performed by: ORTHOPAEDIC SURGERY

## 2022-10-24 PROCEDURE — 80100016 HC MAINTENANCE HEMODIALYSIS

## 2022-10-24 PROCEDURE — 36415 COLL VENOUS BLD VENIPUNCTURE: CPT | Performed by: INTERNAL MEDICINE

## 2022-10-24 PROCEDURE — 63600175 PHARM REV CODE 636 W HCPCS: Mod: JG | Performed by: ORTHOPAEDIC SURGERY

## 2022-10-24 PROCEDURE — 97530 THERAPEUTIC ACTIVITIES: CPT | Mod: CQ

## 2022-10-24 PROCEDURE — 99233 PR SUBSEQUENT HOSPITAL CARE,LEVL III: ICD-10-PCS | Mod: ,,, | Performed by: NURSE PRACTITIONER

## 2022-10-24 PROCEDURE — 97542 WHEELCHAIR MNGMENT TRAINING: CPT | Mod: CQ

## 2022-10-24 PROCEDURE — 80048 BASIC METABOLIC PNL TOTAL CA: CPT | Performed by: INTERNAL MEDICINE

## 2022-10-24 PROCEDURE — P9016 RBC LEUKOCYTES REDUCED: HCPCS | Performed by: HOSPITALIST

## 2022-10-24 RX ORDER — POLYETHYLENE GLYCOL 3350 17 G/17G
17 POWDER, FOR SOLUTION ORAL 2 TIMES DAILY PRN
Status: DISCONTINUED | OUTPATIENT
Start: 2022-10-24 | End: 2022-11-10 | Stop reason: HOSPADM

## 2022-10-24 RX ORDER — HYDROCODONE BITARTRATE AND ACETAMINOPHEN 500; 5 MG/1; MG/1
TABLET ORAL
Status: DISCONTINUED | OUTPATIENT
Start: 2022-10-24 | End: 2022-10-31

## 2022-10-24 RX ADMIN — ATORVASTATIN CALCIUM 40 MG: 40 TABLET, FILM COATED ORAL at 10:10

## 2022-10-24 RX ADMIN — Medication 6 MG: at 10:10

## 2022-10-24 RX ADMIN — INSULIN DETEMIR 12 UNITS: 100 INJECTION, SOLUTION SUBCUTANEOUS at 10:10

## 2022-10-24 RX ADMIN — EPOETIN ALFA-EPBX 10000 UNITS: 10000 INJECTION, SOLUTION INTRAVENOUS; SUBCUTANEOUS at 01:10

## 2022-10-24 NOTE — PROGRESS NOTES
Adryan Neff is a 56 y.o. male patient.    Follow for ESRD, dialysis    Patient seen while on dialysis  No new c/o, comfortable    Scheduled Meds:   amLODIPine  10 mg Oral Daily    ascorbic acid (vitamin C)  1,000 mg Oral Daily    aspirin  81 mg Oral Daily    atorvastatin  40 mg Oral QHS    epoetin paola-epbx  10,000 Units Subcutaneous Every Mon, Wed, Fri    folic acid-vit B6-vit B12 2.5-25-2 mg  1 tablet Oral Daily    insulin detemir U-100  12 Units Subcutaneous QHS    metoprolol succinate  25 mg Oral Daily    pantoprazole  40 mg Oral Daily    sevelamer carbonate  2,400 mg Oral TID WM       Review of patient's allergies indicates:  No Known Allergies      Vital Signs Range (Last 24H):  Temp:  [98.2 °F (36.8 °C)-98.7 °F (37.1 °C)]   Pulse:  [76-86]   Resp:  [18-20]   BP: (127-155)/(58-75)   SpO2:  [100 %]     I & O (Last 24H):  Intake/Output Summary (Last 24 hours) at 10/24/2022 1043  Last data filed at 10/24/2022 0458  Gross per 24 hour   Intake 440 ml   Output --   Net 440 ml           Physical Exam:  General appearance: well developed, well nourished  Lungs:  clear to auscultation bilaterally and normal respiratory effort  Heart: regular rate and rhythm  Abdomen: soft, non-tender non-distented; bowel sounds normal; no masses,  no organomegaly  Extremities: no cyanosis or edema, or clubbing    Laboratory:  I have reviewed all pertinent lab results within the past 24 hours.  CBC:   Recent Labs   Lab 10/21/22  0751 10/22/22  1315 10/24/22  0001   WBC 22.49*  --   --    RBC 2.26*  --   --    HGB 6.3*   < > 6.9*   HCT 21.7*  --  22.4*   *  --   --    MCV 96  --   --    MCH 27.9  --   --    MCHC 29.0*  --   --     < > = values in this interval not displayed.     CMP:   Recent Labs   Lab 10/24/22  0001   *   CALCIUM 8.9   *   K 5.5*   CO2 22*   CL 99   BUN 50*   CREATININE 10.0*         Imp/Plan    ESRD - on dialysis, stable, tolerated well  AVG malfunction s/p fistulogram - working well  S/p PEA  arrest  (R) foot gangrene s/p AKA  HTN  Anemia of CKD    Continue present Rx  HD q MWF  We'll follow for dialysis        Wil Fine  10/24/2022

## 2022-10-24 NOTE — ASSESSMENT & PLAN NOTE
- Anemic - has been transfused this admission pre and post surgery  - Continue EPO per Nephrology  With anemia of acute blood loss from bleeding from right stump.  Transfused another unit of blod on 10/21 with good correction.  Will transfuse another unit today with HD.

## 2022-10-24 NOTE — NURSING
Patient returned from procedure. Telemetry monitor continued. Denies pain or discomfort. Will continue to monitor.

## 2022-10-24 NOTE — PROGRESS NOTES
[facility] Medicine  Progress Note    Patient Name: Adryan Neff  MRN: [id number]  Patient Class: IP- Inpatient   Admission Date: 10/6/2022  Length of Stay: 18 days  Attending Physician: Venkat Strauss MD  Primary Care Provider: Carola Pedro MD        Subjective:     Principal Problem:PAD (peripheral artery disease)        HPI:  This is a 56 year old male with a PMHx of severe PAD s/p gangrene of left foot s/p left AKA (1/2022), ESRD on HD MWF, NICM (EF: 55%, GIDD), pulmonary hypertension, GERD and latent TB filiberto presents for concern for leg infection.       The patient presented to the vascular surgery clinic for a follow up. He reports worsening pain and developing eschar of the right dorsal foot along with foul smelling odor. Given concern for an infection and the need for IV antibiotics & expedited podiatry evaluation, the patient was sent to the ER. The patient denies having any fevers, chills, or injury to his foot. He has a previous AKA and uses a wheelchair to ambulate. While in the ED, the patient was hypertensive but otherwise hemodynamically stable. Labs showed leukocytosis (17.41 - with neutrophilic predominance), normocytic anemia (9.7), elevated sed rate (125), elevated CRP (237.2), negative lactate (1.6). X-ray foot showed no radiographic evidence of osteomyelitis. He was administered vancomycin, zosyn and was admitted for further management.     I attempted to call the daughter to obtain further history on 1902 without a response.         Overview/Hospital Course:  Mr Adryan Neff is a 56 y.o. man with PAD. He is s/p L AKA due to gangrene and now presents with worsening R foot infection. He also has ESRD on HD via LUE AVG. Started antibiotics. Podiatry and Ortho consulted with plans for amputation. This was delayed due to PEA on 10/10 with quick ROSC. Unclear etiology but suspect vasovagal event. Cardiology was consulted. He then had R AKA on 10/13/22. Antibiotics were  discontinued. He has had malfunctioning of his LUE AVG; Vascular was consulted and fistulogram performed.  SW/CM consulted for discharge planning.  Working on NH placement.  Some persistent bleeding from right stump.  Hgb of 6.3 on 10/21 and transfused one unit of blood. Another unit of blood transfused with HD on 10/24.      Interval History: no new complaints.    Review of Systems   HENT:  Negative for ear discharge and ear pain.    Eyes:  Negative for discharge and itching.   Endocrine: Negative for cold intolerance and heat intolerance.   Neurological:  Negative for seizures and syncope.   Objective:     Vital Signs (Most Recent):  Temp: 98.2 °F (36.8 °C) (10/24/22 0725)  Pulse: 76 (10/24/22 0725)  Resp: 18 (10/24/22 0725)  BP: 130/63 (10/24/22 0725)  SpO2: 100 % (10/24/22 0725)   Vital Signs (24h Range):  Temp:  [98.2 °F (36.8 °C)-98.4 °F (36.9 °C)] 98.2 °F (36.8 °C)  Pulse:  [76-83] 76  Resp:  [18-20] 18  SpO2:  [100 %] 100 %  BP: (130-155)/(58-75) 130/63     Weight: 59 kg (130 lb 1.1 oz)  Body mass index is 18.14 kg/m².    Intake/Output Summary (Last 24 hours) at 10/24/2022 1142  Last data filed at 10/24/2022 0458  Gross per 24 hour   Intake 440 ml   Output --   Net 440 ml        Physical Exam  Vitals and nursing note reviewed.   Constitutional:       Appearance: He is ill-appearing. He is not toxic-appearing.   HENT:      Head: Normocephalic and atraumatic.      Nose: Nose normal.      Mouth/Throat:      Mouth: Mucous membranes are moist.   Cardiovascular:      Rate and Rhythm: Normal rate and regular rhythm.      Heart sounds: Normal heart sounds. No murmur heard.    No gallop.   Pulmonary:      Effort: Pulmonary effort is normal. No respiratory distress.      Breath sounds: No wheezing or rales.   Abdominal:      General: Bowel sounds are normal. There is no distension.      Palpations: Abdomen is soft.      Tenderness: There is no abdominal tenderness. There is no guarding.   Musculoskeletal:       Comments: Right AKA dressing in place. L AKA also present   Skin:     Comments: LUE AVG with thrill   Neurological:      Mental Status: He is alert.       Significant Labs: All pertinent labs within the past 24 hours have been reviewed.  BMP:   Recent Labs   Lab 10/24/22  0001   *   *   K 5.5*   CL 99   CO2 22*   BUN 50*   CREATININE 10.0*   CALCIUM 8.9       CBC:   Recent Labs   Lab 10/22/22  1315 10/24/22  0001   HGB 7.5* 6.9*   HCT  --  22.4*         Significant Imaging: I have reviewed all pertinent imaging results/findings within the past 24 hours.      Assessment/Plan:      * PAD (peripheral artery disease)  S/p L AKA then presented with PAD causing R foot wound. Now s/p R AKA on 10/13  - previously on antibiotics. These were discontinued when source control achieved.   - continue asa, statin   Pending NH placement.    Leukocytosis  Persistent leukocytosis over past couple of weeks, but afebrile with no source of infection.  Monitor.      AV graft malfunction  Vascular following.  S/p fistulogram      Cognitive changes  Noted by Psychiatry that he has had multiple episodes of confusion and not understanding treatment plan. Unclear if this is delirium associated or beginnings of cognitive dysfunction. Recommend outpatient evaluation.       Anemia due to chronic kidney disease, on chronic dialysis  - Anemic - has been transfused this admission pre and post surgery  - Continue EPO per Nephrology  With anemia of acute blood loss from bleeding from right stump.  Transfused another unit of blod on 10/21 with good correction.  Will transfuse another unit today with HD.        ESRD on hemodialysis  Dialyzes via LUE AVG  There is concern about AVG malfunction. Vascular surgery consulted  S/p fistulogram.  Nephrology following       Hyperkalemia  Treat with HD      CAD (coronary artery disease) - non-obstructive from [facility] in 2016  - Resume aspirin, statin       Chronic diastolic heart failure  -  Echocardiogram (11/24/2021): EF: 55%, GIDD, mild TR, moderate to severe MR  - no signs of acute exacerbation  - Continue home medications     Gastroesophageal reflux disease  - Coninue PPI     Pure hypercholesterolemia  - Continue statin     Essential (primary) hypertension  - Continue home medications    Controlled type 2 diabetes mellitus with chronic kidney disease on chronic dialysis, with long-term current use of insulin  - Last A1c:   Lab Results   Component Value Date    HGBA1C 6.1 (H) 01/07/2021     Continue current management.  - Low-sensitivity sliding scale insulin  - Initiate and maintain glycemic protocol, monitor POC glucose   - Follow up with PCP as outpatient      VTE Risk Mitigation (From admission, onward)         Ordered     IP VTE HIGH RISK PATIENT  Once         10/06/22 1819     Place sequential compression device  Until discontinued         10/06/22 1819                Discharge Planning   ENIO:      Code Status: Full Code   Is the patient medically ready for discharge?:     Reason for patient still in hospital (select all that apply): Pending disposition  Discharge Plan A: New Nursing Home placement - intermediate care facility   Discharge Delays: (!) Patient and Family Barriers              Venkat Strauss MD  Department of Hospital Medicine   [facility] - Telemetry

## 2022-10-24 NOTE — SUBJECTIVE & OBJECTIVE
Interval History: no new complaints.    Review of Systems   HENT:  Negative for ear discharge and ear pain.    Eyes:  Negative for discharge and itching.   Endocrine: Negative for cold intolerance and heat intolerance.   Neurological:  Negative for seizures and syncope.   Objective:     Vital Signs (Most Recent):  Temp: 98.2 °F (36.8 °C) (10/24/22 0725)  Pulse: 76 (10/24/22 0725)  Resp: 18 (10/24/22 0725)  BP: 130/63 (10/24/22 0725)  SpO2: 100 % (10/24/22 0725)   Vital Signs (24h Range):  Temp:  [98.2 °F (36.8 °C)-98.4 °F (36.9 °C)] 98.2 °F (36.8 °C)  Pulse:  [76-83] 76  Resp:  [18-20] 18  SpO2:  [100 %] 100 %  BP: (130-155)/(58-75) 130/63     Weight: 59 kg (130 lb 1.1 oz)  Body mass index is 18.14 kg/m².    Intake/Output Summary (Last 24 hours) at 10/24/2022 1142  Last data filed at 10/24/2022 0458  Gross per 24 hour   Intake 440 ml   Output --   Net 440 ml        Physical Exam  Vitals and nursing note reviewed.   Constitutional:       Appearance: He is ill-appearing. He is not toxic-appearing.   HENT:      Head: Normocephalic and atraumatic.      Nose: Nose normal.      Mouth/Throat:      Mouth: Mucous membranes are moist.   Cardiovascular:      Rate and Rhythm: Normal rate and regular rhythm.      Heart sounds: Normal heart sounds. No murmur heard.    No gallop.   Pulmonary:      Effort: Pulmonary effort is normal. No respiratory distress.      Breath sounds: No wheezing or rales.   Abdominal:      General: Bowel sounds are normal. There is no distension.      Palpations: Abdomen is soft.      Tenderness: There is no abdominal tenderness. There is no guarding.   Musculoskeletal:      Comments: Right AKA dressing in place. L AKA also present   Skin:     Comments: LUE AVG with thrill   Neurological:      Mental Status: He is alert.       Significant Labs: All pertinent labs within the past 24 hours have been reviewed.  BMP:   Recent Labs   Lab 10/24/22  0001   *   *   K 5.5*   CL 99   CO2 22*   BUN 50*    CREATININE 10.0*   CALCIUM 8.9       CBC:   Recent Labs   Lab 10/22/22  1315 10/24/22  0001   HGB 7.5* 6.9*   HCT  --  22.4*         Significant Imaging: I have reviewed all pertinent imaging results/findings within the past 24 hours.

## 2022-10-24 NOTE — PLAN OF CARE
Problem: Physical Therapy  Goal: Physical Therapy Goal  Description: Goals to be met by: 10/26/22     Patient will increase functional independence with mobility by performin. Pt to be supervision with bed mobility.  2. Pt to transfer with SBA.  3. Pt to be (I) with wheelchair mobility 150'.    Outcome: Ongoing, Progressing   Sup to sit with max A. Transfer bed to  backwards scoot with Max A, WC to bed with SB scoot to left with max A and vcs for proper technique. Pt propelled wc with BUE 50 ft x 2 with vc for proper technique.  Pt required several rest breaks due to poor endurance.

## 2022-10-24 NOTE — PROGRESS NOTES
Spoke with CHANEL Morrow at [facility] to confirm patient treatment. Patient is MWF with Dr. Kuhn.

## 2022-10-24 NOTE — NURSING
Report received from off going nurse, KIAH Jules. Patient AAO. No signs of distress noted. Call light in reach. Bed low and locked. Will continue plan of care.

## 2022-10-24 NOTE — PLAN OF CARE
[place] - Telemetry  Discharge Reassessment    Primary Care Provider: Carola Pedro MD    Expected Discharge Date:  Pending    CM spoke to pt's daughterLuis F to follow up on dc needs. Pt's daughter stated she hasn't spoke to anyone from [facility]. CM contacted Kamilah with [facility] and left several messages. CM reiterated if pt isn't accepted to [facility], pt will need to accept an [place] facility. Pt 's daughter verbally expressed understanding.     CM requested pt's daughterLuis F's e-mail address: [email or web address] to provide to accepting facility to send admission packet.    MARCUS spoke to Nafisa nurse from [facility] and she stated to inform [facility] when pt discharges to ensure pt has a chair. Pt current time is 6:30 am but if the accepting NH would like a later time , it could be arranged.      Reassessment (most recent)       Discharge Reassessment - 10/24/22 2406          Discharge Reassessment    Assessment Type Discharge Planning Reassessment (P)      Did the patient's condition or plan change since previous assessment? Yes (P)      Discharge Plan discussed with: Adult children (P)      Discharge Plan A New Nursing Home placement - [facility] care facility (P)      Discharge Plan B Home with family (P)      DME Needed Upon Discharge  other (see comments) (P)    tbd    Discharge Barriers Identified None (P)      Why the patient remains in the hospital Requires continued medical care (P)         Post-Acute Status    Coverage Medicare (P)      Discharge Delays Post-Acute Set-up (P)

## 2022-10-24 NOTE — NURSING
Asked patient if he was in pain, stated yes; offered pain pill and pt agreed but when pill was bought in to the room, the patient started speaking his native language; used the video remote  # [id number], who interpreted the conversation between myself and the patient; the patient told the  that his pain was gone; pain med wasted in the cactus and in the pyxis with ARUN Edmonds RN,

## 2022-10-24 NOTE — PROGRESS NOTES
Vascular Surgery  Progress Note    Patient Name: Adryan Neff  MRN: [id number]  Admission Date: 10/6/2022  Primary Care Provider: Carola Pedro MD    Subjective:     Interval History: Patient in dialysis resting comfortably. No complaints of pain.     Post-Op Info:  Procedure(s) (LRB):  AMPUTATION, ABOVE KNEE, RIGHT (Right)   4 Days Post-Op      Past Medical History:   Diagnosis Date    CAD (coronary artery disease) 2016    CAD (non obstructive) 2016 [facility]    Diabetes mellitus type I     Diabetic retinopathy     ESRD on hemodialysis     on HD TThSat    Gangrene of left foot     Hypertension     Nuclear sclerosis of right eye 11/24/2021    PAD (peripheral artery disease)     PEA (Pulseless electrical activity) 10/10/2022    Pulmonary HTN     Right foot infection     TB lung, latent 04/2021    Wet gangrene 1/5/2022       Current Facility-Administered Medications:     0.9%  NaCl infusion (for blood administration), , Intravenous, Q24H PRN, Venkat Strauss MD    0.9%  NaCl infusion (for blood administration), , Intravenous, Q24H PRN, Venkat Strauss MD    acetaminophen tablet 650 mg, 650 mg, Oral, Q8H PRN, Dimitris Davis MD, 650 mg at 10/23/22 1839    amLODIPine tablet 10 mg, 10 mg, Oral, Daily, Marta Black MD, 10 mg at 10/23/22 0942    ascorbic acid (vitamin C) tablet 1,000 mg, 1,000 mg, Oral, Daily, Juan Rodriguez MD, 1,000 mg at 10/23/22 0941    aspirin chewable tablet 81 mg, 81 mg, Oral, Daily, Dimitris Davis MD, 81 mg at 10/23/22 0941    atorvastatin tablet 40 mg, 40 mg, Oral, QHS, Dimitris Davis MD, 40 mg at 10/23/22 2159    dextrose 10% bolus 125 mL, 12.5 g, Intravenous, PRN, Dimitris Davis MD    dextrose 10% bolus 250 mL, 25 g, Intravenous, PRN, Dimitris Davis MD, Stopped at 10/16/22 0815    epoetin paola-epbx injection 10,000 Units, 10,000 Units, Subcutaneous, Every Mon, Wed, Fri, Dimitris Davis MD, 10,000 Units at 10/24/22 8913    folic acid-vit B6-vit B12 2.5-25-2 mg tablet 1  tablet, 1 tablet, Oral, Daily, Dimitris Davis MD, 1 tablet at 10/23/22 0941    glucagon (human recombinant) injection 1 mg, 1 mg, Intramuscular, PRN, Dimitris Davis MD    glucose chewable tablet 16 g, 16 g, Oral, PRN, Dimitris Davis MD    glucose chewable tablet 24 g, 24 g, Oral, PRN, Dimitris Davis MD    hydrALAZINE injection 10 mg, 10 mg, Intravenous, Q8H PRN, Dimitris Davis MD    HYDROcodone-acetaminophen 5-325 mg per tablet 1 tablet, 1 tablet, Oral, Q6H PRN, Marta Black MD, 1 tablet at 10/23/22 1948    insulin aspart U-100 pen 0-5 Units, 0-5 Units, Subcutaneous, QID (AC + HS) PRN, Dimitris Davis MD, 2 Units at 10/15/22 1258    insulin detemir U-100 pen 12 Units, 12 Units, Subcutaneous, QHS, Dimitris Davis MD, 12 Units at 10/22/22 2230    melatonin tablet 6 mg, 6 mg, Oral, Nightly PRN, Veronica Barros NP, 6 mg at 10/19/22 2144    metoprolol succinate (TOPROL-XL) 24 hr tablet 25 mg, 25 mg, Oral, Daily, Dimitris Davis MD, 25 mg at 10/23/22 0942    naloxone 0.4 mg/mL injection 0.02 mg, 0.02 mg, Intravenous, PRN, Dimitris Davis MD    ondansetron disintegrating tablet 8 mg, 8 mg, Oral, Q8H PRN, Dimitris Davis MD    pantoprazole EC tablet 40 mg, 40 mg, Oral, Daily, Dimitris Davis MD, 40 mg at 10/23/22 0942    polyethylene glycol packet 17 g, 17 g, Oral, BID PRN, Saranya Montgomery NP    sevelamer carbonate tablet 2,400 mg, 2,400 mg, Oral, TID WM, Dimitris Davis MD, 2,400 mg at 10/23/22 1200    sodium chloride 0.9% flush 10 mL, 10 mL, Intravenous, PRN, Dimitris Davis MD    sodium chloride 0.9% flush 10 mL, 10 mL, Intravenous, Q12H PRN, Dimitris Davis MD   No new subjective & objective note has been filed under this hospital service since the last note was generated.    Objective:    Temp:  [98.2 °F (36.8 °C)-98.5 °F (36.9 °C)] (P) 98.5 °F (36.9 °C)  Pulse:  [76-88] (P) 88  Resp:  [16-18] (P) 16  SpO2:  [100 %] 100 %  BP: (130-155)/(58-75) (P) 166/76     Physical Exam  Vitals reviewed.    Constitutional:       General: He is not in acute distress.     Appearance: He is well-developed. He is not diaphoretic.   HENT:      Head: Normocephalic and atraumatic.   Eyes:      Conjunctiva/sclera: Conjunctivae normal.   Cardiovascular:      Rate and Rhythm: Normal rate.      Pulses:           Radial pulses are 2+ on the left side.       Pulmonary:      Effort: Pulmonary effort is normal.   Abdominal:      General: There is no distension.      Palpations: Abdomen is soft. There is no mass.      Tenderness: There is no abdominal tenderness. There is no guarding or rebound.      Hernia: No hernia is present.   Musculoskeletal:         General: No deformity. Normal range of motion.      Cervical back: Neck supple.      Left Lower Extremity: Left leg is amputated above knee.   Feet:      Right foot:      Skin integrity: Ulcer, skin breakdown and dry skin present.   Skin:     Findings: No rash.   Neurological:      Mental Status: He is alert and oriented to person, place, and time.      Significant Labs:  All pertinent labs from the last 24 hours have been reviewed.     Significant Diagnostics:  I have reviewed all pertinent imaging results/findings within the past 24 hours.  Assessment/Plan:   AV graft malfunction  All imaging reviewed. Recommend HD US. RTC in 2-3 weeks.     Denise Pitts NP  Vascular Surgery

## 2022-10-24 NOTE — PROGRESS NOTES
MARCUS contacted Kamilah with [facility] and she requested to send the referral again. MARCUS sent referral through [facility]. MARCUS will follow up.

## 2022-10-24 NOTE — PT/OT/SLP PROGRESS
"Physical Therapy Treatment    Patient Name:  Adryan Neff   MRN:  [id number]    Recommendations:     Discharge Recommendations:  nursing facility, skilled   Discharge Equipment Recommendations: none   Barriers to discharge: None    Assessment:     Adryan Neff is a 56 y.o. male admitted with a medical diagnosis of PAD (peripheral artery disease).  He presents with the following impairments/functional limitations:  weakness, impaired endurance, impaired sensation, impaired self care skills, impaired functional mobility, gait instability, impaired balance, impaired cognition, decreased lower extremity function, decreased upper extremity function, decreased safety awareness, pain, impaired coordination, impaired skin, edema, orthopedic precautions.    Pt needed vcs for proper technique of transfers and with wc propulsion.  Pt will need further instructions. Educated pt on wc management and parts.  Pt will need reinforcing.  Rehab Prognosis: Good; patient would benefit from acute skilled PT services to address these deficits and reach maximum level of function.    Recent Surgery: Procedure(s) (LRB):  Fistulogram (Left) 4 Days Post-Op    Plan:     During this hospitalization, patient to be seen 5 x/week to address the identified rehab impairments via therapeutic activities, therapeutic exercises, wheelchair management/training and progress toward the following goals:    Plan of Care Expires:  10/26/22    Subjective     Chief Complaint: "I'm scared."  Patient/Family Comments/goals: pt needed encouragement to participate.  Pain/Comfort:  Pain Rating 1: 0/10  Pain Rating Post-Intervention 1: 0/10      Objective:     Communicated with nurse Nguyen prior to session.  Patient found HOB elevated with   upon PT entry to room.     General Precautions: Standard,     Orthopedic Precautions:RLE non weight bearing, LLE non weight bearing (BLE AKA)   Braces:    Respiratory Status: Room air     Functional Mobility:  Bed Mobility:   "   Rolling Left:  moderate assistance  Scooting: maximal assistance  Supine to Sit: maximal assistance  Sit to Supine: maximal assistance  Transfers:     Bed to WChair: maximal assistance with  gt belt  using  Draw sheet to scoot backwards in to wc  WChair to Bed: maximal assistance with  slide board  using  Drawsheet  Balance: poor sitting   Propelled standard wc  50 ft x 2 on level tile with BUE with SBA and vcs for proper technique.  Pt required several rest breaks.      AM-PAC 6 CLICK MOBILITY  Turning over in bed (including adjusting bedclothes, sheets and blankets)?: 3  Sitting down on and standing up from a chair with arms (e.g., wheelchair, bedside commode, etc.): 1  Moving from lying on back to sitting on the side of the bed?: 2  Moving to and from a bed to a chair (including a wheelchair)?: 2  Need to walk in hospital room?: 1  Climbing 3-5 steps with a railing?: 1  Basic Mobility Total Score: 10       Treatment & Education:  Educated pt on wc management and parts.  Pt will need reinforcing.    Patient left HOB elevated with all lines intact, call button in reach, bed alarm on, and nurse notified..    GOALS:   Multidisciplinary Problems       Physical Therapy Goals          Problem: Physical Therapy    Goal Priority Disciplines Outcome Goal Variances Interventions   Physical Therapy Goal     PT, PT/OT Ongoing, Progressing     Description: Goals to be met by: 10/26/22     Patient will increase functional independence with mobility by performin. Pt to be supervision with bed mobility.  2. Pt to transfer with SBA.  3. Pt to be (I) with wheelchair mobility 150'.                         Time Tracking:     PT Received On: 10/24/22  PT Start Time: 1700     PT Stop Time: 1738  PT Total Time (min): 38 min     Billable Minutes: Therapeutic Activity 15 and Train/Wheelchair Management 23    Treatment Type: Treatment  PT/PTA: PTA     PTA Visit Number: 1     10/24/2022

## 2022-10-24 NOTE — PT/OT/SLP PROGRESS
Occupational Therapy      Patient Name:  Adryan Neff   MRN:  [id number]    Patient not seen today secondary to the following: Patient with low Hg of 6.9 this morning.  Will follow-up tomorrow or as able.    11:21 AM: Patient in dialysis Tx.  Will follow up tomorrow.     10/24/2022

## 2022-10-25 LAB
BASOPHILS # BLD AUTO: 0.1 K/UL (ref 0–0.2)
BASOPHILS NFR BLD: 0.6 % (ref 0–1.9)
DIFFERENTIAL METHOD: ABNORMAL
EOSINOPHIL # BLD AUTO: 1.1 K/UL (ref 0–0.5)
EOSINOPHIL NFR BLD: 6.5 % (ref 0–8)
ERYTHROCYTE [DISTWIDTH] IN BLOOD BY AUTOMATED COUNT: 16.8 % (ref 11.5–14.5)
HCT VFR BLD AUTO: 26.2 % (ref 40–54)
HGB BLD-MCNC: 8 G/DL (ref 14–18)
IMM GRANULOCYTES # BLD AUTO: 0.1 K/UL (ref 0–0.04)
IMM GRANULOCYTES NFR BLD AUTO: 0.6 % (ref 0–0.5)
LYMPHOCYTES # BLD AUTO: 1.1 K/UL (ref 1–4.8)
LYMPHOCYTES NFR BLD: 6.6 % (ref 18–48)
MCH RBC QN AUTO: 27.1 PG (ref 27–31)
MCHC RBC AUTO-ENTMCNC: 30.5 G/DL (ref 32–36)
MCV RBC AUTO: 89 FL (ref 82–98)
MONOCYTES # BLD AUTO: 1.2 K/UL (ref 0.3–1)
MONOCYTES NFR BLD: 7 % (ref 4–15)
NEUTROPHILS # BLD AUTO: 12.9 K/UL (ref 1.8–7.7)
NEUTROPHILS NFR BLD: 78.7 % (ref 38–73)
NRBC BLD-RTO: 0 /100 WBC
PLATELET # BLD AUTO: 547 K/UL (ref 150–450)
PMV BLD AUTO: 8.8 FL (ref 9.2–12.9)
POCT GLUCOSE: 269 MG/DL (ref 70–110)
POCT GLUCOSE: 80 MG/DL (ref 70–110)
POCT GLUCOSE: 82 MG/DL (ref 70–110)
POCT GLUCOSE: 99 MG/DL (ref 70–110)
RBC # BLD AUTO: 2.95 M/UL (ref 4.6–6.2)
WBC # BLD AUTO: 16.39 K/UL (ref 3.9–12.7)

## 2022-10-25 PROCEDURE — 85025 COMPLETE CBC W/AUTO DIFF WBC: CPT | Performed by: HOSPITALIST

## 2022-10-25 PROCEDURE — 25000003 PHARM REV CODE 250: Performed by: HOSPITALIST

## 2022-10-25 PROCEDURE — 36415 COLL VENOUS BLD VENIPUNCTURE: CPT | Performed by: HOSPITALIST

## 2022-10-25 PROCEDURE — 25000003 PHARM REV CODE 250: Performed by: ORTHOPAEDIC SURGERY

## 2022-10-25 PROCEDURE — 97530 THERAPEUTIC ACTIVITIES: CPT | Mod: CQ

## 2022-10-25 PROCEDURE — 25000003 PHARM REV CODE 250: Performed by: NURSE PRACTITIONER

## 2022-10-25 PROCEDURE — 97530 THERAPEUTIC ACTIVITIES: CPT

## 2022-10-25 PROCEDURE — 97535 SELF CARE MNGMENT TRAINING: CPT

## 2022-10-25 PROCEDURE — 25000003 PHARM REV CODE 250: Performed by: INTERNAL MEDICINE

## 2022-10-25 PROCEDURE — 97110 THERAPEUTIC EXERCISES: CPT | Mod: CQ

## 2022-10-25 PROCEDURE — 21400001 HC TELEMETRY ROOM

## 2022-10-25 RX ORDER — SENNOSIDES 8.6 MG/1
8.6 TABLET ORAL DAILY PRN
Status: DISCONTINUED | OUTPATIENT
Start: 2022-10-25 | End: 2022-11-10 | Stop reason: HOSPADM

## 2022-10-25 RX ADMIN — SEVELAMER CARBONATE 800 MG: 800 TABLET, FILM COATED ORAL at 11:10

## 2022-10-25 RX ADMIN — HYDROCODONE BITARTRATE AND ACETAMINOPHEN 1 TABLET: 5; 325 TABLET ORAL at 08:10

## 2022-10-25 RX ADMIN — ASPIRIN 81 MG CHEWABLE TABLET 81 MG: 81 TABLET CHEWABLE at 09:10

## 2022-10-25 RX ADMIN — SENNOSIDES 8.6 MG: 8.6 TABLET, FILM COATED ORAL at 10:10

## 2022-10-25 RX ADMIN — INSULIN ASPART 3 UNITS: 100 INJECTION, SOLUTION INTRAVENOUS; SUBCUTANEOUS at 12:10

## 2022-10-25 RX ADMIN — INSULIN DETEMIR 12 UNITS: 100 INJECTION, SOLUTION SUBCUTANEOUS at 09:10

## 2022-10-25 RX ADMIN — HYDROCODONE BITARTRATE AND ACETAMINOPHEN 1 TABLET: 5; 325 TABLET ORAL at 09:10

## 2022-10-25 RX ADMIN — ATORVASTATIN CALCIUM 40 MG: 40 TABLET, FILM COATED ORAL at 09:10

## 2022-10-25 RX ADMIN — POLYETHYLENE GLYCOL 3350 17 G: 17 POWDER, FOR SOLUTION ORAL at 09:10

## 2022-10-25 NOTE — SUBJECTIVE & OBJECTIVE
Interval History: No new issues     Review of Systems   Constitutional:  Negative for activity change, appetite change and chills.   HENT:  Negative for congestion.    Eyes:  Negative for discharge.   Respiratory:  Negative for apnea and chest tightness.    Cardiovascular:  Negative for chest pain.   Gastrointestinal:  Negative for abdominal distention.   Endocrine: Negative for cold intolerance.   Genitourinary:  Negative for difficulty urinating and dysuria.   Musculoskeletal:  Negative for arthralgias and back pain.   Neurological:  Negative for dizziness and facial asymmetry.   Psychiatric/Behavioral:  Negative for agitation and behavioral problems.    Objective:     Vital Signs (Most Recent):  Temp: 98.7 °F (37.1 °C) (10/25/22 0811)  Pulse: 86 (10/25/22 0811)  Resp: 18 (10/25/22 0827)  BP: 129/62 (10/25/22 0811)  SpO2: 99 % (10/25/22 0811) Vital Signs (24h Range):  Temp:  [98.3 °F (36.8 °C)-98.9 °F (37.2 °C)] 98.7 °F (37.1 °C)  Pulse:  [71-94] 86  Resp:  [16-20] 18  SpO2:  [96 %-100 %] 99 %  BP: (129-166)/(61-76) 129/62     Weight: 59 kg (130 lb 1.1 oz)  Body mass index is 18.14 kg/m².    Intake/Output Summary (Last 24 hours) at 10/25/2022 0945  Last data filed at 10/25/2022 0105  Gross per 24 hour   Intake 1260 ml   Output 1800 ml   Net -540 ml      Physical Exam  Constitutional:       General: He is not in acute distress.     Appearance: Normal appearance. He is not ill-appearing, toxic-appearing or diaphoretic.   HENT:      Head: Normocephalic and atraumatic.   Eyes:      Conjunctiva/sclera: Conjunctivae normal.   Cardiovascular:      Rate and Rhythm: Normal rate and regular rhythm.   Pulmonary:      Effort: Pulmonary effort is normal.      Breath sounds: Normal breath sounds.   Skin:     General: Skin is warm and dry.   Neurological:      Mental Status: He is alert and oriented to person, place, and time.   Psychiatric:         Behavior: Behavior normal.         Thought Content: Thought content normal.        Significant Labs: All pertinent labs within the past 24 hours have been reviewed.  BMP:   Recent Labs   Lab 10/24/22  0001   *   *   K 5.5*   CL 99   CO2 22*   BUN 50*   CREATININE 10.0*   CALCIUM 8.9     CBC:   Recent Labs   Lab 10/24/22  0001 10/25/22  0458   WBC  --  16.39*   HGB 6.9* 8.0*   HCT 22.4* 26.2*   PLT  --  547*       Significant Imaging:

## 2022-10-25 NOTE — PLAN OF CARE
Problem: Occupational Therapy  Goal: Occupational Therapy Goal  Description: Goals to be met by: 11/02/2022     Patient will increase functional independence with ADL's by performing:    UE Dressing with Modified Weber/Set-up Assistance.  LE Dressing with Minimal Assistance.  Grooming while EOB with Set-up Assistance.  Increase functional strength to 5/5 for improved ease of scoot transfers.  Patient will tolerate sitting at EOB for 10 mins to allow time for grooming tasks.  Patient will perform scoot transfer bed <---> W/C with Minimal Assistance.       Outcome: Ongoing, Progressing   The patient required max assist/dependent for overall mobility. The patient will benefit from SNF.

## 2022-10-25 NOTE — PROGRESS NOTES
Adryan Neff is a 56 y.o. male patient.    Follow for ESRD, dialysis    No new c/o, reportedly feeling better  Comfortable    Scheduled Meds:   amLODIPine  10 mg Oral Daily    ascorbic acid (vitamin C)  1,000 mg Oral Daily    aspirin  81 mg Oral Daily    atorvastatin  40 mg Oral QHS    epoetin paola-epbx  10,000 Units Subcutaneous Every Mon, Wed, Fri    folic acid-vit B6-vit B12 2.5-25-2 mg  1 tablet Oral Daily    insulin detemir U-100  12 Units Subcutaneous QHS    metoprolol succinate  25 mg Oral Daily    pantoprazole  40 mg Oral Daily    sevelamer carbonate  2,400 mg Oral TID WM       Review of patient's allergies indicates:  No Known Allergies      Vital Signs Range (Last 24H):  Temp:  [98.3 °F (36.8 °C)-98.9 °F (37.2 °C)]   Pulse:  [71-94]   Resp:  [16-20]   BP: (129-166)/(61-76)   SpO2:  [96 %-100 %]     I & O (Last 24H):  Intake/Output Summary (Last 24 hours) at 10/25/2022 1039  Last data filed at 10/25/2022 0105  Gross per 24 hour   Intake 1260 ml   Output 1800 ml   Net -540 ml           Physical Exam:  General appearance: well developed, well nourished, no distress  Lungs:  clear to auscultation bilaterally and normal respiratory effort  Heart: regular rate and rhythm  Abdomen: soft, non-tender non-distented; bowel sounds normal; no masses,  no organomegaly  Extremities:  (B) BKA    Laboratory:  I have reviewed all pertinent lab results within the past 24 hours.  CBC:   Recent Labs   Lab 10/25/22  0458   WBC 16.39*   RBC 2.95*   HGB 8.0*   HCT 26.2*   *   MCV 89   MCH 27.1   MCHC 30.5*     CMP:   Recent Labs   Lab 10/24/22  0001   *   CALCIUM 8.9   *   K 5.5*   CO2 22*   CL 99   BUN 50*   CREATININE 10.0*         Imp/Plan    ESRD - continue HD q MWF  S/p PEA arrest  (R) foot gangrene s/p AKA  Malfunction AVG s/p fistulogram  HTN  Anemia of CKD    Continue present RX  We'll follow for dialysis      Trahenna Fine  10/25/2022

## 2022-10-25 NOTE — PT/OT/SLP PROGRESS
Physical Therapy Treatment    Patient Name:  Adryan Neff   MRN:  [id number]    Recommendations:     Discharge Recommendations:  nursing facility, skilled   Discharge Equipment Recommendations: none     Assessment:     Adryan Neff is a 56 y.o. male admitted with a medical diagnosis of PAD (peripheral artery disease).  He presents with the following impairments/functional limitations:  weakness, impaired endurance, impaired functional mobility, impaired balance, decreased coordination, decreased upper extremity function, decreased safety awareness, impaired skin, orthopedic precautions.    Pt agreed to participate with encouragement. Pt was alert and able to answer questions when PTA asked. Pt completed Bed to Wheelchair transfer with total assistance with slide board using side scoot transfer and Sup>>Sit, Max A for Sit>>Sup    Rehab Prognosis: Good and Fair; patient would benefit from acute skilled PT services to address these deficits and reach maximum level of function.    Recent Surgery: Procedure(s) (LRB):  Fistulogram (Left) 5 Days Post-Op    Plan:     During this hospitalization, patient to be seen 5 x/week to address the identified rehab impairments via therapeutic activities, therapeutic exercises, wheelchair management/training and progress toward the following goals:    Plan of Care Expires:  10/26/22    Subjective     Chief Complaint: n/a  Patient/Family Comments/goals: Pt agreed to participate with encouragement.  Pain/Comfort:  Pain Rating 1: 0/10  Pain Rating Post-Intervention 1: 0/10      Objective:     Communicated with nurse Nguyen prior to session.  Patient found HOB elevated with telemetry, bed alarm, Daughter and Son present upon PT entry to room.     General Precautions: Standard, fall (s/p R AKA)   Orthopedic Precautions:RLE non weight bearing, LLE non weight bearing (B AKA)   Braces: N/A  Respiratory Status: Room air     Functional Mobility:  Bed Mobility:     Rolling Left: moderate assistance  using BR  Rolling Right: moderate assistance using BR  Scooting: anterior scoot to EOB and posterior scoot back to Wheelchair using draw sheet dependence  Supine to Sit: total assistance from trunk lifting/supporting and BLE management with HOB elevated  Sit to Supine: maximal assistance for trunk lowering and BLE management  Transfers:     Bed to Wheelchair: total assistance with slide board using side scoot transfer  Balance: Fair/Fair+ Static Sitting    Pt refused to propel wheelchair today despite encouragement. Pt requested to perform tomorrow.     AM-PAC 6 CLICK MOBILITY  Turning over in bed (including adjusting bedclothes, sheets and blankets)?: 3  Sitting down on and standing up from a chair with arms (e.g., wheelchair, bedside commode, etc.): 1  Moving from lying on back to sitting on the side of the bed?: 2  Moving to and from a bed to a chair (including a wheelchair)?: 2  Need to walk in hospital room?: 1  Climbing 3-5 steps with a railing?: 1  Basic Mobility Total Score: 10       Treatment & Education:  Encouraged pt to perform BUE/BLE ex and get up sit at EOB with assistance throughout the day to maintain his muscle strength/endurance and blood circulation, pt verbalized understanding.    BLE ex in supine 10 reps Hip Flex/Add/Abd    Pt completed L/R rolling with Mod A using BR for posterior hygiene, pt was dependence for posterior hygiene    Educated pt on how to perform transfer from Bed to Wheelchair using Sliding Board    Patient left HOB elevated with tray table at bedside, call button in reach, bed alarm on, nurse  notified, and Daughter and Son present.    GOALS:   Multidisciplinary Problems       Physical Therapy Goals          Problem: Physical Therapy    Goal Priority Disciplines Outcome Goal Variances Interventions   Physical Therapy Goal     PT, PT/OT Ongoing, Progressing     Description: Goals to be met by: 10/26/22     Patient will increase functional independence with mobility by  performin. Pt to be supervision with bed mobility.  2. Pt to transfer with SBA.  3. Pt to be (I) with wheelchair mobility 150'.                         Time Tracking:     PT Received On: 10/25/22  PT Start Time: 152     PT Stop Time: 1600  PT Total Time (min): 38 min     Billable Minutes: Therapeutic Activity 28 min and Therapeutic Exercise 10 min    Treatment Type: Treatment  PT/PTA: PTA     PTA Visit Number: 2     10/25/2022

## 2022-10-25 NOTE — NURSING
Patient looking for cell phone, phone remained in room all day. Daughter did come to visit, reached out to her to make sure she does not have it or does have it, no answer, awaiting call back

## 2022-10-25 NOTE — PLAN OF CARE
Problem: Physical Therapy  Goal: Physical Therapy Goal  Description: Goals to be met by: 10/26/22     Patient will increase functional independence with mobility by performin. Pt to be supervision with bed mobility.  2. Pt to transfer with SBA.  3. Pt to be (I) with wheelchair mobility 150'.    Outcome: Ongoing, Progressing   Pt agreed to participate with encouragement. Pt was alert and able to answer questions when PTA asked. Pt completed Bed to Wheelchair transfer with total assistance with slide board using side scoot transfer and Sup>>Sit, Max A for Sit>>Sup

## 2022-10-25 NOTE — PROGRESS NOTES
MARCUS received a call from Kamilah with [facility] requesting updated MD Notes, MAR and PT/OT notes that will reflect that pt is alert and oriented. CM sent updated notes through [employer] and left a message for Kamilah to inform her that documents were sent. MARCUS will follow up.

## 2022-10-25 NOTE — PT/OT/SLP PROGRESS
Occupational Therapy   Treatment    Name: Adryan Neff  MRN: [id number]  Admitting Diagnosis:  PAD (peripheral artery disease)  5 Days Post-Op    Recommendations:     Discharge Recommendations: nursing facility, skilled  Discharge Equipment Recommendations:   (per SNF)  Barriers to discharge:       Assessment:     Adryan Neff is a 56 y.o. male with a medical diagnosis of PAD (peripheral artery disease).  Performance deficits affecting function are weakness, impaired endurance, impaired self care skills, impaired functional mobility, gait instability, impaired balance, decreased upper extremity function, decreased lower extremity function, decreased safety awareness, pain, impaired skin, orthopedic precautions.    The patient required max assist/dependent for overall mobility. The patient required unilateral UE support to for sitting balance on the EOB with fair sitting balance. The patient will benefit from SNF.        Rehab Prognosis:  Good and Fair; patient would benefit from acute skilled OT services to address these deficits and reach maximum level of function.       Plan:     Patient to be seen 5 x/week to address the above listed problems via self-care/home management, therapeutic activities, therapeutic exercises  Plan of Care Expires: 11/02/22  Plan of Care Reviewed with: patient    Subjective     Pain/Comfort:  Pain Rating 1: 0/10    Objective:     Communicated with: Wendy keen prior to session.  Patient found HOB elevated with bed alarm, telemetry upon OT entry to room.    General Precautions: Standard, fall (s/p R AKA)   Orthopedic Precautions:RLE non weight bearing, LLE non weight bearing (B AKA)   Braces: N/A  Respiratory Status: Room air     Occupational Performance:     Bed Mobility:    Patient completed Rolling/Turning to Left with  maximal assistance  Patient completed Rolling/Turning to Right with maximal assistance  Patient completed Scooting/Bridging with maximal assistance and 2 persons  Patient  completed Supine to Sit with maximal assistance  Patient completed Sit to Supine with maximal assistance and dependent     Functional Mobility/Transfers:  Functional Mobility: The patient sat on the EOB for ~20 min with CGA>mod assist and unilateral UE support to for sitting balance on the EOB with fair/fair- sitting balance. The patient required max assist to scoot along the EOB.     Activities of Daily Living:  Grooming: The patient was dependent to open/close toothpaste and apply paste to the brush. The patient brushed his teeth and rinsed spit in the basin with contact guard assistance to manage the basin to spit.The patient was able to wipe his hands while seated on the EOB with CGA for balance.  Upper Body Dressing: maximal assistance to don/doff back gown        [facility] 6 Click ADL: 16    Treatment & Education:  Participated in self care and functional mobility as noted above  Rolled left to right to reposition pads with max assist. The patient pulled himself up to the top of the bed from supine with mod assist with bed in trendelenburg and mod assist to place hands on bed rail to assist  OT prepared the W/C for sliding board transfer but the patient's balance prevented W/C transfer    Patient left HOB elevated with all lines intact, call button in reach, and bed alarm on    GOALS:   Multidisciplinary Problems       Occupational Therapy Goals          Problem: Occupational Therapy    Goal Priority Disciplines Outcome Interventions   Occupational Therapy Goal     OT, PT/OT Ongoing, Progressing    Description: Goals to be met by: 11/02/2022     Patient will increase functional independence with ADL's by performing:    UE Dressing with Modified Gregg/Set-up Assistance.  LE Dressing with Minimal Assistance.  Grooming while EOB with Set-up Assistance.  Increase functional strength to 5/5 for improved ease of scoot transfers.  Patient will tolerate sitting at EOB for 10 mins to allow time for grooming  tasks.  Patient will perform scoot transfer bed <---> W/C with Minimal Assistance.                            Time Tracking:     OT Date of Treatment: 10/25/22  OT Start Time: 1053  OT Stop Time: 1133  OT Total Time (min): 40 min    Billable Minutes:Self Care/Home Management 23  Therapeutic Activity 17  Total Time 40    OT/GLENDA: OT          10/25/2022

## 2022-10-25 NOTE — NURSING
Patient awake, alert, oriented resting comfortably. No signs of distress observed. bed low and locked. Call light in reach. Report given to oncoming nurse Griselda, LPN. 12hour chart check complete. Will continue plan of care. Also, patient charted last BM is on 10/18, PRN miralax offered to patient, but refused. Will continue to follow up.

## 2022-10-25 NOTE — PROGRESS NOTES
[facility] Medicine  Progress Note    Patient Name: Adryan Neff  MRN: [id number]  Patient Class: IP- Inpatient   Admission Date: 10/6/2022  Length of Stay: 19 days  Attending Physician: Nikolay Sawyer MD  Primary Care Provider: Carola Pedro MD        Subjective:     Principal Problem:PAD (peripheral artery disease)        HPI:  This is a 56 year old male with a PMHx of severe PAD s/p gangrene of left foot s/p left AKA (1/2022), ESRD on HD MWF, NICM (EF: 55%, GIDD), pulmonary hypertension, GERD and latent TB filiberto presents for concern for leg infection.       The patient presented to the vascular surgery clinic for a follow up. He reports worsening pain and developing eschar of the right dorsal foot along with foul smelling odor. Given concern for an infection and the need for IV antibiotics & expedited podiatry evaluation, the patient was sent to the ER. The patient denies having any fevers, chills, or injury to his foot. He has a previous AKA and uses a wheelchair to ambulate. While in the ED, the patient was hypertensive but otherwise hemodynamically stable. Labs showed leukocytosis (17.41 - with neutrophilic predominance), normocytic anemia (9.7), elevated sed rate (125), elevated CRP (237.2), negative lactate (1.6). X-ray foot showed no radiographic evidence of osteomyelitis. He was administered vancomycin, zosyn and was admitted for further management.     I attempted to call the daughter to obtain further history on 1902 without a response.         Overview/Hospital Course:  Mr Adryan Neff is a 56 y.o. man with PAD. He is s/p L AKA due to gangrene and now presents with worsening R foot infection. He also has ESRD on HD via LUE AVG. Started antibiotics. Podiatry and Ortho consulted with plans for amputation. This was delayed due to PEA on 10/10 with quick ROSC. Unclear etiology but suspect vasovagal event. Cardiology was consulted. He then had R AKA on 10/13/22. Antibiotics were  discontinued. He has had malfunctioning of his LUE AVG; Vascular was consulted and fistulogram performed.  SW/CM consulted for discharge planning.  Working on NH placement.  Some persistent bleeding from right stump.  Hgb of 6.3 on 10/21 and transfused one unit of blood. Another unit of blood transfused with HD on 10/24.      Interval History: No new issues     Review of Systems   Constitutional:  Negative for activity change, appetite change and chills.   HENT:  Negative for congestion.    Eyes:  Negative for discharge.   Respiratory:  Negative for apnea and chest tightness.    Cardiovascular:  Negative for chest pain.   Gastrointestinal:  Negative for abdominal distention.   Endocrine: Negative for cold intolerance.   Genitourinary:  Negative for difficulty urinating and dysuria.   Musculoskeletal:  Negative for arthralgias and back pain.   Neurological:  Negative for dizziness and facial asymmetry.   Psychiatric/Behavioral:  Negative for agitation and behavioral problems.    Objective:     Vital Signs (Most Recent):  Temp: 98.7 °F (37.1 °C) (10/25/22 0811)  Pulse: 86 (10/25/22 0811)  Resp: 18 (10/25/22 0827)  BP: 129/62 (10/25/22 0811)  SpO2: 99 % (10/25/22 0811) Vital Signs (24h Range):  Temp:  [98.3 °F (36.8 °C)-98.9 °F (37.2 °C)] 98.7 °F (37.1 °C)  Pulse:  [71-94] 86  Resp:  [16-20] 18  SpO2:  [96 %-100 %] 99 %  BP: (129-166)/(61-76) 129/62     Weight: 59 kg (130 lb 1.1 oz)  Body mass index is 18.14 kg/m².    Intake/Output Summary (Last 24 hours) at 10/25/2022 0945  Last data filed at 10/25/2022 0105  Gross per 24 hour   Intake 1260 ml   Output 1800 ml   Net -540 ml      Physical Exam  Constitutional:       General: He is not in acute distress.     Appearance: Normal appearance. He is not ill-appearing, toxic-appearing or diaphoretic.   HENT:      Head: Normocephalic and atraumatic.   Eyes:      Conjunctiva/sclera: Conjunctivae normal.   Cardiovascular:      Rate and Rhythm: Normal rate and regular rhythm.    Pulmonary:      Effort: Pulmonary effort is normal.      Breath sounds: Normal breath sounds.   Skin:     General: Skin is warm and dry.   Neurological:      Mental Status: He is alert and oriented to person, place, and time.   Psychiatric:         Behavior: Behavior normal.         Thought Content: Thought content normal.       Significant Labs: All pertinent labs within the past 24 hours have been reviewed.  BMP:   Recent Labs   Lab 10/24/22  0001   *   *   K 5.5*   CL 99   CO2 22*   BUN 50*   CREATININE 10.0*   CALCIUM 8.9     CBC:   Recent Labs   Lab 10/24/22  0001 10/25/22  0458   WBC  --  16.39*   HGB 6.9* 8.0*   HCT 22.4* 26.2*   PLT  --  547*       Significant Imaging:       Assessment/Plan:      * PAD (peripheral artery disease)  S/p L AKA then presented with PAD causing R foot wound. Now s/p R AKA on 10/13  - previously on antibiotics. These were discontinued when source control achieved.   - continue asa, statin   Pending NH placement.    Leukocytosis  Persistent leukocytosis over past couple of weeks, but afebrile with no source of infection.  Monitor.      AV graft malfunction  Vascular following.  S/p fistulogram      Cognitive changes  Noted by Psychiatry that he has had multiple episodes of confusion and not understanding treatment plan. Unclear if this is delirium associated or beginnings of cognitive dysfunction. Recommend outpatient evaluation.       Anemia due to chronic kidney disease, on chronic dialysis  - Anemic - has been transfused this admission pre and post surgery  - Continue EPO per Nephrology  With anemia of acute blood loss from bleeding from right stump.  Transfused another unit of blod on 10/21 with good correction.  Will transfuse another unit today with HD.        ESRD on hemodialysis  Dialyzes via LUE AVG  There is concern about AVG malfunction. Vascular surgery consulted  S/p fistulogram.  Nephrology following       Hyperkalemia  Treat with HD      CAD (coronary  artery disease) - non-obstructive from [facility] in 2016  - Resume aspirin, statin       Chronic diastolic heart failure  - Echocardiogram (11/24/2021): EF: 55%, GIDD, mild TR, moderate to severe MR  - no signs of acute exacerbation  - Continue home medications     Gastroesophageal reflux disease  - Coninue PPI     Pure hypercholesterolemia  - Continue statin     Essential (primary) hypertension  - Continue home medications    Controlled type 2 diabetes mellitus with chronic kidney disease on chronic dialysis, with long-term current use of insulin  - Last A1c:   Lab Results   Component Value Date    HGBA1C 6.1 (H) 01/07/2021     Continue current management.  - Low-sensitivity sliding scale insulin  - Initiate and maintain glycemic protocol, monitor POC glucose   - Follow up with PCP as outpatient      VTE Risk Mitigation (From admission, onward)         Ordered     IP VTE HIGH RISK PATIENT  Once         10/06/22 1819     Place sequential compression device  Until discontinued         10/06/22 1819                Discharge Planning   ENIO:      Code Status: Full Code   Is the patient medically ready for discharge?:     Reason for patient still in hospital (select all that apply):  Discharge Plan A: New Nursing Home placement - senior care care facility   Discharge Delays: (!) Post-Acute Set-up              Nikolay Burch MD  Department of Hospital Medicine   [facility] - Telemetry

## 2022-10-25 NOTE — NURSING
Report received from off going Nurse April . Pt awake and alert . Pt said I am not taking any meds tonight . Do not bring me anything . Bed down SR up x 2 HOB . Call bell in reach.

## 2022-10-26 LAB
ANION GAP SERPL CALC-SCNC: 14 MMOL/L (ref 8–16)
ANION GAP SERPL CALC-SCNC: 14 MMOL/L (ref 8–16)
BUN SERPL-MCNC: 37 MG/DL (ref 6–20)
BUN SERPL-MCNC: 37 MG/DL (ref 6–20)
CALCIUM SERPL-MCNC: 9.2 MG/DL (ref 8.7–10.5)
CALCIUM SERPL-MCNC: 9.2 MG/DL (ref 8.7–10.5)
CHLORIDE SERPL-SCNC: 102 MMOL/L (ref 95–110)
CHLORIDE SERPL-SCNC: 102 MMOL/L (ref 95–110)
CO2 SERPL-SCNC: 22 MMOL/L (ref 23–29)
CO2 SERPL-SCNC: 22 MMOL/L (ref 23–29)
CREAT SERPL-MCNC: 8.8 MG/DL (ref 0.5–1.4)
CREAT SERPL-MCNC: 8.8 MG/DL (ref 0.5–1.4)
EST. GFR  (NO RACE VARIABLE): 6 ML/MIN/1.73 M^2
EST. GFR  (NO RACE VARIABLE): 6 ML/MIN/1.73 M^2
GLUCOSE SERPL-MCNC: 45 MG/DL (ref 70–110)
GLUCOSE SERPL-MCNC: 45 MG/DL (ref 70–110)
POCT GLUCOSE: 118 MG/DL (ref 70–110)
POCT GLUCOSE: 178 MG/DL (ref 70–110)
POCT GLUCOSE: 71 MG/DL (ref 70–110)
POCT GLUCOSE: 76 MG/DL (ref 70–110)
POTASSIUM SERPL-SCNC: 4.8 MMOL/L (ref 3.5–5.1)
POTASSIUM SERPL-SCNC: 4.8 MMOL/L (ref 3.5–5.1)
SODIUM SERPL-SCNC: 138 MMOL/L (ref 136–145)
SODIUM SERPL-SCNC: 138 MMOL/L (ref 136–145)

## 2022-10-26 PROCEDURE — 25000003 PHARM REV CODE 250: Performed by: ORTHOPAEDIC SURGERY

## 2022-10-26 PROCEDURE — 80048 BASIC METABOLIC PNL TOTAL CA: CPT | Performed by: INTERNAL MEDICINE

## 2022-10-26 PROCEDURE — 25000003 PHARM REV CODE 250: Performed by: HOSPITALIST

## 2022-10-26 PROCEDURE — 63600175 PHARM REV CODE 636 W HCPCS: Mod: JG | Performed by: ORTHOPAEDIC SURGERY

## 2022-10-26 PROCEDURE — 80100016 HC MAINTENANCE HEMODIALYSIS

## 2022-10-26 PROCEDURE — 21400001 HC TELEMETRY ROOM

## 2022-10-26 PROCEDURE — 36415 COLL VENOUS BLD VENIPUNCTURE: CPT | Performed by: INTERNAL MEDICINE

## 2022-10-26 RX ADMIN — INSULIN DETEMIR 12 UNITS: 100 INJECTION, SOLUTION SUBCUTANEOUS at 09:10

## 2022-10-26 RX ADMIN — ATORVASTATIN CALCIUM 40 MG: 40 TABLET, FILM COATED ORAL at 09:10

## 2022-10-26 RX ADMIN — HYDROCODONE BITARTRATE AND ACETAMINOPHEN 1 TABLET: 5; 325 TABLET ORAL at 07:10

## 2022-10-26 RX ADMIN — EPOETIN ALFA-EPBX 10000 UNITS: 10000 INJECTION, SOLUTION INTRAVENOUS; SUBCUTANEOUS at 12:10

## 2022-10-26 NOTE — PROGRESS NOTES
[facility] Medicine  Progress Note    Patient Name: Adryan Neff  MRN: [id number]  Patient Class: IP- Inpatient   Admission Date: 10/6/2022  Length of Stay: 20 days  Attending Physician: Nikolay Sawyer MD  Primary Care Provider: Carola Pedro MD        Subjective:     Principal Problem:PAD (peripheral artery disease)        HPI:  This is a 56 year old male with a PMHx of severe PAD s/p gangrene of left foot s/p left AKA (1/2022), ESRD on HD MWF, NICM (EF: 55%, GIDD), pulmonary hypertension, GERD and latent TB filiberto presents for concern for leg infection.       The patient presented to the vascular surgery clinic for a follow up. He reports worsening pain and developing eschar of the right dorsal foot along with foul smelling odor. Given concern for an infection and the need for IV antibiotics & expedited podiatry evaluation, the patient was sent to the ER. The patient denies having any fevers, chills, or injury to his foot. He has a previous AKA and uses a wheelchair to ambulate. While in the ED, the patient was hypertensive but otherwise hemodynamically stable. Labs showed leukocytosis (17.41 - with neutrophilic predominance), normocytic anemia (9.7), elevated sed rate (125), elevated CRP (237.2), negative lactate (1.6). X-ray foot showed no radiographic evidence of osteomyelitis. He was administered vancomycin, zosyn and was admitted for further management.     I attempted to call the daughter to obtain further history on 1902 without a response.         Overview/Hospital Course:  Mr Adryan Neff is a 56 y.o. man with PAD. He is s/p L AKA due to gangrene and now presents with worsening R foot infection. He also has ESRD on HD via LUE AVG. Started antibiotics. Podiatry and Ortho consulted with plans for amputation. This was delayed due to PEA on 10/10 with quick ROSC. Unclear etiology but suspect vasovagal event. Cardiology was consulted. He then had R AKA on 10/13/22. Antibiotics were  discontinued. He has had malfunctioning of his LUE AVG; Vascular was consulted and fistulogram performed.  SW/CM consulted for discharge planning.  Working on NH placement.  Some persistent bleeding from right stump.  Hgb of 6.3 on 10/21 and transfused one unit of blood. Another unit of blood transfused with HD on 10/24. PT?OT evaluated and now rec: SNF      Interval History: No new issues     Review of Systems   Constitutional:  Negative for activity change, appetite change and chills.   HENT:  Negative for congestion.    Eyes:  Negative for discharge.   Respiratory:  Negative for apnea and chest tightness.    Cardiovascular:  Negative for chest pain.   Gastrointestinal:  Negative for abdominal distention.   Endocrine: Negative for cold intolerance.   Genitourinary:  Negative for difficulty urinating and dysuria.   Musculoskeletal:  Negative for arthralgias and back pain.   Neurological:  Negative for dizziness and facial asymmetry.   Psychiatric/Behavioral:  Negative for agitation and behavioral problems.    Objective:     Vital Signs (Most Recent):  Temp: 97.8 °F (36.6 °C) (10/26/22 0756)  Pulse: 85 (10/26/22 0756)  Resp: 18 (10/26/22 0756)  BP: (!) 169/72 (10/26/22 0756)  SpO2: 100 % (10/26/22 0756)   Vital Signs (24h Range):  Temp:  [97.8 °F (36.6 °C)-98.6 °F (37 °C)] 97.8 °F (36.6 °C)  Pulse:  [81-95] 85  Resp:  [18-20] 18  SpO2:  [94 %-100 %] 100 %  BP: (137-192)/(61-81) 169/72     Weight: 59 kg (130 lb 1.1 oz)  Body mass index is 18.14 kg/m².    Intake/Output Summary (Last 24 hours) at 10/26/2022 0965  Last data filed at 10/26/2022 0147  Gross per 24 hour   Intake 120 ml   Output --   Net 120 ml      Physical Exam  Constitutional:       General: He is not in acute distress.     Appearance: Normal appearance. He is not ill-appearing, toxic-appearing or diaphoretic.   HENT:      Head: Normocephalic and atraumatic.   Eyes:      Conjunctiva/sclera: Conjunctivae normal.   Cardiovascular:      Rate and Rhythm:  Normal rate and regular rhythm.   Pulmonary:      Effort: Pulmonary effort is normal.      Breath sounds: Normal breath sounds.   Skin:     General: Skin is warm and dry.   Neurological:      Mental Status: He is alert and oriented to person, place, and time.   Psychiatric:         Behavior: Behavior normal.         Thought Content: Thought content normal.       Significant Labs: All pertinent labs within the past 24 hours have been reviewed.  BMP:   Recent Labs   Lab 10/26/22  0527   GLU 45*  45*     138   K 4.8  4.8     102   CO2 22*  22*   BUN 37*  37*   CREATININE 8.8*  8.8*   CALCIUM 9.2  9.2     CBC:   Recent Labs   Lab 10/25/22  0458   WBC 16.39*   HGB 8.0*   HCT 26.2*   *       Significant Imaging:       Assessment/Plan:      * PAD (peripheral artery disease)  S/p L AKA then presented with PAD causing R foot wound. Now s/p R AKA on 10/13  - previously on antibiotics. These were discontinued when source control achieved.   - continue asa, statin   Pending SNF placement.    Leukocytosis  Persistent leukocytosis over past couple of weeks, but afebrile with no source of infection.  Monitor.  Trending down    AV graft malfunction  Vascular following.  S/p fistulogram      Cognitive changes  Noted by Psychiatry that he has had multiple episodes of confusion and not understanding treatment plan. Unclear if this is delirium associated or beginnings of cognitive dysfunction. Recommend outpatient evaluation.       Anemia due to chronic kidney disease, on chronic dialysis  - Anemic - has been transfused this admission pre and post surgery  - Continue EPO per Nephrology  With anemia of acute blood loss from bleeding from right stump.  Transfused another unit of blod on 10/21 with good correction.  Will transfuse another unit today with HD.        ESRD on hemodialysis  Dialyzes via LUE AVG  There is concern about AVG malfunction. Vascular surgery consulted  S/p fistulogram.  Nephrology  following       Hyperkalemia  Treat with HD      CAD (coronary artery disease) - non-obstructive from [facility] in 2016  - Resume aspirin, statin       Chronic diastolic heart failure  - Echocardiogram (11/24/2021): EF: 55%, GIDD, mild TR, moderate to severe MR  - no signs of acute exacerbation  - Continue home medications     Gastroesophageal reflux disease  - Coninue PPI     Pure hypercholesterolemia  - Continue statin     Essential (primary) hypertension  - Continue home medications    Controlled type 2 diabetes mellitus with chronic kidney disease on chronic dialysis, with long-term current use of insulin  - Last A1c:   Lab Results   Component Value Date    HGBA1C 6.1 (H) 01/07/2021     Continue current management.  - Low-sensitivity sliding scale insulin  - Initiate and maintain glycemic protocol, monitor POC glucose   - Follow up with PCP as outpatient      VTE Risk Mitigation (From admission, onward)         Ordered     IP VTE HIGH RISK PATIENT  Once         10/06/22 1819     Place sequential compression device  Until discontinued         10/06/22 1819                Discharge Planning   ENIO:      Code Status: Full Code   Is the patient medically ready for discharge?:     Reason for patient still in hospital (select all that apply):   Discharge Plan A: New Nursing Home placement - halfway care facility   Discharge Delays: (!) Post-Acute Set-up    Pending SNF placement             Nikolay Burch MD  Department of Hospital Medicine   [facility] - Telemetry

## 2022-10-26 NOTE — PLAN OF CARE
Problem: Adult Inpatient Plan of Care  Goal: Plan of Care Review  Outcome: Ongoing, Progressing  Goal: Patient-Specific Goal (Individualized)  Outcome: Ongoing, Progressing  Goal: Absence of Hospital-Acquired Illness or Injury  Outcome: Ongoing, Progressing  Goal: Optimal Comfort and Wellbeing  Outcome: Ongoing, Progressing  Goal: Readiness for Transition of Care  Outcome: Ongoing, Progressing     Problem: Skin Injury Risk Increased  Goal: Skin Health and Integrity  Outcome: Ongoing, Progressing  Intervention: Optimize Skin Protection  Flowsheets (Taken 10/26/2022 0243)  Skin Protection: adhesive use limited  Head of Bed (HOB) Positioning: HOB elevated

## 2022-10-26 NOTE — NURSING
Lab called with critical glucose of 45 that was drawn At 0527; rechecked glucose and result were 76; pt given apple juice with sugar after repeated bedside glucose; primary nurse informed

## 2022-10-26 NOTE — SUBJECTIVE & OBJECTIVE
Interval History: No new issues     Review of Systems   Constitutional:  Negative for activity change, appetite change and chills.   HENT:  Negative for congestion.    Eyes:  Negative for discharge.   Respiratory:  Negative for apnea and chest tightness.    Cardiovascular:  Negative for chest pain.   Gastrointestinal:  Negative for abdominal distention.   Endocrine: Negative for cold intolerance.   Genitourinary:  Negative for difficulty urinating and dysuria.   Musculoskeletal:  Negative for arthralgias and back pain.   Neurological:  Negative for dizziness and facial asymmetry.   Psychiatric/Behavioral:  Negative for agitation and behavioral problems.    Objective:     Vital Signs (Most Recent):  Temp: 97.8 °F (36.6 °C) (10/26/22 0756)  Pulse: 85 (10/26/22 0756)  Resp: 18 (10/26/22 0756)  BP: (!) 169/72 (10/26/22 0756)  SpO2: 100 % (10/26/22 0756)   Vital Signs (24h Range):  Temp:  [97.8 °F (36.6 °C)-98.6 °F (37 °C)] 97.8 °F (36.6 °C)  Pulse:  [81-95] 85  Resp:  [18-20] 18  SpO2:  [94 %-100 %] 100 %  BP: (137-192)/(61-81) 169/72     Weight: 59 kg (130 lb 1.1 oz)  Body mass index is 18.14 kg/m².    Intake/Output Summary (Last 24 hours) at 10/26/2022 0920  Last data filed at 10/26/2022 0147  Gross per 24 hour   Intake 120 ml   Output --   Net 120 ml      Physical Exam  Constitutional:       General: He is not in acute distress.     Appearance: Normal appearance. He is not ill-appearing, toxic-appearing or diaphoretic.   HENT:      Head: Normocephalic and atraumatic.   Eyes:      Conjunctiva/sclera: Conjunctivae normal.   Cardiovascular:      Rate and Rhythm: Normal rate and regular rhythm.   Pulmonary:      Effort: Pulmonary effort is normal.      Breath sounds: Normal breath sounds.   Skin:     General: Skin is warm and dry.   Neurological:      Mental Status: He is alert and oriented to person, place, and time.   Psychiatric:         Behavior: Behavior normal.         Thought Content: Thought content normal.        Significant Labs: All pertinent labs within the past 24 hours have been reviewed.  BMP:   Recent Labs   Lab 10/26/22  0527   GLU 45*  45*     138   K 4.8  4.8     102   CO2 22*  22*   BUN 37*  37*   CREATININE 8.8*  8.8*   CALCIUM 9.2  9.2     CBC:   Recent Labs   Lab 10/25/22  0458   WBC 16.39*   HGB 8.0*   HCT 26.2*   *       Signif

## 2022-10-26 NOTE — PLAN OF CARE
Problem: Pain Acute  Goal: Acceptable Pain Control and Functional Ability  Intervention: Develop Pain Management Plan  Flowsheets (Taken 10/25/2022 2127)  Pain Management Interventions:   care clustered   quiet environment facilitated   relaxation techniques promoted   pillow support provided

## 2022-10-26 NOTE — PT/OT/SLP PROGRESS
Occupational Therapy      Patient Name:  Adryan Neff   MRN:  [id number]    10:27 AM: Patient not seen at this time secondary to being in dialysis.  Will follow-up tomorrow or later today as able.     10/26/2022

## 2022-10-26 NOTE — PROGRESS NOTES
CM received a call from [facility] admissions. CM provided contact information for pt's daughter, Luis F [phone].    CM contacted pt's daughter,Luis F and she agreed to meet and complete admission paperwork. Pt's daughter stated she will contact admissions with a time later today. CM reiterated the importance of completing the admission paperwork because pt is medically stable for discharge. CM will follow up.    CM left a message for Kamilah with [facility].

## 2022-10-26 NOTE — PROGRESS NOTES
Adryan Neff is a 56 y.o. male patient.    Follow for ESRD, dialysis    Patient seen while on dialysis  No new c/o, comfortable    Scheduled Meds:   amLODIPine  10 mg Oral Daily    ascorbic acid (vitamin C)  1,000 mg Oral Daily    aspirin  81 mg Oral Daily    atorvastatin  40 mg Oral QHS    epoetin paola-epbx  10,000 Units Subcutaneous Every Mon, Wed, Fri    folic acid-vit B6-vit B12 2.5-25-2 mg  1 tablet Oral Daily    insulin detemir U-100  12 Units Subcutaneous QHS    metoprolol succinate  25 mg Oral Daily    pantoprazole  40 mg Oral Daily    sevelamer carbonate  2,400 mg Oral TID WM       Review of patient's allergies indicates:  No Known Allergies      Vital Signs Range (Last 24H):  Temp:  [97.8 °F (36.6 °C)-98.6 °F (37 °C)]   Pulse:  [81-95]   Resp:  [18-20]   BP: (137-192)/(61-81)   SpO2:  [94 %-100 %]     I & O (Last 24H):  Intake/Output Summary (Last 24 hours) at 10/26/2022 1106  Last data filed at 10/26/2022 0147  Gross per 24 hour   Intake 120 ml   Output --   Net 120 ml           Physical Exam:  General appearance: well developed, well nourished, no distress  Lungs:  clear to auscultation bilaterally and normal respiratory effort  Heart: regular rate and rhythm  Abdomen:  soft, normal bowel sounds  Extremities:  bilateral AKA    Laboratory:  I have reviewed all pertinent lab results within the past 24 hours.  CBC:   Recent Labs   Lab 10/25/22  0458   WBC 16.39*   RBC 2.95*   HGB 8.0*   HCT 26.2*   *   MCV 89   MCH 27.1   MCHC 30.5*     CMP:   Recent Labs   Lab 10/26/22  0527   GLU 45*  45*   CALCIUM 9.2  9.2     138   K 4.8  4.8   CO2 22*  22*     102   BUN 37*  37*   CREATININE 8.8*  8.8*       Imp/Plan    ESRD - on dialysis, stable, tolerated well  Malfunctioning AVG s/p fistulogram  S/p PEA arrest  (R) foot gangrene s/p AKA  Anemia of CKD    Continue present Rx  HD q MWF  We'll follow for dialysis      Trahenna Fine  10/26/2022

## 2022-10-26 NOTE — SUBJECTIVE & OBJECTIVE
Interval History: No new issues     Review of Systems   Constitutional:  Negative for activity change, appetite change and chills.   HENT:  Negative for congestion.    Eyes:  Negative for discharge.   Respiratory:  Negative for apnea and chest tightness.    Cardiovascular:  Negative for chest pain.   Gastrointestinal:  Negative for abdominal distention.   Endocrine: Negative for cold intolerance.   Genitourinary:  Negative for difficulty urinating and dysuria.   Musculoskeletal:  Negative for arthralgias and back pain.   Neurological:  Negative for dizziness and facial asymmetry.   Psychiatric/Behavioral:  Negative for agitation and behavioral problems.    Objective:     Vital Signs (Most Recent):  Temp: 97.8 °F (36.6 °C) (10/26/22 0756)  Pulse: 85 (10/26/22 0756)  Resp: 18 (10/26/22 0756)  BP: (!) 169/72 (10/26/22 0756)  SpO2: 100 % (10/26/22 0756)   Vital Signs (24h Range):  Temp:  [97.8 °F (36.6 °C)-98.6 °F (37 °C)] 97.8 °F (36.6 °C)  Pulse:  [81-95] 85  Resp:  [18-20] 18  SpO2:  [94 %-100 %] 100 %  BP: (137-192)/(61-81) 169/72     Weight: 59 kg (130 lb 1.1 oz)  Body mass index is 18.14 kg/m².    Intake/Output Summary (Last 24 hours) at 10/26/2022 0913  Last data filed at 10/26/2022 0147  Gross per 24 hour   Intake 120 ml   Output --   Net 120 ml      Physical Exam  Constitutional:       General: He is not in acute distress.     Appearance: Normal appearance. He is not ill-appearing, toxic-appearing or diaphoretic.   HENT:      Head: Normocephalic and atraumatic.   Eyes:      Conjunctiva/sclera: Conjunctivae normal.   Cardiovascular:      Rate and Rhythm: Normal rate and regular rhythm.   Pulmonary:      Effort: Pulmonary effort is normal.      Breath sounds: Normal breath sounds.   Skin:     General: Skin is warm and dry.   Neurological:      Mental Status: He is alert and oriented to person, place, and time.   Psychiatric:         Behavior: Behavior normal.         Thought Content: Thought content normal.        Significant Labs: All pertinent labs within the past 24 hours have been reviewed.  BMP:   Recent Labs   Lab 10/26/22  0527   GLU 45*  45*     138   K 4.8  4.8     102   CO2 22*  22*   BUN 37*  37*   CREATININE 8.8*  8.8*   CALCIUM 9.2  9.2     CBC:   Recent Labs   Lab 10/25/22  0458   WBC 16.39*   HGB 8.0*   HCT 26.2*   *       Significant Imaging:

## 2022-10-26 NOTE — NURSING
Patient awake, alert, oriented resting comfortably. No signs of distress observed. bed low and locked. Call light in reach. Report given to oncoming nurse, KIAH Jules. 12hour chart check complete. Will continue plan of care.

## 2022-10-26 NOTE — ASSESSMENT & PLAN NOTE
S/p L AKA then presented with PAD causing R foot wound. Now s/p R AKA on 10/13  - previously on antibiotics. These were discontinued when source control achieved.   - continue asa, statin   Pending SNF placement.

## 2022-10-26 NOTE — ASSESSMENT & PLAN NOTE
Persistent leukocytosis over past couple of weeks, but afebrile with no source of infection.  Monitor.  Trending down

## 2022-10-26 NOTE — PT/OT/SLP PROGRESS
Physical Therapy      Patient Name:  Adryan Neff   MRN:  [id number]    Patient not seen today secondary to Dialysis. Will follow-up as able.

## 2022-10-27 LAB
POCT GLUCOSE: 149 MG/DL (ref 70–110)
POCT GLUCOSE: 171 MG/DL (ref 70–110)
POCT GLUCOSE: 210 MG/DL (ref 70–110)
POCT GLUCOSE: 91 MG/DL (ref 70–110)

## 2022-10-27 PROCEDURE — 25000003 PHARM REV CODE 250: Performed by: NURSE PRACTITIONER

## 2022-10-27 PROCEDURE — 25000003 PHARM REV CODE 250: Performed by: HOSPITALIST

## 2022-10-27 PROCEDURE — 25000003 PHARM REV CODE 250: Performed by: ORTHOPAEDIC SURGERY

## 2022-10-27 PROCEDURE — 21400001 HC TELEMETRY ROOM

## 2022-10-27 PROCEDURE — 25000003 PHARM REV CODE 250: Performed by: INTERNAL MEDICINE

## 2022-10-27 PROCEDURE — 97530 THERAPEUTIC ACTIVITIES: CPT

## 2022-10-27 PROCEDURE — 97542 WHEELCHAIR MNGMENT TRAINING: CPT | Mod: CQ

## 2022-10-27 PROCEDURE — 97530 THERAPEUTIC ACTIVITIES: CPT | Mod: CQ

## 2022-10-27 RX ORDER — HYDRALAZINE HYDROCHLORIDE 25 MG/1
25 TABLET, FILM COATED ORAL EVERY 8 HOURS
Status: DISCONTINUED | OUTPATIENT
Start: 2022-10-27 | End: 2022-10-30

## 2022-10-27 RX ADMIN — HYDRALAZINE HYDROCHLORIDE 25 MG: 25 TABLET, FILM COATED ORAL at 09:10

## 2022-10-27 RX ADMIN — SEVELAMER CARBONATE 2400 MG: 800 TABLET, FILM COATED ORAL at 06:10

## 2022-10-27 RX ADMIN — AMLODIPINE BESYLATE 10 MG: 5 TABLET ORAL at 10:10

## 2022-10-27 RX ADMIN — HYDRALAZINE HYDROCHLORIDE 25 MG: 25 TABLET, FILM COATED ORAL at 02:10

## 2022-10-27 RX ADMIN — ATORVASTATIN CALCIUM 40 MG: 40 TABLET, FILM COATED ORAL at 09:10

## 2022-10-27 RX ADMIN — HYDROCODONE BITARTRATE AND ACETAMINOPHEN 1 TABLET: 5; 325 TABLET ORAL at 09:10

## 2022-10-27 RX ADMIN — Medication 6 MG: at 09:10

## 2022-10-27 RX ADMIN — HYDROCODONE BITARTRATE AND ACETAMINOPHEN 1 TABLET: 5; 325 TABLET ORAL at 11:10

## 2022-10-27 RX ADMIN — INSULIN DETEMIR 12 UNITS: 100 INJECTION, SOLUTION SUBCUTANEOUS at 09:10

## 2022-10-27 RX ADMIN — METOPROLOL SUCCINATE 25 MG: 25 TABLET, EXTENDED RELEASE ORAL at 10:10

## 2022-10-27 RX ADMIN — OXYCODONE HYDROCHLORIDE AND ACETAMINOPHEN 1000 MG: 500 TABLET ORAL at 10:10

## 2022-10-27 RX ADMIN — Medication 1 TABLET: at 10:10

## 2022-10-27 RX ADMIN — PANTOPRAZOLE SODIUM 40 MG: 40 TABLET, DELAYED RELEASE ORAL at 10:10

## 2022-10-27 RX ADMIN — ASPIRIN 81 MG CHEWABLE TABLET 81 MG: 81 TABLET CHEWABLE at 10:10

## 2022-10-27 NOTE — PT/OT/SLP PROGRESS
Occupational Therapy   Treatment    Name: Adryan Neff  MRN: [id number]  Admitting Diagnosis: PAD (peripheral artery disease)  7 Days Post-Op    Recommendations:     Discharge Recommendations: nursing facility, skilled  Discharge Equipment Recommendations: other (see comments) (per SNF)  Barriers to Discharge: Other (Comment) (Patient requires increased assistance for all T/F's and for most ADL's.  He is at increased risk of falls/injuries.  He is at risk of skin breakdown.)    Assessment:     Adryan Neff is a 56 y.o. male with a medical diagnosis of PAD (peripheral artery disease).  He presents with decreased motivation to participate upon first attempt to treat him.  Performance deficits affecting function are weakness, impaired endurance, impaired functional mobility, impaired balance, decreased coordination, decreased safety awareness, pain, decreased ROM, impaired skin, orthopedic precautions, and impaired self-care skills.     Rehab Prognosis:  Fair; patient would benefit from acute skilled OT services to address these deficits and reach maximum level of function.       Plan:     Patient to be seen 5 x/week to address the above listed problems via self-care/home management, therapeutic activities, and therapeutic exercises.  Plan of Care Expires: 11/02/22  Plan of Care Reviewed with: patient    Subjective     Pain/Comfort:  Pain Rating 1: 10/10 (0/10 pain at rest but 10/10 pain at RLE when seated at EOB)  Location - Side 1: Right  Location - Orientation 1: Distal  Location 1: Leg  Pain Addressed 1: Reposition, Cessation of Activity  Pain Rating Post-Intervention 1: (Not rated)    Objective:     Communicated with: Nurse, francisca Brizuela Shelove, and Jett LEGGETT, prior to session.  Patient found supine in bed with HOB elevated with telemetry unit and IV site at RUE upon OT entry to room.    General Precautions: Standard and fall precautions apply.  (Patient is s/p R AKA.  Patient with B AKA's.)   Orthopedic Precautions:  RLE non weight-bearing   Braces: N/A  Respiratory Status: Room air     Occupational Performance:     Bed Mobility:     Min A of 2 persons for supine/sitting at EOB     Functional Mobility/Transfers:  Transfers: Mod A x 1 to transfer from bed to W/C and Mod A x 2 to transfer from W/C to bed   Functional Mobility: N/A    Activities of Daily Living:  N/A; patient refused to do UE dressing/other ADL's.       [facility] 6 Click ADL: 16    Treatment & Education:  Patient initially refused Tx today, stating that he was fatigued.  Patient later allowed the PTA and OT to co-treat him.  Patient was Min A of 2 persons for supine to sitting at EOB.  He reported 0/10 pain at rest but significant pain at RLE when seated at EOB.  Patient was instructed in W/C management/mobility and sliding board transfers to and from bed/wheelchair.  He required Min A overall for W/C mob's for proper hand placement and to make turns.  Patient was Mod A x 1 for bed to W/C T/F.  He required Mod A of 2 persons for W/C to bed T/F.  Patient refused participating in UE dressing tasks.      SpO2: 100%  HR: 78 bpm   (Taken during W/C mob's)         Patient left in bed with call button within reach, lines intact as previously stated, BLE's elevated, and nurse aware.     GOALS:   Multidisciplinary Problems       Occupational Therapy Goals          Problem: Occupational Therapy    Goal Priority Disciplines Outcome Interventions   Occupational Therapy Goal     OT, PT/OT Ongoing, Progressing    Description: Goals to be met by: 11/02/2022     Patient will increase functional independence with ADL's by performing:    UE Dressing with Modified [place]/Set-up Assistance.  LE Dressing with Minimal Assistance.  Grooming while EOB with Set-up Assistance.  Increase functional strength to 5/5 for improved ease of scoot transfers.  Patient will tolerate sitting at EOB for 10 mins to allow time for grooming tasks.  Patient will perform scoot transfer bed <---> W/C with  Minimal Assistance.                            Time Tracking:     OT Date of Treatment: 10/27/22  OT Start Time: 1445  OT Stop Time: 1523  OT Total Time (min): 38 min    Billable Minutes:Therapeutic Activity 38 mins  Co-treatment with PTA  OT/GLENDA: OT          10/27/2022

## 2022-10-27 NOTE — PROGRESS NOTES
[facility] Medicine  Progress Note    Patient Name: Adryan Neff  MRN: [id number]  Patient Class: IP- Inpatient   Admission Date: 10/6/2022  Length of Stay: 21 days  Attending Physician: Nikolay Sawyer MD  Primary Care Provider: Carola Pedro MD        Subjective:     Principal Problem:PAD (peripheral artery disease)        HPI:  This is a 56 year old male with a PMHx of severe PAD s/p gangrene of left foot s/p left AKA (1/2022), ESRD on HD MWF, NICM (EF: 55%, GIDD), pulmonary hypertension, GERD and latent TB filiberto presents for concern for leg infection.       The patient presented to the vascular surgery clinic for a follow up. He reports worsening pain and developing eschar of the right dorsal foot along with foul smelling odor. Given concern for an infection and the need for IV antibiotics & expedited podiatry evaluation, the patient was sent to the ER. The patient denies having any fevers, chills, or injury to his foot. He has a previous AKA and uses a wheelchair to ambulate. While in the ED, the patient was hypertensive but otherwise hemodynamically stable. Labs showed leukocytosis (17.41 - with neutrophilic predominance), normocytic anemia (9.7), elevated sed rate (125), elevated CRP (237.2), negative lactate (1.6). X-ray foot showed no radiographic evidence of osteomyelitis. He was administered vancomycin, zosyn and was admitted for further management.     I attempted to call the daughter to obtain further history on 1902 without a response.         Overview/Hospital Course:  Mr Adryan Neff is a 56 y.o. man with PAD. He is s/p L AKA due to gangrene and now presents with worsening R foot infection. He also has ESRD on HD via LUE AVG. Started antibiotics. Podiatry and Ortho consulted with plans for amputation. This was delayed due to PEA on 10/10 with quick ROSC. Unclear etiology but suspect vasovagal event. Cardiology was consulted. He then had R AKA on 10/13/22. Antibiotics were  discontinued. He has had malfunctioning of his LUE AVG; Vascular was consulted and fistulogram performed.  SW/CM consulted for discharge planning.  Working on NH placement.  Some persistent bleeding from right stump.  Hgb of 6.3 on 10/21 and transfused one unit of blood. Another unit of blood transfused with HD on 10/24. PT/OT evaluated and now rec: SNF      Interval History: No new issues     Review of Systems   Constitutional:  Negative for activity change, appetite change and chills.   HENT:  Negative for congestion.    Eyes:  Negative for discharge.   Respiratory:  Negative for apnea and chest tightness.    Cardiovascular:  Negative for chest pain.   Gastrointestinal:  Negative for abdominal distention.   Endocrine: Negative for cold intolerance.   Genitourinary:  Negative for difficulty urinating and dysuria.   Musculoskeletal:  Negative for arthralgias and back pain.   Neurological:  Negative for dizziness and facial asymmetry.   Psychiatric/Behavioral:  Negative for agitation and behavioral problems.    Objective:     Vital Signs (Most Recent):  Temp: 97.8 °F (36.6 °C) (10/26/22 0756)  Pulse: 85 (10/26/22 0756)  Resp: 18 (10/26/22 0756)  BP: (!) 169/72 (10/26/22 0756)  SpO2: 100 % (10/26/22 0756)   Vital Signs (24h Range):  Temp:  [97.8 °F (36.6 °C)-98.6 °F (37 °C)] 97.8 °F (36.6 °C)  Pulse:  [81-95] 85  Resp:  [18-20] 18  SpO2:  [94 %-100 %] 100 %  BP: (137-192)/(61-81) 169/72     Weight: 59 kg (130 lb 1.1 oz)  Body mass index is 18.14 kg/m².    Intake/Output Summary (Last 24 hours) at 10/26/2022 0920  Last data filed at 10/26/2022 0147  Gross per 24 hour   Intake 120 ml   Output --   Net 120 ml      Physical Exam  Constitutional:       General: He is not in acute distress.     Appearance: Normal appearance. He is not ill-appearing, toxic-appearing or diaphoretic.   HENT:      Head: Normocephalic and atraumatic.   Eyes:      Conjunctiva/sclera: Conjunctivae normal.   Cardiovascular:      Rate and Rhythm:  Normal rate and regular rhythm.   Pulmonary:      Effort: Pulmonary effort is normal.      Breath sounds: Normal breath sounds.   Skin:     General: Skin is warm and dry.   Neurological:      Mental Status: He is alert and oriented to person, place, and time.   Psychiatric:         Behavior: Behavior normal.         Thought Content: Thought content normal.       Significant Labs: All pertinent labs within the past 24 hours have been reviewed.  BMP:   Recent Labs   Lab 10/26/22  0527   GLU 45*  45*     138   K 4.8  4.8     102   CO2 22*  22*   BUN 37*  37*   CREATININE 8.8*  8.8*   CALCIUM 9.2  9.2     CBC:   Recent Labs   Lab 10/25/22  0458   WBC 16.39*   HGB 8.0*   HCT 26.2*   *       Signif      Assessment/Plan:      * PAD (peripheral artery disease)  S/p L AKA then presented with PAD causing R foot wound. Now s/p R AKA on 10/13  - previously on antibiotics. These were discontinued when source control achieved.   - continue asa, statin   Pending SNF placement.    Leukocytosis  Persistent leukocytosis over past couple of weeks, but afebrile with no source of infection.  Monitor.  Trending down    AV graft malfunction  Vascular following.  S/p fistulogram      Cognitive changes  Noted by Psychiatry that he has had multiple episodes of confusion and not understanding treatment plan. Unclear if this is delirium associated or beginnings of cognitive dysfunction. Recommend outpatient evaluation.       Anemia due to chronic kidney disease, on chronic dialysis  - Anemic - has been transfused this admission pre and post surgery  - Continue EPO per Nephrology  With anemia of acute blood loss from bleeding from right stump.  Transfused another unit of blod on 10/21 with good correction.  Will transfuse another unit today with HD.        ESRD on hemodialysis  Dialyzes via LUE AVG  There is concern about AVG malfunction. Vascular surgery consulted  S/p fistulogram.  Nephrology following        Hyperkalemia  Treat with HD      CAD (coronary artery disease) - non-obstructive from [facility] in 2016  - Resume aspirin, statin       Chronic diastolic heart failure  - Echocardiogram (11/24/2021): EF: 55%, GIDD, mild TR, moderate to severe MR  - no signs of acute exacerbation  - Continue home medications     Gastroesophageal reflux disease  - Coninue PPI     Pure hypercholesterolemia  - Continue statin     Essential (primary) hypertension  - Continue home medications    Controlled type 2 diabetes mellitus with chronic kidney disease on chronic dialysis, with long-term current use of insulin  - Last A1c:   Lab Results   Component Value Date    HGBA1C 6.1 (H) 01/07/2021     Continue current management.  - Low-sensitivity sliding scale insulin  - Initiate and maintain glycemic protocol, monitor POC glucose   - Follow up with PCP as outpatient      VTE Risk Mitigation (From admission, onward)         Ordered     IP VTE HIGH RISK PATIENT  Once         10/06/22 1819     Place sequential compression device  Until discontinued         10/06/22 1819                Discharge Planning   ENIO:      Code Status: Full Code   Is the patient medically ready for discharge?:     Reason for patient still in hospital (select all that apply):   Discharge Plan A: New Nursing Home placement - long-term care facility   Discharge Delays: (!) Post-Acute Set-up      Awaiting SNF placement           Nikolay Burch MD  Department of Hospital Medicine   [facility] - Telemetry

## 2022-10-27 NOTE — PT/OT/SLP PROGRESS
Physical Therapy Treatment    Patient Name:  Adryan Neff   MRN:  [id number]    Recommendations:     Discharge Recommendations:  nursing facility, skilled   Discharge Equipment Recommendations: none     Assessment:     Adryan Neff is a 56 y.o. male admitted with a medical diagnosis of PAD (peripheral artery disease).  He presents with the following impairments/functional limitations:  weakness, impaired endurance, impaired functional mobility, impaired balance, decreased coordination, decreased upper extremity function, decreased safety awareness, pain, decreased ROM, impaired skin, orthopedic precautions.    Pt agreed to participate. Min/Mod A of 2 persons for Bed<>Wheelchair using Sliding board via scoot transfer    Rehab Prognosis: Good; patient would benefit from acute skilled PT services to address these deficits and reach maximum level of function.    Recent Surgery: Procedure(s) (LRB):  Fistulogram (Left) 7 Days Post-Op    Plan:     During this hospitalization, patient to be seen 5 x/week to address the identified rehab impairments via therapeutic activities, therapeutic exercises, wheelchair management/training and progress toward the following goals:    Plan of Care Expires:  11/10/22    Subjective     Chief Complaint: pain to R distal leg upon sitting up at EOB  Patient/Family Comments/goals: Pt agreed to participate.   Pain/Comfort:  Pain Rating 1: 10/10 (no pain at rest, 10/10 Sup>>Sit at EOB)  Location - Side 1: Right  Location - Orientation 1: distal  Location 1: leg  Pain Addressed 1: Reposition, Cessation of Activity  Pain Rating Post-Intervention 1:  (pt didn't rate)      Objective:     Communicated with nurse Brizuela prior to session.  Patient found HOB elevated with telemetry, bed alarm upon PT entry to room.     General Precautions: Standard, fall (s/p R AKA)   Orthopedic Precautions:RLE non weight bearing, LLE non weight bearing (B AKA)   Braces: N/A  Respiratory Status: Room air     Functional  Mobility: Pt moved extra slow and required extra time to complete tasks.  Bed Mobility:     Scooting: anterior scoot to EOB and posterior scoot back to Wheelchair using draw sheet dependence  Supine to Sit: Min A of 2 persons for trunk supporting and BLE management with HOB elevated  Sit to Supine: maximal assistance for trunk lowering and BLE management  Transfers:   Gait Belt don prior OOB activity; v/c required for proper hand placement with transfer and WC propelling  Bed to Wheelchair: Min A of 2 persons with slide board using side scoot transfer  Wheelchair to Bed: Mod A of 2 persons, pt requested to try without sliding board using side scoot transfer, pt had a difficult time to lift his bottom off to perform side scoot, pt agreed to use sliding board next time.  Balance: Fair/Fair+ Static Sitting  Wheelchair Propulsion: Pt propelled Standard wheelchair x ~92 feet on Level tile with Bilateral upper extremity with Stand-by Assistance; pt required frequent rest breaks 2* fatigue, required v/c for WC management and proper hand placement.      AM-PAC 6 CLICK MOBILITY  Turning over in bed (including adjusting bedclothes, sheets and blankets)?: 3  Sitting down on and standing up from a chair with arms (e.g., wheelchair, bedside commode, etc.): 1  Moving from lying on back to sitting on the side of the bed?: 3  Moving to and from a bed to a chair (including a wheelchair)?: 2  Need to walk in hospital room?: 1  Climbing 3-5 steps with a railing?: 1  Basic Mobility Total Score: 11       Treatment & Education:  Encouraged pt to sit up in long sitting in bed or at EOB with assistance to perform BLE ex throughout the day to maintain muscle strength/endurance, blood circulation, pt verbalized understanding.    Patient left HOB elevated with BLE offloaded with green cushion, tray table at bedside, call button in reach, bed alarm on, and nurse notified.    GOALS:   Multidisciplinary Problems       Physical Therapy Goals           Problem: Physical Therapy    Goal Priority Disciplines Outcome Goal Variances Interventions   Physical Therapy Goal     PT, PT/OT Ongoing, Progressing     Description: Goals to be met by: 11/10/22     Patient will increase functional independence with mobility by performin. Pt to be supervision with bed mobility.  2. Pt to transfer with SBA.  3. Pt to be (I) with wheelchair mobility 150'.                         Time Tracking:     PT Received On: 10/27/22  PT Start Time: 1445     PT Stop Time: 1523  PT Total Time (min): 38 min     Billable Minutes: Therapeutic Activity 15 min and Train/Wheelchair Management 23 min    Treatment Type: Treatment  PT/PTA: PTA     PTA Visit Number: 3     10/27/2022

## 2022-10-27 NOTE — PROGRESS NOTES
CM contacted Minnie with [facility]. She stated she will call back to confirm if pt's daughter, Luis F sent the financial documents. CM will follow up.    1:33 pm  CM spoke to Minnie with [facility]. Minnie stated she needed another bank statement because she only received two. Minnie stated they couldn't accept the pt until all financial statements have been received. CM will contact pt's daughter, Luis F.    1:41 pm  CM spoke to pt's daughterLuis F and reiterated the importance of getting a third financial statement. Luis F stated she will have her sister look for the statement.     4:27 pm  Minnie stated she spoke to pt's daughter and she will retrieve the third financial statement. Minnie stated to contact  her after 10am.Pt may be able to transfer to [facility] tomorrow.

## 2022-10-27 NOTE — PLAN OF CARE
Problem: Occupational Therapy  Goal: Occupational Therapy Goal  Description: Goals to be met by: 11/02/2022     Patient will increase functional independence with ADL's by performing:    UE Dressing with Modified [place]/Set-up Assistance.  LE Dressing with Minimal Assistance.  Grooming while EOB with Set-up Assistance.  Increase functional strength to 5/5 for improved ease of scoot transfers.  Patient will tolerate sitting at EOB for 10 mins to allow time for grooming tasks.  Patient will perform scoot transfer bed <---> W/C with Minimal Assistance.       Outcome: Ongoing, Progressing    Patient initially refused Tx today, stating that he was fatigued.  Patient later allowed the PTA and OT to co-treat him.  Patient was Min A of 2 persons for supine to sitting at EOB.  He reported 0/10 pain at rest but significant pain at RLE when seated at EOB.  Patient performed reaching/sitting balance tasks while seated at EOB.  Patient was instructed in W/C management/mobility and sliding board transfers to and from bed/wheelchair.  He required Min A overall for W/C mob's for proper hand placement and to make turns.  Patient was Mod A x 1 for bed to W/C T/F.  He required Mod A of 2 persons for W/C to bed T/F.  Patient refused participating in UE dressing tasks.      SpO2: 100%  HR: 78 bpm   (Taken during W/C mob's)

## 2022-10-27 NOTE — PLAN OF CARE
[place] - Telemetry  Discharge Reassessment    Primary Care Provider: Carola Pedro MD    Expected Discharge Date: Pending    CM spoke to pt's daughterLuis F to follow up on dc needs. Pt's daughter stated she sent financial statements to [facility] yesterday. CM will contact [facility] to confirm.    Reassessment (most recent)       Discharge Reassessment - 10/27/22 0816          Discharge Reassessment    Assessment Type Discharge Planning Reassessment (P)      Did the patient's condition or plan change since previous assessment? Yes (P)      Discharge Plan discussed with: Adult children (P)      Discharge Plan A New Nursing Home placement - correction care facility (P)      Discharge Plan B Home with family (P)      DME Needed Upon Discharge  other (see comments) (P)    tbd    Discharge Barriers Identified None (P)      Why the patient remains in the hospital Requires continued medical care (P)         Post-Acute Status    Coverage Medicare AB (P)      Discharge Delays Post-Acute Set-up (P)

## 2022-10-27 NOTE — PHYSICIAN QUERY
PT Name: Adryan Neff  MR #: [id number]    DOCUMENTATION CLARIFICATION     CDS/: Madhuri Lacy RN              Contact information:[email or web address]    This form is a permanent document in the medical record.     Query Date: October 27, 2022    By submitting this query, we are merely seeking further clarification of documentation. Please utilize your independent clinical judgment when addressing the question(s) below.    Supporting Clinical Findings Location in Medical Record   The patient has had some persistent bleeding to the right AKA site.  Dressing was changed today.  There is no active bleeding.Patient does go to dialysis appears he is only taking aspirin however he may receive heparin at dialysis.  This may be potentiating the bleeding as well Orthopedic surgery note 10-22       heparin (porcine) injection 5,000 Units  Every 8 hours         Home medications: aspirin, plavix and statin       H&P      HM note 10-13       Provider, please clarify if there is any clinical correlation between bleeding and _antocoagulant_.           Are the conditions:      [  ] Due to or associated with each other   [  ] Unrelated to each other   [  ] Other explanation (Please Specify): ______________   [ x ] Clinically Undetermined                                                                               Please document in your progress notes daily for the duration of treatment until resolved and include in your discharge summary.

## 2022-10-27 NOTE — PHYSICIAN QUERY
PT Name: Adryan Neff  MR #: [id number]     DOCUMENTATION CLARIFICATION      CDS/: Madhuri Lacy RN              Contact information:[email or web address]    This form is a permanent document in the medical record.     Query Date: October 27, 2022    Dear Provider,  By submitting this query, we are merely seeking further clarification of documentation.  Please utilize your independent clinical judgment when addressing the question(s) below.     The Medical Record contains the following:    Supporting Clinical Findings Location in Medical Record   Presumed severe PAD on RLE art US  S/p prior L AKA  Now with planned R AKA for gas gangrene Cardiology note 10-10   Dry gangrene  note 10-7   No soft tissue gas on foot x-ray Vascular surgery note 10-7         Please clarify if the gas gangrene diagnosis has been:    [ x ] Ruled In   [  ] Ruled Out   [  ] Other/Clarification of findings (please specify): _______________    [   ] Clinically undetermined       Please document in your progress notes daily for the duration of treatment, until resolved, and include in your discharge summary.    Form No. [id number]

## 2022-10-27 NOTE — PLAN OF CARE
Problem: Physical Therapy  Goal: Physical Therapy Goal  Description: Goals to be met by: 11/10/22     Patient will increase functional independence with mobility by performin. Pt to be supervision with bed mobility.  2. Pt to transfer with SBA.  3. Pt to be (I) with wheelchair mobility 150'.    Outcome: Ongoing, Progressing   Min/Mod A of 2 persons for Bed<>Wheelchair using Sliding board via scoot transfer

## 2022-10-28 LAB
POCT GLUCOSE: 175 MG/DL (ref 70–110)
POCT GLUCOSE: 219 MG/DL (ref 70–110)
POCT GLUCOSE: 73 MG/DL (ref 70–110)

## 2022-10-28 PROCEDURE — 63600175 PHARM REV CODE 636 W HCPCS: Mod: JG | Performed by: ORTHOPAEDIC SURGERY

## 2022-10-28 PROCEDURE — 80100016 HC MAINTENANCE HEMODIALYSIS

## 2022-10-28 PROCEDURE — 25000003 PHARM REV CODE 250: Performed by: NURSE PRACTITIONER

## 2022-10-28 PROCEDURE — 25000003 PHARM REV CODE 250: Performed by: HOSPITALIST

## 2022-10-28 PROCEDURE — 25000003 PHARM REV CODE 250: Performed by: ORTHOPAEDIC SURGERY

## 2022-10-28 PROCEDURE — 21400001 HC TELEMETRY ROOM

## 2022-10-28 PROCEDURE — 25000003 PHARM REV CODE 250: Performed by: INTERNAL MEDICINE

## 2022-10-28 RX ADMIN — EPOETIN ALFA-EPBX 10000 UNITS: 10000 INJECTION, SOLUTION INTRAVENOUS; SUBCUTANEOUS at 12:10

## 2022-10-28 RX ADMIN — SENNOSIDES 8.6 MG: 8.6 TABLET, FILM COATED ORAL at 08:10

## 2022-10-28 RX ADMIN — INSULIN ASPART 2 UNITS: 100 INJECTION, SOLUTION INTRAVENOUS; SUBCUTANEOUS at 04:10

## 2022-10-28 RX ADMIN — HYDRALAZINE HYDROCHLORIDE 25 MG: 25 TABLET, FILM COATED ORAL at 09:10

## 2022-10-28 RX ADMIN — ATORVASTATIN CALCIUM 40 MG: 40 TABLET, FILM COATED ORAL at 08:10

## 2022-10-28 RX ADMIN — INSULIN DETEMIR 12 UNITS: 100 INJECTION, SOLUTION SUBCUTANEOUS at 08:10

## 2022-10-28 RX ADMIN — HYDROCODONE BITARTRATE AND ACETAMINOPHEN 1 TABLET: 5; 325 TABLET ORAL at 08:10

## 2022-10-28 RX ADMIN — Medication 6 MG: at 08:10

## 2022-10-28 RX ADMIN — HYDRALAZINE HYDROCHLORIDE 25 MG: 25 TABLET, FILM COATED ORAL at 06:10

## 2022-10-28 NOTE — PT/OT/SLP PROGRESS
Occupational Therapy      Patient Name:  Adryan Neff   MRN:  [id number]    1st Attempt:  9:13 AM: Patient not seen today secondary to being in dialysis.  Will follow-up later day/hour as able.     2nd Attempt:  PTA attempted to co-treat this patient with OT this afternoon following patient's return from dialysis Tx.  Patient was initially agreeable to Tx but then stated that he was too tired and declined participating.    10/28/2022

## 2022-10-28 NOTE — SUBJECTIVE & OBJECTIVE
Interval History: No new issues     Review of Systems   Constitutional:  Negative for activity change, appetite change and chills.   HENT:  Negative for congestion.    Eyes:  Negative for discharge.   Respiratory:  Negative for apnea and chest tightness.    Cardiovascular:  Negative for chest pain.   Gastrointestinal:  Negative for abdominal distention.   Endocrine: Negative for cold intolerance.   Genitourinary:  Negative for difficulty urinating and dysuria.   Musculoskeletal:  Negative for arthralgias and back pain.   Neurological:  Negative for dizziness and facial asymmetry.   Psychiatric/Behavioral:  Negative for agitation and behavioral problems.    Objective:     Vital Signs (Most Recent):  Temp: 98 °F (36.7 °C) (10/28/22 0806)  Pulse: 83 (10/28/22 0806)  Resp: 20 (10/28/22 0838)  BP: (!) 156/70 (10/28/22 0806)  SpO2: 97 % (10/28/22 0806)   Vital Signs (24h Range):  Temp:  [98 °F (36.7 °C)-98.6 °F (37 °C)] 98 °F (36.7 °C)  Pulse:  [73-88] 83  Resp:  [16-20] 20  SpO2:  [97 %-100 %] 97 %  BP: (131-160)/(61-70) 156/70     Weight: 59.2 kg (130 lb 8.2 oz)  Body mass index is 18.2 kg/m².    Intake/Output Summary (Last 24 hours) at 10/28/2022 0911  Last data filed at 10/27/2022 1719  Gross per 24 hour   Intake 480 ml   Output --   Net 480 ml      Physical Exam  Constitutional:       General: He is not in acute distress.     Appearance: Normal appearance. He is not ill-appearing, toxic-appearing or diaphoretic.   HENT:      Head: Normocephalic and atraumatic.   Eyes:      Conjunctiva/sclera: Conjunctivae normal.   Cardiovascular:      Rate and Rhythm: Normal rate and regular rhythm.   Pulmonary:      Effort: Pulmonary effort is normal.      Breath sounds: Normal breath sounds.   Skin:     General: Skin is warm and dry.   Neurological:      Mental Status: He is alert and oriented to person, place, and time.   Psychiatric:         Behavior: Behavior normal.         Thought Content: Thought content normal.        Significant Labs: All pertinent labs within the past 24 hours have been reviewed.  BMP: No results for input(s): GLU, NA, K, CL, CO2, BUN, CREATININE, CALCIUM, MG in the last 48 hours.  CBC: No results for input(s): WBC, HGB, HCT, PLT in the last 48 hours.    Significant Imaging:

## 2022-10-28 NOTE — PLAN OF CARE
No new nutrition recommendations. Nutrition signing off. Re-consult as needed.     Recommendations  Continue renal diabetic diet. Encourage PO intake.   Continue Novasource renal BID to aid in recovery and nutrition status per HD  Monitor nutrition related labs  Renal  Cardiac  Diabetes  Goals:   Pt to receive >50% EEN/EPN by RD f/u  Pt to receive ONS by RD f/u  Nutrition Goal Status: goal met  Communication of RD Recs: other (comment) (POC)    Kendall Delarosa, MPH, RDN, LDN

## 2022-10-28 NOTE — PROGRESS NOTES
CM contacted Minnie with [facility]. Minnie stated she requested July bank statement but pt's daughter faxed May bank statement.     CM contacted pt's daughter, Luis F and she stated she couldn't find July bank statement. CM suggested to assist pt with calling [employer] to get July bank statement. Pt's daughter stated she will call her sister, Mariel  because she is at work. Pt's daughter, Mariel contacted CM and said she was going to work but she could help pt tomorrow. CM reiterated the importance of getting bank statement because pt is medically stable for discharge. If not retrieved, pt will  discharge home with home health. CM will follow up on Monday.

## 2022-10-28 NOTE — PROGRESS NOTES
[facility] Medicine  Progress Note    Patient Name: Adryan Neff  MRN: [id number]  Patient Class: IP- Inpatient   Admission Date: 10/6/2022  Length of Stay: 22 days  Attending Physician: Nikolay Sawyer MD  Primary Care Provider: Carola Pedro MD        Subjective:     Principal Problem:PAD (peripheral artery disease)        HPI:  This is a 56 year old male with a PMHx of severe PAD s/p gangrene of left foot s/p left AKA (1/2022), ESRD on HD MWF, NICM (EF: 55%, GIDD), pulmonary hypertension, GERD and latent TB filiberto presents for concern for leg infection.       The patient presented to the vascular surgery clinic for a follow up. He reports worsening pain and developing eschar of the right dorsal foot along with foul smelling odor. Given concern for an infection and the need for IV antibiotics & expedited podiatry evaluation, the patient was sent to the ER. The patient denies having any fevers, chills, or injury to his foot. He has a previous AKA and uses a wheelchair to ambulate. While in the ED, the patient was hypertensive but otherwise hemodynamically stable. Labs showed leukocytosis (17.41 - with neutrophilic predominance), normocytic anemia (9.7), elevated sed rate (125), elevated CRP (237.2), negative lactate (1.6). X-ray foot showed no radiographic evidence of osteomyelitis. He was administered vancomycin, zosyn and was admitted for further management.     I attempted to call the daughter to obtain further history on 1902 without a response.         Overview/Hospital Course:  Mr Adryan Neff is a 56 y.o. man with PAD. He is s/p L AKA due to gangrene and now presents with worsening R foot infection. He also has ESRD on HD via LUE AVG. Started antibiotics. Podiatry and Ortho consulted with plans for amputation. This was delayed due to PEA on 10/10 with quick ROSC. Unclear etiology but suspect vasovagal event. Cardiology was consulted. He then had R AKA on 10/13/22. Antibiotics were  discontinued. He has had malfunctioning of his LUE AVG; Vascular was consulted and fistulogram performed.  SW/CM consulted for discharge planning.  Working on NH placement.  Some persistent bleeding from right stump.  Hgb of 6.3 on 10/21 and transfused one unit of blood. Another unit of blood transfused with HD on 10/24. PT/OT evaluated and now rec: SNF      Interval History: No new issues     Review of Systems   Constitutional:  Negative for activity change, appetite change and chills.   HENT:  Negative for congestion.    Eyes:  Negative for discharge.   Respiratory:  Negative for apnea and chest tightness.    Cardiovascular:  Negative for chest pain.   Gastrointestinal:  Negative for abdominal distention.   Endocrine: Negative for cold intolerance.   Genitourinary:  Negative for difficulty urinating and dysuria.   Musculoskeletal:  Negative for arthralgias and back pain.   Neurological:  Negative for dizziness and facial asymmetry.   Psychiatric/Behavioral:  Negative for agitation and behavioral problems.    Objective:     Vital Signs (Most Recent):  Temp: 98 °F (36.7 °C) (10/28/22 0806)  Pulse: 83 (10/28/22 0806)  Resp: 20 (10/28/22 0838)  BP: (!) 156/70 (10/28/22 0806)  SpO2: 97 % (10/28/22 0806)   Vital Signs (24h Range):  Temp:  [98 °F (36.7 °C)-98.6 °F (37 °C)] 98 °F (36.7 °C)  Pulse:  [73-88] 83  Resp:  [16-20] 20  SpO2:  [97 %-100 %] 97 %  BP: (131-160)/(61-70) 156/70     Weight: 59.2 kg (130 lb 8.2 oz)  Body mass index is 18.2 kg/m².    Intake/Output Summary (Last 24 hours) at 10/28/2022 0911  Last data filed at 10/27/2022 1719  Gross per 24 hour   Intake 480 ml   Output --   Net 480 ml      Physical Exam  Constitutional:       General: He is not in acute distress.     Appearance: Normal appearance. He is not ill-appearing, toxic-appearing or diaphoretic.   HENT:      Head: Normocephalic and atraumatic.   Eyes:      Conjunctiva/sclera: Conjunctivae normal.   Cardiovascular:      Rate and Rhythm: Normal  rate and regular rhythm.   Pulmonary:      Effort: Pulmonary effort is normal.      Breath sounds: Normal breath sounds.   Skin:     General: Skin is warm and dry.   Neurological:      Mental Status: He is alert and oriented to person, place, and time.   Psychiatric:         Behavior: Behavior normal.         Thought Content: Thought content normal.       Significant Labs: All pertinent labs within the past 24 hours have been reviewed.  BMP: No results for input(s): GLU, NA, K, CL, CO2, BUN, CREATININE, CALCIUM, MG in the last 48 hours.  CBC: No results for input(s): WBC, HGB, HCT, PLT in the last 48 hours.    Significant Imaging:       Assessment/Plan:      * PAD (peripheral artery disease)  S/p L AKA then presented with PAD causing R foot wound. Now s/p R AKA on 10/13  - previously on antibiotics. These were discontinued when source control achieved.   - continue asa, statin   Pending SNF placement.    Leukocytosis  Persistent leukocytosis over past couple of weeks, but afebrile with no source of infection.  Monitor.  Trending down    AV graft malfunction  Vascular following.  S/p fistulogram      Cognitive changes  Noted by Psychiatry that he has had multiple episodes of confusion and not understanding treatment plan. Unclear if this is delirium associated or beginnings of cognitive dysfunction. Recommend outpatient evaluation.       Anemia due to chronic kidney disease, on chronic dialysis  - Anemic - has been transfused this admission pre and post surgery  - Continue EPO per Nephrology  With anemia of acute blood loss from bleeding from right stump.  Transfused another unit of blod on 10/21 with good correction.  Will transfuse another unit today with HD.        ESRD on hemodialysis  Dialyzes via LUE AVG  There is concern about AVG malfunction. Vascular surgery consulted  S/p fistulogram.  Nephrology following       Hyperkalemia  Treat with HD      CAD (coronary artery disease) - non-obstructive from [facility] in  2016  - Resume aspirin, statin       Chronic diastolic heart failure  - Echocardiogram (11/24/2021): EF: 55%, GIDD, mild TR, moderate to severe MR  - no signs of acute exacerbation  - Continue home medications     Gastroesophageal reflux disease  - Coninue PPI     Pure hypercholesterolemia  - Continue statin     Essential (primary) hypertension  - Continue home medications    Controlled type 2 diabetes mellitus with chronic kidney disease on chronic dialysis, with long-term current use of insulin  - Last A1c:   Lab Results   Component Value Date    HGBA1C 6.1 (H) 01/07/2021     Continue current management.  - Low-sensitivity sliding scale insulin  - Initiate and maintain glycemic protocol, monitor POC glucose   - Follow up with PCP as outpatient      VTE Risk Mitigation (From admission, onward)         Ordered     IP VTE HIGH RISK PATIENT  Once         10/06/22 1819     Place sequential compression device  Until discontinued         10/06/22 1819                Discharge Planning   ENIO:      Code Status: Full Code   Is the patient medically ready for discharge?:     Reason for patient still in hospital (select all that apply)  Discharge Plan A: New Nursing Home placement - penitentiary care facility   Discharge Delays: (!) Post-Acute Set-up              Nikolay Burch MD  Department of Hospital Medicine   [facility] - Telemetry

## 2022-10-28 NOTE — PT/OT/SLP PROGRESS
"Physical Therapy      Patient Name:  Adryan Neff   MRN:  [id number]    Patient just got back to his room from Dialysis and just finished his lunch. Pt not seen today secondary to Patient unwilling to participate, Patient fatigue. Stated "I am tired." PTA tried to encourage pt to get for a little bit however pt still refused. Will follow-up as able.    "

## 2022-10-28 NOTE — PROGRESS NOTES
[facility] [place] - Telemetry  Adult Nutrition  Progress Note    SUMMARY   No new nutrition recommendations. Nutrition signing off. Re-consult as needed.     Recommendations  Continue renal diabetic diet. Encourage PO intake.   Continue Novasource renal BID to aid in recovery and nutrition status per HD  Monitor nutrition related labs  Renal  Cardiac  Diabetes  Goals:   Pt to receive >50% EEN/EPN by RD f/u  Pt to receive ONS by RD f/u  Nutrition Goal Status: goal met  Communication of RD Recs: other (comment) (POC)    Assessment and Plan  Nutrition Problem  Inadequate energy consumption    Related to (etiology):   Increased protein/calorie needs    Signs and Symptoms (as evidenced by):   On HD  Amputation (10/13)  Non-healing wounds    Interventions/Recommendations (treatment strategy):  Collaboration of care with other providers  Oilex    Nutrition Diagnosis Status:   Continue        Reason for Assessment    Reason For Assessment: length of stay  Diagnosis:  (Peripheral artery disease)  Relevant Medical History:   Patient Active Problem List   Diagnosis    Controlled type 2 diabetes mellitus with chronic kidney disease on chronic dialysis, with long-term current use of insulin    Essential (primary) hypertension    Pure hypercholesterolemia    Benign hypertension with chronic kidney disease, stage V    Other insomnia    Gastroesophageal reflux disease    Intractable vomiting    Chronic diastolic heart failure    CAD (coronary artery disease) - non-obstructive from [facility] in 2016    Hyperkalemia    Pre-transplant evaluation for kidney transplant    Chronic pulmonary heart disease    HLD (hyperlipidemia)    PDR (proliferative diabetic retinopathy)    Secondary hyperparathyroidism of renal origin    ESRD on hemodialysis    TB lung, latent    Paronychia, toe, left    Nuclear sclerosis of right eye    Pseudophakia    COVID-19 in immunocompromised patient    Encephalopathy, metabolic    Unilateral complete BKA, left,  "initial encounter    PAD (peripheral artery disease)    Anemia due to chronic kidney disease, on chronic dialysis    Cognitive changes    AV graft malfunction    Leukocytosis       Interdisciplinary Rounds: did not attend  General Information Comments:   10/28- Pt with good appetite. Eating 100% of meals. No labs updated. Nutrition signing off.   10/21- Pt with good appetite. Eating closer to 75% of meals now. Still receiving Novasource renal-- glucose labs looking a little better. Wt remains stable post amputation. Continue to monitor.   10/17- Pt with good appetite. Only eating around 25% of meals. Receiving Novasource renal BID-- rec'd switching to Suplena BID due to renal and glucose labs. Pt still with poor renal and glucose labs. Unsure of weight status due to not taking a new weight since last visit. Continue to monitor. Pt on HD MWF.   10/14-This is a 56 year old male with a PMHx of severe PAD s/p gangrene of left foot s/p left AKA (1/2022), ESRD on HD MWF, NICM (EF: 55%, GIDD), pulmonary hypertension, GERD and latent TB filiberto presents for concern for leg infection.  Pt presents with foot infection with foul odor. Pt had Right BKA 10/13. Pt with history of amputation (Left BKA in January 2022). Pt on HD. Pt reporting a fair appetite with 50% intake. Weights being taken are likely inaccurate. Unsure of wt gain/loss per hospital stay. Pt poor renal and glucose labs. Continue to monitor.   Nutrition Discharge Planning: Renal diet    Nutrition Risk Screen    Nutrition Risk Screen: other (see comments)    Nutrition/Diet History    Spiritual, Cultural Beliefs, Taoism Practices, Values that Affect Care: no    Anthropometrics    Temp: 98 °F (36.7 °C)  Height Method: Stated  Height: 5' 11" (180.3 cm)  Height (inches): 71 in  Weight Method: Bed Scale  Weight: 59.2 kg (130 lb 8.2 oz)  Weight (lb): 130.51 lb  Ideal Body Weight (IBW), Male: 172 lb  % Ideal Body Weight, Male (lb): 90.12 %  BMI (Calculated): " 18.2  Amputation %: 5.9, 5.9  Total Amputation %: 11.8       Lab/Procedures/Meds    Pertinent Labs Reviewed: reviewed  Pertinent Labs Comments: CBC:  No results for input(s): WBC, HGB, HCT, PLT in the last 24 hours.    CMP:  No results for input(s): GLUCOSE, CALCIUM, ALBUMIN, PROT, NA, K, CO2, CL, BUN, CREATININE, ALKPHOS, ALT, AST, BILITOT in the last 24 hours.        Pertinent Medications Reviewed: reviewed  Pertinent Medications Comments: Scheduled Meds:   amLODIPine  10 mg Oral Daily    ascorbic acid (vitamin C)  1,000 mg Oral Daily    aspirin  81 mg Oral Daily    atorvastatin  40 mg Oral QHS    epoetin paola-epbx  10,000 Units Subcutaneous Every Mon, Wed, Fri    folic acid-vit B6-vit B12 2.5-25-2 mg  1 tablet Oral Daily    hydrALAZINE  25 mg Oral Q8H    insulin detemir U-100  12 Units Subcutaneous QHS    metoprolol succinate  25 mg Oral Daily    pantoprazole  40 mg Oral Daily    sevelamer carbonate  2,400 mg Oral TID WM     Continuous Infusions:  PRN Meds:.sodium chloride, sodium chloride, acetaminophen, dextrose 10%, dextrose 10%, glucagon (human recombinant), glucose, glucose, hydrALAZINE, HYDROcodone-acetaminophen, insulin aspart U-100, melatonin, naloxone, ondansetron, polyethylene glycol, senna, sodium chloride 0.9%, sodium chloride 0.9%        Estimated/Assessed Needs    Weight Used For Calorie Calculations: 62.7 kg (138 lb 3.7 oz)  Energy Calorie Requirements (kcal): 1881-2195kcal/day (3-350kcal/kg (amputation recovery, skin integrity, on HD))  Energy Need Method: Kcal/kg  Protein Requirements: 63-75g/day (1.0-1.2g/kg)  Weight Used For Protein Calculations: 62.7 kg (138 lb 3.7 oz)  Fluid Requirements (mL): 1mL per kcal or per MD     RDA Method (mL): 1881  CHO Requirement: 390 g      Nutrition Prescription Ordered    Current Diet Order: Renal diet    Evaluation of Received Nutrient/Fluid Intake    I/O: -.2L since admit  Energy Calories Required: meeting needs  Protein Required: meeting needs  Fluid  Required: meeting needs  Comments: LBM 10/27  Tolerance: tolerating  % Intake of Estimated Energy Needs: 75 - 100 %  % Meal Intake: 75 - 100 %    Nutrition Risk    Level of Risk/Frequency of Follow-up: moderate - high     Monitor and Evaluation    Food and Nutrient Intake: energy intake, food and beverage intake  Food and Nutrient Adminstration: diet order  Knowledge/Beliefs/Attitudes: beliefs and attitudes, food and nutrition knowledge/skill  Physical Activity and Function: nutrition-related ADLs and IADLs  Anthropometric Measurements: height/length, weight, weight change, body mass index  Biochemical Data, Medical Tests and Procedures: electrolyte and renal panel, glucose/endocrine profile, gastrointestinal profile, inflammatory profile, lipid profile  Nutrition-Focused Physical Findings: overall appearance     Nutrition Follow-Up    RD Follow-up?: No    eKndall Delarosa, MPH, RDN, LDN

## 2022-10-28 NOTE — PT/OT/SLP PROGRESS
Physical Therapy      Patient Name:  Adryan Neff   MRN:  [id number]    Patient not seen today secondary to Dialysis. Will follow-up as able.

## 2022-10-28 NOTE — PLAN OF CARE
Problem: Adult Inpatient Plan of Care  Goal: Plan of Care Review  10/28/2022 1839 by Ava Guzman RN  Outcome: Ongoing, Progressing  10/28/2022 1655 by Ava Guzman RN  Outcome: Ongoing, Progressing  Goal: Patient-Specific Goal (Individualized)  10/28/2022 1839 by Ava Guzman RN  Outcome: Ongoing, Progressing  10/28/2022 1655 by Ava Guzman RN  Outcome: Ongoing, Progressing  Goal: Absence of Hospital-Acquired Illness or Injury  10/28/2022 1839 by Ava Guzman RN  Outcome: Ongoing, Progressing  10/28/2022 1655 by Ava Guzman RN  Outcome: Ongoing, Progressing  Goal: Optimal Comfort and Wellbeing  10/28/2022 1839 by Ava Guzman RN  Outcome: Ongoing, Progressing  10/28/2022 1655 by Ava Guzman RN  Outcome: Ongoing, Progressing  Goal: Readiness for Transition of Care  10/28/2022 1839 by Ava Guzman RN  Outcome: Ongoing, Progressing  10/28/2022 1655 by Ava Guzman RN  Outcome: Ongoing, Progressing     Problem: Adult Inpatient Plan of Care  Goal: Patient-Specific Goal (Individualized)  10/28/2022 1839 by Ava Guzman RN  Outcome: Ongoing, Progressing  10/28/2022 1655 by Ava Guzman RN  Outcome: Ongoing, Progressing     Problem: Adult Inpatient Plan of Care  Goal: Absence of Hospital-Acquired Illness or Injury  10/28/2022 1839 by Ava Guzman RN  Outcome: Ongoing, Progressing  10/28/2022 1655 by Ava Guzman RN  Outcome: Ongoing, Progressing     Problem: Adult Inpatient Plan of Care  Goal: Optimal Comfort and Wellbeing  10/28/2022 1839 by Ava Guzman RN  Outcome: Ongoing, Progressing  10/28/2022 1655 by Ava Guzman RN  Outcome: Ongoing, Progressing     Problem: Skin Injury Risk Increased  Goal: Skin Health and Integrity  10/28/2022 1839 by Ava Guzman RN  Outcome: Ongoing, Progressing  10/28/2022 1655 by Ava Guzman RN  Outcome: Ongoing, Progressing     Problem: Diabetes Comorbidity  Goal: Blood Glucose Level Within  Targeted Range  10/28/2022 1839 by Ava Guzman RN  Outcome: Ongoing, Progressing  10/28/2022 1655 by Ava Guzman RN  Outcome: Ongoing, Progressing     Problem: Infection  Goal: Absence of Infection Signs and Symptoms  10/28/2022 1839 by Ava Guzman RN  Outcome: Ongoing, Progressing  10/28/2022 1655 by Ava Guzman RN  Outcome: Ongoing, Progressing     Problem: Impaired Wound Healing  Goal: Optimal Wound Healing  10/28/2022 1839 by Ava Guzman RN  Outcome: Ongoing, Progressing  10/28/2022 1655 by Ava Guzman RN  Outcome: Ongoing, Progressing     Problem: Device-Related Complication Risk (Hemodialysis)  Goal: Safe, Effective Therapy Delivery  10/28/2022 1839 by Ava Guzman RN  Outcome: Ongoing, Progressing  10/28/2022 1655 by Ava Guzman RN  Outcome: Ongoing, Progressing     Problem: Hemodynamic Instability (Hemodialysis)  Goal: Effective Tissue Perfusion  10/28/2022 1839 by Ava Guzman RN  Outcome: Ongoing, Progressing  10/28/2022 1655 by Ava Guzman RN  Outcome: Ongoing, Progressing     Problem: Bariatric Environmental Safety  Goal: Safety Maintained with Care  10/28/2022 1839 by Ava Guzman RN  Outcome: Ongoing, Progressing  10/28/2022 1655 by Ava Guzman RN  Outcome: Ongoing, Progressing     Problem: Pain Acute  Goal: Acceptable Pain Control and Functional Ability  Outcome: Ongoing, Progressing

## 2022-10-29 LAB
POCT GLUCOSE: 131 MG/DL (ref 70–110)
POCT GLUCOSE: 146 MG/DL (ref 70–110)
POCT GLUCOSE: 183 MG/DL (ref 70–110)
POCT GLUCOSE: 53 MG/DL (ref 70–110)
POCT GLUCOSE: 90 MG/DL (ref 70–110)

## 2022-10-29 PROCEDURE — 63600175 PHARM REV CODE 636 W HCPCS: Performed by: INTERNAL MEDICINE

## 2022-10-29 PROCEDURE — 25000003 PHARM REV CODE 250: Performed by: ORTHOPAEDIC SURGERY

## 2022-10-29 PROCEDURE — 25000003 PHARM REV CODE 250: Performed by: NURSE PRACTITIONER

## 2022-10-29 PROCEDURE — 21400001 HC TELEMETRY ROOM

## 2022-10-29 PROCEDURE — 25000003 PHARM REV CODE 250: Performed by: HOSPITALIST

## 2022-10-29 PROCEDURE — 25000003 PHARM REV CODE 250: Performed by: INTERNAL MEDICINE

## 2022-10-29 RX ORDER — HYDROMORPHONE HYDROCHLORIDE 2 MG/ML
2 INJECTION, SOLUTION INTRAMUSCULAR; INTRAVENOUS; SUBCUTANEOUS EVERY 6 HOURS PRN
Status: DISCONTINUED | OUTPATIENT
Start: 2022-10-29 | End: 2022-10-31

## 2022-10-29 RX ADMIN — HYDROMORPHONE HYDROCHLORIDE 2 MG: 2 INJECTION, SOLUTION INTRAMUSCULAR; INTRAVENOUS; SUBCUTANEOUS at 04:10

## 2022-10-29 RX ADMIN — HYDRALAZINE HYDROCHLORIDE 25 MG: 25 TABLET, FILM COATED ORAL at 05:10

## 2022-10-29 RX ADMIN — INSULIN DETEMIR 12 UNITS: 100 INJECTION, SOLUTION SUBCUTANEOUS at 10:10

## 2022-10-29 RX ADMIN — HYDROMORPHONE HYDROCHLORIDE 2 MG: 2 INJECTION, SOLUTION INTRAMUSCULAR; INTRAVENOUS; SUBCUTANEOUS at 10:10

## 2022-10-29 NOTE — PLAN OF CARE
Problem: Adult Inpatient Plan of Care  Goal: Plan of Care Review  Outcome: Ongoing, Progressing  Goal: Patient-Specific Goal (Individualized)  Outcome: Ongoing, Progressing  Goal: Absence of Hospital-Acquired Illness or Injury  Outcome: Ongoing, Progressing  Goal: Optimal Comfort and Wellbeing  Outcome: Ongoing, Progressing  Goal: Readiness for Transition of Care  Outcome: Ongoing, Progressing     Problem: Skin Injury Risk Increased  Goal: Skin Health and Integrity  Outcome: Ongoing, Progressing     Problem: Fall Injury Risk  Goal: Absence of Fall and Fall-Related Injury  Outcome: Ongoing, Progressing     Problem: Diabetes Comorbidity  Goal: Blood Glucose Level Within Targeted Range  Outcome: Ongoing, Progressing     Problem: Infection  Goal: Absence of Infection Signs and Symptoms  Outcome: Ongoing, Progressing     Problem: Impaired Wound Healing  Goal: Optimal Wound Healing  Outcome: Ongoing, Progressing     Problem: Device-Related Complication Risk (Hemodialysis)  Goal: Safe, Effective Therapy Delivery  Outcome: Ongoing, Progressing     Problem: Hemodynamic Instability (Hemodialysis)  Goal: Effective Tissue Perfusion  Outcome: Ongoing, Progressing     Problem: Infection (Hemodialysis)  Goal: Absence of Infection Signs and Symptoms  Outcome: Ongoing, Progressing     Problem: Bariatric Environmental Safety  Goal: Safety Maintained with Care  Outcome: Ongoing, Progressing  Patient remained free from falls and injuries throughout shift. Complained of pain and medicated as ordered. NAD. Call light within reach. Safety precautions. Will continue to monitor

## 2022-10-29 NOTE — SUBJECTIVE & OBJECTIVE
Interval History: No new issues     Review of Systems   Constitutional:  Negative for activity change, appetite change and chills.   HENT:  Negative for congestion.    Eyes:  Negative for discharge.   Respiratory:  Negative for apnea and chest tightness.    Cardiovascular:  Negative for chest pain.   Gastrointestinal:  Negative for abdominal distention.   Endocrine: Negative for cold intolerance.   Genitourinary:  Negative for difficulty urinating and dysuria.   Musculoskeletal:  Negative for arthralgias and back pain.   Neurological:  Negative for dizziness and facial asymmetry.   Psychiatric/Behavioral:  Negative for agitation and behavioral problems.    Objective:     Vital Signs (Most Recent):  Temp: 98 °F (36.7 °C) (10/29/22 0319)  Pulse: 84 (10/29/22 0319)  Resp: 18 (10/29/22 0319)  BP: (!) 160/70 (10/29/22 0319)  SpO2: 98 % (10/29/22 0319)   Vital Signs (24h Range):  Temp:  [97.9 °F (36.6 °C)-98.9 °F (37.2 °C)] 98 °F (36.7 °C)  Pulse:  [72-86] 84  Resp:  [17-22] 18  SpO2:  [97 %-100 %] 98 %  BP: (119-160)/(63-79) 160/70     Weight: 60.7 kg (133 lb 13.1 oz)  Body mass index is 18.66 kg/m².    Intake/Output Summary (Last 24 hours) at 10/29/2022 0623  Last data filed at 10/29/2022 0000  Gross per 24 hour   Intake 1390 ml   Output 1500 ml   Net -110 ml      Physical Exam  Constitutional:       General: He is not in acute distress.     Appearance: Normal appearance. He is not ill-appearing, toxic-appearing or diaphoretic.   HENT:      Head: Normocephalic and atraumatic.   Eyes:      Conjunctiva/sclera: Conjunctivae normal.   Cardiovascular:      Rate and Rhythm: Normal rate and regular rhythm.   Pulmonary:      Effort: Pulmonary effort is normal.      Breath sounds: Normal breath sounds.   Skin:     General: Skin is warm and dry.   Neurological:      Mental Status: He is alert and oriented to person, place, and time.   Psychiatric:         Behavior: Behavior normal.         Thought Content: Thought content normal.        Significant Labs: All pertinent labs within the past 24 hours have been reviewed.  BMP: No results for input(s): GLU, NA, K, CL, CO2, BUN, CREATININE, CALCIUM, MG in the last 48 hours.  CBC: No results for input(s): WBC, HGB, HCT, PLT in the last 48 hours.    Significant Imaging:

## 2022-10-29 NOTE — NURSING
PER handoff given to KIAH Whaley     Pt resting in bed quietly. NAD noted.  Fall and safety precautions maintained. Bed alarm activated and audible.. Bed locked in lowest position, with side rails up x2. Call bell and personal items within reach

## 2022-10-29 NOTE — PROGRESS NOTES
[facility] Medicine  Progress Note    Patient Name: Adryan Neff  MRN: [id number]  Patient Class: IP- Inpatient   Admission Date: 10/6/2022  Length of Stay: 23 days  Attending Physician: Nikolay Sawyer MD  Primary Care Provider: Carola Pedro MD        Subjective:     Principal Problem:PAD (peripheral artery disease)        HPI:  This is a 56 year old male with a PMHx of severe PAD s/p gangrene of left foot s/p left AKA (1/2022), ESRD on HD MWF, NICM (EF: 55%, GIDD), pulmonary hypertension, GERD and latent TB filiberto presents for concern for leg infection.       The patient presented to the vascular surgery clinic for a follow up. He reports worsening pain and developing eschar of the right dorsal foot along with foul smelling odor. Given concern for an infection and the need for IV antibiotics & expedited podiatry evaluation, the patient was sent to the ER. The patient denies having any fevers, chills, or injury to his foot. He has a previous AKA and uses a wheelchair to ambulate. While in the ED, the patient was hypertensive but otherwise hemodynamically stable. Labs showed leukocytosis (17.41 - with neutrophilic predominance), normocytic anemia (9.7), elevated sed rate (125), elevated CRP (237.2), negative lactate (1.6). X-ray foot showed no radiographic evidence of osteomyelitis. He was administered vancomycin, zosyn and was admitted for further management.     I attempted to call the daughter to obtain further history on 1902 without a response.         Overview/Hospital Course:  Mr Adryan Neff is a 56 y.o. man with PAD. He is s/p L AKA due to gangrene and now presents with worsening R foot infection. He also has ESRD on HD via LUE AVG. Started antibiotics. Podiatry and Ortho consulted with plans for amputation. This was delayed due to PEA on 10/10 with quick ROSC. Unclear etiology but suspect vasovagal event. Cardiology was consulted. He then had R AKA on 10/13/22. Antibiotics were  discontinued. He has had malfunctioning of his LUE AVG; Vascular was consulted and fistulogram performed.  SW/CM consulted for discharge planning.  Working on NH placement.  Some persistent bleeding from right stump.  Hgb of 6.3 on 10/21 and transfused one unit of blood. Another unit of blood transfused with HD on 10/24. PT/OT evaluated and now rec: SNF      Interval History: No new issues     Review of Systems   Constitutional:  Negative for activity change, appetite change and chills.   HENT:  Negative for congestion.    Eyes:  Negative for discharge.   Respiratory:  Negative for apnea and chest tightness.    Cardiovascular:  Negative for chest pain.   Gastrointestinal:  Negative for abdominal distention.   Endocrine: Negative for cold intolerance.   Genitourinary:  Negative for difficulty urinating and dysuria.   Musculoskeletal:  Negative for arthralgias and back pain.   Neurological:  Negative for dizziness and facial asymmetry.   Psychiatric/Behavioral:  Negative for agitation and behavioral problems.    Objective:     Vital Signs (Most Recent):  Temp: 98 °F (36.7 °C) (10/29/22 0319)  Pulse: 84 (10/29/22 0319)  Resp: 18 (10/29/22 0319)  BP: (!) 160/70 (10/29/22 0319)  SpO2: 98 % (10/29/22 0319)   Vital Signs (24h Range):  Temp:  [97.9 °F (36.6 °C)-98.9 °F (37.2 °C)] 98 °F (36.7 °C)  Pulse:  [72-86] 84  Resp:  [17-22] 18  SpO2:  [97 %-100 %] 98 %  BP: (119-160)/(63-79) 160/70     Weight: 60.7 kg (133 lb 13.1 oz)  Body mass index is 18.66 kg/m².    Intake/Output Summary (Last 24 hours) at 10/29/2022 0623  Last data filed at 10/29/2022 0000  Gross per 24 hour   Intake 1390 ml   Output 1500 ml   Net -110 ml      Physical Exam  Constitutional:       General: He is not in acute distress.     Appearance: Normal appearance. He is not ill-appearing, toxic-appearing or diaphoretic.   HENT:      Head: Normocephalic and atraumatic.   Eyes:      Conjunctiva/sclera: Conjunctivae normal.   Cardiovascular:      Rate and  Rhythm: Normal rate and regular rhythm.   Pulmonary:      Effort: Pulmonary effort is normal.      Breath sounds: Normal breath sounds.   Skin:     General: Skin is warm and dry.   Neurological:      Mental Status: He is alert and oriented to person, place, and time.   Psychiatric:         Behavior: Behavior normal.         Thought Content: Thought content normal.       Significant Labs: All pertinent labs within the past 24 hours have been reviewed.  BMP: No results for input(s): GLU, NA, K, CL, CO2, BUN, CREATININE, CALCIUM, MG in the last 48 hours.  CBC: No results for input(s): WBC, HGB, HCT, PLT in the last 48 hours.    Significant Imaging:       Assessment/Plan:      * PAD (peripheral artery disease)  S/p L AKA then presented with PAD causing R foot wound. Now s/p R AKA on 10/13  - previously on antibiotics. These were discontinued when source control achieved.   - continue asa, statin   Pending SNF placement.    Leukocytosis  Persistent leukocytosis over past couple of weeks, but afebrile with no source of infection.  Monitor.  Trending down    AV graft malfunction  Vascular following.  S/p fistulogram      Cognitive changes  Noted by Psychiatry that he has had multiple episodes of confusion and not understanding treatment plan. Unclear if this is delirium associated or beginnings of cognitive dysfunction. Recommend outpatient evaluation.       Anemia due to chronic kidney disease, on chronic dialysis  - Anemic - has been transfused this admission pre and post surgery  - Continue EPO per Nephrology  With anemia of acute blood loss from bleeding from right stump.  Transfused another unit of blod on 10/21 with good correction.  Will transfuse another unit today with HD.        ESRD on hemodialysis  Dialyzes via LUE AVG  There is concern about AVG malfunction. Vascular surgery consulted  S/p fistulogram.  Nephrology following       Hyperkalemia  Treat with HD      CAD (coronary artery disease) - non-obstructive  from [facility] in 2016  - Resume aspirin, statin       Chronic diastolic heart failure  - Echocardiogram (11/24/2021): EF: 55%, GIDD, mild TR, moderate to severe MR  - no signs of acute exacerbation  - Continue home medications     Gastroesophageal reflux disease  - Coninue PPI     Pure hypercholesterolemia  - Continue statin     Essential (primary) hypertension  - Continue home medications    Controlled type 2 diabetes mellitus with chronic kidney disease on chronic dialysis, with long-term current use of insulin  - Last A1c:   Lab Results   Component Value Date    HGBA1C 6.1 (H) 01/07/2021     Continue current management.  - Low-sensitivity sliding scale insulin  - Initiate and maintain glycemic protocol, monitor POC glucose   - Follow up with PCP as outpatient      VTE Risk Mitigation (From admission, onward)         Ordered     IP VTE HIGH RISK PATIENT  Once         10/06/22 1819     Place sequential compression device  Until discontinued         10/06/22 1819                Discharge Planning   ENIO:      Code Status: Full Code   Is the patient medically ready for discharge?:     Reason for patient still in hospital (select all that apply)  Discharge Plan A: New Nursing Home placement - retirement care facility   Discharge Delays: (!) Post-Acute Set-up        Awaiting SNF placement       Nikolay Burch MD  Department of Hospital Medicine   [facility] - Telemetry

## 2022-10-29 NOTE — PLAN OF CARE
Patient appears to be asleep with eyes closed, appears to be free of pain will continue to monitor patient.

## 2022-10-29 NOTE — NURSING
PER handoff received from KLAUDIA Blood     Pt resting in bed quietly. NAD noted. Fall and safety precautions maintained. Bed alarm activated and audible.. Bed locked in lowest position, with side rails up x2. Call bell and personal items within reach

## 2022-10-30 LAB
POCT GLUCOSE: 130 MG/DL (ref 70–110)
POCT GLUCOSE: 206 MG/DL (ref 70–110)
POCT GLUCOSE: 88 MG/DL (ref 70–110)

## 2022-10-30 PROCEDURE — 25000003 PHARM REV CODE 250: Performed by: INTERNAL MEDICINE

## 2022-10-30 PROCEDURE — 25000003 PHARM REV CODE 250: Performed by: ORTHOPAEDIC SURGERY

## 2022-10-30 PROCEDURE — 63600175 PHARM REV CODE 636 W HCPCS: Performed by: INTERNAL MEDICINE

## 2022-10-30 PROCEDURE — 97542 WHEELCHAIR MNGMENT TRAINING: CPT | Mod: CQ

## 2022-10-30 PROCEDURE — 25000003 PHARM REV CODE 250: Performed by: HOSPITALIST

## 2022-10-30 PROCEDURE — 21400001 HC TELEMETRY ROOM

## 2022-10-30 PROCEDURE — 97530 THERAPEUTIC ACTIVITIES: CPT | Mod: CQ

## 2022-10-30 RX ORDER — HYDRALAZINE HYDROCHLORIDE 25 MG/1
50 TABLET, FILM COATED ORAL EVERY 8 HOURS
Status: DISCONTINUED | OUTPATIENT
Start: 2022-10-30 | End: 2022-11-04

## 2022-10-30 RX ADMIN — HYDROMORPHONE HYDROCHLORIDE 2 MG: 2 INJECTION, SOLUTION INTRAMUSCULAR; INTRAVENOUS; SUBCUTANEOUS at 08:10

## 2022-10-30 RX ADMIN — ASPIRIN 81 MG CHEWABLE TABLET 81 MG: 81 TABLET CHEWABLE at 09:10

## 2022-10-30 RX ADMIN — AMLODIPINE BESYLATE 10 MG: 5 TABLET ORAL at 09:10

## 2022-10-30 RX ADMIN — HYDRALAZINE HYDROCHLORIDE 50 MG: 25 TABLET, FILM COATED ORAL at 01:10

## 2022-10-30 RX ADMIN — INSULIN ASPART 2 UNITS: 100 INJECTION, SOLUTION INTRAVENOUS; SUBCUTANEOUS at 05:10

## 2022-10-30 RX ADMIN — METOPROLOL SUCCINATE 25 MG: 25 TABLET, EXTENDED RELEASE ORAL at 09:10

## 2022-10-30 RX ADMIN — SEVELAMER CARBONATE 2400 MG: 800 TABLET, FILM COATED ORAL at 05:10

## 2022-10-30 RX ADMIN — HYDRALAZINE HYDROCHLORIDE 25 MG: 25 TABLET, FILM COATED ORAL at 05:10

## 2022-10-30 RX ADMIN — HYDROMORPHONE HYDROCHLORIDE 2 MG: 2 INJECTION, SOLUTION INTRAMUSCULAR; INTRAVENOUS; SUBCUTANEOUS at 09:10

## 2022-10-30 RX ADMIN — INSULIN DETEMIR 12 UNITS: 100 INJECTION, SOLUTION SUBCUTANEOUS at 08:10

## 2022-10-30 NOTE — PLAN OF CARE
Problem: Adult Inpatient Plan of Care  Goal: Plan of Care Review  Outcome: Ongoing, Progressing  Goal: Patient-Specific Goal (Individualized)  Outcome: Ongoing, Progressing  Goal: Absence of Hospital-Acquired Illness or Injury  Outcome: Ongoing, Progressing  Goal: Optimal Comfort and Wellbeing  Outcome: Ongoing, Progressing  Goal: Readiness for Transition of Care  Outcome: Ongoing, Progressing     Problem: Skin Injury Risk Increased  Goal: Skin Health and Integrity  Outcome: Ongoing, Progressing     Problem: Fall Injury Risk  Goal: Absence of Fall and Fall-Related Injury  Outcome: Ongoing, Progressing     Problem: Diabetes Comorbidity  Goal: Blood Glucose Level Within Targeted Range  Outcome: Ongoing, Progressing     Problem: Infection  Goal: Absence of Infection Signs and Symptoms  Outcome: Ongoing, Progressing     Problem: Impaired Wound Healing  Goal: Optimal Wound Healing  Outcome: Ongoing, Progressing     Problem: Device-Related Complication Risk (Hemodialysis)  Goal: Safe, Effective Therapy Delivery  Outcome: Ongoing, Progressing

## 2022-10-30 NOTE — PROGRESS NOTES
[facility] Medicine  Progress Note    Patient Name: Adryan Neff  MRN: [id number]  Patient Class: IP- Inpatient   Admission Date: 10/6/2022  Length of Stay: 24 days  Attending Physician: Nikolay Sawyer MD  Primary Care Provider: Carola Pedro MD        Subjective:     Principal Problem:PAD (peripheral artery disease)        HPI:  This is a 56 year old male with a PMHx of severe PAD s/p gangrene of left foot s/p left AKA (1/2022), ESRD on HD MWF, NICM (EF: 55%, GIDD), pulmonary hypertension, GERD and latent TB filiberto presents for concern for leg infection.       The patient presented to the vascular surgery clinic for a follow up. He reports worsening pain and developing eschar of the right dorsal foot along with foul smelling odor. Given concern for an infection and the need for IV antibiotics & expedited podiatry evaluation, the patient was sent to the ER. The patient denies having any fevers, chills, or injury to his foot. He has a previous AKA and uses a wheelchair to ambulate. While in the ED, the patient was hypertensive but otherwise hemodynamically stable. Labs showed leukocytosis (17.41 - with neutrophilic predominance), normocytic anemia (9.7), elevated sed rate (125), elevated CRP (237.2), negative lactate (1.6). X-ray foot showed no radiographic evidence of osteomyelitis. He was administered vancomycin, zosyn and was admitted for further management.     I attempted to call the daughter to obtain further history on 1902 without a response.         Overview/Hospital Course:  Mr Adryan Neff is a 56 y.o. man with PAD. He is s/p L AKA due to gangrene and now presents with worsening R foot infection. He also has ESRD on HD via LUE AVG. Started antibiotics. Podiatry and Ortho consulted with plans for amputation. This was delayed due to PEA on 10/10 with quick ROSC. Unclear etiology but suspect vasovagal event. Cardiology was consulted. He then had R AKA on 10/13/22. Antibiotics were  discontinued. He has had malfunctioning of his LUE AVG; Vascular was consulted and fistulogram performed.  SW/CM consulted for discharge planning.  Working on NH placement.  Some persistent bleeding from right stump.  Hgb of 6.3 on 10/21 and transfused one unit of blood. Another unit of blood transfused with HD on 10/24. PT/OT evaluated and now rec: SNF      Interval History: No new issues     Review of Systems   Constitutional:  Negative for activity change, appetite change and chills.   HENT:  Negative for congestion.    Eyes:  Negative for discharge.   Respiratory:  Negative for apnea and chest tightness.    Cardiovascular:  Negative for chest pain.   Gastrointestinal:  Negative for abdominal distention.   Endocrine: Negative for cold intolerance.   Genitourinary:  Negative for difficulty urinating and dysuria.   Musculoskeletal:  Negative for arthralgias and back pain.   Neurological:  Negative for dizziness and facial asymmetry.   Psychiatric/Behavioral:  Negative for agitation and behavioral problems.    Objective:     Vital Signs (Most Recent):  Temp: 98 °F (36.7 °C) (10/30/22 0758)  Pulse: 86 (10/30/22 0758)  Resp: 18 (10/30/22 0758)  BP: (!) 170/73 (10/30/22 0758)  SpO2: 99 % (10/30/22 0758)   Vital Signs (24h Range):  Temp:  [97.1 °F (36.2 °C)-98.6 °F (37 °C)] 98 °F (36.7 °C)  Pulse:  [83-93] 86  Resp:  [17-20] 18  SpO2:  [96 %-99 %] 99 %  BP: (135-177)/(65-79) 170/73     Weight: 60.7 kg (133 lb 13.1 oz)  Body mass index is 18.66 kg/m².    Intake/Output Summary (Last 24 hours) at 10/30/2022 0914  Last data filed at 10/29/2022 1420  Gross per 24 hour   Intake 120 ml   Output --   Net 120 ml      Physical Exam  Constitutional:       General: He is not in acute distress.     Appearance: Normal appearance. He is not ill-appearing, toxic-appearing or diaphoretic.   HENT:      Head: Normocephalic and atraumatic.   Eyes:      Conjunctiva/sclera: Conjunctivae normal.   Cardiovascular:      Rate and Rhythm: Normal  rate and regular rhythm.   Pulmonary:      Effort: Pulmonary effort is normal.      Breath sounds: Normal breath sounds.   Skin:     General: Skin is warm and dry.   Neurological:      Mental Status: He is alert and oriented to person, place, and time.   Psychiatric:         Behavior: Behavior normal.         Thought Content: Thought content normal.       Significant Labs: All pertinent labs within the past 24 hours have been reviewed.  BMP: No results for input(s): GLU, NA, K, CL, CO2, BUN, CREATININE, CALCIUM, MG in the last 48 hours.  CBC: No results for input(s): WBC, HGB, HCT, PLT in the last 48 hours.    Significant Imaging:       Assessment/Plan:      * PAD (peripheral artery disease)  S/p L AKA then presented with PAD causing R foot wound. Now s/p R AKA on 10/13  - previously on antibiotics. These were discontinued when source control achieved.   - continue asa, statin   Pending SNF placement.    Leukocytosis  Persistent leukocytosis over past couple of weeks, but afebrile with no source of infection.  Monitor.  Trending down    AV graft malfunction  Vascular following.  S/p fistulogram      Cognitive changes  Noted by Psychiatry that he has had multiple episodes of confusion and not understanding treatment plan. Unclear if this is delirium associated or beginnings of cognitive dysfunction. Recommend outpatient evaluation.       Anemia due to chronic kidney disease, on chronic dialysis  - Anemic - has been transfused this admission pre and post surgery  - Continue EPO per Nephrology  With anemia of acute blood loss from bleeding from right stump.  Transfused another unit of blod on 10/21 with good correction.  Will transfuse another unit today with HD.        ESRD on hemodialysis  Dialyzes via LUE AVG  There is concern about AVG malfunction. Vascular surgery consulted  S/p fistulogram.  Nephrology following       Hyperkalemia  Treat with HD      CAD (coronary artery disease) - non-obstructive from [facility] in  2016  - Resume aspirin, statin       Chronic diastolic heart failure  - Echocardiogram (11/24/2021): EF: 55%, GIDD, mild TR, moderate to severe MR  - no signs of acute exacerbation  - Continue home medications     Gastroesophageal reflux disease  - Coninue PPI     Pure hypercholesterolemia  - Continue statin     Essential (primary) hypertension  - Continue home medications    Controlled type 2 diabetes mellitus with chronic kidney disease on chronic dialysis, with long-term current use of insulin  - Last A1c:   Lab Results   Component Value Date    HGBA1C 6.1 (H) 01/07/2021     Continue current management.  - Low-sensitivity sliding scale insulin  - Initiate and maintain glycemic protocol, monitor POC glucose   - Follow up with PCP as outpatient      VTE Risk Mitigation (From admission, onward)         Ordered     IP VTE HIGH RISK PATIENT  Once         10/06/22 1819     Place sequential compression device  Until discontinued         10/06/22 1819                Discharge Planning   ENIO:      Code Status: Full Code   Is the patient medically ready for discharge?:     Reason for patient still in hospital (select all that appl  Discharge Plan A: [facility]   Discharge Delays: (!) Post-Acute Set-up      Pending SNF        Nikolay Burch MD  Department of Hospital Medicine   [facility] - Telemetry

## 2022-10-30 NOTE — PT/OT/SLP PROGRESS
Physical Therapy Treatment    Patient Name:  Adryan Neff   MRN:  [id number]    Recommendations:     Discharge Recommendations:  nursing facility, skilled   Discharge Equipment Recommendations: other (see comments) (TBD)       Assessment:     Adryan Neff is a 56 y.o. male admitted with a medical diagnosis of PAD (peripheral artery disease).  He presents with the following impairments/functional limitations:  weakness, impaired endurance, decreased ROM, impaired joint extensibility, decreased coordination, orthopedic precautions, decreased lower extremity function, impaired functional mobility, impaired self care skills, gait instability, impaired balance, decreased safety awareness.    Rehab Prognosis: Good; patient would benefit from acute skilled PT services to address these deficits and reach maximum level of function.    Recent Surgery: Procedure(s) (LRB):  Fistulogram (Left) 10 Days Post-Op    Plan:     During this hospitalization, patient to be seen 5 x/week to address the identified rehab impairments via therapeutic activities, therapeutic exercises, wheelchair management/training and progress toward the following goals:    Plan of Care Expires:  11/10/22    Subjective     Chief Complaint: Pt with no complaints or concerns at this time.   Patient/Family Comments/goals: Pt agreeable to PT treatment.   Pain/Comfort:  Pain Rating 1: 0/10      Objective:     Patient found HOB elevated with peripheral IV, telemetry upon PT entry to room.     General Precautions: Standard, fall   Orthopedic Precautions:RLE non weight bearing   Braces: N/A  Respiratory Status: Room air     Functional Mobility:  Bed Mobility:     Supine to Sit: contact guard assistance  Sit to Supine: contact guard assistance  Transfers:     Bed to Wheelchair: contact guard assistance and heavy cueing on proper movement sequencing with  no AD  using  Scoot Pivot.  Wheelchair to bed: Mod assist, requiring heavy cueing on movement sequencing using scoot  pivot and no AD.  Balance: Pt with fair + sitting balance.   Wheelchair Propulsion:  Pt propelled Standard wheelchair x 75 feet on Level tile with  Bilateral upper extremity with Stand-by Assistance.       AM-PAC 6 CLICK MOBILITY  Turning over in bed (including adjusting bedclothes, sheets and blankets)?: 3  Sitting down on and standing up from a chair with arms (e.g., wheelchair, bedside commode, etc.): 1  Moving from lying on back to sitting on the side of the bed?: 3  Moving to and from a bed to a chair (including a wheelchair)?: 2  Need to walk in hospital room?: 1  Climbing 3-5 steps with a railing?: 1  Basic Mobility Total Score: 11       Treatment & Education:      Patient left HOB elevated with all lines intact, call button in reach, and bed alarm on..    GOALS:   Multidisciplinary Problems       Physical Therapy Goals          Problem: Physical Therapy    Goal Priority Disciplines Outcome Goal Variances Interventions   Physical Therapy Goal     PT, PT/OT Ongoing, Progressing     Description: Goals to be met by: 11/10/22     Patient will increase functional independence with mobility by performin. Pt to be supervision with bed mobility.  2. Pt to transfer with SBA.  3. Pt to be (I) with wheelchair mobility 150'.                         Time Tracking:     PT Received On: 10/30/22  PT Start Time: 1026     PT Stop Time: 1056  PT Total Time (min): 30 min     Billable Minutes: Therapeutic Activity 15 and Train/Wheelchair Management 15.    Treatment Type: Treatment  PT/PTA: PTA     PTA Visit Number: 4     10/30/2022

## 2022-10-30 NOTE — SUBJECTIVE & OBJECTIVE
Interval History: No new issues     Review of Systems   Constitutional:  Negative for activity change, appetite change and chills.   HENT:  Negative for congestion.    Eyes:  Negative for discharge.   Respiratory:  Negative for apnea and chest tightness.    Cardiovascular:  Negative for chest pain.   Gastrointestinal:  Negative for abdominal distention.   Endocrine: Negative for cold intolerance.   Genitourinary:  Negative for difficulty urinating and dysuria.   Musculoskeletal:  Negative for arthralgias and back pain.   Neurological:  Negative for dizziness and facial asymmetry.   Psychiatric/Behavioral:  Negative for agitation and behavioral problems.    Objective:     Vital Signs (Most Recent):  Temp: 98 °F (36.7 °C) (10/30/22 0758)  Pulse: 86 (10/30/22 0758)  Resp: 18 (10/30/22 0758)  BP: (!) 170/73 (10/30/22 0758)  SpO2: 99 % (10/30/22 0758)   Vital Signs (24h Range):  Temp:  [97.1 °F (36.2 °C)-98.6 °F (37 °C)] 98 °F (36.7 °C)  Pulse:  [83-93] 86  Resp:  [17-20] 18  SpO2:  [96 %-99 %] 99 %  BP: (135-177)/(65-79) 170/73     Weight: 60.7 kg (133 lb 13.1 oz)  Body mass index is 18.66 kg/m².    Intake/Output Summary (Last 24 hours) at 10/30/2022 0914  Last data filed at 10/29/2022 1420  Gross per 24 hour   Intake 120 ml   Output --   Net 120 ml      Physical Exam  Constitutional:       General: He is not in acute distress.     Appearance: Normal appearance. He is not ill-appearing, toxic-appearing or diaphoretic.   HENT:      Head: Normocephalic and atraumatic.   Eyes:      Conjunctiva/sclera: Conjunctivae normal.   Cardiovascular:      Rate and Rhythm: Normal rate and regular rhythm.   Pulmonary:      Effort: Pulmonary effort is normal.      Breath sounds: Normal breath sounds.   Skin:     General: Skin is warm and dry.   Neurological:      Mental Status: He is alert and oriented to person, place, and time.   Psychiatric:         Behavior: Behavior normal.         Thought Content: Thought content normal.        Significant Labs: All pertinent labs within the past 24 hours have been reviewed.  BMP: No results for input(s): GLU, NA, K, CL, CO2, BUN, CREATININE, CALCIUM, MG in the last 48 hours.  CBC: No results for input(s): WBC, HGB, HCT, PLT in the last 48 hours.    Significant Imaging:

## 2022-10-30 NOTE — NURSING
PER handoff received from KIAH Whaley     Pt resting in bed quietly. NAD noted. No c/o pain.  Fall and safety precautions maintained. Bed alarm activated and audible.. Bed locked in lowest position, with side rails up x2. Call bell and personal items within reach

## 2022-10-30 NOTE — PROGRESS NOTES
Patient in bed c/o a HA. He is a little over two weeks s/p right AKA.  VSSAF    Right AKA stump: Staples intact. Surgical incision intact. No bleeding, no signs of infection around surgical site.     A/P: s/p right AKA  1) new dressing ordered  2) ok from ortho standpoint for SNF when placement available  3) will need to f/u with Dr Davis in clinic in 2 weeks for wound check and staple removal

## 2022-10-31 PROBLEM — R11.10 INTRACTABLE VOMITING: Status: RESOLVED | Noted: 2020-10-26 | Resolved: 2022-10-31

## 2022-10-31 LAB
PHOSPHATE SERPL-MCNC: 4.7 MG/DL (ref 2.7–4.5)
POCT GLUCOSE: 132 MG/DL (ref 70–110)
POCT GLUCOSE: 153 MG/DL (ref 70–110)
POCT GLUCOSE: 181 MG/DL (ref 70–110)
POCT GLUCOSE: 237 MG/DL (ref 70–110)
POCT GLUCOSE: 47 MG/DL (ref 70–110)

## 2022-10-31 PROCEDURE — 25000003 PHARM REV CODE 250: Performed by: ORTHOPAEDIC SURGERY

## 2022-10-31 PROCEDURE — 84100 ASSAY OF PHOSPHORUS: CPT | Performed by: INTERNAL MEDICINE

## 2022-10-31 PROCEDURE — 25000003 PHARM REV CODE 250: Performed by: INTERNAL MEDICINE

## 2022-10-31 PROCEDURE — 21400001 HC TELEMETRY ROOM

## 2022-10-31 PROCEDURE — 25000003 PHARM REV CODE 250: Performed by: HOSPITALIST

## 2022-10-31 PROCEDURE — 63600175 PHARM REV CODE 636 W HCPCS: Mod: JG | Performed by: ORTHOPAEDIC SURGERY

## 2022-10-31 PROCEDURE — 63600175 PHARM REV CODE 636 W HCPCS: Performed by: INTERNAL MEDICINE

## 2022-10-31 PROCEDURE — 36415 COLL VENOUS BLD VENIPUNCTURE: CPT | Performed by: INTERNAL MEDICINE

## 2022-10-31 PROCEDURE — 80100016 HC MAINTENANCE HEMODIALYSIS

## 2022-10-31 RX ORDER — HYDROCODONE BITARTRATE AND ACETAMINOPHEN 5; 325 MG/1; MG/1
1 TABLET ORAL EVERY 6 HOURS PRN
Status: DISCONTINUED | OUTPATIENT
Start: 2022-10-31 | End: 2022-11-10 | Stop reason: HOSPADM

## 2022-10-31 RX ADMIN — PANTOPRAZOLE SODIUM 40 MG: 40 TABLET, DELAYED RELEASE ORAL at 08:10

## 2022-10-31 RX ADMIN — Medication 1 TABLET: at 08:10

## 2022-10-31 RX ADMIN — EPOETIN ALFA-EPBX 10000 UNITS: 10000 INJECTION, SOLUTION INTRAVENOUS; SUBCUTANEOUS at 12:10

## 2022-10-31 RX ADMIN — ASPIRIN 81 MG CHEWABLE TABLET 81 MG: 81 TABLET CHEWABLE at 08:10

## 2022-10-31 RX ADMIN — OXYCODONE HYDROCHLORIDE AND ACETAMINOPHEN 1000 MG: 500 TABLET ORAL at 08:10

## 2022-10-31 RX ADMIN — HYDROMORPHONE HYDROCHLORIDE 2 MG: 2 INJECTION, SOLUTION INTRAMUSCULAR; INTRAVENOUS; SUBCUTANEOUS at 08:10

## 2022-10-31 NOTE — PROGRESS NOTES
[facility] Medicine  Progress Note    Patient Name: Adryan Neff  MRN: [id number]  Patient Class: IP- Inpatient   Admission Date: 10/6/2022  Length of Stay: 25 days  Attending Physician: Marta Black MD  Primary Care Provider: Carola Pedro MD        Subjective:     Principal Problem:PAD (peripheral artery disease)        HPI:  This is a 56 year old male with a PMHx of severe PAD s/p gangrene of left foot s/p left AKA (1/2022), ESRD on HD MWF, NICM (EF: 55%, GIDD), pulmonary hypertension, GERD and latent TB filiberto presents for concern for leg infection.       The patient presented to the vascular surgery clinic for a follow up. He reports worsening pain and developing eschar of the right dorsal foot along with foul smelling odor. Given concern for an infection and the need for IV antibiotics & expedited podiatry evaluation, the patient was sent to the ER. The patient denies having any fevers, chills, or injury to his foot. He has a previous AKA and uses a wheelchair to ambulate. While in the ED, the patient was hypertensive but otherwise hemodynamically stable. Labs showed leukocytosis (17.41 - with neutrophilic predominance), normocytic anemia (9.7), elevated sed rate (125), elevated CRP (237.2), negative lactate (1.6). X-ray foot showed no radiographic evidence of osteomyelitis. He was administered vancomycin, zosyn and was admitted for further management.     I attempted to call the daughter to obtain further history on 1902 without a response.         Overview/Hospital Course:  Mr Adryan Neff is a 56 y.o. man with PAD. He is s/p L AKA due to gangrene and now presents with worsening R foot infection. He also has ESRD on HD via LUE AVG. Started antibiotics. Podiatry and Ortho consulted with plans for amputation. This was delayed due to PEA on 10/10 with quick ROSC. Unclear etiology but suspect vasovagal event. Cardiology was consulted. He then had R AKA on 10/13/22. Antibiotics were  discontinued. He has had malfunctioning of his LUE AVG; Vascular was consulted and fistulogram performed.  SW/CM consulted for discharge planning.  He required blood transfusions for blood loss anemia from R AKA surgical site. Now stable and working on SNF placement.       Interval History: No complaints. Denies pain. Seen while on dialysis.     Review of Systems   Constitutional:  Negative for chills and fever.   Eyes:  Positive for discharge.   Respiratory:  Negative for shortness of breath.    Cardiovascular:  Negative for chest pain.   Gastrointestinal:  Negative for abdominal pain.   Musculoskeletal:  Negative for arthralgias and myalgias.   Neurological:  Negative for weakness and numbness.   Psychiatric/Behavioral:  Negative for confusion.    Objective:     Vital Signs (Most Recent):  Temp: 98.4 °F (36.9 °C) (10/31/22 0734)  Pulse: 87 (10/31/22 0734)  Resp: 18 (10/31/22 0833)  BP: (!) 144/64 (miss Harvey) (10/31/22 0734)  SpO2: 99 % (10/31/22 0734)   Vital Signs (24h Range):  Temp:  [97.9 °F (36.6 °C)-98.9 °F (37.2 °C)] 98.4 °F (36.9 °C)  Pulse:  [76-87] 87  Resp:  [18-20] 18  SpO2:  [97 %-100 %] 99 %  BP: (132-159)/(60-72) 144/64     Weight: 60.3 kg (132 lb 15 oz)  Body mass index is 18.54 kg/m².    Intake/Output Summary (Last 24 hours) at 10/31/2022 1245  Last data filed at 10/31/2022 0830  Gross per 24 hour   Intake 840 ml   Output 2 ml   Net 838 ml      Physical Exam  Vitals and nursing note reviewed.   Constitutional:       General: He is not in acute distress.     Appearance: He is not ill-appearing or toxic-appearing.   HENT:      Head: Normocephalic and atraumatic.      Nose: Nose normal.      Mouth/Throat:      Mouth: Mucous membranes are moist.   Eyes:      General:         Right eye: Discharge present.         Left eye: Discharge present.  Cardiovascular:      Rate and Rhythm: Normal rate and regular rhythm.      Heart sounds: Normal heart sounds. No murmur heard.    No gallop.   Pulmonary:       Effort: Pulmonary effort is normal. No respiratory distress.      Breath sounds: Normal breath sounds. No wheezing or rales.      Comments: Room air  Abdominal:      General: Bowel sounds are normal. There is no distension.      Palpations: Abdomen is soft.      Tenderness: There is no abdominal tenderness. There is no guarding.   Musculoskeletal:      Comments: Bilateral AKA   Skin:     General: Skin is warm and dry.   Neurological:      Mental Status: He is alert. Mental status is at baseline.       Significant Labs: All pertinent labs within the past 24 hours have been reviewed.    Significant Imaging: I have reviewed all pertinent imaging results/findings within the past 24 hours.      Assessment/Plan:      * PAD (peripheral artery disease)  S/p L AKA then presented with PAD causing R foot wound. Now s/p R AKA on 10/13  - previously on antibiotics. These were discontinued when source control achieved.   - continue asa, statin  - Pending SNF placement.    Leukocytosis  Persistent leukocytosis over past couple of weeks, but afebrile with no source of infection.  Monitor.  Trending down    AV graft malfunction  Vascular following.  S/p fistulogram      Cognitive changes  Noted by Psychiatry that he has had multiple episodes of confusion and not understanding treatment plan. Unclear if this is delirium associated or beginnings of cognitive dysfunction. Confusion has resolved. Recommend outpatient evaluation.       Anemia due to chronic kidney disease, on chronic dialysis  - Anemic - has been transfused this admission pre and post surgery  - Continue EPO per Nephrology  - With anemia of acute blood loss from bleeding from right stump.       ESRD on hemodialysis  Dialyzes via LUE AVG  There is concern about AVG malfunction. Vascular surgery consulted  S/p fistulogram.  Nephrology following       Hyperkalemia  Treat with HD      CAD (coronary artery disease) - non-obstructive from [facility] in 2016  - Resume aspirin, statin        Chronic diastolic heart failure  - Echocardiogram (11/24/2021): EF: 55%, GIDD, mild TR, moderate to severe MR  - no signs of acute exacerbation  - Continue home medications     Gastroesophageal reflux disease  - Coninue PPI     Pure hypercholesterolemia  - Continue statin     Essential (primary) hypertension  BP generally controlled  - Continue home medications    Controlled type 2 diabetes mellitus with chronic kidney disease on chronic dialysis, with long-term current use of insulin  - Last A1c:   Lab Results   Component Value Date    HGBA1C 6.1 (H) 01/07/2021     Continue current management.  - Low-sensitivity sliding scale insulin  - Initiate and maintain glycemic protocol, monitor POC glucose   - Follow up with PCP as outpatient      VTE Risk Mitigation (From admission, onward)         Ordered     IP VTE HIGH RISK PATIENT  Once         10/06/22 1819     Place sequential compression device  Until discontinued         10/06/22 1819                Discharge Planning   ENIO:      Code Status: Full Code   Is the patient medically ready for discharge?:     Reason for patient still in hospital (select all that apply): Pending disposition  Discharge Plan A: New Nursing Home placement - [facility] care facility   Discharge Delays: (!) Other (Difficulty getting bank statement for July.)              Marta Black MD  Department of Hospital Medicine   [facility] [place] - Telemetry

## 2022-10-31 NOTE — ASSESSMENT & PLAN NOTE
- Anemic - has been transfused this admission pre and post surgery  - Continue EPO per Nephrology  - With anemia of acute blood loss from bleeding from right stump.

## 2022-10-31 NOTE — PROGRESS NOTES
#[id number] Carlitos translated. CM assisted pt with contacting [facility] [phone]. [employer] rep stated pt had to utilize their interpreters.  translated but pt couldn't provide information for security questions. [employer] rep stated he couldn't provide the bank statement until pt has his identification card.     CM contacted pt's daughterLuis F and  informed her that the pt needs his identification card to get any financial information. Pt's daughter stated she will contact her youngest sister, Mariel to bring the ID card. CM explained that when she contacts [facility] to request an . Pt's daughter stated she will contact her youngest sister, Mariel to bring the ID card and contact [employer]. CM will follow up.

## 2022-10-31 NOTE — PT/OT/SLP PROGRESS
Physical Therapy      Patient Name:  Adryan Neff   MRN:  [id number]    Patient not seen today secondary to Dialysis. Will follow-up tomorrow.

## 2022-10-31 NOTE — PLAN OF CARE
[facility] - Telemetry  Discharge Reassessment    Primary Care Provider: Carola Pedro MD    Expected Discharge Date: Pending    CM spoke to pt's daughterLuis F to discuss dc needs. Pt's daughter stated pt's youngest daughter, Mariel assisted pt to contact [facility] on Saturday but the bank stated they wanted an  to assist. CM will assist pt today to contact [facility].    Reassessment (most recent)       Discharge Reassessment - 10/31/22 0911          Discharge Reassessment    Assessment Type Discharge Planning Reassessment (P)      Did the patient's condition or plan change since previous assessment? Yes (P)      Discharge Plan discussed with: Adult children (P)      Discharge Plan A New Nursing Home placement - half-way care facility (P)      Discharge Plan B Home with family (P)      DME Needed Upon Discharge  other (see comments) (P)    tbd    Discharge Barriers Identified No family/friends to help (P)      Why the patient remains in the hospital Requires continued medical care (P)         Post-Acute Status    Coverage Medicare AB (P)      Discharge Delays Other (P)    Difficulty getting bank statement for July.

## 2022-10-31 NOTE — PT/OT/SLP PROGRESS
Occupational Therapy      Patient Name:  Adryan Neff   MRN:  [id number]    Patient not seen today secondary to Dialysis. Will follow up as able.    10/31/2022

## 2022-10-31 NOTE — PROGRESS NOTES
Seen during HD which he is tolerating well    Awake alert oriented NAD    Denies CNS ENT CP GI  RHEUM OR DERM SX  Past Medical History:   Diagnosis Date    CAD (coronary artery disease) 2016    CAD (non obstructive) 2016 [facility]    Diabetes mellitus type I     Diabetic retinopathy     ESRD on hemodialysis     on HD TThSat    Gangrene of left foot     Hypertension     Nuclear sclerosis of right eye 11/24/2021    PAD (peripheral artery disease)     PEA (Pulseless electrical activity) 10/10/2022    Pulmonary HTN     Right foot infection     TB lung, latent 04/2021    Wet gangrene 1/5/2022     Past Surgical History:   Procedure Laterality Date    ABOVE-KNEE AMPUTATION Left 1/18/2022    Procedure: AMPUTATION, ABOVE KNEE;  Surgeon: Rusty Light MD;  Location: [facility] OR;  Service: Orthopedics;  Laterality: Left;    ABOVE-KNEE AMPUTATION Left 1/21/2022    Procedure: AMPUTATION, ABOVE KNEE;  Surgeon: Rusty Light MD;  Location: [facility] OR;  Service: Orthopedics;  Laterality: Left;    ABOVE-KNEE AMPUTATION Right 10/13/2022    Procedure: AMPUTATION, ABOVE KNEE, RIGHT;  Surgeon: Dimitris Davis MD;  Location: [facility] OR;  Service: Orthopedics;  Laterality: Right;    AV FISTULA PLACEMENT      CATARACT EXTRACTION Left     EYE SURGERY      FISTULOGRAM Left 8/9/2022    Procedure: Fistulogram;  Surgeon: Masoud Soni MD;  Location: [facility] OR;  Service: Vascular;  Laterality: Left;  LEFT VM ON DAUGHTER'S PHONE----PHONE PREOP NOT COMPLETED  CALLED PATIENT ON 8/8/2022 @ 3:34. HE STATED HE IS RESCHEDULING PROCEDURE. NOTIFIED ERWIN @ 3:35PM-LO    FISTULOGRAM Left 10/20/2022    Procedure: Fistulogram;  Surgeon: Masoud Soni MD;  Location: [facility];  Service: Vascular;  Laterality: Left;  Left upper extremity    TOE AMPUTATION Left 1/6/2022    Procedure: AMPUTATION, TOE;  Surgeon: Masoud Soni MD;  Location: [facility] OR;  Service: Vascular;  Laterality: Left;  Left first through fifth toes, possible open  transmetatarsal amputation and all other indicated procedures     Review of patient's allergies indicates:  No Known Allergies    Current Facility-Administered Medications   Medication    acetaminophen tablet 650 mg    amLODIPine tablet 10 mg    ascorbic acid (vitamin C) tablet 1,000 mg    aspirin chewable tablet 81 mg    atorvastatin tablet 40 mg    dextrose 10% bolus 125 mL    dextrose 10% bolus 250 mL    epoetin paola-epbx injection 10,000 Units    folic acid-vit B6-vit B12 2.5-25-2 mg tablet 1 tablet    glucagon (human recombinant) injection 1 mg    glucose chewable tablet 16 g    glucose chewable tablet 24 g    hydrALAZINE injection 10 mg    hydrALAZINE tablet 50 mg    insulin aspart U-100 pen 0-5 Units    insulin detemir U-100 pen 12 Units    melatonin tablet 6 mg    metoprolol succinate (TOPROL-XL) 24 hr tablet 25 mg    naloxone 0.4 mg/mL injection 0.02 mg    ondansetron disintegrating tablet 8 mg    pantoprazole EC tablet 40 mg    polyethylene glycol packet 17 g    senna tablet 8.6 mg    sevelamer carbonate tablet 2,400 mg    sodium chloride 0.9% flush 10 mL    sodium chloride 0.9% flush 10 mL       LABS    Recent Results (from the past 24 hour(s))   POCT glucose    Collection Time: 10/30/22  5:36 PM   Result Value Ref Range    POCT Glucose 206 (H) 70 - 110 mg/dL   POCT glucose    Collection Time: 10/30/22  7:44 PM   Result Value Ref Range    POCT Glucose 181 (H) 70 - 110 mg/dL   Phosphorus    Collection Time: 10/31/22  5:21 AM   Result Value Ref Range    Phosphorus 4.7 (H) 2.7 - 4.5 mg/dL   POCT glucose    Collection Time: 10/31/22  7:27 AM   Result Value Ref Range    POCT Glucose 47 (LL) 70 - 110 mg/dL   POCT glucose    Collection Time: 10/31/22  9:10 AM   Result Value Ref Range    POCT Glucose 132 (H) 70 - 110 mg/dL   ]    I/O last 3 completed shifts:  In: 960 [P.O.:960]  Out: 1 [Urine:1]    Vitals:    10/30/22 2358 10/31/22 0314 10/31/22 0734 10/31/22 0833   BP: (!) 151/67 (!) 159/72 (!) 144/64    Pulse:  76 80 87    Resp: 18 18 18 18   Temp: 97.9 °F (36.6 °C) 98.2 °F (36.8 °C) 98.4 °F (36.9 °C)    TempSrc: Oral Oral Oral    SpO2: 99% 97% 99%    Weight:  60.3 kg (132 lb 15 oz)     Height:           No Jvd, Thyromegaly or Lymphadenopathy  Lungs: Fairly clear anteriorly and laterally  Cor: RRR no G or rubs  Abd: Soft benign good bowel sounds non tender  Ext: Bilat LE amps      A)    ESRD on hd mwf  HTN  Anemia of ckd  2nd hyperpth   PAD  Tendency toward hyperpk   CAD  Psych issues   DM      P)    Renal Diet  Home meds  Protect access  HD MWF   EPO prn   Binders prn   Adjust all meds to the degree of renal fx  Close follow up I/O and weights  Maintain Hydration

## 2022-10-31 NOTE — SUBJECTIVE & OBJECTIVE
Interval History: No complaints. Denies pain. Seen while on dialysis.     Review of Systems   Constitutional:  Negative for chills and fever.   Eyes:  Positive for discharge.   Respiratory:  Negative for shortness of breath.    Cardiovascular:  Negative for chest pain.   Gastrointestinal:  Negative for abdominal pain.   Musculoskeletal:  Negative for arthralgias and myalgias.   Neurological:  Negative for weakness and numbness.   Psychiatric/Behavioral:  Negative for confusion.    Objective:     Vital Signs (Most Recent):  Temp: 98.4 °F (36.9 °C) (10/31/22 0734)  Pulse: 87 (10/31/22 0734)  Resp: 18 (10/31/22 0833)  BP: (!) 144/64 ([facility]) (10/31/22 0734)  SpO2: 99 % (10/31/22 0734)   Vital Signs (24h Range):  Temp:  [97.9 °F (36.6 °C)-98.9 °F (37.2 °C)] 98.4 °F (36.9 °C)  Pulse:  [76-87] 87  Resp:  [18-20] 18  SpO2:  [97 %-100 %] 99 %  BP: (132-159)/(60-72) 144/64     Weight: 60.3 kg (132 lb 15 oz)  Body mass index is 18.54 kg/m².    Intake/Output Summary (Last 24 hours) at 10/31/2022 1245  Last data filed at 10/31/2022 0830  Gross per 24 hour   Intake 840 ml   Output 2 ml   Net 838 ml      Physical Exam  Vitals and nursing note reviewed.   Constitutional:       General: He is not in acute distress.     Appearance: He is not ill-appearing or toxic-appearing.   HENT:      Head: Normocephalic and atraumatic.      Nose: Nose normal.      Mouth/Throat:      Mouth: Mucous membranes are moist.   Eyes:      General:         Right eye: Discharge present.         Left eye: Discharge present.  Cardiovascular:      Rate and Rhythm: Normal rate and regular rhythm.      Heart sounds: Normal heart sounds. No murmur heard.    No gallop.   Pulmonary:      Effort: Pulmonary effort is normal. No respiratory distress.      Breath sounds: Normal breath sounds. No wheezing or rales.      Comments: Room air  Abdominal:      General: Bowel sounds are normal. There is no distension.      Palpations: Abdomen is soft.      Tenderness:  There is no abdominal tenderness. There is no guarding.   Musculoskeletal:      Comments: Bilateral AKA   Skin:     General: Skin is warm and dry.   Neurological:      Mental Status: He is alert. Mental status is at baseline.       Significant Labs: All pertinent labs within the past 24 hours have been reviewed.    Significant Imaging: I have reviewed all pertinent imaging results/findings within the past 24 hours.

## 2022-10-31 NOTE — ASSESSMENT & PLAN NOTE
Noted by Psychiatry that he has had multiple episodes of confusion and not understanding treatment plan. Unclear if this is delirium associated or beginnings of cognitive dysfunction. Confusion has resolved. Recommend outpatient evaluation.

## 2022-10-31 NOTE — ASSESSMENT & PLAN NOTE
S/p L AKA then presented with PAD causing R foot wound. Now s/p R AKA on 10/13  - previously on antibiotics. These were discontinued when source control achieved.   - continue asa, statin  - Pending SNF placement.

## 2022-11-01 LAB
POCT GLUCOSE: 158 MG/DL (ref 70–110)
POCT GLUCOSE: 160 MG/DL (ref 70–110)
POCT GLUCOSE: 164 MG/DL (ref 70–110)
POCT GLUCOSE: 99 MG/DL (ref 70–110)

## 2022-11-01 PROCEDURE — 21400001 HC TELEMETRY ROOM

## 2022-11-01 PROCEDURE — 25000003 PHARM REV CODE 250: Performed by: INTERNAL MEDICINE

## 2022-11-01 PROCEDURE — 25000003 PHARM REV CODE 250: Performed by: ORTHOPAEDIC SURGERY

## 2022-11-01 PROCEDURE — 25000003 PHARM REV CODE 250: Performed by: HOSPITALIST

## 2022-11-01 PROCEDURE — 25000003 PHARM REV CODE 250: Performed by: NURSE PRACTITIONER

## 2022-11-01 RX ADMIN — METOPROLOL SUCCINATE 25 MG: 25 TABLET, EXTENDED RELEASE ORAL at 08:11

## 2022-11-01 RX ADMIN — ASPIRIN 81 MG CHEWABLE TABLET 81 MG: 81 TABLET CHEWABLE at 08:11

## 2022-11-01 RX ADMIN — Medication 6 MG: at 09:11

## 2022-11-01 RX ADMIN — Medication 1 TABLET: at 08:11

## 2022-11-01 RX ADMIN — HYDRALAZINE HYDROCHLORIDE 50 MG: 25 TABLET, FILM COATED ORAL at 09:11

## 2022-11-01 RX ADMIN — HYDROCODONE BITARTRATE AND ACETAMINOPHEN 1 TABLET: 5; 325 TABLET ORAL at 06:11

## 2022-11-01 RX ADMIN — ATORVASTATIN CALCIUM 40 MG: 40 TABLET, FILM COATED ORAL at 09:11

## 2022-11-01 RX ADMIN — PANTOPRAZOLE SODIUM 40 MG: 40 TABLET, DELAYED RELEASE ORAL at 08:11

## 2022-11-01 RX ADMIN — INSULIN DETEMIR 12 UNITS: 100 INJECTION, SOLUTION SUBCUTANEOUS at 09:11

## 2022-11-01 RX ADMIN — AMLODIPINE BESYLATE 10 MG: 5 TABLET ORAL at 08:11

## 2022-11-01 RX ADMIN — SEVELAMER CARBONATE 2400 MG: 800 TABLET, FILM COATED ORAL at 08:11

## 2022-11-01 RX ADMIN — OXYCODONE HYDROCHLORIDE AND ACETAMINOPHEN 1000 MG: 500 TABLET ORAL at 08:11

## 2022-11-01 NOTE — PT/OT/SLP PROGRESS
"Occupational Therapy      Patient Name:  Adryan Neff   MRN:  [id number]    Patient not seen today secondary to pt refusing despite max encouragement; pt stating "don't touch me" and appeared to be getting more agitated. Will follow-up as able.    11/1/2022  "

## 2022-11-01 NOTE — SUBJECTIVE & OBJECTIVE
Interval History: No issues. Still working on placement     Review of Systems   Constitutional:  Negative for chills and fever.   Respiratory:  Negative for shortness of breath.    Cardiovascular:  Negative for chest pain.   Gastrointestinal:  Negative for abdominal pain.   Musculoskeletal:  Negative for arthralgias and myalgias.   Neurological:  Negative for weakness and numbness.   Psychiatric/Behavioral:  Negative for confusion.    Objective:     Vital Signs (Most Recent):  Temp: 97.9 °F (36.6 °C) (11/01/22 1142)  Pulse: 77 (11/01/22 1142)  Resp: 18 (11/01/22 1142)  BP: (!) 153/70 (11/01/22 1142)  SpO2: 100 % (11/01/22 1142)   Vital Signs (24h Range):  Temp:  [97.9 °F (36.6 °C)-99.1 °F (37.3 °C)] 97.9 °F (36.6 °C)  Pulse:  [70-95] 77  Resp:  [18] 18  SpO2:  [96 %-100 %] 100 %  BP: (143-163)/(66-73) 153/70     Weight: 60.1 kg (132 lb 7.9 oz)  Body mass index is 18.48 kg/m².    Intake/Output Summary (Last 24 hours) at 11/1/2022 1301  Last data filed at 11/1/2022 0930  Gross per 24 hour   Intake 860 ml   Output 1500 ml   Net -640 ml        Physical Exam  Vitals and nursing note reviewed.   Constitutional:       General: He is not in acute distress.     Appearance: He is not ill-appearing or toxic-appearing.   HENT:      Head: Normocephalic and atraumatic.      Nose: Nose normal.      Mouth/Throat:      Mouth: Mucous membranes are moist.   Cardiovascular:      Rate and Rhythm: Normal rate and regular rhythm.      Heart sounds: Normal heart sounds. No murmur heard.    No gallop.   Pulmonary:      Effort: Pulmonary effort is normal. No respiratory distress.      Breath sounds: Normal breath sounds. No wheezing or rales.      Comments: Room air  Abdominal:      General: Bowel sounds are normal. There is no distension.      Palpations: Abdomen is soft.      Tenderness: There is no abdominal tenderness. There is no guarding.   Musculoskeletal:      Comments: Bilateral AKA   Skin:     General: Skin is warm and dry.    Neurological:      Mental Status: He is alert. Mental status is at baseline.       Significant Labs: All pertinent labs within the past 24 hours have been reviewed.    Significant Imaging: I have reviewed all pertinent imaging results/findings within the past 24 hours.

## 2022-11-01 NOTE — ASSESSMENT & PLAN NOTE
Dialyzes via LUE AVG  There was concern about AVG malfunction. Vascular surgery consulted  S/p fistulogram.  Nephrology following.

## 2022-11-01 NOTE — PT/OT/SLP PROGRESS
"Physical Therapy      Patient Name:  Adryan Neff   MRN:  [id number]    Patient not seen today secondary to Patient unwilling to participate, Patient fatigue despite MAX encouragement. PTA/OT spent ~7 min to talk to pt, however, pt still didn't want to move/get up even with BLE/BUE in supine. Pt got agitated and said "Don't touch me". Will follow-up as able.    "

## 2022-11-01 NOTE — ASSESSMENT & PLAN NOTE
Persistent leukocytosis over past couple of weeks, but afebrile with no source of infection.  Trending down

## 2022-11-01 NOTE — NURSING
"Pt refused pm medications.  Pt is upset that IV pain medication has been discontinued.  Pt stated that " the doctor and I are crazy for stopping his pain medication."  "

## 2022-11-01 NOTE — PROGRESS NOTES
CM met with pt and pt's daughter, Mariel . CM assisted pt with contacting [facility]. After 2 1/2 hours and 3 reps. The bank statements for July, August , September and October have been mailed to the home address. It will take 5-7 days. CM will follow up.    2:30 pm  Pt refused to participate with OT today.CM contacted pt's daughterLuis F and requested she encourage pt to participate with PT/OT .     3:10 pm  MARCUS spoke to Oscarsandra and informed her that bank statements will take 5-7 days. Scott stated they will hold pt's bed. Scott also stated that pt shouldn't use any money for November because he will be responsible for the prorated amount when he discharges to [facility]. CM will notify family.    3:13 pm  MARCUS contacted  Nafisa with [facility] and she stated the pt should transfer to [facility] because it's closer to [facility]. Nafisa will contact admissions [phone] to start transfer.     3:25 pm  MARCUS spoke to pt's daughterLuis F and informed her that pt shouldn't use any money because pt will have to pay a prorated amount for November when discharged. Pt's daughter stated she was informed by [facility]. Arrangements have been made for the younger children to move with pt's ex- wife.    3:37 pm  MARCUS contacted Maura with [facility] and started the transfer process. CM will follow up.

## 2022-11-01 NOTE — PROGRESS NOTES
[facility] Medicine  Progress Note    Patient Name: Adryan Neff  MRN: [id number]  Patient Class: IP- Inpatient   Admission Date: 10/6/2022  Length of Stay: 26 days  Attending Physician: Marta Black MD  Primary Care Provider: Carola Pedro MD        Subjective:     Principal Problem:PAD (peripheral artery disease)        HPI:  This is a 56 year old male with a PMHx of severe PAD s/p gangrene of left foot s/p left AKA (1/2022), ESRD on HD MWF, NICM (EF: 55%, GIDD), pulmonary hypertension, GERD and latent TB filiberto presents for concern for leg infection.       The patient presented to the vascular surgery clinic for a follow up. He reports worsening pain and developing eschar of the right dorsal foot along with foul smelling odor. Given concern for an infection and the need for IV antibiotics & expedited podiatry evaluation, the patient was sent to the ER. The patient denies having any fevers, chills, or injury to his foot. He has a previous AKA and uses a wheelchair to ambulate. While in the ED, the patient was hypertensive but otherwise hemodynamically stable. Labs showed leukocytosis (17.41 - with neutrophilic predominance), normocytic anemia (9.7), elevated sed rate (125), elevated CRP (237.2), negative lactate (1.6). X-ray foot showed no radiographic evidence of osteomyelitis. He was administered vancomycin, zosyn and was admitted for further management.     I attempted to call the daughter to obtain further history on 1902 without a response.         Overview/Hospital Course:  Mr Adryan Neff is a 56 y.o. man with PAD. He is s/p L AKA due to gangrene and now presents with worsening R foot infection. He also has ESRD on HD via LUE AVG. Started antibiotics. Podiatry and Ortho consulted with plans for amputation. This was delayed due to PEA on 10/10 with quick ROSC. Unclear etiology but suspect vasovagal event. Cardiology was consulted. He then had R AKA on 10/13/22. Antibiotics were  discontinued. He has had malfunctioning of his LUE AVG; Vascular was consulted and fistulogram performed.  SW/CM consulted for discharge planning.  He required blood transfusions for blood loss anemia from R AKA surgical site. Now stable and working on SNF placement.       Interval History: No issues. Still working on placement     Review of Systems   Constitutional:  Negative for chills and fever.   Respiratory:  Negative for shortness of breath.    Cardiovascular:  Negative for chest pain.   Gastrointestinal:  Negative for abdominal pain.   Musculoskeletal:  Negative for arthralgias and myalgias.   Neurological:  Negative for weakness and numbness.   Psychiatric/Behavioral:  Negative for confusion.    Objective:     Vital Signs (Most Recent):  Temp: 97.9 °F (36.6 °C) (11/01/22 1142)  Pulse: 77 (11/01/22 1142)  Resp: 18 (11/01/22 1142)  BP: (!) 153/70 (11/01/22 1142)  SpO2: 100 % (11/01/22 1142)   Vital Signs (24h Range):  Temp:  [97.9 °F (36.6 °C)-99.1 °F (37.3 °C)] 97.9 °F (36.6 °C)  Pulse:  [70-95] 77  Resp:  [18] 18  SpO2:  [96 %-100 %] 100 %  BP: (143-163)/(66-73) 153/70     Weight: 60.1 kg (132 lb 7.9 oz)  Body mass index is 18.48 kg/m².    Intake/Output Summary (Last 24 hours) at 11/1/2022 1301  Last data filed at 11/1/2022 0930  Gross per 24 hour   Intake 860 ml   Output 1500 ml   Net -640 ml        Physical Exam  Vitals and nursing note reviewed.   Constitutional:       General: He is not in acute distress.     Appearance: He is not ill-appearing or toxic-appearing.   HENT:      Head: Normocephalic and atraumatic.      Nose: Nose normal.      Mouth/Throat:      Mouth: Mucous membranes are moist.   Cardiovascular:      Rate and Rhythm: Normal rate and regular rhythm.      Heart sounds: Normal heart sounds. No murmur heard.    No gallop.   Pulmonary:      Effort: Pulmonary effort is normal. No respiratory distress.      Breath sounds: Normal breath sounds. No wheezing or rales.      Comments: Room  air  Abdominal:      General: Bowel sounds are normal. There is no distension.      Palpations: Abdomen is soft.      Tenderness: There is no abdominal tenderness. There is no guarding.   Musculoskeletal:      Comments: Bilateral AKA   Skin:     General: Skin is warm and dry.   Neurological:      Mental Status: He is alert. Mental status is at baseline.       Significant Labs: All pertinent labs within the past 24 hours have been reviewed.    Significant Imaging: I have reviewed all pertinent imaging results/findings within the past 24 hours.      Assessment/Plan:      * PAD (peripheral artery disease)  S/p L AKA then presented with PAD causing R foot wound. Now s/p R AKA on 10/13  - previously on antibiotics. These were discontinued when source control achieved.   - continue asa, statin  - Pending SNF placement.    Leukocytosis  Persistent leukocytosis over past couple of weeks, but afebrile with no source of infection.  Trending down    AV graft malfunction  Vascular following.  S/p fistulogram      Cognitive changes  Noted by Psychiatry that he has had multiple episodes of confusion and not understanding treatment plan. Unclear if this is delirium associated or beginnings of cognitive dysfunction. Confusion has resolved. Recommend outpatient evaluation.       Anemia due to chronic kidney disease, on chronic dialysis  - Anemic - has been transfused this admission pre and post surgery  - Continue EPO per Nephrology  - With anemia of acute blood loss from bleeding from right stump.       ESRD on hemodialysis  Dialyzes via LUE AVG  There was concern about AVG malfunction. Vascular surgery consulted  S/p fistulogram.  Nephrology following.     Hyperkalemia  Treat with HD      CAD (coronary artery disease) - non-obstructive from [facility] in 2016  - Resume aspirin, statin       Chronic diastolic heart failure  - Echocardiogram (11/24/2021): EF: 55%, GIDD, mild TR, moderate to severe MR  - no signs of acute exacerbation  -  Continue home medications     Gastroesophageal reflux disease  - Coninue PPI     Pure hypercholesterolemia  - Continue statin     Essential (primary) hypertension  BP generally controlled  - Continue home medications    Controlled type 2 diabetes mellitus with chronic kidney disease on chronic dialysis, with long-term current use of insulin  - Last A1c:   Lab Results   Component Value Date    HGBA1C 6.1 (H) 01/07/2021     Continue current management.  - Low-sensitivity sliding scale insulin  - Initiate and maintain glycemic protocol, monitor POC glucose   - Follow up with PCP as outpatient      VTE Risk Mitigation (From admission, onward)         Ordered     IP VTE HIGH RISK PATIENT  Once         10/06/22 1819     Place sequential compression device  Until discontinued         10/06/22 1819                Discharge Planning   ENIO:      Code Status: Full Code   Is the patient medically ready for discharge?:     Reason for patient still in hospital (select all that apply): Pending disposition  Discharge Plan A: New Nursing Home placement - skilled nursing care facility   Discharge Delays: (!) Other (Difficulty getting bank statement for July.)              Marta Black MD  Department of Hospital Medicine   [facility] - Telemetry

## 2022-11-01 NOTE — ASSESSMENT & PLAN NOTE
Treat with HD     Wound care RN consult placed for perineal sinus tract.  This requires Wound Care MD consult instead, which has been placed this RN order cancelled.

## 2022-11-02 PROBLEM — Z71.89 ADVANCED CARE PLANNING/COUNSELING DISCUSSION: Status: ACTIVE | Noted: 2022-11-02

## 2022-11-02 PROBLEM — D72.829 LEUKOCYTOSIS: Status: RESOLVED | Noted: 2022-10-21 | Resolved: 2022-11-02

## 2022-11-02 PROBLEM — E87.5 HYPERKALEMIA: Status: RESOLVED | Noted: 2020-10-26 | Resolved: 2022-11-02

## 2022-11-02 LAB
ANION GAP SERPL CALC-SCNC: 16 MMOL/L (ref 8–16)
BASOPHILS # BLD AUTO: 0.14 K/UL (ref 0–0.2)
BASOPHILS NFR BLD: 1.2 % (ref 0–1.9)
BUN SERPL-MCNC: 47 MG/DL (ref 6–20)
CALCIUM SERPL-MCNC: 9.3 MG/DL (ref 8.7–10.5)
CHLORIDE SERPL-SCNC: 102 MMOL/L (ref 95–110)
CO2 SERPL-SCNC: 19 MMOL/L (ref 23–29)
CREAT SERPL-MCNC: 9.2 MG/DL (ref 0.5–1.4)
DIFFERENTIAL METHOD: ABNORMAL
EOSINOPHIL # BLD AUTO: 1.5 K/UL (ref 0–0.5)
EOSINOPHIL NFR BLD: 13.3 % (ref 0–8)
ERYTHROCYTE [DISTWIDTH] IN BLOOD BY AUTOMATED COUNT: 16.2 % (ref 11.5–14.5)
EST. GFR  (NO RACE VARIABLE): 6 ML/MIN/1.73 M^2
GLUCOSE SERPL-MCNC: 41 MG/DL (ref 70–110)
HCT VFR BLD AUTO: 30.7 % (ref 40–54)
HGB BLD-MCNC: 8.8 G/DL (ref 14–18)
IMM GRANULOCYTES # BLD AUTO: 0.04 K/UL (ref 0–0.04)
IMM GRANULOCYTES NFR BLD AUTO: 0.4 % (ref 0–0.5)
LYMPHOCYTES # BLD AUTO: 2.2 K/UL (ref 1–4.8)
LYMPHOCYTES NFR BLD: 19.3 % (ref 18–48)
MCH RBC QN AUTO: 26.5 PG (ref 27–31)
MCHC RBC AUTO-ENTMCNC: 28.7 G/DL (ref 32–36)
MCV RBC AUTO: 93 FL (ref 82–98)
MONOCYTES # BLD AUTO: 0.9 K/UL (ref 0.3–1)
MONOCYTES NFR BLD: 7.8 % (ref 4–15)
NEUTROPHILS # BLD AUTO: 6.6 K/UL (ref 1.8–7.7)
NEUTROPHILS NFR BLD: 58 % (ref 38–73)
NRBC BLD-RTO: 0 /100 WBC
PLATELET # BLD AUTO: 532 K/UL (ref 150–450)
PMV BLD AUTO: 8.9 FL (ref 9.2–12.9)
POCT GLUCOSE: 187 MG/DL (ref 70–110)
POCT GLUCOSE: 195 MG/DL (ref 70–110)
POCT GLUCOSE: 90 MG/DL (ref 70–110)
POTASSIUM SERPL-SCNC: 4.6 MMOL/L (ref 3.5–5.1)
RBC # BLD AUTO: 3.32 M/UL (ref 4.6–6.2)
SODIUM SERPL-SCNC: 137 MMOL/L (ref 136–145)
WBC # BLD AUTO: 11.34 K/UL (ref 3.9–12.7)

## 2022-11-02 PROCEDURE — 25000003 PHARM REV CODE 250: Performed by: INTERNAL MEDICINE

## 2022-11-02 PROCEDURE — 85025 COMPLETE CBC W/AUTO DIFF WBC: CPT | Performed by: HOSPITALIST

## 2022-11-02 PROCEDURE — 25000003 PHARM REV CODE 250: Performed by: HOSPITALIST

## 2022-11-02 PROCEDURE — 80048 BASIC METABOLIC PNL TOTAL CA: CPT | Performed by: HOSPITALIST

## 2022-11-02 PROCEDURE — 36415 COLL VENOUS BLD VENIPUNCTURE: CPT | Performed by: HOSPITALIST

## 2022-11-02 PROCEDURE — 99223 1ST HOSP IP/OBS HIGH 75: CPT | Mod: ,,, | Performed by: STUDENT IN AN ORGANIZED HEALTH CARE EDUCATION/TRAINING PROGRAM

## 2022-11-02 PROCEDURE — 21400001 HC TELEMETRY ROOM

## 2022-11-02 PROCEDURE — 99223 PR INITIAL HOSPITAL CARE,LEVL III: ICD-10-PCS | Mod: ,,, | Performed by: STUDENT IN AN ORGANIZED HEALTH CARE EDUCATION/TRAINING PROGRAM

## 2022-11-02 PROCEDURE — 25000003 PHARM REV CODE 250: Performed by: ORTHOPAEDIC SURGERY

## 2022-11-02 PROCEDURE — 63600175 PHARM REV CODE 636 W HCPCS: Mod: JG | Performed by: ORTHOPAEDIC SURGERY

## 2022-11-02 PROCEDURE — 80100016 HC MAINTENANCE HEMODIALYSIS

## 2022-11-02 RX ADMIN — Medication 24 G: at 06:11

## 2022-11-02 RX ADMIN — METOPROLOL SUCCINATE 25 MG: 25 TABLET, EXTENDED RELEASE ORAL at 08:11

## 2022-11-02 RX ADMIN — ATORVASTATIN CALCIUM 40 MG: 40 TABLET, FILM COATED ORAL at 09:11

## 2022-11-02 RX ADMIN — HYDRALAZINE HYDROCHLORIDE 50 MG: 25 TABLET, FILM COATED ORAL at 09:11

## 2022-11-02 RX ADMIN — EPOETIN ALFA-EPBX 10000 UNITS: 10000 INJECTION, SOLUTION INTRAVENOUS; SUBCUTANEOUS at 08:11

## 2022-11-02 RX ADMIN — Medication 1 TABLET: at 08:11

## 2022-11-02 RX ADMIN — SEVELAMER CARBONATE 2400 MG: 800 TABLET, FILM COATED ORAL at 08:11

## 2022-11-02 RX ADMIN — AMLODIPINE BESYLATE 10 MG: 5 TABLET ORAL at 08:11

## 2022-11-02 RX ADMIN — PANTOPRAZOLE SODIUM 40 MG: 40 TABLET, DELAYED RELEASE ORAL at 08:11

## 2022-11-02 RX ADMIN — OXYCODONE HYDROCHLORIDE AND ACETAMINOPHEN 1000 MG: 500 TABLET ORAL at 08:11

## 2022-11-02 RX ADMIN — ASPIRIN 81 MG CHEWABLE TABLET 81 MG: 81 TABLET CHEWABLE at 08:11

## 2022-11-02 NOTE — ASSESSMENT & PLAN NOTE
- Last A1c:   Lab Results   Component Value Date    HGBA1C 6.1 (H) 01/07/2021     Meds: detemir + SSI PRN to maintain goal 140-180  ADA diet, accuchecks, hypoglycemic protocol

## 2022-11-02 NOTE — HPI
"From primary team note:   "HPI:  This is a 56 year old male with a PMHx of severe PAD s/p gangrene of left foot s/p left AKA (1/2022), ESRD on HD MWF, NICM (EF: 55%, GIDD), pulmonary hypertension, GERD and latent TB filiberto presents for concern for leg infection.       The patient presented to the vascular surgery clinic for a follow up. He reports worsening pain and developing eschar of the right dorsal foot along with foul smelling odor. Given concern for an infection and the need for IV antibiotics & expedited podiatry evaluation, the patient was sent to the ER. The patient denies having any fevers, chills, or injury to his foot. He has a previous AKA and uses a wheelchair to ambulate. While in the ED, the patient was hypertensive but otherwise hemodynamically stable. Labs showed leukocytosis (17.41 - with neutrophilic predominance), normocytic anemia (9.7), elevated sed rate (125), elevated CRP (237.2), negative lactate (1.6). X-ray foot showed no radiographic evidence of osteomyelitis. He was administered vancomycin, zosyn and was admitted for further management.      I attempted to call the daughter to obtain further history on 1902 without a response.         Overview/Hospital Course:  Mr Adryan Neff is a 56 y.o. man with PAD. He is s/p L AKA due to gangrene and now presents with worsening R foot infection. He also has ESRD on HD via LUE AVG. Started antibiotics. Podiatry and Ortho consulted with plans for amputation. This was delayed due to PEA on 10/10 with quick ROSC. Unclear etiology but suspect vasovagal event. Cardiology was consulted. He then had R AKA on 10/13/22. Antibiotics were discontinued. He has had malfunctioning of his LUE AVG; Vascular was consulted and fistulogram performed.  SW/CM consulted for discharge planning.  He required blood transfusions for blood loss anemia from R AKA surgical site. Now stable and working on SNF placement."  "

## 2022-11-02 NOTE — ASSESSMENT & PLAN NOTE
- Anemic - has been transfused this admission pre and post surgery  - Continue EPO per Nephrology  - With anemia of acute blood loss from bleeding from right stump.   - Hgb now stable

## 2022-11-02 NOTE — ASSESSMENT & PLAN NOTE
S/p L AKA then presented with PAD causing R foot wound. Now s/p R AKA on 10/13  - previously on antibiotics. These were discontinued when source control achieved.   - continue asa, statin  - Pending SNF placement.  - now bilateral AKA-- palliative consulted

## 2022-11-02 NOTE — SUBJECTIVE & OBJECTIVE
Interval History: Seen while on dialysis. No complaints.     Review of Systems   Constitutional:  Negative for chills and fever.   Respiratory:  Negative for shortness of breath.    Cardiovascular:  Negative for chest pain.   Gastrointestinal:  Negative for abdominal pain.   Musculoskeletal:  Negative for arthralgias and myalgias.   Neurological:  Negative for weakness and numbness.   Psychiatric/Behavioral:  Negative for confusion.    Objective:     Vital Signs (Most Recent):  Temp: 98 °F (36.7 °C) (11/02/22 0714)  Pulse: 62 (11/02/22 0714)  Resp: 18 (11/02/22 0714)  BP: (!) 155/70 (11/02/22 0714)  SpO2: 100 % (11/02/22 0714)   Vital Signs (24h Range):  Temp:  [97.9 °F (36.6 °C)-98.8 °F (37.1 °C)] 98 °F (36.7 °C)  Pulse:  [62-86] 62  Resp:  [18] 18  SpO2:  [99 %-100 %] 100 %  BP: (141-155)/(65-70) 155/70     Weight: 59.2 kg (130 lb 8.2 oz)  Body mass index is 18.2 kg/m².    Intake/Output Summary (Last 24 hours) at 11/2/2022 1109  Last data filed at 11/2/2022 0900  Gross per 24 hour   Intake 840 ml   Output 0 ml   Net 840 ml        Physical Exam  Vitals and nursing note reviewed.   Constitutional:       General: He is not in acute distress.     Appearance: He is not ill-appearing or toxic-appearing.   HENT:      Head: Normocephalic and atraumatic.      Nose: Nose normal.      Mouth/Throat:      Mouth: Mucous membranes are moist.   Cardiovascular:      Rate and Rhythm: Normal rate and regular rhythm.      Heart sounds: Normal heart sounds. No murmur heard.    No gallop.   Pulmonary:      Effort: Pulmonary effort is normal. No respiratory distress.      Breath sounds: Normal breath sounds. No wheezing or rales.      Comments: Room air  Abdominal:      General: Bowel sounds are normal. There is no distension.      Palpations: Abdomen is soft.      Tenderness: There is no abdominal tenderness. There is no guarding.   Musculoskeletal:      Comments: Bilateral AKA   Skin:     General: Skin is warm and dry.      Comments:  HUMBERTO ALEJANDRE   Neurological:      Mental Status: He is alert. Mental status is at baseline.       Significant Labs: All pertinent labs within the past 24 hours have been reviewed.    Significant Imaging: I have reviewed all pertinent imaging results/findings within the past 24 hours.

## 2022-11-02 NOTE — PT/OT/SLP PROGRESS
Physical Therapy      Patient Name:  Adryan Neff   MRN:  [id number]    Patient not seen today secondary to Dialysis. Will follow-up as able.

## 2022-11-02 NOTE — ASSESSMENT & PLAN NOTE
- Patient seen in dialysis unit, appeared comfortable at first. Patient initially offered  but he stated he could talk in english and did not need one right now. He denied being in any discomfort currently.   - Discussion was started by asking him regarding his living situation and he stated he was living with his two younger children Yvette and Ignacio. He talks with them everyday.   - Mr. Neff soon became very emotional as he went on to talk about his wife (Juan) who has been in [facility] for the past 4 years waiting to come to [facility] to be with her family. Covid has made the situation very difficult. He expressed missing her so much and wanting her to be with him and to help him. He talks with her frequently.   - Tissues were provided and comfort as well to attempt to console Mr. Neff.   - He stated he would want his wife to be his medical decision maker if he was unable to.   - Soon after this, he expressed a desire to be left alone to rest. He did not wish to talk further. I felt as though at times it was difficult for him to say everything he wishes and asked him if it was okay if we used a Creole  next time we talked further and he stated that is fine. I made him aware we are here to support him and that he can ask us any questions he wishes. He had no further questions at this time and simply wished to rest.     - Discussed case further with case management Madonna Frank who was very helpful in filling in some gaps. Patients younger children are in process of transitoning to staying with the patients ex-wife while he is in the hospital and post hospital in a intermediate care nursing facility. They are just awaiting banking statements which will take around 5-7 days before he can be transitioned there. Madonna also mentioned the wifes case to be brought here from [facility] has also been accelerated at which point he could possible transition back to home with her being his caregiver.    - provided updates in person to Attending Dr. Black.     Plan remains to transition to residential care nursing facility once banking statements arrive, as he does not have the support currently to be cared for at home.     Will continue to follow up with him and discuss further

## 2022-11-02 NOTE — PT/OT/SLP PROGRESS
Occupational Therapy      Patient Name:  Adryan Neff   MRN:  [id number]    9:28 AM: Patient not seen today secondary to dialysis.  Will follow-up tomorrow or as able.     3:00 PM: Made multiple attempts this afternoon to co-treat patient with PTA; patient had still not returned from dialysis by 3:00 PM.  Will f/u tomorrow/as able.      11/2/2022

## 2022-11-02 NOTE — CONSULTS
[place] - Telemetry  Palliative Medicine  Consult Note    Patient Name: Adryan Neff  MRN: [id number]  Admission Date: 10/6/2022  Hospital Length of Stay: 27 days  Code Status: Full Code   Attending Provider: Marta Black MD  Consulting Provider: Akbar Bravo MD  Primary Care Physician: Carola Pedro MD  Principal Problem:PAD (peripheral artery disease)    Patient information was obtained from patient, past medical records and primary team.      Inpatient consult to Palliative Care  Consult performed by: Akbar Bravo MD  Consult ordered by: Marta Black MD  Reason for consult: ACP      Assessment/Plan:     * PAD (peripheral artery disease)  Now s/p bilateral AKA     Advanced care planning/counseling discussion  - Patient seen in dialysis unit, appeared comfortable at first. Patient initially offered  but he stated he could talk in english and did not need one right now. He denied being in any discomfort currently.   - Discussion was started by asking him regarding his living situation and he stated he was living with his two younger children Yvette and Ignacio. He talks with them everyday.   - Mr. Neff soon became very emotional as he went on to talk about his wife (Juan) who has been in [facility] for the past 4 years waiting to come to [facility] to be with her family. Covid has made the situation very difficult. He expressed missing her so much and wanting her to be with him and to help him. He talks with her frequently.   - Tissues were provided and comfort as well to attempt to console Mr. Neff.   - He stated he would want his wife to be his medical decision maker if he was unable to.   - Soon after this, he expressed a desire to be left alone to rest. He did not wish to talk further. I felt as though at times it was difficult for him to say everything he wishes and asked him if it was okay if we used a Creole  next time we talked further and he stated that is fine. I made him  "aware we are here to support him and that he can ask us any questions he wishes. He had no further questions at this time and simply wished to rest.     - Discussed case further with case management Madonna Frank who was very helpful in filling in some gaps. Patients younger children are in process of transitoning to staying with the patients ex-wife while he is in the hospital and post hospital in a retirement care nursing facility. They are just awaiting banking statements which will take around 5-7 days before he can be transitioned there. Madonna also mentioned the wifes case to be brought here from [facility] has also been accelerated at which point he could possible transition back to home with her being his caregiver.   - provided updates in person to Attending Dr. Black.     Plan remains to transition to retirement care nursing facility once banking statements arrive, as he does not have the support currently to be cared for at home.     Will continue to follow up with him and discuss further    ESRD on hemodialysis  - Getting HD MWF         Thank you for your consult. I will follow-up with patient. Please contact us if you have any additional questions.    Subjective:     HPI:   From most recent primary team note on 11/2:   "HPI:  This is a 56 year old male with a PMHx of severe PAD s/p gangrene of left foot s/p left AKA (1/2022), ESRD on HD MWF, NICM (EF: 55%, GIDD), pulmonary hypertension, GERD and latent TB filiberto presents for concern for leg infection.       The patient presented to the vascular surgery clinic for a follow up. He reports worsening pain and developing eschar of the right dorsal foot along with foul smelling odor. Given concern for an infection and the need for IV antibiotics & expedited podiatry evaluation, the patient was sent to the ER. The patient denies having any fevers, chills, or injury to his foot. He has a previous AKA and uses a wheelchair to ambulate. While in the ED, the " "patient was hypertensive but otherwise hemodynamically stable. Labs showed leukocytosis (17.41 - with neutrophilic predominance), normocytic anemia (9.7), elevated sed rate (125), elevated CRP (237.2), negative lactate (1.6). X-ray foot showed no radiographic evidence of osteomyelitis. He was administered vancomycin, zosyn and was admitted for further management.      I attempted to call the daughter to obtain further history on 1902 without a response.         Overview/Hospital Course:  Mr Adryan Neff is a 56 y.o. man with PAD. He is s/p L AKA due to gangrene and now presents with worsening R foot infection. He also has ESRD on HD via LUE AVG. Started antibiotics. Podiatry and Ortho consulted with plans for amputation. This was delayed due to PEA on 10/10 with quick ROSC. Unclear etiology but suspect vasovagal event. Cardiology was consulted. He then had R AKA on 10/13/22. Antibiotics were discontinued. He has had malfunctioning of his LUE AVG; Vascular was consulted and fistulogram performed.  SW/CM consulted for discharge planning.  He required blood transfusions for blood loss anemia from R AKA surgical site. Now stable and working on SNF placement."      No notes on file    Subjective:     Seen while starting his dialysis session. He was initially resting comfortably and easily arousable. He stated he was currently comfortable and not having any pain or other symptoms of concern.     Past Medical History:   Diagnosis Date    CAD (coronary artery disease) 2016    CAD (non obstructive) 2016 [facility]    Diabetes mellitus type I     Diabetic retinopathy     ESRD on hemodialysis     on HD TThSat    Gangrene of left foot     Hypertension     Nuclear sclerosis of right eye 11/24/2021    PAD (peripheral artery disease)     PEA (Pulseless electrical activity) 10/10/2022    Pulmonary HTN     Right foot infection     TB lung, latent 04/2021    Wet gangrene 1/5/2022       Past Surgical History:   Procedure Laterality Date    " ABOVE-KNEE AMPUTATION Left 1/18/2022    Procedure: AMPUTATION, ABOVE KNEE;  Surgeon: Rusty Light MD;  Location: [facility] OR;  Service: Orthopedics;  Laterality: Left;    ABOVE-KNEE AMPUTATION Left 1/21/2022    Procedure: AMPUTATION, ABOVE KNEE;  Surgeon: Rusty Light MD;  Location: [facility] OR;  Service: Orthopedics;  Laterality: Left;    ABOVE-KNEE AMPUTATION Right 10/13/2022    Procedure: AMPUTATION, ABOVE KNEE, RIGHT;  Surgeon: Dimitris Davis MD;  Location: [facility] OR;  Service: Orthopedics;  Laterality: Right;    AV FISTULA PLACEMENT      CATARACT EXTRACTION Left     EYE SURGERY      FISTULOGRAM Left 8/9/2022    Procedure: Fistulogram;  Surgeon: Masoud Soni MD;  Location: [facility];  Service: Vascular;  Laterality: Left;  LEFT VM ON DAUGHTER'S PHONE----PHONE PREOP NOT COMPLETED  CALLED PATIENT ON 8/8/2022 @ 3:34. HE STATED HE IS RESCHEDULING PROCEDURE. NOTIFIED ERWIN @ 3:35PM-LO    FISTULOGRAM Left 10/20/2022    Procedure: Fistulogram;  Surgeon: Masoud Soni MD;  Location: [facility];  Service: Vascular;  Laterality: Left;  Left upper extremity    TOE AMPUTATION Left 1/6/2022    Procedure: AMPUTATION, TOE;  Surgeon: Masoud Soni MD;  Location: [facility];  Service: Vascular;  Laterality: Left;  Left first through fifth toes, possible open transmetatarsal amputation and all other indicated procedures       Review of patient's allergies indicates:  No Known Allergies    Medications:  Scheduled Meds:   amLODIPine  10 mg Oral Daily    ascorbic acid (vitamin C)  1,000 mg Oral Daily    aspirin  81 mg Oral Daily    atorvastatin  40 mg Oral QHS    epoetin paola-epbx  10,000 Units Subcutaneous Every Mon, Wed, Fri    folic acid-vit B6-vit B12 2.5-25-2 mg  1 tablet Oral Daily    hydrALAZINE  50 mg Oral Q8H    insulin detemir U-100  8 Units Subcutaneous QHS    metoprolol succinate  25 mg Oral Daily    pantoprazole  40 mg Oral Daily    sevelamer carbonate  2,400 mg Oral TID WM     PRN  Meds:acetaminophen, dextrose 10%, dextrose 10%, glucagon (human recombinant), glucose, glucose, hydrALAZINE, HYDROcodone-acetaminophen, insulin aspart U-100, melatonin, naloxone, ondansetron, polyethylene glycol, senna, sodium chloride 0.9%, sodium chloride 0.9%    Family History       Problem Relation (Age of Onset)    No Known Problems Mother, Father, Sister, Brother, Daughter, Daughter, Daughter, Brother, Maternal Aunt, Maternal Uncle, Paternal Aunt, Paternal Uncle, Maternal Grandmother, Maternal Grandfather, Paternal Grandmother, Paternal Grandfather          Tobacco Use    Smoking status: Never    Smokeless tobacco: Never   Substance and Sexual Activity    Alcohol use: No    Drug use: No    Sexual activity: Yes     Partners: Female       Review of Systems:  As per Subjective  Objective:     Vital Signs (Most Recent):  Temp: 98 °F (36.7 °C) (11/02/22 0714)  Pulse: 62 (11/02/22 0714)  Resp: 18 (11/02/22 0714)  BP: (!) 155/70 (11/02/22 0714)  SpO2: 100 % (11/02/22 0714)   Vital Signs (24h Range):  Temp:  [98 °F (36.7 °C)-98.8 °F (37.1 °C)] 98 °F (36.7 °C)  Pulse:  [62-86] 62  Resp:  [18] 18  SpO2:  [99 %-100 %] 100 %  BP: (141-155)/(65-70) 155/70     Weight: 59.2 kg (130 lb 8.2 oz)  Body mass index is 18.2 kg/m².    Physical Exam  Vitals and nursing note reviewed.   Constitutional:       Comments: Very emotional   HENT:      Head: Normocephalic and atraumatic.      Right Ear: External ear normal.      Left Ear: External ear normal.      Nose: Nose normal.   Eyes:      Extraocular Movements: Extraocular movements intact.      Conjunctiva/sclera: Conjunctivae normal.   Neck:      Comments: ROM intact  Cardiovascular:      Rate and Rhythm: Normal rate.   Pulmonary:      Effort: Pulmonary effort is normal. No respiratory distress.   Abdominal:      General: Abdomen is flat. There is no distension.   Musculoskeletal:      Comments: B/l AKA   Skin:     General: Skin is warm and dry.   Neurological:      Mental Status:  He is alert.      Comments: Awake and Alert   Psychiatric:      Comments: Very tearful and emotional       Review of Symptoms      Symptom Assessment (ESAS 0-10 Scale)  Pain:  0  Dyspnea:  0  Anxiety:  5  Nausea:  0  Depression:  0  Anorexia:  0  Fatigue:  0  Insomnia:  0  Restlessness:  0  Agitation:  0         Living Arrangements:  Lives with family    Psychosocial/Cultural: Lives with 2 younger children aged 17 and 14, who have been arranged to stay with the patients ex-wife while he is in the hospital    Has older son aged 21, who does not seem to be close and another younger son too. 4 children total    Wife is currently in [facility] and he has not seen her in over 4 years as their efforts to bring [place] to [place] were hindered by covid.       Advance Care Planning   Advance Directives:   Living Will: No        Oral Declaration: No    LaPOST: No    Do Not Resuscitate Status: No    Medical Power of : No        Oral Declaration: Yes   Witnesses:  None   Agent's Name:  Wife in [facility] ([place])  Goals of Care: The patient endorses that what is most important right now is to focus on remaining as independent as possible and getting stronger.     Will continue to discuss further with use of  once patient is more willing and not in dialysis.        Significant Labs:   CBC:   Recent Labs   Lab 11/02/22 0425   WBC 11.34   HGB 8.8*   HCT 30.7*   MCV 93   *     BMP:  Recent Labs   Lab 11/02/22 0425   GLU 41*      K 4.6      CO2 19*   BUN 47*   CREATININE 9.2*   CALCIUM 9.3     LFT:  Lab Results   Component Value Date    AST 16 10/11/2022    ALKPHOS 62 10/11/2022    BILITOT 0.4 10/11/2022     Albumin:   Albumin   Date Value Ref Range Status   10/11/2022 2.2 (L) 3.5 - 5.2 g/dL Final     Protein:   Total Protein   Date Value Ref Range Status   10/11/2022 9.2 (H) 6.0 - 8.4 g/dL Final     Lactic acid:   Lab Results   Component Value Date    LACTATE 2.7 (H) 10/10/2022    LACTATE 1.6 10/06/2022        Significant Imaging: I have reviewed all pertinent imaging results/findings within the past 24 hours.        > 50% of 70 min visit spent in chart review, face to face discussion of goals of care,  symptom assessment, coordination of care and emotional support.    Akbar Bravo MD  Palliative Medicine  [facility]

## 2022-11-02 NOTE — ASSESSMENT & PLAN NOTE
Noted by Psychiatry that he has had multiple episodes of confusion and not understanding treatment plan. Unclear if this is delirium associated or beginnings of cognitive dysfunction. Confusion has resolved.

## 2022-11-02 NOTE — SUBJECTIVE & OBJECTIVE
Subjective:     Seen while starting his dialysis session. He was initially resting comfortably and easily arousable. He stated he was currently comfortable and not having any pain or other symptoms of concern.     Past Medical History:   Diagnosis Date    CAD (coronary artery disease) 2016    CAD (non obstructive) 2016 [facility]    Diabetes mellitus type I     Diabetic retinopathy     ESRD on hemodialysis     on HD TThSat    Gangrene of left foot     Hypertension     Nuclear sclerosis of right eye 11/24/2021    PAD (peripheral artery disease)     PEA (Pulseless electrical activity) 10/10/2022    Pulmonary HTN     Right foot infection     TB lung, latent 04/2021    Wet gangrene 1/5/2022       Past Surgical History:   Procedure Laterality Date    ABOVE-KNEE AMPUTATION Left 1/18/2022    Procedure: AMPUTATION, ABOVE KNEE;  Surgeon: Rusty Light MD;  Location: [facility] OR;  Service: Orthopedics;  Laterality: Left;    ABOVE-KNEE AMPUTATION Left 1/21/2022    Procedure: AMPUTATION, ABOVE KNEE;  Surgeon: Rusty Light MD;  Location: [facility] OR;  Service: Orthopedics;  Laterality: Left;    ABOVE-KNEE AMPUTATION Right 10/13/2022    Procedure: AMPUTATION, ABOVE KNEE, RIGHT;  Surgeon: Dimitris Davis MD;  Location: [facility] OR;  Service: Orthopedics;  Laterality: Right;    AV FISTULA PLACEMENT      CATARACT EXTRACTION Left     EYE SURGERY      FISTULOGRAM Left 8/9/2022    Procedure: Fistulogram;  Surgeon: Masoud Soni MD;  Location: [facility] OR;  Service: Vascular;  Laterality: Left;  LEFT VM ON DAUGHTER'S PHONE----PHONE PREOP NOT COMPLETED  CALLED PATIENT ON 8/8/2022 @ 3:34. HE STATED HE IS RESCHEDULING PROCEDURE. NOTIFIED ERWIN @ 3:35PM-LO    FISTULOGRAM Left 10/20/2022    Procedure: Fistulogram;  Surgeon: Masoud Soni MD;  Location: [facility];  Service: Vascular;  Laterality: Left;  Left upper extremity    TOE AMPUTATION Left 1/6/2022    Procedure: AMPUTATION, TOE;  Surgeon: Masoud Soni MD;  Location: [facility]  OR;  Service: Vascular;  Laterality: Left;  Left first through fifth toes, possible open transmetatarsal amputation and all other indicated procedures       Review of patient's allergies indicates:  No Known Allergies    Medications:  Scheduled Meds:   amLODIPine  10 mg Oral Daily    ascorbic acid (vitamin C)  1,000 mg Oral Daily    aspirin  81 mg Oral Daily    atorvastatin  40 mg Oral QHS    epoetin paola-epbx  10,000 Units Subcutaneous Every Mon, Wed, Fri    folic acid-vit B6-vit B12 2.5-25-2 mg  1 tablet Oral Daily    hydrALAZINE  50 mg Oral Q8H    insulin detemir U-100  8 Units Subcutaneous QHS    metoprolol succinate  25 mg Oral Daily    pantoprazole  40 mg Oral Daily    sevelamer carbonate  2,400 mg Oral TID WM     PRN Meds:acetaminophen, dextrose 10%, dextrose 10%, glucagon (human recombinant), glucose, glucose, hydrALAZINE, HYDROcodone-acetaminophen, insulin aspart U-100, melatonin, naloxone, ondansetron, polyethylene glycol, senna, sodium chloride 0.9%, sodium chloride 0.9%    Family History       Problem Relation (Age of Onset)    No Known Problems Mother, Father, Sister, Brother, Daughter, Daughter, Daughter, Brother, Maternal Aunt, Maternal Uncle, Paternal Aunt, Paternal Uncle, Maternal Grandmother, Maternal Grandfather, Paternal Grandmother, Paternal Grandfather          Tobacco Use    Smoking status: Never    Smokeless tobacco: Never   Substance and Sexual Activity    Alcohol use: No    Drug use: No    Sexual activity: Yes     Partners: Female       Review of Systems:  As per Subjective  Objective:     Vital Signs (Most Recent):  Temp: 98 °F (36.7 °C) (11/02/22 0714)  Pulse: 62 (11/02/22 0714)  Resp: 18 (11/02/22 0714)  BP: (!) 155/70 (11/02/22 0714)  SpO2: 100 % (11/02/22 0714)   Vital Signs (24h Range):  Temp:  [98 °F (36.7 °C)-98.8 °F (37.1 °C)] 98 °F (36.7 °C)  Pulse:  [62-86] 62  Resp:  [18] 18  SpO2:  [99 %-100 %] 100 %  BP: (141-155)/(65-70) 155/70     Weight: 59.2 kg (130 lb 8.2 oz)  Body mass  index is 18.2 kg/m².    Physical Exam  Vitals and nursing note reviewed.   Constitutional:       Comments: Very emotional   HENT:      Head: Normocephalic and atraumatic.      Right Ear: External ear normal.      Left Ear: External ear normal.      Nose: Nose normal.   Eyes:      Extraocular Movements: Extraocular movements intact.      Conjunctiva/sclera: Conjunctivae normal.   Neck:      Comments: ROM intact  Cardiovascular:      Rate and Rhythm: Normal rate.   Pulmonary:      Effort: Pulmonary effort is normal. No respiratory distress.   Abdominal:      General: Abdomen is flat. There is no distension.   Musculoskeletal:      Comments: B/l AKA   Skin:     General: Skin is warm and dry.   Neurological:      Mental Status: He is alert.      Comments: Awake and Alert   Psychiatric:      Comments: Very tearful and emotional       Review of Symptoms      Symptom Assessment (ESAS 0-10 Scale)  Pain:  0  Dyspnea:  0  Anxiety:  5  Nausea:  0  Depression:  0  Anorexia:  0  Fatigue:  0  Insomnia:  0  Restlessness:  0  Agitation:  0         Living Arrangements:  Lives with family    Psychosocial/Cultural: Lives with 2 younger children aged 17 and 14, who have been arranged to stay with the patients ex-wife while he is in the hospital    Has older son aged 21, who does not seem to be close and another younger son too. 4 children total    Wife is currently in [facility] and he has not seen her in over 4 years as their efforts to bring her to [place] were hindered by covid.       Advance Care Planning   Advance Directives:   Living Will: No        Oral Declaration: No    LaPOST: No    Do Not Resuscitate Status: No    Medical Power of : No        Oral Declaration: Yes   Witnesses:  None   Agent's Name:  Wife in [facility] ([place])  Goals of Care: The patient endorses that what is most important right now is to focus on remaining as independent as possible and getting stronger.     Will continue to discuss further with use of   once patient is more willing and not in dialysis.        Significant Labs:   CBC:   Recent Labs   Lab 11/02/22 0425   WBC 11.34   HGB 8.8*   HCT 30.7*   MCV 93   *     BMP:  Recent Labs   Lab 11/02/22 0425   GLU 41*      K 4.6      CO2 19*   BUN 47*   CREATININE 9.2*   CALCIUM 9.3     LFT:  Lab Results   Component Value Date    AST 16 10/11/2022    ALKPHOS 62 10/11/2022    BILITOT 0.4 10/11/2022     Albumin:   Albumin   Date Value Ref Range Status   10/11/2022 2.2 (L) 3.5 - 5.2 g/dL Final     Protein:   Total Protein   Date Value Ref Range Status   10/11/2022 9.2 (H) 6.0 - 8.4 g/dL Final     Lactic acid:   Lab Results   Component Value Date    LACTATE 2.7 (H) 10/10/2022    LACTATE 1.6 10/06/2022       Significant Imaging: I have reviewed all pertinent imaging results/findings within the past 24 hours.

## 2022-11-02 NOTE — PROGRESS NOTES
[facility] Medicine  Progress Note    Patient Name: Adryan Neff  MRN: [id number]  Patient Class: IP- Inpatient   Admission Date: 10/6/2022  Length of Stay: 27 days  Attending Physician: Marta Black MD  Primary Care Provider: Carola Pedro MD        Subjective:     Principal Problem:PAD (peripheral artery disease)        HPI:  This is a 56 year old male with a PMHx of severe PAD s/p gangrene of left foot s/p left AKA (1/2022), ESRD on HD MWF, NICM (EF: 55%, GIDD), pulmonary hypertension, GERD and latent TB filiberto presents for concern for leg infection.       The patient presented to the vascular surgery clinic for a follow up. He reports worsening pain and developing eschar of the right dorsal foot along with foul smelling odor. Given concern for an infection and the need for IV antibiotics & expedited podiatry evaluation, the patient was sent to the ER. The patient denies having any fevers, chills, or injury to his foot. He has a previous AKA and uses a wheelchair to ambulate. While in the ED, the patient was hypertensive but otherwise hemodynamically stable. Labs showed leukocytosis (17.41 - with neutrophilic predominance), normocytic anemia (9.7), elevated sed rate (125), elevated CRP (237.2), negative lactate (1.6). X-ray foot showed no radiographic evidence of osteomyelitis. He was administered vancomycin, zosyn and was admitted for further management.     I attempted to call the daughter to obtain further history on 1902 without a response.         Overview/Hospital Course:  Mr Adryan Nfef is a 56 y.o. man with PAD. He is s/p L AKA due to gangrene and now presents with worsening R foot infection. He also has ESRD on HD via LUE AVG. Started antibiotics. Podiatry and Ortho consulted with plans for amputation. This was delayed due to PEA on 10/10 with quick ROSC. Unclear etiology but suspect vasovagal event. Cardiology was consulted. He then had R AKA on 10/13/22. Antibiotics were  discontinued. He has had malfunctioning of his LUE AVG; Vascular was consulted and fistulogram performed.  SW/CM consulted for discharge planning.  He required blood transfusions for blood loss anemia from R AKA surgical site. Now stable and working on SNF placement.       Interval History: Seen while on dialysis. No complaints.     Review of Systems   Constitutional:  Negative for chills and fever.   Respiratory:  Negative for shortness of breath.    Cardiovascular:  Negative for chest pain.   Gastrointestinal:  Negative for abdominal pain.   Musculoskeletal:  Negative for arthralgias and myalgias.   Neurological:  Negative for weakness and numbness.   Psychiatric/Behavioral:  Negative for confusion.    Objective:     Vital Signs (Most Recent):  Temp: 98 °F (36.7 °C) (11/02/22 0714)  Pulse: 62 (11/02/22 0714)  Resp: 18 (11/02/22 0714)  BP: (!) 155/70 (11/02/22 0714)  SpO2: 100 % (11/02/22 0714)   Vital Signs (24h Range):  Temp:  [97.9 °F (36.6 °C)-98.8 °F (37.1 °C)] 98 °F (36.7 °C)  Pulse:  [62-86] 62  Resp:  [18] 18  SpO2:  [99 %-100 %] 100 %  BP: (141-155)/(65-70) 155/70     Weight: 59.2 kg (130 lb 8.2 oz)  Body mass index is 18.2 kg/m².    Intake/Output Summary (Last 24 hours) at 11/2/2022 1109  Last data filed at 11/2/2022 0900  Gross per 24 hour   Intake 840 ml   Output 0 ml   Net 840 ml        Physical Exam  Vitals and nursing note reviewed.   Constitutional:       General: He is not in acute distress.     Appearance: He is not ill-appearing or toxic-appearing.   HENT:      Head: Normocephalic and atraumatic.      Nose: Nose normal.      Mouth/Throat:      Mouth: Mucous membranes are moist.   Cardiovascular:      Rate and Rhythm: Normal rate and regular rhythm.      Heart sounds: Normal heart sounds. No murmur heard.    No gallop.   Pulmonary:      Effort: Pulmonary effort is normal. No respiratory distress.      Breath sounds: Normal breath sounds. No wheezing or rales.      Comments: Room air  Abdominal:       General: Bowel sounds are normal. There is no distension.      Palpations: Abdomen is soft.      Tenderness: There is no abdominal tenderness. There is no guarding.   Musculoskeletal:      Comments: Bilateral AKA   Skin:     General: Skin is warm and dry.      Comments: LUE AVG   Neurological:      Mental Status: He is alert. Mental status is at baseline.       Significant Labs: All pertinent labs within the past 24 hours have been reviewed.    Significant Imaging: I have reviewed all pertinent imaging results/findings within the past 24 hours.      Assessment/Plan:      * PAD (peripheral artery disease)  S/p L AKA then presented with PAD causing R foot wound. Now s/p R AKA on 10/13  - previously on antibiotics. These were discontinued when source control achieved.   - continue asa, statin  - Pending SNF placement.  - now bilateral AKA-- palliative consulted     AV graft malfunction  Vascular following.  S/p fistulogram      Cognitive changes  Noted by Psychiatry that he has had multiple episodes of confusion and not understanding treatment plan. Unclear if this is delirium associated or beginnings of cognitive dysfunction. Confusion has resolved.      Anemia due to chronic kidney disease, on chronic dialysis  - Anemic - has been transfused this admission pre and post surgery  - Continue EPO per Nephrology  - With anemia of acute blood loss from bleeding from right stump.   - Hgb now stable      ESRD on hemodialysis  Dialyzes via LUE AVG  There was concern about AVG malfunction. Vascular surgery consulted  S/p fistulogram.  Nephrology following.     CAD (coronary artery disease) - non-obstructive from [facility] in 2016  - Resume aspirin, statin       Chronic diastolic heart failure  - Echocardiogram (11/24/2021): EF: 55%, GIDD, mild TR, moderate to severe MR  - no signs of acute exacerbation  - Continue home medications     Gastroesophageal reflux disease  - Coninue PPI     Pure hypercholesterolemia  - Continue statin      Essential (primary) hypertension  BP generally controlled  - Continue home medications    Controlled type 2 diabetes mellitus with chronic kidney disease on chronic dialysis, with long-term current use of insulin  - Last A1c:   Lab Results   Component Value Date    HGBA1C 6.1 (H) 01/07/2021     Meds: detemir + SSI PRN to maintain goal 140-180  ADA diet, accuchecks, hypoglycemic protocol        VTE Risk Mitigation (From admission, onward)         Ordered     IP VTE HIGH RISK PATIENT  Once         10/06/22 1819     Place sequential compression device  Until discontinued         10/06/22 1819                Discharge Planning   ENIO:      Code Status: Full Code   Is the patient medically ready for discharge?: Yes    Reason for patient still in hospital (select all that apply): Pending disposition  Discharge Plan A: New Nursing Home placement - retirement care facility   Discharge Delays: (!) Other (Difficulty getting bank statement for July.)              Marta Black MD  Department of Hospital Medicine   [facility] - Telemetry

## 2022-11-02 NOTE — PROGRESS NOTES
Seen during HD which he is tolerating well    Awake alert oriented NAD    Denies CNS ENT CP GI  RHEUM OR DERM SX  Past Medical History:   Diagnosis Date    CAD (coronary artery disease) 2016    CAD (non obstructive) 2016 [facility]    Diabetes mellitus type I     Diabetic retinopathy     ESRD on hemodialysis     on HD TThSat    Gangrene of left foot     Hypertension     Nuclear sclerosis of right eye 11/24/2021    PAD (peripheral artery disease)     PEA (Pulseless electrical activity) 10/10/2022    Pulmonary HTN     Right foot infection     TB lung, latent 04/2021    Wet gangrene 1/5/2022     Past Surgical History:   Procedure Laterality Date    ABOVE-KNEE AMPUTATION Left 1/18/2022    Procedure: AMPUTATION, ABOVE KNEE;  Surgeon: Rusty Light MD;  Location: [facility] OR;  Service: Orthopedics;  Laterality: Left;    ABOVE-KNEE AMPUTATION Left 1/21/2022    Procedure: AMPUTATION, ABOVE KNEE;  Surgeon: Rusty Light MD;  Location: [facility] OR;  Service: Orthopedics;  Laterality: Left;    ABOVE-KNEE AMPUTATION Right 10/13/2022    Procedure: AMPUTATION, ABOVE KNEE, RIGHT;  Surgeon: Dimitris Davis MD;  Location: [facility] OR;  Service: Orthopedics;  Laterality: Right;    AV FISTULA PLACEMENT      CATARACT EXTRACTION Left     EYE SURGERY      FISTULOGRAM Left 8/9/2022    Procedure: Fistulogram;  Surgeon: Masoud Soni MD;  Location: [facility] OR;  Service: Vascular;  Laterality: Left;  LEFT VM ON DAUGHTER'S PHONE----PHONE PREOP NOT COMPLETED  CALLED PATIENT ON 8/8/2022 @ 3:34. HE STATED HE IS RESCHEDULING PROCEDURE. NOTIFIED ERWIN @ 3:35PM-LO    FISTULOGRAM Left 10/20/2022    Procedure: Fistulogram;  Surgeon: Masoud Soni MD;  Location: [facility];  Service: Vascular;  Laterality: Left;  Left upper extremity    TOE AMPUTATION Left 1/6/2022    Procedure: AMPUTATION, TOE;  Surgeon: Masoud Soni MD;  Location: [facility] OR;  Service: Vascular;  Laterality: Left;  Left first through fifth toes, possible open  transmetatarsal amputation and all other indicated procedures     Review of patient's allergies indicates:  No Known Allergies    Current Facility-Administered Medications   Medication    acetaminophen tablet 650 mg    amLODIPine tablet 10 mg    ascorbic acid (vitamin C) tablet 1,000 mg    aspirin chewable tablet 81 mg    atorvastatin tablet 40 mg    dextrose 10% bolus 125 mL    dextrose 10% bolus 250 mL    epoetin paola-epbx injection 10,000 Units    folic acid-vit B6-vit B12 2.5-25-2 mg tablet 1 tablet    glucagon (human recombinant) injection 1 mg    glucose chewable tablet 16 g    glucose chewable tablet 24 g    hydrALAZINE injection 10 mg    hydrALAZINE tablet 50 mg    HYDROcodone-acetaminophen 5-325 mg per tablet 1 tablet    insulin aspart U-100 pen 0-5 Units    insulin detemir U-100 pen 8 Units    melatonin tablet 6 mg    metoprolol succinate (TOPROL-XL) 24 hr tablet 25 mg    naloxone 0.4 mg/mL injection 0.02 mg    ondansetron disintegrating tablet 8 mg    pantoprazole EC tablet 40 mg    polyethylene glycol packet 17 g    senna tablet 8.6 mg    sevelamer carbonate tablet 2,400 mg    sodium chloride 0.9% flush 10 mL    sodium chloride 0.9% flush 10 mL       LABS    Recent Results (from the past 24 hour(s))   POCT glucose    Collection Time: 11/01/22 11:40 AM   Result Value Ref Range    POCT Glucose 158 (H) 70 - 110 mg/dL   POCT glucose    Collection Time: 11/01/22  3:19 PM   Result Value Ref Range    POCT Glucose 160 (H) 70 - 110 mg/dL   POCT glucose    Collection Time: 11/01/22  8:11 PM   Result Value Ref Range    POCT Glucose 164 (H) 70 - 110 mg/dL   CBC Auto Differential    Collection Time: 11/02/22  4:25 AM   Result Value Ref Range    WBC 11.34 3.90 - 12.70 K/uL    RBC 3.32 (L) 4.60 - 6.20 M/uL    Hemoglobin 8.8 (L) 14.0 - 18.0 g/dL    Hematocrit 30.7 (L) 40.0 - 54.0 %    MCV 93 82 - 98 fL    MCH 26.5 (L) 27.0 - 31.0 pg    MCHC 28.7 (L) 32.0 - 36.0 g/dL    RDW 16.2 (H) 11.5 - 14.5 %    Platelets 532 (H) 150  - 450 K/uL    MPV 8.9 (L) 9.2 - 12.9 fL    Immature Granulocytes 0.4 0.0 - 0.5 %    Gran # (ANC) 6.6 1.8 - 7.7 K/uL    Immature Grans (Abs) 0.04 0.00 - 0.04 K/uL    Lymph # 2.2 1.0 - 4.8 K/uL    Mono # 0.9 0.3 - 1.0 K/uL    Eos # 1.5 (H) 0.0 - 0.5 K/uL    Baso # 0.14 0.00 - 0.20 K/uL    nRBC 0 0 /100 WBC    Gran % 58.0 38.0 - 73.0 %    Lymph % 19.3 18.0 - 48.0 %    Mono % 7.8 4.0 - 15.0 %    Eosinophil % 13.3 (H) 0.0 - 8.0 %    Basophil % 1.2 0.0 - 1.9 %    Differential Method Automated    Basic Metabolic Panel    Collection Time: 11/02/22  4:25 AM   Result Value Ref Range    Sodium 137 136 - 145 mmol/L    Potassium 4.6 3.5 - 5.1 mmol/L    Chloride 102 95 - 110 mmol/L    CO2 19 (L) 23 - 29 mmol/L    Glucose 41 (LL) 70 - 110 mg/dL    BUN 47 (H) 6 - 20 mg/dL    Creatinine 9.2 (H) 0.5 - 1.4 mg/dL    Calcium 9.3 8.7 - 10.5 mg/dL    Anion Gap 16 8 - 16 mmol/L    eGFR 6 (A) >60 mL/min/1.73 m^2   POCT glucose    Collection Time: 11/02/22  7:13 AM   Result Value Ref Range    POCT Glucose 90 70 - 110 mg/dL   ]    I/O last 3 completed shifts:  In: 960 [P.O.:960]  Out: 0     Vitals:    11/01/22 2007 11/02/22 0019 11/02/22 0414 11/02/22 0714   BP: (!) 141/65 (!) 151/68 (!) 155/67 (!) 155/70   Pulse: 72 74 86 62   Resp: 18 18 18 18   Temp: 98.8 °F (37.1 °C) 98.4 °F (36.9 °C) 98.6 °F (37 °C) 98 °F (36.7 °C)   TempSrc: Oral Oral Oral Oral   SpO2: 100% 99% 100% 100%   Weight:   59.2 kg (130 lb 8.2 oz)    Height:           No Jvd, Thyromegaly or Lymphadenopathy  Lungs: Fairly clear anteriorly and laterally  Cor: RRR no G or rubs  Abd: Soft benign good bowel sounds non tender  Ext: Bilat LE amps      A)    ESRD on hd mwf  HTN  Anemia of ckd  2nd hyperpth   PAD  Tendency toward hyperpk   CAD  Psych issues   DM      P)    Renal Diet  Home meds  Protect access  HD MWF   EPO prn   Binders prn   Adjust all meds to the degree of renal fx  Close follow up I/O and weights  Maintain Hydration

## 2022-11-03 LAB
POCT GLUCOSE: 127 MG/DL (ref 70–110)
POCT GLUCOSE: 220 MG/DL (ref 70–110)
POCT GLUCOSE: 222 MG/DL (ref 70–110)
POCT GLUCOSE: 92 MG/DL (ref 70–110)

## 2022-11-03 PROCEDURE — 99233 SBSQ HOSP IP/OBS HIGH 50: CPT | Mod: ,,, | Performed by: STUDENT IN AN ORGANIZED HEALTH CARE EDUCATION/TRAINING PROGRAM

## 2022-11-03 PROCEDURE — 25000003 PHARM REV CODE 250: Performed by: INTERNAL MEDICINE

## 2022-11-03 PROCEDURE — 25000003 PHARM REV CODE 250: Performed by: HOSPITALIST

## 2022-11-03 PROCEDURE — 25000003 PHARM REV CODE 250: Performed by: ORTHOPAEDIC SURGERY

## 2022-11-03 PROCEDURE — 21400001 HC TELEMETRY ROOM

## 2022-11-03 PROCEDURE — 99233 PR SUBSEQUENT HOSPITAL CARE,LEVL III: ICD-10-PCS | Mod: ,,, | Performed by: STUDENT IN AN ORGANIZED HEALTH CARE EDUCATION/TRAINING PROGRAM

## 2022-11-03 RX ADMIN — HYDRALAZINE HYDROCHLORIDE 50 MG: 25 TABLET, FILM COATED ORAL at 01:11

## 2022-11-03 RX ADMIN — Medication 1 TABLET: at 08:11

## 2022-11-03 RX ADMIN — INSULIN ASPART 2 UNITS: 100 INJECTION, SOLUTION INTRAVENOUS; SUBCUTANEOUS at 12:11

## 2022-11-03 RX ADMIN — METOPROLOL SUCCINATE 25 MG: 25 TABLET, EXTENDED RELEASE ORAL at 08:11

## 2022-11-03 RX ADMIN — ACETAMINOPHEN 650 MG: 325 TABLET ORAL at 08:11

## 2022-11-03 RX ADMIN — ASPIRIN 81 MG CHEWABLE TABLET 81 MG: 81 TABLET CHEWABLE at 08:11

## 2022-11-03 RX ADMIN — SEVELAMER CARBONATE 2400 MG: 800 TABLET, FILM COATED ORAL at 08:11

## 2022-11-03 RX ADMIN — AMLODIPINE BESYLATE 10 MG: 5 TABLET ORAL at 08:11

## 2022-11-03 RX ADMIN — PANTOPRAZOLE SODIUM 40 MG: 40 TABLET, DELAYED RELEASE ORAL at 08:11

## 2022-11-03 NOTE — SUBJECTIVE & OBJECTIVE
Interval History: No complaints. Says today that his son and daughter are going to take him home.     Review of Systems   Constitutional:  Negative for chills and fever.   Respiratory:  Negative for shortness of breath.    Cardiovascular:  Negative for chest pain.   Gastrointestinal:  Negative for abdominal pain.   Musculoskeletal:  Negative for arthralgias and myalgias.   Neurological:  Negative for weakness and numbness.   Psychiatric/Behavioral:  Negative for confusion.    Objective:     Vital Signs (Most Recent):  Temp: 98.4 °F (36.9 °C) (11/03/22 0735)  Pulse: 78 (11/03/22 0735)  Resp: 18 (11/03/22 0735)  BP: (!) 160/72 (11/03/22 0841)  SpO2: 96 % (11/03/22 0735)   Vital Signs (24h Range):  Temp:  [98.4 °F (36.9 °C)-98.8 °F (37.1 °C)] 98.4 °F (36.9 °C)  Pulse:  [77-82] 78  Resp:  [18] 18  SpO2:  [96 %-100 %] 96 %  BP: (142-166)/(63-72) 160/72     Weight: 58.3 kg (128 lb 8.5 oz)  Body mass index is 17.93 kg/m².    Intake/Output Summary (Last 24 hours) at 11/3/2022 1017  Last data filed at 11/3/2022 0900  Gross per 24 hour   Intake 860 ml   Output 1500 ml   Net -640 ml        Physical Exam  Vitals and nursing note reviewed.   Constitutional:       General: He is not in acute distress.     Appearance: He is not ill-appearing or toxic-appearing.   HENT:      Head: Normocephalic and atraumatic.      Nose: Nose normal.      Mouth/Throat:      Mouth: Mucous membranes are moist.   Cardiovascular:      Rate and Rhythm: Normal rate and regular rhythm.      Heart sounds: Normal heart sounds. No murmur heard.    No gallop.   Pulmonary:      Effort: Pulmonary effort is normal. No respiratory distress.      Breath sounds: Normal breath sounds. No wheezing or rales.      Comments: Room air  Abdominal:      General: Bowel sounds are normal. There is no distension.      Palpations: Abdomen is soft.      Tenderness: There is no abdominal tenderness. There is no guarding.   Musculoskeletal:      Comments: Bilateral AKA   Skin:      General: Skin is warm and dry.      Comments: HUMBERTO ALEJANDRE   Neurological:      Mental Status: He is alert. Mental status is at baseline.       Significant Labs: All pertinent labs within the past 24 hours have been reviewed.    Significant Imaging: I have reviewed all pertinent imaging results/findings within the past 24 hours.

## 2022-11-03 NOTE — ASSESSMENT & PLAN NOTE
- Spoke with him today using [place] [employer] ([id number]). Patient much more easily understood and open to talking in Creole compared to English. Would recommend this be utilized when needing to communicate in order to optimize his understanding of his ongoing medical care.   - He stated that things have been tough without his wife who has has been  to for 14 years. He had completed paperwork for her to come to [place] in 2019 but still she is in [facility]. In the meantime he has lost both his legs in the past year making things even harder.  - He becomes very emotional and tearful when talking about his wife and their distance. He stated he keeps in touch with her and she is aware of ongoing in hospital. He admits to feeling sad throughout the day because he misses his wife, but denies any issues with appetite, sleep, concentration and denies any excessive fatigue. He is not interested in any treatment for depression at this time as he feels a lot of his emotions would improve with his wife being here.   - He used to work as a  up until 2019. Used to love jogging, exercise. Enjoys watching TV, but no particular favorite show.   - He has 5 kids, Oldest to youngest: Lionel (33 y.o) who is in [facility] still, Erendira (31), Lia (18) Apple (16), Dimas (14).   - He does not feel his children care about him and that only his wife does  - He wishes for his wife Mary Claude August ( [phone]) in [facility] to be his only medical decision maker as he feels she is the only one who cares for him. I asked him a few times if he wishes to add anyone as an alternative incase his wife cannot be reached and he said no, there is no one else.   - He understands the next step is for him to go to a [facility] care nursing home where adequate care can be provided for him. He just does not know what will happen after that as he doesn't know when his wife will be able to come to [place] to get him out of the nursing  home.     - I talked with  today Martine who is covering for Madonna just today. I will discuss further with Madonna tomorrow when she is back as she has been very involved in Mr. Walls care.     - Plan remains to transition to longterm care nursing facility once banking statements arrive, as he does not have the support currently to be cared for at home.

## 2022-11-03 NOTE — CONSULTS
Patient  presented as somewhat confused and asked his location. He spoke of having both legs amputated recently and said he is still dealing with pain. He said he has family support from a wife who lives a long way away, and his lane remains strong and requested prayer and then seemed more at inner peace. will continue to follow.

## 2022-11-03 NOTE — PROGRESS NOTES
[facility] Medicine  Progress Note    Patient Name: Adryan Neff  MRN: [id number]  Patient Class: IP- Inpatient   Admission Date: 10/6/2022  Length of Stay: 28 days  Attending Physician: Marta Black MD  Primary Care Provider: Carola Pedro MD        Subjective:     Principal Problem:PAD (peripheral artery disease)        HPI:  This is a 56 year old male with a PMHx of severe PAD s/p gangrene of left foot s/p left AKA (1/2022), ESRD on HD MWF, NICM (EF: 55%, GIDD), pulmonary hypertension, GERD and latent TB filiberto presents for concern for leg infection.       The patient presented to the vascular surgery clinic for a follow up. He reports worsening pain and developing eschar of the right dorsal foot along with foul smelling odor. Given concern for an infection and the need for IV antibiotics & expedited podiatry evaluation, the patient was sent to the ER. The patient denies having any fevers, chills, or injury to his foot. He has a previous AKA and uses a wheelchair to ambulate. While in the ED, the patient was hypertensive but otherwise hemodynamically stable. Labs showed leukocytosis (17.41 - with neutrophilic predominance), normocytic anemia (9.7), elevated sed rate (125), elevated CRP (237.2), negative lactate (1.6). X-ray foot showed no radiographic evidence of osteomyelitis. He was administered vancomycin, zosyn and was admitted for further management.     I attempted to call the daughter to obtain further history on 1902 without a response.         Overview/Hospital Course:  Mr Adryan Neff is a 56 y.o. man with PAD. He is s/p L AKA due to gangrene and now presents with worsening R foot infection. He also has ESRD on HD via LUE AVG. Started antibiotics. Podiatry and Ortho consulted with plans for amputation. This was delayed due to PEA on 10/10 with quick ROSC. Unclear etiology but suspect vasovagal event. Cardiology was consulted. He then had R AKA on 10/13/22. Antibiotics were  discontinued. He has had malfunctioning of his LUE AVG; Vascular was consulted and fistulogram performed.  SW/CM consulted for discharge planning.  He required blood transfusions for blood loss anemia from R AKA surgical site. Now stable and working on SNF placement.       Interval History: No complaints. Says today that his son and daughter are going to take him home.     Review of Systems   Constitutional:  Negative for chills and fever.   Respiratory:  Negative for shortness of breath.    Cardiovascular:  Negative for chest pain.   Gastrointestinal:  Negative for abdominal pain.   Musculoskeletal:  Negative for arthralgias and myalgias.   Neurological:  Negative for weakness and numbness.   Psychiatric/Behavioral:  Negative for confusion.    Objective:     Vital Signs (Most Recent):  Temp: 98.4 °F (36.9 °C) (11/03/22 0735)  Pulse: 78 (11/03/22 0735)  Resp: 18 (11/03/22 0735)  BP: (!) 160/72 (11/03/22 0841)  SpO2: 96 % (11/03/22 0735)   Vital Signs (24h Range):  Temp:  [98.4 °F (36.9 °C)-98.8 °F (37.1 °C)] 98.4 °F (36.9 °C)  Pulse:  [77-82] 78  Resp:  [18] 18  SpO2:  [96 %-100 %] 96 %  BP: (142-166)/(63-72) 160/72     Weight: 58.3 kg (128 lb 8.5 oz)  Body mass index is 17.93 kg/m².    Intake/Output Summary (Last 24 hours) at 11/3/2022 1017  Last data filed at 11/3/2022 0900  Gross per 24 hour   Intake 860 ml   Output 1500 ml   Net -640 ml        Physical Exam  Vitals and nursing note reviewed.   Constitutional:       General: He is not in acute distress.     Appearance: He is not ill-appearing or toxic-appearing.   HENT:      Head: Normocephalic and atraumatic.      Nose: Nose normal.      Mouth/Throat:      Mouth: Mucous membranes are moist.   Cardiovascular:      Rate and Rhythm: Normal rate and regular rhythm.      Heart sounds: Normal heart sounds. No murmur heard.    No gallop.   Pulmonary:      Effort: Pulmonary effort is normal. No respiratory distress.      Breath sounds: Normal breath sounds. No wheezing  or rales.      Comments: Room air  Abdominal:      General: Bowel sounds are normal. There is no distension.      Palpations: Abdomen is soft.      Tenderness: There is no abdominal tenderness. There is no guarding.   Musculoskeletal:      Comments: Bilateral AKA   Skin:     General: Skin is warm and dry.      Comments: LUE AVG   Neurological:      Mental Status: He is alert. Mental status is at baseline.       Significant Labs: All pertinent labs within the past 24 hours have been reviewed.    Significant Imaging: I have reviewed all pertinent imaging results/findings within the past 24 hours.      Assessment/Plan:      * PAD (peripheral artery disease)  S/p L AKA then presented with PAD causing R foot wound. Now s/p R AKA on 10/13  - previously on antibiotics. These were discontinued when source control achieved.   - continue asa, statin  - Pending SNF placement.  - now bilateral AKA-- palliative consulted for support     Advanced care planning/counseling discussion  Advance Care Planning     Date: 11/03/2022  Palliative consulted for additional support. His personal goal is to discharge to home with his family. Currently working on SNF transition to snf due to lack of support at home. Case management also following along to help facilitate SNF. No time spent on ACP          AV graft malfunction  Vascular following.  S/p fistulogram      Cognitive changes  Noted by Psychiatry that he has had multiple episodes of confusion and not understanding treatment plan. Unclear if this is delirium associated or beginnings of cognitive dysfunction. Confusion has resolved.      Anemia due to chronic kidney disease, on chronic dialysis  - Anemic - has been transfused this admission pre and post surgery  - Continue EPO per Nephrology  - With anemia of acute blood loss from bleeding from right stump.   - Hgb now stable      ESRD on hemodialysis  Dialyzes via LUE AVG  There was concern about AVG malfunction. Vascular surgery  consulted  S/p fistulogram.  Nephrology following.     CAD (coronary artery disease) - [facility]-obstructive [facility] in 2016  - Resume aspirin, statin       Chronic diastolic heart failure  - Echocardiogram (11/24/2021): EF: 55%, GIDD, mild TR, moderate to severe MR  - no signs of acute exacerbation  - Continue home medications     Gastroesophageal reflux disease  - Coninue PPI     Pure hypercholesterolemia  - Continue statin     Essential (primary) hypertension  BP generally controlled  - Continue home medications    Controlled type 2 diabetes mellitus with chronic kidney disease on chronic dialysis, with long-term current use of insulin  - Last A1c:   Lab Results   Component Value Date    HGBA1C 6.1 (H) 01/07/2021     Meds: detemir + SSI PRN to maintain goal 140-180  ADA diet, accuchecks, hypoglycemic protocol        VTE Risk Mitigation (From admission, onward)           Ordered     IP VTE HIGH RISK PATIENT  Once         10/06/22 1819     Place sequential compression device  Until discontinued         10/06/22 1819                    Discharge Planning   ENIO:      Code Status: Full Code   Is the patient medically ready for discharge?: Yes    Reason for patient still in hospital (select all that apply): Pending disposition  Discharge Plan A: New Nursing Home placement - [facility] facility   Discharge Delays: (!) Other (Difficulty getting bank statement for July.)              Marta Black MD  Department of Hospital Medicine   [facility] - Telemetry

## 2022-11-03 NOTE — SUBJECTIVE & OBJECTIVE
Interval History/subjective:    Seen at bedside today and Language Line used (Burkinan-[place]hamlet) ID# [id number]    Patient is comfortable currently and without pain. Discussed extensively regarding his understanding of ongoing thus far, next steps and overall family dynamics.     Medications:  Continuous Infusions:  Scheduled Meds:   amLODIPine  10 mg Oral Daily    aspirin  81 mg Oral Daily    atorvastatin  40 mg Oral QHS    epoetin paola-epbx  10,000 Units Subcutaneous Every Mon, Wed, Fri    folic acid-vit B6-vit B12 2.5-25-2 mg  1 tablet Oral Daily    hydrALAZINE  50 mg Oral Q8H    insulin detemir U-100  8 Units Subcutaneous QHS    metoprolol succinate  25 mg Oral Daily    pantoprazole  40 mg Oral Daily    sevelamer carbonate  2,400 mg Oral TID WM     PRN Meds:acetaminophen, dextrose 10%, dextrose 10%, glucagon (human recombinant), glucose, glucose, hydrALAZINE, HYDROcodone-acetaminophen, insulin aspart U-100, melatonin, naloxone, ondansetron, polyethylene glycol, senna, sodium chloride 0.9%, sodium chloride 0.9%    Objective:     Vital Signs (Most Recent):  Temp: 99.3 °F (37.4 °C) (11/03/22 1108)  Pulse: 73 (11/03/22 1108)  Resp: 18 (11/03/22 1108)  BP: 135/61 (11/03/22 1108)  SpO2: 99 % (11/03/22 1108) Vital Signs (24h Range):  Temp:  [98.4 °F (36.9 °C)-99.3 °F (37.4 °C)] 99.3 °F (37.4 °C)  Pulse:  [73-82] 73  Resp:  [18] 18  SpO2:  [96 %-100 %] 99 %  BP: (135-166)/(61-72) 135/61     Weight: 58.3 kg (128 lb 8.5 oz)  Body mass index is 17.93 kg/m².    Physical Exam  Constitutional:       General: He is not in acute distress.     Comments: Tearful   HENT:      Head: Normocephalic and atraumatic.      Nose: Nose normal.   Eyes:      Extraocular Movements: Extraocular movements intact.   Cardiovascular:      Rate and Rhythm: Normal rate.   Pulmonary:      Effort: Pulmonary effort is normal. No respiratory distress.   Musculoskeletal:      Cervical back: Normal range of motion.      Comments: B/l aka   Neurological:       Mental Status: He is alert.      Comments: Awake and alert. emotional when conversating   Psychiatric:      Comments: tearful       Review of Symptoms      Symptom Assessment (ESAS 0-10 Scale)  Pain:  0  Dyspnea:  0  Anxiety:  3  Nausea:  0  Depression:  5  Anorexia:  0  Fatigue:  0  Insomnia:  0  Restlessness:  0  Agitation:  0         Psychosocial/Cultural: Per ACP note        Advance Care Planning   Advance Directives:   Medical Power of : No        Oral Declaration: Yes  Agent's Name:  Mary Claude August   Agent's Contact Number:  [phone]    Decision Making:  Patient answered questions       Significant Labs:   CBC:   Recent Labs   Lab 11/02/22  0425   WBC 11.34   HGB 8.8*   HCT 30.7*   MCV 93   *     BMP:  No results for input(s): GLU, NA, K, CL, CO2, BUN, CREATININE, CALCIUM, MG in the last 24 hours.  LFT:  Lab Results   Component Value Date    AST 16 10/11/2022    ALKPHOS 62 10/11/2022    BILITOT 0.4 10/11/2022     Albumin:   Albumin   Date Value Ref Range Status   10/11/2022 2.2 (L) 3.5 - 5.2 g/dL Final     Protein:   Total Protein   Date Value Ref Range Status   10/11/2022 9.2 (H) 6.0 - 8.4 g/dL Final     Lactic acid:   Lab Results   Component Value Date    LACTATE 2.7 (H) 10/10/2022    LACTATE 1.6 10/06/2022       Significant Imaging: I have reviewed all pertinent imaging results/findings within the past 24 hours.

## 2022-11-03 NOTE — PT/OT/SLP PROGRESS
Physical Therapy      Patient Name:  Adryan Neff   MRN:  [id number]    Patient not seen today secondary to Other (Comment) (Pt unavailable; in conference with MD and crying at present.). Will follow-up as able.

## 2022-11-03 NOTE — PLAN OF CARE
Problem: Adult Inpatient Plan of Care  Goal: Plan of Care Review  Outcome: Ongoing, Progressing  Goal: Patient-Specific Goal (Individualized)  Outcome: Ongoing, Progressing  Goal: Absence of Hospital-Acquired Illness or Injury  Outcome: Ongoing, Progressing  Goal: Optimal Comfort and Wellbeing  Outcome: Ongoing, Progressing  Goal: Readiness for Transition of Care  Outcome: Ongoing, Progressing     Problem: Skin Injury Risk Increased  Goal: Skin Health and Integrity  Outcome: Ongoing, Progressing     Problem: Fall Injury Risk  Goal: Absence of Fall and Fall-Related Injury  Outcome: Ongoing, Progressing     Problem: Diabetes Comorbidity  Goal: Blood Glucose Level Within Targeted Range  Outcome: Ongoing, Progressing     Problem: Infection  Goal: Absence of Infection Signs and Symptoms  Outcome: Ongoing, Progressing     Problem: Impaired Wound Healing  Goal: Optimal Wound Healing  Outcome: Ongoing, Progressing     Problem: Device-Related Complication Risk (Hemodialysis)  Goal: Safe, Effective Therapy Delivery  Outcome: Ongoing, Progressing     Problem: Hemodynamic Instability (Hemodialysis)  Goal: Effective Tissue Perfusion  Outcome: Ongoing, Progressing     Problem: Infection (Hemodialysis)  Goal: Absence of Infection Signs and Symptoms  Outcome: Ongoing, Progressing     Problem: Bariatric Environmental Safety  Goal: Safety Maintained with Care  Outcome: Ongoing, Progressing     Problem: Pain Acute  Goal: Acceptable Pain Control and Functional Ability  Outcome: Ongoing, Progressing     Problem: Coping Ineffective  Goal: Effective Coping  Outcome: Ongoing, Progressing

## 2022-11-03 NOTE — PROGRESS NOTES
Patient stated that he is going home with family.  CM called daughter, Jonn Neff [phone]. No answer and voice mail full

## 2022-11-03 NOTE — ASSESSMENT & PLAN NOTE
Advance Care Planning     Date: 11/03/2022  Palliative consulted for additional support. His personal goal is to discharge to home with his family. Currently working on SNF transition to correction due to lack of support at home. Case management also following along to help facilitate SNF.

## 2022-11-03 NOTE — PROGRESS NOTES
[facility] [place]  Palliative Medicine  Progress Note    Patient Name: Adryan Neff  MRN: [id number]  Admission Date: 10/6/2022  Hospital Length of Stay: 28 days  Code Status: Full Code   Attending Provider: Marta Black MD  Consulting Provider: Akbar Bravo MD  Primary Care Physician: Carola Pedro MD  Principal Problem:PAD (peripheral artery disease)    Assessment/Plan:     Advanced care planning/counseling discussion  - Spoke with him today using Palauan Creole Language Line ([id number]). Patient much more easily understood and open to talking in Creole compared to English. Would recommend this be utilized when needing to communicate in order to optimize his understanding of his ongoing medical care.   - He stated that things have been tough without his wife who has has been  to for 14 years. He had completed paperwork for her to come to [place] in 2019 but still she is in [facility]. In the meantime he has lost both his legs in the past year making things even harder.  - He becomes very emotional and tearful when talking about his wife and their distance. He stated he keeps in touch with her and she is aware of ongoing in hospital. He admits to feeling sad throughout the day because he misses his wife, but denies any issues with appetite, sleep, concentration and denies any excessive fatigue. He is not interested in any treatment for depression at this time as he feels a lot of his emotions would improve with his wife being here.   - He used to work as a  up until 2019. Used to love jogging, exercise. Enjoys watching TV, but no particular favorite show.   - He has 5 kids, Oldest to youngest: Lionel (33 y.o) who is in Psychiatric still, Erendira (31), Lia (18) Apple (16), Yyaya (14).   - He does not feel his children care about him and that only his wife does  - He wishes for his wife Mary Claude August ( [phone]) in [facility] to be his only medical decision maker as he feels she is the only  "one who cares for him. I asked him a few times if he wishes to add anyone as an alternative incase his wife cannot be reached and he said no, there is no one else.   - He understands the next step is for him to go to a penitentiary care nursing home where adequate care can be provided for him. He just does not know what will happen after that as he doesn't know when his wife will be able to come to [place] to get him out of the nursing home.     - I talked with  today Martine who is covering for Madonna just today. I will discuss further with Madonna tomorrow when she is back as she has been very involved in Mr. Walls care.     - Plan remains to transition to penitentiary care nursing facility once banking statements arrive, as he does not have the support currently to be cared for at home.         Subjective:     Chief Complaint:   Chief Complaint   Patient presents with    Foot Pain     Pt's daughter translating for patient per request. Reports they were at the podiatrist and sent to the ED for a right foot infection. Reports hx of DM. Pt has left AKA.       HPI:   From primary team note:   "HPI:  This is a 56 year old male with a PMHx of severe PAD s/p gangrene of left foot s/p left AKA (1/2022), ESRD on HD MWF, NICM (EF: 55%, GIDD), pulmonary hypertension, GERD and latent TB filiberto presents for concern for leg infection.       The patient presented to the vascular surgery clinic for a follow up. He reports worsening pain and developing eschar of the right dorsal foot along with foul smelling odor. Given concern for an infection and the need for IV antibiotics & expedited podiatry evaluation, the patient was sent to the ER. The patient denies having any fevers, chills, or injury to his foot. He has a previous AKA and uses a wheelchair to ambulate. While in the ED, the patient was hypertensive but otherwise hemodynamically stable. Labs showed leukocytosis (17.41 - with neutrophilic predominance), normocytic " "anemia (9.7), elevated sed rate (125), elevated CRP (237.2), negative lactate (1.6). X-ray foot showed no radiographic evidence of osteomyelitis. He was administered vancomycin, zosyn and was admitted for further management.      I attempted to call the daughter to obtain further history on 1902 without a response.         Overview/Hospital Course:  Mr Adryan Neff is a 56 y.o. man with PAD. He is s/p L AKA due to gangrene and now presents with worsening R foot infection. He also has ESRD on HD via LUE AVG. Started antibiotics. Podiatry and Ortho consulted with plans for amputation. This was delayed due to PEA on 10/10 with quick ROSC. Unclear etiology but suspect vasovagal event. Cardiology was consulted. He then had R AKA on 10/13/22. Antibiotics were discontinued. He has had malfunctioning of his LUE AVG; Vascular was consulted and fistulogram performed.  SW/CM consulted for discharge planning.  He required blood transfusions for blood loss anemia from R AKA surgical site. Now stable and working on SNF placement."      Hospital Course:  No notes on file    Interval History/subjective:    Seen at bedside today and Language Line used (Luxembourger-Creole) ID# [id number]    Patient is comfortable currently and without pain. Discussed extensively regarding his understanding of ongoing thus far, next steps and overall family dynamics.     Medications:  Continuous Infusions:  Scheduled Meds:   amLODIPine  10 mg Oral Daily    aspirin  81 mg Oral Daily    atorvastatin  40 mg Oral QHS    epoetin paola-epbx  10,000 Units Subcutaneous Every Mon, Wed, Fri    folic acid-vit B6-vit B12 2.5-25-2 mg  1 tablet Oral Daily    hydrALAZINE  50 mg Oral Q8H    insulin detemir U-100  8 Units Subcutaneous QHS    metoprolol succinate  25 mg Oral Daily    pantoprazole  40 mg Oral Daily    sevelamer carbonate  2,400 mg Oral TID WM     PRN Meds:acetaminophen, dextrose 10%, dextrose 10%, glucagon (human recombinant), glucose, glucose, " hydrALAZINE, HYDROcodone-acetaminophen, insulin aspart U-100, melatonin, naloxone, ondansetron, polyethylene glycol, senna, sodium chloride 0.9%, sodium chloride 0.9%    Objective:     Vital Signs (Most Recent):  Temp: 99.3 °F (37.4 °C) (11/03/22 1108)  Pulse: 73 (11/03/22 1108)  Resp: 18 (11/03/22 1108)  BP: 135/61 (11/03/22 1108)  SpO2: 99 % (11/03/22 1108) Vital Signs (24h Range):  Temp:  [98.4 °F (36.9 °C)-99.3 °F (37.4 °C)] 99.3 °F (37.4 °C)  Pulse:  [73-82] 73  Resp:  [18] 18  SpO2:  [96 %-100 %] 99 %  BP: (135-166)/(61-72) 135/61     Weight: 58.3 kg (128 lb 8.5 oz)  Body mass index is 17.93 kg/m².    Physical Exam  Constitutional:       General: He is not in acute distress.     Comments: Tearful   HENT:      Head: Normocephalic and atraumatic.      Nose: Nose normal.   Eyes:      Extraocular Movements: Extraocular movements intact.   Cardiovascular:      Rate and Rhythm: Normal rate.   Pulmonary:      Effort: Pulmonary effort is normal. No respiratory distress.   Musculoskeletal:      Cervical back: Normal range of motion.      Comments: B/l aka   Neurological:      Mental Status: He is alert.      Comments: Awake and alert. emotional when conversating   Psychiatric:      Comments: tearful       Review of Symptoms      Symptom Assessment (ESAS 0-10 Scale)  Pain:  0  Dyspnea:  0  Anxiety:  3  Nausea:  0  Depression:  5  Anorexia:  0  Fatigue:  0  Insomnia:  0  Restlessness:  0  Agitation:  0         Psychosocial/Cultural: Per ACP note        Advance Care Planning   Advance Directives:   Medical Power of : No        Oral Declaration: Yes  Agent's Name:  Mary Claude August   Agent's Contact Number:  [phone]    Decision Making:  Patient answered questions       Significant Labs:   CBC:   Recent Labs   Lab 11/02/22  0425   WBC 11.34   HGB 8.8*   HCT 30.7*   MCV 93   *     BMP:  No results for input(s): GLU, NA, K, CL, CO2, BUN, CREATININE, CALCIUM, MG in the last 24 hours.  LFT:  Lab  Results   Component Value Date    AST 16 10/11/2022    ALKPHOS 62 10/11/2022    BILITOT 0.4 10/11/2022     Albumin:   Albumin   Date Value Ref Range Status   10/11/2022 2.2 (L) 3.5 - 5.2 g/dL Final     Protein:   Total Protein   Date Value Ref Range Status   10/11/2022 9.2 (H) 6.0 - 8.4 g/dL Final     Lactic acid:   Lab Results   Component Value Date    LACTATE 2.7 (H) 10/10/2022    LACTATE 1.6 10/06/2022       Significant Imaging: I have reviewed all pertinent imaging results/findings within the past 24 hours.    > 50% of 60 min visit spent in chart review, face to face discussion of symptom assessment, coordination of care with other specialists, and d/c planning    Akbar Bravo MD  Palliative Medicine  [facility]

## 2022-11-03 NOTE — PT/OT/SLP PROGRESS
Occupational Therapy      Patient Name:  Adryan Neff   MRN:  [id number]    Attempt at 1238: Patient not seen today secondary to MD at bedside talking to pt; py appeared tearful. Will follow-up as able.    11/3/2022

## 2022-11-03 NOTE — ASSESSMENT & PLAN NOTE
S/p L AKA then presented with PAD causing R foot wound. Now s/p R AKA on 10/13  - previously on antibiotics. These were discontinued when source control achieved.   - continue asa, statin  - Pending SNF placement.  - now bilateral AKA-- palliative consulted for support

## 2022-11-03 NOTE — NURSING
Pt lying in bed awake and alert. Bed locked in lowest position. Call light within reach. No complaints at this time.

## 2022-11-04 LAB
POCT GLUCOSE: 129 MG/DL (ref 70–110)
POCT GLUCOSE: 198 MG/DL (ref 70–110)

## 2022-11-04 PROCEDURE — 63600175 PHARM REV CODE 636 W HCPCS: Mod: JG | Performed by: ORTHOPAEDIC SURGERY

## 2022-11-04 PROCEDURE — 80100016 HC MAINTENANCE HEMODIALYSIS

## 2022-11-04 PROCEDURE — 25000003 PHARM REV CODE 250: Performed by: HOSPITALIST

## 2022-11-04 PROCEDURE — 21400001 HC TELEMETRY ROOM

## 2022-11-04 PROCEDURE — 25000003 PHARM REV CODE 250: Performed by: ORTHOPAEDIC SURGERY

## 2022-11-04 PROCEDURE — 25000003 PHARM REV CODE 250: Performed by: INTERNAL MEDICINE

## 2022-11-04 RX ORDER — HYDRALAZINE HYDROCHLORIDE 25 MG/1
75 TABLET, FILM COATED ORAL EVERY 8 HOURS
Status: DISCONTINUED | OUTPATIENT
Start: 2022-11-04 | End: 2022-11-08

## 2022-11-04 RX ADMIN — HYDRALAZINE HYDROCHLORIDE 75 MG: 25 TABLET, FILM COATED ORAL at 02:11

## 2022-11-04 RX ADMIN — ASPIRIN 81 MG CHEWABLE TABLET 81 MG: 81 TABLET CHEWABLE at 02:11

## 2022-11-04 RX ADMIN — PANTOPRAZOLE SODIUM 40 MG: 40 TABLET, DELAYED RELEASE ORAL at 02:11

## 2022-11-04 RX ADMIN — ACETAMINOPHEN 650 MG: 325 TABLET ORAL at 09:11

## 2022-11-04 RX ADMIN — METOPROLOL SUCCINATE 25 MG: 25 TABLET, EXTENDED RELEASE ORAL at 02:11

## 2022-11-04 RX ADMIN — AMLODIPINE BESYLATE 10 MG: 5 TABLET ORAL at 02:11

## 2022-11-04 RX ADMIN — Medication 1 TABLET: at 02:11

## 2022-11-04 RX ADMIN — HYDRALAZINE HYDROCHLORIDE 50 MG: 25 TABLET, FILM COATED ORAL at 06:11

## 2022-11-04 RX ADMIN — EPOETIN ALFA-EPBX 10000 UNITS: 10000 INJECTION, SOLUTION INTRAVENOUS; SUBCUTANEOUS at 09:11

## 2022-11-04 NOTE — PT/OT/SLP PROGRESS
Occupational Therapy      Patient Name:  Adryan Neff   MRN:  [id number]    9:37 AM: Patient not seen today secondary to dialysis.  Will follow-up at a later day/time as able.    11/4/2022

## 2022-11-04 NOTE — PROGRESS NOTES
Seen during HD which he is tolerating well    Awake alert oriented NAD    Denies CNS ENT CP GI  RHEUM OR DERM SX  Past Medical History:   Diagnosis Date    CAD (coronary artery disease) 2016    CAD (non obstructive) 2016 [facility]    Diabetes mellitus type I     Diabetic retinopathy     ESRD on hemodialysis     on HD TThSat    Gangrene of left foot     Hypertension     Nuclear sclerosis of right eye 11/24/2021    PAD (peripheral artery disease)     PEA (Pulseless electrical activity) 10/10/2022    Pulmonary HTN     Right foot infection     TB lung, latent 04/2021    Wet gangrene 1/5/2022     Past Surgical History:   Procedure Laterality Date    ABOVE-KNEE AMPUTATION Left 1/18/2022    Procedure: AMPUTATION, ABOVE KNEE;  Surgeon: Rusty Light MD;  Location: [facility] OR;  Service: Orthopedics;  Laterality: Left;    ABOVE-KNEE AMPUTATION Left 1/21/2022    Procedure: AMPUTATION, ABOVE KNEE;  Surgeon: Rusty Light MD;  Location: [facility] OR;  Service: Orthopedics;  Laterality: Left;    ABOVE-KNEE AMPUTATION Right 10/13/2022    Procedure: AMPUTATION, ABOVE KNEE, RIGHT;  Surgeon: Dimitris Davis MD;  Location: [facility] OR;  Service: Orthopedics;  Laterality: Right;    AV FISTULA PLACEMENT      CATARACT EXTRACTION Left     EYE SURGERY      FISTULOGRAM Left 8/9/2022    Procedure: Fistulogram;  Surgeon: Masoud Soni MD;  Location: [facility] OR;  Service: Vascular;  Laterality: Left;  LEFT VM ON DAUGHTER'S PHONE----PHONE PREOP NOT COMPLETED  CALLED PATIENT ON 8/8/2022 @ 3:34. HE STATED HE IS RESCHEDULING PROCEDURE. NOTIFIED ERWIN @ 3:35PM-LO    FISTULOGRAM Left 10/20/2022    Procedure: Fistulogram;  Surgeon: Masoud Soni MD;  Location: [facility];  Service: Vascular;  Laterality: Left;  Left upper extremity    TOE AMPUTATION Left 1/6/2022    Procedure: AMPUTATION, TOE;  Surgeon: Masoud Soni MD;  Location: [facility] OR;  Service: Vascular;  Laterality: Left;  Left first through fifth toes, possible open  transmetatarsal amputation and all other indicated procedures     Review of patient's allergies indicates:  No Known Allergies    Current Facility-Administered Medications   Medication    acetaminophen tablet 650 mg    amLODIPine tablet 10 mg    aspirin chewable tablet 81 mg    atorvastatin tablet 40 mg    dextrose 10% bolus 125 mL    dextrose 10% bolus 250 mL    epoetin paola-epbx injection 10,000 Units    folic acid-vit B6-vit B12 2.5-25-2 mg tablet 1 tablet    glucagon (human recombinant) injection 1 mg    glucose chewable tablet 16 g    glucose chewable tablet 24 g    hydrALAZINE injection 10 mg    hydrALAZINE tablet 75 mg    HYDROcodone-acetaminophen 5-325 mg per tablet 1 tablet    insulin aspart U-100 pen 0-5 Units    insulin detemir U-100 pen 8 Units    melatonin tablet 6 mg    metoprolol succinate (TOPROL-XL) 24 hr tablet 25 mg    naloxone 0.4 mg/mL injection 0.02 mg    ondansetron disintegrating tablet 8 mg    pantoprazole EC tablet 40 mg    polyethylene glycol packet 17 g    senna tablet 8.6 mg    sevelamer carbonate tablet 2,400 mg    sodium chloride 0.9% flush 10 mL    sodium chloride 0.9% flush 10 mL       LABS    Recent Results (from the past 24 hour(s))   POCT glucose    Collection Time: 11/03/22  4:15 PM   Result Value Ref Range    POCT Glucose 220 (H) 70 - 110 mg/dL   POCT glucose    Collection Time: 11/03/22  8:05 PM   Result Value Ref Range    POCT Glucose 127 (H) 70 - 110 mg/dL   POCT glucose    Collection Time: 11/04/22  7:15 AM   Result Value Ref Range    POCT Glucose 129 (H) 70 - 110 mg/dL   ]    I/O last 3 completed shifts:  In: 360 [P.O.:360]  Out: -     Vitals:    11/03/22 2001 11/04/22 0017 11/04/22 0408 11/04/22 0713   BP: (!) 155/67 (!) 145/65 (!) 167/72 (!) 152/67   Pulse: 71 70 71 68   Resp: 18 18 18 18   Temp: 98.4 °F (36.9 °C) 97.9 °F (36.6 °C) 98.5 °F (36.9 °C) 98.6 °F (37 °C)   TempSrc: Oral Oral Oral Oral   SpO2: 99% 98% (!) 94% 99%   Weight:   58.7 kg (129 lb 6.6 oz)    Height:            No Jvd, Thyromegaly or Lymphadenopathy  Lungs: Fairly clear anteriorly and laterally  Cor: RRR no G or rubs  Abd: Soft benign good bowel sounds non tender  Ext: Bilat LE amps      A)    ESRD on hd mwf  HTN  Anemia of ckd on epo   2nd hyperpth   PAD  Tendency toward hyperpk   CAD  Psych issues   DM       P)    Renal Diet  Home meds  Protect access  HD MWF   EPO prn   Binders prn   Adjust all meds to the degree of renal fx  Close follow up I/O and weights  Maintain Hydration

## 2022-11-04 NOTE — PROGRESS NOTES
[facility] Medicine  Progress Note    Patient Name: Adryan Neff  MRN: [id number]  Patient Class: IP- Inpatient   Admission Date: 10/6/2022  Length of Stay: 29 days  Attending Physician: Nikolay Sawyer MD  Primary Care Provider: Carola Pedro MD        Subjective:     Principal Problem:PAD (peripheral artery disease)        HPI:  This is a 56 year old male with a PMHx of severe PAD s/p gangrene of left foot s/p left AKA (1/2022), ESRD on HD MWF, NICM (EF: 55%, GIDD), pulmonary hypertension, GERD and latent TB filiberto presents for concern for leg infection.       The patient presented to the vascular surgery clinic for a follow up. He reports worsening pain and developing eschar of the right dorsal foot along with foul smelling odor. Given concern for an infection and the need for IV antibiotics & expedited podiatry evaluation, the patient was sent to the ER. The patient denies having any fevers, chills, or injury to his foot. He has a previous AKA and uses a wheelchair to ambulate. While in the ED, the patient was hypertensive but otherwise hemodynamically stable. Labs showed leukocytosis (17.41 - with neutrophilic predominance), normocytic anemia (9.7), elevated sed rate (125), elevated CRP (237.2), negative lactate (1.6). X-ray foot showed no radiographic evidence of osteomyelitis. He was administered vancomycin, zosyn and was admitted for further management.     I attempted to call the daughter to obtain further history on 1902 without a response.         Overview/Hospital Course:  Mr Adryan Neff is a 56 y.o. man with PAD. He is s/p L AKA due to gangrene and now presents with worsening R foot infection. He also has ESRD on HD via LUE AVG. Started antibiotics. Podiatry and Ortho consulted with plans for amputation. This was delayed due to PEA on 10/10 with quick ROSC. Unclear etiology but suspect vasovagal event. Cardiology was consulted. He then had R AKA on 10/13/22. Antibiotics were  discontinued. He has had malfunctioning of his LUE AVG; Vascular was consulted and fistulogram performed.  SW/CM consulted for discharge planning.  He required blood transfusions for blood loss anemia from R AKA surgical site. Now stable and working on SNF placement.       Interval History: No new issues     Review of Systems   Constitutional:  Negative for activity change, appetite change and chills.   HENT:  Negative for congestion.    Eyes:  Negative for discharge.   Respiratory:  Negative for apnea and chest tightness.    Cardiovascular:  Negative for chest pain.   Gastrointestinal:  Negative for abdominal distention.   Endocrine: Negative for cold intolerance.   Genitourinary:  Negative for difficulty urinating and dysuria.   Musculoskeletal:  Negative for arthralgias and back pain.   Neurological:  Negative for dizziness and facial asymmetry.   Psychiatric/Behavioral:  Negative for agitation and behavioral problems.    Objective:     Vital Signs (Most Recent):  Temp: 98.5 °F (36.9 °C) (11/04/22 0408)  Pulse: 71 (11/04/22 0408)  Resp: 18 (11/04/22 0408)  BP: (!) 167/72 (11/04/22 0408)  SpO2: (!) 94 % (11/04/22 0408)   Vital Signs (24h Range):  Temp:  [97.9 °F (36.6 °C)-99.3 °F (37.4 °C)] 98.5 °F (36.9 °C)  Pulse:  [70-78] 71  Resp:  [18] 18  SpO2:  [94 %-99 %] 94 %  BP: (133-167)/(61-72) 167/72     Weight: 58.7 kg (129 lb 6.6 oz)  Body mass index is 18.05 kg/m².    Intake/Output Summary (Last 24 hours) at 11/4/2022 0644  Last data filed at 11/3/2022 1200  Gross per 24 hour   Intake 240 ml   Output --   Net 240 ml      Physical Exam  Constitutional:       General: He is not in acute distress.     Appearance: Normal appearance. He is not ill-appearing, toxic-appearing or diaphoretic.   HENT:      Head: Normocephalic and atraumatic.   Eyes:      Conjunctiva/sclera: Conjunctivae normal.   Cardiovascular:      Rate and Rhythm: Normal rate and regular rhythm.   Pulmonary:      Effort: Pulmonary effort is normal.       Breath sounds: Normal breath sounds.   Skin:     General: Skin is warm and dry.   Neurological:      Mental Status: He is alert and oriented to person, place, and time.   Psychiatric:         Behavior: Behavior normal.         Thought Content: Thought content normal.       Significant Labs: All pertinent labs within the past 24 hours have been reviewed.  BMP: No results for input(s): GLU, NA, K, CL, CO2, BUN, CREATININE, CALCIUM, MG in the last 48 hours.  CBC: No results for input(s): WBC, HGB, HCT, PLT in the last 48 hours.    Significant Imaging:       Assessment/Plan:      * PAD (peripheral artery disease)  S/p L AKA then presented with PAD causing R foot wound. Now s/p R AKA on 10/13  - previously on antibiotics. These were discontinued when source control achieved.   - continue asa, statin  - Pending SNF placement.  - now bilateral AKA-- palliative consulted for support     Advanced care planning/counseling discussion  Advance Care Planning     Date: 11/03/2022  Palliative consulted for additional support. His personal goal is to discharge to home with his family. Currently working on SNF transition to detention due to lack of support at home. Case management also following along to help facilitate SNF.           AV graft malfunction  Vascular following.  S/p fistulogram      Cognitive changes  Noted by Psychiatry that he has had multiple episodes of confusion and not understanding treatment plan. Unclear if this is delirium associated or beginnings of cognitive dysfunction. Confusion has resolved.      Anemia due to chronic kidney disease, on chronic dialysis  - Anemic - has been transfused this admission pre and post surgery  - Continue EPO per Nephrology  - With anemia of acute blood loss from bleeding from right stump.   - Hgb now stable      ESRD on hemodialysis  Dialyzes via LUE AVG  There was concern about AVG malfunction. Vascular surgery consulted  S/p fistulogram.  Nephrology following.     CAD  (coronary artery disease) - non-obstructive from [facility] in 2016  - Resume aspirin, statin       Chronic diastolic heart failure  - Echocardiogram (11/24/2021): EF: 55%, GIDD, mild TR, moderate to severe MR  - no signs of acute exacerbation  - Continue home medications     Gastroesophageal reflux disease  - Coninue PPI     Pure hypercholesterolemia  - Continue statin     Essential (primary) hypertension  BP generally controlled  - Continue home medications    Controlled type 2 diabetes mellitus with chronic kidney disease on chronic dialysis, with long-term current use of insulin  - Last A1c:   Lab Results   Component Value Date    HGBA1C 6.1 (H) 01/07/2021     Meds: detemir + SSI PRN to maintain goal 140-180  ADA diet, accuchecks, hypoglycemic protocol        VTE Risk Mitigation (From admission, onward)         Ordered     IP VTE HIGH RISK PATIENT  Once         10/06/22 1819     Place sequential compression device  Until discontinued         10/06/22 1819                Discharge Planning   ENIO:      Code Status: Full Code   Is the patient medically ready for discharge?: Yes    Reason for patient still in hospital (select all that apply):  Discharge Plan A: New Nursing Home placement - [facility] care facility   Discharge Delays: (!) Other (Difficulty getting bank statement for July.)    Awaiting SNF placement.        Nikolay Burch MD  Department of Hospital Medicine   [facility] - Telemetry

## 2022-11-04 NOTE — PT/OT/SLP PROGRESS
Physical Therapy      Patient Name:  Adryan Neff   MRN:  [id number]    Patient not seen today secondary to Dialysis. Will follow-up later.

## 2022-11-04 NOTE — PLAN OF CARE
[place] - Telemetry  Discharge Reassessment    Primary Care Provider: Carola Pedro MD    Expected Discharge Date: 11/8/2022    CM spoke to pt's daughterLuis F to discuss dc needs. CM reminded pt's daughter to check mailbox for bank statements. [facility] is holding pt's bed. CM will follow up.    Reassessment (most recent)       Discharge Reassessment - 11/04/22 [facility]          Discharge Reassessment    Assessment Type Discharge Planning Brief Assessment (P)      Did the patient's condition or plan change since previous assessment? Yes (P)      Discharge Plan discussed with: Adult children (P)      Discharge Plan A New Nursing Home placement - [facility] care facility (P)      Discharge Plan B Home with family (P)      DME Needed Upon Discharge  other (see comments) (P)    tbd    Discharge Barriers Identified Other (see comments) (P)    pt doesn't have  alot of family here in the [place]    Why the patient remains in the hospital Requires continued medical care (P)         Post-Acute Status    Coverage Medicare (P)      Discharge Delays Post-Acute Set-up (P)

## 2022-11-04 NOTE — SUBJECTIVE & OBJECTIVE
Interval History: No new issues     Review of Systems   Constitutional:  Negative for activity change, appetite change and chills.   HENT:  Negative for congestion.    Eyes:  Negative for discharge.   Respiratory:  Negative for apnea and chest tightness.    Cardiovascular:  Negative for chest pain.   Gastrointestinal:  Negative for abdominal distention.   Endocrine: Negative for cold intolerance.   Genitourinary:  Negative for difficulty urinating and dysuria.   Musculoskeletal:  Negative for arthralgias and back pain.   Neurological:  Negative for dizziness and facial asymmetry.   Psychiatric/Behavioral:  Negative for agitation and behavioral problems.    Objective:     Vital Signs (Most Recent):  Temp: 98.5 °F (36.9 °C) (11/04/22 0408)  Pulse: 71 (11/04/22 0408)  Resp: 18 (11/04/22 0408)  BP: (!) 167/72 (11/04/22 0408)  SpO2: (!) 94 % (11/04/22 0408)   Vital Signs (24h Range):  Temp:  [97.9 °F (36.6 °C)-99.3 °F (37.4 °C)] 98.5 °F (36.9 °C)  Pulse:  [70-78] 71  Resp:  [18] 18  SpO2:  [94 %-99 %] 94 %  BP: (133-167)/(61-72) 167/72     Weight: 58.7 kg (129 lb 6.6 oz)  Body mass index is 18.05 kg/m².    Intake/Output Summary (Last 24 hours) at 11/4/2022 0644  Last data filed at 11/3/2022 1200  Gross per 24 hour   Intake 240 ml   Output --   Net 240 ml      Physical Exam  Constitutional:       General: He is not in acute distress.     Appearance: Normal appearance. He is not ill-appearing, toxic-appearing or diaphoretic.   HENT:      Head: Normocephalic and atraumatic.   Eyes:      Conjunctiva/sclera: Conjunctivae normal.   Cardiovascular:      Rate and Rhythm: Normal rate and regular rhythm.   Pulmonary:      Effort: Pulmonary effort is normal.      Breath sounds: Normal breath sounds.   Skin:     General: Skin is warm and dry.   Neurological:      Mental Status: He is alert and oriented to person, place, and time.   Psychiatric:         Behavior: Behavior normal.         Thought Content: Thought content normal.        Significant Labs: All pertinent labs within the past 24 hours have been reviewed.  BMP: No results for input(s): GLU, NA, K, CL, CO2, BUN, CREATININE, CALCIUM, MG in the last 48 hours.  CBC: No results for input(s): WBC, HGB, HCT, PLT in the last 48 hours.    Significant Imaging:

## 2022-11-05 LAB
POCT GLUCOSE: 104 MG/DL (ref 70–110)
POCT GLUCOSE: 179 MG/DL (ref 70–110)
POCT GLUCOSE: 275 MG/DL (ref 70–110)
POCT GLUCOSE: 97 MG/DL (ref 70–110)

## 2022-11-05 PROCEDURE — 25000003 PHARM REV CODE 250: Performed by: HOSPITALIST

## 2022-11-05 PROCEDURE — 25000003 PHARM REV CODE 250: Performed by: ORTHOPAEDIC SURGERY

## 2022-11-05 PROCEDURE — 21400001 HC TELEMETRY ROOM

## 2022-11-05 RX ADMIN — ASPIRIN 81 MG CHEWABLE TABLET 81 MG: 81 TABLET CHEWABLE at 08:11

## 2022-11-05 RX ADMIN — METOPROLOL SUCCINATE 25 MG: 25 TABLET, EXTENDED RELEASE ORAL at 08:11

## 2022-11-05 RX ADMIN — HYDROCODONE BITARTRATE AND ACETAMINOPHEN 1 TABLET: 5; 325 TABLET ORAL at 08:11

## 2022-11-05 RX ADMIN — Medication 1 TABLET: at 08:11

## 2022-11-05 RX ADMIN — INSULIN ASPART 3 UNITS: 100 INJECTION, SOLUTION INTRAVENOUS; SUBCUTANEOUS at 11:11

## 2022-11-05 RX ADMIN — AMLODIPINE BESYLATE 10 MG: 5 TABLET ORAL at 08:11

## 2022-11-05 RX ADMIN — PANTOPRAZOLE SODIUM 40 MG: 40 TABLET, DELAYED RELEASE ORAL at 08:11

## 2022-11-05 RX ADMIN — SEVELAMER CARBONATE 2400 MG: 800 TABLET, FILM COATED ORAL at 08:11

## 2022-11-05 RX ADMIN — HYDROCODONE BITARTRATE AND ACETAMINOPHEN 1 TABLET: 5; 325 TABLET ORAL at 02:11

## 2022-11-05 NOTE — PROGRESS NOTES
[facility] Medicine  Progress Note    Patient Name: Adryan Neff  MRN: [id number]  Patient Class: IP- Inpatient   Admission Date: 10/6/2022  Length of Stay: 30 days  Attending Physician: Nikolay Sawyer MD  Primary Care Provider: Carola Pedro MD        Subjective:     Principal Problem:PAD (peripheral artery disease)        HPI:  This is a 56 year old male with a PMHx of severe PAD s/p gangrene of left foot s/p left AKA (1/2022), ESRD on HD MWF, NICM (EF: 55%, GIDD), pulmonary hypertension, GERD and latent TB filiberto presents for concern for leg infection.       The patient presented to the vascular surgery clinic for a follow up. He reports worsening pain and developing eschar of the right dorsal foot along with foul smelling odor. Given concern for an infection and the need for IV antibiotics & expedited podiatry evaluation, the patient was sent to the ER. The patient denies having any fevers, chills, or injury to his foot. He has a previous AKA and uses a wheelchair to ambulate. While in the ED, the patient was hypertensive but otherwise hemodynamically stable. Labs showed leukocytosis (17.41 - with neutrophilic predominance), normocytic anemia (9.7), elevated sed rate (125), elevated CRP (237.2), negative lactate (1.6). X-ray foot showed no radiographic evidence of osteomyelitis. He was administered vancomycin, zosyn and was admitted for further management.     I attempted to call the daughter to obtain further history on 1902 without a response.         Overview/Hospital Course:  Mr Adryan Neff is a 56 y.o. man with PAD. He is s/p L AKA due to gangrene and now presents with worsening R foot infection. He also has ESRD on HD via LUE AVG. Started antibiotics. Podiatry and Ortho consulted with plans for amputation. This was delayed due to PEA on 10/10 with quick ROSC. Unclear etiology but suspect vasovagal event. Cardiology was consulted. He then had R AKA on 10/13/22. Antibiotics were  discontinued. He has had malfunctioning of his LUE AVG; Vascular was consulted and fistulogram performed.  SW/CM consulted for discharge planning.  He required blood transfusions for blood loss anemia from R AKA surgical site. Now stable and working on SNF placement.       Interval History: No new issues     Review of Systems   Constitutional:  Negative for activity change, appetite change and chills.   HENT:  Negative for congestion.    Eyes:  Negative for discharge.   Respiratory:  Negative for apnea and chest tightness.    Cardiovascular:  Negative for chest pain.   Gastrointestinal:  Negative for abdominal distention.   Endocrine: Negative for cold intolerance.   Genitourinary:  Negative for difficulty urinating and dysuria.   Musculoskeletal:  Negative for arthralgias and back pain.   Neurological:  Negative for dizziness and facial asymmetry.   Psychiatric/Behavioral:  Negative for agitation and behavioral problems.    Objective:     Vital Signs (Most Recent):  Temp: 98.4 °F (36.9 °C) (11/05/22 0744)  Pulse: 68 (11/05/22 0744)  Resp: 18 (11/05/22 0843)  BP: (!) 145/67 (11/05/22 0744)  SpO2: 99 % (11/05/22 0744)   Vital Signs (24h Range):  Temp:  [98.2 °F (36.8 °C)-98.5 °F (36.9 °C)] 98.4 °F (36.9 °C)  Pulse:  [68-87] 68  Resp:  [18] 18  SpO2:  [99 %-100 %] 99 %  BP: (113-157)/(55-81) 145/67     Weight: 58.3 kg (128 lb 8.5 oz)  Body mass index is 17.93 kg/m².    Intake/Output Summary (Last 24 hours) at 11/5/2022 1027  Last data filed at 11/5/2022 0900  Gross per 24 hour   Intake 1100 ml   Output 1500 ml   Net -400 ml      Physical Exam  Constitutional:       General: He is not in acute distress.     Appearance: Normal appearance. He is not ill-appearing, toxic-appearing or diaphoretic.   HENT:      Head: Normocephalic and atraumatic.   Eyes:      Conjunctiva/sclera: Conjunctivae normal.   Cardiovascular:      Rate and Rhythm: Normal rate and regular rhythm.   Pulmonary:      Effort: Pulmonary effort is normal.       Breath sounds: Normal breath sounds.   Skin:     General: Skin is warm and dry.   Neurological:      Mental Status: He is alert and oriented to person, place, and time.   Psychiatric:         Behavior: Behavior normal.         Thought Content: Thought content normal.       Significant Labs: All pertinent labs within the past 24 hours have been reviewed.  BMP: No results for input(s): GLU, NA, K, CL, CO2, BUN, CREATININE, CALCIUM, MG in the last 48 hours.  CBC: No results for input(s): WBC, HGB, HCT, PLT in the last 48 hours.    Significant Imaging:       Assessment/Plan:      * PAD (peripheral artery disease)  S/p L AKA then presented with PAD causing R foot wound. Now s/p R AKA on 10/13  - previously on antibiotics. These were discontinued when source control achieved.   - continue asa, statin  - Pending SNF placement.  - now bilateral AKA-- palliative consulted for support     Advanced care planning/counseling discussion  Advance Care Planning     Date: 11/03/2022  Palliative consulted for additional support. His personal goal is to discharge to home with his family. Currently working on SNF transition to [facility] due to lack of support at home. Case management also following along to help facilitate SNF.           AV graft malfunction  Vascular following.  S/p fistulogram      Cognitive changes  Noted by Psychiatry that he has had multiple episodes of confusion and not understanding treatment plan. Unclear if this is delirium associated or beginnings of cognitive dysfunction. Confusion has resolved.      Anemia due to chronic kidney disease, on chronic dialysis  - Anemic - has been transfused this admission pre and post surgery  - Continue EPO per Nephrology  - With anemia of acute blood loss from bleeding from right stump.   - Hgb now stable      ESRD on hemodialysis  Dialyzes via LUE AVG  There was concern about AVG malfunction. Vascular surgery consulted  S/p fistulogram.  Nephrology following.     CAD  (coronary artery disease) - non-obstructive from [facility] in 2016  - Resume aspirin, statin       Chronic diastolic heart failure  - Echocardiogram (11/24/2021): EF: 55%, GIDD, mild TR, moderate to severe MR  - no signs of acute exacerbation  - Continue home medications     Gastroesophageal reflux disease  - Coninue PPI     Pure hypercholesterolemia  - Continue statin     Essential (primary) hypertension  BP generally controlled  - Continue home medications    Controlled type 2 diabetes mellitus with chronic kidney disease on chronic dialysis, with long-term current use of insulin  - Last A1c:   Lab Results   Component Value Date    HGBA1C 6.1 (H) 01/07/2021     Meds: detemir + SSI PRN to maintain goal 140-180  ADA diet, accuchecks, hypoglycemic protocol        VTE Risk Mitigation (From admission, onward)         Ordered     IP VTE HIGH RISK PATIENT  Once         10/06/22 1819     Place sequential compression device  Until discontinued         10/06/22 1819                Discharge Planning   ENIO: 11/8/2022     Code Status: Full Code   Is the patient medically ready for discharge?: Yes    Reason for patient still in hospital (select all that apply)  Discharge Plan A: New Nursing Home placement - senior living care facility   Discharge Delays: (!) Post-Acute Set-up              Nikolay Burch MD  Department of Hospital Medicine   [facility] [place] - Telemetry

## 2022-11-05 NOTE — PLAN OF CARE
Problem: Fall Injury Risk  Goal: Absence of Fall and Fall-Related Injury  Outcome: Ongoing, Progressing     Problem: Diabetes Comorbidity  Goal: Blood Glucose Level Within Targeted Range  Outcome: Ongoing, Progressing     Problem: Infection  Goal: Absence of Infection Signs and Symptoms  Outcome: Ongoing, Progressing     Problem: Impaired Wound Healing  Goal: Optimal Wound Healing  Outcome: Ongoing, Progressing     Problem: Hemodynamic Instability (Hemodialysis)  Goal: Effective Tissue Perfusion  Outcome: Ongoing, Progressing     Problem: Bariatric Environmental Safety  Goal: Safety Maintained with Care  Outcome: Ongoing, Progressing     Problem: Pain Acute  Goal: Acceptable Pain Control and Functional Ability  Outcome: Ongoing, Progressing    Problem: Coping Ineffective  Goal: Effective Coping  11/5/2022 0339 by Aravind Valerio RN  Outcome: Ongoing, Not Progressing  11/5/2022 0229 by Aravind Valerio RN  Outcome: Ongoing, Not Progressing

## 2022-11-05 NOTE — PLAN OF CARE
Problem: Skin Injury Risk Increased  Goal: Skin Health and Integrity  Outcome: Ongoing, Progressing  Intervention: Optimize Skin Protection  Flowsheets (Taken 11/5/2022 1707)  Pressure Reduction Techniques: frequent weight shift encouraged  Pressure Reduction Devices: positioning supports utilized  Skin Protection:   adhesive use limited   incontinence pads utilized   skin-to-device areas padded   skin-to-skin areas padded  Head of Bed (HOB) Positioning: HOB elevated  Intervention: Promote and Optimize Oral Intake  Flowsheets (Taken 11/5/2022 1707)  Oral Nutrition Promotion: medicated

## 2022-11-05 NOTE — SUBJECTIVE & OBJECTIVE
Interval History: No new issues     Review of Systems   Constitutional:  Negative for activity change, appetite change and chills.   HENT:  Negative for congestion.    Eyes:  Negative for discharge.   Respiratory:  Negative for apnea and chest tightness.    Cardiovascular:  Negative for chest pain.   Gastrointestinal:  Negative for abdominal distention.   Endocrine: Negative for cold intolerance.   Genitourinary:  Negative for difficulty urinating and dysuria.   Musculoskeletal:  Negative for arthralgias and back pain.   Neurological:  Negative for dizziness and facial asymmetry.   Psychiatric/Behavioral:  Negative for agitation and behavioral problems.    Objective:     Vital Signs (Most Recent):  Temp: 98.4 °F (36.9 °C) (11/05/22 0744)  Pulse: 68 (11/05/22 0744)  Resp: 18 (11/05/22 0843)  BP: (!) 145/67 (11/05/22 0744)  SpO2: 99 % (11/05/22 0744)   Vital Signs (24h Range):  Temp:  [98.2 °F (36.8 °C)-98.5 °F (36.9 °C)] 98.4 °F (36.9 °C)  Pulse:  [68-87] 68  Resp:  [18] 18  SpO2:  [99 %-100 %] 99 %  BP: (113-157)/(55-81) 145/67     Weight: 58.3 kg (128 lb 8.5 oz)  Body mass index is 17.93 kg/m².    Intake/Output Summary (Last 24 hours) at 11/5/2022 1027  Last data filed at 11/5/2022 0900  Gross per 24 hour   Intake 1100 ml   Output 1500 ml   Net -400 ml      Physical Exam  Constitutional:       General: He is not in acute distress.     Appearance: Normal appearance. He is not ill-appearing, toxic-appearing or diaphoretic.   HENT:      Head: Normocephalic and atraumatic.   Eyes:      Conjunctiva/sclera: Conjunctivae normal.   Cardiovascular:      Rate and Rhythm: Normal rate and regular rhythm.   Pulmonary:      Effort: Pulmonary effort is normal.      Breath sounds: Normal breath sounds.   Skin:     General: Skin is warm and dry.   Neurological:      Mental Status: He is alert and oriented to person, place, and time.   Psychiatric:         Behavior: Behavior normal.         Thought Content: Thought content normal.        Significant Labs: All pertinent labs within the past 24 hours have been reviewed.  BMP: No results for input(s): GLU, NA, K, CL, CO2, BUN, CREATININE, CALCIUM, MG in the last 48 hours.  CBC: No results for input(s): WBC, HGB, HCT, PLT in the last 48 hours.    Significant Imaging:

## 2022-11-05 NOTE — NURSING
"Pt refused his night time medications. Explained to patient the importance of being medication compliant, continued to refuse. He was offered an  in case he was not understanding fully, he also declined. Pt expressed no needs at this time. He answers all questions appropriately. Safety of environment maintained. Also explained to pt that his IV was old and Nurse would like to start a new one, he pulled his arm away and stated "no".   "

## 2022-11-06 LAB
POCT GLUCOSE: 135 MG/DL (ref 70–110)
POCT GLUCOSE: 165 MG/DL (ref 70–110)
POCT GLUCOSE: 89 MG/DL (ref 70–110)

## 2022-11-06 PROCEDURE — 25000003 PHARM REV CODE 250: Performed by: INTERNAL MEDICINE

## 2022-11-06 PROCEDURE — 25000003 PHARM REV CODE 250: Performed by: ORTHOPAEDIC SURGERY

## 2022-11-06 PROCEDURE — 25000003 PHARM REV CODE 250: Performed by: HOSPITALIST

## 2022-11-06 PROCEDURE — 21400001 HC TELEMETRY ROOM

## 2022-11-06 RX ADMIN — Medication 1 TABLET: at 08:11

## 2022-11-06 RX ADMIN — ASPIRIN 81 MG CHEWABLE TABLET 81 MG: 81 TABLET CHEWABLE at 08:11

## 2022-11-06 RX ADMIN — HYDRALAZINE HYDROCHLORIDE 75 MG: 25 TABLET, FILM COATED ORAL at 01:11

## 2022-11-06 RX ADMIN — METOPROLOL SUCCINATE 25 MG: 25 TABLET, EXTENDED RELEASE ORAL at 08:11

## 2022-11-06 RX ADMIN — HYDROCODONE BITARTRATE AND ACETAMINOPHEN 1 TABLET: 5; 325 TABLET ORAL at 04:11

## 2022-11-06 RX ADMIN — AMLODIPINE BESYLATE 10 MG: 5 TABLET ORAL at 08:11

## 2022-11-06 RX ADMIN — PANTOPRAZOLE SODIUM 40 MG: 40 TABLET, DELAYED RELEASE ORAL at 08:11

## 2022-11-06 NOTE — NURSING
Pt refused vitals and accu-check from tech. Pt allowed vitals from Nurse but then he pulled his arm away from Nurse when accu-check was attempted. Pt could not give a reason to why he did not want an accucheck. Pt alert and oriented at this time.

## 2022-11-06 NOTE — PLAN OF CARE
Problem: Adult Inpatient Plan of Care  Goal: Plan of Care Review  Outcome: Ongoing, Progressing  Goal: Patient-Specific Goal (Individualized)  Outcome: Ongoing, Progressing  Goal: Absence of Hospital-Acquired Illness or Injury  Outcome: Ongoing, Progressing  Goal: Optimal Comfort and Wellbeing  Outcome: Ongoing, Progressing  Goal: Readiness for Transition of Care  Outcome: Ongoing, Progressing     Problem: Skin Injury Risk Increased  Goal: Skin Health and Integrity  Outcome: Ongoing, Progressing     Problem: Fall Injury Risk  Goal: Absence of Fall and Fall-Related Injury  Outcome: Ongoing, Progressing     Problem: Diabetes Comorbidity  Goal: Blood Glucose Level Within Targeted Range  Outcome: Ongoing, Progressing     Problem: Impaired Wound Healing  Goal: Optimal Wound Healing  Outcome: Ongoing, Progressing     Problem: Hemodynamic Instability (Hemodialysis)  Goal: Effective Tissue Perfusion  Outcome: Ongoing, Progressing

## 2022-11-06 NOTE — SUBJECTIVE & OBJECTIVE
Interval History: No new issues     Review of Systems   Constitutional:  Negative for activity change, appetite change and chills.   HENT:  Negative for congestion.    Eyes:  Negative for discharge.   Respiratory:  Negative for apnea and chest tightness.    Cardiovascular:  Negative for chest pain.   Gastrointestinal:  Negative for abdominal distention.   Endocrine: Negative for cold intolerance.   Genitourinary:  Negative for difficulty urinating and dysuria.   Musculoskeletal:  Negative for arthralgias and back pain.   Neurological:  Negative for dizziness and facial asymmetry.   Psychiatric/Behavioral:  Negative for agitation and behavioral problems.    Objective:     Vital Signs (Most Recent):  Temp: 98.4 °F (36.9 °C) (11/06/22 0715)  Pulse: 66 (11/06/22 0715)  Resp: 20 (11/06/22 0715)  BP: (!) 158/70 (11/06/22 0715)  SpO2: 97 % (11/06/22 0715)   Vital Signs (24h Range):  Temp:  [98.2 °F (36.8 °C)-98.5 °F (36.9 °C)] 98.4 °F (36.9 °C)  Pulse:  [62-66] 66  Resp:  [18-20] 20  SpO2:  [97 %-100 %] 97 %  BP: (121-158)/(58-70) 158/70     Weight: 59.1 kg (130 lb 4.7 oz)  Body mass index is 18.17 kg/m².    Intake/Output Summary (Last 24 hours) at 11/6/2022 0946  Last data filed at 11/5/2022 2148  Gross per 24 hour   Intake 120 ml   Output --   Net 120 ml      Physical Exam  Constitutional:       General: He is not in acute distress.     Appearance: Normal appearance. He is not ill-appearing, toxic-appearing or diaphoretic.   HENT:      Head: Normocephalic and atraumatic.   Eyes:      Conjunctiva/sclera: Conjunctivae normal.   Cardiovascular:      Rate and Rhythm: Normal rate and regular rhythm.   Pulmonary:      Effort: Pulmonary effort is normal.      Breath sounds: Normal breath sounds.   Skin:     General: Skin is warm and dry.   Neurological:      Mental Status: He is alert and oriented to person, place, and time.   Psychiatric:         Behavior: Behavior normal.         Thought Content: Thought content normal.        Significant Labs: All pertinent labs within the past 24 hours have been reviewed.  BMP: No results for input(s): GLU, NA, K, CL, CO2, BUN, CREATININE, CALCIUM, MG in the last 48 hours.  CBC: No results for input(s): WBC, HGB, HCT, PLT in the last 48 hours.    Significant Imaging:

## 2022-11-06 NOTE — PLAN OF CARE
Problem: Adult Inpatient Plan of Care  Goal: Optimal Comfort and Wellbeing  Outcome: Ongoing, Progressing     Problem: Fall Injury Risk  Goal: Absence of Fall and Fall-Related Injury  Outcome: Ongoing, Progressing     Problem: Diabetes Comorbidity  Goal: Blood Glucose Level Within Targeted Range  Outcome: Ongoing, Progressing     Problem: Impaired Wound Healing  Goal: Optimal Wound Healing  Outcome: Ongoing, Progressing     Problem: Bariatric Environmental Safety  Goal: Safety Maintained with Care  Outcome: Ongoing, Progressing     Problem: Coping Ineffective  Goal: Effective Coping  Outcome: Ongoing, Not Progressing   Pt continues to refuse medications and select care at night, appears to be more compliant during the a.m. shift.

## 2022-11-06 NOTE — PROGRESS NOTES
[facility] Medicine  Progress Note    Patient Name: Adryan Neff  MRN: [id number]  Patient Class: IP- Inpatient   Admission Date: 10/6/2022  Length of Stay: 31 days  Attending Physician: Nikolay Sawyer MD  Primary Care Provider: Carola Pedro MD        Subjective:     Principal Problem:PAD (peripheral artery disease)        HPI:  This is a 56 year old male with a PMHx of severe PAD s/p gangrene of left foot s/p left AKA (1/2022), ESRD on HD MWF, NICM (EF: 55%, GIDD), pulmonary hypertension, GERD and latent TB filiberto presents for concern for leg infection.       The patient presented to the vascular surgery clinic for a follow up. He reports worsening pain and developing eschar of the right dorsal foot along with foul smelling odor. Given concern for an infection and the need for IV antibiotics & expedited podiatry evaluation, the patient was sent to the ER. The patient denies having any fevers, chills, or injury to his foot. He has a previous AKA and uses a wheelchair to ambulate. While in the ED, the patient was hypertensive but otherwise hemodynamically stable. Labs showed leukocytosis (17.41 - with neutrophilic predominance), normocytic anemia (9.7), elevated sed rate (125), elevated CRP (237.2), negative lactate (1.6). X-ray foot showed no radiographic evidence of osteomyelitis. He was administered vancomycin, zosyn and was admitted for further management.     I attempted to call the daughter to obtain further history on 1902 without a response.         Overview/Hospital Course:  Mr Adryan Neff is a 56 y.o. man with PAD. He is s/p L AKA due to gangrene and now presents with worsening R foot infection. He also has ESRD on HD via LUE AVG. Started antibiotics. Podiatry and Ortho consulted with plans for amputation. This was delayed due to PEA on 10/10 with quick ROSC. Unclear etiology but suspect vasovagal event. Cardiology was consulted. He then had R AKA on 10/13/22. Antibiotics were  discontinued. He has had malfunctioning of his LUE AVG; Vascular was consulted and fistulogram performed.  SW/CM consulted for discharge planning.  He required blood transfusions for blood loss anemia from R AKA surgical site. Now stable and working on SNF placement.       Interval History: No new issues     Review of Systems   Constitutional:  Negative for activity change, appetite change and chills.   HENT:  Negative for congestion.    Eyes:  Negative for discharge.   Respiratory:  Negative for apnea and chest tightness.    Cardiovascular:  Negative for chest pain.   Gastrointestinal:  Negative for abdominal distention.   Endocrine: Negative for cold intolerance.   Genitourinary:  Negative for difficulty urinating and dysuria.   Musculoskeletal:  Negative for arthralgias and back pain.   Neurological:  Negative for dizziness and facial asymmetry.   Psychiatric/Behavioral:  Negative for agitation and behavioral problems.    Objective:     Vital Signs (Most Recent):  Temp: 98.4 °F (36.9 °C) (11/06/22 0715)  Pulse: 66 (11/06/22 0715)  Resp: 20 (11/06/22 0715)  BP: (!) 158/70 (11/06/22 0715)  SpO2: 97 % (11/06/22 0715)   Vital Signs (24h Range):  Temp:  [98.2 °F (36.8 °C)-98.5 °F (36.9 °C)] 98.4 °F (36.9 °C)  Pulse:  [62-66] 66  Resp:  [18-20] 20  SpO2:  [97 %-100 %] 97 %  BP: (121-158)/(58-70) 158/70     Weight: 59.1 kg (130 lb 4.7 oz)  Body mass index is 18.17 kg/m².    Intake/Output Summary (Last 24 hours) at 11/6/2022 0946  Last data filed at 11/5/2022 2148  Gross per 24 hour   Intake 120 ml   Output --   Net 120 ml      Physical Exam  Constitutional:       General: He is not in acute distress.     Appearance: Normal appearance. He is not ill-appearing, toxic-appearing or diaphoretic.   HENT:      Head: Normocephalic and atraumatic.   Eyes:      Conjunctiva/sclera: Conjunctivae normal.   Cardiovascular:      Rate and Rhythm: Normal rate and regular rhythm.   Pulmonary:      Effort: Pulmonary effort is normal.       Breath sounds: Normal breath sounds.   Skin:     General: Skin is warm and dry.   Neurological:      Mental Status: He is alert and oriented to person, place, and time.   Psychiatric:         Behavior: Behavior normal.         Thought Content: Thought content normal.       Significant Labs: All pertinent labs within the past 24 hours have been reviewed.  BMP: No results for input(s): GLU, NA, K, CL, CO2, BUN, CREATININE, CALCIUM, MG in the last 48 hours.  CBC: No results for input(s): WBC, HGB, HCT, PLT in the last 48 hours.    Significant Imaging:       Assessment/Plan:      * PAD (peripheral artery disease)  S/p L AKA then presented with PAD causing R foot wound. Now s/p R AKA on 10/13  - previously on antibiotics. These were discontinued when source control achieved.   - continue asa, statin  - Pending SNF placement.  - now bilateral AKA-- palliative consulted for support     Advanced care planning/counseling discussion  Advance Care Planning     Date: 11/03/2022  Palliative consulted for additional support. His personal goal is to discharge to home with his family. Currently working on SNF transition to [facility] due to lack of support at home. Case management also following along to help facilitate SNF.           AV graft malfunction  Vascular following.  S/p fistulogram      Cognitive changes  Noted by Psychiatry that he has had multiple episodes of confusion and not understanding treatment plan. Unclear if this is delirium associated or beginnings of cognitive dysfunction. Confusion has resolved.      Anemia due to chronic kidney disease, on chronic dialysis  - Anemic - has been transfused this admission pre and post surgery  - Continue EPO per Nephrology  - With anemia of acute blood loss from bleeding from right stump.   - Hgb now stable      ESRD on hemodialysis  Dialyzes via LUE AVG  There was concern about AVG malfunction. Vascular surgery consulted  S/p fistulogram.  Nephrology following.     CAD  (coronary artery disease) - non-obstructive from [facility] in 2016  - Resume aspirin, statin       Chronic diastolic heart failure  - Echocardiogram (11/24/2021): EF: 55%, GIDD, mild TR, moderate to severe MR  - no signs of acute exacerbation  - Continue home medications     Gastroesophageal reflux disease  - Coninue PPI     Pure hypercholesterolemia  - Continue statin     Essential (primary) hypertension  BP generally controlled  - Continue home medications    Controlled type 2 diabetes mellitus with chronic kidney disease on chronic dialysis, with long-term current use of insulin  - Last A1c:   Lab Results   Component Value Date    HGBA1C 6.1 (H) 01/07/2021     Meds: detemir + SSI PRN to maintain goal 140-180  ADA diet, accuchecks, hypoglycemic protocol        VTE Risk Mitigation (From admission, onward)         Ordered     IP VTE HIGH RISK PATIENT  Once         10/06/22 1819     Place sequential compression device  Until discontinued         10/06/22 1819                Discharge Planning   ENIO: 11/8/2022     Code Status: Full Code   Is the patient medically ready for discharge?: Yes    Reason for patient still in hospital (select all that apply):  Discharge Plan A: New Nursing Home placement - [facility] care facility   Discharge Delays: (!) Post-Acute Set-up              Nikolay Burch MD  Department of Hospital Medicine   [facility] [place] - Telemetry

## 2022-11-07 LAB — POCT GLUCOSE: 82 MG/DL (ref 70–110)

## 2022-11-07 PROCEDURE — 25000003 PHARM REV CODE 250: Performed by: ORTHOPAEDIC SURGERY

## 2022-11-07 PROCEDURE — 25000003 PHARM REV CODE 250: Performed by: HOSPITALIST

## 2022-11-07 PROCEDURE — 25000003 PHARM REV CODE 250: Performed by: INTERNAL MEDICINE

## 2022-11-07 PROCEDURE — 21400001 HC TELEMETRY ROOM

## 2022-11-07 PROCEDURE — 80100016 HC MAINTENANCE HEMODIALYSIS

## 2022-11-07 PROCEDURE — 63600175 PHARM REV CODE 636 W HCPCS: Mod: JG | Performed by: ORTHOPAEDIC SURGERY

## 2022-11-07 RX ADMIN — METOPROLOL SUCCINATE 25 MG: 25 TABLET, EXTENDED RELEASE ORAL at 08:11

## 2022-11-07 RX ADMIN — SEVELAMER CARBONATE 2400 MG: 800 TABLET, FILM COATED ORAL at 08:11

## 2022-11-07 RX ADMIN — ASPIRIN 81 MG CHEWABLE TABLET 81 MG: 81 TABLET CHEWABLE at 08:11

## 2022-11-07 RX ADMIN — HYDRALAZINE HYDROCHLORIDE 75 MG: 25 TABLET, FILM COATED ORAL at 02:11

## 2022-11-07 RX ADMIN — HYDROCODONE BITARTRATE AND ACETAMINOPHEN 1 TABLET: 5; 325 TABLET ORAL at 01:11

## 2022-11-07 RX ADMIN — Medication 1 TABLET: at 08:11

## 2022-11-07 RX ADMIN — AMLODIPINE BESYLATE 10 MG: 5 TABLET ORAL at 08:11

## 2022-11-07 RX ADMIN — PANTOPRAZOLE SODIUM 40 MG: 40 TABLET, DELAYED RELEASE ORAL at 08:11

## 2022-11-07 NOTE — PROGRESS NOTES
[facility] Medicine  Progress Note    Patient Name: Adryan Neff  MRN: [id number]  Patient Class: IP- Inpatient   Admission Date: 10/6/2022  Length of Stay: 32 days  Attending Physician: Mekhi Dorsey MD  Primary Care Provider: Carola Pedro MD        Subjective:     Principal Problem:PAD (peripheral artery disease)        HPI:  This is a 56 year old male with a PMHx of severe PAD s/p gangrene of left foot s/p left AKA (1/2022), ESRD on HD MWF, NICM (EF: 55%, GIDD), pulmonary hypertension, GERD and latent TB filiberto presents for concern for leg infection.       The patient presented to the vascular surgery clinic for a follow up. He reports worsening pain and developing eschar of the right dorsal foot along with foul smelling odor. Given concern for an infection and the need for IV antibiotics & expedited podiatry evaluation, the patient was sent to the ER. The patient denies having any fevers, chills, or injury to his foot. He has a previous AKA and uses a wheelchair to ambulate. While in the ED, the patient was hypertensive but otherwise hemodynamically stable. Labs showed leukocytosis (17.41 - with neutrophilic predominance), normocytic anemia (9.7), elevated sed rate (125), elevated CRP (237.2), negative lactate (1.6). X-ray foot showed no radiographic evidence of osteomyelitis. He was administered vancomycin, zosyn and was admitted for further management.     I attempted to call the daughter to obtain further history on 1902 without a response.         Overview/Hospital Course:  Mr Adryan Neff is a 56 y.o. man with PAD. He is s/p L AKA due to gangrene and now presents with worsening R foot infection. He also has ESRD on HD via LUE AVG. Started antibiotics. Podiatry and Ortho consulted with plans for amputation. This was delayed due to PEA on 10/10 with quick ROSC. Unclear etiology but suspect vasovagal event. Cardiology was consulted. He then had R AKA on 10/13/22. Antibiotics were discontinued.  He has had malfunctioning of his LUE AVG; Vascular was consulted and fistulogram performed.  SW/CM consulted for discharge planning.  He required blood transfusions for blood loss anemia from R AKA surgical site. Now stable and working on SNF placement.       Interval History: seen after HD, doing well    Review of Systems   Constitutional:  Negative for activity change, appetite change and chills.   HENT:  Negative for congestion.    Eyes:  Negative for discharge.   Respiratory:  Negative for apnea and chest tightness.    Cardiovascular:  Negative for chest pain.   Gastrointestinal:  Negative for abdominal distention.   Endocrine: Negative for cold intolerance.   Genitourinary:  Negative for difficulty urinating and dysuria.   Musculoskeletal:  Negative for arthralgias and back pain.   Neurological:  Negative for dizziness and facial asymmetry.   Psychiatric/Behavioral:  Negative for agitation and behavioral problems.    Objective:     Vital Signs (Most Recent):  Temp: 98.4 °F (36.9 °C) (11/07/22 1525)  Pulse: 72 (11/07/22 1525)  Resp: 20 (11/07/22 1525)  BP: (!) 159/70 (11/07/22 1525)  SpO2: 99 % (11/07/22 1525)   Vital Signs (24h Range):  Temp:  [98.4 °F (36.9 °C)-98.7 °F (37.1 °C)] 98.4 °F (36.9 °C)  Pulse:  [62-74] 72  Resp:  [17-20] 20  SpO2:  [97 %-99 %] 99 %  BP: (125-183)/(58-81) 159/70     Weight: 59.1 kg (130 lb 4.7 oz)  Body mass index is 18.17 kg/m².    Intake/Output Summary (Last 24 hours) at 11/7/2022 1548  Last data filed at 11/7/2022 1400  Gross per 24 hour   Intake 320 ml   Output 1500 ml   Net -1180 ml        Physical Exam  Constitutional:       General: He is not in acute distress.     Appearance: Normal appearance. He is not ill-appearing, toxic-appearing or diaphoretic.   HENT:      Head: Normocephalic and atraumatic.   Eyes:      Conjunctiva/sclera: Conjunctivae normal.   Cardiovascular:      Rate and Rhythm: Normal rate and regular rhythm.   Pulmonary:      Effort: Pulmonary effort is normal.       Breath sounds: Normal breath sounds.   Musculoskeletal:      Comments: B/l AKA   Skin:     General: Skin is warm and dry.   Neurological:      Mental Status: He is alert and oriented to person, place, and time.   Psychiatric:         Behavior: Behavior normal.         Thought Content: Thought content normal.       Significant Labs: All pertinent labs within the past 24 hours have been reviewed.  BMP: No results for input(s): GLU, NA, K, CL, CO2, BUN, CREATININE, CALCIUM, MG in the last 48 hours.  CBC: No results for input(s): WBC, HGB, HCT, PLT in the last 48 hours.    Significant Imaging:       Assessment/Plan:      * PAD (peripheral artery disease)  S/p L AKA then presented with PAD causing R foot wound. Now s/p R AKA on 10/13  - previously on antibiotics. These were discontinued when source control achieved.   - continue asa, statin  - Pending SNF placement.  - now bilateral AKA-- palliative consulted for support     Advanced care planning/counseling discussion  Advance Care Planning     Date: 11/03/2022  Palliative consulted for additional support. His personal goal is to discharge to home with his family. Currently working on SNF transition to group home due to lack of support at home. Case management also following along to help facilitate SNF.           AV graft malfunction  Vascular following.  S/p fistulogram      Cognitive changes  Noted by Psychiatry that he has had multiple episodes of confusion and not understanding treatment plan. Unclear if this is delirium associated or beginnings of cognitive dysfunction. Confusion has resolved.      Anemia due to chronic kidney disease, on chronic dialysis  - Anemic - has been transfused this admission pre and post surgery  - Continue EPO per Nephrology  - With anemia of acute blood loss from bleeding from right stump.   - Hgb now stable      ESRD on hemodialysis  Dialyzes via LUE AVG  There was concern about AVG malfunction. Vascular surgery consulted  S/p  fistulogram.  Nephrology following.     CAD (coronary artery disease) - non-obstructive from [facility] in 2016  - Resume aspirin, statin       Chronic diastolic heart failure  - Echocardiogram (11/24/2021): EF: 55%, GIDD, mild TR, moderate to severe MR  - no signs of acute exacerbation  - Continue home medications     Gastroesophageal reflux disease  - Coninue PPI     Pure hypercholesterolemia  - Continue statin     Essential (primary) hypertension  BP generally controlled  - Continue home medications    Controlled type 2 diabetes mellitus with chronic kidney disease on chronic dialysis, with long-term current use of insulin  - Last A1c:   Lab Results   Component Value Date    HGBA1C 6.1 (H) 01/07/2021     Meds: detemir + SSI PRN to maintain goal 140-180  ADA diet, accuchecks, hypoglycemic protocol        VTE Risk Mitigation (From admission, onward)         Ordered     IP VTE HIGH RISK PATIENT  Once         10/06/22 1819     Place sequential compression device  Until discontinued         10/06/22 1819                Discharge Planning   ENIO: 11/8/2022     Code Status: Full Code   Is the patient medically ready for discharge?: Yes    Reason for patient still in hospital (select all that apply): Treatment  Discharge Plan A: New Nursing Home placement - [facility] care facility   Discharge Delays: (!) Post-Acute Set-up              Mekhi Dorsey MD  Department of Hospital Medicine   [facility] [place] - Telemetry

## 2022-11-07 NOTE — NURSING
"Patient refusing to have blood sugar checked. Patient educated on importance. Patient stated, "No, tomorrow."  "

## 2022-11-07 NOTE — PLAN OF CARE
[facility] - Telemetry  Discharge Reassessment    Primary Care Provider: Carola Pedro MD    Expected Discharge Date: 11/8/2022    CM attempted to contact pt's daughter's, Jamarcus to check if bank statements were received for July, August , September and October. CM will follow up with family.    Reassessment (most recent)       Discharge Reassessment - 11/07/22 1626          Discharge Reassessment    Assessment Type Discharge Planning Reassessment (P)      Did the patient's condition or plan change since previous assessment? Yes (P)      Discharge Plan discussed with: -- (P)    medical record    Discharge Plan A New Nursing Home placement - longterm care facility (P)      Discharge Plan B Other (P)    tbd    DME Needed Upon Discharge  other (see comments) (P)    tbd    Discharge Barriers Identified Other (see comments) (P)    Bank statements       Post-Acute Status    Coverage Medicare (P)      Discharge Delays Other (P)    Waiting on bank statements

## 2022-11-07 NOTE — NURSING
Received report from dialysis nurse Ok. Took 1L off, patient tolerated well.   Will monitor upon arrival back to the floor.

## 2022-11-07 NOTE — PT/OT/SLP PROGRESS
Physical Therapy      Patient Name:  Adryan Neff   MRN:  [id number]    1st attempt at 9:20 am Patient not seen today secondary to Dialysis. Will follow-up as able.    2nd attempt at 2:35 pm Patient found supine in bed with HOB elevated. Pt stated he just got back from Dialysis and really tired right now. Pt requested to have therapy tomorrow. Will follow-up as able.

## 2022-11-07 NOTE — PT/OT/SLP PROGRESS
Occupational Therapy      Patient Name:  Adryan Neff   MRN:  [id number]    Patient not seen today secondary to Dialysis. Will follow up.    11/7/2022

## 2022-11-07 NOTE — PROGRESS NOTES
Date of Admission:10/6/2022    SUBJECTIVE: notes eating ok    Current Facility-Administered Medications   Medication    acetaminophen tablet 650 mg    amLODIPine tablet 10 mg    aspirin chewable tablet 81 mg    atorvastatin tablet 40 mg    dextrose 10% bolus 125 mL    dextrose 10% bolus 250 mL    epoetin paola-epbx injection 10,000 Units    folic acid-vit B6-vit B12 2.5-25-2 mg tablet 1 tablet    glucagon (human recombinant) injection 1 mg    glucose chewable tablet 16 g    glucose chewable tablet 24 g    hydrALAZINE injection 10 mg    hydrALAZINE tablet 75 mg    HYDROcodone-acetaminophen 5-325 mg per tablet 1 tablet    insulin aspart U-100 pen 0-5 Units    insulin detemir U-100 pen 8 Units    melatonin tablet 6 mg    metoprolol succinate (TOPROL-XL) 24 hr tablet 25 mg    naloxone 0.4 mg/mL injection 0.02 mg    ondansetron disintegrating tablet 8 mg    pantoprazole EC tablet 40 mg    polyethylene glycol packet 17 g    senna tablet 8.6 mg    sevelamer carbonate tablet 2,400 mg    sodium chloride 0.9% flush 10 mL    sodium chloride 0.9% flush 10 mL       Wt Readings from Last 3 Encounters:   11/06/22 59.1 kg (130 lb 4.7 oz)   10/06/22 63.4 kg (139 lb 12.4 oz)   10/06/22 63.4 kg (139 lb 12.4 oz)     Temp Readings from Last 3 Encounters:   11/07/22 98.4 °F (36.9 °C) (Oral)   08/09/22 98.2 °F (36.8 °C) (Oral)   06/16/22 98.7 °F (37.1 °C) (Oral)     BP Readings from Last 3 Encounters:   11/07/22 (!) 164/70   10/06/22 131/65   08/09/22 (!) 166/72     Pulse Readings from Last 3 Encounters:   11/07/22 67   08/09/22 75   06/16/22 (!) 111       Intake/Output Summary (Last 24 hours) at 11/7/2022 1046  Last data filed at 11/7/2022 0900  Gross per 24 hour   Intake 220 ml   Output --   Net 220 ml         PE:  GEN:wd male in nad  HEENT:ncat,eomi,mm  CVS:s1s2 regular  PULM:ctab  ABD:bs,soft  EXT:akas, no thigh edema, avf arm  NEURO:awake, alert    No results for input(s): GLU, NA, K, CL, CO2, BUN, CREATININE, CALCIUM, MG in the last  24 hours.      Lab Results   Component Value Date    .0 (H) 01/07/2021    CALCIUM 9.3 11/02/2022    PHOS 4.7 (H) 10/31/2022       No results for input(s): WBC, RBC, HGB, HCT, PLT, MCV, MCH, MCHC in the last 24 hours.          A/P:  1.esrd. cont hd mwf.seen on hd.  2.malfxn avg. Holding heparin  with hd.  3.pea arrest. S/p. Following. Following.  4.gangrene. aka noted.   5.htn. uf with hd.  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 Last phos is at goal.  7.anemia. cont epo. James hg.  8.dm2. folowing sugars.

## 2022-11-07 NOTE — PLAN OF CARE
Problem: Adult Inpatient Plan of Care  Goal: Plan of Care Review  Outcome: Ongoing, Progressing  Goal: Patient-Specific Goal (Individualized)  Outcome: Ongoing, Progressing  Goal: Absence of Hospital-Acquired Illness or Injury  Outcome: Ongoing, Progressing  Goal: Optimal Comfort and Wellbeing  Outcome: Ongoing, Progressing     Problem: Skin Injury Risk Increased  Goal: Skin Health and Integrity  Outcome: Ongoing, Progressing     Problem: Fall Injury Risk  Goal: Absence of Fall and Fall-Related Injury  Outcome: Ongoing, Progressing     Problem: Infection  Goal: Absence of Infection Signs and Symptoms  Outcome: Ongoing, Progressing     Problem: Impaired Wound Healing  Goal: Optimal Wound Healing  Outcome: Ongoing, Progressing     POC reviewed with patient. All questions and concerns addressed. Fall/safety precautions implemented and maintained. Incontinent care provided for bowels. Patient anuric. HD MWF. Blood glucose refused along with scheduled HS meds. Pain management provided. No acute events noted this shift. Please see flowsheet for full assessment and vitals. Bed locked in lowest position. Side rails up x2. Call bell within reach. Will continue to monitor.

## 2022-11-07 NOTE — NURSING
Report given to nurse Yamileth who will assume his care. Pt resting comfortably in no apparent distress. Completed chart check.

## 2022-11-07 NOTE — PLAN OF CARE
Problem: Skin Injury Risk Increased  Goal: Skin Health and Integrity  Outcome: Ongoing, Progressing  Intervention: Optimize Skin Protection  Flowsheets (Taken 11/7/2022 1714)  Pressure Reduction Techniques: frequent weight shift encouraged  Pressure Reduction Devices:   heel offloading device utilized   positioning supports utilized  Skin Protection:   adhesive use limited   incontinence pads utilized   skin-to-device areas padded   skin-to-skin areas padded  Head of Bed (HOB) Positioning: HOB elevated  Intervention: Promote and Optimize Oral Intake  Flowsheets (Taken 11/7/2022 1714)  Oral Nutrition Promotion: rest periods promoted      [Adequate] : adequate

## 2022-11-07 NOTE — SUBJECTIVE & OBJECTIVE
Interval History: seen after HD, doing well    Review of Systems   Constitutional:  Negative for activity change, appetite change and chills.   HENT:  Negative for congestion.    Eyes:  Negative for discharge.   Respiratory:  Negative for apnea and chest tightness.    Cardiovascular:  Negative for chest pain.   Gastrointestinal:  Negative for abdominal distention.   Endocrine: Negative for cold intolerance.   Genitourinary:  Negative for difficulty urinating and dysuria.   Musculoskeletal:  Negative for arthralgias and back pain.   Neurological:  Negative for dizziness and facial asymmetry.   Psychiatric/Behavioral:  Negative for agitation and behavioral problems.    Objective:     Vital Signs (Most Recent):  Temp: 98.4 °F (36.9 °C) (11/07/22 1525)  Pulse: 72 (11/07/22 1525)  Resp: 20 (11/07/22 1525)  BP: (!) 159/70 (11/07/22 1525)  SpO2: 99 % (11/07/22 1525)   Vital Signs (24h Range):  Temp:  [98.4 °F (36.9 °C)-98.7 °F (37.1 °C)] 98.4 °F (36.9 °C)  Pulse:  [62-74] 72  Resp:  [17-20] 20  SpO2:  [97 %-99 %] 99 %  BP: (125-183)/(58-81) 159/70     Weight: 59.1 kg (130 lb 4.7 oz)  Body mass index is 18.17 kg/m².    Intake/Output Summary (Last 24 hours) at 11/7/2022 1548  Last data filed at 11/7/2022 1400  Gross per 24 hour   Intake 320 ml   Output 1500 ml   Net -1180 ml        Physical Exam  Constitutional:       General: He is not in acute distress.     Appearance: Normal appearance. He is not ill-appearing, toxic-appearing or diaphoretic.   HENT:      Head: Normocephalic and atraumatic.   Eyes:      Conjunctiva/sclera: Conjunctivae normal.   Cardiovascular:      Rate and Rhythm: Normal rate and regular rhythm.   Pulmonary:      Effort: Pulmonary effort is normal.      Breath sounds: Normal breath sounds.   Musculoskeletal:      Comments: B/l AKA   Skin:     General: Skin is warm and dry.   Neurological:      Mental Status: He is alert and oriented to person, place, and time.   Psychiatric:         Behavior: Behavior  normal.         Thought Content: Thought content normal.       Significant Labs: All pertinent labs within the past 24 hours have been reviewed.  BMP: No results for input(s): GLU, NA, K, CL, CO2, BUN, CREATININE, CALCIUM, MG in the last 48 hours.  CBC: No results for input(s): WBC, HGB, HCT, PLT in the last 48 hours.    Significant Imaging:

## 2022-11-08 LAB
POCT GLUCOSE: 274 MG/DL (ref 70–110)
POCT GLUCOSE: 94 MG/DL (ref 70–110)

## 2022-11-08 PROCEDURE — 25000003 PHARM REV CODE 250: Performed by: INTERNAL MEDICINE

## 2022-11-08 PROCEDURE — 25000003 PHARM REV CODE 250: Performed by: HOSPITALIST

## 2022-11-08 PROCEDURE — 25000003 PHARM REV CODE 250: Performed by: ORTHOPAEDIC SURGERY

## 2022-11-08 PROCEDURE — 97542 WHEELCHAIR MNGMENT TRAINING: CPT | Mod: CQ

## 2022-11-08 PROCEDURE — 97535 SELF CARE MNGMENT TRAINING: CPT

## 2022-11-08 PROCEDURE — 21400001 HC TELEMETRY ROOM

## 2022-11-08 PROCEDURE — 97530 THERAPEUTIC ACTIVITIES: CPT

## 2022-11-08 RX ORDER — HYDRALAZINE HYDROCHLORIDE 25 MG/1
100 TABLET, FILM COATED ORAL EVERY 8 HOURS
Status: DISCONTINUED | OUTPATIENT
Start: 2022-11-08 | End: 2022-11-10 | Stop reason: HOSPADM

## 2022-11-08 RX ADMIN — ASPIRIN 81 MG CHEWABLE TABLET 81 MG: 81 TABLET CHEWABLE at 09:11

## 2022-11-08 RX ADMIN — ONDANSETRON 8 MG: 8 TABLET, ORALLY DISINTEGRATING ORAL at 12:11

## 2022-11-08 RX ADMIN — HYDRALAZINE HYDROCHLORIDE 100 MG: 25 TABLET, FILM COATED ORAL at 03:11

## 2022-11-08 RX ADMIN — METOPROLOL SUCCINATE 25 MG: 25 TABLET, EXTENDED RELEASE ORAL at 09:11

## 2022-11-08 RX ADMIN — INSULIN ASPART 3 UNITS: 100 INJECTION, SOLUTION INTRAVENOUS; SUBCUTANEOUS at 12:11

## 2022-11-08 RX ADMIN — Medication 1 TABLET: at 09:11

## 2022-11-08 RX ADMIN — HYDROCODONE BITARTRATE AND ACETAMINOPHEN 1 TABLET: 5; 325 TABLET ORAL at 09:11

## 2022-11-08 RX ADMIN — HYDRALAZINE HYDROCHLORIDE 75 MG: 25 TABLET, FILM COATED ORAL at 12:11

## 2022-11-08 RX ADMIN — SEVELAMER CARBONATE 2400 MG: 800 TABLET, FILM COATED ORAL at 09:11

## 2022-11-08 RX ADMIN — PANTOPRAZOLE SODIUM 40 MG: 40 TABLET, DELAYED RELEASE ORAL at 09:11

## 2022-11-08 RX ADMIN — HYDROCODONE BITARTRATE AND ACETAMINOPHEN 1 TABLET: 5; 325 TABLET ORAL at 12:11

## 2022-11-08 RX ADMIN — SEVELAMER CARBONATE 2400 MG: 800 TABLET, FILM COATED ORAL at 12:11

## 2022-11-08 RX ADMIN — AMLODIPINE BESYLATE 10 MG: 5 TABLET ORAL at 09:11

## 2022-11-08 NOTE — PLAN OF CARE
Problem: Physical Therapy  Goal: Physical Therapy Goal  Description: Goals to be met by: 11/10/22     Patient will increase functional independence with mobility by performin. Pt to be supervision with bed mobility.  2. Pt to transfer with SBA.  3. Pt to be (I) with wheelchair mobility 150'.    Outcome: Ongoing, Progressing   Pt was pleasant today and agreed to participate. Pt required  Max A of 2 persons for Bed<>Wheelchair using Sliding board via scoot transfer; Min A for Sup<>Sit. Pt propelled Standard wheelchair x ~128 feet on Level tile with Bilateral upper extremity with Stand-by Assistance; pt required 3 rest breaks 2* fatigue, required v/c for WC management and proper hand placement.

## 2022-11-08 NOTE — PLAN OF CARE
11/08/22 1151   Medicare Message   Important Message from Medicare regarding Discharge Appeal Rights Explained to patient/caregiver  (SW explained IMM. Pt's daughterLuis F verbally expressed understanding. CHANEL will mail a copy to address on file.)   Date IMM was signed 11/08/22   Time IMM was signed 1157     [id number] [phone]

## 2022-11-08 NOTE — NURSING
Pt has a new skin tear to right leg over one of his incisions. Cleaned and wrapped leg. Dr. Sawyer notified. Wound care consult ordered.

## 2022-11-08 NOTE — PROGRESS NOTES
[facility] Medicine  Progress Note    Patient Name: Adryan Neff  MRN: [id number]  Patient Class: IP- Inpatient   Admission Date: 10/6/2022  Length of Stay: 33 days  Attending Physician: Nikolay Sawyer MD  Primary Care Provider: Carola Pedro MD        Subjective:     Principal Problem:PAD (peripheral artery disease)        HPI:  This is a 56 year old male with a PMHx of severe PAD s/p gangrene of left foot s/p left AKA (1/2022), ESRD on HD MWF, NICM (EF: 55%, GIDD), pulmonary hypertension, GERD and latent TB filiberto presents for concern for leg infection.       The patient presented to the vascular surgery clinic for a follow up. He reports worsening pain and developing eschar of the right dorsal foot along with foul smelling odor. Given concern for an infection and the need for IV antibiotics & expedited podiatry evaluation, the patient was sent to the ER. The patient denies having any fevers, chills, or injury to his foot. He has a previous AKA and uses a wheelchair to ambulate. While in the ED, the patient was hypertensive but otherwise hemodynamically stable. Labs showed leukocytosis (17.41 - with neutrophilic predominance), normocytic anemia (9.7), elevated sed rate (125), elevated CRP (237.2), negative lactate (1.6). X-ray foot showed no radiographic evidence of osteomyelitis. He was administered vancomycin, zosyn and was admitted for further management.     I attempted to call the daughter to obtain further history on 1902 without a response.         Overview/Hospital Course:  Mr Adryan Neff is a 56 y.o. man with PAD. He is s/p L AKA due to gangrene and now presents with worsening R foot infection. He also has ESRD on HD via LUE AVG. Started antibiotics. Podiatry and Ortho consulted with plans for amputation. This was delayed due to PEA on 10/10 with quick ROSC. Unclear etiology but suspect vasovagal event. Cardiology was consulted. He then had R AKA on 10/13/22. Antibiotics were  discontinued. He has had malfunctioning of his LUE AVG; Vascular was consulted and fistulogram performed.  SW/CM consulted for discharge planning.  He required blood transfusions for blood loss anemia from R AKA surgical site. Now stable and working on SNF placement.       Interval History: No new issues     Review of Systems   Constitutional:  Negative for activity change, appetite change and chills.   HENT:  Negative for congestion.    Eyes:  Negative for discharge.   Respiratory:  Negative for apnea and chest tightness.    Cardiovascular:  Negative for chest pain.   Gastrointestinal:  Negative for abdominal distention.   Endocrine: Negative for cold intolerance.   Genitourinary:  Negative for difficulty urinating and dysuria.   Musculoskeletal:  Negative for arthralgias and back pain.   Neurological:  Negative for dizziness and facial asymmetry.   Psychiatric/Behavioral:  Negative for agitation and behavioral problems.    Objective:     Vital Signs (Most Recent):  Temp: 98.5 °F (36.9 °C) (11/08/22 0837)  Pulse: 86 (11/08/22 0837)  Resp: 16 (11/08/22 0920)  BP: (!) 178/75 (11/08/22 0919)  SpO2: 96 % (11/08/22 0837)   Vital Signs (24h Range):  Temp:  [98.4 °F (36.9 °C)-98.5 °F (36.9 °C)] 98.5 °F (36.9 °C)  Pulse:  [67-86] 86  Resp:  [16-20] 16  SpO2:  [96 %-99 %] 96 %  BP: (130-183)/(64-81) 178/75     Weight: 59.2 kg (130 lb 8.2 oz)  Body mass index is 18.2 kg/m².    Intake/Output Summary (Last 24 hours) at 11/8/2022 0953  Last data filed at 11/7/2022 1400  Gross per 24 hour   Intake 100 ml   Output 1500 ml   Net -1400 ml      Physical Exam  Constitutional:       General: He is not in acute distress.     Appearance: Normal appearance. He is not ill-appearing, toxic-appearing or diaphoretic.   HENT:      Head: Normocephalic and atraumatic.   Eyes:      Conjunctiva/sclera: Conjunctivae normal.   Cardiovascular:      Rate and Rhythm: Normal rate and regular rhythm.   Pulmonary:      Effort: Pulmonary effort is normal.       Breath sounds: Normal breath sounds.   Skin:     General: Skin is warm and dry.   Neurological:      Mental Status: He is alert and oriented to person, place, and time.   Psychiatric:         Behavior: Behavior normal.         Thought Content: Thought content normal.       Significant Labs: All pertinent labs within the past 24 hours have been reviewed.  BMP: No results for input(s): GLU, NA, K, CL, CO2, BUN, CREATININE, CALCIUM, MG in the last 48 hours.  CBC: No results for input(s): WBC, HGB, HCT, PLT in the last 48 hours.    Significant Imaging:       Assessment/Plan:      * PAD (peripheral artery disease)  S/p L AKA then presented with PAD causing R foot wound. Now s/p R AKA on 10/13  - previously on antibiotics. These were discontinued when source control achieved.   - continue asa, statin  - Pending SNF placement.  - now bilateral AKA-- palliative consulted for support     Advanced care planning/counseling discussion  Advance Care Planning     Date: 11/03/2022  Palliative consulted for additional support. His personal goal is to discharge to home with his family. Currently working on SNF transition to long term due to lack of support at home. Case management also following along to help facilitate SNF.           AV graft malfunction  Vascular following.  S/p fistulogram      Cognitive changes  Noted by Psychiatry that he has had multiple episodes of confusion and not understanding treatment plan. Unclear if this is delirium associated or beginnings of cognitive dysfunction. Confusion has resolved.      Anemia due to chronic kidney disease, on chronic dialysis  - Anemic - has been transfused this admission pre and post surgery  - Continue EPO per Nephrology  - With anemia of acute blood loss from bleeding from right stump.   - Hgb now stable      ESRD on hemodialysis  Dialyzes via LUE AVG  There was concern about AVG malfunction. Vascular surgery consulted  S/p fistulogram.  Nephrology following.     CAD  (coronary artery disease) - non-obstructive from [facility] in 2016  - Resume aspirin, statin       Chronic diastolic heart failure  - Echocardiogram (11/24/2021): EF: 55%, GIDD, mild TR, moderate to severe MR  - no signs of acute exacerbation  - Continue home medications     Gastroesophageal reflux disease  - Coninue PPI     Pure hypercholesterolemia  - Continue statin     Essential (primary) hypertension  BP generally controlled  - Continue home medications    Controlled type 2 diabetes mellitus with chronic kidney disease on chronic dialysis, with long-term current use of insulin  - Last A1c:   Lab Results   Component Value Date    HGBA1C 6.1 (H) 01/07/2021     Meds: detemir + SSI PRN to maintain goal 140-180  ADA diet, accuchecks, hypoglycemic protocol        VTE Risk Mitigation (From admission, onward)         Ordered     IP VTE HIGH RISK PATIENT  Once         10/06/22 1819     Place sequential compression device  Until discontinued         10/06/22 1819                Discharge Planning   ENIO: 11/8/2022     Code Status: Full Code   Is the patient medically ready for discharge?: Yes    Reason for patient still in hospital (select all that apply):  Discharge Plan A: New Nursing Home placement - shelter care facility   Discharge Delays: (!) Other (Waiting on bank statements)              Nikolay Burch MD  Department of Hospital Medicine   [facility] [place] - Telemetry

## 2022-11-08 NOTE — SUBJECTIVE & OBJECTIVE
Interval History: No new issues     Review of Systems   Constitutional:  Negative for activity change, appetite change and chills.   HENT:  Negative for congestion.    Eyes:  Negative for discharge.   Respiratory:  Negative for apnea and chest tightness.    Cardiovascular:  Negative for chest pain.   Gastrointestinal:  Negative for abdominal distention.   Endocrine: Negative for cold intolerance.   Genitourinary:  Negative for difficulty urinating and dysuria.   Musculoskeletal:  Negative for arthralgias and back pain.   Neurological:  Negative for dizziness and facial asymmetry.   Psychiatric/Behavioral:  Negative for agitation and behavioral problems.    Objective:     Vital Signs (Most Recent):  Temp: 98.5 °F (36.9 °C) (11/08/22 0837)  Pulse: 86 (11/08/22 0837)  Resp: 16 (11/08/22 0920)  BP: (!) 178/75 (11/08/22 0919)  SpO2: 96 % (11/08/22 0837)   Vital Signs (24h Range):  Temp:  [98.4 °F (36.9 °C)-98.5 °F (36.9 °C)] 98.5 °F (36.9 °C)  Pulse:  [67-86] 86  Resp:  [16-20] 16  SpO2:  [96 %-99 %] 96 %  BP: (130-183)/(64-81) 178/75     Weight: 59.2 kg (130 lb 8.2 oz)  Body mass index is 18.2 kg/m².    Intake/Output Summary (Last 24 hours) at 11/8/2022 0953  Last data filed at 11/7/2022 1400  Gross per 24 hour   Intake 100 ml   Output 1500 ml   Net -1400 ml      Physical Exam  Constitutional:       General: He is not in acute distress.     Appearance: Normal appearance. He is not ill-appearing, toxic-appearing or diaphoretic.   HENT:      Head: Normocephalic and atraumatic.   Eyes:      Conjunctiva/sclera: Conjunctivae normal.   Cardiovascular:      Rate and Rhythm: Normal rate and regular rhythm.   Pulmonary:      Effort: Pulmonary effort is normal.      Breath sounds: Normal breath sounds.   Skin:     General: Skin is warm and dry.   Neurological:      Mental Status: He is alert and oriented to person, place, and time.   Psychiatric:         Behavior: Behavior normal.         Thought Content: Thought content normal.        Significant Labs: All pertinent labs within the past 24 hours have been reviewed.  BMP: No results for input(s): GLU, NA, K, CL, CO2, BUN, CREATININE, CALCIUM, MG in the last 48 hours.  CBC: No results for input(s): WBC, HGB, HCT, PLT in the last 48 hours.    Significant Imaging:

## 2022-11-08 NOTE — PROGRESS NOTES
"CM contacted pt's daughterLuis F and she has received the bank statements. Pt's daughter will fax bank statements to True with [facility].    MARCUS left a message for True to inform her that the bank statements are being faxed and to contact  when received.    MARCUS contacted Rut with [facility] to follow up on transfer to [facility]. Rut stated the computer systems are down. Rut stated she will contact  once she receives transfer information. CM will follow up.    11:45 am  Nafisa with [facility] contacted CM. Pt will transfer to [facility] [phone] and fax # [phone]. [facility] will need the following: recent labs, dc orders, H&P,TB, CXR. Kristin VALDES faxed the documents. Nafisa also informed CM to inform [facility] to contact [facility] to call for chair time.    1:00 PM  MARCUS met with pt's daughter, Mariel and she stated the bank statements were faxed. MARCUS made a copy. MARCUS inquired about October bank statement and pt's daughter stated " I haven't received October." CM informed her to bring statement when received.    MARCUS spoke to True and she hasn't received bank statements. CM faxed a copy to True at [phone]. MARCUS will follow up.    2:28 pm  True has received July, August and September. October statement is needed or October balance. CM contacted pt's daughter Mariel  and she will contact Shea  "

## 2022-11-08 NOTE — PLAN OF CARE
Problem: Adult Inpatient Plan of Care  Goal: Patient-Specific Goal (Individualized)  Outcome: Ongoing, Progressing     Problem: Adult Inpatient Plan of Care  Goal: Absence of Hospital-Acquired Illness or Injury  Outcome: Ongoing, Progressing     Problem: Adult Inpatient Plan of Care  Goal: Absence of Hospital-Acquired Illness or Injury  Outcome: Ongoing, Progressing     Problem: Skin Injury Risk Increased  Goal: Skin Health and Integrity  Outcome: Ongoing, Progressing     Problem: Fall Injury Risk  Goal: Absence of Fall and Fall-Related Injury  Outcome: Ongoing, Progressing

## 2022-11-08 NOTE — PT/OT/SLP PROGRESS
Physical Therapy Treatment    Patient Name:  Adryan Neff   MRN:  [id number]    Recommendations:     Discharge Recommendations:  nursing facility, skilled   Discharge Equipment Recommendations: other (see comments) (TBD)   Barriers to discharge:  decreased functional mobility, weakness    Assessment:     Adryan Neff is a 56 y.o. male admitted with a medical diagnosis of PAD (peripheral artery disease).  He presents with the following impairments/functional limitations:  weakness, impaired endurance, impaired functional mobility, impaired balance, decreased coordination, decreased upper extremity function, decreased lower extremity function, pain, decreased safety awareness, impaired skin, orthopedic precautions.    Pt was pleasant today and agreed to participate. Pt required  Max A of 2 persons for Bed<>Wheelchair using Sliding board via scoot transfer; Min A for Sup<>Sit. Pt propelled Standard wheelchair x ~128 feet on Level tile with Bilateral upper extremity with Stand-by Assistance; pt required 3 rest breaks 2* fatigue, required v/c for WC management and proper hand placement. Pt will cont.to benefit from skilled therapy to reach goals.    Rehab Prognosis: Good; patient would benefit from acute skilled PT services to address these deficits and reach maximum level of function.    Recent Surgery: Procedure(s) (LRB):  Fistulogram (Left) 19 Days Post-Op    Plan:     During this hospitalization, patient to be seen 5 x/week to address the identified rehab impairments via therapeutic activities, therapeutic exercises, wheelchair management/training and progress toward the following goals:    Plan of Care Expires:  11/10/22    Subjective     Chief Complaint: pain to his RLE  Patient/Family Comments/goals: Pt agreed to participate.  Pain/Comfort:  Pain Rating 1:  (pt didn't rate)  Location - Side 1: Right  Location - Orientation 1: distal  Location 1: leg  Pain Addressed 1: Reposition, Distraction, Cessation of Activity,  Nurse notified  Pain Rating Post-Intervention 1:  (pt didn't rate)      Objective:     Communicated with nurse Mauro prior to session.  Patient found HOB elevated with telemetry, peripheral IV, Daughter present upon PT entry to room.     General Precautions: Standard, fall   Orthopedic Precautions:RLE non weight bearing   Braces: N/A  Respiratory Status: Room air     Functional Mobility: Pt moved slow and required extra time to complete tasks.  Bed Mobility:     Scooting: anterior scoot to EOB with CGA  Supine to Sit: Min A of 2 persons for trunk supporting and BLE management with HOB elevated  Sit to Supine: minimal assistance for trunk guiding   Transfers:   Gait Belt don prior OOB activity; v/c required for proper hand placement with transfer using sliding board and WC propelling  Bed to Wheelchair: Max A of 2 persons with slide board using side scoot transfer  Wheelchair to Bed: Max A of 2 persons with slide board using scoot transfer  Balance: Fair/Fair+ Static Sitting  Wheelchair Propulsion: Pt propelled Standard wheelchair x ~128 feet on Level tile with Bilateral upper extremity with Stand-by Assistance; pt required 3 rest breaks 2* fatigue, required v/c for WC management and proper hand placement.      AM-PAC 6 CLICK MOBILITY  Turning over in bed (including adjusting bedclothes, sheets and blankets)?: 3  Sitting down on and standing up from a chair with arms (e.g., wheelchair, bedside commode, etc.): 1  Moving from lying on back to sitting on the side of the bed?: 3  Moving to and from a bed to a chair (including a wheelchair)?: 2  Need to walk in hospital room?: 1  Climbing 3-5 steps with a railing?: 1  Basic Mobility Total Score: 11       Treatment & Education:  Encouraged pt to sit up in long sitting in bed or at EOB with assistance and to perform BLE ex throughout the day to maintain muscle strength/endurance, blood circulation, pt verbalized understanding.    Patient left HOB elevated (bed in seated  position) with tray table + lunch tray in front, call button in reach, bed alarm on, nurse notified, and Daughters present.    GOALS:   Multidisciplinary Problems       Physical Therapy Goals          Problem: Physical Therapy    Goal Priority Disciplines Outcome Goal Variances Interventions   Physical Therapy Goal     PT, PT/OT Ongoing, Progressing     Description: Goals to be met by: 11/10/22     Patient will increase functional independence with mobility by performin. Pt to be supervision with bed mobility.  2. Pt to transfer with SBA.  3. Pt to be (I) with wheelchair mobility 150'.                         Time Tracking:     PT Received On: 22  PT Start Time: 1211     PT Stop Time: 1242  PT Total Time (min): 31 min     Billable Minutes: Train/Wheelchair Management 31 min    Treatment Type: Treatment  PT/PTA: PTA     PTA Visit Number: 5     2022

## 2022-11-09 PROCEDURE — 25000003 PHARM REV CODE 250: Performed by: HOSPITALIST

## 2022-11-09 PROCEDURE — 97530 THERAPEUTIC ACTIVITIES: CPT

## 2022-11-09 PROCEDURE — 63600175 PHARM REV CODE 636 W HCPCS: Mod: JG | Performed by: ORTHOPAEDIC SURGERY

## 2022-11-09 PROCEDURE — 25000003 PHARM REV CODE 250: Performed by: ORTHOPAEDIC SURGERY

## 2022-11-09 PROCEDURE — 97542 WHEELCHAIR MNGMENT TRAINING: CPT

## 2022-11-09 PROCEDURE — 25000003 PHARM REV CODE 250: Performed by: NURSE PRACTITIONER

## 2022-11-09 PROCEDURE — 80100016 HC MAINTENANCE HEMODIALYSIS

## 2022-11-09 PROCEDURE — 21400001 HC TELEMETRY ROOM

## 2022-11-09 PROCEDURE — 25000003 PHARM REV CODE 250: Performed by: INTERNAL MEDICINE

## 2022-11-09 RX ADMIN — HYDROCODONE BITARTRATE AND ACETAMINOPHEN 1 TABLET: 5; 325 TABLET ORAL at 09:11

## 2022-11-09 RX ADMIN — PANTOPRAZOLE SODIUM 40 MG: 40 TABLET, DELAYED RELEASE ORAL at 09:11

## 2022-11-09 RX ADMIN — HYDRALAZINE HYDROCHLORIDE 100 MG: 25 TABLET, FILM COATED ORAL at 09:11

## 2022-11-09 RX ADMIN — SEVELAMER CARBONATE 2400 MG: 800 TABLET, FILM COATED ORAL at 09:11

## 2022-11-09 RX ADMIN — ASPIRIN 81 MG CHEWABLE TABLET 81 MG: 81 TABLET CHEWABLE at 09:11

## 2022-11-09 RX ADMIN — ATORVASTATIN CALCIUM 40 MG: 40 TABLET, FILM COATED ORAL at 09:11

## 2022-11-09 RX ADMIN — EPOETIN ALFA-EPBX 10000 UNITS: 10000 INJECTION, SOLUTION INTRAVENOUS; SUBCUTANEOUS at 09:11

## 2022-11-09 RX ADMIN — Medication 1 TABLET: at 09:11

## 2022-11-09 RX ADMIN — ACETAMINOPHEN 650 MG: 325 TABLET ORAL at 09:11

## 2022-11-09 RX ADMIN — Medication 6 MG: at 09:11

## 2022-11-09 NOTE — PROGRESS NOTES
Patient sleeping. Woke up during exam. His SNF placement is still pending.  VSSAF    Right LE: Surgical incision c/d/I. No areas of necrosis or skin changes around incision.     A/P: s/p right AKA  1) he will be 4 weeks postop tomorrow. Will order the cathy to be d/c'd before he discharges to SNF  2) cont med mgmt per primary team

## 2022-11-09 NOTE — SUBJECTIVE & OBJECTIVE
Interval History: No new issues     Review of Systems   Constitutional:  Negative for activity change, appetite change and chills.   HENT:  Negative for congestion.    Eyes:  Negative for discharge.   Respiratory:  Negative for apnea and chest tightness.    Cardiovascular:  Negative for chest pain.   Gastrointestinal:  Negative for abdominal distention.   Endocrine: Negative for cold intolerance.   Genitourinary:  Negative for difficulty urinating and dysuria.   Musculoskeletal:  Negative for arthralgias and back pain.   Neurological:  Negative for dizziness and facial asymmetry.   Psychiatric/Behavioral:  Negative for agitation and behavioral problems.    Objective:     Vital Signs (Most Recent):  Temp: 98.7 °F (37.1 °C) (11/08/22 2213)  Pulse: 67 (11/08/22 2213)  Resp: 20 (11/08/22 2213)  BP: (!) 147/65 (11/08/22 2213)  SpO2: 100 % (11/08/22 2213)   Vital Signs (24h Range):  Temp:  [97.8 °F (36.6 °C)-98.7 °F (37.1 °C)] 98.7 °F (37.1 °C)  Pulse:  [60-67] 67  Resp:  [16-20] 20  SpO2:  [97 %-100 %] 100 %  BP: (132-147)/(61-66) 147/65     Weight: 59.2 kg (130 lb 8.2 oz)  Body mass index is 18.2 kg/m².    Intake/Output Summary (Last 24 hours) at 11/9/2022 0920  Last data filed at 11/8/2022 1700  Gross per 24 hour   Intake 360 ml   Output --   Net 360 ml      Physical Exam  Constitutional:       General: He is not in acute distress.     Appearance: Normal appearance. He is not ill-appearing, toxic-appearing or diaphoretic.   HENT:      Head: Normocephalic and atraumatic.   Eyes:      Conjunctiva/sclera: Conjunctivae normal.   Cardiovascular:      Rate and Rhythm: Normal rate and regular rhythm.   Pulmonary:      Effort: Pulmonary effort is normal.      Breath sounds: Normal breath sounds.   Skin:     General: Skin is warm and dry.   Neurological:      Mental Status: He is alert and oriented to person, place, and time.   Psychiatric:         Behavior: Behavior normal.         Thought Content: Thought content normal.        Significant Labs: All pertinent labs within the past 24 hours have been reviewed.  BMP: No results for input(s): GLU, NA, K, CL, CO2, BUN, CREATININE, CALCIUM, MG in the last 48 hours.  CBC: No results for input(s): WBC, HGB, HCT, PLT in the last 48 hours.    Significant Imaging:

## 2022-11-09 NOTE — PROGRESS NOTES
[facility] Medicine  Progress Note    Patient Name: Adryan Neff  MRN: [id number]  Patient Class: IP- Inpatient   Admission Date: 10/6/2022  Length of Stay: 34 days  Attending Physician: Nikolay Sawyer MD  Primary Care Provider: Carola Pedro MD        Subjective:     Principal Problem:PAD (peripheral artery disease)        HPI:  This is a 56 year old male with a PMHx of severe PAD s/p gangrene of left foot s/p left AKA (1/2022), ESRD on HD MWF, NICM (EF: 55%, GIDD), pulmonary hypertension, GERD and latent TB filiberto presents for concern for leg infection.       The patient presented to the vascular surgery clinic for a follow up. He reports worsening pain and developing eschar of the right dorsal foot along with foul smelling odor. Given concern for an infection and the need for IV antibiotics & expedited podiatry evaluation, the patient was sent to the ER. The patient denies having any fevers, chills, or injury to his foot. He has a previous AKA and uses a wheelchair to ambulate. While in the ED, the patient was hypertensive but otherwise hemodynamically stable. Labs showed leukocytosis (17.41 - with neutrophilic predominance), normocytic anemia (9.7), elevated sed rate (125), elevated CRP (237.2), negative lactate (1.6). X-ray foot showed no radiographic evidence of osteomyelitis. He was administered vancomycin, zosyn and was admitted for further management.     I attempted to call the daughter to obtain further history on 1902 without a response.         Overview/Hospital Course:  Mr Adryan Neff is a 56 y.o. man with PAD. He is s/p L AKA due to gangrene and now presents with worsening R foot infection. He also has ESRD on HD via LUE AVG. Started antibiotics. Podiatry and Ortho consulted with plans for amputation. This was delayed due to PEA on 10/10 with quick ROSC. Unclear etiology but suspect vasovagal event. Cardiology was consulted. He then had R AKA on 10/13/22. Antibiotics were  discontinued. He has had malfunctioning of his LUE AVG; Vascular was consulted and fistulogram performed.  SW/CM consulted for discharge planning.  He required blood transfusions for blood loss anemia from R AKA surgical site. Now stable and working on SNF placement.       Interval History: No new issues     Review of Systems   Constitutional:  Negative for activity change, appetite change and chills.   HENT:  Negative for congestion.    Eyes:  Negative for discharge.   Respiratory:  Negative for apnea and chest tightness.    Cardiovascular:  Negative for chest pain.   Gastrointestinal:  Negative for abdominal distention.   Endocrine: Negative for cold intolerance.   Genitourinary:  Negative for difficulty urinating and dysuria.   Musculoskeletal:  Negative for arthralgias and back pain.   Neurological:  Negative for dizziness and facial asymmetry.   Psychiatric/Behavioral:  Negative for agitation and behavioral problems.    Objective:     Vital Signs (Most Recent):  Temp: 98.7 °F (37.1 °C) (11/08/22 2213)  Pulse: 67 (11/08/22 2213)  Resp: 20 (11/08/22 2213)  BP: (!) 147/65 (11/08/22 2213)  SpO2: 100 % (11/08/22 2213)   Vital Signs (24h Range):  Temp:  [97.8 °F (36.6 °C)-98.7 °F (37.1 °C)] 98.7 °F (37.1 °C)  Pulse:  [60-67] 67  Resp:  [16-20] 20  SpO2:  [97 %-100 %] 100 %  BP: (132-147)/(61-66) 147/65     Weight: 59.2 kg (130 lb 8.2 oz)  Body mass index is 18.2 kg/m².    Intake/Output Summary (Last 24 hours) at 11/9/2022 0920  Last data filed at 11/8/2022 1700  Gross per 24 hour   Intake 360 ml   Output --   Net 360 ml      Physical Exam  Constitutional:       General: He is not in acute distress.     Appearance: Normal appearance. He is not ill-appearing, toxic-appearing or diaphoretic.   HENT:      Head: Normocephalic and atraumatic.   Eyes:      Conjunctiva/sclera: Conjunctivae normal.   Cardiovascular:      Rate and Rhythm: Normal rate and regular rhythm.   Pulmonary:      Effort: Pulmonary effort is normal.       Breath sounds: Normal breath sounds.   Skin:     General: Skin is warm and dry.   Neurological:      Mental Status: He is alert and oriented to person, place, and time.   Psychiatric:         Behavior: Behavior normal.         Thought Content: Thought content normal.       Significant Labs: All pertinent labs within the past 24 hours have been reviewed.  BMP: No results for input(s): GLU, NA, K, CL, CO2, BUN, CREATININE, CALCIUM, MG in the last 48 hours.  CBC: No results for input(s): WBC, HGB, HCT, PLT in the last 48 hours.    Significant Imaging:       Assessment/Plan:      * PAD (peripheral artery disease)  S/p L AKA then presented with PAD causing R foot wound. Now s/p R AKA on 10/13  - previously on antibiotics. These were discontinued when source control achieved.   - continue asa, statin  - Pending SNF placement.  - now bilateral AKA-- palliative consulted for support     Advanced care planning/counseling discussion  Advance Care Planning     Date: 11/03/2022  Palliative consulted for additional support. His personal goal is to discharge to home with his family. Currently working on SNF transition to shelter due to lack of support at home. Case management also following along to help facilitate SNF.           AV graft malfunction  Vascular following.  S/p fistulogram      Cognitive changes  Noted by Psychiatry that he has had multiple episodes of confusion and not understanding treatment plan. Unclear if this is delirium associated or beginnings of cognitive dysfunction. Confusion has resolved.      Anemia due to chronic kidney disease, on chronic dialysis  - Anemic - has been transfused this admission pre and post surgery  - Continue EPO per Nephrology  - With anemia of acute blood loss from bleeding from right stump.   - Hgb now stable      ESRD on hemodialysis  Dialyzes via LUE AVG  There was concern about AVG malfunction. Vascular surgery consulted  S/p fistulogram.  Nephrology following.     CAD  (coronary artery disease) - non-obstructive from [facility] in 2016  - Resume aspirin, statin       Chronic diastolic heart failure  - Echocardiogram (11/24/2021): EF: 55%, GIDD, mild TR, moderate to severe MR  - no signs of acute exacerbation  - Continue home medications     Gastroesophageal reflux disease  - Coninue PPI     Pure hypercholesterolemia  - Continue statin     Essential (primary) hypertension  BP generally controlled  - Continue home medications    Controlled type 2 diabetes mellitus with chronic kidney disease on chronic dialysis, with long-term current use of insulin  - Last A1c:   Lab Results   Component Value Date    HGBA1C 6.1 (H) 01/07/2021     Meds: detemir + SSI PRN to maintain goal 140-180  ADA diet, accuchecks, hypoglycemic protocol        VTE Risk Mitigation (From admission, onward)         Ordered     IP VTE HIGH RISK PATIENT  Once         10/06/22 1819     Place sequential compression device  Until discontinued         10/06/22 1819                Discharge Planning   ENIO: 11/8/2022     Code Status: Full Code   Is the patient medically ready for discharge?: Yes    Reason for patient still in hospital (select all that apply):  Discharge Plan A: New Nursing Home placement - half-way care facility   Discharge Delays: (!) Other (Waiting on bank statements)              Nikolay Burch MD  Department of Hospital Medicine   [facility] - [place] - Telemetry

## 2022-11-09 NOTE — PT/OT/SLP PROGRESS
"Physical Therapy Treatment    Patient Name:  Adryan Neff   MRN:  [id number]    Recommendations:     Discharge Recommendations:  nursing facility, skilled   Discharge Equipment Recommendations: other (see comments) (TBD)       Assessment:     Adryan Neff is a 56 y.o. male admitted with a medical diagnosis of PAD (peripheral artery disease).  He presents with the following impairments/functional limitations:  impaired functional mobility, impaired balance, decreased lower extremity function.    Rehab Prognosis: Fair; patient would benefit from acute skilled PT services to address these deficits and reach maximum level of function.    Recent Surgery: Procedure(s) (LRB):  Fistulogram (Left) 20 Days Post-Op    Plan:     During this hospitalization, patient to be seen 3 x/week to address the identified rehab impairments via therapeutic activities and progress toward the following goals:    Plan of Care Expires:  11/17/22    Subjective     Chief Complaint: None; pt feeling better and more joyful the past 2 days.  Patient/Family Comments/goals: "I want to drive my wheelchair"  Pain/Comfort:  Pain Rating 1: 0/10      Objective:     Communicated with nurseGabby prior to session.  Patient found supine with bed alarm upon PT entry to room.     General Precautions: Standard, fall   Orthopedic Precautions:RLE non weight bearing   Braces: N/A  Respiratory Status: Room air     Functional Mobility:  Bed Mobility:     Supine to Sit: moderate assistance  Transfers:     Bed to Chair: moderate assistance, maximal assistance, and of 2 persons with  posterior scoot into wheelchair  using  pad  Wheelchair Propulsion:  Pt propelled Standard wheelchair x 250 feet on Level tile with  Bilateral upper extremity with Supervision or Set-up Assistance.       AM-PAC 6 CLICK MOBILITY  Turning over in bed (including adjusting bedclothes, sheets and blankets)?: 3  Sitting down on and standing up from a chair with arms (e.g., wheelchair, bedside " commode, etc.): 2  Moving from lying on back to sitting on the side of the bed?: 2  Moving to and from a bed to a chair (including a wheelchair)?: 2  Need to walk in hospital room?: 1  Climbing 3-5 steps with a railing?: 1  Basic Mobility Total Score: 11       Treatment & Education:  Pt educated on importance of sitting OOB.    Patient left up in chair with call button in reach and nurse notified..    GOALS:   Multidisciplinary Problems       Physical Therapy Goals          Problem: Physical Therapy    Goal Priority Disciplines Outcome Goal Variances Interventions   Physical Therapy Goal     PT, PT/OT Ongoing, Progressing     Description: Goals to be met by: 22     Patient will increase functional independence with mobility by performin. Pt to be supervision with bed mobility.  2. Pt to transfer with min A scooting or via sliding board.  3. Pt to be (I) with wheelchair mobility 150'. Met 22                         Time Tracking:     PT Received On: 22  PT Start Time: 0950     PT Stop Time: 1019  PT Total Time (min): 29 min     Billable Minutes: Therapeutic Activity 29 (Co-tx with PATRICIO Granados)    Treatment Type: Treatment  PT/PTA: PT     PTA Visit Number: 5     2022

## 2022-11-09 NOTE — PT/OT/SLP PROGRESS
Physical Therapy Treatment    Patient Name:  Adryan Neff   MRN:  [id number]    Recommendations:     Discharge Recommendations:  nursing facility, skilled   Discharge Equipment Recommendations: other (see comments) (TBD)     Assessment:     Adryan Neff is a 56 y.o. male admitted with a medical diagnosis of PAD (peripheral artery disease).  He presents with the following impairments/functional limitations:  impaired functional mobility, impaired balance, decreased lower extremity function .    Rehab Prognosis: Fair; patient would benefit from acute skilled PT services to address these deficits and reach maximum level of function.    Recent Surgery: Procedure(s) (LRB):  Fistulogram (Left) 20 Days Post-Op    Plan:     During this hospitalization, patient to be seen 3 x/week to address the identified rehab impairments via therapeutic activities and progress toward the following goals:    Plan of Care Expires:  11/17/22    Subjective     Chief Complaint: None  Patient/Family Comments/goals: Pt ready to get back to bed.  Pain/Comfort:  Pain Rating 1: 0/10      Objective:     Patient found  in wheelchair  with tray table in front upon PT entry to room.     General Precautions: Standard, fall   Orthopedic Precautions:N/A   Braces: N/A  Respiratory Status: Room air     Functional Mobility:      Bed Mobility:     Scooting: moderate assistance  Sit to Supine: maximal assistance  Transfers:     Chair to mat: total assistance and of 2 persons with  no AD  using  pad  AM-PAC 6 CLICK MOBILITY  Turning over in bed (including adjusting bedclothes, sheets and blankets)?: 3  Sitting down on and standing up from a chair with arms (e.g., wheelchair, bedside commode, etc.): 2  Moving from lying on back to sitting on the side of the bed?: 2  Moving to and from a bed to a chair (including a wheelchair)?: 2  Need to walk in hospital room?: 1  Climbing 3-5 steps with a railing?: 1  Basic Mobility Total Score: 11       Treatment & Education:  Pt  left in bed, spoke with nurse to get PCT assist with getting pt cleaned.    Patient left supine with bed alarm on, nurse notified, and all needs in reach .    GOALS:   Multidisciplinary Problems       Physical Therapy Goals          Problem: Physical Therapy    Goal Priority Disciplines Outcome Goal Variances Interventions   Physical Therapy Goal     PT, PT/OT Ongoing, Progressing     Description: Goals to be met by: 22     Patient will increase functional independence with mobility by performin. Pt to be supervision with bed mobility.  2. Pt to transfer with min A scooting or via sliding board.  3. Pt to be (I) with wheelchair mobility 150'. Met 22                         Time Tracking:     PT Received On: 22  PT Start Time: 1050     PT Stop Time: 1058  PT Total Time (min): 8 min     Billable Minutes: Therapeutic Activity 8 (Co-tx with PATRICIO Granados)    Treatment Type: Treatment  PT/PTA: PT     PTA Visit Number: 5     2022

## 2022-11-09 NOTE — PLAN OF CARE
Problem: Skin Injury Risk Increased  Goal: Skin Health and Integrity  Outcome: Ongoing, Progressing  Intervention: Optimize Skin Protection  Flowsheets (Taken 11/8/2022 1915)  Pressure Reduction Techniques: frequent weight shift encouraged  Pressure Reduction Devices: positioning supports utilized  Skin Protection:   adhesive use limited   incontinence pads utilized  Head of Bed (HOB) Positioning: HOB elevated  Intervention: Promote and Optimize Oral Intake  Flowsheets (Taken 11/8/2022 1915)  Oral Nutrition Promotion: rest periods promoted

## 2022-11-09 NOTE — PLAN OF CARE
Problem: Occupational Therapy  Goal: Occupational Therapy Goal  Description: Goals to be met by: 11/16/2022     Patient will increase functional independence with ADL's by performing:    UE Dressing with Modified [place]/Set-up Assistance.  LE Dressing with Minimal Assistance.  Grooming while EOB with Set-up Assistance.  Increase functional strength to 5/5 for improved ease of scoot transfers.  Patient will tolerate sitting at EOB for 10 mins to allow time for grooming tasks.  Patient will perform scoot transfer bed <---> W/C with Minimal Assistance.       Outcome: Ongoing, Progressing     Patient was pleasant/motivated again today.  Patient attended dialysis later today and was co-treated this morning by PT and OT.  RLE was redressed due to bandage falling off upon movement.  Patient performed scoot transfers to/from bed and W/C today.  He required Max A/Mod A to turn himself in the bed and then CGA to scoot back into his W/C.  Patient continues to demo improved performance of W/C mob's with decreased verbal cues required.  Patient propelled his W/C over 200 ft today with only SBA to Set-Up A required.  He required Max A of 2 persons to perform scoot T/F from W/C back into bed.

## 2022-11-09 NOTE — CONSULTS
[facility] Telemetry  Wound Care    Patient Name:  Adryan Neff   MRN:  [id number]  Date: 11/9/2022  Diagnosis: PAD (peripheral artery disease)    History:     Past Medical History:   Diagnosis Date    CAD (coronary artery disease) 2016    CAD (non obstructive) 2016 [facility]    Diabetes mellitus type I     Diabetic retinopathy     ESRD on hemodialysis     on HD TThSat    Gangrene of left foot     Hypertension     Nuclear sclerosis of right eye 11/24/2021    PAD (peripheral artery disease)     PEA (Pulseless electrical activity) 10/10/2022    Pulmonary HTN     Right foot infection     TB lung, latent 04/2021    Wet gangrene 1/5/2022       Social History     Socioeconomic History    Marital status: Single    Number of children: 5   Tobacco Use    Smoking status: Never    Smokeless tobacco: Never   Substance and Sexual Activity    Alcohol use: No    Drug use: No    Sexual activity: Yes     Partners: Female   Social History Narrative    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     Social Determinants of Health     Financial Resource Strain: Low Risk     Difficulty of Paying Living Expenses: Not very hard   Food Insecurity: No Food Insecurity    Worried About Running Out of Food in the Last Year: Never true    Ran Out of Food in the Last Year: Never true   Transportation Needs: No Transportation Needs    Lack of Transportation (Medical): No    Lack of Transportation (Non-Medical): No   Physical Activity: Inactive    Days of Exercise per Week: 0 days    Minutes of Exercise per Session: 0 min   Stress: Stress Concern Present    Feeling of Stress : Rather much   Social Connections: Socially Isolated    Frequency of Communication with Friends and Family: More than three times a week    Frequency of Social Gatherings with Friends and Family: More than three times a week    Attends Voodoo Services: Never    Active Member of Clubs or Organizations: No    Attends Club or Organization Meetings: Never    Marital Status: Never    Housing Stability:  "Low Risk     Unable to Pay for Housing in the Last Year: No    Number of Places Lived in the Last Year: 1    Unstable Housing in the Last Year: No       Precautions:     Allergies as of 10/06/2022    (No Known Allergies)       [facility] Assessment Details/Treatment   Consulted for altered skin integrity right leg  POD # 27 Right AKA  A 56 year old male admitted 10/6/22 from home with PAD; ESRD on HD; CAD; chronic diastolic heart failure; GERD; pure hypercholesterolemia; essential hypertension; DM II controlled with CKD on HD  Left AKA - healed  11/2 WBC 11.34 Hgb 8.8 Hct 30.7  10/11 Alb 2.2 Weight 130 lb  10/13/22 S/P Right AKA per Dr. Davis for right leg gangrene  On Isoflex mattress; Harpal score 16  11/8 Nursing reported skin tear to right leg over one of incisions, cleaned and wrapped; Wound care consult per Hospitalist; Ortho visited patient 10/23 with instructions to follow up outpatient 2 weeks post op; Ortho 10/22 some persistent bleeding right AKA site; did not identify post op AKA wound care orders for nursing  Assessment:  Photodocumentation    Right lateral AKA incision- Scant amount serosanguineous drainage on Kerlix dressing removed from stump. No foul odor. Epidermis peeling from along lateral incision. No drainage expressed when incision/posterior stump palpated. Incision line moist/scant slough. No eschar.   Treatment:  Cleansed incision with Vashe. Applied 3M Cavilon No Sting Film Barrier along incision. Covered with Aquacel Ag Surgical dressing. Wrapped with 2 cast paddings and 4" Coban for light compression.   Informed nursing of appearance of wound and dressing applied.  Orders placed.     11/09/2022    "

## 2022-11-09 NOTE — NURSING
Report received from night nurse KIAH Whaley. Visualized patient and assessed patient's overall condition and appearance. No acute distress noted. Will continue to monitor

## 2022-11-09 NOTE — PLAN OF CARE
Problem: Physical Therapy  Goal: Physical Therapy Goal  Description: Goals to be met by: 22     Patient will increase functional independence with mobility by performin. Pt to be supervision with bed mobility.  2. Pt to transfer with min A scooting or via sliding board.  3. Pt to be (I) with wheelchair mobility 150'. Met 22    Outcome: Ongoing, Progressing   PT to follow 3 days/wk.

## 2022-11-09 NOTE — NURSING
"Pt let me get vital signs, then became beligerent and  pointed his finger in my face and started speaking loudly.  Stated" I said no medicine, I mean no medicine"  "

## 2022-11-09 NOTE — PLAN OF CARE
Problem: Occupational Therapy  Goal: Occupational Therapy Goal  Description: Goals to be met by: 11/15/2022     Patient will increase functional independence with ADL's by performing:    UE Dressing with Modified [place]/Set-up Assistance.  LE Dressing with Minimal Assistance.  Grooming while EOB with Set-up Assistance.  Increase functional strength to 5/5 for improved ease of scoot transfers.  Patient will tolerate sitting at EOB for 10 mins to allow time for grooming tasks.  Patient will perform scoot transfer bed <---> W/C with Minimal Assistance.       Outcome: Ongoing, Progressing     Patient was motivated and cooperative today.  His two daughters were present for his Tx and provided him with strong family support.  Patient demonstrated significant functional gains; he was Min A for supine to sitting at EOB and CGA for scooting to/from EOB.  While seated at EOB, he needed CGA to maintain his sitting balance.  He donned a clean gown with Min A.  He required Max A x 2 for bed to W/C T/F.  He propelled his W/C 128 ft with SBA from PTA and OT.  He then transferred back to bed with Max A x 2.  All transfers were completed with use of sliding board and max verbal, tactile, and visual cues.

## 2022-11-09 NOTE — PROGRESS NOTES
MARCUS contacted True with [facility]. MARCUS provided the balance for October. Pt's family will need to bring in October when received. True transferred to [facility] for admission.     MARCUS left a message for [facility] to contact .

## 2022-11-09 NOTE — PROGRESS NOTES
Date of Admission:10/6/2022    SUBJECTIVE: notes feeling ok    Current Facility-Administered Medications   Medication    acetaminophen tablet 650 mg    amLODIPine tablet 10 mg    aspirin chewable tablet 81 mg    atorvastatin tablet 40 mg    dextrose 10% bolus 125 mL    dextrose 10% bolus 250 mL    epoetin paola-epbx injection 10,000 Units    folic acid-vit B6-vit B12 2.5-25-2 mg tablet 1 tablet    glucagon (human recombinant) injection 1 mg    glucose chewable tablet 16 g    glucose chewable tablet 24 g    hydrALAZINE injection 10 mg    hydrALAZINE tablet 100 mg    HYDROcodone-acetaminophen 5-325 mg per tablet 1 tablet    insulin aspart U-100 pen 0-5 Units    insulin detemir U-100 pen 8 Units    melatonin tablet 6 mg    metoprolol succinate (TOPROL-XL) 24 hr tablet 25 mg    naloxone 0.4 mg/mL injection 0.02 mg    ondansetron disintegrating tablet 8 mg    pantoprazole EC tablet 40 mg    polyethylene glycol packet 17 g    senna tablet 8.6 mg    sevelamer carbonate tablet 2,400 mg    sodium chloride 0.9% flush 10 mL    sodium chloride 0.9% flush 10 mL       Wt Readings from Last 3 Encounters:   11/08/22 59.2 kg (130 lb 8.2 oz)   10/06/22 63.4 kg (139 lb 12.4 oz)   10/06/22 63.4 kg (139 lb 12.4 oz)     Temp Readings from Last 3 Encounters:   11/09/22 98.7 °F (37.1 °C)   08/09/22 98.2 °F (36.8 °C) (Oral)   06/16/22 98.7 °F (37.1 °C) (Oral)     BP Readings from Last 3 Encounters:   11/09/22 (!) 147/65   10/06/22 131/65   08/09/22 (!) 166/72     Pulse Readings from Last 3 Encounters:   11/09/22 67   08/09/22 75   06/16/22 (!) 111       Intake/Output Summary (Last 24 hours) at 11/9/2022 1630  Last data filed at 11/8/2022 1700  Gross per 24 hour   Intake 120 ml   Output --   Net 120 ml         PE:  GEN:wd male in nad  HEENT:ncat,eomi,mm  CVS:s1s2 regular  PULM:ctab  ABD:bs,soft  EXT:akas, no thigh edema, avf arm  NEURO:awake, alert    No results for input(s): GLU, NA, K, CL, CO2, BUN, CREATININE, CALCIUM, MG in the last 24  hours.      Lab Results   Component Value Date    .0 (H) 01/07/2021    CALCIUM 9.3 11/02/2022    PHOS 4.7 (H) 10/31/2022       No results for input(s): WBC, RBC, HGB, HCT, PLT, MCV, MCH, MCHC in the last 24 hours.          A/P:  1.esrd. cont hd mwf. Hd today.  2.malfxn avg. Working. Following.  3.pea arrest. S/p. Following. Following.  4.gangrene. aka noted.  Cont post care.  5.htn. uf with hd.  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 Last phos is at goal.  7.anemia. cont epo.   8.dm2. folowing sugars.

## 2022-11-09 NOTE — PT/OT/SLP PROGRESS
Occupational Therapy   Treatment    Name: Adryan Neff  MRN: [id number]  Admitting Diagnosis: PAD (peripheral artery disease)  20 Days Post-Op    Recommendations:     Discharge Recommendations: nursing facility, skilled  Discharge Equipment Recommendations: other (see comments) (Per SNF.)  Barriers to Discharge: Other (Comment) (Patient is appropriate for SNF placement.  He is at risk of falls and injuries and requires increased assistance for all T/F's and most ADL's.)    Assessment:     Adryan Neff is a 56 y.o. male with a medical diagnosis of PAD (peripheral artery disease).  He presents with a pleasant affect and good motivation again today.  Performance deficits affecting function are impaired functional mobility, impaired balance, decreased lower extremity function, impaired self-care skills, weakness, and impaired endurance.     Rehab Prognosis:  Good; patient would benefit from acute skilled OT services to address these deficits and reach maximum level of function.       Plan:     Patient to be seen 5 x/week to address the above listed problems via self-care/home management, therapeutic activities, and therapeutic exercises.  Plan of Care Expires: 11/16/22  Plan of Care Reviewed with: patient    Subjective     Pain/Comfort:  Pain Rating 1: 0/10  Pain Rating Post-Intervention 1: 0/10    Objective:     Communicated with: Nurse, Gabby, prior to session.  Patient found in bed with HOB elevated with bed alarm, IV site present, and RLE wound bandaged upon OT entry to room.    General Precautions: Standard, fall   Orthopedic Precautions: N/A   Braces: N/A  Respiratory Status: Room air     Occupational Performance:     Bed Mobility:    Max A/Mod A to turn self 90 degrees to R side in prep for W/C T/F and then CGA to scoot back into W/C      Functional Mobility/Transfers:  Transfers:  Once properly aligned with W/C and while still in bed, patient was able to scoot himself into W/C with CGA as per above.  Patient  required Max A of 2 persons to perform scoot T/F from W/C back into bed.    Functional Mobility: Patient propelled W/C over 200 ft with SBA/Set-Up A required.      Activities of Daily Living:  N/A for today's session       [facility] 6 Click ADL: 16    Treatment & Education:  Patient was pleasant/motivated again today.  Patient attended dialysis later today and was co-treated this morning by PT and OT.  RLE was redressed due to bandage falling off upon movement.  Patient performed scoot transfers to/from bed and W/C today.  He required Max A/Mod A to turn himself in the bed and then CGA to scoot back into his W/C.  Patient continues to demo improved performance of W/C mob's with decreased verbal cues required.  Patient propelled his W/C over 200 ft today with only SBA to Set-Up A required.  He required Max A of 2 persons to perform scoot T/F from W/C back into bed.       Patient left in bed with HOB elevated with call button within reach, nurse informed, bed alarm reset, IV site present, and RLE dressing intact.     GOALS:   Multidisciplinary Problems       Occupational Therapy Goals          Problem: Occupational Therapy    Goal Priority Disciplines Outcome Interventions   Occupational Therapy Goal     OT, PT/OT Ongoing, Progressing    Description: Goals to be met by: 11/16/2022     Patient will increase functional independence with ADL's by performing:    UE Dressing with Modified [place]/Set-up Assistance.  LE Dressing with Minimal Assistance.  Grooming while EOB with Set-up Assistance.  Increase functional strength to 5/5 for improved ease of scoot transfers.  Patient will tolerate sitting at EOB for 10 mins to allow time for grooming tasks.  Patient will perform scoot transfer bed <---> W/C with Minimal Assistance.                            Time Tracking:     OT Date of Treatment: 11/09/22  OT Start Time: 0949  OT Stop Time: 1019  OT Total Time (min): 30 min  Patient was also treated from 10:50 AM to 10:58 AM  in order to return him to bed, bringing his total Tx time to 38 mins.      Billable Minutes:Therapeutic Activity 30 mins and Wheelchair Management Training 08 mins  Patient was co-treated with PT today for all time.     OT/GLENDA: OT          11/9/2022

## 2022-11-09 NOTE — PT/OT/SLP PROGRESS
Occupational Therapy   Treatment    Name: Adryan Neff  MRN: [id number]  Admitting Diagnosis: PAD (peripheral artery disease)  19 Days Post-Op    Recommendations:     Discharge Recommendations: nursing facility, skilled  Discharge Equipment Recommendations: other (see comments) (Per SNF.)  Barriers to Discharge:  (Patient requires increased assistance for ADL's and T/F's.  He is at risk for falls and injuries.  Patient with impaired healing at RLE surgical site.)    Assessment:     Adryan Neff is a 56 y.o. male with a medical diagnosis of PAD (peripheral artery disease).  He presents with improved participation and a positive affect today; patient was happy because his daughters were visiting him today.  Performance deficits affecting function are weakness, impaired endurance, impaired functional mobility, impaired balance, decreased coordination, decreased lower extremity function, pain, impaired skin, orthopedic precautions, and impaired self-care skills.     Rehab Prognosis:  Good; patient would benefit from acute skilled OT services to address these deficits and reach maximum level of function.       Plan:     Patient to be seen 5 x/week to address the above listed problems via self-care/home management, therapeutic activities, and therapeutic exercises.  Plan of Care Expires: 11/15/22  Plan of Care Reviewed with: patient, other (see comments) (both daughters)    Subjective     Pain/Comfort:  Pain Rating 1: (Not rated)  Location - Side 1: Right  Location - Orientation 1: Distal  Location 1: Leg  Pain Addressed 1: Reposition, Cessation of activity, Nurse notified, Other (see comments)---> (Patient had a small wound along the staple line in this area; his nurse, Zunilda, immediately checked and wrapped the area for the patient.)  Pain Rating Post-Intervention 1: (Not rated)    Objective:     Communicated with: NurseZunilda, prior to session.  Patient found in bed with HOB elevated with IV site at [facility] upon OT entry to  room.    General Precautions: Standard, fall   Orthopedic Precautions: RLE non weight-bearing   Braces: N/A  Respiratory Status: Room air     Occupational Performance:     Bed Mobility:    Min A for supine to sitting at EOB  CGA for scooting to and from EOB  Patient required CGA for maintenance of sitting balance with use of bed rail prn.      Functional Mobility/Transfers:  Transfers: Max A x 2 for bed <---> W/C T/F's via sliding board    Functional Mobility: Patient propelled W/C 128 ft in hallways using BUE's with SBA.      Activities of Daily Living:  UE Dressing: Min A to doff soiled gown and manoj clean gown while seated at EOB      [facility] 6 Click ADL: 16    Treatment & Education:  Patient was motivated and cooperative today.  His two daughters were present for his Tx and provided him with strong family support.  Patient demonstrated significant functional gains; he was Min A for supine to sitting at EOB and CGA for scooting to/from EOB.  While seated at EOB, he needed CGA to maintain his sitting balance.  He donned a clean gown with Min A.  He required Max A x 2 for bed to W/C T/F.  He propelled his W/C 128 ft with SBA from PTA and OT.  He then transferred back to bed with Max A x 2.  All transfers were completed with use of sliding board and max verbal, tactile, and visual cues.      Patient left in bed with bed in chair position with daughters present, call button within his reach, nurse notified of his status, and line(s) intact as previously stated.     GOALS:   Multidisciplinary Problems       Occupational Therapy Goals          Problem: Occupational Therapy    Goal Priority Disciplines Outcome Interventions   Occupational Therapy Goal     OT, PT/OT Ongoing, Progressing    Description: Goals to be met by: 11/02/2022     Patient will increase functional independence with ADL's by performing:    UE Dressing with Modified [place]/Set-up Assistance.  LE Dressing with Minimal Assistance.  Grooming while  EOB with Set-up Assistance.  Increase functional strength to 5/5 for improved ease of scoot transfers.  Patient will tolerate sitting at EOB for 10 mins to allow time for grooming tasks.  Patient will perform scoot transfer bed <---> W/C with Minimal Assistance.                            Time Tracking:     OT Date of Treatment: 11/08/22  OT Start Time: 1211  OT Stop Time: 1242  OT Total Time (min): 31 min    Billable Minutes:Self Care/Home Management 9 mins  Therapeutic Activity 22 mins     OT/GLENDA: OT          11/8/2022

## 2022-11-10 VITALS
HEART RATE: 84 BPM | BODY MASS INDEX: 17.72 KG/M2 | RESPIRATION RATE: 20 BRPM | DIASTOLIC BLOOD PRESSURE: 74 MMHG | TEMPERATURE: 98 F | WEIGHT: 126.56 LBS | SYSTOLIC BLOOD PRESSURE: 153 MMHG | OXYGEN SATURATION: 100 % | HEIGHT: 71 IN

## 2022-11-10 PROBLEM — T82.590A AV GRAFT MALFUNCTION: Status: RESOLVED | Noted: 2022-10-14 | Resolved: 2022-11-10

## 2022-11-10 LAB — SARS-COV-2 RDRP RESP QL NAA+PROBE: NEGATIVE

## 2022-11-10 PROCEDURE — 97535 SELF CARE MNGMENT TRAINING: CPT

## 2022-11-10 PROCEDURE — 25000003 PHARM REV CODE 250: Performed by: ORTHOPAEDIC SURGERY

## 2022-11-10 PROCEDURE — 97542 WHEELCHAIR MNGMENT TRAINING: CPT | Mod: CQ

## 2022-11-10 PROCEDURE — 97542 WHEELCHAIR MNGMENT TRAINING: CPT

## 2022-11-10 PROCEDURE — 25000003 PHARM REV CODE 250: Performed by: HOSPITALIST

## 2022-11-10 PROCEDURE — 97530 THERAPEUTIC ACTIVITIES: CPT

## 2022-11-10 PROCEDURE — U0002 COVID-19 LAB TEST NON-CDC: HCPCS | Performed by: INTERNAL MEDICINE

## 2022-11-10 PROCEDURE — 97530 THERAPEUTIC ACTIVITIES: CPT | Mod: CQ

## 2022-11-10 RX ORDER — HYDRALAZINE HYDROCHLORIDE 100 MG/1
100 TABLET, FILM COATED ORAL EVERY 8 HOURS
Qty: 90 TABLET | Refills: 11 | Status: ON HOLD
Start: 2022-11-10 | End: 2022-12-02 | Stop reason: SDUPTHER

## 2022-11-10 RX ADMIN — ASPIRIN 81 MG CHEWABLE TABLET 81 MG: 81 TABLET CHEWABLE at 09:11

## 2022-11-10 RX ADMIN — SEVELAMER CARBONATE 2400 MG: 800 TABLET, FILM COATED ORAL at 09:11

## 2022-11-10 RX ADMIN — SEVELAMER CARBONATE 2400 MG: 800 TABLET, FILM COATED ORAL at 11:11

## 2022-11-10 RX ADMIN — Medication 1 TABLET: at 09:11

## 2022-11-10 RX ADMIN — PANTOPRAZOLE SODIUM 40 MG: 40 TABLET, DELAYED RELEASE ORAL at 09:11

## 2022-11-10 NOTE — PROGRESS NOTES
ADT 30 order placed for Van Transportation.  Requested  time: 5:00 pm  If transportation does not arrive at ETA time nurse will be instructed to follow protocol for transportation below:  How can I get in touch directly with dispatch, if needed?                 Non-emergent dispatch: [phone]      +++NURSING:  If Van does not arrive at requested time please call the above Non Emergent Dispatcher.  If issue not resolved please escalate to your charge nurse for further instructions.    CM notified Aurelio with transportation that pt has his personal wheelchair.

## 2022-11-10 NOTE — PLAN OF CARE
Problem: Occupational Therapy  Goal: Occupational Therapy Goal  Description: Goals to be met by: 11/16/2022     Patient will increase functional independence with ADL's by performing:    UE Dressing with Modified [place]/Set-up Assistance.  LE Dressing with Minimal Assistance.  Grooming while EOB with Set-up Assistance.  Increase functional strength to 5/5 for improved ease of scoot transfers.  Patient will tolerate sitting at EOB for 10 mins to allow time for grooming tasks.  Patient will perform scoot transfer bed <---> W/C with Minimal Assistance.       Outcome: Ongoing, Progressing       Patient continues to demo a positive affect and good motivation.  Patient required Min A for supine to sitting at EOB and Mod A to Max A to scoot to EOB.  Patient sat at EOB for grooming and hygiene tasks with Set-Up A.  Patient required Min A of 2 persons for bed to W/C transfer today via the slide board.  Patient was able to perform all W/C mob's with SBA to Sup.  He then required Max A x 2 for W/C to bed T/F via the slide board.        Patient is to D/C to [facility]

## 2022-11-10 NOTE — PLAN OF CARE
Problem: Physical Therapy  Goal: Physical Therapy Goal  Description: Goals to be met by: 22     Patient will increase functional independence with mobility by performin. Pt to be supervision with bed mobility.  2. Pt to transfer with min A scooting or via sliding board.  3. Pt to be (I) with wheelchair mobility 150'. Met 22    Outcome: Ongoing, Progressing

## 2022-11-10 NOTE — PROGRESS NOTES
[facility] Medicine  Progress Note    Patient Name: Adryan Neff  MRN: [id number]  Patient Class: IP- Inpatient   Admission Date: 10/6/2022  Length of Stay: 35 days  Attending Physician: Nikolay Sawyer MD  Primary Care Provider: Carola Pedro MD        Subjective:     Principal Problem:PAD (peripheral artery disease)        HPI:  This is a 56 year old male with a PMHx of severe PAD s/p gangrene of left foot s/p left AKA (1/2022), ESRD on HD MWF, NICM (EF: 55%, GIDD), pulmonary hypertension, GERD and latent TB filiberto presents for concern for leg infection.       The patient presented to the vascular surgery clinic for a follow up. He reports worsening pain and developing eschar of the right dorsal foot along with foul smelling odor. Given concern for an infection and the need for IV antibiotics & expedited podiatry evaluation, the patient was sent to the ER. The patient denies having any fevers, chills, or injury to his foot. He has a previous AKA and uses a wheelchair to ambulate. While in the ED, the patient was hypertensive but otherwise hemodynamically stable. Labs showed leukocytosis (17.41 - with neutrophilic predominance), normocytic anemia (9.7), elevated sed rate (125), elevated CRP (237.2), negative lactate (1.6). X-ray foot showed no radiographic evidence of osteomyelitis. He was administered vancomycin, zosyn and was admitted for further management.     I attempted to call the daughter to obtain further history on 1902 without a response.         Overview/Hospital Course:  Mr Adryan Neff is a 56 y.o. man with PAD. He is s/p L AKA due to gangrene and now presents with worsening R foot infection. He also has ESRD on HD via LUE AVG. Started antibiotics. Podiatry and Ortho consulted with plans for amputation. This was delayed due to PEA on 10/10 with quick ROSC. Unclear etiology but suspect vasovagal event. Cardiology was consulted. He then had R AKA on 10/13/22. Antibiotics were  discontinued. He has had malfunctioning of his LUE AVG; Vascular was consulted and fistulogram performed.  SW/CM consulted for discharge planning.  He required blood transfusions for blood loss anemia from R AKA surgical site. Now stable and working on SNF placement.       Interval History: No new issues     Review of Systems   Constitutional:  Negative for activity change, appetite change and chills.   HENT:  Negative for congestion.    Eyes:  Negative for discharge.   Respiratory:  Negative for apnea and chest tightness.    Cardiovascular:  Negative for chest pain.   Gastrointestinal:  Negative for abdominal distention.   Endocrine: Negative for cold intolerance.   Genitourinary:  Negative for difficulty urinating and dysuria.   Musculoskeletal:  Negative for arthralgias and back pain.   Neurological:  Negative for dizziness and facial asymmetry.   Psychiatric/Behavioral:  Negative for agitation and behavioral problems.    Objective:     Vital Signs (Most Recent):  Temp:  (refused) (11/09/22 2300)  Pulse:  (refused) (11/09/22 2300)  Resp: 20 (11/09/22 2140)  BP:  (refused) (11/09/22 2300)  SpO2:  (refused) (11/09/22 2300) Vital Signs (24h Range):  Temp:  [98.2 °F (36.8 °C)-98.7 °F (37.1 °C)] 98.2 °F (36.8 °C)  Pulse:  [62-84] 84  Resp:  [16-20] 20  SpO2:  [100 %] 100 %  BP: (135-165)/(63-75) 153/74     Weight: 59.2 kg (130 lb 8.2 oz)  Body mass index is 18.2 kg/m².    Intake/Output Summary (Last 24 hours) at 11/10/2022 0617  Last data filed at 11/9/2022 1919  Gross per 24 hour   Intake 360 ml   Output 1500 ml   Net -1140 ml      Physical Exam  Constitutional:       General: He is not in acute distress.     Appearance: Normal appearance. He is not ill-appearing, toxic-appearing or diaphoretic.   HENT:      Head: Normocephalic and atraumatic.   Eyes:      Conjunctiva/sclera: Conjunctivae normal.   Cardiovascular:      Rate and Rhythm: Normal rate and regular rhythm.   Pulmonary:      Effort: Pulmonary effort is  normal.      Breath sounds: Normal breath sounds.   Skin:     General: Skin is warm and dry.   Neurological:      Mental Status: He is alert and oriented to person, place, and time.   Psychiatric:         Behavior: Behavior normal.         Thought Content: Thought content normal.       Significant Labs: All pertinent labs within the past 24 hours have been reviewed.  CBC: No results for input(s): WBC, HGB, HCT, PLT in the last 48 hours.  CMP: No results for input(s): NA, K, CL, CO2, GLU, BUN, CREATININE, CALCIUM, PROT, ALBUMIN, BILITOT, ALKPHOS, AST, ALT, ANIONGAP, EGFRNONAA in the last 48 hours.    Invalid input(s): ESTGFAFR[place]    Significant Imaging:       Assessment/Plan:      * PAD (peripheral artery disease)  S/p L AKA then presented with PAD causing R foot wound. Now s/p R AKA on 10/13  - previously on antibiotics. These were discontinued when source control achieved.   - continue asa, statin  - Pending SNF placement.  - now bilateral AKA-- palliative consulted for support     Advanced care planning/counseling discussion  Advance Care Planning     Date: 11/03/2022  Palliative consulted for additional support. His personal goal is to discharge to home with his family. Currently working on SNF transition to snf due to lack of support at home. Case management also following along to help facilitate SNF.           AV graft malfunction  Vascular following.  S/p fistulogram      Cognitive changes  Noted by Psychiatry that he has had multiple episodes of confusion and not understanding treatment plan. Unclear if this is delirium associated or beginnings of cognitive dysfunction. Confusion has resolved.      Anemia due to chronic kidney disease, on chronic dialysis  - Anemic - has been transfused this admission pre and post surgery  - Continue EPO per Nephrology  - With anemia of acute blood loss from bleeding from right stump.   - Hgb now stable      ESRD on hemodialysis  Dialyzes via LUE AVG  There was concern  about AVG malfunction. Vascular surgery consulted  S/p fistulogram.  Nephrology following.     CAD (coronary artery disease) - non-obstructive from [facility] in 2016  - Resume aspirin, statin       Chronic diastolic heart failure  - Echocardiogram (11/24/2021): EF: 55%, GIDD, mild TR, moderate to severe MR  - no signs of acute exacerbation  - Continue home medications     Gastroesophageal reflux disease  - Coninue PPI     Pure hypercholesterolemia  - Continue statin     Essential (primary) hypertension  BP generally controlled  - Continue home medications    Controlled type 2 diabetes mellitus with chronic kidney disease on chronic dialysis, with long-term current use of insulin  - Last A1c:   Lab Results   Component Value Date    HGBA1C 6.1 (H) 01/07/2021     Meds: detemir + SSI PRN to maintain goal 140-180  ADA diet, accuchecks, hypoglycemic protocol        VTE Risk Mitigation (From admission, onward)         Ordered     IP VTE HIGH RISK PATIENT  Once         10/06/22 1819     Place sequential compression device  Until discontinued         10/06/22 1819                Discharge Planning   ENIO: 11/8/2022     Code Status: Full Code   Is the patient medically ready for discharge?: Yes    Reason for patient still in hospital (select all that apply):    Discharge Plan A: New Nursing Home placement - [facility] care facility   Discharge Delays: (!) Other (Waiting on bank statements)      Awaiting SNF        Nikolay Burch MD  Department of Hospital Medicine   [facility] - [place] - Telemetry

## 2022-11-10 NOTE — DISCHARGE SUMMARY
[facility] Medicine  Discharge Summary      Patient Name: Adryan Neff  MRN: [id number]  [facility]: [id number]  Patient Class: IP- Inpatient  Admission Date: 10/6/2022  Hospital Length of Stay: 35 days  Discharge Date and Time:  11/10/2022 10:36 AM  Attending Physician: Nikolay Sawyer MD   Discharging Provider: Nikolay Sawyer MD  Primary Care Provider: Carola Pedro MD    Primary Care Team: Networked reference to record PCT     HPI:   This is a 56 year old male with a PMHx of severe PAD s/p gangrene of left foot s/p left AKA (1/2022), ESRD on HD MWF, NICM (EF: 55%, GIDD), pulmonary hypertension, GERD and latent TB filiberto presents for concern for leg infection.       The patient presented to the vascular surgery clinic for a follow up. He reports worsening pain and developing eschar of the right dorsal foot along with foul smelling odor. Given concern for an infection and the need for IV antibiotics & expedited podiatry evaluation, the patient was sent to the ER. The patient denies having any fevers, chills, or injury to his foot. He has a previous AKA and uses a wheelchair to ambulate. While in the ED, the patient was hypertensive but otherwise hemodynamically stable. Labs showed leukocytosis (17.41 - with neutrophilic predominance), normocytic anemia (9.7), elevated sed rate (125), elevated CRP (237.2), negative lactate (1.6). X-ray foot showed no radiographic evidence of osteomyelitis. He was administered vancomycin, zosyn and was admitted for further management.     I attempted to call the daughter to obtain further history on 1902 without a response.         Procedure(s) (LRB):  Fistulogram (Left)      Hospital Course:   Mr Adryan Neff is a 56 y.o. man with PAD. He is s/p L AKA due to gangrene and now presents with worsening R foot infection. He also has ESRD on HD via LUE AVG. Started antibiotics. Podiatry and Ortho consulted with plans for amputation. This was delayed due to PEA on 10/10  with quick ROSC. Unclear etiology but suspect vasovagal event. Cardiology was consulted. He then had R AKA on 10/13/22. Antibiotics were discontinued. He has had malfunctioning of his LUE AVG; Vascular was consulted and fistulogram performed.  SW/CM consulted for discharge planning.  He required blood transfusions for blood loss anemia from R AKA surgical site. Now stable and working on SNF placement. Patient was discharged to SNF on 11/10/22.  Activity as tolerated. Diet low NA, ADA 2000 edilma. Diet. Follow up with PCP in one week       Goals of Care Treatment Preferences:  Code Status: Full Code    Health care agent: Mary Claude August  [facility] agent number: [phone]          What is most important right now is to focus on remaining as independent as possible.         Consults:   Consults (From admission, onward)        Status Ordering Provider     Inpatient consult to Palliative Care  Once        Provider:  (Not yet assigned)    Completed RICHARD DANIELS     Inpatient consult to Spiritual Care  Once        Provider:  (Not yet assigned)    Completed RICHARD DANIELS     Inpatient consult to Psychiatry  Once        Provider:  Mekhi Ndiaye MD    Completed ARTURO MORALES     Inpatient consult to Psychiatry  Once        Provider:  Mekhi Ndiaye MD    Completed ARTURO MORALES     Inpatient consult to Cardiology  Once        Provider:  Víctor Sutton MD    Completed PACO CHARLTON III     Inpatient consult to Orthopedic Surgery  Once        Provider:  Rusty Light MD    Completed SANJUANITA TORRES     Inpatient consult to Orthopedic Surgery  Once        Provider:  Rusty Light MD    Completed SANJUANITA TORRES     Inpatient consult to Infectious Diseases  Once        Provider:  Ruby Salvador MD    Completed LUIS EDUARDO OSORIO     Inpatient consult to Nephrology  Once        Provider:  Bonifacio Garcias MD    Acknowledged GREER SANABRIA     Inpatient  consult to Vascular Surgery  Once        Provider:  Masoud Soni MD    Completed IRWIN BLOOM     Inpatient consult to Podiatry  Once        Provider:  Lalita Hassan DPM    Completed IRWIN BLOOM          No new Assessment & Plan notes have been filed under this hospital service since the last note was generated.  Service: Hospital Medicine    Final Active Diagnoses:    Diagnosis Date Noted POA    PRINCIPAL PROBLEM:  PAD (peripheral artery disease) [I73.9] 02/07/2022 Yes    Advanced care planning/counseling discussion [Z71.89] 11/02/2022 Not Applicable    Cognitive changes [R41.89] 10/14/2022 Yes    Anemia due to chronic kidney disease, on chronic dialysis [N18.6, D63.1, Z99.2] 10/13/2022 Not Applicable    ESRD on hemodialysis [N18.6, Z99.2]  Not Applicable    Chronic diastolic heart failure [I50.32] 10/26/2020 Yes    Gastroesophageal reflux disease [K21.9] 06/04/2019 Yes    Pure hypercholesterolemia [E78.00] 01/31/2019 Yes    Controlled type 2 diabetes mellitus with chronic kidney disease on chronic dialysis, with long-term current use of insulin [E11.22, N18.6, Z79.4, Z99.2] 01/29/2019 Not Applicable    Essential (primary) hypertension [I10] 01/29/2019 Yes      Problems Resolved During this Admission:    Diagnosis Date Noted Date Resolved POA    Wet gangrene [I96] 01/05/2022 10/14/2022 Yes    Leukocytosis [D72.829] 10/21/2022 11/02/2022 No    AV graft malfunction [T82.590A] 10/14/2022 11/10/2022 Yes    PEA (Pulseless electrical activity) [I46.9] 10/10/2022 10/14/2022 No    Hyperkalemia [E87.5] 10/26/2020 11/02/2022 Yes    NICM (nonischemic cardiomyopathy) [I42.8] 09/14/2016 10/12/2022 Yes       Discharged Condition: good    Disposition: Skilled Nursing Facility    Follow Up:   Follow-up Information     Carola Pedro MD Follow up in 1 week(s).    Specialty: Family Medicine  Contact information:  [address] [place] [address]  [place] [address] [phone]                       Patient  Instructions:   No discharge procedures on file.    Significant Diagnostic Studies:     Pending Diagnostic Studies:     Procedure Component Value Units Date/Time    IR Fistulagram w/ Pat with Imaging [[id number]] Resulted: 10/20/22 1127    Order Status: Sent Lab Status: In process Updated: 10/20/22 1309    IR PTA Central Dialysis Circuit [[id number]] Resulted: 10/20/22 1309    Order Status: Sent Lab Status: In process Updated: 10/20/22 1309    IR Ultrasound Guidance [[id number]] Resulted: 10/20/22 1127    Order Status: Sent Lab Status: In process Updated: 10/20/22 1309         Medications:  Reconciled Home Medications:      Medication List      START taking these medications    hydrALAZINE 100 MG tablet  Commonly known as: APRESOLINE  Take 1 tablet (100 mg total) by mouth every 8 (eight) hours.     insulin detemir U-100 100 unit/mL (3 mL) Inpn pen  Commonly known as: Levemir FLEXTOUCH  Inject 8 Units into the skin every evening.  Replaces: insulin glargine 100 units/mL SubQ pen        CHANGE how you take these medications    amLODIPine 10 MG tablet  Commonly known as: NORVASC  Take 1 tablet (10 mg total) by mouth once daily.  What changed: Another medication with the same name was removed. Continue taking this medication, and follow the directions you see here.     aspirin 81 MG Chew  Take 81 mg by mouth once daily.  What changed: Another medication with the same name was removed. Continue taking this medication, and follow the directions you see here.     omeprazole 40 MG capsule  Commonly known as: PRILOSEC  Take 40 mg by mouth once daily.  What changed: Another medication with the same name was removed. Continue taking this medication, and follow the directions you see here.     RENVELA 800 mg Tab  Generic drug: sevelamer carbonate  Take 800 mg by mouth 3 (three) times daily with meals.  What changed: Another medication with the same name was removed. Continue taking this medication, and follow the directions you  "see here.        CONTINUE taking these medications    atorvastatin 20 MG tablet  Commonly known as: LIPITOR  Take 1 tablet by mouth.     clopidogreL 75 mg tablet  Commonly known as: PLAVIX  Take 75 mg by mouth once daily.     lancets Misc  Commonly known as: ACCU-CHEK SOFTCLIX LANCETS  1 each by Misc.(Non-Drug; Combo Route) route once daily at 6am.     metoprolol succinate 25 MG 24 hr tablet  Commonly known as: TOPROL-XL  Take 1 tablet by mouth.     pravastatin 20 MG tablet  Commonly known as: PRAVACHOL  Take 20 mg by mouth once daily.        STOP taking these medications    doxycycline 100 MG Cap  Commonly known as: VIBRAMYCIN     insulin glargine 100 units/mL SubQ pen  Replaced by: insulin detemir U-100 100 unit/mL (3 mL) Inpn pen     MIRCERA INJ     pen needle, diabetic 32 gauge x 5/32" Ndle     traMADoL 50 mg tablet  Commonly known as: ULTRAM            Indwelling Lines/Drains at time of discharge:   Lines/Drains/Airways     Central Venous Catheter Line  Duration                Hemodialysis Catheter 01/14/22 1439 right internal jugular 299 days          Drain  Duration                Hemodialysis AV Fistula Left upper arm -- days                Time spent on the discharge of patient:  > 35  minutes         Nikolay Burch MD  Department of Hospital Medicine  [facility] - Telemetry  "

## 2022-11-10 NOTE — PLAN OF CARE
11/10/22 1254   Medicare Message   Important Message from Medicare regarding Discharge Appeal Rights Explained to patient/caregiver  (SW explained IMM. Pt's daughter, Luis F verbalyy expressed understanding. CHANEL will mail a copy to address on file.)   Date IMM was signed 11/10/22   Time IMM was signed 1254     [id number] [phone]

## 2022-11-10 NOTE — PLAN OF CARE
[place] - Telemetry  Discharge Final Note    Primary Care Provider: Carola Pedro MD    Expected Discharge Date: 11/10/2022    All CM needs met. CM informed Rukhsana with [facility] that pt has been transferred to [facility] at 7:15 am. CM notified nurse, Gabby that pt is ready for discharge from CM standpoint.    Final Discharge Note (most recent)       Final Note - 11/10/22 1604          Final Note    Assessment Type Final Discharge Note (P)      Anticipated Discharge Disposition Intermediate Care Facility for jail or Supportive Care (P)      What phone number can be called within the next 1-3 days to see how you are doing after discharge? [phone] (P)         Post-Acute Status    Post-Acute Authorization Placement (P)    [facility]    Post-Acute Placement Status Set-up Complete/Auth obtained (P)      Coverage Medicare AB (P)      Patient choice form signed by patient/caregiver List from System Post-Acute Care (P)      Discharge Delays PFC Arranged Transportation (P)                      Important Message from Medicare  Important Message from Medicare regarding Discharge Appeal Rights: Explained to patient/caregiver (SW explained IMM. Pt's daughterLuis F verbalyy expressed understanding. SW will mail a copy to address on file.)     Date IMM was signed: 11/10/22  Time IMM was signed: 1254    Contact Info       Carola Pedro MD   Specialty: Family Medicine   Relationship: PCP - General    [place] [address]   Phone: [phone]       Next Steps: Follow up in 1 week(s)

## 2022-11-10 NOTE — PLAN OF CARE
Problem: Adult Inpatient Plan of Care  Goal: Plan of Care Review  Outcome: Met  Goal: Patient-Specific Goal (Individualized)  Outcome: Met  Goal: Absence of Hospital-Acquired Illness or Injury  Outcome: Met  Goal: Optimal Comfort and Wellbeing  Outcome: Met  Goal: Readiness for Transition of Care  Outcome: Met     Problem: Skin Injury Risk Increased  Goal: Skin Health and Integrity  Outcome: Met     Problem: Fall Injury Risk  Goal: Absence of Fall and Fall-Related Injury  Outcome: Met     Problem: Diabetes Comorbidity  Goal: Blood Glucose Level Within Targeted Range  Outcome: Met     Problem: Infection  Goal: Absence of Infection Signs and Symptoms  Outcome: Met     Problem: Impaired Wound Healing  Goal: Optimal Wound Healing  Outcome: Met     Problem: Device-Related Complication Risk (Hemodialysis)  Goal: Safe, Effective Therapy Delivery  Outcome: Met     Problem: Hemodynamic Instability (Hemodialysis)  Goal: Effective Tissue Perfusion  Outcome: Met     Problem: Infection (Hemodialysis)  Goal: Absence of Infection Signs and Symptoms  Outcome: Met     Problem: Bariatric Environmental Safety  Goal: Safety Maintained with Care  Outcome: Met     Problem: Pain Acute  Goal: Acceptable Pain Control and Functional Ability  Outcome: Met     Problem: Coping Ineffective  Goal: Effective Coping  Outcome: Met

## 2022-11-10 NOTE — PT/OT/SLP PROGRESS
Physical Therapy Treatment    Patient Name:  Adryan Neff   MRN:  [id number]    Recommendations:     Discharge Recommendations:  nursing facility, skilled   Discharge Equipment Recommendations: other (see comments) (TBD)   Barriers to discharge:   decreased functional mobility, weakness    Assessment:     Adryan Neff is a 56 y.o. male admitted with a medical diagnosis of PAD (peripheral artery disease).  He presents with the following impairments/functional limitations:  weakness, impaired endurance, impaired functional mobility, impaired balance, decreased safety awareness, decreased upper extremity function, pain, impaired skin.      Rehab Prognosis: Good; patient would benefit from acute skilled PT services to address these deficits and reach maximum level of function.    Recent Surgery: Procedure(s) (LRB):  Fistulogram (Left) 21 Days Post-Op    Plan:     During this hospitalization, patient to be seen 3 x/week to address the identified rehab impairments via therapeutic activities, therapeutic exercises, wheelchair management/training and progress toward the following goals:    Plan of Care Expires:  11/17/22    Subjective     Chief Complaint: n/a  Patient/Family Comments/goals: Pt agreed to participate.  Pain/Comfort:  Pain Rating 1:  (Pt didn't rate)  Location - Side 1: Right  Location - Orientation 1: distal  Location 1: leg  Pain Addressed 1: Reposition, Cessation of Activity, Nurse notified  Pain Rating Post-Intervention 1:  (pt didn't rate)      Objective:     Communicated with nurse Pierre prior to session.  Patient found HOB elevated with bed alarm (IV site present, RLE wound bandaged) upon PT entry to room.     General Precautions: Standard, fall   Orthopedic Precautions:RLE non weight bearing   Braces: N/A  Respiratory Status: Room air     Functional Mobility:  Bed Mobility:     Scooting: anterior scoot to EOB with CGA/Min A  Supine to Sit: CGA with HOB elevated  Sit to Supine: CGA  Transfers:   Gait Belt  don prior OOB activity; v/c required for proper hand placement with transfer using sliding board and WC propelling  Bed to Wheelchair: Min/Mod A of 2 persons with slide board using side scoot transfer  Wheelchair to Bed: Max A of 2 persons with slide board using scoot transfer  Balance: Fair/Fair+ Static Sitting  Wheelchair Propulsion: Pt propelled Standard wheelchair x ~150 feet on Level tile with Bilateral upper extremity with Stand-by Assistance; pt required rest breaks 2* fatigue, required v/c for WC management and proper hand placement.      AM-PAC 6 CLICK MOBILITY  Turning over in bed (including adjusting bedclothes, sheets and blankets)?: 3  Sitting down on and standing up from a chair with arms (e.g., wheelchair, bedside commode, etc.): 1  Moving from lying on back to sitting on the side of the bed?: 3  Moving to and from a bed to a chair (including a wheelchair)?: 2  Need to walk in hospital room?: 1  Climbing 3-5 steps with a railing?: 1  Basic Mobility Total Score: 11       Treatment & Education:  Re-encouraged pt to sit up in long sitting in bed or at EOB with assistance and to perform BLE ex throughout the day to maintain muscle strength/endurance, blood circulation, pt verbalized understanding.  BLE ex Hip Flexion 10 reps  Pt vito. ~20 min sitting at EOB, pt used BR for balance with CGA>>SBA for safety    Patient left with bed in chair position with tray table near by, call button in reach, bed alarm on, and nurse notified.    GOALS:   Multidisciplinary Problems       Physical Therapy Goals          Problem: Physical Therapy    Goal Priority Disciplines Outcome Goal Variances Interventions   Physical Therapy Goal     PT, PT/OT Ongoing, Progressing     Description: Goals to be met by: 22     Patient will increase functional independence with mobility by performin. Pt to be supervision with bed mobility.  2. Pt to transfer with min A scooting or via sliding board.  3. Pt to be (I) with  wheelchair mobility 150'. Met 11/9/22                         Time Tracking:     PT Received On: 11/10/22  PT Start Time: 0945     PT Stop Time: 1030  PT Total Time (min): 45 min     Billable Minutes: Therapeutic Activity 20 min and Train/Wheelchair Management 25 min (Co-treat with OT 45 min)    Treatment Type: Treatment  PT/PTA: PTA     PTA Visit Number: 1     11/10/2022

## 2022-11-10 NOTE — SUBJECTIVE & OBJECTIVE
Interval History: No new issues     Review of Systems   Constitutional:  Negative for activity change, appetite change and chills.   HENT:  Negative for congestion.    Eyes:  Negative for discharge.   Respiratory:  Negative for apnea and chest tightness.    Cardiovascular:  Negative for chest pain.   Gastrointestinal:  Negative for abdominal distention.   Endocrine: Negative for cold intolerance.   Genitourinary:  Negative for difficulty urinating and dysuria.   Musculoskeletal:  Negative for arthralgias and back pain.   Neurological:  Negative for dizziness and facial asymmetry.   Psychiatric/Behavioral:  Negative for agitation and behavioral problems.    Objective:     Vital Signs (Most Recent):  Temp:  (refused) (11/09/22 2300)  Pulse:  (refused) (11/09/22 2300)  Resp: 20 (11/09/22 2140)  BP:  (refused) (11/09/22 2300)  SpO2:  (refused) (11/09/22 2300) Vital Signs (24h Range):  Temp:  [98.2 °F (36.8 °C)-98.7 °F (37.1 °C)] 98.2 °F (36.8 °C)  Pulse:  [62-84] 84  Resp:  [16-20] 20  SpO2:  [100 %] 100 %  BP: (135-165)/(63-75) 153/74     Weight: 59.2 kg (130 lb 8.2 oz)  Body mass index is 18.2 kg/m².    Intake/Output Summary (Last 24 hours) at 11/10/2022 0617  Last data filed at 11/9/2022 1919  Gross per 24 hour   Intake 360 ml   Output 1500 ml   Net -1140 ml      Physical Exam  Constitutional:       General: He is not in acute distress.     Appearance: Normal appearance. He is not ill-appearing, toxic-appearing or diaphoretic.   HENT:      Head: Normocephalic and atraumatic.   Eyes:      Conjunctiva/sclera: Conjunctivae normal.   Cardiovascular:      Rate and Rhythm: Normal rate and regular rhythm.   Pulmonary:      Effort: Pulmonary effort is normal.      Breath sounds: Normal breath sounds.   Skin:     General: Skin is warm and dry.   Neurological:      Mental Status: He is alert and oriented to person, place, and time.   Psychiatric:         Behavior: Behavior normal.         Thought Content: Thought content  normal.       Significant Labs: All pertinent labs within the past 24 hours have been reviewed.  CBC: No results for input(s): WBC, HGB, HCT, PLT in the last 48 hours.  CMP: No results for input(s): NA, K, CL, CO2, GLU, BUN, CREATININE, CALCIUM, PROT, ALBUMIN, BILITOT, ALKPHOS, AST, ALT, ANIONGAP, EGFRNONAA in the last 48 hours.    Invalid input(s): ESTGFAFRICA    Significant Imaging:

## 2022-11-10 NOTE — PLAN OF CARE
[facility]     Department of Hospital Medicine     [address] [place] [address]     [phone] after hours  [phone] fax       NURSING HOME ORDERS    11/10/2022    Admit to Nursing Home:       Skilled Bed                                          Diagnoses: weakness. S/p amputation     Active Hospital Problems    Diagnosis  POA    *PAD (peripheral artery disease) [I73.9]  Yes     Priority: 1 - High    Advanced care planning/counseling discussion [Z71.89]  Not Applicable    Cognitive changes [R41.89]  Yes    Anemia due to chronic kidney disease, on chronic dialysis [N18.6, D63.1, Z99.2]  Not Applicable    ESRD on hemodialysis [N18.6, Z99.2]  Not Applicable     on HD TThSat      Chronic diastolic heart failure [I50.32]  Yes    Gastroesophageal reflux disease [K21.9]  Yes    Pure hypercholesterolemia [E78.00]  Yes    Controlled type 2 diabetes mellitus with chronic kidney disease on chronic dialysis, with long-term current use of insulin [E11.22, N18.6, Z79.4, Z99.2]  Not Applicable    Essential (primary) hypertension [I10]  Yes      Resolved Hospital Problems    Diagnosis Date Resolved POA    Wet gangrene [I96] 10/14/2022 Yes     Priority: 2     Leukocytosis [D72.829] 11/02/2022 No    AV graft malfunction [T82.590A] 11/10/2022 Yes    PEA (Pulseless electrical activity) [I46.9] 10/14/2022 No    Hyperkalemia [E87.5] 11/02/2022 Yes    NICM (nonischemic cardiomyopathy) [I42.8] 10/12/2022 Yes       Patient is homebound due to:  PAD (peripheral artery disease)    Allergies:Review of patient's allergies indicates:  No Known Allergies    Vitals:       Routine,    Diet:  low NA, ADA 2000 edilma diet       Acitivities:        - Up in a chair each morning as tolerated       LABS:  Per facility protocol      Nursing Precautions:              - Fall precautions per nursing home protocol     - Decubitus precautions:        -  for positioning   - Pressure reducing foam  mattress   - Turn patient every two hours. Use wedge pillows to anchor patient    CONSULTS:   Physical Therapy to evaluate and treat     Occupational Therapy to evaluate and treat       E           DIABETES CARE:        Check blood sugar:    Fingerstick blood sugar a.m and p.m.    Fingerstick blood sugar AC and HS   Fingerstick blood sugar every 6 hours if unable to eat      Report CBG < 60 or > 400 to physician.                                          Insulin Sliding Scale          Glucose  Novolog Insulin Subcutaneous        0 - 60   Orange juice or glucose tablet, hold insulin      No insulin   201-250  2 units   251-300  4 units   301-350  6 units   351-400  8 units   >400   10 units then call physician      Medications: Discontinue all previous medication orders, if any. See new list below.          Medication List        START taking these medications      hydrALAZINE 100 MG tablet  Commonly known as: APRESOLINE  Take 1 tablet (100 mg total) by mouth every 8 (eight) hours.     insulin detemir U-100 100 unit/mL (3 mL) Inpn pen  Commonly known as: Levemir FLEXTOUCH  Inject 8 Units into the skin every evening.  Replaces: insulin glargine 100 units/mL SubQ pen            CHANGE how you take these medications      amLODIPine 10 MG tablet  Commonly known as: NORVASC  Take 1 tablet (10 mg total) by mouth once daily.  What changed: Another medication with the same name was removed. Continue taking this medication, and follow the directions you see here.     aspirin 81 MG Chew  Take 81 mg by mouth once daily.  What changed: Another medication with the same name was removed. Continue taking this medication, and follow the directions you see here.     omeprazole 40 MG capsule  Commonly known as: PRILOSEC  Take 40 mg by mouth once daily.  What changed: Another medication with the same name was removed. Continue taking this medication, and follow the directions you see here.     RENVELA 800 mg Tab  Generic drug:  "sevelamer carbonate  Take 800 mg by mouth 3 (three) times daily with meals.  What changed: Another medication with the same name was removed. Continue taking this medication, and follow the directions you see here.            CONTINUE taking these medications           clopidogreL 75 mg tablet  Commonly known as: PLAVIX  Take 75 mg by mouth once daily.     lancets Misc  Commonly known as: ACCU-CHEK SOFTCLIX LANCETS  1 each by Misc.(Non-Drug; Combo Route) route once daily at 6am.     metoprolol succinate 25 MG 24 hr tablet  Commonly known as: TOPROL-XL  Take 1 tablet by mouth daily     pravastatin 20 MG tablet  Commonly known as: PRAVACHOL  Take 20 mg by mouth once daily.            STOP taking these medications      doxycycline 100 MG Cap  Commonly known as: VIBRAMYCIN     insulin glargine 100 units/mL SubQ pen  Replaced by: insulin detemir U-100 100 unit/mL (3 mL) Inpn pen     MIRCERA INJ     pen needle, diabetic 32 gauge x 5/32" Ndle     traMADoL 50 mg tablet  Commonly known as: ULTRAM            Continue dialysis M, W,F        _________________________________  Nikolay Burch MD  11/10/2022   "

## 2022-11-10 NOTE — PT/OT/SLP PROGRESS
Occupational Therapy   Treatment    Name: Adryan Neff  MRN: [id number]  Admitting Diagnosis:  PAD (peripheral artery disease)  21 Days Post-Op    Recommendations:     Discharge Recommendations: nursing facility, skilled  Discharge Equipment Recommendations: other (see comments) (Per SNF.)  Barriers to Discharge: None    Assessment:     Adryan Neff is a 56 y.o. male with a medical diagnosis of PAD (peripheral artery disease).  He presents with emotional lability today.  Performance deficits affecting function are weakness, impaired endurance, impaired functional mobility, impaired balance, impaired skin, impaired self-care skills, and decreased lower extremity function.     Rehab Prognosis:  Good; patient would benefit from acute skilled OT services to address these deficits and reach maximum level of function.       Plan:     Patient to be seen 5 x/week to address the above listed problems via self-care/home management, therapeutic activities, and therapeutic exercises.   Plan of Care Expires: 11/16/22  Plan of Care Reviewed with: patient    Subjective     Pain/Comfort:  Pain Rating 1: (Patient with no c/o pain today.)  Pain Rating Post-Intervention 1: (Patient with no c/o pain.)    Objective:     Communicated with: NurseGabby, prior to session.  Patient found in bed with HOB elevated with bed alarm and IV site at RUE upon OT entry to room.    General Precautions: Standard, fall   Orthopedic Precautions: RLE non weight-bearing   Braces: N/A  Respiratory Status: Room air     Occupational Performance:     Bed Mobility:    Min A for supine to sitting at EOB   Mod A/Max A to scoot to EOB     Functional Mobility/Transfers:  Transfers: Bed to W/C via slide board with Min A of 2 persons and W/C to bed via slide board with Max A of 2 persons    Functional Mobility: Patient propelled W/C with SBA to Supervision for over 150 ft.     Activities of Daily Living:  Grooming/Hygiene: Patient sat at EOB and washed and dried face  and brushed teeth with Set-Up A.  Patient applied deodorant with CGA/Min A.        AMPA 6 Click ADL: 17    Treatment & Education:  Patient continues to demo a positive affect and good motivation.  Patient required Min A for supine to sitting at EOB and Mod A to Max A to scoot to EOB.  Patient sat at EOB for grooming and hygiene tasks with Set-Up A.  Patient required Min A of 2 persons for bed to W/C transfer today via the slide board.  Patient was able to perform all W/C mob's with SBA to Sup.  He then required Max A x 2 for W/C to bed T/F via the slide board.         Patient is to D/C to [facility]            Patient left with bed in chair position with nurse notified, call button within his reach, bed alarm reset, and IV site intact.     GOALS:   Multidisciplinary Problems       Occupational Therapy Goals          Problem: Occupational Therapy    Goal Priority Disciplines Outcome Interventions   Occupational Therapy Goal     OT, PT/OT Ongoing, Progressing    Description: Goals to be met by: 11/16/2022     Patient will increase functional independence with ADL's by performing:    UE Dressing with Modified [place]/Set-up Assistance.  LE Dressing with Minimal Assistance.  Grooming while EOB with Set-up Assistance.  Increase functional strength to 5/5 for improved ease of scoot transfers.  Patient will tolerate sitting at EOB for 10 mins to allow time for grooming tasks.  Patient will perform scoot transfer bed <---> W/C with Minimal Assistance.                            Time Tracking:     OT Date of Treatment: 11/10/22  OT Start Time: 0942  OT Stop Time: 1030  OT Total Time (min): 48 min    Billable Minutes:Self Care/Home Management 19 mins  Therapeutic Activity 19 mins   Wheelchair Management Training 10 mins  Co-Tx with PTA     OT/GLENDA: OT          11/10/2022

## 2022-11-23 ENCOUNTER — HOSPITAL ENCOUNTER (INPATIENT)
Facility: HOSPITAL | Age: 57
LOS: 9 days | Discharge: NURSING, REHAB OR LONG-TERM CARE | DRG: 564 | End: 2022-12-02
Attending: EMERGENCY MEDICINE | Admitting: INTERNAL MEDICINE
Payer: MEDICARE

## 2022-11-23 DIAGNOSIS — L02.415 ABSCESS OF RIGHT THIGH: ICD-10-CM

## 2022-11-23 DIAGNOSIS — R41.82 ALTERED MENTAL STATUS: ICD-10-CM

## 2022-11-23 DIAGNOSIS — A41.9 SEPSIS: ICD-10-CM

## 2022-11-23 DIAGNOSIS — A41.9 SEPSIS, DUE TO UNSPECIFIED ORGANISM, UNSPECIFIED WHETHER ACUTE ORGAN DYSFUNCTION PRESENT: ICD-10-CM

## 2022-11-23 DIAGNOSIS — D72.829 LEUKOCYTOSIS, UNSPECIFIED TYPE: Primary | ICD-10-CM

## 2022-11-23 DIAGNOSIS — E16.2 HYPOGLYCEMIA: ICD-10-CM

## 2022-11-23 DIAGNOSIS — R07.9 CHEST PAIN: ICD-10-CM

## 2022-11-23 LAB
ALBUMIN SERPL BCP-MCNC: 3.1 G/DL (ref 3.5–5.2)
ALLENS TEST: ABNORMAL
ALP SERPL-CCNC: 100 U/L (ref 55–135)
ALT SERPL W/O P-5'-P-CCNC: 11 U/L (ref 10–44)
ANION GAP SERPL CALC-SCNC: 13 MMOL/L (ref 8–16)
ANISOCYTOSIS BLD QL SMEAR: SLIGHT
AST SERPL-CCNC: 12 U/L (ref 10–40)
BACTERIA #/AREA URNS AUTO: ABNORMAL /HPF
BASOPHILS # BLD AUTO: 0.08 K/UL (ref 0–0.2)
BASOPHILS NFR BLD: 0.3 % (ref 0–1.9)
BILIRUB SERPL-MCNC: 0.4 MG/DL (ref 0.1–1)
BILIRUB UR QL STRIP: NEGATIVE
BUN SERPL-MCNC: 23 MG/DL (ref 6–20)
CALCIUM SERPL-MCNC: 10.3 MG/DL (ref 8.7–10.5)
CHLORIDE SERPL-SCNC: 100 MMOL/L (ref 95–110)
CLARITY UR REFRACT.AUTO: ABNORMAL
CO2 SERPL-SCNC: 30 MMOL/L (ref 23–29)
COLOR UR AUTO: ABNORMAL
CREAT SERPL-MCNC: 5.6 MG/DL (ref 0.5–1.4)
DELSYS: ABNORMAL
DIFFERENTIAL METHOD: ABNORMAL
EOSINOPHIL # BLD AUTO: 0 K/UL (ref 0–0.5)
EOSINOPHIL NFR BLD: 0.1 % (ref 0–8)
ERYTHROCYTE [DISTWIDTH] IN BLOOD BY AUTOMATED COUNT: 18.1 % (ref 11.5–14.5)
EST. GFR  (NO RACE VARIABLE): 11.1 ML/MIN/1.73 M^2
GLUCOSE SERPL-MCNC: 123 MG/DL (ref 70–110)
GLUCOSE UR QL STRIP: NEGATIVE
HCO3 UR-SCNC: 31.6 MMOL/L (ref 24–28)
HCT VFR BLD AUTO: 40.8 % (ref 40–54)
HGB BLD-MCNC: 11.6 G/DL (ref 14–18)
HGB UR QL STRIP: ABNORMAL
HYALINE CASTS UR QL AUTO: 0 /LPF
IMM GRANULOCYTES # BLD AUTO: 0.19 K/UL (ref 0–0.04)
IMM GRANULOCYTES NFR BLD AUTO: 0.7 % (ref 0–0.5)
INFLUENZA A, MOLECULAR: NOT DETECTED
INFLUENZA B, MOLECULAR: NOT DETECTED
KETONES UR QL STRIP: NEGATIVE
LACTATE SERPL-SCNC: 1 MMOL/L (ref 0.5–2.2)
LACTATE SERPL-SCNC: 2.3 MMOL/L (ref 0.5–2.2)
LEUKOCYTE ESTERASE UR QL STRIP: ABNORMAL
LYMPHOCYTES # BLD AUTO: 0.7 K/UL (ref 1–4.8)
LYMPHOCYTES NFR BLD: 2.6 % (ref 18–48)
MCH RBC QN AUTO: 26.1 PG (ref 27–31)
MCHC RBC AUTO-ENTMCNC: 28.4 G/DL (ref 32–36)
MCV RBC AUTO: 92 FL (ref 82–98)
MICROSCOPIC COMMENT: ABNORMAL
MONOCYTES # BLD AUTO: 0.5 K/UL (ref 0.3–1)
MONOCYTES NFR BLD: 2 % (ref 4–15)
NEUTROPHILS # BLD AUTO: 23.9 K/UL (ref 1.8–7.7)
NEUTROPHILS NFR BLD: 94.3 % (ref 38–73)
NITRITE UR QL STRIP: NEGATIVE
NRBC BLD-RTO: 0 /100 WBC
OVALOCYTES BLD QL SMEAR: ABNORMAL
PCO2 BLDA: 48.9 MMHG (ref 35–45)
PH SMN: 7.42 [PH] (ref 7.35–7.45)
PH UR STRIP: 5 [PH] (ref 5–8)
PLATELET # BLD AUTO: 551 K/UL (ref 150–450)
PMV BLD AUTO: 9.8 FL (ref 9.2–12.9)
PO2 BLDA: 28 MMHG (ref 40–60)
POC BE: 7 MMOL/L
POC SATURATED O2: 54 % (ref 95–100)
POC TCO2: 33 MMOL/L (ref 24–29)
POIKILOCYTOSIS BLD QL SMEAR: SLIGHT
POLYCHROMASIA BLD QL SMEAR: ABNORMAL
POTASSIUM SERPL-SCNC: 3.9 MMOL/L (ref 3.5–5.1)
PROT SERPL-MCNC: 9.1 G/DL (ref 6–8.4)
PROT UR QL STRIP: ABNORMAL
RBC # BLD AUTO: 4.44 M/UL (ref 4.6–6.2)
RBC #/AREA URNS AUTO: 10 /HPF (ref 0–4)
RSV AG BY MOLECULAR METHOD: NOT DETECTED
SAMPLE: ABNORMAL
SARS-COV-2 RNA RESP QL NAA+PROBE: NOT DETECTED
SITE: ABNORMAL
SODIUM SERPL-SCNC: 143 MMOL/L (ref 136–145)
SP GR UR STRIP: 1.02 (ref 1–1.03)
TROPONIN I SERPL DL<=0.01 NG/ML-MCNC: 0.11 NG/ML (ref 0–0.03)
TROPONIN I SERPL DL<=0.01 NG/ML-MCNC: 0.15 NG/ML (ref 0–0.03)
URN SPEC COLLECT METH UR: ABNORMAL
WBC # BLD AUTO: 25.43 K/UL (ref 3.9–12.7)
WBC #/AREA URNS AUTO: 28 /HPF (ref 0–5)

## 2022-11-23 PROCEDURE — 94761 N-INVAS EAR/PLS OXIMETRY MLT: CPT

## 2022-11-23 PROCEDURE — 87040 BLOOD CULTURE FOR BACTERIA: CPT | Mod: 59 | Performed by: EMERGENCY MEDICINE

## 2022-11-23 PROCEDURE — 25000003 PHARM REV CODE 250: Performed by: EMERGENCY MEDICINE

## 2022-11-23 PROCEDURE — 93005 ELECTROCARDIOGRAM TRACING: CPT

## 2022-11-23 PROCEDURE — 99285 EMERGENCY DEPT VISIT HI MDM: CPT | Mod: CS,,, | Performed by: EMERGENCY MEDICINE

## 2022-11-23 PROCEDURE — 93010 ELECTROCARDIOGRAM REPORT: CPT | Mod: ,,, | Performed by: INTERNAL MEDICINE

## 2022-11-23 PROCEDURE — 99223 PR INITIAL HOSPITAL CARE,LEVL III: ICD-10-PCS | Mod: AI,,, | Performed by: INTERNAL MEDICINE

## 2022-11-23 PROCEDURE — 99223 PR INITIAL HOSPITAL CARE,LEVL III: ICD-10-PCS | Mod: ,,, | Performed by: NURSE PRACTITIONER

## 2022-11-23 PROCEDURE — 87077 CULTURE AEROBIC IDENTIFY: CPT | Mod: 59 | Performed by: EMERGENCY MEDICINE

## 2022-11-23 PROCEDURE — 87186 SC STD MICRODIL/AGAR DIL: CPT | Mod: 59 | Performed by: EMERGENCY MEDICINE

## 2022-11-23 PROCEDURE — 99223 1ST HOSP IP/OBS HIGH 75: CPT | Mod: ,,, | Performed by: NURSE PRACTITIONER

## 2022-11-23 PROCEDURE — 93010 EKG 12-LEAD: ICD-10-PCS | Mod: ,,, | Performed by: INTERNAL MEDICINE

## 2022-11-23 PROCEDURE — 80053 COMPREHEN METABOLIC PANEL: CPT | Performed by: EMERGENCY MEDICINE

## 2022-11-23 PROCEDURE — 20600001 HC STEP DOWN PRIVATE ROOM

## 2022-11-23 PROCEDURE — 87075 CULTR BACTERIA EXCEPT BLOOD: CPT | Performed by: EMERGENCY MEDICINE

## 2022-11-23 PROCEDURE — 84484 ASSAY OF TROPONIN QUANT: CPT | Performed by: EMERGENCY MEDICINE

## 2022-11-23 PROCEDURE — 99285 PR EMERGENCY DEPT VISIT,LEVEL V: ICD-10-PCS | Mod: CS,,, | Performed by: EMERGENCY MEDICINE

## 2022-11-23 PROCEDURE — 0241U SARS-COV2 (COVID) WITH FLU/RSV BY PCR: CPT | Performed by: EMERGENCY MEDICINE

## 2022-11-23 PROCEDURE — 83605 ASSAY OF LACTIC ACID: CPT | Performed by: EMERGENCY MEDICINE

## 2022-11-23 PROCEDURE — 99900035 HC TECH TIME PER 15 MIN (STAT)

## 2022-11-23 PROCEDURE — 25500020 PHARM REV CODE 255: Performed by: EMERGENCY MEDICINE

## 2022-11-23 PROCEDURE — 25000003 PHARM REV CODE 250: Performed by: INTERNAL MEDICINE

## 2022-11-23 PROCEDURE — 63600175 PHARM REV CODE 636 W HCPCS: Performed by: EMERGENCY MEDICINE

## 2022-11-23 PROCEDURE — 96365 THER/PROPH/DIAG IV INF INIT: CPT

## 2022-11-23 PROCEDURE — 82803 BLOOD GASES ANY COMBINATION: CPT

## 2022-11-23 PROCEDURE — 85025 COMPLETE CBC W/AUTO DIFF WBC: CPT | Performed by: EMERGENCY MEDICINE

## 2022-11-23 PROCEDURE — 87086 URINE CULTURE/COLONY COUNT: CPT | Performed by: EMERGENCY MEDICINE

## 2022-11-23 PROCEDURE — 63600175 PHARM REV CODE 636 W HCPCS: Performed by: INTERNAL MEDICINE

## 2022-11-23 PROCEDURE — 96361 HYDRATE IV INFUSION ADD-ON: CPT

## 2022-11-23 PROCEDURE — 87070 CULTURE OTHR SPECIMN AEROBIC: CPT | Performed by: EMERGENCY MEDICINE

## 2022-11-23 PROCEDURE — 99285 EMERGENCY DEPT VISIT HI MDM: CPT | Mod: 25

## 2022-11-23 PROCEDURE — 99223 1ST HOSP IP/OBS HIGH 75: CPT | Mod: AI,,, | Performed by: INTERNAL MEDICINE

## 2022-11-23 PROCEDURE — 81001 URINALYSIS AUTO W/SCOPE: CPT | Performed by: EMERGENCY MEDICINE

## 2022-11-23 RX ORDER — SEVELAMER CARBONATE 800 MG/1
2400 TABLET, FILM COATED ORAL
Status: DISCONTINUED | OUTPATIENT
Start: 2022-11-23 | End: 2022-11-25

## 2022-11-23 RX ORDER — AMLODIPINE BESYLATE 10 MG/1
10 TABLET ORAL DAILY
Status: DISCONTINUED | OUTPATIENT
Start: 2022-11-23 | End: 2022-12-02 | Stop reason: HOSPADM

## 2022-11-23 RX ORDER — NALOXONE HCL 0.4 MG/ML
0.02 VIAL (ML) INJECTION
Status: DISCONTINUED | OUTPATIENT
Start: 2022-11-23 | End: 2022-12-02 | Stop reason: HOSPADM

## 2022-11-23 RX ORDER — HYDROCODONE BITARTRATE AND ACETAMINOPHEN 5; 325 MG/1; MG/1
1 TABLET ORAL EVERY 6 HOURS PRN
Status: DISCONTINUED | OUTPATIENT
Start: 2022-11-23 | End: 2022-12-02 | Stop reason: HOSPADM

## 2022-11-23 RX ORDER — TALC
6 POWDER (GRAM) TOPICAL NIGHTLY PRN
Status: DISCONTINUED | OUTPATIENT
Start: 2022-11-23 | End: 2022-11-23

## 2022-11-23 RX ORDER — GLUCAGON 1 MG
1 KIT INJECTION
Status: DISCONTINUED | OUTPATIENT
Start: 2022-11-23 | End: 2022-12-02 | Stop reason: HOSPADM

## 2022-11-23 RX ORDER — SODIUM CHLORIDE 0.9 % (FLUSH) 0.9 %
10 SYRINGE (ML) INJECTION
Status: DISCONTINUED | OUTPATIENT
Start: 2022-11-23 | End: 2022-12-02 | Stop reason: HOSPADM

## 2022-11-23 RX ORDER — HYDRALAZINE HYDROCHLORIDE 20 MG/ML
10 INJECTION INTRAMUSCULAR; INTRAVENOUS EVERY 8 HOURS PRN
Status: DISCONTINUED | OUTPATIENT
Start: 2022-11-23 | End: 2022-12-02 | Stop reason: HOSPADM

## 2022-11-23 RX ORDER — IBUPROFEN 200 MG
24 TABLET ORAL
Status: DISCONTINUED | OUTPATIENT
Start: 2022-11-23 | End: 2022-11-23

## 2022-11-23 RX ORDER — INSULIN ASPART 100 [IU]/ML
0-5 INJECTION, SOLUTION INTRAVENOUS; SUBCUTANEOUS
Status: DISCONTINUED | OUTPATIENT
Start: 2022-11-23 | End: 2022-12-02 | Stop reason: HOSPADM

## 2022-11-23 RX ORDER — HEPARIN SODIUM 5000 [USP'U]/ML
7500 INJECTION, SOLUTION INTRAVENOUS; SUBCUTANEOUS EVERY 8 HOURS
Status: DISCONTINUED | OUTPATIENT
Start: 2022-11-23 | End: 2022-12-02 | Stop reason: HOSPADM

## 2022-11-23 RX ORDER — NAPROXEN SODIUM 220 MG/1
81 TABLET, FILM COATED ORAL DAILY
Status: DISCONTINUED | OUTPATIENT
Start: 2022-11-23 | End: 2022-12-02 | Stop reason: HOSPADM

## 2022-11-23 RX ORDER — HYDRALAZINE HYDROCHLORIDE 50 MG/1
100 TABLET, FILM COATED ORAL EVERY 8 HOURS
Status: DISCONTINUED | OUTPATIENT
Start: 2022-11-23 | End: 2022-12-02 | Stop reason: HOSPADM

## 2022-11-23 RX ORDER — SENNOSIDES 8.6 MG/1
8.6 TABLET ORAL DAILY PRN
Status: DISCONTINUED | OUTPATIENT
Start: 2022-11-23 | End: 2022-12-02 | Stop reason: HOSPADM

## 2022-11-23 RX ORDER — MAG HYDROX/ALUMINUM HYD/SIMETH 200-200-20
30 SUSPENSION, ORAL (FINAL DOSE FORM) ORAL
Status: DISCONTINUED | OUTPATIENT
Start: 2022-11-23 | End: 2022-12-02 | Stop reason: HOSPADM

## 2022-11-23 RX ORDER — PANTOPRAZOLE SODIUM 40 MG/1
40 TABLET, DELAYED RELEASE ORAL DAILY
Status: DISCONTINUED | OUTPATIENT
Start: 2022-11-23 | End: 2022-12-02 | Stop reason: HOSPADM

## 2022-11-23 RX ORDER — METOPROLOL SUCCINATE 25 MG/1
25 TABLET, EXTENDED RELEASE ORAL DAILY
Status: DISCONTINUED | OUTPATIENT
Start: 2022-11-23 | End: 2022-12-02 | Stop reason: HOSPADM

## 2022-11-23 RX ORDER — ONDANSETRON 8 MG/1
8 TABLET, ORALLY DISINTEGRATING ORAL EVERY 8 HOURS PRN
Status: DISCONTINUED | OUTPATIENT
Start: 2022-11-23 | End: 2022-12-02 | Stop reason: HOSPADM

## 2022-11-23 RX ORDER — MUPIROCIN 20 MG/G
OINTMENT TOPICAL 2 TIMES DAILY
Status: CANCELLED | OUTPATIENT
Start: 2022-11-23 | End: 2022-11-28

## 2022-11-23 RX ORDER — ACETAMINOPHEN 325 MG/1
650 TABLET ORAL EVERY 8 HOURS PRN
Status: DISCONTINUED | OUTPATIENT
Start: 2022-11-23 | End: 2022-12-02 | Stop reason: HOSPADM

## 2022-11-23 RX ORDER — SODIUM CHLORIDE 0.9 % (FLUSH) 0.9 %
10 SYRINGE (ML) INJECTION EVERY 12 HOURS PRN
Status: DISCONTINUED | OUTPATIENT
Start: 2022-11-23 | End: 2022-12-02 | Stop reason: HOSPADM

## 2022-11-23 RX ORDER — TALC
6 POWDER (GRAM) TOPICAL NIGHTLY PRN
Status: DISCONTINUED | OUTPATIENT
Start: 2022-11-23 | End: 2022-12-02 | Stop reason: HOSPADM

## 2022-11-23 RX ORDER — GLUCAGON 1 MG
1 KIT INJECTION
Status: DISCONTINUED | OUTPATIENT
Start: 2022-11-23 | End: 2022-11-23

## 2022-11-23 RX ORDER — SUCRALFATE 1 G/10ML
1 SUSPENSION ORAL EVERY 6 HOURS
Status: DISCONTINUED | OUTPATIENT
Start: 2022-11-23 | End: 2022-12-02 | Stop reason: HOSPADM

## 2022-11-23 RX ORDER — IBUPROFEN 200 MG
16 TABLET ORAL
Status: DISCONTINUED | OUTPATIENT
Start: 2022-11-23 | End: 2022-12-02 | Stop reason: HOSPADM

## 2022-11-23 RX ORDER — POLYETHYLENE GLYCOL 3350 17 G/17G
17 POWDER, FOR SOLUTION ORAL 2 TIMES DAILY PRN
Status: DISCONTINUED | OUTPATIENT
Start: 2022-11-23 | End: 2022-12-02 | Stop reason: HOSPADM

## 2022-11-23 RX ORDER — IBUPROFEN 200 MG
24 TABLET ORAL
Status: DISCONTINUED | OUTPATIENT
Start: 2022-11-23 | End: 2022-12-02 | Stop reason: HOSPADM

## 2022-11-23 RX ORDER — ATORVASTATIN CALCIUM 20 MG/1
40 TABLET, FILM COATED ORAL NIGHTLY
Status: DISCONTINUED | OUTPATIENT
Start: 2022-11-23 | End: 2022-12-02 | Stop reason: HOSPADM

## 2022-11-23 RX ORDER — IBUPROFEN 200 MG
16 TABLET ORAL
Status: DISCONTINUED | OUTPATIENT
Start: 2022-11-23 | End: 2022-11-23

## 2022-11-23 RX ADMIN — ALUMINUM HYDROXIDE, MAGNESIUM HYDROXIDE, AND SIMETHICONE 30 ML: 200; 200; 20 SUSPENSION ORAL at 06:11

## 2022-11-23 RX ADMIN — PIPERACILLIN SODIUM AND TAZOBACTAM SODIUM 4.5 G: 4; .5 INJECTION, POWDER, LYOPHILIZED, FOR SOLUTION INTRAVENOUS at 08:11

## 2022-11-23 RX ADMIN — FOLIC ACID-PYRIDOXINE-CYANOCOBALAMIN TAB 2.5-25-2 MG 1 TABLET: 2.5-25-2 TAB at 07:11

## 2022-11-23 RX ADMIN — CEFTRIAXONE 1 G: 1 INJECTION, SOLUTION INTRAVENOUS at 06:11

## 2022-11-23 RX ADMIN — AMLODIPINE BESYLATE 10 MG: 10 TABLET ORAL at 06:11

## 2022-11-23 RX ADMIN — SUCRALFATE 1 G: 1 SUSPENSION ORAL at 06:11

## 2022-11-23 RX ADMIN — ALUMINUM HYDROXIDE, MAGNESIUM HYDROXIDE, AND SIMETHICONE 30 ML: 200; 200; 20 SUSPENSION ORAL at 09:11

## 2022-11-23 RX ADMIN — SEVELAMER CARBONATE 2400 MG: 800 TABLET, FILM COATED ORAL at 06:11

## 2022-11-23 RX ADMIN — VANCOMYCIN HYDROCHLORIDE 750 MG: 750 INJECTION, POWDER, LYOPHILIZED, FOR SOLUTION INTRAVENOUS at 06:11

## 2022-11-23 RX ADMIN — HYDRALAZINE HYDROCHLORIDE 100 MG: 50 TABLET ORAL at 06:11

## 2022-11-23 RX ADMIN — METOPROLOL SUCCINATE 25 MG: 25 TABLET, EXTENDED RELEASE ORAL at 06:11

## 2022-11-23 RX ADMIN — SODIUM CHLORIDE, SODIUM LACTATE, POTASSIUM CHLORIDE, AND CALCIUM CHLORIDE 500 ML: .6; .31; .03; .02 INJECTION, SOLUTION INTRAVENOUS at 08:11

## 2022-11-23 RX ADMIN — ATORVASTATIN CALCIUM 40 MG: 20 TABLET, FILM COATED ORAL at 09:11

## 2022-11-23 RX ADMIN — ASPIRIN 81 MG 81 MG: 81 TABLET ORAL at 06:11

## 2022-11-23 RX ADMIN — IOHEXOL 100 ML: 350 INJECTION, SOLUTION INTRAVENOUS at 09:11

## 2022-11-23 RX ADMIN — PANTOPRAZOLE SODIUM 40 MG: 40 TABLET, DELAYED RELEASE ORAL at 06:11

## 2022-11-23 RX ADMIN — HYDROCODONE BITARTRATE AND ACETAMINOPHEN 1 TABLET: 5; 325 TABLET ORAL at 09:11

## 2022-11-23 RX ADMIN — HEPARIN SODIUM 7500 UNITS: 5000 INJECTION INTRAVENOUS; SUBCUTANEOUS at 09:11

## 2022-11-23 NOTE — ED NOTES
Bilateral AKA. Staples to R AKA site with drainage. Pt confused and oriented to self / situation only. Pt unable to provide date/ time/ specific location. Pt was able to state hospital but was unable to state what hospital he is in. Pt instructed he is located in [facility]. Pt verbalizes understanding. Pt requires . Dialysis access L arm. Pt denies difficulty/ complaints regarding HD access. Pt reports making urine. Pt reports not having bowel movement x 5 days. Breakdown noted to sacrum.    Admit info / head to toe assessment done.

## 2022-11-23 NOTE — CONSULTS
[facility] - Emergency Dept  Nephrology  Progress Note    Patient Name: Adryan Neff  MRN: [id number]  Admission Date: 11/23/2022  Hospital Length of Stay: 0 days  Attending Provider: Javier Medina MD   Primary Care Physician: Carola Pedro MD  Principal Problem:<principal problem not specified>    Subjective:     HPI: 56-year-old male presenting by EMS for altered mental status. Pt with CAD, DM type 1, diabetic retinopathy, ESRD on HD, hypertension, PAD, b/l AKA. Pt arrived from [facility] where he was found minimally responsive and BG 50s. Upon chart review, last HD sessions were Sunday 11/20 and 11/22. Pt oxygenating well on RA. Awake and oriented to self and place. He is able to state when he last had HD. Labs noted for leukocytosis. Nephrology consulted for ESRD on HD.     HPI obtained via pt interview and EMR            Past Medical History:   Diagnosis Date    CAD (coronary artery disease) 2016    CAD (non obstructive) 2016 [facility]    Diabetes mellitus type I     Diabetic retinopathy     ESRD on hemodialysis     on HD TThSat    Gangrene of left foot     Hypertension     Nuclear sclerosis of right eye 11/24/2021    PAD (peripheral artery disease)     PEA (Pulseless electrical activity) 10/10/2022    Pulmonary HTN     Right foot infection     TB lung, latent 04/2021    Wet gangrene 1/5/2022       Past Surgical History:   Procedure Laterality Date    ABOVE-KNEE AMPUTATION Left 1/18/2022    Procedure: AMPUTATION, ABOVE KNEE;  Surgeon: Rusty Light MD;  Location: [facility] OR;  Service: Orthopedics;  Laterality: Left;    ABOVE-KNEE AMPUTATION Left 1/21/2022    Procedure: AMPUTATION, ABOVE KNEE;  Surgeon: Rusty Light MD;  Location: [facility] OR;  Service: Orthopedics;  Laterality: Left;    ABOVE-KNEE AMPUTATION Right 10/13/2022    Procedure: AMPUTATION, ABOVE KNEE, RIGHT;  Surgeon: Dimitris Davis MD;  Location: [facility] OR;  Service: Orthopedics;  Laterality: Right;    AV FISTULA PLACEMENT       CATARACT EXTRACTION Left     EYE SURGERY      FISTULOGRAM Left 8/9/2022    Procedure: Fistulogram;  Surgeon: Masoud Soni MD;  Location: [facility] OR;  Service: Vascular;  Laterality: Left;  LEFT VM ON DAUGHTER'S PHONE----PHONE PREOP NOT COMPLETED  CALLED PATIENT ON 8/8/2022 @ 3:34. HE STATED HE IS RESCHEDULING PROCEDURE. NOTIFIED ERWIN @ 3:35PM-LO    FISTULOGRAM Left 10/20/2022    Procedure: Fistulogram;  Surgeon: Masoud Soni MD;  Location: [facility];  Service: Vascular;  Laterality: Left;  Left upper extremity    TOE AMPUTATION Left 1/6/2022    Procedure: AMPUTATION, TOE;  Surgeon: Masoud Soni MD;  Location: [facility] OR;  Service: Vascular;  Laterality: Left;  Left first through fifth toes, possible open transmetatarsal amputation and all other indicated procedures       Review of patient's allergies indicates:  No Known Allergies  Current Facility-Administered Medications   Medication Frequency    dextrose 10% bolus 125 mL PRN    dextrose 10% bolus 250 mL PRN    glucagon (human recombinant) injection 1 mg PRN    glucose chewable tablet 16 g PRN    glucose chewable tablet 24 g PRN    melatonin tablet 6 mg Nightly PRN    sodium chloride 0.9% flush 10 mL PRN     Current Outpatient Medications   Medication    amLODIPine (NORVASC) 10 MG tablet    aspirin 81 MG Chew    atorvastatin (LIPITOR) 20 MG tablet    clopidogreL (PLAVIX) 75 mg tablet    hydrALAZINE (APRESOLINE) 100 MG tablet    insulin detemir U-100 (LEVEMIR FLEXTOUCH) 100 unit/mL (3 mL) SubQ InPn pen    lancets (ACCU-CHEK SOFTCLIX LANCETS) Misc    metoprolol succinate (TOPROL-XL) 25 MG 24 hr tablet    omeprazole (PRILOSEC) 40 MG capsule    pravastatin (PRAVACHOL) 20 MG tablet    RENVELA 800 mg Tab     Family History       Problem Relation (Age of Onset)    No Known Problems Mother, Father, Sister, Brother, Daughter, Daughter, Daughter, Brother, Maternal Aunt, Maternal Uncle, Paternal Aunt, Paternal Uncle, Maternal  Grandmother, Maternal Grandfather, Paternal Grandmother, Paternal Grandfather          Tobacco Use    Smoking status: Never    Smokeless tobacco: Never   Substance and Sexual Activity    Alcohol use: No    Drug use: No    Sexual activity: Yes     Partners: Female     Review of Systems   Unable to perform ROS: Other   Respiratory:  Negative for shortness of breath.    Cardiovascular:  Negative for chest pain.   Genitourinary:  Positive for decreased urine volume.   Skin:  Positive for wound.   Objective:     Vital Signs (Most Recent):  Temp: 99.2 °F (37.3 °C) (11/23/22 1031)  Pulse: 89 (11/23/22 1031)  Resp: 14 (11/23/22 1031)  BP: 130/63 (11/23/22 1031)  SpO2: 100 % (11/23/22 1031)  O2 Device (Oxygen Therapy): room air (11/23/22 1031) Vital Signs (24h Range):  Temp:  [91.8 °F (33.2 °C)-99.2 °F (37.3 °C)] 99.2 °F (37.3 °C)  Pulse:  [] 89  Resp:  [14-30] 14  SpO2:  [98 %-100 %] 100 %  BP: (130-210)/() 130/63     Weight: 57.4 kg (126 lb 8.7 oz) (11/23/22 0623)  Body mass index is 17.65 kg/m².  Body surface area is 1.7 meters squared.    No intake/output data recorded.    Physical Exam  Vitals and nursing note reviewed.   Eyes:      Conjunctiva/sclera: Conjunctivae normal.   Cardiovascular:      Rate and Rhythm: Normal rate.      Pulses: Normal pulses.      Arteriovenous access: Left arteriovenous access is present.     Comments: PHIL AVF +thrill   Pulmonary:      Effort: Pulmonary effort is normal.      Breath sounds: Normal breath sounds.   Musculoskeletal:      Comments: B. AKA   Neurological:      Mental Status: He is alert.      Comments: Oriented to self and place   Psychiatric:         Mood and Affect: Mood normal.         Behavior: Behavior normal.     Significant Labs:  CBC:   Recent Labs   Lab 11/23/22 0629   WBC 25.43*   RBC 4.44*   HGB 11.6*   HCT 40.8   *   MCV 92   MCH 26.1*   MCHC 28.4*     CMP:   Recent Labs   Lab 11/23/22 0629   *   CALCIUM 10.3   ALBUMIN 3.1*   PROT 9.1*       K 3.9   CO2 30*      BUN 23*   CREATININE 5.6*   ALKPHOS 100   ALT 11   AST 12   BILITOT 0.4     All labs within the past 24 hours have been reviewed.        Assessment/Plan:     Anemia due to chronic kidney disease, on chronic dialysis  -goal 10-11   -Hgb 11.1     ESRD on hemodialysis  Nephrology History  iHD Schedule: MWF   Unit/MD: [facility]  Duration: 3.5 hours   UF: 2L  EDW: 56 kg   Access: PHIL AVF   Last HD 11/20 and 11/22    Assessment:   - Plan for next HD Friday 11/25.    - Dialysate adjusted to current labs   - Will obtain OP dialysis records  - Continue to monitor intake and output, daily weights   - Avoid nephrotoxic medication and renal dose medications to GFR  - Will continue to monitor  -continue phos binders                   Thank you for your consult. I will follow-up with patient. Please contact us if you have any additional questions.    Odalys Frederick DNP  Nephrology  [facility] - Emergency Dept

## 2022-11-23 NOTE — SUBJECTIVE & OBJECTIVE
Past Medical History:   Diagnosis Date    CAD (coronary artery disease) 2016    CAD (non obstructive) 2016 [facility]    Diabetes mellitus type I     Diabetic retinopathy     ESRD on hemodialysis     on HD TThSat    Gangrene of left foot     Hypertension     Nuclear sclerosis of right eye 11/24/2021    PAD (peripheral artery disease)     PEA (Pulseless electrical activity) 10/10/2022    Pulmonary HTN     Right foot infection     TB lung, latent 04/2021    Wet gangrene 1/5/2022       Past Surgical History:   Procedure Laterality Date    ABOVE-KNEE AMPUTATION Left 1/18/2022    Procedure: AMPUTATION, ABOVE KNEE;  Surgeon: Rusty Light MD;  Location: [facility] OR;  Service: Orthopedics;  Laterality: Left;    ABOVE-KNEE AMPUTATION Left 1/21/2022    Procedure: AMPUTATION, ABOVE KNEE;  Surgeon: Rusty Light MD;  Location: [facility] OR;  Service: Orthopedics;  Laterality: Left;    ABOVE-KNEE AMPUTATION Right 10/13/2022    Procedure: AMPUTATION, ABOVE KNEE, RIGHT;  Surgeon: Dimitris Davis MD;  Location: [facility] OR;  Service: Orthopedics;  Laterality: Right;    AV FISTULA PLACEMENT      CATARACT EXTRACTION Left     EYE SURGERY      FISTULOGRAM Left 8/9/2022    Procedure: Fistulogram;  Surgeon: Masoud Soni MD;  Location: [facility] OR;  Service: Vascular;  Laterality: Left;  LEFT VM ON DAUGHTER'S PHONE----PHONE PREOP NOT COMPLETED  CALLED PATIENT ON 8/8/2022 @ 3:34. HE STATED HE IS RESCHEDULING PROCEDURE. NOTIFIED ERWIN @ 3:35PM-LO    FISTULOGRAM Left 10/20/2022    Procedure: Fistulogram;  Surgeon: Masoud Soni MD;  Location: [facility];  Service: Vascular;  Laterality: Left;  Left upper extremity    TOE AMPUTATION Left 1/6/2022    Procedure: AMPUTATION, TOE;  Surgeon: Masoud Soni MD;  Location: [facility] OR;  Service: Vascular;  Laterality: Left;  Left first through fifth toes, possible open transmetatarsal amputation and all other indicated procedures       Review of patient's allergies indicates:  No Known  Allergies  Current Facility-Administered Medications   Medication Frequency    dextrose 10% bolus 125 mL PRN    dextrose 10% bolus 250 mL PRN    glucagon (human recombinant) injection 1 mg PRN    glucose chewable tablet 16 g PRN    glucose chewable tablet 24 g PRN    melatonin tablet 6 mg Nightly PRN    sodium chloride 0.9% flush 10 mL PRN     Current Outpatient Medications   Medication    amLODIPine (NORVASC) 10 MG tablet    aspirin 81 MG Chew    atorvastatin (LIPITOR) 20 MG tablet    clopidogreL (PLAVIX) 75 mg tablet    hydrALAZINE (APRESOLINE) 100 MG tablet    insulin detemir U-100 (LEVEMIR FLEXTOUCH) 100 unit/mL (3 mL) SubQ InPn pen    lancets (ACCU-CHEK SOFTCLIX LANCETS) Misc    metoprolol succinate (TOPROL-XL) 25 MG 24 hr tablet    omeprazole (PRILOSEC) 40 MG capsule    pravastatin (PRAVACHOL) 20 MG tablet    RENVELA 800 mg Tab     Family History       Problem Relation (Age of Onset)    No Known Problems Mother, Father, Sister, Brother, Daughter, Daughter, Daughter, Brother, Maternal Aunt, Maternal Uncle, Paternal Aunt, Paternal Uncle, Maternal Grandmother, Maternal Grandfather, Paternal Grandmother, Paternal Grandfather          Tobacco Use    Smoking status: Never    Smokeless tobacco: Never   Substance and Sexual Activity    Alcohol use: No    Drug use: No    Sexual activity: Yes     Partners: Female     Review of Systems   Unable to perform ROS: Other   Respiratory:  Negative for shortness of breath.    Cardiovascular:  Negative for chest pain.   Genitourinary:  Positive for decreased urine volume.   Skin:  Positive for wound.   Objective:     Vital Signs (Most Recent):  Temp: 99.2 °F (37.3 °C) (11/23/22 1031)  Pulse: 89 (11/23/22 1031)  Resp: 14 (11/23/22 1031)  BP: 130/63 (11/23/22 1031)  SpO2: 100 % (11/23/22 1031)  O2 Device (Oxygen Therapy): room air (11/23/22 1031) Vital Signs (24h Range):  Temp:  [91.8 °F (33.2 °C)-99.2 °F (37.3 °C)] 99.2 °F (37.3 °C)  Pulse:  [] 89  Resp:  [14-30] 14  SpO2:   [98 %-100 %] 100 %  BP: (130-210)/() 130/63     Weight: 57.4 kg (126 lb 8.7 oz) (11/23/22 0623)  Body mass index is 17.65 kg/m².  Body surface area is 1.7 meters squared.    No intake/output data recorded.    Physical Exam  Vitals and nursing note reviewed.   Eyes:      Conjunctiva/sclera: Conjunctivae normal.   Cardiovascular:      Rate and Rhythm: Normal rate.      Pulses: Normal pulses.      Arteriovenous access: Left arteriovenous access is present.     Comments: PHIL AVF +thrill   Pulmonary:      Effort: Pulmonary effort is normal.      Breath sounds: Normal breath sounds.   Musculoskeletal:      Comments: B. AKA   Neurological:      Mental Status: He is alert.      Comments: Oriented to self and place   Psychiatric:         Mood and Affect: Mood normal.         Behavior: Behavior normal.     Significant Labs:  CBC:   Recent Labs   Lab 11/23/22 0629   WBC 25.43*   RBC 4.44*   HGB 11.6*   HCT 40.8   *   MCV 92   MCH 26.1*   MCHC 28.4*     CMP:   Recent Labs   Lab 11/23/22  0629   *   CALCIUM 10.3   ALBUMIN 3.1*   PROT 9.1*      K 3.9   CO2 30*      BUN 23*   CREATININE 5.6*   ALKPHOS 100   ALT 11   AST 12   BILITOT 0.4     All labs within the past 24 hours have been reviewed.

## 2022-11-23 NOTE — ED NOTES
Assumed care of patient from Lesley DUPONT. Pt remains on cardiac monitor, continuous pulse ox, and cycling blood pressures. Bed placed in low locked position, side rails up x2, call bell is within reach.

## 2022-11-23 NOTE — ED PROVIDER NOTES
Encounter Date: 11/23/2022       History     Chief Complaint   Patient presents with    Altered Mental Status     Pt arrives from [facility] after pt was found minimally responsive by nurse. Pt glucose was said to be in the 50s. Pt given x2 rounds of oral glucagon and now has cbg in the 80s.  Pt still has decreased responsiveness from baseline. Pt has known leg infection.     56-year-old male presenting by EMS for altered mental status.    PMH: CAD, DM type 1, diabetic retinopathy, ESRD on HD, hypertension, PAD, b/l AKA    Context:  The patient arrives from [facility].  He was found minimally responsive and his blood sugar was noted to be in the 50s.  EMS was unable to obtain IV access.  The patient received 2 doses of oral glucagon with improvement in his mental status.  Patient denies pain.    Onset:  Unknown  Duration:  Unknown  Associated Symptoms:  No reported vomiting by EMS           The history is provided by the patient, medical records and the EMS personnel. The history is limited by the condition of the patient. A  was used (Portuguese Creole).   Review of patient's allergies indicates:  No Known Allergies  Past Medical History:   Diagnosis Date    CAD (coronary artery disease) 2016    CAD (non obstructive) 2016 [facility]    Diabetes mellitus type I     Diabetic retinopathy     ESRD on hemodialysis     on HD TThSat    Gangrene of left foot     Hypertension     Nuclear sclerosis of right eye 11/24/2021    PAD (peripheral artery disease)     PEA (Pulseless electrical activity) 10/10/2022    Pulmonary HTN     Right foot infection     TB lung, latent 04/2021    Wet gangrene 1/5/2022     Past Surgical History:   Procedure Laterality Date    ABOVE-KNEE AMPUTATION Left 1/18/2022    Procedure: AMPUTATION, ABOVE KNEE;  Surgeon: Rusty Light MD;  Location: [facility];  Service: Orthopedics;  Laterality: Left;    ABOVE-KNEE AMPUTATION Left 1/21/2022    Procedure: AMPUTATION, ABOVE  KNEE;  Surgeon: Rusty Light MD;  Location: [facility];  Service: Orthopedics;  Laterality: Left;    ABOVE-KNEE AMPUTATION Right 10/13/2022    Procedure: AMPUTATION, ABOVE KNEE, RIGHT;  Surgeon: Dimitris Davis MD;  Location: [facility];  Service: Orthopedics;  Laterality: Right;    AV FISTULA PLACEMENT      CATARACT EXTRACTION Left     EYE SURGERY      FISTULOGRAM Left 8/9/2022    Procedure: Fistulogram;  Surgeon: Masoud Soni MD;  Location: [facility];  Service: Vascular;  Laterality: Left;  LEFT VM ON DAUGHTER'S PHONE----PHONE PREOP NOT COMPLETED  CALLED PATIENT ON 8/8/2022 @ 3:34. HE STATED HE IS RESCHEDULING PROCEDURE. NOTIFIED ERWIN @ 3:35PM-LO    FISTULOGRAM Left 10/20/2022    Procedure: Fistulogram;  Surgeon: Masoud Soni MD;  Location: [facility];  Service: Vascular;  Laterality: Left;  Left upper extremity    TOE AMPUTATION Left 1/6/2022    Procedure: AMPUTATION, TOE;  Surgeon: Masoud Soni MD;  Location: [facility];  Service: Vascular;  Laterality: Left;  Left first through fifth toes, possible open transmetatarsal amputation and all other indicated procedures     Family History   Problem Relation Age of Onset    No Known Problems Mother     No Known Problems Father     No Known Problems Sister     No Known Problems Brother     No Known Problems Daughter     No Known Problems Daughter     No Known Problems Daughter     No Known Problems Brother     No Known Problems Maternal Aunt     No Known Problems Maternal Uncle     No Known Problems Paternal Aunt     No Known Problems Paternal Uncle     No Known Problems Maternal Grandmother     No Known Problems Maternal Grandfather     No Known Problems Paternal Grandmother     No Known Problems Paternal Grandfather      Social History     Tobacco Use    Smoking status: Never    Smokeless tobacco: Never   Substance Use Topics    Alcohol use: No    Drug use: No     Review of Systems   Unable to perform ROS: Mental status change     Physical Exam      Initial Vitals [11/23/22 0557]   BP Pulse Resp Temp SpO2   (!) 210/100 106 14 (!) 92 °F (33.3 °C) 98 %      MAP       --         Physical Exam    Nursing note and vitals reviewed.  Constitutional: He is not diaphoretic. No distress.   HENT:   Head: Normocephalic and atraumatic.   Eyes: Right eye exhibits no discharge. Left eye exhibits no discharge.   Neck: Neck supple. No tracheal deviation present.   Cardiovascular:  Normal rate and regular rhythm.           Pulmonary/Chest: Breath sounds normal. No respiratory distress.   Abdominal: Abdomen is soft. There is no abdominal tenderness.   Genitourinary:    Genitourinary Comments:  exam performed with KIAH Zavala present at bedside  No scrotal edema or wound suggestive of Emerald's     Musculoskeletal:      Cervical back: Neck supple.      Comments: Bilateral AKA    Right AKA staples in place with purulence drainage    Left upper extremity dialysis access with audible bruit and palpable thrill     Neurological: He is alert.   Awake, oriented x1  Repeats questions/phrases stated by   Moving all extremities, follows commands   Skin: Skin is warm.   No sacral wound       ED Course   Procedures  Labs Reviewed   CBC W/ AUTO DIFFERENTIAL - Abnormal; Notable for the following components:       Result Value    WBC 25.43 (*)     RBC 4.44 (*)     Hemoglobin 11.6 (*)     MCH 26.1 (*)     MCHC 28.4 (*)     RDW 18.1 (*)     Platelets 551 (*)     Immature Granulocytes 0.7 (*)     Gran # (ANC) 23.9 (*)     Immature Grans (Abs) 0.19 (*)     Lymph # 0.7 (*)     Gran % 94.3 (*)     Lymph % 2.6 (*)     Mono % 2.0 (*)     All other components within normal limits    Narrative:     add on trop per dr.andrea holland /order#[id number] @ 11/23/2022  07:48    COMPREHENSIVE METABOLIC PANEL - Abnormal; Notable for the following components:    CO2 30 (*)     Glucose 123 (*)     BUN 23 (*)     Creatinine 5.6 (*)     Total Protein 9.1 (*)     Albumin 3.1 (*)     eGFR 11.1 (*)      All other components within normal limits   LACTIC ACID, PLASMA - Abnormal; Notable for the following components:    Lactate (Lactic Acid) 2.3 (*)     All other components within normal limits   URINALYSIS, REFLEX TO URINE CULTURE - Abnormal; Notable for the following components:    Appearance, UA Cloudy (*)     Protein, UA 3+ (*)     Occult Blood UA 2+ (*)     Leukocytes, UA 2+ (*)     All other components within normal limits    Narrative:     Specimen Source->Urine   TROPONIN I - Abnormal; Notable for the following components:    Troponin I 0.152 (*)     All other components within normal limits    Narrative:     add on trop per dr.andrea holland /order#[id number] @ 11/23/2022  07:48    TROPONIN I - Abnormal; Notable for the following components:    Troponin I 0.108 (*)     All other components within normal limits   URINALYSIS MICROSCOPIC - Abnormal; Notable for the following components:    RBC, UA 10 (*)     WBC, UA 28 (*)     Bacteria Many (*)     All other components within normal limits    Narrative:     Specimen Source->Urine   ISTAT PROCEDURE - Abnormal; Notable for the following components:    POC PCO2 48.9 (*)     POC PO2 28 (*)     POC HCO3 31.6 (*)     POC SATURATED O2 54 (*)     POC TCO2 33 (*)     All other components within normal limits   CULTURE, BLOOD    Narrative:     Aerobic and anaerobic   CULTURE, BLOOD    Narrative:     Aerobic and anaerobic   CULTURE, ANAEROBIC   CULTURE, AEROBIC  (SPECIFY SOURCE)   CULTURE, URINE   SARS-COV2 (COVID) WITH FLU/RSV BY PCR   TROPONIN I   LACTIC ACID, PLASMA   HEMOGLOBIN A1C   POCT GLUCOSE MONITORING CONTINUOUS     EKG Readings: (Independently Interpreted)   Initial Reading: No STEMI. Previous EKG: Compared with most recent EKG Rhythm: Normal Sinus Rhythm. Heart Rate: 96. Clinical Impression: Normal Sinus Rhythm   ST depression, TWI inferior leads    ECG Results              EKG 12-lead (Final result)  Result time 11/23/22 10:14:01      Final result by Interface,  Lab In [facility] (11/23/22 10:14:01)                   Narrative:    Test Reason : R41.82,    Vent. Rate : 076 BPM     Atrial Rate : 076 BPM     P-R Int : 138 ms          QRS Dur : 088 ms      QT Int : 414 ms       P-R-T Axes : 074 059 030 degrees     QTc Int : 465 ms    Normal sinus rhythm  Right atrial enlargement  Probable  Anteroseptal infarct ,age undetermined  Abnormal ECG  When compared with ECG of 13-OCT-2022 10:11,  RSR' pattern in V1 is no longer Present  Anteroseptal infarct is now suggested  Confirmed by Reinier CARTER MD ([id number]) on 11/23/2022 10:13:49 AM    Referred By: [email or web address]   SELF           Confirmed By:Reinier CARTER MD                                  Imaging Results              CT Thigh With Contrast Right (Final result)  Result time 11/23/22 11:02:08      Final result by Christiano Betancourt MD (11/23/22 11:02:08)                   Impression:      Postoperative changes of right above knee amputation with a large abscess spanning from the superficial soft tissues to the femoral stump with slight extension into the femoral bone marrow.  No osseous destructive changes of the femoral stump.  However, early osteomyelitis is not excluded.    Circumferential bladder wall thickening.  Recommend correlation with urinalysis.    Significant stool burden, concerning for chronic constipation.    Severe atherosclerosis.    Electronically signed by resident: Gabino Clark  Date:    11/23/2022  Time:    10:12    Electronically signed by: Christiano Betancourt MD  Date:    11/23/2022  Time:    11:02               Narrative:    EXAMINATION:  CT THIGH WITH CONTRAST RIGHT    CLINICAL HISTORY:  Soft tissue infection suspected, thigh, xray done;    TECHNIQUE:  Axial images of the right thigh obtained after the administration of 100 mL of Omnipaque 350 intravenous contrast.  Data submitted for coronal and sagittal reformats.    COMPARISON:  Right femur radiograph 10/10/2022    FINDINGS:  Postoperative changes of right-sided above knee  amputation with skin staples in place.  There is a large bilobed rim enhancing collection containing fluid and air at the operative site.  The collection abuts the femoral stump with slight extension into the femoral bone marrow measuring 2.8 x 3.9 x 2.0 cm measured in AP by TV by CC dimensions.  This collection extends and communicates laterally measuring 5.1 x 3.1 x 3.1 cm measured in AP by TV by CC dimensions.  There is associated soft tissue stranding throughout the operative site.  There is periosteal reaction of the distal femur.  No osseous destructive changes.    Significant of stool throughout the partially visualized colon and rectum.  Circumferential wall thickening of the rectum.  Bladder is distended with circumferential wall thickening.  Prostate is enlarged.    Significant atherosclerosis throughout.  Femoropopliteal bypass graft is in place with occlusion of its distal aspect. Soft tissue stranding of the subcutaneous tissues of the right pelvis.    Degenerative changes of the right sacroiliac joint, pubic symphysis, and right femoroacetabular joint.  Bony mineralization is diminished throughout.  No acute fracture.  No destructive lytic or blastic lesions.  No subluxation or dislocation.                                       CT Head Without Contrast (Final result)  Result time 11/23/22 09:53:54      Final result by Montana Sosa MD (11/23/22 09:53:54)                   Impression:      No evidence of acute hemorrhage or major vascular distribution infarct.    Mild generalized cerebral volume loss and chronic small vessel ischemic change.    Extensive vascular calcification.      Electronically signed by: Montana Sosa MD  Date:    11/23/2022  Time:    09:53               Narrative:    EXAMINATION:  CT HEAD WITHOUT CONTRAST    CLINICAL HISTORY:  Mental status change, unknown cause;    TECHNIQUE:  Low dose axial CT images obtained throughout the head without the use of intravenous contrast.   "Axial, sagittal and coronal reconstructions were performed.    COMPARISON:  None.    FINDINGS:  Intracranial compartment:    Pattern of mild generalized cerebral volume loss, without evidence of hydrocephalus.    Mild patchy hypoattenuation in the supratentorial white matter, nonspecific but most likely reflecting chronic small vessel ischemic changes. No recent or remote major vascular distribution infarct. No acute hemorrhage.  No mass effect or midline shift.    No extra-axial blood or fluid collections.    Skull/extracranial contents (limited evaluation):    No displaced calvarial fracture.    The mastoid air cells and visualized paranasal sinuses are essentially clear.    Extensive vascular calcification.                                       X-Ray Chest AP Portable (Final result)  Result time 11/23/22 08:24:44      Final result by Aureliano Stafford MD (11/23/22 08:24:44)                   Impression:      Borderline cardiomegaly and mild pulmonary edema.      Electronically signed by: Aureliano Stafford MD  Date:    11/23/2022  Time:    08:24               Narrative:    EXAMINATION:  XR CHEST AP PORTABLE    CLINICAL HISTORY:  Provided history is "Sepsis;  ".    TECHNIQUE:  One view of the chest.    COMPARISON:  10/12/2022.    FINDINGS:  Cardiac wires overlie the chest.  Cardiomediastinal silhouette is borderline enlarged and similar to the prior study.  Central vascular congestion with patchy ground-glass opacities in the perihilar lungs.  No confluent area of consolidation.  No large pleural effusion.  No pneumothorax.  Vascular stents overlie the left axillary and subclavian region.                                       Medications   glucose chewable tablet 16 g (has no administration in time range)   glucose chewable tablet 24 g (has no administration in time range)   glucagon (human recombinant) injection 1 mg (has no administration in time range)   dextrose 10% bolus 125 mL (has no administration in time " "range)   dextrose 10% bolus 250 mL (has no administration in time range)   sodium chloride 0.9% flush 10 mL (has no administration in time range)   melatonin tablet 6 mg (has no administration in time range)   vancomycin 750 mg in dextrose 5 % 250 mL IVPB (ready to mix system) (0 mg Intravenous Stopped 11/23/22 0751)   piperacillin-tazobactam 4.5 g in sodium chloride 0.9% 100 mL IVPB (ready to mix system) (0 g Intravenous Stopped 11/23/22 0858)   lactated ringers bolus 500 mL (0 mLs Intravenous Stopped 11/23/22 0858)   iohexoL (OMNIPAQUE 350) injection 100 mL (100 mLs Intravenous Given 11/23/22 0950)     Medical Decision Making:   History:   I obtained history from: EMS provider.  Old Medical Records: I decided to obtain old medical records.  Old Records Summarized: other records.       <> Summary of Records: Records from [facility] at bedside, patient is full code  Initial Assessment:   55 yo male presenting with altered mental status, hypoglycemia.  On arrival, he is confused, but no focal neurologic deficit.  Hypothermic.  Plan for sepsis protocol, hypoglycemia protocol.  Ordered for Q1hr accu checks.  Per record review, oriented x 3 is baseline.   Differential Diagnosis:   Including, but not limited to:    Intracranial hemorrhage  Sepsis  Hypoglycemia  Wound infection  Independently Interpreted Test(s):   I have ordered and independently interpreted X-rays - see summary below.  I have ordered and independently interpreted EKG Reading(s) - see prior notes  Clinical Tests:   Lab Tests: Ordered and Reviewed  Radiological Study: Ordered and Reviewed  Medical Tests: Reviewed and Ordered  Sepsis Perfusion Assessment: "I attest a sepsis perfusion exam was performed within 6 hours of sepsis, severe sepsis, or septic shock presentation, following fluid resuscitation."  ED Management:  Holding 30 cc/kilo bolus 2/2 renal dysfunction.   Received broad spectrum abx.  Altered mental status likely 2/2 infection, " favor RLE as source.  Labs notable for leukocytosis.  Lactate down-trending with modest IVF.  Mental status improving, but not at baseline.   I discussed the case with Hospital Medicine for admission to continue IV abx.  Temperature improving, no ICU criteria at this time.   CT RLE pending at time of admission - resulted after admission with abscess.  I discussed the case with vascular surgery - recommend ortho consult (AKA was by outside orthopedist).  I discussed the case with orthopedics for source control. Ortho to evaluate and give recommendations,  to follow.    Other:   I have discussed this case with another health care provider.           ED Course as of 11/23/22 1459   Wed Nov 23, 2022   0730 WBC(!): 25.43  Leukocytosis  [AB]   0822 Potassium: 3.9  Normal  [AB]   0822 Lactate, Gm(!): 2.3 [AB]   0839 Troponin I(!): 0.152  Lower than most recent, but will trend  [AB]   0910 Temp: 97.8 °F (36.6 °C)  Hypothermia improved  [AB]      ED Course User Index  [AB] Tomas Ohara MD                   Clinical Impression:   Final diagnoses:  [R41.82] Altered mental status  [D72.829] Leukocytosis, unspecified type (Primary)  [E16.2] Hypoglycemia  [A41.9] Sepsis, due to unspecified organism, unspecified whether acute organ dysfunction present        ED Disposition Condition    Admit Stable                Tomas Ohara MD  11/23/22 [id number]

## 2022-11-23 NOTE — HPI
56-year-old male presenting by EMS for altered mental status. Pt with CAD, DM type 1, diabetic retinopathy, ESRD on HD, hypertension, PAD, b/l AKA. Pt arrived from [facility] where he was found minimally responsive and BG 50s. Upon chart review, last HD sessions were Sunday 11/20 and 11/22. Pt oxygenating well on RA. Awake and oriented to self and place. He is able to state when he last had HD. Labs noted for leukocytosis. Nephrology consulted for ESRD on HD.     HPI obtained via pt interview and EMR

## 2022-11-23 NOTE — ASSESSMENT & PLAN NOTE
Nephrology History  iHD Schedule: MWF   Unit/MD: YONI  Duration: 3.5 hours   UF: 2L  EDW: 56 kg   Access: PHIL AVF   Last HD 11/20 and 11/22    Assessment:   - Plan for next HD Friday 11/25.    - Dialysate adjusted to current labs   - Will obtain OP dialysis records  - Continue to monitor intake and output, daily weights   - Avoid nephrotoxic medication and renal dose medications to GFR  - Will continue to monitor  -continue phos binders

## 2022-11-23 NOTE — AI DETERIORATION ALERT
RAPID RESPONSE NURSE AI ALERT       AI alert received.    Chart Reviewed: 11/23/2022, 3:52 PM    MRN: [id number]  Bed: ED 12/12    Dx: ESRD on hemodialysis    Adryan Neff has a past medical history of CAD (coronary artery disease), Diabetes mellitus type I, Diabetic retinopathy, ESRD on hemodialysis, Gangrene of left foot, Hypertension, Nuclear sclerosis of right eye, PAD (peripheral artery disease), PEA (Pulseless electrical activity), Pulmonary HTN, Right foot infection, TB lung, latent, and Wet gangrene.    Last VS: /61 (BP Location: Right arm, Patient Position: Lying)   Pulse 96   Temp 99.2 °F (37.3 °C) (Oral)   Resp (!) 25   Wt 57.4 kg (126 lb 8.7 oz)   SpO2 100%   BMI 17.65 kg/m²     24H Vital Sign Range:  Temp:  [91.8 °F (33.2 °C)-99.2 °F (37.3 °C)]   Pulse:  []   Resp:  [14-30]   BP: (130-210)/()   SpO2:  [98 %-100 %]     Level of Consciousness (AVPU): alert    Recent Labs     11/23/22  0629   WBC 25.43*   HGB 11.6*   HCT 40.8   *       Recent Labs     11/23/22  0629      K 3.9      CO2 30*   CREATININE 5.6*   *        Recent Labs     11/23/22  1011   PH 7.419   PCO2 48.9*   PO2 28*   HCO3 31.6*   POCSATURATED 54*   BE 7        OXYGEN:        O2 Device (Oxygen Therapy): room air    MEWS score: 0  Sepsis workup performed by ED nurse. No need for Rapid intervention  Fan Gonzales RN

## 2022-11-24 PROBLEM — L02.415 ABSCESS OF RIGHT THIGH: Status: ACTIVE | Noted: 2022-11-24

## 2022-11-24 PROBLEM — N30.00 ACUTE CYSTITIS: Status: ACTIVE | Noted: 2022-11-24

## 2022-11-24 PROBLEM — Z89.612 S/P AKA (ABOVE KNEE AMPUTATION) BILATERAL: Status: ACTIVE | Noted: 2022-11-24

## 2022-11-24 PROBLEM — A41.9 SEPSIS: Status: ACTIVE | Noted: 2022-11-24

## 2022-11-24 PROBLEM — R79.89 ELEVATED TROPONIN: Status: ACTIVE | Noted: 2022-11-24

## 2022-11-24 PROBLEM — Z89.611 S/P AKA (ABOVE KNEE AMPUTATION) BILATERAL: Status: ACTIVE | Noted: 2022-11-24

## 2022-11-24 LAB
BACTERIA UR CULT: NO GROWTH
POCT GLUCOSE: 103 MG/DL (ref 70–110)
POCT GLUCOSE: 107 MG/DL (ref 70–110)
POCT GLUCOSE: 141 MG/DL (ref 70–110)

## 2022-11-24 PROCEDURE — 25000003 PHARM REV CODE 250: Performed by: FAMILY MEDICINE

## 2022-11-24 PROCEDURE — 94761 N-INVAS EAR/PLS OXIMETRY MLT: CPT

## 2022-11-24 PROCEDURE — 20600001 HC STEP DOWN PRIVATE ROOM

## 2022-11-24 PROCEDURE — 99233 PR SUBSEQUENT HOSPITAL CARE,LEVL III: ICD-10-PCS | Mod: ,,, | Performed by: FAMILY MEDICINE

## 2022-11-24 PROCEDURE — 99233 SBSQ HOSP IP/OBS HIGH 50: CPT | Mod: ,,, | Performed by: FAMILY MEDICINE

## 2022-11-24 PROCEDURE — 99223 1ST HOSP IP/OBS HIGH 75: CPT | Mod: ,,, | Performed by: STUDENT IN AN ORGANIZED HEALTH CARE EDUCATION/TRAINING PROGRAM

## 2022-11-24 PROCEDURE — 99223 PR INITIAL HOSPITAL CARE,LEVL III: ICD-10-PCS | Mod: ,,, | Performed by: STUDENT IN AN ORGANIZED HEALTH CARE EDUCATION/TRAINING PROGRAM

## 2022-11-24 PROCEDURE — 25000003 PHARM REV CODE 250: Performed by: INTERNAL MEDICINE

## 2022-11-24 PROCEDURE — 63600175 PHARM REV CODE 636 W HCPCS: Performed by: INTERNAL MEDICINE

## 2022-11-24 RX ORDER — MUPIROCIN 20 MG/G
OINTMENT TOPICAL 2 TIMES DAILY
Status: DISPENSED | OUTPATIENT
Start: 2022-11-24 | End: 2022-11-29

## 2022-11-24 RX ADMIN — SENNOSIDES 8.6 MG: 8.6 TABLET, FILM COATED ORAL at 03:11

## 2022-11-24 RX ADMIN — PANTOPRAZOLE SODIUM 40 MG: 40 TABLET, DELAYED RELEASE ORAL at 09:11

## 2022-11-24 RX ADMIN — PIPERACILLIN SODIUM AND TAZOBACTAM SODIUM 4.5 G: 4; .5 INJECTION, POWDER, LYOPHILIZED, FOR SOLUTION INTRAVENOUS at 08:11

## 2022-11-24 RX ADMIN — SEVELAMER CARBONATE 2400 MG: 800 TABLET, FILM COATED ORAL at 12:11

## 2022-11-24 RX ADMIN — ASPIRIN 81 MG 81 MG: 81 TABLET ORAL at 09:11

## 2022-11-24 RX ADMIN — ALUMINUM HYDROXIDE, MAGNESIUM HYDROXIDE, AND SIMETHICONE 30 ML: 200; 200; 20 SUSPENSION ORAL at 05:11

## 2022-11-24 RX ADMIN — HYDRALAZINE HYDROCHLORIDE 100 MG: 50 TABLET ORAL at 05:11

## 2022-11-24 RX ADMIN — AMLODIPINE BESYLATE 10 MG: 10 TABLET ORAL at 09:11

## 2022-11-24 RX ADMIN — MUPIROCIN: 20 OINTMENT TOPICAL at 12:11

## 2022-11-24 RX ADMIN — ALUMINUM HYDROXIDE, MAGNESIUM HYDROXIDE, AND SIMETHICONE 30 ML: 200; 200; 20 SUSPENSION ORAL at 12:11

## 2022-11-24 RX ADMIN — PIPERACILLIN SODIUM AND TAZOBACTAM SODIUM 4.5 G: 4; .5 INJECTION, POWDER, LYOPHILIZED, FOR SOLUTION INTRAVENOUS at 09:11

## 2022-11-24 RX ADMIN — SUCRALFATE 1 G: 1 SUSPENSION ORAL at 05:11

## 2022-11-24 RX ADMIN — METOPROLOL SUCCINATE 25 MG: 25 TABLET, EXTENDED RELEASE ORAL at 09:11

## 2022-11-24 RX ADMIN — HEPARIN SODIUM 7500 UNITS: 5000 INJECTION INTRAVENOUS; SUBCUTANEOUS at 05:11

## 2022-11-24 RX ADMIN — SUCRALFATE 1 G: 1 SUSPENSION ORAL at 12:11

## 2022-11-24 RX ADMIN — ALUMINUM HYDROXIDE, MAGNESIUM HYDROXIDE, AND SIMETHICONE 30 ML: 200; 200; 20 SUSPENSION ORAL at 03:11

## 2022-11-24 RX ADMIN — SEVELAMER CARBONATE 2400 MG: 800 TABLET, FILM COATED ORAL at 09:11

## 2022-11-24 RX ADMIN — FOLIC ACID-PYRIDOXINE-CYANOCOBALAMIN TAB 2.5-25-2 MG 1 TABLET: 2.5-25-2 TAB at 09:11

## 2022-11-24 RX ADMIN — MUPIROCIN: 20 OINTMENT TOPICAL at 08:11

## 2022-11-24 RX ADMIN — POLYETHYLENE GLYCOL 3350 17 G: 17 POWDER, FOR SOLUTION ORAL at 03:11

## 2022-11-24 RX ADMIN — HYDRALAZINE HYDROCHLORIDE 100 MG: 50 TABLET ORAL at 03:11

## 2022-11-24 NOTE — HPI
Mr. Neff is a 56-year-old gentleman with PMH of CAD, DM type 1, diabetic retinopathy, ESRD on HD, hypertension, PAD, b/l AKA who presented [place] [facility] for altered mental status. Pt arrived from [facility] where he was found minimally responsive and BG 50s. Upon chart review, last HD sessions were Sunday 11/20 and 11/22. Pt oxygenating well on RA. Awake and oriented to self and place. He is able to state when he last had HD. Labs noted WBC 25 and lactate 2.3. UA positive for UTI. HCT nonacute. R AKA stump performed on 10/13/22 showed discoloration of the skin at the lateral aspect with areas of possible wound drainage/dehiscence. Due to concern for infection, orthopedic surgery consulted. CT right thigh with contrast showed abscess. Blood cultures ordered and empiric IV antibiotics ordered. He was admitted to Hospital Medicine for further evaluation and management of acute encephalopathy 2/2 sepsis in the setting of UTI and R AKA abscess.    pmd follow up/Other specify

## 2022-11-24 NOTE — ASSESSMENT & PLAN NOTE
- nephrology consulted. apprec recs  - continue HD  - renally-dose all meds  - strict I/Os  - hold ACE-I or ARB while renal function dynamic

## 2022-11-24 NOTE — SUBJECTIVE & OBJECTIVE
Past Medical History:   Diagnosis Date    CAD (coronary artery disease) 2016    CAD (non obstructive) 2016 [facility]    Diabetes mellitus type I     Diabetic retinopathy     ESRD on hemodialysis     on HD TThSat    Gangrene of left foot     Hypertension     Nuclear sclerosis of right eye 11/24/2021    PAD (peripheral artery disease)     PEA (Pulseless electrical activity) 10/10/2022    Pulmonary HTN     Right foot infection     TB lung, latent 04/2021    Wet gangrene 1/5/2022       Past Surgical History:   Procedure Laterality Date    ABOVE-KNEE AMPUTATION Left 1/18/2022    Procedure: AMPUTATION, ABOVE KNEE;  Surgeon: Rusty Light MD;  Location: [facility] OR;  Service: Orthopedics;  Laterality: Left;    ABOVE-KNEE AMPUTATION Left 1/21/2022    Procedure: AMPUTATION, ABOVE KNEE;  Surgeon: Rusty Light MD;  Location: [facility] OR;  Service: Orthopedics;  Laterality: Left;    ABOVE-KNEE AMPUTATION Right 10/13/2022    Procedure: AMPUTATION, ABOVE KNEE, RIGHT;  Surgeon: Dimitris Davis MD;  Location: [facility] OR;  Service: Orthopedics;  Laterality: Right;    AV FISTULA PLACEMENT      CATARACT EXTRACTION Left     EYE SURGERY      FISTULOGRAM Left 8/9/2022    Procedure: Fistulogram;  Surgeon: Masoud Soni MD;  Location: [facility] OR;  Service: Vascular;  Laterality: Left;  LEFT VM ON DAUGHTER'S PHONE----PHONE PREOP NOT COMPLETED  CALLED PATIENT ON 8/8/2022 @ 3:34. HE STATED HE IS RESCHEDULING PROCEDURE. NOTIFIED ERWIN @ 3:35PM-LO    FISTULOGRAM Left 10/20/2022    Procedure: Fistulogram;  Surgeon: Masoud Soni MD;  Location: [facility];  Service: Vascular;  Laterality: Left;  Left upper extremity    TOE AMPUTATION Left 1/6/2022    Procedure: AMPUTATION, TOE;  Surgeon: Masoud Soni MD;  Location: [facility] OR;  Service: Vascular;  Laterality: Left;  Left first through fifth toes, possible open transmetatarsal amputation and all other indicated procedures       Review of patient's allergies indicates:  No Known  Allergies    No current facility-administered medications on file prior to encounter.     Current Outpatient Medications on File Prior to Encounter   Medication Sig    amLODIPine (NORVASC) 10 MG tablet Take 1 tablet (10 mg total) by mouth once daily.    aspirin 81 MG Chew Take 81 mg by mouth once daily.    atorvastatin (LIPITOR) 20 MG tablet Take 1 tablet by mouth.    clopidogreL (PLAVIX) 75 mg tablet Take 75 mg by mouth once daily.    hydrALAZINE (APRESOLINE) 100 MG tablet Take 1 tablet (100 mg total) by mouth every 8 (eight) hours.    insulin detemir U-100 (LEVEMIR FLEXTOUCH) 100 unit/mL (3 mL) SubQ InPn pen Inject 8 Units into the skin every evening.    lancets (ACCU-CHEK SOFTCLIX LANCETS) Misc 1 each by Misc.(Non-Drug; Combo Route) route once daily at 6am.    metoprolol succinate (TOPROL-XL) 25 MG 24 hr tablet Take 1 tablet by mouth.    omeprazole (PRILOSEC) 40 MG capsule Take 40 mg by mouth once daily.    pravastatin (PRAVACHOL) 20 MG tablet Take 20 mg by mouth once daily.    RENVELA 800 mg Tab Take 800 mg by mouth 3 (three) times daily with meals.     Family History       Problem Relation (Age of Onset)    No Known Problems Mother, Father, Sister, Brother, Daughter, Daughter, Daughter, Brother, Maternal Aunt, Maternal Uncle, Paternal Aunt, Paternal Uncle, Maternal Grandmother, Maternal Grandfather, Paternal Grandmother, Paternal Grandfather          Tobacco Use    Smoking status: Never    Smokeless tobacco: Never   Substance and Sexual Activity    Alcohol use: No    Drug use: No    Sexual activity: Yes     Partners: Female     Review of Systems   Unable to perform ROS: Mental status change   Objective:     Vital Signs (Most Recent):  Temp: 98.1 °F (36.7 °C) (11/24/22 0445)  Pulse: 74 (11/24/22 0445)  Resp: 17 (11/24/22 0445)  BP: (!) 143/66 (11/24/22 0445)  SpO2: 99 % (11/24/22 0445)   Vital Signs (24h Range):  Temp:  [97.8 °F (36.6 °C)-99.2 °F (37.3 °C)] 98.1 °F (36.7 °C)  Pulse:  [69-99] 74  Resp:  [12-25]  17  SpO2:  [94 %-100 %] 99 %  BP: (127-185)/(58-81) 143/66     Weight: 56 kg (123 lb 7.3 oz)  Body mass index is 17.22 kg/m².    Physical Exam  Constitutional:       Appearance: He is well-developed. He is ill-appearing.   HENT:      Head: Normocephalic and atraumatic.      Mouth/Throat:      Mouth: Mucous membranes are moist.   Eyes:      General: No scleral icterus.     Extraocular Movements: Extraocular movements intact.      Pupils: Pupils are equal, round, and reactive to light.   Neck:      Vascular: No JVD.   Cardiovascular:      Rate and Rhythm: Normal rate and regular rhythm.      Heart sounds: No murmur heard.    No friction rub. No gallop.   Pulmonary:      Effort: Pulmonary effort is normal. No respiratory distress.      Breath sounds: Normal breath sounds.   Abdominal:      General: Bowel sounds are normal. There is no distension.      Palpations: Abdomen is soft.      Tenderness: There is no abdominal tenderness.   Musculoskeletal:         General: No deformity. Normal range of motion.      Cervical back: Neck supple.      Comments: Bilateral AKA   Lymphadenopathy:      Cervical: No cervical adenopathy.   Skin:     General: Skin is warm and dry.      Capillary Refill: Capillary refill takes less than 2 seconds.      Findings: No erythema or rash.      Comments: Discoloration and wound dehiscence on R AKA with no active drainage. Staples intact   Neurological:      General: No focal deficit present.      Mental Status: He is alert. He is disoriented.      Cranial Nerves: No cranial nerve deficit.      Sensory: No sensory deficit.   Psychiatric:         Mood and Affect: Mood normal.         CRANIAL NERVES     CN III, IV, VI   Pupils are equal, round, and reactive to light.     Significant Labs: CBC:   Recent Labs   Lab 11/23/22  0629   WBC 25.43*   HGB 11.6*   HCT 40.8   *     CMP:   Recent Labs   Lab 11/23/22  0629      K 3.9      CO2 30*   *   BUN 23*   CREATININE 5.6*    CALCIUM 10.3   PROT 9.1*   ALBUMIN 3.1*   BILITOT 0.4   ALKPHOS 100   AST 12   ALT 11   ANIONGAP 13     Coagulation: No results for input(s): PT, INR, APTT in the last 48 hours.  Lactic Acid:   Recent Labs   Lab 11/23/22  0629 11/23/22  1036   LACTATE 2.3* 1.0     Magnesium: No results for input(s): MG in the last 48 hours.  POCT Glucose: No results for input(s): POCTGLUCOSE in the last 48 hours.  Troponin:   Recent Labs   Lab 11/23/22  0629 11/23/22  1004   TROPONINI 0.152* 0.108*     TSH: No results for input(s): TSH in the last 4320 hours.  Urine Culture: No results for input(s): LABURIN in the last 48 hours.  Urine Studies:   Recent Labs   Lab 11/23/22  0958   COLORU Gina   APPEARANCEUA Cloudy*   PHUR 5.0   SPECGRAV 1.020   PROTEINUA 3+*   GLUCUA Negative   KETONESU Negative   BILIRUBINUA Negative   OCCULTUA 2+*   NITRITE Negative   LEUKOCYTESUR 2+*   RBCUA 10*   WBCUA 28*   BACTERIA Many*   HYALINECASTS 0       Significant Imaging: I have reviewed all pertinent imaging results/findings within the past 24 hours.    Imaging Results              CT Thigh With Contrast Right (Final result)  Result time 11/23/22 11:02:08      Final result by Christiano Betancourt MD (11/23/22 11:02:08)                   Impression:      Postoperative changes of right above knee amputation with a large abscess spanning from the superficial soft tissues to the femoral stump with slight extension into the femoral bone marrow.  No osseous destructive changes of the femoral stump.  However, early osteomyelitis is not excluded.    Circumferential bladder wall thickening.  Recommend correlation with urinalysis.    Significant stool burden, concerning for chronic constipation.    Severe atherosclerosis.    Electronically signed by resident: Gabino Clark  Date:    11/23/2022  Time:    10:12    Electronically signed by: Christiano Betancourt MD  Date:    11/23/2022  Time:    11:02               Narrative:    EXAMINATION:  CT THIGH WITH CONTRAST  RIGHT    CLINICAL HISTORY:  Soft tissue infection suspected, thigh, xray done;    TECHNIQUE:  Axial images of the right thigh obtained after the administration of 100 mL of Omnipaque 350 intravenous contrast.  Data submitted for coronal and sagittal reformats.    COMPARISON:  Right femur radiograph 10/10/2022    FINDINGS:  Postoperative changes of right-sided above knee amputation with skin staples in place.  There is a large bilobed rim enhancing collection containing fluid and air at the operative site.  The collection abuts the femoral stump with slight extension into the femoral bone marrow measuring 2.8 x 3.9 x 2.0 cm measured in AP by TV by CC dimensions.  This collection extends and communicates laterally measuring 5.1 x 3.1 x 3.1 cm measured in AP by TV by CC dimensions.  There is associated soft tissue stranding throughout the operative site.  There is periosteal reaction of the distal femur.  No osseous destructive changes.    Significant of stool throughout the partially visualized colon and rectum.  Circumferential wall thickening of the rectum.  Bladder is distended with circumferential wall thickening.  Prostate is enlarged.    Significant atherosclerosis throughout.  Femoropopliteal bypass graft is in place with occlusion of its distal aspect. Soft tissue stranding of the subcutaneous tissues of the right pelvis.    Degenerative changes of the right sacroiliac joint, pubic symphysis, and right femoroacetabular joint.  Bony mineralization is diminished throughout.  No acute fracture.  No destructive lytic or blastic lesions.  No subluxation or dislocation.                                       CT Head Without Contrast (Final result)  Result time 11/23/22 09:53:54      Final result by Montana Sosa MD (11/23/22 09:53:54)                   Impression:      No evidence of acute hemorrhage or major vascular distribution infarct.    Mild generalized cerebral volume loss and chronic small vessel ischemic  "change.    Extensive vascular calcification.      Electronically signed by: Montana Sosa MD  Date:    11/23/2022  Time:    09:53               Narrative:    EXAMINATION:  CT HEAD WITHOUT CONTRAST    CLINICAL HISTORY:  Mental status change, unknown cause;    TECHNIQUE:  Low dose axial CT images obtained throughout the head without the use of intravenous contrast.  Axial, sagittal and coronal reconstructions were performed.    COMPARISON:  None.    FINDINGS:  Intracranial compartment:    Pattern of mild generalized cerebral volume loss, without evidence of hydrocephalus.    Mild patchy hypoattenuation in the supratentorial white matter, nonspecific but most likely reflecting chronic small vessel ischemic changes. No recent or remote major vascular distribution infarct. No acute hemorrhage.  No mass effect or midline shift.    No extra-axial blood or fluid collections.    Skull/extracranial contents (limited evaluation):    No displaced calvarial fracture.    The mastoid air cells and visualized paranasal sinuses are essentially clear.    Extensive vascular calcification.                                       X-Ray Chest AP Portable (Final result)  Result time 11/23/22 08:24:44      Final result by Aureliano Stafford MD (11/23/22 08:24:44)                   Impression:      Borderline cardiomegaly and mild pulmonary edema.      Electronically signed by: Aureliano Stafford MD  Date:    11/23/2022  Time:    08:24               Narrative:    EXAMINATION:  XR CHEST AP PORTABLE    CLINICAL HISTORY:  Provided history is "Sepsis;  ".    TECHNIQUE:  One view of the chest.    COMPARISON:  10/12/2022.    FINDINGS:  Cardiac wires overlie the chest.  Cardiomediastinal silhouette is borderline enlarged and similar to the prior study.  Central vascular congestion with patchy ground-glass opacities in the perihilar lungs.  No confluent area of consolidation.  No large pleural effusion.  No pneumothorax.  Vascular stents overlie the " left axillary and subclavian region.

## 2022-11-24 NOTE — PROGRESS NOTES
Pharmacokinetic Initial Assessment: IV Vancomycin    Assessment/Plan:    ESRD on HD MWF  Patient received intravenous vancomycin with loading dose of 750 mg once in ED on 11/23 at 0647. Will redose when random concentration are less than 20 mcg/mL.    Desired empiric serum trough concentration is 10 to 20 mcg/mL for skin & soft tissue infection.    Draw vancomycin random level on 11/24/22 at 0800.  Pharmacy will continue to follow and monitor vancomycin.      Please contact pharmacy at extension [phone] with any questions regarding this assessment.     Thank you for the consult,   Kong Le       Patient brief summary:  Adryan Neff is a 56 y.o. male initiated on antimicrobial therapy with IV Vancomycin for treatment of suspected skin & soft tissue infection    Drug Allergies:   Review of patient's allergies indicates:  No Known Allergies    Actual Body Weight:   56 kg    Renal Function:   Estimated Creatinine Clearance: 11.5 mL/min (A) (based on SCr of 5.6 mg/dL (H)).,     Dialysis Method (if applicable):  ESRD HD MWF    CBC (last 72 hours):  Recent Labs   Lab Result Units 11/23/22  0629   WBC K/uL 25.43*   Hemoglobin g/dL 11.6*   Hematocrit % 40.8   Platelets K/uL 551*   Gran % % 94.3*   Lymph % % 2.6*   Mono % % 2.0*   Eosinophil % % 0.1   Basophil % % 0.3   Differential Method  Automated       Metabolic Panel (last 72 hours):  Recent Labs   Lab Result Units 11/23/22  0629 11/23/22  0958   Sodium mmol/L 143  --    Potassium mmol/L 3.9  --    Chloride mmol/L 100  --    CO2 mmol/L 30*  --    Glucose mg/dL 123*  --    Glucose, UA   --  Negative   BUN mg/dL 23*  --    Creatinine mg/dL 5.6*  --    Albumin g/dL 3.1*  --    Total Bilirubin mg/dL 0.4  --    Alkaline Phosphatase U/L 100  --    AST U/L 12  --    ALT U/L 11  --        Drug levels (last 3 results):  No results for input(s): VANCOMYCINRA, VANCORANDOM, VANCOMYCINPE, VANCOPEAK, VANCOMYCINTR, VANCOTROUGH in the last 72 hours.    Microbiologic Results:  Microbiology  Results (last 7 days)       Procedure Component Value Units Date/Time    Blood culture x two cultures. Draw prior to antibiotics. [[id number]] Collected: 11/23/22 0631    Order Status: Completed Specimen: Blood from Peripheral, Antecubital, Right Updated: 11/23/22 1345     Blood Culture, Routine No Growth to date    Narrative:      Aerobic and anaerobic    Blood culture x two cultures. Draw prior to antibiotics. [[id number]] Collected: 11/23/22 0631    Order Status: Completed Specimen: Blood from Peripheral, Hand, Right Updated: 11/23/22 1345     Blood Culture, Routine No Growth to date    Narrative:      Aerobic and anaerobic    Urine culture [[id number]] Collected: 11/23/22 0958    Order Status: No result Specimen: Urine Updated: 11/23/22 1032    Culture, Anaerobe [[id number]] Collected: 11/23/22 0630    Order Status: Sent Specimen: Wound from Leg, Right Updated: 11/23/22 0650    Aerobic culture [[id number]] Collected: 11/23/22 0630    Order Status: Sent Specimen: Wound from Leg, Right Updated: 11/23/22 0649

## 2022-11-24 NOTE — PROGRESS NOTES
Pt arrived to unit from ED @1830. Vital signs taken & WNL. Medicines given. Accucheck refused by patient. Pt resting in bed with call light in reach. JOSE Santoyo

## 2022-11-24 NOTE — ASSESSMENT & PLAN NOTE
Patient's FSGs are controlled on current medication regimen.  Last A1c reviewed-   Lab Results   Component Value Date    HGBA1C 6.1 (H) 2021     Most recent fingerstick glucose reviewed-   Recent Labs   Lab 22  0739   POCTGLUCOSE 103     Current correctional scale  Low  Maintain anti-hyperglycemic dose as follows-   Antihyperglycemics (From admission, onward)    Start     Stop Route Frequency Ordered    22 2100  insulin detemir U-100 pen 8 Units         -- SubQ Nightly 22 1508    22 1448  insulin aspart U-100 pen 0-5 Units         -- SubQ Before meals & nightly PRN 22 1508        Hold Oral hypoglycemics while patient is in the hospital.  - continue home determir 8 units HS   - LDSSI   - goal B-180  - diabetic diet

## 2022-11-24 NOTE — PLAN OF CARE
Plan of care reviewed with pt. Pt aaox3, disorientated to time. Pt refusing olabs, unable to collect random vanc. Pt did not go to HD today. Staples intact to right AKA. Orders placed for pt to go to IR to drain abscess. Antibiotics continued. VSS. Pt remained free of falls, trauma, and injury. Will continue to monitor.

## 2022-11-24 NOTE — CARE UPDATE
Imaging reviewed. Recommend IR aspiration of stump abscess due to size. Defer surgical intervention at this time.      Nazario Barrera MD/MPH  PGY-2  Department of Orthopaedic Surgery  [facility]

## 2022-11-24 NOTE — HPI
55 yo male with ESRD on HD, LTBI s/p tx and severe PAD c/b gangrene admitted for R AKA abscess. ID consulted for abx recs. "[employer] creole  used [id number].  Pt was found to be minimally responsive at NH ([facility]) and was brought to ED. He was found to be hypoglycemic. Work up notable for large abscess at site of prior R AKA with periosteal reaction of distal femur , wound swab obtained in ER - cx in process. Blcx ngtd. Pt is currently on vanc/zosyn. Pt denied fevers, chills, n/v/d, abdo pain or dysuria at NH. He stated it felt well.     Of note, pt was recently admitted to  for R foot gangrene - hospital course at that time c/b cardiac arrest/aspiration. He underwent R AKA with ortho with clean margins on path on 10/13.

## 2022-11-24 NOTE — ASSESSMENT & PLAN NOTE
- followup BNP  - CXR showed vascular congestion   - TTE on 10/10/2022 showed EF 40% and grade II DD  - volume management with HD  - continue metoprolol

## 2022-11-24 NOTE — ASSESSMENT & PLAN NOTE
- AMS likely 2/2 sepsis  - HCT nonacute   - continue IV vancomycin and zosyn for UTI and R AKA abscess  -ID and ortho consulted  -IR for I&D right stump abscess

## 2022-11-24 NOTE — ASSESSMENT & PLAN NOTE
Recent R AKA with subsequent rim enhancing fluid collection at operative site c/f abscess; also noted to have periosteal reaction of distal femur with possible early OM on CT. Blcx so far ngtd. Superficial wound swab obtained in ER - in process. UA with pyuria, though pt is on HD and denied symptoms c/w UTI.     Recommendations:  -continue empiric vancomycin pharm to dose and zosyn pending cx data  -de-escalate as indicated  -favor surgical vs IR evaluation of stump abscess given size; please obtain cultures  -f/u cx data

## 2022-11-24 NOTE — H&P
[facility] Intensive Care ([address] Medicine  History & Physical    Patient Name: Adryan Neff  MRN: [id number]  Patient Class: IP- Inpatient  Admission Date: 11/23/2022  Attending Physician: Angelito Garland MD   Primary Care Provider: Carola Pedro MD         Patient information was obtained from patient and ER records.     Subjective:     Principal Problem:Sepsis    Chief Complaint:   Chief Complaint   Patient presents with    Altered Mental Status     Pt arrives from [facility] after pt was found minimally responsive by nurse. Pt glucose was said to be in the 50s. Pt given x2 rounds of oral glucagon and now has cbg in the 80s.  Pt still has decreased responsiveness from baseline. Pt has known leg infection.        HPI: Mr. Neff is a 56-year-old [place] gentleman with PMH of CAD, DM type 1, diabetic retinopathy, ESRD on HD, hypertension, PAD, b/l AKA who presented to [facility] for altered mental status. Pt arrived from [facility] where he was found minimally responsive and BG 50s. Upon chart review, last HD sessions were Sunday 11/20 and 11/22. Pt oxygenating well on RA. Awake and oriented to self and place. He is able to state when he last had HD. Labs noted WBC 25 and lactate 2.3. UA positive for UTI. HCT nonacute. R AKA stump performed on 10/13/22 showed discoloration of the skin at the lateral aspect with areas of possible wound drainage/dehiscence. Due to concern for infection, orthopedic surgery consulted. CT right thigh with contrast showed abscess. Blood cultures ordered and empiric IV antibiotics ordered. He was admitted to Hospital Medicine for further evaluation and management of acute encephalopathy 2/2 sepsis in the setting of UTI and R AKA abscess.       Past Medical History:   Diagnosis Date    CAD (coronary artery disease) 2016    CAD (non obstructive) 2016 [facility]    Diabetes mellitus type I     Diabetic retinopathy     ESRD on hemodialysis     on HD TThSat    Gangrene  of left foot     Hypertension     Nuclear sclerosis of right eye 11/24/2021    PAD (peripheral artery disease)     PEA (Pulseless electrical activity) 10/10/2022    Pulmonary HTN     Right foot infection     TB lung, latent 04/2021    Wet gangrene 1/5/2022       Past Surgical History:   Procedure Laterality Date    ABOVE-KNEE AMPUTATION Left 1/18/2022    Procedure: AMPUTATION, ABOVE KNEE;  Surgeon: Rusty Light MD;  Location: [facility] OR;  Service: Orthopedics;  Laterality: Left;    ABOVE-KNEE AMPUTATION Left 1/21/2022    Procedure: AMPUTATION, ABOVE KNEE;  Surgeon: Rusty Light MD;  Location: [facility] OR;  Service: Orthopedics;  Laterality: Left;    ABOVE-KNEE AMPUTATION Right 10/13/2022    Procedure: AMPUTATION, ABOVE KNEE, RIGHT;  Surgeon: Dimitris Davis MD;  Location: [facility] OR;  Service: Orthopedics;  Laterality: Right;    AV FISTULA PLACEMENT      CATARACT EXTRACTION Left     EYE SURGERY      FISTULOGRAM Left 8/9/2022    Procedure: Fistulogram;  Surgeon: Masoud Soni MD;  Location: [facility];  Service: Vascular;  Laterality: Left;  LEFT VM ON DAUGHTER'S PHONE----PHONE PREOP NOT COMPLETED  CALLED PATIENT ON 8/8/2022 @ 3:34. HE STATED HE IS RESCHEDULING PROCEDURE. NOTIFIED ERWIN @ 3:35PM-LO    FISTULOGRAM Left 10/20/2022    Procedure: Fistulogram;  Surgeon: Masoud Soni MD;  Location: [facility];  Service: Vascular;  Laterality: Left;  Left upper extremity    TOE AMPUTATION Left 1/6/2022    Procedure: AMPUTATION, TOE;  Surgeon: Masoud Soni MD;  Location: [facility];  Service: Vascular;  Laterality: Left;  Left first through fifth toes, possible open transmetatarsal amputation and all other indicated procedures       Review of patient's allergies indicates:  No Known Allergies    No current facility-administered medications on file prior to encounter.     Current Outpatient Medications on File Prior to Encounter   Medication Sig    amLODIPine (NORVASC) 10 MG tablet Take 1 tablet (10 mg  total) by mouth once daily.    aspirin 81 MG Chew Take 81 mg by mouth once daily.    atorvastatin (LIPITOR) 20 MG tablet Take 1 tablet by mouth.    clopidogreL (PLAVIX) 75 mg tablet Take 75 mg by mouth once daily.    hydrALAZINE (APRESOLINE) 100 MG tablet Take 1 tablet (100 mg total) by mouth every 8 (eight) hours.    insulin detemir U-100 (LEVEMIR FLEXTOUCH) 100 unit/mL (3 mL) SubQ InPn pen Inject 8 Units into the skin every evening.    lancets (ACCU-CHEK SOFTCLIX LANCETS) Misc 1 each by Misc.(Non-Drug; Combo Route) route once daily at 6am.    metoprolol succinate (TOPROL-XL) 25 MG 24 hr tablet Take 1 tablet by mouth.    omeprazole (PRILOSEC) 40 MG capsule Take 40 mg by mouth once daily.    pravastatin (PRAVACHOL) 20 MG tablet Take 20 mg by mouth once daily.    RENVELA 800 mg Tab Take 800 mg by mouth 3 (three) times daily with meals.     Family History       Problem Relation (Age of Onset)    No Known Problems Mother, Father, Sister, Brother, Daughter, Daughter, Daughter, Brother, Maternal Aunt, Maternal Uncle, Paternal Aunt, Paternal Uncle, Maternal Grandmother, Maternal Grandfather, Paternal Grandmother, Paternal Grandfather          Tobacco Use    Smoking status: Never    Smokeless tobacco: Never   Substance and Sexual Activity    Alcohol use: No    Drug use: No    Sexual activity: Yes     Partners: Female     Review of Systems   Unable to perform ROS: Mental status change   Objective:     Vital Signs (Most Recent):  Temp: 98.1 °F (36.7 °C) (11/24/22 0445)  Pulse: 74 (11/24/22 0445)  Resp: 17 (11/24/22 0445)  BP: (!) 143/66 (11/24/22 0445)  SpO2: 99 % (11/24/22 0445)   Vital Signs (24h Range):  Temp:  [97.8 °F (36.6 °C)-99.2 °F (37.3 °C)] 98.1 °F (36.7 °C)  Pulse:  [69-99] 74  Resp:  [12-25] 17  SpO2:  [94 %-100 %] 99 %  BP: (127-185)/(58-81) 143/66     Weight: 56 kg (123 lb 7.3 oz)  Body mass index is 17.22 kg/m².    Physical Exam  Constitutional:       Appearance: He is well-developed. He is ill-appearing.    HENT:      Head: Normocephalic and atraumatic.      Mouth/Throat:      Mouth: Mucous membranes are moist.   Eyes:      General: No scleral icterus.     Extraocular Movements: Extraocular movements intact.      Pupils: Pupils are equal, round, and reactive to light.   Neck:      Vascular: No JVD.   Cardiovascular:      Rate and Rhythm: Normal rate and regular rhythm.      Heart sounds: No murmur heard.    No friction rub. No gallop.   Pulmonary:      Effort: Pulmonary effort is normal. No respiratory distress.      Breath sounds: Normal breath sounds.   Abdominal:      General: Bowel sounds are normal. There is no distension.      Palpations: Abdomen is soft.      Tenderness: There is no abdominal tenderness.   Musculoskeletal:         General: No deformity. Normal range of motion.      Cervical back: Neck supple.      Comments: Bilateral AKA   Lymphadenopathy:      Cervical: No cervical adenopathy.   Skin:     General: Skin is warm and dry.      Capillary Refill: Capillary refill takes less than 2 seconds.      Findings: No erythema or rash.      Comments: Discoloration and wound dehiscence on R AKA with no active drainage. Staples intact   Neurological:      General: No focal deficit present.      Mental Status: He is alert. He is disoriented.      Cranial Nerves: No cranial nerve deficit.      Sensory: No sensory deficit.   Psychiatric:         Mood and Affect: Mood normal.         CRANIAL NERVES     CN III, IV, VI   Pupils are equal, round, and reactive to light.     Significant Labs: CBC:   Recent Labs   Lab 11/23/22 0629   WBC 25.43*   HGB 11.6*   HCT 40.8   *     CMP:   Recent Labs   Lab 11/23/22 0629      K 3.9      CO2 30*   *   BUN 23*   CREATININE 5.6*   CALCIUM 10.3   PROT 9.1*   ALBUMIN 3.1*   BILITOT 0.4   ALKPHOS 100   AST 12   ALT 11   ANIONGAP 13     Coagulation: No results for input(s): PT, INR, APTT in the last 48 hours.  Lactic Acid:   Recent Labs   Lab 11/23/22 0629  11/23/22  1036   LACTATE 2.3* 1.0     Magnesium: No results for input(s): MG in the last 48 hours.  POCT Glucose: No results for input(s): POCTGLUCOSE in the last 48 hours.  Troponin:   Recent Labs   Lab 11/23/22  0629 11/23/22  1004   TROPONINI 0.152* 0.108*     TSH: No results for input(s): TSH in the last 4320 hours.  Urine Culture: No results for input(s): LABURIN in the last 48 hours.  Urine Studies:   Recent Labs   Lab 11/23/22  0958   COLORU Gina   APPEARANCEUA Cloudy*   PHUR 5.0   SPECGRAV 1.020   PROTEINUA 3+*   GLUCUA Negative   KETONESU Negative   BILIRUBINUA Negative   OCCULTUA 2+*   NITRITE Negative   LEUKOCYTESUR 2+*   RBCUA 10*   WBCUA 28*   BACTERIA Many*   HYALINECASTS 0       Significant Imaging: I have reviewed all pertinent imaging results/findings within the past 24 hours.    Imaging Results              CT Thigh With Contrast Right (Final result)  Result time 11/23/22 11:02:08      Final result by Christiano Betancourt MD (11/23/22 11:02:08)                   Impression:      Postoperative changes of right above knee amputation with a large abscess spanning from the superficial soft tissues to the femoral stump with slight extension into the femoral bone marrow.  No osseous destructive changes of the femoral stump.  However, early osteomyelitis is not excluded.    Circumferential bladder wall thickening.  Recommend correlation with urinalysis.    Significant stool burden, concerning for chronic constipation.    Severe atherosclerosis.    Electronically signed by resident: Gabino Clark  Date:    11/23/2022  Time:    10:12    Electronically signed by: Christiano Betancourt MD  Date:    11/23/2022  Time:    11:02               Narrative:    EXAMINATION:  CT THIGH WITH CONTRAST RIGHT    CLINICAL HISTORY:  Soft tissue infection suspected, thigh, xray done;    TECHNIQUE:  Axial images of the right thigh obtained after the administration of 100 mL of Omnipaque 350 intravenous contrast.  Data submitted for  coronal and sagittal reformats.    COMPARISON:  Right femur radiograph 10/10/2022    FINDINGS:  Postoperative changes of right-sided above knee amputation with skin staples in place.  There is a large bilobed rim enhancing collection containing fluid and air at the operative site.  The collection abuts the femoral stump with slight extension into the femoral bone marrow measuring 2.8 x 3.9 x 2.0 cm measured in AP by TV by CC dimensions.  This collection extends and communicates laterally measuring 5.1 x 3.1 x 3.1 cm measured in AP by TV by CC dimensions.  There is associated soft tissue stranding throughout the operative site.  There is periosteal reaction of the distal femur.  No osseous destructive changes.    Significant of stool throughout the partially visualized colon and rectum.  Circumferential wall thickening of the rectum.  Bladder is distended with circumferential wall thickening.  Prostate is enlarged.    Significant atherosclerosis throughout.  Femoropopliteal bypass graft is in place with occlusion of its distal aspect. Soft tissue stranding of the subcutaneous tissues of the right pelvis.    Degenerative changes of the right sacroiliac joint, pubic symphysis, and right femoroacetabular joint.  Bony mineralization is diminished throughout.  No acute fracture.  No destructive lytic or blastic lesions.  No subluxation or dislocation.                                       CT Head Without Contrast (Final result)  Result time 11/23/22 09:53:54      Final result by Montana Sosa MD (11/23/22 09:53:54)                   Impression:      No evidence of acute hemorrhage or major vascular distribution infarct.    Mild generalized cerebral volume loss and chronic small vessel ischemic change.    Extensive vascular calcification.      Electronically signed by: Montana Sosa MD  Date:    11/23/2022  Time:    09:53               Narrative:    EXAMINATION:  CT HEAD WITHOUT CONTRAST    CLINICAL HISTORY:  Mental  "status change, unknown cause;    TECHNIQUE:  Low dose axial CT images obtained throughout the head without the use of intravenous contrast.  Axial, sagittal and coronal reconstructions were performed.    COMPARISON:  None.    FINDINGS:  Intracranial compartment:    Pattern of mild generalized cerebral volume loss, without evidence of hydrocephalus.    Mild patchy hypoattenuation in the supratentorial white matter, nonspecific but most likely reflecting chronic small vessel ischemic changes. No recent or remote major vascular distribution infarct. No acute hemorrhage.  No mass effect or midline shift.    No extra-axial blood or fluid collections.    Skull/extracranial contents (limited evaluation):    No displaced calvarial fracture.    The mastoid air cells and visualized paranasal sinuses are essentially clear.    Extensive vascular calcification.                                       X-Ray Chest AP Portable (Final result)  Result time 11/23/22 08:24:44      Final result by Aureliano Stafford MD (11/23/22 08:24:44)                   Impression:      Borderline cardiomegaly and mild pulmonary edema.      Electronically signed by: Aureliano Stafford MD  Date:    11/23/2022  Time:    08:24               Narrative:    EXAMINATION:  XR CHEST AP PORTABLE    CLINICAL HISTORY:  Provided history is "Sepsis;  ".    TECHNIQUE:  One view of the chest.    COMPARISON:  10/12/2022.    FINDINGS:  Cardiac wires overlie the chest.  Cardiomediastinal silhouette is borderline enlarged and similar to the prior study.  Central vascular congestion with patchy ground-glass opacities in the perihilar lungs.  No confluent area of consolidation.  No large pleural effusion.  No pneumothorax.  Vascular stents overlie the left axillary and subclavian region.                                        Assessment/Plan:     * Sepsis  This patient does have evidence of infective focus  My overall impression is sepsis. Vital signs were reviewed and noted " in progress note.  Antibiotics given-   Antibiotics (From admission, onward)      Start     Stop Route Frequency Ordered    11/24/22 0815  piperacillin-tazobactam 4.5 g in sodium chloride 0.9% 100 mL IVPB (ready to mix system)         -- IV Every 12 hours (non-standard times) 11/24/22 0709    11/24/22 0808  vancomycin - pharmacy to dose  (vancomycin IVPB)        [facility].    -- IV pharmacy to manage frequency 11/24/22 0709          Cultures were taken-   Microbiology Results (last 7 days)       Procedure Component Value Units Date/Time    Blood culture x two cultures. Draw prior to antibiotics. [[id number]] Collected: 11/23/22 0631    Order Status: Completed Specimen: Blood from Peripheral, Antecubital, Right Updated: 11/23/22 1345     Blood Culture, Routine No Growth to date    Narrative:      Aerobic and anaerobic    Blood culture x two cultures. Draw prior to antibiotics. [[id number]] Collected: 11/23/22 0631    Order Status: Completed Specimen: Blood from Peripheral, Hand, Right Updated: 11/23/22 1345     Blood Culture, Routine No Growth to date    Narrative:      Aerobic and anaerobic    Urine culture [[id number]] Collected: 11/23/22 0958    Order Status: No result Specimen: Urine Updated: 11/23/22 1032    Culture, Anaerobe [[id number]] Collected: 11/23/22 0630    Order Status: Sent Specimen: Wound from Leg, Right Updated: 11/23/22 0650    Aerobic culture [[id number]] Collected: 11/23/22 0630    Order Status: Sent Specimen: Wound from Leg, Right Updated: 11/23/22 0649          Latest lactate reviewed, they are-  Recent Labs   Lab 11/23/22 0629 11/23/22  1036   LACTATE 2.3* 1.0       Organ dysfunction indicated by Encephalopathy   Source- UTI and R AKA abscess    Source control Achieved by- IV vancomycin and zosyn     PLAN:  - WBC 25  - lactate 2.3 --> 1.0  - UA positive for UTI. followup Ucx  - blood culture pending   - CT right thigh with contrast showed abscess  - continue IV  "vancomycin and zosyn   - ID consulted. Followup recs      Acute cystitis  - UA positive for UTI  - followup Ucx  - continue on IV zosyn       S/P AKA (above knee amputation) bilateral  - ortho consulted. apprec recs  - CT right thigh showed abscess  - continue IV vancomycin and zosyn   - ID consulted. followup recs  - see "sepsis"      Abscess of right thigh  - seen on CT thigh with contrast  - lactate normalized  - ortho consulted. apprec recs  - continue IV vancomycin and zoysn (D1- 11/24)  - followup wound cultures and blood cultures  - ID consulted. followup recs  - see "sepsis"      Anemia due to chronic kidney disease, on chronic dialysis  - Hb on admit, 11  - stable  - transfuse for Hb < 7  - CBC daily      PAD (peripheral artery disease)  - s/p b/l AKA  - continue home ASA, Plavix and statin      Encephalopathy, metabolic  - AMS likely 2/2 sepsis  - HCT nonacute   - continue IV vancomycin and zosyn for UTI and R AKA abscess      ESRD on hemodialysis  - nephrology consulted. apprec recs  - continue HD  - renally-dose all meds  - strict I/Os  - hold ACE-I or ARB while renal function dynamic        Secondary hyperparathyroidism of renal origin  - continue home Renvela       HLD (hyperlipidemia)  - continue home statin      CAD (coronary artery disease) - non-obstructive from [facility] in 2016  - continue ASA, Plavix and statin      Chronic diastolic heart failure  - followup BNP  - CXR showed vascular congestion   - TTE on 10/10/2022 showed EF 40% and grade II DD  - volume management with HD  - continue metoprolol       Gastroesophageal reflux disease  - continue home PPI      Essential (primary) hypertension  - continue home metoprolol, amlodipine and hydralazine      Controlled type 2 diabetes mellitus with chronic kidney disease on chronic dialysis, with long-term current use of insulin  Patient's FSGs are controlled on current medication regimen.  Last A1c reviewed-   Lab Results   Component Value Date    HGBA1C " 6.1 (H) 2021     Most recent fingerstick glucose reviewed-   Recent Labs   Lab 22  0739   POCTGLUCOSE 103     Current correctional scale  Low  Maintain anti-hyperglycemic dose as follows-   Antihyperglycemics (From admission, onward)      Start     Stop Route Frequency Ordered    22 2100  insulin detemir U-100 pen 8 Units         -- SubQ Nightly 22 1508    22 1448  insulin aspart U-100 pen 0-5 Units         -- SubQ Before meals & nightly PRN 22 1508          Hold Oral hypoglycemics while patient is in the hospital.  - continue home determir 8 units HS   - LDSSI   - goal B-180  - diabetic diet     VTE Risk Mitigation (From admission, onward)           Ordered     heparin (porcine) injection 7,500 Units  Every 8 hours         22 1508     IP VTE HIGH RISK PATIENT  Once         22 1508     Place sequential compression device  Until discontinued         22 1508     Place sequential compression device  Until discontinued         22 1044                       Time spent in care of patient: > 35 minutes       Javier Medina MD  Department of Hospital Medicine   [facility] - Intensive Care ([place]

## 2022-11-24 NOTE — ASSESSMENT & PLAN NOTE
This patient does have evidence of infective focus  My overall impression is sepsis. Vital signs were reviewed and noted in progress note.  Antibiotics given-   Antibiotics (From admission, onward)    Start     Stop Route Frequency Ordered    11/24/22 0815  piperacillin-tazobactam 4.5 g in sodium chloride 0.9% 100 mL IVPB (ready to mix system)         -- IV Every 12 hours (non-standard times) 11/24/22 0709    11/24/22 0808  vancomycin - pharmacy to dose  (vancomycin IVPB)        [facility].    -- IV pharmacy to manage frequency 11/24/22 0709        Cultures were taken-   Microbiology Results (last 7 days)     Procedure Component Value Units Date/Time    Blood culture x two cultures. Draw prior to antibiotics. [[id number]] Collected: 11/23/22 0631    Order Status: Completed Specimen: Blood from Peripheral, Antecubital, Right Updated: 11/23/22 1345     Blood Culture, Routine No Growth to date    Narrative:      Aerobic and anaerobic    Blood culture x two cultures. Draw prior to antibiotics. [[id number]] Collected: 11/23/22 0631    Order Status: Completed Specimen: Blood from Peripheral, Hand, Right Updated: 11/23/22 1345     Blood Culture, Routine No Growth to date    Narrative:      Aerobic and anaerobic    Urine culture [[id number]] Collected: 11/23/22 0958    Order Status: No result Specimen: Urine Updated: 11/23/22 1032    Culture, Anaerobe [[id number]] Collected: 11/23/22 0630    Order Status: Sent Specimen: Wound from Leg, Right Updated: 11/23/22 0650    Aerobic culture [[id number]] Collected: 11/23/22 0630    Order Status: Sent Specimen: Wound from Leg, Right Updated: 11/23/22 0649        Latest lactate reviewed, they are-  Recent Labs   Lab 11/23/22 0629 11/23/22  1036   LACTATE 2.3* 1.0       Organ dysfunction indicated by Encephalopathy   Source- UTI and R AKA abscess    Source control Achieved by- IV vancomycin and zosyn     PLAN:  - WBC 25  - lactate 2.3 --> 1.0  - UA  positive for UTI. followup Ucx  - blood culture pending   - CT right thigh with contrast showed abscess  - continue IV vancomycin and zosyn   - ID consulted. Followup recs

## 2022-11-24 NOTE — ASSESSMENT & PLAN NOTE
This patient does have evidence of infective focus  My overall impression is sepsis. Vital signs were reviewed and noted in progress note.  Antibiotics given-   Antibiotics (From admission, onward)    Start     Stop Route Frequency Ordered    11/24/22 1145  mupirocin 2 % ointment         11/29 0859 Nasl 2 times daily 11/24/22 1040    11/24/22 0815  piperacillin-tazobactam 4.5 g in sodium chloride 0.9% 100 mL IVPB (ready to mix system)         -- IV Every 12 hours (non-standard times) 11/24/22 0709    11/24/22 0808  vancomycin - pharmacy to dose  (vancomycin IVPB)        See [facility] for full Linked Orders Report.    -- IV pharmacy to manage frequency 11/24/22 0709        Cultures were taken-   Microbiology Results (last 7 days)     Procedure Component Value Units Date/Time    Urine culture [[id number]] Collected: 11/23/22 0958    Order Status: Completed Specimen: Urine Updated: 11/24/22 1315     Urine Culture, Routine No growth    Narrative:      Specimen Source->Urine    Aerobic culture [[id number]]  (Abnormal) Collected: 11/23/22 0630    Order Status: Completed Specimen: Wound from Leg, Right Updated: 11/24/22 0821     Aerobic Bacterial Culture GRAM NEGATIVE JOHN  Many  Identification and susceptibility pending      Narrative:      Right AKA    Blood culture x two cultures. Draw prior to antibiotics. [[id number]] Collected: 11/23/22 0631    Order Status: Completed Specimen: Blood from Peripheral, Antecubital, Right Updated: 11/24/22 0812     Blood Culture, Routine No Growth to date      No Growth to date    Narrative:      Aerobic and anaerobic    Blood culture x two cultures. Draw prior to antibiotics. [[id number]] Collected: 11/23/22 0631    Order Status: Completed Specimen: Blood from Peripheral, Hand, Right Updated: 11/24/22 0812     Blood Culture, Routine No Growth to date      No Growth to date    Narrative:      Aerobic and anaerobic    Culture, Anaerobe [[id number]] Collected: 11/23/22 0630    Order Status:  Completed Specimen: Wound from Leg, Right Updated: 11/24/22 0739     Anaerobic Culture Culture in progress    Narrative:      Right AKA        Latest lactate reviewed, they are-  Recent Labs   Lab 11/23/22  0629 11/23/22  1036   LACTATE 2.3* 1.0       Organ dysfunction indicated by Encephalopathy   Source- UTI and R AKA abscess    Source control Achieved by- IV vancomycin and zosyn     PLAN:  - WBC 25  - lactate 2.3 --> 1.0  - UA positive for UTI. followup Ucx  - blood culture pending   - CT right thigh with contrast showed abscess  - continue IV vancomycin and zosyn   - ID consulted. Followup recs

## 2022-11-24 NOTE — CONSULTS
[facility] - Intensive Care ([place] [address])  Infectious Disease  Consult Note    Patient Name: Adryan Neff  MRN: [id number]  Admission Date: 11/23/2022  Hospital Length of Stay: 1 days  Attending Physician: Angelito Garland MD  Primary Care Provider: Carola Pedro MD     Isolation Status: No active isolations    Patient information was obtained from patient, past medical records and ER records.      Inpatient consult to Infectious Diseases  Consult performed by: Anita Guzman MD  Consult ordered by: Javier Medina MD        Assessment/Plan:     * Sepsis  See abscess      Abscess of right thigh  Recent R AKA on 10/17 with subsequent rim enhancing fluid collection at operative site c/f abscess; also noted to have periosteal reaction of distal femur with possible early OM on CT. Blcx so far ngtd. Superficial wound swab obtained in ER - in process. UA with pyuria, though pt is on HD and denied symptoms c/w UTI.     Recommendations:  -continue empiric vancomycin pharm to dose and zosyn pending cx data  -de-escalate as indicated  -favor surgical vs IR evaluation of stump abscess given size; please obtain cultures and path to evaluate for OM  -f/u cx data      Leukocytosis  -likely due to abscess    Thank you for your consult. I will follow-up with patient. Please contact us if you have any additional questions. Above d/w primary team.      Time: 70 minutes   50% of time spent on face-to-face counseling and coordination of care. Counseling included review of test results, diagnosis, and treatment plan with patient and/or family.        Anita Guzman MD  Infectious Disease  [facility] - Intensive Care ([place] [address])    Subjective:     Principal Problem: Sepsis    HPI: 55 yo male with ESRD on HD, LTBI s/p tx and severe PAD c/b gangrene admitted for R AKA abscess. ID consulted for abx recs. [employer]  used [id number].  Pt was found to be minimally responsive at NH ([facility]) and was brought to ED. He  was found to be hypoglycemic. Work up notable for large abscess at site of prior R AKA with periosteal reaction of distal femur , wound swab obtained in ER - cx in process. Blcx ngtd. Pt is currently on vanc/zosyn. Pt denied fevers, chills, n/v/d, abdo pain or dysuria at NH. He stated it felt well.     Of note, pt was recently admitted to  for R foot gangrene - hospital course at that time c/b cardiac arrest/aspiration. He underwent R AKA with ortho with clean margins on path on 10/13.           Past Medical History:   Diagnosis Date    CAD (coronary artery disease) 2016    CAD (non obstructive) 2016 [facility]    Diabetes mellitus type I     Diabetic retinopathy     ESRD on hemodialysis     on HD TThSat    Gangrene of left foot     Hypertension     Nuclear sclerosis of right eye 11/24/2021    PAD (peripheral artery disease)     PEA (Pulseless electrical activity) 10/10/2022    Pulmonary HTN     Right foot infection     TB lung, latent 04/2021    Wet gangrene 1/5/2022       Past Surgical History:   Procedure Laterality Date    ABOVE-KNEE AMPUTATION Left 1/18/2022    Procedure: AMPUTATION, ABOVE KNEE;  Surgeon: Rusty Light MD;  Location: [facility] OR;  Service: Orthopedics;  Laterality: Left;    ABOVE-KNEE AMPUTATION Left 1/21/2022    Procedure: AMPUTATION, ABOVE KNEE;  Surgeon: Rusty Light MD;  Location: [facility] OR;  Service: Orthopedics;  Laterality: Left;    ABOVE-KNEE AMPUTATION Right 10/13/2022    Procedure: AMPUTATION, ABOVE KNEE, RIGHT;  Surgeon: Dimitris Davis MD;  Location: [facility] OR;  Service: Orthopedics;  Laterality: Right;    AV FISTULA PLACEMENT      CATARACT EXTRACTION Left     EYE SURGERY      FISTULOGRAM Left 8/9/2022    Procedure: Fistulogram;  Surgeon: Masoud Soni MD;  Location: [facility] OR;  Service: Vascular;  Laterality: Left;  LEFT VM ON DAUGHTER'S PHONE----PHONE PREOP NOT COMPLETED  CALLED PATIENT ON 8/8/2022 @ 3:34. HE STATED HE IS RESCHEDULING PROCEDURE. NOTIFIED  ERWIN @ 3:35PM-LO    FISTULOGRAM Left 10/20/2022    Procedure: Fistulogram;  Surgeon: Masoud Soni MD;  Location: [facility];  Service: Vascular;  Laterality: Left;  Left upper extremity    TOE AMPUTATION Left 1/6/2022    Procedure: AMPUTATION, TOE;  Surgeon: Masoud Soni MD;  Location: [facility] OR;  Service: Vascular;  Laterality: Left;  Left first through fifth toes, possible open transmetatarsal amputation and all other indicated procedures       Review of patient's allergies indicates:  No Known Allergies    Medications:  Medications Prior to Admission   Medication Sig    amLODIPine (NORVASC) 10 MG tablet Take 1 tablet (10 mg total) by mouth once daily.    aspirin 81 MG Chew Take 81 mg by mouth once daily.    atorvastatin (LIPITOR) 20 MG tablet Take 1 tablet by mouth.    clopidogreL (PLAVIX) 75 mg tablet Take 75 mg by mouth once daily.    hydrALAZINE (APRESOLINE) 100 MG tablet Take 1 tablet (100 mg total) by mouth every 8 (eight) hours.    insulin detemir U-100 (LEVEMIR FLEXTOUCH) 100 unit/mL (3 mL) SubQ InPn pen Inject 8 Units into the skin every evening.    lancets (ACCU-CHEK SOFTCLIX LANCETS) Misc 1 each by Misc.(Non-Drug; Combo Route) route once daily at 6am.    metoprolol succinate (TOPROL-XL) 25 MG 24 hr tablet Take 1 tablet by mouth.    omeprazole (PRILOSEC) 40 MG capsule Take 40 mg by mouth once daily.    pravastatin (PRAVACHOL) 20 MG tablet Take 20 mg by mouth once daily.    RENVELA 800 mg Tab Take 800 mg by mouth 3 (three) times daily with meals.     Antibiotics (From admission, onward)      Start     Stop Route Frequency Ordered    11/24/22 0815  piperacillin-tazobactam 4.5 g in sodium chloride 0.9% 100 mL IVPB (ready to mix system)         -- IV Every 12 hours (non-standard times) 11/24/22 0709    11/24/22 0808  vancomycin - pharmacy to dose  (vancomycin IVPB)        See [facility] for full Linked Orders Report.    -- IV pharmacy to manage frequency 11/24/22 0709           Antifungals (From admission, onward)      None          Antivirals (From admission, onward)      None             Immunization History   Administered Date(s) Administered    COVID-19 Vaccine 01/15/2021    COVID-19, MRNA, LN-S, PF (MODERNA FULL 0.5 ML DOSE) 01/15/2021, 02/11/2021    Hepatitis B, Adult 07/10/2017, 08/14/2017, 09/11/2017, 03/30/2021, 04/22/2021    Influenza 09/29/2020    Influenza - Quadrivalent - PF *Preferred* (6 months and older) 01/16/2008, 03/30/2016, 12/07/2016    PPD Test 04/04/2017, 01/11/2022, 10/18/2022    Pneumococcal Conjugate - 13 Valent 11/27/2017    Pneumococcal Polysaccharide - 23 Valent 03/30/2016, 10/24/2018    Tdap 03/30/2016       Family History       Problem Relation (Age of Onset)    No Known Problems Mother, Father, Sister, Brother, Daughter, Daughter, Daughter, Brother, Maternal Aunt, Maternal Uncle, Paternal Aunt, Paternal Uncle, Maternal Grandmother, Maternal Grandfather, Paternal Grandmother, Paternal Grandfather          Social History     Socioeconomic History    Marital status: Single    Number of children: 5   Tobacco Use    Smoking status: Never    Smokeless tobacco: Never   Substance and Sexual Activity    Alcohol use: No    Drug use: No    Sexual activity: Yes     Partners: Female   Social History Narrative    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     Social Determinants of Health     Financial Resource Strain: Low Risk     Difficulty of Paying Living Expenses: Not very hard   Food Insecurity: No Food Insecurity    Worried About Running Out of Food in the Last Year: Never true    Ran Out of Food in the Last Year: Never true   Transportation Needs: No Transportation Needs    Lack of Transportation (Medical): No    Lack of Transportation (Non-Medical): No   Physical Activity: Inactive    Days of Exercise per Week: 0 days    Minutes of Exercise per Session: 0 min   Stress: Stress Concern Present    Feeling of Stress : Rather much   Social Connections: Socially  Isolated    Frequency of Communication with Friends and Family: More than three times a week    Frequency of Social Gatherings with Friends and Family: More than three times a week    Attends Hindu Services: Never    Active Member of Clubs or Organizations: No    Attends Club or Organization Meetings: Never    Marital Status: Never    Housing Stability: Low Risk     Unable to Pay for Housing in the Last Year: No    Number of Places Lived in the Last Year: 1    Unstable Housing in the Last Year: No     Review of Systems   Constitutional:  Negative for chills and fever.   Gastrointestinal:  Negative for abdominal pain, constipation, diarrhea, nausea and vomiting.   Genitourinary:  Negative for dysuria and flank pain.   All other systems reviewed and are negative.  Objective:     Vital Signs (Most Recent):  Temp: 98.1 °F (36.7 °C) (11/24/22 0445)  Pulse: 74 (11/24/22 0445)  Resp: 17 (11/24/22 0445)  BP: (!) 143/66 (11/24/22 0445)  SpO2: 99 % (11/24/22 0445)   Vital Signs (24h Range):  Temp:  [97.8 °F (36.6 °C)-99.2 °F (37.3 °C)] 98.1 °F (36.7 °C)  Pulse:  [69-99] 74  Resp:  [12-25] 17  SpO2:  [94 %-100 %] 99 %  BP: (127-185)/(58-81) 143/66     Weight: 56 kg (123 lb 7.3 oz)  Body mass index is 17.22 kg/m².    Estimated Creatinine Clearance: 11.5 mL/min (A) (based on SCr of 5.6 mg/dL (H)).    Physical Exam  Constitutional:       General: He is not in acute distress.     Appearance: He is not ill-appearing or toxic-appearing.   HENT:      Head: Normocephalic and atraumatic.      Mouth/Throat:      Mouth: Mucous membranes are moist.      Comments: Poor dentition  Eyes:      General:         Right eye: No discharge.         Left eye: No discharge.   Cardiovascular:      Rate and Rhythm: Normal rate and regular rhythm.   Pulmonary:      Effort: Pulmonary effort is normal. No respiratory distress.   Abdominal:      General: Bowel sounds are normal. There is no distension.      Palpations: Abdomen is soft.       Tenderness: There is no abdominal tenderness. There is no guarding.   Musculoskeletal:         General: Swelling (R AKA) and deformity present. No tenderness.   Skin:     General: Skin is warm and dry.      Comments: L AKA - site well healed  R AKA site - purulent drainage from surgical site/staples; fluctuance noted  LAVF site dressed   Neurological:      Mental Status: He is alert and oriented to person, place, and time. Mental status is at baseline.      Motor: No weakness.   Psychiatric:         Mood and Affect: Mood normal.         Behavior: Behavior normal.       Significant Labs:   Microbiology Results (last 7 days)       Procedure Component Value Units Date/Time    Culture, Anaerobe [[id number]] Collected: 11/23/22 0630    Order Status: Completed Specimen: Wound from Leg, Right Updated: 11/24/22 0739     Anaerobic Culture Culture in progress    Narrative:      Right AKA    Blood culture x two cultures. Draw prior to antibiotics. [[id number]] Collected: 11/23/22 0631    Order Status: Completed Specimen: Blood from Peripheral, Antecubital, Right Updated: 11/23/22 1345     Blood Culture, Routine No Growth to date    Narrative:      Aerobic and anaerobic    Blood culture x two cultures. Draw prior to antibiotics. [[id number]] Collected: 11/23/22 0631    Order Status: Completed Specimen: Blood from Peripheral, Hand, Right Updated: 11/23/22 1345     Blood Culture, Routine No Growth to date    Narrative:      Aerobic and anaerobic    Urine culture [[id number]] Collected: 11/23/22 0958    Order Status: No result Specimen: Urine Updated: 11/23/22 1032    Aerobic culture [[id number]] Collected: 11/23/22 0630    Order Status: Sent Specimen: Wound from Leg, Right Updated: 11/23/22 0649            Significant Imaging: I have reviewed all pertinent imaging results/findings within the past 24 hours.

## 2022-11-24 NOTE — PROGRESS NOTES
[facility] - Intensive Care ([address] Medicine  Progress Note    Patient Name: Adryan Neff  MRN: [id number]  Patient Class: IP- Inpatient   Admission Date: 11/23/2022  Length of Stay: 1 days  Attending Physician: Angelito Garland MD  Primary Care Provider: Carola Pedro MD        Subjective:     Principal Problem:Sepsis        HPI:  Mr. Neff is a 56-year-old [place]inamese gentleman with PMH of CAD, DM type 1, diabetic retinopathy, ESRD on HD, hypertension, PAD, b/l AKA who [facility] for altered mental status. Pt arrived from [facility] where he was found minimally responsive and BG 50s. Upon chart review, last HD sessions were Sunday 11/20 and 11/22. Pt oxygenating well on RA. Awake and oriented to self and place. He is able to state when he last had HD. Labs noted WBC 25 and lactate 2.3. UA positive for UTI. HCT nonacute. R AKA stump performed on 10/13/22 showed discoloration of the skin at the lateral aspect with areas of possible wound drainage/dehiscence. Due to concern for infection, orthopedic surgery consulted. CT right thigh with contrast showed abscess. Blood cultures ordered and empiric IV antibiotics ordered. He was admitted to Hospital Medicine for further evaluation and management of acute encephalopathy 2/2 sepsis in the setting of UTI and R AKA abscess.       Overview/Hospital Course:  IR consult for deep abscess drainage stump      Interval History:   Seen and examined with crying spells due to some social issue, with notion of B/L lower ext s/p AKA at the stump with right stump abscess, leukocytosis, no spike of fever, no CP or SOB    Review of Systems   Constitutional:  Positive for activity change. Negative for chills and fever.   HENT:  Negative for congestion.    Respiratory:  Negative for cough, chest tightness, shortness of breath and wheezing.    Cardiovascular:  Negative for chest pain and palpitations.   Gastrointestinal:  Positive for constipation. Negative for  abdominal pain, diarrhea, nausea and vomiting.   Musculoskeletal:  Positive for joint swelling.        B/L AKA   Skin:  Positive for wound.   Neurological:  Positive for weakness. Negative for dizziness, numbness and headaches.   Psychiatric/Behavioral:  Negative for agitation and confusion. The patient is nervous/anxious.    All other systems reviewed and are negative.  Objective:     Vital Signs (Most Recent):  Temp: 98.2 °F (36.8 °C) (11/24/22 1238)  Pulse: 73 (11/24/22 1238)  Resp: 17 (11/24/22 1238)  BP: (!) 125/58 (11/24/22 1238)  SpO2: 99 % (11/24/22 1238)   Vital Signs (24h Range):  Temp:  [98 °F (36.7 °C)-98.3 °F (36.8 °C)] 98.2 °F (36.8 °C)  Pulse:  [69-96] 73  Resp:  [11-25] 17  SpO2:  [94 %-100 %] 99 %  BP: (125-157)/(58-69) 125/58     Weight: 56 kg (123 lb 7.3 oz)  Body mass index is 17.22 kg/m².  No intake or output data in the 24 hours ending 11/24/22 1432   Physical Exam  Constitutional:       General: He is not in acute distress.     Appearance: He is ill-appearing and toxic-appearing.   HENT:      Head: Normocephalic and atraumatic.      Nose: No congestion.      Mouth/Throat:      Mouth: Mucous membranes are moist.      Pharynx: No oropharyngeal exudate.   Eyes:      Extraocular Movements: Extraocular movements intact.      Pupils: Pupils are equal, round, and reactive to light.   Cardiovascular:      Rate and Rhythm: Normal rate. Rhythm irregular.      Heart sounds: No murmur heard.    No friction rub. No gallop.   Abdominal:      General: Bowel sounds are normal. There is no distension.      Tenderness: There is no abdominal tenderness. There is no guarding or rebound.   Musculoskeletal:         General: Swelling, tenderness and deformity present.      Cervical back: No rigidity or tenderness.   Skin:     Findings: Erythema, lesion and rash present.      Comments: Open wound with suture present, minimal secretion   Neurological:      General: No focal deficit present.      Mental Status: He is  alert and oriented to person, place, and time.      Coordination: Coordination normal.   Psychiatric:         Thought Content: Thought content normal.       Significant Labs: All pertinent labs within the past 24 hours have been reviewed.  Recent Lab Results         11/24/22  1237   11/24/22  0739        POCT Glucose 141   103               Significant Imaging: I have reviewed all pertinent imaging results/findings within the past 24 hours.      Assessment/Plan:      * Sepsis  This patient does have evidence of infective focus  My overall impression is sepsis. Vital signs were reviewed and noted in progress note.  Antibiotics given-   Antibiotics (From admission, onward)    Start     Stop Route Frequency Ordered    11/24/22 1145  mupirocin 2 % ointment         11/29 0859 Nasl 2 times daily 11/24/22 1040    11/24/22 0815  piperacillin-tazobactam 4.5 g in sodium chloride 0.9% 100 mL IVPB (ready to mix system)         -- IV Every 12 hours (non-standard times) 11/24/22 0709    11/24/22 0808  vancomycin - pharmacy to dose  (vancomycin IVPB)        [facility].    -- IV pharmacy to manage frequency 11/24/22 0709        Cultures were taken-   Microbiology Results (last 7 days)     Procedure Component Value Units Date/Time    Urine culture [[id number]] Collected: 11/23/22 0958    Order Status: Completed Specimen: Urine Updated: 11/24/22 1315     Urine Culture, Routine No growth    Narrative:      Specimen Source->Urine    Aerobic culture [[id number]]  (Abnormal) Collected: 11/23/22 0630    Order Status: Completed Specimen: Wound from Leg, Right Updated: 11/24/22 0821     Aerobic Bacterial Culture GRAM NEGATIVE JOHN  Many  Identification and susceptibility pending      Narrative:      Right AKA    Blood culture x two cultures. Draw prior to antibiotics. [[id number]] Collected: 11/23/22 0631    Order Status: Completed Specimen: Blood from Peripheral, Antecubital, Right Updated: 11/24/22 0812      "Blood Culture, Routine No Growth to date      No Growth to date    Narrative:      Aerobic and anaerobic    Blood culture x two cultures. Draw prior to antibiotics. [[id number]] Collected: 11/23/22 0631    Order Status: Completed Specimen: Blood from Peripheral, Hand, Right Updated: 11/24/22 0812     Blood Culture, Routine No Growth to date      No Growth to date    Narrative:      Aerobic and anaerobic    Culture, Anaerobe [[id number]] Collected: 11/23/22 0630    Order Status: Completed Specimen: Wound from Leg, Right Updated: 11/24/22 0739     Anaerobic Culture Culture in progress    Narrative:      Right AKA        Latest lactate reviewed, they are-  Recent Labs   Lab 11/23/22  0629 11/23/22  1036   LACTATE 2.3* 1.0       Organ dysfunction indicated by Encephalopathy   Source- UTI and R AKA abscess    Source control Achieved by- IV vancomycin and zosyn     PLAN:  - WBC 25  - lactate 2.3 --> 1.0  - UA positive for UTI. followup Ucx  - blood culture pending   - CT right thigh with contrast showed abscess  - continue IV vancomycin and zosyn   - ID consulted. Followup recs      Elevated troponin  R/O from CKD Vs sepsis  -No CP  -cont monitor and consider cardiology consult if worsening      Acute cystitis  - UA positive for UTI  - followup Ucx  - continue on IV zosyn       S/P AKA (above knee amputation) bilateral  - ortho consulted. apprec recs  - CT right thigh showed abscess  - continue IV vancomycin and zosyn   - ID consulted. followup recs  - see "sepsis"      Abscess of right thigh  - seen on CT thigh with contrast  - lactate normalized  - ortho consulted. apprec recs  - continue IV vancomycin and zoysn (D1- 11/24)  - followup wound cultures and blood cultures  - ID consulted. followup recs  - see "sepsis"  -IR consulted for I&D      Anemia due to chronic kidney disease, on chronic dialysis  - Hb on admit, 11  - stable  - transfuse for Hb < 7  - CBC daily      PAD (peripheral artery disease)  - s/p b/l AKA  - " continue home ASA, Plavix and statin      Encephalopathy, metabolic  - AMS likely 2/2 sepsis  - HCT nonacute   - continue IV vancomycin and zosyn for UTI and R AKA abscess  -ID and ortho consulted  -IR for I&D right stump abscess      ESRD on hemodialysis  - nephrology consulted. apprec recs  - continue HD  - renally-dose all meds  - strict I/Os  - hold ACE-I or ARB while renal function dynamic        Secondary hyperparathyroidism of renal origin  - continue home Renvela       HLD (hyperlipidemia)  - continue home statin      CAD (coronary artery disease) - non-obstructive from [facility] in 2016  - continue ASA, Plavix and statin      Chronic diastolic heart failure  - followup BNP  - CXR showed vascular congestion   - TTE on 10/10/2022 showed EF 40% and grade II DD  - volume management with HD  - continue metoprolol       Gastroesophageal reflux disease  - continue home PPI      Essential (primary) hypertension  controlled  - continue home metoprolol, amlodipine and hydralazine      Controlled type 2 diabetes mellitus with chronic kidney disease on chronic dialysis, with long-term current use of insulin  Patient's FSGs are controlled on current medication regimen.  Last A1c reviewed-   Lab Results   Component Value Date    HGBA1C 6.1 (H) 2021     Most recent fingerstick glucose reviewed-   Recent Labs   Lab 22  0739 22  1237   POCTGLUCOSE 103 141*     Current correctional scale  Low  Maintain anti-hyperglycemic dose as follows-   Antihyperglycemics (From admission, onward)    Start     Stop Route Frequency Ordered    22 2100  insulin detemir U-100 pen 8 Units         -- SubQ Nightly 22 1508    22 1448  insulin aspart U-100 pen 0-5 Units         -- SubQ Before meals & nightly PRN 22 1508        Hold Oral hypoglycemics while patient is in the hospital.  - continue home determir 8 units HS   - LDSSI   - goal B-180  - diabetic diet       VTE Risk Mitigation (From admission,  onward)         Ordered     heparin (porcine) injection 7,500 Units  Every 8 hours         11/23/22 1508     IP VTE HIGH RISK PATIENT  Once         11/23/22 1508     Place sequential compression device  Until discontinued         11/23/22 1508     Place sequential compression device  Until discontinued         11/23/22 1044                Discharge Planning   ENIO: 11/25/2022     Code Status: Full Code   Is the patient medically ready for discharge?: No    Reason for patient still in hospital (select all that apply): Patient new problem, Patient trending condition and Treatment             Time spent 40 minutes      Angelito Garland MD  Department of Hospital Medicine   [facility] - Intensive Care ([place])

## 2022-11-24 NOTE — SUBJECTIVE & OBJECTIVE
Past Medical History:   Diagnosis Date    CAD (coronary artery disease) 2016    CAD (non obstructive) 2016 [facility]    Diabetes mellitus type I     Diabetic retinopathy     ESRD on hemodialysis     on HD TThSat    Gangrene of left foot     Hypertension     Nuclear sclerosis of right eye 11/24/2021    PAD (peripheral artery disease)     PEA (Pulseless electrical activity) 10/10/2022    Pulmonary HTN     Right foot infection     TB lung, latent 04/2021    Wet gangrene 1/5/2022       Past Surgical History:   Procedure Laterality Date    ABOVE-KNEE AMPUTATION Left 1/18/2022    Procedure: AMPUTATION, ABOVE KNEE;  Surgeon: Rusty Light MD;  Location: [facility] OR;  Service: Orthopedics;  Laterality: Left;    ABOVE-KNEE AMPUTATION Left 1/21/2022    Procedure: AMPUTATION, ABOVE KNEE;  Surgeon: Rusty Light MD;  Location: [facility] OR;  Service: Orthopedics;  Laterality: Left;    ABOVE-KNEE AMPUTATION Right 10/13/2022    Procedure: AMPUTATION, ABOVE KNEE, RIGHT;  Surgeon: Dimitris Davis MD;  Location: [facility] OR;  Service: Orthopedics;  Laterality: Right;    AV FISTULA PLACEMENT      CATARACT EXTRACTION Left     EYE SURGERY      FISTULOGRAM Left 8/9/2022    Procedure: Fistulogram;  Surgeon: Masoud Soni MD;  Location: [facility] OR;  Service: Vascular;  Laterality: Left;  LEFT VM ON DAUGHTER'S PHONE----PHONE PREOP NOT COMPLETED  CALLED PATIENT ON 8/8/2022 @ 3:34. HE STATED HE IS RESCHEDULING PROCEDURE. NOTIFIED ERWIN @ 3:35PM-LO    FISTULOGRAM Left 10/20/2022    Procedure: Fistulogram;  Surgeon: Masoud Soni MD;  Location: [facility];  Service: Vascular;  Laterality: Left;  Left upper extremity    TOE AMPUTATION Left 1/6/2022    Procedure: AMPUTATION, TOE;  Surgeon: Masoud Soni MD;  Location: [facility] OR;  Service: Vascular;  Laterality: Left;  Left first through fifth toes, possible open transmetatarsal amputation and all other indicated procedures       Review of patient's allergies indicates:  No Known  Allergies    Medications:  Medications Prior to Admission   Medication Sig    amLODIPine (NORVASC) 10 MG tablet Take 1 tablet (10 mg total) by mouth once daily.    aspirin 81 MG Chew Take 81 mg by mouth once daily.    atorvastatin (LIPITOR) 20 MG tablet Take 1 tablet by mouth.    clopidogreL (PLAVIX) 75 mg tablet Take 75 mg by mouth once daily.    hydrALAZINE (APRESOLINE) 100 MG tablet Take 1 tablet (100 mg total) by mouth every 8 (eight) hours.    insulin detemir U-100 (LEVEMIR FLEXTOUCH) 100 unit/mL (3 mL) SubQ InPn pen Inject 8 Units into the skin every evening.    lancets (ACCU-CHEK SOFTCLIX LANCETS) Misc 1 each by Misc.(Non-Drug; Combo Route) route once daily at 6am.    metoprolol succinate (TOPROL-XL) 25 MG 24 hr tablet Take 1 tablet by mouth.    omeprazole (PRILOSEC) 40 MG capsule Take 40 mg by mouth once daily.    pravastatin (PRAVACHOL) 20 MG tablet Take 20 mg by mouth once daily.    RENVELA 800 mg Tab Take 800 mg by mouth 3 (three) times daily with meals.     Antibiotics (From admission, onward)      Start     Stop Route Frequency Ordered    11/24/22 0815  piperacillin-tazobactam 4.5 g in sodium chloride 0.9% 100 mL IVPB (ready to mix system)         -- IV Every 12 hours (non-standard times) 11/24/22 0709    11/24/22 0808  vancomycin - pharmacy to dose  (vancomycin IVPB)        See Hyperspace for full Linked Orders Report.    -- IV pharmacy to manage frequency 11/24/22 0709          Antifungals (From admission, onward)      None          Antivirals (From admission, onward)      None             Immunization History   Administered Date(s) Administered    COVID-19 Vaccine 01/15/2021    COVID-19, MRNA, LN-S, PF (MODERNA FULL 0.5 ML DOSE) 01/15/2021, 02/11/2021    Hepatitis B, Adult 07/10/2017, 08/14/2017, 09/11/2017, 03/30/2021, 04/22/2021    Influenza 09/29/2020    Influenza - Quadrivalent - PF *Preferred* (6 months and older) 01/16/2008, 03/30/2016, 12/07/2016    PPD Test 04/04/2017, 01/11/2022, 10/18/2022     Pneumococcal Conjugate - 13 Valent 11/27/2017    Pneumococcal Polysaccharide - 23 Valent 03/30/2016, 10/24/2018    Tdap 03/30/2016       Family History       Problem Relation (Age of Onset)    No Known Problems Mother, Father, Sister, Brother, Daughter, Daughter, Daughter, Brother, Maternal Aunt, Maternal Uncle, Paternal Aunt, Paternal Uncle, Maternal Grandmother, Maternal Grandfather, Paternal Grandmother, Paternal Grandfather          Social History     Socioeconomic History    Marital status: Single    Number of children: 5   Tobacco Use    Smoking status: Never    Smokeless tobacco: Never   Substance and Sexual Activity    Alcohol use: No    Drug use: No    Sexual activity: Yes     Partners: Female   Social History Narrative    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     Social Determinants of Health     Financial Resource Strain: Low Risk     Difficulty of Paying Living Expenses: Not very hard   Food Insecurity: No Food Insecurity    Worried About Running Out of Food in the Last Year: Never true    Ran Out of Food in the Last Year: Never true   Transportation Needs: No Transportation Needs    Lack of Transportation (Medical): No    Lack of Transportation (Non-Medical): No   Physical Activity: Inactive    Days of Exercise per Week: 0 days    Minutes of Exercise per Session: 0 min   Stress: Stress Concern Present    Feeling of Stress : Rather much   Social Connections: Socially Isolated    Frequency of Communication with Friends and Family: More than three times a week    Frequency of Social Gatherings with Friends and Family: More than three times a week    Attends Hindu Services: Never    Active Member of Clubs or Organizations: No    Attends Club or Organization Meetings: Never    Marital Status: Never    Housing Stability: Low Risk     Unable to Pay for Housing in the Last Year: No    Number of Places Lived in the Last Year: 1    Unstable Housing in the Last Year: No     Review of Systems   Constitutional:  Negative  for chills and fever.   Gastrointestinal:  Negative for abdominal pain, constipation, diarrhea, nausea and vomiting.   Genitourinary:  Negative for dysuria and flank pain.   All other systems reviewed and are negative.  Objective:     Vital Signs (Most Recent):  Temp: 98.1 °F (36.7 °C) (11/24/22 0445)  Pulse: 74 (11/24/22 0445)  Resp: 17 (11/24/22 0445)  BP: (!) 143/66 (11/24/22 0445)  SpO2: 99 % (11/24/22 0445)   Vital Signs (24h Range):  Temp:  [97.8 °F (36.6 °C)-99.2 °F (37.3 °C)] 98.1 °F (36.7 °C)  Pulse:  [69-99] 74  Resp:  [12-25] 17  SpO2:  [94 %-100 %] 99 %  BP: (127-185)/(58-81) 143/66     Weight: 56 kg (123 lb 7.3 oz)  Body mass index is 17.22 kg/m².    Estimated Creatinine Clearance: 11.5 mL/min (A) (based on SCr of 5.6 mg/dL (H)).    Physical Exam  Constitutional:       General: He is not in acute distress.     Appearance: He is not ill-appearing or toxic-appearing.   HENT:      Head: Normocephalic and atraumatic.      Mouth/Throat:      Mouth: Mucous membranes are moist.      Comments: Poor dentition  Eyes:      General:         Right eye: No discharge.         Left eye: No discharge.   Cardiovascular:      Rate and Rhythm: Normal rate and regular rhythm.   Pulmonary:      Effort: Pulmonary effort is normal. No respiratory distress.   Abdominal:      General: Bowel sounds are normal. There is no distension.      Palpations: Abdomen is soft.      Tenderness: There is no abdominal tenderness. There is no guarding.   Musculoskeletal:         General: Swelling (R AKA) and deformity present. No tenderness.   Skin:     General: Skin is warm and dry.      Comments: L AKA - site well healed  R AKA site - purulent drainage from surgical site/staples; fluctuance noted  LAVF site dressed   Neurological:      Mental Status: He is alert and oriented to person, place, and time. Mental status is at baseline.      Motor: No weakness.   Psychiatric:         Mood and Affect: Mood normal.         Behavior: Behavior  normal.       Significant Labs:   Microbiology Results (last 7 days)       Procedure Component Value Units Date/Time    Culture, Anaerobe [[id number]] Collected: 11/23/22 0630    Order Status: Completed Specimen: Wound from Leg, Right Updated: 11/24/22 0739     Anaerobic Culture Culture in progress    Narrative:      Right AKA    Blood culture x two cultures. Draw prior to antibiotics. [[id number]] Collected: 11/23/22 0631    Order Status: Completed Specimen: Blood from Peripheral, Antecubital, Right Updated: 11/23/22 1345     Blood Culture, Routine No Growth to date    Narrative:      Aerobic and anaerobic    Blood culture x two cultures. Draw prior to antibiotics. [[id number]] Collected: 11/23/22 0631    Order Status: Completed Specimen: Blood from Peripheral, Hand, Right Updated: 11/23/22 1345     Blood Culture, Routine No Growth to date    Narrative:      Aerobic and anaerobic    Urine culture [[id number]] Collected: 11/23/22 0958    Order Status: No result Specimen: Urine Updated: 11/23/22 1032    Aerobic culture [[id number]] Collected: 11/23/22 0630    Order Status: Sent Specimen: Wound from Leg, Right Updated: 11/23/22 0649            Significant Imaging: I have reviewed all pertinent imaging results/findings within the past 24 hours.

## 2022-11-24 NOTE — ASSESSMENT & PLAN NOTE
"- seen on CT thigh with contrast  - lactate normalized  - ortho consulted. apprec recs  - continue IV vancomycin and zoysn (D1- 11/24)  - followup wound cultures and blood cultures  - ID consulted. followup recs  - see "sepsis"    "

## 2022-11-24 NOTE — SUBJECTIVE & OBJECTIVE
Interval History:   Seen and examined with crying spells due to some social issue, with notion of B/L lower ext s/p AKA at the stump with right stump abscess, leukocytosis, no spike of fever, no CP or SOB    Review of Systems   Constitutional:  Positive for activity change. Negative for chills and fever.   HENT:  Negative for congestion.    Respiratory:  Negative for cough, chest tightness, shortness of breath and wheezing.    Cardiovascular:  Negative for chest pain and palpitations.   Gastrointestinal:  Positive for constipation. Negative for abdominal pain, diarrhea, nausea and vomiting.   Musculoskeletal:  Positive for joint swelling.        B/L AKA   Skin:  Positive for wound.   Neurological:  Positive for weakness. Negative for dizziness, numbness and headaches.   Psychiatric/Behavioral:  Negative for agitation and confusion. The patient is nervous/anxious.    All other systems reviewed and are negative.  Objective:     Vital Signs (Most Recent):  Temp: 98.2 °F (36.8 °C) (11/24/22 1238)  Pulse: 73 (11/24/22 1238)  Resp: 17 (11/24/22 1238)  BP: (!) 125/58 (11/24/22 1238)  SpO2: 99 % (11/24/22 1238)   Vital Signs (24h Range):  Temp:  [98 °F (36.7 °C)-98.3 °F (36.8 °C)] 98.2 °F (36.8 °C)  Pulse:  [69-96] 73  Resp:  [11-25] 17  SpO2:  [94 %-100 %] 99 %  BP: (125-157)/(58-69) 125/58     Weight: 56 kg (123 lb 7.3 oz)  Body mass index is 17.22 kg/m².  No intake or output data in the 24 hours ending 11/24/22 1432   Physical Exam  Constitutional:       General: He is not in acute distress.     Appearance: He is ill-appearing and toxic-appearing.   HENT:      Head: Normocephalic and atraumatic.      Nose: No congestion.      Mouth/Throat:      Mouth: Mucous membranes are moist.      Pharynx: No oropharyngeal exudate.   Eyes:      Extraocular Movements: Extraocular movements intact.      Pupils: Pupils are equal, round, and reactive to light.   Cardiovascular:      Rate and Rhythm: Normal rate. Rhythm irregular.       Heart sounds: No murmur heard.    No friction rub. No gallop.   Abdominal:      General: Bowel sounds are normal. There is no distension.      Tenderness: There is no abdominal tenderness. There is no guarding or rebound.   Musculoskeletal:         General: Swelling, tenderness and deformity present.      Cervical back: No rigidity or tenderness.   Skin:     Findings: Erythema, lesion and rash present.      Comments: Open wound with suture present, minimal secretion   Neurological:      General: No focal deficit present.      Mental Status: He is alert and oriented to person, place, and time.      Coordination: Coordination normal.   Psychiatric:         Thought Content: Thought content normal.       Significant Labs: All pertinent labs within the past 24 hours have been reviewed.  Recent Lab Results         11/24/22  1237   11/24/22  0739        POCT Glucose 141   103               Significant Imaging: I have reviewed all pertinent imaging results/findings within the past 24 hours.

## 2022-11-24 NOTE — ASSESSMENT & PLAN NOTE
- AMS likely 2/2 sepsis  - HCT nonacute   - continue IV vancomycin and zosyn for UTI and R AKA abscess

## 2022-11-24 NOTE — ASSESSMENT & PLAN NOTE
"- ortho consulted. apprec recs  - CT right thigh showed abscess  - continue IV vancomycin and zosyn   - ID consulted. followup recs  - see "sepsis"    "

## 2022-11-24 NOTE — ASSESSMENT & PLAN NOTE
Patient's FSGs are controlled on current medication regimen.  Last A1c reviewed-   Lab Results   Component Value Date    HGBA1C 6.1 (H) 2021     Most recent fingerstick glucose reviewed-   Recent Labs   Lab 22  0739 22  1237   POCTGLUCOSE 103 141*     Current correctional scale  Low  Maintain anti-hyperglycemic dose as follows-   Antihyperglycemics (From admission, onward)    Start     Stop Route Frequency Ordered    22 2100  insulin detemir U-100 pen 8 Units         -- SubQ Nightly 22 1508    22 1448  insulin aspart U-100 pen 0-5 Units         -- SubQ Before meals & nightly PRN 22 1508        Hold Oral hypoglycemics while patient is in the hospital.  - continue home determir 8 units HS   - LDSSI   - goal B-180  - diabetic diet

## 2022-11-24 NOTE — PLAN OF CARE
Pt [place] to [facility] with AMS and concern for sepsis. Pt oriented to self only and orthopedics consulted (consult order removed at time of note) for evaluation of R AKA stump after surgery on 10/13/22 due to drainage and concern for infection. I went to bedside to examine the patient and obtain history with the use of . The patient was unable to provide additional history during my interview and exam. He was able to state his name, that he was in the hospital, and that he was in [place]. He was unable to state the name of the hospital and stated the year was 1982.    Upon examination there was no active drainage from the incision site but there was discoloration of the skin at the lateral aspect with areas of possible wound drainage/dehiscence. His sensation was intact to light touch at the posterior aspect of the incision but not the anterior aspect. The limb was warm and well perfused.            Overall exam concerning for soft tissue infection at site of AKA. Given that patient is now presenting for AMS and concern for sepsis admission and IV Abx treatment is appropriate. Orthopedics at [facility] will defer acute surgical intervention at this time and recommends CT scan of the thigh with contrast and transfer to primary surgeon when medically stable. If the patient does not improve over the course of the hospital stay, orthopedics will re-evaluate.     Nazario Barrera MD/MPH  PGY-2  Department of Orthopaedic Surgery  [facility]

## 2022-11-24 NOTE — ASSESSMENT & PLAN NOTE
"- seen on CT thigh with contrast  - lactate normalized  - ortho consulted. apprec recs  - continue IV vancomycin and zoysn (D1- 11/24)  - followup wound cultures and blood cultures  - ID consulted. followup recs  - see "sepsis"  -IR consulted for I&D    "

## 2022-11-24 NOTE — HOSPITAL COURSE
He was treated with broad spectrum IV abx. ID was consulted. Superifical wound cx with multiple GNRs (citrobacterx2 and pseudomonas). IR took him for fluid sampling of the abscess 11/29.

## 2022-11-24 NOTE — CARE UPDATE
"RAPID RESPONSE NURSE CHART REVIEW       Chart Reviewed: 11/24/2022, 11:20 AM    MRN: [id number]  Bed: 78894/31262 A    Dx: Cruz Neff has a past medical history of CAD (coronary artery disease), Diabetes mellitus type I, Diabetic retinopathy, ESRD on hemodialysis, Gangrene of left foot, Hypertension, Nuclear sclerosis of right eye, PAD (peripheral artery disease), PEA (Pulseless electrical activity), Pulmonary HTN, Right foot infection, TB lung, latent, and Wet gangrene.    Last VS: BP (!) 157/69   Pulse 69   Temp 98.2 °F (36.8 °C) (Oral)   Resp 15   Ht 5' 11" (1.803 m)   Wt 56 kg (123 lb 7.3 oz)   SpO2 97%   BMI 17.22 kg/m²     24H Vital Sign Range:  Temp:  [98 °F (36.7 °C)-98.3 °F (36.8 °C)]   Pulse:  [69-96]   Resp:  [11-25]   BP: (127-157)/(58-69)   SpO2:  [94 %-100 %]     Level of Consciousness (AVPU): alert    Recent Labs     11/23/22  0629   WBC 25.43*   HGB 11.6*   HCT 40.8   *       Recent Labs     11/23/22  0629      K 3.9      CO2 30*   CREATININE 5.6*   *        Recent Labs     11/23/22  1011   PH 7.419   PCO2 48.9*   PO2 28*   HCO3 31.6*   POCSATURATED 54*   BE 7        OXYGEN:        O2 Device (Oxygen Therapy): room air    MEWS score: 1    Charge RNGabino  contacted. No concerns verbalized at this time. Instructed to call [phone] for further concerns or assistance.    Evelyn Minor RN        "

## 2022-11-25 LAB
ALBUMIN SERPL BCP-MCNC: 2.5 G/DL (ref 3.5–5.2)
ALP SERPL-CCNC: 83 U/L (ref 55–135)
ALT SERPL W/O P-5'-P-CCNC: 7 U/L (ref 10–44)
ANION GAP SERPL CALC-SCNC: 13 MMOL/L (ref 8–16)
AST SERPL-CCNC: 9 U/L (ref 10–40)
BASOPHILS # BLD AUTO: 0.11 K/UL (ref 0–0.2)
BASOPHILS NFR BLD: 0.9 % (ref 0–1.9)
BILIRUB SERPL-MCNC: 0.4 MG/DL (ref 0.1–1)
BNP SERPL-MCNC: 1445 PG/ML (ref 0–99)
BUN SERPL-MCNC: 49 MG/DL (ref 6–20)
CALCIUM SERPL-MCNC: 9.2 MG/DL (ref 8.7–10.5)
CHLORIDE SERPL-SCNC: 98 MMOL/L (ref 95–110)
CO2 SERPL-SCNC: 24 MMOL/L (ref 23–29)
CREAT SERPL-MCNC: 9.6 MG/DL (ref 0.5–1.4)
DIFFERENTIAL METHOD: ABNORMAL
EOSINOPHIL # BLD AUTO: 1 K/UL (ref 0–0.5)
EOSINOPHIL NFR BLD: 8.1 % (ref 0–8)
ERYTHROCYTE [DISTWIDTH] IN BLOOD BY AUTOMATED COUNT: 18.5 % (ref 11.5–14.5)
EST. GFR  (NO RACE VARIABLE): 5.8 ML/MIN/1.73 M^2
GLUCOSE SERPL-MCNC: 87 MG/DL (ref 70–110)
HCT VFR BLD AUTO: 28.9 % (ref 40–54)
HGB BLD-MCNC: 8.4 G/DL (ref 14–18)
IMM GRANULOCYTES # BLD AUTO: 0.05 K/UL (ref 0–0.04)
IMM GRANULOCYTES NFR BLD AUTO: 0.4 % (ref 0–0.5)
LYMPHOCYTES # BLD AUTO: 1.6 K/UL (ref 1–4.8)
LYMPHOCYTES NFR BLD: 13.2 % (ref 18–48)
MAGNESIUM SERPL-MCNC: 2.9 MG/DL (ref 1.6–2.6)
MCH RBC QN AUTO: 26 PG (ref 27–31)
MCHC RBC AUTO-ENTMCNC: 29.1 G/DL (ref 32–36)
MCV RBC AUTO: 90 FL (ref 82–98)
MONOCYTES # BLD AUTO: 0.7 K/UL (ref 0.3–1)
MONOCYTES NFR BLD: 6.1 % (ref 4–15)
NEUTROPHILS # BLD AUTO: 8.4 K/UL (ref 1.8–7.7)
NEUTROPHILS NFR BLD: 71.3 % (ref 38–73)
NRBC BLD-RTO: 0 /100 WBC
PHOSPHATE SERPL-MCNC: 2.1 MG/DL (ref 2.7–4.5)
PLATELET # BLD AUTO: 417 K/UL (ref 150–450)
PMV BLD AUTO: 10.1 FL (ref 9.2–12.9)
POCT GLUCOSE: 151 MG/DL (ref 70–110)
POCT GLUCOSE: 155 MG/DL (ref 70–110)
POTASSIUM SERPL-SCNC: 4.5 MMOL/L (ref 3.5–5.1)
PROCALCITONIN SERPL IA-MCNC: 72.81 NG/ML
PROT SERPL-MCNC: 7.2 G/DL (ref 6–8.4)
RBC # BLD AUTO: 3.23 M/UL (ref 4.6–6.2)
SODIUM SERPL-SCNC: 135 MMOL/L (ref 136–145)
VANCOMYCIN SERPL-MCNC: 15.1 UG/ML
WBC # BLD AUTO: 11.72 K/UL (ref 3.9–12.7)

## 2022-11-25 PROCEDURE — 99233 SBSQ HOSP IP/OBS HIGH 50: CPT | Mod: ,,, | Performed by: FAMILY MEDICINE

## 2022-11-25 PROCEDURE — 99233 PR SUBSEQUENT HOSPITAL CARE,LEVL III: ICD-10-PCS | Mod: ,,, | Performed by: FAMILY MEDICINE

## 2022-11-25 PROCEDURE — 80053 COMPREHEN METABOLIC PANEL: CPT | Performed by: FAMILY MEDICINE

## 2022-11-25 PROCEDURE — 90935 PR HEMODIALYSIS, ONE EVALUATION: ICD-10-PCS | Mod: ,,, | Performed by: NURSE PRACTITIONER

## 2022-11-25 PROCEDURE — 85025 COMPLETE CBC W/AUTO DIFF WBC: CPT | Performed by: INTERNAL MEDICINE

## 2022-11-25 PROCEDURE — 80100016 HC MAINTENANCE HEMODIALYSIS

## 2022-11-25 PROCEDURE — 63600175 PHARM REV CODE 636 W HCPCS: Mod: JG | Performed by: INTERNAL MEDICINE

## 2022-11-25 PROCEDURE — 20600001 HC STEP DOWN PRIVATE ROOM

## 2022-11-25 PROCEDURE — 84145 PROCALCITONIN (PCT): CPT | Performed by: INTERNAL MEDICINE

## 2022-11-25 PROCEDURE — 83880 ASSAY OF NATRIURETIC PEPTIDE: CPT | Performed by: INTERNAL MEDICINE

## 2022-11-25 PROCEDURE — 36415 COLL VENOUS BLD VENIPUNCTURE: CPT | Performed by: INTERNAL MEDICINE

## 2022-11-25 PROCEDURE — 63600175 PHARM REV CODE 636 W HCPCS: Performed by: FAMILY MEDICINE

## 2022-11-25 PROCEDURE — 90935 HEMODIALYSIS ONE EVALUATION: CPT | Mod: ,,, | Performed by: NURSE PRACTITIONER

## 2022-11-25 PROCEDURE — 80202 ASSAY OF VANCOMYCIN: CPT | Performed by: FAMILY MEDICINE

## 2022-11-25 PROCEDURE — 83735 ASSAY OF MAGNESIUM: CPT | Performed by: INTERNAL MEDICINE

## 2022-11-25 PROCEDURE — 25000003 PHARM REV CODE 250: Performed by: INTERNAL MEDICINE

## 2022-11-25 PROCEDURE — 84100 ASSAY OF PHOSPHORUS: CPT | Performed by: INTERNAL MEDICINE

## 2022-11-25 RX ORDER — SODIUM CHLORIDE 9 MG/ML
INJECTION, SOLUTION INTRAVENOUS ONCE
Status: DISCONTINUED | OUTPATIENT
Start: 2022-11-25 | End: 2022-11-29

## 2022-11-25 RX ADMIN — VANCOMYCIN HYDROCHLORIDE 500 MG: 500 INJECTION, POWDER, LYOPHILIZED, FOR SOLUTION INTRAVENOUS at 03:11

## 2022-11-25 RX ADMIN — ASPIRIN 81 MG 81 MG: 81 TABLET ORAL at 08:11

## 2022-11-25 RX ADMIN — EPOETIN ALFA-EPBX 10000 UNITS: 10000 INJECTION, SOLUTION INTRAVENOUS; SUBCUTANEOUS at 04:11

## 2022-11-25 RX ADMIN — HEPARIN SODIUM 7500 UNITS: 5000 INJECTION INTRAVENOUS; SUBCUTANEOUS at 03:11

## 2022-11-25 RX ADMIN — ALUMINUM HYDROXIDE, MAGNESIUM HYDROXIDE, AND SIMETHICONE 30 ML: 200; 200; 20 SUSPENSION ORAL at 03:11

## 2022-11-25 RX ADMIN — PIPERACILLIN SODIUM AND TAZOBACTAM SODIUM 4.5 G: 4; .5 INJECTION, POWDER, LYOPHILIZED, FOR SOLUTION INTRAVENOUS at 04:11

## 2022-11-25 RX ADMIN — MUPIROCIN: 20 OINTMENT TOPICAL at 08:11

## 2022-11-25 RX ADMIN — PANTOPRAZOLE SODIUM 40 MG: 40 TABLET, DELAYED RELEASE ORAL at 08:11

## 2022-11-25 RX ADMIN — AMLODIPINE BESYLATE 10 MG: 10 TABLET ORAL at 08:11

## 2022-11-25 RX ADMIN — SUCRALFATE 1 G: 1 SUSPENSION ORAL at 03:11

## 2022-11-25 RX ADMIN — METOPROLOL SUCCINATE 25 MG: 25 TABLET, EXTENDED RELEASE ORAL at 08:11

## 2022-11-25 RX ADMIN — HYDRALAZINE HYDROCHLORIDE 100 MG: 50 TABLET ORAL at 03:11

## 2022-11-25 NOTE — PROGRESS NOTES
Pharmacokinetic Assessment Follow Up: IV Vancomycin    Vancomycin serum concentration assessment(s):    ESRD on HD MWF  The random level was drawn correctly and can be used to guide therapy at this time. The measurement is within the desired definitive target range of 10 to 20 mcg/mL.    Vancomycin Regimen Plan:    Give  Vancomycin 500 mg x1  Re-dose when the random level is less than 20 mcg/mL, next level to be drawn at 0500 on 11/28/22  or sooner if HD plans changes    Drug levels (last 3 results):  Recent Labs   Lab Result Units 11/25/22  0500   Vancomycin, Random ug/mL 15.1       Pharmacy will continue to follow and monitor vancomycin.    Please contact pharmacy at extension [phone] for questions regarding this assessment.    Thank you for the consult,   Emma Oliva       Patient brief summary:  Adryan Neff is a 56 y.o. male initiated on antimicrobial therapy with IV Vancomycin for treatment of skin & soft tissue infection    The patient's current regimen is dose by level    Drug Allergies:   Review of patient's allergies indicates:  No Known Allergies    Actual Body Weight:   56.3 kg    Renal Function:   Estimated Creatinine Clearance: 6.8 mL/min (A) (based on SCr of 9.6 mg/dL (H)).,     Dialysis Method (if applicable):  intermittent HD    CBC (last 72 hours):  Recent Labs   Lab Result Units 11/23/22  0629 11/25/22  0500   WBC K/uL 25.43* 11.72   Hemoglobin g/dL 11.6* 8.4*   Hematocrit % 40.8 28.9*   Platelets K/uL 551* 417   Gran % % 94.3* 71.3   Lymph % % 2.6* 13.2*   Mono % % 2.0* 6.1   Eosinophil % % 0.1 8.1*   Basophil % % 0.3 0.9   Differential Method  Automated Automated       Metabolic Panel (last 72 hours):  Recent Labs   Lab Result Units 11/23/22  0629 11/23/22  0958 11/25/22  0500   Sodium mmol/L 143  --  135*   Potassium mmol/L 3.9  --  4.5   Chloride mmol/L 100  --  98   CO2 mmol/L 30*  --  24   Glucose mg/dL 123*  --  87   Glucose, UA   --  Negative  --    BUN mg/dL 23*  --  49*   Creatinine  mg/dL 5.6*  --  9.6*   Albumin g/dL 3.1*  --  2.5*   Total Bilirubin mg/dL 0.4  --  0.4   Alkaline Phosphatase U/L 100  --  83   AST U/L 12  --  9*   ALT U/L 11  --  7*   Magnesium mg/dL  --   --  2.9*   Phosphorus mg/dL  --   --  2.1*       Vancomycin Administrations:  vancomycin given in the last 96 hours                     vancomycin 750 mg in dextrose 5 % 250 mL IVPB (ready to mix system) (mg) 750 mg New Bag 11/23/22 0647                    Microbiologic Results:  Microbiology Results (last 7 days)       Procedure Component Value Units Date/Time    Culture, Anaerobe [[id number]] Collected: 11/23/22 0630    Order Status: Completed Specimen: Wound from Leg, Right Updated: 11/25/22 1026     Anaerobic Culture Culture in progress    Narrative:      Right AKA    Blood culture x two cultures. Draw prior to antibiotics. [[id number]] Collected: 11/23/22 0631    Order Status: Completed Specimen: Blood from Peripheral, Antecubital, Right Updated: 11/25/22 0812     Blood Culture, Routine No Growth to date      No Growth to date      No Growth to date    Narrative:      Aerobic and anaerobic    Blood culture x two cultures. Draw prior to antibiotics. [[id number]] Collected: 11/23/22 0631    Order Status: Completed Specimen: Blood from Peripheral, Hand, Right Updated: 11/25/22 0812     Blood Culture, Routine No Growth to date      No Growth to date      No Growth to date    Narrative:      Aerobic and anaerobic    Urine culture [[id number]] Collected: 11/23/22 0958    Order Status: Completed Specimen: Urine Updated: 11/24/22 1315     Urine Culture, Routine No growth    Narrative:      Specimen Source->Urine    Aerobic culture [[id number]]  (Abnormal) Collected: 11/23/22 0630    Order Status: Completed Specimen: Wound from Leg, Right Updated: 11/24/22 0821     Aerobic Bacterial Culture GRAM NEGATIVE JOHN  Many  Identification and susceptibility pending      Narrative:      Right AKA

## 2022-11-25 NOTE — PLAN OF CARE
Problem: Skin Injury Risk Increased  Goal: Skin Health and Integrity  Outcome: Ongoing, Progressing     Problem: Adult Inpatient Plan of Care  Goal: Plan of Care Review  Outcome: Ongoing, Progressing  Goal: Patient-Specific Goal (Individualized)  Outcome: Ongoing, Progressing  Goal: Absence of Hospital-Acquired Illness or Injury  Outcome: Ongoing, Progressing  Goal: Optimal Comfort and Wellbeing  Outcome: Ongoing, Progressing  Goal: Readiness for Transition of Care  Outcome: Ongoing, Progressing     Problem: Fall Injury Risk  Goal: Absence of Fall and Fall-Related Injury  Outcome: Ongoing, Progressing     Problem: Diabetes Comorbidity  Goal: Blood Glucose Level Within Targeted Range  Outcome: Ongoing, Progressing     Problem: Infection  Goal: Absence of Infection Signs and Symptoms  Outcome: Ongoing, Progressing     Problem: Adjustment to Illness (Sepsis/Septic Shock)  Goal: Optimal Coping  Outcome: Ongoing, Progressing     Problem: Bleeding (Sepsis/Septic Shock)  Goal: Absence of Bleeding  Outcome: Ongoing, Progressing     Problem: Glycemic Control Impaired (Sepsis/Septic Shock)  Goal: Blood Glucose Level Within Desired Range  Outcome: Ongoing, Progressing     Problem: Infection Progression (Sepsis/Septic Shock)  Goal: Absence of Infection Signs and Symptoms  Outcome: Ongoing, Progressing     Problem: Nutrition Impaired (Sepsis/Septic Shock)  Goal: Optimal Nutrition Intake  Outcome: Ongoing, Progressing

## 2022-11-25 NOTE — PLAN OF CARE
Chart reviewed - No fevers documented overnight. Plan for IR aspiration pending for R stump abscess. Superficial wound cx with GNR thus far.     Recommendations:  -continue zosyn pending  -ok for empiric vanc - if no evidence of GPC by this afternoon, would stop  -IR cultures pending - recommend cultures to be sent with aspiration    D/w primary team.

## 2022-11-25 NOTE — PLAN OF CARE
Problem: Skin Injury Risk Increased  Goal: Skin Health and Integrity  Outcome: Ongoing, Progressing     Problem: Adult Inpatient Plan of Care  Goal: Plan of Care Review  Outcome: Ongoing, Progressing  Goal: Patient-Specific Goal (Individualized)  Outcome: Ongoing, Progressing     Problem: Fall Injury Risk  Goal: Absence of Fall and Fall-Related Injury  Outcome: Ongoing, Progressing     Problem: Diabetes Comorbidity  Goal: Blood Glucose Level Within Targeted Range  Outcome: Ongoing, Progressing     Problem: Adjustment to Illness (Sepsis/Septic Shock)  Goal: Optimal Coping  Outcome: Ongoing, Progressing     Problem: Glycemic Control Impaired (Sepsis/Septic Shock)  Goal: Blood Glucose Level Within Desired Range  Outcome: Ongoing, Progressing

## 2022-11-25 NOTE — SUBJECTIVE & OBJECTIVE
Interval History:   On and off confusion, with mild memory loss, stated feeling okay, no spike of fever, denies CP or SOB    Review of Systems   Constitutional:  Positive for activity change. Negative for appetite change, chills and fever.   HENT:  Negative for congestion.    Respiratory:  Negative for cough, chest tightness, shortness of breath and wheezing.    Cardiovascular:  Positive for leg swelling. Negative for chest pain.   Gastrointestinal:  Negative for abdominal distention, abdominal pain, diarrhea, nausea and vomiting.   Musculoskeletal:  Positive for gait problem and joint swelling.   Skin:  Positive for rash and wound.        With suture present   Neurological:  Negative for dizziness, numbness and headaches.   Psychiatric/Behavioral:  Positive for confusion. Negative for agitation and hallucinations.    All other systems reviewed and are negative.  Objective:     Vital Signs (Most Recent):  Temp: 98 °F (36.7 °C) (11/25/22 1531)  Pulse: 74 (11/25/22 1533)  Resp: (!) 9 (11/25/22 1531)  BP: (!) 156/70 (11/25/22 1531)  SpO2: 100 % (11/25/22 1533)   Vital Signs (24h Range):  Temp:  [97.5 °F (36.4 °C)-98 °F (36.7 °C)] 98 °F (36.7 °C)  Pulse:  [60-75] 74  Resp:  [9-18] 9  SpO2:  [96 %-100 %] 100 %  BP: (120-177)/(58-76) 156/70     Weight: 56.3 kg (124 lb 1.9 oz)  Body mass index is 17.31 kg/m².    Intake/Output Summary (Last 24 hours) at 11/25/2022 1727  Last data filed at 11/25/2022 1315  Gross per 24 hour   Intake 520 ml   Output 2554 ml   Net -2034 ml      Physical Exam  Vitals and nursing note reviewed.   Constitutional:       General: He is not in acute distress.     Appearance: He is ill-appearing.   HENT:      Head: Normocephalic and atraumatic.      Nose: No congestion.      Mouth/Throat:      Mouth: Mucous membranes are moist.      Pharynx: No oropharyngeal exudate.   Eyes:      Extraocular Movements: Extraocular movements intact.      Pupils: Pupils are equal, round, and reactive to light.    Cardiovascular:      Rate and Rhythm: Normal rate. Rhythm irregular.      Heart sounds: Murmur heard.     No friction rub. No gallop.   Pulmonary:      Effort: Pulmonary effort is normal. No respiratory distress.      Breath sounds: Normal breath sounds. No wheezing or rales.   Chest:      Chest wall: No tenderness.   Abdominal:      General: There is no distension.      Tenderness: There is no abdominal tenderness. There is no guarding or rebound.   Musculoskeletal:         General: Swelling, tenderness and deformity present.      Cervical back: No rigidity or tenderness.      Comments: B/L AKA with right stump swelling with fluctuation   Skin:     Findings: Lesion present.      Comments: Open wound with suture present and right stump with purulent secretion   Neurological:      General: No focal deficit present.      Mental Status: He is alert. He is disoriented.      Gait: Gait abnormal.       Significant Labs: All pertinent labs within the past 24 hours have been reviewed.  Recent Lab Results         11/25/22  1536   11/25/22  0514   11/25/22  0500   11/24/22 2013        Procalcitonin   72.81  Comment: A concentration < 0.25 ng/mL represents a low risk of bacterial   infection.  Procalcitonin may not be accurate among patients with localized   infection, recent trauma or major surgery, immunosuppressed state,   invasive fungal infection, renal dysfunction. Decisions regarding   initiation or continuation of antibiotic therapy should not be based   solely on procalcitonin levels.             Albumin     2.5         Alkaline Phosphatase     83         ALT     7         Anion Gap     13         AST     9         Baso #     0.11         Basophil %     0.9         BILIRUBIN TOTAL     0.4  Comment: For infants and newborns, interpretation of results should be based  on gestational age, weight and in agreement with clinical  observations.    Premature Infant recommended reference ranges:  Up to 24  hours.............<8.0 mg/dL  Up to 48 hours............<12.0 mg/dL  3-5 days..................<15.0 mg/dL  6-29 days.................<15.0 mg/dL           BNP   1,445  Comment: Values of less than 100 pg/ml are consistent with non-CHF populations.           BUN     49         Calcium     9.2         Chloride     98         CO2     24         Creatinine     9.6         Differential Method     Automated         eGFR     5.8         Eos #     1.0         Eosinophil %     8.1         Glucose     87         Gran # (ANC)     8.4         Gran %     71.3         Hematocrit     28.9         Hemoglobin     8.4         Immature Grans (Abs)     0.05  Comment: Mild elevation in immature granulocytes is non specific and   can be seen in a variety of conditions including stress response,   acute inflammation, trauma and pregnancy. Correlation with other   laboratory and clinical findings is essential.           Immature Granulocytes     0.4         Lymph #     1.6         Lymph %     13.2         Magnesium     2.9         MCH     26.0         MCHC     29.1         MCV     90         Mono #     0.7         Mono %     6.1         MPV     10.1         nRBC     0         Phosphorus     2.1         Platelets     417         POCT Glucose 155       107       Potassium     4.5         PROTEIN TOTAL     7.2         RBC     3.23         RDW     18.5         Sodium     135         Vancomycin, Random     15.1         WBC     11.72                 Significant Imaging: I have reviewed all pertinent imaging results/findings within the past 24 hours.

## 2022-11-25 NOTE — PT/OT/SLP PROGRESS
Occupational Therapy      Patient Name:  Adryan Neff   MRN:  [id number]    Patient not seen today secondary to Dialysis, OT unable to return this date. Will follow-up 11/26/22.      RADHA Girard    11/25/2022

## 2022-11-25 NOTE — PROGRESS NOTES
[facility] - Intensive Care ([address] Medicine  Progress Note    Patient Name: Adryan Neff  MRN: [id number]  Patient Class: IP- Inpatient   Admission Date: 11/23/2022  Length of Stay: 2 days  Attending Physician: Angelito Garland MD  Primary Care Provider: Carola Pedro MD        Subjective:     Principal Problem:Sepsis        HPI:  Mr. Neff is a 56-year-old [place]ag[place] gentleman with PMH of CAD, DM type 1, diabetic retinopathy, ESRD on HD, hypertension, PAD, b/l AKA who presented to [facility] for altered mental status. Pt arrived from [facility] where he was found minimally responsive and BG 50s. Upon chart review, last HD sessions were Sunday 11/20 and 11/22. Pt oxygenating well on RA. Awake and oriented to self and place. He is able to state when he last had HD. Labs noted WBC 25 and lactate 2.3. UA positive for UTI. HCT nonacute. R AKA stump performed on 10/13/22 showed discoloration of the skin at the lateral aspect with areas of possible wound drainage/dehiscence. Due to concern for infection, orthopedic surgery consulted. CT right thigh with contrast showed abscess. Blood cultures ordered and empiric IV antibiotics ordered. He was admitted to Hospital Medicine for further evaluation and management of acute encephalopathy 2/2 sepsis in the setting of UTI and R AKA abscess.       Overview/Hospital Course:  IR consult for deep abscess drainage stump      Interval History:   On and off confusion, with mild memory loss, stated feeling okay, no spike of fever, denies CP or SOB    Review of Systems   Constitutional:  Positive for activity change. Negative for appetite change, chills and fever.   HENT:  Negative for congestion.    Respiratory:  Negative for cough, chest tightness, shortness of breath and wheezing.    Cardiovascular:  Positive for leg swelling. Negative for chest pain.   Gastrointestinal:  Negative for abdominal distention, abdominal pain, diarrhea, nausea and vomiting.    Musculoskeletal:  Positive for gait problem and joint swelling.   Skin:  Positive for rash and wound.        With suture present   Neurological:  Negative for dizziness, numbness and headaches.   Psychiatric/Behavioral:  Positive for confusion. Negative for agitation and hallucinations.    All other systems reviewed and are negative.  Objective:     Vital Signs (Most Recent):  Temp: 98 °F (36.7 °C) (11/25/22 1531)  Pulse: 74 (11/25/22 1533)  Resp: (!) 9 (11/25/22 1531)  BP: (!) 156/70 (11/25/22 1531)  SpO2: 100 % (11/25/22 1533)   Vital Signs (24h Range):  Temp:  [97.5 °F (36.4 °C)-98 °F (36.7 °C)] 98 °F (36.7 °C)  Pulse:  [60-75] 74  Resp:  [9-18] 9  SpO2:  [96 %-100 %] 100 %  BP: (120-177)/(58-76) 156/70     Weight: 56.3 kg (124 lb 1.9 oz)  Body mass index is 17.31 kg/m².    Intake/Output Summary (Last 24 hours) at 11/25/2022 1727  Last data filed at 11/25/2022 1315  Gross per 24 hour   Intake 520 ml   Output 2554 ml   Net -2034 ml      Physical Exam  Vitals and nursing note reviewed.   Constitutional:       General: He is not in acute distress.     Appearance: He is ill-appearing.   HENT:      Head: Normocephalic and atraumatic.      Nose: No congestion.      Mouth/Throat:      Mouth: Mucous membranes are moist.      Pharynx: No oropharyngeal exudate.   Eyes:      Extraocular Movements: Extraocular movements intact.      Pupils: Pupils are equal, round, and reactive to light.   Cardiovascular:      Rate and Rhythm: Normal rate. Rhythm irregular.      Heart sounds: Murmur heard.     No friction rub. No gallop.   Pulmonary:      Effort: Pulmonary effort is normal. No respiratory distress.      Breath sounds: Normal breath sounds. No wheezing or rales.   Chest:      Chest wall: No tenderness.   Abdominal:      General: There is no distension.      Tenderness: There is no abdominal tenderness. There is no guarding or rebound.   Musculoskeletal:         General: Swelling, tenderness and deformity present.       Cervical back: No rigidity or tenderness.      Comments: B/L AKA with right stump swelling with fluctuation   Skin:     Findings: Lesion present.      Comments: Open wound with suture present and right stump with purulent secretion   Neurological:      General: No focal deficit present.      Mental Status: He is alert. He is disoriented.      Gait: Gait abnormal.       Significant Labs: All pertinent labs within the past 24 hours have been reviewed.  Recent Lab Results         11/25/22  1536   11/25/22  0514   11/25/22  0500   11/24/22 2013        Procalcitonin   72.81  Comment: A concentration < 0.25 ng/mL represents a low risk of bacterial   infection.  Procalcitonin may not be accurate among patients with localized   infection, recent trauma or major surgery, immunosuppressed state,   invasive fungal infection, renal dysfunction. Decisions regarding   initiation or continuation of antibiotic therapy should not be based   solely on procalcitonin levels.             Albumin     2.5         Alkaline Phosphatase     83         ALT     7         Anion Gap     13         AST     9         Baso #     0.11         Basophil %     0.9         BILIRUBIN TOTAL     0.4  Comment: For infants and newborns, interpretation of results should be based  on gestational age, weight and in agreement with clinical  observations.    Premature Infant recommended reference ranges:  Up to 24 hours.............<8.0 mg/dL  Up to 48 hours............<12.0 mg/dL  3-5 days..................<15.0 mg/dL  6-29 days.................<15.0 mg/dL           BNP   1,445  Comment: Values of less than 100 pg/ml are consistent with non-CHF populations.           BUN     49         Calcium     9.2         Chloride     98         CO2     24         Creatinine     9.6         Differential Method     Automated         eGFR     5.8         Eos #     1.0         Eosinophil %     8.1         Glucose     87         Gran # (ANC)     8.4         Gran %     71.3          Hematocrit     28.9         Hemoglobin     8.4         Immature Grans (Abs)     0.05  Comment: Mild elevation in immature granulocytes is non specific and   can be seen in a variety of conditions including stress response,   acute inflammation, trauma and pregnancy. Correlation with other   laboratory and clinical findings is essential.           Immature Granulocytes     0.4         Lymph #     1.6         Lymph %     13.2         Magnesium     2.9         MCH     26.0         MCHC     29.1         MCV     90         Mono #     0.7         Mono %     6.1         MPV     10.1         nRBC     0         Phosphorus     2.1         Platelets     417         POCT Glucose 155       107       Potassium     4.5         PROTEIN TOTAL     7.2         RBC     3.23         RDW     18.5         Sodium     135         Vancomycin, Random     15.1         WBC     11.72                 Significant Imaging: I have reviewed all pertinent imaging results/findings within the past 24 hours.      Assessment/Plan:      * Sepsis  This patient does have evidence of infective focus  My overall impression is sepsis. Vital signs were reviewed and noted in progress note.  Antibiotics given-   Antibiotics (From admission, onward)    Start     Stop Route Frequency Ordered    11/24/22 1145  mupirocin 2 % ointment         11/29 0859 Nasl 2 times daily 11/24/22 1040    11/24/22 0815  piperacillin-tazobactam 4.5 g in sodium chloride 0.9% 100 mL IVPB (ready to mix system)         -- IV Every 12 hours (non-standard times) 11/24/22 0709    11/24/22 0808  vancomycin - pharmacy to dose  (vancomycin IVPB)        See Hyperspace for full Linked Orders Report.    -- IV pharmacy to manage frequency 11/24/22 0709        Cultures were taken-   Microbiology Results (last 7 days)     Procedure Component Value Units Date/Time    Urine culture [[id number]] Collected: 11/23/22 0958    Order Status: Completed Specimen: Urine Updated: 11/24/22 1315     Urine Culture,  Routine No growth    Narrative:      Specimen Source->Urine    Aerobic culture [[id number]]  (Abnormal) Collected: 11/23/22 0630    Order Status: Completed Specimen: Wound from Leg, Right Updated: 11/24/22 0821     Aerobic Bacterial Culture GRAM NEGATIVE JOHN  Many  Identification and susceptibility pending      Narrative:      Right AKA    Blood culture x two cultures. Draw prior to antibiotics. [[id number]] Collected: 11/23/22 0631    Order Status: Completed Specimen: Blood from Peripheral, Antecubital, Right Updated: 11/24/22 0812     Blood Culture, Routine No Growth to date      No Growth to date    Narrative:      Aerobic and anaerobic    Blood culture x two cultures. Draw prior to antibiotics. [[id number]] Collected: 11/23/22 0631    Order Status: Completed Specimen: Blood from Peripheral, Hand, Right Updated: 11/24/22 0812     Blood Culture, Routine No Growth to date      No Growth to date    Narrative:      Aerobic and anaerobic    Culture, Anaerobe [[id number]] Collected: 11/23/22 0630    Order Status: Completed Specimen: Wound from Leg, Right Updated: 11/24/22 0739     Anaerobic Culture Culture in progress    Narrative:      Right AKA        Latest lactate reviewed, they are-  Recent Labs   Lab 11/23/22 0629 11/23/22  1036   LACTATE 2.3* 1.0       Organ dysfunction indicated by Encephalopathy   Source- UTI and R AKA abscess    Source control Achieved by- IV vancomycin and zosyn     PLAN:  - WBC 25  - lactate 2.3 --> 1.0  - UA positive for UTI. followup Ucx  - blood culture pending   - CT right thigh with contrast showed abscess  - continue IV vancomycin and zosyn   - ID consulted. Followup recs      Elevated troponin  R/O from CKD Vs sepsis  -No CP  -cont monitor and consider cardiology consult if worsening      Acute cystitis  - UA positive for UTI  - followup Ucx  - continue on IV zosyn       S/P AKA (above knee amputation) bilateral  - ortho consulted. apprec recs  - CT right thigh showed abscess  -  "continue IV vancomycin and zosyn   - ID consulted. followup recs  - see "sepsis"      Abscess of right thigh  -S/P AKA  - seen on CT thigh with contrast  - ortho consulted. apprec recs  - continue IV vancomycin and zoysn (D1- 11/24)  - followup wound cultures and blood cultures  - ID consulted. followup recs  - see "sepsis"  -IR consulted for I&D F/U aspiration Cx      Anemia due to chronic kidney disease, on chronic dialysis  - H/H 8.4/28.9 has dropped  - transfuse for Hb < 7  - CBC daily      PAD (peripheral artery disease)  - s/p b/l AKA  - continue home ASA, Plavix and statin      Encephalopathy, metabolic  - AMS likely 2/2 sepsis  - HCT nonacute   - continue IV vancomycin and zosyn for UTI and R AKA abscess  -ID and ortho consulted  -IR for I&D right stump abscess      ESRD on hemodialysis  - nephrology consulted. apprec recs  - continue HD  - renally-dose all meds  - strict I/Os  - hold ACE-I or ARB while renal function dynamic        Secondary hyperparathyroidism of renal origin  - continue home Renvela       HLD (hyperlipidemia)  - continue home statin      CAD (coronary artery disease) - non-obstructive from [facility] in 2016  - continue ASA, Plavix and statin      Chronic diastolic heart failure  - followup BNP  - CXR showed vascular congestion   - TTE on 10/10/2022 showed EF 40% and grade II DD  - volume management with HD  - continue metoprolol       Gastroesophageal reflux disease  - continue home PPI      Essential (primary) hypertension  controlled  - continue home metoprolol, amlodipine and hydralazine      Controlled type 2 diabetes mellitus with chronic kidney disease on chronic dialysis, with long-term current use of insulin  Patient's FSGs are controlled on current medication regimen.  Last A1c reviewed-   Lab Results   Component Value Date    HGBA1C 6.1 (H) 01/07/2021     Most recent fingerstick glucose reviewed-   Recent Labs   Lab 11/24/22  0739 11/24/22  1237   POCTGLUCOSE 103 141*     Current " correctional scale  Low  Maintain anti-hyperglycemic dose as follows-   Antihyperglycemics (From admission, onward)    Start     Stop Route Frequency Ordered    22 2100  insulin detemir U-100 pen 8 Units         -- SubQ Nightly 22 1508    22 1448  insulin aspart U-100 pen 0-5 Units         -- SubQ Before meals & nightly PRN 22 1508        Hold Oral hypoglycemics while patient is in the hospital.  - continue home determir 8 units HS   - LDSSI   - goal B-180  - diabetic diet       VTE Risk Mitigation (From admission, onward)         Ordered     heparin (porcine) injection 7,500 Units  Every 8 hours         22 1508     IP VTE HIGH RISK PATIENT  Once         22 1508     Place sequential compression device  Until discontinued         22 1508     Place sequential compression device  Until discontinued         22 1044                Discharge Planning   ENIO: 2022     Code Status: Full Code   Is the patient medically ready for discharge?: No    Reason for patient still in hospital (select all that apply): Treatment, Imaging and Consult recommendations  Discharge Plan A: Home Health, Return to nursing home          Time spent > 35 minutes        Angelito Garland MD  Department of Hospital [facility]   [facility])

## 2022-11-25 NOTE — ASSESSMENT & PLAN NOTE
"-S/P AKA  - seen on CT thigh with contrast  - ortho consulted. apprec recs  - continue IV vancomycin and zoysn (D1- 11/24)  - followup wound cultures and blood cultures  - ID consulted. followup recs  - see "sepsis"  -IR consulted for I&D F/U aspiration Cx    "

## 2022-11-25 NOTE — PROGRESS NOTES
11/25/22 1315   Post-Hemodialysis Assessment   Rinseback Volume (mL) 250 mL   Blood Volume Processed (Liters) 50.4 L   Dialyzer Clearance Lightly streaked   Duration of Treatment 210 minutes   Additional Fluid Intake (mL) 300 mL   Total UF (mL) 2554 mL   Net Fluid Removal 2004   Patient Response to Treatment tolerated well   Post-Hemodialysis Comments see note     HD complete. Pt awake, alert oriented to place, self and president. NAD, VSS. Net removal 2004 ml. Spoke with primary team via messages about pt wishes to go home, primary MD will speak to pt on unit. Hemostasis achieved, report given to primary nurse.

## 2022-11-25 NOTE — PLAN OF CARE
[facility] - Intensive Care ([place] [address])  Initial Discharge Assessment       Primary Care Provider: Carola Pedro MD    Admission Diagnosis: Altered mental status [R41.82]  Hypoglycemia [E16.2]  Chest pain [R07.9]  Leukocytosis, unspecified type [D72.829]  Sepsis, due to unspecified organism, unspecified whether acute organ dysfunction present [A41.9]    Admission Date: 11/23/2022  Expected Discharge Date: 11/26/2022         Payor: MEDICARE / Plan: MEDICARE PART A & B / Product Type: Government /     Extended Emergency Contact Information  Primary Emergency Contact: Jonn Neff  Mobile Phone: [phone]  Relation: Daughter  Preferred language: English   needed? No    Discharge Plan A: (P) Home Health, Return to nursing home  Discharge Plan B: (P) Return to Nursing Home      [id number] [place], [place] - [address] [place] [address] [address]  [facility] [place] [address]  Phone: [phone] Fax: [phone]      Initial Assessment (most recent)       Adult Discharge Assessment - 11/25/22 1608          Discharge Assessment    Assessment Type Discharge Planning Assessment (P)      Confirmed/corrected address, phone number and insurance Yes (P)      Confirmed Demographics Correct on Facesheet (P)      Source of Information family (P)      Does patient/caregiver understand observation status Yes (P)      Communicated ENIO with patient/caregiver Yes (P)      Reason For Admission Altered mental status (P)      Lives With alone (P)      Facility Arrived From: [facility] (P)      Do you expect to return to your current living situation? Yes (P)      Do you have help at home or someone to help you manage your care at home? Yes (P)      Who are your caregiver(s) and their phone number(s)? Jonn Neff, daughter [phone] (P)      Prior to hospitilization cognitive status: Alert/Oriented (P)      Current cognitive status:  Alert/Oriented (P)      Walking or Climbing Stairs Difficulty none (P)      Dressing/Bathing Difficulty none (P)      Home Accessibility wheelchair accessible (P)      Home Layout Able to live on 1st floor (P)      Equipment Currently Used at Home none (P)      Readmission within 30 days? No (P)      Patient currently being followed by outpatient case management? No (P)      Do you currently have service(s) that help you manage your care at home? No (P)      Do you take prescription medications? Yes (P)      Do you have prescription coverage? Yes (P)      Coverage MEDICARE PART A &B (P)      Do you have any problems affording any of your prescribed medications? No (P)      Is the patient taking medications as prescribed? yes (P)      How do you get to doctors appointments? family or friend will provide (P)      Are you on dialysis? Yes (P)      Dialysis Name and Scheduled days [facility] MWF (P)      Do you take coumadin? No (P)      Discharge Plan A Home Health;Return to nursing home (P)      Discharge Plan B Return to Nursing Home (P)      DME Needed Upon Discharge  none (P)                           Deepa West LMSW  [facility]   [id number]

## 2022-11-25 NOTE — PROGRESS NOTES
[facility]FF HEMODIALYSIS NOTE     Patient currently on hemodialysis for removal of uremic toxins and volume.     Patient seen and evaluated on hemodialysis, tolerating treatment, see HD flowsheet for vitals and assessments.    Labs have been reviewed and the dialysate bath has been adjusted.       Assessment/Plan:    -Patient seen on HD, tolerating treatment well, w/o complaints   -Pt oriented to person and place   -UF goal of 1-2L  -Renal diet, if not NPO   -Strict I/O's and daily weights  -Daily renal function panels  -Keep MAP >65 while on HD   -Maintain Hgb >7.0  -continue epo   -discontinue sevelamer, phos 2.1  -Will continue to follow while inpatient     Odalys Frederick DNP-FNP, C  Nephrology  Pager: [phone]

## 2022-11-26 LAB — POCT GLUCOSE: 95 MG/DL (ref 70–110)

## 2022-11-26 PROCEDURE — 99232 SBSQ HOSP IP/OBS MODERATE 35: CPT | Mod: ,,, | Performed by: FAMILY MEDICINE

## 2022-11-26 PROCEDURE — 97168 OT RE-EVAL EST PLAN CARE: CPT

## 2022-11-26 PROCEDURE — 20600001 HC STEP DOWN PRIVATE ROOM

## 2022-11-26 PROCEDURE — 94761 N-INVAS EAR/PLS OXIMETRY MLT: CPT

## 2022-11-26 PROCEDURE — 25000003 PHARM REV CODE 250: Performed by: INTERNAL MEDICINE

## 2022-11-26 PROCEDURE — 63600175 PHARM REV CODE 636 W HCPCS: Performed by: STUDENT IN AN ORGANIZED HEALTH CARE EDUCATION/TRAINING PROGRAM

## 2022-11-26 PROCEDURE — 97535 SELF CARE MNGMENT TRAINING: CPT

## 2022-11-26 PROCEDURE — 97166 OT EVAL MOD COMPLEX 45 MIN: CPT

## 2022-11-26 PROCEDURE — 97162 PT EVAL MOD COMPLEX 30 MIN: CPT

## 2022-11-26 PROCEDURE — 63600175 PHARM REV CODE 636 W HCPCS: Performed by: FAMILY MEDICINE

## 2022-11-26 PROCEDURE — 99232 PR SUBSEQUENT HOSPITAL CARE,LEVL II: ICD-10-PCS | Mod: ,,, | Performed by: FAMILY MEDICINE

## 2022-11-26 PROCEDURE — 97530 THERAPEUTIC ACTIVITIES: CPT

## 2022-11-26 PROCEDURE — 63600175 PHARM REV CODE 636 W HCPCS: Performed by: INTERNAL MEDICINE

## 2022-11-26 RX ORDER — SODIUM CHLORIDE 9 MG/ML
INJECTION, SOLUTION INTRAVENOUS ONCE
Status: DISCONTINUED | OUTPATIENT
Start: 2022-11-28 | End: 2022-11-29

## 2022-11-26 RX ORDER — MEROPENEM AND SODIUM CHLORIDE 500 MG/50ML
500 INJECTION, SOLUTION INTRAVENOUS DAILY
Status: DISCONTINUED | OUTPATIENT
Start: 2022-11-26 | End: 2022-11-28

## 2022-11-26 RX ADMIN — FOLIC ACID-PYRIDOXINE-CYANOCOBALAMIN TAB 2.5-25-2 MG 1 TABLET: 2.5-25-2 TAB at 09:11

## 2022-11-26 RX ADMIN — PIPERACILLIN SODIUM AND TAZOBACTAM SODIUM 4.5 G: 4; .5 INJECTION, POWDER, LYOPHILIZED, FOR SOLUTION INTRAVENOUS at 03:11

## 2022-11-26 RX ADMIN — SUCRALFATE 1 G: 1 SUSPENSION ORAL at 11:11

## 2022-11-26 RX ADMIN — METOPROLOL SUCCINATE 25 MG: 25 TABLET, EXTENDED RELEASE ORAL at 09:11

## 2022-11-26 RX ADMIN — VANCOMYCIN HYDROCHLORIDE 500 MG: 500 INJECTION, POWDER, LYOPHILIZED, FOR SOLUTION INTRAVENOUS at 11:11

## 2022-11-26 RX ADMIN — HYDRALAZINE HYDROCHLORIDE 100 MG: 50 TABLET ORAL at 02:11

## 2022-11-26 RX ADMIN — HEPARIN SODIUM 7500 UNITS: 5000 INJECTION INTRAVENOUS; SUBCUTANEOUS at 02:11

## 2022-11-26 RX ADMIN — ALUMINUM HYDROXIDE, MAGNESIUM HYDROXIDE, AND SIMETHICONE 30 ML: 200; 200; 20 SUSPENSION ORAL at 11:11

## 2022-11-26 RX ADMIN — MEROPENEM AND SODIUM CHLORIDE 500 MG: 500 INJECTION, SOLUTION INTRAVENOUS at 04:11

## 2022-11-26 RX ADMIN — PANTOPRAZOLE SODIUM 40 MG: 40 TABLET, DELAYED RELEASE ORAL at 09:11

## 2022-11-26 RX ADMIN — AMLODIPINE BESYLATE 10 MG: 10 TABLET ORAL at 09:11

## 2022-11-26 RX ADMIN — ASPIRIN 81 MG 81 MG: 81 TABLET ORAL at 09:11

## 2022-11-26 NOTE — PROGRESS NOTES
[facility] - Intensive Care ([address] Medicine  Progress Note    Patient Name: Adryan Neff  MRN: [id number]  Patient Class: IP- Inpatient   Admission Date: 11/23/2022  Length of Stay: 3 days  Attending Physician: Angelito Garland MD  Primary Care Provider: Carola Pedro MD        Subjective:     Principal Problem:Sepsis        HPI:  Mr. Neff is a 56-year-old gentleman with PMH of CAD, DM type 1, diabetic retinopathy, ESRD on HD, hypertension, PAD, b/l AKA who presented to [facility] for altered mental status. Pt arrived from [facility] where he was found minimally responsive and BG 50s. Upon chart review, last HD sessions were Sunday 11/20 and 11/22. Pt oxygenating well on RA. Awake and oriented to self and place. He is able to state when he last had HD. Labs noted WBC 25 and lactate 2.3. UA positive for UTI. HCT nonacute. R AKA stump performed on 10/13/22 showed discoloration of the skin at the lateral aspect with areas of possible wound drainage/dehiscence. Due to concern for infection, orthopedic surgery consulted. CT right thigh with contrast showed abscess. Blood cultures ordered and empiric IV antibiotics ordered. He was admitted to Hospital Medicine for further evaluation and management of acute encephalopathy 2/2 sepsis in the setting of UTI and R AKA abscess.       Overview/Hospital Course:  IR consult for deep abscess drainage stump      Interval History:   With wound culture grow, pseudomonas, citrobacter koseri, minimal confuse, oriented to self, right thigh/stump post AKA swelling with fluctuation. He was supposed to have aspiration drainage yesterday, but it was not done, no spike of fever.    Review of Systems   Constitutional:  Positive for activity change and appetite change. Negative for chills and fever.   HENT:  Negative for congestion.    Respiratory:  Negative for cough, chest tightness, shortness of breath and wheezing.    Cardiovascular:  Positive for leg swelling. Negative  for chest pain and palpitations.   Gastrointestinal:  Negative for abdominal pain, nausea and vomiting.   Musculoskeletal:  Positive for gait problem. Negative for back pain.   Skin:  Positive for rash and wound.   Neurological:  Negative for dizziness, speech difficulty, weakness and headaches.   Psychiatric/Behavioral:  Positive for confusion. Negative for agitation.    All other systems reviewed and are negative.  Objective:     Vital Signs (Most Recent):  Temp: 98.3 °F (36.8 °C) (11/26/22 0743)  Pulse: 98 (11/26/22 0743)  Resp: 18 (11/26/22 0743)  BP: (!) 153/66 (11/26/22 0743)  SpO2: 97 % (11/26/22 0743)   Vital Signs (24h Range):  Temp:  [98 °F (36.7 °C)-98.3 °F (36.8 °C)] 98.3 °F (36.8 °C)  Pulse:  [64-98] 98  Resp:  [9-20] 18  SpO2:  [97 %-100 %] 97 %  BP: (115-170)/(57-76) 153/66     Weight: 56 kg (123 lb 7.3 oz)  Body mass index is 17.22 kg/m².    Intake/Output Summary (Last 24 hours) at 11/26/2022 1219  Last data filed at 11/25/2022 1800  Gross per 24 hour   Intake 300 ml   Output 2554 ml   Net -2254 ml      Physical Exam  Vitals and nursing note reviewed.   Constitutional:       General: He is not in acute distress.     Appearance: He is ill-appearing. He is not toxic-appearing.   HENT:      Head: Normocephalic and atraumatic.      Nose: No congestion.      Mouth/Throat:      Mouth: Mucous membranes are moist.      Pharynx: No oropharyngeal exudate.   Eyes:      Extraocular Movements: Extraocular movements intact.      Pupils: Pupils are equal, round, and reactive to light.   Cardiovascular:      Rate and Rhythm: Normal rate and regular rhythm.      Heart sounds: No murmur heard.    No friction rub. No gallop.   Pulmonary:      Effort: Pulmonary effort is normal. No respiratory distress.      Breath sounds: Normal breath sounds. No wheezing or rales.   Chest:      Chest wall: No tenderness.   Abdominal:      General: Bowel sounds are normal. There is no distension.      Palpations: Abdomen is soft.       Tenderness: There is no abdominal tenderness. There is no guarding or rebound.   Musculoskeletal:         General: Swelling, tenderness and deformity present.      Cervical back: No rigidity or tenderness.      Right lower leg: No edema.      Left lower leg: No edema.   Skin:     Findings: Erythema, lesion and rash present.      Comments: Right thigh wound with suture present   Neurological:      General: No focal deficit present.      Mental Status: He is alert. He is disoriented.      Motor: No weakness.      Gait: Gait abnormal.       Significant Labs: All pertinent labs within the past 24 hours have been reviewed.  Recent Lab Results         11/25/22 2023 11/25/22  1536        POCT Glucose 151   155               Significant Imaging: I have reviewed all pertinent imaging results/findings within the past 24 hours.      Assessment/Plan:      * Sepsis  This patient does have evidence of infective focus  My overall impression is sepsis. Vital signs were reviewed and noted in progress note.  Antibiotics given-   Antibiotics (From admission, onward)      Start     Stop Route Frequency Ordered    11/24/22 1145  mupirocin 2 % ointment         11/29 0859 Nasl 2 times daily 11/24/22 1040    11/24/22 0815  piperacillin-tazobactam 4.5 g in sodium chloride 0.9% 100 mL IVPB (ready to mix system)         -- IV Every 12 hours (non-standard times) 11/24/22 0709    11/24/22 0808  vancomycin - pharmacy to dose  (vancomycin IVPB)        See Hyperspace for full Linked Orders Report.    -- IV pharmacy to manage frequency 11/24/22 0709          Cultures were taken-   Microbiology Results (last 7 days)       Procedure Component Value Units Date/Time    Urine culture [[id number]] Collected: 11/23/22 0958    Order Status: Completed Specimen: Urine Updated: 11/24/22 1315     Urine Culture, Routine No growth    Narrative:      Specimen Source->Urine    Aerobic culture [[id number]]  (Abnormal) Collected: 11/23/22 0630    Order Status:  "Completed Specimen: Wound from Leg, Right Updated: 11/24/22 0821     Aerobic Bacterial Culture GRAM NEGATIVE JOHN  Many  Identification and susceptibility pending      Narrative:      Right AKA    Blood culture x two cultures. Draw prior to antibiotics. [[id number]] Collected: 11/23/22 0631    Order Status: Completed Specimen: Blood from Peripheral, Antecubital, Right Updated: 11/24/22 0812     Blood Culture, Routine No Growth to date      No Growth to date    Narrative:      Aerobic and anaerobic    Blood culture x two cultures. Draw prior to antibiotics. [[id number]] Collected: 11/23/22 0631    Order Status: Completed Specimen: Blood from Peripheral, Hand, Right Updated: 11/24/22 0812     Blood Culture, Routine No Growth to date      No Growth to date    Narrative:      Aerobic and anaerobic    Culture, Anaerobe [[id number]] Collected: 11/23/22 0630    Order Status: Completed Specimen: Wound from Leg, Right Updated: 11/24/22 0739     Anaerobic Culture Culture in progress    Narrative:      Right AKA          Latest lactate reviewed, they are-  Recent Labs   Lab 11/23/22 0629 11/23/22  1036   LACTATE 2.3* 1.0       Organ dysfunction indicated by Encephalopathy   Source- UTI and R AKA abscess    Source control Achieved by- IV vancomycin and zosyn     PLAN:  - WBC 25  - lactate 2.3 --> 1.0  - UA positive for UTI. followup Ucx  - blood culture pending   - CT right thigh with contrast showed abscess  - continue IV vancomycin and zosyn   - ID consulted. Followup recs      Elevated troponin  R/O from CKD Vs sepsis  -No CP  -cont monitor and consider cardiology consult if worsening      Acute cystitis  - UA positive for UTI  - followup Ucx  - continue on IV zosyn       S/P AKA (above knee amputation) bilateral  - ortho consulted. apprec recs  - CT right thigh showed abscess  - continue IV vancomycin and zosyn   - ID consulted. followup recs  - see "sepsis"      Abscess of right thigh  -S/P AKA  - seen on CT thigh with " contrast  -Need abscess aspiration by IR with Cx  - ortho consulted. apprec recs  - continue IV vancomycin and zoysn (D1- 11/24)  -  wound cultures with pseudomonas and citrobacter and blood cultures no growth to dates  - ID consulted. followup recs  -IR consulted for I&D F/U aspiration Cx      Cognitive changes  Confusion on and off  Consider psych consult      Anemia due to chronic kidney disease, on chronic dialysis  - H/H 8.4/28.9 has dropped  - transfuse for Hb < 7  - CBC daily      PAD (peripheral artery disease)  - s/p b/l AKA  - continue home ASA, Plavix and statin      Encephalopathy, metabolic  - AMS likely 2/2 sepsis  - HCT nonacute   - continue IV vancomycin and zosyn for UTI and R AKA abscess  -ID and ortho consulted  -IR for I&D right stump abscess      ESRD on hemodialysis  - nephrology consulted. apprec recs  - continue HD  - renally-dose all meds  - strict I/Os  - hold ACE-I or ARB while renal function dynamic        Secondary hyperparathyroidism of renal origin  - continue home Renvela       HLD (hyperlipidemia)  - continue home statin      CAD (coronary artery disease) - non-obstructive from [facility] in 2016  - continue ASA, Plavix and statin      Chronic diastolic heart failure  - followup BNP  - CXR showed vascular congestion   - TTE on 10/10/2022 showed EF 40% and grade II DD  - volume management with HD  - continue metoprolol       Gastroesophageal reflux disease  - continue home PPI      Essential (primary) hypertension  controlled  - continue home metoprolol, amlodipine and hydralazine      Controlled type 2 diabetes mellitus with chronic kidney disease on chronic dialysis, with long-term current use of insulin  Patient's FSGs are controlled on current medication regimen.  Last A1c reviewed-   Lab Results   Component Value Date    HGBA1C 6.1 (H) 01/07/2021     Most recent fingerstick glucose reviewed-   Recent Labs   Lab 11/24/22  0739 11/24/22  1237   POCTGLUCOSE 103 141*     Current  correctional scale  Low  Maintain anti-hyperglycemic dose as follows-   Antihyperglycemics (From admission, onward)      Start     Stop Route Frequency Ordered    22 2100  insulin detemir U-100 pen 8 Units         -- SubQ Nightly 22 1508    22 1448  insulin aspart U-100 pen 0-5 Units         -- SubQ Before meals & nightly PRN 22 1508          Hold Oral hypoglycemics while patient is in the hospital.  - continue home determir 8 units HS   - LDSSI   - goal B-180  - diabetic diet       VTE Risk Mitigation (From admission, onward)           Ordered     heparin (porcine) injection 7,500 Units  Every 8 hours         22 1508     IP VTE HIGH RISK PATIENT  Once         22 1508     Place sequential compression device  Until discontinued         22 1508     Place sequential compression device  Until discontinued         22 1044                    Discharge Planning   ENIO: 2022     Code Status: Full Code   Is the patient medically ready for discharge?: No    Reason for patient still in hospital (select all that apply): Patient trending condition, Treatment and Consult recommendations  Discharge Plan A: Home Health, Return to nursing home          Time spent > 35 min        Angelito Garland MD  Department of Hospital Medicine   [facility] - Intensive Care ([place]-14)

## 2022-11-26 NOTE — PROGRESS NOTES
Pharmacokinetic Assessment Follow Up: IV Vancomycin    Vancomycin serum concentration assessment(s):    The random level was drawn correctly and can be used to guide therapy at this time. The measurement is within the desired definitive target range of 10 to 20 mcg/mL.    Vancomycin Regimen Plan:  -Pt ESRD on HD; last session yesterday  -random level this AM < 20 so will redose with vancomycin 500mg IV x 1 today  -random level ordered for tomorrow w/ AM labs  -Continue to follow-up dialysis plans for any changes    Drug levels (last 3 results):  Recent Labs   Lab Result Units 11/25/22  0500   Vancomycin, Random ug/mL 15.1       Pharmacy will continue to follow and monitor vancomycin.    Please contact pharmacy at extension [phone] for questions regarding this assessment.    Thank you for the consult,   Taina Constantino       Patient brief summary:  Adryan Neff is a 56 y.o. male initiated on antimicrobial therapy with IV Vancomycin for treatment of skin & soft tissue infection    The patient's current regimen is pulse dosing    Drug Allergies:   Review of patient's allergies indicates:  No Known Allergies    Actual Body Weight:   56 kg    Renal Function:   Estimated Creatinine Clearance: 6.7 mL/min (A) (based on SCr of 9.6 mg/dL (H)).,     Dialysis Method (if applicable):  intermittent HD    CBC (last 72 hours):  Recent Labs   Lab Result Units 11/25/22  0500   WBC K/uL 11.72   Hemoglobin g/dL 8.4*   Hematocrit % 28.9*   Platelets K/uL 417   Gran % % 71.3   Lymph % % 13.2*   Mono % % 6.1   Eosinophil % % 8.1*   Basophil % % 0.9   Differential Method  Automated       Metabolic Panel (last 72 hours):  Recent Labs   Lab Result Units 11/23/22  0958 11/25/22  0500   Sodium mmol/L  --  135*   Potassium mmol/L  --  4.5   Chloride mmol/L  --  98   CO2 mmol/L  --  24   Glucose mg/dL  --  87   Glucose, UA  Negative  --    BUN mg/dL  --  49*   Creatinine mg/dL  --  9.6*   Albumin g/dL  --  2.5*   Total Bilirubin mg/dL  --  0.4    Alkaline Phosphatase U/L  --  83   AST U/L  --  9*   ALT U/L  --  7*   Magnesium mg/dL  --  2.9*   Phosphorus mg/dL  --  2.1*       Vancomycin Administrations:  vancomycin given in the last 96 hours                     vancomycin 500 mg in dextrose 5 % 100 mL IVPB (ready to mix system) (mg) 500 mg New Bag 11/25/22 1530    vancomycin 750 mg in dextrose 5 % 250 mL IVPB (ready to mix system) (mg) 750 mg New Bag 11/23/22 0647                    Microbiologic Results:  Microbiology Results (last 7 days)       Procedure Component Value Units Date/Time    Blood culture x two cultures. Draw prior to antibiotics. [[id number]] Collected: 11/23/22 0631    Order Status: Completed Specimen: Blood from Peripheral, Antecubital, Right Updated: 11/26/22 0812     Blood Culture, Routine No Growth to date      No Growth to date      No Growth to date      No Growth to date    Narrative:      Aerobic and anaerobic    Blood culture x two cultures. Draw prior to antibiotics. [[id number]] Collected: 11/23/22 0631    Order Status: Completed Specimen: Blood from Peripheral, Hand, Right Updated: 11/26/22 0812     Blood Culture, Routine No Growth to date      No Growth to date      No Growth to date      No Growth to date    Narrative:      Aerobic and anaerobic    Aerobic culture [[id number]]     Order Status: No result Specimen: Incision site from Buttocks, Left     Aerobic culture [[id number]]  (Abnormal) Collected: 11/23/22 0630    Order Status: Completed Specimen: Wound from Leg, Right Updated: 11/25/22 1404     Aerobic Bacterial Culture GRAM NEGATIVE JOHN  Many  Identification and susceptibility pending        GRAM NEGATIVE JOHN  Many  Identification and susceptibility pending      Narrative:      Right AKA    Aerobic culture [[id number]]     Order Status: No result Specimen: Abscess from Buttocks, Left     Aerobic culture [[id number]]     Order Status: No result Specimen: Incision site from Buttocks, Left     Culture, Anaerobe [[id number]]  Collected: 11/23/22 0630    Order Status: Completed Specimen: Wound from Leg, Right Updated: 11/25/22 1026     Anaerobic Culture Culture in progress    Narrative:      Right AKA    Urine culture [[id number]] Collected: 11/23/22 0958    Order Status: Completed Specimen: Urine Updated: 11/24/22 1315     Urine Culture, Routine No growth    Narrative:      Specimen Source->Urine

## 2022-11-26 NOTE — PLAN OF CARE
Chart reviewed - pending IR evaluation for stump abscess. Superifical wound cx with multiple GNRs (citrobacterx2 and pseudomonas).     Recommendations:   -stop zosyn (one isolate resistant)  -switch to meropenem (ordered)  -IR aspiration pending

## 2022-11-26 NOTE — PT/OT/SLP RE-EVAL
Occupational Therapy   Evaluation    Name: Adryan Neff  MRN: [id number]  Admitting Diagnosis:  Sepsis  Recent Surgery: * No surgery found *      Recommendations:     Discharge Recommendations: nursing facility, skilled  Discharge Equipment Recommendations:  other (see comments) (TBD)  Barriers to discharge:  Decreased caregiver support    Assessment:     Adryan Neff is a 56 y.o. male with a medical diagnosis of Sepsis.  He presents agitated and confused, but willing to participate in todays evaluation. Pt required Min (A) for bed mobility, verbal cues for weight shifting / bed rail hand placement. Pt soiled self, needed Max (A) for perirectal cleaning / upper body dressing. Performance deficits affecting function are weakness, impaired endurance, impaired self care skills, impaired functional mobility, pain.      Rehab Prognosis:  nurse; patient would benefit from acute skilled OT services to address these deficits and reach maximum level of function.       Plan:     Patient to be seen 3 x/week to address the above listed problems via self-care/home management, neuromuscular re-education, therapeutic activities, therapeutic exercises  Plan of Care Expires: 12/26/22  Plan of Care Reviewed with: patient    Subjective     Chief Complaint: R residual limp pain  Patient/Family stated goals: Go home  Communicated with: nurse prior to session.  Pain/Comfort:  Pain Rating 1: other (see comments) (Not specified)  Location - Side 1: Right  Location 1: leg  Pain Addressed 1: Reposition, Distraction, Nurse notified    Objective:     Communicated with: nurse prior to session.  Patient found supine with: pulse ox (continuous) upon OT entry to room.    General Precautions: Standard, fall   Orthopedic Precautions:N/A   Braces: N/A  Respiratory Status: Room air    Occupational Performance:    Bed Mobility:    Patient completed Rolling/Turning to Left with  minimum assistance  Patient completed Rolling/Turning to Right with minimum  assistance  Patient completed Scooting/Bridging with minimum assistance  Patient completed Supine to Sit with minimum assistance  Patient completed Sit to Supine with minimum assistance    Functional Mobility/Transfers:  Pt performed side scoots at EOB without external supports (3 scoots)     Activities of Daily Living:  Upper Body Dressing: moderate assistance Don gown  Toileting: maximal assistance Perirectal cleaning     Cognitive/Visual Perceptual:  Cognitive/Psychosocial Skills:     -       Oriented to: Person, Place, Time, and Situation   -       Follows Commands/attention:Follows two-step commands  -       Safety awareness/insight to disability: impaired   -       Mood/Affect/Coping skills/emotional control: Cooperative    Physical Exam:  Balance:    -       Fair static / dynamic balance while scooting toward the head of bed  Postural examination/scapula alignment:    -       Forward head  Skin integrity: Wound RLE  Edema:  Moderate RLE  Upper Extremity Range of Motion:     -       Right Upper Extremity: WFL  -       Left Upper Extremity: WFL  Upper Extremity Strength:    -       Right Upper Extremity: WFL  -       Left Upper Extremity: WFL   Strength:    -       Right Upper Extremity: WFL  -       Left Upper Extremity: WFL    AMPA 6 Click:  [facility] Total Score: 16    Treatment & Education:  Pt educated on role of OT/POC  Pt. Educated on safety precautions   Pt provided self-care/ADL re-education  Pt reviewed bed mobility/functional mobility    Patient left HOB elevated with all lines intact, call button in reach, and nurse notified    GOALS:   Multidisciplinary Problems       Occupational Therapy Goals          Problem: Occupational Therapy    Goal Priority Disciplines Outcome Interventions   Occupational Therapy Goal     OT, PT/OT Ongoing, Progressing    Description: Goals to be met by: 12/12     Patient will increase functional independence with ADLs by performing:    UE Dressing with Supervision.  TOM  Dressing with Supervision.  Grooming while seated with Supervision.  Toileting from toilet with Supervision for hygiene and clothing management.                          History:     Past Medical History:   Diagnosis Date    CAD (coronary artery disease) 2016    CAD (non obstructive) 2016 [facility]    Diabetes mellitus type I     Diabetic retinopathy     ESRD on hemodialysis     on HD TThSat    Gangrene of left foot     Hypertension     Nuclear sclerosis of right eye 11/24/2021    PAD (peripheral artery disease)     PEA (Pulseless electrical activity) 10/10/2022    Pulmonary HTN     Right foot infection     TB lung, latent 04/2021    Wet gangrene 1/5/2022         Past Surgical History:   Procedure Laterality Date    ABOVE-KNEE AMPUTATION Left 1/18/2022    Procedure: AMPUTATION, ABOVE KNEE;  Surgeon: Rusty Light MD;  Location: [facility] OR;  Service: Orthopedics;  Laterality: Left;    ABOVE-KNEE AMPUTATION Left 1/21/2022    Procedure: AMPUTATION, ABOVE KNEE;  Surgeon: Rusty Light MD;  Location: [facility] OR;  Service: Orthopedics;  Laterality: Left;    ABOVE-KNEE AMPUTATION Right 10/13/2022    Procedure: AMPUTATION, ABOVE KNEE, RIGHT;  Surgeon: Dimitris Davis MD;  Location: [facility] OR;  Service: Orthopedics;  Laterality: Right;    AV FISTULA PLACEMENT      CATARACT EXTRACTION Left     EYE SURGERY      FISTULOGRAM Left 8/9/2022    Procedure: Fistulogram;  Surgeon: Masoud Soni MD;  Location: [facility] OR;  Service: Vascular;  Laterality: Left;  LEFT VM ON DAUGHTER'S PHONE----PHONE PREOP NOT COMPLETED  CALLED PATIENT ON 8/8/2022 @ 3:34. HE STATED HE IS RESCHEDULING PROCEDURE. NOTIFIED ERWIN @ 3:35PM-LO    FISTULOGRAM Left 10/20/2022    Procedure: Fistulogram;  Surgeon: Masoud Soni MD;  Location: [facility];  Service: Vascular;  Laterality: Left;  Left upper extremity    TOE AMPUTATION Left 1/6/2022    Procedure: AMPUTATION, TOE;  Surgeon: Masoud Soni MD;  Location: [facility] OR;  Service: Vascular;   Laterality: Left;  Left first through fifth toes, possible open transmetatarsal amputation and all other indicated procedures       Time Tracking:     OT Date of Treatment: 11/26/22  OT Start Time: 0750  OT Stop Time: 0808  OT Total Time (min): 18 min    Billable Minutes:Re-susieal 8  Self Care/Home Management 10    11/26/2022

## 2022-11-26 NOTE — PLAN OF CARE
Problem: Occupational Therapy  Goal: Occupational Therapy Goal  Description: Goals to be met by: 12/12     Patient will increase functional independence with ADLs by performing:    UE Dressing with Supervision.  LE Dressing with Supervision.  Grooming while seated with Supervision.  Toileting from toilet with Supervision for hygiene and clothing management.     Outcome: Ongoing, Progressing

## 2022-11-26 NOTE — ASSESSMENT & PLAN NOTE
-S/P AKA  - seen on CT thigh with contrast  -Need abscess aspiration by IR with Cx  - ortho consulted. apprec recs  - continue IV vancomycin and zoysn (D1- 11/24)  -  wound cultures with pseudomonas and citrobacter and blood cultures no growth to dates  - ID consulted. followup recs  -IR consulted for I&D F/U aspiration Cx

## 2022-11-26 NOTE — PLAN OF CARE
Problem: Skin Injury Risk Increased  Goal: Skin Health and Integrity  Outcome: Ongoing, Progressing     Problem: Adult Inpatient Plan of Care  Goal: Plan of Care Review  Outcome: Ongoing, Progressing  Goal: Patient-Specific Goal (Individualized)  Outcome: Ongoing, Progressing  Goal: Absence of Hospital-Acquired Illness or Injury  Outcome: Ongoing, Progressing  Goal: Optimal Comfort and Wellbeing  Outcome: Ongoing, Progressing  Goal: Readiness for Transition of Care  Outcome: Ongoing, Progressing     Problem: Fall Injury Risk  Goal: Absence of Fall and Fall-Related Injury  Outcome: Ongoing, Progressing     Problem: Diabetes Comorbidity  Goal: Blood Glucose Level Within Targeted Range  Outcome: Ongoing, Progressing     Problem: Infection  Goal: Absence of Infection Signs and Symptoms  Outcome: Ongoing, Progressing     Problem: Adjustment to Illness (Sepsis/Septic Shock)  Goal: Optimal Coping  Outcome: Ongoing, Progressing     Problem: Bleeding (Sepsis/Septic Shock)  Goal: Absence of Bleeding  Outcome: Ongoing, Progressing     Problem: Glycemic Control Impaired (Sepsis/Septic Shock)  Goal: Blood Glucose Level Within Desired Range  Outcome: Ongoing, Progressing     Problem: Infection Progression (Sepsis/Septic Shock)  Goal: Absence of Infection Signs and Symptoms  Outcome: Ongoing, Progressing     Problem: Nutrition Impaired (Sepsis/Septic Shock)  Goal: Optimal Nutrition Intake  Outcome: Ongoing, Progressing     Problem: Device-Related Complication Risk (Hemodialysis)  Goal: Safe, Effective Therapy Delivery  Outcome: Ongoing, Progressing     Problem: Hemodynamic Instability (Hemodialysis)  Goal: Effective Tissue Perfusion  Outcome: Ongoing, Progressing     Problem: Infection (Hemodialysis)  Goal: Absence of Infection Signs and Symptoms  Outcome: Ongoing, Progressing

## 2022-11-26 NOTE — PLAN OF CARE
Problem: Physical Therapy  Goal: Physical Therapy Goal  Description: Goals to be met by: 12/10/22    Patient will increase functional independence with mobility by performin. Supine to sit with independence  2. Sit to supine with independence  3. Rolling to Left and Right with independence  4. Bed to chair transfer with minimum assistance using LRAD as needed  5. Wheelchair propulsion x100 feet with minimum assistance using bilateral uppper extremities  6. Sitting at edge of bed x10 minutes with [place]    Outcome: Ongoing, Progressing     Pt tolerated PT session well.      All needs met, all questions answered.

## 2022-11-26 NOTE — SUBJECTIVE & OBJECTIVE
Interval History:   With wound culture grow, pseudomonas, citrobacter koseri, minimal confuse, oriented to self, right thigh/stump post AKA swelling with fluctuation. He was supposed to have aspiration drainage yesterday, but it was not done, no spike of fever.    Review of Systems   Constitutional:  Positive for activity change and appetite change. Negative for chills and fever.   HENT:  Negative for congestion.    Respiratory:  Negative for cough, chest tightness, shortness of breath and wheezing.    Cardiovascular:  Positive for leg swelling. Negative for chest pain and palpitations.   Gastrointestinal:  Negative for abdominal pain, nausea and vomiting.   Musculoskeletal:  Positive for gait problem. Negative for back pain.   Skin:  Positive for rash and wound.   Neurological:  Negative for dizziness, speech difficulty, weakness and headaches.   Psychiatric/Behavioral:  Positive for confusion. Negative for agitation.    All other systems reviewed and are negative.  Objective:     Vital Signs (Most Recent):  Temp: 98.3 °F (36.8 °C) (11/26/22 0743)  Pulse: 98 (11/26/22 0743)  Resp: 18 (11/26/22 0743)  BP: (!) 153/66 (11/26/22 0743)  SpO2: 97 % (11/26/22 0743)   Vital Signs (24h Range):  Temp:  [98 °F (36.7 °C)-98.3 °F (36.8 °C)] 98.3 °F (36.8 °C)  Pulse:  [64-98] 98  Resp:  [9-20] 18  SpO2:  [97 %-100 %] 97 %  BP: (115-170)/(57-76) 153/66     Weight: 56 kg (123 lb 7.3 oz)  Body mass index is 17.22 kg/m².    Intake/Output Summary (Last 24 hours) at 11/26/2022 1219  Last data filed at 11/25/2022 1800  Gross per 24 hour   Intake 300 ml   Output 2554 ml   Net -2254 ml      Physical Exam  Vitals and nursing note reviewed.   Constitutional:       General: He is not in acute distress.     Appearance: He is ill-appearing. He is not toxic-appearing.   HENT:      Head: Normocephalic and atraumatic.      Nose: No congestion.      Mouth/Throat:      Mouth: Mucous membranes are moist.      Pharynx: No oropharyngeal exudate.    Eyes:      Extraocular Movements: Extraocular movements intact.      Pupils: Pupils are equal, round, and reactive to light.   Cardiovascular:      Rate and Rhythm: Normal rate and regular rhythm.      Heart sounds: No murmur heard.    No friction rub. No gallop.   Pulmonary:      Effort: Pulmonary effort is normal. No respiratory distress.      Breath sounds: Normal breath sounds. No wheezing or rales.   Chest:      Chest wall: No tenderness.   Abdominal:      General: Bowel sounds are normal. There is no distension.      Palpations: Abdomen is soft.      Tenderness: There is no abdominal tenderness. There is no guarding or rebound.   Musculoskeletal:         General: Swelling, tenderness and deformity present.      Cervical back: No rigidity or tenderness.      Right lower leg: No edema.      Left lower leg: No edema.   Skin:     Findings: Erythema, lesion and rash present.      Comments: Right thigh wound with suture present   Neurological:      General: No focal deficit present.      Mental Status: He is alert. He is disoriented.      Motor: No weakness.      Gait: Gait abnormal.       Significant Labs: All pertinent labs within the past 24 hours have been reviewed.  Recent Lab Results         11/25/22 2023 11/25/22  1536        POCT Glucose 151   155               Significant Imaging: I have reviewed all pertinent imaging results/findings within the past 24 hours.

## 2022-11-26 NOTE — PT/OT/SLP EVAL
"Physical Therapy Co-Evaluation and Co-Treatment    Patient Name:  Adryan Neff   MRN:  [id number]    Recommendations:     Discharge Recommendations:  nursing facility, skilled   Discharge Equipment Recommendations:  (TBD)   Barriers to discharge: decreased functional mobility and fall risk    Assessment:     Adryan Neff is a 56 y.o. male admitted with a medical diagnosis of Sepsis.  Pt demonstrates the below listed impairments with decreased tolerance to functional mobility, weakness, and balance instability being the most limiting.  Pt demonstrates fair tolerance to bed mobility and is willing to perform x2 lateral pivots while in bed.  Unable to retrieve full PLOF from patient and unable to call family this day.  Pt is not safe for home discharge at this time due to patient's status as: a fall risk and requires skilled PT.      Impairments and functional limitations:  weakness, impaired endurance, impaired self care skills, impaired functional mobility, gait instability, impaired balance, decreased lower extremity function, impaired cardiopulmonary response to activity, impaired skin.  These deficits affect their roles and responsibilities in which they were able to complete prior to admit.  Rehab Prognosis:   Fair; patient would benefit from acute skilled PT services 3 x/week to address these deficits, improve quality of life, focus on recovery of impairments, provide patient/caregiver education, reduce fall risk, and reach maximum level of function.  Pt is highly  motivated to participated in skilled PT.    Recent Surgery:   * No surgery found *      Plan:     During this hospitalization, patient to be seen 3 x/week to address the identified rehab impairments via therapeutic activities, therapeutic exercises, neuromuscular re-education, wheelchair management/training and progress toward the following goals:    Plan of Care Expires:  12/26/22    Subjective     Chief Complaint: "I can walk"  Patient/Family " Comments/Goals: Progress to SNF  Pain/Comfort:  Pain Rating 1: 0/10    Patients cultural, spiritual, Rastafarian conflicts given the current situation: no    Living Environment:  Per previous charting:  Patient lives with their son and daughter in apartment, states he has not been home in 1 year.    Pt states he is able to walk, not able to explain further.  Unclear how patient has been ambulatory without B LE.  This PT attempted to call his family however was unable to reach them    Prior to admission, patient required assist with some ADLs.   DME owned: wheelchair, shower chair, bedside commode, walker, rolling.   DME not currently used:  n/a .   Upon discharge, patient will have assistance from family with 24/7 assist.     Objective:     Communicated with nursing prior to session.  Patient found supine with pulse ox (continuous)  upon PT entry to room.    General Precautions: Standard, fall   Orthopedic Precautions:N/A (B AKA)   Braces: N/A   Oxygen Device:      Exams:  Cognitive Exam:  Patient is oriented to Person, Place, and Time  Command following: Patient follows 100% of commands   B LE ROM:  WFL for hip  B LE Strength: grossly 3/5, observation only  Postural Exam:  Patient presented with the following abnormalities:    -       Rounded shoulders  -       Forward head    Functional Mobility:  Bed Mobility:  Rolling Left: Min A  Rolling Right: Min A  Scooting: Min A  Supine to Sit: Min A  Sit to Supine: Min A  Head of bed position: HOB flat with railings up    Transfers:   x2 Pivots to the L with Min A.  Pt slides on bed, unable to fully pivot weight     Gait: n/a    Balance:   Position Score Time   Static Sitting FAIR-: Maintains without assist but is inconsistent n/a   Dynamic Sitting FAIR-: Maintains without assist but is inconsistent n/a   Static Standing n/a: dependent n/a   Dynamic Standing n/a: dependent n/a       Therapeutic Activities:  Patient educated on role of acute care PT and PT POC, safety while  in hospital including calling nurse for mobility, call light usage, benefits of out of bed mobility, breathing technique, fall risk, bed mobility , positioning, posture, risks of prolonged bed rest, and benefits of continued PT by explanation and demonstration.    Patient demonstrates fair understanding of education provided this day.   Whiteboard updated    Therapeutic Exercises:  n/a    AM-PAC 6 CLICK MOBILITY  Total Score:11     Patient left HOB elevated with all lines intact, call button in reach, bed alarm on, and RN notified.    GOALS:   Multidisciplinary Problems       Physical Therapy Goals          Problem: Physical Therapy    Goal Priority Disciplines Outcome Goal Variances Interventions   Physical Therapy Goal     PT, PT/OT Ongoing, Progressing     Description: Goals to be met by: 12/10/22    Patient will increase functional independence with mobility by performin. Supine to sit with independence  2. Sit to supine with independence  3. Rolling to Left and Right with independence  4. Bed to chair transfer with minimum assistance using LRAD as needed  5. Wheelchair propulsion x100 feet with minimum assistance using bilateral uppper extremities  6. Sitting at edge of bed x10 minutes with [place]                         History:     Past Medical History:   Diagnosis Date    CAD (coronary artery disease)     CAD (non obstructive)  [facility]    Diabetes mellitus type I     Diabetic retinopathy     ESRD on hemodialysis     on HD TThSat    Gangrene of left foot     Hypertension     Nuclear sclerosis of right eye 2021    PAD (peripheral artery disease)     PEA (Pulseless electrical activity) 10/10/2022    Pulmonary HTN     Right foot infection     TB lung, latent 2021    Wet gangrene 2022       Past Surgical History:   Procedure Laterality Date    ABOVE-KNEE AMPUTATION Left 2022    Procedure: AMPUTATION, ABOVE KNEE;  Surgeon: Rusty Light MD;  Location: [facility] OR;  Service:  Orthopedics;  Laterality: Left;    ABOVE-KNEE AMPUTATION Left 1/21/2022    Procedure: AMPUTATION, ABOVE KNEE;  Surgeon: Rusty Light MD;  Location: [facility] OR;  Service: Orthopedics;  Laterality: Left;    ABOVE-KNEE AMPUTATION Right 10/13/2022    Procedure: AMPUTATION, ABOVE KNEE, RIGHT;  Surgeon: Dimitris Davis MD;  Location: [facility] OR;  Service: Orthopedics;  Laterality: Right;    AV FISTULA PLACEMENT      CATARACT EXTRACTION Left     EYE SURGERY      FISTULOGRAM Left 8/9/2022    Procedure: Fistulogram;  Surgeon: Masoud Soni MD;  Location: [facility] OR;  Service: Vascular;  Laterality: Left;  LEFT VM ON DAUGHTER'S PHONE----PHONE PREOP NOT COMPLETED  CALLED PATIENT ON 8/8/2022 @ 3:34. HE STATED HE IS RESCHEDULING PROCEDURE. NOTIFIED ERWIN @ 3:35PM-LO    FISTULOGRAM Left 10/20/2022    Procedure: Fistulogram;  Surgeon: Masoud Soni MD;  Location: [facility];  Service: Vascular;  Laterality: Left;  Left upper extremity    TOE AMPUTATION Left 1/6/2022    Procedure: AMPUTATION, TOE;  Surgeon: Masoud Soni MD;  Location: [facility];  Service: Vascular;  Laterality: Left;  Left first through fifth toes, possible open transmetatarsal amputation and all other indicated procedures       Time Tracking:     PT Received On: 11/26/22  PT Start Time: 0750     PT Stop Time: 0807  PT Total Time (min): 17 min     Billable Minutes: Evaluation 9 and Therapeutic Activity 8    11/26/2022    Co-treatment performed for this visit due to patient need for two skilled therapists to ensure patient and staff safety, to accommodate for patient activity tolerance/pain management, and maximize functional potential.

## 2022-11-26 NOTE — PLAN OF CARE
Problem: Skin Injury Risk Increased  Goal: Skin Health and Integrity  Outcome: Ongoing, Progressing     Problem: Adult Inpatient Plan of Care  Goal: Plan of Care Review  Outcome: Ongoing, Progressing  Goal: Absence of Hospital-Acquired Illness or Injury  Outcome: Ongoing, Progressing  Goal: Optimal Comfort and Wellbeing  Outcome: Ongoing, Progressing     Problem: Fall Injury Risk  Goal: Absence of Fall and Fall-Related Injury  Outcome: Ongoing, Progressing     Problem: Diabetes Comorbidity  Goal: Blood Glucose Level Within Targeted Range  Outcome: Ongoing, Progressing

## 2022-11-27 LAB
ANION GAP SERPL CALC-SCNC: 12 MMOL/L (ref 8–16)
BASOPHILS # BLD AUTO: 0.1 K/UL (ref 0–0.2)
BASOPHILS NFR BLD: 1 % (ref 0–1.9)
BUN SERPL-MCNC: 39 MG/DL (ref 6–20)
CALCIUM SERPL-MCNC: 8.9 MG/DL (ref 8.7–10.5)
CHLORIDE SERPL-SCNC: 102 MMOL/L (ref 95–110)
CO2 SERPL-SCNC: 22 MMOL/L (ref 23–29)
CREAT SERPL-MCNC: 9.6 MG/DL (ref 0.5–1.4)
DIFFERENTIAL METHOD: ABNORMAL
EOSINOPHIL # BLD AUTO: 0.8 K/UL (ref 0–0.5)
EOSINOPHIL NFR BLD: 7.6 % (ref 0–8)
ERYTHROCYTE [DISTWIDTH] IN BLOOD BY AUTOMATED COUNT: 18.1 % (ref 11.5–14.5)
EST. GFR  (NO RACE VARIABLE): 5.8 ML/MIN/1.73 M^2
GLUCOSE SERPL-MCNC: 153 MG/DL (ref 70–110)
HCT VFR BLD AUTO: 31.4 % (ref 40–54)
HGB BLD-MCNC: 9.2 G/DL (ref 14–18)
IMM GRANULOCYTES # BLD AUTO: 0.03 K/UL (ref 0–0.04)
IMM GRANULOCYTES NFR BLD AUTO: 0.3 % (ref 0–0.5)
LYMPHOCYTES # BLD AUTO: 1.5 K/UL (ref 1–4.8)
LYMPHOCYTES NFR BLD: 14.5 % (ref 18–48)
MCH RBC QN AUTO: 26.1 PG (ref 27–31)
MCHC RBC AUTO-ENTMCNC: 29.3 G/DL (ref 32–36)
MCV RBC AUTO: 89 FL (ref 82–98)
MONOCYTES # BLD AUTO: 0.8 K/UL (ref 0.3–1)
MONOCYTES NFR BLD: 8.1 % (ref 4–15)
NEUTROPHILS # BLD AUTO: 6.8 K/UL (ref 1.8–7.7)
NEUTROPHILS NFR BLD: 68.5 % (ref 38–73)
NRBC BLD-RTO: 0 /100 WBC
PLATELET # BLD AUTO: 395 K/UL (ref 150–450)
PMV BLD AUTO: 10.2 FL (ref 9.2–12.9)
POCT GLUCOSE: 131 MG/DL (ref 70–110)
POCT GLUCOSE: 152 MG/DL (ref 70–110)
POCT GLUCOSE: 154 MG/DL (ref 70–110)
POCT GLUCOSE: 86 MG/DL (ref 70–110)
POTASSIUM SERPL-SCNC: 4.2 MMOL/L (ref 3.5–5.1)
RBC # BLD AUTO: 3.52 M/UL (ref 4.6–6.2)
SODIUM SERPL-SCNC: 136 MMOL/L (ref 136–145)
WBC # BLD AUTO: 9.98 K/UL (ref 3.9–12.7)

## 2022-11-27 PROCEDURE — 20600001 HC STEP DOWN PRIVATE ROOM

## 2022-11-27 PROCEDURE — 63600175 PHARM REV CODE 636 W HCPCS: Performed by: INTERNAL MEDICINE

## 2022-11-27 PROCEDURE — 25000003 PHARM REV CODE 250: Performed by: INTERNAL MEDICINE

## 2022-11-27 PROCEDURE — 94761 N-INVAS EAR/PLS OXIMETRY MLT: CPT

## 2022-11-27 PROCEDURE — 80048 BASIC METABOLIC PNL TOTAL CA: CPT | Performed by: FAMILY MEDICINE

## 2022-11-27 PROCEDURE — 63600175 PHARM REV CODE 636 W HCPCS: Performed by: STUDENT IN AN ORGANIZED HEALTH CARE EDUCATION/TRAINING PROGRAM

## 2022-11-27 PROCEDURE — 99233 SBSQ HOSP IP/OBS HIGH 50: CPT | Mod: ,,, | Performed by: INTERNAL MEDICINE

## 2022-11-27 PROCEDURE — 99233 PR SUBSEQUENT HOSPITAL CARE,LEVL III: ICD-10-PCS | Mod: ,,, | Performed by: INTERNAL MEDICINE

## 2022-11-27 PROCEDURE — 36415 COLL VENOUS BLD VENIPUNCTURE: CPT | Performed by: FAMILY MEDICINE

## 2022-11-27 PROCEDURE — 85025 COMPLETE CBC W/AUTO DIFF WBC: CPT | Performed by: FAMILY MEDICINE

## 2022-11-27 RX ADMIN — AMLODIPINE BESYLATE 10 MG: 10 TABLET ORAL at 10:11

## 2022-11-27 RX ADMIN — ALUMINUM HYDROXIDE, MAGNESIUM HYDROXIDE, AND SIMETHICONE 30 ML: 200; 200; 20 SUSPENSION ORAL at 06:11

## 2022-11-27 RX ADMIN — HYDRALAZINE HYDROCHLORIDE 100 MG: 50 TABLET ORAL at 09:11

## 2022-11-27 RX ADMIN — HEPARIN SODIUM 7500 UNITS: 5000 INJECTION INTRAVENOUS; SUBCUTANEOUS at 02:11

## 2022-11-27 RX ADMIN — ALUMINUM HYDROXIDE, MAGNESIUM HYDROXIDE, AND SIMETHICONE 30 ML: 200; 200; 20 SUSPENSION ORAL at 09:11

## 2022-11-27 RX ADMIN — METOPROLOL SUCCINATE 25 MG: 25 TABLET, EXTENDED RELEASE ORAL at 10:11

## 2022-11-27 RX ADMIN — ALUMINUM HYDROXIDE, MAGNESIUM HYDROXIDE, AND SIMETHICONE 30 ML: 200; 200; 20 SUSPENSION ORAL at 12:11

## 2022-11-27 RX ADMIN — MUPIROCIN: 20 OINTMENT TOPICAL at 09:11

## 2022-11-27 RX ADMIN — PANTOPRAZOLE SODIUM 40 MG: 40 TABLET, DELAYED RELEASE ORAL at 10:11

## 2022-11-27 RX ADMIN — MEROPENEM AND SODIUM CHLORIDE 500 MG: 500 INJECTION, SOLUTION INTRAVENOUS at 10:11

## 2022-11-27 RX ADMIN — SUCRALFATE 1 G: 1 SUSPENSION ORAL at 06:11

## 2022-11-27 RX ADMIN — ATORVASTATIN CALCIUM 40 MG: 20 TABLET, FILM COATED ORAL at 09:11

## 2022-11-27 RX ADMIN — HYDRALAZINE HYDROCHLORIDE 100 MG: 50 TABLET ORAL at 02:11

## 2022-11-27 RX ADMIN — FOLIC ACID-PYRIDOXINE-CYANOCOBALAMIN TAB 2.5-25-2 MG 1 TABLET: 2.5-25-2 TAB at 10:11

## 2022-11-27 RX ADMIN — INSULIN DETEMIR 8 UNITS: 100 INJECTION, SOLUTION SUBCUTANEOUS at 09:11

## 2022-11-27 RX ADMIN — SUCRALFATE 1 G: 1 SUSPENSION ORAL at 12:11

## 2022-11-27 RX ADMIN — HEPARIN SODIUM 7500 UNITS: 5000 INJECTION INTRAVENOUS; SUBCUTANEOUS at 09:11

## 2022-11-27 RX ADMIN — ASPIRIN 81 MG 81 MG: 81 TABLET ORAL at 10:11

## 2022-11-27 NOTE — PLAN OF CARE
No fevers documented overnight.   IR aspiration pending for source control. Stop vancomycin (discontinued), continue meropenem given cx data.

## 2022-11-27 NOTE — PROGRESS NOTES
Refused sulcarafate and milk of mag and some repositioning.  Refused dinner.  Ate breakfast.      Patient confused and requesting to leave.  Amenable to staying when asked.  Denies any pain or discomfort.  Denies respiratory distressed.  Safety measures maintained, bed in lowest position, call light within reach.

## 2022-11-27 NOTE — PROGRESS NOTES
Vancomycin therapy has been discontinued by provider. Pharmacy will sign off, please re-consult if needed.    Kong Le, KalD  Inpatient Pharmacy  [facility]

## 2022-11-27 NOTE — PROGRESS NOTES
[facility] - Intensive Care ([address] Medicine  Progress Note    Patient Name: Adryan Neff  MRN: [id number]  Patient Class: IP- Inpatient   Admission Date: 11/23/2022  Length of Stay: 4 days  Attending Physician: Angelito Garland MD  Primary Care Provider: Carola Pedro MD        Subjective:     Principal Problem:Sepsis    Pending evaluation for IR draining the stump abcess      HPI:  Mr. Neff is a 56-year-old gentleman with PMH of CAD, DM type 1, diabetic retinopathy, ESRD on HD, hypertension, PAD, b/l AKA who presented to [facility] for altered mental status. Pt arrived from [facility] where he was found minimally responsive and BG 50s. Upon chart review, last HD sessions were Sunday 11/20 and 11/22. Pt oxygenating well on RA. Awake and oriented to self and place. He is able to state when he last had HD. Labs noted WBC 25 and lactate 2.3. UA positive for UTI. HCT nonacute. R AKA stump performed on 10/13/22 showed discoloration of the skin at the lateral aspect with areas of possible wound drainage/dehiscence. Due to concern for infection, orthopedic surgery consulted. CT right thigh with contrast showed abscess. Blood cultures ordered and empiric IV antibiotics ordered. He was admitted to Hospital Medicine for further evaluation and management of acute encephalopathy 2/2 sepsis in the setting of UTI and R AKA abscess.       Overview/Hospital Course:  IR consult for deep abscess drainage stump      No new subjective & objective note has been filed under this hospital service since the last note was generated.    Assessment/Plan:      * Sepsis  This patient does have evidence of infective focus  My overall impression is sepsis. Vital signs were reviewed and noted in progress note.  Antibiotics given-   Antibiotics (From admission, onward)      Start     Stop Route Frequency Ordered    11/24/22 1145  mupirocin 2 % ointment         11/29 0859 Nasl 2 times daily 11/24/22 1040    11/24/22 0815   piperacillin-tazobactam 4.5 g in sodium chloride 0.9% 100 mL IVPB (ready to mix system)         -- IV Every 12 hours (non-standard times) 11/24/22 0709 11/24/22 0808  vancomycin - pharmacy to dose  (vancomycin IVPB)        See Hyperspace for full Linked Orders Report.    -- IV pharmacy to manage frequency 11/24/22 0709          Cultures were taken-   Microbiology Results (last 7 days)       Procedure Component Value Units Date/Time    Urine culture [[id number]] Collected: 11/23/22 0958    Order Status: Completed Specimen: Urine Updated: 11/24/22 1315     Urine Culture, Routine No growth    Narrative:      Specimen Source->Urine    Aerobic culture [[id number]]  (Abnormal) Collected: 11/23/22 0630    Order Status: Completed Specimen: Wound from Leg, Right Updated: 11/24/22 0821     Aerobic Bacterial Culture GRAM NEGATIVE JOHN  Many  Identification and susceptibility pending      Narrative:      Right AKA    Blood culture x two cultures. Draw prior to antibiotics. [[id number]] Collected: 11/23/22 0631    Order Status: Completed Specimen: Blood from Peripheral, Antecubital, Right Updated: 11/24/22 0812     Blood Culture, Routine No Growth to date      No Growth to date    Narrative:      Aerobic and anaerobic    Blood culture x two cultures. Draw prior to antibiotics. [[id number]] Collected: 11/23/22 0631    Order Status: Completed Specimen: Blood from Peripheral, Hand, Right Updated: 11/24/22 0812     Blood Culture, Routine No Growth to date      No Growth to date    Narrative:      Aerobic and anaerobic    Culture, Anaerobe [[id number]] Collected: 11/23/22 0630    Order Status: Completed Specimen: Wound from Leg, Right Updated: 11/24/22 0739     Anaerobic Culture Culture in progress    Narrative:      Right AKA          Latest lactate reviewed, they are-  Recent Labs   Lab 11/23/22 0629 11/23/22  1036   LACTATE 2.3* 1.0       Organ dysfunction indicated by Encephalopathy   Source- UTI and R AKA abscess    Source control  "Achieved by- IV vancomycin and zosyn     PLAN:  - WBC 25  - lactate 2.3 --> 1.0  - UA positive for UTI. followup Ucx  - blood culture pending   - CT right thigh with contrast showed abscess  - continue IV vancomycin and zosyn   - ID consulted. Followup recs  -Pending evaluation for IR draining the stump abcess      Elevated troponin  R/O from CKD Vs sepsis  -No CP  -cont monitor and consider cardiology consult if worsening      Acute cystitis  - UA positive for UTI  - followup Ucx  - continue on IV zosyn       S/P AKA (above knee amputation) bilateral  - ortho consulted. apprec recs  - CT right thigh showed abscess  - continue IV vancomycin and zosyn   - ID consulted. followup recs  - see "sepsis"      Abscess of right thigh  -S/P AKA  - seen on CT thigh with contrast  -Need abscess aspiration by IR with Cx  - ortho consulted. apprec recs  - stop IV vancomycin  per ID and cont meropenem  -  wound cultures with pseudomonas and citrobacter and blood cultures no growth to dates  - ID consulted. followup recs  -IR consulted for I&D F/U aspiration Cx      Cognitive changes  Confusion on and off  Consider psych consult      Anemia due to chronic kidney disease, on chronic dialysis  - H/H 8.4/28.9 has dropped  - transfuse for Hb < 7  - CBC daily      PAD (peripheral artery disease)  - s/p b/l AKA  - continue home ASA, Plavix and statin      Encephalopathy, metabolic  - AMS likely 2/2 sepsis  - HCT nonacute   - continue IV vancomycin and zosyn for UTI and R AKA abscess  -ID and ortho consulted  -IR for I&D right stump abscess      ESRD on hemodialysis  - nephrology consulted. apprec recs  - continue HD  - renally-dose all meds  - strict I/Os  - hold ACE-I or ARB while renal function dynamic        Secondary hyperparathyroidism of renal origin  - continue home Renvela       HLD (hyperlipidemia)  - continue home statin      CAD (coronary artery disease) - non-obstructive from [facility] in 2016  - continue ASA, Plavix and " statin      Chronic diastolic heart failure  - followup BNP  - CXR showed vascular congestion   - TTE on 10/10/2022 showed EF 40% and grade II DD  - volume management with HD  - continue metoprolol       Gastroesophageal reflux disease  - continue home PPI      Essential (primary) hypertension  controlled  - continue home metoprolol, amlodipine and hydralazine      Controlled type 2 diabetes mellitus with chronic kidney disease on chronic dialysis, with long-term current use of insulin  Patient's FSGs are controlled on current medication regimen.  Last A1c reviewed-   Lab Results   Component Value Date    HGBA1C 6.1 (H) 2021     Most recent fingerstick glucose reviewed-   Recent Labs   Lab 22  0739 22  1237   POCTGLUCOSE 103 141*     Current correctional scale  Low  Maintain anti-hyperglycemic dose as follows-   Antihyperglycemics (From admission, onward)      Start     Stop Route Frequency Ordered    22 2100  insulin detemir U-100 pen 8 Units         -- SubQ Nightly 22 1508    22 1448  insulin aspart U-100 pen 0-5 Units         -- SubQ Before meals & nightly PRN 22 1508          Hold Oral hypoglycemics while patient is in the hospital.  - continue home determir 8 units HS   - LDSSI   - goal B-180  - diabetic diet     VTE Risk Mitigation (From admission, onward)           Ordered     heparin (porcine) injection 7,500 Units  Every 8 hours         22 1508     IP VTE HIGH RISK PATIENT  Once         22 1508     Place sequential compression device  Until discontinued         22 1508     Place sequential compression device  Until discontinued         22 1044                    Discharge Planning   ENIO: 2022     Code Status: Full Code   Is the patient medically ready for discharge?: No    Reason for patient still in hospital (select all that apply): Patient trending condition, Treatment and Consult recommendations  Discharge Plan A: Home Health,  Return to nursing home          Time spent > 35 min        Mena Mckeon MD  Department of Hospital Medicine   [facility] - Intensive Care ([place]-14)

## 2022-11-27 NOTE — PROGRESS NOTES
[facility] - Intensive Care ([address] Medicine  Progress Note    Patient Name: Adryan Neff  MRN: [id number]  Patient Class: IP- Inpatient   Admission Date: 11/23/2022  Length of Stay: 4 days  Attending Physician: Angelito Garland MD  Primary Care Provider: Carola Pedro MD        Subjective:     Principal Problem:Sepsis    Pending evaluation for IR draining the stump abcess      HPI:  Mr. Neff is a 56-year-old gentleman with PMH of CAD, DM type 1, diabetic retinopathy, ESRD on HD, hypertension, PAD, b/l AKA who presented to [facility]ED for altered mental status. Pt arrived from [facility] where he was found minimally responsive and BG 50s. Upon chart review, last HD sessions were Sunday 11/20 and 11/22. Pt oxygenating well on RA. Awake and oriented to self and place. He is able to state when he last had HD. Labs noted WBC 25 and lactate 2.3. UA positive for UTI. HCT nonacute. R AKA stump performed on 10/13/22 showed discoloration of the skin at the lateral aspect with areas of possible wound drainage/dehiscence. Due to concern for infection, orthopedic surgery consulted. CT right thigh with contrast showed abscess. Blood cultures ordered and empiric IV antibiotics ordered. He was admitted to Hospital Medicine for further evaluation and management of acute encephalopathy 2/2 sepsis in the setting of UTI and R AKA abscess.       Overview/Hospital Course:  IR consult for deep abscess drainage stump      No new subjective & objective note has been filed under this hospital service since the last note was generated.    Assessment/Plan:      * Sepsis  This patient does have evidence of infective focus  My overall impression is sepsis. Vital signs were reviewed and noted in progress note.  Antibiotics given-   Antibiotics (From admission, onward)      Start     Stop Route Frequency Ordered    11/24/22 1145  mupirocin 2 % ointment         11/29 0859 Nasl 2 times daily 11/24/22 1040    11/24/22 0815   piperacillin-tazobactam 4.5 g in sodium chloride 0.9% 100 mL IVPB (ready to mix system)         -- IV Every 12 hours (non-standard times) 11/24/22 0709 11/24/22 0808  vancomycin - pharmacy to dose  (vancomycin IVPB)        See Hyperspace for full Linked Orders Report.    -- IV pharmacy to manage frequency 11/24/22 0709          Cultures were taken-   Microbiology Results (last 7 days)       Procedure Component Value Units Date/Time    Urine culture [[id number]] Collected: 11/23/22 0958    Order Status: Completed Specimen: Urine Updated: 11/24/22 1315     Urine Culture, Routine No growth    Narrative:      Specimen Source->Urine    Aerobic culture [[id number]]  (Abnormal) Collected: 11/23/22 0630    Order Status: Completed Specimen: Wound from Leg, Right Updated: 11/24/22 0821     Aerobic Bacterial Culture GRAM NEGATIVE JOHN  Many  Identification and susceptibility pending      Narrative:      Right AKA    Blood culture x two cultures. Draw prior to antibiotics. [[id number]] Collected: 11/23/22 0631    Order Status: Completed Specimen: Blood from Peripheral, Antecubital, Right Updated: 11/24/22 0812     Blood Culture, Routine No Growth to date      No Growth to date    Narrative:      Aerobic and anaerobic    Blood culture x two cultures. Draw prior to antibiotics. [[id number]] Collected: 11/23/22 0631    Order Status: Completed Specimen: Blood from Peripheral, Hand, Right Updated: 11/24/22 0812     Blood Culture, Routine No Growth to date      No Growth to date    Narrative:      Aerobic and anaerobic    Culture, Anaerobe [[id number]] Collected: 11/23/22 0630    Order Status: Completed Specimen: Wound from Leg, Right Updated: 11/24/22 0739     Anaerobic Culture Culture in progress    Narrative:      Right AKA          Latest lactate reviewed, they are-  Recent Labs   Lab 11/23/22 0629 11/23/22  1036   LACTATE 2.3* 1.0       Organ dysfunction indicated by Encephalopathy   Source- UTI and R AKA abscess    Source control  "Achieved by- IV vancomycin and zosyn     PLAN:  - WBC 25  - lactate 2.3 --> 1.0  - UA positive for UTI. followup Ucx  - blood culture pending   - CT right thigh with contrast showed abscess  - continue IV vancomycin and zosyn   - ID consulted. Followup recs  -Pending evaluation for IR draining the stump abcess      Elevated troponin  R/O from CKD Vs sepsis  -No CP  -cont monitor and consider cardiology consult if worsening      Acute cystitis  - UA positive for UTI  - followup Ucx  - continue on IV zosyn       S/P AKA (above knee amputation) bilateral  - ortho consulted. apprec recs  - CT right thigh showed abscess  - continue IV vancomycin and zosyn   - ID consulted. followup recs  - see "sepsis"      Abscess of right thigh  -S/P AKA  - seen on CT thigh with contrast  -Need abscess aspiration by IR with Cx  - ortho consulted. apprec recs  - continue IV vancomycin and zoysn (D1- 11/24)  -  wound cultures with pseudomonas and citrobacter and blood cultures no growth to dates  - ID consulted. followup recs  -IR consulted for I&D F/U aspiration Cx      Cognitive changes  Confusion on and off  Consider psych consult      Anemia due to chronic kidney disease, on chronic dialysis  - H/H 8.4/28.9 has dropped  - transfuse for Hb < 7  - CBC daily      PAD (peripheral artery disease)  - s/p b/l AKA  - continue home ASA, Plavix and statin      Encephalopathy, metabolic  - AMS likely 2/2 sepsis  - HCT nonacute   - continue IV vancomycin and zosyn for UTI and R AKA abscess  -ID and ortho consulted  -IR for I&D right stump abscess      ESRD on hemodialysis  - nephrology consulted. apprec recs  - continue HD  - renally-dose all meds  - strict I/Os  - hold ACE-I or ARB while renal function dynamic        Secondary hyperparathyroidism of renal origin  - continue home Renvela       HLD (hyperlipidemia)  - continue home statin      CAD (coronary artery disease) - non-obstructive from [facility] in 2016  - continue ASA, Plavix and " statin      Chronic diastolic heart failure  - followup BNP  - CXR showed vascular congestion   - TTE on 10/10/2022 showed EF 40% and grade II DD  - volume management with HD  - continue metoprolol       Gastroesophageal reflux disease  - continue home PPI      Essential (primary) hypertension  controlled  - continue home metoprolol, amlodipine and hydralazine      Controlled type 2 diabetes mellitus with chronic kidney disease on chronic dialysis, with long-term current use of insulin  Patient's FSGs are controlled on current medication regimen.  Last A1c reviewed-   Lab Results   Component Value Date    HGBA1C 6.1 (H) 2021     Most recent fingerstick glucose reviewed-   Recent Labs   Lab 22  0739 22  1237   POCTGLUCOSE 103 141*     Current correctional scale  Low  Maintain anti-hyperglycemic dose as follows-   Antihyperglycemics (From admission, onward)      Start     Stop Route Frequency Ordered    22 2100  insulin detemir U-100 pen 8 Units         -- SubQ Nightly 22 1508    22 1448  insulin aspart U-100 pen 0-5 Units         -- SubQ Before meals & nightly PRN 22 1508          Hold Oral hypoglycemics while patient is in the hospital.  - continue home determir 8 units HS   - LDSSI   - goal B-180  - diabetic diet     VTE Risk Mitigation (From admission, onward)           Ordered     heparin (porcine) injection 7,500 Units  Every 8 hours         22 1508     IP VTE HIGH RISK PATIENT  Once         22 1508     Place sequential compression device  Until discontinued         22 1508     Place sequential compression device  Until discontinued         22 1044                    Discharge Planning   ENIO: 2022     Code Status: Full Code   Is the patient medically ready for discharge?: No    Reason for patient still in hospital (select all that apply): Patient trending condition, Treatment and Consult recommendations  Discharge Plan A: Home Health,  Return to nursing home          Time spent > 35 min        Mena Mckeon MD  Department of Hospital Medicine   [facility] - Intensive Care ([place]-14)

## 2022-11-28 LAB
BACTERIA BLD CULT: NORMAL
BACTERIA BLD CULT: NORMAL
BACTERIA SPEC AEROBE CULT: ABNORMAL
BACTERIA SPEC ANAEROBE CULT: NORMAL
POCT GLUCOSE: 117 MG/DL (ref 70–110)
POCT GLUCOSE: 195 MG/DL (ref 70–110)

## 2022-11-28 PROCEDURE — 97530 THERAPEUTIC ACTIVITIES: CPT

## 2022-11-28 PROCEDURE — 90935 HEMODIALYSIS ONE EVALUATION: CPT | Mod: ,,, | Performed by: NURSE PRACTITIONER

## 2022-11-28 PROCEDURE — 63600175 PHARM REV CODE 636 W HCPCS: Mod: JG | Performed by: INTERNAL MEDICINE

## 2022-11-28 PROCEDURE — 20600001 HC STEP DOWN PRIVATE ROOM

## 2022-11-28 PROCEDURE — 94761 N-INVAS EAR/PLS OXIMETRY MLT: CPT

## 2022-11-28 PROCEDURE — 99233 PR SUBSEQUENT HOSPITAL CARE,LEVL III: ICD-10-PCS | Mod: ,,, | Performed by: FAMILY MEDICINE

## 2022-11-28 PROCEDURE — 90935 PR HEMODIALYSIS, ONE EVALUATION: ICD-10-PCS | Mod: ,,, | Performed by: NURSE PRACTITIONER

## 2022-11-28 PROCEDURE — 99233 SBSQ HOSP IP/OBS HIGH 50: CPT | Mod: ,,, | Performed by: FAMILY MEDICINE

## 2022-11-28 PROCEDURE — 99233 SBSQ HOSP IP/OBS HIGH 50: CPT | Mod: ,,, | Performed by: INTERNAL MEDICINE

## 2022-11-28 PROCEDURE — 80100016 HC MAINTENANCE HEMODIALYSIS

## 2022-11-28 PROCEDURE — 99233 PR SUBSEQUENT HOSPITAL CARE,LEVL III: ICD-10-PCS | Mod: ,,, | Performed by: INTERNAL MEDICINE

## 2022-11-28 PROCEDURE — 25000003 PHARM REV CODE 250: Performed by: INTERNAL MEDICINE

## 2022-11-28 RX ORDER — MEROPENEM AND SODIUM CHLORIDE 1 G/50ML
1 INJECTION, SOLUTION INTRAVENOUS DAILY
Status: DISCONTINUED | OUTPATIENT
Start: 2022-11-29 | End: 2022-12-02

## 2022-11-28 RX ADMIN — ATORVASTATIN CALCIUM 40 MG: 20 TABLET, FILM COATED ORAL at 09:11

## 2022-11-28 RX ADMIN — ALUMINUM HYDROXIDE, MAGNESIUM HYDROXIDE, AND SIMETHICONE 30 ML: 200; 200; 20 SUSPENSION ORAL at 06:11

## 2022-11-28 RX ADMIN — HEPARIN SODIUM 7500 UNITS: 5000 INJECTION INTRAVENOUS; SUBCUTANEOUS at 09:11

## 2022-11-28 RX ADMIN — SUCRALFATE 1 G: 1 SUSPENSION ORAL at 11:11

## 2022-11-28 RX ADMIN — HYDRALAZINE HYDROCHLORIDE 100 MG: 50 TABLET ORAL at 01:11

## 2022-11-28 RX ADMIN — INSULIN DETEMIR 8 UNITS: 100 INJECTION, SOLUTION SUBCUTANEOUS at 09:11

## 2022-11-28 RX ADMIN — ASPIRIN 81 MG 81 MG: 81 TABLET ORAL at 01:11

## 2022-11-28 RX ADMIN — ALUMINUM HYDROXIDE, MAGNESIUM HYDROXIDE, AND SIMETHICONE 30 ML: 200; 200; 20 SUSPENSION ORAL at 05:11

## 2022-11-28 RX ADMIN — AMLODIPINE BESYLATE 10 MG: 10 TABLET ORAL at 01:11

## 2022-11-28 RX ADMIN — MUPIROCIN: 20 OINTMENT TOPICAL at 01:11

## 2022-11-28 RX ADMIN — HEPARIN SODIUM 7500 UNITS: 5000 INJECTION INTRAVENOUS; SUBCUTANEOUS at 01:11

## 2022-11-28 RX ADMIN — MUPIROCIN: 20 OINTMENT TOPICAL at 09:11

## 2022-11-28 RX ADMIN — FOLIC ACID-PYRIDOXINE-CYANOCOBALAMIN TAB 2.5-25-2 MG 1 TABLET: 2.5-25-2 TAB at 01:11

## 2022-11-28 RX ADMIN — EPOETIN ALFA-EPBX 10000 UNITS: 10000 INJECTION, SOLUTION INTRAVENOUS; SUBCUTANEOUS at 03:11

## 2022-11-28 RX ADMIN — ALUMINUM HYDROXIDE, MAGNESIUM HYDROXIDE, AND SIMETHICONE 30 ML: 200; 200; 20 SUSPENSION ORAL at 09:11

## 2022-11-28 RX ADMIN — SUCRALFATE 1 G: 1 SUSPENSION ORAL at 06:11

## 2022-11-28 RX ADMIN — HYDRALAZINE HYDROCHLORIDE 100 MG: 50 TABLET ORAL at 09:11

## 2022-11-28 RX ADMIN — SUCRALFATE 1 G: 1 SUSPENSION ORAL at 05:11

## 2022-11-28 RX ADMIN — METOPROLOL SUCCINATE 25 MG: 25 TABLET, EXTENDED RELEASE ORAL at 01:11

## 2022-11-28 RX ADMIN — SUCRALFATE 1 G: 1 SUSPENSION ORAL at 01:11

## 2022-11-28 RX ADMIN — SUCRALFATE 1 G: 1 SUSPENSION ORAL at 12:11

## 2022-11-28 RX ADMIN — HYDRALAZINE HYDROCHLORIDE 100 MG: 50 TABLET ORAL at 05:11

## 2022-11-28 RX ADMIN — PANTOPRAZOLE SODIUM 40 MG: 40 TABLET, DELAYED RELEASE ORAL at 01:11

## 2022-11-28 RX ADMIN — HEPARIN SODIUM 7500 UNITS: 5000 INJECTION INTRAVENOUS; SUBCUTANEOUS at 05:11

## 2022-11-28 NOTE — PLAN OF CARE
Problem: Occupational Therapy  Goal: Occupational Therapy Goal  Description: Goals to be met by: 12/12     Patient will increase functional independence with ADLs by performing:    UE Dressing with Supervision.  LE Dressing with Supervision.  Grooming while seated with Supervision.  Toileting from toilet with Supervision for hygiene and clothing management.   Scooting in long sitting with Modified independence    Outcome: Ongoing, Progressing     Goals updated.

## 2022-11-28 NOTE — SUBJECTIVE & OBJECTIVE
"Interval History: No adverse events, reports "I am okay".     Review of Systems   Constitutional:  Negative for fever.   Respiratory:  Negative for chest tightness and shortness of breath.    Cardiovascular:  Negative for chest pain.   Gastrointestinal:  Negative for blood in stool, diarrhea and nausea.   Genitourinary:  Negative for flank pain.   Musculoskeletal:  Negative for arthralgias and neck stiffness.       Objective:     Vital Signs (Most Recent):  Temp: 98 °F (36.7 °C) (11/28/22 0710)  Pulse: 74 (11/28/22 0744)  Resp: 16 (11/28/22 0746)  BP: (!) (P) 149/62 (11/28/22 0842)  SpO2: 99 % (11/28/22 0710)   Vital Signs (24h Range):  Temp:  [96.3 °F (35.7 °C)-98.3 °F (36.8 °C)] 98 °F (36.7 °C)  Pulse:  [64-74] 74  Resp:  [12-18] 16  SpO2:  [96 %-99 %] 99 %  BP: (114-181)/(57-75) (P) 149/62     Weight: 56.5 kg (124 lb 9 oz)  Body mass index is 17.37 kg/m².    Estimated Creatinine Clearance: 6.8 mL/min (A) (based on SCr of 9.6 mg/dL (H)).    Physical Exam  Constitutional:       Appearance: Normal appearance.   HENT:      Head: Normocephalic and atraumatic.      Nose: Nose normal.      Mouth/Throat:      Mouth: Mucous membranes are moist.      Pharynx: Oropharynx is clear.   Eyes:      Extraocular Movements: Extraocular movements intact.      Conjunctiva/sclera: Conjunctivae normal.      Pupils: Pupils are equal, round, and reactive to light.   Cardiovascular:      Rate and Rhythm: Normal rate and regular rhythm.      Pulses: Normal pulses.      Heart sounds: Normal heart sounds.      Comments: HUMBERTO FOX  Pulmonary:      Effort: Pulmonary effort is normal.      Breath sounds: Normal breath sounds.   Abdominal:      General: Abdomen is flat. Bowel sounds are normal.      Palpations: Abdomen is soft.      Tenderness: There is no guarding or rebound.   Musculoskeletal:         General: Swelling and deformity present.      Cervical back: Normal range of motion and neck supple.      Comments: L AKA  R AKA   Skin:     " General: Skin is warm and dry.      Coloration: Skin is not jaundiced.      Findings: Lesion present. No rash.      Comments: R AKA with sutures in place, clean, dry, intact, no purulence noted   Neurological:      Mental Status: He is alert. Mental status is at baseline.      Comments: Alert, awake, oriented and responding appropriately   Psychiatric:         Behavior: Behavior normal.       Significant Labs: Blood Culture:   Recent Labs   Lab 10/11/22  0914 10/11/22  0915 10/17/22  1415 10/17/22  1427 11/23/22 0631   LABBLOO No Growth after 4 days. No Growth after 4 days. No Growth after 4 days. No Growth after 4 days. No growth after 5 days.  No growth after 5 days.     BMP:   Recent Labs   Lab 11/27/22  1043   *      K 4.2      CO2 22*   BUN 39*   CREATININE 9.6*   CALCIUM 8.9     CBC:   Recent Labs   Lab 11/27/22  1043   WBC 9.98   HGB 9.2*   HCT 31.4*        CMP:   Recent Labs   Lab 11/27/22  1043      K 4.2      CO2 22*   *   BUN 39*   CREATININE 9.6*   CALCIUM 8.9   ANIONGAP 12     Microbiology Results (last 7 days)       Procedure Component Value Units Date/Time    Blood culture x two cultures. Draw prior to antibiotics. [[id number]] Collected: 11/23/22 0631    Order Status: Completed Specimen: Blood from Peripheral, Antecubital, Right Updated: 11/28/22 0812     Blood Culture, Routine No growth after 5 days.    Narrative:      Aerobic and anaerobic    Blood culture x two cultures. Draw prior to antibiotics. [[id number]] Collected: 11/23/22 0631    Order Status: Completed Specimen: Blood from Peripheral, Hand, Right Updated: 11/28/22 0812     Blood Culture, Routine No growth after 5 days.    Narrative:      Aerobic and anaerobic    Aerobic culture [[id number]]  (Abnormal)  (Susceptibility) Collected: 11/23/22 0630    Order Status: Completed Specimen: Wound from Leg, Right Updated: 11/28/22 0802     Aerobic Bacterial Culture CITROBACTER KOSERI  Many         CITROBACTER KOSERI  Many        PSEUDOMONAS AERUGINOSA  Many      Narrative:      Right AKA    Aerobic culture [[id number]]     Order Status: No result Specimen: Incision site from Buttocks, Left     Aerobic culture [[id number]]     Order Status: No result Specimen: Abscess from Buttocks, Left     Aerobic culture [[id number]]     Order Status: No result Specimen: Incision site from Buttocks, Left     Culture, Anaerobe [[id number]] Collected: 11/23/22 0630    Order Status: Completed Specimen: Wound from Leg, Right Updated: 11/25/22 1026     Anaerobic Culture Culture in progress    Narrative:      Right AKA    Urine culture [[id number]] Collected: 11/23/22 0958    Order Status: Completed Specimen: Urine Updated: 11/24/22 1315     Urine Culture, Routine No growth    Narrative:      Specimen Source->Urine          Pathology Results  (Last 10 years)                 10/13/22 1540  Specimen to Pathology, Surgery Orthopedics Final result    Narrative:  Pre-op Diagnosis: Diabetic foot ulcer [E11.621, L97.509]   Ulcer of foot [L97.509]   Procedure(s):   AMPUTATION, ABOVE KNEE, RIGHT   Number of specimens 1   Name of specimens:Right above knee amputation   Which provider would you like to cc?->GREER SANABRIA   Release to patient->Immediate   Specimen total (fresh, frozen, permanent):->1 01/21/22 1515  Specimen to Pathology, Surgery Orthopedics Final result    Narrative:  Pre-op Diagnosis: Gangrene of left foot [I96]   Procedure(s):   AMPUTATION, ABOVE KNEE   Number of specimens: 1   Name of specimens: left leg, status-post above the knee   amputation   Release to patient->Immediate   Specimen total (fresh, frozen, permanent):->1 01/06/22 1050  Specimen to Pathology, Surgery Cardiovascular Final result    Narrative:  Pre-op Diagnosis: Gangrene of left foot [I96]   Procedure(s):   AMPUTATION, TOE   Number of specimens: 1   Name of specimens: Left foot : 2nd - 5th metatarsal   Release to patient->Immediate   Specimen  total (fresh, frozen, permanent):->1       08/02/21 1404  Specimen to Pathology, Bone Marrow Aspiration/Biopsy Edited Result - FINAL          All pertinent labs within the past 24 hours have been reviewed.  Recent Lab Results         11/27/22  2045   11/27/22  1626   11/27/22  1118   11/27/22  1043        Anion Gap       12       Baso #       0.10       Basophil %       1.0       BUN       39       Calcium       8.9       Chloride       102       CO2       22       Creatinine       9.6       Differential Method       Automated       eGFR       5.8       Eos #       0.8       Eosinophil %       7.6       Glucose       153       Gran # (ANC)       6.8       Gran %       68.5       Hematocrit       31.4       Hemoglobin       9.2       Immature Grans (Abs)       0.03  Comment: Mild elevation in immature granulocytes is non specific and   can be seen in a variety of conditions including stress response,   acute inflammation, trauma and pregnancy. Correlation with other   laboratory and clinical findings is essential.         Immature Granulocytes       0.3       Lymph #       1.5       Lymph %       14.5       MCH       26.1       MCHC       29.3       MCV       89       Mono #       0.8       Mono %       8.1       MPV       10.2       nRBC       0       Platelets       395       POCT Glucose 131   154   152         Potassium       4.2       RBC       3.52       RDW       18.1       Sodium       136       WBC       9.98               Significant Imaging: I have reviewed all pertinent imaging results/findings within the past 24 hours.

## 2022-11-28 NOTE — PROGRESS NOTES
HD txt complete.  2L UF removed.  Needles removed & dressing applied.  Tolerated well.    Pt transported back to his room in his bed.

## 2022-11-28 NOTE — PT/OT/SLP PROGRESS
"Occupational Therapy   Treatment    Name: Adryan Neff  MRN: [id number]  Admitting Diagnosis:  Sepsis       Recommendations:     Discharge Recommendations: nursing facility, skilled  Discharge Equipment Recommendations:   (TBD)  Barriers to discharge:  Decreased caregiver support    Assessment:     Adryan Neff is a 56 y.o. male with a medical diagnosis of Sepsis.  He presents with noted confusion & mild verbal agitation during session regarding wanting to be discharged from hospital to home.  Pt continues to require (A) for safety. Performance deficits affecting function are weakness, impaired endurance, impaired self care skills, impaired functional mobility, impaired balance, decreased upper extremity function, decreased lower extremity function, decreased safety awareness, orthopedic precautions.     Rehab Prognosis:  Fair; patient would benefit from acute skilled OT services to address these deficits and reach maximum level of function.       Plan:     Patient to be seen 3 x/week to address the above listed problems via therapeutic exercises, therapeutic activities, self-care/home management  Plan of Care Expires: 12/26/22  Plan of Care Reviewed with: patient    Subjective   "9 months. 9 months for no reason.  I'm going home today, I've decided."  Pain/Comfort:  Pain Rating 1: 0/10  Pain Rating Post-Intervention 1: 0/10    Objective:     Communicated with: RN prior to session.  Patient found supine with  (no lines (found telemetry completely pulled off (notified RN)), no family  present) upon OT entry to room.    General Precautions: Standard, fall   Orthopedic Precautions: ((B) AKA)   Braces: N/A     Occupational Performance:     Bed Mobility:    Patient completed Scooting/Bridging with supervision scooting in long sitting in bed posteriorly  Patient completed Supine to Sit with supervision into long sitting in bed  Patient completed Sit to Supine with supervision from long sitting in bed    [facility] 6 Click ADL: " 16    Treatment & Education:  Pt performed AROM exercises with BUE in 2 planes x 10 reps each & 1 plane x 2 sets x 10 reps each while seated in long sitting in bed. Pt started to become upset regarding wanting to discharge home today, OT explained that she does not decide this, notified RN of pt mild verbal agitation.  Attempted to redirect conversation away from discharge plans however pt remained fixated on this therefore ended session & notified RN of pt frustrations with wanting to discharge home today.  Pt had no further questions & when asked whether there were any concerns pt reported none.      Patient left supine with all lines intact, call button in reach, and RN notified    GOALS:   Multidisciplinary Problems       Occupational Therapy Goals          Problem: Occupational Therapy    Goal Priority Disciplines Outcome Interventions   Occupational Therapy Goal     OT, PT/OT Ongoing, Progressing    Description: Goals to be met by: 12/12     Patient will increase functional independence with ADLs by performing:    UE Dressing with Supervision.  LE Dressing with Supervision.  Grooming while seated with Supervision.  Toileting from toilet with Supervision for hygiene and clothing management.   Scooting in long sitting with Modified independence                         Time Tracking:     OT Date of Treatment: 11/28/22  OT Start Time: 1323  OT Stop Time: 1337  OT Total Time (min): 14 min    Billable Minutes:Therapeutic Activity 14    OT/GLENDA: OT     GLENDA Visit Number: 0    11/28/2022

## 2022-11-28 NOTE — PROGRESS NOTES
ESRD MWF HD initiated to PHIL fistula.  Tolerated well.    Pulled large clots out of fistula.  Giovanny had to cannulate.        Pt arrived to [facility] in his bed.

## 2022-11-28 NOTE — ASSESSMENT & PLAN NOTE
56 year old male with ESRD on HD s/p tx and severe PAD c/b gangrene leading ro R AKA (10/13/22- clean margins on pathology). He then re-presented for altered mental status, and found to have a large abscess in his RLE on CT 11/23 with extension into the femoral bone marrow suggestive of osteomyelitis. Superficial wound cultures (suspected to be taken from initial purulence noted around R AKA stump site) show two different types of Citrobacter Koseri (one resistant and one being sensitive) as well as pseudomonas. He remains afebrile since admission, clinically improved and resolved leukocytosis.       Recommendations    1. Continue Meropenem empirically. Vancomycin was discontinued in absence of MRSA on cultures.     2. Please send cultures (anaerobic and aerobic) when aspiration of abscess is performed. Will follow culture data and finalize recommendations based on this. Plan for ~ 6 weeks of treatment given osteomyelitis.

## 2022-11-28 NOTE — PROGRESS NOTES
[facility] H[facility]ODIALYSIS NOTE    Adryan Neff is a 56 y.o. male currently on hemodialysis for removal of uremic toxins and volume.     Patient seen and evaluated on hemodialysis, tolerating treatment, see HD flowsheet for vitals and assessments.    No Hypotension, chest pain, shortness of breath, cramping, nausea or vomiting.      Labs have been reviewed and the dialysate bath has been adjusted.     Labs:     Recent Labs   Lab 11/23/22  0629 11/25/22  0500 11/27/22  1043    135* 136   K 3.9 4.5 4.2    98 102   CO2 30* 24 22*   BUN 23* 49* 39*   CREATININE 5.6* 9.6* 9.6*   CALCIUM 10.3 9.2 8.9   PHOS  --  2.1*  --      Recent Labs   Lab 11/23/22  0629 11/25/22  0500 11/27/22  1043   WBC 25.43* 11.72 9.98   HGB 11.6* 8.4* 9.2*   HCT 40.8 28.9* 31.4*   * 417 395     No results found for: FESATURATED, FERRITIN     Assessment/Plan      Ultrafiltration goal: Liters: 2L. Maintain MAP > 65 mmHg.    Duration: 3 hours    - Seen on dialysis today, tolerating session with current UFR, no complications.    - Admitted with stump abscess and UTI; IR following for possible drainage.   - No lab stick/BP intake on access site  - Continue to monitor intake and output, daily weights   - Please avoid gadolinium, fleets, phos-based laxatives, NSAIDs  - Will follow closely and continue dialysis treatments while in-patient    Anemia  - Hgb 9.2, Continue LUPE with dialysis treatments    BMM  - Renal diet with protein intake goal 1.5 g/kg/d  - Novasource with meals   - F/U PO4, Mg, Calcium. And albumin levels.   - Phos 2.5 on 11/25    HTN  - BP Elevated  - Goal for BP <140mmHg SBP and <90 mmHg DBP.   - Continue home antihypertensive regimen; adjust as needed.       Reba Altman DNP, APRN, FNP-C  Nephrology Department  Pager:  [phone]

## 2022-11-29 PROBLEM — M89.9 CHRONIC KIDNEY DISEASE-MINERAL AND BONE DISORDER: Status: ACTIVE | Noted: 2022-11-29

## 2022-11-29 PROBLEM — N18.9 CHRONIC KIDNEY DISEASE-MINERAL AND BONE DISORDER: Status: ACTIVE | Noted: 2022-11-29

## 2022-11-29 PROBLEM — E83.9 CHRONIC KIDNEY DISEASE-MINERAL AND BONE DISORDER: Status: ACTIVE | Noted: 2022-11-29

## 2022-11-29 LAB
GRAM STN SPEC: NORMAL
GRAM STN SPEC: NORMAL
POCT GLUCOSE: 100 MG/DL (ref 70–110)
POCT GLUCOSE: 119 MG/DL (ref 70–110)
POCT GLUCOSE: 98 MG/DL (ref 70–110)

## 2022-11-29 PROCEDURE — 63600175 PHARM REV CODE 636 W HCPCS: Performed by: STUDENT IN AN ORGANIZED HEALTH CARE EDUCATION/TRAINING PROGRAM

## 2022-11-29 PROCEDURE — 63600175 PHARM REV CODE 636 W HCPCS: Performed by: INTERNAL MEDICINE

## 2022-11-29 PROCEDURE — 87102 FUNGUS ISOLATION CULTURE: CPT | Performed by: FAMILY MEDICINE

## 2022-11-29 PROCEDURE — 97530 THERAPEUTIC ACTIVITIES: CPT

## 2022-11-29 PROCEDURE — 87116 MYCOBACTERIA CULTURE: CPT | Performed by: FAMILY MEDICINE

## 2022-11-29 PROCEDURE — 97542 WHEELCHAIR MNGMENT TRAINING: CPT

## 2022-11-29 PROCEDURE — 99232 PR SUBSEQUENT HOSPITAL CARE,LEVL II: ICD-10-PCS | Mod: ,,, | Performed by: NURSE PRACTITIONER

## 2022-11-29 PROCEDURE — 99232 SBSQ HOSP IP/OBS MODERATE 35: CPT | Mod: ,,, | Performed by: NURSE PRACTITIONER

## 2022-11-29 PROCEDURE — 87206 SMEAR FLUORESCENT/ACID STAI: CPT | Performed by: FAMILY MEDICINE

## 2022-11-29 PROCEDURE — 99232 SBSQ HOSP IP/OBS MODERATE 35: CPT | Mod: ,,, | Performed by: FAMILY MEDICINE

## 2022-11-29 PROCEDURE — 99232 PR SUBSEQUENT HOSPITAL CARE,LEVL II: ICD-10-PCS | Mod: ,,, | Performed by: FAMILY MEDICINE

## 2022-11-29 PROCEDURE — 25000003 PHARM REV CODE 250: Performed by: INTERNAL MEDICINE

## 2022-11-29 PROCEDURE — 20600001 HC STEP DOWN PRIVATE ROOM

## 2022-11-29 PROCEDURE — 87205 SMEAR GRAM STAIN: CPT | Performed by: FAMILY MEDICINE

## 2022-11-29 PROCEDURE — 87075 CULTR BACTERIA EXCEPT BLOOD: CPT | Performed by: FAMILY MEDICINE

## 2022-11-29 RX ORDER — FENTANYL CITRATE 50 UG/ML
INJECTION, SOLUTION INTRAMUSCULAR; INTRAVENOUS
Status: COMPLETED | OUTPATIENT
Start: 2022-11-29 | End: 2022-11-29

## 2022-11-29 RX ADMIN — PANTOPRAZOLE SODIUM 40 MG: 40 TABLET, DELAYED RELEASE ORAL at 03:11

## 2022-11-29 RX ADMIN — HEPARIN SODIUM 7500 UNITS: 5000 INJECTION INTRAVENOUS; SUBCUTANEOUS at 09:11

## 2022-11-29 RX ADMIN — HYDRALAZINE HYDROCHLORIDE 100 MG: 50 TABLET ORAL at 03:11

## 2022-11-29 RX ADMIN — METOPROLOL SUCCINATE 25 MG: 25 TABLET, EXTENDED RELEASE ORAL at 03:11

## 2022-11-29 RX ADMIN — ALUMINUM HYDROXIDE, MAGNESIUM HYDROXIDE, AND SIMETHICONE 30 ML: 200; 200; 20 SUSPENSION ORAL at 09:11

## 2022-11-29 RX ADMIN — MEROPENEM AND SODIUM CHLORIDE 1 G: 1 INJECTION, SOLUTION INTRAVENOUS at 03:11

## 2022-11-29 RX ADMIN — ASPIRIN 81 MG 81 MG: 81 TABLET ORAL at 03:11

## 2022-11-29 RX ADMIN — FENTANYL CITRATE 50 MCG: 0.05 INJECTION, SOLUTION INTRAMUSCULAR; INTRAVENOUS at 01:11

## 2022-11-29 RX ADMIN — SUCRALFATE 1 G: 1 SUSPENSION ORAL at 05:11

## 2022-11-29 RX ADMIN — ALUMINUM HYDROXIDE, MAGNESIUM HYDROXIDE, AND SIMETHICONE 30 ML: 200; 200; 20 SUSPENSION ORAL at 05:11

## 2022-11-29 RX ADMIN — SUCRALFATE 1 G: 1 SUSPENSION ORAL at 11:11

## 2022-11-29 RX ADMIN — HEPARIN SODIUM 7500 UNITS: 5000 INJECTION INTRAVENOUS; SUBCUTANEOUS at 03:11

## 2022-11-29 RX ADMIN — FOLIC ACID-PYRIDOXINE-CYANOCOBALAMIN TAB 2.5-25-2 MG 1 TABLET: 2.5-25-2 TAB at 03:11

## 2022-11-29 RX ADMIN — AMLODIPINE BESYLATE 10 MG: 10 TABLET ORAL at 03:11

## 2022-11-29 RX ADMIN — HYDRALAZINE HYDROCHLORIDE 100 MG: 50 TABLET ORAL at 09:11

## 2022-11-29 RX ADMIN — ATORVASTATIN CALCIUM 40 MG: 20 TABLET, FILM COATED ORAL at 09:11

## 2022-11-29 RX ADMIN — INSULIN DETEMIR 8 UNITS: 100 INJECTION, SOLUTION SUBCUTANEOUS at 09:11

## 2022-11-29 NOTE — ASSESSMENT & PLAN NOTE
Nephrology History  iHD Schedule: MWF   Unit/MD: YONI  Duration: 3.5 hours   UF: 2L  EDW: 56 kg   Access: PHIL AVF   Last HD 11/20 and 11/22    Assessment:   - Plan for next HD 11/30    - Dialysate adjusted to current labs   - Will obtain OP dialysis records  - Continue to monitor intake and output, daily weights   - Avoid nephrotoxic medication and renal dose medications to GFR  - Will continue to monitor  -continue phos binders

## 2022-11-29 NOTE — H&P
Inpatient Radiology Pre-procedure Note    History of Present Illness:  Adryan Neff is a 56 y.o. male with right sided above the knee amputation with an abscess at the distal aspect.  IR plan for aspiration and possible drain placement    Admission H&P reviewed.    Past Medical History:   Diagnosis Date    CAD (coronary artery disease) 2016    CAD (non obstructive) 2016 [facility]    Diabetes mellitus type I     Diabetic retinopathy     ESRD on hemodialysis     on HD TThSat    Gangrene of left foot     Hypertension     Nuclear sclerosis of right eye 11/24/2021    PAD (peripheral artery disease)     PEA (Pulseless electrical activity) 10/10/2022    Pulmonary HTN     Right foot infection     TB lung, latent 04/2021    Wet gangrene 1/5/2022     Past Surgical History:   Procedure Laterality Date    ABOVE-KNEE AMPUTATION Left 1/18/2022    Procedure: AMPUTATION, ABOVE KNEE;  Surgeon: Rusty Light MD;  Location: [facility] OR;  Service: Orthopedics;  Laterality: Left;    ABOVE-KNEE AMPUTATION Left 1/21/2022    Procedure: AMPUTATION, ABOVE KNEE;  Surgeon: Rusty Light MD;  Location: [facility] OR;  Service: Orthopedics;  Laterality: Left;    ABOVE-KNEE AMPUTATION Right 10/13/2022    Procedure: AMPUTATION, ABOVE KNEE, RIGHT;  Surgeon: Dimitris Davis MD;  Location: [facility] OR;  Service: Orthopedics;  Laterality: Right;    AV FISTULA PLACEMENT      CATARACT EXTRACTION Left     EYE SURGERY      FISTULOGRAM Left 8/9/2022    Procedure: Fistulogram;  Surgeon: Masoud Soni MD;  Location: [facility] OR;  Service: Vascular;  Laterality: Left;  LEFT VM ON DAUGHTER'S PHONE----PHONE PREOP NOT COMPLETED  CALLED PATIENT ON 8/8/2022 @ 3:34. HE STATED HE IS RESCHEDULING PROCEDURE. NOTIFIED ERWIN @ 3:35PM-LO    FISTULOGRAM Left 10/20/2022    Procedure: Fistulogram;  Surgeon: Masoud Soni MD;  Location: [facility];  Service: Vascular;  Laterality: Left;  Left upper extremity    TOE AMPUTATION Left 1/6/2022    Procedure: AMPUTATION, TOE;   Surgeon: Masoud Soni MD;  Location: [facility] OR;  Service: Vascular;  Laterality: Left;  Left first through fifth toes, possible open transmetatarsal amputation and all other indicated procedures       Review of Systems:   As documented in primary team H&P    Home Meds:   Prior to Admission medications    Medication Sig Start Date End Date Taking? Authorizing Provider   amLODIPine (NORVASC) 10 MG tablet Take 1 tablet (10 mg total) by mouth once daily. 1/26/22 1/26/23  Dottie Kaufman MD   aspirin 81 MG Chew Take 81 mg by mouth once daily.    Historical Provider   atorvastatin (LIPITOR) 20 MG tablet Take 1 tablet by mouth. 1/30/22   Historical Provider   clopidogreL (PLAVIX) 75 mg tablet Take 75 mg by mouth once daily. 5/10/22   Historical Provider   hydrALAZINE (APRESOLINE) 100 MG tablet Take 1 tablet (100 mg total) by mouth every 8 (eight) hours. 11/10/22 11/10/23  Nikolay Sawyer MD   insulin detemir U-100 (LEVEMIR FLEXTOUCH) 100 unit/mL (3 mL) SubQ InPn pen Inject 8 Units into the skin every evening. 11/10/22 11/10/23  Nikolay Sawyer MD   lancets (ACCU-CHEK SOFTCLIX LANCETS) Misc 1 each by Misc.(Non-Drug; Combo Route) route once daily at 6am. 7/7/22   Carola Pedro MD   metoprolol succinate (TOPROL-XL) 25 MG 24 hr tablet Take 1 tablet by mouth. 1/30/22   Historical Provider   omeprazole (PRILOSEC) 40 MG capsule Take 40 mg by mouth once daily. 2/22/22   Historical Provider   pravastatin (PRAVACHOL) 20 MG tablet Take 20 mg by mouth once daily. 2/22/22   Historical Provider   RENVELA 800 mg Tab Take 800 mg by mouth 3 (three) times daily with meals. 11/21/20   Historical Provider     Scheduled Meds:    sodium chloride 0.9%   Intravenous Once    sodium chloride 0.9%   Intravenous Once    aluminum-magnesium hydroxide-simethicone  30 mL Oral QID (AC & HS)    amLODIPine  10 mg Oral Daily    aspirin  81 mg Oral Daily    atorvastatin  40 mg Oral QHS    epoetin paola-epbx  10,000 Units Subcutaneous  Every Mon, Wed, Fri    folic acid-vit B6-vit B12 2.5-25-2 mg  1 tablet Oral Daily    heparin (porcine)  7,500 Units Subcutaneous Q8H    hydrALAZINE  100 mg Oral Q8H    insulin detemir U-100  8 Units Subcutaneous QHS    meropenem (MERREM) IVPB  1 g Intravenous Daily    metoprolol succinate  25 mg Oral Daily    pantoprazole  40 mg Oral Daily    sucralfate  1 g Oral Q6H     Continuous Infusions:   PRN Meds:acetaminophen, dextrose 10%, dextrose 10%, glucagon (human recombinant), glucose, glucose, hydrALAZINE, HYDROcodone-acetaminophen, insulin aspart U-100, melatonin, naloxone, ondansetron, polyethylene glycol, senna, sodium chloride 0.9%, sodium chloride 0.9%  Anticoagulants/Antiplatelets: aspirin    Allergies: Review of patient's allergies indicates:  No Known Allergies  Sedation Hx: have not been any systemic reactions    Labs:  No results for input(s): INR in the last 168 hours.    Invalid input(s):  PT,  PTT    Recent Labs   Lab 11/27/22  1043   WBC 9.98   HGB 9.2*   HCT 31.4*   MCV 89         Recent Labs   Lab 11/25/22  0500 11/27/22  1043   GLU 87 153*   * 136   K 4.5 4.2   CL 98 102   CO2 24 22*   BUN 49* 39*   CREATININE 9.6* 9.6*   CALCIUM 9.2 8.9   MG 2.9*  --    ALT 7*  --    AST 9*  --    ALBUMIN 2.5*  --    BILITOT 0.4  --          Vitals:  Temp: 97.9 °F (36.6 °C) (11/29/22 0740)  Pulse: 70 (11/29/22 0745)  Resp: 18 (11/29/22 0740)  BP: 131/60 (11/29/22 0745)  SpO2: 100 % (11/29/22 0745)     Physical Exam:  ASA: 2  Mallampati: 2    General: no acute distress  Mental Status: alert and oriented to person, place and time  HEENT: normocephalic, atraumatic  Chest: unlabored breathing  Heart: regular heart rate  Abdomen: nondistended  Extremity: moves all extremities      Plan:  Sedation Plan: moderate  Patient will undergo right lower limb abscess aspiration and possible drain placement.      Gabino Vanegas MD, MS  Radiology  PGY-5  Pager: [phone]

## 2022-11-29 NOTE — PLAN OF CARE
11/29/22 1445   Post-Acute Status   Post-Acute Authorization Placement   Post-Acute Placement Status Referrals Sent   Coverage Medicare Part A&B    Pt/OT are recommending SNF,  Pt came form [facility] [phone] . SW sent updated clinicals to [facility] via [employer] for review. SW will continue to follow patient.          Christina Mckeon LMSW  PRN - [facility]  [phone]

## 2022-11-29 NOTE — SUBJECTIVE & OBJECTIVE
Interval History: IR today for aspiration and possible drain placement. Electrolytes stable. Plan for HD tomorrow.     Review of patient's allergies indicates:  No Known Allergies  Current Facility-Administered Medications   Medication Frequency    0.9%  NaCl infusion Once    0.9%  NaCl infusion Once    acetaminophen tablet 650 mg Q8H PRN    aluminum-magnesium hydroxide-simethicone 200-200-20 mg/5 mL suspension 30 mL QID (AC & HS)    amLODIPine tablet 10 mg Daily    aspirin chewable tablet 81 mg Daily    atorvastatin tablet 40 mg QHS    dextrose 10% bolus 125 mL PRN    dextrose 10% bolus 250 mL PRN    epoetin paola-epbx injection 10,000 Units Every Mon, Wed, Fri    folic acid-vit B6-vit B12 2.5-25-2 mg tablet 1 tablet Daily    glucagon (human recombinant) injection 1 mg PRN    glucose chewable tablet 16 g PRN    glucose chewable tablet 24 g PRN    heparin (porcine) injection 7,500 Units Q8H    hydrALAZINE injection 10 mg Q8H PRN    hydrALAZINE tablet 100 mg Q8H    HYDROcodone-acetaminophen 5-325 mg per tablet 1 tablet Q6H PRN    insulin aspart U-100 pen 0-5 Units QID (AC + HS) PRN    insulin detemir U-100 pen 8 Units QHS    melatonin tablet 6 mg Nightly PRN    meropenem-0.9% sodium chloride 1 g/50 mL IVPB Daily    metoprolol succinate (TOPROL-XL) 24 hr tablet 25 mg Daily    naloxone 0.4 mg/mL injection 0.02 mg PRN    ondansetron disintegrating tablet 8 mg Q8H PRN    pantoprazole EC tablet 40 mg Daily    polyethylene glycol packet 17 g BID PRN    senna tablet 8.6 mg Daily PRN    sodium chloride 0.9% flush 10 mL PRN    sodium chloride 0.9% flush 10 mL Q12H PRN    sucralfate 100 mg/mL suspension 1 g Q6H       Objective:     Vital Signs (Most Recent):  Temp: 98.1 °F (36.7 °C) (11/29/22 1202)  Pulse: 70 (11/29/22 1204)  Resp: 20 (11/29/22 1202)  BP: 138/65 (11/29/22 1202)  SpO2: 98 % (11/29/22 1202)  O2 Device (Oxygen Therapy): (P) room air (11/29/22 9080) Vital Signs (24h Range):  Temp:  [97.5 °F (36.4 °C)-98.1 °F (36.7  °C)] 98.1 °F (36.7 °C)  Pulse:  [67-78] 70  Resp:  [18-20] 20  SpO2:  [98 %-100 %] 98 %  BP: (119-138)/(57-65) 138/65     Weight: 56.2 kg (123 lb 14.4 oz) (11/29/22 0400)  Body mass index is 17.28 kg/m².  Body surface area is 1.68 meters squared.    I/O last 3 completed shifts:  In: 800 [P.O.:200; Other:600]  Out: 2700 [Urine:100; Other:2600]    Physical Exam  Vitals and nursing note reviewed.   Constitutional:       General: He is not in acute distress.     Appearance: He is well-developed.   HENT:      Head: Normocephalic and atraumatic.   Eyes:      Conjunctiva/sclera: Conjunctivae normal.   Neck:      Vascular: No JVD.   Cardiovascular:      Rate and Rhythm: Normal rate and regular rhythm.      Heart sounds: Normal heart sounds.   Pulmonary:      Effort: Pulmonary effort is normal.      Breath sounds: Normal breath sounds.   Abdominal:      General: Bowel sounds are normal. There is no distension.      Palpations: Abdomen is soft.      Tenderness: There is no abdominal tenderness.   Musculoskeletal:      Cervical back: Neck supple.      Right lower leg: Edema (stump with swelling. sutures intact. some purulence at R suture border) present.      Left lower leg: No edema.      Comments: B/L AKA   Neurological:      Mental Status: He is alert.   Psychiatric:         Behavior: Behavior normal.       Significant Labs:  CBC:   Recent Labs   Lab 11/27/22  1043   WBC 9.98   RBC 3.52*   HGB 9.2*   HCT 31.4*      MCV 89   MCH 26.1*   MCHC 29.3*     CMP:   Recent Labs   Lab 11/25/22  0500 11/27/22  1043   GLU 87 153*   CALCIUM 9.2 8.9   ALBUMIN 2.5*  --    PROT 7.2  --    * 136   K 4.5 4.2   CO2 24 22*   CL 98 102   BUN 49* 39*   CREATININE 9.6* 9.6*   ALKPHOS 83  --    ALT 7*  --    AST 9*  --    BILITOT 0.4  --      All labs within the past 24 hours have been reviewed.

## 2022-11-29 NOTE — SUBJECTIVE & OBJECTIVE
Interval History: NAEON. No acute concerns. Feels well.     Review of Systems   Constitutional:  Negative for fever.   Respiratory:  Negative for shortness of breath.    Cardiovascular:  Negative for chest pain.   Gastrointestinal:  Negative for abdominal pain.   Neurological:  Negative for headaches.     Objective:     Vital Signs (Most Recent):  Temp: 97.8 °F (36.6 °C) (11/28/22 1300)  Pulse: 74 (11/28/22 1145)  Resp: 16 (11/28/22 1145)  BP: (!) 119/59 (11/28/22 1605)  SpO2: 99 % (11/28/22 0710)   Vital Signs (24h Range):  Temp:  [96.3 °F (35.7 °C)-98.3 °F (36.8 °C)] 97.8 °F (36.6 °C)  Pulse:  [66-75] 74  Resp:  [16-18] 16  SpO2:  [96 %-99 %] 99 %  BP: (114-181)/(57-75) 119/59     Weight: 56.5 kg (124 lb 9 oz)  Body mass index is 17.37 kg/m².    Intake/Output Summary (Last 24 hours) at 11/28/2022 1958  Last data filed at 11/28/2022 1145  Gross per 24 hour   Intake 800 ml   Output 2600 ml   Net -1800 ml      Physical Exam  Vitals and nursing note reviewed.   Constitutional:       General: He is not in acute distress.     Appearance: He is well-developed.   HENT:      Head: Normocephalic and atraumatic.   Eyes:      Conjunctiva/sclera: Conjunctivae normal.   Neck:      Vascular: No JVD.   Cardiovascular:      Rate and Rhythm: Normal rate and regular rhythm.      Heart sounds: Normal heart sounds.   Pulmonary:      Effort: Pulmonary effort is normal.      Breath sounds: Normal breath sounds.   Abdominal:      General: Bowel sounds are normal. There is no distension.      Palpations: Abdomen is soft.      Tenderness: There is no abdominal tenderness.   Musculoskeletal:      Cervical back: Neck supple.      Right lower leg: Edema (stump with swelling. sutures intact. some purulence at R suture border) present.      Left lower leg: No edema.      Comments: B/L AKA   Neurological:      Mental Status: He is alert.   Psychiatric:         Behavior: Behavior normal.       Significant Labs: All pertinent labs within the past  24 hours have been reviewed.  Blood Culture: No results for input(s): LABBLOO in the last 48 hours.  CBC:   Recent Labs   Lab 11/27/22  1043   WBC 9.98   HGB 9.2*   HCT 31.4*        CMP:   Recent Labs   Lab 11/27/22  1043      K 4.2      CO2 22*   *   BUN 39*   CREATININE 9.6*   CALCIUM 8.9   ANIONGAP 12     Magnesium: No results for input(s): MG in the last 48 hours.  POCT Glucose:   Recent Labs   Lab 11/27/22  1626 11/27/22  2045 11/28/22  1608   POCTGLUCOSE 154* 131* 195*       Significant Imaging: I have reviewed all pertinent imaging results/findings within the past 24 hours.

## 2022-11-29 NOTE — PROGRESS NOTES
[facility] - Intensive Care ([address] Medicine  Progress Note    Patient Name: Adryan Neff  MRN: [id number]  Patient Class: IP- Inpatient   Admission Date: 11/23/2022  Length of Stay: 5 days  Attending Physician: Jessenia Wharton MD  Primary Care Provider: Carola Pedro MD      Subjective:     Principal Problem:Sepsis      HPI:  Mr. Neff is a 56-year-old gentleman with PMH of CAD, DM type 1, diabetic retinopathy, ESRD on HD, hypertension, PAD, b/l AKA who [facility] for altered mental status. Pt arrived from [facility] where he was found minimally responsive and BG 50s. Upon chart review, last HD sessions were Sunday 11/20 and 11/22. Pt oxygenating well on RA. Awake and oriented to self and place. He is able to state when he last had HD. Labs noted WBC 25 and lactate 2.3. UA positive for UTI. HCT nonacute. R AKA stump performed on 10/13/22 showed discoloration of the skin at the lateral aspect with areas of possible wound drainage/dehiscence. Due to concern for infection, orthopedic surgery consulted. CT right thigh with contrast showed abscess. Blood cultures ordered and empiric IV antibiotics ordered. He was admitted to Hospital Medicine for further evaluation and management of acute encephalopathy 2/2 sepsis in the setting of UTI and R AKA abscess.       Overview/Hospital Course:  He was treated with broad spectrum IV abx. ID was consulted. Superifical wound cx with multiple GNRs (citrobacterx2 and pseudomonas).       Interval History: NAEON. No acute concerns. Feels well.     Review of Systems   Constitutional:  Negative for fever.   Respiratory:  Negative for shortness of breath.    Cardiovascular:  Negative for chest pain.   Gastrointestinal:  Negative for abdominal pain.   Neurological:  Negative for headaches.     Objective:     Vital Signs (Most Recent):  Temp: 97.8 °F (36.6 °C) (11/28/22 1300)  Pulse: 74 (11/28/22 1145)  Resp: 16 (11/28/22 1145)  BP: (!) 119/59 (11/28/22  1605)  SpO2: 99 % (11/28/22 0710)   Vital Signs (24h Range):  Temp:  [96.3 °F (35.7 °C)-98.3 °F (36.8 °C)] 97.8 °F (36.6 °C)  Pulse:  [66-75] 74  Resp:  [16-18] 16  SpO2:  [96 %-99 %] 99 %  BP: (114-181)/(57-75) 119/59     Weight: 56.5 kg (124 lb 9 oz)  Body mass index is 17.37 kg/m².    Intake/Output Summary (Last 24 hours) at 11/28/2022 1958  Last data filed at 11/28/2022 1145  Gross per 24 hour   Intake 800 ml   Output 2600 ml   Net -1800 ml      Physical Exam  Vitals and nursing note reviewed.   Constitutional:       General: He is not in acute distress.     Appearance: He is well-developed.   HENT:      Head: Normocephalic and atraumatic.   Eyes:      Conjunctiva/sclera: Conjunctivae normal.   Neck:      Vascular: No JVD.   Cardiovascular:      Rate and Rhythm: Normal rate and regular rhythm.      Heart sounds: Normal heart sounds.   Pulmonary:      Effort: Pulmonary effort is normal.      Breath sounds: Normal breath sounds.   Abdominal:      General: Bowel sounds are normal. There is no distension.      Palpations: Abdomen is soft.      Tenderness: There is no abdominal tenderness.   Musculoskeletal:      Cervical back: Neck supple.      Right lower leg: Edema (stump with swelling. sutures intact. some purulence at R suture border) present.      Left lower leg: No edema.      Comments: B/L AKA   Neurological:      Mental Status: He is alert.   Psychiatric:         Behavior: Behavior normal.       Significant Labs: All pertinent labs within the past 24 hours have been reviewed.  Blood Culture: No results for input(s): LABBLOO in the last 48 hours.  CBC:   Recent Labs   Lab 11/27/22  1043   WBC 9.98   HGB 9.2*   HCT 31.4*        CMP:   Recent Labs   Lab 11/27/22  1043      K 4.2      CO2 22*   *   BUN 39*   CREATININE 9.6*   CALCIUM 8.9   ANIONGAP 12     Magnesium: No results for input(s): MG in the last 48 hours.  POCT Glucose:   Recent Labs   Lab 11/27/22  1626 11/27/22 2045  22  1608   POCTGLUCOSE 154* 131* 195*       Significant Imaging: I have reviewed all pertinent imaging results/findings within the past 24 hours.      Assessment/Plan:      * Sepsis  Abscess of right thigh  Secondary to R AKA abscess/ osteomyelitis. CT  with extension into the femoral bone marrow suggestive of osteomyelitis.  Continue IV abx. Appreciate ID assistance  -Need abscess aspiration by IR with repeat wound cx. Superifical wound cx with multiple GNRs (citrobacterx2 and pseudomonas). Continue meropenem given cx data.       Acute cystitis  - Ucx negative. On broad spectrum abx, so will be treated nonetheless.     Anemia due to chronic kidney disease, on chronic dialysis  - stable       PAD (peripheral artery disease)  S/P AKA (above knee amputation) bilateral  - fall precautions  - s/p b/l AKA  - continue home ASA, Plavix and statin    Elevated troponin  Troponin trend flat. No CP, neg EKG. No further w/u.       ESRD on hemodialysis  - nephrology managing  - No lab stick/BP intake on access site   - Please avoid gadolinium, fleets, phos-based laxatives, NSAIDs  - Will follow closely and continue dialysis treatments while in-patient      Secondary hyperparathyroidism of renal origin  - continue home Renvela       HLD (hyperlipidemia)  - continue home statin      CAD (coronary artery disease) - non-obstructive from [facility] in 2016  - continue ASA, Plavix and statin      Chronic diastolic heart failure  - stable       Gastroesophageal reflux disease  - continue home PPI      Essential (primary) hypertension  controlled  - continue home metoprolol, amlodipine and hydralazine      Controlled type 2 diabetes mellitus with chronic kidney disease on chronic dialysis, with long-term current use of insulin  Controlled. Repeat A1C.  Patient confirms hx of type II DM  - LDSSI   - goal B-180  - diabetic diet     Encephalopathy, metabolic-- resolved       VTE Risk Mitigation (From admission, onward)          Ordered     heparin (porcine) injection 7,500 Units  Every 8 hours         11/23/22 1508     IP VTE HIGH RISK PATIENT  Once         11/23/22 1508     Place sequential compression device  Until discontinued         11/23/22 1508     Place sequential compression device  Until discontinued         11/23/22 1044                Discharge Planning   ENIO: 11/29/2022     Code Status: Full Code   Is the patient medically ready for discharge?: No    Reason for patient still in hospital (select all that apply): Patient trending condition, Treatment and Consult recommendations  Discharge Plan A: Home Health, Return to nursing home       Jessenia Wharton MD  Department of Hospital Medicine   [facility] - Intensive Care ([place]

## 2022-11-29 NOTE — PROGRESS NOTES
[facility] - [facility] ([place] [address])  Nephrology  Progress Note    Patient Name: Adryan Neff  MRN: [id number]  Admission Date: 11/23/2022  Hospital Length of Stay: 6 days  Attending Provider: Jessenia Wharton MD   Primary Care Physician: Carola Pedro MD  Principal Problem:Sepsis    Subjective:          Interval History: IR today for aspiration and possible drain placement. Electrolytes stable. Plan for HD tomorrow.     Review of patient's allergies indicates:  No Known Allergies  Current Facility-Administered Medications   Medication Frequency    0.9%  NaCl infusion Once    0.9%  NaCl infusion Once    acetaminophen tablet 650 mg Q8H PRN    aluminum-magnesium hydroxide-simethicone 200-200-20 mg/5 mL suspension 30 mL QID (AC & HS)    amLODIPine tablet 10 mg Daily    aspirin chewable tablet 81 mg Daily    atorvastatin tablet 40 mg QHS    dextrose 10% bolus 125 mL PRN    dextrose 10% bolus 250 mL PRN    epoetin paola-epbx injection 10,000 Units Every Mon, Wed, Fri    folic acid-vit B6-vit B12 2.5-25-2 mg tablet 1 tablet Daily    glucagon (human recombinant) injection 1 mg PRN    glucose chewable tablet 16 g PRN    glucose chewable tablet 24 g PRN    heparin (porcine) injection 7,500 Units Q8H    hydrALAZINE injection 10 mg Q8H PRN    hydrALAZINE tablet 100 mg Q8H    HYDROcodone-acetaminophen 5-325 mg per tablet 1 tablet Q6H PRN    insulin aspart U-100 pen 0-5 Units QID (AC + HS) PRN    insulin detemir U-100 pen 8 Units QHS    melatonin tablet 6 mg Nightly PRN    meropenem-0.9% sodium chloride 1 g/50 mL IVPB Daily    metoprolol succinate (TOPROL-XL) 24 hr tablet 25 mg Daily    naloxone 0.4 mg/mL injection 0.02 mg PRN    ondansetron disintegrating tablet 8 mg Q8H PRN    pantoprazole EC tablet 40 mg Daily    polyethylene glycol packet 17 g BID PRN    senna tablet 8.6 mg Daily PRN    sodium chloride 0.9% flush 10 mL PRN    sodium chloride 0.9% flush 10 mL Q12H PRN    sucralfate 100 mg/mL  suspension 1 g Q6H       Objective:     Vital Signs (Most Recent):  Temp: 98.1 °F (36.7 °C) (11/29/22 1202)  Pulse: 70 (11/29/22 1204)  Resp: 20 (11/29/22 1202)  BP: 138/65 (11/29/22 1202)  SpO2: 98 % (11/29/22 1202)  O2 Device (Oxygen Therapy): (P) room air (11/29/22 0410) Vital Signs (24h Range):  Temp:  [97.5 °F (36.4 °C)-98.1 °F (36.7 °C)] 98.1 °F (36.7 °C)  Pulse:  [67-78] 70  Resp:  [18-20] 20  SpO2:  [98 %-100 %] 98 %  BP: (119-138)/(57-65) 138/65     Weight: 56.2 kg (123 lb 14.4 oz) (11/29/22 0400)  Body mass index is 17.28 kg/m².  Body surface area is 1.68 meters squared.    I/O last 3 completed shifts:  In: 800 [P.O.:200; Other:600]  Out: 2700 [Urine:100; Other:2600]    Physical Exam  Vitals and nursing note reviewed.   Constitutional:       General: He is not in acute distress.     Appearance: He is well-developed.   HENT:      Head: Normocephalic and atraumatic.   Eyes:      Conjunctiva/sclera: Conjunctivae normal.   Neck:      Vascular: No JVD.   Cardiovascular:      Rate and Rhythm: Normal rate and regular rhythm.      Heart sounds: Normal heart sounds.   Pulmonary:      Effort: Pulmonary effort is normal.      Breath sounds: Normal breath sounds.   Abdominal:      General: Bowel sounds are normal. There is no distension.      Palpations: Abdomen is soft.      Tenderness: There is no abdominal tenderness.   Musculoskeletal:      Cervical back: Neck supple.      Right lower leg: Edema (stump with swelling. sutures intact. some purulence at R suture border) present.      Left lower leg: No edema.      Comments: B/L AKA   Neurological:      Mental Status: He is alert.   Psychiatric:         Behavior: Behavior normal.       Significant Labs:  CBC:   Recent Labs   Lab 11/27/22  1043   WBC 9.98   RBC 3.52*   HGB 9.2*   HCT 31.4*      MCV 89   MCH 26.1*   MCHC 29.3*     CMP:   Recent Labs   Lab 11/25/22  0500 11/27/22  1043   GLU 87 153*   CALCIUM 9.2 8.9   ALBUMIN 2.5*  --    PROT 7.2  --    *  136   K 4.5 4.2   CO2 24 22*   CL 98 102   BUN 49* 39*   CREATININE 9.6* 9.6*   ALKPHOS 83  --    ALT 7*  --    AST 9*  --    BILITOT 0.4  --      All labs within the past 24 hours have been reviewed.         Assessment/Plan:     * Sepsis  management per primary       Chronic kidney disease-mineral and bone disorder  - hold phos binders for now   -phos with am labs     Anemia due to chronic kidney disease, on chronic dialysis  -goal 10-11   -continue epogen     ESRD on hemodialysis  Nephrology History  iHD Schedule: MWF   Unit/MD: [facility]  Duration: 3.5 hours   UF: 2L  EDW: 56 kg   Access: PHIL AVF   Last HD 11/20 and 11/22    Assessment:   - Plan for next HD 11/30    - Dialysate adjusted to current labs   - Will obtain OP dialysis records  - Continue to monitor intake and output, daily weights   - Avoid nephrotoxic medication and renal dose medications to GFR  - Will continue to monitor  -continue phos binders                   Thank you for your consult. I will follow-up with patient. Please contact us if you have any additional questions.    Odalys Frederick, OMAIRA  Nephrology  [facility] ([place]

## 2022-11-29 NOTE — SUBJECTIVE & OBJECTIVE
Interval History: NAEON. No acute concerns. Tolerated biopsy with IR well today.     Review of Systems   Constitutional:  Negative for fever.   Respiratory:  Negative for shortness of breath.    Cardiovascular:  Negative for chest pain.   Gastrointestinal:  Negative for abdominal pain.   Neurological:  Negative for headaches.     Objective:     Vital Signs (Most Recent):  Temp: 97.4 °F (36.3 °C) (11/29/22 1604)  Pulse: 73 (11/29/22 1604)  Resp: 20 (11/29/22 1604)  BP: (!) 174/72 (11/29/22 1604)  SpO2: 99 % (11/29/22 1604)   Vital Signs (24h Range):  Temp:  [97.4 °F (36.3 °C)-98.1 °F (36.7 °C)] 97.4 °F (36.3 °C)  Pulse:  [67-78] 73  Resp:  [18-20] 20  SpO2:  [98 %-100 %] 99 %  BP: (120-174)/(57-72) 174/72     Weight: 56.2 kg (123 lb 14.4 oz)  Body mass index is 17.28 kg/m².    Intake/Output Summary (Last 24 hours) at 11/29/2022 1721  Last data filed at 11/29/2022 1600  Gross per 24 hour   Intake 240 ml   Output 100 ml   Net 140 ml        Physical Exam  Vitals and nursing note reviewed.   Constitutional:       General: He is not in acute distress.     Appearance: He is well-developed.   HENT:      Head: Normocephalic and atraumatic.   Eyes:      Conjunctiva/sclera: Conjunctivae normal.   Neck:      Vascular: No JVD.   Cardiovascular:      Rate and Rhythm: Normal rate and regular rhythm.      Heart sounds: Normal heart sounds.   Pulmonary:      Effort: Pulmonary effort is normal.      Breath sounds: Normal breath sounds.   Abdominal:      General: Bowel sounds are normal. There is no distension.      Palpations: Abdomen is soft.      Tenderness: There is no abdominal tenderness.   Musculoskeletal:      Cervical back: Neck supple.      Right lower leg: Edema (stump with swelling. sutures intact. some purulence at R suture border) present.      Left lower leg: No edema.      Comments: B/L AKA   Neurological:      Mental Status: He is alert.   Psychiatric:         Behavior: Behavior normal.       Significant Labs: All  pertinent labs within the past 24 hours have been reviewed.  Blood Culture: No results for input(s): LABBLOO in the last 48 hours.  CBC:   No results for input(s): WBC, HGB, HCT, PLT in the last 48 hours.    CMP:   No results for input(s): NA, K, CL, CO2, GLU, BUN, CREATININE, CALCIUM, PROT, ALBUMIN, BILITOT, ALKPHOS, AST, ALT, ANIONGAP, EGFRNONAA in the last 48 hours.    Invalid input(s): ESTGFAFRICA    Magnesium: No results for input(s): MG in the last 48 hours.  POCT Glucose:   Recent Labs   Lab 11/28/22  2147 11/29/22  0813 11/29/22  1603   POCTGLUCOSE 117* 100 98         Significant Imaging: I have reviewed all pertinent imaging results/findings within the past 24 hours.

## 2022-11-29 NOTE — PROCEDURES
"  Pre Op Diagnosis: Subcutaneous fluid collection  Post Op Diagnosis: Same    Procedure: ASpiration of fluid collection    Procedure performed by: Dona    Written Informed Consent Obtained: Yes  Specimen Removed: YES 3 cc of purulent fluid  Estimated Blood Loss: Minimal    Findings:   3 areas of concern for abscess were aspirated under US guidance. Only one location produced fluid. Approximately 3cc of purulent fluid was sampled distal to the femur. Note was made that some fluid appears to be leaking from staple line.     Patient tolerated procedure well.    Christiano Carcamo MD ([place])  Interventional Radiology  [phone]      "

## 2022-11-29 NOTE — PLAN OF CARE
Pt arrived to ct 173 for abscess asp. Pt oriented to unit and staff. Plan of care reviewed with patient, patient verbalizes understanding. Comfort measures utilized. Pt safely transferred from stretcher to procedural table. Fall risk reviewed with patient, fall risk interventions maintained. Safety strap applied, positioner pillows utilized to minimize pressure points. Blankets applied. Pt prepped and draped utilizing standard sterile technique. Patient placed on continuous monitoring, as required by sedation policy. Timeouts completed utilizing standard universal time-out, per department and facility policy. RN to remain at bedside, continuous monitoring maintained. Pt resting comfortably. Denies pain/discomfort. Will continue to monitor. See flow sheets for monitoring, medication administration, and updates.

## 2022-11-29 NOTE — PROGRESS NOTES
[facility] - Intensive Care ([place] [address])  Infectious Disease  Progress Note    Patient Name: Adryan Neff  MRN: [id number]  Admission Date: 11/23/2022  Length of Stay: 5 days  Attending Physician: Jessenia Wharton MD  Primary Care Provider: Carola Pedro MD    Isolation Status: No active isolations  Assessment/Plan:      Abscess of right thigh  56 year old male with ESRD on HD s/p tx and severe PAD c/b gangrene leading ro R AKA (10/13/22- clean margins on pathology). He then re-presented for altered mental status, and found to have a large abscess in his RLE on CT 11/23 with extension into the femoral bone marrow suggestive of osteomyelitis. Superficial wound cultures (suspected to be taken from initial purulence noted around R AKA stump site) show two different types of Citrobacter Koseri (one resistant and one being sensitive) as well as pseudomonas. He remains afebrile since admission, clinically improved and resolved leukocytosis.       Recommendations    1. Continue Meropenem empirically. Vancomycin was discontinued in absence of MRSA on cultures.     2. Please send cultures (anaerobic and aerobic) when aspiration of abscess is performed. Will follow culture data and finalize recommendations based on this. Plan for ~ 6 weeks of treatment given osteomyelitis.             Anticipated Disposition: TBD    Thank you for your consult. I will follow-up with patient. Please contact us if you have any additional questions.    Dayday Sahu MD  Infectious Disease  [facility] - Intensive Care ([place] [address])    Subjective:     Principal Problem:Sepsis    HPI: 57 yo male with ESRD on HD, LTBI s/p tx and severe PAD c/b gangrene admitted for R AKA abscess. ID consulted for abx recs. [place]ij[place] creole  used [id number].  Pt was found to be minimally responsive at NH ([facility]) and was brought to ED. He was found to be hypoglycemic. Work up notable for large abscess at site of prior R AKA with periosteal reaction of  "distal femur , wound swab obtained in ER - cx in process. Blcx ngtd. Pt is currently on vanc/zosyn. Pt denied fevers, chills, n/v/d, abdo pain or dysuria at NH. He stated it felt well.     Of note, pt was recently admitted to  for R foot gangrene - hospital course at that time c/b cardiac arrest/aspiration. He underwent R AKA with ortho with clean margins on path on 10/13.         Interval History: No adverse events, reports "I am okay".     Review of Systems   Constitutional:  Negative for fever.   Respiratory:  Negative for chest tightness and shortness of breath.    Cardiovascular:  Negative for chest pain.   Gastrointestinal:  Negative for blood in stool, diarrhea and nausea.   Genitourinary:  Negative for flank pain.   Musculoskeletal:  Negative for arthralgias and neck stiffness.       Objective:     Vital Signs (Most Recent):  Temp: 98 °F (36.7 °C) (11/28/22 0710)  Pulse: 74 (11/28/22 0744)  Resp: 16 (11/28/22 0746)  BP: (!) (P) 149/62 (11/28/22 0842)  SpO2: 99 % (11/28/22 0710)   Vital Signs (24h Range):  Temp:  [96.3 °F (35.7 °C)-98.3 °F (36.8 °C)] 98 °F (36.7 °C)  Pulse:  [64-74] 74  Resp:  [12-18] 16  SpO2:  [96 %-99 %] 99 %  BP: (114-181)/(57-75) (P) 149/62     Weight: 56.5 kg (124 lb 9 oz)  Body mass index is 17.37 kg/m².    Estimated Creatinine Clearance: 6.8 mL/min (A) (based on SCr of 9.6 mg/dL (H)).    Physical Exam  Constitutional:       Appearance: Normal appearance.   HENT:      Head: Normocephalic and atraumatic.      Nose: Nose normal.      Mouth/Throat:      Mouth: Mucous membranes are moist.      Pharynx: Oropharynx is clear.   Eyes:      Extraocular Movements: Extraocular movements intact.      Conjunctiva/sclera: Conjunctivae normal.      Pupils: Pupils are equal, round, and reactive to light.   Cardiovascular:      Rate and Rhythm: Normal rate and regular rhythm.      Pulses: Normal pulses.      Heart sounds: Normal heart sounds.      Comments: [facility] AVF  Pulmonary:      Effort: Pulmonary " effort is normal.      Breath sounds: Normal breath sounds.   Abdominal:      General: Abdomen is flat. Bowel sounds are normal.      Palpations: Abdomen is soft.      Tenderness: There is no guarding or rebound.   Musculoskeletal:         General: Swelling and deformity present.      Cervical back: Normal range of motion and neck supple.      Comments: L AKA  R AKA   Skin:     General: Skin is warm and dry.      Coloration: Skin is not jaundiced.      Findings: Lesion present. No rash.      Comments: R AKA with sutures in place, clean, dry, intact, no purulence noted   Neurological:      Mental Status: He is alert. Mental status is at baseline.      Comments: Alert, awake, oriented and responding appropriately   Psychiatric:         Behavior: Behavior normal.       Significant Labs: Blood Culture:   Recent Labs   Lab 10/11/22  0914 10/11/22  0915 10/17/22  1415 10/17/22  1427 11/23/22 0631   LABBLOO No Growth after 4 days. No Growth after 4 days. No Growth after 4 days. No Growth after 4 days. No growth after 5 days.  No growth after 5 days.     BMP:   Recent Labs   Lab 11/27/22  1043   *      K 4.2      CO2 22*   BUN 39*   CREATININE 9.6*   CALCIUM 8.9     CBC:   Recent Labs   Lab 11/27/22  1043   WBC 9.98   HGB 9.2*   HCT 31.4*        CMP:   Recent Labs   Lab 11/27/22  1043      K 4.2      CO2 22*   *   BUN 39*   CREATININE 9.6*   CALCIUM 8.9   ANIONGAP 12     Microbiology Results (last 7 days)       Procedure Component Value Units Date/Time    Blood culture x two cultures. Draw prior to antibiotics. [[id number]] Collected: 11/23/22 0631    Order Status: Completed Specimen: Blood from Peripheral, Antecubital, Right Updated: 11/28/22 0812     Blood Culture, Routine No growth after 5 days.    Narrative:      Aerobic and anaerobic    Blood culture x two cultures. Draw prior to antibiotics. [[id number]] Collected: 11/23/22 0631    Order Status: Completed Specimen: Blood  from Peripheral, Hand, Right Updated: 11/28/22 0812     Blood Culture, Routine No growth after 5 days.    Narrative:      Aerobic and anaerobic    Aerobic culture [[id number]]  (Abnormal)  (Susceptibility) Collected: 11/23/22 0630    Order Status: Completed Specimen: Wound from Leg, Right Updated: 11/28/22 0802     Aerobic Bacterial Culture CITROBACTER KOSERI  Many        CITROBACTER KOSERI  Many        PSEUDOMONAS AERUGINOSA  Many      Narrative:      Right AKA    Aerobic culture [[id number]]     Order Status: No result Specimen: Incision site from Buttocks, Left     Aerobic culture [[id number]]     Order Status: No result Specimen: Abscess from Buttocks, Left     Aerobic culture [[id number]]     Order Status: No result Specimen: Incision site from Buttocks, Left     Culture, Anaerobe [[id number]] Collected: 11/23/22 0630    Order Status: Completed Specimen: Wound from Leg, Right Updated: 11/25/22 1026     Anaerobic Culture Culture in progress    Narrative:      Right AKA    Urine culture [[id number]] Collected: 11/23/22 0958    Order Status: Completed Specimen: Urine Updated: 11/24/22 1315     Urine Culture, Routine No growth    Narrative:      Specimen Source->Urine          Pathology Results  (Last 10 years)                 10/13/22 1540  Specimen to Pathology, Surgery Orthopedics Final result    Narrative:  Pre-op Diagnosis: Diabetic foot ulcer [E11.621, L97.509]   Ulcer of foot [L97.509]   Procedure(s):   AMPUTATION, ABOVE KNEE, RIGHT   Number of specimens 1   Name of specimens:Right above knee amputation   Which provider would you like to cc?->GREER SANABRIA   Release to patient->Immediate   Specimen total (fresh, frozen, permanent):->1       01/21/22 1515  Specimen to Pathology, Surgery Orthopedics Final result    Narrative:  Pre-op Diagnosis: Gangrene of left foot [I96]   Procedure(s):   AMPUTATION, ABOVE KNEE   Number of specimens: 1   Name of specimens: left leg, status-post above the knee   amputation    Release to patient->Immediate   Specimen total (fresh, frozen, permanent):->1       01/06/22 1050  Specimen to Pathology, Surgery Cardiovascular Final result    Narrative:  Pre-op Diagnosis: Gangrene of left foot [I96]   Procedure(s):   AMPUTATION, TOE   Number of specimens: 1   Name of specimens: Left foot : 2nd - 5th metatarsal   Release to patient->Immediate   Specimen total (fresh, frozen, permanent):->1       08/02/21 1404  Specimen to Pathology, Bone Marrow Aspiration/Biopsy Edited Result - FINAL          All pertinent labs within the past 24 hours have been reviewed.  Recent Lab Results         11/27/22  2045   11/27/22  1626   11/27/22  1118   11/27/22  1043        Anion Gap       12       Baso #       0.10       Basophil %       1.0       BUN       39       Calcium       8.9       Chloride       102       CO2       22       Creatinine       9.6       Differential Method       Automated       eGFR       5.8       Eos #       0.8       Eosinophil %       7.6       Glucose       153       Gran # (ANC)       6.8       Gran %       68.5       Hematocrit       31.4       Hemoglobin       9.2       Immature Grans (Abs)       0.03  Comment: Mild elevation in immature granulocytes is non specific and   can be seen in a variety of conditions including stress response,   acute inflammation, trauma and pregnancy. Correlation with other   laboratory and clinical findings is essential.         Immature Granulocytes       0.3       Lymph #       1.5       Lymph %       14.5       MCH       26.1       MCHC       29.3       MCV       89       Mono #       0.8       Mono %       8.1       MPV       10.2       nRBC       0       Platelets       395       POCT Glucose 131   154   152         Potassium       4.2       RBC       3.52       RDW       18.1       Sodium       136       WBC       9.98               Significant Imaging: I have reviewed all pertinent imaging results/findings within the past 24 hours.

## 2022-11-29 NOTE — PT/OT/SLP PROGRESS
"Physical Therapy Treatment    Patient Name:  Adryan Neff   MRN:  [id number]    Recommendations:     Discharge Recommendations:  nursing facility, skilled   Discharge Equipment Recommendations: slide board   Barriers to discharge: Pt currently requiring increased level of assistance with mobility      Assessment:     Adryan Neff is a 56 y.o. male admitted with a medical diagnosis of Sepsis.  He presents with the following impairments/functional limitations:  weakness, impaired endurance, impaired balance, decreased upper extremity function, decreased lower extremity function, impaired functional mobility. Pt tolerated sliding board wheelchair transfer x2 reps with maxA. Pt performed B UE wheelchair propulsion in hallway with Mc for forward momentum with multiple bouts of rest breaks throughout. Pt continues to benefit from skilled PT services while in house in order to address the aforementioned deficits.      Rehab Prognosis: Good; patient would benefit from acute skilled PT services to address these deficits and reach maximum level of function.    Recent Surgery: * No surgery found *      Plan:     During this hospitalization, patient to be seen 3 x/week to address the identified rehab impairments via therapeutic activities, therapeutic exercises, neuromuscular re-education, wheelchair management/training and progress toward the following goals:    Plan of Care Expires:  12/26/22    Subjective     "My first name is pronounced like Aracely; my last name is pronounced like Door-see"  Pain/Comfort:  Pain Rating 1: 0/10  Pain Rating Post-Intervention 1: 0/10      Objective:     Communicated with RN prior to session.  Patient found HOB elevated with  (no lines intact) upon PT entry to room.     General Precautions: Standard, fall   Orthopedic Precautions: (B AKA)   Braces: N/A  Respiratory Status: Room air     Functional Mobility:  Bed Mobility:     Rolling Left:  modified independence  Rolling Right: modified " independence  Scooting: maximal assistance with difficulty anterior weight shift to offload hips. Provided constant verbal and tactile cues for weight shifting to perform one side at a time, however difficulty with weight shifting as well as pt tends to lean posteriorly  Supine to Sit: supervision  Transfers:     Bed/Transport <> Chair: maximal assistance with  slide board  using  Slide Board x2 reps.  WC propulsion: Pt performed B UE propulsion ~200 with Mc for forward momentum and intermittent maxA for turns. Pt required multiple breaks throughout.  Balance:   Good sitting balance      AM-PAC 6 CLICK MOBILITY  Turning over in bed (including adjusting bedclothes, sheets and blankets)?: 4  Sitting down on and standing up from a chair with arms (e.g., wheelchair, bedside commode, etc.): 1  Moving from lying on back to sitting on the side of the bed?: 4  Moving to and from a bed to a chair (including a wheelchair)?: 2  Need to walk in hospital room?: 1  Climbing 3-5 steps with a railing?: 1  Basic Mobility Total Score: 13       Treatment & Education:  Pt found soiled and tolerated rolling bilaterally for personal hygiene and brief changing.   Discussed sitting upright x1 hr, however transport arrived at end of session. Pt transferred from WC > transport chair maxA.     Educated pt on PT role/POC  Educated pt on importance of OOB activity and daily ambulation   Pt educated on proper body mechanics, safety techniques, and energy conservation with PT facilitation and cueing throughout session   Pt verbalized understanding      Patient left HOB elevated on transport bed with all lines intact, call button in reach, RN notified, and transport aide and RN present..    GOALS:   Multidisciplinary Problems       Physical Therapy Goals          Problem: Physical Therapy    Goal Priority Disciplines Outcome Goal Variances Interventions   Physical Therapy Goal     PT, PT/OT Ongoing, Progressing     Description: Goals to be met  by: 12/10/22    Patient will increase functional independence with mobility by performin. Supine to sit with independence  2. Sit to supine with independence  3. Rolling to Left and Right with independence  4. Bed to chair transfer with minimum assistance using LRAD as needed  5. Wheelchair propulsion x100 feet with minimum assistance using bilateral uppper extremities MET   5a. Wheelchair propulsion x100' supvn using B UE   6. Sitting at edge of bed x10 minutes with [place]                         Time Tracking:     PT Received On: 22  PT Start Time: 1111     PT Stop Time: 1208  PT Total Time (min): 57 min     Billable Minutes: Therapeutic Activity 25 and Train/Wheelchair Management 32    Treatment Type: Treatment  PT/PTA: PT     PTA Visit Number: 0     2022

## 2022-11-29 NOTE — PLAN OF CARE
Problem: Physical Therapy  Goal: Physical Therapy Goal  Description: Goals to be met by: 12/10/22    Patient will increase functional independence with mobility by performin. Supine to sit with independence  2. Sit to supine with independence  3. Rolling to Left and Right with independence  4. Bed to chair transfer with minimum assistance using LRAD as needed  5. Wheelchair propulsion x100 feet with minimum assistance using bilateral uppper extremities MET   5a. Wheelchair propulsion x100' supvn using B UE   6. Sitting at edge of bed x10 minutes with Shasta    Outcome: Ongoing, Progressing

## 2022-11-29 NOTE — PROGRESS NOTES
[facility] - Intensive Care ([address] Medicine  Progress Note    Patient Name: Adryan Neff  MRN: [id number]  Patient Class: IP- Inpatient   Admission Date: 11/23/2022  Length of Stay: 6 days  Attending Physician: Jessenia Wharton MD  Primary Care Provider: Carola Pedro MD      Subjective:     Principal Problem:Sepsis      HPI:  Mr. Neff is a 56-year-old gentleman with PMH of CAD, DM type 1, diabetic retinopathy, ESRD on HD, hypertension, PAD, b/l AKA who [place] [facility] [place] status. Pt arrived from [facility] where he was found minimally responsive and BG 50s. Upon chart review, last HD sessions were Sunday 11/20 and 11/22. Pt oxygenating well on RA. Awake and oriented to self and place. He is able to state when he last had HD. Labs noted WBC 25 and lactate 2.3. UA positive for UTI. HCT nonacute. R AKA stump performed on 10/13/22 showed discoloration of the skin at the lateral aspect with areas of possible wound drainage/dehiscence. Due to concern for infection, orthopedic surgery consulted. CT right thigh with contrast showed abscess. Blood cultures ordered and empiric IV antibiotics ordered. He was admitted to Hospital Medicine for further evaluation and management of acute encephalopathy 2/2 sepsis in the setting of UTI and R AKA abscess.       Overview/Hospital Course:  He was treated with broad spectrum IV abx. ID was consulted. Superifical wound cx with multiple GNRs (citrobacterx2 and pseudomonas).       Interval History: NAEON. No acute concerns. Tolerated biopsy with IR well today.     Review of Systems   Constitutional:  Negative for fever.   Respiratory:  Negative for shortness of breath.    Cardiovascular:  Negative for chest pain.   Gastrointestinal:  Negative for abdominal pain.   Neurological:  Negative for headaches.     Objective:     Vital Signs (Most Recent):  Temp: 97.4 °F (36.3 °C) (11/29/22 1604)  Pulse: 73 (11/29/22 1604)  Resp: 20 (11/29/22  1604)  BP: (!) 174/72 (11/29/22 1604)  SpO2: 99 % (11/29/22 1604)   Vital Signs (24h Range):  Temp:  [97.4 °F (36.3 °C)-98.1 °F (36.7 °C)] 97.4 °F (36.3 °C)  Pulse:  [67-78] 73  Resp:  [18-20] 20  SpO2:  [98 %-100 %] 99 %  BP: (120-174)/(57-72) 174/72     Weight: 56.2 kg (123 lb 14.4 oz)  Body mass index is 17.28 kg/m².    Intake/Output Summary (Last 24 hours) at 11/29/2022 1721  Last data filed at 11/29/2022 1600  Gross per 24 hour   Intake 240 ml   Output 100 ml   Net 140 ml        Physical Exam  Vitals and nursing note reviewed.   Constitutional:       General: He is not in acute distress.     Appearance: He is well-developed.   HENT:      Head: Normocephalic and atraumatic.   Eyes:      Conjunctiva/sclera: Conjunctivae normal.   Neck:      Vascular: No JVD.   Cardiovascular:      Rate and Rhythm: Normal rate and regular rhythm.      Heart sounds: Normal heart sounds.   Pulmonary:      Effort: Pulmonary effort is normal.      Breath sounds: Normal breath sounds.   Abdominal:      General: Bowel sounds are normal. There is no distension.      Palpations: Abdomen is soft.      Tenderness: There is no abdominal tenderness.   Musculoskeletal:      Cervical back: Neck supple.      Right lower leg: Edema (stump with swelling. sutures intact. some purulence at R suture border) present.      Left lower leg: No edema.      Comments: B/L AKA   Neurological:      Mental Status: He is alert.   Psychiatric:         Behavior: Behavior normal.       Significant Labs: All pertinent labs within the past 24 hours have been reviewed.  Blood Culture: No results for input(s): LABBLOO in the last 48 hours.  CBC:   No results for input(s): WBC, HGB, HCT, PLT in the last 48 hours.    CMP:   No results for input(s): NA, K, CL, CO2, GLU, BUN, CREATININE, CALCIUM, PROT, ALBUMIN, BILITOT, ALKPHOS, AST, ALT, ANIONGAP, EGFRNONAA in the last 48 hours.    Invalid input(s): ESTGFAFRICA    Magnesium: No results for input(s): MG in the last 48  hours.  POCT Glucose:   Recent Labs   Lab 22  2147 22  0813 22  1603   POCTGLUCOSE 117* 100 98         Significant Imaging: I have reviewed all pertinent imaging results/findings within the past 24 hours.      Assessment/Plan:      * Sepsis  Abscess of right thigh  Secondary to R AKA abscess/ osteomyelitis. CT  with extension into the femoral bone marrow suggestive of osteomyelitis.  Continue IV abx. Appreciate ID assistance. Superifical wound cx with multiple GNRs (citrobacterx2 and pseudomonas). Continue meropenem given cx data.   - s/p abscess biopsy-- fu cx    Acute cystitis  - Ucx negative. On broad spectrum abx, so will be treated nonetheless.     Anemia due to chronic kidney disease, on chronic dialysis  - stable       PAD (peripheral artery disease)  S/P AKA (above knee amputation) bilateral  - fall precautions  - s/p b/l AKA  - continue home ASA, Plavix and statin    Elevated troponin  Troponin trend flat. No CP, neg EKG. No further w/u.       ESRD on hemodialysis  - nephrology managing  - No lab stick/BP intake on access site   - Please avoid gadolinium, fleets, phos-based laxatives, NSAIDs  - Will follow closely and continue dialysis treatments while in-patient      Secondary hyperparathyroidism of renal origin  - continue home Renvela       HLD (hyperlipidemia)  - continue home statin      CAD (coronary artery disease) - non-obstructive from [facility] in 2016  - continue ASA, Plavix and statin      Chronic diastolic heart failure  - stable       Gastroesophageal reflux disease  - continue home PPI      Essential (primary) hypertension  controlled  - continue home metoprolol, amlodipine and hydralazine      Controlled type 2 diabetes mellitus with chronic kidney disease on chronic dialysis, with long-term current use of insulin  Controlled. Repeat A1C.  Patient confirms hx of type II DM  - LDSSI   - goal B-180  - diabetic diet     Encephalopathy, metabolic-- resolved       VTE Risk  Mitigation (From admission, onward)           Ordered     heparin (porcine) injection 7,500 Units  Every 8 hours         11/23/22 1508     IP VTE HIGH RISK PATIENT  Once         11/23/22 1508     Place sequential compression device  Until discontinued         11/23/22 1508     Place sequential compression device  Until discontinued         11/23/22 1044                    Discharge Planning   EINO: 12/1/2022     Code Status: Full Code   Is the patient medically ready for discharge?: No    Reason for patient still in hospital (select all that apply): Patient trending condition, Treatment and Consult recommendations  Discharge Plan A: Home Health, Return to nursing home       Jessenia Wharton MD  Department of Hospital Medicine   [facility] - Intensive Care ([place]-[facility])

## 2022-11-30 LAB
POCT GLUCOSE: 115 MG/DL (ref 70–110)
POCT GLUCOSE: 176 MG/DL (ref 70–110)

## 2022-11-30 PROCEDURE — 25000003 PHARM REV CODE 250: Performed by: NURSE PRACTITIONER

## 2022-11-30 PROCEDURE — 25000003 PHARM REV CODE 250: Performed by: INTERNAL MEDICINE

## 2022-11-30 PROCEDURE — 99233 SBSQ HOSP IP/OBS HIGH 50: CPT | Mod: GC,,, | Performed by: INTERNAL MEDICINE

## 2022-11-30 PROCEDURE — 99232 SBSQ HOSP IP/OBS MODERATE 35: CPT | Mod: ,,, | Performed by: NURSE PRACTITIONER

## 2022-11-30 PROCEDURE — 20600001 HC STEP DOWN PRIVATE ROOM

## 2022-11-30 PROCEDURE — 63600175 PHARM REV CODE 636 W HCPCS: Performed by: STUDENT IN AN ORGANIZED HEALTH CARE EDUCATION/TRAINING PROGRAM

## 2022-11-30 PROCEDURE — 99232 PR SUBSEQUENT HOSPITAL CARE,LEVL II: ICD-10-PCS | Mod: ,,, | Performed by: FAMILY MEDICINE

## 2022-11-30 PROCEDURE — 99233 PR SUBSEQUENT HOSPITAL CARE,LEVL III: ICD-10-PCS | Mod: GC,,, | Performed by: INTERNAL MEDICINE

## 2022-11-30 PROCEDURE — 80100016 HC MAINTENANCE HEMODIALYSIS

## 2022-11-30 PROCEDURE — 99232 SBSQ HOSP IP/OBS MODERATE 35: CPT | Mod: ,,, | Performed by: FAMILY MEDICINE

## 2022-11-30 PROCEDURE — 63600175 PHARM REV CODE 636 W HCPCS: Performed by: INTERNAL MEDICINE

## 2022-11-30 PROCEDURE — 99232 PR SUBSEQUENT HOSPITAL CARE,LEVL II: ICD-10-PCS | Mod: ,,, | Performed by: NURSE PRACTITIONER

## 2022-11-30 RX ORDER — SODIUM CHLORIDE 9 MG/ML
INJECTION, SOLUTION INTRAVENOUS ONCE
Status: COMPLETED | OUTPATIENT
Start: 2022-11-30 | End: 2022-11-30

## 2022-11-30 RX ADMIN — HEPARIN SODIUM 7500 UNITS: 5000 INJECTION INTRAVENOUS; SUBCUTANEOUS at 05:11

## 2022-11-30 RX ADMIN — EPOETIN ALFA-EPBX 10000 UNITS: 10000 INJECTION, SOLUTION INTRAVENOUS; SUBCUTANEOUS at 05:11

## 2022-11-30 RX ADMIN — SUCRALFATE 1 G: 1 SUSPENSION ORAL at 07:11

## 2022-11-30 RX ADMIN — ALUMINUM HYDROXIDE, MAGNESIUM HYDROXIDE, AND SIMETHICONE 30 ML: 200; 200; 20 SUSPENSION ORAL at 07:11

## 2022-11-30 RX ADMIN — HYDRALAZINE HYDROCHLORIDE 100 MG: 50 TABLET ORAL at 05:11

## 2022-11-30 RX ADMIN — FOLIC ACID-PYRIDOXINE-CYANOCOBALAMIN TAB 2.5-25-2 MG 1 TABLET: 2.5-25-2 TAB at 07:11

## 2022-11-30 RX ADMIN — MEROPENEM AND SODIUM CHLORIDE 1 G: 1 INJECTION, SOLUTION INTRAVENOUS at 07:11

## 2022-11-30 RX ADMIN — METOPROLOL SUCCINATE 25 MG: 25 TABLET, EXTENDED RELEASE ORAL at 07:11

## 2022-11-30 RX ADMIN — AMLODIPINE BESYLATE 10 MG: 10 TABLET ORAL at 07:11

## 2022-11-30 RX ADMIN — SUCRALFATE 1 G: 1 SUSPENSION ORAL at 05:11

## 2022-11-30 RX ADMIN — ASPIRIN 81 MG 81 MG: 81 TABLET ORAL at 07:11

## 2022-11-30 RX ADMIN — PANTOPRAZOLE SODIUM 40 MG: 40 TABLET, DELAYED RELEASE ORAL at 07:11

## 2022-11-30 RX ADMIN — SODIUM CHLORIDE: 0.9 INJECTION, SOLUTION INTRAVENOUS at 03:11

## 2022-11-30 RX ADMIN — ALUMINUM HYDROXIDE, MAGNESIUM HYDROXIDE, AND SIMETHICONE 30 ML: 200; 200; 20 SUSPENSION ORAL at 05:11

## 2022-11-30 NOTE — PROGRESS NOTES
Report received from KIAH Alexis. Pt arrived from floor AAOx4 via bed. Dialysis initiated via PHIL fistula accessed by Giovanny with 15g needles. Venous side stuck 3x. Access working well without complications BFR@400.

## 2022-11-30 NOTE — SUBJECTIVE & OBJECTIVE
Interval History: NAEON. HD today.     Review of patient's allergies indicates:  No Known Allergies  Current Facility-Administered Medications   Medication Frequency    0.9%  NaCl infusion Once    acetaminophen tablet 650 mg Q8H PRN    aluminum-magnesium hydroxide-simethicone 200-200-20 mg/5 mL suspension 30 mL QID (AC & HS)    amLODIPine tablet 10 mg Daily    aspirin chewable tablet 81 mg Daily    atorvastatin tablet 40 mg QHS    dextrose 10% bolus 125 mL PRN    dextrose 10% bolus 250 mL PRN    epoetin paola-epbx injection 10,000 Units Every Mon, Wed, Fri    folic acid-vit B6-vit B12 2.5-25-2 mg tablet 1 tablet Daily    glucagon (human recombinant) injection 1 mg PRN    glucose chewable tablet 16 g PRN    glucose chewable tablet 24 g PRN    heparin (porcine) injection 7,500 Units Q8H    hydrALAZINE injection 10 mg Q8H PRN    hydrALAZINE tablet 100 mg Q8H    HYDROcodone-acetaminophen 5-325 mg per tablet 1 tablet Q6H PRN    insulin aspart U-100 pen 0-5 Units QID (AC + HS) PRN    insulin detemir U-100 pen 8 Units QHS    melatonin tablet 6 mg Nightly PRN    meropenem-0.9% sodium chloride 1 g/50 mL IVPB Daily    metoprolol succinate (TOPROL-XL) 24 hr tablet 25 mg Daily    naloxone 0.4 mg/mL injection 0.02 mg PRN    ondansetron disintegrating tablet 8 mg Q8H PRN    pantoprazole EC tablet 40 mg Daily    polyethylene glycol packet 17 g BID PRN    senna tablet 8.6 mg Daily PRN    sodium chloride 0.9% flush 10 mL PRN    sodium chloride 0.9% flush 10 mL Q12H PRN    sucralfate 100 mg/mL suspension 1 g Q6H       Objective:     Vital Signs (Most Recent):  Temp: 96.4 °F (35.8 °C) (11/30/22 1137)  Pulse: 71 (11/30/22 1137)  Resp: 20 (11/30/22 1137)  BP: (!) 135/95 (11/30/22 1137)  SpO2: 99 % (11/30/22 1137)  O2 Device (Oxygen Therapy): room air (11/30/22 8773)   Vital Signs (24h Range):  Temp:  [96.4 °F (35.8 °C)-98.5 °F (36.9 °C)] 96.4 °F (35.8 °C)  Pulse:  [63-79] 71  Resp:  [18-20] 20  SpO2:  [96 %-100 %] 99 %  BP:  (135-174)/(64-95) 135/95     Weight: 56.7 kg (125 lb) (11/30/22 0400)  Body mass index is 17.43 kg/m².  Body surface area is 1.69 meters squared.    I/O last 3 completed shifts:  In: 340 [P.O.:340]  Out: 100 [Urine:100]    Physical Exam  Vitals and nursing note reviewed.   Constitutional:       General: He is not in acute distress.     Appearance: He is well-developed.   HENT:      Head: Normocephalic and atraumatic.   Eyes:      Conjunctiva/sclera: Conjunctivae normal.   Neck:      Vascular: No JVD.   Cardiovascular:      Rate and Rhythm: Normal rate and regular rhythm.      Heart sounds: Normal heart sounds.   Pulmonary:      Effort: Pulmonary effort is normal.      Breath sounds: Normal breath sounds.   Abdominal:      General: Bowel sounds are normal. There is no distension.      Palpations: Abdomen is soft.      Tenderness: There is no abdominal tenderness.   Musculoskeletal:      Cervical back: Neck supple.      Right lower leg: Edema (stump with swelling. sutures intact. some purulence at R suture border) present.      Left lower leg: No edema.      Comments: B/L AKA   Neurological:      Mental Status: He is alert.   Psychiatric:         Behavior: Behavior normal.       Significant Labs:  CBC:   Recent Labs   Lab 11/27/22  1043   WBC 9.98   RBC 3.52*   HGB 9.2*   HCT 31.4*      MCV 89   MCH 26.1*   MCHC 29.3*     CMP:   Recent Labs   Lab 11/25/22  0500 11/27/22  1043   GLU 87 153*   CALCIUM 9.2 8.9   ALBUMIN 2.5*  --    PROT 7.2  --    * 136   K 4.5 4.2   CO2 24 22*   CL 98 102   BUN 49* 39*   CREATININE 9.6* 9.6*   ALKPHOS 83  --    ALT 7*  --    AST 9*  --    BILITOT 0.4  --      All labs within the past 24 hours have been reviewed.

## 2022-11-30 NOTE — PROGRESS NOTES
[facility] - Intensive Care ([place] [address])  Infectious Disease  Progress Note    Patient Name: Adryan Neff  MRN: [id number]  Admission Date: 11/23/2022  Length of Stay: 7 days  Attending Physician: Jessenia Wharton MD  Primary Care Provider: Carola Pedro MD    Isolation Status: No active isolations  Assessment/Plan:      Abscess of right AKA stump, osteomyelitis  56-year-old male with ESRD on HD s/p tx and severe PAD c/b gangrene leading to R AKA (10/13/22- clean margins on pathology). He then re-presented for altered mental status, and found to have a large abscess in his RLE on CT 11/23 with extension into the femoral bone marrow suggestive of osteomyelitis. Superficial wound cultures from initial purulence noted around R AKA stump show two different Citrobacter koseri and Pseudomonas aeruginosa.  S/p IR aspiration of 3ml purulence 11/29/22.      Recommendations:  -Continue meropenem based off superficial cultures  -Will follow-up IR aspiration cultures; appreciate their intervention  -Will plan to treat for 6 weeks given suspicion of osteomyelitis.  Depending on IR aspiration Cx results, may switch to PO antibiotic with high bioavailability                Thank you for your consult. I will follow-up with patient. Please contact us if you have any additional questions.    Montana Terry MD  Infectious Disease  [facility] - Intensive Care ([place] [address])    Subjective:     Principal Problem:Sepsis    HPI: 55 yo male with ESRD on HD, LTBI s/p tx and severe PAD c/b gangrene admitted for R AKA abscess. ID consulted for abx recs. [employer] creole  used [id number].  Pt was found to be minimally responsive at NH ([facility]) and was brought to ED. He was found to be hypoglycemic. Work up notable for large abscess at site of prior R AKA with periosteal reaction of distal femur , wound swab obtained in ER - cx in process. Blcx ngtd. Pt is currently on vanc/zosyn. Pt denied fevers, chills, n/v/d, abdo pain or dysuria at  NH. He stated it felt well.     Of note, pt was recently admitted to  for R foot gangrene - hospital course at that time c/b cardiac arrest/aspiration. He underwent R AKA with ortho with clean margins on path on 10/13.         Interval History: NAEO.  No fevers.  No worsening pain at right AKA stump.Denies diarrhea.  Assisted by #[id number] today.    Review of Systems   Constitutional:  Negative for chills and fever.   Respiratory:  Negative for cough and shortness of breath.    Gastrointestinal:  Negative for diarrhea, nausea and vomiting.   Skin:  Negative for rash.   Objective:     Vital Signs (Most Recent):  Temp: 96.4 °F (35.8 °C) (11/30/22 1137)  Pulse: 71 (11/30/22 1137)  Resp: 20 (11/30/22 1137)  BP: (!) 135/95 (11/30/22 1137)  SpO2: 99 % (11/30/22 1137)   Vital Signs (24h Range):  Temp:  [96.4 °F (35.8 °C)-98.5 °F (36.9 °C)] 96.4 °F (35.8 °C)  Pulse:  [63-79] 71  Resp:  [18-20] 20  SpO2:  [96 %-100 %] 99 %  BP: (135-174)/(64-95) 135/95     Weight: 56.7 kg (125 lb)  Body mass index is 17.43 kg/m².    Estimated Creatinine Clearance: 6.8 mL/min (A) (based on SCr of 9.6 mg/dL (H)).    Physical Exam  Constitutional:       General: He is not in acute distress.     Appearance: He is normal weight. He is not toxic-appearing or diaphoretic.   HENT:      Head: Normocephalic and atraumatic.      Nose: Nose normal.      Mouth/Throat:      Mouth: Mucous membranes are moist.      Pharynx: Oropharynx is clear.   Eyes:      General: No scleral icterus.        Right eye: No discharge.         Left eye: No discharge.      Extraocular Movements: Extraocular movements intact.      Conjunctiva/sclera: Conjunctivae normal.      Pupils: Pupils are equal, round, and reactive to light.   Cardiovascular:      Rate and Rhythm: Normal rate and regular rhythm.      Pulses: Normal pulses.      Heart sounds: Normal heart sounds.      Comments: GODWINE AVF  Pulmonary:      Effort: Pulmonary effort is normal.      Breath sounds: Normal  breath sounds.   Abdominal:      General: Abdomen is flat. Bowel sounds are normal.      Palpations: Abdomen is soft.      Tenderness: There is no guarding or rebound.   Musculoskeletal:         General: Deformity present.      Cervical back: Normal range of motion and neck supple.      Comments: -bilateral AKA   Skin:     General: Skin is warm and dry.      Coloration: Skin is not jaundiced.      Findings: No rash.      Comments: -R AKA with sutures; previously had some fluctuance on exam, now s/p IR aspiration and bandaged  -LUE Avfistula with palpable thrill   Neurological:      Mental Status: He is alert and oriented to person, place, and time. Mental status is at baseline.   Psychiatric:         Mood and Affect: Mood normal.         Behavior: Behavior normal.         Thought Content: Thought content normal.         Judgment: Judgment normal.       Significant Labs: All pertinent labs within the past 24 hours have been reviewed.    Significant Imaging: I have reviewed all pertinent imaging results/findings within the past 24 hours.

## 2022-11-30 NOTE — PLAN OF CARE
CHANEL sent updated clinicals to [facility] Phone: [phone]  via [employer]. CHANEL will continue to follow patient.        Christina Mckeon LMSW  PRN - [facility]  [phone]

## 2022-11-30 NOTE — SUBJECTIVE & OBJECTIVE
Interval History: NAEON. No acute concerns. Wants to go home.     Review of Systems   Constitutional:  Negative for fever.   Respiratory:  Negative for shortness of breath.    Cardiovascular:  Negative for chest pain.   Gastrointestinal:  Negative for abdominal pain.   Neurological:  Negative for headaches.     Objective:     Vital Signs (Most Recent):  Temp: 96.4 °F (35.8 °C) (11/30/22 1137)  Pulse: 71 (11/30/22 1527)  Resp: 20 (11/30/22 1137)  BP: (!) 155/69 (11/30/22 1527)  SpO2: 99 % (11/30/22 1137)   Vital Signs (24h Range):  Temp:  [96.4 °F (35.8 °C)-98.5 °F (36.9 °C)] 96.4 °F (35.8 °C)  Pulse:  [63-79] 71  Resp:  [18-20] 20  SpO2:  [96 %-100 %] 99 %  BP: (135-174)/(64-95) 155/69     Weight: 56.7 kg (125 lb)  Body mass index is 17.43 kg/m².    Intake/Output Summary (Last 24 hours) at 11/30/2022 1536  Last data filed at 11/30/2022 1419  Gross per 24 hour   Intake 580 ml   Output --   Net 580 ml        Physical Exam  Vitals and nursing note reviewed.   Constitutional:       General: He is not in acute distress.     Appearance: He is well-developed.   HENT:      Head: Normocephalic and atraumatic.   Eyes:      Conjunctiva/sclera: Conjunctivae normal.   Neck:      Vascular: No JVD.   Cardiovascular:      Rate and Rhythm: Normal rate and regular rhythm.      Heart sounds: Normal heart sounds.   Pulmonary:      Effort: Pulmonary effort is normal.      Breath sounds: Normal breath sounds.   Abdominal:      General: Bowel sounds are normal. There is no distension.      Palpations: Abdomen is soft.      Tenderness: There is no abdominal tenderness.   Musculoskeletal:      Cervical back: Neck supple.      Right lower leg: Edema (stump with swelling. sutures intact. Bandage at biopsy site C/D/I) present.      Left lower leg: No edema.      Comments: B/L AKA   Neurological:      Mental Status: He is alert.   Psychiatric:         Behavior: Behavior normal.       Significant Labs: All pertinent labs within the past 24 hours  have been reviewed.  Blood Culture: No results for input(s): LABBLOO in the last 48 hours.  CBC:   No results for input(s): WBC, HGB, HCT, PLT in the last 48 hours.    CMP:   No results for input(s): NA, K, CL, CO2, GLU, BUN, CREATININE, CALCIUM, PROT, ALBUMIN, BILITOT, ALKPHOS, AST, ALT, ANIONGAP, EGFRNONAA in the last 48 hours.    Invalid input(s): ESTGFAFRICA    Magnesium: No results for input(s): MG in the last 48 hours.  POCT Glucose:   Recent Labs   Lab 11/29/22  1603 11/29/22  2055 11/30/22  0759   POCTGLUCOSE 98 119* 115*         Significant Imaging: I have reviewed all pertinent imaging results/findings within the past 24 hours.

## 2022-11-30 NOTE — ASSESSMENT & PLAN NOTE
56-year-old male with ESRD on HD s/p tx and severe PAD c/b gangrene leading to R AKA (10/13/22- clean margins on pathology). He then re-presented for altered mental status, and found to have a large abscess in his RLE on CT 11/23 with extension into the femoral bone marrow suggestive of osteomyelitis. Superficial wound cultures from initial purulence noted around R AKA stump show two different Citrobacter koseri and Pseudomonas aeruginosa.  S/p IR aspiration of 3ml purulence 11/29/22.      Recommendations:  -Continue meropenem based off superficial cultures  -Will follow-up IR aspiration cultures; appreciate their intervention  -Will plan to treat for 6 weeks given suspicion of osteomyelitis.  Depending on IR aspiration Cx results, may switch to PO antibiotic with high bioavailability

## 2022-11-30 NOTE — SUBJECTIVE & OBJECTIVE
Interval History: NAEO.  No fevers.  No worsening pain at right AKA stump.Denies diarrhea.  Assisted by #[id number] today.    Review of Systems   Constitutional:  Negative for chills and fever.   Respiratory:  Negative for cough and shortness of breath.    Gastrointestinal:  Negative for diarrhea, nausea and vomiting.   Skin:  Negative for rash.   Objective:     Vital Signs (Most Recent):  Temp: 96.4 °F (35.8 °C) (11/30/22 1137)  Pulse: 71 (11/30/22 1137)  Resp: 20 (11/30/22 1137)  BP: (!) 135/95 (11/30/22 1137)  SpO2: 99 % (11/30/22 1137)   Vital Signs (24h Range):  Temp:  [96.4 °F (35.8 °C)-98.5 °F (36.9 °C)] 96.4 °F (35.8 °C)  Pulse:  [63-79] 71  Resp:  [18-20] 20  SpO2:  [96 %-100 %] 99 %  BP: (135-174)/(64-95) 135/95     Weight: 56.7 kg (125 lb)  Body mass index is 17.43 kg/m².    Estimated Creatinine Clearance: 6.8 mL/min (A) (based on SCr of 9.6 mg/dL (H)).    Physical Exam  Constitutional:       General: He is not in acute distress.     Appearance: He is normal weight. He is not toxic-appearing or diaphoretic.   HENT:      Head: Normocephalic and atraumatic.      Nose: Nose normal.      Mouth/Throat:      Mouth: Mucous membranes are moist.      Pharynx: Oropharynx is clear.   Eyes:      General: No scleral icterus.        Right eye: No discharge.         Left eye: No discharge.      Extraocular Movements: Extraocular movements intact.      Conjunctiva/sclera: Conjunctivae normal.      Pupils: Pupils are equal, round, and reactive to light.   Cardiovascular:      Rate and Rhythm: Normal rate and regular rhythm.      Pulses: Normal pulses.      Heart sounds: Normal heart sounds.      Comments: HUMBERTO AVDAIJA  Pulmonary:      Effort: Pulmonary effort is normal.      Breath sounds: Normal breath sounds.   Abdominal:      General: Abdomen is flat. Bowel sounds are normal.      Palpations: Abdomen is soft.      Tenderness: There is no guarding or rebound.   Musculoskeletal:         General: Deformity present.       Cervical back: Normal range of motion and neck supple.      Comments: -bilateral AKA   Skin:     General: Skin is warm and dry.      Coloration: Skin is not jaundiced.      Findings: No rash.      Comments: -R AKA with sutures; previously had some fluctuance on exam, now s/p IR aspiration and bandaged  -LUE Avfistula with palpable thrill   Neurological:      Mental Status: He is alert and oriented to person, place, and time. Mental status is at baseline.   Psychiatric:         Mood and Affect: Mood normal.         Behavior: Behavior normal.         Thought Content: Thought content normal.         Judgment: Judgment normal.       Significant Labs: All pertinent labs within the past 24 hours have been reviewed.    Significant Imaging: I have reviewed all pertinent imaging results/findings within the past 24 hours.

## 2022-11-30 NOTE — PROGRESS NOTES
[facility] ([place] [address])  Nephrology  Progress Note    Patient Name: Adryan Neff  MRN: [id number]  Admission Date: 11/23/2022  Hospital Length of Stay: 7 days  Attending Provider: Jessenia Wharton MD   Primary Care Physician: Carola Pedro MD  Principal Problem:Sepsis    Subjective:     Interval History: NAEON. HD today.     Review of patient's allergies indicates:  No Known Allergies  Current Facility-Administered Medications   Medication Frequency    0.9%  NaCl infusion Once    acetaminophen tablet 650 mg Q8H PRN    aluminum-magnesium hydroxide-simethicone 200-200-20 mg/5 mL suspension 30 mL QID (AC & HS)    amLODIPine tablet 10 mg Daily    aspirin chewable tablet 81 mg Daily    atorvastatin tablet 40 mg QHS    dextrose 10% bolus 125 mL PRN    dextrose 10% bolus 250 mL PRN    epoetin paola-epbx injection 10,000 Units Every Mon, Wed, Fri    folic acid-vit B6-vit B12 2.5-25-2 mg tablet 1 tablet Daily    glucagon (human recombinant) injection 1 mg PRN    glucose chewable tablet 16 g PRN    glucose chewable tablet 24 g PRN    heparin (porcine) injection 7,500 Units Q8H    hydrALAZINE injection 10 mg Q8H PRN    hydrALAZINE tablet 100 mg Q8H    HYDROcodone-acetaminophen 5-325 mg per tablet 1 tablet Q6H PRN    insulin aspart U-100 pen 0-5 Units QID (AC + HS) PRN    insulin detemir U-100 pen 8 Units QHS    melatonin tablet 6 mg Nightly PRN    meropenem-0.9% sodium chloride 1 g/50 mL IVPB Daily    metoprolol succinate (TOPROL-XL) 24 hr tablet 25 mg Daily    naloxone 0.4 mg/mL injection 0.02 mg PRN    ondansetron disintegrating tablet 8 mg Q8H PRN    pantoprazole EC tablet 40 mg Daily    polyethylene glycol packet 17 g BID PRN    senna tablet 8.6 mg Daily PRN    sodium chloride 0.9% flush 10 mL PRN    sodium chloride 0.9% flush 10 mL Q12H PRN    sucralfate 100 mg/mL suspension 1 g Q6H       Objective:     Vital Signs (Most Recent):  Temp: 96.4 °F (35.8 °C) (11/30/22  1137)  Pulse: 71 (11/30/22 1137)  Resp: 20 (11/30/22 1137)  BP: (!) 135/95 (11/30/22 1137)  SpO2: 99 % (11/30/22 1137)  O2 Device (Oxygen Therapy): room air (11/30/22 0433)   Vital Signs (24h Range):  Temp:  [96.4 °F (35.8 °C)-98.5 °F (36.9 °C)] 96.4 °F (35.8 °C)  Pulse:  [63-79] 71  Resp:  [18-20] 20  SpO2:  [96 %-100 %] 99 %  BP: (135-174)/(64-95) 135/95     Weight: 56.7 kg (125 lb) (11/30/22 0400)  Body mass index is 17.43 kg/m².  Body surface area is 1.69 meters squared.    I/O last 3 completed shifts:  In: 340 [P.O.:340]  Out: 100 [Urine:100]    Physical Exam  Vitals and nursing note reviewed.   Constitutional:       General: He is not in acute distress.     Appearance: He is well-developed.   HENT:      Head: Normocephalic and atraumatic.   Eyes:      Conjunctiva/sclera: Conjunctivae normal.   Neck:      Vascular: No JVD.   Cardiovascular:      Rate and Rhythm: Normal rate and regular rhythm.      Heart sounds: Normal heart sounds.   Pulmonary:      Effort: Pulmonary effort is normal.      Breath sounds: Normal breath sounds.   Abdominal:      General: Bowel sounds are normal. There is no distension.      Palpations: Abdomen is soft.      Tenderness: There is no abdominal tenderness.   Musculoskeletal:      Cervical back: Neck supple.      Right lower leg: Edema (stump with swelling. sutures intact. some purulence at R suture border) present.      Left lower leg: No edema.      Comments: B/L AKA   Neurological:      Mental Status: He is alert.   Psychiatric:         Behavior: Behavior normal.       Significant Labs:  CBC:   Recent Labs   Lab 11/27/22  1043   WBC 9.98   RBC 3.52*   HGB 9.2*   HCT 31.4*      MCV 89   MCH 26.1*   MCHC 29.3*     CMP:   Recent Labs   Lab 11/25/22  0500 11/27/22  1043   GLU 87 153*   CALCIUM 9.2 8.9   ALBUMIN 2.5*  --    PROT 7.2  --    * 136   K 4.5 4.2   CO2 24 22*   CL 98 102   BUN 49* 39*   CREATININE 9.6* 9.6*   ALKPHOS 83  --    ALT 7*  --    AST 9*  --    BILITOT  0.4  --      All labs within the past 24 hours have been reviewed.         Assessment/Plan:     * Sepsis  management per primary       Chronic kidney disease-mineral and bone disorder  - hold phos binders for now , phos 2.1  -phos with am labs     Anemia due to chronic kidney disease, on chronic dialysis  -goal 10-11   -continue epogen     ESRD on hemodialysis  Nephrology History  iHD Schedule: MWF   Unit/MD: [facility]  Duration: 3.5 hours   UF: 2L  EDW: 56 kg   Access: PHIL AVF   Last HD 11/20 and 11/22    Assessment:   - Plan for HD today 11/30  - Dialysate adjusted to current labs   -Pre and post HD weight   -Renal diet if not NPO   - Continue to monitor intake and output, daily weights   - Avoid nephrotoxic medication and renal dose medications to GFR                     Thank you for your consult. I will follow-up with patient. Please contact us if you have any additional questions.    Odalys Frederick DNP  Nephrology  [facility] ([place]

## 2022-11-30 NOTE — PROGRESS NOTES
[facility] - Intensive Care ([address] Medicine  Progress Note    Patient Name: Adryan Neff  MRN: [id number]  Patient Class: IP- Inpatient   Admission Date: 11/23/2022  Length of Stay: 7 days  Attending Physician: Jessenia Wharton MD  Primary Care Provider: Carola Pedro MD      Subjective:     Principal Problem:Sepsis      HPI:  Mr. Neff is a 56-year-old gentleman with PMH of CAD, DM type 1, diabetic retinopathy, ESRD on HD, hypertension, PAD, b/l AKA who presented to [facility]ED for altered mental status. Pt arrived from [facility] where he was found minimally responsive and BG 50s. Upon chart review, last HD sessions were Sunday 11/20 and 11/22. Pt oxygenating well on RA. Awake and oriented to self and place. He is able to state when he last had HD. Labs noted WBC 25 and lactate 2.3. UA positive for UTI. HCT nonacute. R AKA stump performed on 10/13/22 showed discoloration of the skin at the lateral aspect with areas of possible wound drainage/dehiscence. Due to concern for infection, orthopedic surgery consulted. CT right thigh with contrast showed abscess. Blood cultures ordered and empiric IV antibiotics ordered. He was admitted to Hospital Medicine for further evaluation and management of acute encephalopathy 2/2 sepsis in the setting of UTI and R AKA abscess.       Overview/Hospital Course:  He was treated with broad spectrum IV abx. ID was consulted. Superifical wound cx with multiple GNRs (citrobacterx2 and pseudomonas). IR took him for fluid sampling of the abscess 11/29.       Interval History: NAEON. No acute concerns. Wants to go home.     Review of Systems   Constitutional:  Negative for fever.   Respiratory:  Negative for shortness of breath.    Cardiovascular:  Negative for chest pain.   Gastrointestinal:  Negative for abdominal pain.   Neurological:  Negative for headaches.     Objective:     Vital Signs (Most Recent):  Temp: 96.4 °F (35.8 °C) (11/30/22 1137)  Pulse: 71 (11/30/22  1527)  Resp: 20 (11/30/22 1137)  BP: (!) 155/69 (11/30/22 1527)  SpO2: 99 % (11/30/22 1137)   Vital Signs (24h Range):  Temp:  [96.4 °F (35.8 °C)-98.5 °F (36.9 °C)] 96.4 °F (35.8 °C)  Pulse:  [63-79] 71  Resp:  [18-20] 20  SpO2:  [96 %-100 %] 99 %  BP: (135-174)/(64-95) 155/69     Weight: 56.7 kg (125 lb)  Body mass index is 17.43 kg/m².    Intake/Output Summary (Last 24 hours) at 11/30/2022 1536  Last data filed at 11/30/2022 1419  Gross per 24 hour   Intake 580 ml   Output --   Net 580 ml        Physical Exam  Vitals and nursing note reviewed.   Constitutional:       General: He is not in acute distress.     Appearance: He is well-developed.   HENT:      Head: Normocephalic and atraumatic.   Eyes:      Conjunctiva/sclera: Conjunctivae normal.   Neck:      Vascular: No JVD.   Cardiovascular:      Rate and Rhythm: Normal rate and regular rhythm.      Heart sounds: Normal heart sounds.   Pulmonary:      Effort: Pulmonary effort is normal.      Breath sounds: Normal breath sounds.   Abdominal:      General: Bowel sounds are normal. There is no distension.      Palpations: Abdomen is soft.      Tenderness: There is no abdominal tenderness.   Musculoskeletal:      Cervical back: Neck supple.      Right lower leg: Edema (stump with swelling. sutures intact. Bandage at biopsy site C/D/I) present.      Left lower leg: No edema.      Comments: B/L AKA   Neurological:      Mental Status: He is alert.   Psychiatric:         Behavior: Behavior normal.       Significant Labs: All pertinent labs within the past 24 hours have been reviewed.  Blood Culture: No results for input(s): LABBLOO in the last 48 hours.  CBC:   No results for input(s): WBC, HGB, HCT, PLT in the last 48 hours.    CMP:   No results for input(s): NA, K, CL, CO2, GLU, BUN, CREATININE, CALCIUM, PROT, ALBUMIN, BILITOT, ALKPHOS, AST, ALT, ANIONGAP, EGFRNONAA in the last 48 hours.    Invalid input(s): ESTGFAFRICA    Magnesium: No results for input(s): MG in the  last 48 hours.  POCT Glucose:   Recent Labs   Lab 11/29/22  1603 11/29/22  2055 11/30/22  0759   POCTGLUCOSE 98 119* 115*         Significant Imaging: I have reviewed all pertinent imaging results/findings within the past 24 hours.      Assessment/Plan:      * Sepsis  Abscess of right thigh  Secondary to R AKA abscess/ osteomyelitis. CT 11/23 with extension into the femoral bone marrow suggestive of osteomyelitis.  - s/p abscess biopsy 11/29  ID recs:   -Continue meropenem based off superficial cultures  -Will follow-up IR aspiration cultures; appreciate their intervention  -Will plan to treat for 6 weeks given suspicion of osteomyelitis.  Depending on IR aspiration Cx results, may switch to PO antibiotic with high bioavailability    Acute cystitis  - Ucx negative. On broad spectrum abx, so will be treated nonetheless.     Anemia due to chronic kidney disease, on chronic dialysis  - stable       PAD (peripheral artery disease)  S/P AKA (above knee amputation) bilateral  - fall precautions  - s/p b/l AKA  - continue home ASA, Plavix and statin    Elevated troponin  Troponin trend flat. No CP, neg EKG. No further w/u.       ESRD on hemodialysis  - nephrology managing  - No lab stick/BP intake on access site   - Please avoid gadolinium, fleets, phos-based laxatives, NSAIDs  - Will follow closely and continue dialysis treatments while in-patient      Secondary hyperparathyroidism of renal origin  - continue home Renvela       HLD (hyperlipidemia)  - continue home statin      CAD (coronary artery disease) - [facility]obstructive [facility] in [facility]  - continue ASA, Plavix and statin      Chronic diastolic heart failure  - stable       Gastroesophageal reflux disease  - continue home PPI      Essential (primary) hypertension  controlled  - continue home metoprolol, amlodipine and hydralazine      Controlled type 2 diabetes mellitus with chronic kidney disease on chronic dialysis, with long-term current use of  insulin  Controlled.  Patient confirms hx of type II DM  - LDSSI   - goal B-180  - diabetic diet     Encephalopathy, metabolic-- resolved       VTE Risk Mitigation (From admission, onward)           Ordered     heparin (porcine) injection 7,500 Units  Every 8 hours         22 1508     IP VTE HIGH RISK PATIENT  Once         22 1508     Place sequential compression device  Until discontinued         22 1508     Place sequential compression device  Until discontinued         22 1044                    Discharge Planning   ENIO: 2022     Code Status: Full Code   Is the patient medically ready for discharge?: No    Reason for patient still in hospital (select all that apply): Patient trending condition, Treatment and Consult recommendations  Discharge Plan A: Home Health, Return to nursing home       Jessenia Wharton MD  Department of Hospital Medicine   [facility] - Intensive Care ([place])

## 2022-11-30 NOTE — ASSESSMENT & PLAN NOTE
Nephrology History  iHD Schedule: MWF   Unit/MD: [facility]  Duration: 3.5 hours   UF: 2L  EDW: 56 kg   Access: PHIL AVF   Last HD 11/20 and 11/22    Assessment:   - Plan for HD today 11/30  - Dialysate adjusted to current labs   -Pre and post HD weight   -Renal diet if not NPO   - Continue to monitor intake and output, daily weights   - Avoid nephrotoxic medication and renal dose medications to GFR

## 2022-12-01 PROCEDURE — 20600001 HC STEP DOWN PRIVATE ROOM

## 2022-12-01 PROCEDURE — 63600175 PHARM REV CODE 636 W HCPCS: Performed by: INTERNAL MEDICINE

## 2022-12-01 PROCEDURE — 99233 SBSQ HOSP IP/OBS HIGH 50: CPT | Mod: ,,, | Performed by: NURSE PRACTITIONER

## 2022-12-01 PROCEDURE — 99232 PR SUBSEQUENT HOSPITAL CARE,LEVL II: ICD-10-PCS | Mod: ,,, | Performed by: INTERNAL MEDICINE

## 2022-12-01 PROCEDURE — 99232 SBSQ HOSP IP/OBS MODERATE 35: CPT | Mod: ,,, | Performed by: INTERNAL MEDICINE

## 2022-12-01 PROCEDURE — 63600175 PHARM REV CODE 636 W HCPCS: Performed by: STUDENT IN AN ORGANIZED HEALTH CARE EDUCATION/TRAINING PROGRAM

## 2022-12-01 PROCEDURE — 99233 PR SUBSEQUENT HOSPITAL CARE,LEVL III: ICD-10-PCS | Mod: ,,, | Performed by: NURSE PRACTITIONER

## 2022-12-01 PROCEDURE — 25000003 PHARM REV CODE 250: Performed by: INTERNAL MEDICINE

## 2022-12-01 PROCEDURE — 97535 SELF CARE MNGMENT TRAINING: CPT

## 2022-12-01 RX ORDER — SODIUM CHLORIDE 9 MG/ML
INJECTION, SOLUTION INTRAVENOUS ONCE
Status: COMPLETED | OUTPATIENT
Start: 2022-12-02 | End: 2022-12-02

## 2022-12-01 RX ADMIN — MEROPENEM AND SODIUM CHLORIDE 1 G: 1 INJECTION, SOLUTION INTRAVENOUS at 12:12

## 2022-12-01 RX ADMIN — HYDRALAZINE HYDROCHLORIDE 100 MG: 50 TABLET ORAL at 08:12

## 2022-12-01 NOTE — ASSESSMENT & PLAN NOTE
Nephrology History  iHD Schedule: MWF   Unit/MD: [facility]  Duration: 3.5 hours   UF: 2L  EDW: 56 kg   Access: PHIL AVF   Last HD 11/20 and 11/22    Assessment:   - Plan for HD 12/2/22  - Dialysate adjusted to current labs   -Pre and post HD weight   -Renal diet if not NPO   - Continue to monitor intake and output, daily weights

## 2022-12-01 NOTE — PLAN OF CARE
Problem: Occupational Therapy  Goal: Occupational Therapy Goal  Description: Goals to be met by: 12/12     Patient will increase functional independence with ADLs by performing:    UE Dressing with Supervision.  LE Dressing with Supervision.  Grooming while seated with Supervision.  Toileting from toilet with Supervision for hygiene and clothing management.   Scooting in long sitting with Modified independence    Outcome: Ongoing, Progressing     Goals remain appropriate

## 2022-12-01 NOTE — SUBJECTIVE & OBJECTIVE
Interval History: HD yesterday, tolerated well. Net UF 2L. Oxygenating well on RA.     Review of patient's allergies indicates:  No Known Allergies  Current Facility-Administered Medications   Medication Frequency    [START ON 12/2/2022] 0.9%  NaCl infusion Once    acetaminophen tablet 650 mg Q8H PRN    aluminum-magnesium hydroxide-simethicone 200-200-20 mg/5 mL suspension 30 mL QID (AC & HS)    amLODIPine tablet 10 mg Daily    aspirin chewable tablet 81 mg Daily    atorvastatin tablet 40 mg QHS    dextrose 10% bolus 125 mL PRN    dextrose 10% bolus 250 mL PRN    epoetin paola-epbx injection 10,000 Units Every Mon, Wed, Fri    folic acid-vit B6-vit B12 2.5-25-2 mg tablet 1 tablet Daily    glucagon (human recombinant) injection 1 mg PRN    glucose chewable tablet 16 g PRN    glucose chewable tablet 24 g PRN    heparin (porcine) injection 7,500 Units Q8H    hydrALAZINE injection 10 mg Q8H PRN    hydrALAZINE tablet 100 mg Q8H    HYDROcodone-acetaminophen 5-325 mg per tablet 1 tablet Q6H PRN    insulin aspart U-100 pen 0-5 Units QID (AC + HS) PRN    insulin detemir U-100 pen 8 Units QHS    melatonin tablet 6 mg Nightly PRN    meropenem-0.9% sodium chloride 1 g/50 mL IVPB Daily    metoprolol succinate (TOPROL-XL) 24 hr tablet 25 mg Daily    naloxone 0.4 mg/mL injection 0.02 mg PRN    ondansetron disintegrating tablet 8 mg Q8H PRN    pantoprazole EC tablet 40 mg Daily    polyethylene glycol packet 17 g BID PRN    senna tablet 8.6 mg Daily PRN    sodium chloride 0.9% flush 10 mL PRN    sodium chloride 0.9% flush 10 mL Q12H PRN    sucralfate 100 mg/mL suspension 1 g Q6H       Objective:     Vital Signs (Most Recent):  Temp:  (refuse vitals) (12/01/22 0830)  Pulse:  (refuse vitals) (12/01/22 0830)  Resp: 18 (11/30/22 2054)  BP:  (refuse vitals) (12/01/22 0830)  SpO2: 96 % (11/30/22 2054)  O2 Device (Oxygen Therapy): room air (11/30/22 1810) Vital Signs (24h Range):  Temp:  [96.4 °F (35.8 °C)-98.1 °F (36.7 °C)] 98 °F (36.7  °C)  Pulse:  [66-79] 74  Resp:  [17-20] 18  SpO2:  [96 %-99 %] 96 %  BP: (135-172)/(58-95) 152/68     Weight: 56.6 kg (124 lb 12.5 oz) (12/01/22 0400)  Body mass index is 17.4 kg/m².  Body surface area is 1.68 meters squared.    I/O last 3 completed shifts:  In: 340 [P.O.:340]  Out: 2600 [Other:2600]    Physical Exam  Vitals and nursing note reviewed.   Constitutional:       General: He is not in acute distress.     Appearance: He is well-developed.   HENT:      Head: Normocephalic and atraumatic.   Eyes:      Conjunctiva/sclera: Conjunctivae normal.   Neck:      Vascular: No JVD.   Cardiovascular:      Rate and Rhythm: Normal rate and regular rhythm.      Heart sounds: Normal heart sounds.   Pulmonary:      Effort: Pulmonary effort is normal.      Breath sounds: Normal breath sounds.   Abdominal:      General: Bowel sounds are normal. There is no distension.      Palpations: Abdomen is soft.      Tenderness: There is no abdominal tenderness.   Musculoskeletal:      Cervical back: Neck supple.      Right lower leg: Edema (stump with swelling. sutures intact. some purulence at R suture border) present.      Left lower leg: No edema.      Comments: B/L AKA   Neurological:      Mental Status: He is alert.   Psychiatric:         Behavior: Behavior normal.       Significant Labs:  CBC:   Recent Labs   Lab 11/27/22  1043   WBC 9.98   RBC 3.52*   HGB 9.2*   HCT 31.4*      MCV 89   MCH 26.1*   MCHC 29.3*     CMP:   Recent Labs   Lab 11/25/22  0500 11/27/22  1043   GLU 87 153*   CALCIUM 9.2 8.9   ALBUMIN 2.5*  --    PROT 7.2  --    * 136   K 4.5 4.2   CO2 24 22*   CL 98 102   BUN 49* 39*   CREATININE 9.6* 9.6*   ALKPHOS 83  --    ALT 7*  --    AST 9*  --    BILITOT 0.4  --      All labs within the past 24 hours have been reviewed.

## 2022-12-01 NOTE — PROGRESS NOTES
[facility] - [facility] ([place] [address])  Nephrology  Progress Note    Patient Name: Adryan Neff  MRN: [id number]  Admission Date: 11/23/2022  Hospital Length of Stay: 8 days  Attending Provider: Sonja Cabrera MD   Primary Care Physician: Carola Pedro MD  Principal Problem:Sepsis    Subjective:          Interval History: HD yesterday, tolerated well. Net UF 2L. Oxygenating well on RA.     Review of patient's allergies indicates:  No Known Allergies  Current Facility-Administered Medications   Medication Frequency    [START ON 12/2/2022] 0.9%  NaCl infusion Once    acetaminophen tablet 650 mg Q8H PRN    aluminum-magnesium hydroxide-simethicone 200-200-20 mg/5 mL suspension 30 mL QID (AC & HS)    amLODIPine tablet 10 mg Daily    aspirin chewable tablet 81 mg Daily    atorvastatin tablet 40 mg QHS    dextrose 10% bolus 125 mL PRN    dextrose 10% bolus 250 mL PRN    epoetin paola-epbx injection 10,000 Units Every Mon, Wed, Fri    folic acid-vit B6-vit B12 2.5-25-2 mg tablet 1 tablet Daily    glucagon (human recombinant) injection 1 mg PRN    glucose chewable tablet 16 g PRN    glucose chewable tablet 24 g PRN    heparin (porcine) injection 7,500 Units Q8H    hydrALAZINE injection 10 mg Q8H PRN    hydrALAZINE tablet 100 mg Q8H    HYDROcodone-acetaminophen 5-325 mg per tablet 1 tablet Q6H PRN    insulin aspart U-100 pen 0-5 Units QID (AC + HS) PRN    insulin detemir U-100 pen 8 Units QHS    melatonin tablet 6 mg Nightly PRN    meropenem-0.9% sodium chloride 1 g/50 mL IVPB Daily    metoprolol succinate (TOPROL-XL) 24 hr tablet 25 mg Daily    naloxone 0.4 mg/mL injection 0.02 mg PRN    ondansetron disintegrating tablet 8 mg Q8H PRN    pantoprazole EC tablet 40 mg Daily    polyethylene glycol packet 17 g BID PRN    senna tablet 8.6 mg Daily PRN    sodium chloride 0.9% flush 10 mL PRN    sodium chloride 0.9% flush 10 mL Q12H PRN    sucralfate 100 mg/mL suspension 1 g Q6H       Objective:      Vital Signs (Most Recent):  Temp:  (refuse vitals) (12/01/22 0830)  Pulse:  (refuse vitals) (12/01/22 0830)  Resp: 18 (11/30/22 2054)  BP:  (refuse vitals) (12/01/22 0830)  SpO2: 96 % (11/30/22 2054)  O2 Device (Oxygen Therapy): room air (11/30/22 1810) Vital Signs (24h Range):  Temp:  [96.4 °F (35.8 °C)-98.1 °F (36.7 °C)] 98 °F (36.7 °C)  Pulse:  [66-79] 74  Resp:  [17-20] 18  SpO2:  [96 %-99 %] 96 %  BP: (135-172)/(58-95) 152/68     Weight: 56.6 kg (124 lb 12.5 oz) (12/01/22 0400)  Body mass index is 17.4 kg/m².  Body surface area is 1.68 meters squared.    I/O last 3 completed shifts:  In: 340 [P.O.:340]  Out: 2600 [Other:2600]    Physical Exam  Vitals and nursing note reviewed.   Constitutional:       General: He is not in acute distress.     Appearance: He is well-developed.   HENT:      Head: Normocephalic and atraumatic.   Eyes:      Conjunctiva/sclera: Conjunctivae normal.   Neck:      Vascular: No JVD.   Cardiovascular:      Rate and Rhythm: Normal rate and regular rhythm.      Heart sounds: Normal heart sounds.   Pulmonary:      Effort: Pulmonary effort is normal.      Breath sounds: Normal breath sounds.   Abdominal:      General: Bowel sounds are normal. There is no distension.      Palpations: Abdomen is soft.      Tenderness: There is no abdominal tenderness.   Musculoskeletal:      Cervical back: Neck supple.      Right lower leg: Edema (stump with swelling. sutures intact. some purulence at R suture border) present.      Left lower leg: No edema.      Comments: B/L AKA   Neurological:      Mental Status: He is alert.   Psychiatric:         Behavior: Behavior normal.       Significant Labs:  CBC:   Recent Labs   Lab 11/27/22  1043   WBC 9.98   RBC 3.52*   HGB 9.2*   HCT 31.4*      MCV 89   MCH 26.1*   MCHC 29.3*     CMP:   Recent Labs   Lab 11/25/22  0500 11/27/22  1043   GLU 87 153*   CALCIUM 9.2 8.9   ALBUMIN 2.5*  --    PROT 7.2  --    * 136   K 4.5 4.2   CO2 24 22*   CL 98 102    BUN 49* 39*   CREATININE 9.6* 9.6*   ALKPHOS 83  --    ALT 7*  --    AST 9*  --    BILITOT 0.4  --      All labs within the past 24 hours have been reviewed.       Assessment/Plan:     * Sepsis  management per primary       Chronic kidney disease-mineral and bone disorder  - hold phos binders for now , phos 2.1  -phos with am labs     Anemia due to chronic kidney disease, on chronic dialysis  -goal 10-11   -continue epogen     ESRD on hemodialysis  Nephrology History  iHD Schedule: MWF   Unit/MD: [facility]  Duration: 3.5 hours   UF: 2L  EDW: 56 kg   Access: PHIL AVF   Last HD 11/20 and 11/22    Assessment:   - Plan for HD 12/2/22  - Dialysate adjusted to current labs   -Pre and post HD weight   -Renal diet if not NPO   - Continue to monitor intake and output, daily weights                        Thank you for your consult. I will follow-up with patient. Please contact us if you have any additional questions.    Odalys Frederick DNP  Nephrology  [facility] - [facility] ([place]

## 2022-12-01 NOTE — PROGRESS NOTES
"Pt refused all medications throughout shift and midnight and 0400 vitals. Pt reluctant but allowed 0400 BP. Education given on multiple occassions on importance of medications and vital checks, pt continuously yelled "no...get out... I'm sleeping," while trying to hit nurse and pct.    "

## 2022-12-01 NOTE — PROGRESS NOTES
[facility] - Intensive Care ([address] Medicine  Progress Note    Patient Name: Adryan Neff  MRN: [id number]  Patient Class: IP- Inpatient   Admission Date: 11/23/2022  Length of Stay: 8 days  Attending Physician: Sonja Cabrera MD  Primary Care Provider: Carola Pedro MD      Subjective:     Principal Problem:Sepsis      HPI:  Mr. Neff is a 56-year-old gentleman with PMH of CAD, DM type 1, diabetic retinopathy, ESRD on HD, hypertension, PAD, b/l AKA who presented to [facility] for altered mental status. Pt arrived from [facility] where he was found minimally responsive and BG 50s. Upon chart review, last HD sessions were Sunday 11/20 and 11/22. Pt oxygenating well on RA. Awake and oriented to self and place. He is able to state when he last had HD. Labs noted WBC 25 and lactate 2.3. UA positive for UTI. HCT nonacute. R AKA stump performed on 10/13/22 showed discoloration of the skin at the lateral aspect with areas of possible wound drainage/dehiscence. Due to concern for infection, orthopedic surgery consulted. CT right thigh with contrast showed abscess. Blood cultures ordered and empiric IV antibiotics ordered. He was admitted to Hospital Medicine for further evaluation and management of acute encephalopathy 2/2 sepsis in the setting of UTI and R AKA abscess.       Overview/Hospital Course:  He was treated with broad spectrum IV abx. ID was consulted. Superifical wound cx with multiple GNRs (citrobacterx2 and pseudomonas). IR took him for fluid sampling of the abscess 11/29.     Subjective:     No chest pain, shortness of breath, lightheadedness. Tolerating oral intake. Pain at wound controlled.     NAD, AO3  NC, AT  RRR  CTAB  SNTND+BS  No edema   PERRL  B/l AKA present. Wound dressed     Vitals, labs and radiographs from past 24h reviewed and personally interpreted.       Assessment/Plan:      * Sepsis  Abscess of right thigh  Secondary to R AKA abscess/ osteomyelitis. CT 11/23 with  extension into the femoral bone marrow suggestive of osteomyelitis.  - s/p abscess biopsy   ID recs:   -Continue meropenem based off superficial cultures  -Will follow-up IR aspiration cultures; appreciate their intervention  -likely to discharge on  on oral cipro    Acute cystitis  - Ucx negative. On broad spectrum abx, so will be treated nonetheless.     Anemia due to chronic kidney disease, on chronic dialysis  - stable       PAD (peripheral artery disease)  S/P AKA (above knee amputation) bilateral  - fall precautions  - s/p b/l AKA  - continue home ASA, Plavix and statin    Elevated troponin  Troponin trend flat. No CP, neg EKG. No further w/u.       ESRD on hemodialysis  - nephrology managing  - No lab stick/BP intake on access site   - Please avoid gadolinium, fleets, phos-based laxatives, NSAIDs  - Will follow closely and continue dialysis treatments while in-patient      Secondary hyperparathyroidism of renal origin  - continue home Renvela       HLD (hyperlipidemia)  - continue home statin      CAD (coronary artery disease) - non-obstructive from [facility] in 2016  - continue ASA, Plavix and statin      Chronic diastolic heart failure  - stable       Gastroesophageal reflux disease  - continue home PPI      Essential (primary) hypertension  controlled  - continue home metoprolol, amlodipine and hydralazine      Controlled type 2 diabetes mellitus with chronic kidney disease on chronic dialysis, with long-term current use of insulin  Controlled.  Patient confirms hx of type II DM  - LDSSI   - goal B-180  - diabetic diet     Encephalopathy, metabolic-- resolved       VTE Risk Mitigation (From admission, onward)           Ordered     heparin (porcine) injection 7,500 Units  Every 8 hours         22 1508     IP VTE HIGH RISK PATIENT  Once         22 1508     Place sequential compression device  Until discontinued         22 1508     Place sequential compression device  Until  discontinued         11/23/22 1044                    Discharge Planning   ENIO: 12/2/2022     Code Status: Full Code   Is the patient medically ready for discharge?: No    Reason for patient still in hospital (select all that apply): Patient trending condition, Treatment and Consult recommendations  Discharge Plan A: Home Health, Return to nursing home       Sojna Cabrera MD  Department of Hospital Medicine   [facility] - Intensive Care ([place]-[facility])

## 2022-12-01 NOTE — PLAN OF CARE
MD informed SW they awaits patient's  culture back on tomorrow: oral abx vs IV abx.  Pt is to return back to [facility] Phone: [phone] . SW will continue to follow patient.     SW will follow.          Christina Mckeon LMSW  PRN - [facility]  [phone]

## 2022-12-01 NOTE — PT/OT/SLP PROGRESS
"Occupational Therapy   Treatment    Name: Adryan Neff  MRN: [id number]  Admitting Diagnosis:  Sepsis       Recommendations:     Discharge Recommendations: nursing facility, skilled  Discharge Equipment Recommendations:  slide board  Barriers to discharge:  Decreased caregiver support    Assessment:     Adryan Neff is a 56 y.o. male with a medical diagnosis of Sepsis.  He presents with fair participation and motivation. Pt continues to require (A) with all activities & is at risk for falls. Goals remain appropriate. Performance deficits affecting function are weakness, impaired endurance, impaired functional mobility, impaired self care skills, impaired balance, decreased upper extremity function, decreased safety awareness, orthopedic precautions.     Rehab Prognosis:  Fair; patient would benefit from acute skilled OT services to address these deficits and reach maximum level of function.       Plan:     Patient to be seen 3 x/week to address the above listed problems via self-care/home management, therapeutic activities, therapeutic exercises  Plan of Care Expires: 12/26/22  Plan of Care Reviewed with: patient    Subjective   "Thank you so much."  Pain/Comfort:  Pain Rating 1:  (no report of pain during session)  Pain Rating Post-Intervention 1:  (no report of pain during session)    Objective:     Communicated with: RN prior to session.  Patient found supine with  (no lines; no family present) upon OT entry to room.    General Precautions: Standard, fall   Orthopedic Precautions: (BLE AKA)   Braces: N/A  Respiratory Status: Room air     Occupational Performance:     Bed Mobility:    Patient completed Supine to Sit with supervision into long sitting in bed  Patient completed Sit to Supine with supervision from long sitting in bed    Activities of Daily Living:  Grooming: stand by assistance with brushing teeth & washing face while seated in unsupported long sitting in bed.  Upper Body Dressing: moderate assistance " donning gown to front of body while seated in long sitting unsupported in bed.      [facility] 6 Click ADL: 16    Treatment & Education:  Pt performed AROM exercises with BUE in 3 planes x 10 reps each while seated in long unsupported sitting in bed.  Pt had no further questions & when asked whether there were any concerns pt reported none.      Patient left supine with all lines intact, call button in reach, and RN notified    GOALS:   Multidisciplinary Problems       Occupational Therapy Goals          Problem: Occupational Therapy    Goal Priority Disciplines Outcome Interventions   Occupational Therapy Goal     OT, PT/OT Ongoing, Progressing    Description: Goals to be met by: 12/12     Patient will increase functional independence with ADLs by performing:    UE Dressing with Supervision.  LE Dressing with Supervision.  Grooming while seated with Supervision.  Toileting from toilet with Supervision for hygiene and clothing management.   Scooting in long sitting with Modified independence                         Time Tracking:     OT Date of Treatment: 12/01/22  OT Start Time: 1155  OT Stop Time: 1210  OT Total Time (min): 15 min    Billable Minutes:Self Care/Home Management 15    OT/GLENDA: OT     GLENDA Visit Number: 0    12/1/2022

## 2022-12-01 NOTE — PLAN OF CARE
Problem: Skin Injury Risk Increased  Goal: Skin Health and Integrity  Outcome: Ongoing, Progressing     Problem: Adult Inpatient Plan of Care  Goal: Plan of Care Review  Outcome: Ongoing, Progressing  Goal: Patient-Specific Goal (Individualized)  Outcome: Ongoing, Progressing  Goal: Absence of Hospital-Acquired Illness or Injury  Outcome: Ongoing, Progressing  Goal: Optimal Comfort and Wellbeing  Outcome: Ongoing, Progressing  Goal: Readiness for Transition of Care  Outcome: Ongoing, Progressing     Problem: Fall Injury Risk  Goal: Absence of Fall and Fall-Related Injury  Outcome: Ongoing, Progressing     Problem: Diabetes Comorbidity  Goal: Blood Glucose Level Within Targeted Range  Outcome: Ongoing, Progressing     Problem: Infection  Goal: Absence of Infection Signs and Symptoms  Outcome: Ongoing, Progressing     Problem: Adjustment to Illness (Sepsis/Septic Shock)  Goal: Optimal Coping  Outcome: Ongoing, Progressing     Problem: Bleeding (Sepsis/Septic Shock)  Goal: Absence of Bleeding  Outcome: Ongoing, Progressing     Problem: Glycemic Control Impaired (Sepsis/Septic Shock)  Goal: Blood Glucose Level Within Desired Range  Outcome: Ongoing, Progressing     Problem: Infection Progression (Sepsis/Septic Shock)  Goal: Absence of Infection Signs and Symptoms  Outcome: Ongoing, Progressing     Problem: Nutrition Impaired (Sepsis/Septic Shock)  Goal: Optimal Nutrition Intake  Outcome: Ongoing, Progressing     Problem: Device-Related Complication Risk (Hemodialysis)  Goal: Safe, Effective Therapy Delivery  Outcome: Ongoing, Progressing     Problem: Hemodynamic Instability (Hemodialysis)  Goal: Effective Tissue Perfusion  Outcome: Ongoing, Progressing     Problem: Infection (Hemodialysis)  Goal: Absence of Infection Signs and Symptoms  Outcome: Ongoing, Progressing     Problem: Electrolyte Imbalance (Chronic Kidney Disease)  Goal: Electrolyte Balance  Outcome: Ongoing, Progressing     Problem: Fluid Volume Excess  (Chronic Kidney Disease)  Goal: Fluid Balance  Outcome: Ongoing, Progressing

## 2022-12-01 NOTE — PROGRESS NOTES
3hr HD treatment completed 2L of fluid removed pt tolerated well. Epoetin given as ordered. Both needles of a PHIL fistula removed and pressure held until hemostasis achieved dressing applied. Report given to KIAH Alexis.

## 2022-12-02 VITALS
BODY MASS INDEX: 17.53 KG/M2 | WEIGHT: 125.25 LBS | OXYGEN SATURATION: 100 % | HEIGHT: 71 IN | SYSTOLIC BLOOD PRESSURE: 144 MMHG | HEART RATE: 77 BPM | RESPIRATION RATE: 18 BRPM | TEMPERATURE: 98 F | DIASTOLIC BLOOD PRESSURE: 66 MMHG

## 2022-12-02 LAB
ALBUMIN SERPL BCP-MCNC: 2.7 G/DL (ref 3.5–5.2)
ALP SERPL-CCNC: 105 U/L (ref 55–135)
ALT SERPL W/O P-5'-P-CCNC: 28 U/L (ref 10–44)
ANION GAP SERPL CALC-SCNC: 8 MMOL/L (ref 8–16)
AST SERPL-CCNC: 29 U/L (ref 10–40)
BASOPHILS # BLD AUTO: 0.06 K/UL (ref 0–0.2)
BASOPHILS NFR BLD: 0.7 % (ref 0–1.9)
BILIRUB SERPL-MCNC: 0.2 MG/DL (ref 0.1–1)
BUN SERPL-MCNC: 15 MG/DL (ref 6–20)
CALCIUM SERPL-MCNC: 8.9 MG/DL (ref 8.7–10.5)
CHLORIDE SERPL-SCNC: 103 MMOL/L (ref 95–110)
CO2 SERPL-SCNC: 23 MMOL/L (ref 23–29)
CREAT SERPL-MCNC: 3.6 MG/DL (ref 0.5–1.4)
DIFFERENTIAL METHOD: ABNORMAL
EOSINOPHIL # BLD AUTO: 0.6 K/UL (ref 0–0.5)
EOSINOPHIL NFR BLD: 7.4 % (ref 0–8)
ERYTHROCYTE [DISTWIDTH] IN BLOOD BY AUTOMATED COUNT: 20.4 % (ref 11.5–14.5)
EST. GFR  (NO RACE VARIABLE): 18.9 ML/MIN/1.73 M^2
FERRITIN SERPL-MCNC: 2061 NG/ML (ref 20–300)
GLUCOSE SERPL-MCNC: 89 MG/DL (ref 70–110)
HCT VFR BLD AUTO: 31.4 % (ref 40–54)
HGB BLD-MCNC: 9.2 G/DL (ref 14–18)
IMM GRANULOCYTES # BLD AUTO: 0.07 K/UL (ref 0–0.04)
IMM GRANULOCYTES NFR BLD AUTO: 0.8 % (ref 0–0.5)
IRON SERPL-MCNC: 35 UG/DL (ref 45–160)
LYMPHOCYTES # BLD AUTO: 1.2 K/UL (ref 1–4.8)
LYMPHOCYTES NFR BLD: 14.7 % (ref 18–48)
MAGNESIUM SERPL-MCNC: 2.1 MG/DL (ref 1.6–2.6)
MCH RBC QN AUTO: 26.6 PG (ref 27–31)
MCHC RBC AUTO-ENTMCNC: 29.3 G/DL (ref 32–36)
MCV RBC AUTO: 91 FL (ref 82–98)
MONOCYTES # BLD AUTO: 0.5 K/UL (ref 0.3–1)
MONOCYTES NFR BLD: 5.4 % (ref 4–15)
NEUTROPHILS # BLD AUTO: 5.9 K/UL (ref 1.8–7.7)
NEUTROPHILS NFR BLD: 71 % (ref 38–73)
NRBC BLD-RTO: 0 /100 WBC
PLATELET # BLD AUTO: 364 K/UL (ref 150–450)
PMV BLD AUTO: 10.1 FL (ref 9.2–12.9)
POCT GLUCOSE: 78 MG/DL (ref 70–110)
POTASSIUM SERPL-SCNC: 3.7 MMOL/L (ref 3.5–5.1)
PROT SERPL-MCNC: 7.3 G/DL (ref 6–8.4)
RBC # BLD AUTO: 3.46 M/UL (ref 4.6–6.2)
SATURATED IRON: 17 % (ref 20–50)
SODIUM SERPL-SCNC: 134 MMOL/L (ref 136–145)
TOTAL IRON BINDING CAPACITY: 210 UG/DL (ref 250–450)
TRANSFERRIN SERPL-MCNC: 142 MG/DL (ref 200–375)
WBC # BLD AUTO: 8.34 K/UL (ref 3.9–12.7)

## 2022-12-02 PROCEDURE — 84466 ASSAY OF TRANSFERRIN: CPT | Performed by: NURSE PRACTITIONER

## 2022-12-02 PROCEDURE — 94761 N-INVAS EAR/PLS OXIMETRY MLT: CPT

## 2022-12-02 PROCEDURE — 99239 PR HOSPITAL DISCHARGE DAY,>30 MIN: ICD-10-PCS | Mod: ,,, | Performed by: INTERNAL MEDICINE

## 2022-12-02 PROCEDURE — 83735 ASSAY OF MAGNESIUM: CPT | Performed by: INTERNAL MEDICINE

## 2022-12-02 PROCEDURE — 63600175 PHARM REV CODE 636 W HCPCS: Mod: JG | Performed by: INTERNAL MEDICINE

## 2022-12-02 PROCEDURE — 80100014 HC HEMODIALYSIS 1:1

## 2022-12-02 PROCEDURE — 99239 HOSP IP/OBS DSCHRG MGMT >30: CPT | Mod: ,,, | Performed by: INTERNAL MEDICINE

## 2022-12-02 PROCEDURE — 36415 COLL VENOUS BLD VENIPUNCTURE: CPT | Performed by: NURSE PRACTITIONER

## 2022-12-02 PROCEDURE — 82728 ASSAY OF FERRITIN: CPT | Performed by: NURSE PRACTITIONER

## 2022-12-02 PROCEDURE — 25000003 PHARM REV CODE 250: Performed by: NURSE PRACTITIONER

## 2022-12-02 PROCEDURE — 80053 COMPREHEN METABOLIC PANEL: CPT | Performed by: INTERNAL MEDICINE

## 2022-12-02 PROCEDURE — 85025 COMPLETE CBC W/AUTO DIFF WBC: CPT | Performed by: INTERNAL MEDICINE

## 2022-12-02 RX ORDER — CIPROFLOXACIN 500 MG/1
500 TABLET ORAL
Status: DISCONTINUED | OUTPATIENT
Start: 2022-12-02 | End: 2022-12-02 | Stop reason: HOSPADM

## 2022-12-02 RX ORDER — HYDRALAZINE HYDROCHLORIDE 50 MG/1
100 TABLET, FILM COATED ORAL EVERY 8 HOURS
Qty: 90 TABLET | Refills: 11
Start: 2022-12-02 | End: 2023-05-06 | Stop reason: SDUPTHER

## 2022-12-02 RX ORDER — CIPROFLOXACIN 500 MG/1
500 TABLET ORAL
Qty: 30 TABLET | Refills: 1
Start: 2022-12-02 | End: 2023-01-09

## 2022-12-02 RX ORDER — INSULIN ASPART 100 [IU]/ML
0-5 INJECTION, SOLUTION INTRAVENOUS; SUBCUTANEOUS
Refills: 0
Start: 2022-12-02 | End: 2023-12-02

## 2022-12-02 RX ORDER — AMLODIPINE BESYLATE 5 MG/1
5 TABLET ORAL DAILY
Qty: 30 TABLET | Refills: 11
Start: 2022-12-02 | End: 2023-05-06 | Stop reason: SDUPTHER

## 2022-12-02 RX ADMIN — EPOETIN ALFA-EPBX 10000 UNITS: 10000 INJECTION, SOLUTION INTRAVENOUS; SUBCUTANEOUS at 03:12

## 2022-12-02 RX ADMIN — SODIUM CHLORIDE: 0.9 INJECTION, SOLUTION INTRAVENOUS at 02:12

## 2022-12-02 NOTE — SUBJECTIVE & OBJECTIVE
Interval History: NAEO, afebrile.  AKA stump is not painful.    Review of Systems   Constitutional:  Negative for chills and fever.   Respiratory:  Negative for cough and shortness of breath.    Gastrointestinal:  Negative for diarrhea, nausea and vomiting.   Skin:  Negative for rash.   Objective:     Vital Signs (Most Recent):  Temp:  (refused vitals) (12/01/22 1101)  Pulse:  (refused vitals) (12/01/22 1101)  Resp: 18 (11/30/22 2054)  BP:  (refused vitals) (12/01/22 1101)  SpO2: 96 % (11/30/22 2054) Vital Signs (24h Range):  Temp:  [98 °F (36.7 °C)] 98 °F (36.7 °C)  Pulse:  [74-78] 74  Resp:  [18] 18  SpO2:  [96 %] 96 %  BP: (137-152)/(63-68) 152/68     Weight: 56.6 kg (124 lb 12.5 oz)  Body mass index is 17.4 kg/m².    Estimated Creatinine Clearance: 6.8 mL/min (A) (based on SCr of 9.6 mg/dL (H)).    Physical Exam  Constitutional:       General: He is not in acute distress.     Appearance: He is normal weight. He is not toxic-appearing or diaphoretic.   HENT:      Head: Normocephalic and atraumatic.      Nose: Nose normal.      Mouth/Throat:      Mouth: Mucous membranes are moist.      Pharynx: Oropharynx is clear.   Eyes:      General: No scleral icterus.        Right eye: No discharge.         Left eye: No discharge.      Extraocular Movements: Extraocular movements intact.      Conjunctiva/sclera: Conjunctivae normal.      Pupils: Pupils are equal, round, and reactive to light.   Cardiovascular:      Rate and Rhythm: Normal rate and regular rhythm.      Pulses: Normal pulses.      Heart sounds: Normal heart sounds.      Comments: HUMBERTO FOX  Pulmonary:      Effort: Pulmonary effort is normal.      Breath sounds: Normal breath sounds.   Abdominal:      General: Abdomen is flat. Bowel sounds are normal.      Palpations: Abdomen is soft.      Tenderness: There is no guarding or rebound.   Musculoskeletal:         General: Deformity present.      Cervical back: Normal range of motion and neck supple.      Comments:  -bilateral AKA   Skin:     General: Skin is warm and dry.      Coloration: Skin is not jaundiced.      Findings: No rash.      Comments: -R AKA with sutures; previously had some fluctuance on exam, now s/p IR aspiration and bandaged  -LUE Avfistula with palpable thrill   Neurological:      Mental Status: He is alert and oriented to person, place, and time. Mental status is at baseline.   Psychiatric:         Mood and Affect: Mood normal.         Behavior: Behavior normal.         Thought Content: Thought content normal.         Judgment: Judgment normal.       Significant Labs: All pertinent labs within the past 24 hours have been reviewed.    Significant Imaging: I have reviewed all pertinent imaging results/findings within the past 24 hours.

## 2022-12-02 NOTE — PROGRESS NOTES
12/02/22 0610   Post-Hemodialysis Assessment   Rinseback Volume (mL) 250 mL   Blood Volume Processed (Liters) 68.3 L   Dialyzer Clearance Lightly streaked   Duration of Treatment 205 minutes   Additional Fluid Intake (mL) 500 mL   Total UF (mL) 1500 mL   Net Fluid Removal 1000   Patient Response to Treatment vito well   Post-Hemodialysis Comments stable   HD completed per MD order. Report given to primary RN.

## 2022-12-02 NOTE — ASSESSMENT & PLAN NOTE
56-year-old male with ESRD on HD s/p tx and severe PAD c/b gangrene leading to R AKA (10/13/22- clean margins on pathology). He then re-presented for altered mental status, and found to have a large abscess in his RLE on CT 11/23 with extension into the femoral bone marrow suggestive of osteomyelitis. Superficial wound cultures from initial purulence noted around R AKA stump show two different Citrobacter koseri and Pseudomonas aeruginosa.  S/p IR aspiration of 3ml purulence 11/29/22.  IR aspiration gram stain no organisms, anaerobic Cx without growth, unclear if aerobic culture received.    Will plan to treat for 6 weeks given suspicion of osteomyelitis.  Recommending PO antibiotic to avoid line placement given patient is on HD.  Patient lives in [place] with his adult children.    Recommendations:  -Can switch meropenem to ciprofloxacin 500mg daily  -6 weeks of therapy from IR aspiration, end date 1/9/22  -Assisted by Kalen Vargas #[id number]

## 2022-12-02 NOTE — PLAN OF CARE
Problem: Skin Injury Risk Increased  Goal: Skin Health and Integrity  12/1/2022 2211 by uRby Bedoya RN  Outcome: Ongoing, Progressing  12/1/2022 2211 by Ruby Bedoya RN  Outcome: Ongoing, Progressing     Problem: Adult Inpatient Plan of Care  Goal: Plan of Care Review  12/1/2022 2211 by Ruby Bedoya RN  Outcome: Ongoing, Progressing  12/1/2022 2211 by Ruby Bedoya RN  Outcome: Ongoing, Progressing  Goal: Patient-Specific Goal (Individualized)  12/1/2022 2211 by Ruby Bedoya RN  Outcome: Ongoing, Progressing  12/1/2022 2211 by Ruby Bedoya RN  Outcome: Ongoing, Progressing  Goal: Absence of Hospital-Acquired Illness or Injury  12/1/2022 2211 by Ruby Bedoya RN  Outcome: Ongoing, Progressing  12/1/2022 2211 by Ruby Bedoya RN  Outcome: Ongoing, Progressing  Goal: Optimal Comfort and Wellbeing  12/1/2022 2211 by Ruby Bedoya RN  Outcome: Ongoing, Progressing  12/1/2022 2211 by Ruby Bedoya RN  Outcome: Ongoing, Progressing  Goal: Readiness for Transition of Care  12/1/2022 2211 by Ruby Bedoya RN  Outcome: Ongoing, Progressing  12/1/2022 2211 by Ruby Bedoya RN  Outcome: Ongoing, Progressing     Problem: Fall Injury Risk  Goal: Absence of Fall and Fall-Related Injury  12/1/2022 2211 by Ruby Bedoya RN  Outcome: Ongoing, Progressing  12/1/2022 2211 by Ruby Bedoya RN  Outcome: Ongoing, Progressing     Problem: Diabetes Comorbidity  Goal: Blood Glucose Level Within Targeted Range  12/1/2022 2211 by Ruby Bedoya RN  Outcome: Ongoing, Progressing  12/1/2022 2211 by Ruby Bedoya RN  Outcome: Ongoing, Progressing     Problem: Infection  Goal: Absence of Infection Signs and Symptoms  12/1/2022 2211 by Ruby Bedoya RN  Outcome: Ongoing, Progressing  12/1/2022 2211 by Ruby Bedoya RN  Outcome: Ongoing, Progressing     Problem: Adjustment to Illness (Sepsis/Septic Shock)  Goal: Optimal Coping  12/1/2022 2211 by Ruby Bedoya RN  Outcome: Ongoing, Progressing  12/1/2022 2211 by Ruby  KIAH Bedoya  Outcome: Ongoing, Progressing     Problem: Bleeding (Sepsis/Septic Shock)  Goal: Absence of Bleeding  12/1/2022 2211 by Ruby Bedoya RN  Outcome: Ongoing, Progressing  12/1/2022 2211 by Ruby Bedoya RN  Outcome: Ongoing, Progressing     Problem: Glycemic Control Impaired (Sepsis/Septic Shock)  Goal: Blood Glucose Level Within Desired Range  12/1/2022 2211 by Ruby Bedoya RN  Outcome: Ongoing, Progressing  12/1/2022 2211 by Ruby Bedoya RN  Outcome: Ongoing, Progressing     Problem: Infection Progression (Sepsis/Septic Shock)  Goal: Absence of Infection Signs and Symptoms  12/1/2022 2211 by Ruby Bedoya RN  Outcome: Ongoing, Progressing  12/1/2022 2211 by Ruby Bedoya RN  Outcome: Ongoing, Progressing     Problem: Nutrition Impaired (Sepsis/Septic Shock)  Goal: Optimal Nutrition Intake  12/1/2022 2211 by Ruby Bedoya RN  Outcome: Ongoing, Progressing  12/1/2022 2211 by Ruby Bedoya RN  Outcome: Ongoing, Progressing     Problem: Device-Related Complication Risk (Hemodialysis)  Goal: Safe, Effective Therapy Delivery  12/1/2022 2211 by Ruby Bedoya RN  Outcome: Ongoing, Progressing  12/1/2022 2211 by Ruby Bedoya RN  Outcome: Ongoing, Progressing     Problem: Hemodynamic Instability (Hemodialysis)  Goal: Effective Tissue Perfusion  12/1/2022 2211 by Ruby Bedoya RN  Outcome: Ongoing, Progressing  12/1/2022 2211 by Ruby Bedoya RN  Outcome: Ongoing, Progressing     Problem: Infection (Hemodialysis)  Goal: Absence of Infection Signs and Symptoms  12/1/2022 2211 by Ruby Bedoya RN  Outcome: Ongoing, Progressing  12/1/2022 2211 by Ruby Bedoya RN  Outcome: Ongoing, Progressing     Problem: Electrolyte Imbalance (Chronic Kidney Disease)  Goal: Electrolyte Balance  12/1/2022 2211 by Ruby Bedoya RN  Outcome: Ongoing, Progressing  12/1/2022 2211 by Ruby Bedoya RN  Outcome: Ongoing, Progressing     Problem: Fluid Volume Excess (Chronic Kidney Disease)  Goal: Fluid Balance  12/1/2022  2211 by Ruby Bedoya RN  Outcome: Ongoing, Progressing  12/1/2022 2211 by Ruby Bedoya RN  Outcome: Ongoing, Progressing

## 2022-12-02 NOTE — PT/OT/SLP PROGRESS
Physical Therapy      Patient Name:  Adryan Neff   MRN:  [id number]    PT to bedside for PT treatment session. Pt actively discharging with transport team at bedside.

## 2022-12-02 NOTE — PLAN OF CARE
Problem: Skin Injury Risk Increased  Goal: Skin Health and Integrity  Outcome: Met     Problem: Adult Inpatient Plan of Care  Goal: Plan of Care Review  Outcome: Met  Goal: Patient-Specific Goal (Individualized)  Outcome: Met  Goal: Absence of Hospital-Acquired Illness or Injury  Outcome: Met  Goal: Optimal Comfort and Wellbeing  Outcome: Met  Goal: Readiness for Transition of Care  Outcome: Met     Problem: Fall Injury Risk  Goal: Absence of Fall and Fall-Related Injury  Outcome: Met     Problem: Diabetes Comorbidity  Goal: Blood Glucose Level Within Targeted Range  Outcome: Met     Problem: Infection  Goal: Absence of Infection Signs and Symptoms  Outcome: Met     Problem: Adjustment to Illness (Sepsis/Septic Shock)  Goal: Optimal Coping  Outcome: Met     Problem: Bleeding (Sepsis/Septic Shock)  Goal: Absence of Bleeding  Outcome: Met     Problem: Glycemic Control Impaired (Sepsis/Septic Shock)  Goal: Blood Glucose Level Within Desired Range  Outcome: Met     Problem: Infection Progression (Sepsis/Septic Shock)  Goal: Absence of Infection Signs and Symptoms  Outcome: Met     Problem: Nutrition Impaired (Sepsis/Septic Shock)  Goal: Optimal Nutrition Intake  Outcome: Met     Problem: Device-Related Complication Risk (Hemodialysis)  Goal: Safe, Effective Therapy Delivery  Outcome: Met     Problem: Hemodynamic Instability (Hemodialysis)  Goal: Effective Tissue Perfusion  Outcome: Met     Problem: Infection (Hemodialysis)  Goal: Absence of Infection Signs and Symptoms  Outcome: Met     Problem: Electrolyte Imbalance (Chronic Kidney Disease)  Goal: Electrolyte Balance  Outcome: Met     Problem: Fluid Volume Excess (Chronic Kidney Disease)  Goal: Fluid Balance  Outcome: Met

## 2022-12-02 NOTE — PLAN OF CARE
12/02/22 1043   Post-Acute Status   Post-Acute Authorization Placement   Post-Acute Placement Status Set-up Complete/Auth obtained   Discharge Delays None known at this time   Discharge Plan   Discharge Plan A Skilled Nursing Facility   Discharge Plan B Skilled Nursing Facility     Pt medically ready to return to [facility].  [facility]  Will continue to follow for d/c needs.    12:39pm Update Transportation scheduled through [facility] within [facility] for pt to return to [facility] via stretcher for 2pm.  Bedside nurse Miranda informed via secure chat.  Will continue to follow.    Dunia Gracia RN CM  Case Management  [phone]

## 2022-12-02 NOTE — PROGRESS NOTES
[facility] - Intensive Care ([place] [address])  Infectious Disease  Progress Note    Patient Name: Adryan Neff  MRN: [id number]  Admission Date: 11/23/2022  Length of Stay: 8 days  Attending Physician: Sonja Cabrera MD  Primary Care Provider: Carola Pedro MD    Isolation Status: No active isolations  Assessment/Plan:      Abscess of right thigh  56-year-old male with ESRD on HD s/p tx and severe PAD c/b gangrene leading to R AKA (10/13/22- clean margins on pathology). He then re-presented for altered mental status, and found to have a large abscess in his RLE on CT 11/23 with extension into the femoral bone marrow suggestive of osteomyelitis. Superficial wound cultures from initial purulence noted around R AKA stump show two different Citrobacter koseri and Pseudomonas aeruginosa.  S/p IR aspiration of 3ml purulence 11/29/22.  IR aspiration gram stain no organisms, anaerobic Cx without growth, unclear if aerobic culture received.    Will plan to treat for 6 weeks given suspicion of osteomyelitis.  Recommending PO antibiotic to avoid line placement given patient is on HD.  Patient lives in [facility] [place] with his adult children.    Recommendations:  -Can switch meropenem to ciprofloxacin 500mg daily  -6 weeks of therapy from IR aspiration, end date 1/9/22  -Assisted by Yuyuto  Alicia #[id number]              Thank you for your consult. I will sign off. Please contact us if you have any additional questions.    Montana Terry MD  Infectious Disease  [facility] - Intensive Care ([place] [address])    Subjective:     Principal Problem:Sepsis    HPI: 55 yo male with ESRD on HD, LTBI s/p tx and severe PAD c/b gangrene admitted for R AKA abscess. ID consulted for abx recs. Polantis  used [id number].  Pt was found to be minimally responsive at NH ([facility]) and was brought to ED. He was found to be hypoglycemic. Work up notable for large abscess at site of prior R AKA with periosteal reaction of distal femur ,  wound swab obtained in ER - cx in process. Blcx ngtd. Pt is currently on vanc/zosyn. Pt denied fevers, chills, n/v/d, abdo pain or dysuria at NH. He stated it felt well.     Of note, pt was recently admitted to  for R foot gangrene - hospital course at that time c/b cardiac arrest/aspiration. He underwent R AKA with ortho with clean margins on path on 10/13.         Interval History: NAEO, afebrile.  AKA stump is not painful.    Review of Systems   Constitutional:  Negative for chills and fever.   Respiratory:  Negative for cough and shortness of breath.    Gastrointestinal:  Negative for diarrhea, nausea and vomiting.   Skin:  Negative for rash.   Objective:     Vital Signs (Most Recent):  Temp:  (refused vitals) (12/01/22 1101)  Pulse:  (refused vitals) (12/01/22 1101)  Resp: 18 (11/30/22 2054)  BP:  (refused vitals) (12/01/22 1101)  SpO2: 96 % (11/30/22 2054) Vital Signs (24h Range):  Temp:  [98 °F (36.7 °C)] 98 °F (36.7 °C)  Pulse:  [74-78] 74  Resp:  [18] 18  SpO2:  [96 %] 96 %  BP: (137-152)/(63-68) 152/68     Weight: 56.6 kg (124 lb 12.5 oz)  Body mass index is 17.4 kg/m².    Estimated Creatinine Clearance: 6.8 mL/min (A) (based on SCr of 9.6 mg/dL (H)).    Physical Exam  Constitutional:       General: He is not in acute distress.     Appearance: He is normal weight. He is not toxic-appearing or diaphoretic.   HENT:      Head: Normocephalic and atraumatic.      Nose: Nose normal.      Mouth/Throat:      Mouth: Mucous membranes are moist.      Pharynx: Oropharynx is clear.   Eyes:      General: No scleral icterus.        Right eye: No discharge.         Left eye: No discharge.      Extraocular Movements: Extraocular movements intact.      Conjunctiva/sclera: Conjunctivae normal.      Pupils: Pupils are equal, round, and reactive to light.   Cardiovascular:      Rate and Rhythm: Normal rate and regular rhythm.      Pulses: Normal pulses.      Heart sounds: Normal heart sounds.      Comments: HUMBERTO  AVF  Pulmonary:      Effort: Pulmonary effort is normal.      Breath sounds: Normal breath sounds.   Abdominal:      General: Abdomen is flat. Bowel sounds are normal.      Palpations: Abdomen is soft.      Tenderness: There is no guarding or rebound.   Musculoskeletal:         General: Deformity present.      Cervical back: Normal range of motion and neck supple.      Comments: -bilateral AKA   Skin:     General: Skin is warm and dry.      Coloration: Skin is not jaundiced.      Findings: No rash.      Comments: -R AKA with sutures; previously had some fluctuance on exam, now s/p IR aspiration and bandaged  -LUE Avfistula with palpable thrill   Neurological:      Mental Status: He is alert and oriented to person, place, and time. Mental status is at baseline.   Psychiatric:         Mood and Affect: Mood normal.         Behavior: Behavior normal.         Thought Content: Thought content normal.         Judgment: Judgment normal.       Significant Labs: All pertinent labs within the past 24 hours have been reviewed.    Significant Imaging: I have reviewed all pertinent imaging results/findings within the past 24 hours.

## 2022-12-02 NOTE — PLAN OF CARE
"   NURSING HOME ORDERS    12/02/2022  [facility]  [facility] ([place])  [address]  [facility] [address]  Dept: [phone]  Loc: [phone]     Admit to Nursing Home:  Skilled Nursing Facility    Diagnoses:  Active Hospital Problems    Diagnosis  POA    *Sepsis [A41.9]  Yes    Chronic kidney disease-mineral and bone disorder [N18.9, E83.9, M89.9]  Yes    Abscess of right thigh [L02.415]  Yes    S/P AKA (above knee amputation) bilateral [Z89.611, Z89.612]  Not Applicable    Acute cystitis [N30.00]  Yes    Elevated troponin [R77.8]  Yes    Cognitive changes [R41.89]  Yes    Anemia due to chronic kidney disease, on chronic dialysis [N18.6, D63.1, Z99.2]  Not Applicable    PAD (peripheral artery disease) [I73.9]  Yes    Encephalopathy, metabolic [G93.41]  Yes    ESRD on hemodialysis [N18.6, Z99.2]  Not Applicable     on HD TThSat      CAD (coronary artery disease) - non-obstructive from [facility] in 2016 [I25.10]  Yes    Chronic diastolic heart failure [I50.32]  Yes     TTE (10/10/22)"    The left ventricle is normal in size with mild concentric hypertrophy and mildly decreased systolic function.  The estimated ejection fraction is 40%.  There is moderate left ventricular global hypokinesis.  Grade II left ventricular diastolic dysfunction.  Normal right ventricular size with normal right ventricular systolic function.  Mild-to-moderate mitral regurgitation.        Gastroesophageal reflux disease [K21.9]  Yes    Controlled type 2 diabetes mellitus with chronic kidney disease on chronic dialysis, with long-term current use of insulin [E11.22, N18.6, Z79.4, Z99.2]  Not Applicable    Essential (primary) hypertension [I10]  Yes    Secondary hyperparathyroidism of renal origin [N25.81]  Yes    HLD (hyperlipidemia) [E78.5]  Yes      Resolved Hospital Problems   No resolved problems to display.       Patient is homebound due to:  Sepsis    Allergies:Review of patient's allergies " "indicates:  No Known Allergies    Vitals:  Every shift    Diet: renal diet    Activities:   Activity as tolerated    Goals of Care Treatment Preferences:  Code Status: Full Code    Health care agent: Mary Claude August  Health care agent number: [phone]          What is most important right now is to focus on remaining as independent as possible.         Labs:  CBC, CMP, Mg, Phos three times weekly with dialysis     Nursing Precautions:  Fall and Pressure ulcer prevention    Consults:   PT to evaluate and treat- 3 times a week, OT to evaluate and treat- 3 times a week, and Wound Care     Miscellaneous Care: Routine Skin for Bedridden Patients:  Apply moisture barrier cream to all    Continue dialysis Monday, Wednesday, Friday     Wound Care:     Local wound care to right AKA stump incision every Tuesday and Friday and prn- Cleanse with Vashe. Apply 3M Cavilon No Sting Film Barrier to mary anne-incision skin. Cover incision with Aquacel Ag Surgical dressing. Wrap with 2 cast padding for protection. Wrap with 4" Coban in figure 8 pattern                   Diabetes Care:  Fingerstick blood sugar AC and HS and Report CBG < 60 or > 350 to physician.       insulin aspart U-100 pen 0-5 Units   0-5 Units, Subcutaneous, Before meals & nightly PRN, For blood glucose, Hyperglycemia, Starting on Wed 11/23/22 at 1448    Instructions:     **LOW CORRECTION DOSE** Blood Glucose mg/dL Pre-meal 2200 151-200 0 unit 0 unit 201-250 2 units 1 unit 251-300 3 units 1 unit 301-350 4 units 2 units >350 5 units 3 units Administer subcutaneously if needed at times designated by monitoring schedule. DO NOT HOLD correction dose insulin for patients who are NPO.          Medications: Discontinue all previous medication orders, if any. See new list below.     Medication List        START taking these medications      ciprofloxacin HCl 500 MG tablet  Commonly known as: CIPRO  Take 1 tablet (500 mg total) by mouth daily with breakfast.   "   epoetin paola-epbx 10,000 unit/mL imjection  Commonly known as: RETACRIT  Inject 1 mL (10,000 Units total) into the skin every Mon, Wed, Fri.     folic acid-vit B6-vit B12 2.5-25-2 mg 2.5-25-2 mg Tab  Commonly known as: FOLBIC or Equiv  Take 1 tablet by mouth once daily.  Start taking on: December 3, 2022     insulin aspart U-100 100 unit/mL (3 mL) Inpn pen  Commonly known as: NovoLOG  Inject 0-5 Units into the skin before meals and at bedtime as needed (Hyperglycemia).            CHANGE how you take these medications      amLODIPine 5 MG tablet  Commonly known as: NORVASC  Take 1 tablet (5 mg total) by mouth once daily.  What changed:   medication strength  how much to take     hydrALAZINE 50 MG tablet  Commonly known as: APRESOLINE  Take 2 tablets (100 mg total) by mouth every 8 (eight) hours.  What changed: medication strength     insulin detemir U-100 100 unit/mL (3 mL) Inpn pen  Commonly known as: Levemir FLEXTOUCH  Inject 6 Units into the skin every evening.  What changed: how much to take            CONTINUE taking these medications      aspirin 81 MG Chew  Take 81 mg by mouth once daily.     atorvastatin 20 MG tablet  Commonly known as: LIPITOR  Take 1 tablet by mouth.     clopidogreL 75 mg tablet  Commonly known as: PLAVIX  Take 75 mg by mouth once daily.     lancets Misc  Commonly known as: ACCU-CHEK SOFTCLIX LANCETS  1 each by Misc.(Non-Drug; Combo Route) route once daily at 6am.     metoprolol succinate 25 MG 24 hr tablet  Commonly known as: TOPROL-XL  Take 1 tablet by mouth.     omeprazole 40 MG capsule  Commonly known as: PRILOSEC  Take 40 mg by mouth once daily.            STOP taking these medications      pravastatin 20 MG tablet  Commonly known as: PRAVACHOL     RENVELA 800 mg Tab  Generic drug: sevelamer carbonate                Immunizations Administered as of 12/2/2022       Name Date Dose VIS Date Route Exp Date    COVID-19, MRNA, LN-S, PF (Moderna) 2/11/2021 0.5 mL -- Intramuscular --     Site: Right arm     Lot: [id number]     COVID-19, MRNA, LN-S, PF (Moderna) 1/15/2021 -- -- Intramuscular --    Site: Right arm     Lot: [id number]               Some patients may experience side effects after vaccination.  These may include fever, headache, muscle or joint aches.  Most symptoms resolve with 24-48 hours and do not require urgent medical evaluation unless they persist for more than 72 hours or symptoms are concerning for an unrelated medical condition.          _________________________________  Sonja Cabrera MD  12/02/2022

## 2022-12-02 NOTE — DISCHARGE SUMMARY
[facility] - Intensive Care ([place] [address])  [facility] Hospital Medicine  Discharge Summary      Patient Name: Adryan Neff  MRN: [id number]  Admission Date: 11/23/2022  Hospital Length of Stay: 9 days  Discharge Date and Time:  12/02/2022 10:37 AM  Attending Physician: Sonja Cabrera MD   Discharging Provider: Sonja Cabrera MD  Primary Care Provider: Carola Pedro MD        HPI:     Mr. Neff is a 56-year-old Lebanese gentleman with PMH of CAD, DM type 1, diabetic retinopathy, ESRD on HD, hypertension, PAD, b/l AKA who presented to [facility] for altered mental status. Pt arrived from [facility] where he was found minimally responsive and BG 50s. Upon chart review, last HD sessions were Sunday 11/20 and 11/22. Pt oxygenating well on RA. Awake and oriented to self and place. He is able to state when he last had HD. Labs noted WBC 25 and lactate 2.3. UA positive for UTI. HCT nonacute. R AKA stump performed on 10/13/22 showed discoloration of the skin at the lateral aspect with areas of possible wound drainage/dehiscence. Due to concern for infection, orthopedic surgery consulted. CT right thigh with contrast showed abscess. Blood cultures ordered and empiric IV antibiotics ordered. He was admitted to Hospital Medicine for further evaluation and management of acute encephalopathy 2/2 sepsis in the setting of UTI and R AKA abscess.       * No surgery found *      Hospital Course:     The patient was admitted with decreased responsiveness and hypoglycemia found to have a wound infection from his prior right AKA stump.  Through admission the patient was treated with meropenem leading to significant improvement.  Orthopedic surgery was consulted and did not recommend surgical intervention but consulting IR.  Imaging was reviewed in abscesses were identified.  Interventional Radiology drain the concerning pockets.      Infectious Disease has recommended oral ciprofloxacin through 01/09/2022.  He will follow with that service  after discharge.    Regarding hypoglycemia, his insulin has been reduced and he has tolerated the dose reduction.    He has not required as many blood pressure medications as he was taking prior to admission.  His blood pressure regimen has been reduced to discharge.      * Sepsis 2/2 Abscess of right thigh 2/2 unknown organism.  Secondary to R AKA abscess/ osteomyelitis. CT 11/23 with extension into the femoral bone marrow suggestive of osteomyelitis.  - s/p abscess biopsy 11/29  ID recs:   -as above     Acute cystitis  - Ucx negative. Ffully treated     Anemia due to chronic kidney disease, on chronic dialysis  - stable         PAD (peripheral artery disease)  S/P AKA (above knee amputation) bilateral  - fall precautions  - s/p b/l AKA  - continue home ASA, Plavix and statin     Elevated troponin  Troponin trend flat. No CP, neg EKG. No further w/u.         ESRD on hemodialysis  - nephrology managing  -        Secondary hyperparathyroidism of renal origin  - home renvela held 2/2 low phos. Follow after discharge         HLD (hyperlipidemia)  - continue home statin        CAD (coronary artery disease) - non-obstructive from [facility] in 2016  - continue ASA, Plavix and statin        Chronic diastolic heart failure  - stable         Gastroesophageal reflux disease  - continue home PPI        Essential (primary) hypertension  controlled  - continue home metoprolol, amlodipine and hydralazine        Controlled type 2 diabetes mellitus with chronic kidney disease on chronic dialysis, with long-term current use of insulin  Reduced due to hypoglycemia on admission      Encephalopathy, metabolic-- resolved   Consults:   Consults (From admission, onward)          Status Ordering Provider     Inpatient consult to Infectious Diseases  Once        Provider:  (Not yet assigned)    Completed JESI AMIN     Inpatient consult to Nephrology  Once        Provider:  Bam Hansen MD    Completed JESI AMIN     Inpatient  consult to Nephrology  Once        Provider:  (Not yet assigned)    Completed OMAR ADAMS            Final Active Diagnoses:    Diagnosis Date Noted POA    PRINCIPAL PROBLEM:  Sepsis [A41.9] 11/24/2022 Yes    Chronic kidney disease-mineral and bone disorder [N18.9, E83.9, M89.9] 11/29/2022 Yes    Abscess of right thigh [L02.415] 11/24/2022 Yes    S/P AKA (above knee amputation) bilateral [Z89.611, Z89.612] 11/24/2022 Not Applicable    Acute cystitis [N30.00] 11/24/2022 Yes    Elevated troponin [R77.8] 11/24/2022 Yes    Cognitive changes [R41.89] 10/14/2022 Yes    Anemia due to chronic kidney disease, on chronic dialysis [N18.6, D63.1, Z99.2] 10/13/2022 Not Applicable    PAD (peripheral artery disease) [I73.9] 02/07/2022 Yes    Encephalopathy, metabolic [G93.41] 01/12/2022 Yes    ESRD on hemodialysis [N18.6, Z99.2]  Not Applicable    CAD (coronary artery disease) - non-obstructive from [facility] in 2016 [I25.10] 10/26/2020 Yes    Chronic diastolic heart failure [I50.32] 10/26/2020 Yes    Gastroesophageal reflux disease [K21.9] 06/04/2019 Yes    Controlled type 2 diabetes mellitus with chronic kidney disease on chronic dialysis, with long-term current use of insulin [E11.22, N18.6, Z79.4, Z99.2] 01/29/2019 Not Applicable    Essential (primary) hypertension [I10] 01/29/2019 Yes    Secondary hyperparathyroidism of renal origin [N25.81] 10/02/2017 Yes    HLD (hyperlipidemia) [E78.5] 01/24/2017 Yes      Problems Resolved During this Admission:      Discharged Condition: stable    Disposition: Skilled Nursing Facility    Follow Up:    Patient Instructions:      Ambulatory referral/consult to Infectious Disease   Standing Status: Future   Referral Priority: Routine Referral Type: Consultation   Referral Reason: Specialty Services Required   Requested Specialty: Infectious Diseases   Number of Visits Requested: 1     Notify your health care provider if you experience any of the following:  temperature >100.4     Notify your  health care provider if you experience any of the following:  persistent nausea and vomiting or diarrhea     Notify your health care provider if you experience any of the following:  severe uncontrolled pain     Notify your health care provider if you experience any of the following:  difficulty breathing or increased cough     Notify your health care provider if you experience any of the following:  severe persistent headache     Notify your health care provider if you experience any of the following:  worsening rash     Notify your health care provider if you experience any of the following:  persistent dizziness, light-headedness, or visual disturbances     Notify your health care provider if you experience any of the following:  increased confusion or weakness     Notify your health care provider if you experience any of the following:     Medications:  Reconciled Home Medications:      Medication List        START taking these medications      ciprofloxacin HCl 500 MG tablet  Commonly known as: CIPRO  Take 1 tablet (500 mg total) by mouth daily with breakfast.     epoetin paola-epbx 10,000 unit/mL imjection  Commonly known as: RETACRIT  Inject 1 mL (10,000 Units total) into the skin every Mon, Wed, Fri.     folic acid-vit B6-vit B12 2.5-25-2 mg 2.5-25-2 mg Tab  Commonly known as: FOLBIC or Equiv  Take 1 tablet by mouth once daily.  Start taking on: December 3, 2022     insulin aspart U-100 100 unit/mL (3 mL) Inpn pen  Commonly known as: NovoLOG  Inject 0-5 Units into the skin before meals and at bedtime as needed (Hyperglycemia).            CHANGE how you take these medications      amLODIPine 5 MG tablet  Commonly known as: NORVASC  Take 1 tablet (5 mg total) by mouth once daily.  What changed:   medication strength  how much to take     hydrALAZINE 50 MG tablet  Commonly known as: APRESOLINE  Take 2 tablets (100 mg total) by mouth every 8 (eight) hours.  What changed: medication strength     insulin detemir  U-100 100 unit/mL (3 mL) Inpn pen  Commonly known as: Levemir FLEXTOUCH  Inject 6 Units into the skin every evening.  What changed: how much to take            CONTINUE taking these medications      aspirin 81 MG Chew  Take 81 mg by mouth once daily.     atorvastatin 20 MG tablet  Commonly known as: LIPITOR  Take 1 tablet by mouth.     clopidogreL 75 mg tablet  Commonly known as: PLAVIX  Take 75 mg by mouth once daily.     lancets Misc  Commonly known as: ACCU-CHEK SOFTCLIX LANCETS  1 each by Misc.(Non-Drug; Combo Route) route once daily at 6am.     metoprolol succinate 25 MG 24 hr tablet  Commonly known as: TOPROL-XL  Take 1 tablet by mouth.     omeprazole 40 MG capsule  Commonly known as: PRILOSEC  Take 40 mg by mouth once daily.            STOP taking these medications      pravastatin 20 MG tablet  Commonly known as: PRAVACHOL     RENVELA 800 mg Tab  Generic drug: sevelamer carbonate                Pending Diagnostic Studies:       Procedure Component Value Units Date/Time    CBC with Automated Differential [[id number]]     Order Status: Sent Lab Status: No result     Specimen: Blood     Comprehensive Metabolic Panel (CMP) [[id number]]     Order Status: Sent Lab Status: No result     Specimen: Blood     Phosphorus [[id number]]     Order Status: Sent Lab Status: No result     Specimen: Blood           Indwelling Lines/Drains at time of discharge:   Lines/Drains/Airways       Drain  Duration                  Hemodialysis AV Fistula Left upper arm -- days                    Time spent on the discharge of patient: 35 minutes         Sonja Cabrera MD  Department of Hospital Medicine  [facility] Intensive Care ([place])

## 2022-12-05 LAB — BACTERIA SPEC ANAEROBE CULT: NORMAL

## 2022-12-08 NOTE — PROGRESS NOTES
12/02/22 0250   During Hemodialysis Assessment   Blood Flow Rate (mL/min) 400 mL/min   Dialysate Flow Rate (mL/min) 800 ml/min   Ultrafiltration Rate (mL/Hr) 570 mL/Hr   Arteriovenous Lines Secure Yes   Arterial Pressure (mmHg) -150 mmHg   Venous Pressure (mmHg) 110   Blood Volume Processed (Liters) 0 L   UF Removed (mL) 0 mL   TMP 60   Venous Line in Air Detector Yes   Intake (mL) 250 mL   Intra-Hemodialysis Comments HD initiated   Report received from primary RN. HD started per MD order.

## 2022-12-15 ENCOUNTER — OFFICE VISIT (OUTPATIENT)
Dept: VASCULAR SURGERY | Facility: CLINIC | Age: 57
End: 2022-12-15
Payer: MEDICARE

## 2022-12-15 VITALS
DIASTOLIC BLOOD PRESSURE: 55 MMHG | BODY MASS INDEX: 17.46 KG/M2 | WEIGHT: 125.25 LBS | SYSTOLIC BLOOD PRESSURE: 138 MMHG

## 2022-12-15 DIAGNOSIS — Z89.612 HX OF AKA (ABOVE KNEE AMPUTATION), LEFT: Primary | ICD-10-CM

## 2022-12-15 PROCEDURE — 99999 PR PBB SHADOW E&M-EST. PATIENT-LVL III: CPT | Mod: PBBFAC,,, | Performed by: NURSE PRACTITIONER

## 2022-12-15 PROCEDURE — 99213 OFFICE O/P EST LOW 20 MIN: CPT | Mod: PBBFAC | Performed by: NURSE PRACTITIONER

## 2022-12-15 PROCEDURE — 99215 PR OFFICE/OUTPT VISIT, EST, LEVL V, 40-54 MIN: ICD-10-PCS | Mod: S$PBB,,, | Performed by: NURSE PRACTITIONER

## 2022-12-15 PROCEDURE — 99215 OFFICE O/P EST HI 40 MIN: CPT | Mod: S$PBB,,, | Performed by: NURSE PRACTITIONER

## 2022-12-15 PROCEDURE — 99999 PR PBB SHADOW E&M-EST. PATIENT-LVL III: ICD-10-PCS | Mod: PBBFAC,,, | Performed by: NURSE PRACTITIONER

## 2022-12-19 NOTE — PROGRESS NOTES
[facility]                         12/19/2022    HPI:  Adryan Neff is a 56 y.o. male with   Patient Active Problem List   Diagnosis    Controlled type 2 diabetes mellitus with chronic kidney disease on chronic dialysis, with long-term current use of insulin    Essential (primary) hypertension    Pure hypercholesterolemia    Benign hypertension with chronic kidney disease, stage V    Other insomnia    Gastroesophageal reflux disease    Chronic diastolic heart failure    CAD (coronary artery disease) - non-obstructive from [facility] in 2016    Pre-transplant evaluation for kidney transplant    Chronic pulmonary heart disease    HLD (hyperlipidemia)    PDR (proliferative diabetic retinopathy)    Secondary hyperparathyroidism of renal origin    ESRD on hemodialysis    TB lung, latent    Paronychia, toe, left    Nuclear sclerosis of right eye    Pseudophakia    COVID-19 in immunocompromised patient    Encephalopathy, metabolic    Unilateral complete BKA, left, initial encounter    PAD (peripheral artery disease)    Anemia due to chronic kidney disease, on chronic dialysis    Cognitive changes    Advanced care planning/counseling discussion    Abscess of right thigh    Sepsis    S/P AKA (above knee amputation) bilateral    Acute cystitis    Elevated troponin    Chronic kidney disease-mineral and bone disorder   This is a 56 y.o.male patient, with a PMHx of ESRD on hemodialysis, CAD, HTN, and DM, presenting to the ED for further evaluation of left foot pain and black discoloration that began 3 days ago. Patient states these symptoms have been ongoing for the last month. He reports being referred to a specialist but denies ever following up. He has been putting some otc medication on the foot that he believes has turned his foot more black. No trauma or injury. Patient reports associated chills. Patient states applying a creme to the foot and noticed his discoloration worsened.  Patient reports he was last dialyzed yesterday. Patient denies any fever, shortness of breath, chest pain, neck pain, back pain, abdominal pain, rash, headaches, congestion, rhinorrhea, cough, sore throat, ear pain, eye pain, blurred vision, nausea, vomiting, diarrhea, dysuria, or any other associated symptoms.     Per chart review, he had an arterial u/s 12/15.   Impression:   1. High-grade severe stenosis right popliteal artery with occlusion anterior tibial artery which is reconstituted at level of DPA via collaterals.  2. 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     Cardiology has been planning to see him for revascularization options. Last podiatry visit 12/13 with Stable dry gangrene L 2nd toe and medial L 5th toe. Dorsalis pedis and posterior tibial pulses are diminished Bilaterally. Toes are cool to touch. Feet are warm proximally.There is decreased digital hair. Skin is atrophic, slightly hyperpigmented, and mildly edematous      In the ED, he was found to have worsened gangrene and a cool extremity. Vascular surgery was consulted. He was also COVID +. Says he got a booster shot in November sometime.        Procedure(s) (LRB):  AMPUTATION, ABOVE KNEE (Left)       Hospital Course:   Patient admitted to the hospital for eval and treatment of L foot gangrene.  ID, Ortho and Vasc consulted. Nephrology was also consulted for chronic dialysis. Patient started on broad spectrum antibiotics, and had several toes on L foot amputated by Vascular surgery.      The patient's AVF had recurrent malfunction. IR attempts at intervention without success. THDC placed for dialysis.     Pt became increasingly delirious and combative. He refused angiogram with Vascular. Psychiatry evaluated, felt that he was not able to make his own medical decisions. At this point Dr. Soni felt the patient would be a poor candidate for vascular intervention, recommended AKA. ID in agreement. Ortho consulted for AKA,  daughter/POA in agreement,   S/P left AKA on 1.21.22,plan is SNF placement,SW was on case.afebrile.  Had some EKG changes with no chest pain,cardiology cleared patient for procedure.  Overall his mental status is improved,his pain was controlled,patient was discharged to SNF with PCP and Ortho. Follow up as out patient.    5/2022:  No complaints.  HD without issue.    7/2022:  +prolonged bleeding during HD.    10/2022:  Patient with complaints of right foot pain.  Refused incision and drainage in the podiatry clinic earlier today.  Denies fevers.  Does not have a prosthetic    12/2022: Patient with complaints of right AKA wound dehiscence and foul odor.     Past Medical History:   Diagnosis Date    CAD (coronary artery disease) 2016    CAD (non obstructive) 2016 [facility]    Diabetes mellitus type I     Diabetic retinopathy     ESRD on hemodialysis     on HD TThSat    Gangrene of left foot     Hypertension     Nuclear sclerosis of right eye 11/24/2021    PAD (peripheral artery disease)     PEA (Pulseless electrical activity) 10/10/2022    Pulmonary HTN     Right foot infection     TB lung, latent 04/2021    Wet gangrene 1/5/2022     Past Surgical History:   Procedure Laterality Date    ABOVE-KNEE AMPUTATION Left 1/18/2022    Procedure: AMPUTATION, ABOVE KNEE;  Surgeon: Rusty Light MD;  Location: [facility] OR;  Service: Orthopedics;  Laterality: Left;    ABOVE-KNEE AMPUTATION Left 1/21/2022    Procedure: AMPUTATION, ABOVE KNEE;  Surgeon: Rusty Light MD;  Location: [facility] OR;  Service: Orthopedics;  Laterality: Left;    ABOVE-KNEE AMPUTATION Right 10/13/2022    Procedure: AMPUTATION, ABOVE KNEE, RIGHT;  Surgeon: Dimitris Davis MD;  Location: [facility] OR;  Service: Orthopedics;  Laterality: Right;    AV FISTULA PLACEMENT      CATARACT EXTRACTION Left     EYE SURGERY      FISTULOGRAM Left 8/9/2022    Procedure: Fistulogram;  Surgeon: Masoud Soni MD;  Location: [facility] OR;  Service: Vascular;  Laterality: Left;  LEFT  VM ON DAUGHTER'S PHONE----PHONE PREOP NOT COMPLETED  CALLED PATIENT ON 8/8/2022 @ 3:34. HE STATED HE IS RESCHEDULING PROCEDURE. NOTIFIED ERWIN @ 3:35PM-LO    FISTULOGRAM Left 10/20/2022    Procedure: Fistulogram;  Surgeon: Masoud Soni MD;  Location: [facility];  Service: Vascular;  Laterality: Left;  Left upper extremity    TOE AMPUTATION Left 1/6/2022    Procedure: AMPUTATION, TOE;  Surgeon: Masoud Soni MD;  Location: [facility];  Service: Vascular;  Laterality: Left;  Left first through fifth toes, possible open transmetatarsal amputation and all other indicated procedures     Family History   Problem Relation Age of Onset    No Known Problems Mother     No Known Problems Father     No Known Problems Sister     No Known Problems Brother     No Known Problems Daughter     No Known Problems Daughter     No Known Problems Daughter     No Known Problems Brother     No Known Problems Maternal Aunt     No Known Problems Maternal Uncle     No Known Problems Paternal Aunt     No Known Problems Paternal Uncle     No Known Problems Maternal Grandmother     No Known Problems Maternal Grandfather     No Known Problems Paternal Grandmother     No Known Problems Paternal Grandfather      Social History     Socioeconomic History    Marital status: Single    Number of children: 5   Tobacco Use    Smoking status: Never    Smokeless tobacco: Never   Substance and Sexual Activity    Alcohol use: No    Drug use: No    Sexual activity: Yes     Partners: Female   Social History Narrative    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     Social Determinants of Health     Financial Resource Strain: Low Risk     Difficulty of Paying Living Expenses: Not very hard   Food Insecurity: No Food Insecurity    Worried About Running Out of Food in the Last Year: Never true    Ran Out of Food in the Last Year: Never true   Transportation Needs: No Transportation Needs    Lack of Transportation (Medical): No    Lack of Transportation (Non-Medical): No    Physical Activity: Inactive    Days of Exercise per Week: 0 days    Minutes of Exercise per Session: 0 min   Stress: Stress Concern Present    Feeling of Stress : Rather much   Social Connections: Socially Isolated    Frequency of Communication with Friends and Family: More than three times a week    Frequency of Social Gatherings with Friends and Family: More than three times a week    Attends Restorationism Services: Never    Active Member of Clubs or Organizations: No    Attends Club or Organization Meetings: Never    Marital Status: Never    Housing Stability: Low Risk     Unable to Pay for Housing in the Last Year: No    Number of Places Lived in the Last Year: 1    Unstable Housing in the Last Year: No       Current Outpatient Medications:     amLODIPine (NORVASC) 5 MG tablet, Take 1 tablet (5 mg total) by mouth once daily., Disp: 30 tablet, Rfl: 11    aspirin 81 MG Chew, Take 81 mg by mouth once daily., Disp: , Rfl:     atorvastatin (LIPITOR) 20 MG tablet, Take 1 tablet by mouth., Disp: , Rfl:     ciprofloxacin HCl (CIPRO) 500 MG tablet, Take 1 tablet (500 mg total) by mouth daily with breakfast., Disp: 30 tablet, Rfl: 1    clopidogreL (PLAVIX) 75 mg tablet, Take 75 mg by mouth once daily., Disp: , Rfl:     epoetin paola-epbx (RETACRIT) 10,000 unit/mL imjection, Inject 1 mL (10,000 Units total) into the skin every Mon, Wed, Fri., Disp: , Rfl:     folic acid-vit B6-vit B12 2.5-25-2 mg (FOLBIC OR EQUIV) 2.5-25-2 mg Tab, Take 1 tablet by mouth once daily., Disp: , Rfl:     hydrALAZINE (APRESOLINE) 50 MG tablet, Take 2 tablets (100 mg total) by mouth every 8 (eight) hours., Disp: 90 tablet, Rfl: 11    insulin aspart U-100 (NOVOLOG) 100 unit/mL (3 mL) InPn pen, Inject 0-5 Units into the skin before meals and at bedtime as needed (Hyperglycemia)., Disp: , Rfl: 0    insulin detemir U-100 (LEVEMIR FLEXTOUCH) 100 unit/mL (3 mL) SubQ InPn pen, Inject 6 Units into the skin every evening., Disp: , Rfl: 0    lancets  (ACCU-CHEK SOFTCLIX LANCETS) Misc, 1 each by Misc.(Non-Drug; Combo Route) route once daily at 6am., Disp: 200 each, Rfl: 1    metoprolol succinate (TOPROL-XL) 25 MG 24 hr tablet, Take 1 tablet by mouth., Disp: , Rfl:     omeprazole (PRILOSEC) 40 MG capsule, Take 40 mg by mouth once daily., Disp: , Rfl:     REVIEW OF SYSTEMS:  General: No fevers or chills; ENT: No sore throat; Allergy and Immunology: no persistent infections; Hematological and Lymphatic: No history of bleeding or easy bruising; Endocrine: negative; Respiratory: no cough, shortness of breath, or wheezing; Cardiovascular: no chest pain or dyspnea on exertion; no claudication, no rest pain; Gastrointestinal: no abdominal pain/back, change in bowel habits, or bloody stools; Genito-Urinary: no dysuria, trouble voiding, or hematuria; Musculoskeletal: negative, no wound; Neurological: no TIA or stroke symptoms; Psychiatric: no nervousness, anxiety or depression.    PHYSICAL EXAM:                General appearance:  Alert, well-appearing, and in no distress.  Oriented to person, place, and time                    Neurological: Normal speech, no focal findings noted; CN II - XII grossly intact. RLE with sensation to light touch, LLE with sensation to light touch.            Musculoskeletal: Digits/nail without cyanosis/clubbing.  Strength 5/5 BLE.                    Neck: Supple, no significant adenopathy, no carotid bruit can be auscultated                  Chest:  Clear to auscultation, no wheezes, rales or rhonchi, symmetric air entry. No use of accessory muscles               Cardiac: Normal rate and regular rhythm, S1 and S2 normal            Abdomen: Soft, nontender, nondistended, no masses or organomegaly, no hernia     No rebound tenderness noted; bowel sounds normal     Pulsatile aortic mass is non palpable.     No groin adenopathy      Extremities:2+ R femoral pulse, 2+ L femoral pulse     doppler+ R popliteal pulse     1+ R PT pulse     1+ R DP  pulse     no RLE edema, no L AKA stump edema    Skin: RLE with local infection to distal aspect of foot and toes, RUE AVF pulsatile thrill, no wounds to AV access    LAB RESULTS:  No results found for: CBC  Lab Results   Component Value Date    LABPROT 11.5 10/13/2022    INR 1.1 10/13/2022     Lab Results   Component Value Date     (L) 12/02/2022    K 3.7 12/02/2022     12/02/2022    CO2 23 12/02/2022    GLU 89 12/02/2022    BUN 15 12/02/2022    CREATININE 3.6 (H) 12/02/2022    CALCIUM 8.9 12/02/2022    ANIONGAP 8 12/02/2022    EGFRNONAA 5 (A) 03/16/2022     Lab Results   Component Value Date    WBC 8.34 12/02/2022    RBC 3.46 (L) 12/02/2022    HGB 9.2 (L) 12/02/2022    HCT 31.4 (L) 12/02/2022    MCV 91 12/02/2022    MCH 26.6 (L) 12/02/2022    MCHC 29.3 (L) 12/02/2022    RDW 20.4 (H) 12/02/2022     12/02/2022    MPV 10.1 12/02/2022    GRAN 5.9 12/02/2022    GRAN 71.0 12/02/2022    LYMPH 1.2 12/02/2022    LYMPH 14.7 (L) 12/02/2022    MONO 0.5 12/02/2022    MONO 5.4 12/02/2022    EOS 0.6 (H) 12/02/2022    BASO 0.06 12/02/2022    EOSINOPHIL 7.4 12/02/2022    BASOPHIL 0.7 12/02/2022    DIFFMETHOD Automated 12/02/2022     .  Lab Results   Component Value Date    HGBA1C 6.1 (H) 01/07/2021       IMAGING:  All pertinent imaging has been reviewed and interpreted independently.    HD US 5/2022 reviewed with HD significant venous outflow stenosis.    IMP/PLAN:  56 y.o. male with   Patient Active Problem List   Diagnosis    Controlled type 2 diabetes mellitus with chronic kidney disease on chronic dialysis, with long-term current use of insulin    Essential (primary) hypertension    Pure hypercholesterolemia    Benign hypertension with chronic kidney disease, stage V    Other insomnia    Gastroesophageal reflux disease    Chronic diastolic heart failure    CAD (coronary artery disease) - non-obstructive from [facility] in 2016    Pre-transplant evaluation for kidney transplant    Chronic pulmonary heart disease     HLD (hyperlipidemia)    PDR (proliferative diabetic retinopathy)    Secondary hyperparathyroidism of renal origin    ESRD on hemodialysis    TB lung, latent    Paronychia, toe, left    Nuclear sclerosis of right eye    Pseudophakia    COVID-19 in immunocompromised patient    Encephalopathy, metabolic    Unilateral complete BKA, left, initial encounter    PAD (peripheral artery disease)    Anemia due to chronic kidney disease, on chronic dialysis    Cognitive changes    Advanced care planning/counseling discussion    Abscess of right thigh    Sepsis    S/P AKA (above knee amputation) bilateral    Acute cystitis    Elevated troponin    Chronic kidney disease-mineral and bone disorder    being managed by PCP and specialists who is here today for evaluation of Right AKA wound.    -RLE PVD with right foot infection-recommend evaluation in the emergency room with x-ray, antibiotics. Now s/p Right AKA on 10/13/22. Patient recently seen in ER for purulent drainage from Right AKA wound, discharged on ABX and wound care. Patient presents to clinic with right aka wound dehiscence, positive for foul odor and mild purulent drainage.   -Prosthetics eval L AKA  -Exercise  -Heart healthy lifestyle  -Recommend ER admission  - Wound cleaned and dressed in clinic    I spent 20 minutes evaluating this patient and greater than 50% of the time was spent counseling, coordinator care and discussing the plan of care.  All questions were answered and patient stated understanding with agreement with the above treatment plan.    Mike Pitts NP   Vascular and Endovascular Surgery

## 2023-01-04 LAB — FUNGUS SPEC CULT: NORMAL

## 2023-01-17 ENCOUNTER — HOSPITAL ENCOUNTER (INPATIENT)
Facility: HOSPITAL | Age: 58
LOS: 12 days | Discharge: NURSING, REHAB OR LONG-TERM CARE | DRG: 477 | End: 2023-01-30
Attending: EMERGENCY MEDICINE | Admitting: HOSPITALIST
Payer: MEDICARE

## 2023-01-17 ENCOUNTER — OFFICE VISIT (OUTPATIENT)
Dept: INFECTIOUS DISEASES | Facility: CLINIC | Age: 58
DRG: 477 | End: 2023-01-17
Payer: MEDICARE

## 2023-01-17 VITALS — TEMPERATURE: 98 F | WEIGHT: 125.25 LBS | BODY MASS INDEX: 17.53 KG/M2 | HEIGHT: 71 IN

## 2023-01-17 DIAGNOSIS — D63.1 ANEMIA IN ESRD (END-STAGE RENAL DISEASE): ICD-10-CM

## 2023-01-17 DIAGNOSIS — Z89.612 S/P AKA (ABOVE KNEE AMPUTATION) BILATERAL: Chronic | ICD-10-CM

## 2023-01-17 DIAGNOSIS — L08.9 WOUND INFECTION: ICD-10-CM

## 2023-01-17 DIAGNOSIS — N18.6 ESRD ON HEMODIALYSIS: ICD-10-CM

## 2023-01-17 DIAGNOSIS — Z99.2 ESRD ON HEMODIALYSIS: ICD-10-CM

## 2023-01-17 DIAGNOSIS — I25.10 CORONARY ARTERY DISEASE, UNSPECIFIED VESSEL OR LESION TYPE, UNSPECIFIED WHETHER ANGINA PRESENT, UNSPECIFIED WHETHER NATIVE OR TRANSPLANTED HEART: Primary | ICD-10-CM

## 2023-01-17 DIAGNOSIS — Z89.611 S/P AKA (ABOVE KNEE AMPUTATION) BILATERAL: Chronic | ICD-10-CM

## 2023-01-17 DIAGNOSIS — Z91.89 AT RISK FOR PROLONGED QT INTERVAL SYNDROME: ICD-10-CM

## 2023-01-17 DIAGNOSIS — R07.9 CHEST PAIN: ICD-10-CM

## 2023-01-17 DIAGNOSIS — T14.8XXA WOUND INFECTION: ICD-10-CM

## 2023-01-17 DIAGNOSIS — I10 ESSENTIAL (PRIMARY) HYPERTENSION: ICD-10-CM

## 2023-01-17 DIAGNOSIS — L02.415 ABSCESS OF RIGHT LOWER EXTREMITY: Primary | ICD-10-CM

## 2023-01-17 DIAGNOSIS — N18.6 ANEMIA IN ESRD (END-STAGE RENAL DISEASE): ICD-10-CM

## 2023-01-17 DIAGNOSIS — U07.1 COVID-19: ICD-10-CM

## 2023-01-17 DIAGNOSIS — I73.9 PAD (PERIPHERAL ARTERY DISEASE): ICD-10-CM

## 2023-01-17 DIAGNOSIS — M86.151 OTHER ACUTE OSTEOMYELITIS OF RIGHT FEMUR: ICD-10-CM

## 2023-01-17 LAB
ACID FAST MOD KINY STN SPEC: NORMAL
ALBUMIN SERPL BCP-MCNC: 3.3 G/DL (ref 3.5–5.2)
ALP SERPL-CCNC: 86 U/L (ref 55–135)
ALT SERPL W/O P-5'-P-CCNC: 6 U/L (ref 10–44)
ANION GAP SERPL CALC-SCNC: 12 MMOL/L (ref 8–16)
AST SERPL-CCNC: 9 U/L (ref 10–40)
BASOPHILS # BLD AUTO: 0.07 K/UL (ref 0–0.2)
BASOPHILS NFR BLD: 1 % (ref 0–1.9)
BILIRUB SERPL-MCNC: 0.5 MG/DL (ref 0.1–1)
BUN SERPL-MCNC: 27 MG/DL (ref 6–20)
BUN SERPL-MCNC: 28 MG/DL (ref 6–30)
CALCIUM SERPL-MCNC: 8.8 MG/DL (ref 8.7–10.5)
CHLORIDE SERPL-SCNC: 105 MMOL/L (ref 95–110)
CHLORIDE SERPL-SCNC: 105 MMOL/L (ref 95–110)
CO2 SERPL-SCNC: 24 MMOL/L (ref 23–29)
CREAT SERPL-MCNC: 6.6 MG/DL (ref 0.5–1.4)
CREAT SERPL-MCNC: 7.9 MG/DL (ref 0.5–1.4)
DIFFERENTIAL METHOD: ABNORMAL
EOSINOPHIL # BLD AUTO: 0.7 K/UL (ref 0–0.5)
EOSINOPHIL NFR BLD: 10.6 % (ref 0–8)
ERYTHROCYTE [DISTWIDTH] IN BLOOD BY AUTOMATED COUNT: 20.1 % (ref 11.5–14.5)
EST. GFR  (NO RACE VARIABLE): 9.1 ML/MIN/1.73 M^2
ESTIMATED AVG GLUCOSE: 91 MG/DL (ref 68–131)
GLUCOSE SERPL-MCNC: 83 MG/DL (ref 70–110)
GLUCOSE SERPL-MCNC: 86 MG/DL (ref 70–110)
HBA1C MFR BLD: 4.8 % (ref 4–5.6)
HCT VFR BLD AUTO: 38.4 % (ref 40–54)
HCT VFR BLD CALC: 38 %PCV (ref 36–54)
HCV AB SERPL QL IA: NORMAL
HGB BLD-MCNC: 11 G/DL (ref 14–18)
HIV 1+2 AB+HIV1 P24 AG SERPL QL IA: NORMAL
IMM GRANULOCYTES # BLD AUTO: 0.02 K/UL (ref 0–0.04)
IMM GRANULOCYTES NFR BLD AUTO: 0.3 % (ref 0–0.5)
LACTATE SERPL-SCNC: 0.9 MMOL/L (ref 0.5–2.2)
LYMPHOCYTES # BLD AUTO: 1.6 K/UL (ref 1–4.8)
LYMPHOCYTES NFR BLD: 22.6 % (ref 18–48)
MAGNESIUM SERPL-MCNC: 2.3 MG/DL (ref 1.6–2.6)
MCH RBC QN AUTO: 26.4 PG (ref 27–31)
MCHC RBC AUTO-ENTMCNC: 28.6 G/DL (ref 32–36)
MCV RBC AUTO: 92 FL (ref 82–98)
MONOCYTES # BLD AUTO: 0.5 K/UL (ref 0.3–1)
MONOCYTES NFR BLD: 7.5 % (ref 4–15)
MYCOBACTERIUM SPEC QL CULT: NORMAL
NEUTROPHILS # BLD AUTO: 4 K/UL (ref 1.8–7.7)
NEUTROPHILS NFR BLD: 58 % (ref 38–73)
NRBC BLD-RTO: 0 /100 WBC
PHOSPHATE SERPL-MCNC: 3.9 MG/DL (ref 2.7–4.5)
PLATELET # BLD AUTO: 285 K/UL (ref 150–450)
PMV BLD AUTO: 9.8 FL (ref 9.2–12.9)
POC IONIZED CALCIUM: 0.98 MMOL/L (ref 1.06–1.42)
POC TCO2 (MEASURED): 28 MMOL/L (ref 23–29)
POCT GLUCOSE: 101 MG/DL (ref 70–110)
POCT GLUCOSE: 118 MG/DL (ref 70–110)
POTASSIUM BLD-SCNC: 4.4 MMOL/L (ref 3.5–5.1)
POTASSIUM SERPL-SCNC: 4.5 MMOL/L (ref 3.5–5.1)
PROT SERPL-MCNC: 8.1 G/DL (ref 6–8.4)
RBC # BLD AUTO: 4.16 M/UL (ref 4.6–6.2)
SAMPLE: ABNORMAL
SARS-COV-2 RDRP RESP QL NAA+PROBE: POSITIVE
SODIUM BLD-SCNC: 143 MMOL/L (ref 136–145)
SODIUM SERPL-SCNC: 141 MMOL/L (ref 136–145)
WBC # BLD AUTO: 6.95 K/UL (ref 3.9–12.7)

## 2023-01-17 PROCEDURE — G0378 HOSPITAL OBSERVATION PER HR: HCPCS

## 2023-01-17 PROCEDURE — 82962 GLUCOSE BLOOD TEST: CPT

## 2023-01-17 PROCEDURE — 99285 PR EMERGENCY DEPT VISIT,LEVEL V: ICD-10-PCS | Mod: CR,CS,, | Performed by: PHYSICIAN ASSISTANT

## 2023-01-17 PROCEDURE — 99215 OFFICE O/P EST HI 40 MIN: CPT | Mod: S$PBB,GC,, | Performed by: STUDENT IN AN ORGANIZED HEALTH CARE EDUCATION/TRAINING PROGRAM

## 2023-01-17 PROCEDURE — 96372 THER/PROPH/DIAG INJ SC/IM: CPT | Performed by: STUDENT IN AN ORGANIZED HEALTH CARE EDUCATION/TRAINING PROGRAM

## 2023-01-17 PROCEDURE — 96365 THER/PROPH/DIAG IV INF INIT: CPT

## 2023-01-17 PROCEDURE — 99215 PR OFFICE/OUTPT VISIT, EST, LEVL V, 40-54 MIN: ICD-10-PCS | Mod: S$PBB,GC,, | Performed by: STUDENT IN AN ORGANIZED HEALTH CARE EDUCATION/TRAINING PROGRAM

## 2023-01-17 PROCEDURE — 87389 HIV-1 AG W/HIV-1&-2 AB AG IA: CPT | Performed by: PHYSICIAN ASSISTANT

## 2023-01-17 PROCEDURE — 25000003 PHARM REV CODE 250: Performed by: STUDENT IN AN ORGANIZED HEALTH CARE EDUCATION/TRAINING PROGRAM

## 2023-01-17 PROCEDURE — 99999 PR PBB SHADOW E&M-EST. PATIENT-LVL IV: CPT | Mod: PBBFAC,GC,, | Performed by: STUDENT IN AN ORGANIZED HEALTH CARE EDUCATION/TRAINING PROGRAM

## 2023-01-17 PROCEDURE — 99285 EMERGENCY DEPT VISIT HI MDM: CPT | Mod: 25,27

## 2023-01-17 PROCEDURE — U0002 COVID-19 LAB TEST NON-CDC: HCPCS | Performed by: PHYSICIAN ASSISTANT

## 2023-01-17 PROCEDURE — 99214 OFFICE O/P EST MOD 30 MIN: CPT | Mod: PBBFAC,25 | Performed by: STUDENT IN AN ORGANIZED HEALTH CARE EDUCATION/TRAINING PROGRAM

## 2023-01-17 PROCEDURE — 84100 ASSAY OF PHOSPHORUS: CPT | Performed by: PHYSICIAN ASSISTANT

## 2023-01-17 PROCEDURE — 80053 COMPREHEN METABOLIC PANEL: CPT | Performed by: PHYSICIAN ASSISTANT

## 2023-01-17 PROCEDURE — 85025 COMPLETE CBC W/AUTO DIFF WBC: CPT | Performed by: PHYSICIAN ASSISTANT

## 2023-01-17 PROCEDURE — 83735 ASSAY OF MAGNESIUM: CPT | Performed by: PHYSICIAN ASSISTANT

## 2023-01-17 PROCEDURE — 63600175 PHARM REV CODE 636 W HCPCS: Performed by: STUDENT IN AN ORGANIZED HEALTH CARE EDUCATION/TRAINING PROGRAM

## 2023-01-17 PROCEDURE — 83036 HEMOGLOBIN GLYCOSYLATED A1C: CPT | Performed by: PHYSICIAN ASSISTANT

## 2023-01-17 PROCEDURE — 83605 ASSAY OF LACTIC ACID: CPT | Performed by: PHYSICIAN ASSISTANT

## 2023-01-17 PROCEDURE — 86803 HEPATITIS C AB TEST: CPT | Performed by: PHYSICIAN ASSISTANT

## 2023-01-17 PROCEDURE — 63600175 PHARM REV CODE 636 W HCPCS: Performed by: PHYSICIAN ASSISTANT

## 2023-01-17 PROCEDURE — 99999 PR PBB SHADOW E&M-EST. PATIENT-LVL IV: ICD-10-PCS | Mod: PBBFAC,GC,, | Performed by: STUDENT IN AN ORGANIZED HEALTH CARE EDUCATION/TRAINING PROGRAM

## 2023-01-17 PROCEDURE — 99285 EMERGENCY DEPT VISIT HI MDM: CPT | Mod: CR,CS,, | Performed by: PHYSICIAN ASSISTANT

## 2023-01-17 PROCEDURE — 25000003 PHARM REV CODE 250: Performed by: PHYSICIAN ASSISTANT

## 2023-01-17 PROCEDURE — 25500020 PHARM REV CODE 255: Performed by: EMERGENCY MEDICINE

## 2023-01-17 PROCEDURE — 87040 BLOOD CULTURE FOR BACTERIA: CPT | Mod: 59 | Performed by: PHYSICIAN ASSISTANT

## 2023-01-17 PROCEDURE — 96367 TX/PROPH/DG ADDL SEQ IV INF: CPT

## 2023-01-17 RX ORDER — IBUPROFEN 200 MG
24 TABLET ORAL
Status: DISCONTINUED | OUTPATIENT
Start: 2023-01-17 | End: 2023-01-17

## 2023-01-17 RX ORDER — METOPROLOL SUCCINATE 25 MG/1
25 TABLET, EXTENDED RELEASE ORAL DAILY
Status: DISCONTINUED | OUTPATIENT
Start: 2023-01-18 | End: 2023-01-30 | Stop reason: HOSPADM

## 2023-01-17 RX ORDER — INSULIN ASPART 100 [IU]/ML
0-5 INJECTION, SOLUTION INTRAVENOUS; SUBCUTANEOUS
Status: DISCONTINUED | OUTPATIENT
Start: 2023-01-17 | End: 2023-01-17

## 2023-01-17 RX ORDER — MEROPENEM AND SODIUM CHLORIDE 500 MG/50ML
500 INJECTION, SOLUTION INTRAVENOUS
Status: DISCONTINUED | OUTPATIENT
Start: 2023-01-18 | End: 2023-01-19

## 2023-01-17 RX ORDER — INSULIN ASPART 100 [IU]/ML
0-5 INJECTION, SOLUTION INTRAVENOUS; SUBCUTANEOUS
Status: DISCONTINUED | OUTPATIENT
Start: 2023-01-17 | End: 2023-01-30 | Stop reason: HOSPADM

## 2023-01-17 RX ORDER — NALOXONE HCL 0.4 MG/ML
0.02 VIAL (ML) INJECTION
Status: DISCONTINUED | OUTPATIENT
Start: 2023-01-17 | End: 2023-01-30 | Stop reason: HOSPADM

## 2023-01-17 RX ORDER — IBUPROFEN 200 MG
24 TABLET ORAL
Status: DISCONTINUED | OUTPATIENT
Start: 2023-01-17 | End: 2023-01-30 | Stop reason: HOSPADM

## 2023-01-17 RX ORDER — POLYETHYLENE GLYCOL 3350 17 G/17G
17 POWDER, FOR SOLUTION ORAL 3 TIMES DAILY PRN
Status: DISCONTINUED | OUTPATIENT
Start: 2023-01-17 | End: 2023-01-30 | Stop reason: HOSPADM

## 2023-01-17 RX ORDER — GLUCAGON 1 MG
1 KIT INJECTION
Status: DISCONTINUED | OUTPATIENT
Start: 2023-01-17 | End: 2023-01-17

## 2023-01-17 RX ORDER — SIMETHICONE 80 MG
1 TABLET,CHEWABLE ORAL 4 TIMES DAILY PRN
Status: DISCONTINUED | OUTPATIENT
Start: 2023-01-17 | End: 2023-01-30 | Stop reason: HOSPADM

## 2023-01-17 RX ORDER — IBUPROFEN 200 MG
16 TABLET ORAL
Status: DISCONTINUED | OUTPATIENT
Start: 2023-01-17 | End: 2023-01-17

## 2023-01-17 RX ORDER — HEPARIN SODIUM 5000 [USP'U]/ML
5000 INJECTION, SOLUTION INTRAVENOUS; SUBCUTANEOUS EVERY 8 HOURS
Status: DISCONTINUED | OUTPATIENT
Start: 2023-01-17 | End: 2023-01-30 | Stop reason: HOSPADM

## 2023-01-17 RX ORDER — ACETAMINOPHEN 325 MG/1
650 TABLET ORAL EVERY 8 HOURS PRN
Status: DISCONTINUED | OUTPATIENT
Start: 2023-01-17 | End: 2023-01-30 | Stop reason: HOSPADM

## 2023-01-17 RX ORDER — GLUCAGON 1 MG
1 KIT INJECTION
Status: DISCONTINUED | OUTPATIENT
Start: 2023-01-17 | End: 2023-01-30 | Stop reason: HOSPADM

## 2023-01-17 RX ORDER — AMLODIPINE BESYLATE 5 MG/1
5 TABLET ORAL DAILY
Status: DISCONTINUED | OUTPATIENT
Start: 2023-01-17 | End: 2023-01-18

## 2023-01-17 RX ORDER — NAPROXEN SODIUM 220 MG/1
81 TABLET, FILM COATED ORAL DAILY
Status: DISCONTINUED | OUTPATIENT
Start: 2023-01-18 | End: 2023-01-30 | Stop reason: HOSPADM

## 2023-01-17 RX ORDER — IPRATROPIUM BROMIDE AND ALBUTEROL SULFATE 2.5; .5 MG/3ML; MG/3ML
3 SOLUTION RESPIRATORY (INHALATION) EVERY 4 HOURS PRN
Status: DISCONTINUED | OUTPATIENT
Start: 2023-01-17 | End: 2023-01-30 | Stop reason: HOSPADM

## 2023-01-17 RX ORDER — TALC
6 POWDER (GRAM) TOPICAL NIGHTLY PRN
Status: DISCONTINUED | OUTPATIENT
Start: 2023-01-17 | End: 2023-01-30 | Stop reason: HOSPADM

## 2023-01-17 RX ORDER — PANTOPRAZOLE SODIUM 40 MG/1
40 TABLET, DELAYED RELEASE ORAL DAILY
Status: DISCONTINUED | OUTPATIENT
Start: 2023-01-18 | End: 2023-01-30 | Stop reason: HOSPADM

## 2023-01-17 RX ORDER — SODIUM CHLORIDE 0.9 % (FLUSH) 0.9 %
10 SYRINGE (ML) INJECTION EVERY 12 HOURS PRN
Status: DISCONTINUED | OUTPATIENT
Start: 2023-01-17 | End: 2023-01-30 | Stop reason: HOSPADM

## 2023-01-17 RX ORDER — IBUPROFEN 200 MG
16 TABLET ORAL
Status: DISCONTINUED | OUTPATIENT
Start: 2023-01-17 | End: 2023-01-30 | Stop reason: HOSPADM

## 2023-01-17 RX ORDER — HYDRALAZINE HYDROCHLORIDE 50 MG/1
100 TABLET, FILM COATED ORAL EVERY 8 HOURS
Status: DISCONTINUED | OUTPATIENT
Start: 2023-01-17 | End: 2023-01-30 | Stop reason: HOSPADM

## 2023-01-17 RX ORDER — CLOPIDOGREL BISULFATE 75 MG/1
75 TABLET ORAL DAILY
Status: DISCONTINUED | OUTPATIENT
Start: 2023-01-18 | End: 2023-01-30 | Stop reason: HOSPADM

## 2023-01-17 RX ORDER — ATORVASTATIN CALCIUM 20 MG/1
20 TABLET, FILM COATED ORAL DAILY
Status: DISCONTINUED | OUTPATIENT
Start: 2023-01-18 | End: 2023-01-30 | Stop reason: HOSPADM

## 2023-01-17 RX ORDER — ONDANSETRON 2 MG/ML
4 INJECTION INTRAMUSCULAR; INTRAVENOUS EVERY 8 HOURS PRN
Status: DISCONTINUED | OUTPATIENT
Start: 2023-01-17 | End: 2023-01-30 | Stop reason: HOSPADM

## 2023-01-17 RX ORDER — ONDANSETRON 4 MG/1
4 TABLET, ORALLY DISINTEGRATING ORAL EVERY 8 HOURS PRN
Status: DISCONTINUED | OUTPATIENT
Start: 2023-01-17 | End: 2023-01-30 | Stop reason: HOSPADM

## 2023-01-17 RX ADMIN — INSULIN DETEMIR 6 UNITS: 100 INJECTION, SOLUTION SUBCUTANEOUS at 10:01

## 2023-01-17 RX ADMIN — VANCOMYCIN HYDROCHLORIDE 1250 MG: 1.25 INJECTION, POWDER, LYOPHILIZED, FOR SOLUTION INTRAVENOUS at 04:01

## 2023-01-17 RX ADMIN — MEROPENEM 1 G: 1 INJECTION INTRAVENOUS at 05:01

## 2023-01-17 RX ADMIN — HYDRALAZINE HYDROCHLORIDE 100 MG: 50 TABLET ORAL at 10:01

## 2023-01-17 RX ADMIN — IOHEXOL 75 ML: 350 INJECTION, SOLUTION INTRAVENOUS at 04:01

## 2023-01-17 RX ADMIN — HEPARIN SODIUM 5000 UNITS: 5000 INJECTION INTRAVENOUS; SUBCUTANEOUS at 10:01

## 2023-01-17 NOTE — ED PROVIDER NOTES
Encounter Date: 1/17/2023       History     Chief Complaint   Patient presents with    Wound Infection     Sent from id clinic aka with abscess,     58 y/o male with history of ESRD on hemodialysis MWF (last dialysis yesterday), PAD, HTN, DM type I, pulmonary HTN, presents to the ER for evaluation due to concern for right leg infection. Patient has bilateral AKA, most recent right leg AKA October 2022.  Patient was treated for right leg abscess November 2022, cipro antibiotics were completed on 12/24/22. Patient is currently in [facility] - per [facility] nurse Olivia, patient has wound check and dressing daily.  Pipe patient was advised on wound dehiscence, but has been noncompliant with wound care and was not agreeable to wound dehiscence.  Patient was seen in infectious disease clinic today for initial consultation.  Patient was referred to the emergency department from ID clinic due to concern for wound infection.  Per ID provider, during his admission 11/2022 ID recommendations included 6 week course of antibiotics to cover osteomyelitis.  Patient was seen at [facility] 12/11/22 and decision was made to discontinue Cipro antibiotics early on 12/24.  ID provider was concerned for appearance of wound and advised to come to the ED for repeat imaging to evaluate for possible underlying abscess.  Patient denies current pain in the leg or fever, the leg is tender to palpation.  The patient has no other complaints at this time.    The history is provided by the patient and a relative (history provided by patient, daughter and nurse from [facility]). The history is limited by a language barrier. A  was used.   Review of patient's allergies indicates:  No Known Allergies  Past Medical History:   Diagnosis Date    CAD (coronary artery disease) 2016    CAD (non obstructive) 2016 [facility]    Diabetes mellitus type I     Diabetic retinopathy     ESRD on hemodialysis     on HD TThSat    Gangrene of left  foot     Hypertension     Nuclear sclerosis of right eye 11/24/2021    PAD (peripheral artery disease)     PEA (Pulseless electrical activity) 10/10/2022    Pulmonary HTN     Right foot infection     TB lung, latent 04/2021    Wet gangrene 1/5/2022     Past Surgical History:   Procedure Laterality Date    ABOVE-KNEE AMPUTATION Left 1/18/2022    Procedure: AMPUTATION, ABOVE KNEE;  Surgeon: Rusty Light MD;  Location: [facility] OR;  Service: Orthopedics;  Laterality: Left;    ABOVE-KNEE AMPUTATION Left 1/21/2022    Procedure: AMPUTATION, ABOVE KNEE;  Surgeon: Rusty Light MD;  Location: [facility] OR;  Service: Orthopedics;  Laterality: Left;    ABOVE-KNEE AMPUTATION Right 10/13/2022    Procedure: AMPUTATION, ABOVE KNEE, RIGHT;  Surgeon: Dimitris Davis MD;  Location: [facility] OR;  Service: Orthopedics;  Laterality: Right;    AV FISTULA PLACEMENT      CATARACT EXTRACTION Left     EYE SURGERY      FISTULOGRAM Left 8/9/2022    Procedure: Fistulogram;  Surgeon: Masoud Soni MD;  Location: [facility];  Service: Vascular;  Laterality: Left;  LEFT VM ON DAUGHTER'S PHONE----PHONE PREOP NOT COMPLETED  CALLED PATIENT ON 8/8/2022 @ 3:34. HE STATED HE IS RESCHEDULING PROCEDURE. NOTIFIED ERWIN @ 3:35PM-LO    FISTULOGRAM Left 10/20/2022    Procedure: Fistulogram;  Surgeon: Masoud Soni MD;  Location: [facility];  Service: Vascular;  Laterality: Left;  Left upper extremity    TOE AMPUTATION Left 1/6/2022    Procedure: AMPUTATION, TOE;  Surgeon: Masoud Soni MD;  Location: [facility];  Service: Vascular;  Laterality: Left;  Left first through fifth toes, possible open transmetatarsal amputation and all other indicated procedures     Family History   Problem Relation Age of Onset    No Known Problems Mother     No Known Problems Father     No Known Problems Sister     No Known Problems Brother     No Known Problems Daughter     No Known Problems Daughter     No Known Problems Daughter     No Known Problems Brother      No Known Problems Maternal Aunt     No Known Problems Maternal Uncle     No Known Problems Paternal Aunt     No Known Problems Paternal Uncle     No Known Problems Maternal Grandmother     No Known Problems Maternal Grandfather     No Known Problems Paternal Grandmother     No Known Problems Paternal Grandfather      Social History     Tobacco Use    Smoking status: Never    Smokeless tobacco: Never   Substance Use Topics    Alcohol use: No    Drug use: No     Review of Systems   Constitutional:  Negative for chills and fever.   HENT:  Negative for sore throat.    Respiratory:  Negative for shortness of breath.    Cardiovascular:  Negative for chest pain.   Gastrointestinal:  Negative for abdominal pain, nausea and vomiting.   Genitourinary:  Negative for dysuria.   Musculoskeletal:  Positive for joint swelling. Negative for back pain.   Skin:  Positive for wound. Negative for rash.   Neurological:  Negative for weakness.   Hematological:  Does not bruise/bleed easily.   Psychiatric/Behavioral:  Negative for confusion.      Physical Exam     Initial Vitals [01/17/23 1127]   BP Pulse Resp Temp SpO2   (!) 180/75 98 20 98.5 °F (36.9 °C) 100 %      MAP       --         Physical Exam    Nursing note and vitals reviewed.  Constitutional: He appears well-developed and well-nourished. He is not diaphoretic. No distress.   HENT:   Head: Atraumatic.   Eyes: Conjunctivae and EOM are normal. Pupils are equal, round, and reactive to light.   Neck: Neck supple.   Normal range of motion.  Cardiovascular:  Normal rate and regular rhythm.           Pulmonary/Chest: Breath sounds normal. No respiratory distress. He has no wheezes. He has no rales.   Abdominal: Abdomen is soft. Bowel sounds are normal. There is no abdominal tenderness.   Musculoskeletal:      Cervical back: Normal range of motion and neck supple.     Neurological: He is alert and oriented to person, place, and time. He has normal strength. GCS score is 15. GCS eye  subscore is 4. GCS verbal subscore is 5. GCS motor subscore is 6.   Skin: Skin is warm. Capillary refill takes less than 2 seconds. No rash noted.   Psychiatric: He has a normal mood and affect.       Right AKA, wound with fibrinous and purulent malodorous drainage       Right AKA incision with no  ED Course   Procedures  Labs Reviewed   CBC W/ AUTO DIFFERENTIAL - Abnormal; Notable for the following components:       Result Value    RBC 4.16 (*)     Hemoglobin 11.0 (*)     Hematocrit 38.4 (*)     MCH 26.4 (*)     MCHC 28.6 (*)     RDW 20.1 (*)     Eos # 0.7 (*)     Eosinophil % 10.6 (*)     All other components within normal limits    Narrative:     add on phos and mg per dr.lauren quiroz /order#[id number] and   [id number] @ 01/17/2023  13:51    COMPREHENSIVE METABOLIC PANEL - Abnormal; Notable for the following components:    BUN 27 (*)     Creatinine 6.6 (*)     Albumin 3.3 (*)     AST 9 (*)     ALT 6 (*)     eGFR 9.1 (*)     All other components within normal limits    Narrative:     add on phos and mg per dr.lauren quiroz /order#[id number] and   [id number] @ 01/17/2023  13:51    SARS-COV-2 RNA AMPLIFICATION, QUAL - Abnormal; Notable for the following components:    SARS-CoV-2 RNA, Amplification, Qual Positive (*)     All other components within normal limits   ISTAT PROCEDURE - Abnormal; Notable for the following components:    POC Creatinine 7.9 (*)     POC Ionized Calcium 0.98 (*)     All other components within normal limits   CULTURE, BLOOD   CULTURE, BLOOD   HIV 1 / 2 ANTIBODY    Narrative:     Release to patient->Immediate   HEPATITIS C ANTIBODY    Narrative:     Release to patient->Immediate   LACTIC ACID, PLASMA    Narrative:     add on phos and mg per dr.lauren quiroz /order#[id number] and   [id number] @ 01/17/2023  13:51    MAGNESIUM   PHOSPHORUS   PHOSPHORUS    Narrative:     add on phos and mg per dr.lauren quiroz /order#[id number] and   [id number] @ 01/17/2023  13:51    MAGNESIUM    Narrative:     add  on phos and mg per dr.lauren quiroz /order#[id number] and   [id number] @ 01/17/2023  13:51    HEMOGLOBIN A1C    Narrative:     Add-on [facility] [id number] per Fabio Bradford MD on  01/17/2023 18:12         add on phos and mg per dr.lauren quiroz /order#[id number] and   [id number] @ 01/17/2023  13:51    POCT GLUCOSE   ISTAT CHEM8          Imaging Results              CT Thigh With Contrast Right (Final result)  Result time 01/17/23 16:59:48      Final result by Adonis Walden MD (01/17/23 16:59:48)                   Impression:      There is interval resolution of previously seen large stump abscess. However, there is lateral ulceration, possibly extending to bone. While no erosion is seen, osteomyelitis cannot be excluded on the basis of CT.  Consider further evaluation with MRI femur.      Electronically signed by: Adonis Walden MD  Date:    01/17/2023  Time:    16:59               Narrative:    EXAMINATION:  CT THIGH WITH CONTRAST RIGHT    CLINICAL HISTORY:  Osteomyelitis, femur;Soft tissue infection suspected, thigh, xray done;    TECHNIQUE:  CT right thigh performed with contrast.    COMPARISON:  11/23/2022    FINDINGS:  Postsurgical changes of right above knee amputation.  There is interval resolution of previously seen large stump abscess.  However, there is lateral ulceration, possibly extending to bone.  While no erosion is seen, osteomyelitis cannot be excluded on the basis of CT.    Extensive vascular calcifications are present.    Mild generalized loss of muscle bulk.  No pelvic ascites or lymphadenopathy noted.  Note made of urinary bladder wall thickening.                                       Medications   dextrose 10% bolus 125 mL 125 mL (has no administration in time range)   sodium chloride 0.9% flush 10 mL (has no administration in time range)   albuterol-ipratropium 2.5 mg-0.5 mg/3 mL nebulizer solution 3 mL (has no administration in time range)   melatonin tablet 6 mg (has no administration in time  range)   ondansetron disintegrating tablet 4 mg (has no administration in time range)   ondansetron injection 4 mg (has no administration in time range)   polyethylene glycol packet 17 g (has no administration in time range)   acetaminophen tablet 650 mg (has no administration in time range)   simethicone chewable tablet 80 mg (has no administration in time range)   naloxone 0.4 mg/mL injection 0.02 mg (has no administration in time range)   glucose chewable tablet 16 g (has no administration in time range)   glucose chewable tablet 24 g (has no administration in time range)   glucagon (human recombinant) injection 1 mg (has no administration in time range)   dextrose 10% bolus 250 mL 250 mL (has no administration in time range)   heparin (porcine) injection 5,000 Units (5,000 Units Subcutaneous Given 1/18/23 0629)   aspirin chewable tablet 81 mg (81 mg Oral Given 1/18/23 0809)   atorvastatin tablet 20 mg (20 mg Oral Given 1/18/23 0809)   clopidogreL tablet 75 mg (75 mg Oral Given 1/18/23 0809)   hydrALAZINE tablet 100 mg (100 mg Oral Given 1/18/23 0629)   insulin aspart U-100 pen 0-5 Units (has no administration in time range)   metoprolol succinate (TOPROL-XL) 24 hr tablet 25 mg (25 mg Oral Given 1/18/23 0809)   pantoprazole EC tablet 40 mg (40 mg Oral Given 1/18/23 0809)   vancomycin - pharmacy to dose (has no administration in time range)   meropenem-0.9% sodium chloride 500 mg/50 mL IVPB (has no administration in time range)   remdesivir 200 mg in sodium chloride 0.9% 250 mL infusion (0 mg Intravenous Stopped 1/18/23 0326)     Followed by   remdesivir 100 mg in sodium chloride 0.9% 250 mL infusion (has no administration in time range)   amLODIPine tablet 10 mg (has no administration in time range)   0.9%  NaCl infusion (has no administration in time range)   vancomycin 1,250 mg in dextrose 5 % 250 mL IVPB (Vial-Mate) (0 mg Intravenous Stopped 1/17/23 7364)   meropenem (MERREM) 1 g in sodium chloride 0.9 % 100  "mL IVPB (MB+) (0 g Intravenous Stopped 1/17/23 1955)   iohexoL (OMNIPAQUE 350) injection 100 mL (75 mLs Intravenous Given 1/17/23 1626)   amLODIPine tablet 5 mg (5 mg Oral Given 1/18/23 0255)     Medical Decision Making:   History:   I obtained history from: someone other than patient and another health care provider.  Old Medical Records: I decided to obtain old medical records.     APC / Resident Notes:   Patient with history of ESRD on dialysis MWF (normal dialysis yesterday), DM type 1 presenting from ID clinic with concern for wound infection and possible underlying abscess.  I spoke with Dr. Terry and reviewed clinic note.  He has advised that patient would likely need repeat imaging to assess for possible underlying abscess of right leg.  He was concerned for incomplete treatment of recent osteomyelitis.    Mr. Neff has exam concerning for wound infection involving incision/base of stump from right AKA performed October 2022. Currently in [facility] and receiving daily wound care, he was seen by ID clinic today and referred to the ER for workup.   CT today shows no fluid collection, but he has malodorous wound with purulent drainage,  cipro was discontinued on 12/24 per instruction from [facility] after a readmission 12/11/2022, although ID notes that original recs by ID were to complete cipro through 1/9/2023 to cover for possible osteo.      CT thigh today shows " There is interval resolution of previously seen large stump abscess. However, there is lateral ulceration, possibly extending to bone. While no erosion is seen, osteomyelitis cannot be excluded on the basis of CT.  Consider further evaluation with MRI femur."    Patients labs today reveal no leukocytosis and normal lactic acid, VSS.  He has incidental positive covid test. No reported respiratory symptoms.   Patient received IV vancomycin and meropenem per ID recs.  Patient will be admitted to IM for wound care and IV antibiotics, " further imaging as needed.   I discussed the care of this patient with my supervising MD.  I discussed patients presentation and admission with DR. Ingram, he has agreed to admit the patient to staff Dr. Soto.  Patient and daughter comfortable with plan for admission.      Attending Attestation:     Physician Attestation Statement for NP/PA:   I discussed this assessment and plan of this patient with the NP/PA, but I did not personally examine the patient. The face to face encounter was performed by the NP/PA.            ED Course as of 01/18/23 1203   Tue Jan 17, 2023   1601 Discussed with CT, they will plan to take patient for CT after current patient [LH]      ED Course User Index  [LH] YAMILA Goodson                 Clinical Impression:   Final diagnoses:  [T14.8XXA, L08.9] Wound infection        ED Disposition Condition    Observation                 YAMILA Goodson  01/18/23 0318       Fabio Bradford MD  01/18/23 9780

## 2023-01-17 NOTE — PROGRESS NOTES
INFECTIOUS DISEASE CLINIC  01/17/2023     Subjective:      Chief Complaint: RLE abscess, osteomyelitis    History of Present Illness:    56-year-old male with ESRD on HD, LTBI s/p tx and severe PAD c/b gangrene s/p R AKA (10/13/22 clean margins on pathology) was admitted 11/23/22 for R AKA abscess.  He was initially found minimally responsive at NH ([facility]) and was brought to ED.  Workup revealed hypoglycemia and RLE CT showing large abscess with extension into the femoral bone marrow suggestive of osteomyelitis.  ED collected superficial wound swab growing citrobacter koseri and pseudomonas aeruginosa.  IR aspirated 3ml purulence on 11/29/22 with negative cultures (does not appear aerobic culture was performed).  Blood cultures negative.    ID consulted.  The patient was initially placed on vanc and pip-tazo, then switched to meropenem 2/2 citrobacter resistance to pip-tazo.  Recommended ciprofloxacin 500mg daily for 6 weeks starting from date of IR aspiration to treat as osteomyelitis, end date 1/9/22.  He was discharged to SNF on 12/2/23.    Interval history:  Patient is generally unaware of the details regarding his medical care.  Daughter was on the phone to help translate and giver further information.    After discharge Was then admitted to [facility] 12/8/22.  There was a RLE CT from 12/11/22 that showed a couple areas of rim-enhancing fluid collections/phlegmon.  1.8x2.8x2.3cm, 2.1g7z1ce, no osteomyelitis.  Per discharge summary, orthopedics did not believe there was active infection.  Ciprofloxacin was decreased from 1/9/23 to 10 days.  There was a vascular surgery visit 12/15/22 recommending admission to ED, but it is unclear what happened afterward.  Called [facility], confirmed ciprofloxacin ended 12/24/22.  Patient does receive wound care at the facility, but he refuses debridements.  The patient does report pain at the RLE stump site on palpation.  Denies fevers, chills, nausea,  vomiting, diarrhea    Review of Systems   Constitutional: Negative for chills and fever.   Cardiovascular:  Negative for chest pain.   Respiratory:  Negative for cough.    Skin:  Positive for poor wound healing.   Gastrointestinal:  Negative for diarrhea, nausea and vomiting.   Neurological:  Negative for headaches.       Past Medical History:   Diagnosis Date    CAD (coronary artery disease) 2016    CAD (non obstructive) 2016 [facility]    Diabetes mellitus type I     Diabetic retinopathy     ESRD on hemodialysis     on HD TThSat    Gangrene of left foot     Hypertension     Nuclear sclerosis of right eye 11/24/2021    PAD (peripheral artery disease)     PEA (Pulseless electrical activity) 10/10/2022    Pulmonary HTN     Right foot infection     TB lung, latent 04/2021    Wet gangrene 1/5/2022     Past Surgical History:   Procedure Laterality Date    ABOVE-KNEE AMPUTATION Left 1/18/2022    Procedure: AMPUTATION, ABOVE KNEE;  Surgeon: Rusty Light MD;  Location: [facility] OR;  Service: Orthopedics;  Laterality: Left;    ABOVE-KNEE AMPUTATION Left 1/21/2022    Procedure: AMPUTATION, ABOVE KNEE;  Surgeon: Rusty Light MD;  Location: [facility] OR;  Service: Orthopedics;  Laterality: Left;    ABOVE-KNEE AMPUTATION Right 10/13/2022    Procedure: AMPUTATION, ABOVE KNEE, RIGHT;  Surgeon: Dimitris Davis MD;  Location: [facility] OR;  Service: Orthopedics;  Laterality: Right;    AV FISTULA PLACEMENT      CATARACT EXTRACTION Left     EYE SURGERY      FISTULOGRAM Left 8/9/2022    Procedure: Fistulogram;  Surgeon: Masoud Soni MD;  Location: [facility] OR;  Service: Vascular;  Laterality: Left;  LEFT VM ON DAUGHTER'S PHONE----PHONE PREOP NOT COMPLETED  CALLED PATIENT ON 8/8/2022 @ 3:34. HE STATED HE IS RESCHEDULING PROCEDURE. NOTIFIED ERWIN @ 3:35PM-LO    FISTULOGRAM Left 10/20/2022    Procedure: Fistulogram;  Surgeon: Masoud Soni MD;  Location: [facility];  Service: Vascular;  Laterality: Left;  Left upper extremity    TOE  AMPUTATION Left 1/6/2022    Procedure: AMPUTATION, TOE;  Surgeon: Masoud Soni MD;  Location: [facility];  Service: Vascular;  Laterality: Left;  Left first through fifth toes, possible open transmetatarsal amputation and all other indicated procedures     Family History   Problem Relation Age of Onset    No Known Problems Mother     No Known Problems Father     No Known Problems Sister     No Known Problems Brother     No Known Problems Daughter     No Known Problems Daughter     No Known Problems Daughter     No Known Problems Brother     No Known Problems Maternal Aunt     No Known Problems Maternal Uncle     No Known Problems Paternal Aunt     No Known Problems Paternal Uncle     No Known Problems Maternal Grandmother     No Known Problems Maternal Grandfather     No Known Problems Paternal Grandmother     No Known Problems Paternal Grandfather      Social History     Tobacco Use    Smoking status: Never    Smokeless tobacco: Never   Substance Use Topics    Alcohol use: No    Drug use: No       Review of patient's allergies indicates:  No Known Allergies      Objective:   VS (24h):   Vitals:    01/17/23 1003   Temp: 98.2 °F (36.8 °C)         Physical Exam  Vitals reviewed.   HENT:      Head: Normocephalic and atraumatic.      Right Ear: External ear normal.      Left Ear: External ear normal.      Nose: Nose normal.      Mouth/Throat:      Mouth: Mucous membranes are moist.      Pharynx: Oropharynx is clear.   Eyes:      General: No scleral icterus.        Right eye: No discharge.         Left eye: No discharge.      Extraocular Movements: Extraocular movements intact.      Conjunctiva/sclera: Conjunctivae normal.   Cardiovascular:      Rate and Rhythm: Normal rate and regular rhythm.   Pulmonary:      Effort: Pulmonary effort is normal. No respiratory distress.   Abdominal:      General: Abdomen is flat.      Palpations: Abdomen is soft.   Musculoskeletal:         General: Deformity present.      Cervical  back: Normal range of motion.      Comments: -BL AKA  -RLE stump with fibrinous/purulent exudate at lateral aspect of surgical side; no erythema/induration/swelling   Skin:     General: Skin is warm and dry.   Neurological:      Mental Status: He is alert and oriented to person, place, and time. Mental status is at baseline.   Psychiatric:         Mood and Affect: Mood normal.         Behavior: Behavior normal.         Thought Content: Thought content normal.         Judgment: Judgment normal.               Labs:  reviewed    Micro:   reviewed    Radiology:   reviewed    Immunization History   Administered Date(s) Administered    COVID-19 Vaccine 01/15/2021    COVID-19, MRNA, LN-S, PF (MODERNA FULL 0.5 ML DOSE) 01/15/2021, 02/11/2021    Hepatitis B, Adult 07/10/2017, 08/14/2017, 09/11/2017, 03/30/2021, 04/22/2021    Influenza 09/29/2020    Influenza - Quadrivalent - PF *Preferred* (6 months and older) 01/16/2008, 03/30/2016, 12/07/2016    PPD Test 04/04/2017, 01/11/2022, 10/18/2022    Pneumococcal Conjugate - 13 Valent 11/27/2017    Pneumococcal Polysaccharide - 23 Valent 03/30/2016, 10/24/2018    Tdap 03/30/2016         Assessment & Plan:     1. Abscess of right lower extremity    2. Other acute osteomyelitis of right femur    57-year-old male was recently admitted to [facility] with RLE stump abscess and osteomyelitis (wound swab frowing citrobacter koseri and pseudomonas) was planned for 6 weeks of treatment with ciprofloxacin was readmitted to [place] shortly after.  Residual fluid collection/phlegmon on imaging there, no osteomyelitis, and providers there thought there was no active infection.  Ciprofloxacin duration shortened to 12/24/22.  The patient has been refusing debridement at [facility].  Concerned for possible ongoing infection given imaging findings there.     Recommend presenting to ED to expedite evaluation.  Daughter and patient agree.  Would perform CT RLE to eval residual  infection.  Would recommend broad spectrum abx; vancomycin and meropenem for now based on prior cultures.  Discussed with ED provider    Follow up depending on ED findings.      Montana Terry MD  Infectious Disease Fellow

## 2023-01-18 PROBLEM — L08.9 WOUND INFECTION: Status: ACTIVE | Noted: 2023-01-18

## 2023-01-18 PROBLEM — T14.8XXA WOUND INFECTION: Status: ACTIVE | Noted: 2023-01-18

## 2023-01-18 PROBLEM — N18.6 ANEMIA IN ESRD (END-STAGE RENAL DISEASE): Status: ACTIVE | Noted: 2023-01-18

## 2023-01-18 PROBLEM — D63.1 ANEMIA IN ESRD (END-STAGE RENAL DISEASE): Status: ACTIVE | Noted: 2023-01-18

## 2023-01-18 LAB
ALBUMIN SERPL BCP-MCNC: 3 G/DL (ref 3.5–5.2)
ANION GAP SERPL CALC-SCNC: 11 MMOL/L (ref 8–16)
BUN SERPL-MCNC: 38 MG/DL (ref 6–20)
CALCIUM SERPL-MCNC: 8.7 MG/DL (ref 8.7–10.5)
CHLORIDE SERPL-SCNC: 107 MMOL/L (ref 95–110)
CO2 SERPL-SCNC: 22 MMOL/L (ref 23–29)
CREAT SERPL-MCNC: 8.7 MG/DL (ref 0.5–1.4)
CRP SERPL-MCNC: 7.8 MG/L (ref 0–8.2)
ERYTHROCYTE [DISTWIDTH] IN BLOOD BY AUTOMATED COUNT: 20.2 % (ref 11.5–14.5)
ERYTHROCYTE [SEDIMENTATION RATE] IN BLOOD BY PHOTOMETRIC METHOD: 86 MM/HR (ref 0–23)
EST. GFR  (NO RACE VARIABLE): 6.5 ML/MIN/1.73 M^2
GLUCOSE SERPL-MCNC: 105 MG/DL (ref 70–110)
HCT VFR BLD AUTO: 36.9 % (ref 40–54)
HGB BLD-MCNC: 10.9 G/DL (ref 14–18)
MCH RBC QN AUTO: 26.7 PG (ref 27–31)
MCHC RBC AUTO-ENTMCNC: 29.5 G/DL (ref 32–36)
MCV RBC AUTO: 90 FL (ref 82–98)
PHOSPHATE SERPL-MCNC: 3.7 MG/DL (ref 2.7–4.5)
PLATELET # BLD AUTO: 286 K/UL (ref 150–450)
PMV BLD AUTO: 9.5 FL (ref 9.2–12.9)
POCT GLUCOSE: 105 MG/DL (ref 70–110)
POCT GLUCOSE: 120 MG/DL (ref 70–110)
POCT GLUCOSE: 80 MG/DL (ref 70–110)
POTASSIUM SERPL-SCNC: 4.5 MMOL/L (ref 3.5–5.1)
RBC # BLD AUTO: 4.08 M/UL (ref 4.6–6.2)
SODIUM SERPL-SCNC: 140 MMOL/L (ref 136–145)
VANCOMYCIN SERPL-MCNC: 22.5 UG/ML
WBC # BLD AUTO: 7.57 K/UL (ref 3.9–12.7)

## 2023-01-18 PROCEDURE — 99499 NO LOS: ICD-10-PCS | Mod: ,,, | Performed by: NURSE PRACTITIONER

## 2023-01-18 PROCEDURE — 11000001 HC ACUTE MED/SURG PRIVATE ROOM

## 2023-01-18 PROCEDURE — 99499 UNLISTED E&M SERVICE: CPT | Mod: ,,, | Performed by: NURSE PRACTITIONER

## 2023-01-18 PROCEDURE — 85027 COMPLETE CBC AUTOMATED: CPT | Performed by: STUDENT IN AN ORGANIZED HEALTH CARE EDUCATION/TRAINING PROGRAM

## 2023-01-18 PROCEDURE — 99223 1ST HOSP IP/OBS HIGH 75: CPT | Mod: ,,, | Performed by: STUDENT IN AN ORGANIZED HEALTH CARE EDUCATION/TRAINING PROGRAM

## 2023-01-18 PROCEDURE — 99223 1ST HOSP IP/OBS HIGH 75: CPT | Mod: ,,, | Performed by: NURSE PRACTITIONER

## 2023-01-18 PROCEDURE — 96372 THER/PROPH/DIAG INJ SC/IM: CPT | Performed by: STUDENT IN AN ORGANIZED HEALTH CARE EDUCATION/TRAINING PROGRAM

## 2023-01-18 PROCEDURE — 27000207 HC ISOLATION

## 2023-01-18 PROCEDURE — 99223 PR INITIAL HOSPITAL CARE,LEVL III: ICD-10-PCS | Mod: ,,, | Performed by: NURSE PRACTITIONER

## 2023-01-18 PROCEDURE — 99223 PR INITIAL HOSPITAL CARE,LEVL III: ICD-10-PCS | Mod: ,,, | Performed by: STUDENT IN AN ORGANIZED HEALTH CARE EDUCATION/TRAINING PROGRAM

## 2023-01-18 PROCEDURE — 80069 RENAL FUNCTION PANEL: CPT | Performed by: STUDENT IN AN ORGANIZED HEALTH CARE EDUCATION/TRAINING PROGRAM

## 2023-01-18 PROCEDURE — 80202 ASSAY OF VANCOMYCIN: CPT | Performed by: HOSPITALIST

## 2023-01-18 PROCEDURE — 25000003 PHARM REV CODE 250: Performed by: STUDENT IN AN ORGANIZED HEALTH CARE EDUCATION/TRAINING PROGRAM

## 2023-01-18 PROCEDURE — 86140 C-REACTIVE PROTEIN: CPT | Performed by: STUDENT IN AN ORGANIZED HEALTH CARE EDUCATION/TRAINING PROGRAM

## 2023-01-18 PROCEDURE — 63600175 PHARM REV CODE 636 W HCPCS: Mod: TB | Performed by: STUDENT IN AN ORGANIZED HEALTH CARE EDUCATION/TRAINING PROGRAM

## 2023-01-18 PROCEDURE — 85652 RBC SED RATE AUTOMATED: CPT | Performed by: STUDENT IN AN ORGANIZED HEALTH CARE EDUCATION/TRAINING PROGRAM

## 2023-01-18 RX ORDER — AMLODIPINE BESYLATE 10 MG/1
10 TABLET ORAL DAILY
Status: DISCONTINUED | OUTPATIENT
Start: 2023-01-19 | End: 2023-01-30 | Stop reason: HOSPADM

## 2023-01-18 RX ORDER — SODIUM CHLORIDE 9 MG/ML
INJECTION, SOLUTION INTRAVENOUS ONCE
Status: DISCONTINUED | OUTPATIENT
Start: 2023-01-18 | End: 2023-01-20

## 2023-01-18 RX ORDER — AMLODIPINE BESYLATE 5 MG/1
5 TABLET ORAL ONCE
Status: COMPLETED | OUTPATIENT
Start: 2023-01-18 | End: 2023-01-18

## 2023-01-18 RX ADMIN — HEPARIN SODIUM 5000 UNITS: 5000 INJECTION INTRAVENOUS; SUBCUTANEOUS at 06:01

## 2023-01-18 RX ADMIN — METOPROLOL SUCCINATE 25 MG: 25 TABLET, EXTENDED RELEASE ORAL at 08:01

## 2023-01-18 RX ADMIN — AMLODIPINE BESYLATE 5 MG: 5 TABLET ORAL at 08:01

## 2023-01-18 RX ADMIN — ASPIRIN 81 MG: 81 TABLET, CHEWABLE ORAL at 08:01

## 2023-01-18 RX ADMIN — ATORVASTATIN CALCIUM 20 MG: 20 TABLET, FILM COATED ORAL at 08:01

## 2023-01-18 RX ADMIN — PANTOPRAZOLE SODIUM 40 MG: 40 TABLET, DELAYED RELEASE ORAL at 08:01

## 2023-01-18 RX ADMIN — HEPARIN SODIUM 5000 UNITS: 5000 INJECTION INTRAVENOUS; SUBCUTANEOUS at 09:01

## 2023-01-18 RX ADMIN — REMDESIVIR 200 MG: 100 INJECTION, POWDER, LYOPHILIZED, FOR SOLUTION INTRAVENOUS at 02:01

## 2023-01-18 RX ADMIN — HYDRALAZINE HYDROCHLORIDE 100 MG: 50 TABLET ORAL at 09:01

## 2023-01-18 RX ADMIN — HEPARIN SODIUM 5000 UNITS: 5000 INJECTION INTRAVENOUS; SUBCUTANEOUS at 01:01

## 2023-01-18 RX ADMIN — AMLODIPINE BESYLATE 5 MG: 5 TABLET ORAL at 02:01

## 2023-01-18 RX ADMIN — MEROPENEM AND SODIUM CHLORIDE 500 MG: 500 INJECTION, SOLUTION INTRAVENOUS at 05:01

## 2023-01-18 RX ADMIN — HYDRALAZINE HYDROCHLORIDE 100 MG: 50 TABLET ORAL at 06:01

## 2023-01-18 RX ADMIN — CLOPIDOGREL BISULFATE 75 MG: 75 TABLET ORAL at 08:01

## 2023-01-18 RX ADMIN — HYDRALAZINE HYDROCHLORIDE 100 MG: 50 TABLET ORAL at 01:01

## 2023-01-18 NOTE — ASSESSMENT & PLAN NOTE
Blood pressure elevated upon admission.  We will resume home hydralazine, amlodipine, and metoprolol succinate.

## 2023-01-18 NOTE — SUBJECTIVE & OBJECTIVE
Past Medical History:   Diagnosis Date    CAD (coronary artery disease) 2016    CAD (non obstructive) 2016 [facility]    Diabetes mellitus type I     Diabetic retinopathy     ESRD on hemodialysis     on HD TThSat    Gangrene of left foot     Hypertension     Nuclear sclerosis of right eye 11/24/2021    PAD (peripheral artery disease)     PEA (Pulseless electrical activity) 10/10/2022    Pulmonary HTN     Right foot infection     TB lung, latent 04/2021    Wet gangrene 1/5/2022       Past Surgical History:   Procedure Laterality Date    ABOVE-KNEE AMPUTATION Left 1/18/2022    Procedure: AMPUTATION, ABOVE KNEE;  Surgeon: Rusty Light MD;  Location: [facility] OR;  Service: Orthopedics;  Laterality: Left;    ABOVE-KNEE AMPUTATION Left 1/21/2022    Procedure: AMPUTATION, ABOVE KNEE;  Surgeon: Rusty Light MD;  Location: [facility] OR;  Service: Orthopedics;  Laterality: Left;    ABOVE-KNEE AMPUTATION Right 10/13/2022    Procedure: AMPUTATION, ABOVE KNEE, RIGHT;  Surgeon: Dimitris Davis MD;  Location: [facility] OR;  Service: Orthopedics;  Laterality: Right;    AV FISTULA PLACEMENT      CATARACT EXTRACTION Left     EYE SURGERY      FISTULOGRAM Left 8/9/2022    Procedure: Fistulogram;  Surgeon: Masoud Soni MD;  Location: [facility] OR;  Service: Vascular;  Laterality: Left;  LEFT VM ON DAUGHTER'S PHONE----PHONE PREOP NOT COMPLETED  CALLED PATIENT ON 8/8/2022 @ 3:34. HE STATED HE IS RESCHEDULING PROCEDURE. NOTIFIED ERWIN @ 3:35PM-LO    FISTULOGRAM Left 10/20/2022    Procedure: Fistulogram;  Surgeon: Masoud Soni MD;  Location: [facility];  Service: Vascular;  Laterality: Left;  Left upper extremity    TOE AMPUTATION Left 1/6/2022    Procedure: AMPUTATION, TOE;  Surgeon: Masoud Soni MD;  Location: [facility] OR;  Service: Vascular;  Laterality: Left;  Left first through fifth toes, possible open transmetatarsal amputation and all other indicated procedures       Review of patient's allergies indicates:  No Known  Allergies  Current Facility-Administered Medications   Medication Frequency    0.9%  NaCl infusion Once    acetaminophen tablet 650 mg Q8H PRN    albuterol-ipratropium 2.5 mg-0.5 mg/3 mL nebulizer solution 3 mL Q4H PRN    [START ON 1/19/2023] amLODIPine tablet 10 mg Daily    aspirin chewable tablet 81 mg Daily    atorvastatin tablet 20 mg Daily    clopidogreL tablet 75 mg Daily    dextrose 10% bolus 125 mL 125 mL PRN    dextrose 10% bolus 250 mL 250 mL PRN    glucagon (human recombinant) injection 1 mg PRN    glucose chewable tablet 16 g PRN    glucose chewable tablet 24 g PRN    heparin (porcine) injection 5,000 Units Q8H    hydrALAZINE tablet 100 mg Q8H    insulin aspart U-100 pen 0-5 Units QID (AC + HS) PRN    melatonin tablet 6 mg Nightly PRN    meropenem-0.9% sodium chloride 500 mg/50 mL IVPB Q24H    metoprolol succinate (TOPROL-XL) 24 hr tablet 25 mg Daily    naloxone 0.4 mg/mL injection 0.02 mg PRN    ondansetron disintegrating tablet 4 mg Q8H PRN    ondansetron injection 4 mg Q8H PRN    pantoprazole EC tablet 40 mg Daily    polyethylene glycol packet 17 g TID PRN    [START ON 1/19/2023] remdesivir 100 mg in sodium chloride 0.9% 250 mL infusion Daily    simethicone chewable tablet 80 mg QID PRN    sodium chloride 0.9% flush 10 mL Q12H PRN    vancomycin - pharmacy to dose pharmacy to manage frequency     Family History       Problem Relation (Age of Onset)    No Known Problems Mother, Father, Sister, Brother, Daughter, Daughter, Daughter, Brother, Maternal Aunt, Maternal Uncle, Paternal Aunt, Paternal Uncle, Maternal Grandmother, Maternal Grandfather, Paternal Grandmother, Paternal Grandfather          Tobacco Use    Smoking status: Never    Smokeless tobacco: Never   Substance and Sexual Activity    Alcohol use: No    Drug use: No    Sexual activity: Yes     Partners: Female     Review of Systems   Constitutional:  Negative for activity change, chills, fatigue and fever.   HENT:  Negative for congestion,  rhinorrhea, sinus pressure, sinus pain and sore throat.    Eyes:  Negative for photophobia and visual disturbance.   Respiratory:  Negative for cough, chest tightness, shortness of breath and wheezing.    Cardiovascular:  Negative for chest pain, palpitations and leg swelling.   Gastrointestinal:  Negative for abdominal distention, abdominal pain, constipation, diarrhea, nausea and vomiting.   Endocrine: Negative for polyphagia and polyuria.   Genitourinary:  Negative for dysuria, genital sores, hematuria, testicular pain and urgency.   Musculoskeletal:  Negative for arthralgias, gait problem and myalgias.   Skin:  Positive for wound.   Neurological:  Negative for dizziness, tremors, seizures, syncope and numbness.   Psychiatric/Behavioral:  Negative for agitation, confusion and hallucinations. The patient is not hyperactive.    Objective:     Vital Signs (Most Recent):  Temp: 97.6 °F (36.4 °C) (01/18/23 0830)  Pulse: 79 (01/18/23 0830)  Resp: 17 (01/18/23 0830)  BP: (!) 129/52 (01/18/23 0830)  SpO2: 96 % (01/18/23 0830)   Vital Signs (24h Range):  Temp:  [97.6 °F (36.4 °C)-98.9 °F (37.2 °C)] 97.6 °F (36.4 °C)  Pulse:  [68-98] 79  Resp:  [16-20] 17  SpO2:  [94 %-100 %] 96 %  BP: (125-180)/(52-79) 129/52     Weight: 56.7 kg (125 lb) (01/17/23 2145)  Body mass index is 17.44 kg/m².  Body surface area is 1.69 meters squared.    I/O last 3 completed shifts:  In: 328.5 [IV Piggyback:328.5]  Out: -     Physical Exam  Vitals and nursing note reviewed. Exam conducted with a chaperone present.   Constitutional:       General: He is not in acute distress.     Appearance: Normal appearance. He is normal weight. He is not ill-appearing, toxic-appearing or diaphoretic.      Comments: Pleasant man in no acute distress. He is cooperative with examination and conversant with interview.   HENT:      Head: Normocephalic and atraumatic.      Nose: Nose normal. No congestion or rhinorrhea.      Mouth/Throat:      Mouth: Mucous  membranes are moist.      Pharynx: Oropharynx is clear. No oropharyngeal exudate or posterior oropharyngeal erythema.   Eyes:      Extraocular Movements: Extraocular movements intact.   Cardiovascular:      Rate and Rhythm: Normal rate.      Pulses: Normal pulses.      Heart sounds: Normal heart sounds. No murmur heard.    No friction rub. No gallop.   Pulmonary:      Effort: Pulmonary effort is normal. No respiratory distress.      Breath sounds: Normal breath sounds. No wheezing.   Abdominal:      General: Abdomen is flat. Bowel sounds are normal. There is no distension.      Palpations: Abdomen is soft. There is no mass.      Tenderness: There is no abdominal tenderness. There is no guarding or rebound.      Hernia: No hernia is present.   Musculoskeletal:         General: Normal range of motion.      Right Lower Extremity: Right leg is amputated above knee.      Left Lower Extremity: Left leg is amputated above knee.   Feet:      Comments: Patient with right lower extremity AKA wound. Wound is covered with gauze.  Skin:     General: Skin is warm and dry.   Neurological:      General: No focal deficit present.      Mental Status: He is alert and oriented to person, place, and time. Mental status is at baseline.      Sensory: No sensory deficit.      Motor: No weakness.   Psychiatric:         Mood and Affect: Mood normal.         Behavior: Behavior normal.         Thought Content: Thought content normal.         Judgment: Judgment normal.       Significant Labs:  CBC:   Recent Labs   Lab 01/17/23  1342 01/17/23  1350   WBC 6.95  --    RBC 4.16*  --    HGB 11.0*  --    HCT 38.4* 38     --    MCV 92  --    MCH 26.4*  --    MCHC 28.6*  --      CMP:   Recent Labs   Lab 01/17/23  1342   GLU 83   CALCIUM 8.8   ALBUMIN 3.3*   PROT 8.1      K 4.5   CO2 24      BUN 27*   CREATININE 6.6*   ALKPHOS 86   ALT 6*   AST 9*   BILITOT 0.5     All labs within the past 24 hours have been reviewed.

## 2023-01-18 NOTE — CONSULTS
[facility] - Observation 11H  Infectious Disease  Consult Note    Patient Name: Adryan Neff  MRN: [id number]  Admission Date: 1/17/2023  Hospital Length of Stay: 0 days  Attending Physician: Pretty Soto MD  Primary Care Provider: Carola Pedro MD     Isolation Status: Airborne and Contact and Droplet    Patient information was obtained from patient, past medical records and ER records.      Consults  Assessment/Plan:     * Wound infection     57 year old male with ESRD on HD, DM (HA1C 4.8), PAD  s/p right AKA October 2022, complicated by stump abscess and presumed osteomyelitis 11/23/22 (wound cx citrobacter koseri and pseudomonas), d/c on oral ciprofloxacin with plan for 6 weeks treatment.  Course of antibiotics shortened after admission to [facility] in early December with wound dehiscence. CT imaging there showed showing rim enhancing fluid collections/phlegmon with evaluation by Orthopedics who was not concerned for infection.  Course of ciprofloxacin abbreviated after that admission and he stopped abx on 12/24.  Now readmitted from ID clinic with concern for worsening wound infection.      CT right thigh with contrast 1/17 - showed resolution of prior large stump abscess.  Noted lateral ulceration possibly extended to bone.  Unable to exclude osteo.      Afebrile, hemodynamically stable.  No leukocytosis. CRP wnl.  Incidentally tested positive for COVID on admission and remdesivir started.   Currently on IV vancomycin and meropenem for wound infection    Plan/recommendations:  1.  Continue IV vancomycin (pharmacy to dose. Trough goal 15-20) and meropenem (renally dosed) for now.   2.  Recommend MRI right femur to better evaluate for osteomyelitis.   3.  Continue wound care as recommended by Wound Care nurse  4.  Will follow up tomorrow.     Patient seen by, and plan discussed with, ID staff, Dr. Archer  Secure chat regarding above with Primary Team, Dr. Soto      Thank you.   Please Secure Chat for any  questions or concerns.  KIKI Vieira, ANP-C      Subjective:     Principal Problem: Wound infection    HPI: Mr. Adryan Neff is a 57 year old with ESR on dialysis, DM (last HA1C 4.8), PAD, s/p right AKA 10/13/22 admitted from ID clinic with concern for worsening right stump infection.  He had recent admission 11/23 with right stump abscess with suspected associated osteomyelitis.  Initial wound cultures + for citrobacter koseri (zosyn R) and Pseudomonas.  IR aspiration cx 11/29 were negative.  He was initially treated with meropenem and then discharged to [facility] 12/2/22 with plan for ciprofloxacin X 6 weeks for presumed osteo.      He was [place] 12[place]8 [place] with [place] dehiscence.  Per discharge summary,  Orthopedic Surgery was consulted who did not find any evidence of active infection and he was discharged back to SNF with 10 day course of ciprofloxacin (short of the original 6 week course planned).   He had Vascular Surgery visit on 12/15/22 at [facility] and noted to have wound dehiscence with foul smelling/purulent drainage and ED was recommended but apparently he did  not go.      He was seen by ID for follow up yesterday.  Purulent drainage noted and he was sent to ED.      In ED afebrile, no leukocytosis. COVID +.  Blood cultures obtained and pending CT right femur with contrast showed resolution of large stump abscess from prior but ulcer appeared to extend to bone.  No bony erosion seen but recommended MRI for better evaluation for osteo      Past Medical History:   Diagnosis Date    CAD (coronary artery disease) 2016    CAD (non obstructive) 2016 [facility]    Diabetes mellitus type I     Diabetic retinopathy     ESRD on hemodialysis     on HD TThSat    Gangrene of left foot     Hypertension     Nuclear sclerosis of right eye 11/24/2021    PAD (peripheral artery disease)     PEA (Pulseless electrical activity) 10/10/2022    Pulmonary HTN     Right foot infection     TB lung,  latent 04/2021    Wet gangrene 1/5/2022       Past Surgical History:   Procedure Laterality Date    ABOVE-KNEE AMPUTATION Left 1/18/2022    Procedure: AMPUTATION, ABOVE KNEE;  Surgeon: Rusty Light MD;  Location: [facility] OR;  Service: Orthopedics;  Laterality: Left;    ABOVE-KNEE AMPUTATION Left 1/21/2022    Procedure: AMPUTATION, ABOVE KNEE;  Surgeon: Rusty Light MD;  Location: [facility] OR;  Service: Orthopedics;  Laterality: Left;    ABOVE-KNEE AMPUTATION Right 10/13/2022    Procedure: AMPUTATION, ABOVE KNEE, RIGHT;  Surgeon: Dimitris Davis MD;  Location: [facility] OR;  Service: Orthopedics;  Laterality: Right;    AV FISTULA PLACEMENT      CATARACT EXTRACTION Left     EYE SURGERY      FISTULOGRAM Left 8/9/2022    Procedure: Fistulogram;  Surgeon: Masoud Soni MD;  Location: [facility];  Service: Vascular;  Laterality: Left;  LEFT VM ON DAUGHTER'S PHONE----PHONE PREOP NOT COMPLETED  CALLED PATIENT ON 8/8/2022 @ 3:34. HE STATED HE IS RESCHEDULING PROCEDURE. NOTIFIED LAJAVIER @ 3:35PM-LO    FISTULOGRAM Left 10/20/2022    Procedure: Fistulogram;  Surgeon: Maosud Soni MD;  Location: [facility];  Service: Vascular;  Laterality: Left;  Left upper extremity    TOE AMPUTATION Left 1/6/2022    Procedure: AMPUTATION, TOE;  Surgeon: Masoud Soni MD;  Location: [facility];  Service: Vascular;  Laterality: Left;  Left first through fifth toes, possible open transmetatarsal amputation and all other indicated procedures       Review of patient's allergies indicates:  No Known Allergies    Medications:  Medications Prior to Admission   Medication Sig    amLODIPine (NORVASC) 5 MG tablet Take 1 tablet (5 mg total) by mouth once daily.    aspirin 81 MG Chew Take 81 mg by mouth once daily.    atorvastatin (LIPITOR) 20 MG tablet Take 1 tablet by mouth.    clopidogreL (PLAVIX) 75 mg tablet Take 75 mg by mouth once daily.    epoetin paola-epbx (RETACRIT) 10,000 unit/mL imjection Inject 1 mL (10,000 Units  total) into the skin every Mon, Wed, Fri.    folic acid-vit B6-vit B12 2.5-25-2 mg (FOLBIC OR EQUIV) 2.5-25-2 mg Tab Take 1 tablet by mouth once daily.    hydrALAZINE (APRESOLINE) 50 MG tablet Take 2 tablets (100 mg total) by mouth every 8 (eight) hours.    insulin aspart U-100 (NOVOLOG) 100 unit/mL (3 mL) InPn pen Inject 0-5 Units into the skin before meals and at bedtime as needed (Hyperglycemia).    insulin detemir U-100 (LEVEMIR FLEXTOUCH) 100 unit/mL (3 mL) SubQ InPn pen Inject 6 Units into the skin every evening.    lancets (ACCU-CHEK SOFTCLIX LANCETS) Misc 1 each by Misc.(Non-Drug; Combo Route) route once daily at 6am.    metoprolol succinate (TOPROL-XL) 25 MG 24 hr tablet Take 1 tablet by mouth.    omeprazole (PRILOSEC) 40 MG capsule Take 40 mg by mouth once daily.     Antibiotics (From admission, onward)      Start     Stop Route Frequency Ordered    01/18/23 1800  meropenem-0.9% sodium chloride 500 mg/50 mL IVPB         -- IV Every 24 hours (non-standard times) 01/17/23 1856 01/17/23 1953  vancomycin - pharmacy to dose  (vancomycin IVPB)        See Hyperspace for full Linked Orders Report.    -- IV pharmacy to manage frequency 01/17/23 1856          Antifungals (From admission, onward)      None          Antivirals (From admission, onward)          Stop Route Frequency     remdesivir 100 mg        See Hyperspace for full Linked Orders Report.    01/21 0859 IV Daily             Immunization History   Administered Date(s) Administered    COVID-19 Vaccine 01/15/2021    COVID-19, MRNA, LN-S, PF (MODERNA FULL 0.5 ML DOSE) 01/15/2021, 02/11/2021    Hepatitis B, Adult 07/10/2017, 08/14/2017, 09/11/2017, 03/30/2021, 04/22/2021    Influenza 09/29/2020    Influenza - Quadrivalent - PF *Preferred* (6 months and older) 01/16/2008, 03/30/2016, 12/07/2016    PPD Test 04/04/2017, 01/11/2022, 10/18/2022    Pneumococcal Conjugate - 13 Valent 11/27/2017    Pneumococcal Polysaccharide - 23 Valent  03/30/2016, 10/24/2018    Tdap 03/30/2016       Family History       Problem Relation (Age of Onset)    No Known Problems Mother, Father, Sister, Brother, Daughter, Daughter, Daughter, Brother, Maternal Aunt, Maternal Uncle, Paternal Aunt, Paternal Uncle, Maternal Grandmother, Maternal Grandfather, Paternal Grandmother, Paternal Grandfather          Social History     Socioeconomic History    Marital status: Single    Number of children: 5   Tobacco Use    Smoking status: Never    Smokeless tobacco: Never   Substance and Sexual Activity    Alcohol use: No    Drug use: No    Sexual activity: Yes     Partners: Female   Social History Narrative    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     Social Determinants of Health     Financial Resource Strain: Low Risk     Difficulty of Paying Living Expenses: Not very hard   Food Insecurity: No Food Insecurity    Worried About Running Out of Food in the Last Year: Never true    Ran Out of Food in the Last Year: Never true   Transportation Needs: No Transportation Needs    Lack of Transportation (Medical): No    Lack of Transportation (Non-Medical): No   Physical Activity: Inactive    Days of Exercise per Week: 0 days    Minutes of Exercise per Session: 0 min   Stress: Stress Concern Present    Feeling of Stress : Rather much   Social Connections: Socially Isolated    Frequency of Communication with Friends and Family: More than three times a week    Frequency of Social Gatherings with Friends and Family: More than three times a week    Attends Synagogue Services: Never    Active Member of Clubs or Organizations: No    Attends Club or Organization Meetings: Never    Marital Status: Never    Housing Stability: Low Risk     Unable to Pay for Housing in the Last Year: No    Number of Places Lived in the Last Year: 1    Unstable Housing in the Last Year: No     Review of Systems   Constitutional:  Negative for activity change, appetite change, chills, diaphoresis, fatigue  and fever.   HENT:  Negative for congestion.    Respiratory:  Negative for cough and shortness of breath.    Cardiovascular:  Negative for leg swelling.   Gastrointestinal:  Negative for abdominal pain, constipation, diarrhea, nausea and vomiting.   Genitourinary:         HD   Musculoskeletal:  Negative for arthralgias and myalgias.   Skin:  Positive for wound. Negative for rash.   Neurological:  Negative for dizziness, weakness and headaches.   Psychiatric/Behavioral:  Negative for agitation and confusion.    Objective:     Vital Signs (Most Recent):  Temp: 98.3 °F (36.8 °C) (01/18/23 1613)  Pulse: 75 (01/18/23 1613)  Resp: 17 (01/18/23 1613)  BP: (!) 155/70 (01/18/23 1613)  SpO2: 95 % (01/18/23 1613)   Vital Signs (24h Range):  Temp:  [97.6 °F (36.4 °C)-98.9 °F (37.2 °C)] 98.3 °F (36.8 °C)  Pulse:  [68-83] 75  Resp:  [16-18] 17  SpO2:  [94 %-100 %] 95 %  BP: (125-177)/(52-79) 155/70     Weight: 56.7 kg (125 lb)  Body mass index is 17.44 kg/m².    Estimated Creatinine Clearance: 7.4 mL/min (A) (based on SCr of 8.7 mg/dL (H)).    Physical Exam  Vitals and nursing note reviewed.   Constitutional:       General: He is not in acute distress.     Appearance: Normal appearance. He is not ill-appearing, toxic-appearing or diaphoretic.   HENT:      Head: Normocephalic and atraumatic.      Nose: No congestion or rhinorrhea.      Mouth/Throat:      Mouth: Mucous membranes are moist.   Eyes:      General: No scleral icterus.     Conjunctiva/sclera: Conjunctivae normal.   Cardiovascular:      Rate and Rhythm: Normal rate.      Heart sounds:     No gallop.   Pulmonary:      Effort: Pulmonary effort is normal. No respiratory distress.   Abdominal:      General: There is no distension.      Palpations: Abdomen is soft.      Tenderness: There is no abdominal tenderness. There is no guarding.   Musculoskeletal:         General: No tenderness. Normal range of motion.      Cervical back: Normal range of motion.      Comments:  Bilateral lower extremity amputations.   Right AKA dressed - c/d/I.  Photos reviewed. See below   Skin:     General: Skin is warm and dry.   Neurological:      General: No focal deficit present.      Mental Status: He is alert and oriented to person, place, and time. Mental status is at baseline.      Sensory: No sensory deficit.      Motor: No weakness.   Psychiatric:         Mood and Affect: Mood normal.         Behavior: Behavior normal.         Thought Content: Thought content normal.         Judgment: Judgment normal.             Significant Labs: Blood Culture:   Recent Labs   Lab 10/11/22  0915 10/17/22  1415 10/17/22  1427 11/23/22  0631 01/17/23  1342   LABBLOO No Growth after 4 days. No Growth after 4 days. No Growth after 4 days. No growth after 5 days.  No growth after 5 days. No Growth to date  No Growth to date  No Growth to date  No Growth to date     CBC:   Recent Labs   Lab 01/17/23  1342 01/17/23  1350 01/18/23  1212   WBC 6.95  --  7.57   HGB 11.0*  --  10.9*   HCT 38.4* 38 36.9*     --  286     CMP:   Recent Labs   Lab 01/17/23  1342 01/18/23  1212    140   K 4.5 4.5    107   CO2 24 22*   GLU 83 105   BUN 27* 38*   CREATININE 6.6* 8.7*   CALCIUM 8.8 8.7   PROT 8.1  --    ALBUMIN 3.3* 3.0*   BILITOT 0.5  --    ALKPHOS 86  --    AST 9*  --    ALT 6*  --    ANIONGAP 12 11     Urine Culture:   Recent Labs   Lab 11/23/22  0958   LABURIN No growth     Urine Studies:   Recent Labs   Lab 11/23/22  0958   COLORU Gina   APPEARANCEUA Cloudy*   PHUR 5.0   SPECGRAV 1.020   PROTEINUA 3+*   GLUCUA Negative   KETONESU Negative   BILIRUBINUA Negative   OCCULTUA 2+*   NITRITE Negative   LEUKOCYTESUR 2+*   RBCUA 10*   WBCUA 28*   BACTERIA Many*   HYALINECASTS 0     Wound Culture:   Recent Labs   Lab 11/23/22  0630   LABAERO CITROBACTER KOSERI  Many  *  CITROBACTER KOSERI  Many  *  PSEUDOMONAS AERUGINOSA  Many  *       Significant Imaging: I have reviewed all pertinent imaging  results/findings within the past 24 hours.

## 2023-01-18 NOTE — ASSESSMENT & PLAN NOTE
Patient denies any chest pain or shortness of breath on admission.  Resume home aspirin, clopidogrel, atorvastatin.

## 2023-01-18 NOTE — ASSESSMENT & PLAN NOTE
Patient on hemodialysis.  He receives his sessions on Mondays, Wednesdays, and Fridays per report.  Electrolytes within normal limits on presentation.  - Nephrology consulted, appreciate recommendations

## 2023-01-18 NOTE — NURSING
AAOx3, no distress upon arrival. VSS. Tele monitor verified, NSR at present. Oriented to room, and call bell. No needs currently. Plan of care reviewed . Call bell within reach. Continue to monitor.

## 2023-01-18 NOTE — PROGRESS NOTES
Tad Ferrer - Observation [facility]  Wound Care    Patient Name:  Adryan Neff   MRN:  [id number]  Date: 1/18/2023  Diagnosis: Wound infection    History:     Past Medical History:   Diagnosis Date    CAD (coronary artery disease) 2016    CAD (non obstructive) 2016 [facility]    Diabetes mellitus type I     Diabetic retinopathy     ESRD on hemodialysis     on HD TThSat    Gangrene of left foot     Hypertension     Nuclear sclerosis of right eye 11/24/2021    PAD (peripheral artery disease)     PEA (Pulseless electrical activity) 10/10/2022    Pulmonary HTN     Right foot infection     TB lung, latent 04/2021    Wet gangrene 1/5/2022       Social History     Socioeconomic History    Marital status: Single    Number of children: 5   Tobacco Use    Smoking status: Never    Smokeless tobacco: Never   Substance and Sexual Activity    Alcohol use: No    Drug use: No    Sexual activity: Yes     Partners: Female   Social History Narrative    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     Social Determinants of Health     Financial Resource Strain: Low Risk     Difficulty of Paying Living Expenses: Not very hard   Food Insecurity: No Food Insecurity    Worried About Running Out of Food in the Last Year: Never true    Ran Out of Food in the Last Year: Never true   Transportation Needs: No Transportation Needs    Lack of Transportation (Medical): No    Lack of Transportation (Non-Medical): No   Physical Activity: Inactive    Days of Exercise per Week: 0 days    Minutes of Exercise per Session: 0 min   Stress: Stress Concern Present    Feeling of Stress : Rather much   Social Connections: Socially Isolated    Frequency of Communication with Friends and Family: More than three times a week    Frequency of Social Gatherings with Friends and Family: More than three times a week    Attends Alevism Services: Never    Active Member of Clubs or Organizations: No    Attends Club or Organization Meetings: Never    Marital Status: Never    Housing Stability: Low Risk      Unable to Pay for Housing in the Last Year: No    Number of Places Lived in the Last Year: 1    Unstable Housing in the Last Year: No       Precautions:     Allergies as of 01/17/2023    (No Known Allergies)       [facility] Assessment Details/Treatment     Patient seen for wound care consultation.   Reviewed chart for this encounter.   See Flow Sheet for findings.        RECOMMENDATIONS  Clean with vashe  Can order form central  Apply norman honey to gauze and cover then apply cast padding and tape    Nursing to continue care.                01/18/23 1344   WOCN Assessment   WOCN Total Time (mins) 30   Visit Date 01/18/23   Visit Time 1344   Consult Type New   WOCN Speciality Wound   Intervention assessed;changed;applied;chart review;coordination of care;orders   Teaching on-going        Altered Skin Integrity 01/17/23 Right anterior;posterior Knee Other (comment)   Date First Assessed: 01/17/23   Altered Skin Integrity Present on Admission: yes  Side: Right  Orientation: anterior;posterior  Location: (c) Knee  Primary Wound Type: (c) Other (comment)   Wound Image    Dressing Appearance Dry;Intact;Clean   Drainage Amount None   Appearance Pink;Red;Yellow;Slough;Dry   Periwound Area Intact   Wound Length (cm) 8 cm   Wound Width (cm) 5 cm   Wound Depth (cm) 0.3 cm   Wound Volume (cm^3) 12 cm^3   Wound Surface Area (cm^2) 40 cm^2   Care Cleansed with:;Sterile normal saline;Applied:   Dressing Removed;Applied;Gauze;Rolled gauze;Other (comment)  (norman honey)   Dressing Change Due 01/21/23 01/18/2023

## 2023-01-18 NOTE — H&P
[facility] - [address] Medicine  History & Physical    Patient Name: Adryan Neff  MRN: [id number]  Patient Class: OP- Observation  Admission Date: 1/17/2023  Attending Physician: Pretty Soto MD   Primary Care Provider: Carola Pedro MD         Patient information was obtained from patient, relative(s) and ER records.     Subjective:     Principal Problem:Wound infection    Chief Complaint:   Chief Complaint   Patient presents with    Wound Infection     [facility] with abscess,        HPI: Adryan Neff is a 57-year-old man with a past medical history of primary hypertension, peripheral arterial disease s/p R AKA, diabetes mellitus, ESRD on HD MWF, who presents to the emergency department with a wound infection. Briefly, the patient was admitted 11/23/22 for R AKA abscess. His workup revealed hypoglycemia and RLE CT showing large abscess with extension into the femoral bone marrow suggestive of osteomyelitis.  ED collected superficial wound swab growing Citrobacter koseri and Pseudomonas Aeruginosa.  IR aspirated 3ml purulence on 11/29/22 with negative cultures (does not appear aerobic culture was performed).  Blood cultures negative. Infectious Disease was consulted.  The patient was initially placed on vanc and pip-tazo, then switched to meropenem 2/2 Citrobacter resistance to pip-tazo.  They recommended ciprofloxacin 500mg daily for 6 weeks starting from date of IR aspiration to treat as osteomyelitis, end date 1/9/22.  He was discharged to [facility] on 12/2/23.     He was then admitted to [facility] 12/8/22.  There was a RLE CT from 12/11/22 that showed couple areas of rim-enhancing fluid collections/phlegmon and was seen by Orthopedic Surgery. The discharge summary does refer to no active infection. Ciprofloxacin was decreased to a 10 day supply. [facility], confirmed ciprofloxacin ended ~12/20/22.  Patient does receive wound care at the facility, but he  always refuses debridements.    The the patient was seen in the Infectious Disease Clinic earlier this afternoon.  He was encouraged to present to the emergency department for further evaluation given concern for infection of his right lower extremity.  The wound appeared infected with purulent drainage.    In the emergency department, the patient was found to be hemodynamically stable.  He had blood cultures collected and was given IV vancomycin and IV meropenem.  He was admitted to hospital medicine for further management.         Past Medical History:   Diagnosis Date    CAD (coronary artery disease) 2016    CAD (non obstructive) 2016 [facility]    Diabetes mellitus type I     Diabetic retinopathy     ESRD on hemodialysis     on HD TThSat    Gangrene of left foot     Hypertension     Nuclear sclerosis of right eye 11/24/2021    PAD (peripheral artery disease)     PEA (Pulseless electrical activity) 10/10/2022    Pulmonary HTN     Right foot infection     TB lung, latent 04/2021    Wet gangrene 1/5/2022       Past Surgical History:   Procedure Laterality Date    ABOVE-KNEE AMPUTATION Left 1/18/2022    Procedure: AMPUTATION, ABOVE KNEE;  Surgeon: Rusty Light MD;  Location: [facility] OR;  Service: Orthopedics;  Laterality: Left;    ABOVE-KNEE AMPUTATION Left 1/21/2022    Procedure: AMPUTATION, ABOVE KNEE;  Surgeon: Rusty Light MD;  Location: [facility] OR;  Service: Orthopedics;  Laterality: Left;    ABOVE-KNEE AMPUTATION Right 10/13/2022    Procedure: AMPUTATION, ABOVE KNEE, RIGHT;  Surgeon: Dimitris Davis MD;  Location: [facility] OR;  Service: Orthopedics;  Laterality: Right;    AV FISTULA PLACEMENT      CATARACT EXTRACTION Left     EYE SURGERY      FISTULOGRAM Left 8/9/2022    Procedure: Fistulogram;  Surgeon: Masoud Soni MD;  Location: [facility] OR;  Service: Vascular;  Laterality: Left;  LEFT VM ON DAUGHTER'S PHONE----PHONE PREOP NOT COMPLETED  CALLED PATIENT ON 8/8/2022 @ 3:34. HE STATED HE IS  RESCHEDULING PROCEDURE. NOTIFIED ERWIN @ 3:35PM-LO    FISTULOGRAM Left 10/20/2022    Procedure: Fistulogram;  Surgeon: Masoud Soni MD;  Location: [facility];  Service: Vascular;  Laterality: Left;  Left upper extremity    TOE AMPUTATION Left 1/6/2022    Procedure: AMPUTATION, TOE;  Surgeon: Masoud oSni MD;  Location: [facility] OR;  Service: Vascular;  Laterality: Left;  Left first through fifth toes, possible open transmetatarsal amputation and all other indicated procedures       Review of patient's allergies indicates:  No Known Allergies    No current facility-administered medications on file prior to encounter.     Current Outpatient Medications on File Prior to Encounter   Medication Sig    amLODIPine (NORVASC) 5 MG tablet Take 1 tablet (5 mg total) by mouth once daily.    aspirin 81 MG Chew Take 81 mg by mouth once daily.    atorvastatin (LIPITOR) 20 MG tablet Take 1 tablet by mouth.    clopidogreL (PLAVIX) 75 mg tablet Take 75 mg by mouth once daily.    epoetin paola-epbx (RETACRIT) 10,000 unit/mL imjection Inject 1 mL (10,000 Units total) into the skin every Mon, Wed, Fri.    folic acid-vit B6-vit B12 2.5-25-2 mg (FOLBIC OR EQUIV) 2.5-25-2 mg Tab Take 1 tablet by mouth once daily.    hydrALAZINE (APRESOLINE) 50 MG tablet Take 2 tablets (100 mg total) by mouth every 8 (eight) hours.    insulin aspart U-100 (NOVOLOG) 100 unit/mL (3 mL) InPn pen Inject 0-5 Units into the skin before meals and at bedtime as needed (Hyperglycemia).    insulin detemir U-100 (LEVEMIR FLEXTOUCH) 100 unit/mL (3 mL) SubQ InPn pen Inject 6 Units into the skin every evening.    lancets (ACCU-CHEK SOFTCLIX LANCETS) Misc 1 each by Misc.(Non-Drug; Combo Route) route once daily at 6am.    metoprolol succinate (TOPROL-XL) 25 MG 24 hr tablet Take 1 tablet by mouth.    omeprazole (PRILOSEC) 40 MG capsule Take 40 mg by mouth once daily.     Family History       Problem Relation (Age of Onset)    No Known Problems  Mother, Father, Sister, Brother, Daughter, Daughter, Daughter, Brother, Maternal Aunt, Maternal Uncle, Paternal Aunt, Paternal Uncle, Maternal Grandmother, Maternal Grandfather, Paternal Grandmother, Paternal Grandfather          Tobacco Use    Smoking status: Never    Smokeless tobacco: Never   Substance and Sexual Activity    Alcohol use: No    Drug use: No    Sexual activity: Yes     Partners: Female     Review of Systems   Constitutional:  Negative for activity change, chills, fatigue and fever.   HENT:  Negative for congestion, rhinorrhea, sinus pressure, sinus pain and sore throat.    Eyes:  Negative for photophobia and visual disturbance.   Respiratory:  Negative for cough, chest tightness, shortness of breath and wheezing.    Cardiovascular:  Negative for chest pain, palpitations and leg swelling.   Gastrointestinal:  Negative for abdominal distention, abdominal pain, constipation, diarrhea, nausea and vomiting.   Endocrine: Negative for polyphagia and polyuria.   Genitourinary:  Negative for dysuria, genital sores, hematuria, testicular pain and urgency.   Musculoskeletal:  Negative for arthralgias, gait problem and myalgias.   Skin:  Positive for wound.   Neurological:  Negative for dizziness, tremors, seizures, syncope and numbness.   Psychiatric/Behavioral:  Negative for agitation, confusion and hallucinations. The patient is not hyperactive.    Objective:     Vital Signs (Most Recent):  Temp: 97.8 °F (36.6 °C) (01/17/23 1804)  Pulse: 68 (01/17/23 1700)  Resp: 16 (01/17/23 1700)  BP: (!) 154/79 (01/17/23 1700)  SpO2: 98 % (01/17/23 1700)   Vital Signs (24h Range):  Temp:  [97.8 °F (36.6 °C)-98.5 °F (36.9 °C)] 97.8 °F (36.6 °C)  Pulse:  [68-98] 68  Resp:  [16-20] 16  SpO2:  [98 %-100 %] 98 %  BP: (154-180)/(75-79) 154/79     Weight: 56.7 kg (125 lb)  Body mass index is 17.43 kg/m².    Physical Exam  Vitals reviewed. Exam conducted with a chaperone present.   Constitutional:       General: He is not  in acute distress.     Appearance: Normal appearance. He is normal weight. He is not ill-appearing, toxic-appearing or diaphoretic.      Comments: Pleasant man in no acute distress. He is cooperative with examination and conversant with interview.   HENT:      Head: Normocephalic and atraumatic.      Nose: Nose normal. No congestion or rhinorrhea.      Mouth/Throat:      Mouth: Mucous membranes are moist.      Pharynx: Oropharynx is clear. No oropharyngeal exudate or posterior oropharyngeal erythema.   Eyes:      Extraocular Movements: Extraocular movements intact.   Cardiovascular:      Rate and Rhythm: Normal rate.      Pulses: Normal pulses.      Heart sounds: Normal heart sounds. No murmur heard.    No friction rub. No gallop.   Pulmonary:      Effort: Pulmonary effort is normal. No respiratory distress.      Breath sounds: Normal breath sounds. No wheezing.   Abdominal:      General: Abdomen is flat. Bowel sounds are normal. There is no distension.      Palpations: Abdomen is soft. There is no mass.      Tenderness: There is no abdominal tenderness. There is no guarding or rebound.      Hernia: No hernia is present.   Musculoskeletal:         General: Normal range of motion.      Right Lower Extremity: Right leg is amputated above knee.      Left Lower Extremity: Left leg is amputated above knee.   Feet:      Comments: Patient with right lower extremity AKA wound. Wound is covered with gauze.  Skin:     General: Skin is warm and dry.   Neurological:      General: No focal deficit present.      Mental Status: He is alert and oriented to person, place, and time. Mental status is at baseline.      Sensory: No sensory deficit.      Motor: No weakness.   Psychiatric:         Mood and Affect: Mood normal.         Behavior: Behavior normal.         Thought Content: Thought content normal.         Judgment: Judgment normal.           Significant Labs: All pertinent labs within the past 24 hours have been  reviewed.  CBC:   Recent Labs   Lab 01/17/23  1342 01/17/23  1350   WBC 6.95  --    HGB 11.0*  --    HCT 38.4* 38     --      CMP:   Recent Labs   Lab 01/17/23  1342      K 4.5      CO2 24   GLU 83   BUN 27*   CREATININE 6.6*   CALCIUM 8.8   PROT 8.1   ALBUMIN 3.3*   BILITOT 0.5   ALKPHOS 86   AST 9*   ALT 6*   ANIONGAP 12       Significant Imaging: I have reviewed all pertinent imaging results/findings within the past 24 hours.    Assessment/Plan:     * Wound infection  Patient with known above the knee amputation of right lower extremity.  Patient has had infection of stump in the past, including including prior abscess.  Superficial wound cultures grew Citrobacter Koseri and Pseudomonas Aeruginosa.  Patient seen by infectious disease service and was narrowed to oral ciprofloxacin at discharge.  However, there was confusion given another subsequent hospitalization and ciprofloxacin was discontinued several weeks before anticipated end date.  Now, patient presents with concern for infection given purulent drainage from site.  CT thigh revealing lateral ulceration possibly extending into the bone.  Osteomyelitis can not be excluded.  Patient sent from Infectious Disease Clinic for admission.  - Infectious Disease consulted, appreciate recommendations  - begin IV vancomycin, pharmacy to assist with dosing  - begin IV meropenem 500 mg daily  - Infectious Disease consulted, appreciate recommendations  - f/u blood cultures and narrow antibiotics accordingly  - f/u pending inflammatory markers      COVID-19  Patient testing positive for COVID-19 on nasopharyngeal swab in emergency department.  Patient without symptoms.  Given patient's comorbidities, he is at risk for decompensation.  - begin IV remdesivir 200 mg loading dose; continue IV remdesivir 100 mg daily    PAD (peripheral artery disease)  Patient with extensive peripheral arterial disease with above-the-knee amputation of right lower extremity  and below-the-knee amputation left lower extremity.  Resume home aspirin, clopidogrel, atorvastatin.      ESRD on hemodialysis  Patient on hemodialysis.  He receives his sessions on Mondays, Wednesdays, and Fridays per report.  Electrolytes within normal limits on presentation.  - Nephrology consulted, appreciate recommendations    CAD (coronary artery disease) - non-obstructive from [facility] in 2016  Patient denies any chest pain or shortness of breath on admission.  Resume home aspirin, clopidogrel, atorvastatin.      Essential (primary) hypertension  Blood pressure elevated upon admission.  We will resume home hydralazine, amlodipine, and metoprolol succinate.    Diabetes Mellitus  Patient's FSGs are controlled on current medication regimen.    Last A1c reviewed-   Lab Results   Component Value Date    HGBA1C 4.8 01/17/2023     Most recent fingerstick glucose reviewed-   Recent Labs   Lab 01/17/23  1802 01/17/23  2204   POCTGLUCOSE 101 118*       Maintain anti-hyperglycemic dose as follows-   Antihyperglycemics (From admission, onward)    Start     Stop Route Frequency Ordered    01/17/23 2100  insulin detemir U-100 pen 6 Units         -- SubQ Nightly 01/17/23 1852    01/17/23 1951  insulin aspart U-100 pen 0-5 Units         -- SubQ Before meals & nightly PRN 01/17/23 1852        Hold Oral hypoglycemics while patient is in the hospital.      VTE Risk Mitigation (From admission, onward)         Ordered     heparin (porcine) injection 5,000 Units  Every 8 hours         01/17/23 1848     IP VTE HIGH RISK PATIENT  Once         01/17/23 1848     Place sequential compression device  Until discontinued         01/17/23 1848               Code Status: FULL    Jerome Hurtado MD  Department of Hospital Medicine   [facility] - Observation 11H

## 2023-01-18 NOTE — CONSULTS
[facility]  Nephrology  Consult Note    Patient Name: Adryan Neff  MRN: [id number]  Admission Date: 1/17/2023  Hospital Length of Stay: 0 days  Attending Provider: Pretty Soto MD   Primary Care Physician: Carola Pedro MD  Principal Problem:Wound infection    Inpatient consult to Nephrology  Consult performed by: Odalys Frederick DNP  Consult ordered by: Jerome Hurtado MD  Reason for consult: ESRD on HD         Subjective:     HPI: Adryan Neff is a 57-year-old man with a past medical history of primary hypertension, peripheral arterial disease s/p R AKA, DM type 1, ESRD on HD MWF, who presents to the emergency department with a wound infection. He has had previous admissions for R AKA abscess and concern for osteomyelitis. Resides in NH. Reports compliance with HD. Last HD on Monday 1/16/23. Denies fever, chills, chest pain, or SOB.           Past Medical History:   Diagnosis Date    CAD (coronary artery disease) 2016    CAD (non obstructive) 2016 [facility]    Diabetes mellitus type I     Diabetic retinopathy     ESRD on hemodialysis     on HD TThSat    Gangrene of left foot     Hypertension     Nuclear sclerosis of right eye 11/24/2021    PAD (peripheral artery disease)     PEA (Pulseless electrical activity) 10/10/2022    Pulmonary HTN     Right foot infection     TB lung, latent 04/2021    Wet gangrene 1/5/2022       Past Surgical History:   Procedure Laterality Date    ABOVE-KNEE AMPUTATION Left 1/18/2022    Procedure: AMPUTATION, ABOVE KNEE;  Surgeon: Rusty Light MD;  Location: [facility] OR;  Service: Orthopedics;  Laterality: Left;    ABOVE-KNEE AMPUTATION Left 1/21/2022    Procedure: AMPUTATION, ABOVE KNEE;  Surgeon: Rusty Light MD;  Location: [facility] OR;  Service: Orthopedics;  Laterality: Left;    ABOVE-KNEE AMPUTATION Right 10/13/2022    Procedure: AMPUTATION, ABOVE KNEE, RIGHT;  Surgeon: Dimitris Davis MD;  Location: [facility] OR;  Service: Orthopedics;  Laterality:  Right;    AV FISTULA PLACEMENT      CATARACT EXTRACTION Left     EYE SURGERY      FISTULOGRAM Left 8/9/2022    Procedure: Fistulogram;  Surgeon: Masoud Soni MD;  Location: [facility] OR;  Service: Vascular;  Laterality: Left;  LEFT VM ON DAUGHTER'S PHONE----PHONE PREOP NOT COMPLETED  CALLED PATIENT ON 8/8/2022 @ 3:34. HE STATED HE IS RESCHEDULING PROCEDURE. NOTIFIED ERWIN @ 3:35PM-LO    FISTULOGRAM Left 10/20/2022    Procedure: Fistulogram;  Surgeon: Masoud Soni MD;  Location: [facility];  Service: Vascular;  Laterality: Left;  Left upper extremity    TOE AMPUTATION Left 1/6/2022    Procedure: AMPUTATION, TOE;  Surgeon: Masoud Soni MD;  Location: [facility] OR;  Service: Vascular;  Laterality: Left;  Left first through fifth toes, possible open transmetatarsal amputation and all other indicated procedures       Review of patient's allergies indicates:  No Known Allergies  Current Facility-Administered Medications   Medication Frequency    0.9%  NaCl infusion Once    acetaminophen tablet 650 mg Q8H PRN    albuterol-ipratropium 2.5 mg-0.5 mg/3 mL nebulizer solution 3 mL Q4H PRN    [START ON 1/19/2023] amLODIPine tablet 10 mg Daily    aspirin chewable tablet 81 mg Daily    atorvastatin tablet 20 mg Daily    clopidogreL tablet 75 mg Daily    dextrose 10% bolus 125 mL 125 mL PRN    dextrose 10% bolus 250 mL 250 mL PRN    glucagon (human recombinant) injection 1 mg PRN    glucose chewable tablet 16 g PRN    glucose chewable tablet 24 g PRN    heparin (porcine) injection 5,000 Units Q8H    hydrALAZINE tablet 100 mg Q8H    insulin aspart U-100 pen 0-5 Units QID (AC + HS) PRN    melatonin tablet 6 mg Nightly PRN    meropenem-0.9% sodium chloride 500 mg/50 mL IVPB Q24H    metoprolol succinate (TOPROL-XL) 24 hr tablet 25 mg Daily    naloxone 0.4 mg/mL injection 0.02 mg PRN    ondansetron disintegrating tablet 4 mg Q8H PRN    ondansetron injection 4 mg Q8H PRN    pantoprazole EC  tablet 40 mg Daily    polyethylene glycol packet 17 g TID PRN    [START ON 1/19/2023] remdesivir 100 mg in sodium chloride 0.9% 250 mL infusion Daily    simethicone chewable tablet 80 mg QID PRN    sodium chloride 0.9% flush 10 mL Q12H PRN    vancomycin - pharmacy to dose pharmacy to manage frequency     Family History       Problem Relation (Age of Onset)    No Known Problems Mother, Father, Sister, Brother, Daughter, Daughter, Daughter, Brother, Maternal Aunt, Maternal Uncle, Paternal Aunt, Paternal Uncle, Maternal Grandmother, Maternal Grandfather, Paternal Grandmother, Paternal Grandfather          Tobacco Use    Smoking status: Never    Smokeless tobacco: Never   Substance and Sexual Activity    Alcohol use: No    Drug use: No    Sexual activity: Yes     Partners: Female     Review of Systems   Constitutional:  Negative for activity change, chills, fatigue and fever.   HENT:  Negative for congestion, rhinorrhea, sinus pressure, sinus pain and sore throat.    Eyes:  Negative for photophobia and visual disturbance.   Respiratory:  Negative for cough, chest tightness, shortness of breath and wheezing.    Cardiovascular:  Negative for chest pain, palpitations and leg swelling.   Gastrointestinal:  Negative for abdominal distention, abdominal pain, constipation, diarrhea, nausea and vomiting.   Endocrine: Negative for polyphagia and polyuria.   Genitourinary:  Negative for dysuria, genital sores, hematuria, testicular pain and urgency.   Musculoskeletal:  Negative for arthralgias, gait problem and myalgias.   Skin:  Positive for wound.   Neurological:  Negative for dizziness, tremors, seizures, syncope and numbness.   Psychiatric/Behavioral:  Negative for agitation, confusion and hallucinations. The patient is not hyperactive.    Objective:     Vital Signs (Most Recent):  Temp: 97.6 °F (36.4 °C) (01/18/23 0830)  Pulse: 79 (01/18/23 0830)  Resp: 17 (01/18/23 0830)  BP: (!) 129/52 (01/18/23 0830)  SpO2: 96 %  (01/18/23 0830)   Vital Signs (24h Range):  Temp:  [97.6 °F (36.4 °C)-98.9 °F (37.2 °C)] 97.6 °F (36.4 °C)  Pulse:  [68-98] 79  Resp:  [16-20] 17  SpO2:  [94 %-100 %] 96 %  BP: (125-180)/(52-79) 129/52     Weight: 56.7 kg (125 lb) (01/17/23 2145)  Body mass index is 17.44 kg/m².  Body surface area is 1.69 meters squared.    I/O last 3 completed shifts:  In: 328.5 [IV Piggyback:328.5]  Out: -     Physical Exam  Vitals and nursing note reviewed. Exam conducted with a chaperone present.   Constitutional:       General: He is not in acute distress.     Appearance: Normal appearance. He is normal weight. He is not ill-appearing, toxic-appearing or diaphoretic.      Comments: Pleasant man in no acute distress. He is cooperative with examination and conversant with interview.   HENT:      Head: Normocephalic and atraumatic.      Nose: Nose normal. No congestion or rhinorrhea.      Mouth/Throat:      Mouth: Mucous membranes are moist.      Pharynx: Oropharynx is clear. No oropharyngeal exudate or posterior oropharyngeal erythema.   Eyes:      Extraocular Movements: Extraocular movements intact.   Cardiovascular:      Rate and Rhythm: Normal rate.      Pulses: Normal pulses.      Heart sounds: Normal heart sounds. No murmur heard.    No friction rub. No gallop.   Pulmonary:      Effort: Pulmonary effort is normal. No respiratory distress.      Breath sounds: Normal breath sounds. No wheezing.   Abdominal:      General: Abdomen is flat. Bowel sounds are normal. There is no distension.      Palpations: Abdomen is soft. There is no mass.      Tenderness: There is no abdominal tenderness. There is no guarding or rebound.      Hernia: No hernia is present.   Musculoskeletal:         General: Normal range of motion.      Right Lower Extremity: Right leg is amputated above knee.      Left Lower Extremity: Left leg is amputated above knee.   Feet:      Comments: Patient with right lower extremity AKA wound. Wound is covered with  gauze.  Skin:     General: Skin is warm and dry.   Neurological:      General: No focal deficit present.      Mental Status: He is alert and oriented to person, place, and time. Mental status is at baseline.      Sensory: No sensory deficit.      Motor: No weakness.   Psychiatric:         Mood and Affect: Mood normal.         Behavior: Behavior normal.         Thought Content: Thought content normal.         Judgment: Judgment normal.       Significant Labs:  CBC:   Recent Labs   Lab 01/17/23  1342 01/17/23  1350   WBC 6.95  --    RBC 4.16*  --    HGB 11.0*  --    HCT 38.4* 38     --    MCV 92  --    MCH 26.4*  --    MCHC 28.6*  --      CMP:   Recent Labs   Lab 01/17/23  1342   GLU 83   CALCIUM 8.8   ALBUMIN 3.3*   PROT 8.1      K 4.5   CO2 24      BUN 27*   CREATININE 6.6*   ALKPHOS 86   ALT 6*   AST 9*   BILITOT 0.5     All labs within the past 24 hours have been reviewed.        Assessment/Plan:     * Wound infection  - management per primary     Anemia in ESRD (end-stage renal disease)  - Hgb goal 10-11  currently at goal   epo with HD     ESRD on hemodialysis  57-year-old man with a past medical history of primary hypertension, peripheral arterial disease s/p R AKA, DM type 1, ESRD on HD MWF, admitted for concern of osteomyelitis/abcess to R AKA.     Nephrology History  iHD Schedule: MWF   Unit/MD: [facility]  Duration: 3.5 hours   UF: 2L  EDW: 56 kg   Access: PHIL AVF   Last HD prior to presentation: 1/16/23     Assessment:   - Plan for next HD today 1/18/23, for metabolic clearance and volume management. Will be done at bedside due to COVID status    - Dialysate adjusted to current labs, labs pending (pt refusing)   - Will obtain OP dialysis records  - Continue to monitor intake and output, daily weights   - Avoid nephrotoxic medication and renal dose medications to GFR  -continue phos binders        Thank you for your consult. I will follow-up with patient. Please contact us if you have any  additional questions.    Odalys Frederick DNP  Nephrology  [facility]

## 2023-01-18 NOTE — HPI
Mr. Adryan Neff is a 57 year old with ESR on dialysis, DM (last HA1C 4.8), PAD, s/p right AKA 10/13/22 admitted from ID clinic with concern for worsening right stump infection.  He had recent admission 11/23 with right stump abscess with suspected associated osteomyelitis.  Initial wound cultures + for citrobacter koseri (zosyn R) and Pseudomonas.  IR aspiration cx 11/29 were negative.  He was initially treated with meropenem and then discharged to [facility] 12/2/22 with plan for ciprofloxacin X 6 weeks for presumed osteo.      He was admitted 12/8 to 12/14 to [facility] with wound dehiscence.  Per discharge summary,  Orthopedic Surgery was consulted who did not find any evidence of active infection and he was discharged back to SNF with 10 day course of ciprofloxacin (short of the original 6 week course planned).   He had Vascular Surgery visit on 12/15/22 at [facility] and noted to have wound dehiscence with foul smelling/purulent drainage and ED was recommended but apparently he did  not go.      He was seen by ID for follow up yesterday.  Purulent drainage noted and he was sent to ED.      In ED afebrile, no leukocytosis. COVID +.  Blood cultures obtained and pending CT right femur with contrast showed resolution of large stump abscess from prior but ulcer appeared to extend to bone.  No bony erosion seen but recommended MRI for better evaluation for osteo

## 2023-01-18 NOTE — PROGRESS NOTES
Pharmacokinetic Assessment Follow Up: IV Vancomycin    Vancomycin serum concentration assessment(s):    -Random level 22.5 following loading dose of vancomycin 1250 mg IVPB x1  -Goal 15 - 20 for possible osteomyelitis    Vancomycin Regimen Plan:    -Patient to get HD today, possibly overnight with bedside  -Will plan for re-dose with vancomycin 250 mg IVPB x1 following HD with dose administration tomorrow AM  -Next random level Friday AM for pre-HD level    Drug levels (last 3 results):  Recent Labs   Lab Result Units 01/18/23  1212   Vancomycin, Random ug/mL 22.5       Pharmacy will continue to follow and monitor vancomycin.    Please contact pharmacy at extension [phone] for questions regarding this assessment.    Thank you for the consult,   Hair Raulitomadelin       Patient brief summary:  Adryan Neff is a 57 y.o. male initiated on antimicrobial therapy with IV Vancomycin for treatment of bone/joint infection    The patient's current regimen is vancomycin pulse dosing    Drug Allergies:   Review of patient's allergies indicates:  No Known Allergies    Actual Body Weight:   57 kg    Renal Function:   Estimated Creatinine Clearance: 7.4 mL/min (A) (based on SCr of 8.7 mg/dL (H)).,     Dialysis Method (if applicable):  intermittent HD M/W/F    CBC (last 72 hours):  Recent Labs   Lab Result Units 01/17/23  1342 01/18/23  1212   WBC K/uL 6.95 7.57   Hemoglobin g/dL 11.0* 10.9*   Hemoglobin A1C % 4.8  --    Hematocrit % 38.4* 36.9*   Platelets K/uL 285 286   Gran % % 58.0  --    Lymph % % 22.6  --    Mono % % 7.5  --    Eosinophil % % 10.6*  --    Basophil % % 1.0  --    Differential Method  Automated  --        Metabolic Panel (last 72 hours):  Recent Labs   Lab Result Units 01/17/23  1342 01/18/23  1212   Sodium mmol/L 141 140   Potassium mmol/L 4.5 4.5   Chloride mmol/L 105 107   CO2 mmol/L 24 22*   Glucose mg/dL 83 105   BUN mg/dL 27* 38*   Creatinine mg/dL 6.6* 8.7*   Albumin g/dL 3.3* 3.0*   Total Bilirubin mg/dL 0.5  --     Alkaline Phosphatase U/L 86  --    AST U/L 9*  --    ALT U/L 6*  --    Magnesium mg/dL 2.3  --    Phosphorus mg/dL 3.9 3.7       Vancomycin Administrations:  vancomycin given in the last 96 hours                     vancomycin 1,250 mg in dextrose 5 % 250 mL IVPB (Vial-Mate) (mg) 1,250 mg New Bag 01/17/23 1609                    Microbiologic Results:  Microbiology Results (last 7 days)       Procedure Component Value Units Date/Time    Blood culture #2 **CANNOT BE ORDERED STAT** [[id number]] Collected: 01/17/23 1342    Order Status: Completed Specimen: Blood from Peripheral, Hand, Right Updated: 01/17/23 2115     Blood Culture, Routine No Growth to date    Blood culture #1 **CANNOT BE ORDERED STAT** [[id number]] Collected: 01/17/23 1342    Order Status: Completed Specimen: Blood from Peripheral, Antecubital, Right Updated: 01/17/23 2115     Blood Culture, Routine No Growth to date

## 2023-01-18 NOTE — CONSULTS
[facility]  Infectious Disease  Consult Note    Patient Name: Adryan Neff  MRN: [id number]  Admission Date: 1/17/2023  Hospital Length of Stay: 0 days  Attending Physician: Pretty Soto MD  Primary Care Provider: Carola Pedro MD     Isolation Status: Airborne and Contact and Droplet        Inpatient consult to Infectious Diseases  Consult performed by: KIKI Ojeda, ANP  Consult ordered by: Jerome Hurtado MD  Reason for consult: Patient sent from ID clinic for AKA stump infection; previously on ciprofloxacin but stopped antibiotics early        ID Consult acknowledged.   Full consult and recommendations to follow today  In the interim, please secure chat for any immediate concerns.     Thank you.   KIKI Vieira, ANP-C

## 2023-01-18 NOTE — ASSESSMENT & PLAN NOTE
57 year old male with ESRD on HD, DM (HA1C 4.8), PAD  s/p right AKA October 2022, complicated by stump abscess and presumed osteomyelitis 11/23/22 (wound cx citrobacter koseri and pseudomonas), d/c on oral ciprofloxacin with plan for 6 weeks treatment.  Course of antibiotics shortened after admission to [facility] in early December with wound dehiscence. CT imaging there showed showing rim enhancing fluid collections/phlegmon with evaluation by Orthopedics who was not concerned for infection.  Course of ciprofloxacin abbreviated after that admission and he stopped abx on 12/24.  Now readmitted from ID clinic with concern for worsening wound infection.      CT right thigh with contrast 1/17 - showed resolution of prior large stump abscess.  Noted lateral ulceration possibly extended to bone.  Unable to exclude osteo.      Afebrile, hemodynamically stable.  No leukocytosis. CRP wnl.  Incidentally tested positive for COVID on admission and remdesivir started.   Currently on IV vancomycin and meropenem for wound infection    Plan/recommendations:  1.  Continue IV vancomycin (pharmacy to dose. Trough goal 15-20) and meropenem (renally dosed) for now.   2.  Recommend MRI right femur to better evaluate for osteomyelitis.   3.  Continue wound care as recommended by Wound Care nurse  4.  Will follow up tomorrow.     Patient seen by, and plan discussed with, ID staff, Dr. Archer  Secure chat regarding above with Primary Team, Dr. Soto

## 2023-01-18 NOTE — ASSESSMENT & PLAN NOTE
Patient testing positive for COVID-19 on nasopharyngeal swab in emergency department.  Patient without symptoms.  Given patient's comorbidities, he is at risk for decompensation.  - begin IV remdesivir 200 mg loading dose; continue IV remdesivir 100 mg daily

## 2023-01-18 NOTE — ASSESSMENT & PLAN NOTE
Patient with extensive peripheral arterial disease with above-the-knee amputation of right lower extremity and below-the-knee amputation left lower extremity.  Resume home aspirin, clopidogrel, atorvastatin.

## 2023-01-18 NOTE — HPI
Adryan Neff is a 57-year-old man with a past medical history of primary hypertension, peripheral arterial disease s/p R AKA, DM type 1, ESRD on HD MWF, who presents to the emergency department with a wound infection. He has had previous admissions for R AKA abscess and concern for osteomyelitis. Resides in NH. Reports compliance with HD. Last HD on Monday 1/16/23. Denies fever, chills, chest pain, or SOB.

## 2023-01-18 NOTE — ASSESSMENT & PLAN NOTE
Patient's FSGs are controlled on current medication regimen.    Last A1c reviewed-   Lab Results   Component Value Date    HGBA1C 4.8 01/17/2023     Most recent fingerstick glucose reviewed-   Recent Labs   Lab 01/17/23  1802 01/17/23  2204   POCTGLUCOSE 101 118*       Maintain anti-hyperglycemic dose as follows-   Antihyperglycemics (From admission, onward)    Start     Stop Route Frequency Ordered    01/17/23 2100  insulin detemir U-100 pen 6 Units         -- SubQ Nightly 01/17/23 1852    01/17/23 1951  insulin aspart U-100 pen 0-5 Units         -- SubQ Before meals & nightly PRN 01/17/23 1852        Hold Oral hypoglycemics while patient is in the hospital.

## 2023-01-18 NOTE — ASSESSMENT & PLAN NOTE
Patient with known above the knee amputation of right lower extremity.  Patient has had infection of stump in the past, including including prior abscess.  Superficial wound cultures grew Citrobacter Koseri and Pseudomonas Aeruginosa.  Patient seen by infectious disease service and was narrowed to oral ciprofloxacin at discharge.  However, there was confusion given another subsequent hospitalization and ciprofloxacin was discontinued several weeks before anticipated end date.  Now, patient presents with concern for infection given purulent drainage from site.  CT thigh revealing lateral ulceration possibly extending into the bone.  Osteomyelitis can not be excluded.  Patient sent from Infectious Disease Clinic for admission.  - Infectious Disease consulted, appreciate recommendations  - begin IV vancomycin, pharmacy to assist with dosing  - begin IV meropenem 500 mg daily  - Infectious Disease consulted, appreciate recommendations  - f/u blood cultures and narrow antibiotics accordingly  - f/u pending inflammatory markers

## 2023-01-18 NOTE — PLAN OF CARE
Problem: Adult Inpatient Plan of Care  Goal: Plan of Care Review  Outcome: Ongoing, Progressing     Problem: Adult Inpatient Plan of Care  Goal: Optimal Comfort and Wellbeing  Outcome: Ongoing, Progressing     Problem: Adjustment to Illness (Sepsis/Septic Shock)  Goal: Optimal Coping  Outcome: Ongoing, Progressing     Problem: Skin Injury Risk Increased  Goal: Skin Health and Integrity  Outcome: Ongoing, Progressing

## 2023-01-18 NOTE — PLAN OF CARE
[facility]  Discharge Assessment    Primary Care Provider: Carola Pedro MD     Discharge Assessment (most recent)       BRIEF DISCHARGE ASSESSMENT - 01/18/23 1151          Discharge Planning    Assessment Type Discharge Planning Brief Assessment     Resource/Environmental Concerns none     Support Systems Children     Current Living Arrangements residential facility     Care Facility [facility]     Patient/Family Anticipates Transition to long-term care facility     DME Needed Upon Discharge  none     Discharge Plan A Return to nursing home     Discharge Plan B Return to Nursing Home        Physical Activity    On average, how many days per week do you engage in moderate to strenuous exercise (like a brisk walk)? 0 days     On average, how many minutes do you engage in exercise at this level? 0 min        Financial Resource Strain    How hard is it for you to pay for the very basics like food, housing, medical care, and heating? Not very hard        Housing Stability    In the last 12 months, was there a time when you were not able to pay the mortgage or rent on time? No     In the last 12 months, was there a time when you did not have a steady place to sleep or slept in a shelter (including now)? No        Transportation Needs    In the past 12 months, has lack of transportation kept you from medical appointments or from getting medications? No        Food Insecurity    Within the past 12 months, the food you bought just didn't last and you didn't have money to get more. Never true        Stress    Do you feel stress - tense, restless, nervous, or anxious, or unable to sleep at night because your mind is troubled all the time - these days? Rather much        Social Connections    Are you , , , , never , or living with a partner? Never         Alcohol Use    Q1: How often do you have a drink containing alcohol? Never     Q2: How many  drinks containing alcohol do you have on a typical day when you are drinking? Patient does not drink     Q3: How often do you have six or more drinks on one occasion? Never                   Pt is a 57 y.o. male admitted with Wound Infection and Covid 19. He has a PMH of HTN, ESRD (MWF) PAD s/p Deangelo AKA, DM. He is a resident of [facility] per his daughter and confirmed by [facility] staff. He requires total care by NH staff and will be returning. [facility] Discharge Packet given to patient and/or family with understanding verbalized.   name and number and estimated discharge date written on white board in patient's room with request to call for any questions or concerns.  Will continue to follow for needs.  Adan Shelton, RN,BSN

## 2023-01-18 NOTE — PLAN OF CARE
Return to Nursing Home/SNF referral sent to [facility] via [facility].      01/18/23 1147   Post-Acute Status   Post-Acute Authorization Placement   Post-Acute Placement Status Referrals Sent   Discharge Plan   Discharge Plan A Skilled Nursing Facility   Discharge Plan B Skilled Nursing Facility     Adan Shelton, RN,BSN

## 2023-01-18 NOTE — SUBJECTIVE & OBJECTIVE
Past Medical History:   Diagnosis Date    CAD (coronary artery disease) 2016    CAD (non obstructive) 2016 [facility]    Diabetes mellitus type I     Diabetic retinopathy     ESRD on hemodialysis     on HD TThSat    Gangrene of left foot     Hypertension     Nuclear sclerosis of right eye 11/24/2021    PAD (peripheral artery disease)     PEA (Pulseless electrical activity) 10/10/2022    Pulmonary HTN     Right foot infection     TB lung, latent 04/2021    Wet gangrene 1/5/2022       Past Surgical History:   Procedure Laterality Date    ABOVE-KNEE AMPUTATION Left 1/18/2022    Procedure: AMPUTATION, ABOVE KNEE;  Surgeon: Rusty Light MD;  Location: [facility] OR;  Service: Orthopedics;  Laterality: Left;    ABOVE-KNEE AMPUTATION Left 1/21/2022    Procedure: AMPUTATION, ABOVE KNEE;  Surgeon: Rusty Light MD;  Location: [facility] OR;  Service: Orthopedics;  Laterality: Left;    ABOVE-KNEE AMPUTATION Right 10/13/2022    Procedure: AMPUTATION, ABOVE KNEE, RIGHT;  Surgeon: Dimitris Davis MD;  Location: [facility] OR;  Service: Orthopedics;  Laterality: Right;    AV FISTULA PLACEMENT      CATARACT EXTRACTION Left     EYE SURGERY      FISTULOGRAM Left 8/9/2022    Procedure: Fistulogram;  Surgeon: Masoud Soni MD;  Location: [facility] OR;  Service: Vascular;  Laterality: Left;  LEFT VM ON DAUGHTER'S PHONE----PHONE PREOP NOT COMPLETED  CALLED PATIENT ON 8/8/2022 @ 3:34. HE STATED HE IS RESCHEDULING PROCEDURE. NOTIFIED ERWIN @ 3:35PM-LO    FISTULOGRAM Left 10/20/2022    Procedure: Fistulogram;  Surgeon: Masoud Soni MD;  Location: [facility] [address];  Service: Vascular;  Laterality: Left;  Left upper extremity    TOE AMPUTATION Left 1/6/2022    Procedure: AMPUTATION, TOE;  Surgeon: Masoud Soni MD;  Location: [facility] OR;  Service: Vascular;  Laterality: Left;  Left first through fifth toes, possible open transmetatarsal amputation and all other indicated procedures       Review of patient's allergies indicates:  No Known  Allergies    No current facility-administered medications on file prior to encounter.     Current Outpatient Medications on File Prior to Encounter   Medication Sig    amLODIPine (NORVASC) 5 MG tablet Take 1 tablet (5 mg total) by mouth once daily.    aspirin 81 MG Chew Take 81 mg by mouth once daily.    atorvastatin (LIPITOR) 20 MG tablet Take 1 tablet by mouth.    clopidogreL (PLAVIX) 75 mg tablet Take 75 mg by mouth once daily.    epoetin paola-epbx (RETACRIT) 10,000 unit/mL imjection Inject 1 mL (10,000 Units total) into the skin every Mon, Wed, Fri.    folic acid-vit B6-vit B12 2.5-25-2 mg (FOLBIC OR EQUIV) 2.5-25-2 mg Tab Take 1 tablet by mouth once daily.    hydrALAZINE (APRESOLINE) 50 MG tablet Take 2 tablets (100 mg total) by mouth every 8 (eight) hours.    insulin aspart U-100 (NOVOLOG) 100 unit/mL (3 mL) InPn pen Inject 0-5 Units into the skin before meals and at bedtime as needed (Hyperglycemia).    insulin detemir U-100 (LEVEMIR FLEXTOUCH) 100 unit/mL (3 mL) SubQ InPn pen Inject 6 Units into the skin every evening.    lancets (ACCU-CHEK SOFTCLIX LANCETS) Misc 1 each by Misc.(Non-Drug; Combo Route) route once daily at 6am.    metoprolol succinate (TOPROL-XL) 25 MG 24 hr tablet Take 1 tablet by mouth.    omeprazole (PRILOSEC) 40 MG capsule Take 40 mg by mouth once daily.     Family History       Problem Relation (Age of Onset)    No Known Problems Mother, Father, Sister, Brother, Daughter, Daughter, Daughter, Brother, Maternal Aunt, Maternal Uncle, Paternal Aunt, Paternal Uncle, Maternal Grandmother, Maternal Grandfather, Paternal Grandmother, Paternal Grandfather          Tobacco Use    Smoking status: Never    Smokeless tobacco: Never   Substance and Sexual Activity    Alcohol use: No    Drug use: No    Sexual activity: Yes     Partners: Female     Review of Systems   Constitutional:  Negative for activity change, chills, fatigue and fever.   HENT:  Negative for congestion, rhinorrhea, sinus pressure,  sinus pain and sore throat.    Eyes:  Negative for photophobia and visual disturbance.   Respiratory:  Negative for cough, chest tightness, shortness of breath and wheezing.    Cardiovascular:  Negative for chest pain, palpitations and leg swelling.   Gastrointestinal:  Negative for abdominal distention, abdominal pain, constipation, diarrhea, nausea and vomiting.   Endocrine: Negative for polyphagia and polyuria.   Genitourinary:  Negative for dysuria, genital sores, hematuria, testicular pain and urgency.   Musculoskeletal:  Negative for arthralgias, gait problem and myalgias.   Skin:  Positive for wound.   Neurological:  Negative for dizziness, tremors, seizures, syncope and numbness.   Psychiatric/Behavioral:  Negative for agitation, confusion and hallucinations. The patient is not hyperactive.    Objective:     Vital Signs (Most Recent):  Temp: 97.8 °F (36.6 °C) (01/17/23 1804)  Pulse: 68 (01/17/23 1700)  Resp: 16 (01/17/23 1700)  BP: (!) 154/79 (01/17/23 1700)  SpO2: 98 % (01/17/23 1700)   Vital Signs (24h Range):  Temp:  [97.8 °F (36.6 °C)-98.5 °F (36.9 °C)] 97.8 °F (36.6 °C)  Pulse:  [68-98] 68  Resp:  [16-20] 16  SpO2:  [98 %-100 %] 98 %  BP: (154-180)/(75-79) 154/79     Weight: 56.7 kg (125 lb)  Body mass index is 17.43 kg/m².    Physical Exam  Vitals reviewed. Exam conducted with a chaperone present.   Constitutional:       General: He is not in acute distress.     Appearance: Normal appearance. He is normal weight. He is not ill-appearing, toxic-appearing or diaphoretic.      Comments: Pleasant man in no acute distress. He is cooperative with examination and conversant with interview.   HENT:      Head: Normocephalic and atraumatic.      Nose: Nose normal. No congestion or rhinorrhea.      Mouth/Throat:      Mouth: Mucous membranes are moist.      Pharynx: Oropharynx is clear. No oropharyngeal exudate or posterior oropharyngeal erythema.   Eyes:      Extraocular Movements: Extraocular movements intact.    Cardiovascular:      Rate and Rhythm: Normal rate.      Pulses: Normal pulses.      Heart sounds: Normal heart sounds. No murmur heard.    No friction rub. No gallop.   Pulmonary:      Effort: Pulmonary effort is normal. No respiratory distress.      Breath sounds: Normal breath sounds. No wheezing.   Abdominal:      General: Abdomen is flat. Bowel sounds are normal. There is no distension.      Palpations: Abdomen is soft. There is no mass.      Tenderness: There is no abdominal tenderness. There is no guarding or rebound.      Hernia: No hernia is present.   Musculoskeletal:         General: Normal range of motion.      Right Lower Extremity: Right leg is amputated above knee.      Left Lower Extremity: Left leg is amputated above knee.   Feet:      Comments: Patient with right lower extremity AKA wound. Wound is covered with gauze.  Skin:     General: Skin is warm and dry.   Neurological:      General: No focal deficit present.      Mental Status: He is alert and oriented to person, place, and time. Mental status is at baseline.      Sensory: No sensory deficit.      Motor: No weakness.   Psychiatric:         Mood and Affect: Mood normal.         Behavior: Behavior normal.         Thought Content: Thought content normal.         Judgment: Judgment normal.           Significant Labs: All pertinent labs within the past 24 hours have been reviewed.  CBC:   Recent Labs   Lab 01/17/23  1342 01/17/23  1350   WBC 6.95  --    HGB 11.0*  --    HCT 38.4* 38     --      CMP:   Recent Labs   Lab 01/17/23  1342      K 4.5      CO2 24   GLU 83   BUN 27*   CREATININE 6.6*   CALCIUM 8.8   PROT 8.1   ALBUMIN 3.3*   BILITOT 0.5   ALKPHOS 86   AST 9*   ALT 6*   ANIONGAP 12       Significant Imaging: I have reviewed all pertinent imaging results/findings within the past 24 hours.

## 2023-01-18 NOTE — ASSESSMENT & PLAN NOTE
57-year-old man with a past medical history of primary hypertension, peripheral arterial disease s/p R AKA, DM type 1, ESRD on HD MWF, admitted for concern of osteomyelitis/abcess to R AKA.     Nephrology History  iHD Schedule: MWF   Unit/MD: YONI  Duration: 3.5 hours   UF: 2L  EDW: 56 kg   Access: PHIL AVF   Last HD prior to presentation: 1/16/23     Assessment:   - Plan for next HD today 1/18/23, for metabolic clearance and volume management. Will be done at bedside due to COVID status    - Dialysate adjusted to current labs, labs pending (pt refusing)   - Will obtain OP dialysis records  - Continue to monitor intake and output, daily weights   - Avoid nephrotoxic medication and renal dose medications to GFR  -continue phos binders

## 2023-01-18 NOTE — HPI
Adryan Neff is a 57 year old Black man with hypertension, diabetes mellitus type 2 (treated with insulin) with retinopathy, coronary artery disease, end stage renal disease on hemodialysis (Monday Wednesday Friday), secondary renal hyperparathyroidism, peripheral artery disease, chronic diastolic heart failure, chronic pulmonary heart disease, history of latent tuberculosis, history of left toe amputation on 1/6/2022, history of left above knee amputation on 1/21/2022, history of right above knee amputation on 10/13/2022. He lives in [facility]. His primary care physician is Dr. Carola Pedro.               He was hospitalized at [facility] from 11/23/2023 to 12/2/2022 for right lower extremity stump abscess, osteomyelitis, and hypoglycemia. Superficial wound culture grew Citrobacter koseri and Pseudomonas aeruginosa. He was treated with meropenem then ciprofloxacin 500 mg daily for 6 weeks (end date 1/9/2023). He was discharged to [facility].              He was admitted to [facility] on 12/8/2022. CT of the right lower extremity on 12/11/2022 showed a couple areas of rim-enhancing fluid collections/phlegmon. He was seen by Orthopedic Surgery. Ciprofloxacin was decreased to a 10 day supply and the course ended around 12/20/2022. He received wound care but refused debridements.               He was seen in [facility] on 1/17/2023. He was encouraged to go to the emergency department due to purulent drainage from the right lower extremity wound. He presented to [facility] Emergency Department on 1/17/2023. Blood cultures were collected. He was given intravenous vancomycin and meropenem. He was admitted to Hospital Medicine Team X.

## 2023-01-18 NOTE — SUBJECTIVE & OBJECTIVE
Past Medical History:   Diagnosis Date    CAD (coronary artery disease) 2016    CAD (non obstructive) 2016 [facility]    Diabetes mellitus type I     Diabetic retinopathy     ESRD on hemodialysis     on HD TThSat    Gangrene of left foot     Hypertension     Nuclear sclerosis of right eye 11/24/2021    PAD (peripheral artery disease)     PEA (Pulseless electrical activity) 10/10/2022    Pulmonary HTN     Right foot infection     TB lung, latent 04/2021    Wet gangrene 1/5/2022       Past Surgical History:   Procedure Laterality Date    ABOVE-KNEE AMPUTATION Left 1/18/2022    Procedure: AMPUTATION, ABOVE KNEE;  Surgeon: Rusty Light MD;  Location: [facility] OR;  Service: Orthopedics;  Laterality: Left;    ABOVE-KNEE AMPUTATION Left 1/21/2022    Procedure: AMPUTATION, ABOVE KNEE;  Surgeon: Rusty Light MD;  Location: [facility] OR;  Service: Orthopedics;  Laterality: Left;    ABOVE-KNEE AMPUTATION Right 10/13/2022    Procedure: AMPUTATION, ABOVE KNEE, RIGHT;  Surgeon: Dimitris Davis MD;  Location: [facility] OR;  Service: Orthopedics;  Laterality: Right;    AV FISTULA PLACEMENT      CATARACT EXTRACTION Left     EYE SURGERY      FISTULOGRAM Left 8/9/2022    Procedure: Fistulogram;  Surgeon: Masoud Soni MD;  Location: [facility] OR;  Service: Vascular;  Laterality: Left;  LEFT VM ON DAUGHTER'S PHONE----PHONE PREOP NOT COMPLETED  CALLED PATIENT ON 8/8/2022 @ 3:34. HE STATED HE IS RESCHEDULING PROCEDURE. NOTIFIED ERWIN @ 3:35PM-LO    FISTULOGRAM Left 10/20/2022    Procedure: Fistulogram;  Surgeon: Masoud Soni MD;  Location: [facility];  Service: Vascular;  Laterality: Left;  Left upper extremity    TOE AMPUTATION Left 1/6/2022    Procedure: AMPUTATION, TOE;  Surgeon: Masoud Soni MD;  Location: [facility] OR;  Service: Vascular;  Laterality: Left;  Left first through fifth toes, possible open transmetatarsal amputation and all other indicated procedures       Review of patient's allergies indicates:  No Known  Allergies    Medications:  Medications Prior to Admission   Medication Sig    amLODIPine (NORVASC) 5 MG tablet Take 1 tablet (5 mg total) by mouth once daily.    aspirin 81 MG Chew Take 81 mg by mouth once daily.    atorvastatin (LIPITOR) 20 MG tablet Take 1 tablet by mouth.    clopidogreL (PLAVIX) 75 mg tablet Take 75 mg by mouth once daily.    epoetin paola-epbx (RETACRIT) 10,000 unit/mL imjection Inject 1 mL (10,000 Units total) into the skin every Mon, Wed, Fri.    folic acid-vit B6-vit B12 2.5-25-2 mg (FOLBIC OR EQUIV) 2.5-25-2 mg Tab Take 1 tablet by mouth once daily.    hydrALAZINE (APRESOLINE) 50 MG tablet Take 2 tablets (100 mg total) by mouth every 8 (eight) hours.    insulin aspart U-100 (NOVOLOG) 100 unit/mL (3 mL) InPn pen Inject 0-5 Units into the skin before meals and at bedtime as needed (Hyperglycemia).    insulin detemir U-100 (LEVEMIR FLEXTOUCH) 100 unit/mL (3 mL) SubQ InPn pen Inject 6 Units into the skin every evening.    lancets (ACCU-CHEK SOFTCLIX LANCETS) Misc 1 each by Misc.(Non-Drug; Combo Route) route once daily at 6am.    metoprolol succinate (TOPROL-XL) 25 MG 24 hr tablet Take 1 tablet by mouth.    omeprazole (PRILOSEC) 40 MG capsule Take 40 mg by mouth once daily.     Antibiotics (From admission, onward)      Start     Stop Route Frequency Ordered    01/18/23 1800  meropenem-0.9% sodium chloride 500 mg/50 mL IVPB         -- IV Every 24 hours (non-standard times) 01/17/23 1856 01/17/23 1953  vancomycin - pharmacy to dose  (vancomycin IVPB)        See Hyperspace for full Linked Orders Report.    -- IV pharmacy to manage frequency 01/17/23 1856          Antifungals (From admission, onward)      None          Antivirals (From admission, onward)          Stop Route Frequency     remdesivir 100 mg        See Hyperspace for full Linked Orders Report.    01/21 0859 IV Daily             Immunization History   Administered Date(s) Administered    COVID-19 Vaccine 01/15/2021    COVID-19, MRNA,  LN-S, PF (MODERNA FULL 0.5 ML DOSE) 01/15/2021, 02/11/2021    Hepatitis B, Adult 07/10/2017, 08/14/2017, 09/11/2017, 03/30/2021, 04/22/2021    Influenza 09/29/2020    Influenza - Quadrivalent - PF *Preferred* (6 months and older) 01/16/2008, 03/30/2016, 12/07/2016    PPD Test 04/04/2017, 01/11/2022, 10/18/2022    Pneumococcal Conjugate - 13 Valent 11/27/2017    Pneumococcal Polysaccharide - 23 Valent 03/30/2016, 10/24/2018    Tdap 03/30/2016       Family History       Problem Relation (Age of Onset)    No Known Problems Mother, Father, Sister, Brother, Daughter, Daughter, Daughter, Brother, Maternal Aunt, Maternal Uncle, Paternal Aunt, Paternal Uncle, Maternal Grandmother, Maternal Grandfather, Paternal Grandmother, Paternal Grandfather          Social History     Socioeconomic History    Marital status: Single    Number of children: 5   Tobacco Use    Smoking status: Never    Smokeless tobacco: Never   Substance and Sexual Activity    Alcohol use: No    Drug use: No    Sexual activity: Yes     Partners: Female   Social History Narrative    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     Social Determinants of Health     Financial Resource Strain: Low Risk     Difficulty of Paying Living Expenses: Not very hard   Food Insecurity: No Food Insecurity    Worried About Running Out of Food in the Last Year: Never true    Ran Out of Food in the Last Year: Never true   Transportation Needs: No Transportation Needs    Lack of Transportation (Medical): No    Lack of Transportation (Non-Medical): No   Physical Activity: Inactive    Days of Exercise per Week: 0 days    Minutes of Exercise per Session: 0 min   Stress: Stress Concern Present    Feeling of Stress : Rather much   Social Connections: Socially Isolated    Frequency of Communication with Friends and Family: More than three times a week    Frequency of Social Gatherings with Friends and Family: More than three times a week    Attends Cheondo[employer] Services: Never    Active Member of Clubs or  Organizations: No    Attends Club or Organization Meetings: Never    Marital Status: Never    Housing Stability: Low Risk     Unable to Pay for Housing in the Last Year: No    Number of Places Lived in the Last Year: 1    Unstable Housing in the Last Year: No     Review of Systems   Constitutional:  Negative for activity change, appetite change, chills, diaphoresis, fatigue and fever.   HENT:  Negative for congestion.    Respiratory:  Negative for cough and shortness of breath.    Cardiovascular:  Negative for leg swelling.   Gastrointestinal:  Negative for abdominal pain, constipation, diarrhea, nausea and vomiting.   Genitourinary:         HD   Musculoskeletal:  Negative for arthralgias and myalgias.   Skin:  Positive for wound. Negative for rash.   Neurological:  Negative for dizziness, weakness and headaches.   Psychiatric/Behavioral:  Negative for agitation and confusion.    Objective:     Vital Signs (Most Recent):  Temp: 98.3 °F (36.8 °C) (01/18/23 1613)  Pulse: 75 (01/18/23 1613)  Resp: 17 (01/18/23 1613)  BP: (!) 155/70 (01/18/23 1613)  SpO2: 95 % (01/18/23 1613)   Vital Signs (24h Range):  Temp:  [97.6 °F (36.4 °C)-98.9 °F (37.2 °C)] 98.3 °F (36.8 °C)  Pulse:  [68-83] 75  Resp:  [16-18] 17  SpO2:  [94 %-100 %] 95 %  BP: (125-177)/(52-79) 155/70     Weight: 56.7 kg (125 lb)  Body mass index is 17.44 kg/m².    Estimated Creatinine Clearance: 7.4 mL/min (A) (based on SCr of 8.7 mg/dL (H)).    Physical Exam  Vitals and nursing note reviewed.   Constitutional:       General: He is not in acute distress.     Appearance: Normal appearance. He is not ill-appearing, toxic-appearing or diaphoretic.   HENT:      Head: Normocephalic and atraumatic.      Nose: No congestion or rhinorrhea.      Mouth/Throat:      Mouth: Mucous membranes are moist.   Eyes:      General: No scleral icterus.     Conjunctiva/sclera: Conjunctivae normal.   Cardiovascular:      Rate and Rhythm: Normal rate.      Heart sounds:     No  gallop.   Pulmonary:      Effort: Pulmonary effort is normal. No respiratory distress.   Abdominal:      General: There is no distension.      Palpations: Abdomen is soft.      Tenderness: There is no abdominal tenderness. There is no guarding.   Musculoskeletal:         General: No tenderness. Normal range of motion.      Cervical back: Normal range of motion.      Comments: Bilateral lower extremity amputations.   Right AKA dressed - c/d/I.  Photos reviewed. See below   Skin:     General: Skin is warm and dry.   Neurological:      General: No focal deficit present.      Mental Status: He is alert and oriented to person, place, and time. Mental status is at baseline.      Sensory: No sensory deficit.      Motor: No weakness.   Psychiatric:         Mood and Affect: Mood normal.         Behavior: Behavior normal.         Thought Content: Thought content normal.         Judgment: Judgment normal.             Significant Labs: Blood Culture:   Recent Labs   Lab 10/11/22  0915 10/17/22  1415 10/17/22  1427 11/23/22  0631 01/17/23  1342   LABBLOO No Growth after 4 days. No Growth after 4 days. No Growth after 4 days. No growth after 5 days.  No growth after 5 days. No Growth to date  No Growth to date  No Growth to date  No Growth to date     CBC:   Recent Labs   Lab 01/17/23  1342 01/17/23  1350 01/18/23  1212   WBC 6.95  --  7.57   HGB 11.0*  --  10.9*   HCT 38.4* 38 36.9*     --  286     CMP:   Recent Labs   Lab 01/17/23  1342 01/18/23  1212    140   K 4.5 4.5    107   CO2 24 22*   GLU 83 105   BUN 27* 38*   CREATININE 6.6* 8.7*   CALCIUM 8.8 8.7   PROT 8.1  --    ALBUMIN 3.3* 3.0*   BILITOT 0.5  --    ALKPHOS 86  --    AST 9*  --    ALT 6*  --    ANIONGAP 12 11     Urine Culture:   Recent Labs   Lab 11/23/22  0958   LABURIN No growth     Urine Studies:   Recent Labs   Lab 11/23/22  0958   COLORU Gina   APPEARANCEUA Cloudy*   PHUR 5.0   SPECGRAV 1.020   PROTEINUA 3+*   GLUCUA Negative    KETONESU Negative   BILIRUBINUA Negative   OCCULTUA 2+*   NITRITE Negative   LEUKOCYTESUR 2+*   RBCUA 10*   WBCUA 28*   BACTERIA Many*   HYALINECASTS 0     Wound Culture:   Recent Labs   Lab 11/23/22  0630   LABAERO CITROBACTER KOSERI  Many  *  CITROBACTER KOSERI  Many  *  PSEUDOMONAS AERUGINOSA  Many  *       Significant Imaging: I have reviewed all pertinent imaging results/findings within the past 24 hours.

## 2023-01-19 LAB
ALBUMIN SERPL BCP-MCNC: 3 G/DL (ref 3.5–5.2)
ANION GAP SERPL CALC-SCNC: 14 MMOL/L (ref 8–16)
BASOPHILS # BLD AUTO: 0.06 K/UL (ref 0–0.2)
BASOPHILS NFR BLD: 1 % (ref 0–1.9)
BUN SERPL-MCNC: 49 MG/DL (ref 6–20)
CALCIUM SERPL-MCNC: 8.7 MG/DL (ref 8.7–10.5)
CHLORIDE SERPL-SCNC: 104 MMOL/L (ref 95–110)
CO2 SERPL-SCNC: 21 MMOL/L (ref 23–29)
CREAT SERPL-MCNC: 10 MG/DL (ref 0.5–1.4)
DIFFERENTIAL METHOD: ABNORMAL
EOSINOPHIL # BLD AUTO: 0.7 K/UL (ref 0–0.5)
EOSINOPHIL NFR BLD: 11.1 % (ref 0–8)
ERYTHROCYTE [DISTWIDTH] IN BLOOD BY AUTOMATED COUNT: 20.3 % (ref 11.5–14.5)
EST. GFR  (NO RACE VARIABLE): 5.5 ML/MIN/1.73 M^2
GLUCOSE SERPL-MCNC: 72 MG/DL (ref 70–110)
HCT VFR BLD AUTO: 34.5 % (ref 40–54)
HGB BLD-MCNC: 10.4 G/DL (ref 14–18)
IMM GRANULOCYTES # BLD AUTO: 0.02 K/UL (ref 0–0.04)
IMM GRANULOCYTES NFR BLD AUTO: 0.3 % (ref 0–0.5)
LYMPHOCYTES # BLD AUTO: 1.6 K/UL (ref 1–4.8)
LYMPHOCYTES NFR BLD: 25.3 % (ref 18–48)
MCH RBC QN AUTO: 27.2 PG (ref 27–31)
MCHC RBC AUTO-ENTMCNC: 30.1 G/DL (ref 32–36)
MCV RBC AUTO: 90 FL (ref 82–98)
MONOCYTES # BLD AUTO: 0.5 K/UL (ref 0.3–1)
MONOCYTES NFR BLD: 7.8 % (ref 4–15)
NEUTROPHILS # BLD AUTO: 3.4 K/UL (ref 1.8–7.7)
NEUTROPHILS NFR BLD: 54.5 % (ref 38–73)
NRBC BLD-RTO: 0 /100 WBC
PHOSPHATE SERPL-MCNC: 4.9 MG/DL (ref 2.7–4.5)
PLATELET # BLD AUTO: 298 K/UL (ref 150–450)
PMV BLD AUTO: 10.3 FL (ref 9.2–12.9)
POCT GLUCOSE: 144 MG/DL (ref 70–110)
POCT GLUCOSE: 85 MG/DL (ref 70–110)
POTASSIUM SERPL-SCNC: 4.8 MMOL/L (ref 3.5–5.1)
RBC # BLD AUTO: 3.83 M/UL (ref 4.6–6.2)
SODIUM SERPL-SCNC: 139 MMOL/L (ref 136–145)
WBC # BLD AUTO: 6.29 K/UL (ref 3.9–12.7)

## 2023-01-19 PROCEDURE — 63600175 PHARM REV CODE 636 W HCPCS: Performed by: STUDENT IN AN ORGANIZED HEALTH CARE EDUCATION/TRAINING PROGRAM

## 2023-01-19 PROCEDURE — 99232 PR SUBSEQUENT HOSPITAL CARE,LEVL II: ICD-10-PCS | Mod: CR,,, | Performed by: STUDENT IN AN ORGANIZED HEALTH CARE EDUCATION/TRAINING PROGRAM

## 2023-01-19 PROCEDURE — 25000003 PHARM REV CODE 250: Performed by: HOSPITALIST

## 2023-01-19 PROCEDURE — 99232 SBSQ HOSP IP/OBS MODERATE 35: CPT | Mod: CR,,, | Performed by: STUDENT IN AN ORGANIZED HEALTH CARE EDUCATION/TRAINING PROGRAM

## 2023-01-19 PROCEDURE — 80069 RENAL FUNCTION PANEL: CPT | Performed by: HOSPITALIST

## 2023-01-19 PROCEDURE — 25000003 PHARM REV CODE 250: Performed by: STUDENT IN AN ORGANIZED HEALTH CARE EDUCATION/TRAINING PROGRAM

## 2023-01-19 PROCEDURE — 27000207 HC ISOLATION

## 2023-01-19 PROCEDURE — 85025 COMPLETE CBC W/AUTO DIFF WBC: CPT | Performed by: HOSPITALIST

## 2023-01-19 PROCEDURE — 36415 COLL VENOUS BLD VENIPUNCTURE: CPT | Performed by: HOSPITALIST

## 2023-01-19 PROCEDURE — 80100014 HC HEMODIALYSIS 1:1

## 2023-01-19 PROCEDURE — 11000001 HC ACUTE MED/SURG PRIVATE ROOM

## 2023-01-19 RX ORDER — SEVELAMER CARBONATE 800 MG/1
800 TABLET, FILM COATED ORAL
Status: DISCONTINUED | OUTPATIENT
Start: 2023-01-19 | End: 2023-01-27

## 2023-01-19 RX ADMIN — ATORVASTATIN CALCIUM 20 MG: 20 TABLET, FILM COATED ORAL at 08:01

## 2023-01-19 RX ADMIN — HEPARIN SODIUM 5000 UNITS: 5000 INJECTION INTRAVENOUS; SUBCUTANEOUS at 10:01

## 2023-01-19 RX ADMIN — SEVELAMER CARBONATE 800 MG: 800 TABLET, FILM COATED ORAL at 11:01

## 2023-01-19 RX ADMIN — REMDESIVIR 100 MG: 100 INJECTION, POWDER, LYOPHILIZED, FOR SOLUTION INTRAVENOUS at 09:01

## 2023-01-19 RX ADMIN — CLOPIDOGREL BISULFATE 75 MG: 75 TABLET ORAL at 08:01

## 2023-01-19 RX ADMIN — HYDRALAZINE HYDROCHLORIDE 100 MG: 50 TABLET ORAL at 05:01

## 2023-01-19 RX ADMIN — HEPARIN SODIUM 5000 UNITS: 5000 INJECTION INTRAVENOUS; SUBCUTANEOUS at 05:01

## 2023-01-19 RX ADMIN — PANTOPRAZOLE SODIUM 40 MG: 40 TABLET, DELAYED RELEASE ORAL at 08:01

## 2023-01-19 RX ADMIN — METOPROLOL SUCCINATE 25 MG: 25 TABLET, EXTENDED RELEASE ORAL at 08:01

## 2023-01-19 RX ADMIN — VANCOMYCIN HYDROCHLORIDE 250 MG: 1 INJECTION, POWDER, LYOPHILIZED, FOR SOLUTION INTRAVENOUS at 04:01

## 2023-01-19 RX ADMIN — MEROPENEM 500 MG: 500 INJECTION INTRAVENOUS at 05:01

## 2023-01-19 RX ADMIN — HYDRALAZINE HYDROCHLORIDE 100 MG: 50 TABLET ORAL at 10:01

## 2023-01-19 RX ADMIN — SEVELAMER CARBONATE 800 MG: 800 TABLET, FILM COATED ORAL at 04:01

## 2023-01-19 RX ADMIN — HYDRALAZINE HYDROCHLORIDE 100 MG: 50 TABLET ORAL at 02:01

## 2023-01-19 RX ADMIN — ASPIRIN 81 MG: 81 TABLET, CHEWABLE ORAL at 08:01

## 2023-01-19 RX ADMIN — AMLODIPINE BESYLATE 10 MG: 10 TABLET ORAL at 08:01

## 2023-01-19 NOTE — PLAN OF CARE
Problem: Adult Inpatient Plan of Care  Goal: Plan of Care Review  Outcome: Ongoing, Progressing     Problem: Adult Inpatient Plan of Care  Goal: Optimal Comfort and Wellbeing  Outcome: Ongoing, Progressing     Problem: Impaired Wound Healing  Goal: Optimal Wound Healing  Outcome: Ongoing, Progressing     Problem: Infection (Hemodialysis)  Goal: Absence of Infection Signs and Symptoms  Outcome: Ongoing, Progressing

## 2023-01-19 NOTE — PROGRESS NOTES
[facility] [address] [place] [address] Medicine  Progress Note    Patient Name: Adryan Neff  MRN: [id number]  Patient Class: IP- Inpatient   Admission Date: 1/17/2023  Length of Stay: 1 days  Attending Physician: Monico Lovett MD  Primary Care Provider: Carola Pedro MD        Subjective:     Principal Problem:Wound infection        HPI:  Adryan Neff is a 57-year-old man with a past medical history of primary hypertension, peripheral arterial disease s/p R AKA, diabetes mellitus, ESRD on HD MWF, who presents to the emergency department with a wound infection. Briefly, the patient was admitted 11/23/22 for R AKA abscess. His workup revealed hypoglycemia and RLE CT showing large abscess with extension into the femoral bone marrow suggestive of osteomyelitis.  ED collected superficial wound swab growing Citrobacter koseri and Pseudomonas Aeruginosa.  IR aspirated 3ml purulence on 11/29/22 with negative cultures (does not appear aerobic culture was performed).  Blood cultures negative. Infectious Disease was consulted.  The patient was initially placed on vanc and pip-tazo, then switched to meropenem 2/2 Citrobacter resistance to pip-tazo.  They recommended ciprofloxacin 500mg daily for 6 weeks starting from date of IR aspiration to treat as osteomyelitis, end date 1/9/22.  He was discharged to [facility] on 12/2/23.     He was then admitted to [facility] 12/8/22.  There was a RLE CT from 12/11/22 that showed couple areas of rim-enhancing fluid collections/phlegmon and was seen by Orthopedic Surgery. The discharge summary does refer to no active infection. Ciprofloxacin was decreased to a 10 day supply. [facility], confirmed ciprofloxacin ended ~12/20/22.  Patient does receive wound care at the facility, but he always refuses debridements.    The the patient was seen in the Infectious Disease Clinic earlier this afternoon.  He was encouraged to present to the emergency department for further  evaluation given concern for infection of his right lower extremity.  The wound appeared infected with purulent drainage.    In the emergency department, the patient was found to be hemodynamically stable.  He had blood cultures collected and was given IV vancomycin and IV meropenem.  He was admitted to hospital medicine for further management.         Overview/Hospital Course:  No acute events since admission.  Patient pending MRI of right lower extremity to look for osteomyelitis per ID recs.      Interval History:  No acute events overnight.  Patient states that he is doing well, denies any right AKA tenderness.  Discussed with he and his daughter about plans for MRI imaging to look for osteomyelitis    Review of Systems   Constitutional:  Negative for chills and fever.   HENT:  Negative for congestion and sore throat.    Eyes:  Negative for photophobia and visual disturbance.   Respiratory:  Negative for cough and shortness of breath.    Cardiovascular:  Negative for chest pain and palpitations.   Gastrointestinal:  Negative for abdominal pain, blood in stool, constipation, diarrhea, nausea and vomiting.   Endocrine: Negative for cold intolerance and heat intolerance.   Genitourinary:  Negative for dysuria and hematuria.   Musculoskeletal:  Positive for gait problem. Negative for arthralgias and myalgias.   Skin:  Negative for rash and wound.   Allergic/Immunologic: Negative for environmental allergies and food allergies.   Neurological:  Negative for seizures and numbness.   Hematological:  Negative for adenopathy. Does not bruise/bleed easily.   Psychiatric/Behavioral:  Negative for hallucinations and suicidal ideas.    Objective:     Vital Signs (Most Recent):  Temp: 98.2 °F (36.8 °C) (01/19/23 0847)  Pulse: 79 (01/19/23 1011)  Resp: 17 (01/19/23 0847)  BP: (!) 143/74 (01/19/23 1011)  SpO2: 97 % (01/19/23 0847)   Vital Signs (24h Range):  Temp:  [98.2 °F (36.8 °C)-98.6 °F (37 °C)] 98.2 °F (36.8 °C)  Pulse:   [70-81] 79  Resp:  [17-18] 17  SpO2:  [94 %-97 %] 97 %  BP: (118-155)/(51-74) 143/74     Weight: 56.7 kg (125 lb)  Body mass index is 17.44 kg/m².  No intake or output data in the 24 hours ending 01/19/23 1020   Physical Exam  Constitutional:       Appearance: He is well-developed.   HENT:      Head: Normocephalic and atraumatic.   Eyes:      Conjunctiva/sclera: Conjunctivae normal.      Pupils: Pupils are equal, round, and reactive to light.   Neck:      Thyroid: No thyromegaly.      Vascular: No JVD.   Cardiovascular:      Rate and Rhythm: Normal rate and regular rhythm.      Heart sounds: Normal heart sounds. No murmur heard.    No friction rub. No gallop.   Pulmonary:      Effort: Pulmonary effort is normal. No respiratory distress.      Breath sounds: Normal breath sounds. No wheezing or rales.   Abdominal:      General: Bowel sounds are normal. There is no distension.      Palpations: Abdomen is soft. There is no mass.      Tenderness: There is no abdominal tenderness. There is no guarding or rebound.      Hernia: No hernia is present.   Musculoskeletal:         General: No deformity. Normal range of motion.      Cervical back: Normal range of motion and neck supple.      Comments: R AKA with crusting along lateral incision. No erythema or induration noted.      Right Lower Extremity: Right leg is amputated above knee.   Skin:     General: Skin is warm and dry.   Neurological:      Mental Status: He is alert and oriented to person, place, and time.      Cranial Nerves: No cranial nerve deficit.   Psychiatric:         Behavior: Behavior normal.       Significant Labs: All pertinent labs within the past 24 hours have been reviewed.  CBC:   Recent Labs   Lab 01/17/23  1342 01/17/23  1350 01/18/23  1212 01/19/23  0610   WBC 6.95  --  7.57 6.29   HGB 11.0*  --  10.9* 10.4*   HCT 38.4* 38 36.9* 34.5*     --  286 298     CMP:   Recent Labs   Lab 01/17/23  1342 01/18/23  1212 01/19/23  0610    140 139   K  4.5 4.5 4.8    107 104   CO2 24 22* 21*   GLU 83 105 72   BUN 27* 38* 49*   CREATININE 6.6* 8.7* 10.0*   CALCIUM 8.8 8.7 8.7   PROT 8.1  --   --    ALBUMIN 3.3* 3.0* 3.0*   BILITOT 0.5  --   --    ALKPHOS 86  --   --    AST 9*  --   --    ALT 6*  --   --    ANIONGAP 12 11 14       Significant Imaging: I have reviewed all pertinent imaging results/findings within the past 24 hours.      Assessment/Plan:      * Wound infection  Patient with known above the knee amputation of right lower extremity.  Patient has had infection of stump in the past, including including prior abscess.  Superficial wound cultures grew Citrobacter Koseri and Pseudomonas Aeruginosa.  Patient seen by infectious disease service and was narrowed to oral ciprofloxacin at discharge.  However, there was confusion given another subsequent hospitalization and ciprofloxacin was discontinued several weeks before anticipated end date.  Now, patient presents with concern for infection given purulent drainage from site.  CT thigh revealing lateral ulceration possibly extending into the bone.  Osteomyelitis can not be excluded.  Patient sent from Infectious Disease Clinic for admission.  - Infectious Disease consulted, appreciate recommendations  -- pending right thigh MRI to evaluate for osteomyelitis  - continue IV vancomycin, pharmacy to assist with dosing  - continue IV meropenem 500 mg daily  - f/u blood cultures and narrow antibiotics accordingly      Anemia in ESRD (end-stage renal disease)  Hemoglobin currently stable, 10.4      PAD (peripheral artery disease)  Patient with extensive peripheral arterial disease with above-the-knee amputation of right lower extremity and below-the-knee amputation left lower extremity.  Resume home aspirin, clopidogrel, atorvastatin.      COVID-19  Patient testing positive for COVID-19 on nasopharyngeal swab in emergency department.  Patient without symptoms.  Given patient's comorbidities, he is at risk for  decompensation.  - begin IV remdesivir 200 mg loading dose; continue IV remdesivir 100 mg daily, 3 days total treatment    ESRD on hemodialysis  Patient on hemodialysis.  He receives his sessions on Mondays, Wednesdays, and Fridays per report.  Electrolytes within normal limits on presentation.  - Nephrology consulted, appreciate recommendations    CAD (coronary artery disease) - non-obstructive from [facility] in 2016  Patient denies any chest pain or shortness of breath on admission.  Resume home aspirin, clopidogrel, atorvastatin.      Essential (primary) hypertension  Blood pressure elevated upon admission.  We will resume home hydralazine, amlodipine, and metoprolol succinate.    Diabetes Mellitus  Patient's FSGs are controlled on current medication regimen.    Last A1c reviewed-   Lab Results   Component Value Date    HGBA1C 4.8 01/17/2023     Most recent fingerstick glucose reviewed-   Recent Labs   Lab 01/17/23  1802 01/17/23  2204   POCTGLUCOSE 101 118*       Maintain anti-hyperglycemic dose as follows-   Antihyperglycemics (From admission, onward)    Start     Stop Route Frequency Ordered    01/17/23 2100  insulin detemir U-100 pen 6 Units         -- SubQ Nightly 01/17/23 1852    01/17/23 1951  insulin aspart U-100 pen 0-5 Units         -- SubQ Before meals & nightly PRN 01/17/23 1852        Hold Oral hypoglycemics while patient is in the hospital.      VTE Risk Mitigation (From admission, onward)         Ordered     heparin (porcine) injection 5,000 Units  Every 8 hours         01/17/23 1848     IP VTE HIGH RISK PATIENT  Once         01/17/23 1848     Place sequential compression device  Until discontinued         01/17/23 1848                Discharge Planning   ENIO: 1/21/2023     Code Status: Full Code   Is the patient medically ready for discharge?: No    Reason for patient still in hospital (select all that apply): Patient trending condition  Discharge Plan A: Return to nursing home                  Monico ALEJANDRO  MD [facility]  Department of Hospital Medicine   [facility] - [facility] 11H

## 2023-01-19 NOTE — SUBJECTIVE & OBJECTIVE
Interval History:  No acute events overnight.  Patient states that he is doing well, denies any right AKA tenderness.  Discussed with he and his daughter about plans for MRI imaging to look for osteomyelitis    Review of Systems   Constitutional:  Negative for chills and fever.   HENT:  Negative for congestion and sore throat.    Eyes:  Negative for photophobia and visual disturbance.   Respiratory:  Negative for cough and shortness of breath.    Cardiovascular:  Negative for chest pain and palpitations.   Gastrointestinal:  Negative for abdominal pain, blood in stool, constipation, diarrhea, nausea and vomiting.   Endocrine: Negative for cold intolerance and heat intolerance.   Genitourinary:  Negative for dysuria and hematuria.   Musculoskeletal:  Positive for gait problem. Negative for arthralgias and myalgias.   Skin:  Negative for rash and wound.   Allergic/Immunologic: Negative for environmental allergies and food allergies.   Neurological:  Negative for seizures and numbness.   Hematological:  Negative for adenopathy. Does not bruise/bleed easily.   Psychiatric/Behavioral:  Negative for hallucinations and suicidal ideas.    Objective:     Vital Signs (Most Recent):  Temp: 98.2 °F (36.8 °C) (01/19/23 0847)  Pulse: 79 (01/19/23 1011)  Resp: 17 (01/19/23 0847)  BP: (!) 143/74 (01/19/23 1011)  SpO2: 97 % (01/19/23 0847)   Vital Signs (24h Range):  Temp:  [98.2 °F (36.8 °C)-98.6 °F (37 °C)] 98.2 °F (36.8 °C)  Pulse:  [70-81] 79  Resp:  [17-18] 17  SpO2:  [94 %-97 %] 97 %  BP: (118-155)/(51-74) 143/74     Weight: 56.7 kg (125 lb)  Body mass index is 17.44 kg/m².  No intake or output data in the 24 hours ending 01/19/23 1020   Physical Exam  Constitutional:       Appearance: He is well-developed.   HENT:      Head: Normocephalic and atraumatic.   Eyes:      Conjunctiva/sclera: Conjunctivae normal.      Pupils: Pupils are equal, round, and reactive to light.   Neck:      Thyroid: No thyromegaly.      Vascular: No  JVD.   Cardiovascular:      Rate and Rhythm: Normal rate and regular rhythm.      Heart sounds: Normal heart sounds. No murmur heard.    No friction rub. No gallop.   Pulmonary:      Effort: Pulmonary effort is normal. No respiratory distress.      Breath sounds: Normal breath sounds. No wheezing or rales.   Abdominal:      General: Bowel sounds are normal. There is no distension.      Palpations: Abdomen is soft. There is no mass.      Tenderness: There is no abdominal tenderness. There is no guarding or rebound.      Hernia: No hernia is present.   Musculoskeletal:         General: No deformity. Normal range of motion.      Cervical back: Normal range of motion and neck supple.      Comments: R AKA with crusting along lateral incision. No erythema or induration noted.      Right Lower Extremity: Right leg is amputated above knee.   Skin:     General: Skin is warm and dry.   Neurological:      Mental Status: He is alert and oriented to person, place, and time.      Cranial Nerves: No cranial nerve deficit.   Psychiatric:         Behavior: Behavior normal.       Significant Labs: All pertinent labs within the past 24 hours have been reviewed.  CBC:   Recent Labs   Lab 01/17/23  1342 01/17/23  1350 01/18/23  1212 01/19/23  0610   WBC 6.95  --  7.57 6.29   HGB 11.0*  --  10.9* 10.4*   HCT 38.4* 38 36.9* 34.5*     --  286 298     CMP:   Recent Labs   Lab 01/17/23  1342 01/18/23  1212 01/19/23  0610    140 139   K 4.5 4.5 4.8    107 104   CO2 24 22* 21*   GLU 83 105 72   BUN 27* 38* 49*   CREATININE 6.6* 8.7* 10.0*   CALCIUM 8.8 8.7 8.7   PROT 8.1  --   --    ALBUMIN 3.3* 3.0* 3.0*   BILITOT 0.5  --   --    ALKPHOS 86  --   --    AST 9*  --   --    ALT 6*  --   --    ANIONGAP 12 11 14       Significant Imaging: I have reviewed all pertinent imaging results/findings within the past 24 hours.

## 2023-01-19 NOTE — ASSESSMENT & PLAN NOTE
Patient testing positive for COVID-19 on nasopharyngeal swab in emergency department.  Patient without symptoms.  Given patient's comorbidities, he is at risk for decompensation.  - begin IV remdesivir 200 mg loading dose; continue IV remdesivir 100 mg daily, 3 days total treatment

## 2023-01-19 NOTE — ASSESSMENT & PLAN NOTE
Patient with known above the knee amputation of right lower extremity.  Patient has had infection of stump in the past, including including prior abscess.  Superficial wound cultures grew Citrobacter Koseri and Pseudomonas Aeruginosa.  Patient seen by infectious disease service and was narrowed to oral ciprofloxacin at discharge.  However, there was confusion given another subsequent hospitalization and ciprofloxacin was discontinued several weeks before anticipated end date.  Now, patient presents with concern for infection given purulent drainage from site.  CT thigh revealing lateral ulceration possibly extending into the bone.  Osteomyelitis can not be excluded.  Patient sent from Infectious Disease Clinic for admission.  - Infectious Disease consulted, appreciate recommendations  -- pending right thigh MRI to evaluate for osteomyelitis  - continue IV vancomycin, pharmacy to assist with dosing  - continue IV meropenem 500 mg daily  - f/u blood cultures and narrow antibiotics accordingly

## 2023-01-19 NOTE — PROGRESS NOTES
3 hour dialysis complete.  Blood returned.  Needles pulled from PHIL graft.  Pressure held x 5 minutes.  Hemostasis achieved.  Covered with gauze and paper tape. +thrill +bruit.    Net UF 2L.  Tolerated well.

## 2023-01-19 NOTE — HOSPITAL COURSE
MRI of the right femur showed possible early osteomyelitis, but Orthopedic Surgery reviewed the images and were not concerned about osteomyelitis, instead recommending continuing antibiotics and local wound care. Infectious Disease suggested nuclear medicine bone scan. He was transferred to Hospital Medicine Team B on 1/22/2023. Interventional Radiology performed bone biopsy on 1/25/2023. He was put on ciprofloxacin and vancomycin while awaiting results. He can follow up in clinic for make further medication adjustments. If bone biopsy shows no osteomyelitis, antibiotics can be discontinued.

## 2023-01-19 NOTE — PT/OT/SLP PROGRESS
Physical Therapy      Patient Name:  Adryan Neff   MRN:  [id number]    Patient not seen today secondary to per OBS rounds, pt will not benefit from acute PT services at this time. Current orders discontinued.

## 2023-01-20 LAB
ALBUMIN SERPL BCP-MCNC: 3.1 G/DL (ref 3.5–5.2)
ANION GAP SERPL CALC-SCNC: 15 MMOL/L (ref 8–16)
BUN SERPL-MCNC: 37 MG/DL (ref 6–20)
CALCIUM SERPL-MCNC: 9.1 MG/DL (ref 8.7–10.5)
CHLORIDE SERPL-SCNC: 103 MMOL/L (ref 95–110)
CO2 SERPL-SCNC: 24 MMOL/L (ref 23–29)
CREAT SERPL-MCNC: 8.1 MG/DL (ref 0.5–1.4)
ERYTHROCYTE [DISTWIDTH] IN BLOOD BY AUTOMATED COUNT: 20.6 % (ref 11.5–14.5)
EST. GFR  (NO RACE VARIABLE): 7.1 ML/MIN/1.73 M^2
GLUCOSE SERPL-MCNC: 82 MG/DL (ref 70–110)
HCT VFR BLD AUTO: 38.3 % (ref 40–54)
HGB BLD-MCNC: 11.1 G/DL (ref 14–18)
MCH RBC QN AUTO: 26.3 PG (ref 27–31)
MCHC RBC AUTO-ENTMCNC: 29 G/DL (ref 32–36)
MCV RBC AUTO: 91 FL (ref 82–98)
PHOSPHATE SERPL-MCNC: 4.6 MG/DL (ref 2.7–4.5)
PLATELET # BLD AUTO: 296 K/UL (ref 150–450)
PMV BLD AUTO: 9.8 FL (ref 9.2–12.9)
POCT GLUCOSE: 106 MG/DL (ref 70–110)
POCT GLUCOSE: 91 MG/DL (ref 70–110)
POCT GLUCOSE: 95 MG/DL (ref 70–110)
POTASSIUM SERPL-SCNC: 4.6 MMOL/L (ref 3.5–5.1)
RBC # BLD AUTO: 4.22 M/UL (ref 4.6–6.2)
SODIUM SERPL-SCNC: 142 MMOL/L (ref 136–145)
VANCOMYCIN SERPL-MCNC: 17.6 UG/ML
WBC # BLD AUTO: 7.14 K/UL (ref 3.9–12.7)

## 2023-01-20 PROCEDURE — 99233 PR SUBSEQUENT HOSPITAL CARE,LEVL III: ICD-10-PCS | Mod: ,,, | Performed by: NURSE PRACTITIONER

## 2023-01-20 PROCEDURE — 25000003 PHARM REV CODE 250: Performed by: STUDENT IN AN ORGANIZED HEALTH CARE EDUCATION/TRAINING PROGRAM

## 2023-01-20 PROCEDURE — 63600175 PHARM REV CODE 636 W HCPCS: Mod: TB | Performed by: STUDENT IN AN ORGANIZED HEALTH CARE EDUCATION/TRAINING PROGRAM

## 2023-01-20 PROCEDURE — 99233 SBSQ HOSP IP/OBS HIGH 50: CPT | Mod: ,,, | Performed by: NURSE PRACTITIONER

## 2023-01-20 PROCEDURE — 99232 SBSQ HOSP IP/OBS MODERATE 35: CPT | Mod: CR,,, | Performed by: STUDENT IN AN ORGANIZED HEALTH CARE EDUCATION/TRAINING PROGRAM

## 2023-01-20 PROCEDURE — 99232 PR SUBSEQUENT HOSPITAL CARE,LEVL II: ICD-10-PCS | Mod: CR,,, | Performed by: STUDENT IN AN ORGANIZED HEALTH CARE EDUCATION/TRAINING PROGRAM

## 2023-01-20 PROCEDURE — 27000207 HC ISOLATION

## 2023-01-20 PROCEDURE — 80069 RENAL FUNCTION PANEL: CPT | Performed by: STUDENT IN AN ORGANIZED HEALTH CARE EDUCATION/TRAINING PROGRAM

## 2023-01-20 PROCEDURE — 80202 ASSAY OF VANCOMYCIN: CPT | Performed by: STUDENT IN AN ORGANIZED HEALTH CARE EDUCATION/TRAINING PROGRAM

## 2023-01-20 PROCEDURE — 11000001 HC ACUTE MED/SURG PRIVATE ROOM

## 2023-01-20 PROCEDURE — 85027 COMPLETE CBC AUTOMATED: CPT | Performed by: STUDENT IN AN ORGANIZED HEALTH CARE EDUCATION/TRAINING PROGRAM

## 2023-01-20 PROCEDURE — 25000003 PHARM REV CODE 250: Performed by: HOSPITALIST

## 2023-01-20 PROCEDURE — 63600175 PHARM REV CODE 636 W HCPCS: Performed by: STUDENT IN AN ORGANIZED HEALTH CARE EDUCATION/TRAINING PROGRAM

## 2023-01-20 RX ORDER — SODIUM CHLORIDE 9 MG/ML
INJECTION, SOLUTION INTRAVENOUS ONCE
Status: DISCONTINUED | OUTPATIENT
Start: 2023-01-21 | End: 2023-01-23

## 2023-01-20 RX ADMIN — PANTOPRAZOLE SODIUM 40 MG: 40 TABLET, DELAYED RELEASE ORAL at 09:01

## 2023-01-20 RX ADMIN — HEPARIN SODIUM 5000 UNITS: 5000 INJECTION INTRAVENOUS; SUBCUTANEOUS at 10:01

## 2023-01-20 RX ADMIN — SEVELAMER CARBONATE 800 MG: 800 TABLET, FILM COATED ORAL at 05:01

## 2023-01-20 RX ADMIN — METOPROLOL SUCCINATE 25 MG: 25 TABLET, EXTENDED RELEASE ORAL at 09:01

## 2023-01-20 RX ADMIN — HYDRALAZINE HYDROCHLORIDE 100 MG: 50 TABLET ORAL at 10:01

## 2023-01-20 RX ADMIN — SEVELAMER CARBONATE 800 MG: 800 TABLET, FILM COATED ORAL at 01:01

## 2023-01-20 RX ADMIN — REMDESIVIR 100 MG: 100 INJECTION, POWDER, LYOPHILIZED, FOR SOLUTION INTRAVENOUS at 09:01

## 2023-01-20 RX ADMIN — HYDRALAZINE HYDROCHLORIDE 100 MG: 50 TABLET ORAL at 01:01

## 2023-01-20 RX ADMIN — AMLODIPINE BESYLATE 10 MG: 10 TABLET ORAL at 09:01

## 2023-01-20 RX ADMIN — HYDRALAZINE HYDROCHLORIDE 100 MG: 50 TABLET ORAL at 05:01

## 2023-01-20 RX ADMIN — SEVELAMER CARBONATE 800 MG: 800 TABLET, FILM COATED ORAL at 09:01

## 2023-01-20 RX ADMIN — MEROPENEM 500 MG: 500 INJECTION INTRAVENOUS at 05:01

## 2023-01-20 RX ADMIN — ATORVASTATIN CALCIUM 20 MG: 20 TABLET, FILM COATED ORAL at 09:01

## 2023-01-20 RX ADMIN — ASPIRIN 81 MG: 81 TABLET, CHEWABLE ORAL at 09:01

## 2023-01-20 RX ADMIN — CLOPIDOGREL BISULFATE 75 MG: 75 TABLET ORAL at 09:01

## 2023-01-20 NOTE — CONSULTS
[facility] - Observation 11H  Orthopedics  Consult Note    Patient Name: Adryan Neff  MRN: [id number]  Admission Date: 1/17/2023  Hospital Length of Stay: 2 days  Attending Provider: Monico Lovett MD  Primary Care Provider: Carola Pedro MD      Inpatient consult to Orthopedics  Consult performed by: Montana Naik MD  Consult ordered by: Monico Lovett MD        Subjective:     Principal Problem:Wound infection    Chief Complaint:   Chief Complaint   Patient presents with    Wound Infection     Sent from id clinic aka with abscess,        HPI: 57-year-old Creole-speaking male with PMHx of HTN/ PAD/ insulin-dependent T2DM s/p bilateral AKA and ESRD on HD (MWF) who was referred to the [facility] ED from ID clinic with a R AKA wound infection.     The patient was admitted 11/23/22 for R AKA abscess with extension into the femoral bone marrow suggestive of osteomyelitis. Superficial wound swab at the time grew Citrobacter koseri and Pseudomonas Aeruginosa.  IR aspirated 3ml purulence on 11/29/22 with negative cultures and negative blood cultures negative. Infectious disease recommended ciprofloxacin 500mg daily for 6 weeks starting from aspiration day to 1/9/22 and was discharged to [facility] on 12/2/23. Subsequently he was admitted to [place] 12/8/22 and CT showed areas of rim-enhancing fluid collections/phlegmon and was seen by Orthopedic Surgery and managed as no active infection. Ciprofloxacin was decreased to a 10 day supply. Patient receives wound care at the facility, but refuses debridements.     Patient is now admitted to  after being referred to [facility] ED from ID clinic for further management. Ortho consulted for management recs after MRI was obtained.    Denies chemotherapy, radiation therapy, or immunosuppressant use. He denies anticoagulation use at baseline. Patient is unable to ambulate due to bilateral AKAs.       Past Medical History:   Diagnosis Date    CAD (coronary artery disease) 2016     CAD (non obstructive) 2016 [facility]    Diabetes mellitus type I     Diabetic retinopathy     ESRD on hemodialysis     on HD TThSat    Gangrene of left foot     Hypertension     Nuclear sclerosis of right eye 11/24/2021    PAD (peripheral artery disease)     PEA (Pulseless electrical activity) 10/10/2022    Pulmonary HTN     Right foot infection     TB lung, latent 04/2021    Wet gangrene 1/5/2022       Past Surgical History:   Procedure Laterality Date    ABOVE-KNEE AMPUTATION Left 1/18/2022    Procedure: AMPUTATION, ABOVE KNEE;  Surgeon: Rusty Light MD;  Location: [facility] OR;  Service: Orthopedics;  Laterality: Left;    ABOVE-KNEE AMPUTATION Left 1/21/2022    Procedure: AMPUTATION, ABOVE KNEE;  Surgeon: Rusty Light MD;  Location: [facility] OR;  Service: Orthopedics;  Laterality: Left;    ABOVE-KNEE AMPUTATION Right 10/13/2022    Procedure: AMPUTATION, ABOVE KNEE, RIGHT;  Surgeon: Dimitris Davis MD;  Location: [facility] OR;  Service: Orthopedics;  Laterality: Right;    AV FISTULA PLACEMENT      CATARACT EXTRACTION Left     EYE SURGERY      FISTULOGRAM Left 8/9/2022    Procedure: Fistulogram;  Surgeon: Masoud Soni MD;  Location: [facility] OR;  Service: Vascular;  Laterality: Left;  LEFT VM ON DAUGHTER'S PHONE----PHONE PREOP NOT COMPLETED  CALLED PATIENT ON 8/8/2022 @ 3:34. HE STATED HE IS RESCHEDULING PROCEDURE. NOTIFIED ERWIN @ 3:35PM-LO    FISTULOGRAM Left 10/20/2022    Procedure: Fistulogram;  Surgeon: Masoud Soni MD;  Location: [facility];  Service: Vascular;  Laterality: Left;  Left upper extremity    TOE AMPUTATION Left 1/6/2022    Procedure: AMPUTATION, TOE;  Surgeon: Masoud Soni MD;  Location: [facility] OR;  Service: Vascular;  Laterality: Left;  Left first through fifth toes, possible open transmetatarsal amputation and all other indicated procedures       Review of patient's allergies indicates:  No Known Allergies    Current Facility-Administered Medications    Medication    0.9%  NaCl infusion    acetaminophen tablet 650 mg    albuterol-ipratropium 2.5 mg-0.5 mg/3 mL nebulizer solution 3 mL    amLODIPine tablet 10 mg    aspirin chewable tablet 81 mg    atorvastatin tablet 20 mg    clopidogreL tablet 75 mg    dextrose 10% bolus 125 mL 125 mL    dextrose 10% bolus 250 mL 250 mL    glucagon (human recombinant) injection 1 mg    glucose chewable tablet 16 g    glucose chewable tablet 24 g    heparin (porcine) injection 5,000 Units    hydrALAZINE tablet 100 mg    insulin aspart U-100 pen 0-5 Units    melatonin tablet 6 mg    meropenem (MERREM) 500 mg in sodium chloride 0.9 % 100 mL IVPB (MB+)    metoprolol succinate (TOPROL-XL) 24 hr tablet 25 mg    naloxone 0.4 mg/mL injection 0.02 mg    ondansetron disintegrating tablet 4 mg    ondansetron injection 4 mg    pantoprazole EC tablet 40 mg    polyethylene glycol packet 17 g    remdesivir 100 mg in sodium chloride 0.9% 250 mL infusion    sevelamer carbonate tablet 800 mg    simethicone chewable tablet 80 mg    sodium chloride 0.9% flush 10 mL    vancomycin - pharmacy to dose     Family History       Problem Relation (Age of Onset)    No Known Problems Mother, Father, Sister, Brother, Daughter, Daughter, Daughter, Brother, Maternal Aunt, Maternal Uncle, Paternal Aunt, Paternal Uncle, Maternal Grandmother, Maternal Grandfather, Paternal Grandmother, Paternal Grandfather          Tobacco Use    Smoking status: Never    Smokeless tobacco: Never   Substance and Sexual Activity    Alcohol use: No    Drug use: No    Sexual activity: Yes     Partners: Female     Review of Systems   Constitutional: Negative for chills, decreased appetite, fever, night sweats and weight loss.   Eyes:  Negative for blurred vision and visual disturbance.   Cardiovascular:  Negative for claudication and leg swelling.   Respiratory:  Negative for shortness of breath and sputum production.    Hematologic/Lymphatic: Negative  "for adenopathy and bleeding problem.   Skin:  Positive for color change and poor wound healing. Negative for itching and rash.   Musculoskeletal:  Negative for back pain, joint pain, joint swelling, muscle cramps, myalgias and stiffness.   Gastrointestinal:  Negative for abdominal pain, nausea and vomiting.   Genitourinary:  Negative for flank pain and hematuria.   Neurological:  Negative for dizziness, light-headedness and paresthesias.   Psychiatric/Behavioral:  Negative for altered mental status and depression.    Allergic/Immunologic: Negative for hives.   Objective:     Vital Signs (Most Recent):  Temp: 98.5 °F (36.9 °C) (01/20/23 0852)  Pulse: 84 (01/20/23 0852)  Resp: 18 (01/20/23 0852)  BP: (!) 185/75 (01/20/23 0852)  SpO2: 95 % (01/20/23 0852)   Vital Signs (24h Range):  Temp:  [97.9 °F (36.6 °C)-98.9 °F (37.2 °C)] 98.5 °F (36.9 °C)  Pulse:  [63-85] 84  Resp:  [17-18] 18  SpO2:  [94 %-97 %] 95 %  BP: (113-185)/(53-80) 185/75     Weight: 56.7 kg (125 lb)  Height: 5' 10.98" (180.3 cm)  Body mass index is 17.44 kg/m².      Intake/Output Summary (Last 24 hours) at 1/20/2023 0916  Last data filed at 1/20/2023 0608  Gross per 24 hour   Intake 840 ml   Output 2600 ml   Net -1760 ml       General    Nursing note and vitals reviewed.        Gen:  No acute distress, well-developed, well nourished.  CV:  Peripherally well-perfused. 2+ radial pulses, symmetric.  Respiratory:  Normal respiratory effort. No accessory muscle use.   Head/Neck:  Normocephalic.  Atraumatic. Sclera anicteric. TM. Neck supple.  Neuro: CN 2-12 grossly intact. No FND. Awake. Alert. Oriented to person, place, time, and situation.  Abdomen: Soft, NTND.      MSK:  Right Upper extremity  Inspection  - Skin intact throughout, no open wounds  - No swelling  - No ecchymosis, erythema, or signs of cellulitis  Palpation  - NonTTP throughout, no palpable abnormality  Range of motion  - AROM and PROM of the shoulder, elbow, wrist, and hand " "intact  Stability  - No evidence of joint dislocation or abnormal laxity   Neurovascular  - AIN/PIN/Radial/Median/Ulnar Nerves assessed in isolation without deficit  - Able to give thumbs up, make "OK" sign, cross IF/LF, abduct/adduct fingers, make fist  - SILT throughout  - Compartments soft  - Radial artery palpated  - Capillary Refill <3s  - Muscle tone normal    Left Upper extremity  Inspection  - Skin intact throughout, no open wounds  - No swelling  - No ecchymosis, erythema, or signs of cellulitis  Palpation  - NonTTP throughout, no palpable abnormality  Range of motion  - AROM and PROM of the shoulder, elbow, wrist, and hand intact  Stability  - No evidence of joint dislocation or abnormal laxity   Neurovascular  - AIN/PIN/Radial/Median/Ulnar Nerves assessed in isolation without deficit  - Able to give thumbs up, make "OK" sign, cross IF/LF, abduct/adduct fingers, make fist  - SILT throughout  - Compartments soft  - Radial artery palpated  - Capillary Refill <3s  - Muscle tone normal      Right Lower Extremity  Inspection  - Partially granulated wound (10cm) overlying AKA incision. No active drainage.  - No swelling  - No ecchymosis, erythema, or signs of cellulitis  Palpation  - NonTTP throughout, no palpable abnormality Range of motion  - AROM and PROM of the hip  - No evidence of joint dislocation or abnormal laxity,  Neurovascular  - SILT throughout  - Compartments soft  - DP palpated   - Capillary Refill <3s  - Negative Log roll  - Negative [place]  - Muscle tone normal    Left Lower Extremity  Inspection  - Well healed AKA  - No swelling  - No ecchymosis, erythema, or signs of cellulitis  Palpation  - NonTTP throughout, no palpable abnormality Range of motion  - AROM and PROM of the hip  - No evidence of joint dislocation or abnormal laxity,  Neurovascular  - SILT throughout  - Compartments soft  - DP palpated   - Capillary Refill <3s  - Negative Log roll  - Negative [place]  - Muscle tone " normal    Spine/pelvis/axial body:  No tenderness to palpation of cervical, thoracic, or lumbar spine  No pain with compression of pelvis  No chest wall or abdominal tenderness  No decubitus ulcers  Muscle tone normal      Significant Labs: All pertinent labs within the past 24 hours have been reviewed.    Significant Imaging: I have reviewed and interpreted all pertinent imaging results/findings.    MRI without contrast does not show any focal fluid collections or definitive signs of osteomyelitis. No cortical erosion, cloacae, or sequestrum.     Assessment/Plan:     * Wound infection  Adryan Neff is a 57 y.o. male with PMH of HTN/ PAD/ insulin-dependent T2DM s/p bilateral AKA and ESRD on HD (MWF) who was referred to the [facility] ED from ID clinic with a R AKA wound infection. Wound looks improved after debridement by wound care. MRI findings do not show active osteomyelitis or focal fluid collection.       The patient has been managed by orthopedic surgeon Dr. Davis at [facility].     As patient is currently stable, wound recommend antibiotic suppression, local wound care, and follow up with primary surgeon on an outpatient basis. No acute orthopedic intervention anticipated.           Montana Naik MD  Orthopedics  [facility] - Observation 11H

## 2023-01-20 NOTE — ASSESSMENT & PLAN NOTE
Patient with known above the knee amputation of right lower extremity.  Patient has had infection of stump in the past, including including prior abscess.  Superficial wound cultures grew Citrobacter Koseri and Pseudomonas Aeruginosa.  Patient seen by infectious disease service and was narrowed to oral ciprofloxacin at discharge.  However, there was confusion given another subsequent hospitalization and ciprofloxacin was discontinued several weeks before anticipated end date.  Now, patient presents with concern for infection given purulent drainage from site.  CT thigh revealing lateral ulceration possibly extending into the bone.  Osteomyelitis can not be excluded.  Patient sent from Infectious Disease Clinic for admission.  - Infectious Disease consulted, appreciate recommendations  -- R femur MRI with either microtrauma vs early OM per rads report. Ortho evaluated patient and recommending local wound care/continued abx for now. Low suspicion for osteo.  - continue IV vancomycin, pharmacy to assist with dosing  - continue IV meropenem 500 mg daily  - f/u blood cultures and narrow antibiotics accordingly

## 2023-01-20 NOTE — HPI
57-year-old Creole-speaking male with PMHx of HTN/ PAD/ insulin-dependent T2DM s/p bilateral AKA and ESRD on HD (MWF) who was referred to the [facility] ED from ID clinic with a R AKA wound infection.     The patient was admitted 11/23/22 for R AKA abscess with extension into the femoral bone marrow suggestive of osteomyelitis. Superficial wound swab at the time grew Citrobacter koseri and Pseudomonas Aeruginosa.  IR aspirated 3ml purulence on 11/29/22 with negative cultures and negative blood cultures negative. Infectious disease recommended ciprofloxacin 500mg daily for 6 weeks starting from aspiration day to 1/9/22 and was discharged to [facility] on 12/2/23. Subsequently he was admitted to [place] 12/8/22 and CT showed areas of rim-enhancing fluid collections/phlegmon and was seen by Orthopedic Surgery and managed as no active infection. Ciprofloxacin was decreased to a 10 day supply. Patient receives wound care at the facility, but refuses debridements.     Patient is now admitted to  after being referred to [facility] ED from ID clinic for further management. Ortho consulted for management recs after MRI was obtained.    Denies chemotherapy, radiation therapy, or immunosuppressant use. He denies anticoagulation use at baseline. Patient is unable to ambulate due to bilateral AKAs.

## 2023-01-20 NOTE — ASSESSMENT & PLAN NOTE
57-year-old man with a past medical history of primary hypertension, peripheral arterial disease s/p R AKA, DM type 1, ESRD on HD MWF, admitted for concern of osteomyelitis/abcess to R AKA.     Nephrology History  iHD Schedule: MWF   Unit/MD: [facility]  Duration: 3.5 hours   UF: 2L  EDW: 56 kg   Access: PHIL AVF   Last HD prior to presentation: 1/16/23     Assessment:   - Plan for next HD today 1/21/23, for metabolic clearance and volume management. Will be done at bedside due to COVID status    - Dialysate adjusted to current labs,  - Continue to monitor intake and output, daily weights   - Avoid nephrotoxic medication and renal dose medications to GFR  -Strict I & Os

## 2023-01-20 NOTE — PLAN OF CARE
ID Follow Up    Patient not seen today  Chart reviewed.   Afebrile, no leukocytosis.  HDS.  Blood cx NGTD  Remains on IV vancomycin and meropenem  MRI femur pending.    Will follow up after MRI imaging with further recommendations.     Thank you.   Please Secure Chat for any questions or concerns.  KIKI Vieira, ANP-C

## 2023-01-20 NOTE — SUBJECTIVE & OBJECTIVE
Past Medical History:   Diagnosis Date    CAD (coronary artery disease) 2016    CAD (non obstructive) 2016 [facility]    Diabetes mellitus type I     Diabetic retinopathy     ESRD on hemodialysis     on HD TThSat    Gangrene of left foot     Hypertension     Nuclear sclerosis of right eye 11/24/2021    PAD (peripheral artery disease)     PEA (Pulseless electrical activity) 10/10/2022    Pulmonary HTN     Right foot infection     TB lung, latent 04/2021    Wet gangrene 1/5/2022       Past Surgical History:   Procedure Laterality Date    ABOVE-KNEE AMPUTATION Left 1/18/2022    Procedure: AMPUTATION, ABOVE KNEE;  Surgeon: Rusty Light MD;  Location: [facility] OR;  Service: Orthopedics;  Laterality: Left;    ABOVE-KNEE AMPUTATION Left 1/21/2022    Procedure: AMPUTATION, ABOVE KNEE;  Surgeon: Rusty Light MD;  Location: [facility] OR;  Service: Orthopedics;  Laterality: Left;    ABOVE-KNEE AMPUTATION Right 10/13/2022    Procedure: AMPUTATION, ABOVE KNEE, RIGHT;  Surgeon: Dimitris Davis MD;  Location: [facility] OR;  Service: Orthopedics;  Laterality: Right;    AV FISTULA PLACEMENT      CATARACT EXTRACTION Left     EYE SURGERY      FISTULOGRAM Left 8/9/2022    Procedure: Fistulogram;  Surgeon: Masoud Soni MD;  Location: [facility] OR;  Service: Vascular;  Laterality: Left;  LEFT VM ON DAUGHTER'S PHONE----PHONE PREOP NOT COMPLETED  CALLED PATIENT ON 8/8/2022 @ 3:34. HE STATED HE IS RESCHEDULING PROCEDURE. NOTIFIED ERWIN @ 3:35PM-LO    FISTULOGRAM Left 10/20/2022    Procedure: Fistulogram;  Surgeon: Masoud Soni MD;  Location: [facility];  Service: Vascular;  Laterality: Left;  Left upper extremity    TOE AMPUTATION Left 1/6/2022    Procedure: AMPUTATION, TOE;  Surgeon: Masoud Soni MD;  Location: [facility] OR;  Service: Vascular;  Laterality: Left;  Left first through fifth toes, possible open transmetatarsal amputation and all other indicated procedures       Review of patient's allergies indicates:  No Known  Allergies    Current Facility-Administered Medications   Medication    0.9%  NaCl infusion    acetaminophen tablet 650 mg    albuterol-ipratropium 2.5 mg-0.5 mg/3 mL nebulizer solution 3 mL    amLODIPine tablet 10 mg    aspirin chewable tablet 81 mg    atorvastatin tablet 20 mg    clopidogreL tablet 75 mg    dextrose 10% bolus 125 mL 125 mL    dextrose 10% bolus 250 mL 250 mL    glucagon (human recombinant) injection 1 mg    glucose chewable tablet 16 g    glucose chewable tablet 24 g    heparin (porcine) injection 5,000 Units    hydrALAZINE tablet 100 mg    insulin aspart U-100 pen 0-5 Units    melatonin tablet 6 mg    meropenem (MERREM) 500 mg in sodium chloride 0.9 % 100 mL IVPB (MB+)    metoprolol succinate (TOPROL-XL) 24 hr tablet 25 mg    naloxone 0.4 mg/mL injection 0.02 mg    ondansetron disintegrating tablet 4 mg    ondansetron injection 4 mg    pantoprazole EC tablet 40 mg    polyethylene glycol packet 17 g    remdesivir 100 mg in sodium chloride 0.9% 250 mL infusion    sevelamer carbonate tablet 800 mg    simethicone chewable tablet 80 mg    sodium chloride 0.9% flush 10 mL    vancomycin - pharmacy to dose     Family History       Problem Relation (Age of Onset)    No Known Problems Mother, Father, Sister, Brother, Daughter, Daughter, Daughter, Brother, Maternal Aunt, Maternal Uncle, Paternal Aunt, Paternal Uncle, Maternal Grandmother, Maternal Grandfather, Paternal Grandmother, Paternal Grandfather          Tobacco Use    Smoking status: Never    Smokeless tobacco: Never   Substance and Sexual Activity    Alcohol use: No    Drug use: No    Sexual activity: Yes     Partners: Female     Review of Systems   Constitutional: Negative for chills, decreased appetite, fever, night sweats and weight loss.   Eyes:  Negative for blurred vision and visual disturbance.   Cardiovascular:  Negative for claudication and leg swelling.   Respiratory:  Negative for shortness of breath and sputum production.   "  Hematologic/Lymphatic: Negative for adenopathy and bleeding problem.   Skin:  Positive for color change and poor wound healing. Negative for itching and rash.   Musculoskeletal:  Negative for back pain, joint pain, joint swelling, muscle cramps, myalgias and stiffness.   Gastrointestinal:  Negative for abdominal pain, nausea and vomiting.   Genitourinary:  Negative for flank pain and hematuria.   Neurological:  Negative for dizziness, light-headedness and paresthesias.   Psychiatric/Behavioral:  Negative for altered mental status and depression.    Allergic/Immunologic: Negative for hives.   Objective:     Vital Signs (Most Recent):  Temp: 98.5 °F (36.9 °C) (01/20/23 0852)  Pulse: 84 (01/20/23 0852)  Resp: 18 (01/20/23 0852)  BP: (!) 185/75 (01/20/23 0852)  SpO2: 95 % (01/20/23 0852)   Vital Signs (24h Range):  Temp:  [97.9 °F (36.6 °C)-98.9 °F (37.2 °C)] 98.5 °F (36.9 °C)  Pulse:  [63-85] 84  Resp:  [17-18] 18  SpO2:  [94 %-97 %] 95 %  BP: (113-185)/(53-80) 185/75     Weight: 56.7 kg (125 lb)  Height: 5' 10.98" (180.3 cm)  Body mass index is 17.44 kg/m².      Intake/Output Summary (Last 24 hours) at 1/20/2023 0916  Last data filed at 1/20/2023 0608  Gross per 24 hour   Intake 840 ml   Output 2600 ml   Net -1760 ml       General    Nursing note and vitals reviewed.        Gen:  No acute distress, well-developed, well nourished.  CV:  Peripherally well-perfused. 2+ radial pulses, symmetric.  Respiratory:  Normal respiratory effort. No accessory muscle use.   Head/Neck:  Normocephalic.  Atraumatic. Sclera anicteric. TM. Neck supple.  Neuro: CN 2-12 grossly intact. No FND. Awake. Alert. Oriented to person, place, time, and situation.  Abdomen: Soft, NTND.      MSK:  Right Upper extremity  Inspection  - Skin intact throughout, no open wounds  - No swelling  - No ecchymosis, erythema, or signs of cellulitis  Palpation  - NonTTP throughout, no palpable abnormality  Range of motion  - AROM and PROM of the shoulder, " "elbow, wrist, and hand intact  Stability  - No evidence of joint dislocation or abnormal laxity   Neurovascular  - AIN/PIN/Radial/Median/Ulnar Nerves assessed in isolation without deficit  - Able to give thumbs up, make "OK" sign, cross IF/LF, abduct/adduct fingers, make fist  - SILT throughout  - Compartments soft  - Radial artery palpated  - Capillary Refill <3s  - Muscle tone normal    Left Upper extremity  Inspection  - Skin intact throughout, no open wounds  - No swelling  - No ecchymosis, erythema, or signs of cellulitis  Palpation  - NonTTP throughout, no palpable abnormality  Range of motion  - AROM and PROM of the shoulder, elbow, wrist, and hand intact  Stability  - No evidence of joint dislocation or abnormal laxity   Neurovascular  - AIN/PIN/Radial/Median/Ulnar Nerves assessed in isolation without deficit  - Able to give thumbs up, make "OK" sign, cross IF/LF, abduct/adduct fingers, make fist  - SILT throughout  - Compartments soft  - Radial artery palpated  - Capillary Refill <3s  - Muscle tone normal      Right Lower Extremity  Inspection  - Partially granulated wound (10cm) overlying AKA incision. No active drainage.  - No swelling  - No ecchymosis, erythema, or signs of cellulitis  Palpation  - NonTTP throughout, no palpable abnormality Range of motion  - AROM and PROM of the hip  - No evidence of joint dislocation or abnormal laxity,  Neurovascular  - SILT throughout  - Compartments soft  - DP palpated   - Capillary Refill <3s  - Negative Log roll  - Negative [facility]  - Muscle tone normal    Left Lower Extremity  Inspection  - Well healed AKA  - No swelling  - No ecchymosis, erythema, or signs of cellulitis  Palpation  - NonTTP throughout, no palpable abnormality Range of motion  - AROM and PROM of the hip  - No evidence of joint dislocation or abnormal laxity,  Neurovascular  - SILT throughout  - Compartments soft  - DP palpated   - Capillary Refill <3s  - Negative Log roll  - Negative " [facility]  - Muscle tone normal    Spine/pelvis/axial body:  No tenderness to palpation of cervical, thoracic, or lumbar spine  No pain with compression of pelvis  No chest wall or abdominal tenderness  No decubitus ulcers  Muscle tone normal      Significant Labs: All pertinent labs within the past 24 hours have been reviewed.    Significant Imaging: I have reviewed and interpreted all pertinent imaging results/findings.    MRI without contrast does not show any focal fluid collections or definitive signs of osteomyelitis. No cortical erosion, cloacae, or sequestrum.

## 2023-01-20 NOTE — PROGRESS NOTES
[facility]  Nephrology  Progress Note    Patient Name: Adryan Neff  MRN: [id number]  Admission Date: 1/17/2023  Hospital Length of Stay: 2 days  Attending Provider: Monico Lovett MD   Primary Care Physician: Carola Pedro MD  Principal Problem:Wound infection    Subjective:     Interval History: HD yesterday, tolerated well. Net UF 2L.     Review of patient's allergies indicates:  No Known Allergies  Current Facility-Administered Medications   Medication Frequency    [START ON 1/21/2023] 0.9%  NaCl infusion Once    acetaminophen tablet 650 mg Q8H PRN    albuterol-ipratropium 2.5 mg-0.5 mg/3 mL nebulizer solution 3 mL Q4H PRN    amLODIPine tablet 10 mg Daily    aspirin chewable tablet 81 mg Daily    atorvastatin tablet 20 mg Daily    clopidogreL tablet 75 mg Daily    dextrose 10% bolus 125 mL 125 mL PRN    dextrose 10% bolus 250 mL 250 mL PRN    glucagon (human recombinant) injection 1 mg PRN    glucose chewable tablet 16 g PRN    glucose chewable tablet 24 g PRN    heparin (porcine) injection 5,000 Units Q8H    hydrALAZINE tablet 100 mg Q8H    insulin aspart U-100 pen 0-5 Units QID (AC + HS) PRN    melatonin tablet 6 mg Nightly PRN    meropenem (MERREM) 500 mg in sodium chloride 0.9 % 100 mL IVPB (MB+) Q24H    metoprolol succinate (TOPROL-XL) 24 hr tablet 25 mg Daily    naloxone 0.4 mg/mL injection 0.02 mg PRN    ondansetron disintegrating tablet 4 mg Q8H PRN    ondansetron injection 4 mg Q8H PRN    pantoprazole EC tablet 40 mg Daily    polyethylene glycol packet 17 g TID PRN    sevelamer carbonate tablet 800 mg TID WM    simethicone chewable tablet 80 mg QID PRN    sodium chloride 0.9% flush 10 mL Q12H PRN    vancomycin - pharmacy to dose pharmacy to manage frequency       Objective:     Vital Signs (Most Recent):  Temp: 98.5 °F (36.9 °C) (01/20/23 1122)  Pulse: 79 (01/20/23 1122)  Resp: 18 (01/20/23 1122)  BP: (!) 183/96 (01/20/23 1122)  SpO2: 97 % (01/20/23 1122)    Vital Signs (24h Range):  Temp:  [97.9 °F (36.6 °C)-98.9 °F (37.2 °C)] 98.5 °F (36.9 °C)  Pulse:  [63-85] 79  Resp:  [17-18] 18  SpO2:  [94 %-97 %] 97 %  BP: (113-185)/(53-96) 183/96     Weight: 56.7 kg (125 lb) (01/17/23 2145)  Body mass index is 17.44 kg/m².  Body surface area is 1.69 meters squared.    I/O last 3 completed shifts:  In: 840 [P.O.:240; Other:600]  Out: 2600 [Other:2600]    Physical Exam  Vitals and nursing note reviewed.   Constitutional:       Appearance: He is well-developed.   HENT:      Head: Normocephalic and atraumatic.   Eyes:      Conjunctiva/sclera: Conjunctivae normal.      Pupils: Pupils are equal, round, and reactive to light.   Neck:      Thyroid: No thyromegaly.      Vascular: No JVD.   Cardiovascular:      Rate and Rhythm: Normal rate and regular rhythm.      Heart sounds: Normal heart sounds. No murmur heard.    No friction rub. No gallop.   Pulmonary:      Effort: Pulmonary effort is normal. No respiratory distress.      Breath sounds: Normal breath sounds. No wheezing or rales.   Abdominal:      General: Bowel sounds are normal. There is no distension.      Palpations: Abdomen is soft. There is no mass.      Tenderness: There is no abdominal tenderness. There is no guarding or rebound.      Hernia: No hernia is present.   Musculoskeletal:         General: No deformity. Normal range of motion.      Cervical back: Normal range of motion and neck supple.      Comments: R AKA with crusting along lateral incision. No erythema or induration noted.      Right Lower Extremity: Right leg is amputated above knee.   Skin:     General: Skin is warm and dry.   Neurological:      Mental Status: He is alert and oriented to person, place, and time.      Cranial Nerves: No cranial nerve deficit.   Psychiatric:         Behavior: Behavior normal.       Significant Labs:  CBC:   Recent Labs   Lab 01/20/23  0848   WBC 7.14   RBC 4.22*   HGB 11.1*   HCT 38.3*      MCV 91   MCH 26.3*   MCHC 29.0*      CMP:   Recent Labs   Lab 01/17/23  1342 01/18/23  1212 01/20/23  0848   GLU 83   < > 82   CALCIUM 8.8   < > 9.1   ALBUMIN 3.3*   < > 3.1*   PROT 8.1  --   --       < > 142   K 4.5   < > 4.6   CO2 24   < > 24      < > 103   BUN 27*   < > 37*   CREATININE 6.6*   < > 8.1*   ALKPHOS 86  --   --    ALT 6*  --   --    AST 9*  --   --    BILITOT 0.5  --   --     < > = values in this interval not displayed.     All labs within the past 24 hours have been reviewed.       Assessment/Plan:     * Wound infection  - management per primary   ID is following     Anemia in ESRD (end-stage renal disease)  - Hgb goal 10-11  currently at goal   epo with HD     Chronic kidney disease-mineral and bone disorder  -continue sevelamer 800 TID with meals     ESRD on hemodialysis  57-year-old man with a past medical history of primary hypertension, peripheral arterial disease s/p R AKA, DM type 1, ESRD on HD MWF, admitted for concern of osteomyelitis/abcess to R AKA.     Nephrology History  iHD Schedule: MWF   Unit/MD: [facility]  Duration: 3.5 hours   UF: 2L  EDW: 56 kg   Access: [facility] AV[facility]   Last HD prior to presentation: 1/16/23     Assessment:   - Plan for next HD today 1/21/23, for metabolic clearance and volume management. Will be done at bedside due to COVID status    - Dialysate adjusted to current labs,  - Continue to monitor intake and output, daily weights   - Avoid nephrotoxic medication and renal dose medications to GFR  -Strict I & Os         Thank you for your consult. I will follow-up with patient. Please contact us if you have any additional questions.    Odalys Frederick, OMAIRA  Nephrology  [facility]

## 2023-01-20 NOTE — PLAN OF CARE
Problem: Adult Inpatient Plan of Care  Goal: Plan of Care Review  Outcome: Ongoing, Progressing  Goal: Patient-Specific Goal (Individualized)  Outcome: Ongoing, Progressing  Goal: Absence of Hospital-Acquired Illness or Injury  Outcome: Ongoing, Progressing  Goal: Optimal Comfort and Wellbeing  Outcome: Ongoing, Progressing  Goal: Readiness for Transition of Care  Outcome: Ongoing, Progressing     Problem: Infection  Goal: Absence of Infection Signs and Symptoms  Outcome: Ongoing, Progressing     Problem: Diabetes Comorbidity  Goal: Blood Glucose Level Within Targeted Range  Outcome: Ongoing, Progressing     Problem: Adjustment to Illness (Sepsis/Septic Shock)  Goal: Optimal Coping  Outcome: Ongoing, Progressing     Problem: Bleeding (Sepsis/Septic Shock)  Goal: Absence of Bleeding  Outcome: Ongoing, Progressing     Problem: Glycemic Control Impaired (Sepsis/Septic Shock)  Goal: Blood Glucose Level Within Desired Range  Outcome: Ongoing, Progressing     Problem: Infection Progression (Sepsis/Septic Shock)  Goal: Absence of Infection Signs and Symptoms  Outcome: Ongoing, Progressing     Problem: Nutrition Impaired (Sepsis/Septic Shock)  Goal: Optimal Nutrition Intake  Outcome: Ongoing, Progressing     Problem: Skin Injury Risk Increased  Goal: Skin Health and Integrity  Outcome: Ongoing, Progressing     Problem: Impaired Wound Healing  Goal: Optimal Wound Healing  Outcome: Ongoing, Progressing     Problem: Device-Related Complication Risk (Hemodialysis)  Goal: Safe, Effective Therapy Delivery  Outcome: Ongoing, Progressing     Problem: Hemodynamic Instability (Hemodialysis)  Goal: Effective Tissue Perfusion  Outcome: Ongoing, Progressing     Problem: Infection (Hemodialysis)  Goal: Absence of Infection Signs and Symptoms  Outcome: Ongoing, Progressing

## 2023-01-20 NOTE — PLAN OF CARE
No distress overnight, slept at intervals, bp elevated this morning, schd meds given. Drsg to right stump changed. Incontinent of bm this morning, mary anne care done. Repositioned in bed. Call bell within reach.  Problem: Adult Inpatient Plan of Care    Goal: Optimal Comfort and Wellbeing  Outcome: Ongoing, Progressing     Problem: Infection  Goal: Absence of Infection Signs and Symptoms  Outcome: Ongoing, Progressing     Problem: Glycemic Control Impaired (Sepsis/Septic Shock)  Goal: Blood Glucose Level Within Desired Range  Outcome: Ongoing, Progressing

## 2023-01-20 NOTE — PLAN OF CARE
MARCUS updated [facility] on pt status and ENIO via [employer].   Will continue to update plan as needed.  Adan Shelton RN,BSN

## 2023-01-20 NOTE — SUBJECTIVE & OBJECTIVE
Interval History: HD yesterday, tolerated well. Net UF 2L.     Review of patient's allergies indicates:  No Known Allergies  Current Facility-Administered Medications   Medication Frequency    [START ON 1/21/2023] 0.9%  NaCl infusion Once    acetaminophen tablet 650 mg Q8H PRN    albuterol-ipratropium 2.5 mg-0.5 mg/3 mL nebulizer solution 3 mL Q4H PRN    amLODIPine tablet 10 mg Daily    aspirin chewable tablet 81 mg Daily    atorvastatin tablet 20 mg Daily    clopidogreL tablet 75 mg Daily    dextrose 10% bolus 125 mL 125 mL PRN    dextrose 10% bolus 250 mL 250 mL PRN    glucagon (human recombinant) injection 1 mg PRN    glucose chewable tablet 16 g PRN    glucose chewable tablet 24 g PRN    heparin (porcine) injection 5,000 Units Q8H    hydrALAZINE tablet 100 mg Q8H    insulin aspart U-100 pen 0-5 Units QID (AC + HS) PRN    melatonin tablet 6 mg Nightly PRN    meropenem (MERREM) 500 mg in sodium chloride 0.9 % 100 mL IVPB (MB+) Q24H    metoprolol succinate (TOPROL-XL) 24 hr tablet 25 mg Daily    naloxone 0.4 mg/mL injection 0.02 mg PRN    ondansetron disintegrating tablet 4 mg Q8H PRN    ondansetron injection 4 mg Q8H PRN    pantoprazole EC tablet 40 mg Daily    polyethylene glycol packet 17 g TID PRN    sevelamer carbonate tablet 800 mg TID WM    simethicone chewable tablet 80 mg QID PRN    sodium chloride 0.9% flush 10 mL Q12H PRN    vancomycin - pharmacy to dose pharmacy to manage frequency       Objective:     Vital Signs (Most Recent):  Temp: 98.5 °F (36.9 °C) (01/20/23 1122)  Pulse: 79 (01/20/23 1122)  Resp: 18 (01/20/23 1122)  BP: (!) 183/96 (01/20/23 1122)  SpO2: 97 % (01/20/23 1122)   Vital Signs (24h Range):  Temp:  [97.9 °F (36.6 °C)-98.9 °F (37.2 °C)] 98.5 °F (36.9 °C)  Pulse:  [63-85] 79  Resp:  [17-18] 18  SpO2:  [94 %-97 %] 97 %  BP: (113-185)/(53-96) 183/96     Weight: 56.7 kg (125 lb) (01/17/23 2145)  Body mass index is 17.44 kg/m².  Body surface area is 1.69 meters squared.    I/O last 3 completed  shifts:  In: 840 [P.O.:240; Other:600]  Out: 2600 [Other:2600]    Physical Exam  Vitals and nursing note reviewed.   Constitutional:       Appearance: He is well-developed.   HENT:      Head: Normocephalic and atraumatic.   Eyes:      Conjunctiva/sclera: Conjunctivae normal.      Pupils: Pupils are equal, round, and reactive to light.   Neck:      Thyroid: No thyromegaly.      Vascular: No JVD.   Cardiovascular:      Rate and Rhythm: Normal rate and regular rhythm.      Heart sounds: Normal heart sounds. No murmur heard.    No friction rub. No gallop.   Pulmonary:      Effort: Pulmonary effort is normal. No respiratory distress.      Breath sounds: Normal breath sounds. No wheezing or rales.   Abdominal:      General: Bowel sounds are normal. There is no distension.      Palpations: Abdomen is soft. There is no mass.      Tenderness: There is no abdominal tenderness. There is no guarding or rebound.      Hernia: No hernia is present.   Musculoskeletal:         General: No deformity. Normal range of motion.      Cervical back: Normal range of motion and neck supple.      Comments: R AKA with crusting along lateral incision. No erythema or induration noted.      Right Lower Extremity: Right leg is amputated above knee.   Skin:     General: Skin is warm and dry.   Neurological:      Mental Status: He is alert and oriented to person, place, and time.      Cranial Nerves: No cranial nerve deficit.   Psychiatric:         Behavior: Behavior normal.       Significant Labs:  CBC:   Recent Labs   Lab 01/20/23  0848   WBC 7.14   RBC 4.22*   HGB 11.1*   HCT 38.3*      MCV 91   MCH 26.3*   MCHC 29.0*     CMP:   Recent Labs   Lab 01/17/23  1342 01/18/23  1212 01/20/23  0848   GLU 83   < > 82   CALCIUM 8.8   < > 9.1   ALBUMIN 3.3*   < > 3.1*   PROT 8.1  --   --       < > 142   K 4.5   < > 4.6   CO2 24   < > 24      < > 103   BUN 27*   < > 37*   CREATININE 6.6*   < > 8.1*   ALKPHOS 86  --   --    ALT 6*  --   --     AST 9*  --   --    BILITOT 0.5  --   --     < > = values in this interval not displayed.     All labs within the past 24 hours have been reviewed.

## 2023-01-20 NOTE — ASSESSMENT & PLAN NOTE
Adryan Neff is a 57 y.o. male with PMH of HTN/ PAD/ insulin-dependent T2DM s/p bilateral AKA and ESRD on HD (MWF) who was referred to the [facility] ED from ID clinic with a R AKA wound infection. Wound looks improved after debridement by wound care. MRI findings do not show active osteomyelitis or focal fluid collection.       The patient has been managed by orthopedic surgeon Dr. Davis at [facility].     As patient is currently stable, wound recommend antibiotic suppression, local wound care, and follow up with primary surgeon on an outpatient basis. No acute orthopedic intervention anticipated.

## 2023-01-20 NOTE — SUBJECTIVE & OBJECTIVE
Interval History:  No acute events overnight.  Patient denies any history of currently.  Denies any increased tenderness in his right AKA.    Review of Systems   Constitutional:  Negative for chills and fever.   HENT:  Negative for congestion and sore throat.    Eyes:  Negative for photophobia and visual disturbance.   Respiratory:  Negative for cough and shortness of breath.    Cardiovascular:  Negative for chest pain and palpitations.   Gastrointestinal:  Negative for abdominal pain, blood in stool, constipation, diarrhea, nausea and vomiting.   Endocrine: Negative for cold intolerance and heat intolerance.   Genitourinary:  Negative for dysuria and hematuria.   Musculoskeletal:  Positive for gait problem. Negative for arthralgias and myalgias.   Skin:  Negative for rash and wound.   Allergic/Immunologic: Negative for environmental allergies and food allergies.   Neurological:  Negative for seizures and numbness.   Hematological:  Negative for adenopathy. Does not bruise/bleed easily.   Psychiatric/Behavioral:  Negative for hallucinations and suicidal ideas.    Objective:     Vital Signs (Most Recent):  Temp: 98.5 °F (36.9 °C) (01/20/23 1122)  Pulse: 76 (01/20/23 1300)  Resp: 18 (01/20/23 1122)  BP: (!) 181/83 (hydralazine 100 mg po given at this time) (01/20/23 1300)  SpO2: 97 % (01/20/23 1122)   Vital Signs (24h Range):  Temp:  [97.9 °F (36.6 °C)-98.9 °F (37.2 °C)] 98.5 °F (36.9 °C)  Pulse:  [63-85] 76  Resp:  [17-18] 18  SpO2:  [94 %-97 %] 97 %  BP: (113-185)/(53-96) 181/83     Weight: 56.7 kg (125 lb)  Body mass index is 17.44 kg/m².    Intake/Output Summary (Last 24 hours) at 1/20/2023 1345  Last data filed at 1/20/2023 0608  Gross per 24 hour   Intake 840 ml   Output 2600 ml   Net -1760 ml        Physical Exam  Constitutional:       Appearance: He is well-developed.   HENT:      Head: Normocephalic and atraumatic.   Eyes:      Conjunctiva/sclera: Conjunctivae normal.      Pupils: Pupils are equal, round, and  reactive to light.   Neck:      Thyroid: No thyromegaly.      Vascular: No JVD.   Cardiovascular:      Rate and Rhythm: Normal rate and regular rhythm.      Heart sounds: Normal heart sounds. No murmur heard.    No friction rub. No gallop.   Pulmonary:      Effort: Pulmonary effort is normal. No respiratory distress.      Breath sounds: Normal breath sounds. No wheezing or rales.   Abdominal:      General: Bowel sounds are normal. There is no distension.      Palpations: Abdomen is soft. There is no mass.      Tenderness: There is no abdominal tenderness. There is no guarding or rebound.      Hernia: No hernia is present.   Musculoskeletal:         General: No deformity. Normal range of motion.      Cervical back: Normal range of motion and neck supple.      Comments: R AKA with crusting along lateral incision. No erythema or induration noted.      Right Lower Extremity: Right leg is amputated above knee.   Skin:     General: Skin is warm and dry.   Neurological:      Mental Status: He is alert and oriented to person, place, and time.      Cranial Nerves: No cranial nerve deficit.   Psychiatric:         Behavior: Behavior normal.       Significant Labs: All pertinent labs within the past 24 hours have been reviewed.  CBC:   Recent Labs   Lab 01/19/23  0610 01/20/23  0848   WBC 6.29 7.14   HGB 10.4* 11.1*   HCT 34.5* 38.3*    296       CMP:   Recent Labs   Lab 01/19/23  0610 01/20/23  0848    142   K 4.8 4.6    103   CO2 21* 24   GLU 72 82   BUN 49* 37*   CREATININE 10.0* 8.1*   CALCIUM 8.7 9.1   ALBUMIN 3.0* 3.1*   ANIONGAP 14 15         Significant Imaging: I have reviewed all pertinent imaging results/findings within the past 24 hours.

## 2023-01-20 NOTE — PROGRESS NOTES
[facility] - Observation [address] [place] [address] Medicine  Progress Note    Patient Name: Adryan Neff  MRN: [id number]  Patient Class: IP- Inpatient   Admission Date: 1/17/2023  Length of Stay: 2 days  Attending Physician: Monico Lovett MD  Primary Care Provider: Carola Pedro MD        Subjective:     Principal Problem:Wound infection        HPI:  Adryan Neff is a 57-year-old man with a past medical history of primary hypertension, peripheral arterial disease s/p R AKA, diabetes mellitus, ESRD on HD MWF, who presents to the emergency department with a wound infection. Briefly, the patient was admitted 11/23/22 for R AKA abscess. His workup revealed hypoglycemia and RLE CT showing large abscess with extension into the femoral bone marrow suggestive of osteomyelitis.  ED collected superficial wound swab growing Citrobacter koseri and Pseudomonas Aeruginosa.  IR aspirated 3ml purulence on 11/29/22 with negative cultures (does not appear aerobic culture was performed).  Blood cultures negative. Infectious Disease was consulted.  The patient was initially placed on vanc and pip-tazo, then switched to meropenem 2/2 Citrobacter resistance to pip-tazo.  They recommended ciprofloxacin 500mg daily for 6 weeks starting from date of IR aspiration to treat as osteomyelitis, end date 1/9/22.  He was discharged to [facility] on 12/2/23.     He was then admitted to [facility] 12/8/22.  There was a RLE CT from 12/11/22 that showed couple areas of rim-enhancing fluid collections/phlegmon and was seen by Orthopedic Surgery. The discharge summary does refer to no active infection. Ciprofloxacin was decreased to a 10 day supply. [facility], confirmed ciprofloxacin ended ~12/20/22.  Patient does receive wound care at the facility, but he always refuses debridements.    The the patient was seen in the Infectious Disease Clinic earlier this afternoon.  He was encouraged to present to the emergency department for further  evaluation given concern for infection of his right lower extremity.  The wound appeared infected with purulent drainage.    In the emergency department, the patient was found to be hemodynamically stable.  He had blood cultures collected and was given IV vancomycin and IV meropenem.  He was admitted to hospital medicine for further management.         Overview/Hospital Course:  No acute events since admission.  MRI right femur with some possible early osteomyelitis, however after orthopedics reviewed imaging no concern for osteo at this time, recommending continue antibiotics and local wound care.  No need for debridement at this time.      Interval History:  No acute events overnight.  Patient denies any history of currently.  Denies any increased tenderness in his right AKA.    Review of Systems   Constitutional:  Negative for chills and fever.   HENT:  Negative for congestion and sore throat.    Eyes:  Negative for photophobia and visual disturbance.   Respiratory:  Negative for cough and shortness of breath.    Cardiovascular:  Negative for chest pain and palpitations.   Gastrointestinal:  Negative for abdominal pain, blood in stool, constipation, diarrhea, nausea and vomiting.   Endocrine: Negative for cold intolerance and heat intolerance.   Genitourinary:  Negative for dysuria and hematuria.   Musculoskeletal:  Positive for gait problem. Negative for arthralgias and myalgias.   Skin:  Negative for rash and wound.   Allergic/Immunologic: Negative for environmental allergies and food allergies.   Neurological:  Negative for seizures and numbness.   Hematological:  Negative for adenopathy. Does not bruise/bleed easily.   Psychiatric/Behavioral:  Negative for hallucinations and suicidal ideas.    Objective:     Vital Signs (Most Recent):  Temp: 98.5 °F (36.9 °C) (01/20/23 1122)  Pulse: 76 (01/20/23 1300)  Resp: 18 (01/20/23 1122)  BP: (!) 181/83 (hydralazine 100 mg po given at this time) (01/20/23 1300)  SpO2:  97 % (01/20/23 1122)   Vital Signs (24h Range):  Temp:  [97.9 °F (36.6 °C)-98.9 °F (37.2 °C)] 98.5 °F (36.9 °C)  Pulse:  [63-85] 76  Resp:  [17-18] 18  SpO2:  [94 %-97 %] 97 %  BP: (113-185)/(53-96) 181/83     Weight: 56.7 kg (125 lb)  Body mass index is 17.44 kg/m².    Intake/Output Summary (Last 24 hours) at 1/20/2023 1345  Last data filed at 1/20/2023 0608  Gross per 24 hour   Intake 840 ml   Output 2600 ml   Net -1760 ml        Physical Exam  Constitutional:       Appearance: He is well-developed.   HENT:      Head: Normocephalic and atraumatic.   Eyes:      Conjunctiva/sclera: Conjunctivae normal.      Pupils: Pupils are equal, round, and reactive to light.   Neck:      Thyroid: No thyromegaly.      Vascular: No JVD.   Cardiovascular:      Rate and Rhythm: Normal rate and regular rhythm.      Heart sounds: Normal heart sounds. No murmur heard.    No friction rub. No gallop.   Pulmonary:      Effort: Pulmonary effort is normal. No respiratory distress.      Breath sounds: Normal breath sounds. No wheezing or rales.   Abdominal:      General: Bowel sounds are normal. There is no distension.      Palpations: Abdomen is soft. There is no mass.      Tenderness: There is no abdominal tenderness. There is no guarding or rebound.      Hernia: No hernia is present.   Musculoskeletal:         General: No deformity. Normal range of motion.      Cervical back: Normal range of motion and neck supple.      Comments: R AKA with crusting along lateral incision. No erythema or induration noted.      Right Lower Extremity: Right leg is amputated above knee.   Skin:     General: Skin is warm and dry.   Neurological:      Mental Status: He is alert and oriented to person, place, and time.      Cranial Nerves: No cranial nerve deficit.   Psychiatric:         Behavior: Behavior normal.       Significant Labs: All pertinent labs within the past 24 hours have been reviewed.  CBC:   Recent Labs   Lab 01/19/23  0610 01/20/23  0848   WBC  6.29 7.14   HGB 10.4* 11.1*   HCT 34.5* 38.3*    296       CMP:   Recent Labs   Lab 01/19/23  0610 01/20/23  0848    142   K 4.8 4.6    103   CO2 21* 24   GLU 72 82   BUN 49* 37*   CREATININE 10.0* 8.1*   CALCIUM 8.7 9.1   ALBUMIN 3.0* 3.1*   ANIONGAP 14 15         Significant Imaging: I have reviewed all pertinent imaging results/findings within the past 24 hours.      Assessment/Plan:      * Wound infection  Patient with known above the knee amputation of right lower extremity.  Patient has had infection of stump in the past, including including prior abscess.  Superficial wound cultures grew Citrobacter Koseri and Pseudomonas Aeruginosa.  Patient seen by infectious disease service and was narrowed to oral ciprofloxacin at discharge.  However, there was confusion given another subsequent hospitalization and ciprofloxacin was discontinued several weeks before anticipated end date.  Now, patient presents with concern for infection given purulent drainage from site.  CT thigh revealing lateral ulceration possibly extending into the bone.  Osteomyelitis can not be excluded.  Patient sent from Infectious Disease Clinic for admission.  - Infectious Disease consulted, appreciate recommendations  -- R femur MRI with either microtrauma vs early OM per rads report. Ortho evaluated patient and recommending local wound care/continued abx for now. Low suspicion for osteo.  - continue IV vancomycin, pharmacy to assist with dosing  - continue IV meropenem 500 mg daily  - f/u blood cultures and narrow antibiotics accordingly      Anemia in ESRD (end-stage renal disease)  Hemoglobin currently stable, 10.4      PAD (peripheral artery disease)  Patient with extensive peripheral arterial disease with above-the-knee amputation of right lower extremity and below-the-knee amputation left lower extremity.  Resume home aspirin, clopidogrel, atorvastatin.      COVID-19  Patient testing positive for COVID-19 on  nasopharyngeal swab in emergency department.  Patient without symptoms.  Given patient's comorbidities, he is at risk for decompensation.  - begin IV remdesivir 200 mg loading dose; continue IV remdesivir 100 mg daily, 3 days total treatment    ESRD on hemodialysis  Patient on hemodialysis.  He receives his sessions on Mondays, Wednesdays, and Fridays per report.  Electrolytes within normal limits on presentation.  - Nephrology consulted, appreciate recommendations    CAD (coronary artery disease) - non-obstructive from [facility] in 2016  Patient denies any chest pain or shortness of breath on admission.  Resume home aspirin, clopidogrel, atorvastatin.      Essential (primary) hypertension  Blood pressure elevated upon admission.  We will resume home hydralazine, amlodipine, and metoprolol succinate.    Diabetes Mellitus  Patient's FSGs are controlled on current medication regimen.    Last A1c reviewed-   Lab Results   Component Value Date    HGBA1C 4.8 01/17/2023     Most recent fingerstick glucose reviewed-   Recent Labs   Lab 01/17/23  1802 01/17/23  2204   POCTGLUCOSE 101 118*       Maintain anti-hyperglycemic dose as follows-   Antihyperglycemics (From admission, onward)    Start     Stop Route Frequency Ordered    01/17/23 2100  insulin detemir U-100 pen 6 Units         -- SubQ Nightly 01/17/23 1852    01/17/23 1951  insulin aspart U-100 pen 0-5 Units         -- SubQ Before meals & nightly PRN 01/17/23 1852        Hold Oral hypoglycemics while patient is in the hospital.      VTE Risk Mitigation (From admission, onward)         Ordered     heparin (porcine) injection 5,000 Units  Every 8 hours         01/17/23 1848     IP VTE HIGH RISK PATIENT  Once         01/17/23 1848     Place sequential compression device  Until discontinued         01/17/23 1848                Discharge Planning   ENIO: 1/26/2023     Code Status: Full Code   Is the patient medically ready for discharge?: No    Reason for patient still in  hospital (select all that apply): Patient trending condition  Discharge Plan A: Return to nursing home                  Monico Lovett MD  Department of Hospital Medicine   [facility] - Observation 11H

## 2023-01-21 LAB — POCT GLUCOSE: 78 MG/DL (ref 70–110)

## 2023-01-21 PROCEDURE — 11000001 HC ACUTE MED/SURG PRIVATE ROOM

## 2023-01-21 PROCEDURE — 99232 PR SUBSEQUENT HOSPITAL CARE,LEVL II: ICD-10-PCS | Mod: CR,,, | Performed by: STUDENT IN AN ORGANIZED HEALTH CARE EDUCATION/TRAINING PROGRAM

## 2023-01-21 PROCEDURE — 27000207 HC ISOLATION

## 2023-01-21 PROCEDURE — 90935 HEMODIALYSIS ONE EVALUATION: CPT | Mod: ,,, | Performed by: NURSE PRACTITIONER

## 2023-01-21 PROCEDURE — 25000003 PHARM REV CODE 250: Performed by: HOSPITALIST

## 2023-01-21 PROCEDURE — 25000003 PHARM REV CODE 250: Performed by: STUDENT IN AN ORGANIZED HEALTH CARE EDUCATION/TRAINING PROGRAM

## 2023-01-21 PROCEDURE — 63600175 PHARM REV CODE 636 W HCPCS: Performed by: STUDENT IN AN ORGANIZED HEALTH CARE EDUCATION/TRAINING PROGRAM

## 2023-01-21 PROCEDURE — 99232 SBSQ HOSP IP/OBS MODERATE 35: CPT | Mod: CR,,, | Performed by: STUDENT IN AN ORGANIZED HEALTH CARE EDUCATION/TRAINING PROGRAM

## 2023-01-21 PROCEDURE — 90935 PR HEMODIALYSIS, ONE EVALUATION: ICD-10-PCS | Mod: ,,, | Performed by: NURSE PRACTITIONER

## 2023-01-21 PROCEDURE — 80100014 HC HEMODIALYSIS 1:1

## 2023-01-21 RX ADMIN — HYDRALAZINE HYDROCHLORIDE 100 MG: 50 TABLET ORAL at 05:01

## 2023-01-21 RX ADMIN — VANCOMYCIN HYDROCHLORIDE 500 MG: 500 INJECTION, POWDER, LYOPHILIZED, FOR SOLUTION INTRAVENOUS at 05:01

## 2023-01-21 RX ADMIN — HEPARIN SODIUM 5000 UNITS: 5000 INJECTION INTRAVENOUS; SUBCUTANEOUS at 09:01

## 2023-01-21 RX ADMIN — PANTOPRAZOLE SODIUM 40 MG: 40 TABLET, DELAYED RELEASE ORAL at 12:01

## 2023-01-21 RX ADMIN — ATORVASTATIN CALCIUM 20 MG: 20 TABLET, FILM COATED ORAL at 12:01

## 2023-01-21 RX ADMIN — MEROPENEM 500 MG: 500 INJECTION INTRAVENOUS at 05:01

## 2023-01-21 RX ADMIN — ASPIRIN 81 MG: 81 TABLET, CHEWABLE ORAL at 12:01

## 2023-01-21 RX ADMIN — HYDRALAZINE HYDROCHLORIDE 100 MG: 50 TABLET ORAL at 09:01

## 2023-01-21 RX ADMIN — CLOPIDOGREL BISULFATE 75 MG: 75 TABLET ORAL at 12:01

## 2023-01-21 RX ADMIN — AMLODIPINE BESYLATE 10 MG: 10 TABLET ORAL at 12:01

## 2023-01-21 RX ADMIN — SEVELAMER CARBONATE 800 MG: 800 TABLET, FILM COATED ORAL at 12:01

## 2023-01-21 RX ADMIN — METOPROLOL SUCCINATE 25 MG: 25 TABLET, EXTENDED RELEASE ORAL at 12:01

## 2023-01-21 NOTE — PROGRESS NOTES
[facility] - Observation 11H  Infectious Disease  Progress Note    Patient Name: Adryan Neff  MRN: [id number]  Admission Date: 1/17/2023  Length of Stay: 2 days  Attending Physician: Monico Lovett MD  Primary Care Provider: Carola Pedro MD    Isolation Status: Airborne and Contact and Droplet  Assessment/Plan:      * Wound infection     57 year old male with ESRD on HD, DM (HA1C 4.8), PAD  s/p right AKA October 2022, complicated by stump abscess and presumed osteomyelitis 11/23/22 (wound cx citrobacter koseri and pseudomonas), d/c on oral ciprofloxacin with plan for 6 weeks treatment.  Course of antibiotics shortened after admission to [facility] in early December with wound dehiscence. CT imaging there showed showing rim enhancing fluid collections/phlegmon with evaluation by Orthopedics who was not concerned for infection.  Course of ciprofloxacin abbreviated after that admission and he stopped abx on 12/24. Readmitted from ID clinic with concern for worsening wound infection.      CT right thigh with contrast 1/17 - resolution of prior large stump abscess.  Noted lateral ulceration possibly extended to bone.  Unable to exclude osteo.      MRI without contrast 1/19 - Bone marrow edema at distal femur amputation edge - micro trauma edema vs early osteo.  Notes extensive edema throughout suggestive of cellulitis/myositis.  No focal fluid collections or sinus tracts.     Orthopedics consulted.today. Reviewed MRI, does not feel it shows active osteomyelitis. Recommends antibiotic suppression, local wound care and follow up with primary surgeon (Dr. Davis) as outpatient.  No acute orthopedic intervention anticipated      Remains afebrile, hemodynamically stable and on  IV vancomycin and meropenem    Plan/recommendations:  1.  Continue IV vancomycin (pharmacy to dose. Trough goal 15-20) and meropenem (renally dosed) for now.   2.  Recommend Triple Phase NM bone scan to further evaluate for osteo  4.  Continue wound care    5.  Will follow up imaging and follow up this weekend.     Data reviewed and plan discussed with ID staff, Dr. Archer  Secure chat/Discussed above plan with Primary Team, Dr. Lovett        Thank you.   Please Secure Chat for any questions or concerns.  KIKI Vieira, ANP-C      Subjective:     Principal Problem:Wound infection    HPI: Mr. Adryan Neff is a 57 year old with ESR on dialysis, DM (last HA1C 4.8), PAD, s/p right AKA 10/13/22 admitted from ID clinic with concern for worsening right stump infection.  He had recent admission 11/23 with right stump abscess with suspected associated osteomyelitis.  Initial wound cultures + for citrobacter koseri (zosyn R) and Pseudomonas.  IR aspiration cx 11/29 were negative.  He was initially treated with meropenem and then discharged to [facility] 12/2/22 with plan for ciprofloxacin X 6 weeks for presumed osteo.      He was admitted 12/8 to 12/14 to [facility] with wound dehiscence.  Per discharge summary,  Orthopedic Surgery was consulted who did not find any evidence of active infection and he was discharged back to SNF with 10 day course of ciprofloxacin (short of the original 6 week course planned).   He had Vascular Surgery visit on 12/15/22 at [facility] and noted to have wound dehiscence with foul smelling/purulent drainage and ED was recommended but apparently he did  not go.      He was seen by ID for follow up yesterday.  Purulent drainage noted and he was sent to ED.      In ED afebrile, no leukocytosis. COVID +.  Blood cultures obtained and pending CT right femur with contrast showed resolution of large stump abscess from prior but ulcer appeared to extend to bone.  No bony erosion seen but recommended MRI for better evaluation for osteo    Interval History: Remains afebrile, no leukocytosis  Blood cx NGTD  MRI without focal fluid collections. Bone marrow edema at distal amputation edge concerning for early osteo    Review of Systems   Constitutional:   Negative for activity change, appetite change, chills, diaphoresis, fatigue and fever.   HENT:  Negative for congestion.    Respiratory:  Negative for cough and shortness of breath.    Cardiovascular:  Negative for leg swelling.   Gastrointestinal:  Negative for abdominal pain, constipation, diarrhea, nausea and vomiting.   Genitourinary:         HD   Musculoskeletal:  Negative for arthralgias and myalgias.   Skin:  Positive for wound. Negative for rash.   Neurological:  Negative for dizziness, weakness and headaches.   Psychiatric/Behavioral:  Negative for agitation and confusion.    Objective:     Vital Signs (Most Recent):  Temp: 98.3 °F (36.8 °C) (01/20/23 1617)  Pulse: 77 (01/20/23 1849)  Resp: 18 (01/20/23 1617)  BP: (!) 164/70 (01/20/23 1849)  SpO2: 95 % (01/20/23 1617)   Vital Signs (24h Range):  Temp:  [98.3 °F (36.8 °C)-98.9 °F (37.2 °C)] 98.3 °F (36.8 °C)  Pulse:  [63-84] 77  Resp:  [18] 18  SpO2:  [94 %-97 %] 95 %  BP: (113-186)/(54-96) 164/70     Weight: 56.7 kg (125 lb)  Body mass index is 17.44 kg/m².    Estimated Creatinine Clearance: 8 mL/min (A) (based on SCr of 8.1 mg/dL (H)).    Physical Exam  Vitals and nursing note reviewed.   Constitutional:       General: He is not in acute distress.     Appearance: Normal appearance. He is not ill-appearing, toxic-appearing or diaphoretic.   HENT:      Head: Normocephalic and atraumatic.      Nose: No congestion or rhinorrhea.      Mouth/Throat:      Mouth: Mucous membranes are moist.   Eyes:      General: No scleral icterus.     Conjunctiva/sclera: Conjunctivae normal.   Cardiovascular:      Rate and Rhythm: Normal rate and regular rhythm.      Heart sounds: Normal heart sounds.   Pulmonary:      Effort: Pulmonary effort is normal. No respiratory distress.   Abdominal:      Tenderness: There is no abdominal tenderness.   Musculoskeletal:         General: No tenderness.      Cervical back: Normal range of motion.      Comments: Bilateral lower extremity  amputations.   Right AKA dressed - purulent drainage noted on dressing.   See admission photos below   Skin:     General: Skin is warm and dry.   Neurological:      Mental Status: He is alert and oriented to person, place, and time. Mental status is at baseline.   Psychiatric:         Mood and Affect: Mood normal.         Behavior: Behavior normal.         Thought Content: Thought content normal.         Judgment: Judgment normal.             Significant Labs: Blood Culture:   Recent Labs   Lab 10/11/22  0915 10/17/22  1415 10/17/22  1427 11/23/22  0631 01/17/23  1342   LABBLOO No Growth after 4 days. No Growth after 4 days. No Growth after 4 days. No growth after 5 days.  No growth after 5 days. No Growth to date  No Growth to date  No Growth to date  No Growth to date  No Growth to date  No Growth to date  No Growth to date  No Growth to date     CBC:   Recent Labs   Lab 01/19/23  0610 01/20/23  0848   WBC 6.29 7.14   HGB 10.4* 11.1*   HCT 34.5* 38.3*    296     CMP:   Recent Labs   Lab 01/19/23  0610 01/20/23  0848    142   K 4.8 4.6    103   CO2 21* 24   GLU 72 82   BUN 49* 37*   CREATININE 10.0* 8.1*   CALCIUM 8.7 9.1   ALBUMIN 3.0* 3.1*   ANIONGAP 14 15     Wound Culture:   Recent Labs   Lab 11/23/22  0630   LABAERO CITROBACTER KOSERI  Many  *  CITROBACTER KOSERI  Many  *  PSEUDOMONAS AERUGINOSA  Many  *       Significant Imaging: I have reviewed all pertinent imaging results/findings within the past 24 hours.

## 2023-01-21 NOTE — PROGRESS NOTES
HD completed , blood returned  and needles pulled .post bleeding time < 5 minutes.  [facility] uf removed 1.5 liters. Post B/P 154/67, pulse 73. Patient alert and stable

## 2023-01-21 NOTE — PROGRESS NOTES
"Patient refused HD. Primary nurse at beside. Pt was informed that he may not receive dialysis until later on today. Pt stated"later on today" when offered to dialyze him at bedside.   "

## 2023-01-21 NOTE — PROGRESS NOTES
[facility] - Observation [address] [place] [address] Medicine  Progress Note    Patient Name: Adryan Neff  MRN: [id number]  Patient Class: IP- Inpatient   Admission Date: 1/17/2023  Length of Stay: 3 days  Attending Physician: Monico Lovett MD  Primary Care Provider: Carola Pedro MD        Subjective:     Principal Problem:Wound infection        HPI:  Adryan Neff is a 57-year-old man with a past medical history of primary hypertension, peripheral arterial disease s/p R AKA, diabetes mellitus, ESRD on HD MWF, who presents to the emergency department with a wound infection. Briefly, the patient was admitted 11/23/22 for R AKA abscess. His workup revealed hypoglycemia and RLE CT showing large abscess with extension into the femoral bone marrow suggestive of osteomyelitis.  ED collected superficial wound swab growing Citrobacter koseri and Pseudomonas Aeruginosa.  IR aspirated 3ml purulence on 11/29/22 with negative cultures (does not appear aerobic culture was performed).  Blood cultures negative. Infectious Disease was consulted.  The patient was initially placed on vanc and pip-tazo, then switched to meropenem 2/2 Citrobacter resistance to pip-tazo.  They recommended ciprofloxacin 500mg daily for 6 weeks starting from date of IR aspiration to treat as osteomyelitis, end date 1/9/22.  He was discharged to [facility] on 12/2/23.     He was then admitted to [facility] 12/8/22.  There was a RLE CT from 12/11/22 that showed couple areas of rim-enhancing fluid collections/phlegmon and was seen by Orthopedic Surgery. The discharge summary does refer to no active infection. Ciprofloxacin was decreased to a 10 day supply. [facility], confirmed ciprofloxacin ended ~12/20/22.  Patient does receive wound care at the facility, but he always refuses debridements.    The the patient was seen in the Infectious Disease Clinic earlier this afternoon.  He was encouraged to present to the emergency department for further  evaluation given concern for infection of his right lower extremity.  The wound appeared infected with purulent drainage.    In the emergency department, the patient was found to be hemodynamically stable.  He had blood cultures collected and was given IV vancomycin and IV meropenem.  He was admitted to hospital medicine for further management.         Overview/Hospital Course:  No acute events since admission.  MRI right femur with some possible early osteomyelitis, however after orthopedics reviewed imaging no concern for osteo at this time, recommending continue antibiotics and local wound care.  No need for debridement at this time.  ID suggesting nuclear medicine bone scan for further evaluation      Interval History:  No acute events overnight.  Patient going for HD today.  Patient denies any worsening pain in his right AKA.  Nuclear medicine bone scan ordered to further evaluate for osteo per ID recs    Review of Systems   Constitutional:  Negative for chills and fever.   HENT:  Negative for congestion and sore throat.    Eyes:  Negative for photophobia and visual disturbance.   Respiratory:  Negative for cough and shortness of breath.    Cardiovascular:  Negative for chest pain and palpitations.   Gastrointestinal:  Negative for abdominal pain, blood in stool, constipation, diarrhea, nausea and vomiting.   Endocrine: Negative for cold intolerance and heat intolerance.   Genitourinary:  Negative for dysuria and hematuria.   Musculoskeletal:  Positive for gait problem. Negative for arthralgias and myalgias.   Skin:  Negative for rash and wound.   Allergic/Immunologic: Negative for environmental allergies and food allergies.   Neurological:  Negative for seizures and numbness.   Hematological:  Negative for adenopathy. Does not bruise/bleed easily.   Psychiatric/Behavioral:  Negative for hallucinations and suicidal ideas.    Objective:     Vital Signs (Most Recent):  Temp: 97.8 °F (36.6 °C) (01/21/23  0821)  Pulse: 76 (01/21/23 1200)  Resp: 18 (01/21/23 1200)  BP: 129/60 (01/21/23 1200)  SpO2: 98 % (01/21/23 0821)   Vital Signs (24h Range):  Temp:  [97.7 °F (36.5 °C)-98.9 °F (37.2 °C)] 97.8 °F (36.6 °C)  Pulse:  [61-78] 76  Resp:  [14-20] 18  SpO2:  [94 %-98 %] 98 %  BP: (112-186)/(55-83) 129/60     Weight: 56.7 kg (125 lb)  Body mass index is 17.44 kg/m².  No intake or output data in the 24 hours ending 01/21/23 1211     Physical Exam  Constitutional:       Appearance: He is well-developed.   HENT:      Head: Normocephalic and atraumatic.   Eyes:      Conjunctiva/sclera: Conjunctivae normal.      Pupils: Pupils are equal, round, and reactive to light.   Neck:      Thyroid: No thyromegaly.      Vascular: No JVD.   Cardiovascular:      Rate and Rhythm: Normal rate and regular rhythm.      Heart sounds: Normal heart sounds. No murmur heard.    No friction rub. No gallop.   Pulmonary:      Effort: Pulmonary effort is normal. No respiratory distress.      Breath sounds: Normal breath sounds. No wheezing or rales.   Abdominal:      General: Bowel sounds are normal. There is no distension.      Palpations: Abdomen is soft. There is no mass.      Tenderness: There is no abdominal tenderness. There is no guarding or rebound.      Hernia: No hernia is present.   Musculoskeletal:         General: No deformity. Normal range of motion.      Cervical back: Normal range of motion and neck supple.      Comments: R AKA with crusting along lateral incision. No erythema or induration noted.      Right Lower Extremity: Right leg is amputated above knee.   Skin:     General: Skin is warm and dry.   Neurological:      Mental Status: He is alert and oriented to person, place, and time.      Cranial Nerves: No cranial nerve deficit.   Psychiatric:         Behavior: Behavior normal.       Significant Labs: All pertinent labs within the past 24 hours have been reviewed.  CBC:   Recent Labs   Lab 01/20/23  0848   WBC 7.14   HGB 11.1*    HCT 38.3*          CMP:   Recent Labs   Lab 01/20/23  0848      K 4.6      CO2 24   GLU 82   BUN 37*   CREATININE 8.1*   CALCIUM 9.1   ALBUMIN 3.1*   ANIONGAP 15         Significant Imaging: I have reviewed all pertinent imaging results/findings within the past 24 hours.      Assessment/Plan:      * Wound infection  Patient with known above the knee amputation of right lower extremity.  Patient has had infection of stump in the past, including including prior abscess.  Superficial wound cultures grew Citrobacter Koseri and Pseudomonas Aeruginosa.  Patient seen by infectious disease service and was narrowed to oral ciprofloxacin at discharge.  However, there was confusion given another subsequent hospitalization and ciprofloxacin was discontinued several weeks before anticipated end date.  Now, patient presents with concern for infection given purulent drainage from site.  CT thigh revealing lateral ulceration possibly extending into the bone.  Osteomyelitis can not be excluded.  Patient sent from Infectious Disease Clinic for admission.  - Infectious Disease consulted, appreciate recommendations  -- R femur MRI with either microtrauma vs early OM per rads report. Ortho evaluated patient and recommending local wound care/continued abx for now.  ID recommending nuclear medicine bone scan of femur to evaluate for osteo  - continue IV vancomycin, pharmacy to assist with dosing  - continue IV meropenem 500 mg daily  - f/u blood cultures and narrow antibiotics accordingly      Anemia in ESRD (end-stage renal disease)  Hemoglobin currently stable, 10.4      PAD (peripheral artery disease)  Patient with extensive peripheral arterial disease with above-the-knee amputation of right lower extremity and below-the-knee amputation left lower extremity.  Resume home aspirin, clopidogrel, atorvastatin.      COVID-19  Patient testing positive for COVID-19 on nasopharyngeal swab in emergency department.  Patient  without symptoms.  Given patient's comorbidities, he is at risk for decompensation.  - begin IV remdesivir 200 mg loading dose; continue IV remdesivir 100 mg daily, 3 days total treatment    ESRD on hemodialysis  Patient on hemodialysis.  He receives his sessions on Mondays, Wednesdays, and Fridays per report.  Electrolytes within normal limits on presentation.  - Nephrology consulted, appreciate recommendations  - receiving HD today    CAD (coronary artery disease) - non-obstructive from [facility] in 2016  Patient denies any chest pain or shortness of breath on admission.  Resume home aspirin, clopidogrel, atorvastatin.      Essential (primary) hypertension  Blood pressure elevated upon admission.  We will resume home hydralazine, amlodipine, and metoprolol succinate.    Diabetes Mellitus  Patient's FSGs are controlled on current medication regimen.    Last A1c reviewed-   Lab Results   Component Value Date    HGBA1C 4.8 01/17/2023     Most recent fingerstick glucose reviewed-   Recent Labs   Lab 01/17/23  1802 01/17/23  2204   POCTGLUCOSE 101 118*       Maintain anti-hyperglycemic dose as follows-   Antihyperglycemics (From admission, onward)    Start     Stop Route Frequency Ordered    01/17/23 2100  insulin detemir U-100 pen 6 Units         -- SubQ Nightly 01/17/23 1852    01/17/23 1951  insulin aspart U-100 pen 0-5 Units         -- SubQ Before meals & nightly PRN 01/17/23 1852        Hold Oral hypoglycemics while patient is in the hospital.      VTE Risk Mitigation (From admission, onward)         Ordered     heparin (porcine) injection 5,000 Units  Every 8 hours         01/17/23 1848     IP VTE HIGH RISK PATIENT  Once         01/17/23 1848     Place sequential compression device  Until discontinued         01/17/23 1848                Discharge Planning   ENIO: 1/26/2023     Code Status: Full Code   Is the patient medically ready for discharge?: No    Reason for patient still in hospital (select all that apply):  Patient trending condition  Discharge Plan A: Return to nursing home                  Monico Lovett MD  Department of Hospital Medicine   [facility] - Observation 11H

## 2023-01-21 NOTE — ASSESSMENT & PLAN NOTE
Patient on hemodialysis.  He receives his sessions on Mondays, Wednesdays, and Fridays per report.  Electrolytes within normal limits on presentation.  - Nephrology consulted, appreciate recommendations  - receiving HD today

## 2023-01-21 NOTE — ASSESSMENT & PLAN NOTE
57 year old male with ESRD on HD, DM (HA1C 4.8), PAD  s/p right AKA October 2022, complicated by stump abscess and presumed osteomyelitis 11/23/22 (wound cx citrobacter koseri and pseudomonas), d/c on oral ciprofloxacin with plan for 6 weeks treatment.  Course of antibiotics shortened after admission to [facility] in early December with wound dehiscence. CT imaging there showed showing rim enhancing fluid collections/phlegmon with evaluation by Orthopedics who was not concerned for infection.  Course of ciprofloxacin abbreviated after that admission and he stopped abx on 12/24. Readmitted from ID clinic with concern for worsening wound infection.      CT right thigh with contrast 1/17 - resolution of prior large stump abscess.  Noted lateral ulceration possibly extended to bone.  Unable to exclude osteo.      MRI without contrast 1/19 - Bone marrow edema at distal femur amputation edge - micro trauma edema vs early osteo.  Notes extensive edema throughout suggestive of cellulitis/myositis.  No focal fluid collections or sinus tracts.     Orthopedics consulted.today. Reviewed MRI, does not feel it shows active osteomyelitis. Recommends antibiotic suppression, local wound care and follow up with primary surgeon (Dr. Davis) as outpatient.  No acute orthopedic intervention anticipated      Remains afebrile, hemodynamically stable and on  IV vancomycin and meropenem    Plan/recommendations:  1.  Continue IV vancomycin (pharmacy to dose. Trough goal 15-20) and meropenem (renally dosed) for now.   2.  Recommend Triple Phase NM bone scan to further evaluate for osteo  4.  Continue wound care   5.  Will follow up imaging and follow up this weekend.     Data reviewed and plan discussed with ID staff, Dr. Archer  Secure chat/Discussed above plan with Primary Team, Dr. Lovett

## 2023-01-21 NOTE — SUBJECTIVE & OBJECTIVE
Interval History: Remains afebrile, no leukocytosis  Blood cx NGTD  MRI without focal fluid collections. Bone marrow edema at distal amputation edge concerning for early osteo    Review of Systems   Constitutional:  Negative for activity change, appetite change, chills, diaphoresis, fatigue and fever.   HENT:  Negative for congestion.    Respiratory:  Negative for cough and shortness of breath.    Cardiovascular:  Negative for leg swelling.   Gastrointestinal:  Negative for abdominal pain, constipation, diarrhea, nausea and vomiting.   Genitourinary:         HD   Musculoskeletal:  Negative for arthralgias and myalgias.   Skin:  Positive for wound. Negative for rash.   Neurological:  Negative for dizziness, weakness and headaches.   Psychiatric/Behavioral:  Negative for agitation and confusion.    Objective:     Vital Signs (Most Recent):  Temp: 98.3 °F (36.8 °C) (01/20/23 1617)  Pulse: 77 (01/20/23 1849)  Resp: 18 (01/20/23 1617)  BP: (!) 164/70 (01/20/23 1849)  SpO2: 95 % (01/20/23 1617)   Vital Signs (24h Range):  Temp:  [98.3 °F (36.8 °C)-98.9 °F (37.2 °C)] 98.3 °F (36.8 °C)  Pulse:  [63-84] 77  Resp:  [18] 18  SpO2:  [94 %-97 %] 95 %  BP: (113-186)/(54-96) 164/70     Weight: 56.7 kg (125 lb)  Body mass index is 17.44 kg/m².    Estimated Creatinine Clearance: 8 mL/min (A) (based on SCr of 8.1 mg/dL (H)).    Physical Exam  Vitals and nursing note reviewed.   Constitutional:       General: He is not in acute distress.     Appearance: Normal appearance. He is not ill-appearing, toxic-appearing or diaphoretic.   HENT:      Head: Normocephalic and atraumatic.      Nose: No congestion or rhinorrhea.      Mouth/Throat:      Mouth: Mucous membranes are moist.   Eyes:      General: No scleral icterus.     Conjunctiva/sclera: Conjunctivae normal.   Cardiovascular:      Rate and Rhythm: Normal rate and regular rhythm.      Heart sounds: Normal heart sounds.   Pulmonary:      Effort: Pulmonary effort is normal. No  respiratory distress.   Abdominal:      Tenderness: There is no abdominal tenderness.   Musculoskeletal:         General: No tenderness.      Cervical back: Normal range of motion.      Comments: Bilateral lower extremity amputations.   Right AKA dressed - purulent drainage noted on dressing.   See admission photos below   Skin:     General: Skin is warm and dry.   Neurological:      Mental Status: He is alert and oriented to person, place, and time. Mental status is at baseline.   Psychiatric:         Mood and Affect: Mood normal.         Behavior: Behavior normal.         Thought Content: Thought content normal.         Judgment: Judgment normal.             Significant Labs: Blood Culture:   Recent Labs   Lab 10/11/22  0915 10/17/22  1415 10/17/22  1427 11/23/22  0631 01/17/23  1342   LABBLOO No Growth after 4 days. No Growth after 4 days. No Growth after 4 days. No growth after 5 days.  No growth after 5 days. No Growth to date  No Growth to date  No Growth to date  No Growth to date  No Growth to date  No Growth to date  No Growth to date  No Growth to date     CBC:   Recent Labs   Lab 01/19/23  0610 01/20/23  0848   WBC 6.29 7.14   HGB 10.4* 11.1*   HCT 34.5* 38.3*    296     CMP:   Recent Labs   Lab 01/19/23  0610 01/20/23  0848    142   K 4.8 4.6    103   CO2 21* 24   GLU 72 82   BUN 49* 37*   CREATININE 10.0* 8.1*   CALCIUM 8.7 9.1   ALBUMIN 3.0* 3.1*   ANIONGAP 14 15     Wound Culture:   Recent Labs   Lab 11/23/22  0630   LABAERO CITROBACTER KOSERI  Many  *  CITROBACTER KOSERI  Many  *  PSEUDOMONAS AERUGINOSA  Many  *       Significant Imaging: I have reviewed all pertinent imaging results/findings within the past 24 hours.

## 2023-01-21 NOTE — PROGRESS NOTES
[facility]FF HEMODIALYSIS NOTE     Patient currently on hemodialysis for removal of uremic toxins and volume.     Patient seen and evaluated on hemodialysis, tolerating treatment, see HD flowsheet for vitals and assessments.    Labs have been reviewed and the dialysate bath has been adjusted.       Assessment/Plan:    -Patient seen on HD, tolerating treatment well, w/o complaints   -UF goal of 1-2L   -Renal diet, if not NPO   -Strict I/O's and daily weights  -Daily renal function panels  -Hgb goal 10-11  -continue sevelamer 800 TID with meals   -Will continue to follow while inpatient     Odalys Frederick DNP-FNP, C  Nephrology  Pager: [phone]

## 2023-01-21 NOTE — SUBJECTIVE & OBJECTIVE
Interval History:  No acute events overnight.  Patient going for HD today.  Patient denies any worsening pain in his right AKA.  Nuclear medicine bone scan ordered to further evaluate for osteo per ID recs    Review of Systems   Constitutional:  Negative for chills and fever.   HENT:  Negative for congestion and sore throat.    Eyes:  Negative for photophobia and visual disturbance.   Respiratory:  Negative for cough and shortness of breath.    Cardiovascular:  Negative for chest pain and palpitations.   Gastrointestinal:  Negative for abdominal pain, blood in stool, constipation, diarrhea, nausea and vomiting.   Endocrine: Negative for cold intolerance and heat intolerance.   Genitourinary:  Negative for dysuria and hematuria.   Musculoskeletal:  Positive for gait problem. Negative for arthralgias and myalgias.   Skin:  Negative for rash and wound.   Allergic/Immunologic: Negative for environmental allergies and food allergies.   Neurological:  Negative for seizures and numbness.   Hematological:  Negative for adenopathy. Does not bruise/bleed easily.   Psychiatric/Behavioral:  Negative for hallucinations and suicidal ideas.    Objective:     Vital Signs (Most Recent):  Temp: 97.8 °F (36.6 °C) (01/21/23 0821)  Pulse: 76 (01/21/23 1200)  Resp: 18 (01/21/23 1200)  BP: 129/60 (01/21/23 1200)  SpO2: 98 % (01/21/23 0821)   Vital Signs (24h Range):  Temp:  [97.7 °F (36.5 °C)-98.9 °F (37.2 °C)] 97.8 °F (36.6 °C)  Pulse:  [61-78] 76  Resp:  [14-20] 18  SpO2:  [94 %-98 %] 98 %  BP: (112-186)/(55-83) 129/60     Weight: 56.7 kg (125 lb)  Body mass index is 17.44 kg/m².  No intake or output data in the 24 hours ending 01/21/23 1211     Physical Exam  Constitutional:       Appearance: He is well-developed.   HENT:      Head: Normocephalic and atraumatic.   Eyes:      Conjunctiva/sclera: Conjunctivae normal.      Pupils: Pupils are equal, round, and reactive to light.   Neck:      Thyroid: No thyromegaly.      Vascular: No JVD.    Cardiovascular:      Rate and Rhythm: Normal rate and regular rhythm.      Heart sounds: Normal heart sounds. No murmur heard.    No friction rub. No gallop.   Pulmonary:      Effort: Pulmonary effort is normal. No respiratory distress.      Breath sounds: Normal breath sounds. No wheezing or rales.   Abdominal:      General: Bowel sounds are normal. There is no distension.      Palpations: Abdomen is soft. There is no mass.      Tenderness: There is no abdominal tenderness. There is no guarding or rebound.      Hernia: No hernia is present.   Musculoskeletal:         General: No deformity. Normal range of motion.      Cervical back: Normal range of motion and neck supple.      Comments: R AKA with crusting along lateral incision. No erythema or induration noted.      Right Lower Extremity: Right leg is amputated above knee.   Skin:     General: Skin is warm and dry.   Neurological:      Mental Status: He is alert and oriented to person, place, and time.      Cranial Nerves: No cranial nerve deficit.   Psychiatric:         Behavior: Behavior normal.       Significant Labs: All pertinent labs within the past 24 hours have been reviewed.  CBC:   Recent Labs   Lab 01/20/23  0848   WBC 7.14   HGB 11.1*   HCT 38.3*          CMP:   Recent Labs   Lab 01/20/23  0848      K 4.6      CO2 24   GLU 82   BUN 37*   CREATININE 8.1*   CALCIUM 9.1   ALBUMIN 3.1*   ANIONGAP 15         Significant Imaging: I have reviewed all pertinent imaging results/findings within the past 24 hours.

## 2023-01-21 NOTE — PLAN OF CARE
No distress overnight, refused dialysis this morning, agree to have it done later in day. Remains on room air. Drsg intact to stump. Call bell within reach.     Problem: Infection  Goal: Absence of Infection Signs and Symptoms  Outcome: Ongoing, Progressing     Problem: Adult Inpatient Plan of Care  Goal: Plan of Care Review  Outcome: Ongoing, Progressing     Problem: Adult Inpatient Plan of Care  Goal: Optimal Comfort and Wellbeing  Outcome: Ongoing, Progressing

## 2023-01-21 NOTE — ASSESSMENT & PLAN NOTE
Patient with known above the knee amputation of right lower extremity.  Patient has had infection of stump in the past, including including prior abscess.  Superficial wound cultures grew Citrobacter Koseri and Pseudomonas Aeruginosa.  Patient seen by infectious disease service and was narrowed to oral ciprofloxacin at discharge.  However, there was confusion given another subsequent hospitalization and ciprofloxacin was discontinued several weeks before anticipated end date.  Now, patient presents with concern for infection given purulent drainage from site.  CT thigh revealing lateral ulceration possibly extending into the bone.  Osteomyelitis can not be excluded.  Patient sent from Infectious Disease Clinic for admission.  - Infectious Disease consulted, appreciate recommendations  -- R femur MRI with either microtrauma vs early OM per rads report. Ortho evaluated patient and recommending local wound care/continued abx for now.  ID recommending nuclear medicine bone scan of femur to evaluate for osteo  - continue IV vancomycin, pharmacy to assist with dosing  - continue IV meropenem 500 mg daily  - f/u blood cultures and narrow antibiotics accordingly

## 2023-01-21 NOTE — PROGRESS NOTES
Bedside HD initiated, cannulated upper left arm av graft with 15 gauge needles . Good blood flow noted. /. Uf net goal set for 2 liters as tolerated . Patient alert and awake before*P 162/58 pulse 72. Patient on room air .

## 2023-01-22 LAB
BACTERIA BLD CULT: NORMAL
BACTERIA BLD CULT: NORMAL
POCT GLUCOSE: 81 MG/DL (ref 70–110)
POCT GLUCOSE: 99 MG/DL (ref 70–110)

## 2023-01-22 PROCEDURE — 25000003 PHARM REV CODE 250: Performed by: STUDENT IN AN ORGANIZED HEALTH CARE EDUCATION/TRAINING PROGRAM

## 2023-01-22 PROCEDURE — 25000003 PHARM REV CODE 250: Performed by: HOSPITALIST

## 2023-01-22 PROCEDURE — 11000001 HC ACUTE MED/SURG PRIVATE ROOM

## 2023-01-22 PROCEDURE — 63600175 PHARM REV CODE 636 W HCPCS: Performed by: STUDENT IN AN ORGANIZED HEALTH CARE EDUCATION/TRAINING PROGRAM

## 2023-01-22 PROCEDURE — 99232 PR SUBSEQUENT HOSPITAL CARE,LEVL II: ICD-10-PCS | Mod: ,,, | Performed by: INTERNAL MEDICINE

## 2023-01-22 PROCEDURE — 27000207 HC ISOLATION

## 2023-01-22 PROCEDURE — 99232 SBSQ HOSP IP/OBS MODERATE 35: CPT | Mod: ,,, | Performed by: INTERNAL MEDICINE

## 2023-01-22 RX ADMIN — HYDRALAZINE HYDROCHLORIDE 100 MG: 50 TABLET ORAL at 10:01

## 2023-01-22 RX ADMIN — MEROPENEM 500 MG: 500 INJECTION INTRAVENOUS at 05:01

## 2023-01-22 RX ADMIN — ATORVASTATIN CALCIUM 20 MG: 20 TABLET, FILM COATED ORAL at 08:01

## 2023-01-22 RX ADMIN — PANTOPRAZOLE SODIUM 40 MG: 40 TABLET, DELAYED RELEASE ORAL at 08:01

## 2023-01-22 RX ADMIN — SEVELAMER CARBONATE 800 MG: 800 TABLET, FILM COATED ORAL at 07:01

## 2023-01-22 RX ADMIN — HYDRALAZINE HYDROCHLORIDE 100 MG: 50 TABLET ORAL at 06:01

## 2023-01-22 RX ADMIN — CLOPIDOGREL BISULFATE 75 MG: 75 TABLET ORAL at 08:01

## 2023-01-22 RX ADMIN — METOPROLOL SUCCINATE 25 MG: 25 TABLET, EXTENDED RELEASE ORAL at 08:01

## 2023-01-22 RX ADMIN — HYDRALAZINE HYDROCHLORIDE 100 MG: 50 TABLET ORAL at 03:01

## 2023-01-22 RX ADMIN — AMLODIPINE BESYLATE 10 MG: 10 TABLET ORAL at 08:01

## 2023-01-22 RX ADMIN — HEPARIN SODIUM 5000 UNITS: 5000 INJECTION INTRAVENOUS; SUBCUTANEOUS at 10:01

## 2023-01-22 RX ADMIN — SEVELAMER CARBONATE 800 MG: 800 TABLET, FILM COATED ORAL at 12:01

## 2023-01-22 RX ADMIN — ASPIRIN 81 MG: 81 TABLET, CHEWABLE ORAL at 08:01

## 2023-01-22 NOTE — PLAN OF CARE
ID Note:  Patient not seen today.  Chart reviewed.    Afebrile, no leukocytosis.  HDS  Completed course of remdesivir  Awaiting NM bone scan  On IV meropenem and vancomycin for stump infection/suspected osteo    Plan:   Continue current antibiotics   Follow up NM bone scan to evaluate for osteomyelitis  ID will follow up tomorrow.     Thank you.   Please Secure Chat for any questions or concerns.  KIKI Vieira, ANP-C

## 2023-01-22 NOTE — PLAN OF CARE
Problem: Adult Inpatient Plan of Care  Goal: Plan of Care Review  1/22/2023 1026 by Theresa Benítez RN  Outcome: Ongoing, Progressing  1/22/2023 1026 by Theresa Benítez RN  Outcome: Ongoing, Progressing  Goal: Patient-Specific Goal (Individualized)  1/22/2023 1026 by Theresa Benítez RN  Outcome: Ongoing, Progressing  1/22/2023 1026 by Theresa Benítez RN  Outcome: Ongoing, Progressing  Goal: Absence of Hospital-Acquired Illness or Injury  1/22/2023 1026 by Theresa Benítez RN  Outcome: Ongoing, Progressing  1/22/2023 1026 by Theresa Benítez RN  Outcome: Ongoing, Progressing  Goal: Optimal Comfort and Wellbeing  1/22/2023 1026 by Theresa Benítez RN  Outcome: Ongoing, Progressing  1/22/2023 1026 by Theresa Benítez RN  Outcome: Ongoing, Progressing  Goal: Readiness for Transition of Care  1/22/2023 1026 by Theresa Benítez RN  Outcome: Ongoing, Progressing  1/22/2023 1026 by Theresa Benítez RN  Outcome: Ongoing, Progressing

## 2023-01-22 NOTE — PROGRESS NOTES
[facility] - Observation [address] [place] [address] Medicine  Progress Note    Patient Name: Adryan Neff  MRN: [id number]  Patient Class: IP- Inpatient   Admission Date: 1/17/2023  Length of Stay: 4 days  Attending Physician: Lee Toney MD  Primary Care Provider: Carola Pedro MD        Subjective:     Principal Problem:Wound infection    Interval History:  57-year-old man with a past medical history of primary hypertension, peripheral arterial disease s/p Right and left AKA, diabetes mellitus, ESRD on HD MWF, who presents to the emergency department with a wound infection.  He had prior history of infection to right stump and there is uncertainty if patient completed his antibiotic course.  Orthopedics and Infectious Disease consulted.  MRI of right femur was done with report noted.  Infectious Disease ordered a bone scan which is currently pending.  He is on IV antibiotics per their recommendations.  He had incidental COVID-19 positive findings and received remdesivir course.    Patient has no complaints at this time .  he was dialyzed yesterday    Review of Systems   All other systems reviewed and are negative.  Objective:     Vital Signs (Most Recent):  Temp: 97.8 °F (36.6 °C) (01/22/23 0805)  Pulse: 68 (01/22/23 0845)  Resp: 18 (01/22/23 0805)  BP: (!) 119/56 (01/22/23 0805)  SpO2: 97 % (01/22/23 0805)   Vital Signs (24h Range):  Temp:  [97.6 °F (36.4 °C)-98 °F (36.7 °C)] 97.8 °F (36.6 °C)  Pulse:  [56-76] 68  Resp:  [14-24] 18  SpO2:  [97 %-99 %] 97 %  BP: (112-165)/(56-70) 119/56     Weight: 56.7 kg (125 lb)  Body mass index is 17.44 kg/m².    Intake/Output Summary (Last 24 hours) at 1/22/2023 1057  Last data filed at 1/21/2023 1208  Gross per 24 hour   Intake 250 ml   Output 2100 ml   Net -1850 ml      Physical Exam  Constitutional:       General: He is not in acute distress.  HENT:      Head: Normocephalic.      Right Ear: External ear normal.      Left Ear: External ear normal.      Nose: Nose normal.   Eyes:       General: No scleral icterus.  Cardiovascular:      Rate and Rhythm: Normal rate.      Heart sounds: Normal heart sounds.   Pulmonary:      Breath sounds: Normal breath sounds.   Abdominal:      Palpations: Abdomen is soft.      Tenderness: There is no abdominal tenderness.   Musculoskeletal:      Comments: Bilateral AKA   Skin:     Comments: Minimal serous fluid oozing from right stump, no tenderness to palpation.   Neurological:      Mental Status: He is alert and oriented to person, place, and time.   Psychiatric:         Mood and Affect: Mood normal.           Assessment/Plan:      Active Diagnoses:    Diagnosis Date Noted POA    PRINCIPAL PROBLEM:  Wound infection [T14.8XXA, L08.9] 01/18/2023 Yes    Anemia in ESRD (end-stage renal disease) [N18.6, D63.1] 01/18/2023 Yes    Chronic kidney disease-mineral and bone disorder [N18.9, E83.9, M89.9] 11/29/2022 Yes    PAD (peripheral artery disease) [I73.9] 02/07/2022 Yes    COVID-19 [U07.1] 01/05/2022 Yes    ESRD on hemodialysis [N18.6, Z99.2]  Not Applicable    CAD (coronary artery disease) - non-obstructive from [facility] in 2016 [I25.10] 10/26/2020 Yes    Diabetes Mellitus [E11.22, N18.6, Z79.4, Z99.2] 01/29/2019 Not Applicable    Essential (primary) hypertension [I10] 01/29/2019 Yes      Problems Resolved During this Admission:     VTE Risk Mitigation (From admission, onward)           Ordered     heparin (porcine) injection 5,000 Units  Every 8 hours         01/17/23 1848     IP VTE HIGH RISK PATIENT  Once         01/17/23 1848     Place sequential compression device  Until discontinued         01/17/23 1848                  Right femoral stump wound infection.  Continue antimicrobials per Infectious Disease recommendations.  Follow up with bone scan.  Seen by Orthopedics with no acute surgical intervention recommended.  Continue wound care  ESRD on dialysis.  Follow up with Nephrology recommendations continue dialysis per regime  Hypertension.  Continue  antihypertensive  Atherosclerotic disease, peripheral arterial disease status post bilateral AKA.  Continue aspirin, statin, beta blocker and Plavix   Incidental COVID-19.  Normal sats on room air, completed remdesivir course    DVT prophylaxis.  Heparin.  Fall precaution    Lee Toney MD  Department of Hospital Medicine   [facility] - Observation 11H

## 2023-01-23 LAB
POCT GLUCOSE: 141 MG/DL (ref 70–110)
POCT GLUCOSE: 77 MG/DL (ref 70–110)
POCT GLUCOSE: 97 MG/DL (ref 70–110)

## 2023-01-23 PROCEDURE — 99233 PR SUBSEQUENT HOSPITAL CARE,LEVL III: ICD-10-PCS | Mod: ,,, | Performed by: INTERNAL MEDICINE

## 2023-01-23 PROCEDURE — 99232 PR SUBSEQUENT HOSPITAL CARE,LEVL II: ICD-10-PCS | Mod: ,,, | Performed by: INTERNAL MEDICINE

## 2023-01-23 PROCEDURE — 25000003 PHARM REV CODE 250: Performed by: STUDENT IN AN ORGANIZED HEALTH CARE EDUCATION/TRAINING PROGRAM

## 2023-01-23 PROCEDURE — 99232 SBSQ HOSP IP/OBS MODERATE 35: CPT | Mod: ,,, | Performed by: INTERNAL MEDICINE

## 2023-01-23 PROCEDURE — 27000207 HC ISOLATION

## 2023-01-23 PROCEDURE — 25000003 PHARM REV CODE 250: Performed by: HOSPITALIST

## 2023-01-23 PROCEDURE — 99233 SBSQ HOSP IP/OBS HIGH 50: CPT | Mod: ,,, | Performed by: INTERNAL MEDICINE

## 2023-01-23 PROCEDURE — 11000001 HC ACUTE MED/SURG PRIVATE ROOM

## 2023-01-23 PROCEDURE — 63600175 PHARM REV CODE 636 W HCPCS: Performed by: STUDENT IN AN ORGANIZED HEALTH CARE EDUCATION/TRAINING PROGRAM

## 2023-01-23 RX ORDER — SODIUM CHLORIDE 9 MG/ML
INJECTION, SOLUTION INTRAVENOUS ONCE
Status: COMPLETED | OUTPATIENT
Start: 2023-01-24 | End: 2023-01-24

## 2023-01-23 RX ADMIN — METOPROLOL SUCCINATE 25 MG: 25 TABLET, EXTENDED RELEASE ORAL at 01:01

## 2023-01-23 RX ADMIN — PANTOPRAZOLE SODIUM 40 MG: 40 TABLET, DELAYED RELEASE ORAL at 01:01

## 2023-01-23 RX ADMIN — ACETAMINOPHEN 650 MG: 325 TABLET ORAL at 01:01

## 2023-01-23 RX ADMIN — HYDRALAZINE HYDROCHLORIDE 100 MG: 50 TABLET ORAL at 04:01

## 2023-01-23 RX ADMIN — AMLODIPINE BESYLATE 10 MG: 10 TABLET ORAL at 01:01

## 2023-01-23 RX ADMIN — ATORVASTATIN CALCIUM 20 MG: 20 TABLET, FILM COATED ORAL at 01:01

## 2023-01-23 RX ADMIN — ASPIRIN 81 MG: 81 TABLET, CHEWABLE ORAL at 01:01

## 2023-01-23 RX ADMIN — HYDRALAZINE HYDROCHLORIDE 100 MG: 50 TABLET ORAL at 10:01

## 2023-01-23 RX ADMIN — SEVELAMER CARBONATE 800 MG: 800 TABLET, FILM COATED ORAL at 07:01

## 2023-01-23 RX ADMIN — MEROPENEM 500 MG: 500 INJECTION INTRAVENOUS at 04:01

## 2023-01-23 RX ADMIN — SEVELAMER CARBONATE 800 MG: 800 TABLET, FILM COATED ORAL at 04:01

## 2023-01-23 RX ADMIN — HYDRALAZINE HYDROCHLORIDE 100 MG: 50 TABLET ORAL at 06:01

## 2023-01-23 RX ADMIN — CLOPIDOGREL BISULFATE 75 MG: 75 TABLET ORAL at 01:01

## 2023-01-23 NOTE — PROGRESS NOTES
[facility] - Observation [address] [place] [address] Medicine  Progress Note    Patient Name: Adryan Neff  MRN: [id number]  Patient Class: IP- Inpatient   Admission Date: 1/17/2023  Length of Stay: 5 days  Attending Physician: Lee Toney MD  Primary Care Provider: Carola Pedro MD        Subjective:     Principal Problem:Wound infection    Interval History: 57-year-old man with a past medical history of primary hypertension, peripheral arterial disease s/p Right and left AKA, diabetes mellitus, ESRD on HD MWF, who presents to the emergency department with a wound infection.  He had prior history of infection to right stump and there is uncertainty if patient completed his antibiotic course.  Orthopedics and Infectious Disease consulted.  MRI of right femur was done with report noted.  Infectious Disease ordered a bone scan which is currently pending.  He is on IV antibiotics per their recommendations.  He had incidental COVID-19 positive findings and received remdesivir course.    No complaints or acute events    Review of Systems   All other systems reviewed and are negative.  Objective:     Vital Signs (Most Recent):  Temp: 98.1 °F (36.7 °C) (01/23/23 0751)  Pulse: 75 (01/23/23 0751)  Resp: 18 (01/23/23 0751)  BP: (!) 152/73 (01/23/23 0751)  SpO2: 97 % (01/23/23 0751)   Vital Signs (24h Range):  Temp:  [97.5 °F (36.4 °C)-98.7 °F (37.1 °C)] 98.1 °F (36.7 °C)  Pulse:  [68-75] 75  Resp:  [16-20] 18  SpO2:  [96 %-98 %] 97 %  BP: (114-168)/(52-79) 152/73     Weight: 56.7 kg (125 lb)  Body mass index is 17.44 kg/m².  No intake or output data in the 24 hours ending 01/23/23 0814   Physical Exam  Constitutional:       General: He is not in acute distress.  HENT:      Head: Normocephalic.      Right Ear: External ear normal.      Left Ear: External ear normal.      Nose: Nose normal.   Cardiovascular:      Rate and Rhythm: Normal rate.   Pulmonary:      Effort: Pulmonary effort is normal.   Musculoskeletal:      Comments: Bilateral  AKA   Skin:     Comments: Dressing to right stump   Neurological:      Mental Status: He is alert and oriented to person, place, and time.   Psychiatric:         Mood and Affect: Mood normal.           Assessment/Plan:      Active Diagnoses:    Diagnosis Date Noted POA    PRINCIPAL PROBLEM:  Wound infection [T14.8XXA, L08.9] 01/18/2023 Yes    Anemia in ESRD (end-stage renal disease) [N18.6, D63.1] 01/18/2023 Yes    Chronic kidney disease-mineral and bone disorder [N18.9, E83.9, M89.9] 11/29/2022 Yes    PAD (peripheral artery disease) [I73.9] 02/07/2022 Yes    COVID-19 [U07.1] 01/05/2022 Yes    ESRD on hemodialysis [N18.6, Z99.2]  Not Applicable    CAD (coronary artery disease) - non-obstructive from [facility] in 2016 [I25.10] 10/26/2020 Yes    Diabetes Mellitus [E11.22, N18.6, Z79.4, Z99.2] 01/29/2019 Not Applicable    Essential (primary) hypertension [I10] 01/29/2019 Yes      Problems Resolved During this Admission:     VTE Risk Mitigation (From admission, onward)           Ordered     heparin (porcine) injection 5,000 Units  Every 8 hours         01/17/23 1848     IP VTE HIGH RISK PATIENT  Once         01/17/23 1848     Place sequential compression device  Until discontinued         01/17/23 1848                  Right femoral stump wound infection.  Continue antimicrobials per Infectious Disease recommendations.  Follow up with bone scan.  Seen by Orthopedics with no acute surgical intervention recommended.  Continue wound care  ESRD on dialysis.  Follow up with Nephrology recommendations continue dialysis per regime  Hypertension.  Continue antihypertensive  Atherosclerotic disease, peripheral arterial disease status post bilateral AKA.  Continue aspirin, statin, beta blocker and Plavix   Incidental COVID-19.  Normal sats on room air, completed remdesivir course     DVT prophylaxis.  Heparin.  Fall precaution    Lee Toney MD  Department of Hospital Medicine   [facility] - Observation 11H

## 2023-01-23 NOTE — PLAN OF CARE
[facility] - [facility] 11H  Discharge Reassessment    Primary Care Provider: Carola Pedro MD    Expected Discharge Date: 1/26/2023        Reassessment (most recent)       Discharge Reassessment - 01/23/23 1405          Discharge Reassessment    Assessment Type Discharge Planning Reassessment     Did the patient's condition or plan change since previous assessment? No     Discharge Plan discussed with: Patient     Communicated ENIO with patient/caregiver Yes     Discharge Plan A Return to nursing home     Discharge Plan B Return to Nursing Home     DME Needed Upon Discharge  none     Discharge Barriers Identified None     Why the patient remains in the hospital Requires continued medical care        Post-Acute Status    Post-Acute Placement Status Set-up Complete/Auth obtained     Discharge Delays None known at this time                   57-year-old man with a past medical history of primary hypertension, peripheral arterial disease s/p Right and left AKA, diabetes mellitus, ESRD on HD MWF, who presents to the emergency department with a wound infection.  He had prior history of infection to right stump and there is uncertainty if patient completed his antibiotic course.  Orthopedics and Infectious Disease consulted.  MRI of right femur was done with report noted.  Infectious Disease ordered a bone scan which is currently pending.  He is on IV antibiotics per their recommendations.  He had incidental COVID-19 positive findings and received remdesivir course.  Will continue to update plan as needed.  Adan Shelton, RN,BSN

## 2023-01-23 NOTE — ASSESSMENT & PLAN NOTE
57 year old male with ESRD on HD, DM (HA1C 4.8), PAD  s/p right AKA October 2022, complicated by stump abscess and presumed osteomyelitis 11/23/22 (wound cx citrobacter koseri and pseudomonas), d/c on oral ciprofloxacin with plan for 6 weeks treatment.  Course of antibiotics shortened after admission to [facility] in early December with wound dehiscence. CT imaging there showed showing rim enhancing fluid collections/phlegmon with evaluation by Orthopedics who was not concerned for infection.  Course of ciprofloxacin abbreviated after that admission and he stopped abx on 12/24. Readmitted from ID clinic with concern for worsening wound infection.      CT right thigh with contrast 1/17 - resolution of prior large stump abscess.  Noted lateral ulceration possibly extended to bone.  Unable to exclude osteo.      MRI without contrast 1/19 - Bone marrow edema at distal femur amputation edge - micro trauma edema vs early osteo.  Notes extensive edema throughout suggestive of cellulitis/myositis.  No focal fluid collections or sinus tracts.     Orthopedics consulted . Reviewed MRI, does not feel it shows active osteomyelitis. Recommends antibiotic suppression, local wound care and follow up with primary surgeon (Dr. Davis) as outpatient.  No acute orthopedic intervention anticipated     Plan/recommendations:  1.  Continue IV vancomycin (pharmacy to dose. Trough goal 15-20) and meropenem (renally dosed) for now.   2.  Bone scan today. Will f/u   3.  Recommend wound care evaluation if need for debridement, please obtain deep wound cultures to guide abx therapy  4.  As pt with PAD, wound eschar and poor wound healing of R AKA site, would consider vascular surgery evaluation        Seen and discussed with ID staff Dr. Salvador

## 2023-01-23 NOTE — SUBJECTIVE & OBJECTIVE
Interval History: Remains afebrile, no leukocytosis  Blood cx NGTD  Bone scan studies pending      Review of Systems   Constitutional:  Negative for activity change, appetite change, chills, diaphoresis, fatigue and fever.   HENT:  Negative for congestion.    Respiratory:  Negative for cough and shortness of breath.    Cardiovascular:  Negative for leg swelling.   Gastrointestinal:  Negative for abdominal pain, constipation, diarrhea, nausea and vomiting.   Genitourinary:         HD   Musculoskeletal:  Negative for arthralgias and myalgias.   Skin:  Positive for wound. Negative for rash.   Neurological:  Negative for dizziness, weakness and headaches.   Psychiatric/Behavioral:  Negative for agitation and confusion.    Objective:     Vital Signs (Most Recent):  Temp: 98 °F (36.7 °C) (01/23/23 1133)  Pulse: 72 (01/23/23 1330)  Resp: 18 (01/23/23 1133)  BP: (!) 146/70 (01/23/23 1330)  SpO2: 95 % (01/23/23 1133)   Vital Signs (24h Range):  Temp:  [97.5 °F (36.4 °C)-98.7 °F (37.1 °C)] 98 °F (36.7 °C)  Pulse:  [68-75] 72  Resp:  [16-20] 18  SpO2:  [95 %-98 %] 95 %  BP: (132-168)/(64-79) 146/70     Weight: 56.7 kg (125 lb)  Body mass index is 17.44 kg/m².    Estimated Creatinine Clearance: 8 mL/min (A) (based on SCr of 8.1 mg/dL (H)).    Physical Exam  Vitals and nursing note reviewed.   Constitutional:       General: He is not in acute distress.     Appearance: Normal appearance. He is not ill-appearing, toxic-appearing or diaphoretic.   HENT:      Head: Normocephalic and atraumatic.      Nose: No congestion or rhinorrhea.      Mouth/Throat:      Mouth: Mucous membranes are moist.   Eyes:      General: No scleral icterus.     Conjunctiva/sclera: Conjunctivae normal.   Cardiovascular:      Rate and Rhythm: Normal rate and regular rhythm.      Heart sounds: Normal heart sounds.   Pulmonary:      Effort: Pulmonary effort is normal. No respiratory distress.   Abdominal:      Tenderness: There is no abdominal tenderness.    Musculoskeletal:         General: No tenderness.      Cervical back: Normal range of motion.      Comments: Bilateral lower extremity amputations.   R AKA wound seen  No purulent drainage  No foul odor  Wound with eschar   Skin:     General: Skin is warm and dry.   Neurological:      Mental Status: He is alert and oriented to person, place, and time. Mental status is at baseline.   Psychiatric:         Mood and Affect: Mood normal.         Behavior: Behavior normal.         Thought Content: Thought content normal.         Judgment: Judgment normal.           Significant Labs: Blood Culture:   Recent Labs   Lab 10/11/22  0915 10/17/22  1415 10/17/22  1427 11/23/22  0631 01/17/23  1342   LABBLOO No Growth after 4 days. No Growth after 4 days. No Growth after 4 days. No growth after 5 days.  No growth after 5 days. No growth after 5 days.  No growth after 5 days.       CBC:   No results for input(s): WBC, HGB, HCT, PLT in the last 48 hours.    CMP:   No results for input(s): NA, K, CL, CO2, GLU, BUN, CREATININE, CALCIUM, PROT, ALBUMIN, BILITOT, ALKPHOS, AST, ALT, ANIONGAP, EGFRNONAA in the last 48 hours.    Invalid input(s): ESTGFAFRICA    Wound Culture:   Recent Labs   Lab 11/23/22  0630   LABAERO CITROBACTER KOSERI  Many  *  CITROBACTER KOSERI  Many  *  PSEUDOMONAS AERUGINOSA  Many  *         Significant Imaging: I have reviewed all pertinent imaging results/findings within the past 24 hours.

## 2023-01-23 NOTE — PLAN OF CARE
Problem: Adult Inpatient Plan of Care  Goal: Plan of Care Review  Outcome: Ongoing, Progressing  Goal: Patient-Specific Goal (Individualized)  Outcome: Ongoing, Progressing  Goal: Absence of Hospital-Acquired Illness or Injury  Outcome: Ongoing, Progressing  Goal: Optimal Comfort and Wellbeing  Outcome: Ongoing, Progressing  Goal: Readiness for Transition of Care  Outcome: Ongoing, Progressing     Problem: Infection  Goal: Absence of Infection Signs and Symptoms  Outcome: Ongoing, Progressing     Problem: Diabetes Comorbidity  Goal: Blood Glucose Level Within Targeted Range  Outcome: Ongoing, Progressing     Problem: Infection Progression (Sepsis/Septic Shock)  Goal: Absence of Infection Signs and Symptoms  Outcome: Ongoing, Progressing     Problem: Bleeding (Sepsis/Septic Shock)  Goal: Absence of Bleeding  Outcome: Ongoing, Progressing     Problem: Nutrition Impaired (Sepsis/Septic Shock)  Goal: Optimal Nutrition Intake  Outcome: Ongoing, Progressing

## 2023-01-23 NOTE — PROGRESS NOTES
[facility] - Observation 11H  Infectious Disease  Progress Note    Patient Name: Adryan Neff  MRN: [id number]  Admission Date: 1/17/2023  Length of Stay: 5 days  Attending Physician: Lee Toney MD  Primary Care Provider: Carola Pedro MD    Isolation Status: Airborne and Contact and Droplet  Assessment/Plan:      * Wound infection     57 year old male with ESRD on HD, DM (HA1C 4.8), PAD  s/p right AKA October 2022, complicated by stump abscess and presumed osteomyelitis 11/23/22 (wound cx citrobacter koseri and pseudomonas), d/c on oral ciprofloxacin with plan for 6 weeks treatment.  Course of antibiotics shortened after admission to [facility] in early December with wound dehiscence. CT imaging there showed showing rim enhancing fluid collections/phlegmon with evaluation by Orthopedics who was not concerned for infection.  Course of ciprofloxacin abbreviated after that admission and he stopped abx on 12/24. Readmitted from ID clinic with concern for worsening wound infection.      CT right thigh with contrast 1/17 - resolution of prior large stump abscess.  Noted lateral ulceration possibly extended to bone.  Unable to exclude osteo.      MRI without contrast 1/19 - Bone marrow edema at distal femur amputation edge - micro trauma edema vs early osteo.  Notes extensive edema throughout suggestive of cellulitis/myositis.  No focal fluid collections or sinus tracts.     Orthopedics consulted . Reviewed MRI, does not feel it shows active osteomyelitis. Recommends antibiotic suppression, local wound care and follow up with primary surgeon (Dr. Davis) as outpatient.  No acute orthopedic intervention anticipated     Plan/recommendations:  1.  Continue IV vancomycin (pharmacy to dose. Trough goal 15-20) and meropenem (renally dosed) for now.   2.  Bone scan today. Will f/u   3.  Recommend wound care evaluation if need for debridement, please obtain deep wound cultures to guide abx therapy  4.  As pt with PAD, wound eschar and  poor wound healing of R AKA site, would consider vascular surgery evaluation        Seen and discussed with ID staff Dr. Salvador             Thank you for your consult. I will follow-up with patient. Please contact us if you have any additional questions.    Terrie Rice PA-C  Infectious Disease  [address] - Observation 11H    Subjective:     Principal Problem:Wound infection    HPI: Mr. Adryan Neff is a 57 year old with ESR on dialysis, DM (last HA1C 4.8), PAD, s/p right AKA 10/13/22 admitted from ID clinic with concern for worsening right stump infection.  He had recent admission 11/23 with right stump abscess with suspected associated osteomyelitis.  Initial wound cultures + for citrobacter koseri (zosyn R) and Pseudomonas.  IR aspiration cx 11/29 were negative.  He was initially treated with meropenem and then discharged to [facility] 12/2/22 with plan for ciprofloxacin X 6 weeks for presumed osteo.      He was admitted 12/8 to 12/14 to [facility] with wound dehiscence.  Per discharge summary,  Orthopedic Surgery was consulted who did not find any evidence of active infection and he was discharged back to SNF with 10 day course of ciprofloxacin (short of the original 6 week course planned).   He had Vascular Surgery visit on 12/15/22 at [facility] and noted to have wound dehiscence with foul smelling/purulent drainage and ED was recommended but apparently he did  not go.      He was seen by ID for follow up yesterday.  Purulent drainage noted and he was sent to ED.      In ED afebrile, no leukocytosis. COVID +.  Blood cultures obtained and pending CT right femur with contrast showed resolution of large stump abscess from prior but ulcer appeared to extend to bone.  No bony erosion seen but recommended MRI for better evaluation for osteo    Interval History: Remains afebrile, no leukocytosis  Blood cx NGTD  Bone scan studies pending      Review of Systems   Constitutional:  Negative for activity change,  appetite change, chills, diaphoresis, fatigue and fever.   HENT:  Negative for congestion.    Respiratory:  Negative for cough and shortness of breath.    Cardiovascular:  Negative for leg swelling.   Gastrointestinal:  Negative for abdominal pain, constipation, diarrhea, nausea and vomiting.   Genitourinary:         HD   Musculoskeletal:  Negative for arthralgias and myalgias.   Skin:  Positive for wound. Negative for rash.   Neurological:  Negative for dizziness, weakness and headaches.   Psychiatric/Behavioral:  Negative for agitation and confusion.    Objective:     Vital Signs (Most Recent):  Temp: 98 °F (36.7 °C) (01/23/23 1133)  Pulse: 72 (01/23/23 1330)  Resp: 18 (01/23/23 1133)  BP: (!) 146/70 (01/23/23 1330)  SpO2: 95 % (01/23/23 1133)   Vital Signs (24h Range):  Temp:  [97.5 °F (36.4 °C)-98.7 °F (37.1 °C)] 98 °F (36.7 °C)  Pulse:  [68-75] 72  Resp:  [16-20] 18  SpO2:  [95 %-98 %] 95 %  BP: (132-168)/(64-79) 146/70     Weight: 56.7 kg (125 lb)  Body mass index is 17.44 kg/m².    Estimated Creatinine Clearance: 8 mL/min (A) (based on SCr of 8.1 mg/dL (H)).    Physical Exam  Vitals and nursing note reviewed.   Constitutional:       General: He is not in acute distress.     Appearance: Normal appearance. He is not ill-appearing, toxic-appearing or diaphoretic.   HENT:      Head: Normocephalic and atraumatic.      Nose: No congestion or rhinorrhea.      Mouth/Throat:      Mouth: Mucous membranes are moist.   Eyes:      General: No scleral icterus.     Conjunctiva/sclera: Conjunctivae normal.   Cardiovascular:      Rate and Rhythm: Normal rate and regular rhythm.      Heart sounds: Normal heart sounds.   Pulmonary:      Effort: Pulmonary effort is normal. No respiratory distress.   Abdominal:      Tenderness: There is no abdominal tenderness.   Musculoskeletal:         General: No tenderness.      Cervical back: Normal range of motion.      Comments: Bilateral lower extremity amputations.   R AKA wound seen  No  purulent drainage  No foul odor  Wound with eschar   Skin:     General: Skin is warm and dry.   Neurological:      Mental Status: He is alert and oriented to person, place, and time. Mental status is at baseline.   Psychiatric:         Mood and Affect: Mood normal.         Behavior: Behavior normal.         Thought Content: Thought content normal.         Judgment: Judgment normal.           Significant Labs: Blood Culture:   Recent Labs   Lab 10/11/22  0915 10/17/22  1415 10/17/22  1427 11/23/22  0631 01/17/23  1342   LABBLOO No Growth after 4 days. No Growth after 4 days. No Growth after 4 days. No growth after 5 days.  No growth after 5 days. No growth after 5 days.  No growth after 5 days.       CBC:   No results for input(s): WBC, HGB, HCT, PLT in the last 48 hours.    CMP:   No results for input(s): NA, K, CL, CO2, GLU, BUN, CREATININE, CALCIUM, PROT, ALBUMIN, BILITOT, ALKPHOS, AST, ALT, ANIONGAP, EGFRNONAA in the last 48 hours.    Invalid input(s): ESTGFAFRICA    Wound Culture:   Recent Labs   Lab 11/23/22  0630   LABAERO CITROBACTER KOSERI  Many  *  CITROBACTER KOSERI  Many  *  PSEUDOMONAS AERUGINOSA  Many  *         Significant Imaging: I have reviewed all pertinent imaging results/findings within the past 24 hours.

## 2023-01-24 LAB
ALBUMIN SERPL BCP-MCNC: 3.1 G/DL (ref 3.5–5.2)
ANION GAP SERPL CALC-SCNC: 17 MMOL/L (ref 8–16)
BUN SERPL-MCNC: 87 MG/DL (ref 6–20)
CALCIUM SERPL-MCNC: 8.7 MG/DL (ref 8.7–10.5)
CHLORIDE SERPL-SCNC: 100 MMOL/L (ref 95–110)
CO2 SERPL-SCNC: 19 MMOL/L (ref 23–29)
CREAT SERPL-MCNC: 12.5 MG/DL (ref 0.5–1.4)
EST. GFR  (NO RACE VARIABLE): 4.2 ML/MIN/1.73 M^2
GLUCOSE SERPL-MCNC: 100 MG/DL (ref 70–110)
PHOSPHATE SERPL-MCNC: 6.3 MG/DL (ref 2.7–4.5)
POCT GLUCOSE: 143 MG/DL (ref 70–110)
POTASSIUM SERPL-SCNC: 5.4 MMOL/L (ref 3.5–5.1)
SODIUM SERPL-SCNC: 136 MMOL/L (ref 136–145)
VANCOMYCIN SERPL-MCNC: 7 UG/ML

## 2023-01-24 PROCEDURE — 99232 PR SUBSEQUENT HOSPITAL CARE,LEVL II: ICD-10-PCS | Mod: ,,, | Performed by: INTERNAL MEDICINE

## 2023-01-24 PROCEDURE — 80202 ASSAY OF VANCOMYCIN: CPT | Performed by: STUDENT IN AN ORGANIZED HEALTH CARE EDUCATION/TRAINING PROGRAM

## 2023-01-24 PROCEDURE — 99233 SBSQ HOSP IP/OBS HIGH 50: CPT | Mod: ,,, | Performed by: PHYSICIAN ASSISTANT

## 2023-01-24 PROCEDURE — 80069 RENAL FUNCTION PANEL: CPT | Performed by: INTERNAL MEDICINE

## 2023-01-24 PROCEDURE — 25000003 PHARM REV CODE 250: Performed by: HOSPITALIST

## 2023-01-24 PROCEDURE — 25000003 PHARM REV CODE 250: Performed by: NURSE PRACTITIONER

## 2023-01-24 PROCEDURE — 99233 SBSQ HOSP IP/OBS HIGH 50: CPT | Mod: ,,, | Performed by: NURSE PRACTITIONER

## 2023-01-24 PROCEDURE — 63600175 PHARM REV CODE 636 W HCPCS: Performed by: STUDENT IN AN ORGANIZED HEALTH CARE EDUCATION/TRAINING PROGRAM

## 2023-01-24 PROCEDURE — 99233 PR SUBSEQUENT HOSPITAL CARE,LEVL III: ICD-10-PCS | Mod: ,,, | Performed by: NURSE PRACTITIONER

## 2023-01-24 PROCEDURE — 25000003 PHARM REV CODE 250: Performed by: INTERNAL MEDICINE

## 2023-01-24 PROCEDURE — 80100014 HC HEMODIALYSIS 1:1

## 2023-01-24 PROCEDURE — 99232 SBSQ HOSP IP/OBS MODERATE 35: CPT | Mod: ,,, | Performed by: INTERNAL MEDICINE

## 2023-01-24 PROCEDURE — 63600175 PHARM REV CODE 636 W HCPCS: Performed by: INTERNAL MEDICINE

## 2023-01-24 PROCEDURE — 25000003 PHARM REV CODE 250: Performed by: STUDENT IN AN ORGANIZED HEALTH CARE EDUCATION/TRAINING PROGRAM

## 2023-01-24 PROCEDURE — 99233 PR SUBSEQUENT HOSPITAL CARE,LEVL III: ICD-10-PCS | Mod: ,,, | Performed by: PHYSICIAN ASSISTANT

## 2023-01-24 PROCEDURE — 27000207 HC ISOLATION

## 2023-01-24 PROCEDURE — 11000001 HC ACUTE MED/SURG PRIVATE ROOM

## 2023-01-24 RX ORDER — LIDOCAINE 50 MG/G
1 PATCH TOPICAL
Status: DISCONTINUED | OUTPATIENT
Start: 2023-01-24 | End: 2023-01-30 | Stop reason: HOSPADM

## 2023-01-24 RX ADMIN — ASPIRIN 81 MG: 81 TABLET, CHEWABLE ORAL at 08:01

## 2023-01-24 RX ADMIN — ACETAMINOPHEN 650 MG: 325 TABLET ORAL at 04:01

## 2023-01-24 RX ADMIN — AMLODIPINE BESYLATE 10 MG: 10 TABLET ORAL at 08:01

## 2023-01-24 RX ADMIN — HEPARIN SODIUM 5000 UNITS: 5000 INJECTION INTRAVENOUS; SUBCUTANEOUS at 09:01

## 2023-01-24 RX ADMIN — HYDRALAZINE HYDROCHLORIDE 100 MG: 50 TABLET ORAL at 09:01

## 2023-01-24 RX ADMIN — CLOPIDOGREL BISULFATE 75 MG: 75 TABLET ORAL at 08:01

## 2023-01-24 RX ADMIN — VANCOMYCIN HYDROCHLORIDE 500 MG: 500 INJECTION, POWDER, LYOPHILIZED, FOR SOLUTION INTRAVENOUS at 04:01

## 2023-01-24 RX ADMIN — METOPROLOL SUCCINATE 25 MG: 25 TABLET, EXTENDED RELEASE ORAL at 08:01

## 2023-01-24 RX ADMIN — HEPARIN SODIUM 5000 UNITS: 5000 INJECTION INTRAVENOUS; SUBCUTANEOUS at 05:01

## 2023-01-24 RX ADMIN — SEVELAMER CARBONATE 800 MG: 800 TABLET, FILM COATED ORAL at 04:01

## 2023-01-24 RX ADMIN — ATORVASTATIN CALCIUM 20 MG: 20 TABLET, FILM COATED ORAL at 08:01

## 2023-01-24 RX ADMIN — SODIUM CHLORIDE: 9 INJECTION, SOLUTION INTRAVENOUS at 10:01

## 2023-01-24 RX ADMIN — LIDOCAINE 1 PATCH: 50 PATCH CUTANEOUS at 05:01

## 2023-01-24 RX ADMIN — SEVELAMER CARBONATE 800 MG: 800 TABLET, FILM COATED ORAL at 08:01

## 2023-01-24 RX ADMIN — HYDRALAZINE HYDROCHLORIDE 100 MG: 50 TABLET ORAL at 05:01

## 2023-01-24 RX ADMIN — SEVELAMER CARBONATE 800 MG: 800 TABLET, FILM COATED ORAL at 12:01

## 2023-01-24 RX ADMIN — MEROPENEM 500 MG: 500 INJECTION INTRAVENOUS at 05:01

## 2023-01-24 RX ADMIN — HYDRALAZINE HYDROCHLORIDE 100 MG: 50 TABLET ORAL at 03:01

## 2023-01-24 RX ADMIN — PANTOPRAZOLE SODIUM 40 MG: 40 TABLET, DELAYED RELEASE ORAL at 08:01

## 2023-01-24 RX ADMIN — HEPARIN SODIUM 5000 UNITS: 5000 INJECTION INTRAVENOUS; SUBCUTANEOUS at 03:01

## 2023-01-24 RX ADMIN — ACETAMINOPHEN 650 MG: 325 TABLET ORAL at 12:01

## 2023-01-24 NOTE — PLAN OF CARE
MARCUS updated [facility] on pt status and ENIO via [employer].  Will continue to update plan as needed.  Adan Shelton RN,BSN

## 2023-01-24 NOTE — CONSULTS
See H&P    Michelle West MD  Radiology Resident PGY- 3  [facility]   Pager: [phone]

## 2023-01-24 NOTE — PLAN OF CARE
Problem: Hemodynamic Instability (Hemodialysis)  Goal: Effective Tissue Perfusion  Outcome: Ongoing, Progressing      Dialysis tx ended to the left arm AV fistula, hemostasis achieved.    Net fluid removed: 2.7 L    PRN pain med given    Report given to primary nurse Mango

## 2023-01-24 NOTE — PROGRESS NOTES
"      [facility] - Observation [address] [place] [address] Medicine  Progress Note    Patient Name: Adryan Neff  MRN: [id number]  Patient Class: IP- Inpatient   Admission Date: 1/17/2023  Length of Stay: 6 days  Attending Physician: Lee Toney MD  Primary Care Provider: Carola Pedro MD        Subjective:     Principal Problem:Wound infection    Interval History:  57-year-old man with a past medical history of primary hypertension, peripheral arterial disease s/p Right and left AKA, diabetes mellitus, ESRD on HD MWF, who presents to the emergency department with a wound infection.  He had prior history of infection to right stump and there is uncertainty if patient completed his antibiotic course.  Orthopedics and Infectious Disease consulted.  MRI of right femur was done with report noted.  Infectious Disease ordered a bone scan which is currently pending.  He is on IV antibiotics per their recommendations.  He had incidental COVID-19 positive findings and received remdesivir course.    Bone scan reports "Increased uptake of the right femoral osseous stump concerning for osteomyelitis.  Early uptake of the surrounding stem soft tissues may represent soft tissue infection or cellulitis". Patient has no complaints and was receiving dialysis    Review of Systems   All other systems reviewed and are negative.  Objective:     Vital Signs (Most Recent):  Temp: 97.2 °F (36.2 °C) (01/24/23 0850)  Pulse: 78 (01/24/23 1200)  Resp: 17 (01/24/23 0850)  BP: (!) 160/69 (01/24/23 1200)  SpO2: 95 % (01/24/23 0850)   Vital Signs (24h Range):  Temp:  [97.2 °F (36.2 °C)-98.8 °F (37.1 °C)] 97.2 °F (36.2 °C)  Pulse:  [65-88] 78  Resp:  [17-20] 17  SpO2:  [91 %-96 %] 95 %  BP: (111-182)/(56-96) 160/69     Weight: 56.7 kg (125 lb)  Body mass index is 17.44 kg/m².  No intake or output data in the 24 hours ending 01/24/23 1213       Physical Exam  Constitutional:       General: He is not in acute distress.  HENT:      Head: Normocephalic.      Right Ear: " External ear normal.      Left Ear: External ear normal.      Nose: Nose normal.   Cardiovascular:      Rate and Rhythm: Normal rate.   Pulmonary:      Effort: Pulmonary effort is normal.   Musculoskeletal:      Comments: Bilateral AKA   Skin:     Comments: Dialysis access to left Upper Extrimity  Neurological:      Mental Status: He is alert and oriented to person, place, and time.   Psychiatric:         Mood and Affect: Mood normal.        Assessment/Plan:      Active Diagnoses:    Diagnosis Date Noted POA    PRINCIPAL PROBLEM:  Wound infection [T14.8XXA, L08.9] 01/18/2023 Yes    Anemia in ESRD (end-stage renal disease) [N18.6, D63.1] 01/18/2023 Yes    Chronic kidney disease-mineral and bone disorder [N18.9, E83.9, M89.9] 11/29/2022 Yes    PAD (peripheral artery disease) [I73.9] 02/07/2022 Yes    COVID-19 [U07.1] 01/05/2022 Yes    ESRD on hemodialysis [N18.6, Z99.2]  Not Applicable    CAD (coronary artery disease) - non-obstructive from [facility] in 2016 [I25.10] 10/26/2020 Yes    Diabetes Mellitus [E11.22, N18.6, Z79.4, Z99.2] 01/29/2019 Not Applicable    Essential (primary) hypertension [I10] 01/29/2019 Yes      Problems Resolved During this Admission:     VTE Risk Mitigation (From admission, onward)           Ordered     heparin (porcine) injection 5,000 Units  Every 8 hours         01/17/23 1848     IP VTE HIGH RISK PATIENT  Once         01/17/23 1848     Place sequential compression device  Until discontinued         01/17/23 1848                  Right femoral stump wound infection with confirmed osteomyelitis on bone scan.  Continue antimicrobials per Infectious Disease recommendations.  Follow up with their recommendations. Seen by Orthopedics with no acute surgical intervention recommended.  Continue wound care  ESRD on dialysis.  Follow up with Nephrology recommendations continue dialysis per regime  Hypertension.  Continue antihypertensive  Atherosclerotic disease, peripheral arterial disease status post  bilateral AKA.  Continue aspirin, statin, beta blocker and Plavix   Incidental COVID-19.  Normal sats on room air, completed remdesivir course     DVT prophylaxis.  Heparin.  Fall precaution    Lee Toney MD  Department of Hospital Medicine   [facility] - Observation 11H

## 2023-01-24 NOTE — NURSING
Bedside dialysis tx started to the left arm AV fistula per orders placed by FAYE Sanchez:    Order Questions    Question Answer   Antibiotics on HD? No   Duration of Treatment 3.5 hours   Dialyzer F160   Dialysate Temperature (C) 36.5   BFR-As tolerated to a maximum of: 400 mL/min    mL/min   K+ Potassium per Protocol   Ca++ Calcium per Protocol   Na+ Sodium per Protocol   Bicarb Bicarbonate per Protocol   Access AVF   Fluid Removal (L): 2-3L as tolerated, keep MAP >65   Dialysate Bath Solution Protocol (DO NOT MODIFY ANSWER) [email or web address] Dialysate Bath Solution Algorithm (formatted with date).pdf

## 2023-01-24 NOTE — PROGRESS NOTES
Pharmacokinetic Assessment Follow Up: IV Vancomycin    Vancomycin serum concentration assessment(s):    The random level was drawn incorrectly but can be used to guide therapy at this time. Level is below goal of 15-20 mcg/mL prior to dialysis session. Will give vancomycin 500mg x1     Vancomycin Regimen Plan:    Re-dose when the random level is less than 20 mcg/mL, next level to be drawn at 0430 on 1/25 . Inpatient HD schedule not yet established     Drug levels (last 3 results):  Recent Labs   Lab Result Units 01/24/23  1058   Vancomycin, Random ug/mL 7.0       Pharmacy will continue to follow and monitor vancomycin.    Please contact pharmacy at extension [phone] for questions regarding this assessment.    Thank you for the consult,   Francesco Tapia       Patient brief summary:  Adryan Neff is a 57 y.o. male initiated on antimicrobial therapy with IV Vancomycin for treatment of skin & soft tissue infection      Drug Allergies:   Review of patient's allergies indicates:  No Known Allergies    Actual Body Weight:   56.7 kg    Renal Function:   Estimated Creatinine Clearance: 5.2 mL/min (A) (based on SCr of 12.5 mg/dL (H)).,     Dialysis Method (if applicable):  intermittent HD- schedule not yet established    CBC (last 72 hours):  No results for input(s): WHITE BLOOD CELL COUNT, HEMOGLOBIN, HEMATOCRIT, PLATELETS, GRAN%, LYMPH%, MONO%, EOSINOPHIL%, BASOPHIL%, DIFFERENTIAL METHOD in the last 72 hours.    Metabolic Panel (last 72 hours):  Recent Labs   Lab Result Units 01/24/23  0901   Sodium mmol/L 136   Potassium mmol/L 5.4*   Chloride mmol/L 100   CO2 mmol/L 19*   Glucose mg/dL 100   BUN mg/dL 87*   Creatinine mg/dL 12.5*   Albumin g/dL 3.1*   Phosphorus mg/dL 6.3*       Vancomycin Administrations:  vancomycin given in the last 96 hours                     vancomycin (VANCOCIN) 500 mg in dextrose 5 % in water (D5W) 5 % 100 mL IVPB (MB+) (mg) 500 mg New Bag 01/21/23 3376                    Microbiologic  Results:  Microbiology Results (last 7 days)       Procedure Component Value Units Date/Time    Blood culture #1 **CANNOT BE ORDERED STAT** [[id number]] Collected: 01/17/23 1342    Order Status: Completed Specimen: Blood from Peripheral, Antecubital, Right Updated: 01/22/23 1612     Blood Culture, Routine No growth after 5 days.    Blood culture #2 **CANNOT BE ORDERED STAT** [[id number]] Collected: 01/17/23 1342    Order Status: Completed Specimen: Blood from Peripheral, Hand, Right Updated: 01/22/23 1612     Blood Culture, Routine No growth after 5 days.

## 2023-01-24 NOTE — PROGRESS NOTES
[facility] - Observation 11H  Infectious Disease  Progress Note    Patient Name: Adryan Neff  MRN: [id number]  Admission Date: 1/17/2023  Length of Stay: 6 days  Attending Physician: Lee Toney MD  Primary Care Provider: Carola Pedro MD    Isolation Status: Airborne and Contact and Droplet  Assessment/Plan:      * Wound infection     57 year old male with ESRD on HD, DM (HA1C 4.8), PAD  s/p right AKA October 2022, complicated by stump abscess and presumed osteomyelitis 11/23/22 (wound cx citrobacter koseri and pseudomonas), d/c on oral ciprofloxacin with plan for 6 weeks treatment.  Course of antibiotics shortened after admission to [facility] in early December with wound dehiscence. CT imaging there showed showing rim enhancing fluid collections/phlegmon with evaluation by Orthopedics who was not concerned for infection.  Course of ciprofloxacin abbreviated after that admission and he stopped abx on 12/24. Readmitted from ID clinic with concern for worsening wound infection.      CT right thigh with contrast 1/17 - resolution of prior large stump abscess.  Noted lateral ulceration possibly extended to bone.  Unable to exclude osteo. NM bone scan +osteomyelitis.       Orthopedics consulted . Reviewed MRI, does not feel it shows active osteomyelitis. Recommends antibiotic suppression, local wound care and follow up with primary surgeon (Dr. Davis) as outpatient.  No acute orthopedic intervention anticipated    Plan/recommendations:  1.  Continue IV vancomycin (pharmacy to dose. Trough goal 15-20) and meropenem (renally dosed) for now.   2.  Bone scan +osteomyelitis. Discussed with orthopedics, recommend IR for bone biopsy. Recommend to send for path and cultures to guide abx therapy for osteomyelitis treatment   3.  Recommend wound care evaluation and if need for further debridement, please obtain deep wound cultures to guide abx therapy  4.  As pt with PAD, wound eschar and poor wound healing of R AKA site, would  consider vascular surgery evaluation      Discussed with ID staff and primary team. ID will follow      Seen and discussed with ID staff Dr. Salvador         Thank you for your consult. I will follow-up with patient. Please contact us if you have any additional questions.    Terrie Rice PA-C  Infectious Disease  [address] - Observation 11H    Subjective:     Principal Problem:Wound infection    HPI: Mr. Adryan Neff is a 57 year old with ESR on dialysis, DM (last HA1C 4.8), PAD, s/p right AKA 10/13/22 admitted from ID clinic with concern for worsening right stump infection.  He had recent admission 11/23 with right stump abscess with suspected associated osteomyelitis.  Initial wound cultures + for citrobacter koseri (zosyn R) and Pseudomonas.  IR aspiration cx 11/29 were negative.  He was initially treated with meropenem and then discharged to [facility] 12/2/22 with plan for ciprofloxacin X 6 weeks for presumed osteo.      He was admitted 12/8 to 12/14 to [facility] with wound dehiscence.  Per discharge summary,  Orthopedic Surgery was consulted who did not find any evidence of active infection and he was discharged back to SNF with 10 day course of ciprofloxacin (short of the original 6 week course planned).   He had Vascular Surgery visit on 12/15/22 at [facility] and noted to have wound dehiscence with foul smelling/purulent drainage and ED was recommended but apparently he did  not go.      He was seen by ID for follow up yesterday.  Purulent drainage noted and he was sent to ED.      In ED afebrile, no leukocytosis. COVID +.  Blood cultures obtained and pending CT right femur with contrast showed resolution of large stump abscess from prior but ulcer appeared to extend to bone.  No bony erosion seen but recommended MRI for better evaluation for osteo    Interval History: Remains afebrile, no leukocytosis  Blood cx NGTD  Bone scan studies + osteomyelitis  HD today      Review of Systems   Constitutional:   Negative for activity change, appetite change, chills, diaphoresis, fatigue and fever.   HENT:  Negative for congestion.    Respiratory:  Negative for cough and shortness of breath.    Cardiovascular:  Negative for leg swelling.   Gastrointestinal:  Negative for abdominal pain, constipation, diarrhea, nausea and vomiting.   Genitourinary:         HD   Musculoskeletal:  Negative for arthralgias and myalgias.   Skin:  Positive for wound. Negative for rash.   Neurological:  Negative for dizziness, weakness and headaches.   Psychiatric/Behavioral:  Negative for agitation and confusion.    Objective:     Vital Signs (Most Recent):  Temp: 96.3 °F (35.7 °C) (01/24/23 1329)  Pulse: 75 (01/24/23 1329)  Resp: 15 (01/24/23 1329)  BP: 138/62 (01/24/23 1329)  SpO2: (!) 93 % (01/24/23 1329)   Vital Signs (24h Range):  Temp:  [96.3 °F (35.7 °C)-98.8 °F (37.1 °C)] 96.3 °F (35.7 °C)  Pulse:  [65-88] 75  Resp:  [15-20] 15  SpO2:  [91 %-96 %] 93 %  BP: (103-182)/(52-96) 138/62     Weight: 56.7 kg (125 lb)  Body mass index is 17.44 kg/m².    Estimated Creatinine Clearance: 5.2 mL/min (A) (based on SCr of 12.5 mg/dL (H)).    Physical Exam  Vitals and nursing note reviewed.   Constitutional:       General: He is not in acute distress.     Appearance: Normal appearance. He is not ill-appearing, toxic-appearing or diaphoretic.   HENT:      Head: Normocephalic and atraumatic.      Nose: No congestion or rhinorrhea.      Mouth/Throat:      Mouth: Mucous membranes are moist.   Eyes:      General: No scleral icterus.     Conjunctiva/sclera: Conjunctivae normal.   Cardiovascular:      Rate and Rhythm: Normal rate and regular rhythm.      Heart sounds: Normal heart sounds.   Pulmonary:      Effort: Pulmonary effort is normal. No respiratory distress.   Abdominal:      Tenderness: There is no abdominal tenderness.   Musculoskeletal:         General: No tenderness.      Cervical back: Normal range of motion.      Comments: Bilateral lower  extremity amputations.   R AKA wound seen  No purulent drainage  No foul odor  Wound with eschar   Skin:     General: Skin is warm and dry.   Neurological:      Mental Status: He is alert and oriented to person, place, and time. Mental status is at baseline.   Psychiatric:         Mood and Affect: Mood normal.         Behavior: Behavior normal.         Thought Content: Thought content normal.         Judgment: Judgment normal.           Significant Labs: Blood Culture:   Recent Labs   Lab 10/11/22  0915 10/17/22  1415 10/17/22  1427 11/23/22  0631 01/17/23  1342   LABBLOO No Growth after 4 days. No Growth after 4 days. No Growth after 4 days. No growth after 5 days.  No growth after 5 days. No growth after 5 days.  No growth after 5 days.       CBC:   No results for input(s): WBC, HGB, HCT, PLT in the last 48 hours.    CMP:   Recent Labs   Lab 01/24/23  0901      K 5.4*      CO2 19*      BUN 87*   CREATININE 12.5*   CALCIUM 8.7   ALBUMIN 3.1*   ANIONGAP 17*       Wound Culture:   Recent Labs   Lab 11/23/22  0630   LABAERO CITROBACTER KOSERI  Many  *  CITROBACTER KOSERI  Many  *  PSEUDOMONAS AERUGINOSA  Many  *         Significant Imaging: I have reviewed all pertinent imaging results/findings within the past 24 hours.

## 2023-01-24 NOTE — HPI
57yM w/ PMH HTN, PAD s/p bilateral AKAs, DM, and ESRD on HD admitted for concern for right stump osteomyelitis. Was admitted in November 2022 for wound infection and osteomyelitis of the right stump discharged home on oral abx. Readmitted from ID clinic due to concern for worsening infection. MRI right femur showing concern for osteomyelitis. IR consulted and plan for bone biopsy 1/25. Vascular surgery consulted to comment on healing potential given history of PAD.    Patient with vascular history as detailed below:  - 1/6/22: amputation L 2-5 digits 2/2 gangrene  - 1/21/22: left foot gangrene with occlusion of L profunda and peroneal s/p L AKA  - 10/13/22: right foot diabetic ulcer s/p R AKA    Discharge Instructions - Orthopedics  Lupe Reno 60 y o  male MRN: [id number]  Unit/Bed#: PACU 02    Weight Bearing Status:                                           Weight Bearing as tolerated to the right lower extremity  DVT prophylaxis:  Complete course of Lovenox as directed    Pain:  Continue analgesics as directed    Showering Instructions:   Do not shower until followup     Dressing Instructions:   Keep dressing clean, dry and intact until follow up appointment  Driving Instructions:  No driving until cleared by Orthopaedic Surgery  PT/OT:  Continue PT/OT on outpatient basis as directed    Appt Instructions: If you do not have your appointment, please call the clinic at [phone]  Otherwise followup as scheduled below:      Contact the office sooner if you experience any increased numbness/tingling in the extremities        Miscellaneous:  None

## 2023-01-24 NOTE — SUBJECTIVE & OBJECTIVE
Interval History: Remains afebrile, no leukocytosis  Blood cx NGTD  Bone scan studies + osteomyelitis  HD today      Review of Systems   Constitutional:  Negative for activity change, appetite change, chills, diaphoresis, fatigue and fever.   HENT:  Negative for congestion.    Respiratory:  Negative for cough and shortness of breath.    Cardiovascular:  Negative for leg swelling.   Gastrointestinal:  Negative for abdominal pain, constipation, diarrhea, nausea and vomiting.   Genitourinary:         HD   Musculoskeletal:  Negative for arthralgias and myalgias.   Skin:  Positive for wound. Negative for rash.   Neurological:  Negative for dizziness, weakness and headaches.   Psychiatric/Behavioral:  Negative for agitation and confusion.    Objective:     Vital Signs (Most Recent):  Temp: 96.3 °F (35.7 °C) (01/24/23 1329)  Pulse: 75 (01/24/23 1329)  Resp: 15 (01/24/23 1329)  BP: 138/62 (01/24/23 1329)  SpO2: (!) 93 % (01/24/23 1329)   Vital Signs (24h Range):  Temp:  [96.3 °F (35.7 °C)-98.8 °F (37.1 °C)] 96.3 °F (35.7 °C)  Pulse:  [65-88] 75  Resp:  [15-20] 15  SpO2:  [91 %-96 %] 93 %  BP: (103-182)/(52-96) 138/62     Weight: 56.7 kg (125 lb)  Body mass index is 17.44 kg/m².    Estimated Creatinine Clearance: 5.2 mL/min (A) (based on SCr of 12.5 mg/dL (H)).    Physical Exam  Vitals and nursing note reviewed.   Constitutional:       General: He is not in acute distress.     Appearance: Normal appearance. He is not ill-appearing, toxic-appearing or diaphoretic.   HENT:      Head: Normocephalic and atraumatic.      Nose: No congestion or rhinorrhea.      Mouth/Throat:      Mouth: Mucous membranes are moist.   Eyes:      General: No scleral icterus.     Conjunctiva/sclera: Conjunctivae normal.   Cardiovascular:      Rate and Rhythm: Normal rate and regular rhythm.      Heart sounds: Normal heart sounds.   Pulmonary:      Effort: Pulmonary effort is normal. No respiratory distress.   Abdominal:      Tenderness: There is no  abdominal tenderness.   Musculoskeletal:         General: No tenderness.      Cervical back: Normal range of motion.      Comments: Bilateral lower extremity amputations.   R AKA wound seen  No purulent drainage  No foul odor  Wound with eschar   Skin:     General: Skin is warm and dry.   Neurological:      Mental Status: He is alert and oriented to person, place, and time. Mental status is at baseline.   Psychiatric:         Mood and Affect: Mood normal.         Behavior: Behavior normal.         Thought Content: Thought content normal.         Judgment: Judgment normal.           Significant Labs: Blood Culture:   Recent Labs   Lab 10/11/22  0915 10/17/22  1415 10/17/22  1427 11/23/22  0631 01/17/23  1342   LABBLOO No Growth after 4 days. No Growth after 4 days. No Growth after 4 days. No growth after 5 days.  No growth after 5 days. No growth after 5 days.  No growth after 5 days.       CBC:   No results for input(s): WBC, HGB, HCT, PLT in the last 48 hours.    CMP:   Recent Labs   Lab 01/24/23  0901      K 5.4*      CO2 19*      BUN 87*   CREATININE 12.5*   CALCIUM 8.7   ALBUMIN 3.1*   ANIONGAP 17*       Wound Culture:   Recent Labs   Lab 11/23/22  0630   LABAERO CITROBACTER KOSERI  Many  *  CITROBACTER KOSERI  Many  *  PSEUDOMONAS AERUGINOSA  Many  *         Significant Imaging: I have reviewed all pertinent imaging results/findings within the past 24 hours.

## 2023-01-24 NOTE — ASSESSMENT & PLAN NOTE
57-year-old man with a past medical history of primary hypertension, peripheral arterial disease s/p R AKA, DM type 1, ESRD on HD MWF, admitted for concern of osteomyelitis/abcess to R AKA.     Nephrology History  iHD Schedule: MWF   Unit/MD: YONI  Duration: 3.5 hours   UF: 2L  EDW: 56 kg   Access: PHIL AVF   Last HD prior to presentation: 1/16/23     Plan/Recommendations:     -HD today for metabolic clearance and volume management   -Strict I/O's   -Trend renal function panels daily   -Renally dose meds/avoid nephrotoxic meds   -Renal diet/formulations, if not NPO

## 2023-01-24 NOTE — H&P
Inpatient Radiology Pre-procedure Note    History of Present Illness:  Adryan Neff is a 57 y.o. male with pmhx ESRD on HD, DM (HA1C 4.8), PAD  s/p right AKA October 2022, complicated by stump abscess and presumed osteomyelitis 11/23/22 (wound cx citrobacter koseri and pseudomonas), d/c on oral ciprofloxacin with plan for 6 weeks treatment.  Course of antibiotics shortened after admission to [facility] in early December with wound dehiscence. CT imaging there showed showing rim enhancing fluid collections/phlegmon with evaluation by Orthopedics who was not concerned for infection.  Course of ciprofloxacin abbreviated after that admission and he stopped abx on 12/24. Readmitted from ID clinic with concern for worsening wound infection. CT R thigh 1/17 CT right thigh with contrast 1/17 - resolution of prior large stump abscess.  Noted lateral ulceration possibly extended to bone. MRI of femur 1/20 showing  Bone marrow edema signal at the distal femur amputation edge. Bone scan was also done on 1/23 which showed Increased uptake of the right femoral osseous stump concerning for osteomyelitis.  Early uptake of the surrounding stem soft tissues may represent soft tissue infection or cellulitis. ID and Orthopedics are following and recommend bone biopsy for cultures and pathology.     Admission H&P reviewed.    Past Medical History:   Diagnosis Date    CAD (coronary artery disease) 2016    CAD (non obstructive) 2016 [facility]    Diabetes mellitus type I     Diabetic retinopathy     ESRD on hemodialysis     on HD TThSat    Gangrene of left foot     Hypertension     Nuclear sclerosis of right eye 11/24/2021    PAD (peripheral artery disease)     PEA (Pulseless electrical activity) 10/10/2022    Pulmonary HTN     Right foot infection     TB lung, latent 04/2021    Wet gangrene 1/5/2022     Past Surgical History:   Procedure Laterality Date    ABOVE-KNEE AMPUTATION Left 1/18/2022    Procedure: AMPUTATION, ABOVE KNEE;  Surgeon: Rusty  JOSE Light MD;  Location: [facility] OR;  Service: Orthopedics;  Laterality: Left;    ABOVE-KNEE AMPUTATION Left 1/21/2022    Procedure: AMPUTATION, ABOVE KNEE;  Surgeon: Rusty Light MD;  Location: [facility] OR;  Service: Orthopedics;  Laterality: Left;    ABOVE-KNEE AMPUTATION Right 10/13/2022    Procedure: AMPUTATION, ABOVE KNEE, RIGHT;  Surgeon: Dimitris Davis MD;  Location: [facility] OR;  Service: Orthopedics;  Laterality: Right;    AV FISTULA PLACEMENT      CATARACT EXTRACTION Left     EYE SURGERY      FISTULOGRAM Left 8/9/2022    Procedure: Fistulogram;  Surgeon: Masoud Soni MD;  Location: [facility] OR;  Service: Vascular;  Laterality: Left;  LEFT VM ON DAUGHTER'S PHONE----PHONE PREOP NOT COMPLETED  CALLED PATIENT ON 8/8/2022 @ 3:34. HE STATED HE IS RESCHEDULING PROCEDURE. NOTIFIED ERWIN @ 3:35PM-LO    FISTULOGRAM Left 10/20/2022    Procedure: Fistulogram;  Surgeon: Masoud Soni MD;  Location: [facility];  Service: Vascular;  Laterality: Left;  Left upper extremity    TOE AMPUTATION Left 1/6/2022    Procedure: AMPUTATION, TOE;  Surgeon: Masoud Soni MD;  Location: [facility];  Service: Vascular;  Laterality: Left;  Left first through fifth toes, possible open transmetatarsal amputation and all other indicated procedures       Review of Systems:   As documented in primary team H&P    Home Meds:   Prior to Admission medications    Medication Sig Start Date End Date Taking? Authorizing Provider   amLODIPine (NORVASC) 5 MG tablet Take 1 tablet (5 mg total) by mouth once daily. 12/2/22 12/2/23  Sonja Cabrera MD   aspirin 81 MG Chew Take 81 mg by mouth once daily.    Historical Provider   atorvastatin (LIPITOR) 20 MG tablet Take 1 tablet by mouth. 1/30/22   Historical Provider   clopidogreL (PLAVIX) 75 mg tablet Take 75 mg by mouth once daily. 5/10/22   Historical Provider   epoetin paola-epbx (RETACRIT) 10,000 unit/mL imjection Inject 1 mL (10,000 Units total) into the skin every Mon, Wed, Fri. 12/2/22    Sonja Cabrera MD   folic acid-vit B6-vit B12 2.5-25-2 mg (FOLBIC OR EQUIV) 2.5-25-2 mg Tab Take 1 tablet by mouth once daily. 12/3/22   Sonja Cabrera MD   hydrALAZINE (APRESOLINE) 50 MG tablet Take 2 tablets (100 mg total) by mouth every 8 (eight) hours. 12/2/22 12/2/23  Sonja Cabrera MD   insulin aspart U-100 (NOVOLOG) 100 unit/mL (3 mL) InPn pen Inject 0-5 Units into the skin before meals and at bedtime as needed (Hyperglycemia). 12/2/22 12/2/23  Sonja Cabrera MD   insulin detemir U-100 (LEVEMIR FLEXTOUCH) 100 unit/mL (3 mL) SubQ InPn pen Inject 6 Units into the skin every evening. 12/2/22 12/2/23  Sonja Cabrera MD   lancets (ACCU-CHEK SOFTCLIX LANCETS) Misc 1 each by Misc.(Non-Drug; Combo Route) route once daily at 6am. 7/7/22   Carola Pedro MD   metoprolol succinate (TOPROL-XL) 25 MG 24 hr tablet Take 1 tablet by mouth. 1/30/22   Historical Provider   omeprazole (PRILOSEC) 40 MG capsule Take 40 mg by mouth once daily. 2/22/22   Historical Provider     Scheduled Meds:    amLODIPine  10 mg Oral Daily    aspirin  81 mg Oral Daily    atorvastatin  20 mg Oral Daily    clopidogreL  75 mg Oral Daily    heparin (porcine)  5,000 Units Subcutaneous Q8H    hydrALAZINE  100 mg Oral Q8H    LIDOcaine  1 patch Transdermal Q24H    meropenem (MERREM) IVPB  500 mg Intravenous Q24H    metoprolol succinate  25 mg Oral Daily    pantoprazole  40 mg Oral Daily    sevelamer carbonate  800 mg Oral TID WM    vancomycin (VANCOCIN) IVPB  500 mg Intravenous Once     Continuous Infusions:   PRN Meds:acetaminophen, albuterol-ipratropium, dextrose 10%, dextrose 10%, glucagon (human recombinant), glucose, glucose, insulin aspart U-100, melatonin, naloxone, ondansetron, ondansetron, polyethylene glycol, simethicone, sodium chloride 0.9%, Pharmacy to dose Vancomycin consult **AND** vancomycin - pharmacy to dose  Anticoagulants/Antiplatelets: aspirin, Plavix, and Lovenox    Allergies: Review of patient's allergies  indicates:  No Known Allergies  Sedation Hx: have not been any systemic reactions    Labs:  No results for input(s): INR in the last 168 hours.    Invalid input(s):  PT,  PTT    Recent Labs   Lab 01/20/23  0848   WBC 7.14   HGB 11.1*   HCT 38.3*   MCV 91         Recent Labs   Lab 01/24/23  0901         K 5.4*      CO2 19*   BUN 87*   CREATININE 12.5*   CALCIUM 8.7   ALBUMIN 3.1*         Vitals:  Temp: 97.6 °F (36.4 °C) (01/24/23 1520)  Pulse: 75 (01/24/23 1520)  Resp: 17 (01/24/23 1520)  BP: (!) 143/65 (01/24/23 1520)  SpO2: 96 % (01/24/23 1520)     Physical Exam:  ASA: III  Mallampati: II    General: no acute distress  Mental Status: alert and oriented to person, place and time  HEENT: normocephalic, atraumatic  Chest: unlabored breathing  Heart: regular heart rate  Abdomen: nondistended  Extremity: moves all extremities      Plan:  Sedation Plan: up to moderate.  Patient will undergo distal femur bone biopsy tentatively on 12/25   Please place all the lab orders needed from pathology and cultures.           Michelle West MD  Radiology Resident PGY- 3  [facility]

## 2023-01-24 NOTE — PROGRESS NOTES
[facility]  Nephrology  Progress Note    Patient Name: Adryan Neff  MRN: [id number]  Admission Date: 1/17/2023  Hospital Length of Stay: 6 days  Attending Provider: Lee Toney MD   Primary Care Physician: Carola Pedro MD  Principal Problem:Wound infection      Interval History: NAEON. Due for HD today.     Review of patient's allergies indicates:  No Known Allergies  Current Facility-Administered Medications   Medication Frequency    0.9%  NaCl infusion Once    acetaminophen tablet 650 mg Q8H PRN    albuterol-ipratropium 2.5 mg-0.5 mg/3 mL nebulizer solution 3 mL Q4H PRN    amLODIPine tablet 10 mg Daily    aspirin chewable tablet 81 mg Daily    atorvastatin tablet 20 mg Daily    clopidogreL tablet 75 mg Daily    dextrose 10% bolus 125 mL 125 mL PRN    dextrose 10% bolus 250 mL 250 mL PRN    glucagon (human recombinant) injection 1 mg PRN    glucose chewable tablet 16 g PRN    glucose chewable tablet 24 g PRN    heparin (porcine) injection 5,000 Units Q8H    hydrALAZINE tablet 100 mg Q8H    insulin aspart U-100 pen 0-5 Units QID (AC + HS) PRN    LIDOcaine 5 % patch 1 patch Q24H    melatonin tablet 6 mg Nightly PRN    meropenem (MERREM) 500 mg in sodium chloride 0.9 % 100 mL IVPB (MB+) Q24H    metoprolol succinate (TOPROL-XL) 24 hr tablet 25 mg Daily    naloxone 0.4 mg/mL injection 0.02 mg PRN    ondansetron disintegrating tablet 4 mg Q8H PRN    ondansetron injection 4 mg Q8H PRN    pantoprazole EC tablet 40 mg Daily    polyethylene glycol packet 17 g TID PRN    sevelamer carbonate tablet 800 mg TID WM    simethicone chewable tablet 80 mg QID PRN    sodium chloride 0.9% flush 10 mL Q12H PRN    vancomycin - pharmacy to dose pharmacy to manage frequency       Objective:     Vital Signs (Most Recent):  Temp: 97.2 °F (36.2 °C) (01/24/23 0850)  Pulse: 69 (01/24/23 1058)  Resp: 17 (01/24/23 0850)  BP: (!) 152/70 (01/24/23 1058)  SpO2: 95 % (01/24/23 0850)   Vital Signs (24h  Range):  Temp:  [97.2 °F (36.2 °C)-98.8 °F (37.1 °C)] 97.2 °F (36.2 °C)  Pulse:  [65-88] 69  Resp:  [17-20] 17  SpO2:  [91 %-96 %] 95 %  BP: (111-182)/(56-96) 152/70     Weight: 56.7 kg (125 lb) (01/17/23 2145)  Body mass index is 17.44 kg/m².  Body surface area is 1.69 meters squared.    No intake/output data recorded.    Physical Exam  Vitals and nursing note reviewed.   Constitutional:       Appearance: He is well-developed.   HENT:      Head: Normocephalic and atraumatic.   Eyes:      Conjunctiva/sclera: Conjunctivae normal.      Pupils: Pupils are equal, round, and reactive to light.   Neck:      Thyroid: No thyromegaly.      Vascular: No JVD.   Cardiovascular:      Rate and Rhythm: Normal rate and regular rhythm.      Heart sounds: Normal heart sounds. No murmur heard.    No friction rub. No gallop.   Pulmonary:      Effort: Pulmonary effort is normal. No respiratory distress.      Breath sounds: Normal breath sounds. No wheezing or rales.   Abdominal:      General: Bowel sounds are normal. There is no distension.      Palpations: Abdomen is soft. There is no mass.      Tenderness: There is no abdominal tenderness. There is no guarding or rebound.      Hernia: No hernia is present.   Musculoskeletal:         General: No deformity. Normal range of motion.      Cervical back: Normal range of motion and neck supple.      Comments: R AKA with crusting along lateral incision. No erythema or induration noted.      Right Lower Extremity: Right leg is amputated above knee.   Skin:     General: Skin is warm and dry.   Neurological:      Mental Status: He is alert and oriented to person, place, and time.      Cranial Nerves: No cranial nerve deficit.   Psychiatric:         Behavior: Behavior normal.       Significant Labs:  CBC:   Recent Labs   Lab 01/20/23  0848   WBC 7.14   RBC 4.22*   HGB 11.1*   HCT 38.3*      MCV 91   MCH 26.3*   MCHC 29.0*       CMP:   Recent Labs   Lab 01/17/23  1342 01/18/23  1212  01/24/23  0901   GLU 83   < > 100   CALCIUM 8.8   < > 8.7   ALBUMIN 3.3*   < > 3.1*   PROT 8.1  --   --       < > 136   K 4.5   < > 5.4*   CO2 24   < > 19*      < > 100   BUN 27*   < > 87*   CREATININE 6.6*   < > 12.5*   ALKPHOS 86  --   --    ALT 6*  --   --    AST 9*  --   --    BILITOT 0.5  --   --     < > = values in this interval not displayed.       All labs within the past 24 hours have been reviewed.       Assessment/Plan:     * Wound infection  -management per primary   ID is following     Anemia in ESRD (end-stage renal disease)  -Hgb goal 10-12  -Transfuse for Hg <7.0    Chronic kidney disease-mineral and bone disorder  -continue sevelamer 800 TID with meals     ESRD on hemodialysis  57-year-old man with a past medical history of primary hypertension, peripheral arterial disease s/p R AKA, DM type 1, ESRD on HD MWF, admitted for concern of osteomyelitis/abcess to R AKA.     Nephrology History  iHD Schedule: MWF   Unit/MD: [facility]  Duration: 3.5 hours   UF: 2L  EDW: 56 kg   Access: PHIL AVF   Last HD prior to presentation: 1/16/23     Plan/Recommendations:     -HD today for metabolic clearance and volume management   -Strict I/O's   -Trend renal function panels daily   -Renally dose meds/avoid nephrotoxic meds   -Renal diet/formulations, if not NPO           Thank you for your consult. I will follow-up with patient. Please contact us if you have any additional questions.    Ok Sanchez, NP  Nephrology  [facility]

## 2023-01-24 NOTE — SUBJECTIVE & OBJECTIVE
Medications Prior to Admission   Medication Sig Dispense Refill Last Dose    amLODIPine (NORVASC) 5 MG tablet Take 1 tablet (5 mg total) by mouth once daily. 30 tablet 11     aspirin 81 MG Chew Take 81 mg by mouth once daily.       atorvastatin (LIPITOR) 20 MG tablet Take 1 tablet by mouth.       clopidogreL (PLAVIX) 75 mg tablet Take 75 mg by mouth once daily.       epoetin paola-epbx (RETACRIT) 10,000 unit/mL imjection Inject 1 mL (10,000 Units total) into the skin every Mon, Wed, Fri.       folic acid-vit B6-vit B12 2.5-25-2 mg (FOLBIC OR EQUIV) 2.5-25-2 mg Tab Take 1 tablet by mouth once daily.       hydrALAZINE (APRESOLINE) 50 MG tablet Take 2 tablets (100 mg total) by mouth every 8 (eight) hours. 90 tablet 11     insulin aspart U-100 (NOVOLOG) 100 unit/mL (3 mL) InPn pen Inject 0-5 Units into the skin before meals and at bedtime as needed (Hyperglycemia).  0     insulin detemir U-100 (LEVEMIR FLEXTOUCH) 100 unit/mL (3 mL) SubQ InPn pen Inject 6 Units into the skin every evening.  0     lancets (ACCU-CHEK SOFTCLIX LANCETS) Misc 1 each by Misc.(Non-Drug; Combo Route) route once daily at 6am. 200 each 1     metoprolol succinate (TOPROL-XL) 25 MG 24 hr tablet Take 1 tablet by mouth.       omeprazole (PRILOSEC) 40 MG capsule Take 40 mg by mouth once daily.          Review of patient's allergies indicates:  No Known Allergies    Past Medical History:   Diagnosis Date    CAD (coronary artery disease) 2016    CAD (non obstructive) 2016 [facility]    Diabetes mellitus type I     Diabetic retinopathy     ESRD on hemodialysis     on HD TThSat    Gangrene of left foot     Hypertension     Nuclear sclerosis of right eye 11/24/2021    PAD (peripheral artery disease)     PEA (Pulseless electrical activity) 10/10/2022    Pulmonary HTN     Right foot infection     TB lung, latent 04/2021    Wet gangrene 1/5/2022     Past Surgical History:   Procedure Laterality Date    ABOVE-KNEE AMPUTATION Left 1/18/2022    Procedure: AMPUTATION,  ABOVE KNEE;  Surgeon: Rusty Light MD;  Location: [facility] OR;  Service: Orthopedics;  Laterality: Left;    ABOVE-KNEE AMPUTATION Left 1/21/2022    Procedure: AMPUTATION, ABOVE KNEE;  Surgeon: Rusty Light MD;  Location: [facility] OR;  Service: Orthopedics;  Laterality: Left;    ABOVE-KNEE AMPUTATION Right 10/13/2022    Procedure: AMPUTATION, ABOVE KNEE, RIGHT;  Surgeon: Dimitris Davis MD;  Location: [facility] OR;  Service: Orthopedics;  Laterality: Right;    AV FISTULA PLACEMENT      CATARACT EXTRACTION Left     EYE SURGERY      FISTULOGRAM Left 8/9/2022    Procedure: Fistulogram;  Surgeon: Masoud Soni MD;  Location: [facility] OR;  Service: Vascular;  Laterality: Left;  LEFT VM ON DAUGHTER'S PHONE----PHONE PREOP NOT COMPLETED  CALLED PATIENT ON 8/8/2022 @ 3:34. HE STATED HE IS RESCHEDULING PROCEDURE. NOTIFIED ERWIN @ 3:35PM-LO    FISTULOGRAM Left 10/20/2022    Procedure: Fistulogram;  Surgeon: Masoud Soni MD;  Location: [facility];  Service: Vascular;  Laterality: Left;  Left upper extremity    TOE AMPUTATION Left 1/6/2022    Procedure: AMPUTATION, TOE;  Surgeon: Masoud Soni MD;  Location: [facility] OR;  Service: Vascular;  Laterality: Left;  Left first through fifth toes, possible open transmetatarsal amputation and all other indicated procedures     Family History       Problem Relation (Age of Onset)    No Known Problems Mother, Father, Sister, Brother, Daughter, Daughter, Daughter, Brother, Maternal Aunt, Maternal Uncle, Paternal Aunt, Paternal Uncle, Maternal Grandmother, Maternal Grandfather, Paternal Grandmother, Paternal Grandfather          Tobacco Use    Smoking status: Never    Smokeless tobacco: Never   Substance and Sexual Activity    Alcohol use: No    Drug use: No    Sexual activity: Yes     Partners: Female     Review of Systems  Objective:     Vital Signs (Most Recent):  Temp: 97.6 °F (36.4 °C) (01/24/23 1520)  Pulse: 75 (01/24/23 1520)  Resp: 17 (01/24/23 1520)  BP: (!) 143/65  (01/24/23 1520)  SpO2: 96 % (01/24/23 1520)   Vital Signs (24h Range):  Temp:  [96.3 °F (35.7 °C)-98.6 °F (37 °C)] 97.6 °F (36.4 °C)  Pulse:  [65-88] 75  Resp:  [15-20] 17  SpO2:  [91 %-96 %] 96 %  BP: (103-182)/(52-77) 143/65     Weight: 56.7 kg (125 lb)  Body mass index is 17.44 kg/m².    Physical Exam  Constitutional:       General: He is not in acute distress.  HENT:      Head: Normocephalic and atraumatic.      Mouth/Throat:      Mouth: Mucous membranes are moist.      Pharynx: No oropharyngeal exudate.   Eyes:      Extraocular Movements: Extraocular movements intact.      Conjunctiva/sclera: Conjunctivae normal.   Cardiovascular:      Rate and Rhythm: Normal rate and regular rhythm.      Comments: 2+ femoral pulses bilaterally  Pulmonary:      Effort: Pulmonary effort is normal. No respiratory distress.   Abdominal:      General: There is no distension.      Palpations: Abdomen is soft.      Tenderness: There is no abdominal tenderness.   Musculoskeletal:         General: Normal range of motion.      Comments: S/p bilateral AKA  R AKA with scab to lateral portion of incision, no active drainage   Skin:     General: Skin is dry.   Neurological:      Mental Status: He is alert. Mental status is at baseline.       Significant Labs:  All pertinent labs from the last 24 hours have been reviewed.    Significant Diagnostics:  I have reviewed all pertinent imaging results/findings within the past 24 hours.

## 2023-01-24 NOTE — SUBJECTIVE & OBJECTIVE
Interval History: NAEON. Due for HD today.     Review of patient's allergies indicates:  No Known Allergies  Current Facility-Administered Medications   Medication Frequency    0.9%  NaCl infusion Once    acetaminophen tablet 650 mg Q8H PRN    albuterol-ipratropium 2.5 mg-0.5 mg/3 mL nebulizer solution 3 mL Q4H PRN    amLODIPine tablet 10 mg Daily    aspirin chewable tablet 81 mg Daily    atorvastatin tablet 20 mg Daily    clopidogreL tablet 75 mg Daily    dextrose 10% bolus 125 mL 125 mL PRN    dextrose 10% bolus 250 mL 250 mL PRN    glucagon (human recombinant) injection 1 mg PRN    glucose chewable tablet 16 g PRN    glucose chewable tablet 24 g PRN    heparin (porcine) injection 5,000 Units Q8H    hydrALAZINE tablet 100 mg Q8H    insulin aspart U-100 pen 0-5 Units QID (AC + HS) PRN    LIDOcaine 5 % patch 1 patch Q24H    melatonin tablet 6 mg Nightly PRN    meropenem (MERREM) 500 mg in sodium chloride 0.9 % 100 mL IVPB (MB+) Q24H    metoprolol succinate (TOPROL-XL) 24 hr tablet 25 mg Daily    naloxone 0.4 mg/mL injection 0.02 mg PRN    ondansetron disintegrating tablet 4 mg Q8H PRN    ondansetron injection 4 mg Q8H PRN    pantoprazole EC tablet 40 mg Daily    polyethylene glycol packet 17 g TID PRN    sevelamer carbonate tablet 800 mg TID WM    simethicone chewable tablet 80 mg QID PRN    sodium chloride 0.9% flush 10 mL Q12H PRN    vancomycin - pharmacy to dose pharmacy to manage frequency       Objective:     Vital Signs (Most Recent):  Temp: 97.2 °F (36.2 °C) (01/24/23 0850)  Pulse: 69 (01/24/23 1058)  Resp: 17 (01/24/23 0850)  BP: (!) 152/70 (01/24/23 1058)  SpO2: 95 % (01/24/23 0850)   Vital Signs (24h Range):  Temp:  [97.2 °F (36.2 °C)-98.8 °F (37.1 °C)] 97.2 °F (36.2 °C)  Pulse:  [65-88] 69  Resp:  [17-20] 17  SpO2:  [91 %-96 %] 95 %  BP: (111-182)/(56-96) 152/70     Weight: 56.7 kg (125 lb) (01/17/23 2145)  Body mass index is 17.44 kg/m².  Body surface area is 1.69 meters squared.    No intake/output data  recorded.    Physical Exam  Vitals and nursing note reviewed.   Constitutional:       Appearance: He is well-developed.   HENT:      Head: Normocephalic and atraumatic.   Eyes:      Conjunctiva/sclera: Conjunctivae normal.      Pupils: Pupils are equal, round, and reactive to light.   Neck:      Thyroid: No thyromegaly.      Vascular: No JVD.   Cardiovascular:      Rate and Rhythm: Normal rate and regular rhythm.      Heart sounds: Normal heart sounds. No murmur heard.    No friction rub. No gallop.   Pulmonary:      Effort: Pulmonary effort is normal. No respiratory distress.      Breath sounds: Normal breath sounds. No wheezing or rales.   Abdominal:      General: Bowel sounds are normal. There is no distension.      Palpations: Abdomen is soft. There is no mass.      Tenderness: There is no abdominal tenderness. There is no guarding or rebound.      Hernia: No hernia is present.   Musculoskeletal:         General: No deformity. Normal range of motion.      Cervical back: Normal range of motion and neck supple.      Comments: R AKA with crusting along lateral incision. No erythema or induration noted.      Right Lower Extremity: Right leg is amputated above knee.   Skin:     General: Skin is warm and dry.   Neurological:      Mental Status: He is alert and oriented to person, place, and time.      Cranial Nerves: No cranial nerve deficit.   Psychiatric:         Behavior: Behavior normal.       Significant Labs:  CBC:   Recent Labs   Lab 01/20/23  0848   WBC 7.14   RBC 4.22*   HGB 11.1*   HCT 38.3*      MCV 91   MCH 26.3*   MCHC 29.0*       CMP:   Recent Labs   Lab 01/17/23  1342 01/18/23  1212 01/24/23  0901   GLU 83   < > 100   CALCIUM 8.8   < > 8.7   ALBUMIN 3.3*   < > 3.1*   PROT 8.1  --   --       < > 136   K 4.5   < > 5.4*   CO2 24   < > 19*      < > 100   BUN 27*   < > 87*   CREATININE 6.6*   < > 12.5*   ALKPHOS 86  --   --    ALT 6*  --   --    AST 9*  --   --    BILITOT 0.5  --   --      < > = values in this interval not displayed.       All labs within the past 24 hours have been reviewed.

## 2023-01-24 NOTE — ASSESSMENT & PLAN NOTE
57yM w/ PMH HTN, PAD s/p bilateral AKAs, DM, and ESRD on HD admitted for concern for right stump osteomyelitis. Vascular surgery consulted to comment on healing potential given history of PAD.    - Patient with palpable femoral pulses and open SFA on CT  - No vascular intervention  - Continue ASA, statin, and plavix  - Continue abx per ID

## 2023-01-24 NOTE — ASSESSMENT & PLAN NOTE
57 year old male with ESRD on HD, DM (HA1C 4.8), PAD  s/p right AKA October 2022, complicated by stump abscess and presumed osteomyelitis 11/23/22 (wound cx citrobacter koseri and pseudomonas), d/c on oral ciprofloxacin with plan for 6 weeks treatment.  Course of antibiotics shortened after admission to [facility] in early December with wound dehiscence. CT imaging there showed showing rim enhancing fluid collections/phlegmon with evaluation by Orthopedics who was not concerned for infection.  Course of ciprofloxacin abbreviated after that admission and he stopped abx on 12/24. Readmitted from ID clinic with concern for worsening wound infection.      CT right thigh with contrast 1/17 - resolution of prior large stump abscess.  Noted lateral ulceration possibly extended to bone.  Unable to exclude osteo. NM bone scan +osteomyelitis.       Orthopedics consulted . Reviewed MRI, does not feel it shows active osteomyelitis. Recommends antibiotic suppression, local wound care and follow up with primary surgeon (Dr. Davis) as outpatient.  No acute orthopedic intervention anticipated    Plan/recommendations:  1.  Continue IV vancomycin (pharmacy to dose. Trough goal 15-20) and meropenem (renally dosed) for now.   2.  Bone scan +osteomyelitis. Discussed with orthopedics, recommend IR for bone biopsy. Recommend to send for path and cultures to guide abx therapy for osteomyelitis treatment   3.  Recommend wound care evaluation and if need for further debridement, please obtain deep wound cultures to guide abx therapy  4.  As pt with PAD, wound eschar and poor wound healing of R AKA site, would consider vascular surgery evaluation      Discussed with ID staff and primary team. ID will follow      Seen and discussed with ID staff Dr. Salvador

## 2023-01-25 PROBLEM — Z99.2 CONTROLLED TYPE 2 DIABETES MELLITUS WITH CHRONIC KIDNEY DISEASE ON CHRONIC DIALYSIS, WITH LONG-TERM CURRENT USE OF INSULIN: Chronic | Status: ACTIVE | Noted: 2019-01-29

## 2023-01-25 PROBLEM — Z79.4 CONTROLLED TYPE 2 DIABETES MELLITUS WITH CHRONIC KIDNEY DISEASE ON CHRONIC DIALYSIS, WITH LONG-TERM CURRENT USE OF INSULIN: Chronic | Status: ACTIVE | Noted: 2019-01-29

## 2023-01-25 PROBLEM — I50.32 CHRONIC DIASTOLIC HEART FAILURE: Chronic | Status: ACTIVE | Noted: 2020-10-26

## 2023-01-25 PROBLEM — E11.22 CONTROLLED TYPE 2 DIABETES MELLITUS WITH CHRONIC KIDNEY DISEASE ON CHRONIC DIALYSIS, WITH LONG-TERM CURRENT USE OF INSULIN: Chronic | Status: ACTIVE | Noted: 2019-01-29

## 2023-01-25 PROBLEM — I25.10 CAD (CORONARY ARTERY DISEASE): Chronic | Status: ACTIVE | Noted: 2020-10-26

## 2023-01-25 PROBLEM — Z89.612 S/P AKA (ABOVE KNEE AMPUTATION) BILATERAL: Chronic | Status: ACTIVE | Noted: 2022-11-24

## 2023-01-25 PROBLEM — A41.9 SEPSIS: Status: RESOLVED | Noted: 2022-11-24 | Resolved: 2023-01-25

## 2023-01-25 PROBLEM — G93.41 ENCEPHALOPATHY, METABOLIC: Status: RESOLVED | Noted: 2022-01-12 | Resolved: 2023-01-25

## 2023-01-25 PROBLEM — I10 ESSENTIAL (PRIMARY) HYPERTENSION: Chronic | Status: ACTIVE | Noted: 2019-01-29

## 2023-01-25 PROBLEM — Z89.611 S/P AKA (ABOVE KNEE AMPUTATION) BILATERAL: Chronic | Status: ACTIVE | Noted: 2022-11-24

## 2023-01-25 PROBLEM — N30.00 ACUTE CYSTITIS: Status: RESOLVED | Noted: 2022-11-24 | Resolved: 2023-01-25

## 2023-01-25 PROBLEM — N18.6 CONTROLLED TYPE 2 DIABETES MELLITUS WITH CHRONIC KIDNEY DISEASE ON CHRONIC DIALYSIS, WITH LONG-TERM CURRENT USE OF INSULIN: Chronic | Status: ACTIVE | Noted: 2019-01-29

## 2023-01-25 PROBLEM — I73.9 PAD (PERIPHERAL ARTERY DISEASE): Chronic | Status: ACTIVE | Noted: 2022-02-07

## 2023-01-25 LAB
GRAM STN SPEC: NORMAL
GRAM STN SPEC: NORMAL
POCT GLUCOSE: 102 MG/DL (ref 70–110)
POCT GLUCOSE: 125 MG/DL (ref 70–110)
POCT GLUCOSE: 69 MG/DL (ref 70–110)
POCT GLUCOSE: 94 MG/DL (ref 70–110)

## 2023-01-25 PROCEDURE — 63600175 PHARM REV CODE 636 W HCPCS: Performed by: STUDENT IN AN ORGANIZED HEALTH CARE EDUCATION/TRAINING PROGRAM

## 2023-01-25 PROCEDURE — 99232 SBSQ HOSP IP/OBS MODERATE 35: CPT | Mod: ,,, | Performed by: INTERNAL MEDICINE

## 2023-01-25 PROCEDURE — 99232 PR SUBSEQUENT HOSPITAL CARE,LEVL II: ICD-10-PCS | Mod: ,,, | Performed by: INTERNAL MEDICINE

## 2023-01-25 PROCEDURE — 25000003 PHARM REV CODE 250: Performed by: HOSPITALIST

## 2023-01-25 PROCEDURE — 99233 PR SUBSEQUENT HOSPITAL CARE,LEVL III: ICD-10-PCS | Mod: ,,, | Performed by: NURSE PRACTITIONER

## 2023-01-25 PROCEDURE — 87116 MYCOBACTERIA CULTURE: CPT | Performed by: PHYSICIAN ASSISTANT

## 2023-01-25 PROCEDURE — 99223 PR INITIAL HOSPITAL CARE,LEVL III: ICD-10-PCS | Mod: ,,, | Performed by: SURGERY

## 2023-01-25 PROCEDURE — 87206 SMEAR FLUORESCENT/ACID STAI: CPT | Performed by: PHYSICIAN ASSISTANT

## 2023-01-25 PROCEDURE — 87075 CULTR BACTERIA EXCEPT BLOOD: CPT | Performed by: PHYSICIAN ASSISTANT

## 2023-01-25 PROCEDURE — 11000001 HC ACUTE MED/SURG PRIVATE ROOM

## 2023-01-25 PROCEDURE — 27000207 HC ISOLATION

## 2023-01-25 PROCEDURE — 87102 FUNGUS ISOLATION CULTURE: CPT | Performed by: PHYSICIAN ASSISTANT

## 2023-01-25 PROCEDURE — 25000003 PHARM REV CODE 250: Performed by: STUDENT IN AN ORGANIZED HEALTH CARE EDUCATION/TRAINING PROGRAM

## 2023-01-25 PROCEDURE — 87070 CULTURE OTHR SPECIMN AEROBIC: CPT | Performed by: PHYSICIAN ASSISTANT

## 2023-01-25 PROCEDURE — 99233 SBSQ HOSP IP/OBS HIGH 50: CPT | Mod: ,,, | Performed by: NURSE PRACTITIONER

## 2023-01-25 PROCEDURE — 99223 1ST HOSP IP/OBS HIGH 75: CPT | Mod: ,,, | Performed by: SURGERY

## 2023-01-25 PROCEDURE — 87205 SMEAR GRAM STAIN: CPT | Performed by: PHYSICIAN ASSISTANT

## 2023-01-25 RX ORDER — DIPHENHYDRAMINE HYDROCHLORIDE 50 MG/ML
INJECTION INTRAMUSCULAR; INTRAVENOUS
Status: COMPLETED | OUTPATIENT
Start: 2023-01-25 | End: 2023-01-25

## 2023-01-25 RX ORDER — FENTANYL CITRATE 50 UG/ML
INJECTION, SOLUTION INTRAMUSCULAR; INTRAVENOUS
Status: COMPLETED | OUTPATIENT
Start: 2023-01-25 | End: 2023-01-25

## 2023-01-25 RX ADMIN — HYDRALAZINE HYDROCHLORIDE 100 MG: 50 TABLET ORAL at 02:01

## 2023-01-25 RX ADMIN — DIPHENHYDRAMINE HYDROCHLORIDE 50 MG: 50 INJECTION, SOLUTION INTRAMUSCULAR; INTRAVENOUS at 04:01

## 2023-01-25 RX ADMIN — HEPARIN SODIUM 5000 UNITS: 5000 INJECTION INTRAVENOUS; SUBCUTANEOUS at 05:01

## 2023-01-25 RX ADMIN — ASPIRIN 81 MG: 81 TABLET, CHEWABLE ORAL at 09:01

## 2023-01-25 RX ADMIN — PANTOPRAZOLE SODIUM 40 MG: 40 TABLET, DELAYED RELEASE ORAL at 09:01

## 2023-01-25 RX ADMIN — SEVELAMER CARBONATE 800 MG: 800 TABLET, FILM COATED ORAL at 06:01

## 2023-01-25 RX ADMIN — MEROPENEM 500 MG: 500 INJECTION INTRAVENOUS at 06:01

## 2023-01-25 RX ADMIN — CLOPIDOGREL BISULFATE 75 MG: 75 TABLET ORAL at 09:01

## 2023-01-25 RX ADMIN — HYDRALAZINE HYDROCHLORIDE 100 MG: 50 TABLET ORAL at 05:01

## 2023-01-25 RX ADMIN — SEVELAMER CARBONATE 800 MG: 800 TABLET, FILM COATED ORAL at 02:01

## 2023-01-25 RX ADMIN — HYDRALAZINE HYDROCHLORIDE 100 MG: 50 TABLET ORAL at 09:01

## 2023-01-25 RX ADMIN — HEPARIN SODIUM 5000 UNITS: 5000 INJECTION INTRAVENOUS; SUBCUTANEOUS at 09:01

## 2023-01-25 RX ADMIN — FENTANYL CITRATE 25 MCG: 0.05 INJECTION, SOLUTION INTRAMUSCULAR; INTRAVENOUS at 05:01

## 2023-01-25 RX ADMIN — SEVELAMER CARBONATE 800 MG: 800 TABLET, FILM COATED ORAL at 09:01

## 2023-01-25 RX ADMIN — METOPROLOL SUCCINATE 25 MG: 25 TABLET, EXTENDED RELEASE ORAL at 09:01

## 2023-01-25 RX ADMIN — ATORVASTATIN CALCIUM 20 MG: 20 TABLET, FILM COATED ORAL at 09:01

## 2023-01-25 RX ADMIN — AMLODIPINE BESYLATE 10 MG: 10 TABLET ORAL at 09:01

## 2023-01-25 RX ADMIN — FENTANYL CITRATE 25 MCG: 0.05 INJECTION, SOLUTION INTRAMUSCULAR; INTRAVENOUS at 04:01

## 2023-01-25 RX ADMIN — FENTANYL CITRATE 50 MCG: 0.05 INJECTION, SOLUTION INTRAMUSCULAR; INTRAVENOUS at 05:01

## 2023-01-25 RX ADMIN — LIDOCAINE 1 PATCH: 50 PATCH CUTANEOUS at 05:01

## 2023-01-25 NOTE — CONSULTS
[facility]  Vascular Surgery  Consult Note    Inpatient consult to Vascular Surgery  Consult performed by: Yazmin Gardner MD  Consult ordered by: Lee Toney MD        Subjective:     Chief Complaint/Reason for Admission: R AKA with osteo    History of Present Illness: 57yM w/ PMH HTN, PAD s/p bilateral AKAs, DM, and ESRD on HD admitted for concern for right stump osteomyelitis. Was admitted in November 2022 for wound infection and osteomyelitis of the right stump discharged home on oral abx. Readmitted from ID clinic due to concern for worsening infection. MRI right femur showing concern for osteomyelitis. IR consulted and plan for bone biopsy 1/25. Vascular surgery consulted to comment on healing potential given history of PAD.    Patient with vascular history as detailed below:  - 1/6/22: amputation L 2-5 digits 2/2 gangrene  - 1/21/22: left foot gangrene with occlusion of L profunda and peroneal s/p L AKA  - 10/13/22: right foot diabetic ulcer s/p R AKA       Medications Prior to Admission   Medication Sig Dispense Refill Last Dose    amLODIPine (NORVASC) 5 MG tablet Take 1 tablet (5 mg total) by mouth once daily. 30 tablet 11     aspirin 81 MG Chew Take 81 mg by mouth once daily.       atorvastatin (LIPITOR) 20 MG tablet Take 1 tablet by mouth.       clopidogreL (PLAVIX) 75 mg tablet Take 75 mg by mouth once daily.       epoetin paola-epbx (RETACRIT) 10,000 unit/mL imjection Inject 1 mL (10,000 Units total) into the skin every Mon, Wed, Fri.       folic acid-vit B6-vit B12 2.5-25-2 mg (FOLBIC OR EQUIV) 2.5-25-2 mg Tab Take 1 tablet by mouth once daily.       hydrALAZINE (APRESOLINE) 50 MG tablet Take 2 tablets (100 mg total) by mouth every 8 (eight) hours. 90 tablet 11     insulin aspart U-100 (NOVOLOG) 100 unit/mL (3 mL) InPn pen Inject 0-5 Units into the skin before meals and at bedtime as needed (Hyperglycemia).  0     insulin detemir U-100 (LEVEMIR FLEXTOUCH) 100 unit/mL (3 mL) SubQ  InPn pen Inject 6 Units into the skin every evening.  0     lancets (ACCU-CHEK SOFTCLIX LANCETS) Misc 1 each by Misc.(Non-Drug; Combo Route) route once daily at 6am. 200 each 1     metoprolol succinate (TOPROL-XL) 25 MG 24 hr tablet Take 1 tablet by mouth.       omeprazole (PRILOSEC) 40 MG capsule Take 40 mg by mouth once daily.          Review of patient's allergies indicates:  No Known Allergies    Past Medical History:   Diagnosis Date    CAD (coronary artery disease) 2016    CAD (non obstructive) 2016 [facility]    Diabetes mellitus type I     Diabetic retinopathy     ESRD on hemodialysis     on HD TThSat    Gangrene of left foot     Hypertension     Nuclear sclerosis of right eye 11/24/2021    PAD (peripheral artery disease)     PEA (Pulseless electrical activity) 10/10/2022    Pulmonary HTN     Right foot infection     TB lung, latent 04/2021    Wet gangrene 1/5/2022     Past Surgical History:   Procedure Laterality Date    ABOVE-KNEE AMPUTATION Left 1/18/2022    Procedure: AMPUTATION, ABOVE KNEE;  Surgeon: Rusty Light MD;  Location: [facility] OR;  Service: Orthopedics;  Laterality: Left;    ABOVE-KNEE AMPUTATION Left 1/21/2022    Procedure: AMPUTATION, ABOVE KNEE;  Surgeon: Rusty Light MD;  Location: [facility] OR;  Service: Orthopedics;  Laterality: Left;    ABOVE-KNEE AMPUTATION Right 10/13/2022    Procedure: AMPUTATION, ABOVE KNEE, RIGHT;  Surgeon: Dimitris Davis MD;  Location: [facility] OR;  Service: Orthopedics;  Laterality: Right;    AV FISTULA PLACEMENT      CATARACT EXTRACTION Left     EYE SURGERY      FISTULOGRAM Left 8/9/2022    Procedure: Fistulogram;  Surgeon: Masoud Soni MD;  Location: [facility] OR;  Service: Vascular;  Laterality: Left;  LEFT VM ON DAUGHTER'S PHONE----PHONE PREOP NOT COMPLETED  CALLED PATIENT ON 8/8/2022 @ 3:34. HE STATED HE IS RESCHEDULING PROCEDURE. NOTIFIED ERWIN @ 3:35PM-LO    FISTULOGRAM Left 10/20/2022    Procedure: Fistulogram;  Surgeon: Masoud SERRANO  MD Nae;  Location: [facility];  Service: Vascular;  Laterality: Left;  Left upper extremity    TOE AMPUTATION Left 1/6/2022    Procedure: AMPUTATION, TOE;  Surgeon: Masoud Soni MD;  Location: [facility] OR;  Service: Vascular;  Laterality: Left;  Left first through fifth toes, possible open transmetatarsal amputation and all other indicated procedures     Family History       Problem Relation (Age of Onset)    No Known Problems Mother, Father, Sister, Brother, Daughter, Daughter, Daughter, Brother, Maternal Aunt, Maternal Uncle, Paternal Aunt, Paternal Uncle, Maternal Grandmother, Maternal Grandfather, Paternal Grandmother, Paternal Grandfather          Tobacco Use    Smoking status: Never    Smokeless tobacco: Never   Substance and Sexual Activity    Alcohol use: No    Drug use: No    Sexual activity: Yes     Partners: Female     Review of Systems  Objective:     Vital Signs (Most Recent):  Temp: 97.6 °F (36.4 °C) (01/24/23 1520)  Pulse: 75 (01/24/23 1520)  Resp: 17 (01/24/23 1520)  BP: (!) 143/65 (01/24/23 1520)  SpO2: 96 % (01/24/23 1520)   Vital Signs (24h Range):  Temp:  [96.3 °F (35.7 °C)-98.6 °F (37 °C)] 97.6 °F (36.4 °C)  Pulse:  [65-88] 75  Resp:  [15-20] 17  SpO2:  [91 %-96 %] 96 %  BP: (103-182)/(52-77) 143/65     Weight: 56.7 kg (125 lb)  Body mass index is 17.44 kg/m².    Physical Exam  Constitutional:       General: He is not in acute distress.  HENT:      Head: Normocephalic and atraumatic.      Mouth/Throat:      Mouth: Mucous membranes are moist.      Pharynx: No oropharyngeal exudate.   Eyes:      Extraocular Movements: Extraocular movements intact.      Conjunctiva/sclera: Conjunctivae normal.   Cardiovascular:      Rate and Rhythm: Normal rate and regular rhythm.      Comments: 2+ femoral pulses bilaterally  Pulmonary:      Effort: Pulmonary effort is normal. No respiratory distress.   Abdominal:      General: There is no distension.      Palpations: Abdomen is soft.       Tenderness: There is no abdominal tenderness.   Musculoskeletal:         General: Normal range of motion.      Comments: S/p bilateral AKA  R AKA with scab to lateral portion of incision, no active drainage   Skin:     General: Skin is dry.   Neurological:      Mental Status: He is alert. Mental status is at baseline.       Significant Labs:  All pertinent labs from the last 24 hours have been reviewed.    Significant Diagnostics:  I have reviewed all pertinent imaging results/findings within the past 24 hours.    Assessment/Plan:     PAD (peripheral artery disease)  57yM w/ PMH HTN, PAD s/p bilateral AKAs, DM, and ESRD on HD admitted for concern for right stump osteomyelitis. Vascular surgery consulted to comment on healing potential given history of PAD.    - Patient with palpable femoral pulses and open SFA on CT  - No vascular intervention  - Continue ASA, statin, and plavix  - Continue abx per ID        Thank you for your consult. I will follow-up with patient. Please contact us if you have any additional questions.    Yazmin Gardner MD  Vascular Surgery  [facility]

## 2023-01-25 NOTE — SUBJECTIVE & OBJECTIVE
Interval History: Tolerated HD yesterday, with a net UF of 2.7L. NAEON.     Review of patient's allergies indicates:  No Known Allergies  Current Facility-Administered Medications   Medication Frequency    acetaminophen tablet 650 mg Q8H PRN    albuterol-ipratropium 2.5 mg-0.5 mg/3 mL nebulizer solution 3 mL Q4H PRN    amLODIPine tablet 10 mg Daily    aspirin chewable tablet 81 mg Daily    atorvastatin tablet 20 mg Daily    clopidogreL tablet 75 mg Daily    dextrose 10% bolus 125 mL 125 mL PRN    dextrose 10% bolus 250 mL 250 mL PRN    glucagon (human recombinant) injection 1 mg PRN    glucose chewable tablet 16 g PRN    glucose chewable tablet 24 g PRN    heparin (porcine) injection 5,000 Units Q8H    hydrALAZINE tablet 100 mg Q8H    insulin aspart U-100 pen 0-5 Units QID (AC + HS) PRN    LIDOcaine 5 % patch 1 patch Q24H    melatonin tablet 6 mg Nightly PRN    meropenem (MERREM) 500 mg in sodium chloride 0.9 % 100 mL IVPB (MB+) Q24H    metoprolol succinate (TOPROL-XL) 24 hr tablet 25 mg Daily    naloxone 0.4 mg/mL injection 0.02 mg PRN    ondansetron disintegrating tablet 4 mg Q8H PRN    ondansetron injection 4 mg Q8H PRN    pantoprazole EC tablet 40 mg Daily    polyethylene glycol packet 17 g TID PRN    sevelamer carbonate tablet 800 mg TID WM    simethicone chewable tablet 80 mg QID PRN    sodium chloride 0.9% flush 10 mL Q12H PRN    vancomycin - pharmacy to dose pharmacy to manage frequency       Objective:     Vital Signs (Most Recent):  Temp: 98 °F (36.7 °C) (01/25/23 0843)  Pulse: 71 (01/25/23 0843)  Resp: 17 (01/25/23 0843)  BP: (!) 158/71 (01/25/23 0843)  SpO2: (!) 92 % (01/25/23 0843)   Vital Signs (24h Range):  Temp:  [96.3 °F (35.7 °C)-98.4 °F (36.9 °C)] 98 °F (36.7 °C)  Pulse:  [69-81] 71  Resp:  [15-18] 17  SpO2:  [92 %-96 %] 92 %  BP: (103-176)/(46-71) 158/71     Weight: 56.7 kg (125 lb) (01/17/23 2145)  Body mass index is 17.44 kg/m².  Body surface area is 1.69 meters squared.    I/O last 3 completed  shifts:  In: 700 [I.V.:200; Other:500]  Out: 3400 [Other:3400]    Physical Exam  Vitals and nursing note reviewed.   Constitutional:       Appearance: He is well-developed.   HENT:      Head: Normocephalic and atraumatic.   Eyes:      Conjunctiva/sclera: Conjunctivae normal.      Pupils: Pupils are equal, round, and reactive to light.   Neck:      Thyroid: No thyromegaly.      Vascular: No JVD.   Cardiovascular:      Rate and Rhythm: Normal rate and regular rhythm.      Heart sounds: Normal heart sounds. No murmur heard.    No friction rub. No gallop.   Pulmonary:      Effort: Pulmonary effort is normal. No respiratory distress.      Breath sounds: Normal breath sounds. No wheezing or rales.   Abdominal:      General: Bowel sounds are normal. There is no distension.      Palpations: Abdomen is soft. There is no mass.      Tenderness: There is no abdominal tenderness. There is no guarding or rebound.      Hernia: No hernia is present.   Musculoskeletal:         General: No deformity. Normal range of motion.      Cervical back: Normal range of motion and neck supple.      Comments: R AKA with crusting along lateral incision. No erythema or induration noted.      Right Lower Extremity: Right leg is amputated above knee.   Skin:     General: Skin is warm and dry.   Neurological:      Mental Status: He is alert and oriented to person, place, and time.      Cranial Nerves: No cranial nerve deficit.   Psychiatric:         Behavior: Behavior normal.       Significant Labs:  CBC:   Recent Labs   Lab 01/20/23  0848   WBC 7.14   RBC 4.22*   HGB 11.1*   HCT 38.3*      MCV 91   MCH 26.3*   MCHC 29.0*       CMP:   Recent Labs   Lab 01/24/23  0901      CALCIUM 8.7   ALBUMIN 3.1*      K 5.4*   CO2 19*      BUN 87*   CREATININE 12.5*       All labs within the past 24 hours have been reviewed.

## 2023-01-25 NOTE — PROGRESS NOTES
[facility]  Infectious Disease  Progress Note    Patient Name: Adryan Neff  MRN: [id number]  Admission Date: 1/17/2023  Length of Stay: 7 days  Attending Physician: Lee Toney MD  Primary Care Provider: Carola Pedro MD    Isolation Status: Airborne and Contact and Droplet  Assessment/Plan:      * Wound infection     57 year old male with ESRD on HD, DM (HA1C 4.8), PAD  s/p right AKA October 2022, complicated by stump abscess and presumed osteomyelitis 11/23/22 (wound cx citrobacter koseri and pseudomonas), d/c on oral ciprofloxacin recently admitted [facility] with wound dehiscence and abscess of R BKA site.  Readmitted from ID clinic with concern for worsening wound infection.      CT right thigh with contrast 1/17 - resolution of prior large stump abscess.  Noted lateral ulceration possibly extended to bone.  Unable to exclude osteo. NM bone scan +osteomyelitis.         Plan/recommendations:  1.  Continue IV vancomycin (pharmacy to dose. Trough goal 15-20) and meropenem (renally dosed) for now.   2.  Bone scan +osteomyelitis. Discussed with orthopedics, recommend IR for bone biopsy. IR following, plans for bone biopsy for path and culture to guide abx therapy for osteomyelitis treatment. Appreciate assistance   3.  Likely need close follow up with primary surgeon (Dr. [facility]) as outpatient. Pt with adequate blood flow per vascular surgery.     Discussed with ID staff and primary team. ID will follow closely.       Seen and discussed with ID staff Dr. Salvador             Thank you for your consult. I will follow-up with patient. Please contact us if you have any additional questions.    Terrie Rice PA-C  Infectious Disease  [facility]    Subjective:     Principal Problem:Wound infection    HPI: Mr. Adryan Neff is a 57 year old with ESR on dialysis, DM (last HA1C 4.8), PAD, s/p right AKA 10/13/22 admitted from ID clinic with concern for worsening right stump infection.  He had recent  admission 11/23 with right stump abscess with suspected associated osteomyelitis.  Initial wound cultures + for citrobacter koseri (zosyn R) and Pseudomonas.  IR aspiration cx 11/29 were negative.  He was initially treated with meropenem and then discharged to [facility] 12/2/22 with plan for ciprofloxacin X 6 weeks for presumed osteo.      He was admitted 12/8 to 12/14 to [facility] with wound dehiscence.  Per discharge summary,  Orthopedic Surgery was consulted who did not find any evidence of active infection and he was discharged back to SNF with 10 day course of ciprofloxacin (short of the original 6 week course planned).   He had Vascular Surgery visit on 12/15/22 at [facility] and noted to have wound dehiscence with foul smelling/purulent drainage and ED was recommended but apparently he did  not go.      He was seen by ID for follow up yesterday.  Purulent drainage noted and he was sent to ED.      In ED afebrile, no leukocytosis. COVID +.  Blood cultures obtained and pending CT right femur with contrast showed resolution of large stump abscess from prior but ulcer appeared to extend to bone.  No bony erosion seen but recommended MRI for better evaluation for osteo    Interval History: Remains afebrile, no leukocytosis  Blood cx NGTD  Bone scan studies + osteomyelitis  Pt doing well. Wound appears stable. No drainage. Vascular surgery following, no intervention at this time. Pt with adequate blood flow  IR following for bone biopsy       Review of Systems   Constitutional:  Negative for activity change, appetite change, chills, diaphoresis, fatigue and fever.   HENT:  Negative for congestion.    Respiratory:  Negative for cough and shortness of breath.    Cardiovascular:  Negative for leg swelling.   Gastrointestinal:  Negative for abdominal pain, constipation, diarrhea, nausea and vomiting.   Genitourinary:         HD   Musculoskeletal:  Negative for arthralgias and myalgias.   Skin:  Positive for wound.  Negative for rash.   Neurological:  Negative for dizziness, weakness and headaches.   Psychiatric/Behavioral:  Negative for agitation and confusion.    Objective:     Vital Signs (Most Recent):  Temp: 97.3 °F (36.3 °C) (01/25/23 1239)  Pulse: 75 (01/25/23 1239)  Resp: 17 (01/25/23 1239)  BP: (!) 114/53 (01/25/23 1239)  SpO2: 96 % (01/25/23 1239)   Vital Signs (24h Range):  Temp:  [97.3 °F (36.3 °C)-98.4 °F (36.9 °C)] 97.3 °F (36.3 °C)  Pulse:  [71-80] 75  Resp:  [17-18] 17  SpO2:  [92 %-96 %] 96 %  BP: (107-158)/(46-71) 114/53     Weight: 56.7 kg (125 lb)  Body mass index is 17.44 kg/m².    Estimated Creatinine Clearance: 5.2 mL/min (A) (based on SCr of 12.5 mg/dL (H)).    Physical Exam  Vitals and nursing note reviewed.   Constitutional:       General: He is not in acute distress.     Appearance: Normal appearance. He is not ill-appearing, toxic-appearing or diaphoretic.   HENT:      Head: Normocephalic and atraumatic.      Nose: No congestion or rhinorrhea.      Mouth/Throat:      Mouth: Mucous membranes are moist.   Eyes:      General: No scleral icterus.     Conjunctiva/sclera: Conjunctivae normal.   Cardiovascular:      Rate and Rhythm: Normal rate and regular rhythm.      Heart sounds: Normal heart sounds.   Pulmonary:      Effort: Pulmonary effort is normal. No respiratory distress.   Abdominal:      Tenderness: There is no abdominal tenderness.   Musculoskeletal:         General: No tenderness.      Cervical back: Normal range of motion.      Comments: Bilateral lower extremity amputations.   R AKA wound seen  No purulent drainage  No foul odor  Wound with eschar   Skin:     General: Skin is warm and dry.   Neurological:      Mental Status: He is alert and oriented to person, place, and time. Mental status is at baseline.   Psychiatric:         Mood and Affect: Mood normal.         Behavior: Behavior normal.         Thought Content: Thought content normal.         Judgment: Judgment normal.            Significant Labs: Blood Culture:   Recent Labs   Lab 10/11/22  0915 10/17/22  1415 10/17/22  1427 11/23/22  0631 01/17/23  1342   LABBLOO No Growth after 4 days. No Growth after 4 days. No Growth after 4 days. No growth after 5 days.  No growth after 5 days. No growth after 5 days.  No growth after 5 days.       CBC:   No results for input(s): WBC, HGB, HCT, PLT in the last 48 hours.    CMP:   Recent Labs   Lab 01/24/23  0901      K 5.4*      CO2 19*      BUN 87*   CREATININE 12.5*   CALCIUM 8.7   ALBUMIN 3.1*   ANIONGAP 17*       Wound Culture:   Recent Labs   Lab 11/23/22  0630   LABAERO CITROBACTER KOSERI  Many  *  CITROBACTER KOSERI  Many  *  PSEUDOMONAS AERUGINOSA  Many  *         Significant Imaging: I have reviewed all pertinent imaging results/findings within the past 24 hours.

## 2023-01-25 NOTE — PROGRESS NOTES
"[facility] [address] [place] [address] Medicine  Progress Note    Patient Name: Adryan Neff  MRN: [id number]  Patient Class: IP- Inpatient   Admission Date: 1/17/2023  Length of Stay: 7 days  Attending Physician: Lee Toney MD  Primary Care Provider: Carola Pedro MD        Subjective:     Principal Problem:Wound infection    Interval History: 57-year-old man with a past medical history of primary hypertension, peripheral arterial disease s/p Right and left AKA, diabetes mellitus, ESRD on HD, who presents to the emergency department with a wound infection.  He had prior history of infection to right stump and there is uncertainty if patient completed his antibiotic course.  Orthopedics and Infectious Disease consulted.  MRI of right femur was done with  concerns for osteomyelitis and subsequently bone scan done which report "Increased uptake of the right femoral osseous stump concerning for osteomyelitis.  Early uptake of the surrounding stem soft tissues may represent soft tissue infection or cellulitis" He is on IV antibiotics per ID recommendations.  He had incidental COVID-19 positive findings and received remdesivir course.         Patient was dialyzed yesterday.  He has no complaints today, he had breakfast and biopsy was postponed, vascular surgeon signed off.    Review of Systems   Unable to perform ROS: Other   Objective:     Vital Signs (Most Recent):  Temp: 97.3 °F (36.3 °C) (01/25/23 1239)  Pulse: 73 (01/25/23 1532)  Resp: 17 (01/25/23 1239)  BP: (!) 114/53 (01/25/23 1239)  SpO2: 96 % (01/25/23 1239)   Vital Signs (24h Range):  Temp:  [97.3 °F (36.3 °C)-98.4 °F (36.9 °C)] 97.3 °F (36.3 °C)  Pulse:  [71-80] 73  Resp:  [17-18] 17  SpO2:  [92 %-96 %] 96 %  BP: (107-158)/(46-71) 114/53     Weight: 56.7 kg (125 lb)  Body mass index is 17.44 kg/m².  No intake or output data in the 24 hours ending 01/25/23 1612      Physical Exam  Constitutional:       General: He is not in acute distress.  HENT:      Head: " Normocephalic.      Right Ear: External ear normal.      Left Ear: External ear normal.      Nose: Nose normal.   Cardiovascular:      Rate and Rhythm: Normal rate.   Pulmonary:      Effort: Pulmonary effort is normal.   Musculoskeletal:      Comments: Bilateral AKA. No overt infection to right stump at this time   Skin:     Comments: Dialysis access to left Upper Extrimity  Neurological:      Mental Status: He is alert and oriented to person, place, and time.   Psychiatric:         Mood and Affect: Mood normal.         Assessment/Plan:      Active Diagnoses:    Diagnosis Date Noted POA    PRINCIPAL PROBLEM:  Wound infection [T14.8XXA, L08.9] 01/18/2023 Yes    Anemia in ESRD (end-stage renal disease) [N18.6, D63.1] 01/18/2023 Yes    Chronic kidney disease-mineral and bone disorder [N18.9, E83.9, M89.9] 11/29/2022 Yes    PAD (peripheral artery disease) [I73.9] 02/07/2022 Yes     Chronic    COVID-19 [U07.1] 01/05/2022 Yes    ESRD on hemodialysis [N18.6, Z99.2]  Not Applicable     Chronic    CAD (coronary artery disease) - non-obstructive from [facility] in 2016 [I25.10] 10/26/2020 Yes     Chronic    Diabetes Mellitus [E11.22, N18.6, Z79.4, Z99.2] 01/29/2019 Not Applicable     Chronic    Essential (primary) hypertension [I10] 01/29/2019 Yes     Chronic      Problems Resolved During this Admission:     VTE Risk Mitigation (From admission, onward)           Ordered     heparin (porcine) injection 5,000 Units  Every 8 hours         01/17/23 1848     IP VTE HIGH RISK PATIENT  Once         01/17/23 1848     Place sequential compression device  Until discontinued         01/17/23 1848                  Right femoral stump wound infection with osteomyelitis reported on bone scan.  Continue antimicrobials per Infectious Disease recommendations. IR consulted for bone biopsy. NPO after midnight. Continue to Follow up with ID recommendations. Seen by Orthopedics with no acute surgical intervention recommended.  Continue wound care.  Vascular surgery was consulted who signed off  ESRD on dialysis.  Follow up with Nephrology recommendations continue dialysis per regime  Hypertension.  Continue antihypertensive  Atherosclerotic disease, peripheral arterial disease status post bilateral AKA.  Continue aspirin, statin, beta blocker and Plavix   Incidental COVID-19.  Normal sats on room air, completed remdesivir course     DVT prophylaxis.  Heparin.  Fall precaution    Addendum 5:50pm. Patient had Bone biopsy done       Lee Toney MD  Department of Hospital Medicine   [facility]

## 2023-01-25 NOTE — PLAN OF CARE
Superficial bone biopsy complete. Pt tolerated well. VSS. No signs or symptoms of distress noted.  Site CDI.  Pt will be transferred to back to room 1148. Report called to KIAH Ordaz

## 2023-01-25 NOTE — PLAN OF CARE
PLAN of CARE  Vascular Surgery     Patient is a 57y M w/ hx of HTN, PAD s/p bilateral AKAs, DM, ESRD on HD admitted with concrn for right stump osteomyelitis. No concern for decreased flow to the stump on exam. No plans for vascular intervention at this time.     If concerns in future, could consider duplex US of arteries.   Agree w/ 6 wks antibiotics.  Orthopedic surgery for stump revision.     Vascular surgery will sign off at this time. Please contact us with any further questions or concerns.    Loni Ayala MD  General Surgery PGY-III  Pager [phone]

## 2023-01-25 NOTE — PLAN OF CARE
Pt arrived to IR CT for superficial bone biopsy. Pt oriented to unit and staff. Plan of care reviewed with patient, patient verbalizes understanding. Comfort measures utilized. Pt safely transferred from stretcher to procedural table. Fall risk reviewed with patient, fall risk interventions maintained. Safety strap applied, positioner pillows utilized to minimize pressure points. Blankets applied. Pt prepped and draped utilizing standard sterile technique. Patient placed on continuous monitoring. Plan is for local with fentanyl and benadryl due to NPO status.  Timeouts completed utilizing standard universal time-out, per department and facility policy. RN to remain at bedside, continuous monitoring maintained. Pt resting comfortably. Denies pain/discomfort. Will continue to monitor. See flow sheets for monitoring, medication administration, and updates.

## 2023-01-25 NOTE — PROCEDURES
Interventional Radiology postop note    Pre Op Diagnosis: Osteomyelitis   Post Op Diagnosis: Same    Procedure: Bone biopsy and aspiration    Procedure performed by: Caroline    Written Informed Consent Obtained: Yes  Specimen Removed: YES Bone core and aspirate  Estimated Blood Loss: Minimal    Findings:   Image-guided core bone biopsy of the distal right femur stump and 2 mL of aspirate.    Patient tolerated procedure well.    Luc Schaefer, DO  Interventional Radiology    4

## 2023-01-25 NOTE — SUBJECTIVE & OBJECTIVE
Interval History: Remains afebrile, no leukocytosis  Blood cx NGTD  Bone scan studies + osteomyelitis  Pt doing well. Wound appears stable. No drainage. Vascular surgery following, no intervention at this time. Pt with adequate blood flow  IR following for bone biopsy       Review of Systems   Constitutional:  Negative for activity change, appetite change, chills, diaphoresis, fatigue and fever.   HENT:  Negative for congestion.    Respiratory:  Negative for cough and shortness of breath.    Cardiovascular:  Negative for leg swelling.   Gastrointestinal:  Negative for abdominal pain, constipation, diarrhea, nausea and vomiting.   Genitourinary:         HD   Musculoskeletal:  Negative for arthralgias and myalgias.   Skin:  Positive for wound. Negative for rash.   Neurological:  Negative for dizziness, weakness and headaches.   Psychiatric/Behavioral:  Negative for agitation and confusion.    Objective:     Vital Signs (Most Recent):  Temp: 97.3 °F (36.3 °C) (01/25/23 1239)  Pulse: 75 (01/25/23 1239)  Resp: 17 (01/25/23 1239)  BP: (!) 114/53 (01/25/23 1239)  SpO2: 96 % (01/25/23 1239)   Vital Signs (24h Range):  Temp:  [97.3 °F (36.3 °C)-98.4 °F (36.9 °C)] 97.3 °F (36.3 °C)  Pulse:  [71-80] 75  Resp:  [17-18] 17  SpO2:  [92 %-96 %] 96 %  BP: (107-158)/(46-71) 114/53     Weight: 56.7 kg (125 lb)  Body mass index is 17.44 kg/m².    Estimated Creatinine Clearance: 5.2 mL/min (A) (based on SCr of 12.5 mg/dL (H)).    Physical Exam  Vitals and nursing note reviewed.   Constitutional:       General: He is not in acute distress.     Appearance: Normal appearance. He is not ill-appearing, toxic-appearing or diaphoretic.   HENT:      Head: Normocephalic and atraumatic.      Nose: No congestion or rhinorrhea.      Mouth/Throat:      Mouth: Mucous membranes are moist.   Eyes:      General: No scleral icterus.     Conjunctiva/sclera: Conjunctivae normal.   Cardiovascular:      Rate and Rhythm: Normal rate and regular rhythm.       Heart sounds: Normal heart sounds.   Pulmonary:      Effort: Pulmonary effort is normal. No respiratory distress.   Abdominal:      Tenderness: There is no abdominal tenderness.   Musculoskeletal:         General: No tenderness.      Cervical back: Normal range of motion.      Comments: Bilateral lower extremity amputations.   R AKA wound seen  No purulent drainage  No foul odor  Wound with eschar   Skin:     General: Skin is warm and dry.   Neurological:      Mental Status: He is alert and oriented to person, place, and time. Mental status is at baseline.   Psychiatric:         Mood and Affect: Mood normal.         Behavior: Behavior normal.         Thought Content: Thought content normal.         Judgment: Judgment normal.           Significant Labs: Blood Culture:   Recent Labs   Lab 10/11/22  0915 10/17/22  1415 10/17/22  1427 11/23/22  0631 01/17/23  1342   LABBLOO No Growth after 4 days. No Growth after 4 days. No Growth after 4 days. No growth after 5 days.  No growth after 5 days. No growth after 5 days.  No growth after 5 days.       CBC:   No results for input(s): WBC, HGB, HCT, PLT in the last 48 hours.    CMP:   Recent Labs   Lab 01/24/23  0901      K 5.4*      CO2 19*      BUN 87*   CREATININE 12.5*   CALCIUM 8.7   ALBUMIN 3.1*   ANIONGAP 17*       Wound Culture:   Recent Labs   Lab 11/23/22  0630   LABAERO CITROBACTER KOSERI  Many  *  CITROBACTER KOSERI  Many  *  PSEUDOMONAS AERUGINOSA  Many  *         Significant Imaging: I have reviewed all pertinent imaging results/findings within the past 24 hours.

## 2023-01-25 NOTE — ASSESSMENT & PLAN NOTE
57 year old male with ESRD on HD, DM (HA1C 4.8), PAD  s/p right AKA October 2022, complicated by stump abscess and presumed osteomyelitis 11/23/22 (wound cx citrobacter koseri and pseudomonas), d/c on oral ciprofloxacin recently admitted [facility] with wound dehiscence and abscess of R BKA site.  Readmitted from ID clinic with concern for worsening wound infection.      CT right thigh with contrast 1/17 - resolution of prior large stump abscess.  Noted lateral ulceration possibly extended to bone.  Unable to exclude osteo. NM bone scan +osteomyelitis.         Plan/recommendations:  1.  Continue IV vancomycin (pharmacy to dose. Trough goal 15-20) and meropenem (renally dosed) for now.   2.  Bone scan +osteomyelitis. Discussed with orthopedics, recommend IR for bone biopsy. IR following, plans for bone biopsy for path and culture to guide abx therapy for osteomyelitis treatment. Appreciate assistance   3.  Likely need close follow up with primary surgeon (Dr. Davis) as outpatient. Pt with adequate blood flow per vascular surgery.     Discussed with ID staff and primary team. ID will follow closely.       Seen and discussed with ID staff Dr. Salvador

## 2023-01-25 NOTE — ASSESSMENT & PLAN NOTE
57-year-old man with a past medical history of primary hypertension, peripheral arterial disease s/p R AKA, DM type 1, ESRD on HD MWF, admitted for concern of osteomyelitis/abcess to R AKA.     Nephrology History  iHD Schedule: MWF   Unit/MD: YONI  Duration: 3.5 hours   UF: 2L  EDW: 56 kg   Access: PHIL AVF   Last HD prior to presentation: 1/16/23     Plan/Recommendations:     -HD tomorrow for metabolic clearance and volume management   -Strict I/O's   -Trend renal function panels daily   -Renally dose meds/avoid nephrotoxic meds   -Renal diet/formulations, if not NPO

## 2023-01-26 LAB
ALBUMIN SERPL BCP-MCNC: 2.8 G/DL (ref 3.5–5.2)
ANION GAP SERPL CALC-SCNC: 15 MMOL/L (ref 8–16)
BASOPHILS # BLD AUTO: 0.08 K/UL (ref 0–0.2)
BASOPHILS NFR BLD: 1.1 % (ref 0–1.9)
BUN SERPL-MCNC: 76 MG/DL (ref 6–20)
CALCIUM SERPL-MCNC: 8.1 MG/DL (ref 8.7–10.5)
CHLORIDE SERPL-SCNC: 103 MMOL/L (ref 95–110)
CO2 SERPL-SCNC: 18 MMOL/L (ref 23–29)
CREAT SERPL-MCNC: 11.6 MG/DL (ref 0.5–1.4)
DIFFERENTIAL METHOD: ABNORMAL
EOSINOPHIL # BLD AUTO: 0.8 K/UL (ref 0–0.5)
EOSINOPHIL NFR BLD: 11 % (ref 0–8)
ERYTHROCYTE [DISTWIDTH] IN BLOOD BY AUTOMATED COUNT: 19 % (ref 11.5–14.5)
EST. GFR  (NO RACE VARIABLE): 4.6 ML/MIN/1.73 M^2
GLUCOSE SERPL-MCNC: 100 MG/DL (ref 70–110)
HCT VFR BLD AUTO: 28.8 % (ref 40–54)
HGB BLD-MCNC: 8.9 G/DL (ref 14–18)
IMM GRANULOCYTES # BLD AUTO: 0.02 K/UL (ref 0–0.04)
IMM GRANULOCYTES NFR BLD AUTO: 0.3 % (ref 0–0.5)
LYMPHOCYTES # BLD AUTO: 1.4 K/UL (ref 1–4.8)
LYMPHOCYTES NFR BLD: 18.6 % (ref 18–48)
MCH RBC QN AUTO: 27.1 PG (ref 27–31)
MCHC RBC AUTO-ENTMCNC: 30.9 G/DL (ref 32–36)
MCV RBC AUTO: 88 FL (ref 82–98)
MONOCYTES # BLD AUTO: 0.9 K/UL (ref 0.3–1)
MONOCYTES NFR BLD: 11.4 % (ref 4–15)
NEUTROPHILS # BLD AUTO: 4.3 K/UL (ref 1.8–7.7)
NEUTROPHILS NFR BLD: 57.6 % (ref 38–73)
NRBC BLD-RTO: 0 /100 WBC
PHOSPHATE SERPL-MCNC: 7.7 MG/DL (ref 2.7–4.5)
PLATELET # BLD AUTO: 239 K/UL (ref 150–450)
PMV BLD AUTO: 10.3 FL (ref 9.2–12.9)
POCT GLUCOSE: 142 MG/DL (ref 70–110)
POTASSIUM SERPL-SCNC: 5.3 MMOL/L (ref 3.5–5.1)
RBC # BLD AUTO: 3.28 M/UL (ref 4.6–6.2)
SODIUM SERPL-SCNC: 136 MMOL/L (ref 136–145)
VANCOMYCIN SERPL-MCNC: 15.1 UG/ML
WBC # BLD AUTO: 7.52 K/UL (ref 3.9–12.7)

## 2023-01-26 PROCEDURE — 88307 TISSUE EXAM BY PATHOLOGIST: CPT | Mod: 26,,, | Performed by: PATHOLOGY

## 2023-01-26 PROCEDURE — 11000001 HC ACUTE MED/SURG PRIVATE ROOM

## 2023-01-26 PROCEDURE — 88307 PR  SURG PATH,LEVEL V: ICD-10-PCS | Mod: 26,,, | Performed by: PATHOLOGY

## 2023-01-26 PROCEDURE — 80202 ASSAY OF VANCOMYCIN: CPT | Performed by: HOSPITALIST

## 2023-01-26 PROCEDURE — 99233 PR SUBSEQUENT HOSPITAL CARE,LEVL III: ICD-10-PCS | Mod: ,,, | Performed by: PHYSICIAN ASSISTANT

## 2023-01-26 PROCEDURE — 88307 TISSUE EXAM BY PATHOLOGIST: CPT | Performed by: PATHOLOGY

## 2023-01-26 PROCEDURE — 63600175 PHARM REV CODE 636 W HCPCS: Performed by: STUDENT IN AN ORGANIZED HEALTH CARE EDUCATION/TRAINING PROGRAM

## 2023-01-26 PROCEDURE — 25000003 PHARM REV CODE 250: Performed by: HOSPITALIST

## 2023-01-26 PROCEDURE — 90935 HEMODIALYSIS ONE EVALUATION: CPT | Mod: ,,, | Performed by: NURSE PRACTITIONER

## 2023-01-26 PROCEDURE — 36415 COLL VENOUS BLD VENIPUNCTURE: CPT | Performed by: NURSE PRACTITIONER

## 2023-01-26 PROCEDURE — 80069 RENAL FUNCTION PANEL: CPT | Performed by: NURSE PRACTITIONER

## 2023-01-26 PROCEDURE — 99232 PR SUBSEQUENT HOSPITAL CARE,LEVL II: ICD-10-PCS | Mod: CR,,, | Performed by: HOSPITALIST

## 2023-01-26 PROCEDURE — 27000207 HC ISOLATION

## 2023-01-26 PROCEDURE — 90935 PR HEMODIALYSIS, ONE EVALUATION: ICD-10-PCS | Mod: ,,, | Performed by: NURSE PRACTITIONER

## 2023-01-26 PROCEDURE — 99233 SBSQ HOSP IP/OBS HIGH 50: CPT | Mod: ,,, | Performed by: PHYSICIAN ASSISTANT

## 2023-01-26 PROCEDURE — 88304 TISSUE EXAM BY PATHOLOGIST: CPT | Performed by: PATHOLOGY

## 2023-01-26 PROCEDURE — 80100014 HC HEMODIALYSIS 1:1

## 2023-01-26 PROCEDURE — 25000003 PHARM REV CODE 250: Performed by: STUDENT IN AN ORGANIZED HEALTH CARE EDUCATION/TRAINING PROGRAM

## 2023-01-26 PROCEDURE — 63600175 PHARM REV CODE 636 W HCPCS: Performed by: HOSPITALIST

## 2023-01-26 PROCEDURE — 99232 SBSQ HOSP IP/OBS MODERATE 35: CPT | Mod: CR,,, | Performed by: HOSPITALIST

## 2023-01-26 PROCEDURE — 85025 COMPLETE CBC W/AUTO DIFF WBC: CPT | Performed by: NURSE PRACTITIONER

## 2023-01-26 RX ADMIN — VANCOMYCIN HYDROCHLORIDE 500 MG: 500 INJECTION, POWDER, LYOPHILIZED, FOR SOLUTION INTRAVENOUS at 05:01

## 2023-01-26 RX ADMIN — AMLODIPINE BESYLATE 10 MG: 10 TABLET ORAL at 08:01

## 2023-01-26 RX ADMIN — SEVELAMER CARBONATE 800 MG: 800 TABLET, FILM COATED ORAL at 05:01

## 2023-01-26 RX ADMIN — CLOPIDOGREL BISULFATE 75 MG: 75 TABLET ORAL at 08:01

## 2023-01-26 RX ADMIN — HYDRALAZINE HYDROCHLORIDE 100 MG: 50 TABLET ORAL at 05:01

## 2023-01-26 RX ADMIN — ASPIRIN 81 MG: 81 TABLET, CHEWABLE ORAL at 08:01

## 2023-01-26 RX ADMIN — SEVELAMER CARBONATE 800 MG: 800 TABLET, FILM COATED ORAL at 08:01

## 2023-01-26 RX ADMIN — HEPARIN SODIUM 5000 UNITS: 5000 INJECTION INTRAVENOUS; SUBCUTANEOUS at 05:01

## 2023-01-26 RX ADMIN — PANTOPRAZOLE SODIUM 40 MG: 40 TABLET, DELAYED RELEASE ORAL at 08:01

## 2023-01-26 RX ADMIN — LIDOCAINE 1 PATCH: 50 PATCH CUTANEOUS at 05:01

## 2023-01-26 RX ADMIN — METOPROLOL SUCCINATE 25 MG: 25 TABLET, EXTENDED RELEASE ORAL at 08:01

## 2023-01-26 RX ADMIN — MEROPENEM 500 MG: 500 INJECTION INTRAVENOUS at 05:01

## 2023-01-26 RX ADMIN — ATORVASTATIN CALCIUM 20 MG: 20 TABLET, FILM COATED ORAL at 08:01

## 2023-01-26 NOTE — SUBJECTIVE & OBJECTIVE
Interval History: Got bone biopsy yesterday. Dialysis this morning.     Review of Systems   Constitutional:  Negative for chills and fever.   Respiratory:  Negative for cough and shortness of breath.    Skin:  Positive for wound.   Neurological:  Negative for seizures and syncope.   Objective:     Vital Signs (Most Recent):  Temp: 97.8 °F (36.6 °C) (01/26/23 1015)  Pulse: 81 (01/26/23 1315)  Resp: 16 (01/26/23 1015)  BP: (!) 106/58 (01/26/23 1315)  SpO2: 96 % (01/26/23 0737)   Vital Signs (24h Range):  Temp:  [97.7 °F (36.5 °C)-98.3 °F (36.8 °C)] 97.8 °F (36.6 °C)  Pulse:  [69-85] 81  Resp:  [9-17] 16  SpO2:  [93 %-98 %] 96 %  BP: ()/(43-73) 106/58     Weight: 56.7 kg (125 lb)  Body mass index is 17.44 kg/m².    Intake/Output Summary (Last 24 hours) at 1/26/2023 1349  Last data filed at 1/26/2023 0610  Gross per 24 hour   Intake 210 ml   Output --   Net 210 ml      Physical Exam  Vitals and nursing note reviewed.   Constitutional:       General: He is not in acute distress.     Appearance: He is well-developed and underweight. He is not diaphoretic.   Pulmonary:      Effort: Pulmonary effort is normal. No respiratory distress.   Neurological:      Mental Status: He is alert and oriented to person, place, and time. Mental status is at baseline.      Motor: No seizure activity.   Psychiatric:         Attention and Perception: Attention normal.         Mood and Affect: Mood and affect normal.         Behavior: Behavior is cooperative.       Significant Labs: All pertinent labs within the past 24 hours have been reviewed.    Significant Imaging: I have reviewed all pertinent imaging results/findings within the past 24 hours.  CT Thigh With Contrast Right 1/17/23: FINDINGS:  Postsurgical changes of right above knee amputation.  There is interval resolution of previously seen large stump abscess.  However, there is lateral ulceration, possibly extending to bone.  While no erosion is seen, osteomyelitis cannot be  excluded on the basis of CT.   Extensive vascular calcifications are present.  Mild generalized loss of muscle bulk.  No pelvic ascites or lymphadenopathy noted.  Note made of urinary bladder wall thickening.  Impression:  There is interval resolution of previously seen large stump abscess. However, there is lateral ulceration, possibly extending to bone. While no erosion is seen, osteomyelitis cannot be excluded on the basis of CT.  Consider further evaluation with MRI femur.  MRI Femur Without Contrast Right 1/20/23: FINDINGS:  Postoperative change of right above-knee amputation.  Extensive edema signal throughout the right lower extremity stump.  No focal fluid collection to suggest abscess.  Prominent right inguinal nodes.  Bone marrow edema signal at the distal femur with corresponding T1 signal hypointensity.  No definite cortical erosion.  Unremarkable femoral bone marrow signal elsewhere.  No acute fracture.  Impression:  Postoperative change of right above-knee amputation.  Bone marrow edema signal at the distal femur amputation edge.  Differential includes micro trauma edema versus early osteomyelitis.  No definite cortical erosion.  No sinus tract.  Extensive edema signal throughout the right lower extremity stump soft tissues, suggestive of cellulitis/myositis.  No focal fluid collection to suggest drainable abscess.  Additional findings as above.  NM Bone Scan 3 Phase Femur 1/23/23: FINDINGS:  On the flow images there is there is increased uptake near the level of the right femoral osseous stump and stump soft tissues.  On the delayed views, there is increased uptake of the right femoral osseous stump distal end.  Impression:  Increased uptake of the right femoral osseous stump concerning for osteomyelitis.  Early uptake of the surrounding stem soft tissues may represent soft tissue infection or cellulitis.

## 2023-01-26 NOTE — PROGRESS NOTES
[facility] - [address] Medicine  Progress Note    Patient Name: Adryan Neff  MRN: [id number]  Patient Class: IP- Inpatient   Admission Date: 1/17/2023  Length of Stay: 8 days  Attending Physician: Naveed Larson MD  Primary Care Provider: Carola Pedro MD        Subjective:     Principal Problem:Wound infection        HPI:  Adryan Neff is a 57 year old Black man with hypertension, diabetes mellitus type 2 (treated with insulin) with retinopathy, coronary artery disease, end stage renal disease on hemodialysis (Monday Wednesday Friday), secondary renal hyperparathyroidism, peripheral artery disease, chronic diastolic heart failure, chronic pulmonary heart disease, history of latent tuberculosis, history of left toe amputation on 1/6/2022, history of left above knee amputation on 1/21/2022, history of right above knee amputation on 10/13/2022. He lives in [facility]. His primary care physician is Dr. Carola Pedro.               He was hospitalized at [facility] from 11/23/2023 to 12/2/2022 for right lower extremity stump abscess, osteomyelitis, and hypoglycemia. Superficial wound culture grew Citrobacter koseri and Pseudomonas aeruginosa. He was treated with meropenem then ciprofloxacin 500 mg daily for 6 weeks (end date 1/9/2023). He was discharged to [facility].              He was admitted to [facility] on 12/8/2022. CT of the right lower extremity on 12/11/2022 showed a couple areas of rim-enhancing fluid collections/phlegmon. He was seen by Orthopedic Surgery. Ciprofloxacin was decreased to a 10 day supply and the course ended around 12/20/2022. He received wound care but refused debridements.               He was seen in [facility] on 1/17/2023. He was encouraged to go to the emergency department due to purulent drainage from the right lower extremity wound. He presented to [facility]  Emergency Department on 1/17/2023. Blood cultures were collected. He was given intravenous vancomycin and meropenem. He was admitted to Hospital Medicine Team X.      Overview/Hospital Course:  MRI of the right femur showed possible early osteomyelitis, but Orthopedic Surgery reviewed the images and were not concerned about osteomyelitis, instead recommending continuing antibiotics and local wound care. Infectious Disease suggested nuclear medicine bone scan. He was transferred to Hospital Medicine Team B on 1/22/2023. Interventional Radiology performed bone biopsy on 1/25/2023.      Interval History: Got bone biopsy yesterday. Dialysis this morning.     Review of Systems   Constitutional:  Negative for chills and fever.   Respiratory:  Negative for cough and shortness of breath.    Skin:  Positive for wound.   Neurological:  Negative for seizures and syncope.   Objective:     Vital Signs (Most Recent):  Temp: 97.8 °F (36.6 °C) (01/26/23 1015)  Pulse: 81 (01/26/23 1315)  Resp: 16 (01/26/23 1015)  BP: (!) 106/58 (01/26/23 1315)  SpO2: 96 % (01/26/23 0737)   Vital Signs (24h Range):  Temp:  [97.7 °F (36.5 °C)-98.3 °F (36.8 °C)] 97.8 °F (36.6 °C)  Pulse:  [69-85] 81  Resp:  [9-17] 16  SpO2:  [93 %-98 %] 96 %  BP: ()/(43-73) 106/58     Weight: 56.7 kg (125 lb)  Body mass index is 17.44 kg/m².    Intake/Output Summary (Last 24 hours) at 1/26/2023 1349  Last data filed at 1/26/2023 0610  Gross per 24 hour   Intake 210 ml   Output --   Net 210 ml      Physical Exam  Vitals and nursing note reviewed.   Constitutional:       General: He is not in acute distress.     Appearance: He is well-developed and underweight. He is not diaphoretic.   Pulmonary:      Effort: Pulmonary effort is normal. No respiratory distress.   Neurological:      Mental Status: He is alert and oriented to person, place, and time. Mental status is at baseline.      Motor: No seizure activity.   Psychiatric:         Attention and Perception:  Attention normal.         Mood and Affect: Mood and affect normal.         Behavior: Behavior is cooperative.       Significant Labs: All pertinent labs within the past 24 hours have been reviewed.    Significant Imaging: I have reviewed all pertinent imaging results/findings within the past 24 hours.  CT Thigh With Contrast Right 1/17/23: FINDINGS:  Postsurgical changes of right above knee amputation.  There is interval resolution of previously seen large stump abscess.  However, there is lateral ulceration, possibly extending to bone.  While no erosion is seen, osteomyelitis cannot be excluded on the basis of CT.   Extensive vascular calcifications are present.  Mild generalized loss of muscle bulk.  No pelvic ascites or lymphadenopathy noted.  Note made of urinary bladder wall thickening.  Impression:  There is interval resolution of previously seen large stump abscess. However, there is lateral ulceration, possibly extending to bone. While no erosion is seen, osteomyelitis cannot be excluded on the basis of CT.  Consider further evaluation with MRI femur.  MRI Femur Without Contrast Right 1/20/23: FINDINGS:  Postoperative change of right above-knee amputation.  Extensive edema signal throughout the right lower extremity stump.  No focal fluid collection to suggest abscess.  Prominent right inguinal nodes.  Bone marrow edema signal at the distal femur with corresponding T1 signal hypointensity.  No definite cortical erosion.  Unremarkable femoral bone marrow signal elsewhere.  No acute fracture.  Impression:  Postoperative change of right above-knee amputation.  Bone marrow edema signal at the distal femur amputation edge.  Differential includes micro trauma edema versus early osteomyelitis.  No definite cortical erosion.  No sinus tract.  Extensive edema signal throughout the right lower extremity stump soft tissues, suggestive of cellulitis/myositis.  No focal fluid collection to suggest drainable  abscess.  Additional findings as above.  NM Bone Scan 3 Phase Femur 1/23/23: FINDINGS:  On the flow images there is there is increased uptake near the level of the right femoral osseous stump and stump soft tissues.  On the delayed views, there is increased uptake of the right femoral osseous stump distal end.  Impression:  Increased uptake of the right femoral osseous stump concerning for osteomyelitis.  Early uptake of the surrounding stem soft tissues may represent soft tissue infection or cellulitis.      Assessment/Plan:      * Wound infection  On meropenem and vancomycin. ID following. Follow up bone biopsy results to determine antibiotics to continue.    Anemia in ESRD (end-stage renal disease)  Stable.     PAD (peripheral artery disease)  Continue home aspirin, clopidogrel, atorvastatin.    COVID-19  Treated with remdesivir.    ESRD on hemodialysis  Nephrology managing.    CAD (coronary artery disease) - non-obstructive from [facility] in 2016  Continue home aspirin, clopidrogel, atorvastatin.    Essential (primary) hypertension  Continue home hydralazine, amlodipine, and metoprolol succinate.    Diabetes Mellitus  He takes insulin detemir. Giving insulin sliding scale.    VTE Risk Mitigation (From admission, onward)         Ordered     heparin (porcine) injection 5,000 Units  Every 8 hours         01/17/23 1848     IP VTE HIGH RISK PATIENT  Once         01/17/23 1848     Place sequential compression device  Until discontinued         01/17/23 1848                Discharge Planning   ENIO: 1/30/2023     Code Status: Full Code   Is the patient medically ready for discharge?: No    Reason for patient still in hospital (select all that apply): Laboratory test and Consult recommendations  Discharge Plan A: Return to nursing home   Discharge Delays: None known at this time              Naveed Larson MD  Department of Hospital Medicine   [facility] - Observation 11H

## 2023-01-26 NOTE — PLAN OF CARE
Pt progressing towards goals.Telemetry maintained,NSR.Continue iv antibiotics and wound care.Scheduled for Dialysis in am.vss.Continue COVID Isolation.safety precautions maintained.bed in low position.rails up x3.call bell in reach.bed alarm in use for pt safety.continue plan of care.

## 2023-01-26 NOTE — PROCEDURES
[facility] NOTE     Patient currently on hemodialysis for removal of uremic toxins and volume.     Patient seen and evaluated on hemodialysis, tolerating treatment, see HD flowsheet for vitals and assessments.      Ultrafiltration goal is 1-2L     Labs have been reviewed and the dialysate bath has been adjusted.     Assessment/Plan:  Seen on HD this morning, tolerating well.  No complaints on exam  AM labs not drawn, will draw this morning with HD and adjust dialysis as needed.  Hgb @ goal for ESRD, no EPO needed at this time  Continue KIKI Jackson, [facility]  Nephrology  Pager:  [phone]

## 2023-01-26 NOTE — NURSING
Pt completed HD. Tolerated well, BP soft throughout. VSS. NAD. Net 1 liter fluid removed. Blood returned. Lines disconnected and flushed with NS. Needles pulled and manual pressure held until hemostasis achieved. Report given to KIAH Ordaz.

## 2023-01-26 NOTE — ASSESSMENT & PLAN NOTE
57 year old male with ESRD on HD, DM (HA1C 4.8), PAD  s/p right AKA October 2022, complicated by stump abscess and presumed osteomyelitis 11/23/22 (wound cx citrobacter koseri and pseudomonas), d/c on oral ciprofloxacin recently admitted [facility] with wound dehiscence and abscess of R BKA site.  Readmitted from ID clinic with concern for worsening wound infection.      CT right thigh with contrast 1/17 - resolution of prior large stump abscess.  Noted lateral ulceration possibly extended to bone.  Unable to exclude osteo. NM bone scan +osteomyelitis.  S/p bone biopsy with IR on 1/25. Cultures and pathology in process      Plan/recommendations:  1.  Continue IV vancomycin (pharmacy to dose. Trough goal 15-20) and meropenem (renally dosed) for now.   2.  Will follow pathology and culture data. If path and cultures negative for acute osteomyelitis, can d/c IV abx after 14 days of abx therapy. If path or cultures positive for osteomyelitis. Will need a 6 week course of osteomyelitis.   3.  Likely need close follow up with primary surgeon (Dr. Davis) as outpatient. Pt with adequate blood flow per vascular surgery.     Discussed with ID staff and primary team. ID will follow with you from afar pending path and culture data.

## 2023-01-26 NOTE — NURSING
Arrived at pt's bedside for maintenance hemodialysis tx. Pt AAO, VSS, NAD. Left upper arm AVF cannulated with 15g needles x2 using aseptic technique. Lines connected and secured- tx initiated at 1024.

## 2023-01-26 NOTE — PLAN OF CARE
[facility] - [facility]ation [facility]  Discharge Reassessment    Primary Care Provider: Carola Pedro MD    Expected Discharge Date: 1/30/2023    Reassessment (most recent)       Discharge Reassessment - 01/26/23 1405          Discharge Reassessment    Assessment Type Discharge Planning Reassessment     Did the patient's condition or plan change since previous assessment? No     Discharge Plan discussed with: Patient     Communicated ENIO with patient/caregiver Yes     Discharge Plan A Return to nursing home     Discharge Plan B Return to Nursing Home     DME Needed Upon Discharge  none     Discharge Barriers Identified None     Why the patient remains in the hospital Requires continued medical care        Post-Acute Status    Post-Acute Authorization Placement     Post-Acute Placement Status Set-up Complete/Auth obtained     Discharge Delays None known at this time                   MRI of the right femur showed possible early osteomyelitis, but Orthopedic Surgery reviewed the images and were not concerned about osteomyelitis, instead recommending continuing antibiotics and local wound care. Infectious Disease suggested nuclear medicine bone scan. He was transferred to [facility] Medicine Team B on 1/22/2023. Interventional Radiology performed bone biopsy on 1/25/2023.        Interval History: Got bone biopsy yesterday. Dialysis this morning.   Awaiting results of bone biopsy.     Will continue to update plan as needed.  Adan Shelton, RN,BSN

## 2023-01-26 NOTE — ASSESSMENT & PLAN NOTE
On meropenem and vancomycin. ID following. Follow up bone biopsy results to determine antibiotics to continue.

## 2023-01-26 NOTE — NURSING
Bedscale has malfunctioned.requires maintenance to service.unable to obtain bedscale weight this am.

## 2023-01-26 NOTE — PROGRESS NOTES
[address] [facility]  Infectious Disease  Progress Note    Patient Name: Adryan Neff  MRN: [id number]  Admission Date: 1/17/2023  Length of Stay: 8 days  Attending Physician: Naveed Larson MD  Primary Care Provider: Carola Pedro MD    Isolation Status: Airborne and Contact and Droplet  Assessment/Plan:      * Wound infection     57 year old male with ESRD on HD, DM (HA1C 4.8), PAD  s/p right AKA October 2022, complicated by stump abscess and presumed osteomyelitis 11/23/22 (wound cx citrobacter koseri and pseudomonas), d/c on oral ciprofloxacin recently admitted [facility] with wound dehiscence and abscess of R BKA site.  Readmitted from ID clinic with concern for worsening wound infection.      CT right thigh with contrast 1/17 - resolution of prior large stump abscess.  Noted lateral ulceration possibly extended to bone.  Unable to exclude osteo. NM bone scan +osteomyelitis.  S/p bone biopsy with IR on 1/25. Cultures and pathology in process      Plan/recommendations:  1.  Continue IV vancomycin (pharmacy to dose. Trough goal 15-20) and meropenem (renally dosed) for now.   2.  Will follow pathology and culture data. If path and cultures negative for acute osteomyelitis, can d/c IV abx after 14 days of abx therapy. If path or cultures positive for osteomyelitis. Will need a 6 week course of osteomyelitis.   3.  Likely need close follow up with primary surgeon (Dr. Davis) as outpatient. Pt with adequate blood flow per vascular surgery.     Discussed with ID staff and primary team. ID will follow with you from afar pending path and culture data.         Thank you for your consult. I will follow-up with patient. Please contact us if you have any additional questions.    Terrie Rice PA-C  Infectious Disease  [address] [facility]    Subjective:     Principal Problem:Wound infection    HPI: Mr. Adryan Neff is a 57 year old with ESR on dialysis, DM (last HA1C 4.8), PAD, s/p right AKA 10/13/22 admitted from  ID clinic with concern for worsening right stump infection.  He had recent admission 11/23 with right stump abscess with suspected associated osteomyelitis.  Initial wound cultures + for citrobacter koseri (zosyn R) and Pseudomonas.  IR aspiration cx 11/29 were negative.  He was initially treated with meropenem and then discharged to [facility] 12/2/22 with plan for ciprofloxacin X 6 weeks for presumed osteo.      He was admitted 12/8 to 12/14 to [facility] with wound dehiscence.  Per discharge summary,  Orthopedic Surgery was consulted who did not find any evidence of active infection and he was discharged back to SNF with 10 day course of ciprofloxacin (short of the original 6 week course planned).   He had Vascular Surgery visit on 12/15/22 at [facility] and noted to have wound dehiscence with foul smelling/purulent drainage and ED was recommended but apparently he did  not go.      He was seen by ID for follow up yesterday.  Purulent drainage noted and he was sent to ED.      In ED afebrile, no leukocytosis. COVID +.  Blood cultures obtained and pending CT right femur with contrast showed resolution of large stump abscess from prior but ulcer appeared to extend to bone.  No bony erosion seen but recommended MRI for better evaluation for osteo    Interval History: Remains afebrile, no leukocytosis  Blood cx NGTD  Bone scan studies + osteomyelitis  Pt doing well. Wound appears stable. No drainage. Vascular surgery following, no intervention at this time. Pt with adequate blood flow  Pt underwent bone biopsy with IR 1/25. Cultures in process. No cultures sent for path      Discussed with microlab and pathology. Pathology picked up bone specimen from  microlab. Pathology studies in process.       Review of Systems   Constitutional:  Negative for activity change, appetite change, chills, diaphoresis, fatigue and fever.   HENT:  Negative for congestion.    Respiratory:  Negative for cough and shortness of breath.     Cardiovascular:  Negative for leg swelling.   Gastrointestinal:  Negative for abdominal pain, constipation, diarrhea, nausea and vomiting.   Genitourinary:         HD   Musculoskeletal:  Negative for arthralgias and myalgias.   Skin:  Positive for wound. Negative for rash.   Neurological:  Negative for dizziness, weakness and headaches.   Psychiatric/Behavioral:  Negative for agitation and confusion.    Objective:     Vital Signs (Most Recent):  Temp: 97.6 °F (36.4 °C) (01/26/23 1335)  Pulse: 81 (01/26/23 1335)  Resp: 16 (01/26/23 1335)  BP: 133/63 (01/26/23 1335)  SpO2: 96 % (01/26/23 0737)   Vital Signs (24h Range):  Temp:  [97.6 °F (36.4 °C)-98.3 °F (36.8 °C)] 97.6 °F (36.4 °C)  Pulse:  [69-85] 81  Resp:  [9-17] 16  SpO2:  [93 %-98 %] 96 %  BP: ()/(43-73) 133/63     Weight: 56.7 kg (125 lb)  Body mass index is 17.44 kg/m².    Estimated Creatinine Clearance: 5.6 mL/min (A) (based on SCr of 11.6 mg/dL (H)).    Physical Exam  Vitals and nursing note reviewed.   Constitutional:       General: He is not in acute distress.     Appearance: Normal appearance. He is not ill-appearing, toxic-appearing or diaphoretic.   HENT:      Head: Normocephalic and atraumatic.      Nose: No congestion or rhinorrhea.      Mouth/Throat:      Mouth: Mucous membranes are moist.   Eyes:      General: No scleral icterus.     Conjunctiva/sclera: Conjunctivae normal.   Cardiovascular:      Rate and Rhythm: Normal rate and regular rhythm.      Heart sounds: Normal heart sounds.   Pulmonary:      Effort: Pulmonary effort is normal. No respiratory distress.   Abdominal:      Tenderness: There is no abdominal tenderness.   Musculoskeletal:         General: No tenderness.      Cervical back: Normal range of motion.      Comments: Bilateral lower extremity amputations.   R AKA wound seen  No purulent drainage  No foul odor  Wound with eschar   Skin:     General: Skin is warm and dry.   Neurological:      Mental Status: He is alert and  oriented to person, place, and time. Mental status is at baseline.   Psychiatric:         Mood and Affect: Mood normal.         Behavior: Behavior normal.         Thought Content: Thought content normal.         Judgment: Judgment normal.           Significant Labs: Blood Culture:   Recent Labs   Lab 10/11/22  0915 10/17/22  1415 10/17/22  1427 11/23/22  0631 01/17/23  1342   LABBLOO No Growth after 4 days. No Growth after 4 days. No Growth after 4 days. No growth after 5 days.  No growth after 5 days. No growth after 5 days.  No growth after 5 days.       CBC:   Recent Labs   Lab 01/26/23  1001   WBC 7.52   HGB 8.9*   HCT 28.8*          CMP:   Recent Labs   Lab 01/26/23  1001      K 5.3*      CO2 18*      BUN 76*   CREATININE 11.6*   CALCIUM 8.1*   ALBUMIN 2.8*   ANIONGAP 15       Wound Culture:   Recent Labs   Lab 11/23/22  0630 01/25/23  1734   LABAERO CITROBACTER KOSERI  Many  *  CITROBACTER KOSERI  Many  *  PSEUDOMONAS AERUGINOSA  Many  * No growth         Significant Imaging: I have reviewed all pertinent imaging results/findings within the past 24 hours.

## 2023-01-26 NOTE — PROGRESS NOTES
Pharmacokinetic Assessment Follow Up: IV Vancomycin    Vancomycin serum concentration assessment(s):    The random level was drawn correctly and can be used to guide therapy at this time. The measurement is within the desired definitive target range of 15 to 20 mcg/mL.    Vancomycin Regimen Plan:    Re-dose when the random level is less than 20 mcg/mL, next level to be drawn at 0430 on 1/28    Drug levels (last 3 results):  Recent Labs   Lab Result Units 01/24/23  1058 01/26/23  1001   Vancomycin, Random ug/mL 7.0 15.1       Pharmacy will continue to follow and monitor vancomycin.    Please contact pharmacy at extension [phone] for questions regarding this assessment.    Thank you for the consult,   Francesco Tapia       Patient brief summary:  Adryan Neff is a 57 y.o. male initiated on antimicrobial therapy with IV Vancomycin for treatment of skin & soft tissue infection      Drug Allergies:   Review of patient's allergies indicates:  No Known Allergies    Actual Body Weight:   56.7 kg    Renal Function:   Estimated Creatinine Clearance: 5.6 mL/min (A) (based on SCr of 11.6 mg/dL (H)).,     Dialysis Method (if applicable):  intermittent HD Tu/Thur/Sat (tentative)    CBC (last 72 hours):  Recent Labs   Lab Result Units 01/26/23  1001   WBC K/uL 7.52   Hemoglobin g/dL 8.9*   Hematocrit % 28.8*   Platelets K/uL 239   Gran % % 57.6   Lymph % % 18.6   Mono % % 11.4   Eosinophil % % 11.0*   Basophil % % 1.1   Differential Method  Automated       Metabolic Panel (last 72 hours):  Recent Labs   Lab Result Units 01/24/23  0901 01/26/23  1001   Sodium mmol/L 136 136   Potassium mmol/L 5.4* 5.3*   Chloride mmol/L 100 103   CO2 mmol/L 19* 18*   Glucose mg/dL 100 100   BUN mg/dL 87* 76*   Creatinine mg/dL 12.5* 11.6*   Albumin g/dL 3.1* 2.8*   Phosphorus mg/dL 6.3* 7.7*       Vancomycin Administrations:  vancomycin given in the last 96 hours                     vancomycin (VANCOCIN) 500 mg in dextrose 5 % in water (D5W) 5 % 100 mL  IVPB (MB+) (mg) 500 mg New Bag 01/24/23 1610                    Microbiologic Results:  Microbiology Results (last 7 days)       Procedure Component Value Units Date/Time    AFB Culture & Smear [[id number]] Collected: 01/25/23 1734    Order Status: Completed Specimen: Bone from Leg, Right Updated: 01/26/23 1323     AFB CULTURE STAIN No acid fast bacilli seen.    Culture, Anaerobe [[id number]] Collected: 01/25/23 1734    Order Status: Completed Specimen: Bone from Leg, Right Updated: 01/26/23 0910     Anaerobic Culture Culture in progress    Aerobic culture [[id number]] Collected: 01/25/23 1734    Order Status: Completed Specimen: Bone from Leg, Right Updated: 01/26/23 0744     Aerobic Bacterial Culture No growth    Gram stain [[id number]] Collected: 01/25/23 1734    Order Status: Completed Specimen: Bone from Leg, Right Updated: 01/25/23 2138     Gram Stain Result Rare WBC's      No organisms seen    Fungus culture [[id number]] Collected: 01/25/23 1734    Order Status: Sent Specimen: Bone from Leg, Right Updated: 01/25/23 1749    Blood culture #1 **CANNOT BE ORDERED STAT** [[id number]] Collected: 01/17/23 1342    Order Status: Completed Specimen: Blood from Peripheral, Antecubital, Right Updated: 01/22/23 1612     Blood Culture, Routine No growth after 5 days.    Blood culture #2 **CANNOT BE ORDERED STAT** [[id number]] Collected: 01/17/23 1342    Order Status: Completed Specimen: Blood from Peripheral, Hand, Right Updated: 01/22/23 1612     Blood Culture, Routine No growth after 5 days.

## 2023-01-26 NOTE — SUBJECTIVE & OBJECTIVE
Interval History: Remains afebrile, no leukocytosis  Blood cx NGTD  Bone scan studies + osteomyelitis  Pt doing well. Wound appears stable. No drainage. Vascular surgery following, no intervention at this time. Pt with adequate blood flow  Pt underwent bone biopsy with IR 1/25. Cultures in process. No cultures sent for path      Discussed with microlab and pathology. Pathology picked up bone specimen from  microlab. Pathology studies in process.       Review of Systems   Constitutional:  Negative for activity change, appetite change, chills, diaphoresis, fatigue and fever.   HENT:  Negative for congestion.    Respiratory:  Negative for cough and shortness of breath.    Cardiovascular:  Negative for leg swelling.   Gastrointestinal:  Negative for abdominal pain, constipation, diarrhea, nausea and vomiting.   Genitourinary:         HD   Musculoskeletal:  Negative for arthralgias and myalgias.   Skin:  Positive for wound. Negative for rash.   Neurological:  Negative for dizziness, weakness and headaches.   Psychiatric/Behavioral:  Negative for agitation and confusion.    Objective:     Vital Signs (Most Recent):  Temp: 97.6 °F (36.4 °C) (01/26/23 1335)  Pulse: 81 (01/26/23 1335)  Resp: 16 (01/26/23 1335)  BP: 133/63 (01/26/23 1335)  SpO2: 96 % (01/26/23 0737)   Vital Signs (24h Range):  Temp:  [97.6 °F (36.4 °C)-98.3 °F (36.8 °C)] 97.6 °F (36.4 °C)  Pulse:  [69-85] 81  Resp:  [9-17] 16  SpO2:  [93 %-98 %] 96 %  BP: ()/(43-73) 133/63     Weight: 56.7 kg (125 lb)  Body mass index is 17.44 kg/m².    Estimated Creatinine Clearance: 5.6 mL/min (A) (based on SCr of 11.6 mg/dL (H)).    Physical Exam  Vitals and nursing note reviewed.   Constitutional:       General: He is not in acute distress.     Appearance: Normal appearance. He is not ill-appearing, toxic-appearing or diaphoretic.   HENT:      Head: Normocephalic and atraumatic.      Nose: No congestion or rhinorrhea.      Mouth/Throat:      Mouth: Mucous membranes  are moist.   Eyes:      General: No scleral icterus.     Conjunctiva/sclera: Conjunctivae normal.   Cardiovascular:      Rate and Rhythm: Normal rate and regular rhythm.      Heart sounds: Normal heart sounds.   Pulmonary:      Effort: Pulmonary effort is normal. No respiratory distress.   Abdominal:      Tenderness: There is no abdominal tenderness.   Musculoskeletal:         General: No tenderness.      Cervical back: Normal range of motion.      Comments: Bilateral lower extremity amputations.   R AKA wound seen  No purulent drainage  No foul odor  Wound with eschar   Skin:     General: Skin is warm and dry.   Neurological:      Mental Status: He is alert and oriented to person, place, and time. Mental status is at baseline.   Psychiatric:         Mood and Affect: Mood normal.         Behavior: Behavior normal.         Thought Content: Thought content normal.         Judgment: Judgment normal.           Significant Labs: Blood Culture:   Recent Labs   Lab 10/11/22  0915 10/17/22  1415 10/17/22  1427 11/23/22  0631 01/17/23  1342   LABBLOO No Growth after 4 days. No Growth after 4 days. No Growth after 4 days. No growth after 5 days.  No growth after 5 days. No growth after 5 days.  No growth after 5 days.       CBC:   Recent Labs   Lab 01/26/23  1001   WBC 7.52   HGB 8.9*   HCT 28.8*          CMP:   Recent Labs   Lab 01/26/23  1001      K 5.3*      CO2 18*      BUN 76*   CREATININE 11.6*   CALCIUM 8.1*   ALBUMIN 2.8*   ANIONGAP 15       Wound Culture:   Recent Labs   Lab 11/23/22  0630 01/25/23  1734   LABAERO CITROBACTER KOSERI  Many  *  CITROBACTER KOSERI  Many  *  PSEUDOMONAS AERUGINOSA  Many  * No growth         Significant Imaging: I have reviewed all pertinent imaging results/findings within the past 24 hours.

## 2023-01-27 PROBLEM — E83.9 CHRONIC KIDNEY DISEASE-MINERAL AND BONE DISORDER: Chronic | Status: ACTIVE | Noted: 2022-11-29

## 2023-01-27 PROBLEM — M89.9 CHRONIC KIDNEY DISEASE-MINERAL AND BONE DISORDER: Chronic | Status: ACTIVE | Noted: 2022-11-29

## 2023-01-27 PROBLEM — N18.9 CHRONIC KIDNEY DISEASE-MINERAL AND BONE DISORDER: Chronic | Status: ACTIVE | Noted: 2022-11-29

## 2023-01-27 PROBLEM — N18.6 ANEMIA IN ESRD (END-STAGE RENAL DISEASE): Chronic | Status: ACTIVE | Noted: 2023-01-18

## 2023-01-27 PROBLEM — D63.1 ANEMIA IN ESRD (END-STAGE RENAL DISEASE): Chronic | Status: ACTIVE | Noted: 2023-01-18

## 2023-01-27 LAB
POCT GLUCOSE: 128 MG/DL (ref 70–110)
POCT GLUCOSE: 81 MG/DL (ref 70–110)

## 2023-01-27 PROCEDURE — 99232 PR SUBSEQUENT HOSPITAL CARE,LEVL II: ICD-10-PCS | Mod: ,,, | Performed by: NURSE PRACTITIONER

## 2023-01-27 PROCEDURE — 27000207 HC ISOLATION

## 2023-01-27 PROCEDURE — 99232 PR SUBSEQUENT HOSPITAL CARE,LEVL II: ICD-10-PCS | Mod: CR,,, | Performed by: HOSPITALIST

## 2023-01-27 PROCEDURE — 25000003 PHARM REV CODE 250: Performed by: HOSPITALIST

## 2023-01-27 PROCEDURE — 25000003 PHARM REV CODE 250: Performed by: STUDENT IN AN ORGANIZED HEALTH CARE EDUCATION/TRAINING PROGRAM

## 2023-01-27 PROCEDURE — 11000001 HC ACUTE MED/SURG PRIVATE ROOM

## 2023-01-27 PROCEDURE — 93005 ELECTROCARDIOGRAM TRACING: CPT

## 2023-01-27 PROCEDURE — 93010 ELECTROCARDIOGRAM REPORT: CPT | Mod: ,,, | Performed by: INTERNAL MEDICINE

## 2023-01-27 PROCEDURE — 99232 SBSQ HOSP IP/OBS MODERATE 35: CPT | Mod: ,,, | Performed by: NURSE PRACTITIONER

## 2023-01-27 PROCEDURE — 63600175 PHARM REV CODE 636 W HCPCS: Performed by: STUDENT IN AN ORGANIZED HEALTH CARE EDUCATION/TRAINING PROGRAM

## 2023-01-27 PROCEDURE — 99232 SBSQ HOSP IP/OBS MODERATE 35: CPT | Mod: CR,,, | Performed by: HOSPITALIST

## 2023-01-27 PROCEDURE — 93010 EKG 12-LEAD: ICD-10-PCS | Mod: ,,, | Performed by: INTERNAL MEDICINE

## 2023-01-27 PROCEDURE — 25000003 PHARM REV CODE 250: Performed by: NURSE PRACTITIONER

## 2023-01-27 RX ORDER — BISACODYL 5 MG
5 TABLET, DELAYED RELEASE (ENTERIC COATED) ORAL DAILY PRN
Status: DISCONTINUED | OUTPATIENT
Start: 2023-01-27 | End: 2023-01-30 | Stop reason: HOSPADM

## 2023-01-27 RX ORDER — CIPROFLOXACIN 500 MG/1
500 TABLET ORAL DAILY
Status: DISCONTINUED | OUTPATIENT
Start: 2023-01-27 | End: 2023-01-30 | Stop reason: HOSPADM

## 2023-01-27 RX ORDER — SEVELAMER CARBONATE 800 MG/1
1600 TABLET, FILM COATED ORAL
Status: DISCONTINUED | OUTPATIENT
Start: 2023-01-27 | End: 2023-01-30 | Stop reason: HOSPADM

## 2023-01-27 RX ADMIN — ASPIRIN 81 MG: 81 TABLET, CHEWABLE ORAL at 08:01

## 2023-01-27 RX ADMIN — SEVELAMER CARBONATE 1600 MG: 800 TABLET, FILM COATED ORAL at 05:01

## 2023-01-27 RX ADMIN — HEPARIN SODIUM 5000 UNITS: 5000 INJECTION INTRAVENOUS; SUBCUTANEOUS at 05:01

## 2023-01-27 RX ADMIN — ATORVASTATIN CALCIUM 20 MG: 20 TABLET, FILM COATED ORAL at 08:01

## 2023-01-27 RX ADMIN — HYDRALAZINE HYDROCHLORIDE 100 MG: 50 TABLET ORAL at 05:01

## 2023-01-27 RX ADMIN — HEPARIN SODIUM 5000 UNITS: 5000 INJECTION INTRAVENOUS; SUBCUTANEOUS at 03:01

## 2023-01-27 RX ADMIN — SEVELAMER CARBONATE 800 MG: 800 TABLET, FILM COATED ORAL at 08:01

## 2023-01-27 RX ADMIN — LIDOCAINE 1 PATCH: 50 PATCH CUTANEOUS at 05:01

## 2023-01-27 RX ADMIN — CLOPIDOGREL BISULFATE 75 MG: 75 TABLET ORAL at 08:01

## 2023-01-27 RX ADMIN — HYDRALAZINE HYDROCHLORIDE 100 MG: 50 TABLET ORAL at 09:01

## 2023-01-27 RX ADMIN — AMLODIPINE BESYLATE 10 MG: 10 TABLET ORAL at 08:01

## 2023-01-27 RX ADMIN — METOPROLOL SUCCINATE 25 MG: 25 TABLET, EXTENDED RELEASE ORAL at 08:01

## 2023-01-27 RX ADMIN — HEPARIN SODIUM 5000 UNITS: 5000 INJECTION INTRAVENOUS; SUBCUTANEOUS at 09:01

## 2023-01-27 RX ADMIN — PANTOPRAZOLE SODIUM 40 MG: 40 TABLET, DELAYED RELEASE ORAL at 08:01

## 2023-01-27 RX ADMIN — CIPROFLOXACIN HYDROCHLORIDE 500 MG: 500 TABLET, FILM COATED ORAL at 05:01

## 2023-01-27 NOTE — NURSING
Pt very pleasant this am.allowed vital signs to be checked.took am meds without difficulty.allowed Telemetry to be reapplied.pt had a bm incontinently,cleaned and changed.resting quietly.

## 2023-01-27 NOTE — ASSESSMENT & PLAN NOTE
57-year-old man with a past medical history of primary hypertension, peripheral arterial disease s/p R AKA, DM type 1, ESRD on HD MWF, admitted for concern of osteomyelitis/abcess to R AKA.     Nephrology History  iHD Schedule: MWF   Unit/MD: [facility]  Duration: 3.5 hours   UF: 2L  EDW: 56 kg   Access: [facility]IL AVF   Last HD prior to presentation: 1/16/23     Plan/Recommendations:     -HD tomorrow for metabolic clearance and volume management   -can decrease labs draws to TTS on dialysis days from Nephrology standpoint.  -Strict I/O's   -Trend renal function panels daily   -Renally dose meds/avoid nephrotoxic meds   -Renal diet/formulations, if not NPO   -increase phos binders to 1.6g

## 2023-01-27 NOTE — PROGRESS NOTES
Pharmacist Renal Dose Adjustment Note    Adryan Neff is a 57 y.o. male being treated with the medication Cipro    Patient Data:    Vital Signs (Most Recent):  Temp: 98.1 °F (36.7 °C) (01/27/23 1153)  Pulse: 71 (01/27/23 1153)  Resp: 18 (01/27/23 1153)  BP: (!) 117/55 (01/27/23 1153)  SpO2: 96 % (01/27/23 1153)   Vital Signs (72h Range):  Temp:  [96.4 °F (35.8 °C)-98.4 °F (36.9 °C)]   Pulse:  [67-85]   Resp:  [9-18]   BP: ()/(43-73)   SpO2:  [92 %-99 %]      Recent Labs   Lab 01/24/23  0901 01/26/23  1001   CREATININE 12.5* 11.6*     Serum creatinine: 11.6 mg/dL (H) 01/26/23 1001  Estimated creatinine clearance: 5.6 mL/min (A)    Medication: Cipro 750 mg po daily will be changed to Cipro 500 mg po daily    Pharmacist's Name: Emma Oliva  Pharmacist's Extension: [phone]

## 2023-01-27 NOTE — SUBJECTIVE & OBJECTIVE
Interval History: Awaiting bone biopsy and culture results.     Review of Systems   Constitutional:  Negative for chills and fever.   Respiratory:  Negative for cough and shortness of breath.    Skin:  Positive for wound.   Neurological:  Negative for seizures and syncope.   Objective:     Vital Signs (Most Recent):  Temp: 96.4 °F (35.8 °C) (01/27/23 0505)  Pulse: 67 (01/27/23 0743)  Resp: 18 (01/27/23 0505)  BP: (!) 150/68 (01/27/23 0505)  SpO2: 99 % (01/27/23 0505)   Vital Signs (24h Range):  Temp:  [96.4 °F (35.8 °C)-97.8 °F (36.6 °C)] 96.4 °F (35.8 °C)  Pulse:  [67-81] 67  Resp:  [16-18] 18  SpO2:  [96 %-99 %] 99 %  BP: ()/(50-69) 150/68     Weight: 56.7 kg (125 lb)  Body mass index is 17.44 kg/m².    Intake/Output Summary (Last 24 hours) at 1/27/2023 0857  Last data filed at 1/27/2023 0500  Gross per 24 hour   Intake 840 ml   Output 2600 ml   Net -1760 ml        Physical Exam  Vitals and nursing note reviewed.   Constitutional:       General: He is not in acute distress.     Appearance: He is well-developed and underweight. He is not diaphoretic.   Pulmonary:      Effort: Pulmonary effort is normal. No respiratory distress.   Neurological:      Mental Status: He is alert and oriented to person, place, and time. Mental status is at baseline.      Motor: No seizure activity.   Psychiatric:         Attention and Perception: Attention normal.         Mood and Affect: Mood and affect normal.         Behavior: Behavior is cooperative.       Significant Labs: All pertinent labs within the past 24 hours have been reviewed.    Significant Imaging: I have reviewed all pertinent imaging results/findings within the past 24 hours.

## 2023-01-27 NOTE — NURSING
Pt continues to refuse vital signs,meds and has pulled his Telemetry off and placed it on the floor.refusing to allow the telemetry to be reapplied at this time.Dr Hurtado notified.

## 2023-01-27 NOTE — PLAN OF CARE
Outpatient Antibiotic Therapy Plan:    Please send referral to [facility].    1) Infection: R AKA culture negative osteomyelitis pending final path and cultures.     2) Discharge Antibiotics:    antibiotics:  Vancomycin with HD   Po Ciprofloxacin 750 mg daily      3) Therapy Duration:  2-6 weeks    Estimated end date of IV antibiotics: 1/31-2/28/23    4) Outpatient Weekly Labs:    Order the following labs to be drawn on Mondays:   CBC  CMP   CRP  Vancomycin before HD      5) Fax Lab Results to Infectious Diseases Provider: lexy bustos    [facility] Fax Number: [phone]    6) Outpatient Infectious Diseases Follow-up    Follow-up appointment will be arranged by the ID clinic and will be found in the patient's appointments tab.    Prior to discharge, please ensure the patient's follow-up has been scheduled.    If there is still no follow-up scheduled prior to discharge, please send an EPIC message to Huong Reynaga in Infectious Diseases.

## 2023-01-27 NOTE — CONSULTS
Thank you for your consult to [facility]. We have reviewed the patient chart. This patient does not meet criteria for [facility] service at this time due to Patient is unlikely to participate well with the telemedicine platform due to uncooperative with care/plan likely to require numerous bedside visits.. Will hand back to In-house service.    Mindy Shaw MD

## 2023-01-27 NOTE — SUBJECTIVE & OBJECTIVE
Interval History:   HD completed yesterday, tolerated well with 1L net fluid removal.  Labs not drawn this morning.      Review of patient's allergies indicates:  No Known Allergies  Current Facility-Administered Medications   Medication Frequency    acetaminophen tablet 650 mg Q8H PRN    albuterol-ipratropium 2.5 mg-0.5 mg/3 mL nebulizer solution 3 mL Q4H PRN    amLODIPine tablet 10 mg Daily    aspirin chewable tablet 81 mg Daily    atorvastatin tablet 20 mg Daily    bisacodyL EC tablet 5 mg Daily PRN    clopidogreL tablet 75 mg Daily    dextrose 10% bolus 125 mL 125 mL PRN    dextrose 10% bolus 250 mL 250 mL PRN    glucagon (human recombinant) injection 1 mg PRN    glucose chewable tablet 16 g PRN    glucose chewable tablet 24 g PRN    heparin (porcine) injection 5,000 Units Q8H    hydrALAZINE tablet 100 mg Q8H    insulin aspart U-100 pen 0-5 Units QID (AC + HS) PRN    LIDOcaine 5 % patch 1 patch Q24H    melatonin tablet 6 mg Nightly PRN    meropenem (MERREM) 500 mg in sodium chloride 0.9 % 100 mL IVPB (MB+) Q24H    metoprolol succinate (TOPROL-XL) 24 hr tablet 25 mg Daily    naloxone 0.4 mg/mL injection 0.02 mg PRN    ondansetron disintegrating tablet 4 mg Q8H PRN    ondansetron injection 4 mg Q8H PRN    pantoprazole EC tablet 40 mg Daily    polyethylene glycol packet 17 g TID PRN    sevelamer carbonate tablet 800 mg TID WM    simethicone chewable tablet 80 mg QID PRN    sodium chloride 0.9% flush 10 mL Q12H PRN    vancomycin - pharmacy to dose pharmacy to manage frequency       Objective:     Vital Signs (Most Recent):  Temp: 98.1 °F (36.7 °C) (01/27/23 1153)  Pulse: 71 (01/27/23 1153)  Resp: 18 (01/27/23 1153)  BP: (!) 117/55 (01/27/23 1153)  SpO2: 96 % (01/27/23 1153)   Vital Signs (24h Range):  Temp:  [96.4 °F (35.8 °C)-98.1 °F (36.7 °C)] 98.1 °F (36.7 °C)  Pulse:  [67-81] 71  Resp:  [16-18] 18  SpO2:  [96 %-99 %] 96 %  BP: ()/(50-69) 117/55     Weight: 56.7 kg (125 lb) (01/17/23 1560)  Body mass index  is 17.44 kg/m².  Body surface area is 1.69 meters squared.    I/O last 3 completed shifts:  In: 1050 [P.O.:450; Other:600]  Out: 2600 [Other:2600]    Physical Exam  Vitals and nursing note reviewed.   Constitutional:       Appearance: He is well-developed.   HENT:      Head: Normocephalic and atraumatic.   Eyes:      Conjunctiva/sclera: Conjunctivae normal.      Pupils: Pupils are equal, round, and reactive to light.   Neck:      Thyroid: No thyromegaly.      Vascular: No JVD.   Cardiovascular:      Rate and Rhythm: Normal rate and regular rhythm.      Heart sounds: Normal heart sounds. No murmur heard.    No friction rub. No gallop.   Pulmonary:      Effort: Pulmonary effort is normal. No respiratory distress.      Breath sounds: Normal breath sounds. No wheezing or rales.   Abdominal:      General: Bowel sounds are normal. There is no distension.      Palpations: Abdomen is soft. There is no mass.      Tenderness: There is no abdominal tenderness. There is no guarding or rebound.      Hernia: No hernia is present.   Musculoskeletal:         General: No deformity. Normal range of motion.      Cervical back: Normal range of motion and neck supple.      Comments: R AKA with crusting along lateral incision. No erythema or induration noted.      Right Lower Extremity: Right leg is amputated above knee.   Skin:     General: Skin is warm and dry.   Neurological:      Mental Status: He is alert and oriented to person, place, and time.      Cranial Nerves: No cranial nerve deficit.   Psychiatric:         Behavior: Behavior normal.       Significant Labs:  CBC:   Recent Labs   Lab 01/26/23  1001   WBC 7.52   RBC 3.28*   HGB 8.9*   HCT 28.8*      MCV 88   MCH 27.1   MCHC 30.9*     CMP:   Recent Labs   Lab 01/26/23  1001      CALCIUM 8.1*   ALBUMIN 2.8*      K 5.3*   CO2 18*      BUN 76*   CREATININE 11.6*

## 2023-01-27 NOTE — PROGRESS NOTES
[facility] - [address] Medicine  Progress Note    Patient Name: Adryan Neff  MRN: [id number]  Patient Class: IP- Inpatient   Admission Date: 1/17/2023  Length of Stay: 9 days  Attending Physician: Naveed Larson MD  Primary Care Provider: Carola Pedro MD        Subjective:     Principal Problem:Wound infection        HPI:  Adryan Neff is a 57 year old Black man with hypertension, diabetes mellitus type 2 (treated with insulin) with retinopathy, coronary artery disease, end stage renal disease on hemodialysis (Monday Wednesday Friday), secondary renal hyperparathyroidism, peripheral artery disease, chronic diastolic heart failure, chronic pulmonary heart disease, history of latent tuberculosis, history of left toe amputation on 1/6/2022, history of left above knee amputation on 1/21/2022, history of right above knee amputation on 10/13/2022. He lives in [facility]. His primary care physician is Dr. Carola Pedro.               He was hospitalized at [facility] from 11/23/2023 to 12/2/2022 for right lower extremity stump abscess, osteomyelitis, and hypoglycemia. Superficial wound culture grew Citrobacter koseri and Pseudomonas aeruginosa. He was treated with meropenem then ciprofloxacin 500 mg daily for 6 weeks (end date 1/9/2023). He was discharged to [facility].              He was admitted to [facility] on 12/8/2022. CT of the right lower extremity on 12/11/2022 showed a couple areas of rim-enhancing fluid collections/phlegmon. He was seen by Orthopedic Surgery. Ciprofloxacin was decreased to a 10 day supply and the course ended around 12/20/2022. He received wound care but refused debridements.               He was seen in [facility] on 1/17/2023. He was encouraged to go to the emergency department due to purulent drainage from the right lower extremity wound. He presented to [facility]  Emergency Department on 1/17/2023. Blood cultures were collected. He was given intravenous vancomycin and meropenem. He was admitted to Hospital Medicine Team X.      Overview/Hospital Course:  MRI of the right femur showed possible early osteomyelitis, but Orthopedic Surgery reviewed the images and were not concerned about osteomyelitis, instead recommending continuing antibiotics and local wound care. Infectious Disease suggested nuclear medicine bone scan. He was transferred to Hospital Medicine Team B on 1/22/2023. Interventional Radiology performed bone biopsy on 1/25/2023.      Interval History: Awaiting bone biopsy and culture results.     Review of Systems   Constitutional:  Negative for chills and fever.   Respiratory:  Negative for cough and shortness of breath.    Skin:  Positive for wound.   Neurological:  Negative for seizures and syncope.   Objective:     Vital Signs (Most Recent):  Temp: 96.4 °F (35.8 °C) (01/27/23 0505)  Pulse: 67 (01/27/23 0743)  Resp: 18 (01/27/23 0505)  BP: (!) 150/68 (01/27/23 0505)  SpO2: 99 % (01/27/23 0505)   Vital Signs (24h Range):  Temp:  [96.4 °F (35.8 °C)-97.8 °F (36.6 °C)] 96.4 °F (35.8 °C)  Pulse:  [67-81] 67  Resp:  [16-18] 18  SpO2:  [96 %-99 %] 99 %  BP: ()/(50-69) 150/68     Weight: 56.7 kg (125 lb)  Body mass index is 17.44 kg/m².    Intake/Output Summary (Last 24 hours) at 1/27/2023 0857  Last data filed at 1/27/2023 0500  Gross per 24 hour   Intake 840 ml   Output 2600 ml   Net -1760 ml        Physical Exam  Vitals and nursing note reviewed.   Constitutional:       General: He is not in acute distress.     Appearance: He is well-developed and underweight. He is not diaphoretic.   Pulmonary:      Effort: Pulmonary effort is normal. No respiratory distress.   Neurological:      Mental Status: He is alert and oriented to person, place, and time. Mental status is at baseline.      Motor: No seizure activity.   Psychiatric:         Attention and Perception:  Attention normal.         Mood and Affect: Mood and affect normal.         Behavior: Behavior is cooperative.       Significant Labs: All pertinent labs within the past 24 hours have been reviewed.    Significant Imaging: I have reviewed all pertinent imaging results/findings within the past 24 hours.      Assessment/Plan:      * Wound infection  On meropenem and vancomycin. ID following. Follow up bone biopsy results to determine antibiotics to continue.    Anemia in ESRD (end-stage renal disease)  Stable.     PAD (peripheral artery disease)  Continue home aspirin, clopidogrel, atorvastatin.    COVID-19  Positive on 1/17/2023. Treated with remdesivir.    ESRD on hemodialysis  Nephrology managing.    CAD (coronary artery disease) - non-obstructive from [facility] in 2016  Continue home aspirin, clopidrogel, atorvastatin.    Essential (primary) hypertension  Continue home hydralazine, amlodipine, and metoprolol succinate.    Diabetes Mellitus  He takes insulin detemir. Giving insulin sliding scale.      VTE Risk Mitigation (From admission, onward)         Ordered     heparin (porcine) injection 5,000 Units  Every 8 hours         01/17/23 1848     IP VTE HIGH RISK PATIENT  Once         01/17/23 1848     Place sequential compression device  Until discontinued         01/17/23 1848                Discharge Planning   ENIO: 1/30/2023     Code Status: Full Code   Is the patient medically ready for discharge?: No    Reason for patient still in hospital (select all that apply): Laboratory test and Consult recommendations  Discharge Plan A: Return to nursing home   Discharge Delays: None known at this time              Naveed Larson MD  Department of Hospital Medicine   [facility] - [facility]

## 2023-01-27 NOTE — PLAN OF CARE
"Pt progressing towards goals.Continue iv antibiotics.pt has been upset tonight and has been refusing vitals,meds and Telemetry.States "I just want to go home".appears frustrated.Dr Hurtado aware.will continue to encourage pt and offer emotional support.safety precautions maintained.bed in low position.rails up x3.call bell in reach.bed alarm in use for pt safety.COVID Isolation continues.continue plan of care.  "

## 2023-01-27 NOTE — PROGRESS NOTES
[facility] - [facility]  Nephrology  Progress Note    Patient Name: Adrayn Neff  MRN: [id number]  Admission Date: 1/17/2023  Hospital Length of Stay: 9 days  Attending Provider: Naveed Larson MD   Primary Care Physician: Carola Pedro MD  Principal Problem:Wound infection    Subjective:     HPI: Adryan Neff is a 57-year-old man with a past medical history of primary hypertension, peripheral arterial disease s/p R AKA, DM type 1, ESRD on HD MWF, who presents to the emergency department with a wound infection. He has had previous admissions for R AKA abscess and concern for osteomyelitis. Resides in NH. Reports compliance with HD. Last HD on Monday 1/16/23. Denies fever, chills, chest pain, or SOB.           Interval History:   HD completed yesterday, tolerated well with 1L net fluid removal.  Labs not drawn this morning.      Review of patient's allergies indicates:  No Known Allergies  Current Facility-Administered Medications   Medication Frequency    acetaminophen tablet 650 mg Q8H PRN    albuterol-ipratropium 2.5 mg-0.5 mg/3 mL nebulizer solution 3 mL Q4H PRN    amLODIPine tablet 10 mg Daily    aspirin chewable tablet 81 mg Daily    atorvastatin tablet 20 mg Daily    bisacodyL EC tablet 5 mg Daily PRN    clopidogreL tablet 75 mg Daily    dextrose 10% bolus 125 mL 125 mL PRN    dextrose 10% bolus 250 mL 250 mL PRN    glucagon (human recombinant) injection 1 mg PRN    glucose chewable tablet 16 g PRN    glucose chewable tablet 24 g PRN    heparin (porcine) injection 5,000 Units Q8H    hydrALAZINE tablet 100 mg Q8H    insulin aspart U-100 pen 0-5 Units QID (AC + HS) PRN    LIDOcaine 5 % patch 1 patch Q24H    melatonin tablet 6 mg Nightly PRN    meropenem (MERREM) 500 mg in sodium chloride 0.9 % 100 mL IVPB (MB+) Q24H    metoprolol succinate (TOPROL-XL) 24 hr tablet 25 mg Daily    naloxone 0.4 mg/mL injection 0.02 mg PRN    ondansetron disintegrating tablet 4 mg Q8H PRN    ondansetron  injection 4 mg Q8H PRN    pantoprazole EC tablet 40 mg Daily    polyethylene glycol packet 17 g TID PRN    sevelamer carbonate tablet 800 mg TID WM    simethicone chewable tablet 80 mg QID PRN    sodium chloride 0.9% flush 10 mL Q12H PRN    vancomycin - pharmacy to dose pharmacy to manage frequency       Objective:     Vital Signs (Most Recent):  Temp: 98.1 °F (36.7 °C) (01/27/23 1153)  Pulse: 71 (01/27/23 1153)  Resp: 18 (01/27/23 1153)  BP: (!) 117/55 (01/27/23 1153)  SpO2: 96 % (01/27/23 1153)   Vital Signs (24h Range):  Temp:  [96.4 °F (35.8 °C)-98.1 °F (36.7 °C)] 98.1 °F (36.7 °C)  Pulse:  [67-81] 71  Resp:  [16-18] 18  SpO2:  [96 %-99 %] 96 %  BP: ()/(50-69) 117/55     Weight: 56.7 kg (125 lb) (01/17/23 2145)  Body mass index is 17.44 kg/m².  Body surface area is 1.69 meters squared.    I/O last 3 completed shifts:  In: 1050 [P.O.:450; Other:600]  Out: 2600 [Other:2600]    Physical Exam  Vitals and nursing note reviewed.   Constitutional:       Appearance: He is well-developed.   HENT:      Head: Normocephalic and atraumatic.   Eyes:      Conjunctiva/sclera: Conjunctivae normal.      Pupils: Pupils are equal, round, and reactive to light.   Neck:      Thyroid: No thyromegaly.      Vascular: No JVD.   Cardiovascular:      Rate and Rhythm: Normal rate and regular rhythm.      Heart sounds: Normal heart sounds. No murmur heard.    No friction rub. No gallop.   Pulmonary:      Effort: Pulmonary effort is normal. No respiratory distress.      Breath sounds: Normal breath sounds. No wheezing or rales.   Abdominal:      General: Bowel sounds are normal. There is no distension.      Palpations: Abdomen is soft. There is no mass.      Tenderness: There is no abdominal tenderness. There is no guarding or rebound.      Hernia: No hernia is present.   Musculoskeletal:         General: No deformity. Normal range of motion.      Cervical back: Normal range of motion and neck supple.      Comments: R AKA with  crusting along lateral incision. No erythema or induration noted.      Right Lower Extremity: Right leg is amputated above knee.   Skin:     General: Skin is warm and dry.   Neurological:      Mental Status: He is alert and oriented to person, place, and time.      Cranial Nerves: No cranial nerve deficit.   Psychiatric:         Behavior: Behavior normal.       Significant Labs:  CBC:   Recent Labs   Lab 01/26/23  1001   WBC 7.52   RBC 3.28*   HGB 8.9*   HCT 28.8*      MCV 88   MCH 27.1   MCHC 30.9*     CMP:   Recent Labs   Lab 01/26/23  1001      CALCIUM 8.1*   ALBUMIN 2.8*      K 5.3*   CO2 18*      BUN 76*   CREATININE 11.6*          Assessment/Plan:     ESRD on hemodialysis  57-year-old man with a past medical history of primary hypertension, peripheral arterial disease s/p R AKA, DM type 1, ESRD on HD MWF, admitted for concern of osteomyelitis/abcess to R AKA.     Nephrology History  iHD Schedule: MWF   Unit/MD: [facility]  Duration: 3.5 hours   UF: 2L  EDW: 56 kg   Access: PHIL AVF   Last HD prior to presentation: 1/16/23     Plan/Recommendations:     -HD tomorrow for metabolic clearance and volume management   -can decrease labs draws to TTS on dialysis days from Nephrology standpoint.  -Strict I/O's   -Trend renal function panels daily   -Renally dose meds/avoid nephrotoxic meds   -Renal diet/formulations, if not NPO   -increase phos binders to 1.6g        Arthur Louis NP  Nephrology  [facility]

## 2023-01-28 LAB
ANION GAP SERPL CALC-SCNC: 17 MMOL/L (ref 8–16)
BUN SERPL-MCNC: 64 MG/DL (ref 6–20)
CALCIUM SERPL-MCNC: 8.8 MG/DL (ref 8.7–10.5)
CHLORIDE SERPL-SCNC: 100 MMOL/L (ref 95–110)
CO2 SERPL-SCNC: 18 MMOL/L (ref 23–29)
CREAT SERPL-MCNC: 10.3 MG/DL (ref 0.5–1.4)
EST. GFR  (NO RACE VARIABLE): 5.3 ML/MIN/1.73 M^2
GLUCOSE SERPL-MCNC: 64 MG/DL (ref 70–110)
POCT GLUCOSE: 102 MG/DL (ref 70–110)
POCT GLUCOSE: 127 MG/DL (ref 70–110)
POCT GLUCOSE: 95 MG/DL (ref 70–110)
POTASSIUM SERPL-SCNC: 5.1 MMOL/L (ref 3.5–5.1)
SODIUM SERPL-SCNC: 135 MMOL/L (ref 136–145)
VANCOMYCIN SERPL-MCNC: 18.2 UG/ML

## 2023-01-28 PROCEDURE — 25000003 PHARM REV CODE 250: Performed by: STUDENT IN AN ORGANIZED HEALTH CARE EDUCATION/TRAINING PROGRAM

## 2023-01-28 PROCEDURE — 25000003 PHARM REV CODE 250: Performed by: NURSE PRACTITIONER

## 2023-01-28 PROCEDURE — 63600175 PHARM REV CODE 636 W HCPCS: Performed by: HOSPITALIST

## 2023-01-28 PROCEDURE — 99233 SBSQ HOSP IP/OBS HIGH 50: CPT | Mod: ,,, | Performed by: NURSE PRACTITIONER

## 2023-01-28 PROCEDURE — 99232 PR SUBSEQUENT HOSPITAL CARE,LEVL II: ICD-10-PCS | Mod: CR,,, | Performed by: HOSPITALIST

## 2023-01-28 PROCEDURE — 25000003 PHARM REV CODE 250: Performed by: HOSPITALIST

## 2023-01-28 PROCEDURE — 80100014 HC HEMODIALYSIS 1:1

## 2023-01-28 PROCEDURE — 99232 SBSQ HOSP IP/OBS MODERATE 35: CPT | Mod: CR,,, | Performed by: HOSPITALIST

## 2023-01-28 PROCEDURE — 80048 BASIC METABOLIC PNL TOTAL CA: CPT | Performed by: HOSPITALIST

## 2023-01-28 PROCEDURE — 99233 PR SUBSEQUENT HOSPITAL CARE,LEVL III: ICD-10-PCS | Mod: ,,, | Performed by: NURSE PRACTITIONER

## 2023-01-28 PROCEDURE — 11000001 HC ACUTE MED/SURG PRIVATE ROOM

## 2023-01-28 PROCEDURE — 80202 ASSAY OF VANCOMYCIN: CPT | Performed by: HOSPITALIST

## 2023-01-28 RX ADMIN — ATORVASTATIN CALCIUM 20 MG: 20 TABLET, FILM COATED ORAL at 08:01

## 2023-01-28 RX ADMIN — SEVELAMER CARBONATE 1600 MG: 800 TABLET, FILM COATED ORAL at 08:01

## 2023-01-28 RX ADMIN — SEVELAMER CARBONATE 1600 MG: 800 TABLET, FILM COATED ORAL at 06:01

## 2023-01-28 RX ADMIN — LIDOCAINE 1 PATCH: 50 PATCH CUTANEOUS at 05:01

## 2023-01-28 RX ADMIN — HYDRALAZINE HYDROCHLORIDE 100 MG: 50 TABLET ORAL at 09:01

## 2023-01-28 RX ADMIN — PANTOPRAZOLE SODIUM 40 MG: 40 TABLET, DELAYED RELEASE ORAL at 08:01

## 2023-01-28 RX ADMIN — ASPIRIN 81 MG: 81 TABLET, CHEWABLE ORAL at 08:01

## 2023-01-28 RX ADMIN — CLOPIDOGREL BISULFATE 75 MG: 75 TABLET ORAL at 08:01

## 2023-01-28 RX ADMIN — AMLODIPINE BESYLATE 10 MG: 10 TABLET ORAL at 08:01

## 2023-01-28 RX ADMIN — VANCOMYCIN HYDROCHLORIDE 500 MG: 500 INJECTION, POWDER, LYOPHILIZED, FOR SOLUTION INTRAVENOUS at 09:01

## 2023-01-28 RX ADMIN — CIPROFLOXACIN HYDROCHLORIDE 500 MG: 500 TABLET, FILM COATED ORAL at 08:01

## 2023-01-28 RX ADMIN — METOPROLOL SUCCINATE 25 MG: 25 TABLET, EXTENDED RELEASE ORAL at 08:01

## 2023-01-28 RX ADMIN — SEVELAMER CARBONATE 1600 MG: 800 TABLET, FILM COATED ORAL at 12:01

## 2023-01-28 RX ADMIN — HYDRALAZINE HYDROCHLORIDE 100 MG: 50 TABLET ORAL at 05:01

## 2023-01-28 NOTE — ASSESSMENT & PLAN NOTE
On ciprofloxacin and vancomycin. ID following. Follow up bone biopsy results to determine antibiotics to continue.

## 2023-01-28 NOTE — PROGRESS NOTES
[facility]  Nephrology  Progress Note    Patient Name: Adryan Neff  MRN: [id number]  Admission Date: 1/17/2023  Hospital Length of Stay: 10 days  Attending Provider: Naveed Larson MD   Primary Care Physician: Carola Pedro MD  Principal Problem:Wound infection    Subjective:     HPI: Adryan Neff is a 57-year-old man with a past medical history of primary hypertension, peripheral arterial disease s/p R AKA, DM type 1, ESRD on HD MWF, who presents to the emergency department with a wound infection. He has had previous admissions for R AKA abscess and concern for osteomyelitis. Resides in NH. Reports compliance with HD. Last HD on Monday 1/16/23. Denies fever, chills, chest pain, or SOB.           Interval History:   NAEON.  Due for HD today    Review of patient's allergies indicates:  No Known Allergies  Current Facility-Administered Medications   Medication Frequency    acetaminophen tablet 650 mg Q8H PRN    albuterol-ipratropium 2.5 mg-0.5 mg/3 mL nebulizer solution 3 mL Q4H PRN    amLODIPine tablet 10 mg Daily    aspirin chewable tablet 81 mg Daily    atorvastatin tablet 20 mg Daily    bisacodyL EC tablet 5 mg Daily PRN    ciprofloxacin HCl tablet 500 mg Daily    clopidogreL tablet 75 mg Daily    dextrose 10% bolus 125 mL 125 mL PRN    dextrose 10% bolus 250 mL 250 mL PRN    glucagon (human recombinant) injection 1 mg PRN    glucose chewable tablet 16 g PRN    glucose chewable tablet 24 g PRN    heparin (porcine) injection 5,000 Units Q8H    hydrALAZINE tablet 100 mg Q8H    insulin aspart U-100 pen 0-5 Units QID (AC + HS) PRN    LIDOcaine 5 % patch 1 patch Q24H    melatonin tablet 6 mg Nightly PRN    metoprolol succinate (TOPROL-XL) 24 hr tablet 25 mg Daily    naloxone 0.4 mg/mL injection 0.02 mg PRN    ondansetron disintegrating tablet 4 mg Q8H PRN    ondansetron injection 4 mg Q8H PRN    pantoprazole EC tablet 40 mg Daily    polyethylene glycol packet 17 g TID PRN     sevelamer carbonate tablet 1,600 mg TID WM    simethicone chewable tablet 80 mg QID PRN    sodium chloride 0.9% flush 10 mL Q12H PRN    vancomycin - pharmacy to dose pharmacy to manage frequency       Objective:     Vital Signs (Most Recent):  Temp: 97.8 °F (36.6 °C) (01/28/23 0738)  Pulse: 76 (01/28/23 0738)  Resp: 18 (01/28/23 0738)  BP: 124/60 (01/28/23 0738)  SpO2: 98 % (01/28/23 0738) Vital Signs (24h Range):  Temp:  [97.5 °F (36.4 °C)-98.1 °F (36.7 °C)] 97.8 °F (36.6 °C)  Pulse:  [70-77] 76  Resp:  [17-18] 18  SpO2:  [94 %-98 %] 98 %  BP: (117-150)/(55-72) 124/60     Weight: 56.7 kg (125 lb) (01/17/23 2145)  Body mass index is 17.44 kg/m².  Body surface area is 1.69 meters squared.    I/O last 3 completed shifts:  In: 240 [P.O.:240]  Out: 0     Physical Exam  Vitals and nursing note reviewed.   Constitutional:       Appearance: He is well-developed.   HENT:      Head: Normocephalic and atraumatic.   Eyes:      Conjunctiva/sclera: Conjunctivae normal.      Pupils: Pupils are equal, round, and reactive to light.   Neck:      Thyroid: No thyromegaly.      Vascular: No JVD.   Cardiovascular:      Rate and Rhythm: Normal rate and regular rhythm.      Heart sounds: Normal heart sounds. No murmur heard.    No friction rub. No gallop.   Pulmonary:      Effort: Pulmonary effort is normal. No respiratory distress.      Breath sounds: Normal breath sounds. No wheezing or rales.   Abdominal:      General: Bowel sounds are normal. There is no distension.      Palpations: Abdomen is soft. There is no mass.      Tenderness: There is no abdominal tenderness. There is no guarding or rebound.      Hernia: No hernia is present.   Musculoskeletal:         General: No deformity. Normal range of motion.      Cervical back: Normal range of motion and neck supple.      Comments: R AKA with crusting along lateral incision. No erythema or induration noted.      Right Lower Extremity: Right leg is amputated above knee.   Skin:      General: Skin is warm and dry.   Neurological:      Mental Status: He is alert and oriented to person, place, and time.      Cranial Nerves: No cranial nerve deficit.   Psychiatric:         Behavior: Behavior normal.       Significant Labs:  CBC:   Recent Labs   Lab 01/26/23  1001   WBC 7.52   RBC 3.28*   HGB 8.9*   HCT 28.8*      MCV 88   MCH 27.1   MCHC 30.9*     CMP:   Recent Labs   Lab 01/26/23  1001 01/28/23  0320    64*   CALCIUM 8.1* 8.8   ALBUMIN 2.8*  --     135*   K 5.3* 5.1   CO2 18* 18*    100   BUN 76* 64*   CREATININE 11.6* 10.3*          Assessment/Plan:     ESRD on hemodialysis  57-year-old man with a past medical history of primary hypertension, peripheral arterial disease s/p R AKA, DM type 1, ESRD on HD MWF, admitted for concern of osteomyelitis/abcess to R AKA.     Nephrology History  iHD Schedule: MWF   Unit/MD: [facility]  Duration: 3.5 hours   UF: 2L  EDW: 56 kg   Access: PHIL AVF   Last HD prior to presentation: 1/16/23     Plan/Recommendations:     -HD today for metabolic clearance and volume management   -can decrease labs draws to TTS on dialysis days from Nephrology standpoint.  -Strict I/O's   -Trend renal function panels daily   -Renally dose meds/avoid nephrotoxic meds   -Renal diet/formulations, if not NPO   -continue phos binders        Arthur Louis, NP  Nephrology  [facility]

## 2023-01-28 NOTE — SUBJECTIVE & OBJECTIVE
Interval History:   NAEON.  Due for HD today    Review of patient's allergies indicates:  No Known Allergies  Current Facility-Administered Medications   Medication Frequency    acetaminophen tablet 650 mg Q8H PRN    albuterol-ipratropium 2.5 mg-0.5 mg/3 mL nebulizer solution 3 mL Q4H PRN    amLODIPine tablet 10 mg Daily    aspirin chewable tablet 81 mg Daily    atorvastatin tablet 20 mg Daily    bisacodyL EC tablet 5 mg Daily PRN    ciprofloxacin HCl tablet 500 mg Daily    clopidogreL tablet 75 mg Daily    dextrose 10% bolus 125 mL 125 mL PRN    dextrose 10% bolus 250 mL 250 mL PRN    glucagon (human recombinant) injection 1 mg PRN    glucose chewable tablet 16 g PRN    glucose chewable tablet 24 g PRN    heparin (porcine) injection 5,000 Units Q8H    hydrALAZINE tablet 100 mg Q8H    insulin aspart U-100 pen 0-5 Units QID (AC + HS) PRN    LIDOcaine 5 % patch 1 patch Q24H    melatonin tablet 6 mg Nightly PRN    metoprolol succinate (TOPROL-XL) 24 hr tablet 25 mg Daily    naloxone 0.4 mg/mL injection 0.02 mg PRN    ondansetron disintegrating tablet 4 mg Q8H PRN    ondansetron injection 4 mg Q8H PRN    pantoprazole EC tablet 40 mg Daily    polyethylene glycol packet 17 g TID PRN    sevelamer carbonate tablet 1,600 mg TID WM    simethicone chewable tablet 80 mg QID PRN    sodium chloride 0.9% flush 10 mL Q12H PRN    vancomycin - pharmacy to dose pharmacy to manage frequency       Objective:     Vital Signs (Most Recent):  Temp: 97.8 °F (36.6 °C) (01/28/23 0738)  Pulse: 76 (01/28/23 0738)  Resp: 18 (01/28/23 0738)  BP: 124/60 (01/28/23 0738)  SpO2: 98 % (01/28/23 0738) Vital Signs (24h Range):  Temp:  [97.5 °F (36.4 °C)-98.1 °F (36.7 °C)] 97.8 °F (36.6 °C)  Pulse:  [70-77] 76  Resp:  [17-18] 18  SpO2:  [94 %-98 %] 98 %  BP: (117-150)/(55-72) 124/60     Weight: 56.7 kg (125 lb) (01/17/23 2142)  Body mass index is 17.44 kg/m².  Body surface area is 1.69 meters squared.    I/O last 3 completed shifts:  In: 240  [P.O.:240]  Out: 0     Physical Exam  Vitals and nursing note reviewed.   Constitutional:       Appearance: He is well-developed.   HENT:      Head: Normocephalic and atraumatic.   Eyes:      Conjunctiva/sclera: Conjunctivae normal.      Pupils: Pupils are equal, round, and reactive to light.   Neck:      Thyroid: No thyromegaly.      Vascular: No JVD.   Cardiovascular:      Rate and Rhythm: Normal rate and regular rhythm.      Heart sounds: Normal heart sounds. No murmur heard.    No friction rub. No gallop.   Pulmonary:      Effort: Pulmonary effort is normal. No respiratory distress.      Breath sounds: Normal breath sounds. No wheezing or rales.   Abdominal:      General: Bowel sounds are normal. There is no distension.      Palpations: Abdomen is soft. There is no mass.      Tenderness: There is no abdominal tenderness. There is no guarding or rebound.      Hernia: No hernia is present.   Musculoskeletal:         General: No deformity. Normal range of motion.      Cervical back: Normal range of motion and neck supple.      Comments: R AKA with crusting along lateral incision. No erythema or induration noted.      Right Lower Extremity: Right leg is amputated above knee.   Skin:     General: Skin is warm and dry.   Neurological:      Mental Status: He is alert and oriented to person, place, and time.      Cranial Nerves: No cranial nerve deficit.   Psychiatric:         Behavior: Behavior normal.       Significant Labs:  CBC:   Recent Labs   Lab 01/26/23  1001   WBC 7.52   RBC 3.28*   HGB 8.9*   HCT 28.8*      MCV 88   MCH 27.1   MCHC 30.9*     CMP:   Recent Labs   Lab 01/26/23  1001 01/28/23  0320    64*   CALCIUM 8.1* 8.8   ALBUMIN 2.8*  --     135*   K 5.3* 5.1   CO2 18* 18*    100   BUN 76* 64*   CREATININE 11.6* 10.3*

## 2023-01-28 NOTE — ASSESSMENT & PLAN NOTE
57-year-old man with a past medical history of primary hypertension, peripheral arterial disease s/p R AKA, DM type 1, ESRD on HD MWF, admitted for concern of osteomyelitis/abcess to R AKA.     Nephrology History  iHD Schedule: MWF   Unit/MD: [facility]  Duration: 3.5 hours   UF: 2L  EDW: 56 kg   Access: PHIL AVF   Last HD prior to presentation: 1/16/23     Plan/Recommendations:     -HD today for metabolic clearance and volume management   -can decrease labs draws to TTS on dialysis days from Nephrology standpoint.  -Strict I/O's   -Trend renal function panels daily   -Renally dose meds/avoid nephrotoxic meds   -Renal diet/formulations, if not NPO   -continue phos binders

## 2023-01-28 NOTE — PLAN OF CARE
Problem: Adult Inpatient Plan of Care  Goal: Plan of Care Review  Outcome: Ongoing, Progressing  Goal: Patient-Specific Goal (Individualized)  Outcome: Ongoing, Progressing  Goal: Absence of Hospital-Acquired Illness or Injury  Outcome: Ongoing, Progressing  Goal: Optimal Comfort and Wellbeing  Outcome: Ongoing, Progressing  Goal: Readiness for Transition of Care  Outcome: Ongoing, Progressing     Problem: Infection  Goal: Absence of Infection Signs and Symptoms  Outcome: Ongoing, Progressing     Problem: Diabetes Comorbidity  Goal: Blood Glucose Level Within Targeted Range  Outcome: Ongoing, Progressing     Problem: Adjustment to Illness (Sepsis/Septic Shock)  Goal: Optimal Coping  Outcome: Ongoing, Progressing     Problem: Bleeding (Sepsis/Septic Shock)  Goal: Absence of Bleeding  Outcome: Ongoing, Progressing     Problem: Glycemic Control Impaired (Sepsis/Septic Shock)  Goal: Blood Glucose Level Within Desired Range  Outcome: Ongoing, Progressing     Problem: Infection Progression (Sepsis/Septic Shock)  Goal: Absence of Infection Signs and Symptoms  Outcome: Ongoing, Progressing     Problem: Nutrition Impaired (Sepsis/Septic Shock)  Goal: Optimal Nutrition Intake  Outcome: Ongoing, Progressing     Problem: Skin Injury Risk Increased  Goal: Skin Health and Integrity  Outcome: Ongoing, Progressing     Problem: Impaired Wound Healing  Goal: Optimal Wound Healing  Outcome: Ongoing, Progressing     Problem: Device-Related Complication Risk (Hemodialysis)  Goal: Safe, Effective Therapy Delivery  Outcome: Ongoing, Progressing     Problem: Hemodynamic Instability (Hemodialysis)  Goal: Effective Tissue Perfusion  Outcome: Ongoing, Progressing     Problem: Infection (Hemodialysis)  Goal: Absence of Infection Signs and Symptoms  Outcome: Ongoing, Progressing     Problem: Fall Injury Risk  Goal: Absence of Fall and Fall-Related Injury  Outcome: Ongoing, Progressing     Problem: Electrolyte Imbalance (Chronic Kidney  Disease)  Goal: Electrolyte Balance  Outcome: Ongoing, Progressing     Problem: Fluid Volume Excess (Chronic Kidney Disease)  Goal: Fluid Balance  Outcome: Ongoing, Progressing

## 2023-01-28 NOTE — PROGRESS NOTES
Pharmacokinetic Assessment Follow Up: IV Vancomycin    Vancomycin serum concentration assessment(s):    - Random level resulted at 18.2 mcg/mL  - Goal trough concentration is 15-20 mcg/mL  - ESRD on HD (MWF)  - Will receive HD today per nephro    Vancomycin Regimen Plan:    - Give vancomycin 500 mg IV x1 dose after HD  - Next random level to be drawn 1/30 with AM labs    Drug levels (last 3 results):  Recent Labs   Lab Result Units 01/26/23  1001 01/28/23  0320   Vancomycin, Random ug/mL 15.1 18.2       Pharmacy will continue to follow and monitor vancomycin.    Please contact pharmacy at extension [phone] for questions regarding this assessment.    Thank you for the consult,   Ina Zarate       Patient brief summary:  Adryan Neff is a 57 y.o. male initiated on antimicrobial therapy with IV Vancomycin for treatment of skin & soft tissue infection    Drug Allergies:   Review of patient's allergies indicates:  No Known Allergies    Actual Body Weight:   56.7 kg    Renal Function:   Estimated Creatinine Clearance: 6.3 mL/min (A) (based on SCr of 10.3 mg/dL (H)).,     Dialysis Method (if applicable):  intermittent HD    CBC (last 72 hours):  Recent Labs   Lab Result Units 01/26/23  1001   WBC K/uL 7.52   Hemoglobin g/dL 8.9*   Hematocrit % 28.8*   Platelets K/uL 239   Gran % % 57.6   Lymph % % 18.6   Mono % % 11.4   Eosinophil % % 11.0*   Basophil % % 1.1   Differential Method  Automated       Metabolic Panel (last 72 hours):  Recent Labs   Lab Result Units 01/26/23  1001 01/28/23  0320   Sodium mmol/L 136 135*   Potassium mmol/L 5.3* 5.1   Chloride mmol/L 103 100   CO2 mmol/L 18* 18*   Glucose mg/dL 100 64*   BUN mg/dL 76* 64*   Creatinine mg/dL 11.6* 10.3*   Albumin g/dL 2.8*  --    Phosphorus mg/dL 7.7*  --        Vancomycin Administrations:  vancomycin given in the last 96 hours                     vancomycin (VANCOCIN) 500 mg in dextrose 5 % in water (D5W) 5 % 100 mL IVPB (MB+) (mg) 500 mg New Bag 01/26/23 0839     vancomycin (VANCOCIN) 500 mg in dextrose 5 % in water (D5W) 5 % 100 mL IVPB (MB+) (mg) 500 mg New Bag 01/24/23 1610                    Microbiologic Results:  Microbiology Results (last 7 days)       Procedure Component Value Units Date/Time    Culture, Anaerobe [[id number]] Collected: 01/25/23 1734    Order Status: Completed Specimen: Bone from Leg, Right Updated: 01/27/23 1102     Anaerobic Culture Culture in progress    AFB Culture & Smear [[id number]] Collected: 01/25/23 1734    Order Status: Completed Specimen: Bone from Leg, Right Updated: 01/26/23 2127     AFB Culture & Smear Culture in progress     AFB CULTURE STAIN No acid fast bacilli seen.    Aerobic culture [[id number]] Collected: 01/25/23 1734    Order Status: Completed Specimen: Bone from Leg, Right Updated: 01/26/23 0744     Aerobic Bacterial Culture No growth    Gram stain [[id number]] Collected: 01/25/23 1734    Order Status: Completed Specimen: Bone from Leg, Right Updated: 01/25/23 2138     Gram Stain Result Rare WBC's      No organisms seen    Fungus culture [[id number]] Collected: 01/25/23 1734    Order Status: Sent Specimen: Bone from Leg, Right Updated: 01/25/23 1749    Blood culture #1 **CANNOT BE ORDERED STAT** [[id number]] Collected: 01/17/23 1342    Order Status: Completed Specimen: Blood from Peripheral, Antecubital, Right Updated: 01/22/23 1612     Blood Culture, Routine No growth after 5 days.    Blood culture #2 **CANNOT BE ORDERED STAT** [[id number]] Collected: 01/17/23 1342    Order Status: Completed Specimen: Blood from Peripheral, Hand, Right Updated: 01/22/23 1612     Blood Culture, Routine No growth after 5 days.

## 2023-01-28 NOTE — SUBJECTIVE & OBJECTIVE
Interval History: Choked this morning when trying to swallow all his morning medications at the same time.     Review of Systems   Constitutional:  Negative for chills and fever.   Respiratory:  Negative for cough and shortness of breath.    Skin:  Positive for wound.   Neurological:  Negative for seizures and syncope.   Objective:     Vital Signs (Most Recent):  Temp: 97.8 °F (36.6 °C) (01/28/23 0738)  Pulse: 76 (01/28/23 0738)  Resp: 18 (01/28/23 0738)  BP: 124/60 (01/28/23 0738)  SpO2: 98 % (01/28/23 0738)   Vital Signs (24h Range):  Temp:  [97.5 °F (36.4 °C)-98.1 °F (36.7 °C)] 97.8 °F (36.6 °C)  Pulse:  [70-77] 76  Resp:  [17-18] 18  SpO2:  [94 %-98 %] 98 %  BP: (117-150)/(55-72) 124/60     Weight: 56.7 kg (125 lb)  Body mass index is 17.44 kg/m².  No intake or output data in the 24 hours ending 01/28/23 0943     Physical Exam  Vitals and nursing note reviewed.   Constitutional:       General: He is not in acute distress.     Appearance: He is well-developed and underweight. He is not diaphoretic.   Pulmonary:      Effort: Pulmonary effort is normal. No respiratory distress.   Neurological:      Mental Status: He is alert and oriented to person, place, and time. Mental status is at baseline.      Motor: No seizure activity.   Psychiatric:         Attention and Perception: Attention normal.         Mood and Affect: Mood and affect normal.         Behavior: Behavior is cooperative.       Significant Labs: All pertinent labs within the past 24 hours have been reviewed.    Significant Imaging: I have reviewed all pertinent imaging results/findings within the past 24 hours.

## 2023-01-28 NOTE — PLAN OF CARE
Reviewed POC with pt including discharge date pt verbalizes understanding. Pt upset this morning and wanting to go home reached out to md who states possible d/c on Monday. Reviewed with pt who is agreeable to this plan. Pt does refuses blood sugar checks occasionally even with education.

## 2023-01-28 NOTE — PROGRESS NOTES
[facility] - [address] Medicine  Progress Note    Patient Name: Adryan Neff  MRN: [id number]  Patient Class: IP- Inpatient   Admission Date: 1/17/2023  Length of Stay: 10 days  Attending Physician: Naveed Larson MD  Primary Care Provider: Carola Pedro MD        Subjective:     Principal Problem:Wound infection        HPI:  Adryan Neff is a 57 year old Black man with hypertension, diabetes mellitus type 2 (treated with insulin) with retinopathy, coronary artery disease, end stage renal disease on hemodialysis (Monday Wednesday Friday), secondary renal hyperparathyroidism, peripheral artery disease, chronic diastolic heart failure, chronic pulmonary heart disease, history of latent tuberculosis, history of left toe amputation on 1/6/2022, history of left above knee amputation on 1/21/2022, history of right above knee amputation on 10/13/2022. He lives in [facility]. His primary care physician is Dr. Carola Pedro.               He was hospitalized at [facility] from 11/23/2023 to 12/2/2022 for right lower extremity stump abscess, osteomyelitis, and hypoglycemia. Superficial wound culture grew Citrobacter koseri and Pseudomonas aeruginosa. He was treated with meropenem then ciprofloxacin 500 mg daily for 6 weeks (end date 1/9/2023). He was discharged to [facility].              He was admitted to [facility] on 12/8/2022. CT of the right lower extremity on 12/11/2022 showed a couple areas of rim-enhancing fluid collections/phlegmon. He was seen by Orthopedic Surgery. Ciprofloxacin was decreased to a 10 day supply and the course ended around 12/20/2022. He received wound care but refused debridements.               He was seen in [facility] on 1/17/2023. He was encouraged to go to the emergency department due to purulent drainage from the right lower extremity wound. He presented to [facility]  Emergency Department on 1/17/2023. Blood cultures were collected. He was given intravenous vancomycin and meropenem. He was admitted to Hospital Medicine Team X.      Overview/Hospital Course:  MRI of the right femur showed possible early osteomyelitis, but Orthopedic Surgery reviewed the images and were not concerned about osteomyelitis, instead recommending continuing antibiotics and local wound care. Infectious Disease suggested nuclear medicine bone scan. He was transferred to Hospital Medicine Team B on 1/22/2023. Interventional Radiology performed bone biopsy on 1/25/2023.       Interval History: Choked this morning when trying to swallow all his morning medications at the same time.     Review of Systems   Constitutional:  Negative for chills and fever.   Respiratory:  Negative for cough and shortness of breath.    Skin:  Positive for wound.   Neurological:  Negative for seizures and syncope.   Objective:     Vital Signs (Most Recent):  Temp: 97.8 °F (36.6 °C) (01/28/23 0738)  Pulse: 76 (01/28/23 0738)  Resp: 18 (01/28/23 0738)  BP: 124/60 (01/28/23 0738)  SpO2: 98 % (01/28/23 0738)   Vital Signs (24h Range):  Temp:  [97.5 °F (36.4 °C)-98.1 °F (36.7 °C)] 97.8 °F (36.6 °C)  Pulse:  [70-77] 76  Resp:  [17-18] 18  SpO2:  [94 %-98 %] 98 %  BP: (117-150)/(55-72) 124/60     Weight: 56.7 kg (125 lb)  Body mass index is 17.44 kg/m².  No intake or output data in the 24 hours ending 01/28/23 0943     Physical Exam  Vitals and nursing note reviewed.   Constitutional:       General: He is not in acute distress.     Appearance: He is well-developed and underweight. He is not diaphoretic.   Pulmonary:      Effort: Pulmonary effort is normal. No respiratory distress.   Neurological:      Mental Status: He is alert and oriented to person, place, and time. Mental status is at baseline.      Motor: No seizure activity.   Psychiatric:         Attention and Perception: Attention normal.         Mood and Affect: Mood and affect  normal.         Behavior: Behavior is cooperative.       Significant Labs: All pertinent labs within the past 24 hours have been reviewed.    Significant Imaging: I have reviewed all pertinent imaging results/findings within the past 24 hours.      Assessment/Plan:      * Wound infection  On ciprofloxacin and vancomycin. ID following. Follow up bone biopsy results to determine antibiotics to continue.    Anemia in ESRD (end-stage renal disease)  Stable.     PAD (peripheral artery disease)  Continue home aspirin, clopidogrel, atorvastatin.    COVID-19  Positive on 1/17/2023. Treated with remdesivir.    ESRD on hemodialysis  Nephrology managing.    CAD (coronary artery disease) - non-obstructive from [facility] in 2016  Continue home aspirin, clopidrogel, atorvastatin.    Essential (primary) hypertension  Continue home hydralazine, amlodipine, and metoprolol succinate.    Diabetes Mellitus  He takes insulin detemir. Giving insulin sliding scale.      VTE Risk Mitigation (From admission, onward)         Ordered     heparin (porcine) injection 5,000 Units  Every 8 hours         01/17/23 1848     IP VTE HIGH RISK PATIENT  Once         01/17/23 1848     Place sequential compression device  Until discontinued         01/17/23 1848                Discharge Planning   ENIO: 1/30/2023     Code Status: Full Code   Is the patient medically ready for discharge?: No    Reason for patient still in hospital (select all that apply): Laboratory test  Discharge Plan A: Return to nursing home   Discharge Delays: None known at this time              Naveed Larson MD  Department of Hospital Medicine   [facility] - Observation 11H

## 2023-01-29 PROBLEM — Z86.16 HISTORY OF COVID-19: Status: ACTIVE | Noted: 2022-01-05

## 2023-01-29 LAB — BACTERIA SPEC AEROBE CULT: NO GROWTH

## 2023-01-29 PROCEDURE — 25000003 PHARM REV CODE 250: Performed by: HOSPITALIST

## 2023-01-29 PROCEDURE — 11000001 HC ACUTE MED/SURG PRIVATE ROOM

## 2023-01-29 PROCEDURE — 25000003 PHARM REV CODE 250: Performed by: STUDENT IN AN ORGANIZED HEALTH CARE EDUCATION/TRAINING PROGRAM

## 2023-01-29 PROCEDURE — 92610 EVALUATE SWALLOWING FUNCTION: CPT

## 2023-01-29 PROCEDURE — 99232 SBSQ HOSP IP/OBS MODERATE 35: CPT | Mod: ,,, | Performed by: HOSPITALIST

## 2023-01-29 PROCEDURE — 25000003 PHARM REV CODE 250: Performed by: NURSE PRACTITIONER

## 2023-01-29 PROCEDURE — 99232 PR SUBSEQUENT HOSPITAL CARE,LEVL II: ICD-10-PCS | Mod: ,,, | Performed by: HOSPITALIST

## 2023-01-29 RX ADMIN — METOPROLOL SUCCINATE 25 MG: 25 TABLET, EXTENDED RELEASE ORAL at 08:01

## 2023-01-29 RX ADMIN — HYDRALAZINE HYDROCHLORIDE 100 MG: 50 TABLET ORAL at 06:01

## 2023-01-29 RX ADMIN — ATORVASTATIN CALCIUM 20 MG: 20 TABLET, FILM COATED ORAL at 08:01

## 2023-01-29 RX ADMIN — ASPIRIN 81 MG: 81 TABLET, CHEWABLE ORAL at 08:01

## 2023-01-29 RX ADMIN — CLOPIDOGREL BISULFATE 75 MG: 75 TABLET ORAL at 08:01

## 2023-01-29 RX ADMIN — LIDOCAINE 1 PATCH: 50 PATCH CUTANEOUS at 06:01

## 2023-01-29 RX ADMIN — CIPROFLOXACIN HYDROCHLORIDE 500 MG: 500 TABLET, FILM COATED ORAL at 08:01

## 2023-01-29 RX ADMIN — SEVELAMER CARBONATE 1600 MG: 800 TABLET, FILM COATED ORAL at 08:01

## 2023-01-29 RX ADMIN — SEVELAMER CARBONATE 1600 MG: 800 TABLET, FILM COATED ORAL at 05:01

## 2023-01-29 RX ADMIN — SEVELAMER CARBONATE 1600 MG: 800 TABLET, FILM COATED ORAL at 11:01

## 2023-01-29 RX ADMIN — AMLODIPINE BESYLATE 10 MG: 10 TABLET ORAL at 08:01

## 2023-01-29 RX ADMIN — PANTOPRAZOLE SODIUM 40 MG: 40 TABLET, DELAYED RELEASE ORAL at 08:01

## 2023-01-29 NOTE — PROGRESS NOTES
01/28/23 1942   Vital Signs   Temp 98.1 °F (36.7 °C)   Pulse 69   Resp 18   Device (Oxygen Therapy) room air   BP (!) 152/67   BP Location Right arm   BP Method Automatic   Patient Position Lying   During Hemodialysis Assessment   Blood Flow Rate (mL/min) 350 mL/min   Dialysate Flow Rate (mL/min) 800 ml/min   Ultrafiltration Rate (mL/Hr) 870 mL/Hr   Arteriovenous Lines Secure Yes   Arterial Pressure (mmHg) -190 mmHg   Venous Pressure (mmHg) 160   UF Removed (mL) 2600 mL   TMP 60   Venous Line in Air Detector Yes   Intake (mL) 300 mL   Transducer Dry Yes   Access Visible Yes    notified of access issue? N/A   Heparin given? N/A   Intra-Hemodialysis Comments tx completed, no distress   Post-Hemodialysis Assessment   Rinseback Volume (mL) 300 mL   Blood Volume Processed (Liters) 60 L   Dialyzer Clearance Lightly streaked   Duration of Treatment 180 minutes   Total UF (mL) 2600 mL   Net Fluid Removal 2000   Patient Response to Treatment tolerated tx   Arterial bleeding stop time (min) 8 min   Venous bleeding stop time (min) 8 min   Post-Hemodialysis Comments tx completed, blood returned, access clean and secured, VWNL     Tx completed, pt denies any complaint or distress, VWNL, left AVF +B/T, clean and secured, report given to primary RN

## 2023-01-29 NOTE — PROGRESS NOTES
[facility] - Observation [address] [place] [address] Medicine  Progress Note    Patient Name: Adryan Neff  MRN: [id number]  Patient Class: IP- Inpatient   Admission Date: 1/17/2023  Length of Stay: 11 days  Attending Physician: Naveed Larson MD  Primary Care Provider: Carola Pedro MD        Subjective:     Principal Problem:Wound infection        HPI:  Adryan Neff is a 57 year old Black man with hypertension, diabetes mellitus type 2 (treated with insulin) with retinopathy, coronary artery disease, end stage renal disease on hemodialysis (Monday Wednesday Friday), secondary renal hyperparathyroidism, peripheral artery disease, chronic diastolic heart failure, chronic pulmonary heart disease, history of latent tuberculosis, history of left toe amputation on 1/6/2022, history of left above knee amputation on 1/21/2022, history of right above knee amputation on 10/13/2022. He lives in [facility]. His primary care physician is Dr. Carola Pedro.               He was hospitalized at [facility] from 11/23/2023 to 12/2/2022 for right lower extremity stump abscess, osteomyelitis, and hypoglycemia. Superficial wound culture grew Citrobacter koseri and Pseudomonas aeruginosa. He was treated with meropenem then ciprofloxacin 500 mg daily for 6 weeks (end date 1/9/2023). He was discharged to [facility].              He was admitted to [facility] on 12/8/2022. CT of the right lower extremity on 12/11/2022 showed a couple areas of rim-enhancing fluid collections/phlegmon. He was seen by Orthopedic Surgery. Ciprofloxacin was decreased to a 10 day supply and the course ended around 12/20/2022. He received wound care but refused debridements.               He was seen in [facility] on 1/17/2023. He was encouraged to go to the emergency department due to purulent drainage from the right lower extremity wound. He presented to [facility]  Emergency Department on 1/17/2023. Blood cultures were collected. He was given intravenous vancomycin and meropenem. He was admitted to Hospital Medicine Team X.      Overview/Hospital Course:  MRI of the right femur showed possible early osteomyelitis, but Orthopedic Surgery reviewed the images and were not concerned about osteomyelitis, instead recommending continuing antibiotics and local wound care. Infectious Disease suggested nuclear medicine bone scan. He was transferred to Hospital Medicine Team B on 1/22/2023. Interventional Radiology performed bone biopsy on 1/25/2023. He was put on ciprofloxacin and vancomycin while awaiting results.       Interval History: Nothing new    Review of Systems   Constitutional:  Negative for chills and fever.   Respiratory:  Negative for cough and shortness of breath.    Skin:  Positive for wound.   Neurological:  Negative for seizures and syncope.   Objective:     Vital Signs (Most Recent):  Temp: 98.5 °F (36.9 °C) (01/29/23 0753)  Pulse: 73 (01/29/23 0753)  Resp: 18 (01/29/23 0753)  BP: 130/62 (01/29/23 0753)  SpO2: 96 % (01/29/23 0753)   Vital Signs (24h Range):  Temp:  [97.3 °F (36.3 °C)-98.6 °F (37 °C)] 98.5 °F (36.9 °C)  Pulse:  [65-77] 73  Resp:  [18] 18  SpO2:  [95 %-98 %] 96 %  BP: (130-184)/(60-77) 130/62     Weight: 55.1 kg (121 lb 7.6 oz)  Body mass index is 16.95 kg/m².    Intake/Output Summary (Last 24 hours) at 1/29/2023 0911  Last data filed at 1/29/2023 0615  Gross per 24 hour   Intake 400 ml   Output 2600 ml   Net -2200 ml        Physical Exam  Vitals and nursing note reviewed.   Constitutional:       General: He is not in acute distress.     Appearance: He is well-developed and underweight. He is not diaphoretic.   Pulmonary:      Effort: Pulmonary effort is normal. No respiratory distress.   Neurological:      Mental Status: He is alert and oriented to person, place, and time. Mental status is at baseline.      Motor: No seizure activity.   Psychiatric:          Attention and Perception: Attention normal.         Mood and Affect: Mood and affect normal.         Behavior: Behavior is cooperative.       Significant Labs: All pertinent labs within the past 24 hours have been reviewed.    Significant Imaging: I have reviewed all pertinent imaging results/findings within the past 24 hours.      Assessment/Plan:      * Wound infection  On ciprofloxacin and vancomycin. ID following. Follow up bone biopsy results to determine antibiotics to continue.    Anemia in ESRD (end-stage renal disease)  Stable.     PAD (peripheral artery disease)  Continue home aspirin, clopidogrel, atorvastatin.    History of COVID-19  Positive on 1/17/2023. Treated with remdesivir.    ESRD on hemodialysis  Nephrology managing.    CAD (coronary artery disease) - non-obstructive from [facility] in 2016  Continue home aspirin, clopidrogel, atorvastatin.    Essential (primary) hypertension  Continue home hydralazine, amlodipine, and metoprolol succinate.    Diabetes Mellitus  He takes insulin detemir. Giving insulin sliding scale.      VTE Risk Mitigation (From admission, onward)         Ordered     heparin (porcine) injection 5,000 Units  Every 8 hours         01/17/23 1848     IP VTE HIGH RISK PATIENT  Once         01/17/23 1848     Place sequential compression device  Until discontinued         01/17/23 1848                Discharge Planning   ENIO: 1/30/2023     Code Status: Full Code   Is the patient medically ready for discharge?: No    Reason for patient still in hospital (select all that apply): Laboratory test  Discharge Plan A: Return to nursing home   Discharge Delays: None known at this time              Naveed Larson MD  Department of Hospital Medicine   [facility] - Observation 11[facility]

## 2023-01-29 NOTE — PT/OT/SLP EVAL
Speech Language Pathology Evaluation  Bedside Swallow  Discharge Summary    Patient Name:  Adryan Neff   MRN:  [id number]  Admitting Diagnosis: Wound infection    Recommendations:                 General Recommendations:  Follow-up not indicated  Diet recommendations:  Regular, Thin   Aspiration Precautions: Strict aspiration precautions   General Precautions: Standard, fall  Communication strategies:  none    History:     Past Medical History:   Diagnosis Date    CAD (coronary artery disease) 2016    CAD (non obstructive) 2016 [facility]    Diabetes mellitus type I     Diabetic retinopathy     ESRD on hemodialysis     on HD TThSat    Gangrene of left foot     Hypertension     Nuclear sclerosis of right eye 11/24/2021    PAD (peripheral artery disease)     PEA (Pulseless electrical activity) 10/10/2022    Pulmonary HTN     Right foot infection     TB lung, latent 04/2021    Wet gangrene 1/5/2022       Past Surgical History:   Procedure Laterality Date    ABOVE-KNEE AMPUTATION Left 1/18/2022    Procedure: AMPUTATION, ABOVE KNEE;  Surgeon: Rusty Light MD;  Location: [facility] OR;  Service: Orthopedics;  Laterality: Left;    ABOVE-KNEE AMPUTATION Left 1/21/2022    Procedure: AMPUTATION, ABOVE KNEE;  Surgeon: Rusty Light MD;  Location: [facility] OR;  Service: Orthopedics;  Laterality: Left;    ABOVE-KNEE AMPUTATION Right 10/13/2022    Procedure: AMPUTATION, ABOVE KNEE, RIGHT;  Surgeon: Dimitris Davis MD;  Location: [facility] OR;  Service: Orthopedics;  Laterality: Right;    AV FISTULA PLACEMENT      CATARACT EXTRACTION Left     EYE SURGERY      FISTULOGRAM Left 8/9/2022    Procedure: Fistulogram;  Surgeon: Masoud Soni MD;  Location: [facility] OR;  Service: Vascular;  Laterality: Left;  LEFT VM ON DAUGHTER'S PHONE----PHONE PREOP NOT COMPLETED  CALLED PATIENT ON 8/8/2022 @ 3:34. HE STATED HE IS RESCHEDULING PROCEDURE. NOTIFIED ERWIN @ 3:35PM-LO    FISTULOGRAM Left 10/20/2022    Procedure: Fistulogram;  Surgeon: Masoud SERRANO  MD Nae;  Location: [facility];  Service: Vascular;  Laterality: Left;  Left upper extremity    TOE AMPUTATION Left 1/6/2022    Procedure: AMPUTATION, TOE;  Surgeon: Masoud Soni MD;  Location: [facility] OR;  Service: Vascular;  Laterality: Left;  Left first through fifth toes, possible open transmetatarsal amputation and all other indicated procedures       Chest X-Rays: no recent     Prior diet: regular.    Subjective     Awake & alert. NSG reports that yesterday morning, pt had choking & then vomiting episode after taking several whole meds. Since then, he has had no difficulty with regular meals, thin liquids & with meds taken one at a time.     Pain/Comfort:  Pain Rating 1: 0/10  Pain Rating Post-Intervention 2: 0/10    Respiratory Status: Room air    Objective:     Oral Musculature Evaluation  Oral Musculature: WFL  Dentition: present and adequate  Oral Labial Strength and Mobility: WFL  Lingual Strength and Mobility: WFL  Volitional Cough: adequate  Volitional Swallow: adequate  Voice Prior to PO Intake: clear    Bedside Swallow Eval:   Consistencies Assessed:  Thin liquids straw sips of thin x4  Solids 1 kyara cracker      Oral Phase:   WFL    Pharyngeal Phase:   no overt clinical signs/symptoms of aspiration      Assessment:     Adryan Neff is a 57 y.o. male with oropharyngeal swallow appears WFL. Choking episode appears to be an isolated event & due to the size & quantity of pills given. No further ST service appears necessary at this time for swallowing. Please re-consult if any concern.    Goals:   Multidisciplinary Problems       SLP Goals          Problem: SLP    Goal Priority Disciplines Outcome   SLP Goal     SLP                        Plan:     Patient to be seen:      Plan of Care expires:     Plan of Care reviewed with:  patient   SLP Follow-Up:  No       Discharge recommendations:   (tbd)     Time Tracking:     SLP Treatment Date:   01/29/23  Speech Start Time:  0940  Speech Stop Time:   0950     Speech Total Time (min):  10 min    Billable Minutes: Eval Swallow and Oral Function 10    01/29/2023

## 2023-01-29 NOTE — SUBJECTIVE & OBJECTIVE
Interval History: Nothing new    Review of Systems   Constitutional:  Negative for chills and fever.   Respiratory:  Negative for cough and shortness of breath.    Skin:  Positive for wound.   Neurological:  Negative for seizures and syncope.   Objective:     Vital Signs (Most Recent):  Temp: 98.5 °F (36.9 °C) (01/29/23 0753)  Pulse: 73 (01/29/23 0753)  Resp: 18 (01/29/23 0753)  BP: 130/62 (01/29/23 0753)  SpO2: 96 % (01/29/23 0753)   Vital Signs (24h Range):  Temp:  [97.3 °F (36.3 °C)-98.6 °F (37 °C)] 98.5 °F (36.9 °C)  Pulse:  [65-77] 73  Resp:  [18] 18  SpO2:  [95 %-98 %] 96 %  BP: (130-184)/(60-77) 130/62     Weight: 55.1 kg (121 lb 7.6 oz)  Body mass index is 16.95 kg/m².    Intake/Output Summary (Last 24 hours) at 1/29/2023 0911  Last data filed at 1/29/2023 0615  Gross per 24 hour   Intake 400 ml   Output 2600 ml   Net -2200 ml        Physical Exam  Vitals and nursing note reviewed.   Constitutional:       General: He is not in acute distress.     Appearance: He is well-developed and underweight. He is not diaphoretic.   Pulmonary:      Effort: Pulmonary effort is normal. No respiratory distress.   Neurological:      Mental Status: He is alert and oriented to person, place, and time. Mental status is at baseline.      Motor: No seizure activity.   Psychiatric:         Attention and Perception: Attention normal.         Mood and Affect: Mood and affect normal.         Behavior: Behavior is cooperative.       Significant Labs: All pertinent labs within the past 24 hours have been reviewed.    Significant Imaging: I have reviewed all pertinent imaging results/findings within the past 24 hours.

## 2023-01-29 NOTE — PLAN OF CARE
Reviewed POC with pt he verbalized understanding. Pt refuses some meds at time education provided on benefits of medications pt still refuses.

## 2023-01-30 VITALS
BODY MASS INDEX: 17.26 KG/M2 | SYSTOLIC BLOOD PRESSURE: 143 MMHG | WEIGHT: 123.25 LBS | TEMPERATURE: 98 F | OXYGEN SATURATION: 98 % | DIASTOLIC BLOOD PRESSURE: 68 MMHG | HEART RATE: 71 BPM | HEIGHT: 71 IN | RESPIRATION RATE: 18 BRPM

## 2023-01-30 PROCEDURE — 99233 PR SUBSEQUENT HOSPITAL CARE,LEVL III: ICD-10-PCS | Mod: ,,, | Performed by: NURSE PRACTITIONER

## 2023-01-30 PROCEDURE — 99233 SBSQ HOSP IP/OBS HIGH 50: CPT | Mod: ,,, | Performed by: NURSE PRACTITIONER

## 2023-01-30 PROCEDURE — 99239 PR HOSPITAL DISCHARGE DAY,>30 MIN: ICD-10-PCS | Mod: ,,, | Performed by: HOSPITALIST

## 2023-01-30 PROCEDURE — 99239 HOSP IP/OBS DSCHRG MGMT >30: CPT | Mod: ,,, | Performed by: HOSPITALIST

## 2023-01-30 RX ORDER — SODIUM CHLORIDE 9 MG/ML
INJECTION, SOLUTION INTRAVENOUS ONCE
Status: DISCONTINUED | OUTPATIENT
Start: 2023-01-31 | End: 2023-01-30 | Stop reason: HOSPADM

## 2023-01-30 RX ORDER — CIPROFLOXACIN 500 MG/1
500 TABLET ORAL DAILY
Qty: 29 TABLET | Refills: 0
Start: 2023-01-30 | End: 2023-02-28

## 2023-01-30 NOTE — SUBJECTIVE & OBJECTIVE
Interval History: NAEON. Last dialyzed on Saturday.     Review of patient's allergies indicates:  No Known Allergies  Current Facility-Administered Medications   Medication Frequency    [START ON 1/31/2023] 0.9%  NaCl infusion Once    acetaminophen tablet 650 mg Q8H PRN    albuterol-ipratropium 2.5 mg-0.5 mg/3 mL nebulizer solution 3 mL Q4H PRN    amLODIPine tablet 10 mg Daily    aspirin chewable tablet 81 mg Daily    atorvastatin tablet 20 mg Daily    bisacodyL EC tablet 5 mg Daily PRN    ciprofloxacin HCl tablet 500 mg Daily    clopidogreL tablet 75 mg Daily    dextrose 10% bolus 125 mL 125 mL PRN    dextrose 10% bolus 250 mL 250 mL PRN    glucagon (human recombinant) injection 1 mg PRN    glucose chewable tablet 16 g PRN    glucose chewable tablet 24 g PRN    heparin (porcine) injection 5,000 Units Q8H    hydrALAZINE tablet 100 mg Q8H    insulin aspart U-100 pen 0-5 Units QID (AC + HS) PRN    LIDOcaine 5 % patch 1 patch Q24H    melatonin tablet 6 mg Nightly PRN    metoprolol succinate (TOPROL-XL) 24 hr tablet 25 mg Daily    naloxone 0.4 mg/mL injection 0.02 mg PRN    ondansetron disintegrating tablet 4 mg Q8H PRN    ondansetron injection 4 mg Q8H PRN    pantoprazole EC tablet 40 mg Daily    polyethylene glycol packet 17 g TID PRN    sevelamer carbonate tablet 1,600 mg TID WM    simethicone chewable tablet 80 mg QID PRN    sodium chloride 0.9% flush 10 mL Q12H PRN    vancomycin - pharmacy to dose pharmacy to manage frequency       Objective:     Vital Signs (Most Recent):  Temp: 97.8 °F (36.6 °C) (01/29/23 2315)  Pulse: 71 (01/29/23 2315)  Resp: 18 (01/29/23 1937)  BP: (!) 143/68 (01/29/23 2315)  SpO2: 98 % (01/29/23 2315) Vital Signs (24h Range):  Temp:  [97.3 °F (36.3 °C)-98.4 °F (36.9 °C)] 97.8 °F (36.6 °C)  Pulse:  [67-73] 71  Resp:  [18] 18  SpO2:  [95 %-98 %] 98 %  BP: (116-143)/(56-69) 143/68     Weight: 55.9 kg (123 lb 3.8 oz) (01/30/23 0342)  Body mass index is 17.2 kg/m².  Body surface area is 1.67  meters squared.    I/O last 3 completed shifts:  In: 520 [P.O.:420; IV Piggyback:100]  Out: 2600 [Other:2600]    Physical Exam  Vitals and nursing note reviewed.   Constitutional:       Appearance: He is well-developed.   HENT:      Head: Normocephalic and atraumatic.   Eyes:      Conjunctiva/sclera: Conjunctivae normal.      Pupils: Pupils are equal, round, and reactive to light.   Neck:      Thyroid: No thyromegaly.      Vascular: No JVD.   Cardiovascular:      Rate and Rhythm: Normal rate and regular rhythm.      Heart sounds: Normal heart sounds. No murmur heard.    No friction rub. No gallop.   Pulmonary:      Effort: Pulmonary effort is normal. No respiratory distress.      Breath sounds: Normal breath sounds. No wheezing or rales.   Abdominal:      General: Bowel sounds are normal. There is no distension.      Palpations: Abdomen is soft. There is no mass.      Tenderness: There is no abdominal tenderness. There is no guarding or rebound.      Hernia: No hernia is present.   Musculoskeletal:         General: No deformity. Normal range of motion.      Cervical back: Normal range of motion and neck supple.      Comments: R AKA with crusting along lateral incision. No erythema or induration noted.      Right Lower Extremity: Right leg is amputated above knee.   Skin:     General: Skin is warm and dry.   Neurological:      Mental Status: He is alert and oriented to person, place, and time.      Cranial Nerves: No cranial nerve deficit.   Psychiatric:         Behavior: Behavior normal.       Significant Labs:  CBC:   Recent Labs   Lab 01/26/23  1001   WBC 7.52   RBC 3.28*   HGB 8.9*   HCT 28.8*      MCV 88   MCH 27.1   MCHC 30.9*       CMP:   Recent Labs   Lab 01/26/23  1001 01/28/23  0320    64*   CALCIUM 8.1* 8.8   ALBUMIN 2.8*  --     135*   K 5.3* 5.1   CO2 18* 18*    100   BUN 76* 64*   CREATININE 11.6* 10.3*

## 2023-01-30 NOTE — ASSESSMENT & PLAN NOTE
57-year-old man with a past medical history of primary hypertension, peripheral arterial disease s/p R AKA, DM type 1, ESRD on HD MWF, admitted for concern of osteomyelitis/abcess to R AKA.     Nephrology History  iHD Schedule: MWF   Unit/MD: YONI  Duration: 3.5 hours   UF: 2L  EDW: 56 kg   Access: [facility]IL AVF   Last HD prior to presentation: 1/16/23     Plan/Recommendations:     -HD tomorrow for metabolic clearance and volume management    -Strict I/O's   -Trend renal function panels daily   -Renally dose meds/avoid nephrotoxic meds   -Renal diet/formulations, if not NPO   -continue phos binders

## 2023-01-30 NOTE — PLAN OF CARE
CM was notified of placement. Patient was accepted to return to [facility] on 1/30/23 . Report can be called to [phone]    SW arranged  wheelchair  transport via [facility]. Requested  time is 1515.  Requested  time does not guarantee arrival time.  If transport does not arrive by  1615  please contact assigned SW or on-call for assistance.    Patient's bedside nurse and OC notified of the above.     01/30/23 1409   Post-Acute Status   Post-Acute Authorization Placement   Post-Acute Placement Status Set-up Complete/Auth obtained   Hospital Resources/Appts/Education Provided Provided patient/caregiver with written discharge plan information;Appointments scheduled and added to AVS   Discharge Plan   Discharge Plan A Skilled Nursing Facility   Discharge Plan B Skilled Nursing Facility       Future Appointments   Date Time Provider Department Center   2/8/2023  2:00 PM KIKI jOeda, ANP [facility]   3/1/2023  1:00 PM Hair Steele Jr., PA [facility], RN,BSN

## 2023-01-30 NOTE — DISCHARGE SUMMARY
[facility] - Observation [address] [place] [address] Medicine  Discharge Summary      Patient Name: Adryan Neff  MRN: [id number]  [place]: [id number]  Patient Class: IP- Inpatient  Admission Date: 1/17/2023  Hospital Length of Stay: 12 days  Discharge Date and Time: 1/30/2023  3:41 PM  Attending Physician: Naveed Larson MD   Discharging Provider: Naveed Larson MD  Primary Care Provider: Carola Pedro MD  [facility] Medicine Team: [facility] MED B Naveed Larson MD  Primary Care Team: [facility] MED B    HPI:   Adryan Neff is a 57 year old Black man with hypertension, diabetes mellitus type 2 (treated with insulin) with retinopathy, coronary artery disease, end stage renal disease on hemodialysis (Monday Wednesday Friday), secondary renal hyperparathyroidism, peripheral artery disease, chronic diastolic heart failure, chronic pulmonary heart disease, history of latent tuberculosis, history of left toe amputation on 1/6/2022, history of left above knee amputation on 1/21/2022, history of right above knee amputation on 10/13/2022. He lives in [facility]. His primary care physician is Dr. Carola Pedro.               He was hospitalized at [facility] from 11/23/2023 to 12/2/2022 for right lower extremity stump abscess, osteomyelitis, and hypoglycemia. Superficial wound culture grew Citrobacter koseri and Pseudomonas aeruginosa. He was treated with meropenem then ciprofloxacin 500 mg daily for 6 weeks (end date 1/9/2023). He was discharged to [facility].              He was admitted to [facility] on 12/8/2022. CT of the right lower extremity on 12/11/2022 showed a couple areas of rim-enhancing fluid collections/phlegmon. He was seen by Orthopedic Surgery. Ciprofloxacin was decreased to a 10 day supply and the course ended around 12/20/2022. He received wound care but refused debridements.               He was seen in [facility] on 1/17/2023. He was  encouraged to go to the emergency department due to purulent drainage from the right lower extremity wound. He presented to [facility] Emergency Department on 1/17/2023. Blood cultures were collected. He was given intravenous vancomycin and meropenem. He was admitted to Hospital Medicine Team X.        Hospital Course:   MRI of the right femur showed possible early osteomyelitis, but Orthopedic Surgery reviewed the images and were not concerned about osteomyelitis, instead recommending continuing antibiotics and local wound care. Infectious Disease suggested nuclear medicine bone scan. He was transferred to Hospital Medicine Team B on 1/22/2023. Interventional Radiology performed bone biopsy on 1/25/2023. He was put on ciprofloxacin and vancomycin while awaiting results. He can follow up in clinic for make further medication adjustments. If bone biopsy shows no osteomyelitis, antibiotics can be discontinued.        Goals of Care Treatment Preferences:  Code Status: Full Code    Health care agent: Mary Claude August  [facility] agent number: [phone]          What is most important right now is to focus on remaining as independent as possible.         Consults:   Consults (From admission, onward)          Status Ordering Provider     Inpatient virtual consult to Hospital Medicine  Once        Provider:  (Not yet assigned)    Completed MONIE ORTIZ     Inpatient consult to Vascular Surgery  Once        Provider:  (Not yet assigned)    Completed PACO BEVERLY     Inpatient consult to Interventional Radiology  Once        Provider:  (Not yet assigned)    Completed PACO BEVERLY     Inpatient consult to Orthopedics  Once        Provider:  (Not yet assigned)    Completed SULLY CABRERA     Inpatient consult to Infectious Diseases  Once        Provider:  (Not yet assigned)    Completed NAEEM BENTON     Pharmacy to dose Vancomycin consult  Once        Provider:  (Not yet assigned)   See  Griselda for full Linked Orders Report.    Acknowledged NAEEM BENTON     Inpatient consult to Nephrology  Once        Provider:  (Not yet assigned)    Completed NAEEM BENTON          Final Active Diagnoses:    Diagnosis Date Noted POA    PRINCIPAL PROBLEM:  Wound infection [T14.8XXA, L08.9] 01/18/2023 Yes    Anemia in ESRD (end-stage renal disease) [N18.6, D63.1] 01/18/2023 Yes     Chronic    Chronic kidney disease-mineral and bone disorder [N18.9, E83.9, M89.9] 11/29/2022 Yes     Chronic    PAD (peripheral artery disease) [I73.9] 02/07/2022 Yes     Chronic    History of COVID-19 [Z86.16] 01/05/2022 Yes    ESRD on hemodialysis [N18.6, Z99.2]  Not Applicable     Chronic    CAD (coronary artery disease) - non-obstructive from [facility] in 2016 [I25.10] 10/26/2020 Yes     Chronic    Diabetes Mellitus [E11.22, N18.6, Z79.4, Z99.2] 01/29/2019 Not Applicable     Chronic    Essential (primary) hypertension [I10] 01/29/2019 Yes     Chronic      Problems Resolved During this Admission:       Discharged Condition: good    Disposition: Skilled Nursing Facility    Follow Up:   Follow-up Information       KIKI Marinelli ANP Follow up on 2/8/2023.    Specialty: Infectious Diseases  Why: 2:00 PM  Contact information:  Annetta PADILLA [facility] [id number]  [phone]                           Patient Instructions:      Ambulatory referral/consult to Outpatient Case Management   Referral Priority: Routine Referral Type: Consultation   Referral Reason: Specialty Services Required   Number of Visits Requested: 1     Diet Adult Regular     Order Specific Question Answer Comments   Additional restrictions: Diabetic 2000    Additional restrictions: Renal    Additional restrictions: Low Potassium      Notify your health care provider if you experience any of the following:  persistent nausea and vomiting or diarrhea     Notify your health care provider if you experience any of the following:  temperature >100.4      Activity as tolerated       Significant Diagnostic Studies:   CT Thigh With Contrast Right 1/17/23: FINDINGS:  Postsurgical changes of right above knee amputation.  There is interval resolution of previously seen large stump abscess.  However, there is lateral ulceration, possibly extending to bone.  While no erosion is seen, osteomyelitis cannot be excluded on the basis of CT.   Extensive vascular calcifications are present.  Mild generalized loss of muscle bulk.  No pelvic ascites or lymphadenopathy noted.  Note made of urinary bladder wall thickening.  Impression:  There is interval resolution of previously seen large stump abscess. However, there is lateral ulceration, possibly extending to bone. While no erosion is seen, osteomyelitis cannot be excluded on the basis of CT.  Consider further evaluation with MRI femur.  MRI Femur Without Contrast Right 1/20/23: FINDINGS:  Postoperative change of right above-knee amputation.  Extensive edema signal throughout the right lower extremity stump.  No focal fluid collection to suggest abscess.  Prominent right inguinal nodes.  Bone marrow edema signal at the distal femur with corresponding T1 signal hypointensity.  No definite cortical erosion.  Unremarkable femoral bone marrow signal elsewhere.  No acute fracture.  Impression:  Postoperative change of right above-knee amputation.  Bone marrow edema signal at the distal femur amputation edge.  Differential includes micro trauma edema versus early osteomyelitis.  No definite cortical erosion.  No sinus tract.  Extensive edema signal throughout the right lower extremity stump soft tissues, suggestive of cellulitis/myositis.  No focal fluid collection to suggest drainable abscess.  Additional findings as above.  NM Bone Scan 3 Phase Femur 1/23/23: FINDINGS:  On the flow images there is there is increased uptake near the level of the right femoral osseous stump and stump soft tissues.  On the delayed views, there is  increased uptake of the right femoral osseous stump distal end.  Impression:  Increased uptake of the right femoral osseous stump concerning for osteomyelitis.  Early uptake of the surrounding stem soft tissues may represent soft tissue infection or cellulitis.     Medications:  Reconciled Home Medications:      Medication List        START taking these medications      ciprofloxacin HCl 500 MG tablet  Commonly known as: CIPRO  Take 1 tablet (500 mg total) by mouth once daily. End date 2/28/2023 unless discontinued or changed after bone biopsy results     VANCOMYCIN 1 G/250 ML D5W (ADD-EASE) (CNR - SIMPLE)  Give 500 mg with each dialysis treatment until 2/28/2023 unless discontinued or changed after bone biopsy results            CONTINUE taking these medications      amLODIPine 5 MG tablet  Commonly known as: NORVASC  Take 1 tablet (5 mg total) by mouth once daily.     aspirin 81 MG Chew  Take 81 mg by mouth once daily.     atorvastatin 20 MG tablet  Commonly known as: LIPITOR  Take 1 tablet by mouth.     clopidogreL 75 mg tablet  Commonly known as: PLAVIX  Take 75 mg by mouth once daily.     epoetin paola-epbx 10,000 unit/mL imjection  Commonly known as: RETACRIT  Inject 1 mL (10,000 Units total) into the skin every Mon, Wed, Fri.     folic acid-vit B6-vit B12 2.5-25-2 mg 2.5-25-2 mg Tab  Commonly known as: FOLBIC or Equiv  Take 1 tablet by mouth once daily.     hydrALAZINE 50 MG tablet  Commonly known as: APRESOLINE  Take 2 tablets (100 mg total) by mouth every 8 (eight) hours.     insulin aspart U-100 100 unit/mL (3 mL) Inpn pen  Commonly known as: NovoLOG  Inject 0-5 Units into the skin before meals and at bedtime as needed (Hyperglycemia).     insulin detemir U-100 100 unit/mL (3 mL) Inpn pen  Commonly known as: Levemir FLEXTOUCH  Inject 6 Units into the skin every evening.     lancets Misc  Commonly known as: ACCU-CHEK SOFTCLIX LANCETS  1 each by Misc.(Non-Drug; Combo Route) route once daily at 6am.      metoprolol succinate 25 MG 24 hr tablet  Commonly known as: TOPROL-XL  Take 1 tablet by mouth.     omeprazole 40 MG capsule  Commonly known as: PRILOSEC  Take 40 mg by mouth once daily.              Indwelling Lines/Drains at time of discharge:   Lines/Drains/Airways       Drain  Duration                  Hemodialysis AV Fistula Left upper arm -- days                    Time spent on the discharge of patient: 35 minutes         Naveed Larson MD  Department of Hospital Medicine  [facility] - Observation 11H

## 2023-01-30 NOTE — PLAN OF CARE
Pt left without notifying team or RN. Report called and given to SNF.    Problem: Adult Inpatient Plan of Care  Goal: Plan of Care Review  Outcome: Met  Goal: Patient-Specific Goal (Individualized)  Outcome: Met  Goal: Absence of Hospital-Acquired Illness or Injury  Outcome: Met  Goal: Optimal Comfort and Wellbeing  Outcome: Met  Goal: Readiness for Transition of Care  Outcome: Met     Problem: Infection  Goal: Absence of Infection Signs and Symptoms  Outcome: Met     Problem: Diabetes Comorbidity  Goal: Blood Glucose Level Within Targeted Range  Outcome: Met     Problem: Adjustment to Illness (Sepsis/Septic Shock)  Goal: Optimal Coping  Outcome: Met     Problem: Bleeding (Sepsis/Septic Shock)  Goal: Absence of Bleeding  Outcome: Met     Problem: Glycemic Control Impaired (Sepsis/Septic Shock)  Goal: Blood Glucose Level Within Desired Range  Outcome: Met     Problem: Infection Progression (Sepsis/Septic Shock)  Goal: Absence of Infection Signs and Symptoms  Outcome: Met     Problem: Nutrition Impaired (Sepsis/Septic Shock)  Goal: Optimal Nutrition Intake  Outcome: Met     Problem: Skin Injury Risk Increased  Goal: Skin Health and Integrity  Outcome: Met     Problem: Impaired Wound Healing  Goal: Optimal Wound Healing  Outcome: Met     Problem: Device-Related Complication Risk (Hemodialysis)  Goal: Safe, Effective Therapy Delivery  Outcome: Met     Problem: Hemodynamic Instability (Hemodialysis)  Goal: Effective Tissue Perfusion  Outcome: Met     Problem: Infection (Hemodialysis)  Goal: Absence of Infection Signs and Symptoms  Outcome: Met     Problem: Fall Injury Risk  Goal: Absence of Fall and Fall-Related Injury  Outcome: Met     Problem: Electrolyte Imbalance (Chronic Kidney Disease)  Goal: Electrolyte Balance  Outcome: Met     Problem: Fluid Volume Excess (Chronic Kidney Disease)  Goal: Fluid Balance  Outcome: Met

## 2023-01-30 NOTE — PLAN OF CARE
[facility]     Department of Hospital Medicine     [address] [place] [address]     [phone] after hours  [phone] fax       NURSING HOME ORDERS    Patient Name: Adryan Neff  YOB: [birth year]    Admit to Nursing Home:     Skilled Bed                                                  Diagnoses:  Active Hospital Problems    Diagnosis  POA    *Wound infection [T14.8XXA, L08.9]  Yes    Anemia in ESRD (end-stage renal disease) [N18.6, D63.1]  Yes     Chronic    Chronic kidney disease-mineral and bone disorder [N18.9, E83.9, M89.9]  Yes     Chronic    PAD (peripheral artery disease) [I73.9]  Yes     Chronic    History of COVID-19 [Z86.16]  Yes    ESRD on hemodialysis [N18.6, Z99.2]  Not Applicable     Chronic     on HD TThSat      CAD (coronary artery disease) - non-obstructive from [facility] in 2016 [I25.10]  Yes     Chronic    Diabetes Mellitus [E11.22, N18.6, Z79.4, Z99.2]  Not Applicable     Chronic    Essential (primary) hypertension [I10]  Yes     Chronic      Resolved Hospital Problems   No resolved problems to display.       Patient is homebound due to:  Wound infection    Allergies:Review of patient's allergies indicates:  No Known Allergies    Vitals:     Routine    Diet: diabetic diet: 2000 calorie, renal diet, and low potassium diet       Activities:  as tolerated      LABS:  Per facility protocol   Mondays:   CBC  CMP   CRP  Vancomycin before HD  Fax Lab Results to Infectious Diseases Provider: Subha Rice  [facility] Clinic Fax Number: [phone]    Nursing Precautions:         - Fall precautions per nursing home protocol    CONSULTS:   Physical Therapy to evaluate and treat     Occupational Therapy to evaluate and treat       Wound Care: Right knee wound - Cleanse wound with vashe  Pat dry.  Apply norman honey and gauze to wound bed  Cover with cast padding and tape    IV antibiotic: Give vancomycin 500 mg with each dialysis  session until 2/28/2023 (may change after bone biopsy results)    Medications:   Reconciled Home Medications:      Medication List        START taking these medications      ciprofloxacin HCl 500 MG tablet  Commonly known as: CIPRO  Take 1 tablet (500 mg total) by mouth once daily. End date 2/28/2023 unless discontinued or changed after bone biopsy results     VANCOMYCIN 1 G/250 ML D5W (ADD-EASE) (CNR - SIMPLE)  Give 500 mg with each dialysis treatment until 2/28/2023 unless discontinued or changed after bone biopsy results            CONTINUE taking these medications      amLODIPine 5 MG tablet  Commonly known as: NORVASC  Take 1 tablet (5 mg total) by mouth once daily.     aspirin 81 MG Chew  Take 81 mg by mouth once daily.     atorvastatin 20 MG tablet  Commonly known as: LIPITOR  Take 1 tablet by mouth.     clopidogreL 75 mg tablet  Commonly known as: PLAVIX  Take 75 mg by mouth once daily.     epoetin paola-epbx 10,000 unit/mL imjection  Commonly known as: RETACRIT  Inject 1 mL (10,000 Units total) into the skin every Mon, Wed, Fri.     folic acid-vit B6-vit B12 2.5-25-2 mg 2.5-25-2 mg Tab  Commonly known as: FOLBIC or Equiv  Take 1 tablet by mouth once daily.     hydrALAZINE 50 MG tablet  Commonly known as: APRESOLINE  Take 2 tablets (100 mg total) by mouth every 8 (eight) hours.     insulin aspart U-100 100 unit/mL (3 mL) Inpn pen  Commonly known as: NovoLOG  Inject 0-5 Units into the skin before meals and at bedtime as needed (Hyperglycemia).     insulin detemir U-100 100 unit/mL (3 mL) Inpn pen  Commonly known as: Levemir FLEXTOUCH  Inject 6 Units into the skin every evening.     lancets Misc  Commonly known as: ACCU-CHEK SOFTCLIX LANCETS  1 each by Misc.(Non-Drug; Combo Route) route once daily at 6am.     metoprolol succinate 25 MG 24 hr tablet  Commonly known as: TOPROL-XL  Take 1 tablet by mouth.     omeprazole 40 MG capsule  Commonly known as: PRILOSEC  Take 40 mg by mouth once daily.                Follow-up Information       KIKI Marinelli, ANP Follow up on 2/8/2023.    Specialty: Infectious Diseases  Why: 2:00 PM  Contact information:  Annetta PADILLA [address]HAZALA  [facility] [id number] [phone]                                   _________________________________  Naveed Larson MD  01/30/2023

## 2023-01-30 NOTE — PLAN OF CARE
Prescription for IV vancomycin faxed to [facility] ([phone]). Received notification transmission was successful.   Will continue to update plan as needed.  Adan Shelton RN,BSN

## 2023-01-30 NOTE — PROGRESS NOTES
[facility]  Nephrology  Progress Note    Patient Name: Adryan Neff  MRN: [id number]  Admission Date: 1/17/2023  Hospital Length of Stay: 12 days  Attending Provider: Naveed Larson MD   Primary Care Physician: Carola Pedro MD  Principal Problem:Wound infection      Interval History: NAEON. Last dialyzed on Saturday.     Review of patient's allergies indicates:  No Known Allergies  Current Facility-Administered Medications   Medication Frequency    [START ON 1/31/2023] 0.9%  NaCl infusion Once    acetaminophen tablet 650 mg Q8H PRN    albuterol-ipratropium 2.5 mg-0.5 mg/3 mL nebulizer solution 3 mL Q4H PRN    amLODIPine tablet 10 mg Daily    aspirin chewable tablet 81 mg Daily    atorvastatin tablet 20 mg Daily    bisacodyL EC tablet 5 mg Daily PRN    ciprofloxacin HCl tablet 500 mg Daily    clopidogreL tablet 75 mg Daily    dextrose 10% bolus 125 mL 125 mL PRN    dextrose 10% bolus 250 mL 250 mL PRN    glucagon (human recombinant) injection 1 mg PRN    glucose chewable tablet 16 g PRN    glucose chewable tablet 24 g PRN    heparin (porcine) injection 5,000 Units Q8H    hydrALAZINE tablet 100 mg Q8H    insulin aspart U-100 pen 0-5 Units QID (AC + HS) PRN    LIDOcaine 5 % patch 1 patch Q24H    melatonin tablet 6 mg Nightly PRN    metoprolol succinate (TOPROL-XL) 24 hr tablet 25 mg Daily    naloxone 0.4 mg/mL injection 0.02 mg PRN    ondansetron disintegrating tablet 4 mg Q8H PRN    ondansetron injection 4 mg Q8H PRN    pantoprazole EC tablet 40 mg Daily    polyethylene glycol packet 17 g TID PRN    sevelamer carbonate tablet 1,600 mg TID WM    simethicone chewable tablet 80 mg QID PRN    sodium chloride 0.9% flush 10 mL Q12H PRN    vancomycin - pharmacy to dose pharmacy to manage frequency       Objective:     Vital Signs (Most Recent):  Temp: 97.8 °F (36.6 °C) (01/29/23 2315)  Pulse: 71 (01/29/23 2315)  Resp: 18 (01/29/23 1937)  BP: (!) 143/68 (01/29/23 2315)  SpO2: 98 %  (01/29/23 4945) Vital Signs (24h Range):  Temp:  [97.3 °F (36.3 °C)-98.4 °F (36.9 °C)] 97.8 °F (36.6 °C)  Pulse:  [67-73] 71  Resp:  [18] 18  SpO2:  [95 %-98 %] 98 %  BP: (116-143)/(56-69) 143/68     Weight: 55.9 kg (123 lb 3.8 oz) (01/30/23 0342)  Body mass index is 17.2 kg/m².  Body surface area is 1.67 meters squared.    I/O last 3 completed shifts:  In: 520 [P.O.:420; IV Piggyback:100]  Out: 2600 [Other:2600]    Physical Exam  Vitals and nursing note reviewed.   Constitutional:       Appearance: He is well-developed.   HENT:      Head: Normocephalic and atraumatic.   Eyes:      Conjunctiva/sclera: Conjunctivae normal.      Pupils: Pupils are equal, round, and reactive to light.   Neck:      Thyroid: No thyromegaly.      Vascular: No JVD.   Cardiovascular:      Rate and Rhythm: Normal rate and regular rhythm.      Heart sounds: Normal heart sounds. No murmur heard.    No friction rub. No gallop.   Pulmonary:      Effort: Pulmonary effort is normal. No respiratory distress.      Breath sounds: Normal breath sounds. No wheezing or rales.   Abdominal:      General: Bowel sounds are normal. There is no distension.      Palpations: Abdomen is soft. There is no mass.      Tenderness: There is no abdominal tenderness. There is no guarding or rebound.      Hernia: No hernia is present.   Musculoskeletal:         General: No deformity. Normal range of motion.      Cervical back: Normal range of motion and neck supple.      Comments: R AKA with crusting along lateral incision. No erythema or induration noted.      Right Lower Extremity: Right leg is amputated above knee.   Skin:     General: Skin is warm and dry.   Neurological:      Mental Status: He is alert and oriented to person, place, and time.      Cranial Nerves: No cranial nerve deficit.   Psychiatric:         Behavior: Behavior normal.       Significant Labs:  CBC:   Recent Labs   Lab 01/26/23  1001   WBC 7.52   RBC 3.28*   HGB 8.9*   HCT 28.8*      MCV 88    MCH 27.1   MCHC 30.9*       CMP:   Recent Labs   Lab 01/26/23  1001 01/28/23  0320    64*   CALCIUM 8.1* 8.8   ALBUMIN 2.8*  --     135*   K 5.3* 5.1   CO2 18* 18*    100   BUN 76* 64*   CREATININE 11.6* 10.3*            Assessment/Plan:     * Wound infection  -Management per primary       Anemia in ESRD (end-stage renal disease)  -Hgb goal 10-12  -Transfuse for Hg <7.0    Chronic kidney disease-mineral and bone disorder  -continue sevelamer 1600mg TID with meals     ESRD on hemodialysis  57-year-old man with a past medical history of primary hypertension, peripheral arterial disease s/p R AKA, DM type 1, ESRD on HD MWF, admitted for concern of osteomyelitis/abcess to R AKA.     Nephrology History  iHD Schedule: MWF   Unit/MD: [facility]  Duration: 3.5 hours   UF: 2L  EDW: 56 kg   Access: PHIL AVF   Last HD prior to presentation: 1/16/23     Plan/Recommendations:     -HD tomorrow for metabolic clearance and volume management    -Strict I/O's   -Trend renal function panels daily   -Renally dose meds/avoid nephrotoxic meds   -Renal diet/formulations, if not NPO   -continue phos binders          Thank you for your consult. I will follow-up with patient. Please contact us if you have any additional questions.    Ok Sanchez, NP  Nephrology  [facility]

## 2023-01-31 LAB
FINAL PATHOLOGIC DIAGNOSIS: NORMAL
GROSS: NORMAL
Lab: NORMAL

## 2023-02-01 ENCOUNTER — DOCUMENTATION ONLY (OUTPATIENT)
Dept: INFECTIOUS DISEASES | Facility: CLINIC | Age: 58
End: 2023-02-01
Payer: MEDICARE

## 2023-02-01 LAB — BACTERIA SPEC ANAEROBE CULT: NORMAL

## 2023-02-01 NOTE — PROGRESS NOTES
Chart reviewed    IR cultures 1/25 NGTD   Pathology specimen sent were blood products. Bone with no other tissue elements for evaluation. No abnormalities identified in blood    Given no bone specimen sent to pathology. Would continue abx course x 6 weeks EOC 2/28/23.    Discussed with nursing at [facility] to continue abx as above   ([phone]). Confirmed    [facility] Krystal

## 2023-02-02 NOTE — PHYSICIAN QUERY
PT Name: Adryan Neff  MR #: [id number]     DOCUMENTATION CLARIFICATION      CDS/: KATELIN Merchant, RN               Contact information:[email or web address]  This form is a permanent document in the medical record.     Query Date: February 2, 2023    By submitting this query, we are merely seeking further clarification of documentation to reflect the severity of illness of your patient. Please utilize your independent clinical judgment when addressing the question(s) below.    The Medical Record contains the following:     Indicators   Supporting Clinical Findings Location in Medical Record    x Documentation/History of condition s/p right AKA October 2022, complicated by stump abscess and presumed osteomyelitis 11/23/22 (wound cx citrobacter koseri and pseudomonas), d/c on oral ciprofloxacin with plan for 6 weeks treatment.  Course of antibiotics shortened after admission to [facility] in early December with wound dehiscence. CT imaging there showed showing rim enhancing fluid collections/phlegmon with evaluation by Orthopedics who was not concerned for infection.  Course of ciprofloxacin abbreviated after that admission and he stopped abx on 12/24.  Now readmitted from ID clinic with concern for worsening wound infection.     He has had previous admissions for R AKA abscess and concern for osteomyelitis.     admitted for concern of osteomyelitis/abcess to R AKA.       MRI findings do not show active osteomyelitis or focal fluid collection.      Right femoral stump wound infection with confirmed osteomyelitis on bone scan.   Infectious Disease Consults Note File Time: 01/19/2023 8:18 AM                                    Nephrology Consults File Time: 01/18/2023 10:47 AM            Orthopedic Surgery Consults Note 01/20/2023    Hospital Medicine Progress Notes File Time: 01/24/2023 12: 18 PM    x Pathology/Culture Infection: R AKA culture negative osteomyelitis pending final path and cultures.     Final Pathologic  "Diagnosis  SPECIMEN FROM RIGHT LEG:   BONE WITH NO OTHER TISSUE ELEMENTS FOR EVALUATION   NO ABNORMALITY IDENTIFIED IN THE BLOOD   SECTIONS WERE PREPARED DEEPER INTO THE BLOCK IN THE EVALUATION OF THIS CASE   Infectious Disease Plan of Care File Time: 01/27/2023 3:41 PM      Surgical Pathology Results 1/26/2023    x Lab Value(s) Sed Rate  86 on 1/18/2023 Lab Results 1/18/2023    x Radiology Findings MRI without contrast does not show any focal fluid collections or definitive signs of osteomyelitis. No cortical erosion, cloacae, or sequestrum.     Bone scan reports "Increased uptake of the right femoral osseous stump concerning for osteomyelitis.    NM bone scan +osteomyelitis.  Orthopedic Surgery Consults Note 01/20/2023        Hospital Medicine Progress Notes File Time: 01/24/2023 12:18 PM      Infectious Disease Progress Notes File Time: 01/24/2023 3:00 PM      x Treatment/Medication Infectious Disease consulted  Continue IV Vancomycin, Pharmacy to   dose and  Meropenem (renally dosed) for now.   Recommend MRI right femur to better evaluate for osteomyelitis.      MRI right femur with some possible early osteomyelitis, however after orthopedics reviewed imaging no concern for osteo at this time, recommending continue antibiotics and local wound care.  No need for debridement at this time.    Follow up NM bone scan to evaluate for osteomyelitis    On IV meropenem and vancomycin for stump infection/suspected osteo   Infectious Disease Consults Note File Time: 01/19/2023 8:18 AM            Hospital Medicine Progress Notes   File Time: 01/20/2023 1:46 PM              Infectious Disease Plan of Care File Time: 01/22/2023 5:54 PM    x Other Orthopedics consulted . Reviewed MRI, does not feel it shows active osteomyelitis. Recommends antibiotic suppression, local wound care and follow up with primary surgeon (Dr. Davis) as outpatient.  No acute orthopedic intervention anticipated    Bone scan +osteomyelitis. Discussed " with orthopedics, recommend IR for bone biopsy. Recommend to send for path and cultures to guide abx therapy for osteomyelitis treatment  Infectious Disease Progress Notes  File Time: 02/24/2023 3:00 PM       Provider, please further specify the diagnosis of Osteomyelitis.  [  x ] Acute   [   ] Chronic   [   ] Subacute   [   ] Acute on chronic   [   ] Ruled Out   [   ] Past history only, not a current diagnosis   [   ] Other clarification (please specify): _____________   [   ] Clinically undetermined         Please document in your progress notes daily for the duration of treatment until resolved, and include in your discharge summary.    Form No. [id number]

## 2023-02-07 ENCOUNTER — OUTPATIENT CASE MANAGEMENT (OUTPATIENT)
Dept: ADMINISTRATIVE | Facility: OTHER | Age: 58
End: 2023-02-07
Payer: MEDICARE

## 2023-02-07 NOTE — PROGRESS NOTES
Outpatient Care Management  Patient Not Qualified    Patient: Adryan Neff  MRN:  [id number]  Date of Service:  2/7/2023  Completed by:  Patrizia Driver RN    Chief Complaint   Patient presents with    OPCM Chart Review       Patient Summary     Program:  OPCM High Risk     Reason Not Qualified:  Resides in Nursing Home    Pt chart review the pt was transferred to [facility] for placement.

## 2023-02-22 LAB — FUNGUS SPEC CULT: NORMAL

## 2023-03-15 LAB
ACID FAST MOD KINY STN SPEC: NORMAL
MYCOBACTERIUM SPEC QL CULT: NORMAL

## 2023-03-17 ENCOUNTER — PES CALL (OUTPATIENT)
Dept: ADMINISTRATIVE | Facility: OTHER | Age: 58
End: 2023-03-17
Payer: MEDICARE

## 2023-04-26 ENCOUNTER — PES CALL (OUTPATIENT)
Dept: ADMINISTRATIVE | Facility: OTHER | Age: 58
End: 2023-04-26
Payer: MEDICARE

## 2023-06-22 DIAGNOSIS — T82.858D ARTERIOVENOUS FISTULA STENOSIS, SUBSEQUENT ENCOUNTER: Primary | ICD-10-CM

## 2023-11-02 ENCOUNTER — TELEPHONE (OUTPATIENT)
Dept: FAMILY MEDICINE | Facility: CLINIC | Age: 58
End: 2023-11-02
Payer: MEDICARE

## 2023-11-02 NOTE — TELEPHONE ENCOUNTER
Called to inform pts daughter I'm unable to schedule anything; she would have to try [facility] if she wants something in the [facility] area; unable to LM due to voicemail full

## 2023-11-02 NOTE — TELEPHONE ENCOUNTER
"----- Message from Terri Bhardwaj MD sent at 11/2/2023  1:27 PM CDT -----  Regarding: FW: Adryan    ----- Message -----  From: Dewayne Perales  Sent: 11/2/2023  12:48 PM CDT  To: Terri Bhardwaj MD  Subject: Adryan                                                                                                      Patient Needs Appointment       Patient: Adryan Neff       Seen For: Establish care      Date Preferred: Patient is a dialysis patient and he is free for appointments on Mondays, Wednesdays and Fridays and it will have to be afternoon appointments. Please contact the patient for scheduling       Contact Info: [phone] "Apple daughter"         Additional Info: Daughter is a college but she is the patients caregiver and the best time to reach her will be in the afternoons .      "

## 2024-02-12 NOTE — PROGRESS NOTES
[facility] Medicine  Progress Note    Patient Name: Adryan Neff  MRN: [id number]  Patient Class: IP- Inpatient   Admission Date: 10/6/2022  Length of Stay: 5 days  Attending Physician: Nikolay Sawyer MD  Primary Care Provider: No primary care provider on file.        Subjective:     Principal Problem:PAD (peripheral artery disease)        HPI:  This is a 56 year old male with a PMHx of severe PAD s/p gangrene of left foot s/p left AKA (1/2022), ESRD on HD MWF, NICM (EF: 55%, GIDD), pulmonary hypertension, GERD and latent TB filiberto presents for concern for leg infection.       The patient presented to the vascular surgery clinic for a follow up. He reports worsening pain and developing eschar of the right dorsal foot along with foul smelling odor. Given concern for an infection and the need for IV antibiotics & expedited podiatry evaluation, the patient was sent to the ER. The patient denies having any fevers, chills, or injury to his foot. He has a previous AKA and uses a wheelchair to ambulate. While in the ED, the patient was hypertensive but otherwise hemodynamically stable. Labs showed leukocytosis (17.41 - with neutrophilic predominance), normocytic anemia (9.7), elevated sed rate (125), elevated CRP (237.2), negative lactate (1.6). X-ray foot showed no radiographic evidence of osteomyelitis. He was administered vancomycin, zosyn and was admitted for further management.     I attempted to call the daughter to obtain further history on 1902 without a response.         Overview/Hospital Course:  56M with pmh of severe pad s/p gangangrene of left foot s/p left AKA (1/2022), ESRD on HD MWF, NICM (EF: 55%, GIDD), pulmonary hypertension, GERD and latent TB filiberto presents for concern for leg infection. The patient presented to the vascular surgery clinic for a follow up. He reports worsening pain and developing eschar of the right dorsal foot along with foul smelling odor. Given concern for an  infection and the need for IV antibiotics & expedited podiatry evaluation, the patient was sent to the ER. In ed labs with leukocytosis, normocytic anemia (9.7), elevated sed rate (125), elevated CRP (237.2), negative lactate (1.6). X-ray foot showed no radiographic evidence of osteomyelitis.Pt started on vancomycin and zosyn for possible infection. Initially evaluated by podiatry who recommended ortho consult for possible amputation. Amputation to be done on 10/10. ID also on board for abx recommendations. Patient had a coded blue called on 10/10 for possible PEA vs. Vasovagal.  ROSC returned quickly.Cards consulted to follow. The patient was transferred to the floor on 10/11.      Interval History: No new issues    Review of Systems   Constitutional:  Negative for activity change and appetite change.   HENT:  Negative for congestion and dental problem.    Eyes:  Negative for discharge.   Respiratory:  Negative for apnea and chest tightness.    Cardiovascular:  Negative for chest pain.   Gastrointestinal:  Negative for abdominal distention.   Endocrine: Negative for cold intolerance.   Genitourinary:  Negative for difficulty urinating and dysuria.   Musculoskeletal:  Negative for arthralgias.   Neurological:  Negative for facial asymmetry and headaches.   Psychiatric/Behavioral:  Negative for agitation and behavioral problems.    Objective:     Vital Signs (Most Recent):  Temp: 98.3 °F (36.8 °C) (10/11/22 0000)  Pulse: 90 (10/11/22 0836)  Resp: 11 (10/11/22 0607)  BP: (!) 158/75 (10/11/22 0836)  SpO2: 100 % (10/11/22 0607)   Vital Signs (24h Range):  Temp:  [98.1 °F (36.7 °C)-98.6 °F (37 °C)] 98.3 °F (36.8 °C)  Pulse:  [] 90  Resp:  [0-30] 11  SpO2:  [100 %] 100 %  BP: ()/(50-75) 158/75     Weight: 62.6 kg (138 lb 0.1 oz)  Body mass index is 19.25 kg/m².    Intake/Output Summary (Last 24 hours) at 10/11/2022 0846  Last data filed at 10/11/2022 0600  Gross per 24 hour   Intake 1696.48 ml   Output 2500 ml    Net -803.52 ml      Physical Exam  Vitals and nursing note reviewed.   Constitutional:       General: He is not in acute distress.     Appearance: Normal appearance. He is not ill-appearing, toxic-appearing or diaphoretic.   HENT:      Head: Normocephalic and atraumatic.   Eyes:      Conjunctiva/sclera: Conjunctivae normal.   Cardiovascular:      Rate and Rhythm: Normal rate and regular rhythm.   Pulmonary:      Effort: Pulmonary effort is normal. No respiratory distress.   Skin:     General: Skin is warm and dry.   Neurological:      Mental Status: He is alert and oriented to person, place, and time.   Psychiatric:         Mood and Affect: Mood normal.         Behavior: Behavior normal.     Significant Labs: All pertinent labs within the past 24 hours have been reviewed.  BMP:   Recent Labs   Lab 10/10/22  1650   *      K 4.5      CO2 23   BUN 22*   CREATININE 7.8*   CALCIUM 9.9     CBC:   Recent Labs   Lab 10/10/22  1225 10/10/22  1650   WBC 16.14* 19.76*   HGB 9.5* 9.0*   HCT 31.1* 29.0*   * 646*       Significant Imaging:       Assessment/Plan:      * PAD (peripheral artery disease)  - History of severe PAD   - C/b LEFT foot gangrene s/p left AKA   - Now presenting with right foot gangrene with concern of superimposed infection   - Home medications: aspirin, plavix and statin  - RCRI score of 4    Plan;   - Vascular surgery, podiatry consultation   - Podiatry recommending bka/aka. Ortho consulted, surgery 10/10  - Vancomycin/Zosyn for concern for superimposed infection   - Infectious disease consult   - Resume home medications     Wet gangrene  As stated above in PAD      PEA (Pulseless electrical activity)  On 10/10. Return of ROSC quickly. Now in NSR and awake and alert. Will transfer to tele.      ESRD on hemodialysis  - Dialysis type: iHD  - Access: L AVF  - Schedule: MWF    Plan:  - Nephrology consult for dialysis management  - Daily weights to guide UF  - Continue dialysis  MWF    CAD (coronary artery disease) - non-obstructive from [facility] in 2016  - Resume aspirin, statin       Chronic diastolic heart failure  - Echocardiogram (11/24/2021): EF: 55%, GIDD, mild TR, moderate to severe MR  - Compensated on physical examination, on room air   - Resume home medications     Gastroesophageal reflux disease  - Resume PPI     Pure hypercholesterolemia  - Resume statin     Essential (primary) hypertension  - Resume home medications (amlodipine 10 mg, Toprol-XL 50 mg)     Controlled type 2 diabetes mellitus with chronic kidney disease on chronic dialysis, with long-term current use of insulin  - Last A1c:   Lab Results   Component Value Date    HGBA1C 6.1 (H) 01/07/2021     - Home regimen: Lantus 15 units nightly, decreased due to hypoglycemia in am of 10/8.  - Continue home regimen   - Low-sensitivity sliding scale insulin  - Initiate and maintain glycemic protocol, monitor POC glucose   - Follow up with PCP as outpatient      VTE Risk Mitigation (From admission, onward)         Ordered     IP VTE HIGH RISK PATIENT  Once         10/06/22 1819     Place sequential compression device  Until discontinued         10/06/22 1819                Discharge Planning   ENIO:      Code Status: Full Code   Is the patient medically ready for discharge?:     Reason for patient still in hospital (select all that apply): Patient unstable  Discharge Plan A: Home with family   Discharge Delays: None known at this time        Critical care time spent on the evaluation and treatment of severe organ dysfunction, review of pertinent labs and imaging studies, discussions with consulting providers and discussions with patient/family: 45 minutes.      Nikolay Burch MD  Department of Hospital Medicine   [facility] - Intensive Care     Medications

## 2024-02-23 NOTE — PROGRESS NOTES
Electrophysiology study in progress. [facility]  Nephrology  Progress Note    Patient Name: Adryan Neff  MRN: [id number]  Admission Date: 1/17/2023  Hospital Length of Stay: 7 days  Attending Provider: Lee Toney MD   Primary Care Physician: Carola Pedro MD  Principal Problem:Wound infection      Interval History: Tolerated HD yesterday, with a net UF of 2.7L. NAEON.     Review of patient's allergies indicates:  No Known Allergies  Current Facility-Administered Medications   Medication Frequency    acetaminophen tablet 650 mg Q8H PRN    albuterol-ipratropium 2.5 mg-0.5 mg/3 mL nebulizer solution 3 mL Q4H PRN    amLODIPine tablet 10 mg Daily    aspirin chewable tablet 81 mg Daily    atorvastatin tablet 20 mg Daily    clopidogreL tablet 75 mg Daily    dextrose 10% bolus 125 mL 125 mL PRN    dextrose 10% bolus 250 mL 250 mL PRN    glucagon (human recombinant) injection 1 mg PRN    glucose chewable tablet 16 g PRN    glucose chewable tablet 24 g PRN    heparin (porcine) injection 5,000 Units Q8H    hydrALAZINE tablet 100 mg Q8H    insulin aspart U-100 pen 0-5 Units QID (AC + HS) PRN    LIDOcaine 5 % patch 1 patch Q24H    melatonin tablet 6 mg Nightly PRN    meropenem (MERREM) 500 mg in sodium chloride 0.9 % 100 mL IVPB (MB+) Q24H    metoprolol succinate (TOPROL-XL) 24 hr tablet 25 mg Daily    naloxone 0.4 mg/mL injection 0.02 mg PRN    ondansetron disintegrating tablet 4 mg Q8H PRN    ondansetron injection 4 mg Q8H PRN    pantoprazole EC tablet 40 mg Daily    polyethylene glycol packet 17 g TID PRN    sevelamer carbonate tablet 800 mg TID WM    simethicone chewable tablet 80 mg QID PRN    sodium chloride 0.9% flush 10 mL Q12H PRN    vancomycin - pharmacy to dose pharmacy to manage frequency       Objective:     Vital Signs (Most Recent):  Temp: 98 °F (36.7 °C) (01/25/23 0843)  Pulse: 71 (01/25/23 0843)  Resp: 17 (01/25/23 0843)  BP: (!) 158/71 (01/25/23 0843)  SpO2: (!) 92 % (01/25/23 0843)   Vital Signs (24h  Range):  Temp:  [96.3 °F (35.7 °C)-98.4 °F (36.9 °C)] 98 °F (36.7 °C)  Pulse:  [69-81] 71  Resp:  [15-18] 17  SpO2:  [92 %-96 %] 92 %  BP: (103-176)/(46-71) 158/71     Weight: 56.7 kg (125 lb) (01/17/23 2145)  Body mass index is 17.44 kg/m².  Body surface area is 1.69 meters squared.    I/O last 3 completed shifts:  In: 700 [I.V.:200; Other:500]  Out: 3400 [Other:3400]    Physical Exam  Vitals and nursing note reviewed.   Constitutional:       Appearance: He is well-developed.   HENT:      Head: Normocephalic and atraumatic.   Eyes:      Conjunctiva/sclera: Conjunctivae normal.      Pupils: Pupils are equal, round, and reactive to light.   Neck:      Thyroid: No thyromegaly.      Vascular: No JVD.   Cardiovascular:      Rate and Rhythm: Normal rate and regular rhythm.      Heart sounds: Normal heart sounds. No murmur heard.    No friction rub. No gallop.   Pulmonary:      Effort: Pulmonary effort is normal. No respiratory distress.      Breath sounds: Normal breath sounds. No wheezing or rales.   Abdominal:      General: Bowel sounds are normal. There is no distension.      Palpations: Abdomen is soft. There is no mass.      Tenderness: There is no abdominal tenderness. There is no guarding or rebound.      Hernia: No hernia is present.   Musculoskeletal:         General: No deformity. Normal range of motion.      Cervical back: Normal range of motion and neck supple.      Comments: R AKA with crusting along lateral incision. No erythema or induration noted.      Right Lower Extremity: Right leg is amputated above knee.   Skin:     General: Skin is warm and dry.   Neurological:      Mental Status: He is alert and oriented to person, place, and time.      Cranial Nerves: No cranial nerve deficit.   Psychiatric:         Behavior: Behavior normal.       Significant Labs:  CBC:   Recent Labs   Lab 01/20/23  0848   WBC 7.14   RBC 4.22*   HGB 11.1*   HCT 38.3*      MCV 91   MCH 26.3*   MCHC 29.0*       CMP:    Recent Labs   Lab 01/24/23  0901      CALCIUM 8.7   ALBUMIN 3.1*      K 5.4*   CO2 19*      BUN 87*   CREATININE 12.5*       All labs within the past 24 hours have been reviewed.       Assessment/Plan:     * Wound infection  -Management per primary       Anemia in ESRD (end-stage renal disease)  -Hgb goal 10-12  -Transfuse for Hg <7.0    Chronic kidney disease-mineral and bone disorder  -continue sevelamer 800 TID with meals     ESRD on hemodialysis  57-year-old man with a past medical history of primary hypertension, peripheral arterial disease s/p R AKA, DM type 1, ESRD on HD MWF, admitted for concern of osteomyelitis/abcess to R AKA.     Nephrology History  iHD Schedule: MWF   Unit/MD: YONI  Duration: 3.5 hours   UF: 2L  EDW: 56 kg   Access: PHIL AVF   Last HD prior to presentation: 1/16/23     Plan/Recommendations:     -HD tomorrow for metabolic clearance and volume management   -Strict I/O's   -Trend renal function panels daily   -Renally dose meds/avoid nephrotoxic meds   -Renal diet/formulations, if not NPO           Thank you for your consult. I will follow-up with patient. Please contact us if you have any additional questions.    Ok Sanchez, NP  Nephrology  [facility]

## 2024-02-26 ENCOUNTER — HOSPITAL ENCOUNTER (EMERGENCY)
Facility: HOSPITAL | Age: 59
Discharge: LEFT BEFORE DISCHARGE | End: 2024-02-26
Payer: MEDICARE

## 2024-02-26 VITALS
WEIGHT: 180 LBS | HEART RATE: 89 BPM | DIASTOLIC BLOOD PRESSURE: 75 MMHG | RESPIRATION RATE: 17 BRPM | BODY MASS INDEX: 25.12 KG/M2 | SYSTOLIC BLOOD PRESSURE: 176 MMHG | OXYGEN SATURATION: 100 % | TEMPERATURE: 98 F

## 2024-02-26 DIAGNOSIS — R06.02 SHORTNESS OF BREATH: ICD-10-CM

## 2024-02-26 DIAGNOSIS — R06.02 SOB (SHORTNESS OF BREATH): ICD-10-CM

## 2024-02-26 LAB — POCT GLUCOSE: 97 MG/DL (ref 70–110)

## 2024-02-26 PROCEDURE — 93005 ELECTROCARDIOGRAM TRACING: CPT

## 2024-02-26 PROCEDURE — 99900041 HC LEFT WITHOUT BEING SEEN- EMERGENCY

## 2024-02-26 PROCEDURE — 93010 ELECTROCARDIOGRAM REPORT: CPT | Mod: ,,, | Performed by: INTERNAL MEDICINE

## 2024-02-27 LAB
OHS QRS DURATION: 102 MS
OHS QTC CALCULATION: 454 MS

## 2024-03-05 ENCOUNTER — TELEPHONE (OUTPATIENT)
Dept: VASCULAR SURGERY | Facility: CLINIC | Age: 59
End: 2024-03-05
Payer: MEDICARE

## 2024-04-18 NOTE — NURSING
----- Message from Gosia Taylor RN sent at 4/18/2024 11:39 AM EDT -----  Regarding: Still want to see pt 4/24 (Wed)?  This pt hasn't had MRI or Neuropsych testing done.  Also needs B12.  Do you want her rescheduled?      Thank you.     Report received from off going nurse, Griselda, RN. Patient AAO. No signs of distress noted. Call light in reach. Bed low and locked. Will continue plan of care.

## 2024-04-30 ENCOUNTER — TELEPHONE (OUTPATIENT)
Dept: VASCULAR SURGERY | Facility: CLINIC | Age: 59
End: 2024-04-30
Payer: MEDICARE

## 2024-04-30 NOTE — TELEPHONE ENCOUNTER
Called  at [facility] and no answer. Left message that patient last seen in our office on 12/15/2022 and no order placed for wheelchair. Called pt.and spoke with dgtr.and aware. Verbalized understanding.

## 2024-04-30 NOTE — TELEPHONE ENCOUNTER
----- Message from Jazmyn Martinez sent at 4/30/2024  1:22 PM CDT -----  .Type: Patient Call Back    Who called:  Christy Oneil    What is the request in detail: Would like to know the name of the company that the wheelchair was ordered from     Can the clinic reply by [facility]? No     Would the patient rather a call back or a response via [facility]? Call Back     Best call back number: [phone] ext [phone]    Additional Information:

## 2024-05-01 NOTE — DISCHARGE SUMMARY
"[facility] Hospital Medicine  Discharge Summary      Patient Name: Adryan Neff  MRN: [id number]  Admission Date: 10/25/2020  Hospital Length of Stay: 1 days  Discharge Date and Time:  10/27/2020 7:24 AM  Attending Physician: Rosy att. providers found   Discharging Provider: Anita Villalobos MD  Primary Care Provider: Soren Rice MD      HPI:   Adryan Neff 54 y.o. male PMHX: Hypertension ESRD on hemodialysis presents complaining of abdominal pain and shortness of breath.He reports acute onset of constant stabbing LLQ abdominal pain(10/10) associated with shortness of breath around 1700 on10/25/20.He endorses x3 episodes of vomiting, last bowel movement on morning of 10/25/20 ,chest congestion,chronic cough;productive cough x1 day and compliance with M/W/F dialysis at [facility] last on 10/23/20.Additionally endorses compliance with home medication;however doesn't know the names.He denies fever/chills,diarrhea/constipation,CP/palpitations,dysuria or any other associated symptoms.    "As clarification,on 10/26/2020 patient should be admitted for hospital observation services under my care in collaboration with Nikolay Sawyer MD   Signed by Mary Lou Jordan,MSN,APRN,FNP-C".    Left Heart Cath on 03/30/2016:  CAD-Non-obstructive,medically managed.  TTE on 03/27/2017:  Constrictive physiology  Normal left ventricular systolic function(LVEF55%).  Grade II left ventricular diastolic dysfunction  Moderate Pulmonary Hypertension  Elevated left atrial and central venous pressures.        * No surgery found *      Hospital Course:   Ms Neff presented with severe hyperkalemia secondary to non compliance with dialysis. Admitted and dialyzed. Potassium improved but not back to normal. Patient then refused repeat labs for re-evaluation and refused repeat hemodialysis. Patient requested leaving against medical advised prior to evaluation by myself and nephrologist. Patient signed AMA form " and left room ambulating independently, per nurse.      Consults:   Consults (From admission, onward)        Status Ordering Provider     Inpatient consult to Nephrology  Once     Provider:  Reshma Gee MD    Completed GABRIELLE GALLEGOS          Final Active Diagnoses:    Diagnosis Date Noted POA    PRINCIPAL PROBLEM:  Elevated troponin [R77.8] 10/26/2020 Yes    Intractable vomiting [R11.10] 10/26/2020 Yes    Chronic diastolic heart failure [I50.32] 10/26/2020 Yes    CAD (coronary artery disease) [I25.10] 10/26/2020 Yes    Hyperkalemia [E87.5] 10/26/2020 Yes    Shortness of breath [R06.02] 10/26/2020 Yes    Gastroesophageal reflux disease [K21.9] 06/04/2019 Yes    Pure hypercholesterolemia [E78.00] 01/31/2019 Yes    Controlled type 2 diabetes mellitus without complication, without long-term current use of insulin [E11.9] 01/29/2019 Yes    Essential (primary) hypertension [I10] 01/29/2019 Yes    ESRD on hemodialysis [N18.6, Z99.2] 01/29/2019 Not Applicable      Problems Resolved During this Admission:       Discharged Condition: against medical advice    Disposition: Left Against Medical Adv*    Follow Up:  Follow-up Information     Soren Rice MD On 11/3/2020.    Specialty: Family Medicine  Why: Outpatient Services, PCP, follow-up appointment at 8:00am.   Contact information:  [address]  [place] [address] [phone]                   Indwelling Lines/Drains at time of discharge:   Lines/Drains/Airways     None                    Anita Villalobos MD  Department of Hospital Medicine  [facility]   daily       No current facility-administered medications for this visit.        Jae Dumont MD     This document may have been prepared at least partially through the use of voice recognition software. Although effort is taken to assure the accuracy ofthis document, it is possible that grammatical, syntax, or spelling errors may occur.

## 2024-05-13 DIAGNOSIS — R79.89 ELEVATED BRAIN NATRIURETIC PEPTIDE (BNP) LEVEL: Primary | ICD-10-CM

## 2024-05-30 NOTE — PROGRESS NOTES
Since H&P CF carrier, is pt positive from sweat test or were there any clinical signs.   Bedside dialysis started to PHIL graft with difficulty.  This RN attempted arterial stick x 2 with 15 gauge needles without success.      Graft successfully needled by DAVID Mejía RN with 16 gauge needles.    Tolerated well.

## 2024-07-18 NOTE — NURSING
Discharge with all belongings in stable condition. Paperwork and education provided. Runa transportation.   
Patient alert with confusion. Denies any pain or discomfort. Denies any respiratory distress. Patient refused all PO med, Insulin, and Heparin. Patient refused lab draw by . After I educated him, he allowed the tech to draw his blood. Patient slept well. Safety measures maintain. Bed in lowest position and call light within reach.  
Patient alert with confusion. Denies any pain or discomfort. Denies any respiratory distress. Patient refused all PO med, Insulin, and Heparin. Patient refused lab draw by . Patient slept well. Safety measures maintain. Bed in lowest position and call light within reach.    
Patient alert with confusion. Denies any pain or discomfort. Denies any respiratory distress. Patient refused all PO med, Insulin, and Heparin. Patient refused lab draw. Patient slept well. Safety measures maintain. Bed in lowest position and call light within reach.  
Patient alert with confusion. Denies any pain or discomfort. Denies any respiratory distress. Patient slept well. Safety measures maintain. Bed in lowest position and call light within reach.  
Report called to Lawrence ROSE  
c/o sob

## 2024-08-28 NOTE — PLAN OF CARE
Problem: Adult Inpatient Plan of Care  Goal: Plan of Care Review  Outcome: Ongoing, Progressing     Problem: Fall Injury Risk  Goal: Absence of Fall and Fall-Related Injury  Outcome: Ongoing, Progressing     Problem: Diabetes Comorbidity  Goal: Blood Glucose Level Within Targeted Range  Outcome: Ongoing, Progressing     Problem: Infection  Goal: Absence of Infection Signs and Symptoms  Outcome: Ongoing, Progressing     Problem: Impaired Wound Healing  Goal: Optimal Wound Healing  Outcome: Ongoing, Progressing      Patient symptomatic for UTI, began 8/26: R side lower abdominal pain, itching, frequently urinating, hydrating appropriately. Patient confirms she has had these symptoms and was treated for UTI before.     Please return patient's call, wondering if something can be requested with lab or prescription can be sent.

## 2024-10-28 NOTE — BRIEF OP NOTE
"ICU End of Shift Summary.  For vital signs and complete assessments, please see documentation flowsheets.      Pertinent assessments:   Neuro: A&Ox4  Cardiac: SR on amio drip  Resp: oxymask 10L, CPAP 40% at night, LS diminished, productive cough w/ blood tinged sputum  GI: obese, rounded, + flatus no BM overnight  : Miguel, low UOP tea colored 60cc total output  Skin: R foot wound dressing dry and intact  Lines: PIVx3  Drips: heparin, amiodarone, insulin, bumex    Major Shift Events:   Intensivist made aware of pt having blood tinged sputum continue heparin. Diuril 500mg IV x1 given  Plan (Upcoming Events): cont current care plan  Discharge/Transfer Needs: TBD     Bedside Shift Report Completed : yes  Bedside Safety Check Completed: yes      Goal Outcome Evaluation:      Plan of Care Reviewed With: patient    Overall Patient Progress: no changeOverall Patient Progress: no change    Outcome Evaluation: Pt. still has low UOP provider aware.  Blood tinged sputum tele intensivist aware continue heparin drip. Pt. remains on insulin, amiodarone, bumex drip.    Problem: Adult Inpatient Plan of Care  Goal: Plan of Care Review  Description: The Plan of Care Review/Shift note should be completed every shift.  The Outcome Evaluation is a brief statement about your assessment that the patient is improving, declining, or no change.  This information will be displayed automatically on your shift  note.  Outcome: Not Progressing  Flowsheets (Taken 10/27/2024 2242)  Outcome Evaluation: Pt. still has low UOP provider aware.  Blood tinged sputum tele intensivist aware continue heparin drip. Pt. remains on insulin, amiodarone, bumex drip.  Plan of Care Reviewed With: patient  Overall Patient Progress: no change  Goal: Patient-Specific Goal (Individualized)  Description: You can add care plan individualizations to a care plan. Examples of Individualization might be:  \"Parent requests to be called daily at 9am for status\", \"I have a " [facility] Operative Note    SUMMARY     Surgery Date: 1/6/2022     Surgeon(s) and Role:     * Masoud Soni MD - Primary    Assisting Surgeon: None    Pre-op Diagnosis:  Gangrene of left foot [I96]    Post-op Diagnosis:  Post-Op Diagnosis Codes:     * Gangrene of left foot [I96]    Procedure:  1. Left 2-5 metatarsal amputations   2. Left foot washout  3. Left foot debridement    Anesthesia: Regional    Operative Findings: soft 2-5 metatarsals, seropurulent drainage, no mid foot purulence, no infection involving 1st digit.    Estimated Blood Loss: <5 cc    Estimated Blood Loss has been documented.         Specimens:   Specimen (24h ago, onward)            None          [id number]     "hard time hearing out of my right ear\", or \"Do not touch me to wake me up as it startles  me\".  Outcome: Not Progressing  Goal: Absence of Hospital-Acquired Illness or Injury  Outcome: Not Progressing  Intervention: Identify and Manage Fall Risk  Recent Flowsheet Documentation  Taken 10/27/2024 2034 by Cherelle Conner RN  Safety Promotion/Fall Prevention:   activity supervised   assistive device/personal items within reach   clutter free environment maintained   increased rounding and observation   increase visualization of patient   patient and family education   nonskid shoes/slippers when out of bed   room near nurse's station   room organization consistent   supervised activity   safety round/check completed   treat reversible contributory factors   treat underlying cause  Intervention: Prevent Skin Injury  Recent Flowsheet Documentation  Taken 10/27/2024 2034 by Cherelle Conner RN  Body Position:   turned   right   left  Intervention: Prevent and Manage VTE (Venous Thromboembolism) Risk  Recent Flowsheet Documentation  Taken 10/27/2024 2034 by Cherelle Conner RN  VTE Prevention/Management: (heparin drip) SCDs off (sequential compression devices)  Goal: Optimal Comfort and Wellbeing  Outcome: Not Progressing  Intervention: Monitor Pain and Promote Comfort  Recent Flowsheet Documentation  Taken 10/27/2024 2034 by Cherelle Conner RN  Pain Management Interventions: medication (see MAR)  Intervention: Provide Person-Centered Care  Recent Flowsheet Documentation  Taken 10/27/2024 2034 by Cherelle Conner RN  Trust Relationship/Rapport:   choices provided   care explained   emotional support provided   empathic listening provided   questions answered   questions encouraged   reassurance provided   thoughts/feelings acknowledged  Goal: Readiness for Transition of Care  Outcome: Not Progressing     Problem: Comorbidity Management  Goal: Maintenance of Asthma Control  Outcome: Not Progressing  Intervention: Maintain " Asthma Symptom Control  Recent Flowsheet Documentation  Taken 10/27/2024 2034 by Cherelle Conner RN  Medication Review/Management:   medications reviewed   high-risk medications identified  Goal: Maintenance of Behavioral Health Symptom Control  Outcome: Not Progressing  Intervention: Maintain Behavioral Health Symptom Control  Recent Flowsheet Documentation  Taken 10/27/2024 2034 by Cherelle Conner RN  Medication Review/Management:   medications reviewed   high-risk medications identified  Goal: Maintenance of COPD Symptom Control  Outcome: Not Progressing  Intervention: Maintain COPD Symptom Control  Recent Flowsheet Documentation  Taken 10/27/2024 2034 by Cherelle Conner RN  Medication Review/Management:   medications reviewed   high-risk medications identified  Goal: Blood Glucose Levels Within Targeted Range  Outcome: Not Progressing  Intervention: Monitor and Manage Glycemia  Recent Flowsheet Documentation  Taken 10/27/2024 2034 by Cherelle Conner RN  Medication Review/Management:   medications reviewed   high-risk medications identified  Goal: Maintenance of Heart Failure Symptom Control  Outcome: Not Progressing  Intervention: Maintain Heart Failure Management  Recent Flowsheet Documentation  Taken 10/27/2024 2034 by Cherelle Conner RN  Medication Review/Management:   medications reviewed   high-risk medications identified  Goal: Blood Pressure in Desired Range  Outcome: Not Progressing  Intervention: Maintain Blood Pressure Management  Recent Flowsheet Documentation  Taken 10/27/2024 2034 by Cherelle Conner RN  Medication Review/Management:   medications reviewed   high-risk medications identified  Goal: Maintenance of Osteoarthritis Symptom Control  Outcome: Not Progressing  Intervention: Maintain Osteoarthritis Symptom Control  Recent Flowsheet Documentation  Taken 10/27/2024 2034 by Cherelle Conner RN  Assistive Device Utilized: lift device  Activity Management: activity adjusted per  tolerance  Medication Review/Management:   medications reviewed   high-risk medications identified  Goal: Bariatric Home Regimen Maintained  Outcome: Not Progressing  Intervention: Maintain and Manage Postbariatric Surgery Care  Recent Flowsheet Documentation  Taken 10/27/2024 2034 by Cherelle Conner RN  Medication Review/Management:   medications reviewed   high-risk medications identified  Goal: Maintenance of Seizure Control  Outcome: Not Progressing  Intervention: Maintain Seizure Symptom Control  Recent Flowsheet Documentation  Taken 10/27/2024 2034 by Cherelle Conner RN  Sensory Stimulation Regulation:   care clustered   lighting decreased   quiet environment promoted  Medication Review/Management:   medications reviewed   high-risk medications identified     Problem: Gas Exchange Impaired  Goal: Optimal Gas Exchange  Outcome: Not Progressing  Intervention: Optimize Oxygenation and Ventilation  Recent Flowsheet Documentation  Taken 10/27/2024 2034 by Cherelle Conner RN  Head of Bed (HOB) Positioning: HOB at 30-45 degrees     Problem: Fall Injury Risk  Goal: Absence of Fall and Fall-Related Injury  Outcome: Not Progressing  Intervention: Identify and Manage Contributors  Recent Flowsheet Documentation  Taken 10/27/2024 2034 by Cherelle Conner RN  Self-Care Promotion:   independence encouraged   BADL personal objects within reach  Medication Review/Management:   medications reviewed   high-risk medications identified  Intervention: Promote Injury-Free Environment  Recent Flowsheet Documentation  Taken 10/27/2024 2034 by Cherelle Conner RN  Safety Promotion/Fall Prevention:   activity supervised   assistive device/personal items within reach   clutter free environment maintained   increased rounding and observation   increase visualization of patient   patient and family education   nonskid shoes/slippers when out of bed   room near nurse's station   room organization consistent   supervised activity    safety round/check completed   treat reversible contributory factors   treat underlying cause     Problem: Pain Acute  Goal: Optimal Pain Control and Function  Outcome: Not Progressing  Intervention: Optimize Psychosocial Wellbeing  Recent Flowsheet Documentation  Taken 10/27/2024 2034 by Cherelle Conner RN  Supportive Measures:   active listening utilized   decision-making supported   positive reinforcement provided   problem-solving facilitated   relaxation techniques promoted   verbalization of feelings encouraged  Intervention: Develop Pain Management Plan  Recent Flowsheet Documentation  Taken 10/27/2024 2034 by Cherelle Conner RN  Pain Management Interventions: medication (see MAR)  Intervention: Prevent or Manage Pain  Recent Flowsheet Documentation  Taken 10/27/2024 2034 by Cherelle Conner RN  Sensory Stimulation Regulation:   care clustered   lighting decreased   quiet environment promoted  Medication Review/Management:   medications reviewed   high-risk medications identified

## 2024-11-22 ENCOUNTER — TELEPHONE (OUTPATIENT)
Dept: VASCULAR SURGERY | Facility: CLINIC | Age: 59
End: 2024-11-22
Payer: MEDICARE

## 2024-12-20 NOTE — ASSESSMENT & PLAN NOTE
- Last A1c:   Lab Results   Component Value Date    HGBA1C 6.1 (H) 01/07/2021     - Home regimen: Lantus 12 units nightly, increased from 10 due to hyperglycemia. 15u caused hypoglycemia at last week  - Low-sensitivity sliding scale insulin  - Initiate and maintain glycemic protocol, monitor POC glucose   - Follow up with PCP as outpatient   negative...

## 2025-03-27 NOTE — PROGRESS NOTES
Pt awake, alert oriented to self, place, answering questions appropriately. Pt arrived to [facility] via stretcher. NAD. HD started to left arm fistula.    Spoke with Joann 

## (undated) DEVICE — SUT 0 18IN SILK BLK BRAIDE

## (undated) DEVICE — CANISTER SUCTION 2 LTR

## (undated) DEVICE — APPLICATOR CHLORAPREP ORN 26ML

## (undated) DEVICE — DRAPE STERI U-SHAPED 47X51IN

## (undated) DEVICE — SOL 9P NACL IRR PIC IL

## (undated) DEVICE — PULSAVAC ZIMMER

## (undated) DEVICE — PAD CAST SPECIALIST STRL 6

## (undated) DEVICE — ELECTRODE REM PLYHSV RETURN 9

## (undated) DEVICE — SHEET DRAPE FAN-FOLDED 3/4

## (undated) DEVICE — SUT VICRYL 2-0 36 CT-1

## (undated) DEVICE — PAD ABD 8X10 STERILE

## (undated) DEVICE — SEE MEDLINE ITEM 157110

## (undated) DEVICE — BLADE SURG CARBON STEEL #10

## (undated) DEVICE — BLANKET UPPER BODY 78.7X29.9IN

## (undated) DEVICE — UNDERGLOVES BIOGEL PI SIZE 8

## (undated) DEVICE — UNDERGLOVE BIOGEL PI SZ 6.5 LF

## (undated) DEVICE — SYR 10CC LUER LOCK

## (undated) DEVICE — NDL SYR 10ML 18X1.5 LL BLUNT

## (undated) DEVICE — STAPLER SKIN PROXIMATE WIDE

## (undated) DEVICE — COVER OVERHEAD SURG LT BLUE

## (undated) DEVICE — NDL SAFETY 22G X 1.5 ECLIPSE

## (undated) DEVICE — PAD PREP 50/CA

## (undated) DEVICE — LINER GLOVE POWDERFREE SZ 7.5

## (undated) DEVICE — TOWEL OR DISP STRL BLUE 4/PK

## (undated) DEVICE — BNDG COFLEX FOAM LF2 ST 6X5YD

## (undated) DEVICE — GLOVE BIOGEL ORTHOPEDIC 7.5

## (undated) DEVICE — SPONGE LAP 18X18 PREWASHED

## (undated) DEVICE — STAPLER SKIN ROTATING HEAD

## (undated) DEVICE — GLOVE SURG BIOGEL LATEX SZ 7.5

## (undated) DEVICE — NDL 18GA X1 1/2 REG BEVEL

## (undated) DEVICE — PACK ARTHROSCOPY W/ISO BAC

## (undated) DEVICE — BLADE SAW OSC ME-92 COATED

## (undated) DEVICE — GLOVE SURGICAL LATEX SZ 6.5

## (undated) DEVICE — SOL BETADINE 5%

## (undated) DEVICE — SUT VICRYL PLUS 0 CT1 36IN

## (undated) DEVICE — UNGERGLOVE BIOGEL PI INDIC SZ9

## (undated) DEVICE — Device

## (undated) DEVICE — COLLECTION SPECIMEN NEPTUNE

## (undated) DEVICE — BANDAGE ELAS SOFTWRAP ST 4X5YD

## (undated) DEVICE — KIT COLLECTION E SWAB REGULAR

## (undated) DEVICE — BLADE #15 STERILE CARBON

## (undated) DEVICE — SUPPORT ULNA NERVE PROTECTOR

## (undated) DEVICE — SWAB CULTURETTE II DUAL

## (undated) DEVICE — PAD CAST SPECIALIST STRL 4

## (undated) DEVICE — TIP YANKAUERS BULB NO VENT

## (undated) DEVICE — DRESSING GAUZE XEROFORM 5X9

## (undated) DEVICE — GLOVE SURGICAL LATEX SZ 8

## (undated) DEVICE — GLOVE BIOGEL ORTHOPEDIC 8

## (undated) DEVICE — SPONGE DERMACEA GAUZE 4X4

## (undated) DEVICE — SEE MEDLINE ITEM 154981

## (undated) DEVICE — GAUZE SPONGE 4X4 12PLY

## (undated) DEVICE — SOL IRR NACL .9% 3000ML

## (undated) DEVICE — CONTAINER SPECIMEN STRL 4OZ

## (undated) DEVICE — GLOVE BIOGEL SZ 8 1/2

## (undated) DEVICE — SUT VICRYL PLUS ANTIBACT

## (undated) DEVICE — BANDAGE ELAS SOFTWRAP ST 6X5YD

## (undated) DEVICE — SYR SLIP TIP 20CC

## (undated) DEVICE — BANDAGE MATRIX HK LOOP 4IN 5YD